# Patient Record
Sex: MALE | Race: WHITE | Employment: OTHER | URBAN - METROPOLITAN AREA
[De-identification: names, ages, dates, MRNs, and addresses within clinical notes are randomized per-mention and may not be internally consistent; named-entity substitution may affect disease eponyms.]

---

## 2019-12-15 ENCOUNTER — APPOINTMENT (OUTPATIENT)
Dept: CT IMAGING | Age: 69
DRG: 914 | End: 2019-12-15
Payer: MEDICARE

## 2019-12-15 ENCOUNTER — HOSPITAL ENCOUNTER (INPATIENT)
Age: 69
LOS: 1 days | Discharge: HOME | DRG: 914 | End: 2019-12-16
Attending: EMERGENCY MEDICINE | Admitting: SURGERY
Payer: MEDICARE

## 2019-12-15 PROBLEM — S36.892A TRAUMATIC RETROPERITONEAL HEMATOMA: Status: ACTIVE | Noted: 2019-12-15

## 2019-12-15 LAB
ABO/RH: NORMAL
ABSOLUTE EOS #: 0.21 K/UL (ref 0–0.44)
ABSOLUTE EOS #: 0.22 K/UL (ref 0–0.44)
ABSOLUTE IMMATURE GRANULOCYTE: 0.03 K/UL (ref 0–0.3)
ABSOLUTE IMMATURE GRANULOCYTE: 0.04 K/UL (ref 0–0.3)
ABSOLUTE LYMPH #: 1.52 K/UL (ref 1.1–3.7)
ABSOLUTE LYMPH #: 1.66 K/UL (ref 1.1–3.7)
ABSOLUTE MONO #: 0.72 K/UL (ref 0.1–1.2)
ABSOLUTE MONO #: 0.78 K/UL (ref 0.1–1.2)
ALBUMIN SERPL-MCNC: 4.1 G/DL (ref 3.5–5.2)
ALBUMIN/GLOBULIN RATIO: 1.5 (ref 1–2.5)
ALP BLD-CCNC: 85 U/L (ref 40–129)
ALT SERPL-CCNC: 27 U/L (ref 5–41)
ANION GAP SERPL CALCULATED.3IONS-SCNC: 16 MMOL/L (ref 9–17)
ANION GAP SERPL CALCULATED.3IONS-SCNC: 17 MMOL/L (ref 9–17)
ANTIBODY SCREEN: NEGATIVE
ARM BAND NUMBER: NORMAL
AST SERPL-CCNC: 23 U/L
BASOPHILS # BLD: 0 % (ref 0–2)
BASOPHILS # BLD: 0 % (ref 0–2)
BASOPHILS ABSOLUTE: 0.03 K/UL (ref 0–0.2)
BASOPHILS ABSOLUTE: <0.03 K/UL (ref 0–0.2)
BILIRUB SERPL-MCNC: 0.47 MG/DL (ref 0.3–1.2)
BILIRUBIN DIRECT: 0.12 MG/DL
BILIRUBIN, INDIRECT: 0.35 MG/DL (ref 0–1)
BUN BLDV-MCNC: 13 MG/DL (ref 8–23)
BUN BLDV-MCNC: 17 MG/DL (ref 8–23)
BUN/CREAT BLD: ABNORMAL (ref 9–20)
CALCIUM SERPL-MCNC: 8.6 MG/DL (ref 8.6–10.4)
CALCIUM SERPL-MCNC: 8.9 MG/DL (ref 8.6–10.4)
CHLORIDE BLD-SCNC: 102 MMOL/L (ref 98–107)
CHLORIDE BLD-SCNC: 108 MMOL/L (ref 98–107)
CO2: 19 MMOL/L (ref 20–31)
CO2: 21 MMOL/L (ref 20–31)
CREAT SERPL-MCNC: 0.67 MG/DL (ref 0.7–1.2)
CREAT SERPL-MCNC: 0.79 MG/DL (ref 0.7–1.2)
DIFFERENTIAL TYPE: ABNORMAL
DIFFERENTIAL TYPE: ABNORMAL
EOSINOPHILS RELATIVE PERCENT: 3 % (ref 1–4)
EOSINOPHILS RELATIVE PERCENT: 3 % (ref 1–4)
EXPIRATION DATE: NORMAL
GFR AFRICAN AMERICAN: >60 ML/MIN
GFR AFRICAN AMERICAN: >60 ML/MIN
GFR NON-AFRICAN AMERICAN: >60 ML/MIN
GFR NON-AFRICAN AMERICAN: >60 ML/MIN
GFR SERPL CREATININE-BSD FRML MDRD: ABNORMAL ML/MIN/{1.73_M2}
GLUCOSE BLD-MCNC: 151 MG/DL (ref 75–110)
GLUCOSE BLD-MCNC: 199 MG/DL (ref 70–99)
GLUCOSE BLD-MCNC: 207 MG/DL (ref 70–99)
GLUCOSE BLD-MCNC: 246 MG/DL (ref 75–110)
HCT VFR BLD CALC: 37.6 % (ref 40.7–50.3)
HCT VFR BLD CALC: 41.5 % (ref 40.7–50.3)
HCT VFR BLD CALC: 41.9 % (ref 40.7–50.3)
HEMOGLOBIN: 13.1 G/DL (ref 13–17)
HEMOGLOBIN: 13.7 G/DL (ref 13–17)
HEMOGLOBIN: 14.3 G/DL (ref 13–17)
IMMATURE GRANULOCYTES: 0 %
IMMATURE GRANULOCYTES: 1 %
INR BLD: 0.9
LACTIC ACID, WHOLE BLOOD: 4 MMOL/L (ref 0.7–2.1)
LYMPHOCYTES # BLD: 18 % (ref 24–43)
LYMPHOCYTES # BLD: 21 % (ref 24–43)
MAGNESIUM: 2 MG/DL (ref 1.6–2.6)
MCH RBC QN AUTO: 30 PG (ref 25.2–33.5)
MCH RBC QN AUTO: 30.2 PG (ref 25.2–33.5)
MCHC RBC AUTO-ENTMCNC: 33 G/DL (ref 28.4–34.8)
MCHC RBC AUTO-ENTMCNC: 34.1 G/DL (ref 28.4–34.8)
MCV RBC AUTO: 88.4 FL (ref 82.6–102.9)
MCV RBC AUTO: 90.8 FL (ref 82.6–102.9)
MONOCYTES # BLD: 9 % (ref 3–12)
MONOCYTES # BLD: 9 % (ref 3–12)
MRSA, DNA, NASAL: NORMAL
NRBC AUTOMATED: 0 PER 100 WBC
NRBC AUTOMATED: 0 PER 100 WBC
PARTIAL THROMBOPLASTIN TIME: 24.5 SEC (ref 20.5–30.5)
PARTIAL THROMBOPLASTIN TIME: 24.6 SEC (ref 20.5–30.5)
PDW BLD-RTO: 14.1 % (ref 11.8–14.4)
PDW BLD-RTO: 14.4 % (ref 11.8–14.4)
PHOSPHORUS: 3.1 MG/DL (ref 2.5–4.5)
PLATELET # BLD: 199 K/UL (ref 138–453)
PLATELET # BLD: 207 K/UL (ref 138–453)
PLATELET ESTIMATE: ABNORMAL
PLATELET ESTIMATE: ABNORMAL
PMV BLD AUTO: 10.5 FL (ref 8.1–13.5)
PMV BLD AUTO: 10.8 FL (ref 8.1–13.5)
POTASSIUM SERPL-SCNC: 3.9 MMOL/L (ref 3.7–5.3)
POTASSIUM SERPL-SCNC: 4.6 MMOL/L (ref 3.7–5.3)
PROTHROMBIN TIME: 9.6 SEC (ref 9–12)
RBC # BLD: 4.57 M/UL (ref 4.21–5.77)
RBC # BLD: 4.74 M/UL (ref 4.21–5.77)
RBC # BLD: ABNORMAL 10*6/UL
RBC # BLD: ABNORMAL 10*6/UL
SEG NEUTROPHILS: 66 % (ref 36–65)
SEG NEUTROPHILS: 69 % (ref 36–65)
SEGMENTED NEUTROPHILS ABSOLUTE COUNT: 5.18 K/UL (ref 1.5–8.1)
SEGMENTED NEUTROPHILS ABSOLUTE COUNT: 5.9 K/UL (ref 1.5–8.1)
SODIUM BLD-SCNC: 139 MMOL/L (ref 135–144)
SODIUM BLD-SCNC: 144 MMOL/L (ref 135–144)
SPECIMEN DESCRIPTION: NORMAL
TOTAL PROTEIN: 6.8 G/DL (ref 6.4–8.3)
TROPONIN INTERP: NORMAL
TROPONIN T: NORMAL NG/ML
TROPONIN, HIGH SENSITIVITY: 16 NG/L (ref 0–22)
WBC # BLD: 7.8 K/UL (ref 3.5–11.3)
WBC # BLD: 8.5 K/UL (ref 3.5–11.3)
WBC # BLD: ABNORMAL 10*3/UL
WBC # BLD: ABNORMAL 10*3/UL

## 2019-12-15 PROCEDURE — 86850 RBC ANTIBODY SCREEN: CPT

## 2019-12-15 PROCEDURE — 82805 BLOOD GASES W/O2 SATURATION: CPT

## 2019-12-15 PROCEDURE — 82248 BILIRUBIN DIRECT: CPT

## 2019-12-15 PROCEDURE — 83735 ASSAY OF MAGNESIUM: CPT

## 2019-12-15 PROCEDURE — 85610 PROTHROMBIN TIME: CPT

## 2019-12-15 PROCEDURE — 99285 EMERGENCY DEPT VISIT HI MDM: CPT

## 2019-12-15 PROCEDURE — 82947 ASSAY GLUCOSE BLOOD QUANT: CPT

## 2019-12-15 PROCEDURE — 85025 COMPLETE CBC W/AUTO DIFF WBC: CPT

## 2019-12-15 PROCEDURE — 70450 CT HEAD/BRAIN W/O DYE: CPT

## 2019-12-15 PROCEDURE — 86901 BLOOD TYPING SEROLOGIC RH(D): CPT

## 2019-12-15 PROCEDURE — 85027 COMPLETE CBC AUTOMATED: CPT

## 2019-12-15 PROCEDURE — 6370000000 HC RX 637 (ALT 250 FOR IP): Performed by: STUDENT IN AN ORGANIZED HEALTH CARE EDUCATION/TRAINING PROGRAM

## 2019-12-15 PROCEDURE — 80051 ELECTROLYTE PANEL: CPT

## 2019-12-15 PROCEDURE — 83605 ASSAY OF LACTIC ACID: CPT

## 2019-12-15 PROCEDURE — 86900 BLOOD TYPING SEROLOGIC ABO: CPT

## 2019-12-15 PROCEDURE — 80053 COMPREHEN METABOLIC PANEL: CPT

## 2019-12-15 PROCEDURE — 6360000004 HC RX CONTRAST MEDICATION: Performed by: EMERGENCY MEDICINE

## 2019-12-15 PROCEDURE — G0480 DRUG TEST DEF 1-7 CLASSES: HCPCS

## 2019-12-15 PROCEDURE — 72128 CT CHEST SPINE W/O DYE: CPT

## 2019-12-15 PROCEDURE — 2580000003 HC RX 258: Performed by: STUDENT IN AN ORGANIZED HEALTH CARE EDUCATION/TRAINING PROGRAM

## 2019-12-15 PROCEDURE — 72125 CT NECK SPINE W/O DYE: CPT

## 2019-12-15 PROCEDURE — 80307 DRUG TEST PRSMV CHEM ANLYZR: CPT

## 2019-12-15 PROCEDURE — 92523 SPEECH SOUND LANG COMPREHEN: CPT

## 2019-12-15 PROCEDURE — 84100 ASSAY OF PHOSPHORUS: CPT

## 2019-12-15 PROCEDURE — 80048 BASIC METABOLIC PNL TOTAL CA: CPT

## 2019-12-15 PROCEDURE — 72131 CT LUMBAR SPINE W/O DYE: CPT

## 2019-12-15 PROCEDURE — 84520 ASSAY OF UREA NITROGEN: CPT

## 2019-12-15 PROCEDURE — 85018 HEMOGLOBIN: CPT

## 2019-12-15 PROCEDURE — 84484 ASSAY OF TROPONIN QUANT: CPT

## 2019-12-15 PROCEDURE — 84703 CHORIONIC GONADOTROPIN ASSAY: CPT

## 2019-12-15 PROCEDURE — 85730 THROMBOPLASTIN TIME PARTIAL: CPT

## 2019-12-15 PROCEDURE — 36415 COLL VENOUS BLD VENIPUNCTURE: CPT

## 2019-12-15 PROCEDURE — 71260 CT THORAX DX C+: CPT

## 2019-12-15 PROCEDURE — 85014 HEMATOCRIT: CPT

## 2019-12-15 PROCEDURE — 87641 MR-STAPH DNA AMP PROBE: CPT

## 2019-12-15 PROCEDURE — 2060000000 HC ICU INTERMEDIATE R&B

## 2019-12-15 PROCEDURE — 82565 ASSAY OF CREATININE: CPT

## 2019-12-15 RX ORDER — SODIUM CHLORIDE 0.9 % (FLUSH) 0.9 %
10 SYRINGE (ML) INJECTION PRN
Status: DISCONTINUED | OUTPATIENT
Start: 2019-12-15 | End: 2019-12-16 | Stop reason: HOSPADM

## 2019-12-15 RX ORDER — SIMVASTATIN 20 MG
20 TABLET ORAL NIGHTLY
Status: DISCONTINUED | OUTPATIENT
Start: 2019-12-15 | End: 2019-12-16 | Stop reason: HOSPADM

## 2019-12-15 RX ORDER — METHOCARBAMOL 500 MG/1
750 TABLET, FILM COATED ORAL 3 TIMES DAILY PRN
Status: DISCONTINUED | OUTPATIENT
Start: 2019-12-15 | End: 2019-12-16 | Stop reason: HOSPADM

## 2019-12-15 RX ORDER — ACETAMINOPHEN 500 MG
1000 TABLET ORAL EVERY 8 HOURS SCHEDULED
Status: DISCONTINUED | OUTPATIENT
Start: 2019-12-15 | End: 2019-12-16 | Stop reason: HOSPADM

## 2019-12-15 RX ORDER — SODIUM CHLORIDE, SODIUM LACTATE, POTASSIUM CHLORIDE, CALCIUM CHLORIDE 600; 310; 30; 20 MG/100ML; MG/100ML; MG/100ML; MG/100ML
INJECTION, SOLUTION INTRAVENOUS CONTINUOUS
Status: DISCONTINUED | OUTPATIENT
Start: 2019-12-15 | End: 2019-12-16 | Stop reason: HOSPADM

## 2019-12-15 RX ORDER — GLYBURIDE-METFORMIN HYDROCHLORIDE 5; 500 MG/1; MG/1
1 TABLET ORAL 2 TIMES DAILY WITH MEALS
Status: CANCELLED | OUTPATIENT
Start: 2019-12-15

## 2019-12-15 RX ORDER — AMLODIPINE BESYLATE 5 MG/1
5 TABLET ORAL DAILY
Status: DISCONTINUED | OUTPATIENT
Start: 2019-12-15 | End: 2019-12-16 | Stop reason: HOSPADM

## 2019-12-15 RX ORDER — LEVOTHYROXINE SODIUM 0.05 MG/1
50 TABLET ORAL DAILY
Status: DISCONTINUED | OUTPATIENT
Start: 2019-12-15 | End: 2019-12-16 | Stop reason: HOSPADM

## 2019-12-15 RX ORDER — ONDANSETRON 2 MG/ML
4 INJECTION INTRAMUSCULAR; INTRAVENOUS EVERY 6 HOURS PRN
Status: DISCONTINUED | OUTPATIENT
Start: 2019-12-15 | End: 2019-12-16 | Stop reason: HOSPADM

## 2019-12-15 RX ORDER — SODIUM CHLORIDE 0.9 % (FLUSH) 0.9 %
10 SYRINGE (ML) INJECTION EVERY 12 HOURS SCHEDULED
Status: DISCONTINUED | OUTPATIENT
Start: 2019-12-15 | End: 2019-12-16 | Stop reason: HOSPADM

## 2019-12-15 RX ORDER — GABAPENTIN 100 MG/1
100 CAPSULE ORAL 3 TIMES DAILY
Status: DISCONTINUED | OUTPATIENT
Start: 2019-12-15 | End: 2019-12-16 | Stop reason: HOSPADM

## 2019-12-15 RX ORDER — MEMANTINE HYDROCHLORIDE 5 MG/1
10 TABLET ORAL 2 TIMES DAILY
Status: DISCONTINUED | OUTPATIENT
Start: 2019-12-15 | End: 2019-12-16 | Stop reason: HOSPADM

## 2019-12-15 RX ADMIN — MEMANTINE HYDROCHLORIDE 10 MG: 5 TABLET, FILM COATED ORAL at 09:00

## 2019-12-15 RX ADMIN — SODIUM CHLORIDE, PRESERVATIVE FREE 10 ML: 5 INJECTION INTRAVENOUS at 21:05

## 2019-12-15 RX ADMIN — ACETAMINOPHEN 1000 MG: 500 TABLET ORAL at 10:37

## 2019-12-15 RX ADMIN — GABAPENTIN 100 MG: 100 CAPSULE ORAL at 20:49

## 2019-12-15 RX ADMIN — SIMVASTATIN 20 MG: 20 TABLET, FILM COATED ORAL at 20:50

## 2019-12-15 RX ADMIN — ACETAMINOPHEN 1000 MG: 500 TABLET ORAL at 14:58

## 2019-12-15 RX ADMIN — INSULIN LISPRO 2 UNITS: 100 INJECTION, SOLUTION INTRAVENOUS; SUBCUTANEOUS at 20:50

## 2019-12-15 RX ADMIN — GABAPENTIN 100 MG: 100 CAPSULE ORAL at 09:00

## 2019-12-15 RX ADMIN — ACETAMINOPHEN 1000 MG: 500 TABLET ORAL at 20:50

## 2019-12-15 RX ADMIN — SODIUM CHLORIDE, PRESERVATIVE FREE 10 ML: 5 INJECTION INTRAVENOUS at 09:00

## 2019-12-15 RX ADMIN — INSULIN LISPRO 4 UNITS: 100 INJECTION, SOLUTION INTRAVENOUS; SUBCUTANEOUS at 18:25

## 2019-12-15 RX ADMIN — IOHEXOL 75 ML: 350 INJECTION, SOLUTION INTRAVENOUS at 05:08

## 2019-12-15 RX ADMIN — MEMANTINE HYDROCHLORIDE 10 MG: 5 TABLET, FILM COATED ORAL at 20:50

## 2019-12-15 RX ADMIN — LEVOTHYROXINE SODIUM 50 MCG: 50 TABLET ORAL at 10:37

## 2019-12-15 RX ADMIN — AMLODIPINE BESYLATE 5 MG: 5 TABLET ORAL at 10:39

## 2019-12-15 RX ADMIN — INSULIN LISPRO 2 UNITS: 100 INJECTION, SOLUTION INTRAVENOUS; SUBCUTANEOUS at 12:00

## 2019-12-15 RX ADMIN — GABAPENTIN 100 MG: 100 CAPSULE ORAL at 14:00

## 2019-12-15 RX ADMIN — INSULIN LISPRO 4 UNITS: 100 INJECTION, SOLUTION INTRAVENOUS; SUBCUTANEOUS at 09:00

## 2019-12-15 RX ADMIN — SODIUM CHLORIDE, POTASSIUM CHLORIDE, SODIUM LACTATE AND CALCIUM CHLORIDE: 600; 310; 30; 20 INJECTION, SOLUTION INTRAVENOUS at 10:38

## 2019-12-15 ASSESSMENT — ENCOUNTER SYMPTOMS
BACK PAIN: 1
SHORTNESS OF BREATH: 0
ABDOMINAL PAIN: 0

## 2019-12-15 ASSESSMENT — PAIN SCALES - GENERAL
PAINLEVEL_OUTOF10: 8
PAINLEVEL_OUTOF10: 4
PAINLEVEL_OUTOF10: 8
PAINLEVEL_OUTOF10: 0
PAINLEVEL_OUTOF10: 0

## 2019-12-15 ASSESSMENT — PAIN - FUNCTIONAL ASSESSMENT: PAIN_FUNCTIONAL_ASSESSMENT: 0-10

## 2019-12-15 NOTE — PLAN OF CARE
improve  12/15/2019 1712 by Kamla Miranda RN  Outcome: Ongoing  12/15/2019 1711 by Kamla Miranda RN  Outcome: Not Met This Shift     Problem: Respiratory:  Goal: Ability to maintain normal respiratory secretions will improve  Description  Ability to maintain normal respiratory secretions will improve  12/15/2019 1712 by Kamla Miranda RN  Outcome: Ongoing  12/15/2019 1711 by Kamla Miranda RN  Outcome: Not Met This Shift

## 2019-12-15 NOTE — ED NOTES
Lab notified, awaiting lab results for pt. Stated they will call back.      Koleen Phoenix, RN  12/15/19 9786
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
## 2019-12-15 NOTE — PROGRESS NOTES
Comprehension  Comprehension: Within Functional Limits         Expression  Primary Mode of Expression: Verbal    Verbal Expression  Verbal Expression: Within functional limits         Motor Speech  Motor Speech:  Within Functional Limits         Cognition:      Orientation  Overall Orientation Status: Within Normal Limits  Attention  Attention: Within Functional Limits  Memory  Memory: Within Funtional Limits  Problem Solving  Problem Solving: Within Functional Limits  Abstract Reasoning  Abstract Reasoning: Within Functional Limits  Safety/Judgement  Safety/Judgement: Within Functional Limits  Verbal Sequencing: WFL  Thought Organization: WFL  Word Generation: WFL    Prognosis:  Speech Therapy Prognosis  Prognosis: Good  Individuals consulted  Consulted and agree with results and recommendations: Patient    Education:  Patient Education: yes  Patient Education Response: Verbalizes understanding             Therapy Time:   Individual Concurrent Group Co-treatment   Time In 2262         Time Out 1047         Minutes Blanca Schaffer M.S. CCC-SLP    12/15/2019 11:00 AM

## 2019-12-16 ENCOUNTER — APPOINTMENT (OUTPATIENT)
Dept: GENERAL RADIOLOGY | Age: 69
DRG: 914 | End: 2019-12-16
Payer: MEDICARE

## 2019-12-16 VITALS
HEIGHT: 71 IN | RESPIRATION RATE: 21 BRPM | OXYGEN SATURATION: 90 % | HEART RATE: 88 BPM | DIASTOLIC BLOOD PRESSURE: 70 MMHG | BODY MASS INDEX: 27.02 KG/M2 | TEMPERATURE: 98.6 F | WEIGHT: 193 LBS | SYSTOLIC BLOOD PRESSURE: 159 MMHG

## 2019-12-16 LAB
ABSOLUTE EOS #: 0.42 K/UL (ref 0–0.44)
ABSOLUTE IMMATURE GRANULOCYTE: <0.03 K/UL (ref 0–0.3)
ABSOLUTE LYMPH #: 1.29 K/UL (ref 1.1–3.7)
ABSOLUTE MONO #: 0.7 K/UL (ref 0.1–1.2)
ACETAMINOPHEN LEVEL: <5 UG/ML (ref 10–30)
ANION GAP SERPL CALCULATED.3IONS-SCNC: 12 MMOL/L (ref 9–17)
BASOPHILS # BLD: 0 % (ref 0–2)
BASOPHILS ABSOLUTE: 0.03 K/UL (ref 0–0.2)
BUN BLDV-MCNC: 11 MG/DL (ref 8–23)
BUN/CREAT BLD: ABNORMAL (ref 9–20)
CALCIUM SERPL-MCNC: 8.5 MG/DL (ref 8.6–10.4)
CHLORIDE BLD-SCNC: 103 MMOL/L (ref 98–107)
CO2: 26 MMOL/L (ref 20–31)
CREAT SERPL-MCNC: 0.63 MG/DL (ref 0.7–1.2)
DIFFERENTIAL TYPE: ABNORMAL
EOSINOPHILS RELATIVE PERCENT: 6 % (ref 1–4)
ETHANOL PERCENT: 0.07 %
ETHANOL: 71 MG/DL
GFR AFRICAN AMERICAN: >60 ML/MIN
GFR NON-AFRICAN AMERICAN: >60 ML/MIN
GFR SERPL CREATININE-BSD FRML MDRD: ABNORMAL ML/MIN/{1.73_M2}
GFR SERPL CREATININE-BSD FRML MDRD: ABNORMAL ML/MIN/{1.73_M2}
GLUCOSE BLD-MCNC: 172 MG/DL (ref 75–110)
GLUCOSE BLD-MCNC: 175 MG/DL (ref 70–99)
GLUCOSE BLD-MCNC: 276 MG/DL (ref 75–110)
GLUCOSE BLD-MCNC: 93 MG/DL (ref 75–110)
HCT VFR BLD CALC: 39.4 % (ref 40.7–50.3)
HCT VFR BLD CALC: 39.8 % (ref 40.7–50.3)
HEMOGLOBIN: 13.5 G/DL (ref 13–17)
HEMOGLOBIN: 13.7 G/DL (ref 13–17)
IMMATURE GRANULOCYTES: 0 %
LYMPHOCYTES # BLD: 19 % (ref 24–43)
MCH RBC QN AUTO: 31 PG (ref 25.2–33.5)
MCHC RBC AUTO-ENTMCNC: 34.4 G/DL (ref 28.4–34.8)
MCV RBC AUTO: 90 FL (ref 82.6–102.9)
MONOCYTES # BLD: 10 % (ref 3–12)
NRBC AUTOMATED: 0 PER 100 WBC
PDW BLD-RTO: 14.1 % (ref 11.8–14.4)
PLATELET # BLD: 227 K/UL (ref 138–453)
PLATELET ESTIMATE: ABNORMAL
PMV BLD AUTO: 11.3 FL (ref 8.1–13.5)
POTASSIUM SERPL-SCNC: 4.8 MMOL/L (ref 3.7–5.3)
RBC # BLD: 4.42 M/UL (ref 4.21–5.77)
RBC # BLD: ABNORMAL 10*6/UL
SALICYLATE LEVEL: <1 MG/DL (ref 3–10)
SEG NEUTROPHILS: 65 % (ref 36–65)
SEGMENTED NEUTROPHILS ABSOLUTE COUNT: 4.48 K/UL (ref 1.5–8.1)
SODIUM BLD-SCNC: 141 MMOL/L (ref 135–144)
TOXIC TRICYCLIC SC,BLOOD: NEGATIVE
WBC # BLD: 6.9 K/UL (ref 3.5–11.3)
WBC # BLD: ABNORMAL 10*3/UL

## 2019-12-16 PROCEDURE — 2580000003 HC RX 258: Performed by: STUDENT IN AN ORGANIZED HEALTH CARE EDUCATION/TRAINING PROGRAM

## 2019-12-16 PROCEDURE — 6370000000 HC RX 637 (ALT 250 FOR IP): Performed by: STUDENT IN AN ORGANIZED HEALTH CARE EDUCATION/TRAINING PROGRAM

## 2019-12-16 PROCEDURE — 85025 COMPLETE CBC W/AUTO DIFF WBC: CPT

## 2019-12-16 PROCEDURE — 85014 HEMATOCRIT: CPT

## 2019-12-16 PROCEDURE — 97535 SELF CARE MNGMENT TRAINING: CPT

## 2019-12-16 PROCEDURE — APPSS30 APP SPLIT SHARED TIME 16-30 MINUTES: Performed by: NURSE PRACTITIONER

## 2019-12-16 PROCEDURE — 85018 HEMOGLOBIN: CPT

## 2019-12-16 PROCEDURE — 36415 COLL VENOUS BLD VENIPUNCTURE: CPT

## 2019-12-16 PROCEDURE — 73030 X-RAY EXAM OF SHOULDER: CPT

## 2019-12-16 PROCEDURE — 97165 OT EVAL LOW COMPLEX 30 MIN: CPT

## 2019-12-16 PROCEDURE — 97116 GAIT TRAINING THERAPY: CPT

## 2019-12-16 PROCEDURE — 97162 PT EVAL MOD COMPLEX 30 MIN: CPT

## 2019-12-16 PROCEDURE — 80048 BASIC METABOLIC PNL TOTAL CA: CPT

## 2019-12-16 PROCEDURE — 82947 ASSAY GLUCOSE BLOOD QUANT: CPT

## 2019-12-16 RX ADMIN — GABAPENTIN 100 MG: 100 CAPSULE ORAL at 08:25

## 2019-12-16 RX ADMIN — ACETAMINOPHEN 1000 MG: 500 TABLET ORAL at 14:00

## 2019-12-16 RX ADMIN — ACETAMINOPHEN 1000 MG: 500 TABLET ORAL at 06:09

## 2019-12-16 RX ADMIN — INSULIN LISPRO 2 UNITS: 100 INJECTION, SOLUTION INTRAVENOUS; SUBCUTANEOUS at 08:23

## 2019-12-16 RX ADMIN — GABAPENTIN 100 MG: 100 CAPSULE ORAL at 14:00

## 2019-12-16 RX ADMIN — LEVOTHYROXINE SODIUM 50 MCG: 50 TABLET ORAL at 06:08

## 2019-12-16 RX ADMIN — SODIUM CHLORIDE, POTASSIUM CHLORIDE, SODIUM LACTATE AND CALCIUM CHLORIDE: 600; 310; 30; 20 INJECTION, SOLUTION INTRAVENOUS at 10:14

## 2019-12-16 RX ADMIN — INSULIN LISPRO 6 UNITS: 100 INJECTION, SOLUTION INTRAVENOUS; SUBCUTANEOUS at 12:23

## 2019-12-16 RX ADMIN — MEMANTINE HYDROCHLORIDE 10 MG: 5 TABLET, FILM COATED ORAL at 08:25

## 2019-12-16 RX ADMIN — AMLODIPINE BESYLATE 5 MG: 5 TABLET ORAL at 08:25

## 2019-12-16 RX ADMIN — METHOCARBAMOL TABLETS 750 MG: 500 TABLET, COATED ORAL at 12:23

## 2019-12-16 ASSESSMENT — PAIN DESCRIPTION - PROGRESSION: CLINICAL_PROGRESSION: NOT CHANGED

## 2019-12-16 ASSESSMENT — PAIN DESCRIPTION - LOCATION
LOCATION: EYE
LOCATION: EYE

## 2019-12-16 ASSESSMENT — PAIN - FUNCTIONAL ASSESSMENT: PAIN_FUNCTIONAL_ASSESSMENT: ACTIVITIES ARE NOT PREVENTED

## 2019-12-16 ASSESSMENT — PAIN DESCRIPTION - ONSET: ONSET: ON-GOING

## 2019-12-16 ASSESSMENT — PAIN DESCRIPTION - ORIENTATION
ORIENTATION: LEFT
ORIENTATION: LEFT

## 2019-12-16 ASSESSMENT — PAIN DESCRIPTION - DESCRIPTORS: DESCRIPTORS: ACHING

## 2019-12-16 ASSESSMENT — PAIN SCALES - GENERAL
PAINLEVEL_OUTOF10: 1
PAINLEVEL_OUTOF10: 4
PAINLEVEL_OUTOF10: 4
PAINLEVEL_OUTOF10: 9
PAINLEVEL_OUTOF10: 1

## 2019-12-16 ASSESSMENT — PAIN DESCRIPTION - FREQUENCY: FREQUENCY: CONTINUOUS

## 2019-12-16 ASSESSMENT — PAIN DESCRIPTION - PAIN TYPE
TYPE: ACUTE PAIN
TYPE: ACUTE PAIN

## 2019-12-16 NOTE — CARE COORDINATION
SBIRT -completed  Met with patient this date states he drinks about 5 shots of Black Velvet on the weekend. Smokes marijuana x1 weekly  Denies any suicidal ideations  No prior rehab          Alcohol Screening and Brief Intervention        Recent Labs     12/15/19  0400   ALC 71*       Alcohol Pre-screening  (MEN ONLY) How many times in the past year have you had 5 or more drinks in a day?: None       Alcohol Screening Audit       Drug Pre-Screening   How many times in the past year have you used a recreational drug or used a prescription medication for nonmedical reasons?: 1 or more    Drug Screening DAST  TOTAL SCORE[de-identified] 1    Mood Pre-Screening (PHQ-2)  During the past two weeks, have you been bothered by little interest or pleasure in doing things?: No  During the past two weeks, have you been bothered by feeling down, depressed, or hopeless?: No    Mood Pre-Screening (PHQ-9)         I have interviewed Ayaan Valenzuela, [id number] regarding  His alcohol consumption/drug use and risk for excessive use. Screenings were negative. Patient  N/A intervention at this time.      Deferred []    Completed on: 12/16/2019   [address] [facility]

## 2019-12-16 NOTE — PROGRESS NOTES
Activity; Distraction;Repositioned  Response to Pain Intervention: Patient Satisfied    Oxygen Therapy  O2 Device: None (Room air)    Social/Functional History  Social/Functional History  Lives With: Spouse  Type of Home: House  Home Layout: One level  Home Access: Stairs to enter without rails  Entrance Stairs - Number of Steps: 1 big step  Bathroom Shower/Tub: Tub/Shower unit  Bathroom Toilet: Standard  Bathroom Equipment: (daughter is going to bring over shower chair )  Home Equipment: Cane(pt reported only using cane for long distances )  Receives Help From: Family  ADL Assistance: Independent  Homemaking Assistance: Independent  Homemaking Responsibilities: Yes(pt reported splitting with wife )  Meal Prep Responsibility: Secondary  Laundry Responsibility: Secondary  Cleaning Responsibility: Secondary  Ambulation Assistance: Independent  Transfer Assistance: Independent  Active : Yes  Occupation: Retired  Type of occupation: supervisor in factories  Additional Comments: pt reported wife able to assist PRN      Objective   Vision: Within Functional Limits  Hearing: Within functional limits    Orientation  Overall Orientation Status: Within Functional Limits     Balance  Sitting Balance: Independent(~8 minutes on eOB and in chair )  Standing Balance: Supervision  Standing Balance  Time: ~5 minutes   Activity: pt completed functional mobility in hallway with PT and within hospital room   Comment: pt with no LOB      ADL  Feeding: Independent  Grooming: Independent  UE Bathing: Independent  LE Bathing: Modified independent   UE Dressing: Independent  LE Dressing: Modified independent (pt donned/doffed sock while sitting on eOB with no difficulties)  Toileting: Modified independent   Tone RUE  RUE Tone: Normotonic  Tone LUE  LUE Tone: Normotonic  Coordination  Movements Are Fluid And Coordinated: Yes     Bed mobility  Supine to Sit: Modified independent  Sit to Supine: (pt retired to chair at end of session)  Scooting: Modified independent  Transfers  Sit to stand: Supervision  Stand to sit: Supervision     Cognition  Overall Cognitive Status: WFL     Sensation  Overall Sensation Status: WFL      LUE AROM : WFL  Left Hand AROM: WFL  RUE AROM : WFL  Right Hand AROM: WFL    LUE Strength  Gross LUE Strength: WFL  L Hand General: 5/5  RUE Strength  Gross RUE Strength: WFL  R Hand General: 5/5      Plan   Plan  Times per week: D/C OT     AM-PAC Score   AM-[facility] Inpatient Daily Activity Raw Score: 24 (12/16/19 1132)  AM-PAC Inpatient ADL T-Scale Score : 57.54 (12/16/19 1132)  ADL Inpatient CMS 0-100% Score: 0 (12/16/19 1132)  ADL Inpatient CMS G-Code Modifier : [facility] (12/16/19 1132)    Therapy Time   Individual Concurrent Group Co-treatment   Time In 1013         Time Out [facility], OTR/L

## 2019-12-16 NOTE — CARE COORDINATION
Case Management Initial Discharge Plan  F[place],             Met with:patient patient to discuss discharge plans. Information verified: address, contacts, phone number, , insurance Yes  PCP: Chase Foster DO  Date of last visit: 2 months ago    Insurance Provider: auto insurance and [employer] medicare    Discharge Planning    Living Arrangements:  Spouse/Significant Other   Support Systems:  Family Members    Home has 1 stories  2 stairs to climb to get into front door, 0stairs to climb to reach second floor  Location of bedroom/bathroom in home main    Patient able to perform ADL's:Independent    Current Services (outpatient & in home) none  DME equipment: cane  DME provider: 0      Potential Assistance Needed:  Outpatient PT/OT    Patient agreeable to home care: No  [place] of choice provided:  n/a    Prior SNF/Rehab Placement and Facility: none  Agreeable to SNF/Rehab: No  [place] of choice provided: n/a   Evaluation: no    Expected Discharge date:     Patient expects to be discharged to: Follow Up Appointment: Best Day/ Time:      Transportation provider: family  Transportation arrangements needed for discharge: No    Readmission Risk              Risk of Unplanned Readmission:        11             Does patient have a readmission risk score greater than 14?: No  If yes, follow-up appointment must be made within 7 days of discharge. Goals of Care: pain control      Discharge Plan: home independently.           Electronically signed by Khai Munoz RN on 19 at 12:10 PM

## 2019-12-17 NOTE — CARE COORDINATION
Discharge [facility] Case Management Department  Written by: Jn Prasad RN    Patient Name: Cindy Gay  Attending Provider: No att. providers found  Admit Date: 12/15/2019  1:22 AM  MRN: [id number]  Account: [de-identified]                     : 1950  Discharge Date: 2019      Disposition: home independently.     Jn Prasad RN
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
## 2021-07-03 ENCOUNTER — HOSPITAL ENCOUNTER (INPATIENT)
Age: 71
LOS: 2 days | Discharge: HOME | DRG: 596 | End: 2021-07-08
Attending: EMERGENCY MEDICINE | Admitting: FAMILY MEDICINE
Payer: MEDICARE

## 2021-07-03 ENCOUNTER — APPOINTMENT (OUTPATIENT)
Dept: CT IMAGING | Age: 71
DRG: 596 | End: 2021-07-03
Payer: MEDICARE

## 2021-07-03 DIAGNOSIS — B02.9 HERPES ZOSTER WITHOUT COMPLICATION: Primary | ICD-10-CM

## 2021-07-03 DIAGNOSIS — C85.90 NON-HODGKIN'S LYMPHOMA, UNSPECIFIED BODY REGION, UNSPECIFIED NON-HODGKIN LYMPHOMA TYPE (HCC): ICD-10-CM

## 2021-07-03 PROBLEM — N17.9 ACUTE KIDNEY INJURY (HCC): Status: ACTIVE | Noted: 2021-07-03

## 2021-07-03 LAB
-: ABNORMAL
ABSOLUTE EOS #: 0.1 K/UL (ref 0–0.4)
ABSOLUTE IMMATURE GRANULOCYTE: ABNORMAL K/UL (ref 0–0.3)
ABSOLUTE LYMPH #: 1.4 K/UL (ref 1–4.8)
ABSOLUTE MONO #: 0.7 K/UL (ref 0.1–1.3)
ALBUMIN SERPL-MCNC: 3.9 G/DL (ref 3.5–5.2)
ALBUMIN/GLOBULIN RATIO: ABNORMAL (ref 1–2.5)
ALP BLD-CCNC: 144 U/L (ref 40–129)
ALT SERPL-CCNC: 14 U/L (ref 5–41)
AMORPHOUS: ABNORMAL
ANION GAP SERPL CALCULATED.3IONS-SCNC: 10 MMOL/L (ref 9–17)
AST SERPL-CCNC: 16 U/L
BACTERIA: ABNORMAL
BASOPHILS # BLD: 0 % (ref 0–2)
BASOPHILS ABSOLUTE: 0 K/UL (ref 0–0.2)
BILIRUB SERPL-MCNC: 0.67 MG/DL (ref 0.3–1.2)
BILIRUBIN URINE: NEGATIVE
BUN BLDV-MCNC: 39 MG/DL (ref 8–23)
BUN/CREAT BLD: ABNORMAL (ref 9–20)
CALCIUM SERPL-MCNC: 8.8 MG/DL (ref 8.6–10.4)
CASTS UA: ABNORMAL /LPF
CHLORIDE BLD-SCNC: 105 MMOL/L (ref 98–107)
CO2: 26 MMOL/L (ref 20–31)
COLOR: YELLOW
COMMENT UA: ABNORMAL
CREAT SERPL-MCNC: 1.36 MG/DL (ref 0.7–1.2)
CRYSTALS, UA: ABNORMAL /HPF
DIFFERENTIAL TYPE: ABNORMAL
EOSINOPHILS RELATIVE PERCENT: 1 % (ref 0–4)
EPITHELIAL CELLS UA: ABNORMAL /HPF
GFR AFRICAN AMERICAN: >60 ML/MIN
GFR NON-AFRICAN AMERICAN: 52 ML/MIN
GFR SERPL CREATININE-BSD FRML MDRD: ABNORMAL ML/MIN/{1.73_M2}
GFR SERPL CREATININE-BSD FRML MDRD: ABNORMAL ML/MIN/{1.73_M2}
GLUCOSE BLD-MCNC: 139 MG/DL (ref 70–99)
GLUCOSE URINE: NEGATIVE
HCT VFR BLD CALC: 40 % (ref 41–53)
HEMOGLOBIN: 13.5 G/DL (ref 13.5–17.5)
IMMATURE GRANULOCYTES: ABNORMAL %
KETONES, URINE: NEGATIVE
LEUKOCYTE ESTERASE, URINE: NEGATIVE
LYMPHOCYTES # BLD: 15 % (ref 24–44)
MCH RBC QN AUTO: 28.9 PG (ref 26–34)
MCHC RBC AUTO-ENTMCNC: 33.7 G/DL (ref 31–37)
MCV RBC AUTO: 85.6 FL (ref 80–100)
MONOCYTES # BLD: 8 % (ref 1–7)
MUCUS: ABNORMAL
NITRITE, URINE: NEGATIVE
NRBC AUTOMATED: ABNORMAL PER 100 WBC
OTHER OBSERVATIONS UA: ABNORMAL
PDW BLD-RTO: 15.2 % (ref 11.5–14.9)
PH UA: 5.5 (ref 5–8)
PLATELET # BLD: 184 K/UL (ref 150–450)
PLATELET ESTIMATE: ABNORMAL
PMV BLD AUTO: 8.2 FL (ref 6–12)
POTASSIUM SERPL-SCNC: 4.3 MMOL/L (ref 3.7–5.3)
PROTEIN UA: ABNORMAL
RBC # BLD: 4.68 M/UL (ref 4.5–5.9)
RBC # BLD: ABNORMAL 10*6/UL
RBC UA: ABNORMAL /HPF
RENAL EPITHELIAL, UA: ABNORMAL /HPF
SEG NEUTROPHILS: 76 % (ref 36–66)
SEGMENTED NEUTROPHILS ABSOLUTE COUNT: 6.7 K/UL (ref 1.3–9.1)
SODIUM BLD-SCNC: 141 MMOL/L (ref 135–144)
SPECIFIC GRAVITY UA: 1.02 (ref 1–1.03)
TOTAL PROTEIN: 6.4 G/DL (ref 6.4–8.3)
TRICHOMONAS: ABNORMAL
TURBIDITY: CLEAR
URINE HGB: ABNORMAL
UROBILINOGEN, URINE: NORMAL
WBC # BLD: 8.9 K/UL (ref 3.5–11)
WBC # BLD: ABNORMAL 10*3/UL
WBC UA: ABNORMAL /HPF
YEAST: ABNORMAL

## 2021-07-03 PROCEDURE — 6370000000 HC RX 637 (ALT 250 FOR IP): Performed by: FAMILY MEDICINE

## 2021-07-03 PROCEDURE — 2580000003 HC RX 258: Performed by: FAMILY MEDICINE

## 2021-07-03 PROCEDURE — 80053 COMPREHEN METABOLIC PANEL: CPT

## 2021-07-03 PROCEDURE — 2580000003 HC RX 258: Performed by: EMERGENCY MEDICINE

## 2021-07-03 PROCEDURE — G0378 HOSPITAL OBSERVATION PER HR: HCPCS

## 2021-07-03 PROCEDURE — 36415 COLL VENOUS BLD VENIPUNCTURE: CPT

## 2021-07-03 PROCEDURE — 85025 COMPLETE CBC W/AUTO DIFF WBC: CPT

## 2021-07-03 PROCEDURE — 6360000004 HC RX CONTRAST MEDICATION: Performed by: EMERGENCY MEDICINE

## 2021-07-03 PROCEDURE — 6370000000 HC RX 637 (ALT 250 FOR IP): Performed by: EMERGENCY MEDICINE

## 2021-07-03 PROCEDURE — 99284 EMERGENCY DEPT VISIT MOD MDM: CPT

## 2021-07-03 PROCEDURE — 96374 THER/PROPH/DIAG INJ IV PUSH: CPT

## 2021-07-03 PROCEDURE — 6360000002 HC RX W HCPCS: Performed by: EMERGENCY MEDICINE

## 2021-07-03 PROCEDURE — 74177 CT ABD & PELVIS W/CONTRAST: CPT

## 2021-07-03 PROCEDURE — 6360000002 HC RX W HCPCS: Performed by: FAMILY MEDICINE

## 2021-07-03 PROCEDURE — 96375 TX/PRO/DX INJ NEW DRUG ADDON: CPT

## 2021-07-03 PROCEDURE — 81001 URINALYSIS AUTO W/SCOPE: CPT

## 2021-07-03 RX ORDER — LEVOTHYROXINE SODIUM 0.05 MG/1
50 TABLET ORAL DAILY
Status: DISCONTINUED | OUTPATIENT
Start: 2021-07-04 | End: 2021-07-08 | Stop reason: HOSPADM

## 2021-07-03 RX ORDER — ONDANSETRON 4 MG/1
4 TABLET, ORALLY DISINTEGRATING ORAL EVERY 8 HOURS PRN
Status: DISCONTINUED | OUTPATIENT
Start: 2021-07-03 | End: 2021-07-08 | Stop reason: HOSPADM

## 2021-07-03 RX ORDER — 0.9 % SODIUM CHLORIDE 0.9 %
1000 INTRAVENOUS SOLUTION INTRAVENOUS ONCE
Status: COMPLETED | OUTPATIENT
Start: 2021-07-03 | End: 2021-07-03

## 2021-07-03 RX ORDER — DEXTROSE MONOHYDRATE 25 G/50ML
12.5 INJECTION, SOLUTION INTRAVENOUS PRN
Status: DISCONTINUED | OUTPATIENT
Start: 2021-07-03 | End: 2021-07-08 | Stop reason: HOSPADM

## 2021-07-03 RX ORDER — ACYCLOVIR 800 MG/1
800 TABLET ORAL
Status: DISCONTINUED | OUTPATIENT
Start: 2021-07-03 | End: 2021-07-06

## 2021-07-03 RX ORDER — GLYBURIDE 5 MG/1
5 TABLET ORAL 2 TIMES DAILY WITH MEALS
Status: DISCONTINUED | OUTPATIENT
Start: 2021-07-04 | End: 2021-07-08 | Stop reason: HOSPADM

## 2021-07-03 RX ORDER — 0.9 % SODIUM CHLORIDE 0.9 %
80 INTRAVENOUS SOLUTION INTRAVENOUS ONCE
Status: COMPLETED | OUTPATIENT
Start: 2021-07-03 | End: 2021-07-03

## 2021-07-03 RX ORDER — MORPHINE SULFATE 4 MG/ML
4 INJECTION, SOLUTION INTRAMUSCULAR; INTRAVENOUS ONCE
Status: COMPLETED | OUTPATIENT
Start: 2021-07-03 | End: 2021-07-03

## 2021-07-03 RX ORDER — MEMANTINE HYDROCHLORIDE 10 MG/1
10 TABLET ORAL 2 TIMES DAILY
Status: DISCONTINUED | OUTPATIENT
Start: 2021-07-04 | End: 2021-07-08 | Stop reason: HOSPADM

## 2021-07-03 RX ORDER — OXYCODONE HYDROCHLORIDE AND ACETAMINOPHEN 5; 325 MG/1; MG/1
2 TABLET ORAL ONCE
Status: COMPLETED | OUTPATIENT
Start: 2021-07-03 | End: 2021-07-03

## 2021-07-03 RX ORDER — OMEGA-3-ACID ETHYL ESTERS 1 G/1
1 CAPSULE, LIQUID FILLED ORAL 2 TIMES DAILY
Status: DISCONTINUED | OUTPATIENT
Start: 2021-07-04 | End: 2021-07-08 | Stop reason: HOSPADM

## 2021-07-03 RX ORDER — ATORVASTATIN CALCIUM 20 MG/1
20 TABLET, FILM COATED ORAL DAILY
Status: DISCONTINUED | OUTPATIENT
Start: 2021-07-04 | End: 2021-07-08 | Stop reason: HOSPADM

## 2021-07-03 RX ORDER — SODIUM CHLORIDE 0.9 % (FLUSH) 0.9 %
5-40 SYRINGE (ML) INJECTION EVERY 12 HOURS SCHEDULED
Status: DISCONTINUED | OUTPATIENT
Start: 2021-07-03 | End: 2021-07-08 | Stop reason: HOSPADM

## 2021-07-03 RX ORDER — DEXTROSE MONOHYDRATE 50 MG/ML
100 INJECTION, SOLUTION INTRAVENOUS PRN
Status: DISCONTINUED | OUTPATIENT
Start: 2021-07-03 | End: 2021-07-08 | Stop reason: HOSPADM

## 2021-07-03 RX ORDER — NICOTINE POLACRILEX 4 MG
15 LOZENGE BUCCAL PRN
Status: DISCONTINUED | OUTPATIENT
Start: 2021-07-03 | End: 2021-07-08 | Stop reason: HOSPADM

## 2021-07-03 RX ORDER — AMLODIPINE BESYLATE 5 MG/1
5 TABLET ORAL DAILY
Status: DISCONTINUED | OUTPATIENT
Start: 2021-07-04 | End: 2021-07-06

## 2021-07-03 RX ORDER — SODIUM CHLORIDE 9 MG/ML
25 INJECTION, SOLUTION INTRAVENOUS PRN
Status: DISCONTINUED | OUTPATIENT
Start: 2021-07-03 | End: 2021-07-08 | Stop reason: HOSPADM

## 2021-07-03 RX ORDER — GLYBURIDE-METFORMIN HYDROCHLORIDE 5; 500 MG/1; MG/1
1 TABLET ORAL 2 TIMES DAILY WITH MEALS
Status: DISCONTINUED | OUTPATIENT
Start: 2021-07-04 | End: 2021-07-03 | Stop reason: SDUPTHER

## 2021-07-03 RX ORDER — ACETAMINOPHEN 325 MG/1
650 TABLET ORAL EVERY 4 HOURS PRN
Status: DISCONTINUED | OUTPATIENT
Start: 2021-07-03 | End: 2021-07-04

## 2021-07-03 RX ORDER — SODIUM CHLORIDE 0.9 % (FLUSH) 0.9 %
5-40 SYRINGE (ML) INJECTION PRN
Status: DISCONTINUED | OUTPATIENT
Start: 2021-07-03 | End: 2021-07-08 | Stop reason: HOSPADM

## 2021-07-03 RX ORDER — LIDOCAINE 4 G/G
1 PATCH TOPICAL DAILY
Status: DISCONTINUED | OUTPATIENT
Start: 2021-07-04 | End: 2021-07-08 | Stop reason: HOSPADM

## 2021-07-03 RX ORDER — ONDANSETRON 2 MG/ML
4 INJECTION INTRAMUSCULAR; INTRAVENOUS EVERY 6 HOURS PRN
Status: DISCONTINUED | OUTPATIENT
Start: 2021-07-03 | End: 2021-07-08 | Stop reason: HOSPADM

## 2021-07-03 RX ORDER — ONDANSETRON 2 MG/ML
8 INJECTION INTRAMUSCULAR; INTRAVENOUS ONCE
Status: COMPLETED | OUTPATIENT
Start: 2021-07-03 | End: 2021-07-03

## 2021-07-03 RX ORDER — SODIUM CHLORIDE 9 MG/ML
INJECTION, SOLUTION INTRAVENOUS CONTINUOUS
Status: DISCONTINUED | OUTPATIENT
Start: 2021-07-03 | End: 2021-07-08 | Stop reason: HOSPADM

## 2021-07-03 RX ORDER — SODIUM CHLORIDE 0.9 % (FLUSH) 0.9 %
10 SYRINGE (ML) INJECTION PRN
Status: DISCONTINUED | OUTPATIENT
Start: 2021-07-03 | End: 2021-07-06 | Stop reason: SDUPTHER

## 2021-07-03 RX ADMIN — SODIUM CHLORIDE 1000 ML: 9 INJECTION, SOLUTION INTRAVENOUS at 12:46

## 2021-07-03 RX ADMIN — IOPAMIDOL 75 ML: 755 INJECTION, SOLUTION INTRAVENOUS at 13:54

## 2021-07-03 RX ADMIN — SODIUM CHLORIDE 80 ML: 9 INJECTION, SOLUTION INTRAVENOUS at 13:48

## 2021-07-03 RX ADMIN — SODIUM CHLORIDE, PRESERVATIVE FREE 10 ML: 5 INJECTION INTRAVENOUS at 13:48

## 2021-07-03 RX ADMIN — HYDROMORPHONE HYDROCHLORIDE 0.5 MG: 1 INJECTION, SOLUTION INTRAMUSCULAR; INTRAVENOUS; SUBCUTANEOUS at 23:45

## 2021-07-03 RX ADMIN — MORPHINE SULFATE 4 MG: 4 INJECTION, SOLUTION INTRAMUSCULAR; INTRAVENOUS at 12:46

## 2021-07-03 RX ADMIN — SODIUM CHLORIDE, PRESERVATIVE FREE 10 ML: 5 INJECTION INTRAVENOUS at 21:24

## 2021-07-03 RX ADMIN — HYDROMORPHONE HYDROCHLORIDE 1 MG: 1 INJECTION, SOLUTION INTRAMUSCULAR; INTRAVENOUS; SUBCUTANEOUS at 15:37

## 2021-07-03 RX ADMIN — OXYCODONE HYDROCHLORIDE AND ACETAMINOPHEN 2 TABLET: 5; 325 TABLET ORAL at 17:48

## 2021-07-03 RX ADMIN — ACYCLOVIR 800 MG: 800 TABLET ORAL at 21:24

## 2021-07-03 RX ADMIN — SODIUM CHLORIDE, PRESERVATIVE FREE 10 ML: 5 INJECTION INTRAVENOUS at 21:23

## 2021-07-03 RX ADMIN — SODIUM CHLORIDE: 9 INJECTION, SOLUTION INTRAVENOUS at 21:24

## 2021-07-03 RX ADMIN — ACYCLOVIR 800 MG: 800 TABLET ORAL at 23:21

## 2021-07-03 RX ADMIN — ONDANSETRON 8 MG: 2 INJECTION INTRAMUSCULAR; INTRAVENOUS at 15:37

## 2021-07-03 ASSESSMENT — PAIN DESCRIPTION - LOCATION: LOCATION: ABDOMEN;BACK

## 2021-07-03 ASSESSMENT — PAIN DESCRIPTION - DESCRIPTORS: DESCRIPTORS: THROBBING

## 2021-07-03 ASSESSMENT — PAIN DESCRIPTION - ORIENTATION: ORIENTATION: RIGHT

## 2021-07-03 ASSESSMENT — PAIN SCALES - GENERAL
PAINLEVEL_OUTOF10: 7
PAINLEVEL_OUTOF10: 4
PAINLEVEL_OUTOF10: 6
PAINLEVEL_OUTOF10: 7
PAINLEVEL_OUTOF10: 4
PAINLEVEL_OUTOF10: 8

## 2021-07-03 ASSESSMENT — ENCOUNTER SYMPTOMS
ABDOMINAL PAIN: 1
EYE PAIN: 0
CONSTIPATION: 0
DIARRHEA: 0
VOMITING: 0
COUGH: 0
SHORTNESS OF BREATH: 0
SORE THROAT: 0
BACK PAIN: 0
CHEST TIGHTNESS: 0
NAUSEA: 0

## 2021-07-03 ASSESSMENT — PAIN DESCRIPTION - PAIN TYPE: TYPE: ACUTE PAIN

## 2021-07-03 NOTE — ED NOTES
Report given to Mi Infante RN from [employer]. Report method by phone   The following was reviewed with receiving RN:   Current vital signs:  BP (!) 150/59   Pulse 71   Temp 97.8 °F (36.6 °C) (Oral)   Resp 20   Ht 5' 10.5\" (1.791 m)   Wt 192 lb (87.1 kg)   SpO2 91%   BMI 27.16 kg/m²                MEWS Score: 1     Any medication or safety alerts were reviewed. Any pending diagnostics and notifications were also reviewed, as well as any safety concerns or issues, abnormal labs, abnormal imaging, and abnormal assessment findings. Questions were answered.             La Nena Peter RN  07/03/21 0390

## 2021-07-03 NOTE — ED NOTES
Patient wife at nurses station upset pt hasn't been seen by physician. RN and house supervisor spoke with wife notified her physician will be in shortly.       Bakari Brown, GIOVANY  07/03/21 7475

## 2021-07-03 NOTE — ED PROVIDER NOTES
[address] ED  eMERGENCY dEPARTMENT eNCOUnter    Pt Name: Inge Zarate  MRN: [id number]  [place] 1950  Date of evaluation: 7/3/21  CHIEF COMPLAINT       Chief Complaint   Patient presents with    Flank Pain     Rt flank pain    Abdominal Pain     RLQ pain     HISTORY OF PRESENT ILLNESS   HPI  R sided flank and abdominal pain started about 2 days ago. Pain is constant but varies in intensity, it is throbbing in nature, currently 2-3/10 in severity. No fever, nausea or vomiting. He cannot recall his last BM but states he has passed flatus today. No abdominal surgeries. No urinary symptoms. REVIEW OF SYSTEMS     Review of Systems   Constitutional: Negative for chills and fever. HENT: Negative for congestion, ear pain and sore throat. Eyes: Negative for pain and visual disturbance. Respiratory: Negative for cough, chest tightness and shortness of breath. Cardiovascular: Negative for chest pain and palpitations. Gastrointestinal: Positive for abdominal pain. Negative for constipation, diarrhea, nausea and vomiting. Genitourinary: Positive for flank pain. Negative for dysuria and testicular pain. Musculoskeletal: Negative for back pain. Skin: Positive for rash. Negative for wound. Neurological: Negative for seizures, syncope and headaches. PASTMEDICAL HISTORY     Past Medical History:   Diagnosis Date    [facility] ([facility] [address]     History of non-Hodgkin's lymphoma     Hyperlipidemia     Hypertension     Hypothyroidism      SURGICAL HISTORY       Past Surgical History:   Procedure Laterality Date    HERNIA REPAIR       CURRENT MEDICATIONS       Previous Medications    AMLODIPINE (NORVASC) 5 MG TABLET    Take 5 mg by mouth daily. GLYBURIDE-METFORMIN (GLUCOVANCE) 5-500 MG PER TABLET    Take 1 tablet by mouth 2 times daily (with meals). LEVOTHYROXINE (SYNTHROID) 50 MCG TABLET    Take 50 mcg by mouth Daily.       MEMANTINE (NAMENDA) 10 MG TABLET    Take 10 mg by mouth 2 times daily.      OMEGA-3 ACID ETHYL ESTERS (LOVAZA) 1 G CAPSULE    Take 1 g by mouth 2 times daily. SIMVASTATIN (ZOCOR) 20 MG TABLET    Take 20 mg by mouth nightly. ALLERGIES     has No Known Allergies. FAMILY HISTORY     has no family status information on file. SOCIALHISTORY      reports that he quit smoking about 15 years ago. His smoking use included cigarettes. He has a 90.00 pack-year smoking history. He does not have any smokeless tobacco history on file. He reports current alcohol use of about 1.7 standard drinks of alcohol per week. He reports current drug use. Drug: Marijuana. PHYSICAL EXAM     INITIAL VITALS: BP (!) 167/63   Pulse 65   Temp 97.8 °F (36.6 °C) (Oral)   Resp 19   Ht 5' 10.5\" (1.791 m)   Wt 192 lb (87.1 kg)   SpO2 92%   BMI 27.16 kg/m²    Physical Exam  Vitals and nursing note reviewed. Constitutional:       Appearance: He is well-developed. HENT:      Head: Normocephalic and atraumatic. Right Ear: External ear normal.      Left Ear: External ear normal.   Eyes:      Conjunctiva/sclera: Conjunctivae normal.      Pupils: Pupils are equal, round, and reactive to light. Neck:      Vascular: No JVD. Trachea: No tracheal deviation. Cardiovascular:      Rate and Rhythm: Normal rate and regular rhythm. Heart sounds: Normal heart sounds. Pulmonary:      Effort: Pulmonary effort is normal. No respiratory distress. Breath sounds: Normal breath sounds. No stridor. Abdominal:      General: Bowel sounds are normal. There is no distension. Palpations: Abdomen is soft. Tenderness: There is abdominal tenderness in the right upper quadrant and right lower quadrant. Musculoskeletal:         General: No tenderness or deformity. Normal range of motion. Cervical back: Normal range of motion and neck supple. Skin:     Comments: Excoriated rash noted over right flank, no vesicles noted.     Neurological:      Mental Status: He is alert and oriented to person, place, and time. Cranial Nerves: No cranial nerve deficit. Coordination: Coordination normal.         MEDICAL DECISION MAKING:   Assessment:  Holden Cordova is a 65 y.o. male who presents with right flank and abdominal pain. ED Course/MDM:   Patient arrived hemodynamically stable and in no acute distress. Patient's previous imaging and studies reviewed. On exam, patient does have a rash on his right flank which appears dermatomal and does not cross midline, not clear if zoster as it is not vesicular. Labs with LAURO. 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 We will admit for heme consult regarding infiltrative mass, IVF, pain management. Discussed with Dr Tolu Do who also requested that we initiate anti-virals for shingles. Admit. Procedures    DIAGNOSTIC RESULTS   EKG: All EKG's are interpreted by the Emergency Department Physician who either signs or Co-signs this chart inthe absence of a cardiologist.      RADIOLOGY:All plain film, CT, MRI, and formal ultrasound images (except ED bedside ultrasound) are read by the radiologist, see reports below, unless otherwise noted in MDM or here. CT ABDOMEN PELVIS W IV CONTRAST Additional Contrast? None   Final Result   Infiltrative soft tissue density is seen in the retroperitoneum on the left,   with increased size of retroperitoneal lymph nodes, and involvement of the   adrenal glands, and pancreas. .  Given the lack of resolution and progression   constellation of findings suggest underlying neoplastic etiology, possibly   lymphoma. Tiny nonobstructing right renal stone, and cholelithiasis atherosclerosis   with severe narrowing of the superficial femoral arteries and proximal iliacs           LABS: All lab results were reviewed by myself, and all abnormals are listed below.   Labs Reviewed   URINE RT REFLEX TO CULTURE - Abnormal; Notable for the following components:       Result Value    Urine Hgb TRACE (*)     Protein, UA 4+ (*)     All other components within normal limits   MICROSCOPIC URINALYSIS - Abnormal; Notable for the following components:    Bacteria, UA FEW (*)     All other components within normal limits   CBC WITH AUTO DIFFERENTIAL - Abnormal; Notable for the following components:    Hematocrit 40.0 (*)     RDW 15.2 (*)     Seg Neutrophils 76 (*)     Lymphocytes 15 (*)     Monocytes 8 (*)     All other components within normal limits   COMPREHENSIVE METABOLIC PANEL - Abnormal; Notable for the following components:    Glucose 139 (*)     BUN 39 (*)     CREATININE 1.36 (*)     Alkaline Phosphatase 144 (*)     GFR Non- 52 (*)     All other components within normal limits     EMERGENCY DEPARTMENT COURSE:   Vitals:    Vitals:    07/03/21 1025 07/03/21 1606 07/03/21 1630   BP: (!) 156/69 (!) 186/67 (!) 167/63   Pulse: 74 67 65   Resp: 16 16 19   Temp: 97.8 °F (36.6 °C)     TempSrc: Oral     SpO2: 96% 95% 92%   Weight: 192 lb (87.1 kg)     Height: 5' 10.5\" (1.791 m)         The patient was given the following medications while in the emergency department:  Orders Placed This Encounter   Medications    0.9 % sodium chloride bolus    morphine sulfate (PF) injection 4 mg    0.9 % sodium chloride bolus    sodium chloride flush 0.9 % injection 10 mL    iopamidol (ISOVUE-370) 76 % injection 75 mL    HYDROmorphone (DILAUDID) injection 1 mg    ondansetron (ZOFRAN) injection 8 mg    oxyCODONE-acetaminophen (PERCOCET) 5-325 MG per tablet 2 tablet     CONSULTS:  IP CONSULT TO FAMILY MEDICINE  IP CONSULT TO GYNECOLOGIC ONCOLOGY    FINAL IMPRESSION      1. Herpes zoster without complication          DISPOSITION/PLAN   DISPOSITION Admitted 07/03/2021 05:26:55 PM      PATIENT REFERRED TO:  No follow-up provider specified.   DISCHARGE MEDICATIONS:  New Prescriptions    No medications on file     Coralyn Im, MD  AttendingEmergency Physician                        Chani Andres MD  07/03/21 5683

## 2021-07-03 NOTE — ED NOTES
Patient's family at nurses station stating patient is in need of more pain medication.  RN notified MD of patient's request.      Leon Villalba RN  07/03/21 7692

## 2021-07-03 NOTE — ED NOTES
Report given to Azul Fung RN from [facility]. Report method in person   The following was reviewed with receiving RN:   Current vital signs:  BP (!) 168/87   Pulse 85   Temp 97.8 °F (36.6 °C) (Oral)   Resp 14   Ht 5' 10.5\" (1.791 m)   Wt 192 lb (87.1 kg)   SpO2 100%   BMI 27.16 kg/m²                MEWS Score: 1     Any medication or safety alerts were reviewed. Any pending diagnostics and notifications were also reviewed, as well as any safety concerns or issues, abnormal labs, abnormal imaging, and abnormal assessment findings. Questions were answered.           Rico Neil RN  07/03/21 0963

## 2021-07-04 LAB
ABSOLUTE EOS #: 0.2 K/UL (ref 0–0.4)
ABSOLUTE IMMATURE GRANULOCYTE: ABNORMAL K/UL (ref 0–0.3)
ABSOLUTE LYMPH #: 0.8 K/UL (ref 1–4.8)
ABSOLUTE MONO #: 0.6 K/UL (ref 0.1–1.3)
AMYLASE: 56 U/L (ref 28–100)
ANION GAP SERPL CALCULATED.3IONS-SCNC: 10 MMOL/L (ref 9–17)
BASOPHILS # BLD: 0 % (ref 0–2)
BASOPHILS ABSOLUTE: 0 K/UL (ref 0–0.2)
BUN BLDV-MCNC: 32 MG/DL (ref 8–23)
BUN/CREAT BLD: ABNORMAL (ref 9–20)
CALCIUM SERPL-MCNC: 8.1 MG/DL (ref 8.6–10.4)
CHLORIDE BLD-SCNC: 107 MMOL/L (ref 98–107)
CO2: 25 MMOL/L (ref 20–31)
CREAT SERPL-MCNC: 1.21 MG/DL (ref 0.7–1.2)
DIFFERENTIAL TYPE: ABNORMAL
EOSINOPHILS RELATIVE PERCENT: 3 % (ref 0–4)
GFR AFRICAN AMERICAN: >60 ML/MIN
GFR NON-AFRICAN AMERICAN: 59 ML/MIN
GFR SERPL CREATININE-BSD FRML MDRD: ABNORMAL ML/MIN/{1.73_M2}
GFR SERPL CREATININE-BSD FRML MDRD: ABNORMAL ML/MIN/{1.73_M2}
GLUCOSE BLD-MCNC: 106 MG/DL (ref 75–110)
GLUCOSE BLD-MCNC: 117 MG/DL (ref 70–99)
GLUCOSE BLD-MCNC: 126 MG/DL (ref 75–110)
GLUCOSE BLD-MCNC: 129 MG/DL (ref 75–110)
GLUCOSE BLD-MCNC: 153 MG/DL (ref 75–110)
GLUCOSE BLD-MCNC: 156 MG/DL (ref 75–110)
GLUCOSE BLD-MCNC: 44 MG/DL (ref 75–110)
GLUCOSE BLD-MCNC: 63 MG/DL (ref 75–110)
HCT VFR BLD CALC: 36.4 % (ref 41–53)
HEMOGLOBIN: 12.3 G/DL (ref 13.5–17.5)
IMMATURE GRANULOCYTES: ABNORMAL %
LACTATE DEHYDROGENASE: 156 U/L (ref 135–225)
LIPASE: 31 U/L (ref 13–60)
LYMPHOCYTES # BLD: 14 % (ref 24–44)
MCH RBC QN AUTO: 28.9 PG (ref 26–34)
MCHC RBC AUTO-ENTMCNC: 33.9 G/DL (ref 31–37)
MCV RBC AUTO: 85.2 FL (ref 80–100)
MONOCYTES # BLD: 11 % (ref 1–7)
NRBC AUTOMATED: ABNORMAL PER 100 WBC
PATHOLOGIST REVIEW: NORMAL
PDW BLD-RTO: 15 % (ref 11.5–14.9)
PLATELET # BLD: 156 K/UL (ref 150–450)
PLATELET ESTIMATE: ABNORMAL
PMV BLD AUTO: 8.5 FL (ref 6–12)
POTASSIUM SERPL-SCNC: 4.7 MMOL/L (ref 3.7–5.3)
RBC # BLD: 4.27 M/UL (ref 4.5–5.9)
RBC # BLD: ABNORMAL 10*6/UL
SEG NEUTROPHILS: 72 % (ref 36–66)
SEGMENTED NEUTROPHILS ABSOLUTE COUNT: 4.4 K/UL (ref 1.3–9.1)
SODIUM BLD-SCNC: 142 MMOL/L (ref 135–144)
WBC # BLD: 6 K/UL (ref 3.5–11)
WBC # BLD: ABNORMAL 10*3/UL

## 2021-07-04 PROCEDURE — 88185 FLOWCYTOMETRY/TC ADD-ON: CPT

## 2021-07-04 PROCEDURE — 6360000002 HC RX W HCPCS: Performed by: FAMILY MEDICINE

## 2021-07-04 PROCEDURE — 83615 LACTATE (LD) (LDH) ENZYME: CPT

## 2021-07-04 PROCEDURE — 85025 COMPLETE CBC W/AUTO DIFF WBC: CPT

## 2021-07-04 PROCEDURE — 88184 FLOWCYTOMETRY/ TC 1 MARKER: CPT

## 2021-07-04 PROCEDURE — 82947 ASSAY GLUCOSE BLOOD QUANT: CPT

## 2021-07-04 PROCEDURE — G0378 HOSPITAL OBSERVATION PER HR: HCPCS

## 2021-07-04 PROCEDURE — 6370000000 HC RX 637 (ALT 250 FOR IP): Performed by: FAMILY MEDICINE

## 2021-07-04 PROCEDURE — 82150 ASSAY OF AMYLASE: CPT

## 2021-07-04 PROCEDURE — 2580000003 HC RX 258: Performed by: FAMILY MEDICINE

## 2021-07-04 PROCEDURE — 80048 BASIC METABOLIC PNL TOTAL CA: CPT

## 2021-07-04 PROCEDURE — 36415 COLL VENOUS BLD VENIPUNCTURE: CPT

## 2021-07-04 PROCEDURE — 83690 ASSAY OF LIPASE: CPT

## 2021-07-04 PROCEDURE — 99223 1ST HOSP IP/OBS HIGH 75: CPT | Performed by: INTERNAL MEDICINE

## 2021-07-04 RX ORDER — TRAMADOL HYDROCHLORIDE 50 MG/1
50 TABLET ORAL EVERY 8 HOURS PRN
Status: DISCONTINUED | OUTPATIENT
Start: 2021-07-04 | End: 2021-07-05

## 2021-07-04 RX ORDER — ACETAMINOPHEN 325 MG/1
650 TABLET ORAL EVERY 6 HOURS PRN
Status: DISCONTINUED | OUTPATIENT
Start: 2021-07-04 | End: 2021-07-08 | Stop reason: HOSPADM

## 2021-07-04 RX ORDER — PREGABALIN 50 MG/1
50 CAPSULE ORAL NIGHTLY
Status: DISCONTINUED | OUTPATIENT
Start: 2021-07-04 | End: 2021-07-08 | Stop reason: HOSPADM

## 2021-07-04 RX ADMIN — SODIUM CHLORIDE: 9 INJECTION, SOLUTION INTRAVENOUS at 19:13

## 2021-07-04 RX ADMIN — ACYCLOVIR 800 MG: 800 TABLET ORAL at 19:12

## 2021-07-04 RX ADMIN — METFORMIN HYDROCHLORIDE 500 MG: 500 TABLET ORAL at 08:21

## 2021-07-04 RX ADMIN — ENOXAPARIN SODIUM 40 MG: 40 INJECTION SUBCUTANEOUS at 08:20

## 2021-07-04 RX ADMIN — PREGABALIN 50 MG: 50 CAPSULE ORAL at 20:15

## 2021-07-04 RX ADMIN — TRAMADOL HYDROCHLORIDE 50 MG: 50 TABLET, FILM COATED ORAL at 17:20

## 2021-07-04 RX ADMIN — ACETAMINOPHEN 650 MG: 325 TABLET ORAL at 14:04

## 2021-07-04 RX ADMIN — OMEGA-3-ACID ETHYL ESTERS 1 G: 1 CAPSULE, LIQUID FILLED ORAL at 08:20

## 2021-07-04 RX ADMIN — GLYBURIDE 5 MG: 5 TABLET ORAL at 08:33

## 2021-07-04 RX ADMIN — GLYBURIDE 5 MG: 5 TABLET ORAL at 17:16

## 2021-07-04 RX ADMIN — LEVOTHYROXINE SODIUM 50 MCG: 0.05 TABLET ORAL at 05:53

## 2021-07-04 RX ADMIN — HYDROMORPHONE HYDROCHLORIDE 0.5 MG: 1 INJECTION, SOLUTION INTRAMUSCULAR; INTRAVENOUS; SUBCUTANEOUS at 19:12

## 2021-07-04 RX ADMIN — ACYCLOVIR 800 MG: 800 TABLET ORAL at 15:53

## 2021-07-04 RX ADMIN — OMEGA-3-ACID ETHYL ESTERS 1 G: 1 CAPSULE, LIQUID FILLED ORAL at 20:15

## 2021-07-04 RX ADMIN — ACYCLOVIR 800 MG: 800 TABLET ORAL at 05:54

## 2021-07-04 RX ADMIN — ACYCLOVIR 800 MG: 800 TABLET ORAL at 11:53

## 2021-07-04 RX ADMIN — ACYCLOVIR 800 MG: 800 TABLET ORAL at 22:49

## 2021-07-04 RX ADMIN — MEMANTINE 10 MG: 10 TABLET ORAL at 08:33

## 2021-07-04 RX ADMIN — AMLODIPINE BESYLATE 5 MG: 5 TABLET ORAL at 08:21

## 2021-07-04 RX ADMIN — ONDANSETRON 4 MG: 4 TABLET, ORALLY DISINTEGRATING ORAL at 05:53

## 2021-07-04 RX ADMIN — ATORVASTATIN CALCIUM 20 MG: 20 TABLET, FILM COATED ORAL at 08:21

## 2021-07-04 RX ADMIN — SODIUM CHLORIDE: 9 INJECTION, SOLUTION INTRAVENOUS at 08:19

## 2021-07-04 RX ADMIN — HYDROMORPHONE HYDROCHLORIDE 0.5 MG: 1 INJECTION, SOLUTION INTRAMUSCULAR; INTRAVENOUS; SUBCUTANEOUS at 11:53

## 2021-07-04 RX ADMIN — MEMANTINE 10 MG: 10 TABLET ORAL at 20:14

## 2021-07-04 RX ADMIN — HYDROMORPHONE HYDROCHLORIDE 0.5 MG: 1 INJECTION, SOLUTION INTRAMUSCULAR; INTRAVENOUS; SUBCUTANEOUS at 05:53

## 2021-07-04 ASSESSMENT — PAIN SCALES - GENERAL
PAINLEVEL_OUTOF10: 3
PAINLEVEL_OUTOF10: 0
PAINLEVEL_OUTOF10: 7
PAINLEVEL_OUTOF10: 6
PAINLEVEL_OUTOF10: 7
PAINLEVEL_OUTOF10: 0
PAINLEVEL_OUTOF10: 7
PAINLEVEL_OUTOF10: 7
PAINLEVEL_OUTOF10: 4
PAINLEVEL_OUTOF10: 7

## 2021-07-04 ASSESSMENT — PAIN DESCRIPTION - ORIENTATION: ORIENTATION: RIGHT;MID

## 2021-07-04 ASSESSMENT — PAIN DESCRIPTION - LOCATION: LOCATION: BACK

## 2021-07-04 ASSESSMENT — PAIN DESCRIPTION - DESCRIPTORS: DESCRIPTORS: THROBBING

## 2021-07-04 NOTE — PLAN OF CARE
Problem: Pain:  Goal: Pain level will decrease  Description: Pain level will decrease  7/4/2021 1833 by Graciela Darby RN  Outcome: Ongoing     Problem: Pain:  Goal: Control of acute pain  Description: Control of acute pain  7/4/2021 1833 by Graciela Darby RN  Outcome: Ongoing     Problem: Pain:  Goal: Control of chronic pain  Description: Control of chronic pain  Outcome: Ongoing

## 2021-07-04 NOTE — PROGRESS NOTES
Harsh expiratory wheezes noted anteriorly with occasional congested nonproductive cough noted. Area of shingles noted under abdominal fold right side, no drainage noted.

## 2021-07-04 NOTE — CARE COORDINATION
CASE MANAGEMENT NOTE:    Admission Date:  7/3/2021 Hilaria Carreno is a 65 y.o.  male    Admitted for : Acute kidney injury ([facility] [address] [N17.9]    Met with:  Patient    PCP:  Saskia Hauser                                Insurance:  [employer]      Is patient alert and oriented at time of discussion:  Yes    Current Residence/ Living Arrangements:  independently at home w/Wife          Current Services PTA:  No    Does patient go to outpatient dialysis: No  If yes, location and chair time: NA    Is patient agreeable to VNS: No    Freedom of choice provided:  Yes    List of [address] provided: No    VNS chosen:  No, Denies the need at this time. DME:  straight cane, GB    Home Oxygen: No    Nebulizer: No    CPAP/BIPAP: No    Supplier: N/A    Potential Assistance Needed: Will follow    SNF needed: No    Freedom of choice and list provided: NA    Pharmacy:  [address] [place] [address] in [place]       Does Patient want to use MEDS to BEDS? No    Is patient currently receiving oral anticoagulation therapy? No    Is the Patient an [facility] with Readmission Risk Score greater than 14%? No  If yes, pt needs a follow up appointment made within 7 days. Family Members/Caregivers that pt would like involved in their care:    Yes    If yes, list name here:  WifeSarika    Transportation Provider:  Patient             Discharge Plan:  7/4/21 [employer] Pt. Lives in 1 story home w/ Wife. DME cane, GB. Denies VNS, Has HX of Non Hodgkin's Lymphoma, had Chemo/Rad about 8 years ago. Hemoc, Bun 32, CR 1.21, IV Fluids.  Denies needs at this time, will follow//KB                 Electronically signed by: Shaniqua Perez RN on 7/4/2021 at 10:29 AM

## 2021-07-04 NOTE — PROGRESS NOTES
Patient arrived to room 2077. Ambulated to bed. A&O x 4. In no acute distress. Vitals stable. Bed locked in lowest position, call light in reach. All questions answered.  Assessment complete

## 2021-07-04 NOTE — CARE COORDINATION
Please have patient or family member fill out the MRI Screening form and fax to dept @ [phone]. Any questions. .please call [facility] MRI @ [phone]. MRI exam will be scheduled after receiving the completed screening form.  Patient will need to be NPO for approximately 6 hours prior to exam. Thank you!!

## 2021-07-04 NOTE — PLAN OF CARE
Problem: Pain:  Goal: Pain level will decrease  Description: Pain level will decrease  Outcome: Ongoing  Note: R flank pain. Medicated as needed. Patient tolerating      Problem: Pain:  Goal: Control of acute pain  Description: Control of acute pain  Outcome: Ongoing  Note: R flank pain. Medicated as needed. Patient tolerating      Problem: Pain:  Goal: Control of chronic pain  Description: Control of chronic pain  Outcome: Ongoing  Note: R flank pain. Medicated as needed.  Patient tolerating

## 2021-07-04 NOTE — PROGRESS NOTES
Bedside reporting done, per Kelvin Carrillo and Marian Chen Rn's  IV intact, no redness noted  Pt. Rests peacefully, family cont.  At bedside  Bed alarm on

## 2021-07-04 NOTE — FLOWSHEET NOTE
07/04/21 1510   Encounter Summary   Services provided to: Patient   Referral/Consult From: Rounding   Complexity of Encounter Low   Length of Encounter 15 minutes   Spiritual/Worship   Type Spiritual support   Assessment Sleeping   Intervention Prayer

## 2021-07-04 NOTE — CONSULTS
Today's Date: 7/4/2021  Patient Name: Justo Hilario  Date of admission: 7/3/2021 10:30 AM  Patient's age: 65 y.o., 1950  Admission Dx: Acute kidney injury ([facility] [address] [N17.9]    Reason for Consult: infiltrative kidney mass wtih history of NHL  Requesting Physician: Mian Villasenor DO    CHIEF COMPLAINT:    Chief Complaint   Patient presents with    Flank Pain     Rt flank pain    Abdominal Pain     RLQ pain       History Obtained From:  patient and chart    HISTORY OF PRESENT ILLNESS:      Justo Hilario is a 65 y.o. male who is admitted to the hospital on 7/3/2021  For acute abdominal pain and was noted to have acute kidney injury. Patient reports his abdominal pain started few days ago and also noted to have skin rash around lower back suspicious for herpes zoster. Patient has history of non-Hodgkin's lymphoma and has seen oncology with last visit in 2013. Since then he has not had any follow-up with oncology. His brief oncologic history as follows. He had seen Dr. Emily Villaseñor in the past.  He has not showed for his follow-up appointment since then. 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       CURRENT THERAPY:    1.   Observation. 2.   Status post treatment with Bexxar in May 2008. 3.   Status post previous treatment with Rituxan.       On admission he had lab work showing acute kidney injury with elevated creatinine. And also CT abdomen showed a large 10.1 cm infiltrate soft tissue density beneath the left kidney and also encasing left adrenal gland. Additionally nodular density in pancreas measuring 2.6 cm noted. Also a dominant right external iliac lymph node noted measuring 3.5 cm suggesting increase in size from previous scans. Therefore oncology was consulted for further recommendations.   Past Medical History:   has a past medical history of Cancer ([address] History of non-Hodgkin's lymphoma, Hyperlipidemia, Hypertension, and Hypothyroidism. Past Surgical History:   has a past surgical history that includes hernia repair. Medications:    Prior to Admission medications    Medication Sig Start Date End Date Taking? Authorizing Provider   amLODIPine (NORVASC) 5 MG tablet Take 5 mg by mouth daily. Yes Historical Provider, MD   glyBURIDE-metformin (GLUCOVANCE) 5-500 MG per tablet Take 1 tablet by mouth 2 times daily (with meals). Yes Historical Provider, MD   levothyroxine (SYNTHROID) 50 MCG tablet Take 50 mcg by mouth Daily. Yes Historical Provider, MD   memantine (NAMENDA) 10 MG tablet Take 10 mg by mouth 2 times daily. Yes Historical Provider, MD   omega-3 acid ethyl esters (LOVAZA) 1 G capsule Take 1 g by mouth 2 times daily. Yes Historical Provider, MD   simvastatin (ZOCOR) 20 MG tablet Take 20 mg by mouth nightly.      Yes Historical Provider, MD     Current Facility-Administered Medications   Medication Dose Route Frequency Provider Last Rate Last Admin    sodium chloride flush 0.9 % injection 10 mL  10 mL Intravenous PRN Maribel Templeton MD   10 mL at 07/03/21 1348    sodium chloride flush 0.9 % injection 5-40 mL  5-40 mL Intravenous 2 times per day Toño Geronimo, DO   10 mL at 07/03/21 2124    sodium chloride flush 0.9 % injection 5-40 mL  5-40 mL Intravenous PRN Roderick T Dixon, DO        0.9 % sodium chloride infusion  25 mL Intravenous PRN Roderick T Dixon, DO        enoxaparin (LOVENOX) injection 40 mg  40 mg Subcutaneous Daily Roderick T Dixon, DO   40 mg at 07/04/21 0820    acetaminophen (TYLENOL) tablet 650 mg  650 mg Oral Q4H PRN Roderick T Dixon, DO        ondansetron (ZOFRAN-ODT) disintegrating tablet 4 mg  4 mg Oral Q8H PRN Roderick T Dixon, DO   4 mg at 07/04/21 0553    Or    ondansetron (ZOFRAN) injection 4 mg  4 mg Intravenous Q6H PRN Roderick T Dixon, DO        0.9 % sodium chloride infusion   Intravenous Continuous Roderick T Dixon,  mL/hr at 07/04/21 0819 New Bag at 07/04/21 0819    acyclovir (ZOVIRAX) tablet 800 mg  800 mg Oral 5x Daily Roderick T Dixon, DO   800 mg at 07/04/21 1153    amLODIPine (NORVASC) tablet 5 mg  5 mg Oral Daily Roderick T Dixon, DO   5 mg at 07/04/21 9674    levothyroxine (SYNTHROID) tablet 50 mcg  50 mcg Oral Daily Roderick T Dixon, DO   50 mcg at 07/04/21 0553    memantine (NAMENDA) tablet 10 mg  10 mg Oral BID Roderick T Dixon, DO   10 mg at 07/04/21 1060    omega-3 acid ethyl esters (LOVAZA) capsule 1 g  1 g Oral BID Roderick T Dixon, DO   1 g at 07/04/21 0820    atorvastatin (LIPITOR) tablet 20 mg  20 mg Oral Daily Roderick T Dixon, DO   20 mg at 07/04/21 6669    HYDROmorphone (DILAUDID) injection 0.5 mg  0.5 mg Intravenous Q6H PRN Rosaland Dung, DO   0.5 mg at 07/04/21 1153    lidocaine 4 % external patch 1 patch  1 patch Transdermal Daily Roderick T Dixon, DO   1 patch at 07/04/21 0819    glyBURIDE (DIABETA) tablet 5 mg  5 mg Oral BID  Roderick T Dixon, DO   5 mg at 07/04/21 9433    And    metFORMIN (GLUCOPHAGE) tablet 500 mg  500 mg Oral BID  Roderick T Dixon, DO   500 mg at 07/04/21 0821    glucose (GLUTOSE) 40 % oral gel 15 g  15 g Oral PRN Roderick T Dixon, DO        dextrose 50 % IV solution  12.5 g Intravenous PRN Roderick T Dixon, DO        glucagon (rDNA) injection 1 mg  1 mg Intramuscular PRN Roderick T Dixon, DO        dextrose 5 % solution  100 mL/hr Intravenous PRN Roderick T Dixon, DO           Allergies:  Patient has no known allergies. Social History:   reports that he quit smoking about 15 years ago. His smoking use included cigarettes. He has a 90.00 pack-year smoking history. He does not have any smokeless tobacco history on file. He reports current alcohol use of about 1.7 standard drinks of alcohol per week. He reports current drug use. Drug: Marijuana. Family History: family history is not on file. Family history was reviewed and no pertinent family history noted. REVIEW OF SYSTEMS:    Constitutional: No fever or chills.  No night sweats, no weight loss   Eyes: No eye discharge, double vision, or eye pain   HEENT: negative for sore mouth, sore throat, hoarseness and voice change   Respiratory: negative for cough , sputum, dyspnea, wheezing, hemoptysis, chest pain   Cardiovascular: negative for chest pain, dyspnea, palpitations, orthopnea, PND   Gastrointestinal: negative for nausea, vomiting, diarrhea, constipation, ++abdominal pain, Dysphagia, hematemesis and hematochezia   Genitourinary: negative for frequency, dysuria, nocturia, urinary incontinence, and hematuria   Integument: negative for rash, skin lesions, bruises.    Hematologic/Lymphatic: negative for easy bruising, bleeding, lymphadenopathy, or petechiae   Endocrine: negative for heat or cold intolerance,weight changes, change in bowel habits and hair loss   Musculoskeletal: negative for myalgias, arthralgias, pain, joint swelling,and bone pain   Neurological: negative for headaches, dizziness, seizures, weakness, numbness    PHYSICAL EXAM:      BP (!) 163/66   Pulse 69   Temp 98.6 °F (37 °C) (Oral)   Resp 16   Ht 5' 10.5\" (1.791 m)   Wt 192 lb (87.1 kg)   SpO2 92%   BMI 27.16 kg/m²    Temp (24hrs), Av.4 °F (36.9 °C), Min:98 °F (36.7 °C), Max:99 °F (37.2 °C)    General appearance - well appearing, no in pain or distress   Mental status - alert and cooperative   Eyes - pupils equal and reactive, extraocular eye movements intact   Ears - bilateral TM's and external ear canals normal   Mouth - mucous membranes moist, pharynx normal without lesions   Neck - supple, no significant adenopathy   Lymphatics - no palpable lymphadenopathy, no hepatosplenomegaly   Chest - clear to auscultation, no wheezes, rales or rhonchi, symmetric air entry   Heart - normal rate, regular rhythm, normal S1, S2, no murmurs  Abdomen - soft, nontender, nondistended, no masses or organomegaly   Neurological - alert, oriented, normal speech, no focal findings or movement disorder noted   Musculoskeletal - no joint tenderness, deformity or swelling   Extremities - peripheral pulses normal, no pedal edema, no clubbing or cyanosis   Skin - normal coloration and turgor, no rashes, no suspicious skin lesions noted ,    DATA:    Labs:   CBC:   Recent Labs     07/03/21  1226 07/04/21  0528   WBC 8.9 6.0   HGB 13.5 12.3*   HCT 40.0* 36.4*    156     BMP:   Recent Labs     07/03/21  1226 07/04/21  0528    142   K 4.3 4.7   CO2 26 25   BUN 39* 32*   CREATININE 1.36* 1.21*   LABGLOM 52* 59*   GLUCOSE 139* 117*     PT/INR: No results for input(s): PROTIME, INR in the last 72 hours. IMAGING DATA:  CT ABDOMEN PELVIS W IV CONTRAST Additional Contrast? None   Final Result   Infiltrative soft tissue density is seen in the retroperitoneum on the left,   with increased size of retroperitoneal lymph nodes, and involvement of the   adrenal glands, and pancreas. .  Given the lack of resolution and progression   constellation of findings suggest underlying neoplastic etiology, possibly   lymphoma. Tiny nonobstructing right renal stone, and cholelithiasis atherosclerosis   with severe narrowing of the superficial femoral arteries and proximal iliacs             Primary Problem  <principal problem not specified>    Active Hospital Problems    Diagnosis Date Noted    Acute kidney injury ([facility] [address] [N17.9] 07/03/2021         IMPRESSION:   1. History of small cell non-Hodgkin's lymphoma  2. Retroperitoneal soft tissue mass  3. Acute kidney injury  4. Back pain/skin rash possible herpes zoster    RECOMMENDATIONS:  1. I reviewed the laboratory data, imaging studies, diagnosis and other treatment recommendations with patient and family  2. Given his recent scan and prior history of lymphoma he will need tissue biopsy of the retroperitoneal mass to assess for recurrence  3. Plan for IR guided biopsy of the retroperitoneal mass  4. Patient will need restaging scans possibly PET scan as outpatient  5.  Further recommendation will be based on the tissue

## 2021-07-04 NOTE — H&P
Dr. Be Valdez        History and Physical Examination   Admission Note    CHIEF COMPLAINT:   Chief Complaint   Patient presents with    Flank Pain     Rt flank pain    Abdominal Pain     RLQ pain       Reason for Admission: Intractable back and abdominal pain    History Obtained From:  Patient     HISTORY OF PRESENT ILLNESS:      The patient is a pleasant 65 y.o. male with significant medical history of back and abdominal pain that started on Thursday. And he noticed some rash developing on that and the pain got worse. There was burning and discomfort for him he had decreased appetite and eating because of it but no nausea no vomiting no diarrhea denies any chest pain or shortness of breath. Patient states he just saw Dr. Avani Lamar on Thursday for a shoulder injection because of shoulder pain and he said he actually was asked about shingles vaccine. Which she has not had. He has never had shingles before he says. Now he continues to have some pain the pain medicine helps a little bit he says. He has no fevers no chills. Patient had a CT in the emergency room and showed some increase in the size of either lymph nodes or adrenal area especially on the left he does have a history of lymphoma he tells me the last time he saw his hematologist was maybe about 5 or 6 years ago and not sure if he was declared in remission. But he thinks he has been doing okay at home with no evidence of any night sweats or losing weight or fatigue. Except in the last few days he has been just having decreased appetite because of the right-sided pain he says. Patient also has cholelithiasis on his CAT scan but does not look he has been having any symptoms from it is LFTs amylase lipase are normal so far but the CAT scan shows some mass on the pancreas nonspecific.     Past Medical History:    Past Medical History:   Diagnosis Date    Cancer ([facility] [address]     History of non-Hodgkin's lymphoma     Hyperlipidemia     Hypertension     Hypothyroidism      Patient Active Problem List   Diagnosis Code    NHL (non-Hodgkin's lymphoma) ([facility] [address] C85.90    Traumatic retroperitoneal hematoma S36.892A    [facility] N17.9       Past Surgical History:       Past Surgical History:   Procedure Laterality Date    HERNIA REPAIR         Current Medications:    Current Facility-Administered Medications   Medication Dose Route Frequency Provider Last Rate Last Admin    sodium chloride flush 0.9 % injection 10 mL  10 mL Intravenous PRN Richard Elizabeth MD   10 mL at 07/03/21 1348    sodium chloride flush 0.9 % injection 5-40 mL  5-40 mL Intravenous 2 times per day Stephan Cuevas,    10 mL at 07/03/21 2124    sodium chloride flush 0.9 % injection 5-40 mL  5-40 mL Intravenous PRN Roderick T Dixon, DO        0.9 % sodium chloride infusion  25 mL Intravenous PRN Roderick T Dixon, DO        enoxaparin (LOVENOX) injection 40 mg  40 mg Subcutaneous Daily Roderick T Dixon, DO   40 mg at 07/04/21 0820    acetaminophen (TYLENOL) tablet 650 mg  650 mg Oral Q4H PRN Roderick T Dixon, DO        ondansetron (ZOFRAN-ODT) disintegrating tablet 4 mg  4 mg Oral Q8H PRN Roderick T Dixon, DO   4 mg at 07/04/21 0553    Or    ondansetron (ZOFRAN) injection 4 mg  4 mg Intravenous Q6H PRN Roderick T Dixon, DO        0.9 % sodium chloride infusion   Intravenous Continuous Roderick T Dixon,  mL/hr at 07/04/21 0819 New Bag at 07/04/21 0819    acyclovir (ZOVIRAX) tablet 800 mg  800 mg Oral 5x Daily Roderick T Dixon, DO   800 mg at 07/04/21 1153    amLODIPine (NORVASC) tablet 5 mg  5 mg Oral Daily Roderick T Dixon, DO   5 mg at 07/04/21 0821    levothyroxine (SYNTHROID) tablet 50 mcg  50 mcg Oral Daily Roderick T Dixon, DO   50 mcg at 07/04/21 0553    memantine (NAMENDA) tablet 10 mg  10 mg Oral BID Roderick T Dixon, DO   10 mg at 07/04/21 0833    omega-3 acid ethyl esters (LOVAZA) capsule 1 g  1 g Oral BID Roderick Metcalf DO   1 g at 07/04/21 0820    atorvastatin (LIPITOR) tablet 20 mg  20 mg Oral Daily Roderick T Dixon, DO   20 mg at 07/04/21 3586    HYDROmorphone (DILAUDID) injection 0.5 mg  0.5 mg Intravenous Q6H PRN Barbara Reeves, DO   0.5 mg at 07/04/21 1153    lidocaine 4 % external patch 1 patch  1 patch Transdermal Daily Roderick T Dixon, DO   1 patch at 07/04/21 0819    glyBURIDE (DIABETA) tablet 5 mg  5 mg Oral BID  Roderick T Dixon, DO   5 mg at 07/04/21 5759    And    metFORMIN (GLUCOPHAGE) tablet 500 mg  500 mg Oral BID  Roderick T Dixon, DO   500 mg at 07/04/21 0821    glucose (GLUTOSE) 40 % oral gel 15 g  15 g Oral PRN Roderick T Dixon, DO        dextrose 50 % IV solution  12.5 g Intravenous PRN Roderick T Dixon, DO        glucagon (rDNA) injection 1 mg  1 mg Intramuscular PRN Roderick T Dixon, DO        dextrose 5 % solution  100 mL/hr Intravenous PRN Roderick T Dixon, DO           Allergies:  Patient has no known allergies. Social History:    reports that he quit smoking about 15 years ago. His smoking use included cigarettes. He has a 90.00 pack-year smoking history. He does not have any smokeless tobacco history on file. He reports current alcohol use of about 1.7 standard drinks of alcohol per week. He reports current drug use. Drug: Marijuana. Family History:   History reviewed. No pertinent family history.     REVIEW OF SYSTEMS:  RESPIRATORY:  negative for  dry cough, dyspnea, wheezing and chest pain   CARDIOVASCULAR:  negative for  chest pain, dyspnea, palpitations, orthopnea, exertional chest pressure/discomfort, fatigue, edema, syncope  GASTROINTESTINAL:  negative for nausea, vomiting, change in bowel habits, diarrhea, constipation, abdominal pain and reflux   GENITOURINARY:  negative for frequency, dysuria, nocturia, urinary incontinence and hesitancy positive for   HEMATOLOGIC/LYMPHATIC:  negative for easy bruising, bleeding and swelling/edemapositive for   ENDOCRINE:  negative for weight changes, change in bowel habits and diabetic symptoms including neither polyuria nor polydipsia nor blurred vision nor foot ulcerations nor neuropathy  MUSCULOSKELETAL:  negative for  myalgias, arthralgias, pain, joint swelling, stiff joints and muscle weakness shoulder pain he just had injection in the shoulder he says. NEUROLOGICAL:  negative for headaches, dizziness, memory problems, speech problems, visual disturbance, gait problems, weakness and numbness     Vitals:  BP (!) 163/66   Pulse 69   Temp 98.6 °F (37 °C) (Oral)   Resp 16   Ht 5' 10.5\" (1.791 m)   Wt 192 lb (87.1 kg)   SpO2 92%   BMI 27.16 kg/m²     PHYSICAL EXAM:    CONSTITUTIONAL:  awake, alert, cooperative, no apparent distress, and appears stated age  EYES:  Lids and lashes normal, pupils equal, round and reactive to light, extra ocular muscles intact, sclera clear, conjunctiva normal    ENT:  Normocephalic, without obvious abnormality, atraumatic, sinuses nontender on palpation, external ears without lesions, oral pharynx with moist mucus membranes, tonsils without erythema or exudates,    NECK:  Supple, symmetrical, trachea midline, no adenopathy, thyroid symmetric, not enlarged and no tenderness, skin normal   HEMATOLOGIC/LYMPHATICS:  no cervical lymphadenopathy   BACK:  Symmetric, no curvature, spinous processes are non-tender on palpation, paraspinous muscles are non-tender on palpation, no costal vertebral tenderness   LUNGS:  No increased work of breathing, good air exchange, clear to auscultation bilaterally, no crackles or wheezing   CARDIOVASCULAR:  Normal apical impulse, regular rate and rhythm, normal S1 and S2, no S3 or S4, and no murmur noted    ABDOMEN:  No scars, normal bowel sounds, soft, non-distended, non-tender, no masses palpated, no hepatosplenomegally  , his right flank shows vesicular type lesion consistent with varicella that extended into the back. It is sensitive to touch as well. GENITAL/URINARY:na   MUSCULOSKELETAL:  There is no redness, warmth, or swelling of the joints.   Full range of motion noted.  Motor strength is 5 out of 5 all extremities bilaterally. Tone is normal.   NEUROLOGIC:  Awake, alert, oriented to name, place and time. Cranial nerves II-XII are grossly intact. Motor is 5 out of 5 bilaterally. Sensory is intact. gait is normal.     SKIN:  no bruising or bleeding, normal skin color, texture, turgor, no redness, warmth, or swelling and no rashes    RECENT DATA:  No results found for: CBC, BMP    DATA:     Component Value Units   POC Glucose Fingerstick [[id number]] (Abnormal)    Collected: 07/04/21 1056    Updated: 07/04/21 1113     POC Glucose 153High  mg/dL   POC Glucose Fingerstick [[id number]] (Abnormal)    Collected: 07/04/21 0616    Updated: 07/04/21 0628     POC Glucose 126High  mg/dL   Lipase [[id number]]    Collected: 07/04/21 0528    Updated: 07/04/21 0614    Specimen Source: Blood     Lipase 31 U/L   Amylase [[id number]]    Collected: 07/04/21 0528    Updated: 07/04/21 0614    Specimen Source: Blood     Amylase 56 U/L   Basic Metabolic Panel w/ Reflex to MG [[id number]] (Abnormal)    Collected: 07/04/21 0528    Updated: 07/04/21 0700    Specimen Source: Blood     Glucose 117High  mg/dL    BUN 32High  mg/dL    CREATININE 1. 21High  mg/dL    Bun/Cre Ratio NOT REPORTED    Calcium 8.1Low  mg/dL    Sodium 142 mmol/L    Potassium 4.7 mmol/L    Chloride 107 mmol/L    CO2 25 mmol/L    Anion Gap 10 mmol/L    GFR Non-African American 59Low  mL/min    GFR African American >60 mL/min    GFR Comment         Comment: Average GFR for 79or more years old:    65 mL/min/1.73sq m   Chronic Kidney Disease:    <60 mL/min/1.73sq m   Kidney failure:    <15 mL/min/1.73sq m               eGFR calculated using average adult body mass.  Additional eGFR calculator available at:         [employer]              GFR Staging NOT REPORTED   CBC with DIFF [[id number]] (Abnormal)    Collected: 07/04/21 0528    Updated: 07/04/21 0601    Specimen Source: Blood     WBC 6.0 k/uL    RBC 4.27Low  m/uL    Hemoglobin 12.3Low  g/dL    Hematocrit 36.4Low  %    MCV 85.2 fL    MCH 28.9 pg    MCHC 33.9 g/dL    RDW 15.0High  %    Platelets 915 k/uL    MPV 8.5 fL    NRBC Automated NOT REPORTED per 100 WBC    Differential Type NOT REPORTED    Seg Neutrophils 72High  %    Lymphocytes 14Low  %    Monocytes 11High  %    Eosinophils % 3 %    Basophils 0 %    Immature Granulocytes NOT REPORTED %    Segs Absolute 4.40 k/uL    Absolute Lymph # 0.80Low  k/uL    Absolute Mono # 0.60 k/uL    Absolute Eos # 0.20 k/uL    Basophils Absolute 0.00 k/uL    Absolute Immature Granulocyte NOT REPORTED k/uL    WBC Morphology NOT REPORTED    RBC Morphology NOT REPORTED    Platelet Estimate NOT REPORTED   CT ABDOMEN PELVIS W IV CONTRAST Additional Contrast? None [[id number]]    Collected: 07/03/21 1356    Updated: 07/03/21 1414    Narrative:     EXAMINATION:   CT OF THE ABDOMEN AND PELVIS WITH CONTRAST 7/3/2021 1:29 pm     TECHNIQUE:   CT of the abdomen and pelvis was performed with the administration of   intravenous contrast. Multiplanar reformatted images are provided for review. Dose modulation, iterative reconstruction, and/or weight based adjustment of   the mA/kV was utilized to reduce the radiation dose to as low as reasonably   achievable. COMPARISON:   December 2019     HISTORY:   ORDERING SYSTEM PROVIDED HISTORY: RLQ abd pain   TECHNOLOGIST PROVIDED HISTORY:     RLQ abd pain   Decision Support Exception - unselect if not a suspected or confirmed   emergency medical condition->Emergency Medical Condition (MA)   Reason for Exam: RLQ abd pain   Acuity: Unknown   Type of Exam: Unknown   Additional signs and symptoms: PT STATES RIGHT SIDE PAN SINCE THURSDAY, SAYS   THE PAIN IS ALWAYS THERE SOMETIMES JUST STRONGER THAN OTHERS   Relevant Medical/Surgical History: URGERIES-HERNIA REPAIR AS A CHILD     FINDINGS:   Lower Chest: Small hiatal hernia seen.  There is nonspecific thickening at   the GE junction.  Atherosclerotic change seen in the descending thoracic   aorta. No pleural effusions.  No focal lung consolidation noted. Organs: No perisplenic fluid     No intrahepatic ductal dilatation.  No perihepatic fluid.  Gallstones are   seen.  Gallbladder is contracted. There is nodularity to the lateral limb of the adrenal gland on the right   measuring 9 mm.  No hydronephrosis on the right.  2 mm stone seen in the   right kidney.  A few subcentimeter cysts are seen in the right kidney. There is in ill-defined nodule in the right kidney measuring 1.2 cm. .  A more   hypodense nodule with seen in this region previously, therefore, the finding   on today's study may be secondary to ruptured cyst.     There is infiltrative soft tissue density seen in the left upper quadrant, in   the perinephric space on the left encasing the left kidney.  This   infiltrative soft tissue density is also seen in the retroperitoneum on the   left encasing the aorta and IVC.  Beneath the left kidney this infiltrative   soft tissue density measures 10.1 cm by 7.9 cm.  There is also a encasement   of the left adrenal gland by this soft tissue density. While there is no pancreatic ductal dilatation there is a nodular density   seen within the pancreas measuring 2.6 cm x 2.3 cm. No significant small bowel distention noted.  Mild stool load seen in the   colon.  Appendix is identified and normal in caliber.  A few colonic   diverticula are seen     Nodular soft tissue density extends inferiorly along the root of the   mesentery.  Infiltrative soft tissue density is also seen extending along the   iliac vessels bilaterally, left greater than right. There is mild to moderate narrowing of the iliac vessels bilaterally, left   greater than right.  There is severe narrowing of the right superficial   femoral artery and left superficial femoral artery. Jomar Flight narrowing is   seen involving the proximal iliacs.      Prostate measures 4.9 cm x 3.4 cm. Dominant right external iliac node measures 3.5 x 1.6 cm. Lorean Snare  Previously this   measured 2.9 x 1.2 cm. Spurring is seen in the spine    Impression:     Infiltrative soft tissue density is seen in the retroperitoneum on the left,   with increased size of retroperitoneal lymph nodes, and involvement of the   adrenal glands, and pancreas. Alvester Redhead the lack of resolution and progression   constellation of findings suggest underlying neoplastic etiology, possibly   lymphoma. Tiny nonobstructing right renal stone, and cholelithiasis atherosclerosis   with severe narrowing of the superficial femoral arteries and proximal iliacs    Current IP Meds          ASSESSMENT:  Patient Active Problem List   Diagnosis Code    NHL (non-Hodgkin's lymphoma) (HCC) C85.90    Traumatic retroperitoneal hematoma S36.892A    Acute kidney injury ([facility] [address] N17.9     · History of lymphoma  ·   · Cholelithiasis  ·   · Right flank abdominal pain secondary his acute varicella-zoster  ·   · Varicella-zoster  ·   · Herpetic neuralgia  ·   · Abnormal CT with left retroperitoneal and adrenal mass  ·   · Pancreatic mass questionable etiology  ·   · History of hypertension  ·   · History of diabetes mellitus  · Acute kidney injury  · Renal insufficiency improving with hydration    PLAN:  · Continue with IV fluids we will adjust the rate  ·   · Encourage p.o. intake  ·   · Continue with the Valtrex for antiviral medication equipment for 7 days  ·   · We will add Lyrica 50 mg at at bedtime for 7 days and then to increase to twice daily for 7 days and then to 3 times daily for 7 days for his herpetic pain  ·   · Continue Lidoderm patch  ·   · Encourage ambulation  ·   · Await hematology input  ·   · We will get MRCP tomorrow to evaluate the abnormalities on the CT  ·   · He can continue with his home meds  ·   · We will add Ultram 50 mg p.o. 3 times daily as needed for pain with Tylenol 500 mg.   · Came back to the patient about MRCP and his daughter was in the room she started saying that they have noticed lately that he has been dizzy having problems with his bowels and urine as well asked RN to add MRI brain with no contrast for tomorrow as well get physical therapy.         Electronically signed by KRISTI Salgado on 7/4/2021 at 2:04 PM  [address] [place] [address]

## 2021-07-04 NOTE — PROGRESS NOTES
Patient diaphoretic, denies weakness or shakiness. Blood sugar 44. Patient given orange juice and peanut butter and crackers. Blood sugar rechecked . .. 63. Patient states he feels better. Will continue to monitor.

## 2021-07-05 ENCOUNTER — APPOINTMENT (OUTPATIENT)
Dept: MRI IMAGING | Age: 71
DRG: 596 | End: 2021-07-05
Payer: MEDICARE

## 2021-07-05 LAB
ABSOLUTE EOS #: 0 K/UL (ref 0–0.4)
ABSOLUTE IMMATURE GRANULOCYTE: ABNORMAL K/UL (ref 0–0.3)
ABSOLUTE LYMPH #: 1.56 K/UL (ref 1–4.8)
ABSOLUTE MONO #: 0.27 K/UL (ref 0.1–1.3)
ANION GAP SERPL CALCULATED.3IONS-SCNC: 7 MMOL/L (ref 9–17)
BASOPHILS # BLD: 0 % (ref 0–2)
BASOPHILS ABSOLUTE: 0 K/UL (ref 0–0.2)
BUN BLDV-MCNC: 25 MG/DL (ref 8–23)
BUN/CREAT BLD: ABNORMAL (ref 9–20)
CA 19-9: 17 U/ML (ref 0–35)
CALCIUM SERPL-MCNC: 8.6 MG/DL (ref 8.6–10.4)
CARCINOEMBRYONIC ANTIGEN: 1.3 NG/ML
CHLORIDE BLD-SCNC: 108 MMOL/L (ref 98–107)
CO2: 26 MMOL/L (ref 20–31)
CREAT SERPL-MCNC: 1.18 MG/DL (ref 0.7–1.2)
DIFFERENTIAL TYPE: ABNORMAL
EOSINOPHILS RELATIVE PERCENT: 0 % (ref 0–4)
GFR AFRICAN AMERICAN: >60 ML/MIN
GFR NON-AFRICAN AMERICAN: >60 ML/MIN
GFR SERPL CREATININE-BSD FRML MDRD: ABNORMAL ML/MIN/{1.73_M2}
GFR SERPL CREATININE-BSD FRML MDRD: ABNORMAL ML/MIN/{1.73_M2}
GLUCOSE BLD-MCNC: 49 MG/DL (ref 75–110)
GLUCOSE BLD-MCNC: 63 MG/DL (ref 70–99)
GLUCOSE BLD-MCNC: 99 MG/DL (ref 75–110)
HCT VFR BLD CALC: 37.7 % (ref 41–53)
HEMOGLOBIN: 12.8 G/DL (ref 13.5–17.5)
IMMATURE GRANULOCYTES: ABNORMAL %
LYMPHOCYTES # BLD: 23 % (ref 24–44)
MCH RBC QN AUTO: 29.1 PG (ref 26–34)
MCHC RBC AUTO-ENTMCNC: 33.8 G/DL (ref 31–37)
MCV RBC AUTO: 86 FL (ref 80–100)
MONOCYTES # BLD: 4 % (ref 1–7)
MORPHOLOGY: NORMAL
NRBC AUTOMATED: ABNORMAL PER 100 WBC
PDW BLD-RTO: 15 % (ref 11.5–14.9)
PLATELET # BLD: 161 K/UL (ref 150–450)
PLATELET ESTIMATE: ABNORMAL
PMV BLD AUTO: 8.2 FL (ref 6–12)
POTASSIUM SERPL-SCNC: 4.9 MMOL/L (ref 3.7–5.3)
RBC # BLD: 4.39 M/UL (ref 4.5–5.9)
RBC # BLD: ABNORMAL 10*6/UL
SEG NEUTROPHILS: 73 % (ref 36–66)
SEGMENTED NEUTROPHILS ABSOLUTE COUNT: 4.97 K/UL (ref 1.3–9.1)
SODIUM BLD-SCNC: 141 MMOL/L (ref 135–144)
WBC # BLD: 6.8 K/UL (ref 3.5–11)
WBC # BLD: ABNORMAL 10*3/UL

## 2021-07-05 PROCEDURE — 6360000002 HC RX W HCPCS: Performed by: FAMILY MEDICINE

## 2021-07-05 PROCEDURE — 70551 MRI BRAIN STEM W/O DYE: CPT

## 2021-07-05 PROCEDURE — 2500000003 HC RX 250 WO HCPCS: Performed by: FAMILY MEDICINE

## 2021-07-05 PROCEDURE — G0378 HOSPITAL OBSERVATION PER HR: HCPCS

## 2021-07-05 PROCEDURE — 36415 COLL VENOUS BLD VENIPUNCTURE: CPT

## 2021-07-05 PROCEDURE — 99232 SBSQ HOSP IP/OBS MODERATE 35: CPT | Performed by: INTERNAL MEDICINE

## 2021-07-05 PROCEDURE — 82947 ASSAY GLUCOSE BLOOD QUANT: CPT

## 2021-07-05 PROCEDURE — 80048 BASIC METABOLIC PNL TOTAL CA: CPT

## 2021-07-05 PROCEDURE — 97530 THERAPEUTIC ACTIVITIES: CPT

## 2021-07-05 PROCEDURE — 86301 IMMUNOASSAY TUMOR CA 19-9: CPT

## 2021-07-05 PROCEDURE — 97116 GAIT TRAINING THERAPY: CPT

## 2021-07-05 PROCEDURE — 97162 PT EVAL MOD COMPLEX 30 MIN: CPT

## 2021-07-05 PROCEDURE — 6370000000 HC RX 637 (ALT 250 FOR IP): Performed by: FAMILY MEDICINE

## 2021-07-05 PROCEDURE — 2580000003 HC RX 258: Performed by: FAMILY MEDICINE

## 2021-07-05 PROCEDURE — 85025 COMPLETE CBC W/AUTO DIFF WBC: CPT

## 2021-07-05 PROCEDURE — 97166 OT EVAL MOD COMPLEX 45 MIN: CPT

## 2021-07-05 PROCEDURE — 82378 CARCINOEMBRYONIC ANTIGEN: CPT

## 2021-07-05 RX ORDER — TRAMADOL HYDROCHLORIDE 50 MG/1
50 TABLET ORAL EVERY 6 HOURS PRN
Status: DISCONTINUED | OUTPATIENT
Start: 2021-07-05 | End: 2021-07-08

## 2021-07-05 RX ADMIN — AMLODIPINE BESYLATE 5 MG: 5 TABLET ORAL at 08:26

## 2021-07-05 RX ADMIN — OMEGA-3-ACID ETHYL ESTERS 1 G: 1 CAPSULE, LIQUID FILLED ORAL at 22:07

## 2021-07-05 RX ADMIN — DEXTROSE MONOHYDRATE 12.5 G: 25 INJECTION, SOLUTION INTRAVENOUS at 06:34

## 2021-07-05 RX ADMIN — HYDROMORPHONE HYDROCHLORIDE 0.5 MG: 1 INJECTION, SOLUTION INTRAMUSCULAR; INTRAVENOUS; SUBCUTANEOUS at 01:21

## 2021-07-05 RX ADMIN — ACYCLOVIR 800 MG: 800 TABLET ORAL at 18:53

## 2021-07-05 RX ADMIN — HYDROMORPHONE HYDROCHLORIDE 0.5 MG: 1 INJECTION, SOLUTION INTRAMUSCULAR; INTRAVENOUS; SUBCUTANEOUS at 22:03

## 2021-07-05 RX ADMIN — PREGABALIN 50 MG: 50 CAPSULE ORAL at 22:07

## 2021-07-05 RX ADMIN — ACYCLOVIR 800 MG: 800 TABLET ORAL at 15:10

## 2021-07-05 RX ADMIN — HYDROMORPHONE HYDROCHLORIDE 0.5 MG: 1 INJECTION, SOLUTION INTRAMUSCULAR; INTRAVENOUS; SUBCUTANEOUS at 15:10

## 2021-07-05 RX ADMIN — MEMANTINE 10 MG: 10 TABLET ORAL at 22:07

## 2021-07-05 RX ADMIN — LEVOTHYROXINE SODIUM 50 MCG: 0.05 TABLET ORAL at 05:44

## 2021-07-05 RX ADMIN — ACYCLOVIR 800 MG: 800 TABLET ORAL at 22:08

## 2021-07-05 RX ADMIN — ACYCLOVIR 800 MG: 800 TABLET ORAL at 11:54

## 2021-07-05 RX ADMIN — ACYCLOVIR 800 MG: 800 TABLET ORAL at 05:44

## 2021-07-05 RX ADMIN — TRAMADOL HYDROCHLORIDE 50 MG: 50 TABLET, FILM COATED ORAL at 09:30

## 2021-07-05 RX ADMIN — SODIUM CHLORIDE, PRESERVATIVE FREE 10 ML: 5 INJECTION INTRAVENOUS at 22:04

## 2021-07-05 ASSESSMENT — PAIN DESCRIPTION - LOCATION
LOCATION: BACK
LOCATION: BACK;ABDOMEN
LOCATION: ABDOMEN;BACK

## 2021-07-05 ASSESSMENT — PAIN SCALES - GENERAL
PAINLEVEL_OUTOF10: 8
PAINLEVEL_OUTOF10: 7
PAINLEVEL_OUTOF10: 3
PAINLEVEL_OUTOF10: 8
PAINLEVEL_OUTOF10: 5
PAINLEVEL_OUTOF10: 8
PAINLEVEL_OUTOF10: 7
PAINLEVEL_OUTOF10: 7

## 2021-07-05 ASSESSMENT — PAIN DESCRIPTION - ORIENTATION: ORIENTATION: MID

## 2021-07-05 ASSESSMENT — PAIN DESCRIPTION - PAIN TYPE
TYPE: ACUTE PAIN

## 2021-07-05 ASSESSMENT — PAIN DESCRIPTION - DESCRIPTORS
DESCRIPTORS: THROBBING;SHOOTING
DESCRIPTORS: BURNING;ITCHING
DESCRIPTORS: BURNING;ITCHING

## 2021-07-05 NOTE — PROGRESS NOTES
Physical Therapy    Facility/Department: [facility] MED SURG  Initial Assessment    NAME: Joe Stout  : 1950  MRN: [id number]    Date of Service: 2021    Discharge Recommendations:  Patient would benefit from continued therapy after discharge        Assessment   Body structures, Functions, Activity limitations: Decreased functional mobility ; Decreased strength;Decreased endurance;Decreased balance  Assessment: Pt presents with generalized weakness/endurnace and increased back/abdominal pain affecting his mobility. Will benefit from continued therapy while in acute care setting to improve fucntion for safe return home. Treatment Diagnosis: Impaired mobility  Prognosis: Good  Decision Making: Medium Complexity  Clinical Presentation: evolving  PT Education: Plan of Care;PT Role;Goals; General Safety;Gait Training;Family Education;Precautions;Transfer Training;Energy Conservation; Functional Mobility Training  REQUIRES PT FOLLOW UP: Yes  Activity Tolerance  Activity Tolerance: Patient limited by pain; Patient limited by fatigue;Patient limited by endurance       Patient Diagnosis(es): The encounter diagnosis was Herpes zoster without complication. has a past medical history of Cancer ([address] History of non-Hodgkin's lymphoma, Hyperlipidemia, Hypertension, and Hypothyroidism. has a past surgical history that includes hernia repair. Restrictions  Restrictions/Precautions  Restrictions/Precautions: Contact Precautions  Required Braces or Orthoses?: No  Vision/Hearing  Vision: Within Functional Limits  Hearing: Within functional limits     Subjective  General  Patient assessed for rehabilitation services?: Yes  Additional Pertinent Hx: Joe Stout is a 65 y.o. male who is admitted to the hospital on 7/3/2021  For acute abdominal pain and was noted to have acute kidney injury.   Patient reports his abdominal pain started few days ago and also noted to have skin rash around lower back suspicious for herpes zoster. .Patient has history of non-Hodgkin's lymphoma . CT Abdomine- Abnormal CT with left retroperitoneal and adrenal mass. Oncology consulted, reccommends tissue biopsy of   Referring Practitioner: Dr Marcela Wiley  Referral Date : 07/04/21  Diagnosis: Acute Kidney injury  Follows Commands: Within Functional Limits  Pain Screening  Patient Currently in Pain: Yes  Pain Assessment  Pain Assessment: 0-10  Pain Level: 8  Pain Type: Acute pain  Pain Location: Abdomen;Back  Pain Descriptors: Burning;Itching  Response to Pain Intervention: Asleep with RR greater than 10  Vital Signs  BP Location: Left upper arm  Level of Consciousness: Alert (0)  Patient Currently in Pain: Yes  Oxygen Therapy  O2 Device: None (Room air)       Orientation     Social/Functional History  Social/Functional History  Lives With: Spouse  Type of Home: House  Home Layout: One level  Home Access: Stairs to enter without rails  Entrance Stairs - Number of Steps: 1 step. Pt reports grabbing storm door handle to assist with step  Bathroom Shower/Tub: Tub/Shower unit, Doors  Bathroom Toilet: Handicap height  Bathroom Equipment: Grab bars in shower, Hand-held shower  Bathroom Accessibility: Walker accessible  Home Equipment: [employer], Reacher  ADL Assistance: Independent  Homemaking Assistance: Independent (share responsibility)  Homemaking Responsibilities: Yes  Ambulation Assistance: Independent (with single point cane)  Transfer Assistance: Independent  Active : Yes  Mode of Transportation: Car  Occupation: Retired  Type of occupation: Supervisor in [address]: Pt reports sleeping in flat bed  Additional Comments: Pt reports that wife is retired and able to assist if needed. Pt reports that he has family and friends around that are able to assist as needed. Pt denies any recent OT/PT.    Cognition        Objective          AROM RLE (degrees)  RLE AROM: WFL  AROM LLE (degrees)  LLE AROM : WFL  Strength RLE  Comment: Grossly 4/5  Strength LLE  Comment: Grossly 4/5     Sensation  Overall Sensation Status: Impaired (Bilateral feet)  Bed mobility  Rolling to Left: Stand by assistance  Rolling to Right: Stand by assistance  Supine to Sit: Stand by assistance (relies heavily on bed rails.)  Sit to Supine: Stand by assistance (RELIES HEAVILY ON BED RAILS.)  Comment: INCREASED ABD/BACK PAIN WITH BED MOBILITY SLIGHT DIZZY FIRST UPON SITTING, SYMPTOMS CLEARING WITH TIME. Transfers  Sit to Stand: Stand by assistance  Stand to sit: Stand by assistance  Comment: Pt deferrs sitting up in a chair due to back pain. educated pt in importnce of sitting up, trying to sit up at meals at bedside, pt verbalizes understanding. Ambulation  Ambulation?: Yes  Ambulation 1  Surface: level tile  Device: No Device (IV pole)  Assistance: Stand by assistance;Contact guard assistance  Quality of Gait: Slight unsteady, short steps, fatiques easily. Gait Deviations: Slow Marta;Decreased step length;Decreased step height;Decreased arm swing  Distance: 70 ft  Comments: Pt declines to try to use rolling walker.   Stairs/Curb  Stairs?: No     Balance  Posture: Fair  Sitting - Static: Good  Sitting - Dynamic: Fair  Standing - Static: Good;-  Standing - Dynamic: Fair;+ (UE support on IV pole)        Plan   Plan  Times per week: 5 to 6 x/week  Current Treatment Recommendations: Strengthening, Balance Training, Functional Mobility Training, Transfer Training, Endurance Training, Gait Training, Safety Education & Training, Patient/Caregiver Education & Training, Home Exercise Program  Safety Devices  Type of devices: Left in bed, Gait belt, Call light within reach    G-Code       OutComes Score                                                  AM-PAC Score             Goals  Short term goals  Time Frame for Short term goals: 5 to 7 visits  Short term goal 1: Pt able to perform bed mobility independently without use fo bed rail  Short term goal 2: Pt able to tranfer independently from varies surfaces  Short term goal 3:  Pt able to ambulate with st cane distance of 200 to 300 ft SBA, level surfaces. Short term goal 4: Pt able to tolerate strengthenign activities/excercises for 20 to 30 minutes toimprove overall endurance and strength for safe return home.    Patient Goals   Patient goals : Feel better, get stronger       Therapy Time   Individual Concurrent Group Co-treatment   Time In 5010         Time Out 0920         Minutes 22         Timed Code Treatment Minutes: [address] [facility] [address] Judith [place]o, JUNITO

## 2021-07-05 NOTE — CARE COORDINATION
ONGOING DISCHARGE PLAN:    Patient is alert and oriented x4. Spoke with patient regarding discharge plan and patient confirms that plan is still home without needs. LAURO- Creatinine improved at 1.18 today (was 1.21 yesterday). IR consult for bx retroperitoneal mass. Will continue to follow for additional discharge needs.     Electronically signed by Sylvester Kay RN on 7/5/2021 at 1:37 PM

## 2021-07-05 NOTE — PROGRESS NOTES
[id number] Consulted      PROGRESS NOTE        Patient:  Jj Garcia  YOB: 1950    MRN: [id number]     Acct: [de-identified]     Admit date: 7/3/2021    Pt seen and Chart reviewed. Consultant notes reviewed and care evaluated. Subjective: Patient doing okay considering but his wife is in the room she says he is thinking a lot he is somewhat quiet. He still having some pain but the pain medication is helping. But maybe is not lasting long. Patient cannot have MRCP today second to scheduling issues but may be hopefully by tomorrow also discussed with oncologist Dr. Glenys Chan and plan to have IR do a biopsy on his retroperitoneal changes. I will try to further consult by tomorrow, patient received Lyrica last night it seems like she is done okay with it with no side effect so far    Diet:  ADULT DIET;  Regular; 5 carb choices (75 gm/meal)      Medications:Current Inpatient    Scheduled Meds:   insulin lispro  0-6 Units Subcutaneous TID WC    insulin lispro  0-3 Units Subcutaneous Nightly    pregabalin  50 mg Oral Nightly    sodium chloride flush  5-40 mL Intravenous 2 times per day    enoxaparin  40 mg Subcutaneous Daily    acyclovir  800 mg Oral 5x Daily    amLODIPine  5 mg Oral Daily    levothyroxine  50 mcg Oral Daily    memantine  10 mg Oral BID    omega-3 acid ethyl esters  1 g Oral BID    atorvastatin  20 mg Oral Daily    lidocaine  1 patch Transdermal Daily    [Held by provider] glyBURIDE  5 mg Oral BID WC    And    [Held by provider] metFORMIN  500 mg Oral BID WC     Continuous Infusions:   sodium chloride      sodium chloride 75 mL/hr at 07/04/21 1913    dextrose       PRN Meds:traMADol, acetaminophen, sodium chloride flush, sodium chloride flush, sodium chloride, ondansetron **OR** ondansetron, HYDROmorphone, glucose, dextrose, glucagon (rDNA), dextrose        Physical Exam:  Vitals: BP (!) 195/74   Pulse 74   Temp 98.4 °F (36.9 °C) (Oral)   Resp 16   Ht 5' 10.5\" (1.791 m)   Wt 194 lb 7.1 oz (88.2 kg)   SpO2 94%   BMI 27.51 kg/m²   24 hour intake/output:    Intake/Output Summary (Last 24 hours) at 7/5/2021 1303  Last data filed at 7/4/2021 1830  Gross per 24 hour   Intake 1833 ml   Output 675 ml   Net 1158 ml     Last 3 weights: Wt Readings from Last 3 Encounters:   07/04/21 194 lb 7.1 oz (88.2 kg)   12/16/19 193 lb (87.5 kg)   09/06/13 212 lb (96.2 kg)       Physical Examination:   General appearance - alert, well appearing, and in no distress  Mental status - alert, oriented to person, place, and time  PERRLA wnl  Chest - clear to auscultation, no wheezes, rales or rhonchi, symmetric air entry  Heart - normal rate, regular rhythm, normal S1, S2, no murmurs, rubs, clicks or gallops  Abdomen - soft, nontender, nondistended, no masses or organomegaly just his right side and flank area tender and sensitive second to his very soft eruption. There is still looks vesicular and starting but no secondary infection. Neurological - alert, oriented, normal speech, no focal findings or movement disorder noted}  Extremities - peripheral pulses normal, no pedal edema, no clubbing or cyanosis  Skin - normal coloration and turgor, no rashes, no suspicious skin lesions noted     CEA [[id number]]    Collected: 07/05/21 0531    Updated: 07/05/21 1016     CEA 1.3 ng/mL    Comment: The Roche \"ECLIA\" assay is used.  Results obtained with different assay methods cannot be   used interchangeably. CANCER ANTIGEN 19-9 [[id number]]    Collected: 07/05/21 0531    Updated: 07/05/21 1016    Specimen Source: Blood     CA 19-9 17 U/mL    Comment: The Roche \"ECLIA\" assay is used.  Results obtained with different assay methods cannot be   used interchangeably.        POC Glucose Fingerstick [[id number]]    Collected: 07/05/21 0712    Updated: 07/05/21 0718     POC Glucose 99 mg/dL   CBC with DIFF [[id number]] (Abnormal)    Collected: 07/05/21 0531 Updated: 07/05/21 0705    Specimen Source: Blood     WBC 6.8 k/uL    RBC 4.39Low  m/uL    Hemoglobin 12.8Low  g/dL    Hematocrit 37.7Low  %    MCV 86.0 fL    MCH 29.1 pg    MCHC 33.8 g/dL    RDW 15.0High  %    Platelets 740 k/uL    MPV 8.2 fL    NRBC Automated NOT REPORTED per 100 WBC    Differential Type NOT REPORTED    Immature Granulocytes NOT REPORTED %    Absolute Immature Granulocyte NOT REPORTED k/uL    WBC Morphology NOT REPORTED    RBC Morphology NOT REPORTED    Platelet Estimate NOT REPORTED    Seg Neutrophils 73High  %    Lymphocytes 23Low  %    Monocytes 4 %    Eosinophils % 0 %    Basophils 0 %    Segs Absolute 4.97 k/uL    Absolute Lymph # 1.56 k/uL    Absolute Mono # 0.27 k/uL    Absolute Eos # 0.00 k/uL    Basophils Absolute 0.00 k/uL    Morphology Normal   POC Glucose Fingerstick [[id number]] (Abnormal)    Collected: 07/05/21 0625    Updated: 07/05/21 0631     POC Glucose 49Low  mg/dL    Comment: Critical Noted      Basic Metabolic Prof [[id number]] (Abnormal)    Collected: 07/05/21 0531    Updated: 07/05/21 9612    Specimen Source: Blood     Glucose 63Low  mg/dL    BUN 25High  mg/dL    CREATININE 1.18 mg/dL    Bun/Cre Ratio NOT REPORTED    Calcium 8.6 mg/dL    Sodium 141 mmol/L    Potassium 4.9 mmol/L    Chloride 108High  mmol/L    CO2 26 mmol/L    Anion Gap 7Low  mmol/L    GFR Non-African American >60 mL/min    GFR African American >60 mL/min    GFR Comment         Comment: Average GFR for 79or more years old:    65 mL/min/1.73sq m   Chronic Kidney Disease:    <60 mL/min/1.73sq m   Kidney failure:    <15 mL/min/1.73sq m               eGFR calculated using average adult body mass.  Additional eGFR calculator available at:         [email or web address]              GFR Staging NOT REPORTED   POC Glucose Fingerstick [[id number]]    Collected: 07/04/21 2034    Updated: 07/04/21 2041     POC Glucose 106 mg/dL   POC Glucose Fingerstick [[id number]] (Abnormal)    Collected: 07/04/21 1919    Updated: 07/04/21 1926     POC Glucose 156High  mg/dL   POC Glucose Fingerstick [[id number]] (Abnormal)    Collected: 07/04/21 1700    Updated: 07/04/21 1706     POC Glucose 129High  mg/dL   Lactate Dehydrogenase [[id number]]    Collected: 07/04/21 1641    Updated: 07/04/21 1703    Specimen Source: Blood      U/L   Path Review, Smear [[id number]]    Collected: 07/04/21 0528    Updated: 07/04/21 1657     Pathologist Review TO BE REVIEWED BY PATHOLOGIST   Flow cytometry leukemia/lymphoma blood [[id number]]    Collected: 07/04/21 1641    Updated: 07/04/21 1641    Specimen Source: Blood    POC Glucose Fingerstick [[id number]] (Abnormal)    Collected: 07/04/21 1543    Updated: 07/04/21 1632     POC Glucose 44Low  mg/dL   POC Glucose Fingerstick [[id number]] (Abnormal)    Collected: 07/04/21 1604    Updated: 07/04/21 1632     POC Glucose 63Low           Assessment:  Active Problems:    Acute kidney injury ([facility] [address]  Resolved Problems:    * No resolved hospital problems. *     NHL (non-Hodgkin's lymphoma) (HCC) C85.90    Traumatic retroperitoneal hematoma S36.892A    Acute kidney injury ([address] N17.9      · History of lymphoma  ·    · Cholelithiasis  ·    · Right flank abdominal pain secondary his acute varicella-zoster  ·    · Varicella-zoster  ·    · Herpetic neuralgia  ·    · Abnormal CT with left retroperitoneal and adrenal mass  ·    · Pancreatic mass questionable etiology  ·    · History of hypertension  ·    · History of diabetes mellitus  · Acute kidney injury  · Renal insufficiency improving with hydration  · Hyperglycemia second to decreased appetite and oral intake  · Reviewing his CT shows severe narrowing of the superficial femoral arteries and proximal iliac         Plan:  1. Discussed with patient and his wife he agrees to try at least clear Ensure he does not like the other type.   Encouraged him to at least drink that he does not need it  2.   3. We will DC his glipizide reported on hold for now and just put him on low insulin sliding scale  4. We will get vascular consult  5. Discussed with Dr. Luna Castro in regard to his biopsy we will ask IR to put him on the schedule  6. We will increase his Ultram to every 6 hours as needed continue with Dilaudid for breakthrough pain  7. We will await his MRCP tomorrow for further plan and possible surgical input  8.  Patient and wife verbalized understanding of the plan    [facility]             7/5/2021, 1:03 PM

## 2021-07-05 NOTE — PLAN OF CARE
Problem: Pain:  Goal: Pain level will decrease  Description: Pain level will decrease  7/5/2021 0414 by Nelida Bower RN  Outcome: Ongoing  7/4/2021 1833 by Eliana Treviño RN  Outcome: Ongoing  7/4/2021 1425 by Bruce Cabrera RN  Outcome: Ongoing  Goal: Control of acute pain  Description: Control of acute pain  7/5/2021 0414 by Nelida Bower RN  Outcome: Ongoing  Note: Assess the location, characteristics, onset, duration, frequency, quality, and severity of pain. Encourage immediate report of pain. Use appropriate pain scale to rate pain. Manage pain using nonpharmacologic/pharmacologic interventions. 7/4/2021 1833 by Eliana Treviño RN  Outcome: Ongoing  7/4/2021 1425 by Bruce Cabrera RN  Outcome: Ongoing  Goal: Control of chronic pain  Description: Control of chronic pain  7/5/2021 0414 by Nelida Bower RN  Outcome: Ongoing  7/4/2021 1833 by Eliana Treviño RN  Outcome: Ongoing     Problem: Falls - Risk of:  Goal: Will remain free from falls  Description: Will remain free from falls  Outcome: Ongoing  Note: Patient remains free of falls and injuries throughout shift. Bed remains in the lowest position, wheels locked, call light and bedside table are within reach.    Goal: Absence of physical injury  Description: Absence of physical injury  Outcome: Ongoing

## 2021-07-05 NOTE — FLOWSHEET NOTE
07/05/21 1132   Encounter Summary   Services provided to: Patient   Referral/Consult From: Rounding   Complexity of Encounter Low   Length of Encounter 15 minutes   Spiritual/Judaism   Type Spiritual support   Assessment Sleeping   Intervention Prayer

## 2021-07-05 NOTE — PLAN OF CARE
Please have patient or patient representative fill out the MRI screening form and fax to [phone] when done. MRI will be scheduled after screening form is received. Any questions call [phone]. Thank you!

## 2021-07-05 NOTE — PROGRESS NOTES
[address]   Occupational Therapy Evaluation  Date: 21  Patient Name: Justo Hilario       Room: 3890/8315-65  MRN: [id number]  Account: [de-identified]   : 1950  (65 y.o.) Gender: male     Discharge Recommendations: The patient may need non-skilled ADL assistance after discharge. Equipment Needed:  (TBD)    Referring Practitioner: Mian Villasenor DO  Diagnosis: LAURO  Additional Pertinent Hx: Justo Hilario is a 65 y.o. male who is admitted to the hospital on 7/3/2021  For acute abdominal pain and was noted to have acute kidney injury. Patient reports his abdominal pain started few days ago and also noted to have skin rash around lower back suspicious for herpes zoster. .Patient has history of non-Hodgkin's lymphoma . CT Abdomine- Abnormal CT with left retroperitoneal and adrenal mass    Treatment Diagnosis: Impaired self care status  Past Medical History:  has a past medical history of Cancer ([address] History of non-Hodgkin's lymphoma, Hyperlipidemia, Hypertension, and Hypothyroidism. Past Surgical History:   has a past surgical history that includes hernia repair. Restrictions  Restrictions/Precautions: Contact Precautions  Required Braces or Orthoses?: No     Vitals  Temp: 99.1 °F (37.3 °C)  Pulse: 82  Resp: 18  BP: (!) 171/79  Height: 5' 10.5\" (179.1 cm)  Weight: 194 lb 7.1 oz (88.2 kg)  BMI (Calculated): 27.6  Oxygen Therapy  SpO2: 91 %  O2 Device: None (Room air)  O2 Flow Rate (L/min): 0 L/min  Level of Consciousness: Alert (0)    Subjective     Overall Orientation Status: Within Functional Limits  Vision  Vision: Within Functional Limits  Hearing  Hearing: Within functional limits  Social/Functional History  Lives With: Spouse  Type of Home: House  Home Layout: One level  Home Access: Stairs to enter without rails  Entrance Stairs - Number of Steps: 1 step.  Pt reports grabbing storm door handle to assist with step  Bathroom Shower/Tub: Tub/Shower unit, [facility]  [place] Toilet: Handicap height  Bathroom Equipment: Grab bars in shower, Hand-held shower  Bathroom Accessibility: Walker accessible  Home Equipment: Bobbye Brace, Reacher  ADL Assistance: Independent  Homemaking Assistance: Independent (share responsibility)  Homemaking Responsibilities: Yes  Ambulation Assistance: Independent (with single point cane)  Transfer Assistance: Independent  Active : Yes  Mode of Transportation: Car  Occupation: Retired  Type of occupation: Supervisor in [address]: Pt reports sleeping in flat bed  Additional Comments: Pt reports that wife is retired and able to assist if needed. Pt reports that he has family and friends around that are able to assist as needed. Pt denies any recent OT/PT. Pain Assessment  Pain Assessment: 0-10  Pain Level: 8  Pain Type: Acute pain  Pain Location: Back, Abdomen  Pain Descriptors: Burning, Itching    Objective          Sensation  Overall Sensation Status: Impaired (Bilateral feet)   ADL  Feeding: Modified independent   Grooming: Setup  UE Bathing: Minimal assistance  LE Bathing: Minimal assistance  UE Dressing: Minimal assistance  LE Dressing: Minimal assistance  Toileting: Minimal assistance  Additional Comments: ADL scores based on clinical reasoning and skilled observation unless otherwise noted. Pt completed doffing/donning bilateral socks with min A to stabilize LLE. Pt currently limited due to pain, balance, and strength impacting safety and independence with self care tasks. UE Function           LUE Strength  L Hand General: 4/5  LUE Strength Comment: Shoulder defered due to pain. Otherwise 4/5     LUE Tone: Normotonic     LUE AROM (degrees)  LUE General AROM: ~100 degrees shoulder flexion due to increased back pain; Otherwise WFL     Left Hand AROM (degrees)  Left Hand AROM: WFL  RUE Strength  R Hand General: 4/5  RUE Strength Comment: Shoulder defered due to pain.  Otherwise 4/5      RUE Tone: Normotonic     RUE AROM (degrees)  RUE General AROM: ~100 degrees shoulder flexion due to increased back pain; Otherwise WFL     Right Hand AROM (degrees)  Right Hand AROM: WFL    Fine Motor Skills  Coordination  Movements Are Fluid And Coordinated: Yes                           Mobility  Supine to Sit: Stand by assistance  Sit to Supine: Stand by assistance       Balance  Sitting Balance: Stand by assistance  Standing Balance: Contact guard assistance  Standing Balance  Time: 3-4 minutes  Activity: functional mobility  Comment: Unsteadiness with standing  Functional Mobility  Functional - Mobility Device: Other (IV pole for stabilization)  Activity: Other  Assist Level: Contact guard assistance  Functional Mobility Comments: Pt demostrated short steps with mobility with unsteadiness noted. Bed mobility  Rolling to Left: Stand by assistance  Rolling to Right: Stand by assistance  Supine to Sit: Stand by assistance  Sit to Supine: Stand by assistance  Comment: Pt reported increased back and abdominal pain with bed mobility with dizziness noted upon sitting. Pt reports that symptoms resolved with increased time.       Transfers  Sit to stand: Stand by assistance  Stand to sit: Stand by assistance      Assessment  Performance deficits / Impairments: Decreased ADL status, Decreased functional mobility , Decreased ROM, Decreased strength, Decreased safe awareness, Decreased endurance, Decreased balance  Treatment Diagnosis: Impaired self care status  Prognosis: Good  Decision Making: Medium Complexity  REQUIRES OT FOLLOW UP: Yes  Discharge Recommendations: Home with assist PRN  Activity Tolerance: Patient limited by fatigue, Patient limited by pain         Functional Outcome Measures  AM-PAC Daily Activity Inpatient   How much help for putting on and taking off regular lower body clothing?: A Little  How much help for Bathing?: A Little  How much help for Toileting?: A Little  How much help for putting on and taking off regular upper body clothing?: A Little  How much help for taking care of personal grooming?: A Little  How much help for eating meals?: None  AM-PAC Inpatient Daily Activity Raw Score: 19  AM-PAC Inpatient ADL T-Scale Score : 40.22  ADL Inpatient CMS 0-100% Score: 42.8  ADL Inpatient CMS G-Code Modifier : CK       Goals  Patient Goals   Patient goals :  To get betterq  Short term goals  Time Frame for Short term goals: By discharge  Short term goal 1: Pt will complete BADLs with modified independence and good safety  Short term goal 2: Pt will complete functional transfers/mobility during self care tasks with modified independence with good safety  Short term goal 3: Pt will tolerate standing 8+ minutes during functional activity of choice to increase safety and independence with self care and mobility  Short term goal 4: Pt will verbalize/demonstrate good understanding of energy conservation techniques/home safety to increase safety and independence with self care and mobility  Short term goal 5: Pt will participate in 15+ minutes of therapeutic exercises/functional activities to increase safety and independence with self care and mobility    Plan  Safety Devices  Safety Devices in place: Yes  Type of devices: Bed alarm in place, Gait belt, Patient at risk for falls, Call light within reach, Left in bed, Nurse notified     Plan  Times per week: 4-6  Current Treatment Recommendations: Self-Care / ADL, Strengthening, ROM, Balance Training, Functional Mobility Training, Endurance Training, Pain Management, Safety Education & Training, Patient/Caregiver Education & Training, Equipment Evaluation, Education, & procurement       Equipment Recommendations  Equipment Needed:  (TBD)  OT Individual Minutes  Time In: 7794  Time Out: 0920  Minutes: 22    Electronically signed by Jl Barkley OT on 7/5/21 at 1:10 PM EDT

## 2021-07-06 ENCOUNTER — APPOINTMENT (OUTPATIENT)
Dept: INTERVENTIONAL RADIOLOGY/VASCULAR | Age: 71
DRG: 596 | End: 2021-07-06
Payer: MEDICARE

## 2021-07-06 ENCOUNTER — APPOINTMENT (OUTPATIENT)
Dept: CT IMAGING | Age: 71
DRG: 596 | End: 2021-07-06
Payer: MEDICARE

## 2021-07-06 ENCOUNTER — APPOINTMENT (OUTPATIENT)
Dept: MRI IMAGING | Age: 71
DRG: 596 | End: 2021-07-06
Payer: MEDICARE

## 2021-07-06 LAB
ABSOLUTE EOS #: 0.1 K/UL (ref 0–0.4)
ABSOLUTE IMMATURE GRANULOCYTE: ABNORMAL K/UL (ref 0–0.3)
ABSOLUTE LYMPH #: 1.3 K/UL (ref 1–4.8)
ABSOLUTE MONO #: 1.1 K/UL (ref 0.1–1.3)
ANION GAP SERPL CALCULATED.3IONS-SCNC: 9 MMOL/L (ref 9–17)
BASOPHILS # BLD: 0 % (ref 0–2)
BASOPHILS ABSOLUTE: 0 K/UL (ref 0–0.2)
BUN BLDV-MCNC: 19 MG/DL (ref 8–23)
BUN/CREAT BLD: ABNORMAL (ref 9–20)
CALCIUM SERPL-MCNC: 8.3 MG/DL (ref 8.6–10.4)
CHLORIDE BLD-SCNC: 108 MMOL/L (ref 98–107)
CO2: 24 MMOL/L (ref 20–31)
CREAT SERPL-MCNC: 1.07 MG/DL (ref 0.7–1.2)
DIFFERENTIAL TYPE: ABNORMAL
EOSINOPHILS RELATIVE PERCENT: 2 % (ref 0–4)
FLOW CYTOMETRY BL: NORMAL
GFR AFRICAN AMERICAN: >60 ML/MIN
GFR NON-AFRICAN AMERICAN: >60 ML/MIN
GFR SERPL CREATININE-BSD FRML MDRD: ABNORMAL ML/MIN/{1.73_M2}
GFR SERPL CREATININE-BSD FRML MDRD: ABNORMAL ML/MIN/{1.73_M2}
GLUCOSE BLD-MCNC: 102 MG/DL (ref 75–110)
GLUCOSE BLD-MCNC: 103 MG/DL (ref 70–99)
GLUCOSE BLD-MCNC: 103 MG/DL (ref 75–110)
GLUCOSE BLD-MCNC: 119 MG/DL (ref 75–110)
GLUCOSE BLD-MCNC: 159 MG/DL (ref 75–110)
GLUCOSE BLD-MCNC: 163 MG/DL (ref 75–110)
GLUCOSE BLD-MCNC: 182 MG/DL (ref 75–110)
GLUCOSE BLD-MCNC: 73 MG/DL (ref 75–110)
HCT VFR BLD CALC: 35.7 % (ref 41–53)
HEMOGLOBIN: 11.9 G/DL (ref 13.5–17.5)
IMMATURE GRANULOCYTES: ABNORMAL %
INR BLD: 1
LYMPHOCYTES # BLD: 19 % (ref 24–44)
MCH RBC QN AUTO: 28.5 PG (ref 26–34)
MCHC RBC AUTO-ENTMCNC: 33.4 G/DL (ref 31–37)
MCV RBC AUTO: 85.2 FL (ref 80–100)
MONOCYTES # BLD: 15 % (ref 1–7)
NRBC AUTOMATED: ABNORMAL PER 100 WBC
PARTIAL THROMBOPLASTIN TIME: 30.2 SEC (ref 24–36)
PDW BLD-RTO: 15.1 % (ref 11.5–14.9)
PLATELET # BLD: 156 K/UL (ref 150–450)
PLATELET ESTIMATE: ABNORMAL
PMV BLD AUTO: 8.1 FL (ref 6–12)
POTASSIUM SERPL-SCNC: 4.4 MMOL/L (ref 3.7–5.3)
PROTHROMBIN TIME: 13 SEC (ref 11.8–14.6)
RBC # BLD: 4.19 M/UL (ref 4.5–5.9)
RBC # BLD: ABNORMAL 10*6/UL
SEG NEUTROPHILS: 64 % (ref 36–66)
SEGMENTED NEUTROPHILS ABSOLUTE COUNT: 4.5 K/UL (ref 1.3–9.1)
SODIUM BLD-SCNC: 141 MMOL/L (ref 135–144)
SURGICAL PATHOLOGY REPORT: NORMAL
WBC # BLD: 7.1 K/UL (ref 3.5–11)
WBC # BLD: ABNORMAL 10*3/UL

## 2021-07-06 PROCEDURE — A9579 GAD-BASE MR CONTRAST NOS,1ML: HCPCS | Performed by: FAMILY MEDICINE

## 2021-07-06 PROCEDURE — 49180 BIOPSY ABDOMINAL MASS: CPT | Performed by: RADIOLOGY

## 2021-07-06 PROCEDURE — 1200000000 HC SEMI PRIVATE

## 2021-07-06 PROCEDURE — 85610 PROTHROMBIN TIME: CPT

## 2021-07-06 PROCEDURE — 6360000002 HC RX W HCPCS: Performed by: FAMILY MEDICINE

## 2021-07-06 PROCEDURE — 80048 BASIC METABOLIC PNL TOTAL CA: CPT

## 2021-07-06 PROCEDURE — 88108 CYTOPATH CONCENTRATE TECH: CPT

## 2021-07-06 PROCEDURE — 36415 COLL VENOUS BLD VENIPUNCTURE: CPT

## 2021-07-06 PROCEDURE — 6360000004 HC RX CONTRAST MEDICATION: Performed by: FAMILY MEDICINE

## 2021-07-06 PROCEDURE — 88360 TUMOR IMMUNOHISTOCHEM/MANUAL: CPT

## 2021-07-06 PROCEDURE — 76377 3D RENDER W/INTRP POSTPROCES: CPT

## 2021-07-06 PROCEDURE — 6370000000 HC RX 637 (ALT 250 FOR IP): Performed by: FAMILY MEDICINE

## 2021-07-06 PROCEDURE — 88341 IMHCHEM/IMCYTCHM EA ADD ANTB: CPT

## 2021-07-06 PROCEDURE — 88305 TISSUE EXAM BY PATHOLOGIST: CPT

## 2021-07-06 PROCEDURE — 0T913ZX DRAINAGE OF LEFT KIDNEY, PERCUTANEOUS APPROACH, DIAGNOSTIC: ICD-10-PCS | Performed by: RADIOLOGY

## 2021-07-06 PROCEDURE — 93923 UPR/LXTR ART STDY 3+ LVLS: CPT

## 2021-07-06 PROCEDURE — 2580000003 HC RX 258: Performed by: FAMILY MEDICINE

## 2021-07-06 PROCEDURE — 88342 IMHCHEM/IMCYTCHM 1ST ANTB: CPT

## 2021-07-06 PROCEDURE — 85025 COMPLETE CBC W/AUTO DIFF WBC: CPT

## 2021-07-06 PROCEDURE — 76942 ECHO GUIDE FOR BIOPSY: CPT | Performed by: RADIOLOGY

## 2021-07-06 PROCEDURE — 88333 PATH CONSLTJ SURG CYTO XM 1: CPT

## 2021-07-06 PROCEDURE — 0TB13ZX EXCISION OF LEFT KIDNEY, PERCUTANEOUS APPROACH, DIAGNOSTIC: ICD-10-PCS | Performed by: RADIOLOGY

## 2021-07-06 PROCEDURE — 85730 THROMBOPLASTIN TIME PARTIAL: CPT

## 2021-07-06 PROCEDURE — 2709999900 IR BIOPSY ABDOMINAL/RETROPERITONEAL MASS PERCUTANEOUS

## 2021-07-06 PROCEDURE — 99232 SBSQ HOSP IP/OBS MODERATE 35: CPT | Performed by: INTERNAL MEDICINE

## 2021-07-06 PROCEDURE — 74150 CT ABDOMEN W/O CONTRAST: CPT

## 2021-07-06 RX ORDER — AMLODIPINE BESYLATE 10 MG/1
10 TABLET ORAL DAILY
Status: DISCONTINUED | OUTPATIENT
Start: 2021-07-06 | End: 2021-07-08 | Stop reason: HOSPADM

## 2021-07-06 RX ORDER — 0.9 % SODIUM CHLORIDE 0.9 %
80 INTRAVENOUS SOLUTION INTRAVENOUS ONCE
Status: COMPLETED | OUTPATIENT
Start: 2021-07-06 | End: 2021-07-06

## 2021-07-06 RX ORDER — SODIUM CHLORIDE 0.9 % (FLUSH) 0.9 %
10 SYRINGE (ML) INJECTION PRN
Status: DISCONTINUED | OUTPATIENT
Start: 2021-07-06 | End: 2021-07-06 | Stop reason: SDUPTHER

## 2021-07-06 RX ORDER — ACYCLOVIR 800 MG/1
800 TABLET ORAL
Status: DISCONTINUED | OUTPATIENT
Start: 2021-07-06 | End: 2021-07-08 | Stop reason: HOSPADM

## 2021-07-06 RX ADMIN — PREGABALIN 50 MG: 50 CAPSULE ORAL at 20:17

## 2021-07-06 RX ADMIN — SODIUM CHLORIDE, PRESERVATIVE FREE 10 ML: 5 INJECTION INTRAVENOUS at 10:20

## 2021-07-06 RX ADMIN — SODIUM CHLORIDE: 9 INJECTION, SOLUTION INTRAVENOUS at 21:28

## 2021-07-06 RX ADMIN — SODIUM CHLORIDE: 9 INJECTION, SOLUTION INTRAVENOUS at 05:21

## 2021-07-06 RX ADMIN — INSULIN LISPRO 1 UNITS: 100 INJECTION, SOLUTION INTRAVENOUS; SUBCUTANEOUS at 20:19

## 2021-07-06 RX ADMIN — HYDROMORPHONE HYDROCHLORIDE 0.5 MG: 1 INJECTION, SOLUTION INTRAMUSCULAR; INTRAVENOUS; SUBCUTANEOUS at 21:29

## 2021-07-06 RX ADMIN — TRAMADOL HYDROCHLORIDE 50 MG: 50 TABLET, FILM COATED ORAL at 13:14

## 2021-07-06 RX ADMIN — HYDROMORPHONE HYDROCHLORIDE 0.5 MG: 1 INJECTION, SOLUTION INTRAMUSCULAR; INTRAVENOUS; SUBCUTANEOUS at 08:19

## 2021-07-06 RX ADMIN — ACYCLOVIR 800 MG: 800 TABLET ORAL at 13:49

## 2021-07-06 RX ADMIN — HYDROMORPHONE HYDROCHLORIDE 0.5 MG: 1 INJECTION, SOLUTION INTRAMUSCULAR; INTRAVENOUS; SUBCUTANEOUS at 15:12

## 2021-07-06 RX ADMIN — GADOTERIDOL 19 ML: 279.3 INJECTION, SOLUTION INTRAVENOUS at 10:19

## 2021-07-06 RX ADMIN — ATORVASTATIN CALCIUM 20 MG: 20 TABLET, FILM COATED ORAL at 08:19

## 2021-07-06 RX ADMIN — SODIUM CHLORIDE 80 ML: 9 INJECTION, SOLUTION INTRAVENOUS at 10:20

## 2021-07-06 RX ADMIN — OMEGA-3-ACID ETHYL ESTERS 1 G: 1 CAPSULE, LIQUID FILLED ORAL at 20:17

## 2021-07-06 RX ADMIN — ACYCLOVIR 800 MG: 800 TABLET ORAL at 23:03

## 2021-07-06 RX ADMIN — AMLODIPINE BESYLATE 10 MG: 10 TABLET ORAL at 08:19

## 2021-07-06 RX ADMIN — MEMANTINE 10 MG: 10 TABLET ORAL at 20:17

## 2021-07-06 RX ADMIN — MEMANTINE 10 MG: 10 TABLET ORAL at 08:20

## 2021-07-06 RX ADMIN — ACYCLOVIR 800 MG: 800 TABLET ORAL at 20:17

## 2021-07-06 RX ADMIN — SODIUM CHLORIDE, PRESERVATIVE FREE 10 ML: 5 INJECTION INTRAVENOUS at 20:18

## 2021-07-06 ASSESSMENT — PAIN SCALES - GENERAL
PAINLEVEL_OUTOF10: 5
PAINLEVEL_OUTOF10: 7
PAINLEVEL_OUTOF10: 3
PAINLEVEL_OUTOF10: 3
PAINLEVEL_OUTOF10: 10
PAINLEVEL_OUTOF10: 0
PAINLEVEL_OUTOF10: 8
PAINLEVEL_OUTOF10: 4
PAINLEVEL_OUTOF10: 6
PAINLEVEL_OUTOF10: 0

## 2021-07-06 ASSESSMENT — PAIN DESCRIPTION - PAIN TYPE: TYPE: ACUTE PAIN

## 2021-07-06 ASSESSMENT — PAIN DESCRIPTION - ONSET: ONSET: ON-GOING

## 2021-07-06 ASSESSMENT — PAIN DESCRIPTION - ORIENTATION: ORIENTATION: RIGHT;LOWER

## 2021-07-06 ASSESSMENT — PAIN DESCRIPTION - LOCATION: LOCATION: BACK

## 2021-07-06 ASSESSMENT — PAIN DESCRIPTION - PROGRESSION: CLINICAL_PROGRESSION: NOT CHANGED

## 2021-07-06 ASSESSMENT — PAIN DESCRIPTION - FREQUENCY: FREQUENCY: CONTINUOUS

## 2021-07-06 ASSESSMENT — PAIN - FUNCTIONAL ASSESSMENT: PAIN_FUNCTIONAL_ASSESSMENT: ACTIVITIES ARE NOT PREVENTED

## 2021-07-06 ASSESSMENT — PAIN DESCRIPTION - DESCRIPTORS: DESCRIPTORS: THROBBING

## 2021-07-06 NOTE — CONSULTS
[address] [facility]                 [address] [facility], [address]                                  CONSULTATION    PATIENT NAME: Daisy Redd                    :        1950  MED REC NO:   [id number]                              ROOM:       2077  ACCOUNT NO:   [de-identified]                           ADMIT DATE: 2021  PROVIDER:     NELSON Cortez    CONSULT DATE:  2021    REASON FOR CONSULTATION:  Arterial occlusive disease. HISTORY OF PRESENT ILLNESS:  The patient is a 22-year-old male who was  admitted with abdominal pain. This has been going on for several days  now. The patient is complaining of pain mostly in the upper abdomen. He was noted to have changes on the CT scan which are suggestive of  possible tumor in the retroperitoneum. We were asked to evaluate him  for arterial occlusive disease as seen by the CT scan study involving  the iliac arteries as well as both superficial femoral arteries. The  patient gives me history of claudication for about _____. He denies any  rest pain. The patient is a past smoker. He does have diabetes. The  patient has been treated for non-Hodgkin lymphoma in the past and  Oncology service is back on the case for retroperitoneal mass and  changes in the pancreas as well as lymph node involvement in the  retroperitoneum. PAST MEDICAL HISTORY:  History of non-Hodgkin lymphoma. History of  hyperlipidemia, hypertension, hypothyroidism. The patient had a hernia  repair in the past.    MEDICATIONS:  As listed. ALLERGIES:  None known. SOCIAL HISTORY:  The patient is a past smoker. He quit smoking 15 years  ago. He denies any ethanol abuse. FAMILY HISTORY:  Nothing contributory. REVIEW OF SYSTEMS:  10 points review of systems other than abdominal  pain and history of claudication has no other symptomatology. PHYSICAL EXAMINATION:  GENERAL:  The patient is awake, alert and oriented.   He is in no  distress. HEART:  Normal.  LUNGS:  Clear bilaterally. ABDOMEN:  Soft. I feel no pulsatile masses. He is nontender in the  abdomen. EXTREMITIES:  Reveal no ulcerations. Toes are all viable. Calves are  soft and nontender. PERIPHERAL VASCULAR SYSTEM:  Reveals palpable radial, brachial, femoral  pulses. DP and PT are not palpable on either side. Popliteal pulses  are not palpable on either side as well. LABORATORY DATA:  Lab study shows normal white count, hemoglobin is  stable, platelets are normal.  His BUN and creatinine are elevated. DIAGNOSTIC DATA:  CT scan study was reviewed. ASSESSMENT:  The patient is a 68-year-old white male who has a history  of claudication. We are planning on getting noninvasive studies. Clinically as well as by CAT scan as well as by history, the patient  does have peripheral arterial occlusive disease. As soon as the  arterial study reports are available, further recommendation will be  forwarded. Considering multiple comorbidities at the present time, very  unlikely that we will be recommending any intervention from vascular  standpoint at this point. We will prefer him to be on aspirin if at all  possible along with statins. The patient is already not smoking  anymore. We would encourage him to walk as much as possible. Thank you very much for this kind consultation.         Luanna Koyanagi    D: 07/06/2021 12:17:45       T: 07/06/2021 14:29:28     [email or web address]  Job#: [id number]     Doc#: [id number]    CC:

## 2021-07-06 NOTE — PROGRESS NOTES
Today's Date: 7/5/2021  Patient Name: Ayanna Albrecht  Date of admission: 7/3/2021 10:30 AM  Patient's age: 65 y.o., 1950  Admission Dx: Acute kidney injury ([facility] [address] [N17.9]    Reason for Consult: infiltrative kidney mass wtih history of NHL  Requesting Physician: Nyasia Barnes DO    CHIEF COMPLAINT:    Chief Complaint   Patient presents with    Flank Pain     Rt flank pain    Abdominal Pain     RLQ pain       SUBJECTIVE:    Pt seen and examined  Plan for IR guided biopsy  Pain controlled   NO NV      HISTORY OF PRESENT ILLNESS:    Ayanna Albrecht is a 65 y.o. male who is admitted to the hospital on 7/3/2021  For acute abdominal pain and was noted to have acute kidney injury. Patient reports his abdominal pain started few days ago and also noted to have skin rash around lower back suspicious for herpes zoster. Patient has history of non-Hodgkin's lymphoma and has seen oncology with last visit in 2013. Since then he has not had any follow-up with oncology. His brief oncologic history as follows. He had seen Dr. Lul Hunter in the past.  He has not showed for his follow-up appointment since then. 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       CURRENT THERAPY:    1.   Observation. 2.   Status post treatment with Bexxar in May 2008. 3.   Status post previous treatment with Rituxan.       On admission he had lab work showing acute kidney injury with elevated creatinine. And also CT abdomen showed a large 10.1 cm infiltrate soft tissue density beneath the left kidney and also encasing left adrenal gland. Additionally nodular density in pancreas measuring 2.6 cm noted. Also a dominant right external iliac lymph node noted measuring 3.5 cm suggesting increase in size from previous scans. Therefore oncology was consulted for further recommendations.   Past Medical History:   has a past medical history of Cancer ([address] History of non-Hodgkin's lymphoma, Hyperlipidemia, Hypertension, and Hypothyroidism. Past Surgical History:   has a past surgical history that includes hernia repair. Medications:    Prior to Admission medications    Medication Sig Start Date End Date Taking? Authorizing Provider   amLODIPine (NORVASC) 5 MG tablet Take 5 mg by mouth daily. Yes Historical Provider, MD   glyBURIDE-metformin (GLUCOVANCE) 5-500 MG per tablet Take 1 tablet by mouth 2 times daily (with meals). Yes Historical Provider, MD   levothyroxine (SYNTHROID) 50 MCG tablet Take 50 mcg by mouth Daily. Yes Historical Provider, MD   memantine (NAMENDA) 10 MG tablet Take 10 mg by mouth 2 times daily. Yes Historical Provider, MD   omega-3 acid ethyl esters (LOVAZA) 1 G capsule Take 1 g by mouth 2 times daily. Yes Historical Provider, MD   simvastatin (ZOCOR) 20 MG tablet Take 20 mg by mouth nightly.      Yes Historical Provider, MD     Current Facility-Administered Medications   Medication Dose Route Frequency Provider Last Rate Last Admin    amLODIPine (NORVASC) tablet 10 mg  10 mg Oral Daily Roderick T Dixon, DO   10 mg at 07/06/21 0819    acyclovir (ZOVIRAX) tablet 800 mg  800 mg Oral 5x Daily Roderick T Dixon, DO        sodium chloride flush 0.9 % injection 10 mL  10 mL Intravenous PRN Roderick T Dixon, DO   10 mL at 07/06/21 1020    insulin lispro (HUMALOG) injection vial 0-6 Units  0-6 Units Subcutaneous TID WC Roderick T Dixon, DO        insulin lispro (HUMALOG) injection vial 0-3 Units  0-3 Units Subcutaneous Nightly Roderick T Dixon, DO        traMADol (ULTRAM) tablet 50 mg  50 mg Oral Q6H PRN Roderick T Dixon, DO   50 mg at 07/06/21 1314    acetaminophen (TYLENOL) tablet 650 mg  650 mg Oral Q6H PRN Roderick T Dixon, DO        pregabalin (LYRICA) capsule 50 mg  50 mg Oral Nightly Roderick T Dixon, DO   50 mg at 07/05/21 2207    sodium chloride flush 0.9 % injection 10 mL  10 mL Intravenous PRN Taiwo Tavares MD   10 mL at 07/03/21 1348    sodium chloride flush 0.9 % injection 5-40 mL  5-40 mL Intravenous 2 times per day Alveda Has, DO   10 mL at 07/05/21 2204    sodium chloride flush 0.9 % injection 5-40 mL  5-40 mL Intravenous PRN Roderick T Dixon, DO        0.9 % sodium chloride infusion  25 mL Intravenous PRN Roderick T Dixon, DO        enoxaparin (LOVENOX) injection 40 mg  40 mg Subcutaneous Daily Roderick T Dixon, DO   40 mg at 07/04/21 0820    ondansetron (ZOFRAN-ODT) disintegrating tablet 4 mg  4 mg Oral Q8H PRN Alveda Has, DO   4 mg at 07/04/21 0553    Or    ondansetron (ZOFRAN) injection 4 mg  4 mg Intravenous Q6H PRN Roderick T Dixon, DO        0.9 % sodium chloride infusion   Intravenous Continuous Alveda Has, DO 75 mL/hr at 07/06/21 0521 New Bag at 07/06/21 0521    levothyroxine (SYNTHROID) tablet 50 mcg  50 mcg Oral Daily Roderick T Dixon, DO   50 mcg at 07/05/21 0544    memantine (NAMENDA) tablet 10 mg  10 mg Oral BID Roderick T Dixon, DO   10 mg at 07/06/21 0820    omega-3 acid ethyl esters (LOVAZA) capsule 1 g  1 g Oral BID Roderick T Odeh, DO   1 g at 07/05/21 2207    atorvastatin (LIPITOR) tablet 20 mg  20 mg Oral Daily Roderick T Dixno, DO   20 mg at 07/06/21 0819    HYDROmorphone (DILAUDID) injection 0.5 mg  0.5 mg Intravenous Q6H PRN Alveda Has, DO   0.5 mg at 07/06/21 0819    lidocaine 4 % external patch 1 patch  1 patch Transdermal Daily Roderick T Dixon, DO   1 patch at 07/05/21 1154    [Held by provider] glyBURIDE (DIABETA) tablet 5 mg  5 mg Oral BID  Roderick T Dixon, DO   5 mg at 07/04/21 1716    And    [Held by provider] metFORMIN (GLUCOPHAGE) tablet 500 mg  500 mg Oral BID  Roderick T Dixon, DO   500 mg at 07/04/21 0821    glucose (GLUTOSE) 40 % oral gel 15 g  15 g Oral PRN Roderick T Dixon, DO        dextrose 50 % IV solution  12.5 g Intravenous PRN Roderick T Dixon, DO   12.5 g at 07/05/21 0634    glucagon (rDNA) injection 1 mg  1 mg Intramuscular PRN Roderick T Dixon, DO        dextrose 5 % solution  100 mL/hr Intravenous PRN Alveda Has, DO Allergies:  Patient has no known allergies. Social History:   reports that he quit smoking about 15 years ago. His smoking use included cigarettes. He has a 90.00 pack-year smoking history. He does not have any smokeless tobacco history on file. He reports current alcohol use of about 1.7 standard drinks of alcohol per week. He reports current drug use. Drug: Marijuana. Family History: family history is not on file. Family history was reviewed and no pertinent family history noted. REVIEW OF SYSTEMS:    Constitutional: No fever or chills. No night sweats, no weight loss   Eyes: No eye discharge, double vision, or eye pain   HEENT: negative for sore mouth, sore throat, hoarseness and voice change   Respiratory: negative for cough , sputum, dyspnea, wheezing, hemoptysis, chest pain   Cardiovascular: negative for chest pain, dyspnea, palpitations, orthopnea, PND   Gastrointestinal: negative for nausea, vomiting, diarrhea, constipation, ++abdominal pain, Dysphagia, hematemesis and hematochezia   Genitourinary: negative for frequency, dysuria, nocturia, urinary incontinence, and hematuria   Integument: negative for rash, skin lesions, bruises.    Hematologic/Lymphatic: negative for easy bruising, bleeding, lymphadenopathy, or petechiae   Endocrine: negative for heat or cold intolerance,weight changes, change in bowel habits and hair loss   Musculoskeletal: negative for myalgias, arthralgias, pain, joint swelling,and bone pain   Neurological: negative for headaches, dizziness, seizures, weakness, numbness    PHYSICAL EXAM:      BP (!) 181/80   Pulse 83   Temp 98.8 °F (37.1 °C) (Oral)   Resp 17   Ht 5' 10.5\" (1.791 m)   Wt 194 lb 7.1 oz (88.2 kg)   SpO2 95%   BMI 27.51 kg/m²    Temp (24hrs), Av.8 °F (37.1 °C), Min:98.7 °F (37.1 °C), Max:98.9 °F (37.2 °C)    General appearance - well appearing, no in pain or distress   Mental status - alert and cooperative   Eyes - pupils equal and reactive, extraocular eye movements intact   Ears - bilateral TM's and external ear canals normal   Mouth - mucous membranes moist, pharynx normal without lesions   Neck - supple, no significant adenopathy   Lymphatics - no palpable lymphadenopathy, no hepatosplenomegaly   Chest - clear to auscultation, no wheezes, rales or rhonchi, symmetric air entry   Heart - normal rate, regular rhythm, normal S1, S2, no murmurs  Abdomen - soft, nontender, nondistended, no masses or organomegaly   Neurological - alert, oriented, normal speech, no focal findings or movement disorder noted   Musculoskeletal - no joint tenderness, deformity or swelling   Extremities - peripheral pulses normal, no pedal edema, no clubbing or cyanosis   Skin - normal coloration and turgor, no rashes, no suspicious skin lesions noted ,    DATA:    Labs:   CBC:   Recent Labs     07/05/21 0531 07/06/21  0528   WBC 6.8 7.1   HGB 12.8* 11.9*   HCT 37.7* 35.7*    156     BMP:   Recent Labs     07/05/21 0531 07/06/21  0528    141   K 4.9 4.4   CO2 26 24   BUN 25* 19   CREATININE 1.18 1.07   LABGLOM >60 >60   GLUCOSE 63* 103*     PT/INR:   Recent Labs     07/06/21  0750   PROTIME 13.0   INR 1.0       IMAGING DATA:  MRI ABDOMEN W WO CONTRAST MRCP   Final Result   1. Infiltrative soft tissue mass in the left perinephric space and   retroperitoneum with direct involvement of the left adrenal gland and with   perivascular extension into the pancreas and splenic hilum. These findings   are consistent with lymphoma. 2.  Moderately severe left hydronephrosis is present, likely due to extrinsic   compression at the level of the UPJ. 3.  Cholelithiasis. MRI BRAIN WO CONTRAST   Final Result   No acute stroke, intracranial hemorrhage or mass effect. No obvious   intracranial metastatic disease. Mild chronic small vessel ischemic disease. Small bilateral old lacunar   infarcts.       Post-contrast MRI brain is more sensitive for the detection of intracranial   metastatic disease and may be helpful for further evaluation as clinically   appropriate. CT ABDOMEN PELVIS W IV CONTRAST Additional Contrast? None   Final Result   Infiltrative soft tissue density is seen in the retroperitoneum on the left,   with increased size of retroperitoneal lymph nodes, and involvement of the   adrenal glands, and pancreas. .  Given the lack of resolution and progression   constellation of findings suggest underlying neoplastic etiology, possibly   lymphoma. Tiny nonobstructing right renal stone, and cholelithiasis atherosclerosis   with severe narrowing of the superficial femoral arteries and proximal iliacs         IR BIOPSY ABDOMINAL/RETROPERITONEAL MASS PERCUTANEOUS    (Results Pending)   VL Arterial PVR Lower    (Results Pending)   CT ABDOMEN WO CONTRAST Additional Contrast? None    (Results Pending)       Primary Problem  <principal problem not specified>    Active Hospital Problems    Diagnosis Date Noted    Acute kidney injury ([facility] [address] [N17.9] 07/03/2021         IMPRESSION:   1. History of small cell non-Hodgkin's lymphoma  2. Retroperitoneal soft tissue mass  3. Acute kidney injury  4. Back pain/skin rash possible herpes zoster    RECOMMENDATIONS:  1. I reviewed the laboratory data, imaging studies, diagnosis and other treatment recommendations with patient and family  2. Given his recent scan and prior history of lymphoma he will need tissue biopsy of the retroperitoneal mass to assess for recurrence  3. Plan for IR guided biopsy of the retroperitoneal mass  4. Patient will need restaging scans possibly PET scan as outpatient  5. Further recommendation will be based on the tissue diagnosis  6. Continue other supportive care as per primary team  7. Likley biopsy tomorrow  8. Disused with Dr Alexsander Hallman  9. We will follow    Discussed with pt and Nurse. Thank you for asking us to see this patient. Adria Montes De Oca MD  Hematologist/Medical

## 2021-07-06 NOTE — PROGRESS NOTES
Physical Therapy        Physical Therapy Cancel Note      DATE: 2021    NAME: Hilaria Carreno  MRN: [id number]   : 1950      Patient not seen this date for Physical Therapy due to: Other: Unable to treat in AM due to being at MRI. Checked on patient in PM and patient declined PT today.        Electronically signed by Jase Cochran PTA on 2021 at 2:10 PM

## 2021-07-06 NOTE — PROGRESS NOTES
[address] [facility]    OCCUPATIONAL THERAPY MISSED TREATMENT NOTE   INPATIENT   Date: 21  Patient Name: Niki Ruiz       Room: 77/6763-11  MRN: [id number]   Account #: [de-identified]    : 1950  (65 y.o.)  Gender: male   Referring Practitioner: Katty Gomez DO  Diagnosis: LAURO             REASON FOR MISSED TREATMENT:  Patient at testing and/or off the floor   -   9:50 Per RN Raya Yo. Pt off of the floor for MRI. Occupational therapy will attempt again if time allows. 13:02 Pt out of room for IR per RN. Occupational therapy will attempt again if time allows. 12:41-12:44 Pt just returned from IR. Pt politely declines occupational therapy at this time.         Saurabh Ricks, OT

## 2021-07-06 NOTE — PROGRESS NOTES
[id number] [facility]      PROGRESS NOTE        Patient:  Pineda Morris  YOB: 1950    MRN: [id number]     Acct: [de-identified]     Admit date: 7/3/2021    Pt seen and Chart reviewed. Consultant notes reviewed and care evaluated. Subjective: Patient stated last night was in bed but this morning he is having pain he does get received Dilaudid and it seems to help a little bit. He says his blood pressure has been high at home as well. He still not eating much he is scheduled to have MRCP today we will make sure also he is on the schedule for IR for biopsy he states the meds helps when he gets it except he is n.p.o. now he is just getting Dilaudid we will see if that will continue to work for him.     Diet:  Diet NPO      Medications:Current Inpatient    Scheduled Meds:   amLODIPine  10 mg Oral Daily    insulin lispro  0-6 Units Subcutaneous TID WC    insulin lispro  0-3 Units Subcutaneous Nightly    pregabalin  50 mg Oral Nightly    sodium chloride flush  5-40 mL Intravenous 2 times per day    enoxaparin  40 mg Subcutaneous Daily    acyclovir  800 mg Oral 5x Daily    levothyroxine  50 mcg Oral Daily    memantine  10 mg Oral BID    omega-3 acid ethyl esters  1 g Oral BID    atorvastatin  20 mg Oral Daily    lidocaine  1 patch Transdermal Daily    [Held by provider] glyBURIDE  5 mg Oral BID WC    And    [Held by provider] metFORMIN  500 mg Oral BID WC     Continuous Infusions:   sodium chloride      sodium chloride 75 mL/hr at 07/06/21 0521    dextrose       PRN Meds:traMADol, acetaminophen, sodium chloride flush, sodium chloride flush, sodium chloride, ondansetron **OR** ondansetron, HYDROmorphone, glucose, dextrose, glucagon (rDNA), dextrose        Physical Exam:  Vitals: BP (!) 190/79   Pulse 71   Temp 98.8 °F (37.1 °C) (Oral)   Resp 16   Ht 5' 10.5\" (1.791 m)   Wt 194 lb 7.1 oz (88.2 kg)   SpO2 95%   BMI 27.51 kg/m²   24 hour intake/output:    Intake/Output Summary (Last 24 hours) at 7/6/2021 0804  Last data filed at 7/5/2021 1856  Gross per 24 hour   Intake 2132.5 ml   Output --   Net 2132.5 ml     Last 3 weights: Wt Readings from Last 3 Encounters:   07/04/21 194 lb 7.1 oz (88.2 kg)   12/16/19 193 lb (87.5 kg)   09/06/13 212 lb (96.2 kg)       Physical Examination:   General appearance - alert, well appearing, and in no distress  Mental status - alert, oriented to person, place, and time  PERRLA wnl  Chest - clear to auscultation, no wheezes, rales or rhonchi, symmetric air entry  Heart - normal rate, regular rhythm, normal S1, S2, no murmurs, rubs, clicks or gallops  Abdomen - soft, nontender, nondistended, no masses or organomegaly  Neurological - alert, oriented, normal speech, no focal findings or movement disorder noted}  Extremities - peripheral pulses normal, no pedal edema, no clubbing or cyanosis  Skin - normal coloration and turgor, no rashes, no suspicious skin lesions noted   PT [[id number]]    Collected: 07/06/21 0750    Updated: 07/06/21 0802    Specimen Source: Blood     Protime 13.0 sec    INR 1.0    Comment:        Non-therapeutic Range:      INR = 0.9-1.2   Therapeutic Range:    Moderate Anticoagulant Intensity:      INR = 2.0-3.0    High Anticoagulant Intensity:      INR = 2.5-3. 5             APTT [[id number]]    Collected: 07/06/21 0750    Updated: 07/06/21 0750    Specimen Source: Blood    CBC with DIFF [[id number]] (Abnormal)    Collected: 07/06/21 0528    Updated: 07/06/21 6804    Specimen Source: Blood     WBC 7.1 k/uL    RBC 4. 19Low  m/uL    Hemoglobin 11.9Low  g/dL    Hematocrit 35.7Low  %    MCV 85.2 fL    MCH 28.5 pg    MCHC 33.4 g/dL    RDW 15.1High  %    Platelets 279 k/uL    MPV 8.1 fL    NRBC Automated NOT REPORTED per 100 WBC    Differential Type NOT REPORTED    Immature Granulocytes NOT REPORTED %    Absolute Immature Granulocyte NOT REPORTED k/uL    WBC Morphology NOT REPORTED    RBC Morphology NOT REPORTED    Platelet Estimate NOT REPORTED    Seg Neutrophils 64 %    Lymphocytes 19Low  %    Monocytes 15High  %    Eosinophils % 2 %    Basophils 0 %    Segs Absolute 4.50 k/uL    Absolute Lymph # 1.30 k/uL    Absolute Mono # 1.10 k/uL    Absolute Eos # 0.10 k/uL    Basophils Absolute 0.00 k/uL   Basic Metabolic Prof [[id number]] (Abnormal)    Collected: 07/06/21 0528    Updated: 07/06/21 0602    Specimen Source: Blood     Glucose 103High  mg/dL    BUN 19 mg/dL    CREATININE 1.07 mg/dL    Bun/Cre Ratio NOT REPORTED    Calcium 8.3Low  mg/dL    Sodium 141 mmol/L    Potassium 4.4 mmol/L    Chloride 108High  mmol/L    CO2 24 mmol/L    Anion Gap 9 mmol/L    GFR Non-African American >60 mL/min    GFR African American >60 mL/min    GFR Comment         Comment: Average GFR for 79or more years old:    65 mL/min/1.73sq m   Chronic Kidney Disease:    <60 mL/min/1.73sq m   Kidney failure:    <15 mL/min/1.73sq m               eGFR calculated using average adult body mass. Additional eGFR calculator available at:         [email or web address]              GFR Staging NOT REPORTED   MRI BRAIN WO CONTRAST [[id number]]    Collected: 07/05/21 2123    Updated: 07/05/21 2307    Narrative:     EXAMINATION:   MRI OF THE BRAIN WITHOUT CONTRAST  7/5/2021 9:07 pm     TECHNIQUE:   Multiplanar multisequence MRI of the brain was performed without the   administration of intravenous contrast.     COMPARISON:   Noncontrast CT head done December 15, 2018. [facility] MRI done November 14, 2008. HISTORY:   ORDERING SYSTEM PROVIDED HISTORY: mets? dizziness and confusion. TECHNOLOGIST PROVIDED HISTORY:   mets? dizziness and confusion. Reason for Exam: pt having unsteady gait x 1 year, but getting worse recently   and having some confusion also. FINDINGS:   INTRACRANIAL STRUCTURES/VENTRICLES: There is no acute infarct. No mass effect   or midline shift.  No evidence of an acute intracranial hemorrhage.  The    · History of lymphoma  ·    · Cholelithiasis  ·    · Right flank abdominal pain secondary his acute varicella-zoster  ·    · Varicella-zoster  ·    · Herpetic neuralgia  ·    · Abnormal CT with left retroperitoneal and adrenal mass  ·    · Pancreatic mass questionable etiology  ·    · History of hypertension  ·    · History of diabetes mellitus  · Acute kidney injury  · Renal insufficiency improving with hydration  · Hyperglycemia second to decreased appetite and oral intake  · Reviewing his CT shows severe narrowing of the superficial femoral arteries and proximal iliac  · Herpetic pain this morning  ·   · Elevated blood pressure            Plan:  25. Norvasc was increased to 10 mg daily we will monitor his blood pressure patient states he has always had a little bit higher blood pressure than normal  26.   27. Continue pain control  28.   29. Awaiting MRCP and further testing  30.   31. Make sure he is on the IR schedule for biopsy  32.   33. We will give him a clear Ensure as well.     KRISTI Moser             7/6/2021, 8:04 AM

## 2021-07-06 NOTE — PLAN OF CARE
Problem: Pain:  Goal: Pain level will decrease  Description: Pain level will decrease  Outcome: Ongoing  Goal: Control of acute pain  Description: Control of acute pain  Outcome: Ongoing  Note: Assess the location, characteristics, onset, duration, frequency, quality, and severity of pain. Encourage immediate report of pain. Use appropriate pain scale to rate pain. Manage pain using nonpharmacologic/pharmacologic interventions. Goal: Control of chronic pain  Description: Control of chronic pain  Outcome: Ongoing     Problem: Falls - Risk of:  Goal: Will remain free from falls  Description: Will remain free from falls  Outcome: Ongoing  Note: Patient remains free of falls and injuries throughout shift. Bed remains in the lowest position, wheels locked, call light and bedside table are within reach.    Goal: Absence of physical injury  Description: Absence of physical injury  Outcome: Ongoing

## 2021-07-06 NOTE — PROGRESS NOTES
Patient goes down for MRI of the brain. Patient not NPO until midnight, so MRI of abdomen to be done tomorrow.

## 2021-07-06 NOTE — PROGRESS NOTES
Writer speaks with Dr. Eriberto Pineda regarding patient's elevated blood pressure. New order to increase morning dose of Norvasc from 5mg to 10mg.

## 2021-07-06 NOTE — PLAN OF CARE
Problem: Falls - Risk of:  Goal: Will remain free from falls  Description: Will remain free from falls  7/6/2021 1404 by Vikas Del Toro RN  Outcome: Met This Shift  Note: No falls noted this shift. Patient ambulates independently without difficulty. Bed kept in low position. Safe environment maintained. Bedside table & call light in reach. Uses call light appropriately when needing assistance. 7/6/2021 0430 by Nelida Bower RN  Outcome: Ongoing  Note: Patient remains free of falls and injuries throughout shift. Bed remains in the lowest position, wheels locked, call light and bedside table are within reach. Problem: Pain:  Goal: Pain level will decrease  Description: Pain level will decrease  7/6/2021 1404 by Vikas Del Toro RN  Outcome: Ongoing  Note: Pt medicated with pain medication prn. Assessed all pain characteristics including level, type, location, frequency, and onset. Non-pharmacologic interventions offered to pt as well. Pt states pain is tolerable at this time. Will continue to monitor. 7/6/2021 0430 by Nelida Bower RN  Outcome: Ongoing  Goal: Control of acute pain  Description: Control of acute pain  7/6/2021 0430 by Nelida Bower RN  Outcome: Ongoing  Note: Assess the location, characteristics, onset, duration, frequency, quality, and severity of pain. Encourage immediate report of pain. Use appropriate pain scale to rate pain. Manage pain using nonpharmacologic/pharmacologic interventions.    Goal: Control of chronic pain  Description: Control of chronic pain  7/6/2021 0430 by Nelida Bower RN  Outcome: Ongoing     Problem: Falls - Risk of:  Goal: Absence of physical injury  Description: Absence of physical injury  7/6/2021 0430 by Nelida Bower RN  Outcome: Ongoing

## 2021-07-07 LAB
ABSOLUTE EOS #: 0.2 K/UL (ref 0–0.4)
ABSOLUTE IMMATURE GRANULOCYTE: ABNORMAL K/UL (ref 0–0.3)
ABSOLUTE LYMPH #: 1.4 K/UL (ref 1–4.8)
ABSOLUTE MONO #: 1 K/UL (ref 0.1–1.3)
ANION GAP SERPL CALCULATED.3IONS-SCNC: 6 MMOL/L (ref 9–17)
BASOPHILS # BLD: 0 % (ref 0–2)
BASOPHILS ABSOLUTE: 0 K/UL (ref 0–0.2)
BUN BLDV-MCNC: 22 MG/DL (ref 8–23)
BUN/CREAT BLD: ABNORMAL (ref 9–20)
CALCIUM SERPL-MCNC: 8.2 MG/DL (ref 8.6–10.4)
CHLORIDE BLD-SCNC: 105 MMOL/L (ref 98–107)
CO2: 26 MMOL/L (ref 20–31)
CREAT SERPL-MCNC: 1.09 MG/DL (ref 0.7–1.2)
DIFFERENTIAL TYPE: ABNORMAL
EOSINOPHILS RELATIVE PERCENT: 3 % (ref 0–4)
GFR AFRICAN AMERICAN: >60 ML/MIN
GFR NON-AFRICAN AMERICAN: >60 ML/MIN
GFR SERPL CREATININE-BSD FRML MDRD: ABNORMAL ML/MIN/{1.73_M2}
GFR SERPL CREATININE-BSD FRML MDRD: ABNORMAL ML/MIN/{1.73_M2}
GLUCOSE BLD-MCNC: 115 MG/DL (ref 75–110)
GLUCOSE BLD-MCNC: 123 MG/DL (ref 70–99)
GLUCOSE BLD-MCNC: 179 MG/DL (ref 75–110)
GLUCOSE BLD-MCNC: 236 MG/DL (ref 75–110)
GLUCOSE BLD-MCNC: 79 MG/DL (ref 75–110)
HCT VFR BLD CALC: 34.5 % (ref 41–53)
HEMOGLOBIN: 11.6 G/DL (ref 13.5–17.5)
IMMATURE GRANULOCYTES: ABNORMAL %
LYMPHOCYTES # BLD: 20 % (ref 24–44)
MCH RBC QN AUTO: 28.6 PG (ref 26–34)
MCHC RBC AUTO-ENTMCNC: 33.6 G/DL (ref 31–37)
MCV RBC AUTO: 85.2 FL (ref 80–100)
MONOCYTES # BLD: 14 % (ref 1–7)
NRBC AUTOMATED: ABNORMAL PER 100 WBC
PDW BLD-RTO: 15 % (ref 11.5–14.9)
PLATELET # BLD: 170 K/UL (ref 150–450)
PLATELET ESTIMATE: ABNORMAL
PMV BLD AUTO: 8.3 FL (ref 6–12)
POTASSIUM SERPL-SCNC: 4.6 MMOL/L (ref 3.7–5.3)
RBC # BLD: 4.05 M/UL (ref 4.5–5.9)
RBC # BLD: ABNORMAL 10*6/UL
SEG NEUTROPHILS: 63 % (ref 36–66)
SEGMENTED NEUTROPHILS ABSOLUTE COUNT: 4.5 K/UL (ref 1.3–9.1)
SODIUM BLD-SCNC: 137 MMOL/L (ref 135–144)
SURGICAL PATHOLOGY REPORT: NORMAL
WBC # BLD: 7.1 K/UL (ref 3.5–11)
WBC # BLD: ABNORMAL 10*3/UL

## 2021-07-07 PROCEDURE — 1200000000 HC SEMI PRIVATE

## 2021-07-07 PROCEDURE — 6360000002 HC RX W HCPCS: Performed by: FAMILY MEDICINE

## 2021-07-07 PROCEDURE — 99232 SBSQ HOSP IP/OBS MODERATE 35: CPT | Performed by: INTERNAL MEDICINE

## 2021-07-07 PROCEDURE — 80048 BASIC METABOLIC PNL TOTAL CA: CPT

## 2021-07-07 PROCEDURE — 36415 COLL VENOUS BLD VENIPUNCTURE: CPT

## 2021-07-07 PROCEDURE — 2580000003 HC RX 258: Performed by: FAMILY MEDICINE

## 2021-07-07 PROCEDURE — 97116 GAIT TRAINING THERAPY: CPT

## 2021-07-07 PROCEDURE — 97530 THERAPEUTIC ACTIVITIES: CPT

## 2021-07-07 PROCEDURE — 85025 COMPLETE CBC W/AUTO DIFF WBC: CPT

## 2021-07-07 PROCEDURE — 82947 ASSAY GLUCOSE BLOOD QUANT: CPT

## 2021-07-07 PROCEDURE — 6370000000 HC RX 637 (ALT 250 FOR IP): Performed by: FAMILY MEDICINE

## 2021-07-07 RX ADMIN — ENOXAPARIN SODIUM 40 MG: 40 INJECTION SUBCUTANEOUS at 07:25

## 2021-07-07 RX ADMIN — MEMANTINE 10 MG: 10 TABLET ORAL at 20:15

## 2021-07-07 RX ADMIN — AMLODIPINE BESYLATE 10 MG: 10 TABLET ORAL at 07:26

## 2021-07-07 RX ADMIN — OMEGA-3-ACID ETHYL ESTERS 1 G: 1 CAPSULE, LIQUID FILLED ORAL at 20:15

## 2021-07-07 RX ADMIN — MEMANTINE 10 MG: 10 TABLET ORAL at 07:31

## 2021-07-07 RX ADMIN — TRAMADOL HYDROCHLORIDE 50 MG: 50 TABLET, FILM COATED ORAL at 02:05

## 2021-07-07 RX ADMIN — HYDROMORPHONE HYDROCHLORIDE 0.5 MG: 1 INJECTION, SOLUTION INTRAMUSCULAR; INTRAVENOUS; SUBCUTANEOUS at 23:10

## 2021-07-07 RX ADMIN — ACYCLOVIR 800 MG: 800 TABLET ORAL at 18:09

## 2021-07-07 RX ADMIN — ACYCLOVIR 800 MG: 800 TABLET ORAL at 10:28

## 2021-07-07 RX ADMIN — INSULIN LISPRO 1 UNITS: 100 INJECTION, SOLUTION INTRAVENOUS; SUBCUTANEOUS at 20:20

## 2021-07-07 RX ADMIN — LEVOTHYROXINE SODIUM 50 MCG: 0.05 TABLET ORAL at 06:32

## 2021-07-07 RX ADMIN — SODIUM CHLORIDE: 9 INJECTION, SOLUTION INTRAVENOUS at 23:17

## 2021-07-07 RX ADMIN — HYDROMORPHONE HYDROCHLORIDE 0.5 MG: 1 INJECTION, SOLUTION INTRAMUSCULAR; INTRAVENOUS; SUBCUTANEOUS at 10:28

## 2021-07-07 RX ADMIN — TRAMADOL HYDROCHLORIDE 50 MG: 50 TABLET, FILM COATED ORAL at 15:29

## 2021-07-07 RX ADMIN — HYDROMORPHONE HYDROCHLORIDE 0.5 MG: 1 INJECTION, SOLUTION INTRAMUSCULAR; INTRAVENOUS; SUBCUTANEOUS at 18:07

## 2021-07-07 RX ADMIN — ACYCLOVIR 800 MG: 800 TABLET ORAL at 23:10

## 2021-07-07 RX ADMIN — INSULIN LISPRO 2 UNITS: 100 INJECTION, SOLUTION INTRAVENOUS; SUBCUTANEOUS at 11:21

## 2021-07-07 RX ADMIN — HYDROMORPHONE HYDROCHLORIDE 0.5 MG: 1 INJECTION, SOLUTION INTRAMUSCULAR; INTRAVENOUS; SUBCUTANEOUS at 03:42

## 2021-07-07 RX ADMIN — PREGABALIN 50 MG: 50 CAPSULE ORAL at 20:15

## 2021-07-07 RX ADMIN — SODIUM CHLORIDE: 9 INJECTION, SOLUTION INTRAVENOUS at 07:26

## 2021-07-07 RX ADMIN — ATORVASTATIN CALCIUM 20 MG: 20 TABLET, FILM COATED ORAL at 07:26

## 2021-07-07 RX ADMIN — ACETAMINOPHEN 650 MG: 325 TABLET ORAL at 15:29

## 2021-07-07 RX ADMIN — ACYCLOVIR 800 MG: 800 TABLET ORAL at 06:32

## 2021-07-07 RX ADMIN — OMEGA-3-ACID ETHYL ESTERS 1 G: 1 CAPSULE, LIQUID FILLED ORAL at 07:26

## 2021-07-07 RX ADMIN — ACYCLOVIR 800 MG: 800 TABLET ORAL at 15:09

## 2021-07-07 ASSESSMENT — PAIN DESCRIPTION - LOCATION
LOCATION: FLANK
LOCATION: BACK

## 2021-07-07 ASSESSMENT — PAIN DESCRIPTION - PAIN TYPE
TYPE: ACUTE PAIN

## 2021-07-07 ASSESSMENT — PAIN DESCRIPTION - DESCRIPTORS
DESCRIPTORS: DISCOMFORT
DESCRIPTORS: ACHING

## 2021-07-07 ASSESSMENT — PAIN SCALES - GENERAL
PAINLEVEL_OUTOF10: 3
PAINLEVEL_OUTOF10: 10
PAINLEVEL_OUTOF10: 9
PAINLEVEL_OUTOF10: 7
PAINLEVEL_OUTOF10: 10
PAINLEVEL_OUTOF10: 0
PAINLEVEL_OUTOF10: 10
PAINLEVEL_OUTOF10: 4
PAINLEVEL_OUTOF10: 4
PAINLEVEL_OUTOF10: 2
PAINLEVEL_OUTOF10: 5
PAINLEVEL_OUTOF10: 9
PAINLEVEL_OUTOF10: 0
PAINLEVEL_OUTOF10: 5

## 2021-07-07 ASSESSMENT — PAIN DESCRIPTION - ORIENTATION
ORIENTATION: LOWER
ORIENTATION: RIGHT
ORIENTATION: LEFT
ORIENTATION: RIGHT
ORIENTATION: RIGHT

## 2021-07-07 ASSESSMENT — PAIN DESCRIPTION - FREQUENCY: FREQUENCY: CONTINUOUS

## 2021-07-07 ASSESSMENT — PAIN DESCRIPTION - PROGRESSION: CLINICAL_PROGRESSION: NOT CHANGED

## 2021-07-07 NOTE — PLAN OF CARE
Problem: Pain:  Goal: Pain level will decrease  Description: Pain level will decrease  Outcome: Ongoing  Note: Pain decreased with prescribed medication. Goal: Control of acute pain  Description: Control of acute pain  Outcome: Ongoing  Goal: Control of chronic pain  Description: Control of chronic pain  Outcome: Ongoing     Problem: Falls - Risk of:  Goal: Will remain free from falls  Description: Will remain free from falls  Outcome: Ongoing  Note: No falls this shift. Call light within reach and siderails x2. Bed in lowest position. Patient safety maintained. Goal: Absence of physical injury  Description: Absence of physical injury  Outcome: Ongoing  Note: No falls this shift. Call light within reach and siderails x2. Bed in lowest position. Patient safety maintained.

## 2021-07-07 NOTE — PROGRESS NOTES
Dr. Fabrice Parra returns page for new urology consult. Reviewed results of CT scan and most recent creatinine level. NNO at this time.

## 2021-07-07 NOTE — PROGRESS NOTES
Dr. Kirby Vann calls in to speak with writer re: recent testing. Received new order to consult Dr. Olman Stein for possible need for stent. Updated patient and he voices understanding.

## 2021-07-07 NOTE — CONSULTS
Urology Consultation    Patient:  Harman Gibbs  MRN: [id number]    CHIEF COMPLAINT:  Left hydronephrosis    HISTORY OF PRESENT ILLNESS:   The patient is a 65 y.o. male who presents with left hydronephrosis. He does not complain of left flank pain but actually right back pain. He has an infiltrative left retroperitoneal mass that was biopsied yesterday. Patient states there was not enough tissue for diagnosis. No hematuria or obstructive symptoms. Creatinine is normal at 1.07, was 1.36 on admission. Patient's old records, notes and chart reviewed and summarized above. Past Medical History:    Past Medical History:   Diagnosis Date    Cancer ([facility] [address]     History of non-Hodgkin's lymphoma     Hyperlipidemia     Hypertension     Hypothyroidism        Past Surgical History:    Past Surgical History:   Procedure Laterality Date    HERNIA REPAIR      IR BIOPSY ABDOMINAL MASS  7/6/2021    IR BIOPSY ABDOMINAL MASS 7/6/2021 [facility]       Medications:    Scheduled Meds:   amLODIPine  10 mg Oral Daily    acyclovir  800 mg Oral 5x Daily    insulin lispro  0-6 Units Subcutaneous TID WC    insulin lispro  0-3 Units Subcutaneous Nightly    pregabalin  50 mg Oral Nightly    sodium chloride flush  5-40 mL Intravenous 2 times per day    enoxaparin  40 mg Subcutaneous Daily    levothyroxine  50 mcg Oral Daily    memantine  10 mg Oral BID    omega-3 acid ethyl esters  1 g Oral BID    atorvastatin  20 mg Oral Daily    lidocaine  1 patch Transdermal Daily    [Held by provider] glyBURIDE  5 mg Oral BID WC    And    [Held by provider] metFORMIN  500 mg Oral BID WC     Continuous Infusions:   sodium chloride      sodium chloride 75 mL/hr at 07/07/21 0726    dextrose       PRN Meds:.traMADol, acetaminophen, sodium chloride flush, sodium chloride, ondansetron **OR** ondansetron, HYDROmorphone, glucose, dextrose, glucagon (rDNA), dextrose    Allergies:  Patient has no known allergies.     Social History:    Social History     Socioeconomic History    Marital status:      Spouse name: Not on file    Number of children: Not on file    Years of education: Not on file    Highest education level: Not on file   Occupational History    Not on file   Tobacco Use    Smoking status: Former Smoker     Packs/day: 2.00     Years: 45.00     Pack years: 90.00     Types: Cigarettes     Quit date: 1/7/2006     Years since quitting: 15.5   Substance and Sexual Activity    Alcohol use: Yes     Alcohol/week: 1.7 standard drinks     Types: 2 Standard drinks or equivalent per week    Drug use: Yes     Types: Marijuana    Sexual activity: Not on file   Other Topics Concern    Not on file   Social History Narrative    Not on file     Social Determinants of Health     Financial Resource Strain:     Difficulty of Paying Living Expenses:    Food Insecurity:     Worried About Running Out of Food in the Last Year:     [address] in the Last Year:    Transportation Needs:     Lack of Transportation (Medical):  Lack of Transportation (Non-Medical):    Physical Activity:     Days of Exercise per Week:     Minutes of Exercise per Session:    Stress:     Feeling of Stress :    Social Connections:     Frequency of Communication with Friends and Family:     Frequency of Social Gatherings with Friends and Family:     Attends Jewish Services:     Active Member of Clubs or Organizations:     Attends Club or Organization Meetings:     Marital Status:    Intimate Partner Violence:     Fear of Current or Ex-Partner:     Emotionally Abused:     Physically Abused:     Sexually Abused:        Family History:  History reviewed. No pertinent family history. REVIEW OF SYSTEMS:  All systems reviewed and negative except for that already noted in the HPI.     Physical Exam:    This a 65 y.o. male   Patient Vitals for the past 24 hrs:   BP Temp Temp src Pulse Resp SpO2   07/07/21 1345 (!) 166/67 98.2 °F (36.8 °C) Oral 76 16 94 %   07/07/21 0715 (!) 172/81 98.8 °F (37.1 °C) Oral 74 16 98 %   07/07/21 0008 (!) 168/69 99.9 °F (37.7 °C) Axillary 70 16 91 %   07/06/21 1824 (!) 155/65 99.7 °F (37.6 °C) Oral 84 16 93 %     Constitutional: Patient in no acute distress; Neuro: alert and oriented to person place and time. Psych: Mood and affect normal.  Skin: Normal  Lungs: Respiratory effort normal  Cardiovascular:  Normal peripheral pulses  Abdomen: Soft, non-tender, non-distended with no CVA, flank pain, hepatosplenomegaly or hernia. Kidneys normal.  Bladder non-tender and not distended. Lymphatics: no palpable lymphadenopathy  Penis normal and circumcised  Urethral meatus normal  Scrotal exam normal  Testicles normal bilaterally  Epididymis normal bilaterally  No evidence of inguinal hernia  Anus and perineum normal  Normal rectal tone with no masses  Prostate soft, non-tender to palpation. No palpable nodules. Estimated 40 grams. LABS:  Recent Labs     07/05/21  0531 07/06/21  0528 07/07/21  0547   WBC 6.8 7.1 7.1   HGB 12.8* 11.9* 11.6*   HCT 37.7* 35.7* 34.5*   MCV 86.0 85.2 85.2    156 170     Recent Labs     07/05/21  0531 07/06/21  0528 07/07/21  0547    141 137   K 4.9 4.4 4.6   * 108* 105   CO2 26 24 26   BUN 25* 19 22   CREATININE 1.18 1.07 1.09     Lab Results   Component Value Date    PSA 1.65 02/29/2016    PSA 1.69 07/05/2013    PSA 1.64 07/27/2012       Additional Lab/culture results:    Urinalysis: No results for input(s): COLORU, PHUR, LABCAST, WBCUA, RBCUA, MUCUS, TRICHOMONAS, YEAST, BACTERIA, CLARITYU, SPECGRAV, LEUKOCYTESUR, UROBILINOGEN, Cleven Gemma in the last 72 hours.     Invalid input(s): NITRATE, GLUCOSEUKETONESUAMORPHOUS     -----------------------------------------------------------------  Imaging Results:    Assessment and Plan   Impression:    Patient Active Problem List   Diagnosis    [facility]    Traumatic retroperitoneal hematoma    Acute kidney injury ([facility] [address]       Plan: No need for ureteral stent unless symptomatic with pain. Patient is being discharged and has follow up with oncology. Has prior history of NHL. Follow up with me in 2 weeks.     Wil Garcia MD  5:36 PM 7/7/2021

## 2021-07-07 NOTE — PROGRESS NOTES
[address] [facility]    INPATIENT OCCUPATIONAL THERAPY  PROGRESS NOTE  Date: 2021  Patient Name: Pineda Morris      Room: 3259/1428-01  MRN: [id number]    : 1950  (65 y.o.) Gender: male     Discharge Recommendations:  Further Occupational Therapy is recommended upon facility discharge. Equipment Needed:  (TBD)    Referring Practitioner: Nathaniel Doll DO  Diagnosis: LAURO  General  Chart Reviewed: Yes  Patient assessed for rehabilitation services?: Yes  Additional Pertinent Hx: Pineda Morris is a 65 y.o. male who is admitted to the hospital on 7/3/2021  For acute abdominal pain and was noted to have acute kidney injury. Patient reports his abdominal pain started few days ago and also noted to have skin rash around lower back suspicious for herpes zoster. .Patient has history of non-Hodgkin's lymphoma . CT Abdomine- Abnormal CT with left retroperitoneal and adrenal mass  Family / Caregiver Present: No  Referring Practitioner: Nathaniel Doll DO  Diagnosis: LAURO    Restrictions  Restrictions/Precautions: Contact Precautions  Required Braces or Orthoses?: No      Subjective   Sleeping at first attempt   Feeling Better, Hoping to get home soon            Objective   Alert and conversant about instruction in Energy Conservation strategies with application to daily needs                                                    Assessment     Activity Tolerance: Patient limited by fatigue;Patient limited by pain                Patient Education:  Patient Education: Eulalio Maguire and provided a handout regarding Energy Conservation strategies with applicaiton to daily needs  Learner:patient  Method: demonstration, explanation and handout       Outcome: needs reinforcement and asked questions     Plan         Goals  Short term goals  Time Frame for Short term goals: By discharge  Short term goal 1: Pt will complete BADLs with modified independence and good safety  Short term goal 2: Pt will

## 2021-07-07 NOTE — CARE COORDINATION
ONGOING DISCHARGE PLAN:    Patient is alert and oriented x4. Spoke with patient regarding discharge plan and patient confirms that plan is still to return to home w/ Wife. Denies VNS. Pt. Had IR BX of Retroperitoneal mass & MRCP yesterday. Hemoc/Vascular following. Pt. Denies needs at this time. Will continue to follow for additional discharge needs.     Electronically signed by Tiff Lopez RN on 7/7/2021 at 11:45 AM

## 2021-07-07 NOTE — PROGRESS NOTES
172/81   Pulse 74   Temp 98.8 °F (37.1 °C) (Oral)   Resp 16   Ht 5' 10.5\" (1.791 m)   Wt 194 lb 7.1 oz (88.2 kg)   SpO2 98%   BMI 27.51 kg/m²   24 hour intake/output:    Intake/Output Summary (Last 24 hours) at 7/7/2021 0832  Last data filed at 7/7/2021 0818  Gross per 24 hour   Intake 1531 ml   Output 400 ml   Net 1131 ml     Last 3 weights:   Wt Readings from Last 3 Encounters:   07/04/21 194 lb 7.1 oz (88.2 kg)   12/16/19 193 lb (87.5 kg)   09/06/13 212 lb (96.2 kg)       Physical Examination:   General appearance - alert, well appearing, and in no distress  Mental status - alert, oriented to person, place, and time  PERRLA wnl  Chest - clear to auscultation, no wheezes, rales or rhonchi, symmetric air entry  Heart - normal rate, regular rhythm, normal S1, S2, no murmurs, rubs, clicks or gallops  Abdomen - soft, nontender, nondistended, no masses or organomegaly still have vesicular lesions but less sensitive and less tender  Neurological - alert, oriented, normal speech, no focal findings or movement disorder noted}  Extremities - peripheral pulses normal, no pedal edema, no clubbing or cyanosis  Skin - normal coloration and turgor, no rashes, no suspicious skin lesions noted     Last Refreshed: 07/07/21 0832 [Time [place] Orders]   Results     Component Value Units   POC Glucose Fingerstick [[id number]] (Abnormal)    Collected: 07/07/21 0626    Updated: 07/07/21 0633     POC Glucose 115High  mg/dL   Basic Metabolic Prof [[id number]] (Abnormal)    Collected: 07/07/21 0547    Updated: 07/07/21 0630    Specimen Source: Blood     Glucose 123High  mg/dL    BUN 22 mg/dL    CREATININE 1.09 mg/dL    Bun/Cre Ratio NOT REPORTED    Calcium 8.2Low  mg/dL    Sodium 137 mmol/L    Potassium 4.6 mmol/L    Chloride 105 mmol/L    CO2 26 mmol/L    Anion Gap 6Low  mmol/L    GFR Non-African American >60 mL/min    GFR African American >60 mL/min    GFR Comment         Comment: Average GFR for 79or more years old:    65 mL/min/1.73sq m   Chronic Kidney Disease:    <60 mL/min/1.73sq m   Kidney failure:    <15 mL/min/1.73sq m               eGFR calculated using average adult body mass.  Additional eGFR calculator available at:         [employer]              GFR Staging NOT REPORTED   CBC with DIFF [[id number]] (Abnormal)    Collected: 07/07/21 0547    Updated: 07/07/21 0615    Specimen Source: Blood     WBC 7.1 k/uL    RBC 4.05Low  m/uL    Hemoglobin 11.6Low  g/dL    Hematocrit 34.5Low  %    MCV 85.2 fL    MCH 28.6 pg    MCHC 33.6 g/dL    RDW 15.0High  %    Platelets 408 k/uL    MPV 8.3 fL    NRBC Automated NOT REPORTED per 100 WBC    Differential Type NOT REPORTED    Seg Neutrophils 63 %    Lymphocytes 20Low  %    Monocytes 14High  %    Eosinophils % 3 %    Basophils 0 %    Immature Granulocytes NOT REPORTED %    Segs Absolute 4.50 k/uL    Absolute Lymph # 1.40 k/uL    Absolute Mono # 1.00 k/uL    Absolute Eos # 0.20 k/uL    Basophils Absolute 0.00 k/uL    Absolute Immature Granulocyte NOT REPORTED k/uL    WBC Morphology NOT REPORTED    RBC Morphology NOT REPORTED    Platelet Estimate NOT REPORTED   POC Glucose Fingerstick [[id number]] (Abnormal)    Collected: 07/06/21 2010    Updated: 07/06/21 2016     POC Glucose 159High  mg/dL   VL Arterial PVR Lower [[id number]]    Collected: 07/06/21 1541    Updated: 07/06/21 1923    Narrative:         [facility]    Vascular Lower Arterial Plethysmography Procedure        Patient Name   Rebeka Barrera    Date of Study           07/06/2021                  Brenda Base        Date of Birth  1950  Gender                  Male        Age            70 year(s)  Race                            Room Number    9569        Corporate ID # [id number]        Patient Acct # [de-identified]        MR #           [id number]      Sonographer             Brenden Birch RVT        Accession #    [id number]  Interpreting Physician  Delos Reyes,Arthur        Referring                  Referring Physician     Roderick Augustine Minus    Practitioner       Procedure   Type of Study:        Extremities Arteries: Lower Arterial Plethysmography, PVR Lower.       Indications for Study:PVD. Patient Status: In Patient. Technical Quality:Adequate visualization.      Conclusions        Summary        Right ALIN of 0.53 and left ALIN of 0.67 are consistent with moderate    arterial occlusive disease.        Signature        ----------------------------------------------------------------    Electronically signed by Yomi Latham RVT(Sonographer) on    07/06/2021 04:26 PM    ----------------------------------------------------------------        ----------------------------------------------------------------    Electronically signed by Carlen Craze Reyes,Arthur(Interpreting    [id number]) on 07/06/2021 07:23 PM    ----------------------------------------------------------------       Velocities are measured in cm/s ; Diameters are measured in cm     Pressures   +--------------------------------------++--------+-----+----+--------+-----+   !                                      ! ! Right   !     !Left!        !     !   +--------------------------------------++--------+-----+----+--------+-----+   ! Location                              ! ! Pressure! Ratio!    !Pressure! Ratio! +--------------------------------------++--------+-----+----+--------+-----+   ! Thigh                                 !!129     !0.8  !    !241     !0.75 !   +--------------------------------------++--------+-----+----+--------+-----+   ! Calf                                  !!87      !0.54 !    !110     !0.68 !   +--------------------------------------++--------+-----+----+--------+-----+   ! Ankle PT                              !!86      !0.53 !    !108     !0.67 !   +--------------------------------------++--------+-----+----+--------+-----+   ! Ankle DP                              !!84      !0.52 !    !104     !0.64 ! +--------------------------------------++--------+-----+----+--------+-----+   ! Great Toe                             !!76      !0.42 !    !70      !0.43 !   +--------------------------------------++--------+-----+----+--------+-----+       - Brachial Pressure:Right: 154. Left:162.       - ALIN:Right: 0.53. Left: 0.67. Plethysmographic Digit Evaluation   +---------++--------+-----+---------------++--------+-----+----------------+   !         ! ! Right   !     ! Left           !!        !     !                !   +---------++--------+-----+---------------++--------+-----+----------------+   ! Location ! !Pressure! Ratio! PPG Wave Form  !!Pressure! Ratio! PPG Wave Form   !   +---------++--------+-----+---------------++--------+-----+----------------+   ! Great HRA!!70      !0.42 !               !!79      !0.43 !                !   +---------++--------+-----+---------------++--------+-----+----------------+   Flow cytometry leukemia/lymphoma blood [[id number]]    Collected: 07/04/21 1641    Updated: 07/06/21 1623    Specimen Source: Blood     Flow Cytometry Bl [id number]    Comment: SEE SEPARATE REPORT      IR BIOPSY ABDOMINAL/RETROPERITONEAL MASS PERCUTANEOUS [[id number]]    Collected: 07/06/21 1316    Updated: 07/06/21 1613    Narrative:     PROCEDURE:   ULTRASOUND-GUIDED RETROPERITONEAL MASS BIOPSY     7/6/2021     HISTORY:   ORDERING SYSTEM PROVIDED HISTORY: Biopsy of MI mass seen on CT scan, send for   cytology and flow Cytometry   TECHNOLOGIST PROVIDED HISTORY:   Biopsy of MI mass seen on CT scan, send for cytology and flow Cytometry     CONTRAST:   None. SEDATION:   None. FLUOROSCOPY DOSE AND TYPE OR TIME AND EXPOSURES:   None.      DESCRIPTION OF PROCEDURE AND FINDINGS:   This procedure was performed by Dr. Baron Carrero.  Informed consent was obtained   after a detailed explanation of the procedure including risks, benefits, and   alternatives.  Universal protocol was observed.  Targeted ultrasound of the   left flank was performed and shows a heterogeneous hypoechoic mass by the   inferior pole of the left kidney corresponding to the mass described on the   recent CT and MRI studies.  The skin over the area was prepped and draped in   standard sterile fashion.  1% lidocaine used for local anesthesia.  Using   real-time ultrasound guidance an 25 gauge M biopsy needle was advanced into   the lesion.  Ultrasound images show a safe tract and access into the mass on   all occasions.  Five core biopsy specimens were obtained and placed on   slides.  Samples were given directly to the pathologist for review. Preliminary evaluation confirms adequate specimens for assessment.  Following   this, request was made for an aspiration biopsy.  A 20 gauge spinal needle   was advanced into the lesion with real-time ultrasound guidance.  Fine needle   aspiration biopsy was obtained and sample submitted for flow cytometry. Postprocedure images show no local complication.  Sterile dressing was   applied.  EBL: None. Impression:     Successful ultrasound-guided core biopsy and FNA biopsy of a left   retroperitoneal mass. CT ABDOMEN WO CONTRAST Additional Contrast? None [[id number]]    Collected: 07/06/21 1309    Updated: 07/06/21 1546    Narrative:     EXAMINATION:   CT ABDOMEN WITHOUT CONTRAST 7/6/2021 12:04 pm     TECHNIQUE:   CT of the abdomen was performed without the administration of intravenous   contrast. Multiplanar reformatted images are provided for review. Dose   modulation, iterative reconstruction, and/or weight based adjustment of the   mA/kV was utilized to reduce the radiation dose to as low as reasonably   achievable.      COMPARISON:   MRI 07/06/2021, 07/03/2020     HISTORY:   ORDERING SYSTEM PROVIDED HISTORY: ABDOMINAL MASS   TECHNOLOGIST PROVIDED HISTORY:   ABDOMINAL MASS       Left retroperitoneal mass extending below the kidney, history of lymphoma,   please biopsy for histology     FINDINGS:   Limited CT of abdomen is performed without contrast for planned   retroperitoneal mass biopsy.  Images are obtained with an overlying grid. Infiltrative retroperitoneal mass described on prior studies is again noted. Mild left hydronephrosis.  Extensive calcific plaque of the aorta and iliac   arteries.  Skin over the largest component of the mass inferior to the left   kidney and anterior to the psoas muscle was marked.  Subsequent biopsy is   performed using ultrasound guidance. Impression:     Infiltrative predominant left-sided retroperitoneal mass is again noted. Subsequent biopsy is performed using ultrasound guidance. Path Review, Smear [[id number]]    Collected: 07/04/21 0528    Updated: 07/06/21 1455     Pathologist Review TO BE REVIEWED BY PATHOLOGIST    Comment: Reviewed by pathologist: Brian Quintana M.D.   (NOTE)   ESSENTIALLY NORMAL PERIPHERAL BLOOD SMEAR. Surgical Pathology [[id number]]    Collected: 07/04/21 1048    Updated: 07/06/21 1341     Surgical Pathology Report --    [id number]   [facility]   [facility]   ANATOMIC PATHOLOGY   [address] [place] [address]. [place], [address]   [phone]   Fax: [phone]   SURGICAL PATHOLOGY CONSULTATION     Patient Name: Eri Sneed   MR#: [id number]   Specimen #[id number]     Procedures/Addenda   FLOW CYTOMETRY REPORT     Date Ordered:     7/6/2021     Status:   Signed Out        Date Complete:     7/6/2021     By: Jeronimo Ohara M.D.        Date Reported:     7/6/2021       INTERPRETATION     Peripheral blood:          Mild normocytic anemia. A reticulocyte count and iron studies   would help further characterize this anemia. Lymphopenia. Flow cytometric immunophenotyping analysis of peripheral blood   lymphocyte population is negative for B-cell monoclonality and T-cell   aberrancy (reactive lymphocytes).        RESULTS-COMMENTS   PERIPHERAL BLOOD STUDY     CBC: Please see the electronic health record for EXAMINATION:   MRI OF THE ABDOMEN WITH AND WITHOUT CONTRAST AND MRCP 7/6/2021 9:02 am     TECHNIQUE:   Multiplanar multisequence MRI of the abdomen was performed with and without   the administration of intravenous contrast.  After initial T2 axial and   coronal images, thick slab, thin slab and 3D coronal MRCP sequences were   obtained without the administration of intravenous contrast.  MIP images are   provided for review. COMPARISON:   CT 07/03/2021 and 12/15/2019     HISTORY:   ORDERING SYSTEM PROVIDED HISTORY: Pancreatic mass left adrenal mass abnormal   CT   TECHNOLOGIST PROVIDED HISTORY:   Pancreatic mass left adrenal mass abnormal CT   Reason for Exam: Pancreatic mass left adrenal mass abnormal CT   Additional signs and symptoms: Patient complains of shingles, kidney stone   snad gallstones. Previous hernia repair denies any other absominal suergeries     FINDINGS:   ABDOMEN:     Trace pleural effusions. Infiltrative soft tissue mass in the left perinephric space extending into   the midline retroperitoneum surrounding the left renal vasculature, aorta and   into the aortocaval space.  Soft tissue does extend along the celiac and SMA. Directly involvement of the left adrenal gland is demonstrated.  A similar   signal intensity mass is present that is immediately adjacent to if not   involving the body of the pancreas near the SMV measuring up to 2.8 cm.  This   likely represents direct extension of the retroperitoneal mass along the   vasculature into the pancreas and less likely a primary pancreatic neoplasm. Soft tissue extension along the splenic vasculature towards the splenic hilum   is noted.  No pancreatic duct dilatation.  No discrete mass identified within   the left renal parenchyma. Moderate left hydronephrosis is noted, presumably due to extrinsic   obstruction at the level of the UPJ.  Subcentimeter intraparenchymal   bilateral renal cysts are noted.      The liver is normal in morphology.  No evidence for steatosis. No suspicious   liver lesion identified.  The hepatic veins and portal veins are   appropriately opacified. The spleen is normal in size and signal intensity. The visualized bowel is normal in caliber. The aorta is normal in caliber.  The visceral branches are patent.  No   lymphadenopathy.  The IVC is appropriately opacified. No signal abnormality identified in the visualized osseous structures. MRCP:     Gallbladder: Cholelithiasis in a contracted gallbladder. Bile Ducts: Unremarkable. Pancreatic Duct: Unremarkable. Impression:     1.  Infiltrative soft tissue mass in the left perinephric space and   retroperitoneum with direct involvement of the left adrenal gland and with   perivascular extension into the pancreas and splenic hilum.  These findings   are consistent with lymphoma. 2.  Moderately severe left hydronephrosis is present, likely due to extrinsic   compression at the level of the UPJ. 3.  Cholelithiasis.     Current IP Meds  Hide  (From admission, onward)    Frequency    acyclovir (ZOVIRAX) tablet 800 mg    Discontinue Note to Pharmacy: For a total of 7 days    5 TIMES DAILY    amLODIPine (NORVASC) tablet 10 mg    Discontinue    DAILY    insulin lispro (HUMALOG) injection vial 0-3 Units (Low Dose Correction Insulin)    Discontinue    NIGHTLY    traMADol (ULTRAM) tablet 50 mg    Discontinue    EVERY 6 HOURS PRN    insulin lispro (HUMALOG) injection vial 0-6 Units (Low Dose Correction Insulin)    Discontinue    3 TIMES DAILY WITH MEALS    pregabalin (LYRICA) capsule 50 mg    Discontinue    NIGHTLY    acetaminophen (TYLENOL) tablet 650 mg    Discontinue    EVERY 6 HOURS PRN    memantine (NAMENDA) tablet 10 mg    Discontinue    2 TIMES DAILY    omega-3 acid ethyl esters (LOVAZA) capsule 1 g    Discontinue    2 TIMES DAILY    atorvastatin (LIPITOR) tablet 20 mg    Discontinue    DAILY    lidocaine 4 % external patch 1 patch    Discontinue    DAILY    [Held by provider] glyBURIDE (DIABETA) tablet 5 mg    Discontinue   \"And\" Linked Group Details    2 TIMES DAILY WITH MEALS    [Held by provider] metFORMIN (GLUCOPHAGE) tablet 500 mg    Discontinue   \"And\" Linked Group Details    2 TIMES DAILY WITH MEALS    levothyroxine (SYNTHROID) tablet 50 mcg    Discontinue    DAILY    glucose (GLUTOSE) 40 % oral gel 15 g    Discontinue    PRN    dextrose 50 % IV solution    Discontinue    PRN    glucagon (rDNA) injection 1 mg    Discontinue    PRN    dextrose 5 % solution    Discontinue    PRN    HYDROmorphone (DILAUDID) injection 0.5 mg    Discontinue    EVERY 6 HOURS PRN    sodium chloride flush 0.9 % injection 5-40 mL (Saline Flushes)    Discontinue    2 times per day    enoxaparin (LOVENOX) injection 40 mg (enoxaparin (LOVENOX) or reason for no chemical VTE)    Discontinue    DAILY    0.9 % sodium chloride infusion (IV MAINTENANCE FLUID ORDER PANEL)    Discontinue    CONTINUOUS    ondansetron (ZOFRAN-ODT) disintegrating tablet 4 mg (ondansetron (ZOFRAN) ODT or ondansetron (ZOFRAN) IV)    Discontinue   \"Or\" Linked Group Details    EVERY 8 HOURS PRN    ondansetron (ZOFRAN) injection 4 mg (ondansetron (ZOFRAN) ODT or ondansetron (ZOFRAN) IV)    Discontinue   \"Or\" Linked Group Details    EVERY 6 HOURS PRN    sodium chloride flush 0.9 % injection 5-40 mL (Saline Flushes)    Discontinue    PRN    0.9 % sodium chloride infusion (Saline Flushes)    Discontinue    PRN   Intake/Output     07/06/21 0701 - 07/07/21 0700 07/07/21 0701 - 07/08/21 0700   Intake (ml) 1171 360   Output (ml) 400 --   Net (ml) 771 360         Assessment:  Active Problems:    Acute kidney injury ([address]  Resolved Problems:    * No resolved hospital problems.  *     NHL (non-Hodgkin's lymphoma) (HCC) C85.90    Traumatic retroperitoneal hematoma S36.892A    Acute kidney injury ([address] N17.9      · History of lymphoma  ·    · Cholelithiasis  ·    · Right flank abdominal pain secondary his acute varicella-zoster  ·    · Varicella-zoster  ·    · Herpetic neuralgia  ·    · Abnormal CT with left retroperitoneal and adrenal mass  ·    · Pancreatic mass questionable etiology  ·    · History of hypertension  ·    · History of diabetes mellitus  · Acute kidney injury  · Renal insufficiency improving with hydration  · Hyperglycemia second to decreased appetite and oral intake  · Reviewing his CT shows severe narrowing of the superficial femoral arteries and proximal iliac  · Herpetic pain this morning  ·    · Elevated blood pressure  · Cholelithiasis but no cholecystitis on MRCP  ·   · Evidence of moderate left hydro on MRCP urology consulted  ·   · Feeling better today his herpetic pain is improving  ·   · Eating better                  Plan:  32. Await urology input  33.   34. If no plan for any procedures we could start discharge planning if not today tomorrow and follow-up outpatient  35.   36.  Awaiting his biopsy results for hematology to plan further care for him and treatment  37.   38. Discussed with patient about his gallbladder and cholelithiasis since he has other things going on we will monitor that and in the future when things are worked up with his lymphoma will refer to surgery if needed this can be done outpatient    KRISTI Beverly             7/7/2021, 8:32 AM

## 2021-07-07 NOTE — PROGRESS NOTES
Medical History:   has a past medical history of Cancer ([facility] [address] History of non-Hodgkin's lymphoma, Hyperlipidemia, Hypertension, and Hypothyroidism. Past Surgical History:   has a past surgical history that includes hernia repair and IR BIOPSY ABDOMINAL/RETROPERITONEAL MASS PERCUTANEOUS (7/6/2021). Medications:    Prior to Admission medications    Medication Sig Start Date End Date Taking? Authorizing Provider   amLODIPine (NORVASC) 5 MG tablet Take 5 mg by mouth daily. Yes Historical Provider, MD   glyBURIDE-metformin (GLUCOVANCE) 5-500 MG per tablet Take 1 tablet by mouth 2 times daily (with meals). Yes Historical Provider, MD   levothyroxine (SYNTHROID) 50 MCG tablet Take 50 mcg by mouth Daily. Yes Historical Provider, MD   memantine (NAMENDA) 10 MG tablet Take 10 mg by mouth 2 times daily. Yes Historical Provider, MD   omega-3 acid ethyl esters (LOVAZA) 1 G capsule Take 1 g by mouth 2 times daily. Yes Historical Provider, MD   simvastatin (ZOCOR) 20 MG tablet Take 20 mg by mouth nightly.      Yes Historical Provider, MD     Current Facility-Administered Medications   Medication Dose Route Frequency Provider Last Rate Last Admin    amLODIPine (NORVASC) tablet 10 mg  10 mg Oral Daily Roderick T Dixon, DO   10 mg at 07/07/21 0726    acyclovir (ZOVIRAX) tablet 800 mg  800 mg Oral 5x Daily Roderick T Dixon, DO   800 mg at 07/07/21 1509    insulin lispro (HUMALOG) injection vial 0-6 Units  0-6 Units Subcutaneous TID WC Roderick T Dixon, DO   2 Units at 07/07/21 1121    insulin lispro (HUMALOG) injection vial 0-3 Units  0-3 Units Subcutaneous Nightly Roderick T Dixon, DO   1 Units at 07/06/21 2019    traMADol (ULTRAM) tablet 50 mg  50 mg Oral Q6H PRN Roderick T Dixon, DO   50 mg at 07/07/21 1529    acetaminophen (TYLENOL) tablet 650 mg  650 mg Oral Q6H PRN Roderick T Dixon, DO   650 mg at 07/07/21 1529    pregabalin (LYRICA) capsule 50 mg  50 mg Oral Nightly Roderick T Dixon, DO   50 mg at 07/06/21 2017    sodium chloride flush 0.9 % injection 5-40 mL  5-40 mL Intravenous 2 times per day Rich Cordon, DO   10 mL at 07/06/21 2018    sodium chloride flush 0.9 % injection 5-40 mL  5-40 mL Intravenous PRN Roderick T Dixon, DO        0.9 % sodium chloride infusion  25 mL Intravenous PRN Roderick T Dixon, DO        enoxaparin (LOVENOX) injection 40 mg  40 mg Subcutaneous Daily Roderick T Dixon, DO   40 mg at 07/07/21 0725    ondansetron (ZOFRAN-ODT) disintegrating tablet 4 mg  4 mg Oral Q8H PRN Rich Cordon, DO   4 mg at 07/04/21 0553    Or    ondansetron (ZOFRAN) injection 4 mg  4 mg Intravenous Q6H PRN Roderick T Dixon, DO        0.9 % sodium chloride infusion   Intravenous Continuous Rich Cordon, DO 75 mL/hr at 07/07/21 0726 New Bag at 07/07/21 0726    levothyroxine (SYNTHROID) tablet 50 mcg  50 mcg Oral Daily Roderick T Dixon, DO   50 mcg at 07/07/21 7270    memantine (NAMENDA) tablet 10 mg  10 mg Oral BID Roderick T Dixon, DO   10 mg at 07/07/21 0731    omega-3 acid ethyl esters (LOVAZA) capsule 1 g  1 g Oral BID Roderick T Dixon, DO   1 g at 07/07/21 0726    atorvastatin (LIPITOR) tablet 20 mg  20 mg Oral Daily Roderick T Dixon, DO   20 mg at 07/07/21 0726    HYDROmorphone (DILAUDID) injection 0.5 mg  0.5 mg Intravenous Q6H PRN Roderick T Dixon, DO   0.5 mg at 07/07/21 1028    lidocaine 4 % external patch 1 patch  1 patch Transdermal Daily Roderick T Dixon, DO   1 patch at 07/07/21 0725    [Held by provider] glyBURIDE (DIABETA) tablet 5 mg  5 mg Oral BID  Roderick T Dixon, DO   5 mg at 07/04/21 1716    And    [Held by provider] metFORMIN (GLUCOPHAGE) tablet 500 mg  500 mg Oral BID  Roderick T Dixon, DO   500 mg at 07/04/21 0821    glucose (GLUTOSE) 40 % oral gel 15 g  15 g Oral PRN Roderick T Dixon, DO        dextrose 50 % IV solution  12.5 g Intravenous PRN Roderick T Dixon, DO   12.5 g at 07/05/21 0634    glucagon (rDNA) injection 1 mg  1 mg Intramuscular PRN Roderick T Dixon, DO        dextrose 5 % solution  100 mL/hr Intravenous PRN Annamaria Washburn DO           Allergies:  Patient has no known allergies. Social History:   reports that he quit smoking about 15 years ago. His smoking use included cigarettes. He has a 90.00 pack-year smoking history. He does not have any smokeless tobacco history on file. He reports current alcohol use of about 1.7 standard drinks of alcohol per week. He reports current drug use. Drug: Marijuana. Family History: family history is not on file. Family history was reviewed and no pertinent family history noted. REVIEW OF SYSTEMS:    Constitutional: No fever or chills. No night sweats, no weight loss   Eyes: No eye discharge, double vision, or eye pain   HEENT: negative for sore mouth, sore throat, hoarseness and voice change   Respiratory: negative for cough , sputum, dyspnea, wheezing, hemoptysis, chest pain   Cardiovascular: negative for chest pain, dyspnea, palpitations, orthopnea, PND   Gastrointestinal: negative for nausea, vomiting, diarrhea, constipation, ++abdominal pain, Dysphagia, hematemesis and hematochezia   Genitourinary: negative for frequency, dysuria, nocturia, urinary incontinence, and hematuria   Integument: negative for rash, skin lesions, bruises.    Hematologic/Lymphatic: negative for easy bruising, bleeding, lymphadenopathy, or petechiae   Endocrine: negative for heat or cold intolerance,weight changes, change in bowel habits and hair loss   Musculoskeletal: negative for myalgias, arthralgias, pain, joint swelling,and bone pain   Neurological: negative for headaches, dizziness, seizures, weakness, numbness    PHYSICAL EXAM:      BP (!) 166/67   Pulse 76   Temp 98.2 °F (36.8 °C) (Oral)   Resp 16   Ht 5' 10.5\" (1.791 m)   Wt 194 lb 7.1 oz (88.2 kg)   SpO2 94%   BMI 27.51 kg/m²    Temp (24hrs), Av.2 °F (37.3 °C), Min:98.2 °F (36.8 °C), Max:99.9 °F (37.7 °C)    General appearance - well appearing, no in pain or distress   Mental status - alert and cooperative   Eyes - pupils equal and reactive, extraocular eye movements intact   Ears - bilateral TM's and external ear canals normal   Mouth - mucous membranes moist, pharynx normal without lesions   Neck - supple, no significant adenopathy   Lymphatics - no palpable lymphadenopathy, no hepatosplenomegaly   Chest - clear to auscultation, no wheezes, rales or rhonchi, symmetric air entry   Heart - normal rate, regular rhythm, normal S1, S2, no murmurs  Abdomen - soft, nontender, nondistended, no masses or organomegaly   Neurological - alert, oriented, normal speech, no focal findings or movement disorder noted   Musculoskeletal - no joint tenderness, deformity or swelling   Extremities - peripheral pulses normal, no pedal edema, no clubbing or cyanosis   Skin - normal coloration and turgor, no rashes, no suspicious skin lesions noted ,    DATA:    Labs:   CBC:   Recent Labs     07/06/21  0528 07/07/21  0547   WBC 7.1 7.1   HGB 11.9* 11.6*   HCT 35.7* 34.5*    170     BMP:   Recent Labs     07/06/21  0528 07/07/21  0547    137   K 4.4 4.6   CO2 24 26   BUN 19 22   CREATININE 1.07 1.09   LABGLOM >60 >60   GLUCOSE 103* 123*     PT/INR:   Recent Labs     07/06/21  0750   PROTIME 13.0   INR 1.0       IMAGING DATA:  VL Arterial PVR Lower   Final Result      CT ABDOMEN WO CONTRAST Additional Contrast? None   Final Result   Infiltrative predominant left-sided retroperitoneal mass is again noted. Subsequent biopsy is performed using ultrasound guidance. IR BIOPSY ABDOMINAL/RETROPERITONEAL MASS PERCUTANEOUS   Final Result   Successful ultrasound-guided core biopsy and FNA biopsy of a left   retroperitoneal mass. MRI ABDOMEN W WO CONTRAST MRCP   Final Result   1. Infiltrative soft tissue mass in the left perinephric space and   retroperitoneum with direct involvement of the left adrenal gland and with   perivascular extension into the pancreas and splenic hilum. These findings   are consistent with lymphoma.       2. recommendation will be based on the tissue diagnosis  6. Continue other supportive care as per primary team  7. MRI noted  8. Urology to see patient  5. Okay to discharge from hematology standpoint and will set up follow-up appointment with Dr. Tim Gitelman as outpatient and arrange PET scan as outpatient  10. We will follow    Discussed with pt and Nurse. Thank you for asking us to see this patient. Adria Padilla MD  Hematologist/Medical Oncologist    Cell: [phone]    This note is created with the assistance of a speech recognition program.  While intending to generate a document that actually reflects the content of the visit, the document can still have some errors including those of syntax and sound a like substitutions which may escape proof reading. It such instances, actual meaning can be extrapolated by contextual diversion.

## 2021-07-07 NOTE — PROGRESS NOTES
occulsive arerail  disease. Referring Practitioner: Dr Ayla Felder  Pain Screening  Patient Currently in Pain: Yes  Pain Assessment  Pain Assessment: 0-10  Pain Level: 2  Pain Type: Acute pain  Pain Location: Back  Pain Orientation: Lower  Pain Descriptors: Discomfort  Clinical Progression: Not changed  Response to Pain Intervention: Patient Satisfied  Vital Signs  BP Location: Left upper arm  Level of Consciousness: Alert (0)  Patient Currently in Pain: Yes  Oxygen Therapy  O2 Device: None (Room air)       Orientation     Cognition      Objective   Bed mobility  Rolling to Left: Modified independent  Rolling to Right: Modified independent  Supine to Sit: Modified independent  Sit to Supine: Modified independent  Scooting: Modified independent  Transfers  Sit to Stand: Stand by assistance  Stand to sit: Stand by assistance  Comment: Pt returns back to bed, reports he ahs been sitting at EOB on his own often. Ambulation  Ambulation?: Yes  Ambulation 1  Surface: level tile  Device:  (IV pole)  Assistance: Stand by assistance;Contact guard assistance  Quality of Gait: Slight unsteady without device, short steps, fatiques easily. Pt with increased steadyness with IV pole, per pt he has a cane at home that he will use if needed. Gait Deviations: Slow Marta;Decreased step length;Decreased step height;Decreased arm swing  Distance: 200 ft, with 2 standing breaks   Comments: Pt declines to try to use rolling walker. Stairs/Curb  Stairs?: No     Balance  Posture: Good  Sitting - Static: Good  Sitting - Dynamic: Fair;+  Standing - Static: Good;-  Standing - Dynamic: Fair;+ (UE support on IV pole)  Other exercises  Other exercises? :  (Pt deferrs 2* fatique after ambulation.)   AROM RLE (degrees)  RLE AROM: WFL  AROM LLE (degrees)  LLE AROM : WFL  Strength RLE  Comment: Grossly 4/5  Strength LLE  Comment: Grossly 4/5                 G-Code     OutComes Score                                                     AM-PAC Score Goals  Short term goals  Time Frame for Short term goals: 5 to 7 visits  Short term goal 1: Pt able to perform bed mobility independently without use fo bed rail  Short term goal 2: Pt able to tranfer independently from varies surfaces  Short term goal 3:  Pt able to ambulate with st cane distance of 200 to 300 ft SBA, level surfaces. Short term goal 4: Pt able to tolerate strengthenign activities/excercises for 20 to 30 minutes toimprove overall endurance and strength for safe return home.    Patient Goals   Patient goals : Feel better, get stronger    Plan    Plan  Times per week: 5 to 6 x/week  Current Treatment Recommendations: Strengthening, Balance Training, Functional Mobility Training, Transfer Training, Endurance Training, Gait Training, Safety Education & Training, Patient/Caregiver Education & Training, Home Exercise Program  Safety Devices  Type of devices: Left in bed, Gait belt, Call light within reach     Therapy Time   Individual Concurrent Group Co-treatment   Time In 1359         Time Out 1409         Minutes 10                 Shayne Rahman, PT

## 2021-07-07 NOTE — PROGRESS NOTES
Pt. Examined,willdictate note. PVR studies are consistent with Arterial occlusive disease consistent with claudication. No need for intervention for now,will as OP. Thanks.

## 2021-07-07 NOTE — PROGRESS NOTES
Today's Date: 7/6/2021  Patient Name: Becky Hess  Date of admission: 7/3/2021 10:30 AM  Patient's age: 65 y.o., 1950  Admission Dx: Acute kidney injury ([facility] [address] [N17.9]    Reason for Consult: infiltrative kidney mass wtih history of NHL  Requesting Physician: Dorothy Yu DO    CHIEF COMPLAINT:    Chief Complaint   Patient presents with    Flank Pain     Rt flank pain    Abdominal Pain     RLQ pain       SUBJECTIVE:    Pt seen and examined  Had IR guide LN biopsy today   tolerated procedure well  Pain controlled   NO NV      HISTORY OF PRESENT ILLNESS:    Becky Hess is a 65 y.o. male who is admitted to the hospital on 7/3/2021  For acute abdominal pain and was noted to have acute kidney injury. Patient reports his abdominal pain started few days ago and also noted to have skin rash around lower back suspicious for herpes zoster. Patient has history of non-Hodgkin's lymphoma and has seen oncology with last visit in 2013. Since then he has not had any follow-up with oncology. His brief oncologic history as follows. He had seen Dr. Wendy Morales in the past.  He has not showed for his follow-up appointment since then. 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       CURRENT THERAPY:    1.   Observation. 2.   Status post treatment with Bexxar in May 2008. 3.   Status post previous treatment with Rituxan.       On admission he had lab work showing acute kidney injury with elevated creatinine. And also CT abdomen showed a large 10.1 cm infiltrate soft tissue density beneath the left kidney and also encasing left adrenal gland. Additionally nodular density in pancreas measuring 2.6 cm noted. Also a dominant right external iliac lymph node noted measuring 3.5 cm suggesting increase in size from previous scans. Therefore oncology was consulted for further recommendations.   Past Medical History:   has a past medical history of Cancer [facility]), History of non-Hodgkin's lymphoma, Hyperlipidemia, Hypertension, and Hypothyroidism. Past Surgical History:   has a past surgical history that includes hernia repair. Medications:    Prior to Admission medications    Medication Sig Start Date End Date Taking? Authorizing Provider   amLODIPine (NORVASC) 5 MG tablet Take 5 mg by mouth daily. Yes Historical Provider, MD   glyBURIDE-metformin (GLUCOVANCE) 5-500 MG per tablet Take 1 tablet by mouth 2 times daily (with meals). Yes Historical Provider, MD   levothyroxine (SYNTHROID) 50 MCG tablet Take 50 mcg by mouth Daily. Yes Historical Provider, MD   memantine (NAMENDA) 10 MG tablet Take 10 mg by mouth 2 times daily. Yes Historical Provider, MD   omega-3 acid ethyl esters (LOVAZA) 1 G capsule Take 1 g by mouth 2 times daily. Yes Historical Provider, MD   simvastatin (ZOCOR) 20 MG tablet Take 20 mg by mouth nightly.      Yes Historical Provider, MD     Current Facility-Administered Medications   Medication Dose Route Frequency Provider Last Rate Last Admin    amLODIPine (NORVASC) tablet 10 mg  10 mg Oral Daily Roderick T Dixon, DO   10 mg at 07/06/21 0819    acyclovir (ZOVIRAX) tablet 800 mg  800 mg Oral 5x Daily Roderick T Dixon, DO   800 mg at 07/06/21 2303    insulin lispro (HUMALOG) injection vial 0-6 Units  0-6 Units Subcutaneous TID WC Roderick T Dixon, DO        insulin lispro (HUMALOG) injection vial 0-3 Units  0-3 Units Subcutaneous Nightly Roderick T Dixon, DO   1 Units at 07/06/21 2019    traMADol (ULTRAM) tablet 50 mg  50 mg Oral Q6H PRN Roderick T Dixon, DO   50 mg at 07/06/21 1314    acetaminophen (TYLENOL) tablet 650 mg  650 mg Oral Q6H PRN Destiny Pickup, DO        pregabalin (LYRICA) capsule 50 mg  50 mg Oral Nightly Roderick T Dixon, DO   50 mg at 07/06/21 2017    sodium chloride flush 0.9 % injection 5-40 mL  5-40 mL Intravenous 2 times per day Destiny Pickup, DO   10 mL at 07/06/21 2018    sodium chloride flush 0.9 % injection 5-40 mL  5-40 mL Intravenous PRN Roderick T Dixon, DO        0.9 % sodium chloride infusion  25 mL Intravenous PRN Roderick T Dixon, DO        enoxaparin (LOVENOX) injection 40 mg  40 mg Subcutaneous Daily Roderick T Dixon, DO   40 mg at 07/04/21 0820    ondansetron (ZOFRAN-ODT) disintegrating tablet 4 mg  4 mg Oral Q8H PRN Lennox Sauce, DO   4 mg at 07/04/21 0553    Or    ondansetron (ZOFRAN) injection 4 mg  4 mg Intravenous Q6H PRN Roderick T Dixon, DO        0.9 % sodium chloride infusion   Intravenous Continuous Lennox Sauce, DO 75 mL/hr at 07/06/21 2128 New Bag at 07/06/21 2128    levothyroxine (SYNTHROID) tablet 50 mcg  50 mcg Oral Daily Roderick T Dixon, DO   50 mcg at 07/05/21 0544    memantine (NAMENDA) tablet 10 mg  10 mg Oral BID Roderick T Dixon, DO   10 mg at 07/06/21 2017    omega-3 acid ethyl esters (LOVAZA) capsule 1 g  1 g Oral BID Roderick T Dixon, DO   1 g at 07/06/21 2017    atorvastatin (LIPITOR) tablet 20 mg  20 mg Oral Daily Roderick T Dixon, DO   20 mg at 07/06/21 0819    HYDROmorphone (DILAUDID) injection 0.5 mg  0.5 mg Intravenous Q6H PRN Lennox Sauce, DO   0.5 mg at 07/06/21 2129    lidocaine 4 % external patch 1 patch  1 patch Transdermal Daily Roderick T Dixon, DO   1 patch at 07/06/21 1350    [Held by provider] glyBURIDE (DIABETA) tablet 5 mg  5 mg Oral BID  Roderick T Dixon, DO   5 mg at 07/04/21 1716    And    [Held by provider] metFORMIN (GLUCOPHAGE) tablet 500 mg  500 mg Oral BID  Roderick T Dixon, DO   500 mg at 07/04/21 0821    glucose (GLUTOSE) 40 % oral gel 15 g  15 g Oral PRN Roderick T Dixon, DO        dextrose 50 % IV solution  12.5 g Intravenous PRN Roderick T Dixon, DO   12.5 g at 07/05/21 0634    glucagon (rDNA) injection 1 mg  1 mg Intramuscular PRN Roderick T Dixon, DO        dextrose 5 % solution  100 mL/hr Intravenous PRN Roderick T Dixon, DO           Allergies:  Patient has no known allergies. Social History:   reports that he quit smoking about 15 years ago. His smoking use included cigarettes.  He supple, no significant adenopathy   Lymphatics - no palpable lymphadenopathy, no hepatosplenomegaly   Chest - clear to auscultation, no wheezes, rales or rhonchi, symmetric air entry   Heart - normal rate, regular rhythm, normal S1, S2, no murmurs  Abdomen - soft, nontender, nondistended, no masses or organomegaly   Neurological - alert, oriented, normal speech, no focal findings or movement disorder noted   Musculoskeletal - no joint tenderness, deformity or swelling   Extremities - peripheral pulses normal, no pedal edema, no clubbing or cyanosis   Skin - normal coloration and turgor, no rashes, no suspicious skin lesions noted ,    DATA:    Labs:   CBC:   Recent Labs     07/05/21  0531 07/06/21  0528   WBC 6.8 7.1   HGB 12.8* 11.9*   HCT 37.7* 35.7*    156     BMP:   Recent Labs     07/05/21 0531 07/06/21  0528    141   K 4.9 4.4   CO2 26 24   BUN 25* 19   CREATININE 1.18 1.07   LABGLOM >60 >60   GLUCOSE 63* 103*     PT/INR:   Recent Labs     07/06/21  0750   PROTIME 13.0   INR 1.0       IMAGING DATA:  VL Arterial PVR Lower   Final Result      CT ABDOMEN WO CONTRAST Additional Contrast? None   Final Result   Infiltrative predominant left-sided retroperitoneal mass is again noted. Subsequent biopsy is performed using ultrasound guidance. IR BIOPSY ABDOMINAL/RETROPERITONEAL MASS PERCUTANEOUS   Preliminary Result   Successful ultrasound-guided core biopsy and FNA biopsy of a left   retroperitoneal mass. MRI ABDOMEN W WO CONTRAST MRCP   Final Result   1. Infiltrative soft tissue mass in the left perinephric space and   retroperitoneum with direct involvement of the left adrenal gland and with   perivascular extension into the pancreas and splenic hilum. These findings   are consistent with lymphoma. 2.  Moderately severe left hydronephrosis is present, likely due to extrinsic   compression at the level of the UPJ. 3.  Cholelithiasis.          MRI BRAIN WO CONTRAST   Final us to see this patient. Adria Hastings MD  Hematologist/Medical Oncologist    Cell: [phone]    This note is created with the assistance of a speech recognition program.  While intending to generate a document that actually reflects the content of the visit, the document can still have some errors including those of syntax and sound a like substitutions which may escape proof reading. It such instances, actual meaning can be extrapolated by contextual diversion.

## 2021-07-08 VITALS
WEIGHT: 194.45 LBS | TEMPERATURE: 98.3 F | BODY MASS INDEX: 27.22 KG/M2 | HEART RATE: 82 BPM | SYSTOLIC BLOOD PRESSURE: 196 MMHG | HEIGHT: 71 IN | OXYGEN SATURATION: 92 % | RESPIRATION RATE: 16 BRPM | DIASTOLIC BLOOD PRESSURE: 77 MMHG

## 2021-07-08 LAB
ABSOLUTE EOS #: 0.2 K/UL (ref 0–0.4)
ABSOLUTE IMMATURE GRANULOCYTE: ABNORMAL K/UL (ref 0–0.3)
ABSOLUTE LYMPH #: 1.3 K/UL (ref 1–4.8)
ABSOLUTE MONO #: 0.7 K/UL (ref 0.1–1.3)
ANION GAP SERPL CALCULATED.3IONS-SCNC: 9 MMOL/L (ref 9–17)
BASOPHILS # BLD: 0 % (ref 0–2)
BASOPHILS ABSOLUTE: 0 K/UL (ref 0–0.2)
BUN BLDV-MCNC: 21 MG/DL (ref 8–23)
BUN/CREAT BLD: ABNORMAL (ref 9–20)
CALCIUM SERPL-MCNC: 8.2 MG/DL (ref 8.6–10.4)
CHLORIDE BLD-SCNC: 104 MMOL/L (ref 98–107)
CO2: 25 MMOL/L (ref 20–31)
CREAT SERPL-MCNC: 1.04 MG/DL (ref 0.7–1.2)
DIFFERENTIAL TYPE: ABNORMAL
EOSINOPHILS RELATIVE PERCENT: 3 % (ref 0–4)
GFR AFRICAN AMERICAN: >60 ML/MIN
GFR NON-AFRICAN AMERICAN: >60 ML/MIN
GFR SERPL CREATININE-BSD FRML MDRD: ABNORMAL ML/MIN/{1.73_M2}
GFR SERPL CREATININE-BSD FRML MDRD: ABNORMAL ML/MIN/{1.73_M2}
GLUCOSE BLD-MCNC: 110 MG/DL (ref 75–110)
GLUCOSE BLD-MCNC: 116 MG/DL (ref 70–99)
HCT VFR BLD CALC: 33.8 % (ref 41–53)
HEMOGLOBIN: 11.3 G/DL (ref 13.5–17.5)
IMMATURE GRANULOCYTES: ABNORMAL %
LYMPHOCYTES # BLD: 20 % (ref 24–44)
MCH RBC QN AUTO: 28.6 PG (ref 26–34)
MCHC RBC AUTO-ENTMCNC: 33.5 G/DL (ref 31–37)
MCV RBC AUTO: 85.5 FL (ref 80–100)
MONOCYTES # BLD: 11 % (ref 1–7)
NRBC AUTOMATED: ABNORMAL PER 100 WBC
PDW BLD-RTO: 14.9 % (ref 11.5–14.9)
PLATELET # BLD: 184 K/UL (ref 150–450)
PLATELET ESTIMATE: ABNORMAL
PMV BLD AUTO: 7.9 FL (ref 6–12)
POTASSIUM SERPL-SCNC: 4.3 MMOL/L (ref 3.7–5.3)
RBC # BLD: 3.95 M/UL (ref 4.5–5.9)
RBC # BLD: ABNORMAL 10*6/UL
SEG NEUTROPHILS: 66 % (ref 36–66)
SEGMENTED NEUTROPHILS ABSOLUTE COUNT: 4.4 K/UL (ref 1.3–9.1)
SODIUM BLD-SCNC: 138 MMOL/L (ref 135–144)
SURGICAL PATHOLOGY REPORT: NORMAL
WBC # BLD: 6.6 K/UL (ref 3.5–11)
WBC # BLD: ABNORMAL 10*3/UL

## 2021-07-08 PROCEDURE — 80048 BASIC METABOLIC PNL TOTAL CA: CPT

## 2021-07-08 PROCEDURE — 6370000000 HC RX 637 (ALT 250 FOR IP): Performed by: FAMILY MEDICINE

## 2021-07-08 PROCEDURE — 6360000002 HC RX W HCPCS: Performed by: FAMILY MEDICINE

## 2021-07-08 PROCEDURE — 82947 ASSAY GLUCOSE BLOOD QUANT: CPT

## 2021-07-08 PROCEDURE — 85025 COMPLETE CBC W/AUTO DIFF WBC: CPT

## 2021-07-08 PROCEDURE — 36415 COLL VENOUS BLD VENIPUNCTURE: CPT

## 2021-07-08 RX ORDER — AMLODIPINE BESYLATE 10 MG/1
10 TABLET ORAL DAILY
Qty: 30 TABLET | Refills: 5 | Status: SHIPPED | OUTPATIENT
Start: 2021-07-09 | End: 2021-10-05 | Stop reason: DRUGHIGH

## 2021-07-08 RX ORDER — PREGABALIN 50 MG/1
CAPSULE ORAL
Qty: 90 CAPSULE | Refills: 1 | Status: SHIPPED | OUTPATIENT
Start: 2021-07-08 | End: 2021-10-05 | Stop reason: DRUGHIGH

## 2021-07-08 RX ORDER — HYDROCODONE BITARTRATE AND ACETAMINOPHEN 5; 325 MG/1; MG/1
1 TABLET ORAL EVERY 6 HOURS PRN
Qty: 28 TABLET | Refills: 0 | Status: SHIPPED | OUTPATIENT
Start: 2021-07-08 | End: 2021-07-15

## 2021-07-08 RX ORDER — HYDROCODONE BITARTRATE AND ACETAMINOPHEN 5; 325 MG/1; MG/1
1 TABLET ORAL EVERY 6 HOURS PRN
Status: DISCONTINUED | OUTPATIENT
Start: 2021-07-08 | End: 2021-07-08 | Stop reason: HOSPADM

## 2021-07-08 RX ORDER — ACYCLOVIR 800 MG/1
800 TABLET ORAL
Qty: 25 TABLET | Refills: 0 | Status: SHIPPED | OUTPATIENT
Start: 2021-07-08 | End: 2021-07-13

## 2021-07-08 RX ADMIN — HYDROMORPHONE HYDROCHLORIDE 0.5 MG: 1 INJECTION, SOLUTION INTRAMUSCULAR; INTRAVENOUS; SUBCUTANEOUS at 05:37

## 2021-07-08 RX ADMIN — ENOXAPARIN SODIUM 40 MG: 40 INJECTION SUBCUTANEOUS at 07:23

## 2021-07-08 RX ADMIN — ATORVASTATIN CALCIUM 20 MG: 20 TABLET, FILM COATED ORAL at 07:24

## 2021-07-08 RX ADMIN — AMLODIPINE BESYLATE 10 MG: 10 TABLET ORAL at 07:24

## 2021-07-08 RX ADMIN — LEVOTHYROXINE SODIUM 50 MCG: 0.05 TABLET ORAL at 05:37

## 2021-07-08 RX ADMIN — HYDROCODONE BITARTRATE AND ACETAMINOPHEN 1 TABLET: 5; 325 TABLET ORAL at 09:23

## 2021-07-08 RX ADMIN — OMEGA-3-ACID ETHYL ESTERS 1 G: 1 CAPSULE, LIQUID FILLED ORAL at 07:24

## 2021-07-08 RX ADMIN — ACYCLOVIR 800 MG: 800 TABLET ORAL at 05:37

## 2021-07-08 RX ADMIN — MEMANTINE 10 MG: 10 TABLET ORAL at 07:25

## 2021-07-08 ASSESSMENT — PAIN DESCRIPTION - PAIN TYPE: TYPE: ACUTE PAIN

## 2021-07-08 ASSESSMENT — PAIN DESCRIPTION - ORIENTATION: ORIENTATION: RIGHT

## 2021-07-08 ASSESSMENT — PAIN SCALES - GENERAL
PAINLEVEL_OUTOF10: 6
PAINLEVEL_OUTOF10: 6
PAINLEVEL_OUTOF10: 3

## 2021-07-08 ASSESSMENT — PAIN DESCRIPTION - PROGRESSION: CLINICAL_PROGRESSION: GRADUALLY WORSENING

## 2021-07-08 ASSESSMENT — PAIN DESCRIPTION - FREQUENCY: FREQUENCY: CONTINUOUS

## 2021-07-08 ASSESSMENT — PAIN DESCRIPTION - ONSET: ONSET: ON-GOING

## 2021-07-08 ASSESSMENT — PAIN DESCRIPTION - LOCATION: LOCATION: FLANK

## 2021-07-08 ASSESSMENT — PAIN DESCRIPTION - DESCRIPTORS: DESCRIPTORS: ACHING

## 2021-07-08 NOTE — DISCHARGE SUMMARY
Physician Discharge Summary     Patient ID:  Niki Ruiz  [id number]  70 y.o.  1950    Admit date: 7/3/2021    Discharge date and time: 7/8/2021    Admitting Physician: Katty Gomez DO     Discharge Physician: Katty Gomez DO      Admission Diagnoses:   Acute kidney injury [facility]) [N17.9]    Discharge Diagnoses: Active Problems:    Acute kidney injury ([address]  Resolved Problems:    * No resolved hospital problems. [facility] Course: Patient was admitted with abdominal pain was found to have her take 2 lesions on the right side. But had a CT in the emergency room showed masslike areas around the retroperitoneal area and kidneys. And adrenals and he had a history of lymphoma was suspicious that this is old lymphoma type recurrence he was having no symptoms before admission. Patient was seen by hematology he also showed hydro on his MRCP and cholelithiasis with no evidence of cholecystitis seen by urology but no need for stenting they said because of his hydro as well as vascular and felt his occlusion that noted on the CT was more consistent with PVD and claudication but no intervention needed patient had a biopsy of his mass that was still pending and maybe was inconclusive according to the patient before discharge but will plan to get him a PET scan by his hematologist outpatient. Patient pain got somewhat controlled with Ultram and Lyrica but was discharged on Norco and Lyrica.   POC Glucose Fingerstick [[id number]]      Collected: 07/08/21 0602      Updated: 07/08/21 0608        POC Glucose 110 mg/dL    Basic Metabolic Prof [[id number]] (Abnormal)      Collected: 07/08/21 0535      Updated: 07/08/21 0608      Specimen Source: Blood        Glucose 116High  mg/dL      BUN 21 mg/dL      CREATININE 1.04 mg/dL      Bun/Cre Ratio NOT REPORTED      Calcium 8.2Low  mg/dL      Sodium 138 mmol/L      Potassium 4.3 mmol/L      Chloride 104 mmol/L      CO2 25 mmol/L      Anion Gap 9 mmol/L      GFR Non-African American >60 mL/min      GFR African American >60 mL/min      GFR Comment           Comment: Average GFR for 79or more years old:    65 mL/min/1.73sq m   Chronic Kidney Disease:    <60 mL/min/1.73sq m   Kidney failure:    <15 mL/min/1.73sq m               eGFR calculated using average adult body mass.  Additional eGFR calculator available at:         [email or web address]               GFR Staging NOT REPORTED    CBC with DIFF [[id number]] (Abnormal)      Collected: 07/08/21 0535      Updated: 07/08/21 0545      Specimen Source: Blood        WBC 6.6 k/uL      RBC 3.95Low  m/uL      Hemoglobin 11.3Low  g/dL      Hematocrit 33.8Low  %      MCV 85.5 fL      MCH 28.6 pg      MCHC 33.5 g/dL      RDW 14.9 %      Platelets 859 k/uL      MPV 7.9 fL      NRBC Automated NOT REPORTED per 100 WBC      Differential Type NOT REPORTED      Seg Neutrophils 66 %      Lymphocytes 20Low  %      Monocytes 11High  %      Eosinophils % 3 %      Basophils 0 %      Immature Granulocytes NOT REPORTED %      Segs Absolute 4.40 k/uL      Absolute Lymph # 1.30 k/uL      Absolute Mono # 0.70 k/uL      Absolute Eos # 0.20 k/uL      Basophils Absolute 0.00 k/uL      Absolute Immature Granulocyte NOT REPORTED k/uL      WBC Morphology NOT REPORTED      RBC Morphology NOT REPORTED      Platelet Estimate NOT REPORTED    POC Glucose Fingerstick [[id number]] (Abnormal)      Collected: 07/07/21 2016      Updated: 07/07/21 2022        POC Glucose 179High  mg/dL    POC Glucose Fingerstick [[id number]]      Collected: 07/07/21 1554      Updated: 07/07/21 1601        POC Glucose 79 mg/dL    Surgical Pathology [[id number]]      Collected: 07/03/21 1435      Updated: 07/07/21 1510        Surgical Pathology Report --      [id number]   [facility]   ANATOMIC PATHOLOGY   [address] [place], [place] [address]g[place]   [phone]   Fax: [phone]   SURGICAL PATHOLOGY CONSULTATION Patient Name: Dianna Gage   MR#: [id number]   Specimen #[id number]     Procedures/Addenda   FLOW CYTOMETRY REPORT     Date Ordered:     7/7/2021     Status:   Signed Out        Date Complete:     7/7/2021     By: Zeinab Villanueva M.D.        Date Reported:     7/7/2021       INTERPRETATION     Retroperitoneal mass biopsy for flow cytometry:          Low cellularity/low viability sample precludes analysis. RESULTS-COMMENTS     Sample type:  RP Mass Biopsy     Flow cytometric immunophenotyping analysis is attempted on RP mass   biopsy following rbc lysis procedure.  The cells are labeled by direct   ten color immunostaining procedure and analyzed on a [facility] flow   cytometer. Viability:  65%     Note: Low cellularity sample, flow cytometry cannot be performed.       Zeinab Villanueva M.D.   Shan Flores                 Source:   1: RP MASS BIOPSY FOR FLOW CYTOMETRY     IR BIOPSY ABDOMINAL/RETROPERITONEAL MASS PERCUTANEOUS [[id number]]      Collected: 07/06/21 1316      Updated: 07/07/21 1131      Narrative:      PROCEDURE:   ULTRASOUND-GUIDED RETROPERITONEAL MASS BIOPSY     7/6/2021     HISTORY:   ORDERING SYSTEM PROVIDED HISTORY: Biopsy of NE mass seen on CT scan, send for   cytology and flow Cytometry   TECHNOLOGIST PROVIDED HISTORY:   Biopsy of NE mass seen on CT scan, send for cytology and flow Cytometry     CONTRAST:   None. SEDATION:   None. FLUOROSCOPY DOSE AND TYPE OR TIME AND EXPOSURES:   None.      DESCRIPTION OF PROCEDURE AND FINDINGS:   This procedure was performed by Dr. Aubree Yusuf.  Informed consent was obtained   after a detailed explanation of the procedure including risks, benefits, and   alternatives.  Universal protocol was observed.  Targeted ultrasound of the   left flank was performed and shows a heterogeneous hypoechoic mass by the   inferior pole of the left kidney corresponding to the mass described on the   recent CT and MRI studies.  The skin over the area was prepped and draped in   standard sterile fashion.  1% lidocaine used for local anesthesia.  Using   real-time ultrasound guidance an 25 gauge M biopsy needle was advanced into   the lesion.  Ultrasound images show a safe tract and access into the mass on   all occasions.  Five core biopsy specimens were obtained and placed on   slides.  Samples were given directly to the pathologist for review. Preliminary evaluation confirms adequate specimens for assessment.  Following   this, request was made for an aspiration biopsy.  A 20 gauge spinal needle   was advanced into the lesion with real-time ultrasound guidance.  Fine needle   aspiration biopsy was obtained and sample submitted for flow cytometry. Postprocedure images show no local complication.  Sterile dressing was   applied.  EBL: None. Impression:      Successful ultrasound-guided core biopsy and FNA biopsy of a left   retroperitoneal mass. POC Glucose Fingerstick [[id number]] (Abnormal)      Collected: 07/07/21 1057      Updated: 07/07/21 1103        POC Glucose 236High  mg/dL    Current IP Meds       Patient Instructions: Continue with home meds.   And to see his PCP and hematologist for follow-up    Discharge Medications: Norco for pain and Lyrica for his herpetic pain and continue with his home meds  [unfilled]  acyclovir (ZOVIRAX) tablet 800 mg    Discontinue Note to Pharmacy: For a total of 7 days    5 TIMES DAILY     amLODIPine (NORVASC) tablet 10 mg    Discontinue    DAILY    insulin lispro (HUMALOG) injection vial 0-3 Units (Low Dose Correction Insulin)    Discontinue    NIGHTLY    traMADol (ULTRAM) tablet 50 mg    Discontinue    EVERY 6 HOURS PRN    insulin lispro (HUMALOG) injection vial 0-6 Units (Low Dose Correction Insulin)    Discontinue    3 TIMES DAILY WITH MEALS    pregabalin (LYRICA) capsule 50 mg    Discontinue    NIGHTLY    acetaminophen (TYLENOL) tablet 650 mg    Discontinue    EVERY 6 HOURS PRN    memantine (NAMENDA) tablet 10 mg    Discontinue    2 TIMES DAILY    omega-3 acid ethyl esters (LOVAZA) capsule 1 g    Discontinue    2 TIMES DAILY    atorvastatin (LIPITOR) tablet 20 mg    Discontinue    DAILY    lidocaine 4 % external patch 1 patch    Discontinue    DAILY    [Held by provider] glyBURIDE (DIABETA) tablet 5 mg    Discontinue   \"And\" Linked Group Details    2 TIMES DAILY WITH MEALS    [Held by provider] metFORMIN (GLUCOPHAGE) tablet 500 mg    Discontinue   \"And\" Linked Group Details    2 TIMES DAILY WITH MEALS    levothyroxine (SYNTHROID) tablet 50 mcg    Discontinue    DAILY    glucose (GLUTOSE) 40 % oral gel 15 g    Discontinue    PRN    dextrose 50 % IV solution    Discontinue    PRN    glucagon (rDNA) injection 1 mg    Discontinue    PRN    dextrose 5 % solution    Discontinue    PRN    HYDROmorphone (DILAUDID) injection 0.5 mg    Discontinue    EVERY 6 HOURS PRN    sodium chloride flush 0.9 % injection 5-40 mL (Saline Flushes)    Discontinue    2 times per day    enoxaparin (LOVENOX) injection 40 mg (enoxaparin (LOVENOX) or reason for no chemical VTE)    Discontinue    DAILY    0.9 % sodium chloride infusion (IV MAINTENANCE FLUID ORDER PANEL)    Discontinue    CONTINUOUS    ondansetron (ZOFRAN-ODT) disintegrating tablet 4 mg (ondansetron (ZOFRAN) ODT or ondansetron (ZOFRAN) IV)    Discontinue   \"Or\" Linked Group Details    EVERY 8 HOURS PRN    ondansetron (ZOFRAN) injection 4 mg (ondansetron (ZOFRAN) ODT or ondansetron (ZOFRAN) IV)    Discontinue   \"Or\" Linked Group Details    EVERY 6 HOURS PRN    sodium chloride flush 0.9 % injection 5-40 mL (Saline Flushes)    Discontinue    PRN    0.9 % sodium chloride infusion (Saline Flushes)    Discontinue    PRN   Intake/Output     07/07/21 0701 - 07/08/21 0700 07/08/21 0701 - 07/09/21 0700   Intake (ml) 2968.8 --   Output (ml) 575 200   Net (ml) 2393.8 -200       Disposition to home  Activity: See list    Diet: Adult Oral Nutrition Supplement; Clear Liquid Oral Supplement  ADULT DIET; Regular; 5 carb choices (75 gm/meal)    Follow-up with Dr Maico Puga in 1 to 2 weeks, patient to call  for an appointment and if has any problems.       Electronically signed by KRISTI Lopez  on 7/8/2021 at 8:36 AM

## 2021-07-08 NOTE — PLAN OF CARE
Problem: Pain:  Goal: Pain level will decrease  Description: Pain level will decrease  7/8/2021 0450 by Fredy Alvarado RN  Outcome: Ongoing  Note: Pt c/o left flank pain  and was medicated times 1. Repositions self for comfort as needed. Restful environment provided   7/7/2021 1924 by Lili Varghese RN  Outcome: Ongoing  Note: Pain decreased with prescribed medication. Problem: Falls - Risk of:  Goal: Will remain free from falls  Description: Will remain free from falls  7/8/2021 0450 by Fredy Alvarado RN  Outcome: Ongoing  Note: Gait steady. Safe environment provided. Bed down and locked. Uses call light appropriately   7/7/2021 1924 by Lili Varghese RN  Outcome: Ongoing  Note: No falls this shift. Call light within reach and siderails x2. Bed in lowest position. Patient safety maintained.

## 2021-07-08 NOTE — CARE COORDINATION
ONGOING DISCHARGE PLAN:    Patient is alert and oriented x4. Spoke with patient regarding discharge plan and patient confirms that plan is still  to return to home w/ Wife.     Denies VNS.      Pt. Had IR BX of Retroperitoneal mass & MRCP Tuesday.     Hemoc/Vascular following.      Per Urology notes, no need for Ureteral stent at this time. Anticipate DC today & will F/U w/ Hematology.       Will continue to follow for additional discharge needs.     Electronically signed by Lauren Burger RN on 7/8/2021 at 9:17 AM

## 2021-07-08 NOTE — CONSULTS
[address]                 [address] [facility], [address]                                  CONSULTATION    PATIENT NAME: Tanja Swain                    :        1950  MED REC NO:   [id number]                              ROOM:       2077  ACCOUNT NO:   [de-identified]                           ADMIT DATE: 2021  PROVIDER:     Zabrina Prather    CONSULT DATE:  2021    CHIEF COMPLAINT:  Weakness in both lower extremities. REVIEW OF SYSTEMS:  10 point review of systems shows no other  symptomatology at the present time. PHYSICAL EXAMINATION:  GENERAL:  This patient is awake, alert and oriented x3. NEUROLOGIC:  Speech is clear. Cranial nerve functions are intact. No  motor or sensory symptoms are noted. Face is symmetrical.  HEART:  Reveals normal rhythm. ABDOMEN:  Soft. I feel no masses. PERIPHERAL VASCULAR SYSTEM:  Reveals palpable radial, brachial, femoral  pulses. DP and PT audible with Doppler. EXTREMITIES:  Reveals no ulcerations. Toes are all viable. DIAGNOSTIC DATA:  PVR studies were reviewed, which shows arterial  insufficiency and worse on the right side. His ALIN falls in the range  of claudication. ASSESSMENT:  This patient has multiple medical issues at the present  time. He does not have significant symptomatology from arterial  insufficiency. We will encourage him to walk as much as possible. The  patient may benefit from using aspirin along with statins. From a vascular standpoint, we will follow him along on an outpatient  basis. At this point, there is no need for any intervention.         Ping Maciel    D: 2021 13:56:48       T: 2021 14:58:27     [email or web address]  Job#: [id number]     Doc#: [id number]    CC:

## 2021-07-08 NOTE — ADT AUTH CERT
Utilization Reviews       Renal Failure, Acute - Care Day 5 (7/7/2021) by Myrtie Osler, RN       Review Status Review Entered   Completed 7/8/2021 09:46      Criteria Review      Care Day: 5 Care Date: 7/7/2021 Level of Care: Inpatient Floor    Guideline Day 1    Level Of Care    (X) ICU or floor    Clinical Status    ( ) * Clinical Indications met    7/8/2021 9:46 AM EDT by Skylar Martinez      BP-181/72, T-99    Activity    (X) Activity as tolerated    Routes    (X) Parenteral or oral hydration    (X) Parenteral or oral medications    7/8/2021 9:46 AM EDT by Skylar Martinez      acyclovir (ZOVIRAX) tablet 800 mg 5 times po daily  NS @ 75 ml/hr    (X) Oral diet as tolerated    Interventions    (X) Urinalysis and other urine tests, CBC, renal function tests, hepatitis B test, other laboratory tests    (X) CXR, ECG, renal ultrasound    Medications    (X) Discontinue potentially nephrotoxic medications    (X) Possible medical therapies    7/8/2021 9:46 AM EDT by Skylar Martinez      Increased to amLODIPine (NORVASC) tablet 10 mg  po daily  HYDROmorphone (DILAUDID) injection 0.5 mg x4    Plan: No need for ureteral stent unless symptomatic with pain.  Patient is being discharged and has follow up with oncology. * Milestone   Additional Notes   77/       Per Hematology Oncology   IMPRESSION:    1. History of small cell non-Hodgkin's lymphoma   2. Retroperitoneal soft tissue mass   3. Acute kidney injury   4. Back pain/skin rash possible herpes zoster       RECOMMENDATIONS:   1. I reviewed the laboratory data, imaging studies, diagnosis and other treatment recommendations with patient and family   2. Given his recent scan and prior history of lymphoma he will need tissue biopsy of the retroperitoneal mass to assess for recurrence   3. Flow cytometry from her retroperitoneal mass biopsy was nondiagnostic as it showed low cellularity, cytology still awaited   4.  Patient will need restaging scans possibly PET scan as outpatient   5. Further recommendation will be based on the tissue diagnosis   6. Continue other supportive care as per primary team   7. MRI noted   8. Urology to see patient   5. Okay to discharge from hematology standpoint and will set up follow-up appointment with Dr. Donnie Alfonso as outpatient and arrange PET scan as outpatient   10. We will follow          Per Urology PN    Assessment and Plan   Impression:     Patient Active Problem List   Diagnosis   · NHL (non-Hodgkin's lymphoma) ([address]   · Traumatic retroperitoneal hematoma   · Acute kidney injury ([address]           Plan: No need for ureteral stent unless symptomatic with pain.  Patient is being discharged and has follow up with oncology.  Has prior history of NHL.  Follow up with me in 2 weeks.             Renal Failure, Acute - Care Day 4 (7/6/2021) by Dino Grant RN       Review Status Review Entered   Completed 7/8/2021 09:42      Criteria Review      Care Day: 4 Care Date: 7/6/2021 Level of Care: Inpatient Floor    Guideline Day 1    Level Of Care    (X) ICU or floor    7/8/2021 9:42 AM EDT by Rosalio Newberry      IMPRESSION:   1. History of small cell non-Hodgkin's lymphoma  2. Retroperitoneal soft tissue mass  3. Acute kidney injury  4. Back pain/skin rash possible herpes zoster    Clinical Status    ( ) * Clinical Indications met    7/8/2021 9:42 AM EDT by Fadumo Alvarez RP mass biopsy today' await biopsy results    T-99.7, BP-180/80    Activity    (X) Activity as tolerated    Routes    (X) Parenteral or oral hydration    (X) Parenteral or oral medications    7/8/2021 9:42 AM EDT by Rosaloi Newberry      acyclovir (ZOVIRAX) tablet 800 mg 5 times po daily  NS @ 75 ml/hr    (X) Oral diet as tolerated    Interventions    (X) Urinalysis and other urine tests, CBC, renal function tests, hepatitis B test, other laboratory tests    (X) CXR, ECG, renal ultrasound    Medications    (X) Discontinue potentially nephrotoxic medications    (X) Possible medical therapies    7/8/2021 9:42 AM EDT by Barbara Fuentes RP mass biopsy today' await biopsy results    Increased to amLODIPine (NORVASC) tablet 10 mg  po daily  HYDROmorphone (DILAUDID) injection 0.5 mg x3    * Milestone   Additional Notes   7/6          Per Hematology Oncology      IMPRESSION:    1. History of small cell non-Hodgkin's lymphoma   2. Retroperitoneal soft tissue mass   3. Acute kidney injury   4. Back pain/skin rash possible herpes zoster       RECOMMENDATIONS:   1. I reviewed the laboratory data, imaging studies, diagnosis and other treatment recommendations with patient and family   2. Given his recent scan and prior history of lymphoma he will need tissue biopsy of the retroperitoneal mass to assess for recurrence   3. Plan for IR guided biopsy of the retroperitoneal mass   4. Patient will need restaging scans possibly PET scan as outpatient   5. Further recommendation will be based on the tissue diagnosis   6. Continue other supportive care as per primary team   7. Had RP mass biopsy today' await biopsy results   8. MRI noted   9. We will follow          Vascular Surgery   Consults       Date of Service:  7/6/2021  2:29 PM       CONSULTATION       ADMIT DATE: 07/03/2021   PROVIDER:     [facility]       CONSULT DATE:  07/06/2021       REASON FOR CONSULTATION:  Arterial occlusive disease.       HISTORY OF PRESENT ILLNESS:  The patient is a 52-year-old male who was   admitted with abdominal pain.  This has been going on for several days   now.  The patient is complaining of pain mostly in the upper abdomen.     He was noted to have changes on the CT scan which are suggestive of   possible tumor in the retroperitoneum.  We were asked to evaluate him   for arterial occlusive disease as seen by the CT scan study involving   the iliac arteries as well as both superficial femoral arteries.  The   patient gives me history of claudication for about _____. Theadorviky Kirkpatrick denies any   rest not smoking   anymore.  We would encourage him to walk as much as possible.

## 2021-07-23 ENCOUNTER — TELEPHONE (OUTPATIENT)
Dept: ONCOLOGY | Age: 71
End: 2021-07-23

## 2021-07-23 ENCOUNTER — OFFICE VISIT (OUTPATIENT)
Dept: ONCOLOGY | Age: 71
End: 2021-07-23
Payer: MEDICARE

## 2021-07-23 VITALS
SYSTOLIC BLOOD PRESSURE: 150 MMHG | HEART RATE: 62 BPM | DIASTOLIC BLOOD PRESSURE: 75 MMHG | TEMPERATURE: 97.2 F | WEIGHT: 179 LBS | BODY MASS INDEX: 25.32 KG/M2

## 2021-07-23 DIAGNOSIS — C82.13 FOLLICULAR LYMPHOMA GRADE II OF INTRA-ABDOMINAL LYMPH NODES (HCC): Primary | ICD-10-CM

## 2021-07-23 PROCEDURE — 99214 OFFICE O/P EST MOD 30 MIN: CPT | Performed by: INTERNAL MEDICINE

## 2021-07-23 PROCEDURE — G8417 CALC BMI ABV UP PARAM F/U: HCPCS | Performed by: INTERNAL MEDICINE

## 2021-07-23 PROCEDURE — 1111F DSCHRG MED/CURRENT MED MERGE: CPT | Performed by: INTERNAL MEDICINE

## 2021-07-23 PROCEDURE — 99211 OFF/OP EST MAY X REQ PHY/QHP: CPT | Performed by: INTERNAL MEDICINE

## 2021-07-23 PROCEDURE — 4040F PNEUMOC VAC/ADMIN/RCVD: CPT | Performed by: INTERNAL MEDICINE

## 2021-07-23 PROCEDURE — G8427 DOCREV CUR MEDS BY ELIG CLIN: HCPCS | Performed by: INTERNAL MEDICINE

## 2021-07-23 PROCEDURE — 4004F PT TOBACCO SCREEN RCVD TLK: CPT | Performed by: INTERNAL MEDICINE

## 2021-07-23 PROCEDURE — 3017F COLORECTAL CA SCREEN DOC REV: CPT | Performed by: INTERNAL MEDICINE

## 2021-07-23 PROCEDURE — 1123F ACP DISCUSS/DSCN MKR DOCD: CPT | Performed by: INTERNAL MEDICINE

## 2021-07-23 RX ORDER — FLUOXETINE HYDROCHLORIDE 20 MG/1
20 CAPSULE ORAL DAILY
COMMUNITY

## 2021-07-23 RX ORDER — CARVEDILOL 6.25 MG/1
6.25 TABLET ORAL 2 TIMES DAILY WITH MEALS
Status: ON HOLD | COMMUNITY
End: 2022-02-20 | Stop reason: HOSPADM

## 2021-07-23 RX ORDER — ATORVASTATIN CALCIUM 10 MG/1
10 TABLET, FILM COATED ORAL DAILY
Status: ON HOLD | COMMUNITY
End: 2022-06-29 | Stop reason: SDUPTHER

## 2021-07-23 RX ORDER — OMEPRAZOLE 20 MG/1
20 CAPSULE, DELAYED RELEASE ORAL 2 TIMES DAILY
COMMUNITY

## 2021-07-23 NOTE — TELEPHONE ENCOUNTER
AVS from 7/23/21     PET/CT scan soon  Further treatment will be based on results      *Pet Ct sched for [email or web address]  -Per  wants to review results before scheduling follow up appt.   *AVS given to triage to follow for results    PT was given AVS

## 2021-08-04 ENCOUNTER — HOSPITAL ENCOUNTER (OUTPATIENT)
Dept: NUCLEAR MEDICINE | Age: 71
Discharge: HOME | End: 2021-08-06
Payer: MEDICARE

## 2021-08-04 DIAGNOSIS — C82.13 FOLLICULAR LYMPHOMA GRADE II OF INTRA-ABDOMINAL LYMPH NODES (HCC): ICD-10-CM

## 2021-08-04 LAB — GLUCOSE BLD-MCNC: 108 MG/DL (ref 75–110)

## 2021-08-04 PROCEDURE — 82947 ASSAY GLUCOSE BLOOD QUANT: CPT

## 2021-08-04 PROCEDURE — 2580000003 HC RX 258: Performed by: INTERNAL MEDICINE

## 2021-08-04 PROCEDURE — 78815 PET IMAGE W/CT SKULL-THIGH: CPT

## 2021-08-04 PROCEDURE — A9552 F18 FDG: HCPCS | Performed by: INTERNAL MEDICINE

## 2021-08-04 PROCEDURE — 3430000000 HC RX DIAGNOSTIC RADIOPHARMACEUTICAL: Performed by: INTERNAL MEDICINE

## 2021-08-04 RX ORDER — SODIUM CHLORIDE 0.9 % (FLUSH) 0.9 %
10 SYRINGE (ML) INJECTION ONCE
Status: COMPLETED | OUTPATIENT
Start: 2021-08-04 | End: 2021-08-04

## 2021-08-04 RX ORDER — FLUDEOXYGLUCOSE F 18 200 MCI/ML
10 INJECTION, SOLUTION INTRAVENOUS
Status: COMPLETED | OUTPATIENT
Start: 2021-08-04 | End: 2021-08-04

## 2021-08-04 RX ADMIN — FLUDEOXYGLUCOSE F 18 12.2 MILLICURIE: 200 INJECTION, SOLUTION INTRAVENOUS at 12:34

## 2021-08-04 RX ADMIN — SODIUM CHLORIDE, PRESERVATIVE FREE 10 ML: 5 INJECTION INTRAVENOUS at 12:35

## 2021-08-07 NOTE — PROGRESS NOTES
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 Evidence of relapse with biopsy-proven follicular lymphoma. 1-2 from treated medically lymph node biopsy July 6, 2021    CURRENT THERAPY:    1. Observation. 2. Status post treatment with Bexxar in May 2008. 3. Status post previous treatment with Rituxan. INTERIM HISTORY:  The patient is seen for follow-up of his lymphoma. He was last seen by myself in 2013. Was lost for follow-up. He was treated medically because of abdominal pain. Further work-up showed enlarged lymph nodes in the retroperitoneum and iliac area. Biopsy proved recurrence of follicular lymphoma. Patient has no more abdominal pain. No nausea vomiting. No fever. No weight loss or decreased appetite. No night sweats. REVIEW OF SYSTEMS:   General: No weight loss. Eyes: No blurred vision, eye pain or double vision. Ears: No hearing problems or drainage. No tinnitus. Throat: No sore throat, problems with swallowing or dysphagia. Respiratory: No cough, sputum or hemoptysis. No shortness of breath. Cardiovascular: No chest pain, orthopnea or PND. No lower extremity edema. No palpitation. Gastrointestinal: No problems with swallowing. No abdominal pain or bloating. No nausea or vomiting. No diarrhea or constipation. No GI bleeding. Genitourinary: No dysuria, hematuria, frequency or urgency. Musculoskeletal: No muscle aches or pains. No limitation of movement. No back pain. Dermatologic: No skin rashes or pruritus. No skin lesions or discolorations. Psychiatric: No depression, anxiety, or stress or signs of schizophrenia. Hematologic: No history of bleeding tendency. No bruises or ecchymosis. No history of clotting problems. Infectious disease: No fever, chills or frequent infections. Endocrine: No problems with opacity. No polydipsia or polyuria. Neurologic: No headaches or dizziness. No weakness or numbness of the extremities.     SOCIAL HISTORY: No smoking, no alcohol drinking. PHYSICAL EXAM:  The patient is not in acute distress. Vital signs: Blood pressure (!) 150/75, pulse 62, temperature 97.2 °F (36.2 °C), temperature source Tympanic, weight 179 lb (81.2 kg). HEENT:  Eyes are normal. Ears, nose and throat are normal.  Neck: Supple. No lymph node enlargement. No thyroid enlargement. Trachea is centrally located. Chest:  Clear to auscultation. No wheezes or crepitations. Heart: Regular sinus rhythm. Abdomen: Soft, nontender. No hepatosplenomegaly. No masses. Extremities:  With no edema. Lymph Nodes:  No cervical, axillary or inguinal lymph node enlargement. Neurologic:  Conscious and oriented. No focal neurological deficits. Psychosocial: No depression, anxiety or stress. Skin: No rashes, bruises or ecchymoses. REVIEW OF DIAGNOSTIC DATA:     Lab Results   Component Value Date    WBC 6.6 07/08/2021    HGB 11.3 (L) 07/08/2021    HCT 33.8 (L) 07/08/2021    MCV 85.5 07/08/2021     07/08/2021       Chemistry        Component Value Date/Time     07/08/2021 0535    K 4.3 07/08/2021 0535     07/08/2021 0535    CO2 25 07/08/2021 0535    BUN 21 07/08/2021 0535    CREATININE 1.04 07/08/2021 0535        Component Value Date/Time    CALCIUM 8.2 (L) 07/08/2021 0535    ALKPHOS 144 (H) 07/03/2021 1226    AST 16 07/03/2021 1226    ALT 14 07/03/2021 1226    BILITOT 0.67 07/03/2021 1226        CT scan showed stable findings with no evidence of progression. ASSESSMENT:    Small cell, B-cell non-Hodgkins lymphoma in remission. Events of relapse with biopsy-proven follicular lymphoma grade 1-2 July 5, 2021    PLAN:     Lymph node biopsy showed relapse of low-grade lymphoma. Grade 1 through 2 follicular lymphoma. I again discussed with the patient the nature of his lymphoma, staging, prognosis and treatment. Explained that this is a low-grade lymphoma and treatment is usually well controlled but not curable.     Before deciding the treatment plan would like to do PET CT scan to evaluate the extent of the disease. We will call him with results and further recommendations. Patient's questions were answered to the best of his satisfaction and he verbalized full understanding and agreement. cc: Kristin Uribe D.O. Yosi Kiran M.D.         Mamie Mccrary M.D.

## 2021-08-09 DIAGNOSIS — C82.13 FOLLICULAR LYMPHOMA GRADE II OF INTRA-ABDOMINAL LYMPH NODES (HCC): ICD-10-CM

## 2021-08-10 RX ORDER — EPINEPHRINE 1 MG/ML
0.3 INJECTION, SOLUTION, CONCENTRATE INTRAVENOUS PRN
Status: CANCELLED | OUTPATIENT
Start: 2021-09-20

## 2021-08-10 RX ORDER — SODIUM CHLORIDE 0.9 % (FLUSH) 0.9 %
5-40 SYRINGE (ML) INJECTION PRN
Status: CANCELLED | OUTPATIENT
Start: 2021-09-20

## 2021-08-10 RX ORDER — EPINEPHRINE 1 MG/ML
0.3 INJECTION, SOLUTION, CONCENTRATE INTRAVENOUS PRN
Status: CANCELLED | OUTPATIENT
Start: 2021-08-23

## 2021-08-10 RX ORDER — EPINEPHRINE 1 MG/ML
0.3 INJECTION, SOLUTION, CONCENTRATE INTRAVENOUS PRN
Status: CANCELLED | OUTPATIENT
Start: 2021-08-30

## 2021-08-10 RX ORDER — EPINEPHRINE 1 MG/ML
0.3 INJECTION, SOLUTION, CONCENTRATE INTRAVENOUS PRN
Status: CANCELLED | OUTPATIENT
Start: 2021-09-13

## 2021-08-10 RX ORDER — MEPERIDINE HYDROCHLORIDE 50 MG/ML
12.5 INJECTION INTRAMUSCULAR; INTRAVENOUS; SUBCUTANEOUS ONCE
Status: CANCELLED | OUTPATIENT
Start: 2021-09-13 | End: 2021-08-23

## 2021-08-10 RX ORDER — DIPHENHYDRAMINE HYDROCHLORIDE 50 MG/ML
25 INJECTION INTRAMUSCULAR; INTRAVENOUS ONCE
Status: CANCELLED | OUTPATIENT
Start: 2021-08-23

## 2021-08-10 RX ORDER — SODIUM CHLORIDE 9 MG/ML
20 INJECTION, SOLUTION INTRAVENOUS ONCE
Status: CANCELLED | OUTPATIENT
Start: 2021-09-13 | End: 2021-08-23

## 2021-08-10 RX ORDER — SODIUM CHLORIDE 9 MG/ML
25 INJECTION, SOLUTION INTRAVENOUS PRN
Status: CANCELLED | OUTPATIENT
Start: 2021-09-13

## 2021-08-10 RX ORDER — ACETAMINOPHEN 325 MG/1
650 TABLET ORAL ONCE
Status: CANCELLED | OUTPATIENT
Start: 2021-09-20

## 2021-08-10 RX ORDER — MEPERIDINE HYDROCHLORIDE 50 MG/ML
12.5 INJECTION INTRAMUSCULAR; INTRAVENOUS; SUBCUTANEOUS ONCE
Status: CANCELLED | OUTPATIENT
Start: 2021-09-20 | End: 2021-08-30

## 2021-08-10 RX ORDER — MEPERIDINE HYDROCHLORIDE 50 MG/ML
12.5 INJECTION INTRAMUSCULAR; INTRAVENOUS; SUBCUTANEOUS ONCE
Status: CANCELLED | OUTPATIENT
Start: 2021-08-23 | End: 2021-08-10

## 2021-08-10 RX ORDER — SODIUM CHLORIDE 9 MG/ML
INJECTION, SOLUTION INTRAVENOUS CONTINUOUS
Status: CANCELLED | OUTPATIENT
Start: 2021-09-20

## 2021-08-10 RX ORDER — METHYLPREDNISOLONE SODIUM SUCCINATE 125 MG/2ML
125 INJECTION, POWDER, LYOPHILIZED, FOR SOLUTION INTRAMUSCULAR; INTRAVENOUS ONCE
Status: CANCELLED | OUTPATIENT
Start: 2021-08-30 | End: 2021-08-16

## 2021-08-10 RX ORDER — SODIUM CHLORIDE 9 MG/ML
20 INJECTION, SOLUTION INTRAVENOUS ONCE
Status: CANCELLED | OUTPATIENT
Start: 2021-08-30 | End: 2021-08-16

## 2021-08-10 RX ORDER — SODIUM CHLORIDE 9 MG/ML
INJECTION, SOLUTION INTRAVENOUS CONTINUOUS
Status: CANCELLED | OUTPATIENT
Start: 2021-09-13

## 2021-08-10 RX ORDER — HEPARIN SODIUM (PORCINE) LOCK FLUSH IV SOLN 100 UNIT/ML 100 UNIT/ML
500 SOLUTION INTRAVENOUS PRN
Status: CANCELLED | OUTPATIENT
Start: 2021-08-23

## 2021-08-10 RX ORDER — SODIUM CHLORIDE 9 MG/ML
INJECTION, SOLUTION INTRAVENOUS CONTINUOUS
Status: CANCELLED | OUTPATIENT
Start: 2021-08-23

## 2021-08-10 RX ORDER — METHYLPREDNISOLONE SODIUM SUCCINATE 125 MG/2ML
125 INJECTION, POWDER, LYOPHILIZED, FOR SOLUTION INTRAMUSCULAR; INTRAVENOUS ONCE
Status: CANCELLED | OUTPATIENT
Start: 2021-08-23 | End: 2021-08-10

## 2021-08-10 RX ORDER — SODIUM CHLORIDE 0.9 % (FLUSH) 0.9 %
5-40 SYRINGE (ML) INJECTION PRN
Status: CANCELLED | OUTPATIENT
Start: 2021-08-30

## 2021-08-10 RX ORDER — HEPARIN SODIUM (PORCINE) LOCK FLUSH IV SOLN 100 UNIT/ML 100 UNIT/ML
500 SOLUTION INTRAVENOUS PRN
Status: CANCELLED | OUTPATIENT
Start: 2021-08-30

## 2021-08-10 RX ORDER — ACETAMINOPHEN 325 MG/1
650 TABLET ORAL ONCE
Status: CANCELLED | OUTPATIENT
Start: 2021-09-13

## 2021-08-10 RX ORDER — HEPARIN SODIUM (PORCINE) LOCK FLUSH IV SOLN 100 UNIT/ML 100 UNIT/ML
500 SOLUTION INTRAVENOUS PRN
Status: CANCELLED | OUTPATIENT
Start: 2021-09-20

## 2021-08-10 RX ORDER — DIPHENHYDRAMINE HYDROCHLORIDE 50 MG/ML
50 INJECTION INTRAMUSCULAR; INTRAVENOUS ONCE
Status: CANCELLED | OUTPATIENT
Start: 2021-09-20 | End: 2021-08-30

## 2021-08-10 RX ORDER — SODIUM CHLORIDE 9 MG/ML
20 INJECTION, SOLUTION INTRAVENOUS ONCE
Status: CANCELLED | OUTPATIENT
Start: 2021-08-23 | End: 2021-08-10

## 2021-08-10 RX ORDER — SODIUM CHLORIDE 9 MG/ML
20 INJECTION, SOLUTION INTRAVENOUS ONCE
Status: CANCELLED | OUTPATIENT
Start: 2021-09-20 | End: 2021-08-30

## 2021-08-10 RX ORDER — DIPHENHYDRAMINE HYDROCHLORIDE 50 MG/ML
25 INJECTION INTRAMUSCULAR; INTRAVENOUS ONCE
Status: CANCELLED | OUTPATIENT
Start: 2021-08-30

## 2021-08-10 RX ORDER — SODIUM CHLORIDE 9 MG/ML
25 INJECTION, SOLUTION INTRAVENOUS PRN
Status: CANCELLED | OUTPATIENT
Start: 2021-08-30

## 2021-08-10 RX ORDER — MEPERIDINE HYDROCHLORIDE 50 MG/ML
12.5 INJECTION INTRAMUSCULAR; INTRAVENOUS; SUBCUTANEOUS ONCE
Status: CANCELLED | OUTPATIENT
Start: 2021-08-30 | End: 2021-08-16

## 2021-08-10 RX ORDER — METHYLPREDNISOLONE SODIUM SUCCINATE 125 MG/2ML
125 INJECTION, POWDER, LYOPHILIZED, FOR SOLUTION INTRAMUSCULAR; INTRAVENOUS ONCE
Status: CANCELLED | OUTPATIENT
Start: 2021-09-20 | End: 2021-08-30

## 2021-08-10 RX ORDER — HEPARIN SODIUM (PORCINE) LOCK FLUSH IV SOLN 100 UNIT/ML 100 UNIT/ML
500 SOLUTION INTRAVENOUS PRN
Status: CANCELLED | OUTPATIENT
Start: 2021-09-13

## 2021-08-10 RX ORDER — DIPHENHYDRAMINE HYDROCHLORIDE 50 MG/ML
25 INJECTION INTRAMUSCULAR; INTRAVENOUS ONCE
Status: CANCELLED | OUTPATIENT
Start: 2021-09-13

## 2021-08-10 RX ORDER — DIPHENHYDRAMINE HYDROCHLORIDE 50 MG/ML
25 INJECTION INTRAMUSCULAR; INTRAVENOUS ONCE
Status: CANCELLED | OUTPATIENT
Start: 2021-09-20

## 2021-08-10 RX ORDER — SODIUM CHLORIDE 9 MG/ML
25 INJECTION, SOLUTION INTRAVENOUS PRN
Status: CANCELLED | OUTPATIENT
Start: 2021-08-23

## 2021-08-10 RX ORDER — SODIUM CHLORIDE 0.9 % (FLUSH) 0.9 %
5-40 SYRINGE (ML) INJECTION PRN
Status: CANCELLED | OUTPATIENT
Start: 2021-09-13

## 2021-08-10 RX ORDER — ACETAMINOPHEN 325 MG/1
650 TABLET ORAL ONCE
Status: CANCELLED | OUTPATIENT
Start: 2021-08-30

## 2021-08-10 RX ORDER — DIPHENHYDRAMINE HYDROCHLORIDE 50 MG/ML
50 INJECTION INTRAMUSCULAR; INTRAVENOUS ONCE
Status: CANCELLED | OUTPATIENT
Start: 2021-08-30 | End: 2021-08-16

## 2021-08-10 RX ORDER — SODIUM CHLORIDE 0.9 % (FLUSH) 0.9 %
5-40 SYRINGE (ML) INJECTION PRN
Status: CANCELLED | OUTPATIENT
Start: 2021-08-23

## 2021-08-10 RX ORDER — DIPHENHYDRAMINE HYDROCHLORIDE 50 MG/ML
50 INJECTION INTRAMUSCULAR; INTRAVENOUS ONCE
Status: CANCELLED | OUTPATIENT
Start: 2021-08-23 | End: 2021-08-10

## 2021-08-10 RX ORDER — SODIUM CHLORIDE 9 MG/ML
INJECTION, SOLUTION INTRAVENOUS CONTINUOUS
Status: CANCELLED | OUTPATIENT
Start: 2021-08-30

## 2021-08-10 RX ORDER — METHYLPREDNISOLONE SODIUM SUCCINATE 125 MG/2ML
125 INJECTION, POWDER, LYOPHILIZED, FOR SOLUTION INTRAMUSCULAR; INTRAVENOUS ONCE
Status: CANCELLED | OUTPATIENT
Start: 2021-09-13 | End: 2021-08-23

## 2021-08-10 RX ORDER — DIPHENHYDRAMINE HYDROCHLORIDE 50 MG/ML
50 INJECTION INTRAMUSCULAR; INTRAVENOUS ONCE
Status: CANCELLED | OUTPATIENT
Start: 2021-09-13 | End: 2021-08-23

## 2021-08-10 RX ORDER — SODIUM CHLORIDE 9 MG/ML
25 INJECTION, SOLUTION INTRAVENOUS PRN
Status: CANCELLED | OUTPATIENT
Start: 2021-09-20

## 2021-08-10 RX ORDER — ACETAMINOPHEN 325 MG/1
650 TABLET ORAL ONCE
Status: CANCELLED | OUTPATIENT
Start: 2021-08-23

## 2021-08-17 ENCOUNTER — TELEPHONE (OUTPATIENT)
Dept: INFUSION THERAPY | Age: 71
End: 2021-08-17

## 2021-08-17 DIAGNOSIS — C85.90 NON-HODGKIN'S LYMPHOMA, UNSPECIFIED BODY REGION, UNSPECIFIED NON-HODGKIN LYMPHOMA TYPE (HCC): Primary | ICD-10-CM

## 2021-08-17 NOTE — TELEPHONE ENCOUNTER
Pt notified of need to have hep b antigen drawn prior to the start of this rituxan  Pt verbalized understanding and states he will be here this thur 8/19  Dalila Ojeda RN

## 2021-08-17 NOTE — TELEPHONE ENCOUNTER
Chemo orders received, ht 70\", wt 179lbs, and bsa 2.0 verified.    Dose of chemotherapy verified;  Ruxience 375mg/m2= 750mg

## 2021-08-17 NOTE — TELEPHONE ENCOUNTER
Chemotherapy orders received:    Ht=70 inches  Ix=098 lbs  BSA=2.00  Chemotherapy doses verified:  Ruxience 375 mg/m2 = 750 mg  Jonah Quiroz RN

## 2021-08-19 ENCOUNTER — HOSPITAL ENCOUNTER (OUTPATIENT)
Age: 71
Discharge: HOME | End: 2021-08-19
Payer: MEDICARE

## 2021-08-19 DIAGNOSIS — C85.90 NON-HODGKIN'S LYMPHOMA, UNSPECIFIED BODY REGION, UNSPECIFIED NON-HODGKIN LYMPHOMA TYPE (HCC): ICD-10-CM

## 2021-08-19 LAB
ABSOLUTE EOS #: 0.1 K/UL (ref 0–0.4)
ABSOLUTE IMMATURE GRANULOCYTE: ABNORMAL K/UL (ref 0–0.3)
ABSOLUTE LYMPH #: 1.4 K/UL (ref 1–4.8)
ABSOLUTE MONO #: 0.6 K/UL (ref 0.1–1.2)
ALBUMIN SERPL-MCNC: 3.6 G/DL (ref 3.5–5.2)
ALBUMIN/GLOBULIN RATIO: 1.7 (ref 1–2.5)
ALP BLD-CCNC: 86 U/L (ref 40–129)
ALT SERPL-CCNC: 27 U/L (ref 5–41)
ANION GAP SERPL CALCULATED.3IONS-SCNC: 9 MMOL/L (ref 9–17)
AST SERPL-CCNC: 18 U/L
BASOPHILS # BLD: 1 % (ref 0–2)
BASOPHILS ABSOLUTE: 0 K/UL (ref 0–0.2)
BILIRUB SERPL-MCNC: 0.3 MG/DL (ref 0.3–1.2)
BUN BLDV-MCNC: 40 MG/DL (ref 8–23)
BUN/CREAT BLD: ABNORMAL (ref 9–20)
CALCIUM SERPL-MCNC: 8.5 MG/DL (ref 8.6–10.4)
CHLORIDE BLD-SCNC: 108 MMOL/L (ref 98–107)
CO2: 23 MMOL/L (ref 20–31)
CREAT SERPL-MCNC: 1.14 MG/DL (ref 0.7–1.2)
DIFFERENTIAL TYPE: ABNORMAL
EOSINOPHILS RELATIVE PERCENT: 2 % (ref 1–4)
GFR AFRICAN AMERICAN: >60 ML/MIN
GFR NON-AFRICAN AMERICAN: >60 ML/MIN
GFR SERPL CREATININE-BSD FRML MDRD: ABNORMAL ML/MIN/{1.73_M2}
GFR SERPL CREATININE-BSD FRML MDRD: ABNORMAL ML/MIN/{1.73_M2}
GLUCOSE BLD-MCNC: 211 MG/DL (ref 70–99)
HCT VFR BLD CALC: 36.9 % (ref 41–53)
HEMOGLOBIN: 12.2 G/DL (ref 13.5–17.5)
HEPATITIS B SURFACE ANTIGEN: NONREACTIVE
IMMATURE GRANULOCYTES: ABNORMAL %
LYMPHOCYTES # BLD: 22 % (ref 24–44)
MCH RBC QN AUTO: 29.2 PG (ref 26–34)
MCHC RBC AUTO-ENTMCNC: 33 G/DL (ref 31–37)
MCV RBC AUTO: 88.4 FL (ref 80–100)
MONOCYTES # BLD: 9 % (ref 2–11)
NRBC AUTOMATED: ABNORMAL PER 100 WBC
PDW BLD-RTO: 16.7 % (ref 12.5–15.4)
PLATELET # BLD: 129 K/UL (ref 140–450)
PLATELET ESTIMATE: ABNORMAL
PMV BLD AUTO: 8.8 FL (ref 6–12)
POTASSIUM SERPL-SCNC: 5.5 MMOL/L (ref 3.7–5.3)
RBC # BLD: 4.18 M/UL (ref 4.5–5.9)
RBC # BLD: ABNORMAL 10*6/UL
SEG NEUTROPHILS: 66 % (ref 36–66)
SEGMENTED NEUTROPHILS ABSOLUTE COUNT: 4.4 K/UL (ref 1.8–7.7)
SODIUM BLD-SCNC: 140 MMOL/L (ref 135–144)
TOTAL PROTEIN: 5.7 G/DL (ref 6.4–8.3)
WBC # BLD: 6.5 K/UL (ref 3.5–11)
WBC # BLD: ABNORMAL 10*3/UL

## 2021-08-19 PROCEDURE — 85025 COMPLETE CBC W/AUTO DIFF WBC: CPT

## 2021-08-19 PROCEDURE — 87340 HEPATITIS B SURFACE AG IA: CPT

## 2021-08-19 PROCEDURE — 36415 COLL VENOUS BLD VENIPUNCTURE: CPT

## 2021-08-19 PROCEDURE — 80053 COMPREHEN METABOLIC PANEL: CPT

## 2021-08-23 ENCOUNTER — TELEPHONE (OUTPATIENT)
Dept: ONCOLOGY | Age: 71
End: 2021-08-23

## 2021-08-23 ENCOUNTER — HOSPITAL ENCOUNTER (OUTPATIENT)
Dept: INFUSION THERAPY | Age: 71
Discharge: HOME | End: 2021-08-23
Payer: MEDICARE

## 2021-08-23 ENCOUNTER — OFFICE VISIT (OUTPATIENT)
Dept: ONCOLOGY | Age: 71
End: 2021-08-23
Payer: MEDICARE

## 2021-08-23 VITALS
SYSTOLIC BLOOD PRESSURE: 125 MMHG | TEMPERATURE: 97.5 F | HEART RATE: 65 BPM | BODY MASS INDEX: 25.17 KG/M2 | DIASTOLIC BLOOD PRESSURE: 64 MMHG | WEIGHT: 177.9 LBS

## 2021-08-23 DIAGNOSIS — C82.13 FOLLICULAR LYMPHOMA GRADE II OF INTRA-ABDOMINAL LYMPH NODES (HCC): ICD-10-CM

## 2021-08-23 DIAGNOSIS — C85.90 NON-HODGKIN'S LYMPHOMA, UNSPECIFIED BODY REGION, UNSPECIFIED NON-HODGKIN LYMPHOMA TYPE (HCC): Primary | ICD-10-CM

## 2021-08-23 LAB
ABSOLUTE EOS #: 0.1 K/UL (ref 0–0.4)
ABSOLUTE IMMATURE GRANULOCYTE: ABNORMAL K/UL (ref 0–0.3)
ABSOLUTE LYMPH #: 1.3 K/UL (ref 1–4.8)
ABSOLUTE MONO #: 0.4 K/UL (ref 0.1–1.2)
ALBUMIN SERPL-MCNC: 3.6 G/DL (ref 3.5–5.2)
ALBUMIN/GLOBULIN RATIO: 1.6 (ref 1–2.5)
ALP BLD-CCNC: 93 U/L (ref 40–129)
ALT SERPL-CCNC: 43 U/L (ref 5–41)
ANION GAP SERPL CALCULATED.3IONS-SCNC: 9 MMOL/L (ref 9–17)
AST SERPL-CCNC: 25 U/L
BASOPHILS # BLD: 0 % (ref 0–2)
BASOPHILS ABSOLUTE: 0 K/UL (ref 0–0.2)
BILIRUB SERPL-MCNC: 0.32 MG/DL (ref 0.3–1.2)
BUN BLDV-MCNC: 41 MG/DL (ref 8–23)
BUN/CREAT BLD: ABNORMAL (ref 9–20)
CALCIUM SERPL-MCNC: 8.5 MG/DL (ref 8.6–10.4)
CHLORIDE BLD-SCNC: 109 MMOL/L (ref 98–107)
CO2: 22 MMOL/L (ref 20–31)
CREAT SERPL-MCNC: 1.49 MG/DL (ref 0.7–1.2)
DIFFERENTIAL TYPE: ABNORMAL
EOSINOPHILS RELATIVE PERCENT: 2 % (ref 1–4)
GFR AFRICAN AMERICAN: 56 ML/MIN
GFR NON-AFRICAN AMERICAN: 46 ML/MIN
GFR SERPL CREATININE-BSD FRML MDRD: ABNORMAL ML/MIN/{1.73_M2}
GFR SERPL CREATININE-BSD FRML MDRD: ABNORMAL ML/MIN/{1.73_M2}
GLUCOSE BLD-MCNC: 349 MG/DL (ref 70–99)
HCT VFR BLD CALC: 37.5 % (ref 41–53)
HEMOGLOBIN: 12.3 G/DL (ref 13.5–17.5)
IMMATURE GRANULOCYTES: ABNORMAL %
LYMPHOCYTES # BLD: 20 % (ref 24–44)
MCH RBC QN AUTO: 29.3 PG (ref 26–34)
MCHC RBC AUTO-ENTMCNC: 32.8 G/DL (ref 31–37)
MCV RBC AUTO: 89.4 FL (ref 80–100)
MONOCYTES # BLD: 7 % (ref 2–11)
NRBC AUTOMATED: ABNORMAL PER 100 WBC
PDW BLD-RTO: 16.8 % (ref 12.5–15.4)
PLATELET # BLD: 138 K/UL (ref 140–450)
PLATELET ESTIMATE: ABNORMAL
PMV BLD AUTO: 8.9 FL (ref 6–12)
POTASSIUM SERPL-SCNC: 5.6 MMOL/L (ref 3.7–5.3)
RBC # BLD: 4.19 M/UL (ref 4.5–5.9)
RBC # BLD: ABNORMAL 10*6/UL
SEG NEUTROPHILS: 71 % (ref 36–66)
SEGMENTED NEUTROPHILS ABSOLUTE COUNT: 4.5 K/UL (ref 1.8–7.7)
SODIUM BLD-SCNC: 140 MMOL/L (ref 135–144)
TOTAL PROTEIN: 5.9 G/DL (ref 6.4–8.3)
WBC # BLD: 6.4 K/UL (ref 3.5–11)
WBC # BLD: ABNORMAL 10*3/UL

## 2021-08-23 PROCEDURE — 85025 COMPLETE CBC W/AUTO DIFF WBC: CPT

## 2021-08-23 PROCEDURE — G8427 DOCREV CUR MEDS BY ELIG CLIN: HCPCS | Performed by: INTERNAL MEDICINE

## 2021-08-23 PROCEDURE — 4040F PNEUMOC VAC/ADMIN/RCVD: CPT | Performed by: INTERNAL MEDICINE

## 2021-08-23 PROCEDURE — 6360000002 HC RX W HCPCS: Performed by: INTERNAL MEDICINE

## 2021-08-23 PROCEDURE — 80053 COMPREHEN METABOLIC PANEL: CPT

## 2021-08-23 PROCEDURE — 99211 OFF/OP EST MAY X REQ PHY/QHP: CPT | Performed by: INTERNAL MEDICINE

## 2021-08-23 PROCEDURE — 1036F TOBACCO NON-USER: CPT | Performed by: INTERNAL MEDICINE

## 2021-08-23 PROCEDURE — 6370000000 HC RX 637 (ALT 250 FOR IP): Performed by: INTERNAL MEDICINE

## 2021-08-23 PROCEDURE — 36415 COLL VENOUS BLD VENIPUNCTURE: CPT

## 2021-08-23 PROCEDURE — 96375 TX/PRO/DX INJ NEW DRUG ADDON: CPT

## 2021-08-23 PROCEDURE — G8417 CALC BMI ABV UP PARAM F/U: HCPCS | Performed by: INTERNAL MEDICINE

## 2021-08-23 PROCEDURE — 99214 OFFICE O/P EST MOD 30 MIN: CPT | Performed by: INTERNAL MEDICINE

## 2021-08-23 PROCEDURE — 1123F ACP DISCUSS/DSCN MKR DOCD: CPT | Performed by: INTERNAL MEDICINE

## 2021-08-23 PROCEDURE — 96415 CHEMO IV INFUSION ADDL HR: CPT

## 2021-08-23 PROCEDURE — 3017F COLORECTAL CA SCREEN DOC REV: CPT | Performed by: INTERNAL MEDICINE

## 2021-08-23 PROCEDURE — 96413 CHEMO IV INFUSION 1 HR: CPT

## 2021-08-23 PROCEDURE — 2580000003 HC RX 258: Performed by: INTERNAL MEDICINE

## 2021-08-23 RX ORDER — MELOXICAM 15 MG/1
15 TABLET ORAL DAILY
Status: ON HOLD | COMMUNITY
Start: 2021-07-14 | End: 2022-02-01 | Stop reason: HOSPADM

## 2021-08-23 RX ORDER — DONEPEZIL HYDROCHLORIDE 10 MG/1
10 TABLET, FILM COATED ORAL DAILY
COMMUNITY
Start: 2021-07-11

## 2021-08-23 RX ORDER — SODIUM CHLORIDE 9 MG/ML
20 INJECTION, SOLUTION INTRAVENOUS ONCE
Status: DISCONTINUED | OUTPATIENT
Start: 2021-08-23 | End: 2021-08-24 | Stop reason: HOSPADM

## 2021-08-23 RX ORDER — ACETAMINOPHEN 325 MG/1
650 TABLET ORAL ONCE
Status: COMPLETED | OUTPATIENT
Start: 2021-08-23 | End: 2021-08-23

## 2021-08-23 RX ORDER — DIPHENHYDRAMINE HYDROCHLORIDE 50 MG/ML
25 INJECTION INTRAMUSCULAR; INTRAVENOUS ONCE
Status: COMPLETED | OUTPATIENT
Start: 2021-08-23 | End: 2021-08-23

## 2021-08-23 RX ADMIN — SODIUM CHLORIDE 20 ML/HR: 9 INJECTION, SOLUTION INTRAVENOUS at 10:32

## 2021-08-23 RX ADMIN — DIPHENHYDRAMINE HYDROCHLORIDE 25 MG: 50 INJECTION, SOLUTION INTRAMUSCULAR; INTRAVENOUS at 10:33

## 2021-08-23 RX ADMIN — ACETAMINOPHEN 650 MG: 325 TABLET ORAL at 10:33

## 2021-08-23 RX ADMIN — SODIUM CHLORIDE 750 MG: 9 INJECTION, SOLUTION INTRAVENOUS at 10:59

## 2021-08-23 ASSESSMENT — PAIN SCALES - GENERAL: PAINLEVEL_OUTOF10: 0

## 2021-08-23 NOTE — PROGRESS NOTES
Pt arrived for C1 ruxience   Labs collected and reviewed seen by Dr Hazel Steele prior ok to proceed with tx  Pt had rituxan in 2013 at [facility], pt denies  any issues with infusions   Titrated at 50mg tolerated w/o incident   Return 8/30  and tx   Shayy Brady RN

## 2021-08-23 NOTE — FLOWSHEET NOTE
Situation:  Writer visited with Mr. Falguni Kramer while rounding the infusion clinic. Assessment:  Mr. Falguni Kramer smiled and greeted writer. He shared that he was a patient at [address] [place] [address] eight years ago and that he had a recurrence. He acknowledged how he was feeling, noting he is doing okay with the cancer and mentioning another concern that he has shared with his doctor. He identified his wife, children, grandchildren and great grandchildren as his support system. He affirmed that he has a sense of humor and laughed during the conversation. Intervention:  Writer provided supportive presence and explored Pt's coping and needs; inquired about Pt's sources of support and strength; offered words of encouragement and support. Outcome:  Mr. Falguni Kramer thanked writer. 08/23/21 1511   Encounter Summary   Services provided to: Patient   Referral/Consult From: Rounding   Support System Spouse; Children;Family members   Continue Visiting   (8/23/21)   Complexity of Encounter Low   Length of Encounter 15 minutes   Spiritual Assessment Completed Yes   Routine   Type Initial   Spiritual/Anabaptism   Type Spiritual support   Assessment Calm; Approachable;Coping; Hopeful   Intervention Active listening;Explored feelings, thoughts, concerns;Explored coping resources;Sustaining presence/ Ministry of presence; Discussed illness/injury and it's impact   Outcome Receptive; Hopeful;Encouraged;Coping;Expressed gratitude;Expressed feelings/needs/concerns     Electronically signed by Ishaan Herman, Oncology [facility] [id number] [facility]  8/23/2021  3:13 PM

## 2021-08-23 NOTE — TELEPHONE ENCOUNTER
AVS from 8/23/21      [facility]  Start [facility]ituxan  RV one week after starting treatment    *IR port placed for [email or web address]  *TX sched for 8/23/21  *rv is sched for [email or web address] w/tx @11am    PT was given AVS and an appt schedule

## 2021-08-30 ENCOUNTER — TELEPHONE (OUTPATIENT)
Dept: ONCOLOGY | Age: 71
End: 2021-08-30

## 2021-08-30 ENCOUNTER — OFFICE VISIT (OUTPATIENT)
Dept: ONCOLOGY | Age: 71
End: 2021-08-30
Payer: MEDICARE

## 2021-08-30 ENCOUNTER — HOSPITAL ENCOUNTER (OUTPATIENT)
Dept: INFUSION THERAPY | Age: 71
Discharge: HOME | End: 2021-08-30
Payer: MEDICARE

## 2021-08-30 VITALS
WEIGHT: 177.7 LBS | TEMPERATURE: 98.2 F | SYSTOLIC BLOOD PRESSURE: 110 MMHG | BODY MASS INDEX: 25.14 KG/M2 | HEART RATE: 67 BPM | DIASTOLIC BLOOD PRESSURE: 64 MMHG

## 2021-08-30 DIAGNOSIS — C85.90 NON-HODGKIN'S LYMPHOMA, UNSPECIFIED BODY REGION, UNSPECIFIED NON-HODGKIN LYMPHOMA TYPE (HCC): Primary | ICD-10-CM

## 2021-08-30 DIAGNOSIS — C82.13 FOLLICULAR LYMPHOMA GRADE II OF INTRA-ABDOMINAL LYMPH NODES (HCC): ICD-10-CM

## 2021-08-30 LAB
ABSOLUTE EOS #: 0.1 K/UL (ref 0–0.4)
ABSOLUTE IMMATURE GRANULOCYTE: ABNORMAL K/UL (ref 0–0.3)
ABSOLUTE LYMPH #: 1.1 K/UL (ref 1–4.8)
ABSOLUTE MONO #: 0.6 K/UL (ref 0.1–1.2)
ALBUMIN SERPL-MCNC: 3.6 G/DL (ref 3.5–5.2)
ALBUMIN/GLOBULIN RATIO: 1.6 (ref 1–2.5)
ALP BLD-CCNC: 88 U/L (ref 40–129)
ALT SERPL-CCNC: 37 U/L (ref 5–41)
ANION GAP SERPL CALCULATED.3IONS-SCNC: 8 MMOL/L (ref 9–17)
AST SERPL-CCNC: 20 U/L
BASOPHILS # BLD: 0 % (ref 0–2)
BASOPHILS ABSOLUTE: 0 K/UL (ref 0–0.2)
BILIRUB SERPL-MCNC: 0.31 MG/DL (ref 0.3–1.2)
BUN BLDV-MCNC: 33 MG/DL (ref 8–23)
BUN/CREAT BLD: ABNORMAL (ref 9–20)
CALCIUM SERPL-MCNC: 8.6 MG/DL (ref 8.6–10.4)
CHLORIDE BLD-SCNC: 111 MMOL/L (ref 98–107)
CO2: 25 MMOL/L (ref 20–31)
CREAT SERPL-MCNC: 1.21 MG/DL (ref 0.7–1.2)
DIFFERENTIAL TYPE: ABNORMAL
EOSINOPHILS RELATIVE PERCENT: 2 % (ref 1–4)
GFR AFRICAN AMERICAN: >60 ML/MIN
GFR NON-AFRICAN AMERICAN: 59 ML/MIN
GFR SERPL CREATININE-BSD FRML MDRD: ABNORMAL ML/MIN/{1.73_M2}
GFR SERPL CREATININE-BSD FRML MDRD: ABNORMAL ML/MIN/{1.73_M2}
GLUCOSE BLD-MCNC: 118 MG/DL (ref 70–99)
HCT VFR BLD CALC: 35.6 % (ref 41–53)
HEMOGLOBIN: 12.1 G/DL (ref 13.5–17.5)
IMMATURE GRANULOCYTES: ABNORMAL %
LYMPHOCYTES # BLD: 17 % (ref 24–44)
MCH RBC QN AUTO: 29.6 PG (ref 26–34)
MCHC RBC AUTO-ENTMCNC: 34 G/DL (ref 31–37)
MCV RBC AUTO: 86.9 FL (ref 80–100)
MONOCYTES # BLD: 9 % (ref 2–11)
NRBC AUTOMATED: ABNORMAL PER 100 WBC
PDW BLD-RTO: 16.5 % (ref 12.5–15.4)
PLATELET # BLD: 130 K/UL (ref 140–450)
PLATELET ESTIMATE: ABNORMAL
PMV BLD AUTO: 8.6 FL (ref 6–12)
POTASSIUM SERPL-SCNC: 4.5 MMOL/L (ref 3.7–5.3)
RBC # BLD: 4.1 M/UL (ref 4.5–5.9)
RBC # BLD: ABNORMAL 10*6/UL
SEG NEUTROPHILS: 72 % (ref 36–66)
SEGMENTED NEUTROPHILS ABSOLUTE COUNT: 4.6 K/UL (ref 1.8–7.7)
SODIUM BLD-SCNC: 144 MMOL/L (ref 135–144)
TOTAL PROTEIN: 5.9 G/DL (ref 6.4–8.3)
WBC # BLD: 6.4 K/UL (ref 3.5–11)
WBC # BLD: ABNORMAL 10*3/UL

## 2021-08-30 PROCEDURE — 85025 COMPLETE CBC W/AUTO DIFF WBC: CPT

## 2021-08-30 PROCEDURE — 6370000000 HC RX 637 (ALT 250 FOR IP): Performed by: INTERNAL MEDICINE

## 2021-08-30 PROCEDURE — 99211 OFF/OP EST MAY X REQ PHY/QHP: CPT | Performed by: INTERNAL MEDICINE

## 2021-08-30 PROCEDURE — 1036F TOBACCO NON-USER: CPT | Performed by: INTERNAL MEDICINE

## 2021-08-30 PROCEDURE — 6360000002 HC RX W HCPCS: Performed by: INTERNAL MEDICINE

## 2021-08-30 PROCEDURE — 1123F ACP DISCUSS/DSCN MKR DOCD: CPT | Performed by: INTERNAL MEDICINE

## 2021-08-30 PROCEDURE — G8417 CALC BMI ABV UP PARAM F/U: HCPCS | Performed by: INTERNAL MEDICINE

## 2021-08-30 PROCEDURE — 2580000003 HC RX 258: Performed by: INTERNAL MEDICINE

## 2021-08-30 PROCEDURE — G8427 DOCREV CUR MEDS BY ELIG CLIN: HCPCS | Performed by: INTERNAL MEDICINE

## 2021-08-30 PROCEDURE — 80053 COMPREHEN METABOLIC PANEL: CPT

## 2021-08-30 PROCEDURE — 96413 CHEMO IV INFUSION 1 HR: CPT

## 2021-08-30 PROCEDURE — 36415 COLL VENOUS BLD VENIPUNCTURE: CPT

## 2021-08-30 PROCEDURE — 99214 OFFICE O/P EST MOD 30 MIN: CPT | Performed by: INTERNAL MEDICINE

## 2021-08-30 PROCEDURE — 96415 CHEMO IV INFUSION ADDL HR: CPT

## 2021-08-30 PROCEDURE — 4040F PNEUMOC VAC/ADMIN/RCVD: CPT | Performed by: INTERNAL MEDICINE

## 2021-08-30 PROCEDURE — 3017F COLORECTAL CA SCREEN DOC REV: CPT | Performed by: INTERNAL MEDICINE

## 2021-08-30 PROCEDURE — 96375 TX/PRO/DX INJ NEW DRUG ADDON: CPT

## 2021-08-30 RX ORDER — SODIUM CHLORIDE 9 MG/ML
20 INJECTION, SOLUTION INTRAVENOUS ONCE
Status: COMPLETED | OUTPATIENT
Start: 2021-08-30 | End: 2021-08-30

## 2021-08-30 RX ORDER — ACETAMINOPHEN 325 MG/1
650 TABLET ORAL ONCE
Status: COMPLETED | OUTPATIENT
Start: 2021-08-30 | End: 2021-08-30

## 2021-08-30 RX ORDER — DIPHENHYDRAMINE HYDROCHLORIDE 50 MG/ML
25 INJECTION INTRAMUSCULAR; INTRAVENOUS ONCE
Status: COMPLETED | OUTPATIENT
Start: 2021-08-30 | End: 2021-08-30

## 2021-08-30 RX ADMIN — ACETAMINOPHEN 650 MG: 325 TABLET ORAL at 10:56

## 2021-08-30 RX ADMIN — SODIUM CHLORIDE 750 MG: 9 INJECTION, SOLUTION INTRAVENOUS at 11:21

## 2021-08-30 RX ADMIN — SODIUM CHLORIDE 20 ML/HR: 9 INJECTION, SOLUTION INTRAVENOUS at 10:53

## 2021-08-30 RX ADMIN — DIPHENHYDRAMINE HYDROCHLORIDE 25 MG: 50 INJECTION, SOLUTION INTRAMUSCULAR; INTRAVENOUS at 10:56

## 2021-08-30 ASSESSMENT — PAIN SCALES - GENERAL: PAINLEVEL_OUTOF10: 0

## 2021-08-30 NOTE — PROGRESS NOTES
Patient here for ruxience C1D8. Labs reviewed. He saw Dr. Ildefonso Swenson today see his dictation. He tolerated treatment well and was discharged home in stable condition. He is due to return 9/13 for [facility].

## 2021-08-30 NOTE — TELEPHONE ENCOUNTER
AVS from 8/30/21    Refer to GI  Proceed with Rituxan today  RV 2 weeks    Referall confirmed with Dr. Malou Hardin office    Tx today as scheduled    RV scheduled 9/13/21 @ 8:30am    PT was given AVS and an appt schedule    Electronically signed by Zenon Appiah on 8/30/2021 at 1:47 PM

## 2021-08-31 ENCOUNTER — HOSPITAL ENCOUNTER (OUTPATIENT)
Dept: INTERVENTIONAL RADIOLOGY/VASCULAR | Age: 71
Discharge: HOME | End: 2021-09-02
Payer: MEDICARE

## 2021-08-31 VITALS
HEART RATE: 60 BPM | TEMPERATURE: 97 F | SYSTOLIC BLOOD PRESSURE: 156 MMHG | RESPIRATION RATE: 20 BRPM | OXYGEN SATURATION: 97 % | DIASTOLIC BLOOD PRESSURE: 52 MMHG

## 2021-08-31 DIAGNOSIS — C85.90 NON-HODGKIN'S LYMPHOMA, UNSPECIFIED BODY REGION, UNSPECIFIED NON-HODGKIN LYMPHOMA TYPE (HCC): ICD-10-CM

## 2021-08-31 LAB
INR BLD: 0.9
PARTIAL THROMBOPLASTIN TIME: 26.7 SEC (ref 24–36)
PLATELET # BLD: 121 K/UL (ref 150–450)
PROTHROMBIN TIME: 12.1 SEC (ref 11.8–14.6)

## 2021-08-31 PROCEDURE — 36561 INSERT TUNNELED CV CATH: CPT | Performed by: RADIOLOGY

## 2021-08-31 PROCEDURE — 7100000031 HC ASPR PHASE II RECOVERY - ADDTL 15 MIN

## 2021-08-31 PROCEDURE — 7100000011 HC PHASE II RECOVERY - ADDTL 15 MIN

## 2021-08-31 PROCEDURE — 85610 PROTHROMBIN TIME: CPT

## 2021-08-31 PROCEDURE — 7100000010 HC PHASE II RECOVERY - FIRST 15 MIN

## 2021-08-31 PROCEDURE — 2709999900 IR PORT PLACEMENT > 5 YEARS

## 2021-08-31 PROCEDURE — 76937 US GUIDE VASCULAR ACCESS: CPT | Performed by: RADIOLOGY

## 2021-08-31 PROCEDURE — 7100000030 HC ASPR PHASE II RECOVERY - FIRST 15 MIN

## 2021-08-31 PROCEDURE — 85049 AUTOMATED PLATELET COUNT: CPT

## 2021-08-31 PROCEDURE — 2580000003 HC RX 258: Performed by: RADIOLOGY

## 2021-08-31 PROCEDURE — 36415 COLL VENOUS BLD VENIPUNCTURE: CPT

## 2021-08-31 PROCEDURE — 85730 THROMBOPLASTIN TIME PARTIAL: CPT

## 2021-08-31 PROCEDURE — 6360000002 HC RX W HCPCS: Performed by: RADIOLOGY

## 2021-08-31 PROCEDURE — 77001 FLUOROGUIDE FOR VEIN DEVICE: CPT | Performed by: RADIOLOGY

## 2021-08-31 RX ORDER — ACETAMINOPHEN 325 MG/1
650 TABLET ORAL EVERY 4 HOURS PRN
Status: DISCONTINUED | OUTPATIENT
Start: 2021-08-31 | End: 2021-09-03 | Stop reason: HOSPADM

## 2021-08-31 RX ORDER — SODIUM CHLORIDE 9 MG/ML
INJECTION, SOLUTION INTRAVENOUS CONTINUOUS
Status: DISCONTINUED | OUTPATIENT
Start: 2021-08-31 | End: 2021-09-03 | Stop reason: HOSPADM

## 2021-08-31 RX ORDER — SODIUM CHLORIDE 0.9 % (FLUSH) 0.9 %
5-40 SYRINGE (ML) INJECTION PRN
Status: DISCONTINUED | OUTPATIENT
Start: 2021-08-31 | End: 2021-09-03 | Stop reason: HOSPADM

## 2021-08-31 RX ORDER — SODIUM CHLORIDE 9 MG/ML
25 INJECTION, SOLUTION INTRAVENOUS PRN
Status: DISCONTINUED | OUTPATIENT
Start: 2021-08-31 | End: 2021-09-03 | Stop reason: HOSPADM

## 2021-08-31 RX ADMIN — CEFAZOLIN 1000 MG: 1 INJECTION, POWDER, FOR SOLUTION INTRAMUSCULAR; INTRAVENOUS at 10:50

## 2021-08-31 RX ADMIN — CEFAZOLIN 1000 MG: 1 INJECTION, POWDER, FOR SOLUTION INTRAMUSCULAR; INTRAVENOUS at 10:49

## 2021-08-31 RX ADMIN — SODIUM CHLORIDE: 9 INJECTION, SOLUTION INTRAVENOUS at 10:56

## 2021-08-31 ASSESSMENT — PAIN SCALES - GENERAL: PAINLEVEL_OUTOF10: 0

## 2021-08-31 NOTE — H&P
HISTORY and [id number]       NAME:  Abdifatah Shultz  MRN: [id number]   YOB: 1950   Date: 8/31/2021   Age: 66 y.o. Gender: male       COMPLAINT AND PRESENT HISTORY:     Abdifatah Shultz is 66 y.o. , male, for IR PORT PLACEMENT     Patient has hx of Non-Hodgkin's Lymphoma. Patient was diagnosed in 2013 and underwent Chemotherapy. Patient presented with abdominal pain and distension. and was rechecked for Lymphoma. Patient had CAT, PET scan and Patient had a Bx done 7/6/21,  that gave evidence to relapse in follicular lymphoma. Patient has been under care of , Oncology. Pt has recently started x2  infusions of Chemotherapy meds. Reports that he will be starting. Rituxan, as he has in the past.     Patient reports with continues abdominal pain on the right side. Patient states when the pain strikes its at a 10/10. Pt denies any nausea or vomiting . No decrease in appetite, occassional  night sweats. Patient has significant medical history of : HTN, HLD, HYPOTHYROID, DM-managed with medications. Patient denies any CP, palpitations  or SOB. Denies any issues in bowel or bladder. No fever or chills. Patient denies any aspirin, but has been on motrin. PAST MEDICAL HISTORY     Past Medical History:   Diagnosis Date    Cancer ([facility] [address]     Diabetes mellitus ([facility] [address]     History of non-Hodgkin's lymphoma     Hyperlipidemia     Hypertension     Hypothyroidism        SURGICAL HISTORY       Past Surgical History:   Procedure Laterality Date    HERNIA REPAIR      IR BIOPSY ABDOMINAL MASS  7/6/2021    IR BIOPSY ABDOMINAL MASS 7/6/2021 [facility]       FAMILY HISTORY     History reviewed. No pertinent family history.     SOCIAL HISTORY       Social History     Socioeconomic History    Marital status:      Spouse name: None    Number of children: None    Years of education: None    Highest education level: None Occupational History    None   Tobacco Use    Smoking status: Former Smoker     Packs/day: 2.00     Years: 45.00     Pack years: 90.00     Types: Cigarettes     Quit date: 1/7/2006     Years since quitting: 15.6    Smokeless tobacco: Never Used   Vaping Use    Vaping Use: Never used   Substance and Sexual Activity    Alcohol use: Yes     Alcohol/week: 1.7 standard drinks     Types: 2 Standard drinks or equivalent per week    Drug use: Yes     Types: Marijuana    Sexual activity: None   Other Topics Concern    None   Social History Narrative    None     Social Determinants of Health     Financial Resource Strain:     Difficulty of Paying Living Expenses:    Food Insecurity:     Worried About Running Out of Food in the Last Year:     Ran Out of Food in the Last Year:    Transportation Needs:     Lack of Transportation (Medical):      Lack of Transportation (Non-Medical):    Physical Activity:     Days of Exercise per Week:     Minutes of Exercise per Session:    Stress:     Feeling of Stress :    Social Connections:     Frequency of Communication with Friends and Family:     Frequency of Social Gatherings with Friends and Family:     Attends Jainism Services:     Active Member of Clubs or Organizations:     Attends Club or Organization Meetings:     Marital Status:    Intimate Partner Violence:     Fear of Current or Ex-Partner:     Emotionally Abused:     Physically Abused:     Sexually Abused:           REVIEW OF SYSTEMS      No Known Allergies    Current Outpatient Medications on File Prior to Encounter   Medication Sig Dispense Refill    donepezil (ARICEPT) 10 MG tablet       atorvastatin (LIPITOR) 10 MG tablet Take 10 mg by mouth daily      FLUoxetine (PROZAC) 20 MG capsule Take 20 mg by mouth daily      lisinopril-hydroCHLOROthiazide (PRINZIDE;ZESTORETIC) 20-12.5 MG per tablet       carvedilol (COREG) 6.25 MG tablet Take 6.25 mg by mouth 2 times daily (with meals)      omeprazole (PRILOSEC) 20 MG delayed release capsule Take 20 mg by mouth 2 times daily      amLODIPine (NORVASC) 10 MG tablet Take 1 tablet by mouth daily (Patient taking differently: Take by mouth daily ) 30 tablet 5    glyBURIDE-metformin (GLUCOVANCE) 5-500 MG per tablet Take 1 tablet by mouth 2 times daily (with meals).  levothyroxine (SYNTHROID) 50 MCG tablet Take 112 mcg by mouth Daily       memantine (NAMENDA) 10 MG tablet Take 10 mg by mouth 2 times daily        meloxicam (MOBIC) 15 MG tablet       pregabalin (LYRICA) 50 MG capsule 1 tablet at at bedtime for the next 4 days and then 1 tablets twice daily for 7 days then 1 tablet 3 times daily 90 capsule 1     No current facility-administered medications on file prior to encounter. Negative except for what is mentioned in the HPI. GENERAL PHYSICAL EXAM     Vitals: BP (!) 133/52   Pulse 57   Temp 96.8 °F (36 °C) (Infrared)   Resp 18   SpO2 100%  There is no height or weight on file to calculate BMI. GENERAL APPEARANCE:   Joe Stout is 66 y.o.,  male, moderately obese, nourished, conscious, alert. Does not appear to be distress or pain at this time. SKIN:  Warm, dry, no cyanosis or jaundice. HEAD:  Normocephalic, atraumatic, no swelling or tenderness. EYES:  Pupils equal, reactive to light. EARS:  No discharge, no marked hearing loss. NOSE:  No rhinorrhea, epistaxis or septal deformity. THROAT:  Not congested. No ulceration bleeding or discharge. NECK:  No stiffness, trachea central.  No palpable masses or L.N.                 CHEST:  Symmetrical and equal on expansion. HEART:  RRR S1 > S2. No audible murmurs or gallops. LUNGS:  Equal on expansion, normal breath sounds. No adventitious sounds. ABDOMEN:  Obese. Soft on palpation.   No dysphagia, No localized tenderness. No guarding or rigidity. No palpable hepatosplenomegaly. LYMPHATICS:  No palpable cervical lymphadenopathy. LOCOMOTOR, BACK AND SPINE:  No tenderness or deformities. EXTREMITIES:  Symmetrical, no pretibial edema. Jamies sign negative. No discoloration or ulcerations. NEUROLOGIC:  The patient is conscious, alert, oriented,Cranial nerve II-XII intact, taste and smell were not examined. No apparent focal sensory or motor deficits.              PROVISIONAL DIAGNOSES / SURGERY:      IR PORT PLACEMENT         Recurrent Small Cell Non-Hodgkin's Lymphoma    Patient Active Problem List    Diagnosis Date Noted    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 ([facility] [address] 08/09/2021    Acute kidney injury ([address] 07/03/2021    Traumatic retroperitoneal hematoma 12/15/2019    NHL (non-Hodgkin's lymphoma) ([facility] [address] 01/07/2013           VALARIE Benitez, APRN - CNP on 8/31/2021 at 10:32 AM

## 2021-08-31 NOTE — BRIEF OP NOTE
Brief Postoperative Note    Barbara Giles  YOB: 1950  [id number]    Pre-operative Diagnosis: Non-Hodgkin's lymphoma, unspecified body region, unspecified non-Hodgkin lymphoma type    Post-operative Diagnosis: Same    Procedure: Port placement    Medications Given: none    Anesthesia: Local    Surgeons/Assistants: Kelvin Dick MD    Estimated Blood Loss: minimal    Complications: none    Specimens: were not obtained    Findings: Rt IJ chemoport inserted via rt IJ vein with tip in upper RA. Port flushed and ready for use at this time.       Electronically signed by Kelvin Dick MD on 8/31/2021 at 12:26 PM

## 2021-09-01 ENCOUNTER — TELEPHONE (OUTPATIENT)
Dept: GASTROENTEROLOGY | Age: 71
End: 2021-09-01

## 2021-09-01 NOTE — TELEPHONE ENCOUNTER
Received VM on 8/30/21 requesting a call back if we did not received this referral.  Called patient and is scheduled for 9-14-21. Did offer sooner but the patient was not available.   Writer thanked and call ended,

## 2021-09-01 NOTE — PROGRESS NOTES
Chief Complaint   Patient presents with    Follow-up     review status of disease    Results     go over PET scan            DIAGNOSIS:  Recurrent progressive diffuse small cell B cell non-Hodgkins lymphoma. Evidence of relapse with biopsy-proven follicular lymphoma. 1-2 from treated medically lymph node biopsy July 6, 2021    CURRENT THERAPY:    1. Observation. 2. Status post treatment with Bexxar in May 2008. 3. Status post previous treatment with Rituxan. 4.  Start treatment with Rituxan August 23, 2021    INTERIM HISTORY:  The patient is seen for follow-up of his lymphoma. He was treated medically because of abdominal pain. Further work-up showed enlarged lymph nodes in the retroperitoneum and iliac area. Biopsy proved recurrence of follicular lymphoma. Patient has no more abdominal pain. No nausea vomiting. No fever. No weight loss or decreased appetite. No night sweats. REVIEW OF SYSTEMS:   General: No weight loss. Eyes: No blurred vision, eye pain or double vision. Ears: No hearing problems or drainage. No tinnitus. Throat: No sore throat, problems with swallowing or dysphagia. Respiratory: No cough, sputum or hemoptysis. No shortness of breath. Cardiovascular: No chest pain, orthopnea or PND. No lower extremity edema. No palpitation. Gastrointestinal: No problems with swallowing. No abdominal pain or bloating. No nausea or vomiting. No diarrhea or constipation. No GI bleeding. Genitourinary: No dysuria, hematuria, frequency or urgency. Musculoskeletal: No muscle aches or pains. No limitation of movement. No back pain. Dermatologic: No skin rashes or pruritus. No skin lesions or discolorations. Psychiatric: No depression, anxiety, or stress or signs of schizophrenia. Hematologic: No history of bleeding tendency. No bruises or ecchymosis. No history of clotting problems. Infectious disease: No fever, chills or frequent infections. Endocrine: No problems with opacity.  No polydipsia or polyuria. Neurologic: No headaches or dizziness. No weakness or numbness of the extremities. SOCIAL HISTORY:  No smoking, no alcohol drinking. PHYSICAL EXAM:  The patient is not in acute distress. Vital signs: Blood pressure 125/64, pulse 65, temperature 97.5 °F (36.4 °C), temperature source Oral, weight 177 lb 14.4 oz (80.7 kg). HEENT:  Eyes are normal. Ears, nose and throat are normal.  Neck: Supple. No lymph node enlargement. No thyroid enlargement. Trachea is centrally located. Chest:  Clear to auscultation. No wheezes or crepitations. Heart: Regular sinus rhythm. Abdomen: Soft, nontender. No hepatosplenomegaly. No masses. Extremities:  With no edema. Lymph Nodes:  No cervical, axillary or inguinal lymph node enlargement. Neurologic:  Conscious and oriented. No focal neurological deficits. Psychosocial: No depression, anxiety or stress. Skin: No rashes, bruises or ecchymoses. REVIEW OF DIAGNOSTIC DATA:     Lab Results   Component Value Date    WBC 6.4 08/30/2021    HGB 12.1 (L) 08/30/2021    HCT 35.6 (L) 08/30/2021    MCV 86.9 08/30/2021     (L) 08/31/2021       Chemistry        Component Value Date/Time     08/30/2021 0951    K 4.5 08/30/2021 0951     (H) 08/30/2021 0951    CO2 25 08/30/2021 0951    BUN 33 (H) 08/30/2021 0951    CREATININE 1.21 (H) 08/30/2021 0951        Component Value Date/Time    CALCIUM 8.6 08/30/2021 0951    ALKPHOS 88 08/30/2021 0951    AST 20 08/30/2021 0951    ALT 37 08/30/2021 0951    BILITOT 0.31 08/30/2021 0951        CT scan showed stable findings with no evidence of progression. IR PORT PLACEMENT > 5 YEARS  Narrative: PROCEDURE:  ULTRASOUND GUIDED VASCULAR ACCESS. FLUOROSCOPY GUIDED PLACEMENT OF A SUBCUTANEOUS PORT-A-CATH.    8/31/2021.     HISTORY:  ORDERING SYSTEM PROVIDED HISTORY: Non-Hodgkin's lymphoma, unspecified body  region, unspecified non-Hodgkin lymphoma type ([facility] [address]    SEDATION:  Local anesthesia    FLUOROSCOPY DOSE AND

## 2021-09-06 NOTE — PROGRESS NOTES
Chief Complaint   Patient presents with    Follow-up     review status of disease    Other     Questions about the port being put in     Abdominal Pain     having issues and its getting worse, would like a GI doctor            DIAGNOSIS:  Recurrent progressive diffuse small cell B cell non-Hodgkins lymphoma. Evidence of relapse with biopsy-proven follicular lymphoma. 1-2 from treated medically lymph node biopsy July 6, 2021    CURRENT THERAPY:    1. Observation. 2. Status post treatment with Bexxar in May 2008. 3. Status post previous treatment with Rituxan. 4.  Start treatment with Rituxan August 23, 2021    INTERIM HISTORY:  The patient is seen for follow-up of his lymphoma. He was treated medically because of abdominal pain. Further work-up showed enlarged lymph nodes in the retroperitoneum and iliac area. Biopsy proved recurrence of follicular lymphoma. Patient has no more abdominal pain. No nausea vomiting. No fever. No weight loss or decreased appetite. No night sweats. Tolerated Rituxan well. No side effects. REVIEW OF SYSTEMS:   General: No weight loss. Eyes: No blurred vision, eye pain or double vision. Ears: No hearing problems or drainage. No tinnitus. Throat: No sore throat, problems with swallowing or dysphagia. Respiratory: No cough, sputum or hemoptysis. No shortness of breath. Cardiovascular: No chest pain, orthopnea or PND. No lower extremity edema. No palpitation. Gastrointestinal: No problems with swallowing. No abdominal pain or bloating. No nausea or vomiting. No diarrhea or constipation. No GI bleeding. Genitourinary: No dysuria, hematuria, frequency or urgency. Musculoskeletal: No muscle aches or pains. No limitation of movement. No back pain. Dermatologic: No skin rashes or pruritus. No skin lesions or discolorations. Psychiatric: No depression, anxiety, or stress or signs of schizophrenia. Hematologic: No history of bleeding tendency. No bruises or ecchymosis. Non-Hodgkin's lymphoma, unspecified body  region, unspecified non-Hodgkin lymphoma type ([facility] [address]    SEDATION:  Local anesthesia    FLUOROSCOPY DOSE AND TYPE OR TIME AND EXPOSURES:  Fluoroscopy time-26 seconds    D AP-71 cGy cm squared. TECHNIQUE:  Informed consent was obtained after a detailed explanation of the procedure  including risks, benefits, and alternatives. Universal protocol was observed. The right neck and chest were prepped and draped in sterile fashion using  maximum sterile barrier technique. Local anesthesia was achieved with  lidocaine. A micropuncture needle was used to access the right internal  jugular vein using ultrasound guidance. An ultrasound image demonstrating  patency of the vein with needle tip located within it. An image was obtained  and stored in PACs. A 0.018 inch guidewire and 2 in 1 transitional set was  left in place, with the guidewire extending into the IVC. A chest wall incision was then made and a subcutaneous pocket was created. The port reservoir was placed within the pocket and the port tubing was  attached and tunneled subcutaneously to the venotomy site. The port tubing  was cut to an appropriate length. A 0.035 guidewire was used to place a  peel-away sheath. The catheter was then advanced through the peel-away  sheath under fluoroscopic guidance to the cavo-atrial junction. The chest wall incision was closed using 2-0 and 4-0 Vicryl suture and tissue  adhesive was applied to the venotomy site and chest wall incision. The port  flushed easily and there was a good blood return. The port was locked with  heparinized saline. The patient tolerated the procedure well and there were  no immediate complications. FINDINGS:  Fluoroscopic image demonstrates the tip of the port in the mid-upper right  atrium. Impression: Successful ultrasound and fluoroscopy guided Port-A-Cath placement, as above. Port is flushed and ready for use at this time.         ASSESSMENT: Small cell, B-cell non-Hodgkins lymphoma in remission. Events of relapse with biopsy-proven follicular lymphoma grade 1-2 July 5, 2021    PLAN:     Lymph node biopsy showed relapse of low-grade lymphoma. Grade 1 through 2 follicular lymphoma. PET/CT scan was reviewed and discussed with the patient. I again discussed with the patient the nature of his lymphoma, staging, prognosis and treatment. Explained that this is a low-grade lymphoma and treatment is usually well controlled but not curable. Based on the findings my recommendation is to use rituximab again. Explained benefits and side effects. He agreed. We will plan to give weekly Rituxan for 4 weeks followed by maintenance. Started weekly rituximab. Tolerated well. Will be continued. With patient's GI symptoms, we will refer to GI. Patient's questions were answered to the best of his satisfaction and he verbalized full understanding and agreement. cc: LORETO Lopez M.D. Audra Galloway, M.D.

## 2021-09-13 ENCOUNTER — HOSPITAL ENCOUNTER (OUTPATIENT)
Dept: INFUSION THERAPY | Age: 71
Discharge: HOME | End: 2021-09-13
Payer: MEDICARE

## 2021-09-13 ENCOUNTER — TELEPHONE (OUTPATIENT)
Dept: ONCOLOGY | Age: 71
End: 2021-09-13

## 2021-09-13 ENCOUNTER — OFFICE VISIT (OUTPATIENT)
Dept: ONCOLOGY | Age: 71
End: 2021-09-13
Payer: MEDICARE

## 2021-09-13 VITALS
DIASTOLIC BLOOD PRESSURE: 76 MMHG | TEMPERATURE: 96 F | SYSTOLIC BLOOD PRESSURE: 151 MMHG | BODY MASS INDEX: 25.66 KG/M2 | WEIGHT: 181.4 LBS | HEART RATE: 80 BPM

## 2021-09-13 DIAGNOSIS — C82.13 FOLLICULAR LYMPHOMA GRADE II OF INTRA-ABDOMINAL LYMPH NODES (HCC): ICD-10-CM

## 2021-09-13 DIAGNOSIS — C85.90 NON-HODGKIN'S LYMPHOMA, UNSPECIFIED BODY REGION, UNSPECIFIED NON-HODGKIN LYMPHOMA TYPE (HCC): Primary | ICD-10-CM

## 2021-09-13 LAB
ABSOLUTE EOS #: 0.1 K/UL (ref 0–0.4)
ABSOLUTE IMMATURE GRANULOCYTE: ABNORMAL K/UL (ref 0–0.3)
ABSOLUTE LYMPH #: 1.2 K/UL (ref 1–4.8)
ABSOLUTE MONO #: 0.8 K/UL (ref 0.1–1.2)
ALBUMIN SERPL-MCNC: 3.9 G/DL (ref 3.5–5.2)
ALBUMIN/GLOBULIN RATIO: 1.8 (ref 1–2.5)
ALP BLD-CCNC: 88 U/L (ref 40–129)
ALT SERPL-CCNC: 20 U/L (ref 5–41)
ANION GAP SERPL CALCULATED.3IONS-SCNC: 11 MMOL/L (ref 9–17)
AST SERPL-CCNC: 17 U/L
BASOPHILS # BLD: 0 % (ref 0–2)
BASOPHILS ABSOLUTE: 0 K/UL (ref 0–0.2)
BILIRUB SERPL-MCNC: 0.42 MG/DL (ref 0.3–1.2)
BUN BLDV-MCNC: 30 MG/DL (ref 8–23)
BUN/CREAT BLD: ABNORMAL (ref 9–20)
CALCIUM SERPL-MCNC: 8.5 MG/DL (ref 8.6–10.4)
CHLORIDE BLD-SCNC: 111 MMOL/L (ref 98–107)
CO2: 23 MMOL/L (ref 20–31)
CREAT SERPL-MCNC: 1.13 MG/DL (ref 0.7–1.2)
DIFFERENTIAL TYPE: ABNORMAL
EOSINOPHILS RELATIVE PERCENT: 2 % (ref 1–4)
GFR AFRICAN AMERICAN: >60 ML/MIN
GFR NON-AFRICAN AMERICAN: >60 ML/MIN
GFR SERPL CREATININE-BSD FRML MDRD: ABNORMAL ML/MIN/{1.73_M2}
GFR SERPL CREATININE-BSD FRML MDRD: ABNORMAL ML/MIN/{1.73_M2}
GLUCOSE BLD-MCNC: 210 MG/DL (ref 70–99)
HCT VFR BLD CALC: 35.2 % (ref 41–53)
HEMOGLOBIN: 11.7 G/DL (ref 13.5–17.5)
IMMATURE GRANULOCYTES: ABNORMAL %
LYMPHOCYTES # BLD: 17 % (ref 24–44)
MCH RBC QN AUTO: 29.5 PG (ref 26–34)
MCHC RBC AUTO-ENTMCNC: 33.2 G/DL (ref 31–37)
MCV RBC AUTO: 88.9 FL (ref 80–100)
MONOCYTES # BLD: 11 % (ref 2–11)
NRBC AUTOMATED: ABNORMAL PER 100 WBC
PDW BLD-RTO: 16.7 % (ref 12.5–15.4)
PLATELET # BLD: 139 K/UL (ref 140–450)
PLATELET ESTIMATE: ABNORMAL
PMV BLD AUTO: 8.8 FL (ref 6–12)
POTASSIUM SERPL-SCNC: 4.8 MMOL/L (ref 3.7–5.3)
RBC # BLD: 3.96 M/UL (ref 4.5–5.9)
RBC # BLD: ABNORMAL 10*6/UL
SEG NEUTROPHILS: 70 % (ref 36–66)
SEGMENTED NEUTROPHILS ABSOLUTE COUNT: 5.2 K/UL (ref 1.8–7.7)
SODIUM BLD-SCNC: 145 MMOL/L (ref 135–144)
TOTAL PROTEIN: 6.1 G/DL (ref 6.4–8.3)
WBC # BLD: 7.3 K/UL (ref 3.5–11)
WBC # BLD: ABNORMAL 10*3/UL

## 2021-09-13 PROCEDURE — 6360000002 HC RX W HCPCS: Performed by: INTERNAL MEDICINE

## 2021-09-13 PROCEDURE — 4040F PNEUMOC VAC/ADMIN/RCVD: CPT | Performed by: INTERNAL MEDICINE

## 2021-09-13 PROCEDURE — 85025 COMPLETE CBC W/AUTO DIFF WBC: CPT

## 2021-09-13 PROCEDURE — 96415 CHEMO IV INFUSION ADDL HR: CPT

## 2021-09-13 PROCEDURE — 6370000000 HC RX 637 (ALT 250 FOR IP): Performed by: INTERNAL MEDICINE

## 2021-09-13 PROCEDURE — 36591 DRAW BLOOD OFF VENOUS DEVICE: CPT

## 2021-09-13 PROCEDURE — 3017F COLORECTAL CA SCREEN DOC REV: CPT | Performed by: INTERNAL MEDICINE

## 2021-09-13 PROCEDURE — G8417 CALC BMI ABV UP PARAM F/U: HCPCS | Performed by: INTERNAL MEDICINE

## 2021-09-13 PROCEDURE — 1036F TOBACCO NON-USER: CPT | Performed by: INTERNAL MEDICINE

## 2021-09-13 PROCEDURE — G8427 DOCREV CUR MEDS BY ELIG CLIN: HCPCS | Performed by: INTERNAL MEDICINE

## 2021-09-13 PROCEDURE — 2580000003 HC RX 258: Performed by: INTERNAL MEDICINE

## 2021-09-13 PROCEDURE — 99214 OFFICE O/P EST MOD 30 MIN: CPT | Performed by: INTERNAL MEDICINE

## 2021-09-13 PROCEDURE — 96413 CHEMO IV INFUSION 1 HR: CPT

## 2021-09-13 PROCEDURE — 99211 OFF/OP EST MAY X REQ PHY/QHP: CPT | Performed by: INTERNAL MEDICINE

## 2021-09-13 PROCEDURE — 96375 TX/PRO/DX INJ NEW DRUG ADDON: CPT

## 2021-09-13 PROCEDURE — 80053 COMPREHEN METABOLIC PANEL: CPT

## 2021-09-13 PROCEDURE — 1123F ACP DISCUSS/DSCN MKR DOCD: CPT | Performed by: INTERNAL MEDICINE

## 2021-09-13 RX ORDER — HEPARIN SODIUM (PORCINE) LOCK FLUSH IV SOLN 100 UNIT/ML 100 UNIT/ML
500 SOLUTION INTRAVENOUS PRN
Status: DISCONTINUED | OUTPATIENT
Start: 2021-09-13 | End: 2021-09-14 | Stop reason: HOSPADM

## 2021-09-13 RX ORDER — ACETAMINOPHEN 325 MG/1
650 TABLET ORAL ONCE
Status: COMPLETED | OUTPATIENT
Start: 2021-09-13 | End: 2021-09-13

## 2021-09-13 RX ORDER — DIPHENHYDRAMINE HYDROCHLORIDE 50 MG/ML
25 INJECTION INTRAMUSCULAR; INTRAVENOUS ONCE
Status: COMPLETED | OUTPATIENT
Start: 2021-09-13 | End: 2021-09-13

## 2021-09-13 RX ORDER — SODIUM CHLORIDE 9 MG/ML
20 INJECTION, SOLUTION INTRAVENOUS ONCE
Status: COMPLETED | OUTPATIENT
Start: 2021-09-13 | End: 2021-09-13

## 2021-09-13 RX ORDER — SODIUM CHLORIDE 0.9 % (FLUSH) 0.9 %
5-40 SYRINGE (ML) INJECTION PRN
Status: DISCONTINUED | OUTPATIENT
Start: 2021-09-13 | End: 2021-09-14 | Stop reason: HOSPADM

## 2021-09-13 RX ADMIN — HEPARIN 500 UNITS: 100 SYRINGE at 11:38

## 2021-09-13 RX ADMIN — SODIUM CHLORIDE, PRESERVATIVE FREE 10 ML: 5 INJECTION INTRAVENOUS at 11:38

## 2021-09-13 RX ADMIN — DIPHENHYDRAMINE HYDROCHLORIDE 25 MG: 50 INJECTION, SOLUTION INTRAMUSCULAR; INTRAVENOUS at 09:18

## 2021-09-13 RX ADMIN — SODIUM CHLORIDE 750 MG: 9 INJECTION, SOLUTION INTRAVENOUS at 09:53

## 2021-09-13 RX ADMIN — ACETAMINOPHEN 650 MG: 325 TABLET ORAL at 09:18

## 2021-09-13 RX ADMIN — SODIUM CHLORIDE 20 ML/HR: 9 INJECTION, SOLUTION INTRAVENOUS at 09:18

## 2021-09-13 RX ADMIN — SODIUM CHLORIDE, PRESERVATIVE FREE 10 ML: 5 INJECTION INTRAVENOUS at 09:17

## 2021-09-13 ASSESSMENT — PAIN SCALES - GENERAL: PAINLEVEL_OUTOF10: 0

## 2021-09-13 NOTE — TELEPHONE ENCOUNTER
AVS from 9/13/21     Proceed with treatment as scheduled. Start maintenance Rituxan in 9 weeks  RV 9 weeks.   CT abdomen before RV    *tx as sched  *ct sched for [email or web address]   *rv is sched for [email or web address] w/tx to follow    PT was given AVS and an appt schedule

## 2021-09-13 NOTE — PATIENT INSTRUCTIONS
Proceed with treatment as scheduled. Start maintenance Rituxan in 9 weeks  RV 9 weeks.   CT abdomen before RV

## 2021-09-13 NOTE — PROGRESS NOTES
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 Evidence of relapse with biopsy-proven follicular lymphoma. 1-2 from treated medically lymph node biopsy July 6, 2021    CURRENT THERAPY:    1. Observation. 2. Status post treatment with Bexxar in May 2008. 3. Status post previous treatment with Rituxan. 4.  Start treatment with Rituxan August 23, 2021. Will complete 4 weeks of rituximab on 9/20/2021  5. He will be started on consolidation Rituxan maintenance after 2 months. INTERIM HISTORY:  The patient is seen for follow-up of his lymphoma. He was treated medically because of abdominal pain. Further work-up showed enlarged lymph nodes in the retroperitoneum and iliac area. Biopsy proved recurrence of follicular lymphoma. Patient has no more abdominal pain. No nausea vomiting. No fever. No weight loss or decreased appetite. No night sweats. Tolerated Rituxan well. No side effects. REVIEW OF SYSTEMS:   General: No weight loss. Eyes: No blurred vision, eye pain or double vision. Ears: No hearing problems or drainage. No tinnitus. Throat: No sore throat, problems with swallowing or dysphagia. Respiratory: No cough, sputum or hemoptysis. No shortness of breath. Cardiovascular: No chest pain, orthopnea or PND. No lower extremity edema. No palpitation. Gastrointestinal: No problems with swallowing. No abdominal pain or bloating. No nausea or vomiting. No diarrhea or constipation. No GI bleeding. Genitourinary: No dysuria, hematuria, frequency or urgency. Musculoskeletal: No muscle aches or pains. No limitation of movement. No back pain. Dermatologic: No skin rashes or pruritus. No skin lesions or discolorations. Psychiatric: No depression, anxiety, or stress or signs of schizophrenia. Hematologic: No history of bleeding tendency. No bruises or ecchymosis.  No history of clotting problems. Infectious disease: No fever, chills or frequent infections. Endocrine: No problems with opacity. No polydipsia or polyuria. Neurologic: No headaches or dizziness. No weakness or numbness of the extremities. SOCIAL HISTORY:  No smoking, no alcohol drinking. PHYSICAL EXAM:  The patient is not in acute distress. Vital signs: Blood pressure (!) 151/76, pulse 80, temperature 96 °F (35.6 °C), temperature source Oral, weight 181 lb 6.4 oz (82.3 kg). HEENT:  Eyes are normal. Ears, nose and throat are normal.  Neck: Supple. No lymph node enlargement. No thyroid enlargement. Trachea is centrally located. Chest:  Clear to auscultation. No wheezes or crepitations. Heart: Regular sinus rhythm. Abdomen: Soft, nontender. No hepatosplenomegaly. No masses. Extremities:  With no edema. Lymph Nodes:  No cervical, axillary or inguinal lymph node enlargement. Neurologic:  Conscious and oriented. No focal neurological deficits. Psychosocial: No depression, anxiety or stress. Skin: No rashes, bruises or ecchymoses. REVIEW OF DIAGNOSTIC DATA:     Lab Results   Component Value Date    WBC 7.3 09/13/2021    HGB 11.7 (L) 09/13/2021    HCT 35.2 (L) 09/13/2021    MCV 88.9 09/13/2021     (L) 09/13/2021       Chemistry        Component Value Date/Time     (H) 09/13/2021 0834    K 4.8 09/13/2021 0834     (H) 09/13/2021 0834    CO2 23 09/13/2021 0834    BUN 30 (H) 09/13/2021 0834    CREATININE 1.13 09/13/2021 0834        Component Value Date/Time    CALCIUM 8.5 (L) 09/13/2021 0834    ALKPHOS 88 09/13/2021 0834    AST 17 09/13/2021 0834    ALT 20 09/13/2021 0834    BILITOT 0.42 09/13/2021 0834        CT scan showed stable findings with no evidence of progression. IR PORT PLACEMENT > 5 YEARS  Narrative: PROCEDURE:  ULTRASOUND GUIDED VASCULAR ACCESS. FLUOROSCOPY GUIDED PLACEMENT OF A SUBCUTANEOUS PORT-A-CATH.    8/31/2021.     HISTORY:  ORDERING SYSTEM PROVIDED HISTORY: Non-Hodgkin's lymphoma, unspecified body  region, unspecified non-Hodgkin lymphoma type ([facility] [address]    SEDATION:  Local anesthesia    FLUOROSCOPY DOSE AND TYPE OR TIME AND EXPOSURES:  Fluoroscopy time-26 seconds    D AP-71 cGy cm squared. TECHNIQUE:  Informed consent was obtained after a detailed explanation of the procedure  including risks, benefits, and alternatives. Universal protocol was observed. The right neck and chest were prepped and draped in sterile fashion using  maximum sterile barrier technique. Local anesthesia was achieved with  lidocaine. A micropuncture needle was used to access the right internal  jugular vein using ultrasound guidance. An ultrasound image demonstrating  patency of the vein with needle tip located within it. An image was obtained  and stored in PACs. A 0.018 inch guidewire and 2 in 1 transitional set was  left in place, with the guidewire extending into the IVC. A chest wall incision was then made and a subcutaneous pocket was created. The port reservoir was placed within the pocket and the port tubing was  attached and tunneled subcutaneously to the venotomy site. The port tubing  was cut to an appropriate length. A 0.035 guidewire was used to place a  peel-away sheath. The catheter was then advanced through the peel-away  sheath under fluoroscopic guidance to the cavo-atrial junction. The chest wall incision was closed using 2-0 and 4-0 Vicryl suture and tissue  adhesive was applied to the venotomy site and chest wall incision. The port  flushed easily and there was a good blood return. The port was locked with  heparinized saline. The patient tolerated the procedure well and there were  no immediate complications. FINDINGS:  Fluoroscopic image demonstrates the tip of the port in the mid-upper right  atrium. Impression: Successful ultrasound and fluoroscopy guided Port-A-Cath placement, as above. Port is flushed and ready for use at this time.         ASSESSMENT:

## 2021-09-13 NOTE — PROGRESS NOTES
Pt here for C.1D.15 Ruxience. Arrives ambulatory by self. Denies any new complaints. Labs drawn from port at MD visit, results reviewed. Pt was seen by Dr. Jose Montes, order rec'd to proceed with tx. Tx complete without incident. Pt d/c'd in stable condition. Returns 9/20/21 for C.1D.22 tx.

## 2021-09-14 ENCOUNTER — TELEPHONE (OUTPATIENT)
Dept: GASTROENTEROLOGY | Age: 71
End: 2021-09-14

## 2021-09-14 ENCOUNTER — VIRTUAL VISIT (OUTPATIENT)
Dept: GASTROENTEROLOGY | Age: 71
End: 2021-09-14
Payer: MEDICARE

## 2021-09-14 DIAGNOSIS — C82.13 FOLLICULAR LYMPHOMA GRADE II OF INTRA-ABDOMINAL LYMPH NODES (HCC): ICD-10-CM

## 2021-09-14 DIAGNOSIS — R10.31 ABDOMINAL PAIN, RIGHT LOWER QUADRANT: Primary | ICD-10-CM

## 2021-09-14 DIAGNOSIS — R19.8 CHANGE IN BOWEL FUNCTION: ICD-10-CM

## 2021-09-14 DIAGNOSIS — D64.9 ANEMIA, UNSPECIFIED TYPE: ICD-10-CM

## 2021-09-14 PROCEDURE — 99204 OFFICE O/P NEW MOD 45 MIN: CPT | Performed by: INTERNAL MEDICINE

## 2021-09-14 PROCEDURE — 1036F TOBACCO NON-USER: CPT | Performed by: INTERNAL MEDICINE

## 2021-09-14 PROCEDURE — G8427 DOCREV CUR MEDS BY ELIG CLIN: HCPCS | Performed by: INTERNAL MEDICINE

## 2021-09-14 PROCEDURE — 3017F COLORECTAL CA SCREEN DOC REV: CPT | Performed by: INTERNAL MEDICINE

## 2021-09-14 PROCEDURE — 4040F PNEUMOC VAC/ADMIN/RCVD: CPT | Performed by: INTERNAL MEDICINE

## 2021-09-14 PROCEDURE — G8417 CALC BMI ABV UP PARAM F/U: HCPCS | Performed by: INTERNAL MEDICINE

## 2021-09-14 PROCEDURE — 1123F ACP DISCUSS/DSCN MKR DOCD: CPT | Performed by: INTERNAL MEDICINE

## 2021-09-14 RX ORDER — POLYETHYLENE GLYCOL 3350 17 G/17G
POWDER, FOR SOLUTION ORAL
Qty: 238 G | Refills: 0 | Status: ON HOLD | OUTPATIENT
Start: 2021-09-14 | End: 2021-09-24 | Stop reason: ALTCHOICE

## 2021-09-14 ASSESSMENT — ENCOUNTER SYMPTOMS
ANAL BLEEDING: 0
COUGH: 0
ABDOMINAL DISTENTION: 0
TROUBLE SWALLOWING: 0
BLOOD IN STOOL: 0
VOMITING: 0
CONSTIPATION: 1
ABDOMINAL PAIN: 1
NAUSEA: 0
CHOKING: 0
DIARRHEA: 0
RECTAL PAIN: 0
WHEEZING: 0

## 2021-09-14 NOTE — TELEPHONE ENCOUNTER
Writer spoke to pt's wife, Sarika, over the phone in regards to scheduling colon proc ordered at [facility] on 9/14/21. Wife states pt has cold symptoms cough, sneezing, but no fever. Pt is sched w/ [place] at [facility] 9/23/21 @ 11:15am proc time, 9:15am arrival time. Mag Citrate, Miralax and Dulcolax orders will be sent to [employer] in [place], [place]. Bowel prep instructions given to pt over the phone and hard copy e-mailed to [email or web address]. Sarika instructed pt will need a  and to bring proof of covid-19 vaccinations on day of proc. Sarika voices her understanding.

## 2021-09-14 NOTE — TELEPHONE ENCOUNTER
Patient came to office answering several questions positive for COVID. Per Dr Vincent Restrepo, patient can have a video consultation later today. He was sent home.

## 2021-09-15 NOTE — TELEPHONE ENCOUNTER
Writer returned pt's phone call in regards to rescheduling 9/23/21 colon proc. Pt states he has strep throat & cold symptoms. Pt is resched w/ Elyssa at [address] [place] [address] 9/30/21 @ 12:30pm proc time, 10:30am arrival time. Pt reminded of needing a  and to bring proof of Covid-19 vaccinations. New bowel prep instructions will be sent to wife's e-mail address. Pt voices his understanding.

## 2021-09-15 NOTE — TELEPHONE ENCOUNTER
Writer spoke to pt's wife, Sarika, over the phone in regards to pt's symptoms. Sarika states pt is feeling better, but has an appt w/ pcp today in regards to his cold symptoms. Writer informed Sarika if pt is still having symptoms on Monday, pt's procedure will need to be rescheduled due to anesthesia. Sarika voices her understanding and will call the office back Monday if pt is still having symptoms.

## 2021-09-16 ENCOUNTER — HOSPITAL ENCOUNTER (OUTPATIENT)
Age: 71
Discharge: HOME | End: 2021-09-16
Payer: MEDICARE

## 2021-09-16 LAB — PROSTATE SPECIFIC ANTIGEN: 2.5 UG/L

## 2021-09-16 PROCEDURE — 84154 ASSAY OF PSA FREE: CPT

## 2021-09-16 PROCEDURE — 84153 ASSAY OF PSA TOTAL: CPT

## 2021-09-16 PROCEDURE — 36415 COLL VENOUS BLD VENIPUNCTURE: CPT

## 2021-09-17 LAB
PROSTATE SPECIFIC ANTIGEN FREE: 0.8 UG/L
PROSTATE SPECIFIC ANTIGEN PERCENT FREE: 38.1 %
PROSTATE SPECIFIC ANTIGEN: 2.1 UG/L (ref 0–4)

## 2021-09-20 ENCOUNTER — HOSPITAL ENCOUNTER (OUTPATIENT)
Dept: INFUSION THERAPY | Age: 71
Discharge: HOME | End: 2021-09-20
Payer: MEDICARE

## 2021-09-20 VITALS
TEMPERATURE: 97.3 F | BODY MASS INDEX: 24.5 KG/M2 | HEART RATE: 63 BPM | WEIGHT: 173.2 LBS | SYSTOLIC BLOOD PRESSURE: 129 MMHG | RESPIRATION RATE: 18 BRPM | DIASTOLIC BLOOD PRESSURE: 70 MMHG

## 2021-09-20 DIAGNOSIS — C82.13 FOLLICULAR LYMPHOMA GRADE II OF INTRA-ABDOMINAL LYMPH NODES (HCC): ICD-10-CM

## 2021-09-20 DIAGNOSIS — C85.90 NON-HODGKIN'S LYMPHOMA, UNSPECIFIED BODY REGION, UNSPECIFIED NON-HODGKIN LYMPHOMA TYPE (HCC): Primary | ICD-10-CM

## 2021-09-20 LAB
ABSOLUTE EOS #: 0.1 K/UL (ref 0–0.4)
ABSOLUTE IMMATURE GRANULOCYTE: ABNORMAL K/UL (ref 0–0.3)
ABSOLUTE LYMPH #: 1.4 K/UL (ref 1–4.8)
ABSOLUTE MONO #: 1 K/UL (ref 0.1–1.2)
ALBUMIN SERPL-MCNC: 3.7 G/DL (ref 3.5–5.2)
ALBUMIN/GLOBULIN RATIO: 1.4 (ref 1–2.5)
ALP BLD-CCNC: 109 U/L (ref 40–129)
ALT SERPL-CCNC: 18 U/L (ref 5–41)
ANION GAP SERPL CALCULATED.3IONS-SCNC: 12 MMOL/L (ref 9–17)
AST SERPL-CCNC: 17 U/L
BASOPHILS # BLD: 0 % (ref 0–2)
BASOPHILS ABSOLUTE: 0 K/UL (ref 0–0.2)
BILIRUB SERPL-MCNC: 0.43 MG/DL (ref 0.3–1.2)
BUN BLDV-MCNC: 50 MG/DL (ref 8–23)
BUN/CREAT BLD: ABNORMAL (ref 9–20)
CALCIUM SERPL-MCNC: 8.7 MG/DL (ref 8.6–10.4)
CHLORIDE BLD-SCNC: 108 MMOL/L (ref 98–107)
CO2: 21 MMOL/L (ref 20–31)
CREAT SERPL-MCNC: 1.49 MG/DL (ref 0.7–1.2)
DIFFERENTIAL TYPE: ABNORMAL
EOSINOPHILS RELATIVE PERCENT: 2 % (ref 1–4)
GFR AFRICAN AMERICAN: 56 ML/MIN
GFR NON-AFRICAN AMERICAN: 46 ML/MIN
GFR SERPL CREATININE-BSD FRML MDRD: ABNORMAL ML/MIN/{1.73_M2}
GFR SERPL CREATININE-BSD FRML MDRD: ABNORMAL ML/MIN/{1.73_M2}
GLUCOSE BLD-MCNC: 99 MG/DL (ref 70–99)
HCT VFR BLD CALC: 35.4 % (ref 41–53)
HEMOGLOBIN: 11.8 G/DL (ref 13.5–17.5)
IMMATURE GRANULOCYTES: ABNORMAL %
LYMPHOCYTES # BLD: 17 % (ref 24–44)
MCH RBC QN AUTO: 29.6 PG (ref 26–34)
MCHC RBC AUTO-ENTMCNC: 33.3 G/DL (ref 31–37)
MCV RBC AUTO: 88.8 FL (ref 80–100)
MONOCYTES # BLD: 12 % (ref 2–11)
NRBC AUTOMATED: ABNORMAL PER 100 WBC
PDW BLD-RTO: 16.3 % (ref 12.5–15.4)
PLATELET # BLD: 144 K/UL (ref 140–450)
PLATELET ESTIMATE: ABNORMAL
PMV BLD AUTO: 9.3 FL (ref 6–12)
POTASSIUM SERPL-SCNC: 5.2 MMOL/L (ref 3.7–5.3)
RBC # BLD: 3.99 M/UL (ref 4.5–5.9)
RBC # BLD: ABNORMAL 10*6/UL
SEG NEUTROPHILS: 69 % (ref 36–66)
SEGMENTED NEUTROPHILS ABSOLUTE COUNT: 5.8 K/UL (ref 1.8–7.7)
SODIUM BLD-SCNC: 141 MMOL/L (ref 135–144)
TOTAL PROTEIN: 6.3 G/DL (ref 6.4–8.3)
WBC # BLD: 8.4 K/UL (ref 3.5–11)
WBC # BLD: ABNORMAL 10*3/UL

## 2021-09-20 PROCEDURE — 96375 TX/PRO/DX INJ NEW DRUG ADDON: CPT

## 2021-09-20 PROCEDURE — 2580000003 HC RX 258: Performed by: INTERNAL MEDICINE

## 2021-09-20 PROCEDURE — 96413 CHEMO IV INFUSION 1 HR: CPT

## 2021-09-20 PROCEDURE — 85025 COMPLETE CBC W/AUTO DIFF WBC: CPT

## 2021-09-20 PROCEDURE — 96415 CHEMO IV INFUSION ADDL HR: CPT

## 2021-09-20 PROCEDURE — 6370000000 HC RX 637 (ALT 250 FOR IP): Performed by: INTERNAL MEDICINE

## 2021-09-20 PROCEDURE — 6360000002 HC RX W HCPCS: Performed by: INTERNAL MEDICINE

## 2021-09-20 PROCEDURE — 80053 COMPREHEN METABOLIC PANEL: CPT

## 2021-09-20 PROCEDURE — 36591 DRAW BLOOD OFF VENOUS DEVICE: CPT

## 2021-09-20 RX ORDER — SODIUM CHLORIDE 9 MG/ML
20 INJECTION, SOLUTION INTRAVENOUS ONCE
Status: COMPLETED | OUTPATIENT
Start: 2021-09-20 | End: 2021-09-20

## 2021-09-20 RX ORDER — HEPARIN SODIUM (PORCINE) LOCK FLUSH IV SOLN 100 UNIT/ML 100 UNIT/ML
500 SOLUTION INTRAVENOUS PRN
Status: DISCONTINUED | OUTPATIENT
Start: 2021-09-20 | End: 2021-09-21 | Stop reason: HOSPADM

## 2021-09-20 RX ORDER — ACETAMINOPHEN 325 MG/1
650 TABLET ORAL ONCE
Status: COMPLETED | OUTPATIENT
Start: 2021-09-20 | End: 2021-09-20

## 2021-09-20 RX ORDER — SODIUM CHLORIDE 0.9 % (FLUSH) 0.9 %
5-40 SYRINGE (ML) INJECTION PRN
Status: DISCONTINUED | OUTPATIENT
Start: 2021-09-20 | End: 2021-09-21 | Stop reason: HOSPADM

## 2021-09-20 RX ORDER — DIPHENHYDRAMINE HYDROCHLORIDE 50 MG/ML
25 INJECTION INTRAMUSCULAR; INTRAVENOUS ONCE
Status: COMPLETED | OUTPATIENT
Start: 2021-09-20 | End: 2021-09-20

## 2021-09-20 RX ADMIN — SODIUM CHLORIDE 20 ML/HR: 9 INJECTION, SOLUTION INTRAVENOUS at 10:16

## 2021-09-20 RX ADMIN — ACETAMINOPHEN 650 MG: 325 TABLET ORAL at 10:27

## 2021-09-20 RX ADMIN — HEPARIN 500 UNITS: 100 SYRINGE at 12:23

## 2021-09-20 RX ADMIN — SODIUM CHLORIDE, PRESERVATIVE FREE 10 ML: 5 INJECTION INTRAVENOUS at 12:23

## 2021-09-20 RX ADMIN — SODIUM CHLORIDE 750 MG: 9 INJECTION, SOLUTION INTRAVENOUS at 10:43

## 2021-09-20 RX ADMIN — SODIUM CHLORIDE, PRESERVATIVE FREE 10 ML: 5 INJECTION INTRAVENOUS at 09:31

## 2021-09-20 RX ADMIN — DIPHENHYDRAMINE HYDROCHLORIDE 25 MG: 50 INJECTION, SOLUTION INTRAMUSCULAR; INTRAVENOUS at 10:28

## 2021-09-20 RX ADMIN — SODIUM CHLORIDE, PRESERVATIVE FREE 10 ML: 5 INJECTION INTRAVENOUS at 09:30

## 2021-09-20 ASSESSMENT — PAIN DESCRIPTION - PAIN TYPE: TYPE: CHRONIC PAIN

## 2021-09-20 ASSESSMENT — PAIN DESCRIPTION - PROGRESSION: CLINICAL_PROGRESSION: GRADUALLY IMPROVING

## 2021-09-20 ASSESSMENT — PAIN DESCRIPTION - ORIENTATION: ORIENTATION: RIGHT;LOWER

## 2021-09-20 ASSESSMENT — PAIN - FUNCTIONAL ASSESSMENT: PAIN_FUNCTIONAL_ASSESSMENT: PREVENTS OR INTERFERES SOME ACTIVE ACTIVITIES AND ADLS

## 2021-09-20 ASSESSMENT — PAIN SCALES - GENERAL
PAINLEVEL_OUTOF10: 3
PAINLEVEL_OUTOF10: 1

## 2021-09-20 ASSESSMENT — PAIN DESCRIPTION - FREQUENCY: FREQUENCY: CONTINUOUS

## 2021-09-20 ASSESSMENT — PAIN DESCRIPTION - ONSET: ONSET: ON-GOING

## 2021-09-20 ASSESSMENT — PAIN DESCRIPTION - DESCRIPTORS: DESCRIPTORS: DISCOMFORT

## 2021-09-20 ASSESSMENT — PAIN DESCRIPTION - LOCATION: LOCATION: ABDOMEN

## 2021-09-20 NOTE — TELEPHONE ENCOUNTER
9/16/21 pt returned phone call and is resched to [facility] Fri 9/24/21 @ 1:15pm proc time, 11:45am arrival time. Pt reminded of needing a  and to bring proof of covid-19 vaccinations. Pt voices his understanding.

## 2021-09-20 NOTE — PROGRESS NOTES
Pt here for C1D22. Denies any new complaints. Labs drawn from port and results reviewed. Dr Jase Moreno reviewed labs and instructed pt to increase his fluid intake. Pt and spouse verbalize understanding. Will proceed with treatment. Pt was treated without incident and d/c'd in stable condition. Pt will return 11/15 for Dr visit and treatment.

## 2021-09-23 NOTE — TELEPHONE ENCOUNTER
Pt left msg on ofc vm 9/23/21 @ 12:52pm wanting to know arrival time for his proc on 9/24/21    Writer returned pt's phone call and spoke to pt informing him he is to arrive at 11:45am tomorrow 9/24/21 at [facility]. Pt voices his understanding.

## 2021-09-24 ENCOUNTER — ANESTHESIA EVENT (OUTPATIENT)
Dept: OPERATING ROOM | Age: 71
End: 2021-09-24
Payer: MEDICARE

## 2021-09-24 ENCOUNTER — HOSPITAL ENCOUNTER (OUTPATIENT)
Age: 71
Setting detail: OUTPATIENT SURGERY
Discharge: HOME | End: 2021-09-24
Attending: INTERNAL MEDICINE | Admitting: INTERNAL MEDICINE
Payer: MEDICARE

## 2021-09-24 ENCOUNTER — ANESTHESIA (OUTPATIENT)
Dept: OPERATING ROOM | Age: 71
End: 2021-09-24
Payer: MEDICARE

## 2021-09-24 VITALS
BODY MASS INDEX: 24.91 KG/M2 | TEMPERATURE: 97.2 F | OXYGEN SATURATION: 98 % | HEIGHT: 70 IN | SYSTOLIC BLOOD PRESSURE: 171 MMHG | RESPIRATION RATE: 13 BRPM | WEIGHT: 174 LBS | HEART RATE: 50 BPM | DIASTOLIC BLOOD PRESSURE: 65 MMHG

## 2021-09-24 VITALS — OXYGEN SATURATION: 93 % | DIASTOLIC BLOOD PRESSURE: 51 MMHG | SYSTOLIC BLOOD PRESSURE: 99 MMHG

## 2021-09-24 LAB — GLUCOSE BLD-MCNC: 116 MG/DL (ref 75–110)

## 2021-09-24 PROCEDURE — 3700000001 HC ADD 15 MINUTES (ANESTHESIA): Performed by: INTERNAL MEDICINE

## 2021-09-24 PROCEDURE — 88305 TISSUE EXAM BY PATHOLOGIST: CPT

## 2021-09-24 PROCEDURE — 6360000002 HC RX W HCPCS: Performed by: NURSE ANESTHETIST, CERTIFIED REGISTERED

## 2021-09-24 PROCEDURE — 45385 COLONOSCOPY W/LESION REMOVAL: CPT | Performed by: INTERNAL MEDICINE

## 2021-09-24 PROCEDURE — 3609010600 HC COLONOSCOPY POLYPECTOMY SNARE/COLD BIOPSY: Performed by: INTERNAL MEDICINE

## 2021-09-24 PROCEDURE — 7100000011 HC PHASE II RECOVERY - ADDTL 15 MIN: Performed by: INTERNAL MEDICINE

## 2021-09-24 PROCEDURE — 3700000000 HC ANESTHESIA ATTENDED CARE: Performed by: INTERNAL MEDICINE

## 2021-09-24 PROCEDURE — 7100000010 HC PHASE II RECOVERY - FIRST 15 MIN: Performed by: INTERNAL MEDICINE

## 2021-09-24 PROCEDURE — 2709999900 HC NON-CHARGEABLE SUPPLY: Performed by: INTERNAL MEDICINE

## 2021-09-24 PROCEDURE — 2580000003 HC RX 258: Performed by: ANESTHESIOLOGY

## 2021-09-24 PROCEDURE — 82947 ASSAY GLUCOSE BLOOD QUANT: CPT

## 2021-09-24 RX ORDER — PROPOFOL 10 MG/ML
INJECTION, EMULSION INTRAVENOUS PRN
Status: DISCONTINUED | OUTPATIENT
Start: 2021-09-24 | End: 2021-09-24 | Stop reason: SDUPTHER

## 2021-09-24 RX ORDER — LIDOCAINE HYDROCHLORIDE 10 MG/ML
1 INJECTION, SOLUTION EPIDURAL; INFILTRATION; INTRACAUDAL; PERINEURAL
Status: DISCONTINUED | OUTPATIENT
Start: 2021-09-24 | End: 2021-09-24 | Stop reason: HOSPADM

## 2021-09-24 RX ORDER — SODIUM CHLORIDE, SODIUM LACTATE, POTASSIUM CHLORIDE, CALCIUM CHLORIDE 600; 310; 30; 20 MG/100ML; MG/100ML; MG/100ML; MG/100ML
INJECTION, SOLUTION INTRAVENOUS CONTINUOUS
Status: DISCONTINUED | OUTPATIENT
Start: 2021-09-25 | End: 2021-09-24 | Stop reason: HOSPADM

## 2021-09-24 RX ORDER — ONDANSETRON 2 MG/ML
4 INJECTION INTRAMUSCULAR; INTRAVENOUS
Status: DISCONTINUED | OUTPATIENT
Start: 2021-09-24 | End: 2021-09-24 | Stop reason: HOSPADM

## 2021-09-24 RX ADMIN — PROPOFOL 50 MG: 10 INJECTION, EMULSION INTRAVENOUS at 12:56

## 2021-09-24 RX ADMIN — PROPOFOL 50 MG: 10 INJECTION, EMULSION INTRAVENOUS at 12:49

## 2021-09-24 RX ADMIN — PROPOFOL 20 MG: 10 INJECTION, EMULSION INTRAVENOUS at 13:34

## 2021-09-24 RX ADMIN — PROPOFOL 50 MG: 10 INJECTION, EMULSION INTRAVENOUS at 13:00

## 2021-09-24 RX ADMIN — PROPOFOL 50 MG: 10 INJECTION, EMULSION INTRAVENOUS at 13:12

## 2021-09-24 RX ADMIN — PROPOFOL 50 MG: 10 INJECTION, EMULSION INTRAVENOUS at 12:53

## 2021-09-24 RX ADMIN — SODIUM CHLORIDE, POTASSIUM CHLORIDE, SODIUM LACTATE AND CALCIUM CHLORIDE: 600; 310; 30; 20 INJECTION, SOLUTION INTRAVENOUS at 11:39

## 2021-09-24 RX ADMIN — PROPOFOL 100 MG: 10 INJECTION, EMULSION INTRAVENOUS at 13:19

## 2021-09-24 RX ADMIN — PROPOFOL 50 MG: 10 INJECTION, EMULSION INTRAVENOUS at 13:16

## 2021-09-24 RX ADMIN — PROPOFOL 100 MG: 10 INJECTION, EMULSION INTRAVENOUS at 13:02

## 2021-09-24 ASSESSMENT — PULMONARY FUNCTION TESTS
PIF_VALUE: 1
PIF_VALUE: 0
PIF_VALUE: 1
PIF_VALUE: 0
PIF_VALUE: 1
PIF_VALUE: 0
PIF_VALUE: 1
PIF_VALUE: 0
PIF_VALUE: 0
PIF_VALUE: 1
PIF_VALUE: 0
PIF_VALUE: 1
PIF_VALUE: 0
PIF_VALUE: 1
PIF_VALUE: 0
PIF_VALUE: 1
PIF_VALUE: 0
PIF_VALUE: 1
PIF_VALUE: 0
PIF_VALUE: 1
PIF_VALUE: 0
PIF_VALUE: 1
PIF_VALUE: 0
PIF_VALUE: 1
PIF_VALUE: 0
PIF_VALUE: 1
PIF_VALUE: 0
PIF_VALUE: 1
PIF_VALUE: 1

## 2021-09-24 ASSESSMENT — ENCOUNTER SYMPTOMS: SHORTNESS OF BREATH: 0

## 2021-09-24 ASSESSMENT — PAIN SCALES - GENERAL
PAINLEVEL_OUTOF10: 0
PAINLEVEL_OUTOF10: 0

## 2021-09-24 ASSESSMENT — LIFESTYLE VARIABLES: SMOKING_STATUS: 0

## 2021-09-24 ASSESSMENT — PAIN DESCRIPTION - DESCRIPTORS: DESCRIPTORS: DISCOMFORT

## 2021-09-24 ASSESSMENT — PAIN - FUNCTIONAL ASSESSMENT: PAIN_FUNCTIONAL_ASSESSMENT: 0-10

## 2021-09-24 NOTE — H&P
Interval H&P Note    Pt Name: Lul Chao  MRN: [id number]  YOB: 1950  Date of evaluation: 9/24/2021      [x] I have reviewed in Epic the H&P by Lexi OSORIO dated 8/31//2021 attached below which meets the criteria for an Interval History and Physical note. [x] I have examined  Lul Chao  There are no changes to the patient who is scheduled for a diagnostic colonoscopy by Dr. Gwen Oro for abdominal pain right lower quadrant, follicular lymphoma grade 2 intra abdominal lymph node, anemia, change in bowel function. Patient had port placement for lymphoma on 8/31/2021. Patient was having complaints of recurrent abdominal pain and was given a referral to GI from Dr. Stone Smith. Patient was evaluated by Dr. Gwen Oro on 9/14/2021 who recommended colonoscopy. He denies bloody tarry stools, diarrhea alternating with constipation, nausea, vomiting, or unintentional weight loss. Patient followed bowel prep until watery clear. No previous colonoscopy. The patient denies new health changes, fever, chills, wheezing, cough, increased SOB, chest pain, open sores or wounds. Hx diabetes. POC . Denies taking any blood thinning medications in the last 10 days. Vital signs: BP (!) 155/58   Pulse 80   Temp 96.8 °F (36 °C)   Resp 16   SpO2 97%     Allergies:  Patient has no known allergies. Medications:    Prior to Admission medications    Medication Sig Start Date End Date Taking? Authorizing Provider   magnesium citrate solution Take 296 mLs by mouth once for 1 dose 9/14/21 9/14/21  Dominik Bergeron MD   bisacodyl (DULCOLAX) 5 MG EC tablet TAKE 4 TABS AT 10 AM THE DAY PRIOR TO COLONOSCOPY 9/14/21   Dominik Bergeron MD   polyethylene glycol (GLYCOLAX) 17 GM/SCOOP powder Use as directed by following your patient instructions given by office.  9/14/21   Dominik Bergeron MD   donepezil (ARICEPT) 10 MG tablet  7/11/21   Historical Provider, MD   meloxicam (MOBIC) 15 MG tablet  7/14/21   Historical Provider, MD --    INR  --   --  0.9   PROTIME  --   --  12.1   APTT  --   --  26.7   AST 17   < >  --    ALT 18   < >  --    LABALBU 3.7   < >  --     < > = values in this interval not displayed. No results for input(s): COVID19 in the last 720 hours. EDWIGE Keene CNP  Electronically signed 9/24/2021 at 11:25 AM    EDWIGE Woods CNP   Nurse Practitioner   Internal Medicine   H&P       Signed   Date of Service:  8/31/2021 11:00 AM         Related encounter: [facility] IR PORT PLACEMENT > 5 YEARS from 8/31/2021 in [facility] Special Procedures         Signed        Expand AllCollapse All     Show:Clear all  [x]Manual[x]Template[x]Copied    Added by:  [x]EDWIGE Jeffrey CNP    []Holly for details        HISTORY and [facility] [address]         NAME:  Cesar Clarke  MRN: [id number]   YOB: 1950   Date: 8/31/2021   Age: 66 y.o. Gender: male         COMPLAINT AND PRESENT HISTORY:      Cesar Clarke is 66 y.o. , male, for IR PORT PLACEMENT      Patient has hx of Non-Hodgkin's Lymphoma. Patient was diagnosed in 2013 and underwent Chemotherapy. Patient presented with abdominal pain and distension. and was rechecked for Lymphoma. Patient had CAT, PET scan and Patient had a Bx done 7/6/21,  that gave evidence to relapse in follicular lymphoma. Patient has been under care of , Oncology. Pt has recently started x2  infusions of Chemotherapy meds. Reports that he will be starting. Rituxan, as he has in the past.      Patient reports with continues abdominal pain on the right side. Patient states when the pain strikes its at a 10/10. Pt denies any nausea or vomiting . No decrease in appetite, occassional  night sweats. Patient has significant medical history of : HTN, HLD, HYPOTHYROID, DM-managed with medications. Patient denies any CP, palpitations  or SOB. Denies any issues in bowel or bladder. No fever or chills.    Patient denies any aspirin, but has been on motrin. PAST MEDICAL HISTORY      Past Medical History        Past Medical History:   Diagnosis Date    Cancer ([facility] [address]      Diabetes mellitus ([facility] [address]      History of non-Hodgkin's lymphoma      Hyperlipidemia      Hypertension      Hypothyroidism              SURGICAL HISTORY        Past Surgical History         Past Surgical History:   Procedure Laterality Date    HERNIA REPAIR        IR BIOPSY ABDOMINAL MASS   7/6/2021     IR BIOPSY ABDOMINAL MASS 7/6/2021 [facility] SPECIAL PROCEDURES            FAMILY HISTORY     Family History   History reviewed. No pertinent family history. SOCIAL HISTORY        Social History   Social History            Socioeconomic History    Marital status:        Spouse name: None    Number of children: None    Years of education: None    Highest education level: None   Occupational History    None   Tobacco Use    Smoking status: Former Smoker       Packs/day: 2.00       Years: 45.00       Pack years: 90.00       Types: Cigarettes       Quit date: 1/7/2006       Years since quitting: 15.6    Smokeless tobacco: Never Used   Vaping Use    Vaping Use: Never used   Substance and Sexual Activity    Alcohol use: Yes       Alcohol/week: 1.7 standard drinks       Types: 2 Standard drinks or equivalent per week    Drug use: Yes       Types: Marijuana    Sexual activity: None   Other Topics Concern    None   Social History Narrative    None      Social Determinants of Health          Financial Resource Strain:     Difficulty of Paying Living Expenses:    Food Insecurity:     Worried About Running Out of Food in the Last Year:     Ran Out of Food in the Last Year:    Transportation Needs:     Lack of Transportation (Medical):      Lack of Transportation (Non-Medical):    Physical Activity:     Days of Exercise per Week:     Minutes of Exercise per Session:    Stress:     Feeling of Stress :    Social Connections:     Abdifatah Shultz is 66 y.o.,  male, moderately obese, nourished, conscious, alert. Does not appear to be distress or pain at this time. SKIN:  Warm, dry, no cyanosis or jaundice. HEAD:  Normocephalic, atraumatic, no swelling or tenderness. EYES:  Pupils equal, reactive to light. EARS:  No discharge, no marked hearing loss. NOSE:  No rhinorrhea, epistaxis or septal deformity. THROAT:  Not congested. No ulceration bleeding or discharge. NECK:  No stiffness, trachea central.  No palpable masses or L.N.                 CHEST:  Symmetrical and equal on expansion. HEART:  RRR S1 > S2. No audible murmurs or gallops. LUNGS:  Equal on expansion, normal breath sounds. No adventitious sounds. ABDOMEN:  Obese. Soft on palpation. No dysphagia, No localized tenderness. No guarding or rigidity. No palpable hepatosplenomegaly. LYMPHATICS:  No palpable cervical lymphadenopathy. LOCOMOTOR, BACK AND SPINE:  No tenderness or deformities. EXTREMITIES:  Symmetrical, no pretibial edema. Jamies sign negative. No discoloration or ulcerations. NEUROLOGIC:  The patient is conscious, alert, oriented,Cranial nerve II-XII intact, taste and smell were not examined. No apparent focal sensory or motor deficits. PROVISIONAL DIAGNOSES / SURGERY:       IR PORT PLACEMENT          Recurrent Small Cell Non-Hodgkin's Lymphoma          Patient Active Problem List     Diagnosis Date Noted    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 ([address] 08/09/2021    Acute kidney injury ([address] 07/03/2021    Traumatic retroperitoneal hematoma 12/15/2019    NHL (non-Hodgkin's lymphoma) ([address] 01/07/2013            VALARIE Grubbs, EDWIGE - CNP on 8/31/2021 at 10:32 AM               Cosigned by:  Luís Kenny MD at 8/31/2021 11:04 AM Revision History                    Routing History

## 2021-09-24 NOTE — ANESTHESIA POSTPROCEDURE EVALUATION
Department of Anesthesiology  Postprocedure Note    Patient: Justo Hilario  MRN: [id number]  YOB: 1950  Date of evaluation: 9/24/2021  Time:  2:08 PM     Procedure Summary     Date: 09/24/21 Room / Location: [place] [address]    Anesthesia Start: 5054 Anesthesia Stop: 9683    Procedure: COLONOSCOPY POLYPECTOMY SNARE/COLD BIOPSY (Left ) Diagnosis: (DX ABD PAIN  RIGHT LOWER QUADRANT,FOLLICALER LYMPHOMA GRADE 2 INTRA ABDOMINAL LYMPH NODE    ANEMIA  CHANGE IN BOWEL FUNCTION)    Surgeons: Idalia Mcarthur MD Responsible Provider: Prince Mahmood MD    Anesthesia Type: MAC ASA Status: 3          Anesthesia Type: MAC    Yvette Phase I:      Yvette Phase II: Yvette Score: 10    Last vitals: Reviewed and per EMR flowsheets.        Anesthesia Post Evaluation    Complications: no

## 2021-09-24 NOTE — OP NOTE
Operative Note  PROCEDURE NOTE    DATE OF PROCEDURE: 9/24/2021    SURGEON: Effie Warren MD  Facility : [facility] [address] [facility]  ASSISTANT: None  Anesthesia: MAC  PREOPERATIVE DIAGNOSIS:     1. Abdominal pain, right lower quadrant  COLONOSCOPY W/ OR W/O BIOPSY   2. 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)  COLONOSCOPY W/ OR W/O BIOPSY   3. Change in bowel function  COLONOSCOPY W/ OR W/O BIOPSY   4. Anemia, unspecified type  COLONOSCOPY W/ OR W/O BIOPSY        POSTOPERATIVE DIAGNOSIS: as described below    OPERATION: Total colonoscopy     ANESTHESIA: Moderate Sedation    ESTIMATED BLOOD LOSS: less than 50     COMPLICATIONS: None. SPECIMENS:  Was Obtained:     Cecal polyp removed with snare and 2 clips were placed  Measuring around 12 mm    Right colon polyp 1 cm snared    Multiple sigmoid polyps, they are all around 1 cm removed with a snare one by one, we removed at least 5  There is multiple other smaller ones which we could not remove at this time because of the poor preparation and the patient would not give the air to keep deflating his colon      Diverticulosis      Very poor preparation    Hemorrhoids    Very limited exam is to be repeated with 2-day prep          HISTORY: The patient is a 66y.o. year old male with history of above preop diagnosis. I recommended colonoscopy with possible biopsy or polypectomy and I explained the risk, benefits, expected outcome, and alternatives to the procedure. Risks included but are not limited to bleeding, infection, respiratory distress, hypotension, and perforation of the colon and possibility of missing a lesion. The patient understands and is in agreement. The patient was counseled at length about the risks of lashaun Covid-19 during their perioperative period and any recovery window from their procedure.   The patient was made aware that lashaun Covid-19  may worsen their prognosis for recovering from their procedure  and lend to a higher morbidity and/or mortality risk. All material risks, benefits, and reasonable alternatives including postponing the procedure were discussed. The patient does wish to proceed with the procedure at this time. PROCEDURE: The patient was given IV conscious sedation. The patient's SPO2 remained above 90% throughout the procedure. The colonoscope was inserted per rectum and advanced under direct vision to the cecum without difficulty. Post sedation note : The patient's SPO2 remained above 90% throughout the procedure. the vital signs remained stable , and no immediate complication form the procedure noted, patient will be ready for d/c when criteria is met . The prep was poor. Findings:  Terminal ileum: normal      Cecal polyp removed with snare and 2 clips were placed  Measuring around 12 mm    Right colon polyp 1 cm snared    Multiple sigmoid polyps, they are all around 1 cm removed with a snare one by one, we removed at least 5  There is multiple other smaller ones which we could not remove at this time because of the poor preparation and the patient would not give the air to keep deflating his colon      Diverticulosis      Very poor preparation    Hemorrhoids    Very limited exam is to be repeated with 2-day prep    Withdrawal Time was (minutes): 29    The colon was decompressed and the scope was removed. The patient tolerated the procedure well. Recommendations/Plan:   1. Lifestyle and dietary modifications as discussed  2. F/U Biopsies  3. F/U In OfficeYes  4. Discussed with the family  5.  Repeat colonoscopy in2-3 months with 2 day prep       Electronically signed by Samina Mariee MD  on 9/24/2021 at 1:43 PM

## 2021-09-28 LAB — SURGICAL PATHOLOGY REPORT: NORMAL

## 2021-10-05 ENCOUNTER — APPOINTMENT (OUTPATIENT)
Dept: CT IMAGING | Age: 71
DRG: 660 | End: 2021-10-05
Payer: MEDICARE

## 2021-10-05 ENCOUNTER — HOSPITAL ENCOUNTER (INPATIENT)
Age: 71
LOS: 7 days | Discharge: HOME | DRG: 660 | End: 2021-10-12
Attending: EMERGENCY MEDICINE | Admitting: FAMILY MEDICINE
Payer: MEDICARE

## 2021-10-05 ENCOUNTER — TELEPHONE (OUTPATIENT)
Dept: GASTROENTEROLOGY | Age: 71
End: 2021-10-05

## 2021-10-05 DIAGNOSIS — R52 PAIN: ICD-10-CM

## 2021-10-05 DIAGNOSIS — K81.1 CHRONIC CHOLECYSTITIS: ICD-10-CM

## 2021-10-05 DIAGNOSIS — N13.1 HYDRONEPHROSIS WITH URETERAL STRICTURE, NOT ELSEWHERE CLASSIFIED: Primary | ICD-10-CM

## 2021-10-05 LAB
-: ABNORMAL
ABSOLUTE EOS #: 0 K/UL (ref 0–0.4)
ABSOLUTE IMMATURE GRANULOCYTE: ABNORMAL K/UL (ref 0–0.3)
ABSOLUTE LYMPH #: 1 K/UL (ref 1–4.8)
ABSOLUTE MONO #: 0.6 K/UL (ref 0.1–1.3)
ALBUMIN SERPL-MCNC: 3.9 G/DL (ref 3.5–5.2)
ALBUMIN/GLOBULIN RATIO: ABNORMAL (ref 1–2.5)
ALP BLD-CCNC: 147 U/L (ref 40–129)
ALT SERPL-CCNC: 17 U/L (ref 5–41)
AMORPHOUS: ABNORMAL
ANION GAP SERPL CALCULATED.3IONS-SCNC: 15 MMOL/L (ref 9–17)
AST SERPL-CCNC: 10 U/L
BACTERIA: ABNORMAL
BASOPHILS # BLD: 0 % (ref 0–2)
BASOPHILS ABSOLUTE: 0 K/UL (ref 0–0.2)
BILIRUB SERPL-MCNC: 0.72 MG/DL (ref 0.3–1.2)
BILIRUBIN DIRECT: 0.17 MG/DL
BILIRUBIN URINE: ABNORMAL
BILIRUBIN, INDIRECT: 0.55 MG/DL (ref 0–1)
BUN BLDV-MCNC: 20 MG/DL (ref 8–23)
BUN/CREAT BLD: ABNORMAL (ref 9–20)
CALCIUM SERPL-MCNC: 9.1 MG/DL (ref 8.6–10.4)
CASTS UA: ABNORMAL /LPF
CASTS UA: ABNORMAL /LPF
CHLORIDE BLD-SCNC: 105 MMOL/L (ref 98–107)
CO2: 24 MMOL/L (ref 20–31)
COLOR: YELLOW
COMMENT UA: ABNORMAL
CREAT SERPL-MCNC: 1.25 MG/DL (ref 0.7–1.2)
CRYSTALS, UA: ABNORMAL /HPF
DIFFERENTIAL TYPE: ABNORMAL
EOSINOPHILS RELATIVE PERCENT: 1 % (ref 0–4)
EPITHELIAL CELLS UA: ABNORMAL /HPF
GFR AFRICAN AMERICAN: >60 ML/MIN
GFR NON-AFRICAN AMERICAN: 57 ML/MIN
GFR SERPL CREATININE-BSD FRML MDRD: ABNORMAL ML/MIN/{1.73_M2}
GFR SERPL CREATININE-BSD FRML MDRD: ABNORMAL ML/MIN/{1.73_M2}
GLOBULIN: ABNORMAL G/DL (ref 1.5–3.8)
GLUCOSE BLD-MCNC: 139 MG/DL (ref 70–99)
GLUCOSE URINE: NEGATIVE
HCT VFR BLD CALC: 39.3 % (ref 41–53)
HEMOGLOBIN: 13.3 G/DL (ref 13.5–17.5)
IMMATURE GRANULOCYTES: ABNORMAL %
KETONES, URINE: ABNORMAL
LACTATE DEHYDROGENASE: 188 U/L (ref 135–225)
LACTIC ACID: 1.3 MMOL/L (ref 0.5–2.2)
LEUKOCYTE ESTERASE, URINE: NEGATIVE
LIPASE: 22 U/L (ref 13–60)
LYMPHOCYTES # BLD: 13 % (ref 24–44)
MCH RBC QN AUTO: 29.7 PG (ref 26–34)
MCHC RBC AUTO-ENTMCNC: 33.9 G/DL (ref 31–37)
MCV RBC AUTO: 87.5 FL (ref 80–100)
MONOCYTES # BLD: 8 % (ref 1–7)
MUCUS: ABNORMAL
NITRITE, URINE: NEGATIVE
NRBC AUTOMATED: ABNORMAL PER 100 WBC
OTHER OBSERVATIONS UA: ABNORMAL
PDW BLD-RTO: 15.3 % (ref 11.5–14.9)
PH UA: 5.5 (ref 5–8)
PHOSPHORUS: 3.4 MG/DL (ref 2.5–4.5)
PLATELET # BLD: 171 K/UL (ref 150–450)
PLATELET ESTIMATE: ABNORMAL
PMV BLD AUTO: 9 FL (ref 6–12)
POTASSIUM SERPL-SCNC: 4.6 MMOL/L (ref 3.7–5.3)
PROTEIN UA: ABNORMAL
RBC # BLD: 4.49 M/UL (ref 4.5–5.9)
RBC # BLD: ABNORMAL 10*6/UL
RBC UA: ABNORMAL /HPF
RENAL EPITHELIAL, UA: ABNORMAL /HPF
SEG NEUTROPHILS: 78 % (ref 36–66)
SEGMENTED NEUTROPHILS ABSOLUTE COUNT: 5.9 K/UL (ref 1.3–9.1)
SODIUM BLD-SCNC: 144 MMOL/L (ref 135–144)
SPECIFIC GRAVITY UA: 1.04 (ref 1–1.03)
TOTAL PROTEIN: 6.6 G/DL (ref 6.4–8.3)
TRICHOMONAS: ABNORMAL
TURBIDITY: CLEAR
URIC ACID: 9.1 MG/DL (ref 3.4–7)
URINE HGB: NEGATIVE
UROBILINOGEN, URINE: NORMAL
WBC # BLD: 7.6 K/UL (ref 3.5–11)
WBC # BLD: ABNORMAL 10*3/UL
WBC UA: ABNORMAL /HPF
YEAST: ABNORMAL

## 2021-10-05 PROCEDURE — 96374 THER/PROPH/DIAG INJ IV PUSH: CPT

## 2021-10-05 PROCEDURE — 96375 TX/PRO/DX INJ NEW DRUG ADDON: CPT

## 2021-10-05 PROCEDURE — 80048 BASIC METABOLIC PNL TOTAL CA: CPT

## 2021-10-05 PROCEDURE — 6360000004 HC RX CONTRAST MEDICATION: Performed by: STUDENT IN AN ORGANIZED HEALTH CARE EDUCATION/TRAINING PROGRAM

## 2021-10-05 PROCEDURE — 96376 TX/PRO/DX INJ SAME DRUG ADON: CPT

## 2021-10-05 PROCEDURE — 84100 ASSAY OF PHOSPHORUS: CPT

## 2021-10-05 PROCEDURE — 2580000003 HC RX 258: Performed by: STUDENT IN AN ORGANIZED HEALTH CARE EDUCATION/TRAINING PROGRAM

## 2021-10-05 PROCEDURE — 80076 HEPATIC FUNCTION PANEL: CPT

## 2021-10-05 PROCEDURE — 85025 COMPLETE CBC W/AUTO DIFF WBC: CPT

## 2021-10-05 PROCEDURE — 83605 ASSAY OF LACTIC ACID: CPT

## 2021-10-05 PROCEDURE — 6370000000 HC RX 637 (ALT 250 FOR IP): Performed by: STUDENT IN AN ORGANIZED HEALTH CARE EDUCATION/TRAINING PROGRAM

## 2021-10-05 PROCEDURE — 2580000003 HC RX 258: Performed by: FAMILY MEDICINE

## 2021-10-05 PROCEDURE — 6360000002 HC RX W HCPCS: Performed by: STUDENT IN AN ORGANIZED HEALTH CARE EDUCATION/TRAINING PROGRAM

## 2021-10-05 PROCEDURE — 99285 EMERGENCY DEPT VISIT HI MDM: CPT

## 2021-10-05 PROCEDURE — 6360000002 HC RX W HCPCS: Performed by: EMERGENCY MEDICINE

## 2021-10-05 PROCEDURE — 81001 URINALYSIS AUTO W/SCOPE: CPT

## 2021-10-05 PROCEDURE — 74177 CT ABD & PELVIS W/CONTRAST: CPT

## 2021-10-05 PROCEDURE — 36415 COLL VENOUS BLD VENIPUNCTURE: CPT

## 2021-10-05 PROCEDURE — 84550 ASSAY OF BLOOD/URIC ACID: CPT

## 2021-10-05 PROCEDURE — 83615 LACTATE (LD) (LDH) ENZYME: CPT

## 2021-10-05 PROCEDURE — 1200000000 HC SEMI PRIVATE

## 2021-10-05 PROCEDURE — 83690 ASSAY OF LIPASE: CPT

## 2021-10-05 RX ORDER — ONDANSETRON 2 MG/ML
4 INJECTION INTRAMUSCULAR; INTRAVENOUS EVERY 8 HOURS PRN
Status: DISCONTINUED | OUTPATIENT
Start: 2021-10-05 | End: 2021-10-12 | Stop reason: HOSPADM

## 2021-10-05 RX ORDER — SODIUM CHLORIDE 0.9 % (FLUSH) 0.9 %
10 SYRINGE (ML) INJECTION PRN
Status: DISCONTINUED | OUTPATIENT
Start: 2021-10-05 | End: 2021-10-12 | Stop reason: HOSPADM

## 2021-10-05 RX ORDER — AMLODIPINE BESYLATE 5 MG/1
10 TABLET ORAL DAILY
Status: ON HOLD | COMMUNITY
End: 2021-10-12 | Stop reason: HOSPADM

## 2021-10-05 RX ORDER — PANTOPRAZOLE SODIUM 40 MG/1
40 TABLET, DELAYED RELEASE ORAL
Status: DISCONTINUED | OUTPATIENT
Start: 2021-10-06 | End: 2021-10-12 | Stop reason: HOSPADM

## 2021-10-05 RX ORDER — ONDANSETRON 2 MG/ML
4 INJECTION INTRAMUSCULAR; INTRAVENOUS ONCE
Status: COMPLETED | OUTPATIENT
Start: 2021-10-05 | End: 2021-10-05

## 2021-10-05 RX ORDER — 0.9 % SODIUM CHLORIDE 0.9 %
80 INTRAVENOUS SOLUTION INTRAVENOUS ONCE
Status: COMPLETED | OUTPATIENT
Start: 2021-10-05 | End: 2021-10-05

## 2021-10-05 RX ORDER — CARVEDILOL 6.25 MG/1
6.25 TABLET ORAL 2 TIMES DAILY WITH MEALS
Status: DISCONTINUED | OUTPATIENT
Start: 2021-10-05 | End: 2021-10-10

## 2021-10-05 RX ORDER — 0.9 % SODIUM CHLORIDE 0.9 %
1000 INTRAVENOUS SOLUTION INTRAVENOUS ONCE
Status: COMPLETED | OUTPATIENT
Start: 2021-10-05 | End: 2021-10-05

## 2021-10-05 RX ORDER — AMLODIPINE BESYLATE 5 MG/1
10 TABLET ORAL DAILY
Status: DISCONTINUED | OUTPATIENT
Start: 2021-10-05 | End: 2021-10-12 | Stop reason: HOSPADM

## 2021-10-05 RX ORDER — GLIPIZIDE AND METFORMIN HCL 5; 500 MG/1; MG/1
1-2 TABLET, FILM COATED ORAL
COMMUNITY
End: 2022-07-14

## 2021-10-05 RX ORDER — SODIUM CHLORIDE 9 MG/ML
INJECTION, SOLUTION INTRAVENOUS CONTINUOUS
Status: DISCONTINUED | OUTPATIENT
Start: 2021-10-05 | End: 2021-10-12 | Stop reason: HOSPADM

## 2021-10-05 RX ORDER — DONEPEZIL HYDROCHLORIDE 10 MG/1
10 TABLET, FILM COATED ORAL NIGHTLY
Status: DISCONTINUED | OUTPATIENT
Start: 2021-10-05 | End: 2021-10-12 | Stop reason: HOSPADM

## 2021-10-05 RX ORDER — MEMANTINE HYDROCHLORIDE 10 MG/1
10 TABLET ORAL 2 TIMES DAILY
Status: DISCONTINUED | OUTPATIENT
Start: 2021-10-05 | End: 2021-10-12 | Stop reason: HOSPADM

## 2021-10-05 RX ORDER — GLIPIZIDE AND METFORMIN HCL 5; 500 MG/1; MG/1
1 TABLET, FILM COATED ORAL
COMMUNITY
End: 2021-11-15 | Stop reason: SDUPTHER

## 2021-10-05 RX ORDER — FLUOXETINE HYDROCHLORIDE 20 MG/1
20 CAPSULE ORAL DAILY
Status: DISCONTINUED | OUTPATIENT
Start: 2021-10-05 | End: 2021-10-12 | Stop reason: HOSPADM

## 2021-10-05 RX ORDER — ATORVASTATIN CALCIUM 10 MG/1
10 TABLET, FILM COATED ORAL DAILY
Status: DISCONTINUED | OUTPATIENT
Start: 2021-10-05 | End: 2021-10-12 | Stop reason: HOSPADM

## 2021-10-05 RX ORDER — PREGABALIN 150 MG/1
150 CAPSULE ORAL NIGHTLY
Status: ON HOLD | COMMUNITY
End: 2022-02-01 | Stop reason: HOSPADM

## 2021-10-05 RX ORDER — SCOLOPAMINE TRANSDERMAL SYSTEM 1 MG/1
1 PATCH, EXTENDED RELEASE TRANSDERMAL
Status: DISCONTINUED | OUTPATIENT
Start: 2021-10-05 | End: 2021-10-12 | Stop reason: HOSPADM

## 2021-10-05 RX ORDER — ONDANSETRON 4 MG/1
4 TABLET, ORALLY DISINTEGRATING ORAL ONCE
Status: DISCONTINUED | OUTPATIENT
Start: 2021-10-05 | End: 2021-10-05

## 2021-10-05 RX ORDER — LEVOTHYROXINE SODIUM 112 UG/1
112 TABLET ORAL DAILY
Status: DISCONTINUED | OUTPATIENT
Start: 2021-10-06 | End: 2021-10-12 | Stop reason: HOSPADM

## 2021-10-05 RX ADMIN — SODIUM CHLORIDE, PRESERVATIVE FREE 10 ML: 5 INJECTION INTRAVENOUS at 17:47

## 2021-10-05 RX ADMIN — AMLODIPINE BESYLATE 10 MG: 5 TABLET ORAL at 22:38

## 2021-10-05 RX ADMIN — ONDANSETRON 4 MG: 2 INJECTION INTRAMUSCULAR; INTRAVENOUS at 18:56

## 2021-10-05 RX ADMIN — ONDANSETRON 4 MG: 2 INJECTION INTRAMUSCULAR; INTRAVENOUS at 16:24

## 2021-10-05 RX ADMIN — IOPAMIDOL 75 ML: 755 INJECTION, SOLUTION INTRAVENOUS at 17:47

## 2021-10-05 RX ADMIN — SODIUM CHLORIDE: 9 INJECTION, SOLUTION INTRAVENOUS at 22:40

## 2021-10-05 RX ADMIN — HYDROMORPHONE HYDROCHLORIDE 1 MG: 1 INJECTION, SOLUTION INTRAMUSCULAR; INTRAVENOUS; SUBCUTANEOUS at 18:56

## 2021-10-05 RX ADMIN — SODIUM CHLORIDE 80 ML: 9 INJECTION, SOLUTION INTRAVENOUS at 17:44

## 2021-10-05 RX ADMIN — SODIUM CHLORIDE 1000 ML: 9 INJECTION, SOLUTION INTRAVENOUS at 16:23

## 2021-10-05 ASSESSMENT — ENCOUNTER SYMPTOMS
VOMITING: 1
EYE ITCHING: 0
COUGH: 0
EYE PAIN: 0
SHORTNESS OF BREATH: 0
DIARRHEA: 0
SINUS PRESSURE: 0
ABDOMINAL DISTENTION: 0
SORE THROAT: 0
SINUS PAIN: 0
CONSTIPATION: 0
NAUSEA: 1
ABDOMINAL PAIN: 1

## 2021-10-05 ASSESSMENT — PAIN SCALES - GENERAL
PAINLEVEL_OUTOF10: 8
PAINLEVEL_OUTOF10: 8

## 2021-10-05 NOTE — TELEPHONE ENCOUNTER
Patient's wife LM x2 yesterday afternoon and once this morning stating pt was having abd pain. VM were pulled around noon today. Writer called and spoke with Sarika. She states she thinks pt has the stomach flu because he started vomiting. Writer advised patient to go to ER. She states she would like Dr Sally Pacheco to look at results of colonoscopy first.  Writer spoke with Dr Sally Pacheco. He also advises pt to go to ER to be evaluated. Writer returned call to [facility] and advised her. She stated she would see if she could get the patient to go. Writer expressed the urgency to go to hospital.  TE was not made immediately, as we were in clinic. Writer checked chart to make TE and sees pt is in ER.

## 2021-10-05 NOTE — ED PROVIDER NOTES
[facility]  Emergency Department Encounter  EmergencyMedicine Resident     Pt Name:Dale Terris Hamman  MRN: [id number]  [place] 1950  Date of evaluation: 10/5/21  PCP:  Mary Rodrigez DO    This patient was evaluated in the Emergency Department for symptoms described in the history of present illness. The patient was evaluated in the context of the global COVID-19 pandemic, which necessitated consideration that the patient might be at risk for infection with the SARS-CoV-2 virus that causes COVID-19. Institutional protocols and algorithms that pertain to the evaluation of patients at risk for COVID-19 are in a state of rapid change based on information released by regulatory bodies including the CDC and federal and state organizations. These policies and algorithms were followed during the patient's care in the ED. CHIEF COMPLAINT       Chief Complaint   Patient presents with    Abdominal Pain       HISTORY OF PRESENT ILLNESS  (Location/Symptom, Timing/Onset, Context/Setting, Quality, Duration, Modifying Factors, Severity.)      Brielle Jon is a 66 y.o. male who presents with right-sided and generalized abdominal pain with nausea and NBNB emesis. States he has been unable to tolerate any significant p.o. intake for the last 3 days. He reported this to his doctor who instructed him to report to the emergency department due to \"possible infection\". Complains of subjective fever and chills but denies any shortness of breath, chest pain, coughing, dysuria. Underwent colonoscopy 1 week ago which was limited due to poor prep but states that 5 polyps were removed and were \"precancerous\". Medical history includes lymphoma for which he is following with his oncologist and is currently on Rituxan. Last CT scan of the abdomen was done in July and demonstrated a left-sided retroperitoneal mass.     PAST MEDICAL / SURGICAL / SOCIAL / FAMILY HISTORY      has a past medical history of Cancer ([facility] [address] Chemotherapy management, encounter for, Diabetes mellitus ([facility] [address] History of non-Hodgkin's lymphoma, Hyperlipidemia, Hypertension, and Hypothyroidism. has a past surgical history that includes hernia repair; IR BIOPSY ABDOMINAL/RETROPERITONEAL MASS PERCUTANEOUS (7/6/2021); IR PORT PLACEMENT > 5 YEARS (8/31/2021); and Colonoscopy (Left, 9/24/2021). Social History     Socioeconomic History    Marital status:      Spouse name: Not on file    Number of children: Not on file    Years of education: Not on file    Highest education level: Not on file   Occupational History    Not on file   Tobacco Use    Smoking status: Former Smoker     Packs/day: 2.00     Years: 45.00     Pack years: 90.00     Types: Cigarettes     Quit date: 1/7/2006     Years since quitting: 15.7    Smokeless tobacco: Never Used   Vaping Use    Vaping Use: Never used   Substance and Sexual Activity    Alcohol use: Yes     Alcohol/week: 1.7 standard drinks     Types: 2 Standard drinks or equivalent per week     Comment: occassional    Drug use: Yes     Types: Marijuana    Sexual activity: Not on file   Other Topics Concern    Not on file   Social History Narrative    Not on file     Social Determinants of Health     Financial Resource Strain:     Difficulty of Paying Living Expenses:    Food Insecurity:     Worried About Running Out of Food in the Last Year:     [address] in the Last Year:    Transportation Needs:     Lack of Transportation (Medical):      Lack of Transportation (Non-Medical):    Physical Activity:     Days of Exercise per Week:     Minutes of Exercise per Session:    Stress:     Feeling of Stress :    Social Connections:     Frequency of Communication with Friends and Family:     Frequency of Social Gatherings with Friends and Family:     Attends Christianity Services:     Active Member of Clubs or Organizations:     Attends Club or Organization Meetings:     Marital Status:    Intimate Partner Violence:     Fear of Current or Ex-Partner:     Emotionally Abused:     Physically Abused:     Sexually Abused:        Family History   Problem Relation Age of Onset    Diabetes Mother     Alzheimer's Disease Father     Heart Disease Brother     Heart Disease Brother        Allergies:  Patient has no known allergies. Home Medications:  Prior to Admission medications    Medication Sig Start Date End Date Taking? Authorizing Provider   amLODIPine (NORVASC) 5 MG tablet Take 10 mg by mouth daily    Yes Historical Provider, MD   pregabalin (LYRICA) 150 MG capsule Take 150 mg by mouth 3 times daily. Yes Historical Provider, MD   glipiZIDE-metFORMIN (METAGLIP) 5-500 MG per tablet Take 2 tablets by mouth daily (with breakfast)   Yes Historical Provider, MD   glipiZIDE-metFORMIN (METAGLIP) 5-500 MG per tablet Take 1 tablet by mouth Daily with supper   Yes Historical Provider, MD   donepezil (ARICEPT) 10 MG tablet Take 10 mg by mouth nightly  7/11/21  Yes Historical Provider, MD   meloxicam (MOBIC) 15 MG tablet Take 15 mg by mouth daily  7/14/21  Yes Historical Provider, MD   atorvastatin (LIPITOR) 10 MG tablet Take 10 mg by mouth daily   Yes Historical Provider, MD   FLUoxetine (PROZAC) 20 MG capsule Take 20 mg by mouth daily   Yes Historical Provider, MD   lisinopril-hydroCHLOROthiazide (PRINZIDE;ZESTORETIC) 20-12.5 MG per tablet Take 1 tablet by mouth daily  7/14/21  Yes Historical Provider, MD   carvedilol (COREG) 6.25 MG tablet Take 6.25 mg by mouth 2 times daily (with meals)   Yes Historical Provider, MD   omeprazole (PRILOSEC) 20 MG delayed release capsule Take 20 mg by mouth 2 times daily   Yes Historical Provider, MD   levothyroxine (SYNTHROID) 50 MCG tablet Take 112 mcg by mouth Daily    Yes Historical Provider, MD       REVIEW OF SYSTEMS    (2-9 systems for level 4, 10 or more for level 5)      Review of Systems   Constitutional: Negative for activity change, chills and fever.    HENT: Negative for congestion, sinus pressure, sinus pain and sore throat. Eyes: Negative for pain and itching. Respiratory: Negative for cough and shortness of breath. Cardiovascular: Negative for chest pain. Gastrointestinal: Positive for abdominal pain, nausea and vomiting. Negative for abdominal distention, constipation and diarrhea. Endocrine: Negative for polyuria. Genitourinary: Negative for dysuria and frequency. Musculoskeletal: Negative for arthralgias. Skin: Negative for rash. Neurological: Negative for light-headedness and headaches. PHYSICAL EXAM   (up to 7 for level 4, 8 or more for level 5)      INITIAL VITALS:   BP (!) 188/63   Pulse 61   Temp 99 °F (37.2 °C) (Oral)   Resp 18   Ht 5' 10\" (1.778 m)   Wt 164 lb (74.4 kg)   SpO2 96%   BMI 23.53 kg/m²     Physical Exam  Vitals reviewed. Constitutional:       General: He is not in acute distress. HENT:      Head: Normocephalic and atraumatic. Ears:      Comments: Hearing grossly normal     Nose: Nose normal.      Mouth/Throat:      Mouth: Mucous membranes are moist.      Pharynx: Oropharynx is clear. Eyes:      General: No scleral icterus. Conjunctiva/sclera: Conjunctivae normal.      Pupils: Pupils are equal, round, and reactive to light. Cardiovascular:      Rate and Rhythm: Normal rate and regular rhythm. Pulses: Normal pulses. Pulmonary:      Effort: Pulmonary effort is normal. No respiratory distress. Breath sounds: Normal breath sounds. Abdominal:      General: There is no distension. Tenderness: There is generalized abdominal tenderness. There is no right CVA tenderness, left CVA tenderness or guarding. Negative signs include Navas's sign and McBurney's sign. Musculoskeletal:      Cervical back: No muscular tenderness. Right lower leg: No edema. Left lower leg: No edema. Skin:     General: Skin is warm and dry. Capillary Refill: Capillary refill takes less than 2 seconds. Neurological:      General: No focal deficit present. Mental Status: He is alert and oriented to person, place, and time. Mental status is at baseline. DIFFERENTIAL  DIAGNOSIS     PLAN (LABS / IMAGING / EKG):  Orders Placed This Encounter   Procedures    CT ABDOMEN PELVIS W IV CONTRAST Additional Contrast? None    CBC Auto Differential    Lipase    Hepatic Function Panel    Basic Metabolic Panel    Lactic Acid    Lactate Dehydrogenase    Phosphorus    Uric Acid    Urinalysis Reflex to Culture    Microscopic Urinalysis    CBC with DIFF    BASIC METABOLIC PANEL    ADULT DIET;  Regular    Up with assistance    Inpatient consult to Primary Care Provider    Inpatient consult to Oncology    Inpatient consult to Urology    POCT Glucose    POCT Glucose    PATIENT STATUS (FROM ED OR OR/PROCEDURAL) Inpatient       MEDICATIONS ORDERED:  Orders Placed This Encounter   Medications    DISCONTD: ondansetron (ZOFRAN-ODT) disintegrating tablet 4 mg    0.9 % sodium chloride bolus    ondansetron (ZOFRAN) injection 4 mg    iopamidol (ISOVUE-370) 76 % injection 75 mL    sodium chloride flush 0.9 % injection 10 mL    0.9 % sodium chloride bolus    HYDROmorphone (DILAUDID) injection 1 mg    ondansetron (ZOFRAN) injection 4 mg    amLODIPine (NORVASC) tablet 10 mg    atorvastatin (LIPITOR) tablet 10 mg    carvedilol (COREG) tablet 6.25 mg    donepezil (ARICEPT) tablet 10 mg    FLUoxetine (PROZAC) capsule 20 mg    levothyroxine (SYNTHROID) tablet 112 mcg    memantine (NAMENDA) tablet 10 mg    pantoprazole (PROTONIX) tablet 40 mg    ondansetron (ZOFRAN) injection 4 mg    insulin lispro (HUMALOG) injection vial 0-6 Units    insulin lispro (HUMALOG) injection vial 0-3 Units    influenza quadrivalent split vaccine (FLUZONE;FLUARIX;FLULAVAL;AFLURIA) injection 0.5 mL    HYDROmorphone (DILAUDID) injection 1 mg    0.9 % sodium chloride infusion    scopolamine (TRANSDERM-SCOP) transdermal patch 1 patch       DIAGNOSTIC RESULTS / EMERGENCY DEPARTMENT COURSE / MDM   LAB RESULTS:  Results for orders placed or performed during the hospital encounter of 10/05/21   CBC Auto Differential   Result Value Ref Range    WBC 7.6 3.5 - 11.0 k/uL    RBC 4.49 (L) 4.5 - 5.9 m/uL    Hemoglobin 13.3 (L) 13.5 - 17.5 g/dL    Hematocrit 39.3 (L) 41 - 53 %    MCV 87.5 80 - 100 fL    MCH 29.7 26 - 34 pg    MCHC 33.9 31 - 37 g/dL    RDW 15.3 (H) 11.5 - 14.9 %    Platelets 974 250 - 169 k/uL    MPV 9.0 6.0 - 12.0 fL    NRBC Automated NOT REPORTED per 100 WBC    Differential Type NOT REPORTED     Seg Neutrophils 78 (H) 36 - 66 %    Lymphocytes 13 (L) 24 - 44 %    Monocytes 8 (H) 1 - 7 %    Eosinophils % 1 0 - 4 %    Basophils 0 0 - 2 %    Immature Granulocytes NOT REPORTED 0 %    Segs Absolute 5.90 1.3 - 9.1 k/uL    Absolute Lymph # 1.00 1.0 - 4.8 k/uL    Absolute Mono # 0.60 0.1 - 1.3 k/uL    Absolute Eos # 0.00 0.0 - 0.4 k/uL    Basophils Absolute 0.00 0.0 - 0.2 k/uL    Absolute Immature Granulocyte NOT REPORTED 0.00 - 0.30 k/uL    WBC Morphology NOT REPORTED     RBC Morphology NOT REPORTED     Platelet Estimate NOT REPORTED    Lipase   Result Value Ref Range    Lipase 22 13 - 60 U/L   Hepatic Function Panel   Result Value Ref Range    Albumin 3.9 3.5 - 5.2 g/dL    Alkaline Phosphatase 147 (H) 40 - 129 U/L    ALT 17 5 - 41 U/L    AST 10 <40 U/L    Total Bilirubin 0.72 0.3 - 1.2 mg/dL    Bilirubin, Direct 0.17 <0.31 mg/dL    Bilirubin, Indirect 0.55 0.00 - 1.00 mg/dL    Total Protein 6.6 6.4 - 8.3 g/dL    Globulin NOT REPORTED 1.5 - 3.8 g/dL    Albumin/Globulin Ratio NOT REPORTED 1.0 - 2.5   Basic Metabolic Panel   Result Value Ref Range    Glucose 139 (H) 70 - 99 mg/dL    BUN 20 8 - 23 mg/dL    CREATININE 1.25 (H) 0.70 - 1.20 mg/dL    Bun/Cre Ratio NOT REPORTED 9 - 20    Calcium 9.1 8.6 - 10.4 mg/dL    Sodium 144 135 - 144 mmol/L    Potassium 4.6 3.7 - 5.3 mmol/L    Chloride 105 98 - 107 mmol/L    CO2 24 20 - 31 mmol/L Anion Gap 15 9 - 17 mmol/L    GFR Non-African American 57 (L) >60 mL/min    GFR African American >60 >60 mL/min    GFR Comment          GFR Staging NOT REPORTED    Lactic Acid   Result Value Ref Range    Lactic Acid 1.3 0.5 - 2.2 mmol/L   Lactate Dehydrogenase   Result Value Ref Range     135 - 225 U/L   Phosphorus   Result Value Ref Range    Phosphorus 3.4 2.5 - 4.5 mg/dL   Uric Acid   Result Value Ref Range    Uric Acid 9.1 (H) 3.4 - 7.0 mg/dL   Urinalysis Reflex to Culture    Specimen: Urine, clean catch   Result Value Ref Range    Color, UA Yellow Yellow    Turbidity UA Clear Clear    Glucose, Ur NEGATIVE NEGATIVE    Bilirubin Urine SMALL (A) NEGATIVE    Ketones, Urine SMALL (A) NEGATIVE    Specific Gravity, UA 1.045 (H) 1.000 - 1.030    Urine Hgb NEGATIVE NEGATIVE    pH, UA 5.5 5.0 - 8.0    Protein, UA 4+ (A) NEGATIVE    Urobilinogen, Urine Normal Normal    Nitrite, Urine NEGATIVE NEGATIVE    Leukocyte Esterase, Urine NEGATIVE NEGATIVE    Urinalysis Comments NOT REPORTED    Microscopic Urinalysis   Result Value Ref Range    -          WBC, UA 2 TO 5 /HPF    RBC, UA 2 TO 5 /HPF    Casts UA HYALINE /LPF    Casts UA 0 TO 2 /LPF    Crystals, UA NOT REPORTED None /HPF    Epithelial Cells UA 2 TO 5 /HPF    Renal Epithelial, UA NOT REPORTED 0 /HPF    Bacteria, UA FEW (A) None    Mucus, UA NOT REPORTED None    Trichomonas, UA NOT REPORTED None    Amorphous, UA NOT REPORTED None    Other Observations UA NOT REPORTED NOT REQ. Yeast, UA NOT REPORTED None       IMPRESSION: Ashish Andersen is a 66 y.o. man presenting for n/v and right sided abdominal pain. Hx of metastatic lymphoma. Vital signs stable on exam and abdomen is nonsurgical.  Biochemical work-up was generally unremarkable. CT scan demonstrates new right sided retroperitoneal mass with associated right hydronephrosis which is new from most recent CT scan from July. [facility].  Patient did not report these as known findings. Will admit for symptomatic management and urology evaluation. RADIOLOGY:  CT ABDOMEN PELVIS W IV CONTRAST Additional Contrast? None    Result Date: 10/5/2021  1. Interval decrease in size of the infiltrating soft tissue mass within the left retroperitoneum, encasing the left kidney and abdominal aorta. The para-aortic adenopathy, and external iliac chain adenopathy is decreased in size, indicating response to therapy. Changes are likely due to lymphoma. 2. Cholelithiasis, without evidence of cholecystitis 3. Interval development of a mild degree of right hydronephrosis, and hydroureter. Right ureter is dilated to the level of the retroperitoneal \"mass\". .  Obstruction may be related to stricture formation or encasement by the retroperitoneal soft tissue mass. 4. Scattered colonic diverticula, without evidence of diverticulitis 5. Enlarged prostate gland 6. Nonobstructive bowel gas pattern      EKG  none    All EKG's are interpreted by the Emergency Department Physician who either signs or Co-signs this chart in the absence of a cardiologist.    EMERGENCY DEPARTMENT COURSE:  Patient seen and evaluated, VSS and nontoxic in appearance. ED work-up as stated above. Zofran offered for symptomatic management with moderate improvement. Discussed findings with patient and recommended admission for symptomatic management and urology evaluation. PROCEDURES:  none    CONSULTS:  IP CONSULT TO PRIMARY CARE PROVIDER  IP CONSULT TO ONCOLOGY  IP CONSULT TO UROLOGY    CRITICAL CARE:  See attending note    FINAL IMPRESSION      1. Hydronephrosis with ureteral stricture, not elsewhere classified          DISPOSITION / [address] Admitted 10/05/2021 07:22:05 PM      PATIENT REFERRED TO:  No follow-up provider specified.     DISCHARGE MEDICATIONS:  Current Discharge Medication List          Emilio Sheth DO  Emergency Medicine Resident    (Please note that portions of thisnote were completed with a voice recognition program.  Efforts were made to edit the dictations but occasionally words are mis-transcribed.)       Lyudmila Dick DO  Resident  10/06/21 Jayesh Villarreal DO  Resident  11/23/21 [id number]

## 2021-10-05 NOTE — ED PROVIDER NOTES
[address] ED  eMERGENCY dEPARTMENT eNCOUnter   Attending Attestation     Pt Name: Favian Adan  MRN: [id number]  [place] 1950  Date of evaluation: 10/5/21    History, EXAM, MDM:    Favian Adan is a 66 y.o. male who presents with Abdominal Pain    N/v x 3 days. Chronic abdominal pain. Small cell, B-cell non-Hodgkins lymphoma in remission. 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 Currently on rituximab. On exam VSS. Laboratory studies obtained and showed normal WBC count. Cr. 1.25. Lactic acid normal. .   CT scan shows hydronephrosis resulting from obstruction from his mass. Will place in hospital  Urology evaluation. Pt will likely need IR nephrostomy tube. D/w Pt and he's in agreement with the plan.               Vitals:   Vitals:    10/05/21 1525   BP: (!) 154/84   Pulse: 87   Resp: 18   Temp: 98.5 °F (36.9 °C)   TempSrc: Oral   SpO2: 99%   Weight: 164 lb (74.4 kg)   Height: 5' 10\" (1.778 m)     I performed a history and physical examination of the patient and discussed management with the resident. I reviewed the residents note and agree with the documented findings and plan of care. Any areas of disagreement are noted on the chart. I was personally present for the key portions of any procedures. I have documented in the chart those procedures where I was not present during the key portions. I have personally reviewed all images and agree with the resident's interpretation. I have reviewed the emergency nurses triage note. I agree with the chief complaint, past medical history, past surgical history, allergies, medications, social and family history as documented unless otherwise noted below. Documentation of the HPI, Physical Exam and Medical Decision Making performed by medical students or scribes is based on my personal performance of the HPI, PE and MDM.  For Phys Assistant/ Nurse Practitioner cases/documentation I have had a face to face evaluation of this patient and have completed at least one if not all key elements of the E/M (history, physical exam, and MDM). Additional findings are as noted.     Sandro June MD  Attending Emergency  Physician                Sandro June MD  10/06/21 2522

## 2021-10-05 NOTE — ED NOTES
Pt complains of N/V x2 days. Recently had colonoscopy x7 days ago.  Denies any blood in vomit or stoool     Sixto Rolon RN  10/05/21 [id number]

## 2021-10-05 NOTE — PROGRESS NOTES
Medication History completed:    New medications: glipizide-metformin    Medications discontinued: memantine, magnesium citrate, glyburide-metformin    Changes to dosing:   Pregabalin changed to 150 mg three times daily  Amlodipine changed to 5 mg daily    Stated allergies: NKDA    Other pertinent information: Medications confirmed with [facility]      Thank you,  Masha Olivares, PharmD, BCPS  [phone]

## 2021-10-06 ENCOUNTER — APPOINTMENT (OUTPATIENT)
Dept: GENERAL RADIOLOGY | Age: 71
DRG: 660 | End: 2021-10-06
Payer: MEDICARE

## 2021-10-06 ENCOUNTER — APPOINTMENT (OUTPATIENT)
Dept: ULTRASOUND IMAGING | Age: 71
DRG: 660 | End: 2021-10-06
Payer: MEDICARE

## 2021-10-06 ENCOUNTER — ANESTHESIA EVENT (OUTPATIENT)
Dept: OPERATING ROOM | Age: 71
DRG: 660 | End: 2021-10-06
Payer: MEDICARE

## 2021-10-06 ENCOUNTER — ANESTHESIA (OUTPATIENT)
Dept: OPERATING ROOM | Age: 71
DRG: 660 | End: 2021-10-06
Payer: MEDICARE

## 2021-10-06 ENCOUNTER — APPOINTMENT (OUTPATIENT)
Dept: NUCLEAR MEDICINE | Age: 71
DRG: 660 | End: 2021-10-06
Payer: MEDICARE

## 2021-10-06 VITALS
RESPIRATION RATE: 1 BRPM | SYSTOLIC BLOOD PRESSURE: 118 MMHG | DIASTOLIC BLOOD PRESSURE: 58 MMHG | TEMPERATURE: 96.2 F | OXYGEN SATURATION: 99 %

## 2021-10-06 LAB
-: ABNORMAL
ABSOLUTE EOS #: 0.1 K/UL (ref 0–0.4)
ABSOLUTE IMMATURE GRANULOCYTE: ABNORMAL K/UL (ref 0–0.3)
ABSOLUTE LYMPH #: 0.6 K/UL (ref 1–4.8)
ABSOLUTE MONO #: 0.7 K/UL (ref 0.1–1.3)
ALBUMIN SERPL-MCNC: 3.5 G/DL (ref 3.5–5.2)
ALBUMIN/GLOBULIN RATIO: ABNORMAL (ref 1–2.5)
ALP BLD-CCNC: 129 U/L (ref 40–129)
ALT SERPL-CCNC: 13 U/L (ref 5–41)
AMORPHOUS: ABNORMAL
AMYLASE: 45 U/L (ref 28–100)
ANION GAP SERPL CALCULATED.3IONS-SCNC: 11 MMOL/L (ref 9–17)
AST SERPL-CCNC: 11 U/L
BACTERIA: ABNORMAL
BASOPHILS # BLD: 0 % (ref 0–2)
BASOPHILS ABSOLUTE: 0 K/UL (ref 0–0.2)
BILIRUB SERPL-MCNC: 0.61 MG/DL (ref 0.3–1.2)
BILIRUBIN DIRECT: 0.17 MG/DL
BILIRUBIN URINE: ABNORMAL
BILIRUBIN, INDIRECT: 0.44 MG/DL (ref 0–1)
BUN BLDV-MCNC: 19 MG/DL (ref 8–23)
BUN/CREAT BLD: ABNORMAL (ref 9–20)
CALCIUM SERPL-MCNC: 8.4 MG/DL (ref 8.6–10.4)
CASTS UA: ABNORMAL /LPF
CHLORIDE BLD-SCNC: 110 MMOL/L (ref 98–107)
CO2: 26 MMOL/L (ref 20–31)
COLOR: YELLOW
COMMENT UA: ABNORMAL
CREAT SERPL-MCNC: 1.1 MG/DL (ref 0.7–1.2)
CRYSTALS, UA: ABNORMAL /HPF
DIFFERENTIAL TYPE: ABNORMAL
EOSINOPHILS RELATIVE PERCENT: 2 % (ref 0–4)
EPITHELIAL CELLS UA: ABNORMAL /HPF
FERRITIN: 137 UG/L (ref 30–400)
FOLATE: 18.2 NG/ML
GFR AFRICAN AMERICAN: >60 ML/MIN
GFR NON-AFRICAN AMERICAN: >60 ML/MIN
GFR SERPL CREATININE-BSD FRML MDRD: ABNORMAL ML/MIN/{1.73_M2}
GFR SERPL CREATININE-BSD FRML MDRD: ABNORMAL ML/MIN/{1.73_M2}
GLOBULIN: ABNORMAL G/DL (ref 1.5–3.8)
GLUCOSE BLD-MCNC: 107 MG/DL (ref 75–110)
GLUCOSE BLD-MCNC: 75 MG/DL (ref 75–110)
GLUCOSE BLD-MCNC: 83 MG/DL (ref 75–110)
GLUCOSE BLD-MCNC: 87 MG/DL (ref 75–110)
GLUCOSE BLD-MCNC: 89 MG/DL (ref 75–110)
GLUCOSE BLD-MCNC: 94 MG/DL (ref 70–99)
GLUCOSE BLD-MCNC: 95 MG/DL (ref 75–110)
GLUCOSE URINE: NEGATIVE
HCT VFR BLD CALC: 34.4 % (ref 41–53)
HEMOGLOBIN: 11.7 G/DL (ref 13.5–17.5)
IMMATURE GRANULOCYTES: ABNORMAL %
IRON SATURATION: 19 % (ref 20–55)
IRON: 46 UG/DL (ref 59–158)
KETONES, URINE: ABNORMAL
LEUKOCYTE ESTERASE, URINE: NEGATIVE
LIPASE: 19 U/L (ref 13–60)
LYMPHOCYTES # BLD: 10 % (ref 24–44)
MCH RBC QN AUTO: 29.9 PG (ref 26–34)
MCHC RBC AUTO-ENTMCNC: 33.9 G/DL (ref 31–37)
MCV RBC AUTO: 88.3 FL (ref 80–100)
MONOCYTES # BLD: 11 % (ref 1–7)
MUCUS: ABNORMAL
NITRITE, URINE: NEGATIVE
NRBC AUTOMATED: ABNORMAL PER 100 WBC
OTHER OBSERVATIONS UA: ABNORMAL
PDW BLD-RTO: 15.5 % (ref 11.5–14.9)
PH UA: 5 (ref 5–8)
PLATELET # BLD: 144 K/UL (ref 150–450)
PLATELET ESTIMATE: ABNORMAL
PMV BLD AUTO: 8.5 FL (ref 6–12)
POTASSIUM SERPL-SCNC: 4.5 MMOL/L (ref 3.7–5.3)
PROTEIN UA: ABNORMAL
RBC # BLD: 3.89 M/UL (ref 4.5–5.9)
RBC # BLD: ABNORMAL 10*6/UL
RBC UA: ABNORMAL /HPF
RENAL EPITHELIAL, UA: ABNORMAL /HPF
SEG NEUTROPHILS: 77 % (ref 36–66)
SEGMENTED NEUTROPHILS ABSOLUTE COUNT: 4.7 K/UL (ref 1.3–9.1)
SODIUM BLD-SCNC: 147 MMOL/L (ref 135–144)
SPECIFIC GRAVITY UA: 1.02 (ref 1–1.03)
TOTAL IRON BINDING CAPACITY: 237 UG/DL (ref 250–450)
TOTAL PROTEIN: 5.4 G/DL (ref 6.4–8.3)
TRICHOMONAS: ABNORMAL
TURBIDITY: CLEAR
UNSATURATED IRON BINDING CAPACITY: 191 UG/DL (ref 112–347)
URINE HGB: ABNORMAL
UROBILINOGEN, URINE: NORMAL
VITAMIN B-12: 791 PG/ML (ref 232–1245)
WBC # BLD: 6.2 K/UL (ref 3.5–11)
WBC # BLD: ABNORMAL 10*3/UL
WBC UA: ABNORMAL /HPF
YEAST: ABNORMAL

## 2021-10-06 PROCEDURE — 6360000002 HC RX W HCPCS: Performed by: ANESTHESIOLOGY

## 2021-10-06 PROCEDURE — 2580000003 HC RX 258: Performed by: ANESTHESIOLOGY

## 2021-10-06 PROCEDURE — 82150 ASSAY OF AMYLASE: CPT

## 2021-10-06 PROCEDURE — 3700000000 HC ANESTHESIA ATTENDED CARE: Performed by: UROLOGY

## 2021-10-06 PROCEDURE — 6370000000 HC RX 637 (ALT 250 FOR IP): Performed by: STUDENT IN AN ORGANIZED HEALTH CARE EDUCATION/TRAINING PROGRAM

## 2021-10-06 PROCEDURE — C1758 CATHETER, URETERAL: HCPCS | Performed by: UROLOGY

## 2021-10-06 PROCEDURE — 85025 COMPLETE CBC W/AUTO DIFF WBC: CPT

## 2021-10-06 PROCEDURE — 6360000002 HC RX W HCPCS: Performed by: UROLOGY

## 2021-10-06 PROCEDURE — 6370000000 HC RX 637 (ALT 250 FOR IP): Performed by: UROLOGY

## 2021-10-06 PROCEDURE — 6360000002 HC RX W HCPCS: Performed by: STUDENT IN AN ORGANIZED HEALTH CARE EDUCATION/TRAINING PROGRAM

## 2021-10-06 PROCEDURE — 2709999900 HC NON-CHARGEABLE SUPPLY: Performed by: UROLOGY

## 2021-10-06 PROCEDURE — 6360000004 HC RX CONTRAST MEDICATION: Performed by: UROLOGY

## 2021-10-06 PROCEDURE — 76705 ECHO EXAM OF ABDOMEN: CPT

## 2021-10-06 PROCEDURE — 3600000002 HC SURGERY LEVEL 2 BASE: Performed by: UROLOGY

## 2021-10-06 PROCEDURE — 83550 IRON BINDING TEST: CPT

## 2021-10-06 PROCEDURE — 2580000003 HC RX 258: Performed by: FAMILY MEDICINE

## 2021-10-06 PROCEDURE — 82746 ASSAY OF FOLIC ACID SERUM: CPT

## 2021-10-06 PROCEDURE — 51798 US URINE CAPACITY MEASURE: CPT

## 2021-10-06 PROCEDURE — 80048 BASIC METABOLIC PNL TOTAL CA: CPT

## 2021-10-06 PROCEDURE — A9537 TC99M MEBROFENIN: HCPCS | Performed by: FAMILY MEDICINE

## 2021-10-06 PROCEDURE — 3700000001 HC ADD 15 MINUTES (ANESTHESIA): Performed by: UROLOGY

## 2021-10-06 PROCEDURE — 82728 ASSAY OF FERRITIN: CPT

## 2021-10-06 PROCEDURE — 3209999900 FLUORO FOR SURGICAL PROCEDURES

## 2021-10-06 PROCEDURE — 2500000003 HC RX 250 WO HCPCS: Performed by: ANESTHESIOLOGY

## 2021-10-06 PROCEDURE — 80076 HEPATIC FUNCTION PANEL: CPT

## 2021-10-06 PROCEDURE — 6370000000 HC RX 637 (ALT 250 FOR IP): Performed by: FAMILY MEDICINE

## 2021-10-06 PROCEDURE — C2617 STENT, NON-COR, TEM W/O DEL: HCPCS | Performed by: UROLOGY

## 2021-10-06 PROCEDURE — 3600000012 HC SURGERY LEVEL 2 ADDTL 15MIN: Performed by: UROLOGY

## 2021-10-06 PROCEDURE — 3430000000 HC RX DIAGNOSTIC RADIOPHARMACEUTICAL: Performed by: FAMILY MEDICINE

## 2021-10-06 PROCEDURE — 81001 URINALYSIS AUTO W/SCOPE: CPT

## 2021-10-06 PROCEDURE — 83540 ASSAY OF IRON: CPT

## 2021-10-06 PROCEDURE — 6360000002 HC RX W HCPCS: Performed by: FAMILY MEDICINE

## 2021-10-06 PROCEDURE — 7100000001 HC PACU RECOVERY - ADDTL 15 MIN: Performed by: UROLOGY

## 2021-10-06 PROCEDURE — 0T768DZ DILATION OF RIGHT URETER WITH INTRALUMINAL DEVICE, VIA NATURAL OR ARTIFICIAL OPENING ENDOSCOPIC: ICD-10-PCS | Performed by: UROLOGY

## 2021-10-06 PROCEDURE — 82607 VITAMIN B-12: CPT

## 2021-10-06 PROCEDURE — 1200000000 HC SEMI PRIVATE

## 2021-10-06 PROCEDURE — 78227 HEPATOBIL SYST IMAGE W/DRUG: CPT

## 2021-10-06 PROCEDURE — 99223 1ST HOSP IP/OBS HIGH 75: CPT | Performed by: INTERNAL MEDICINE

## 2021-10-06 PROCEDURE — 6360000002 HC RX W HCPCS: Performed by: RADIOLOGY

## 2021-10-06 PROCEDURE — 83690 ASSAY OF LIPASE: CPT

## 2021-10-06 PROCEDURE — 7100000000 HC PACU RECOVERY - FIRST 15 MIN: Performed by: UROLOGY

## 2021-10-06 PROCEDURE — 82947 ASSAY GLUCOSE BLOOD QUANT: CPT

## 2021-10-06 PROCEDURE — C1769 GUIDE WIRE: HCPCS | Performed by: UROLOGY

## 2021-10-06 PROCEDURE — 36415 COLL VENOUS BLD VENIPUNCTURE: CPT

## 2021-10-06 DEVICE — URETERAL STENT
Type: IMPLANTABLE DEVICE | Site: URETER | Status: FUNCTIONAL
Brand: POLARIS™ ULTRA

## 2021-10-06 RX ORDER — ONDANSETRON 2 MG/ML
4 INJECTION INTRAMUSCULAR; INTRAVENOUS
Status: DISCONTINUED | OUTPATIENT
Start: 2021-10-06 | End: 2021-10-06 | Stop reason: HOSPADM

## 2021-10-06 RX ORDER — MEPERIDINE HYDROCHLORIDE 25 MG/ML
12.5 INJECTION INTRAMUSCULAR; INTRAVENOUS; SUBCUTANEOUS EVERY 5 MIN PRN
Status: DISCONTINUED | OUTPATIENT
Start: 2021-10-06 | End: 2021-10-06 | Stop reason: HOSPADM

## 2021-10-06 RX ORDER — SODIUM CHLORIDE 0.9 % (FLUSH) 0.9 %
10 SYRINGE (ML) INJECTION PRN
Status: DISCONTINUED | OUTPATIENT
Start: 2021-10-06 | End: 2021-10-12 | Stop reason: HOSPADM

## 2021-10-06 RX ORDER — TOBRAMYCIN SULFATE 40 MG/ML
INJECTION, SOLUTION INTRAMUSCULAR; INTRAVENOUS PRN
Status: DISCONTINUED | OUTPATIENT
Start: 2021-10-06 | End: 2021-10-06 | Stop reason: ALTCHOICE

## 2021-10-06 RX ORDER — EPHEDRINE SULFATE/0.9% NACL/PF 50 MG/5 ML
SYRINGE (ML) INTRAVENOUS PRN
Status: DISCONTINUED | OUTPATIENT
Start: 2021-10-06 | End: 2021-10-06 | Stop reason: SDUPTHER

## 2021-10-06 RX ORDER — LIDOCAINE HYDROCHLORIDE 10 MG/ML
INJECTION, SOLUTION EPIDURAL; INFILTRATION; INTRACAUDAL; PERINEURAL PRN
Status: DISCONTINUED | OUTPATIENT
Start: 2021-10-06 | End: 2021-10-06 | Stop reason: SDUPTHER

## 2021-10-06 RX ORDER — MORPHINE SULFATE 2 MG/ML
2 INJECTION, SOLUTION INTRAMUSCULAR; INTRAVENOUS EVERY 5 MIN PRN
Status: DISCONTINUED | OUTPATIENT
Start: 2021-10-06 | End: 2021-10-06 | Stop reason: HOSPADM

## 2021-10-06 RX ORDER — DIPHENHYDRAMINE HYDROCHLORIDE 50 MG/ML
12.5 INJECTION INTRAMUSCULAR; INTRAVENOUS
Status: DISCONTINUED | OUTPATIENT
Start: 2021-10-06 | End: 2021-10-06 | Stop reason: HOSPADM

## 2021-10-06 RX ORDER — LIDOCAINE HYDROCHLORIDE 20 MG/ML
JELLY TOPICAL PRN
Status: DISCONTINUED | OUTPATIENT
Start: 2021-10-06 | End: 2021-10-06 | Stop reason: ALTCHOICE

## 2021-10-06 RX ORDER — GLYCOPYRROLATE 1 MG/5 ML
SYRINGE (ML) INTRAVENOUS PRN
Status: DISCONTINUED | OUTPATIENT
Start: 2021-10-06 | End: 2021-10-06 | Stop reason: SDUPTHER

## 2021-10-06 RX ORDER — MIDAZOLAM HYDROCHLORIDE 1 MG/ML
INJECTION INTRAMUSCULAR; INTRAVENOUS PRN
Status: DISCONTINUED | OUTPATIENT
Start: 2021-10-06 | End: 2021-10-06 | Stop reason: SDUPTHER

## 2021-10-06 RX ORDER — SODIUM CHLORIDE 9 MG/ML
INJECTION, SOLUTION INTRAVENOUS CONTINUOUS PRN
Status: DISCONTINUED | OUTPATIENT
Start: 2021-10-06 | End: 2021-10-06 | Stop reason: SDUPTHER

## 2021-10-06 RX ORDER — PROPOFOL 10 MG/ML
INJECTION, EMULSION INTRAVENOUS PRN
Status: DISCONTINUED | OUTPATIENT
Start: 2021-10-06 | End: 2021-10-06 | Stop reason: SDUPTHER

## 2021-10-06 RX ORDER — LABETALOL HYDROCHLORIDE 5 MG/ML
5 INJECTION, SOLUTION INTRAVENOUS EVERY 10 MIN PRN
Status: DISCONTINUED | OUTPATIENT
Start: 2021-10-06 | End: 2021-10-06 | Stop reason: HOSPADM

## 2021-10-06 RX ORDER — ONDANSETRON 2 MG/ML
INJECTION INTRAMUSCULAR; INTRAVENOUS PRN
Status: DISCONTINUED | OUTPATIENT
Start: 2021-10-06 | End: 2021-10-06 | Stop reason: SDUPTHER

## 2021-10-06 RX ORDER — MORPHINE SULFATE 2 MG/ML
2.9 INJECTION, SOLUTION INTRAMUSCULAR; INTRAVENOUS ONCE
Status: DISCONTINUED | OUTPATIENT
Start: 2021-10-06 | End: 2021-10-06

## 2021-10-06 RX ORDER — FENTANYL CITRATE 50 UG/ML
INJECTION, SOLUTION INTRAMUSCULAR; INTRAVENOUS PRN
Status: DISCONTINUED | OUTPATIENT
Start: 2021-10-06 | End: 2021-10-06 | Stop reason: SDUPTHER

## 2021-10-06 RX ORDER — LIDOCAINE 4 G/G
1 PATCH TOPICAL DAILY
Status: DISCONTINUED | OUTPATIENT
Start: 2021-10-06 | End: 2021-10-12 | Stop reason: HOSPADM

## 2021-10-06 RX ORDER — MORPHINE SULFATE 4 MG/ML
2.9 INJECTION, SOLUTION INTRAMUSCULAR; INTRAVENOUS ONCE
Status: COMPLETED | OUTPATIENT
Start: 2021-10-06 | End: 2021-10-06

## 2021-10-06 RX ADMIN — Medication 25 MG: at 18:30

## 2021-10-06 RX ADMIN — MORPHINE SULFATE 2 MG: 2 INJECTION, SOLUTION INTRAMUSCULAR; INTRAVENOUS at 19:37

## 2021-10-06 RX ADMIN — ATORVASTATIN CALCIUM 10 MG: 10 TABLET, FILM COATED ORAL at 07:51

## 2021-10-06 RX ADMIN — HYDROMORPHONE HYDROCHLORIDE 1 MG: 1 INJECTION, SOLUTION INTRAMUSCULAR; INTRAVENOUS; SUBCUTANEOUS at 20:53

## 2021-10-06 RX ADMIN — PIPERACILLIN AND TAZOBACTAM 4500 MG: 4; .5 INJECTION, POWDER, LYOPHILIZED, FOR SOLUTION INTRAVENOUS; PARENTERAL at 10:27

## 2021-10-06 RX ADMIN — ONDANSETRON 4 MG: 2 INJECTION, SOLUTION INTRAMUSCULAR; INTRAVENOUS at 18:23

## 2021-10-06 RX ADMIN — SODIUM CHLORIDE, PRESERVATIVE FREE 10 ML: 5 INJECTION INTRAVENOUS at 12:12

## 2021-10-06 RX ADMIN — SODIUM CHLORIDE: 9 INJECTION, SOLUTION INTRAVENOUS at 18:17

## 2021-10-06 RX ADMIN — FENTANYL CITRATE 100 MCG: 50 INJECTION, SOLUTION INTRAMUSCULAR; INTRAVENOUS at 18:22

## 2021-10-06 RX ADMIN — PANTOPRAZOLE SODIUM 40 MG: 40 TABLET, DELAYED RELEASE ORAL at 06:33

## 2021-10-06 RX ADMIN — PROPOFOL 150 MG: 10 INJECTION, EMULSION INTRAVENOUS at 18:24

## 2021-10-06 RX ADMIN — ONDANSETRON 4 MG: 2 INJECTION INTRAMUSCULAR; INTRAVENOUS at 06:31

## 2021-10-06 RX ADMIN — AMLODIPINE BESYLATE 10 MG: 5 TABLET ORAL at 07:51

## 2021-10-06 RX ADMIN — FLUOXETINE HYDROCHLORIDE 20 MG: 20 CAPSULE ORAL at 07:50

## 2021-10-06 RX ADMIN — HYDROMORPHONE HYDROCHLORIDE 1 MG: 1 INJECTION, SOLUTION INTRAMUSCULAR; INTRAVENOUS; SUBCUTANEOUS at 14:35

## 2021-10-06 RX ADMIN — Medication 0.2 MG: at 18:41

## 2021-10-06 RX ADMIN — Medication 5.2 MILLICURIE: at 12:12

## 2021-10-06 RX ADMIN — HYDROMORPHONE HYDROCHLORIDE 1 MG: 1 INJECTION, SOLUTION INTRAMUSCULAR; INTRAVENOUS; SUBCUTANEOUS at 00:17

## 2021-10-06 RX ADMIN — ONDANSETRON 4 MG: 2 INJECTION INTRAMUSCULAR; INTRAVENOUS at 14:35

## 2021-10-06 RX ADMIN — LIDOCAINE HYDROCHLORIDE 50 MG: 10 INJECTION, SOLUTION EPIDURAL; INFILTRATION; INTRACAUDAL; PERINEURAL at 18:25

## 2021-10-06 RX ADMIN — DONEPEZIL HYDROCHLORIDE 10 MG: 10 TABLET, FILM COATED ORAL at 20:53

## 2021-10-06 RX ADMIN — HYDROMORPHONE HYDROCHLORIDE 1 MG: 1 INJECTION, SOLUTION INTRAMUSCULAR; INTRAVENOUS; SUBCUTANEOUS at 04:26

## 2021-10-06 RX ADMIN — MEMANTINE 10 MG: 10 TABLET ORAL at 20:53

## 2021-10-06 RX ADMIN — CARVEDILOL 6.25 MG: 6.25 TABLET, FILM COATED ORAL at 07:51

## 2021-10-06 RX ADMIN — Medication 25 MG: at 18:46

## 2021-10-06 RX ADMIN — MORPHINE SULFATE 2.9 MG: 4 INJECTION, SOLUTION INTRAMUSCULAR; INTRAVENOUS at 13:38

## 2021-10-06 RX ADMIN — MIDAZOLAM 2 MG: 1 INJECTION INTRAMUSCULAR; INTRAVENOUS at 18:24

## 2021-10-06 RX ADMIN — MEMANTINE 10 MG: 10 TABLET ORAL at 07:52

## 2021-10-06 ASSESSMENT — PULMONARY FUNCTION TESTS
PIF_VALUE: 0
PIF_VALUE: 1
PIF_VALUE: 13
PIF_VALUE: 13
PIF_VALUE: 3
PIF_VALUE: 13
PIF_VALUE: 1
PIF_VALUE: 12
PIF_VALUE: 1
PIF_VALUE: 5
PIF_VALUE: 12
PIF_VALUE: 0
PIF_VALUE: 13
PIF_VALUE: 13
PIF_VALUE: 0
PIF_VALUE: 2
PIF_VALUE: 13
PIF_VALUE: 13
PIF_VALUE: 1
PIF_VALUE: 0
PIF_VALUE: 13
PIF_VALUE: 1
PIF_VALUE: 13
PIF_VALUE: 13
PIF_VALUE: 22
PIF_VALUE: 0
PIF_VALUE: 13
PIF_VALUE: 13
PIF_VALUE: 1
PIF_VALUE: 13
PIF_VALUE: 0
PIF_VALUE: 13
PIF_VALUE: 1
PIF_VALUE: 13
PIF_VALUE: 0
PIF_VALUE: 3
PIF_VALUE: 0
PIF_VALUE: 13
PIF_VALUE: 2
PIF_VALUE: 13

## 2021-10-06 ASSESSMENT — PAIN DESCRIPTION - DESCRIPTORS
DESCRIPTORS: SHARP
DESCRIPTORS: ACHING
DESCRIPTORS: SHARP;SHOOTING
DESCRIPTORS: SHARP;SHOOTING

## 2021-10-06 ASSESSMENT — PAIN SCALES - GENERAL
PAINLEVEL_OUTOF10: 7
PAINLEVEL_OUTOF10: 8
PAINLEVEL_OUTOF10: 0
PAINLEVEL_OUTOF10: 7
PAINLEVEL_OUTOF10: 7
PAINLEVEL_OUTOF10: 0
PAINLEVEL_OUTOF10: 0
PAINLEVEL_OUTOF10: 7
PAINLEVEL_OUTOF10: 3
PAINLEVEL_OUTOF10: 5
PAINLEVEL_OUTOF10: 0
PAINLEVEL_OUTOF10: 6
PAINLEVEL_OUTOF10: 5
PAINLEVEL_OUTOF10: 8
PAINLEVEL_OUTOF10: 6

## 2021-10-06 ASSESSMENT — PAIN DESCRIPTION - LOCATION
LOCATION: ABDOMEN

## 2021-10-06 ASSESSMENT — PAIN DESCRIPTION - ORIENTATION
ORIENTATION: RIGHT;LOWER
ORIENTATION: RIGHT
ORIENTATION: RIGHT;LOWER

## 2021-10-06 ASSESSMENT — PAIN DESCRIPTION - FREQUENCY
FREQUENCY: CONTINUOUS

## 2021-10-06 ASSESSMENT — LIFESTYLE VARIABLES: SMOKING_STATUS: 0

## 2021-10-06 ASSESSMENT — ENCOUNTER SYMPTOMS
STRIDOR: 0
SHORTNESS OF BREATH: 0

## 2021-10-06 ASSESSMENT — PAIN DESCRIPTION - PAIN TYPE: TYPE: CHRONIC PAIN

## 2021-10-06 NOTE — PLAN OF CARE
Problem: Pain:  Goal: Pain level will decrease  Description: Pain level will decrease  Outcome: Ongoing  Goal: Control of acute pain  Description: Control of acute pain  Outcome: Ongoing  Goal: Control of chronic pain  Description: Control of chronic pain  Outcome: Ongoing     Problem: Coping:  Goal: Expressions of feelings of enhanced comfort will increase  Description: Expressions of feelings of enhanced comfort will increase  Outcome: Ongoing     Problem: Urinary Elimination:  Goal: Ability to achieve a balanced intake and output will improve  Description: Ability to achieve a balanced intake and output will improve  Outcome: Ongoing  Goal: Ability to recognize the need to void and respond appropriately will improve  Description: Ability to recognize the need to void and respond appropriately will improve  Outcome: Ongoing  Goal: Will remain free from infection  Description: Will remain free from infection  Outcome: Ongoing     Problem: Falls - Risk of:  Goal: Will remain free from falls  Description: Will remain free from falls  Outcome: Ongoing  Goal: Absence of physical injury  Description: Absence of physical injury  Outcome: Ongoing

## 2021-10-06 NOTE — PROGRESS NOTES
Spoke with RN about HIDA scan. We will bring him down around 1215, please keep him NPO and NO PAIN MEDS until after the test.  Any questions please call nuclear medicine at [phone].  Electronically signed by Basim Jarrett on 10/6/2021 at 8:59 AM

## 2021-10-06 NOTE — PLAN OF CARE
Problem: Pain:  Goal: Control of acute pain  Description: Control of acute pain  10/6/2021 1442 by Minnie Gutierrez RN  Outcome: Ongoing   Pt. Pain is adequately controlled with PRN pain medication, see MAR. Problem: Urinary Elimination:  Goal: Will remain free from infection  Description: Will remain free from infection  10/6/2021 1442 by Minnie Gutierrez RN  Outcome: Ongoing   Pt. Remains free of any signs on infection, will continue to monitor. Problem: Falls - Risk of:  Goal: Will remain free from falls  Description: Will remain free from falls  10/6/2021 1442 by Minnie Gutierrez RN  Outcome: Ongoing   Pt. Remains free of falls, appropriate fall precautions in place.

## 2021-10-06 NOTE — CONSULTS
General Surgery Consult      Pt Name: Albino Matta  MRN: [id number]  YOB: 1950  Date of evaluation: 10/6/2021  Primary Care Physician: Fritz Carter DO   Patient evaluated at the request of  Dr. Ashok Buerger  Reason for evaluation: Abdominal pain    SUBJECTIVE:   History of Chief Complaint:    Albino Matta is a 66 y.o. male with PMHx type II DM, hypothyroidism, and non-Hodgkin's lymphoma undergoing chemotherapy who presents with right sided abdominal pain. Patient states he has been having intermittent pain since July but worsened over the past several days. Pain is dull, now constant, radiates to right flank. Associated with N/V, subjective fevers/chills, and poor appetite. He denies SOB, hematemesis, diarrhea, melena, hematochezia, dysuria, hematuria. S/p open bilateral inguinal hernia repairs, IR retroperitoneal mass biopsy 07/2021, and IR port placement 08/31/2021. Biopsy confirmed follicular lymphoma, stage 1-2. He did just have a colonoscopy per GI 09/24/2021 with removal of several polyps. Pathology came back as tubular adenomas and sessile serrated adenoma. Labs in ED unremarkable. CT abdomen/pelvis revealed interval decrease in left retroperitoneal soft tissue mass, indicating response to therapy. Cholelithiasis and right hydronephrosis also identified. Symptom onset has been acute for a time period of several day(s). Severity is described as moderate. Course of his symptoms over time is acute. Past Medical History   has a past medical history of Cancer [facility]), Chemotherapy management, encounter for, Diabetes mellitus ([facility] [address] History of non-Hodgkin's lymphoma, Hyperlipidemia, Hypertension, and Hypothyroidism. Past Surgical History   has a past surgical history that includes hernia repair; IR BIOPSY ABDOMINAL/RETROPERITONEAL MASS PERCUTANEOUS (7/6/2021); IR PORT PLACEMENT > 5 YEARS (8/31/2021); and Colonoscopy (Left, 9/24/2021).     Medications  Prior to Admission medications    Medication Sig diplopia, tinnitus or vertigo  Resp Denies any shortness of breath, cough or wheezing  Cardiac Denies any chest pain, palpitations, claudication or edema  GI Denies any melena, hematochezia, hematemesis or pyrosis   Denies any frequency, urgency, hesitancy or incontinence  Heme Denies bruising or bleeding easily; PMHx non-Hodgkin's lymphoma undergoing chemotherapy   Endocrine PMHx type II DM and hypothyroidism   Neuro Denies any focal motor or sensory deficits    OBJECTIVE:   VITALS:  height is 5' 10\" (1.778 m) and weight is 164 lb (74.4 kg). His oral temperature is 97.8 °F (36.6 °C). His blood pressure is 161/61 (abnormal) and his pulse is 66. His respiration is 18 and oxygen saturation is 91%. CONSTITUTIONAL: Alert and oriented times 3, no acute distress and cooperative to examination with proper mood and affect. SKIN: Skin color, texture, turgor normal. No rashes or lesions. LYMPH: no cervical nodes, no inguinal nodes  HEENT: Head is normocephalic, atraumatic. EOMI, PERRLA  NECK: Supple, symmetrical, trachea midline, no adenopathy, thyroid symmetric, not enlarged and no tenderness, skin normal  CHEST/LUNGS: chest symmetric with normal A/P diameter, normal respiratory rate and rhythm, lungs clear to auscultation without wheezes, rales or rhonchi. No accessory muscle use. CARDIOVASCULAR: Heart regular rate and rhythm Normal S1 and S2. . Carotid and femoral pulses 2+/4 and equal bilaterally  ABDOMEN: Normal shape. . right groin and left groin scar(s) present. Normal bowel sounds. No bruits. Soft, nondistended, no masses or organomegaly. no evidence of hernia. Percussion: Normal without hepatosplenomegally. Tenderness: RLQ  RECTAL: deferred, not clinically indicated  NEUROLOGIC: There are no focalizing motor or sensory deficits. CN II-XII are grossly intact.   EXTREMITIES: no cyanosis, no clubbing and no edema    LABS:   CBC with Differential:    Lab Results   Component Value Date    WBC 6.2 10/06/2021    RBC 3.89 10/06/2021    RBC 4.38 06/06/2012    HGB 11.7 10/06/2021    HCT 34.4 10/06/2021     10/06/2021     06/06/2012    MCV 88.3 10/06/2021    MCH 29.9 10/06/2021    MCHC 33.9 10/06/2021    RDW 15.5 10/06/2021    LYMPHOPCT 10 10/06/2021    MONOPCT 11 10/06/2021    BASOPCT 0 10/06/2021    MONOSABS 0.70 10/06/2021    LYMPHSABS 0.60 10/06/2021    EOSABS 0.10 10/06/2021    BASOSABS 0.00 10/06/2021    DIFFTYPE NOT REPORTED 10/06/2021     BMP:    Lab Results   Component Value Date     10/06/2021    K 4.5 10/06/2021     10/06/2021    CO2 26 10/06/2021    BUN 19 10/06/2021    LABALBU 3.5 10/06/2021    CREATININE 1.10 10/06/2021    CALCIUM 8.4 10/06/2021    GFRAA >60 10/06/2021    LABGLOM >60 10/06/2021    GLUCOSE 94 10/06/2021    GLUCOSE 97 06/06/2012     Hepatic Function Panel:    Lab Results   Component Value Date    ALKPHOS 129 10/06/2021    ALT 13 10/06/2021    AST 11 10/06/2021    PROT 5.4 10/06/2021    BILITOT 0.61 10/06/2021    BILIDIR 0.17 10/06/2021    IBILI 0.44 10/06/2021    LABALBU 3.5 10/06/2021     Calcium:    Lab Results   Component Value Date    CALCIUM 8.4 10/06/2021     Magnesium:    Lab Results   Component Value Date    MG 2.0 12/15/2019     Phosphorus:    Lab Results   Component Value Date    PHOS 3.4 10/05/2021     PT/INR:    Lab Results   Component Value Date    PROTIME 12.1 08/31/2021    INR 0.9 08/31/2021     ABG:  No results found for: PHART, PH, EKF2IJX, PCO2, PO2ART, PO2, GJF7GYQ, HCO3, BEART, BE, THGBART, THB, CJF8BVU, Z2EDFKIC, O2SAT  Urine Culture:  No components found for: CURINE  Blood Culture:  No components found for: CBLOOD, CFUNGUSBL  Stool Culture:  No components found for: CSTOOL    RADIOLOGY:   I have personally reviewed the following films:  CT ABDOMEN PELVIS W IV CONTRAST Additional Contrast? None    Result Date: 10/5/2021  EXAMINATION: CT OF THE ABDOMEN AND PELVIS WITH CONTRAST 10/5/2021 11:37 am TECHNIQUE: CT of the abdomen and pelvis was performed with the administration of intravenous contrast. Multiplanar reformatted images are provided for review. Dose modulation, iterative reconstruction, and/or weight based adjustment of the mA/kV was utilized to reduce the radiation dose to as low as reasonably achievable. COMPARISON: CT scan of the abdomen dated July 6, 2021, and July 3, 2021 HISTORY: ORDERING SYSTEM PROVIDED HISTORY: abd pain w/n/v w/known retroperitoneal mass TECHNOLOGIST PROVIDED HISTORY: abd pain w/n/v w/known retroperitoneal mass Reason for Exam: Right side ab pain with nausea/vomiting, hx of retroperitoneal mass Acuity: Unknown Type of Exam: Unknown Relevant Medical/Surgical History: Hernia repair FINDINGS: Lower Chest: Dependent changes are present within the lung bases. Organs: The liver, spleen, and pancreas demonstrate no acute abnormality. Cholelithiasis is present without evidence of cholecystitis. Nodular thickening of the right adrenal gland is again noted, unchanged. Cortical cyst formation is again noted on the right kidney. A 3 mm nonobstructing calculus is again visualized within the lower pole collecting system of the right kidney. An approximate 1.7 x 1.5 cm solid-appearing nodule is again visualized within the right kidney, previously measured 1.2 mm. A mild degree of right hydronephrosis and hydroureter is now present. Again demonstrated is the presence of an infiltrating soft tissue mass involving the left retroperitoneum, with enlargement of the left adrenal gland, and encasing the left kidney. Caudal to the left kidney, with the soft tissue mass measures approximately 6.4 by 7.1 cm, decreased in size, indicating response to treatment. The soft tissue fullness within the para-region is decreased in size as well. Interval decrease in the degree of left hydronephrosis is present, now mild in degree. GI/Bowel: A small hiatal hernia is present. No evidence of small-bowel obstruction is present.   The appendix is normal.  Biopsy clips are present within the cecum. Scattered colonic diverticula are noted, without evidence of diverticulitis. Pelvis: Prostate gland is enlarged. Urinary bladder appears grossly normal. Peritoneum/Retroperitoneum: No free fluid or free air is present within the abdomen or pelvis. Extensive atherosclerotic changes are again demonstrated involving the abdominal aorta. I suspect there is high-grade stenosis at the origin of the left common iliac artery, similar compared to the prior study. Dominant right external iliac chain lymph node is significantly decreased in size, now measuring approximately 2.6 cm in length, and 8 mm in thickness. This previously measured 3.5 x 1.6 cm. Bulky para-adenopathy is decreased in size. Bones/Soft Tissues: No acute osseous abnormality is present. 1. Interval decrease in size of the infiltrating soft tissue mass within the left retroperitoneum, encasing the left kidney and abdominal aorta. The para-aortic adenopathy, and external iliac chain adenopathy is decreased in size, indicating response to therapy. Changes are likely due to lymphoma. 2. Cholelithiasis, without evidence of cholecystitis 3. Interval development of a mild degree of right hydronephrosis, and hydroureter. Right ureter is dilated to the level of the retroperitoneal \"mass\". .  Obstruction may be related to stricture formation or encasement by the retroperitoneal soft tissue mass. 4. Scattered colonic diverticula, without evidence of diverticulitis 5. Enlarged prostate gland 6. Nonobstructive bowel gas pattern         IMPRESSION:   1. Abdominal pain, tender in RLQ  2. Cholelithiasis   3. Hydroureteronephrosis, right sided    4. PMHx type II DM, hypothyroidism, and non-Hodgkin's lymphoma undergoing chemotherapy  5. S/p open b/l inguinal hernia repair, IR retroperitoneal mass biopsy, IR port placement, colonoscopy with polypectomy per GI 09/24/2021    does not have any pertinent problems on file.     PLAN: 1. NPO  2. Daily CBC, BMP, trend LFT's  3. RUQ US and HIDA scan today  4. GI prophylaxis  5. Pain and nausea management   6. No acute general surgery intervention; await testing results  7. Urology consultation per primary   8. Continue medical management   9. Right upper quadrant abdominal pain. Likely chronic calculus cholecystitis. HIDA scan negative for acute cholecystitis. Urology consult pending  10. Will discuss with primary care physician and urology regarding timing for cholecystectomy. Thank you for this interesting consult and for allowing us to participate in the care of this patient. If you have any questions please don't hesitate to call.       Electronically signed by MIREYA Hinson  on 10/6/2021 at 9:34 AM

## 2021-10-06 NOTE — CARE COORDINATION
DISCHARGE PLANNING NOTE:      Attempted to speak with patient regarding discharge planning, however he was off the floor. Will try again later.     Electronically signed by Ramila Ferguson RN on 10/6/2021 at 1:05 PM

## 2021-10-06 NOTE — FLOWSHEET NOTE
Patient stated he had just \"gotten back from a test\"; declined prayer     10/06/21 5079   Encounter Summary   Services provided to: Patient   Referral/Consult From: Rounding   Continue Visiting   (10/6/21)   Complexity of Encounter Moderate   Length of Encounter 15 minutes   Spiritual Assessment Completed Yes   Routine   Type Initial   Assessment Approachable   Intervention Active listening;Sustaining presence/ Ministry of presence; Discussed illness/injury and it's impact   Outcome Expressed gratitude;Coping;Receptive

## 2021-10-06 NOTE — CONSULTS
Lissa [facility]d  Urology Consultation    Patient:  Richard Mandujano  MRN: [id number]  YOB: 1950    CHIEF COMPLAINT: Chronic hydronephrosis, right flank pain    HISTORY OF PRESENT ILLNESS:   The patient is a 66 y.o. male who presents with abdominal pain, and nausea as well as right-sided pain patient denies any dysuria or gross hematuria he had a recent colonoscopy 1 week ago 5 polyps were removed. Patient with known history of lymphoma, hydronephrosis associated with left-sided retroperitoneal lymph nodes has been present since July the patient had biopsy of the abdominal and retroperitoneal nodes on July 6 this was followed by placement of port in August.    The CT imaging previously demonstrated the hydronephrosis, we had discussed with the patient the stent placement previously but since the renal function was stable and the patient was being treated for the lymphoma stent placement was delayed at that time    Patient's old records, notes and chart reviewed and summarized above.     Past Medical History:    Past Medical History:   Diagnosis Date    Cancer [facility])     Chemotherapy management, encounter for     Diabetes mellitus ([facility] [address]     History of non-Hodgkin's lymphoma     Hyperlipidemia     Hypertension     Hypothyroidism        Past Surgical History:    Past Surgical History:   Procedure Laterality Date    COLONOSCOPY Left 9/24/2021    COLONOSCOPY POLYPECTOMY SNARE/COLD BIOPSY performed by [facility] V[facility]quez MD [facility] IR BIOPSY ABDOMINAL MASS  7/6/2021    IR BIOPSY ABDOMINAL MASS 7/6/2021 [facility]    IR PORT PLACEMENT EQUAL OR GREATER THAN 5 YEARS  8/31/2021    IR PORT PLACEMENT EQUAL OR GREATER THAN 5 YEARS 8/31/2021 [facility]     Previous  surgery: none     Medications:    Scheduled Meds:   amLODIPine  10 mg Oral Daily    atorvastatin  10 mg Oral Daily    carvedilol  6.25 mg Oral BID WC    donepezil  10 mg Oral Nightly    FLUoxetine  20 mg Oral Daily    levothyroxine  112 mcg Oral Daily    memantine  10 mg Oral BID    pantoprazole  40 mg Oral QAM AC    insulin lispro  0-6 Units SubCUTAneous TID WC    insulin lispro  0-3 Units SubCUTAneous Nightly    influenza virus vaccine  0.5 mL IntraMUSCular Prior to discharge    scopolamine  1 patch TransDERmal Q72H     Continuous Infusions:   sodium chloride 80 mL/hr at 10/05/21 2240     PRN Meds:.sodium chloride flush, ondansetron, HYDROmorphone    Allergies:  Patient has no known allergies. Social History:    Social History     Socioeconomic History    Marital status:      Spouse name: Not on file    Number of children: Not on file    Years of education: Not on file    Highest education level: Not on file   Occupational History    Not on file   Tobacco Use    Smoking status: Former Smoker     Packs/day: 2.00     Years: 45.00     Pack years: 90.00     Types: Cigarettes     Quit date: 1/7/2006     Years since quitting: 15.7    Smokeless tobacco: Never Used   Vaping Use    Vaping Use: Never used   Substance and Sexual Activity    Alcohol use: Yes     Alcohol/week: 1.7 standard drinks     Types: 2 Standard drinks or equivalent per week     Comment: occassional    Drug use: Yes     Types: Marijuana    Sexual activity: Not on file   Other Topics Concern    Not on file   Social History Narrative    Not on file     Social Determinants of Health     Financial Resource Strain:     Difficulty of Paying Living Expenses:    Food Insecurity:     Worried About Running Out of Food in the Last Year:     [address] in the Last Year:    Transportation Needs:     Lack of Transportation (Medical):      Lack of Transportation (Non-Medical):    Physical Activity:     Days of Exercise per Week:     Minutes of Exercise per Session:    Stress:     Feeling of Stress :    Social Connections:     Frequency of Communication with Friends and Family:     Frequency of Social Gatherings with Friends and Family:     Attends [employer] witness Services:     Active Member of Clubs or Organizations:     Attends Club or Organization Meetings:     Marital Status:    Intimate Partner Violence:     Fear of Current or Ex-Partner:     Emotionally Abused:     Physically Abused:     Sexually Abused:        Family History:    Family History   Problem Relation Age of Onset    Diabetes Mother     Alzheimer's Disease Father     Heart Disease Brother     Heart Disease Brother      Previous Urologic Family history: none    REVIEW OF SYSTEMS:  Constitutional: negative  Eyes: negative  Respiratory: negative  Cardiovascular: negative  Gastrointestinal: See history of present illness  Genitourinary: see HPI  Musculoskeletal: negative  Skin: negative   Neurological: negative  Hematological/Lymphatic: See history of present illness  Psychological: negative    Physical Exam:    This a 66 y.o. male   Patient Vitals for the past 24 hrs:   BP Temp Temp src Pulse Resp SpO2 Height Weight   10/06/21 0630 (!) 161/61 97.8 °F (36.6 °C) Oral 57 18 -- -- --   10/05/21 2120 (!) 188/63 99 °F (37.2 °C) Oral 61 18 96 % -- --   10/05/21 2100 (!) 161/57 -- -- 59 17 94 % -- --   10/05/21 1936 (!) 170/67 -- -- -- -- 94 % -- --   10/05/21 1933 (!) 183/67 -- -- 65 17 -- -- --   10/05/21 1525 (!) 154/84 98.5 °F (36.9 °C) Oral 87 18 99 % 5' 10\" (1.778 m) 164 lb (74.4 kg)     Constitutional: Patient in no acute distress; Neuro: alert and oriented to person place and time. Psych: Mood and affect normal.  Skin: Normal  Lungs: Respiratory effort normal  Cardiovascular:  Normal peripheral pulses  Abdomen: Soft, right flank is-tender, -distended with right CVA, and right para umbilical tenderness    No evidence  or hernia. Kidneys normal.  Bladder non-tender and not distended.   Lymphatics: no palpable lymphadenopathy  Penis normal and circumcised  Urethral meatus normal  Scrotal exam normal  .   LABS:  Recent Labs 10/05/21  1619 10/06/21  0539   WBC 7.6 6.2   HGB 13.3* 11.7*   HCT 39.3* 34.4*   MCV 87.5 88.3    144*     Recent Labs     10/05/21  1619 10/06/21  0539    147*   K 4.6 4.5    110*   CO2 24 26   PHOS 3.4  --    BUN 20 19   CREATININE 1.25* 1.10     Lab Results   Component Value Date    PSA 2.50 09/16/2021    PSA 2.1 09/16/2021    PSA 1.65 02/29/2016       Additional Lab/culture results:    Urinalysis:   Recent Labs     10/05/21  2011   COLORU Yellow   PHUR 5.5   WBCUA 2 TO 5   RBCUA 2 TO 5   MUCUS NOT REPORTED   TRICHOMONAS NOT REPORTED   YEAST NOT REPORTED   BACTERIA FEW*   SPECGRAV 1.045*   LEUKOCYTESUR NEGATIVE   UROBILINOGEN Normal   BILIRUBINUR SMALL*        -----------------------------------------------------------------  Imaging Results:  . Interval decrease in size of the infiltrating soft tissue mass within the   left retroperitoneum, encasing the left kidney and abdominal aorta.  The   para-aortic adenopathy, and external iliac chain adenopathy is decreased in   size, indicating response to therapy.  Changes are likely due to lymphoma. 2. Cholelithiasis, without evidence of cholecystitis   3. Interval development of a mild degree of right hydronephrosis, and   hydroureter.  Right ureter is dilated to the level of the retroperitoneal   \"mass\". .  Obstruction may be related to stricture formation or encasement by   the retroperitoneal soft tissue mass. 4. Scattered colonic diverticula, without evidence of diverticulitis   5. Enlarged prostate gland   6.  Nonobstructive bowel gas pattern             Assessment and Plan   Impression: Right flank pain, right hydronephrosis  Patient Active Problem List   Diagnosis    NHL (non-Hodgkin's lymphoma) ([address]    Traumatic retroperitoneal hematoma    Acute kidney injury ([address]    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)    Hydronephrosis due to obstruction of ureter       Plan: Discussed with the patient cystoscopy pyelogram right stent placement.     Patient is agreeable    Discussed also with the patient's primary care physician Dr. Dequan Hood    We will proceed as outlined    Chana Carias MD  7:08 AM 10/6/2021

## 2021-10-06 NOTE — ED NOTES
Report given to Hamilton Doyle RN from ED. Report method by phone   The following was reviewed with receiving RN:   Current vital signs:  BP (!) 161/57   Pulse 59   Temp 98.5 °F (36.9 °C) (Oral)   Resp 17   Ht 5' 10\" (1.778 m)   Wt 164 lb (74.4 kg)   SpO2 94%   BMI 23.53 kg/m²                MEWS Score: 1     Any medication or safety alerts were reviewed. Any pending diagnostics and notifications were also reviewed, as well as any safety concerns or issues, abnormal labs, abnormal imaging, and abnormal assessment findings. Questions were answered.             Dyllan Rodriguez RN  10/05/21 3370

## 2021-10-06 NOTE — ANESTHESIA PRE PROCEDURE
Department of Anesthesiology  Preprocedure Note       Name:  Ashish Andersen   Age:  66 y.o.  :  1950                                          MRN:  [id number]         Date:  10/6/2021      Surgeon: Ingrid Davis):  Kike Orellana MD    Procedure: Procedure(s):  CYSTOSCOPY PYELOGRAM URETERAL STENT INSERTION    Medications prior to admission:   Prior to Admission medications    Medication Sig Start Date End Date Taking? Authorizing Provider   amLODIPine (NORVASC) 5 MG tablet Take 10 mg by mouth daily    Yes Historical Provider, MD   pregabalin (LYRICA) 150 MG capsule Take 150 mg by mouth 3 times daily.    Yes Historical Provider, MD   glipiZIDE-metFORMIN (METAGLIP) 5-500 MG per tablet Take 2 tablets by mouth daily (with breakfast)   Yes Historical Provider, MD   glipiZIDE-metFORMIN (METAGLIP) 5-500 MG per tablet Take 1 tablet by mouth Daily with supper   Yes Historical Provider, MD   donepezil (ARICEPT) 10 MG tablet Take 10 mg by mouth nightly  21  Yes Historical Provider, MD   meloxicam (MOBIC) 15 MG tablet Take 15 mg by mouth daily  21  Yes Historical Provider, MD   atorvastatin (LIPITOR) 10 MG tablet Take 10 mg by mouth daily   Yes Historical Provider, MD   FLUoxetine (PROZAC) 20 MG capsule Take 20 mg by mouth daily   Yes Historical Provider, MD   lisinopril-hydroCHLOROthiazide (PRINZIDE;ZESTORETIC) 20-12.5 MG per tablet Take 1 tablet by mouth daily  21  Yes Historical Provider, MD   carvedilol (COREG) 6.25 MG tablet Take 6.25 mg by mouth 2 times daily (with meals)   Yes Historical Provider, MD   omeprazole (PRILOSEC) 20 MG delayed release capsule Take 20 mg by mouth 2 times daily   Yes Historical Provider, MD   levothyroxine (SYNTHROID) 50 MCG tablet Take 112 mcg by mouth Daily    Yes Historical Provider, MD       Current medications:    Current Facility-Administered Medications   Medication Dose Route Frequency Provider Last Rate Last Admin    lidocaine 4 % external patch 1 patch  1 patch TransDERmal Daily Ida Console, DO   1 patch at 10/06/21 1027    piperacillin-tazobactam (ZOSYN) 3,375 mg in dextrose 5 % 50 mL IVPB extended infusion (mini-bag)  3,375 mg IntraVENous Q8H Roderick T Dixon, DO        sodium chloride flush 0.9 % injection 10 mL  10 mL IntraVENous PRN Roderick T Dixon, DO   10 mL at 10/06/21 1212    sodium chloride flush 0.9 % injection 10 mL  10 mL IntraVENous PRN Roxane A Lupe, DO   10 mL at 10/05/21 1747    amLODIPine (NORVASC) tablet 10 mg  10 mg Oral Daily Roxane A Lupe, DO   10 mg at 10/06/21 0751    atorvastatin (LIPITOR) tablet 10 mg  10 mg Oral Daily Roxane A Lupe, DO   10 mg at 10/06/21 0751    carvedilol (COREG) tablet 6.25 mg  6.25 mg Oral BID  Roxane A Lupe, DO   6.25 mg at 10/06/21 0751    donepezil (ARICEPT) tablet 10 mg  10 mg Oral Nightly Roxane A Lupe, DO        FLUoxetine (PROZAC) capsule 20 mg  20 mg Oral Daily Roxane A Lupe, DO   20 mg at 10/06/21 0750    levothyroxine (SYNTHROID) tablet 112 mcg  112 mcg Oral Daily Roxane A Lupe, DO        memantine (NAMENDA) tablet 10 mg  10 mg Oral BID Roxane A Lupe, DO   10 mg at 10/06/21 0752    pantoprazole (PROTONIX) tablet 40 mg  40 mg Oral QAM  Roxane A Lupe, DO   40 mg at 10/06/21 0633    ondansetron (ZOFRAN) injection 4 mg  4 mg IntraVENous Q8H PRN Roxane A Lupe, DO   4 mg at 10/06/21 1435    insulin lispro (HUMALOG) injection vial 0-6 Units  0-6 Units SubCUTAneous TID  Roxane A Lupe, DO        insulin lispro (HUMALOG) injection vial 0-3 Units  0-3 Units SubCUTAneous Nightly Roxane A Lupe, DO        influenza quadrivalent split vaccine (FLUZONE;FLUARIX;FLULAVAL;AFLURIA) injection 0.5 mL  0.5 mL IntraMUSCular Prior to discharge Roderick T Dixon, DO        HYDROmorphone (DILAUDID) injection 1 mg  1 mg IntraVENous Q4H PRN Roderick T Dixon, DO   1 mg at 10/06/21 1435    0.9 % sodium chloride infusion   IntraVENous Continuous Roderick T Dixon, DO 80 mL/hr at 10/05/21 2240 New Bag at 10/05/21 2240    scopolamine (TRANSDERM-SCOP) transdermal patch 1 patch  1 patch TransDERmal Q72H Roderick HODGE Dixon, DO   1 patch at 10/06/21 0021       Allergies:  No Known Allergies    Problem List:    Patient Active Problem List   Diagnosis Code    NHL (non-Hodgkin's lymphoma) ([facility] [address] C85.90    Traumatic retroperitoneal hematoma S36.892A    Acute kidney injury ([facility] [address] E76.1    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    Hydronephrosis due to obstruction of ureter N13.1       Past Medical History:        Diagnosis Date    Cancer ([facility] [address]     Chemotherapy management, encounter for     Diabetes mellitus [facility]     History of non-Hodgkin's lymphoma     Hyperlipidemia     Hypertension     Hypothyroidism        Past Surgical History:        Procedure Laterality Date    COLONOSCOPY Left 9/24/2021    COLONOSCOPY POLYPECTOMY SNARE/COLD BIOPSY performed by Juan Wang MD at [facility] IR BIOPSY ABDOMINAL MASS  7/6/2021    IR BIOPSY ABDOMINAL MASS 7/6/2021 [facility]    IR PORT PLACEMENT EQUAL OR GREATER THAN 5 YEARS  8/31/2021    IR PORT PLACEMENT EQUAL OR GREATER THAN 5 YEARS 8/31/2021 [facility]       Social History:    Social History     Tobacco Use    Smoking status: Former Smoker     Packs/day: 2.00     Years: 45.00     Pack years: 90.00     Types: Cigarettes     Quit date: 1/7/2006     Years since quitting: 15.7    Smokeless tobacco: Never Used   Substance Use Topics    Alcohol use:  Yes     Alcohol/week: 1.7 standard drinks     Types: 2 Standard drinks or equivalent per week     Comment: occassional                                Counseling given: Not Answered      Vital Signs (Current):   Vitals:    10/06/21 0630 10/06/21 0751 10/06/21 1100 10/06/21 1435   BP: (!) 161/61  (!) 126/49    Pulse: 57 66 55    Resp: 18  16    Temp: 97.8 °F (36.6 °C)  98 °F (36.7 °C)    TempSrc: Oral  Oral    SpO2: 91%  (!) 86% 90%   Weight:       Height: BP Readings from Last 3 Encounters:   10/06/21 (!) 126/49   09/24/21 (!) 171/65   09/24/21 (!) 99/51       NPO Status:                                                                                 BMI:   Wt Readings from Last 3 Encounters:   10/05/21 164 lb (74.4 kg)   09/24/21 174 lb (78.9 kg)   09/20/21 173 lb 3.2 oz (78.6 kg)     Body mass index is 23.53 kg/m².     CBC:   Lab Results   Component Value Date    WBC 6.2 10/06/2021    RBC 3.89 10/06/2021    RBC 4.38 06/06/2012    HGB 11.7 10/06/2021    HCT 34.4 10/06/2021    MCV 88.3 10/06/2021    RDW 15.5 10/06/2021     10/06/2021     06/06/2012     LR    CMP:   Lab Results   Component Value Date     10/06/2021    K 4.5 10/06/2021     10/06/2021    CO2 26 10/06/2021    BUN 19 10/06/2021    CREATININE 1.10 10/06/2021    GFRAA >60 10/06/2021    LABGLOM >60 10/06/2021    GLUCOSE 94 10/06/2021    GLUCOSE 97 06/06/2012    PROT 5.4 10/06/2021    CALCIUM 8.4 10/06/2021    BILITOT 0.61 10/06/2021    ALKPHOS 129 10/06/2021    AST 11 10/06/2021    ALT 13 10/06/2021       POC Tests:   Recent Labs     10/06/21  1120   POCGLU 107       Coags:   Lab Results   Component Value Date    PROTIME 12.1 08/31/2021    INR 0.9 08/31/2021    APTT 26.7 08/31/2021       HCG (If Applicable): No results found for: PREGTESTUR, PREGSERUM, HCG, HCGQUANT     ABGs: No results found for: PHART, PO2ART, KNM0SRL, CKP8MKU, BEART, O9MWDTLJ     Type & Screen (If Applicable):  No results found for: LABABO, LABRH    Drug/Infectious Status (If Applicable):  No results found for: HIV, HEPCAB    COVID-19 Screening (If Applicable): No results found for: COVID19        Anesthesia Evaluation  Patient summary reviewed and Nursing notes reviewed no history of anesthetic complications:   Airway: Mallampati: III  TM distance: >3 FB   Neck ROM: full  Mouth opening: > = 3 FB Dental:          Pulmonary:Negative Pulmonary ROS and normal exam  breath sounds clear to auscultation      (-) pneumonia, COPD, asthma, shortness of breath, recent URI, sleep apnea, rhonchi, wheezes, rales, stridor, not a current smoker and no decreased breath sounds          Patient did not smoke on day of surgery. Cardiovascular:  Exercise tolerance: good (>4 METS),   (+) hypertension: no interval change, hyperlipidemia    (-) pacemaker, valvular problems/murmurs, past MI, CAD, CABG/stent, dysrhythmias,  angina,  CHF, orthopnea, PND,  DIALLO, murmur, weak pulses,  friction rub, systolic click, carotid bruit,  JVD, peripheral edema and no pulmonary hypertension    ECG reviewed  Rhythm: regular  Rate: normal           Beta Blocker:  Dose within 24 Hrs         Neuro/Psych:   Negative Neuro/Psych ROS     (-) seizures, neuromuscular disease, TIA, CVA, headaches, psychiatric history and depression/anxiety            GI/Hepatic/Renal:        (-) hiatal hernia, GERD, PUD, hepatitis, liver disease, no renal disease, bowel prep and no morbid obesity       Endo/Other:    (+) DiabetesType II DM, , hypothyroidism::., no malignancy/cancer. (-) hyperthyroidism, blood dyscrasia, arthritis, no electrolyte abnormalities, no malignancy/cancer               Abdominal:             Vascular: negative vascular ROS. - PVD, DVT and PE. Other Findings: Bad teeth          Anesthesia Plan      general     ASA 3       Induction: intravenous. MIPS: Postoperative opioids intended and Prophylactic antiemetics administered. Anesthetic plan and risks discussed with patient. Plan discussed with CRNA.                   Shira Wilder MD   10/6/2021

## 2021-10-06 NOTE — PROGRESS NOTES
RN updated Dr. Geni Olson on patients admission and nausea, see new orders for scopolamine patch, and that RN called consult to Dr. Aisha Bhatt and he said the patient has had the same CT results for 3 months and  He is waiting for the Biopsy pathology that has is still pending.

## 2021-10-06 NOTE — OP NOTE
carried out,    The contrast material reached the lower third of the ureter but would not progress proximally because of obstruction. Cori Hai was negotiated by the narrowing and the area of hydronephrosis and positioned in the renal pelvis with the distal end in the bladder. At the completion the bladder was emptied the scope removed the patient returned to recovery room in stable condition. Recommendation discussed status with medicine, we will await resolution of the hydronephrosis and monitoring the renal function further plan of care will be decided accordingly.     We will also monitor the patient's right flank pain and right paraumbilical pain    Electronically signed by Yodit Kirkland MD on 10/6/2021 at 7:01 PM

## 2021-10-06 NOTE — H&P
Dr. Mario June        History and Physical Examination   Admission Note    CHIEF COMPLAINT:   Chief Complaint   Patient presents with    Abdominal Pain       Reason for Admission: Nausea vomiting right-sided abdominal pain    History Obtained From:  Patient     HISTORY OF PRESENT ILLNESS:      The patient is a pleasant 66 y.o. male with significant medical history of lymphoma undergoing treatments presented with nausea vomiting and right-sided abdominal pain especially with movement he says. Started he says after he had colonoscopy last week for his symptoms by  where polyps were found but he was not clear enough to complete the procedure he says. But after that he started having nausea vomiting could not keep things down had pain on the right side that slowly getting worse especially with movement he says from side to side. He denies having any issues with urination. He thought he was emptying. He had no fevers or chills till yesterday he says. He had no chest pain no shortness of breath he has no coronary artery disease except for hypertension he had no problems with his chemo treatment and he has no problems with ambulation or going flight of stairs he says. He had a CT that showed mild hydro on the right with may be a retroperitoneal mass as well as his left-sided mass has decreased in size is response to his treatment. Patient was seen by urology this morning Dr. Maris Severino which possibly he is going to have for cystoscopy may be today according to the patient. Patient also shows has cholelithiasis he is tender in the right upper quadrant and middle quadrant his CT does not show any cholecystitis but will run some other testing make sure this not also a concern or part of his symptoms.     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    Hydronephrosis due to obstruction of ureter N13.1       Past Surgical History:       Past Surgical History:   Procedure Laterality Date    COLONOSCOPY Left 9/24/2021    COLONOSCOPY POLYPECTOMY SNARE/COLD BIOPSY performed by Bobo Mata MD at [facility] IR BIOPSY ABDOMINAL MASS  7/6/2021    IR BIOPSY ABDOMINAL MASS 7/6/2021 [facility]    IR PORT PLACEMENT EQUAL OR GREATER THAN 5 YEARS  8/31/2021    IR PORT PLACEMENT EQUAL OR GREATER THAN 5 YEARS 8/31/2021 [facility]       Current Medications:    Current Facility-Administered Medications   Medication Dose Route Frequency Provider Last Rate Last Admin    sodium chloride flush 0.9 % injection 10 mL  10 mL IntraVENous PRN Roxane A Lupe, DO   10 mL at 10/05/21 1747    amLODIPine (NORVASC) tablet 10 mg  10 mg Oral Daily Roxane A Lupe, DO   10 mg at 10/06/21 0751    atorvastatin (LIPITOR) tablet 10 mg  10 mg Oral Daily Roxane A Lupe, DO   10 mg at 10/06/21 0751    carvedilol (COREG) tablet 6.25 mg  6.25 mg Oral BID WC Roxane A Lupe, DO   6.25 mg at 10/06/21 0751    donepezil (ARICEPT) tablet 10 mg  10 mg Oral Nightly Roxane A Lupe, DO        FLUoxetine (PROZAC) capsule 20 mg  20 mg Oral Daily Roxane A Lupe, DO   20 mg at 10/06/21 0750    levothyroxine (SYNTHROID) tablet 112 mcg  112 mcg Oral Daily Roxane A Lupe, DO        memantine (NAMENDA) tablet 10 mg  10 mg Oral BID Roxane A Lupe, DO   10 mg at 10/06/21 0752    pantoprazole (PROTONIX) tablet 40 mg  40 mg Oral QAM AC Roxane A Lupe, DO   40 mg at 10/06/21 0633    ondansetron (ZOFRAN) injection 4 mg  4 mg IntraVENous Q8H PRN Roxane A Lupe, DO   4 mg at 10/06/21 4952  insulin lispro (HUMALOG) injection vial 0-6 Units  0-6 Units SubCUTAneous TID WC Roxane A Lupe, DO        insulin lispro (HUMALOG) injection vial 0-3 Units  0-3 Units SubCUTAneous Nightly Roxane Andrade, DO        influenza quadrivalent split vaccine (FLUZONE;FLUARIX;FLULAVAL;AFLURIA) injection 0.5 mL  0.5 mL IntraMUSCular Prior to discharge Roderick T Dixon, DO        HYDROmorphone (DILAUDID) injection 1 mg  1 mg IntraVENous Q4H PRN Roderick T Dixon, DO   1 mg at 10/06/21 0426    0.9 % sodium chloride infusion   IntraVENous Continuous Roderick T Dixon, DO 80 mL/hr at 10/05/21 2240 New Bag at 10/05/21 2240    scopolamine (TRANSDERM-SCOP) transdermal patch 1 patch  1 patch TransDERmal Q72H Roderick T Dixon, DO   1 patch at 10/06/21 0021       Allergies:  Patient has no known allergies. Social History:    reports that he quit smoking about 15 years ago. His smoking use included cigarettes. He has a 90.00 pack-year smoking history. He has never used smokeless tobacco. He reports current alcohol use of about 1.7 standard drinks of alcohol per week. He reports current drug use. Drug: Marijuana.     Family History:   Family History   Problem Relation Age of Onset    Diabetes Mother     Alzheimer's Disease Father     Heart Disease Brother     Heart Disease Brother        REVIEW OF SYSTEMS:  RESPIRATORY:  negative for  dry cough, dyspnea, wheezing and chest pain    CARDIOVASCULAR:  negative for  chest pain, dyspnea, palpitations, orthopnea, exertional chest pressure/discomfort, fatigue, edema, syncope positive for htn  GASTROINTESTINAL:  negative for nausea, vomiting, change in bowel habits, diarrhea, constipation, abdominal pain and reflux  GENITOURINARY:  negative for frequency, dysuria, nocturia, urinary incontinence and hesitancy positive for   HEMATOLOGIC/LYMPHATIC:  negative for easy bruising, bleeding and swelling/edemapositive for lymphoma  ENDOCRINE:  negative for weight changes, change in bowel habits and diabetic symptoms including neither polyuria nor polydipsia nor blurred vision nor foot ulcerations nor neuropathy  MUSCULOSKELETAL:  negative for  myalgias, arthralgias, pain, joint swelling, stiff joints and muscle weakness positive for rt sided pain  NEUROLOGICAL:  negative for headaches, dizziness, memory problems, speech problems, visual disturbance, gait problems, weakness and numbness     Vitals:  BP (!) 161/61   Pulse 66 Comment: apical  Temp 97.8 °F (36.6 °C) (Oral)   Resp 18   Ht 5' 10\" (1.778 m)   Wt 164 lb (74.4 kg)   SpO2 91%   BMI 23.53 kg/m²     PHYSICAL EXAM:    CONSTITUTIONAL:  awake, alert, cooperative, no apparent distress, and appears stated age  EYES:  Lids and lashes normal, pupils equal, round and reactive to light, extra ocular muscles intact, sclera clear, conjunctiva normal    ENT:  Normocephalic, without obvious abnormality, atraumatic, sinuses nontender on palpation, external ears without lesions, oral pharynx with moist mucus membranes, tonsils without erythema or exudates,    NECK:  Supple, symmetrical, trachea midline, no adenopathy, thyroid symmetric, not enlarged and no tenderness, skin normal  HEMATOLOGIC/LYMPHATICS:  no cervical lymphadenopathy no cervical lymphadenopathy  BACK:  Symmetric, no curvature, spinous processes are non-tender on palpation, paraspinous muscles are non-tender on palpation, no costal vertebral tenderness  LUNGS:  No increased work of breathing, good air exchange, clear to auscultation bilaterally, no crackles or wheezing  CARDIOVASCULAR:  Normal apical impulse, regular rate and rhythm, normal S1 and S2, no S3 or S4, and no murmur noted    ABDOMEN:  No scars, normal bowel sounds, soft, non-distended, non-tender, no masses palpated, no hepatosplenomegally   GENITAL/URINARY:  na  MUSCULOSKELETAL:  There is no redness, warmth, or swelling of the joints. Full range of motion noted. Motor strength is 5 out of 5 all extremities bilaterally.   Tone is normal. NEUROLOGIC:  Awake, alert, oriented to name, place and time. Cranial nerves II-XII are grossly intact. Motor is 5 out of 5 bilaterally. Sensory is intact. gait is normal.    SKIN:  no bruising or bleeding, normal skin color, texture, turgor, no redness, warmth, or swelling and no rashes     RECENT DATA:  No results found for: CBC, BMP    DATA:     Component Value Units   Hepatic Function Panel [[id number]]    Collected: 10/06/21 0832    Updated: 10/06/21 0848    Amylase [[id number]]    Collected: 10/06/21 0832    Updated: 10/06/21 0848    Lipase [[id number]]    Collected: 10/06/21 0832    Updated: 10/06/21 0832    Specimen Source: Blood    POC Glucose Fingerstick [[id number]]    Collected: 10/06/21 0723    Updated: 10/06/21 0730     POC Glucose 87 mg/dL   BASIC METABOLIC PANEL [[id number]] (Abnormal)    Collected: 10/06/21 0539    Updated: 10/06/21 4863    Specimen Source: Blood     Glucose 94 mg/dL    BUN 19 mg/dL    CREATININE 1.10 mg/dL    Bun/Cre Ratio NOT REPORTED    Calcium 8.4Low  mg/dL    Sodium 147High  mmol/L    Potassium 4.5 mmol/L    Chloride 110High  mmol/L    CO2 26 mmol/L    Anion Gap 11 mmol/L    GFR Non-African American >60 mL/min    GFR African American >60 mL/min    GFR Comment         Comment: Average GFR for 79or more years old:    65 mL/min/1.73sq m   Chronic Kidney Disease:    <60 mL/min/1.73sq m   Kidney failure:    <15 mL/min/1.73sq m               eGFR calculated using average adult body mass.  Additional eGFR calculator available at:         [email or web address]              GFR Staging NOT REPORTED   CBC with DIFF [[id number]] (Abnormal)    Collected: 10/06/21 0539    Updated: 10/06/21 7734    Specimen Source: Blood     WBC 6.2 k/uL    RBC 3.89Low  m/uL    Hemoglobin 11.7Low  g/dL    Hematocrit 34.4Low  %    MCV 88.3 fL    MCH 29.9 pg    MCHC 33.9 g/dL    RDW 15.5High  %    Platelets 734LDB  k/uL    MPV 8.5 fL    NRBC Automated NOT REPORTED per 100 WBC evidence   of diverticulitis. Pelvis: Prostate gland is enlarged.  Urinary bladder appears grossly normal.     Peritoneum/Retroperitoneum: No free fluid or free air is present within the   abdomen or pelvis.  Extensive atherosclerotic changes are again demonstrated   involving the abdominal aorta.  I suspect there is high-grade stenosis at the   origin of the left common iliac artery, similar compared to the prior study. Dominant right external iliac chain lymph node is significantly decreased in   size, now measuring approximately 2.6 cm in length, and 8 mm in thickness. This previously measured 3.5 x 1.6 cm.  Bulky para-adenopathy is decreased in   size. Bones/Soft Tissues: No acute osseous abnormality is present. Impression:     1. Interval decrease in size of the infiltrating soft tissue mass within the   left retroperitoneum, encasing the left kidney and abdominal aorta.  The   para-aortic adenopathy, and external iliac chain adenopathy is decreased in   size, indicating response to therapy.  Changes are likely due to lymphoma. 2. Cholelithiasis, without evidence of cholecystitis   3. Interval development of a mild degree of right hydronephrosis, and   hydroureter.  Right ureter is dilated to the level of the retroperitoneal   \"mass\". .  Obstruction may be related to stricture formation or encasement by   the retroperitoneal soft tissue mass. 4. Scattered colonic diverticula, without evidence of diverticulitis   5. Enlarged prostate gland   6.  Nonobstructive bowel gas pattern    Uric Acid [[id number]] (Abnormal)    Collected: 10/05/21 1619    Updated: 10/05/21 1655     Uric Acid 9.1High  mg/dL   Lipase [[id number]]    Collected: 10/05/21 1619    Updated: 10/05/21 1655    Specimen Source: Blood     Lipase 22 U/L   Hepatic Function Panel [[id number]] (Abnormal)    Collected: 10/05/21 1619    Updated: 10/05/21 1655    Specimen Source: Blood     Albumin 3.9 g/dL    Alkaline Phosphatase 147High U/L    ALT 17 U/L    AST 10 U/L    Total Bilirubin 0.72 mg/dL    Bilirubin, Direct 0.17 mg/dL    Bilirubin, Indirect 0.55 mg/dL    Total Protein 6.6 g/dL    Globulin NOT REPORTED g/dL    Albumin/Globulin Ratio NOT REPORTED   Lactate Dehydrogenase [[id number]]    Collected: 10/05/21 1619    Updated: 10/05/21 1655      U/L   Phosphorus [[id number]]    Collected: 10/05/21 1619    Updated: 10/05/21 1655     Phosphorus 3.4 mg/dL   Basic Metabolic Panel [[id number]] (Abnormal)    Collected: 10/05/21 1619    Updated: 10/05/21 1655    Specimen Type: Blood     Glucose 139High  mg/dL    BUN 20 mg/dL    CREATININE 1. 25High  mg/dL    Bun/Cre Ratio NOT REPORTED    Calcium 9.1 mg/dL    Sodium 144 mmol/L    Potassium 4.6 mmol/L    Chloride 105 mmol/L    CO2 24 mmol/L    Anion Gap 15 mmol/L    GFR Non-African American 57Low  mL/min    GFR African American >60 mL/min    GFR Comment         Comment: Average GFR for 79or more years old:    65 mL/min/1.73sq m   Chronic Kidney Disease:    <60 mL/min/1.73sq m   Kidney failure:    <15 mL/min/1.73sq m               eGFR calculated using average adult body mass.  Additional eGFR calculator available at:         [email or web address]              GFR Staging NOT REPORTED   Lactic Acid [[id number]]    Collected: 10/05/21 1619    Updated: 10/05/21 1642    Specimen Source: Blood     Lactic Acid 1.3 mmol/L   CBC Auto Differential [[id number]] (Abnormal)    Collected: 10/05/21 1619    Updated: 10/05/21 1636    Specimen Source: Blood     WBC 7.6 k/uL    RBC 4.49Low  m/uL    Hemoglobin 13.3Low  g/dL    Hematocrit 39.3Low  %    MCV 87.5 fL    MCH 29.7 pg    MCHC 33.9 g/dL    RDW 15.3High  %    Platelets 940 k/uL    MPV 9.0 fL    NRBC Automated NOT REPORTED per 100 WBC    Differential Type NOT REPORTED    Seg Neutrophils 78High  %    Lymphocytes 13Low  %    Monocytes 8High  %    Eosinophils % 1 %    Basophils 0 %    Immature Granulocytes NOT REPORTED %    Segs Absolute 5.90 k/uL    Absolute Lymph # 1.00 k/uL    Absolute Mono # 0.60 k/uL    Absolute Eos # 0.00 k/uL    Basophils Absolute 0.00 k/uL    Absolute Immature Granulocyte NOT REPORTED k/uL    WBC Morphology NOT REPORTED    RBC Morphology NOT REPORTED    Platelet Estimate NOT REPORTED   Current IP Meds          ASSESSMENT:  Patient Active Problem List   Diagnosis Code    NHL (non-Hodgkin's lymphoma) (HCC) C85.90    Traumatic retroperitoneal hematoma S36.892A    Acute kidney injury ([address] P71.9    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 Check lipase amylase LFTs    We will consult general surgery as well    We will discuss with  he left a message to talk to him about possible cystoscopy and stent this afternoon    We will keep patient n.p.o. for the above testing and procedure    He is doing okay with scopolamine patch for now.     We will apply Lidoderm patch on his right-sided pain for comfort as well  Discussed with RN  We will start patient on Zosyn prophylactically for his symptoms ~other testing are done      Electronically signed by KRISTI Thakkar on 10/6/2021 at [address] [place] [address]

## 2021-10-06 NOTE — CONSULTS
Today's Date: 10/6/2021  Patient Name: Hillary Buck  Date of admission: 10/5/2021  3:20 PM  Patient's age: 66 y.o., 1950  Admission Dx: Hydronephrosis with ureteral stricture, not elsewhere classified [N13.1]  Hydronephrosis due to obstruction of ureter [N13.1]    Reason for Consult: management recommendations  Requesting Physician: Philomena Capps DO    CHIEF COMPLAINT: Abdominal pain. B-cell lymphoma. History Obtained From:  patient, electronic medical record    HISTORY OF PRESENT ILLNESS:      The patient is a 66 y.o.  male who is admitted to the hospital with chief complaints of right-sided abdominal pain. Patient has been having pain on and off. Pain is associated with nausea and vomiting. Denies shortness of breath. Patient recently finished induction course of Rituxan 4 weekly treatments. Patient follows up with Dr. Edil Gaona from medical oncology standpoint. Recent colonoscopy showed polyps which were removed did not show any malignancy. CT abdomen pelvis showed interval decrease in retroperitoneal soft tissue mass indicating response to therapy. CT scan does show interval development of mild degree of right hydronephrosis. Patient was seen by surgery team who are at this point not planning any intervention. Surgically team has ordered work-up for gallbladder evaluation. Patient lab work-up shows mild normocytic anemia and thrombocytopenia. Patient is LFTs are within range. Urology team has been consulted. Past Medical History:   has a past medical history of Cancer [facility]), Chemotherapy management, encounter for, Diabetes mellitus ([facility] [address] History of non-Hodgkin's lymphoma, Hyperlipidemia, Hypertension, and Hypothyroidism. Past Surgical History:   has a past surgical history that includes hernia repair; IR BIOPSY ABDOMINAL/RETROPERITONEAL MASS PERCUTANEOUS (7/6/2021); IR PORT PLACEMENT > 5 YEARS (8/31/2021); and Colonoscopy (Left, 9/24/2021).      Medications: standard drinks of alcohol per week. He reports current drug use. Drug: Marijuana. Family History: family history includes Alzheimer's Disease in his father; Diabetes in his mother; Heart Disease in his brother and brother. REVIEW OF SYSTEMS:      Constitutional: No fever or chills. No night sweats, no weight loss   Eyes: No eye discharge, double vision, or eye pain   HEENT: negative for sore mouth, sore throat, hoarseness and voice change   Respiratory: negative for cough , sputum, dyspnea, wheezing, hemoptysis, chest pain   Cardiovascular: negative for chest pain, dyspnea, palpitations, orthopnea, PND   Gastrointestinal: Positive abdominal pain  Genitourinary: negative for frequency, dysuria, nocturia, urinary incontinence, and hematuria   Integument: negative for rash, skin lesions, bruises.    Hematologic/Lymphatic: negative for easy bruising, bleeding, lymphadenopathy, or petechiae   Endocrine: negative for heat or cold intolerance,weight changes, change in bowel habits and hair loss   Musculoskeletal: negative for myalgias, arthralgias, pain, joint swelling,and bone pain   Neurological: negative for headaches, dizziness, seizures, weakness, numbness    PHYSICAL EXAM:        BP (!) 126/49   Pulse 55   Temp 98 °F (36.7 °C) (Oral)   Resp 16   Ht 5' 10\" (1.778 m)   Wt 164 lb (74.4 kg)   SpO2 (!) 86%   BMI 23.53 kg/m²    Temp (24hrs), Av.3 °F (36.8 °C), Min:97.8 °F (36.6 °C), Max:99 °F (37.2 °C)      General appearance - well appearing, no in pain or distress   Mental status - alert and cooperative   Eyes - pupils equal and reactive, extraocular eye movements intact   Ears - bilateral TM's and external ear canals normal   Mouth - mucous membranes moist, pharynx normal without lesions   Neck - supple, no significant adenopathy   Lymphatics - no palpable lymphadenopathy, no hepatosplenomegaly   Chest - clear to auscultation, no wheezes, rales or rhonchi, symmetric air entry   Heart - normal rate, regular rhythm, normal S1, S2, no murmurs  Abdomen - soft, nondistended, no masses or organomegaly.   Generalized mild tenderness  Neurological - alert, oriented, normal speech, no focal findings or movement disorder noted   Musculoskeletal - no joint tenderness, deformity or swelling   Extremities - peripheral pulses normal, no pedal edema, no clubbing or cyanosis   Skin - normal coloration and turgor, no rashes, no suspicious skin lesions noted ,      DATA:      Labs:     Results for orders placed or performed during the hospital encounter of 10/05/21   CBC Auto Differential   Result Value Ref Range    WBC 7.6 3.5 - 11.0 k/uL    RBC 4.49 (L) 4.5 - 5.9 m/uL    Hemoglobin 13.3 (L) 13.5 - 17.5 g/dL    Hematocrit 39.3 (L) 41 - 53 %    MCV 87.5 80 - 100 fL    MCH 29.7 26 - 34 pg    MCHC 33.9 31 - 37 g/dL    RDW 15.3 (H) 11.5 - 14.9 %    Platelets 770 263 - 467 k/uL    MPV 9.0 6.0 - 12.0 fL    NRBC Automated NOT REPORTED per 100 WBC    Differential Type NOT REPORTED     Seg Neutrophils 78 (H) 36 - 66 %    Lymphocytes 13 (L) 24 - 44 %    Monocytes 8 (H) 1 - 7 %    Eosinophils % 1 0 - 4 %    Basophils 0 0 - 2 %    Immature Granulocytes NOT REPORTED 0 %    Segs Absolute 5.90 1.3 - 9.1 k/uL    Absolute Lymph # 1.00 1.0 - 4.8 k/uL    Absolute Mono # 0.60 0.1 - 1.3 k/uL    Absolute Eos # 0.00 0.0 - 0.4 k/uL    Basophils Absolute 0.00 0.0 - 0.2 k/uL    Absolute Immature Granulocyte NOT REPORTED 0.00 - 0.30 k/uL    WBC Morphology NOT REPORTED     RBC Morphology NOT REPORTED     Platelet Estimate NOT REPORTED    Lipase   Result Value Ref Range    Lipase 22 13 - 60 U/L   Hepatic Function Panel   Result Value Ref Range    Albumin 3.9 3.5 - 5.2 g/dL    Alkaline Phosphatase 147 (H) 40 - 129 U/L    ALT 17 5 - 41 U/L    AST 10 <40 U/L    Total Bilirubin 0.72 0.3 - 1.2 mg/dL    Bilirubin, Direct 0.17 <0.31 mg/dL    Bilirubin, Indirect 0.55 0.00 - 1.00 mg/dL    Total Protein 6.6 6.4 - 8.3 g/dL    Globulin NOT REPORTED 1.5 - 3.8 g/dL Albumin/Globulin Ratio NOT REPORTED 1.0 - 2.5   Basic Metabolic Panel   Result Value Ref Range    Glucose 139 (H) 70 - 99 mg/dL    BUN 20 8 - 23 mg/dL    CREATININE 1.25 (H) 0.70 - 1.20 mg/dL    Bun/Cre Ratio NOT REPORTED 9 - 20    Calcium 9.1 8.6 - 10.4 mg/dL    Sodium 144 135 - 144 mmol/L    Potassium 4.6 3.7 - 5.3 mmol/L    Chloride 105 98 - 107 mmol/L    CO2 24 20 - 31 mmol/L    Anion Gap 15 9 - 17 mmol/L    GFR Non-African American 57 (L) >60 mL/min    GFR African American >60 >60 mL/min    GFR Comment          GFR Staging NOT REPORTED    Lactic Acid   Result Value Ref Range    Lactic Acid 1.3 0.5 - 2.2 mmol/L   Lactate Dehydrogenase   Result Value Ref Range     135 - 225 U/L   Phosphorus   Result Value Ref Range    Phosphorus 3.4 2.5 - 4.5 mg/dL   Uric Acid   Result Value Ref Range    Uric Acid 9.1 (H) 3.4 - 7.0 mg/dL   Urinalysis Reflex to Culture    Specimen: Urine, clean catch   Result Value Ref Range    Color, UA Yellow Yellow    Turbidity UA Clear Clear    Glucose, Ur NEGATIVE NEGATIVE    Bilirubin Urine SMALL (A) NEGATIVE    Ketones, Urine SMALL (A) NEGATIVE    Specific Gravity, UA 1.045 (H) 1.000 - 1.030    Urine Hgb NEGATIVE NEGATIVE    pH, UA 5.5 5.0 - 8.0    Protein, UA 4+ (A) NEGATIVE    Urobilinogen, Urine Normal Normal    Nitrite, Urine NEGATIVE NEGATIVE    Leukocyte Esterase, Urine NEGATIVE NEGATIVE    Urinalysis Comments NOT REPORTED    Microscopic Urinalysis   Result Value Ref Range    -          WBC, UA 2 TO 5 /HPF    RBC, UA 2 TO 5 /HPF    Casts UA HYALINE /LPF    Casts UA 0 TO 2 /LPF    Crystals, UA NOT REPORTED None /HPF    Epithelial Cells UA 2 TO 5 /HPF    Renal Epithelial, UA NOT REPORTED 0 /HPF    Bacteria, UA FEW (A) None    Mucus, UA NOT REPORTED None    Trichomonas, UA NOT REPORTED None    Amorphous, UA NOT REPORTED None    Other Observations UA NOT REPORTED NOT REQ.     Yeast, UA NOT REPORTED None   CBC with DIFF   Result Value Ref Range    WBC 6.2 3.5 - 11.0 k/uL    RBC 3.89 (L) 4.5 - 5.9 m/uL    Hemoglobin 11.7 (L) 13.5 - 17.5 g/dL    Hematocrit 34.4 (L) 41 - 53 %    MCV 88.3 80 - 100 fL    MCH 29.9 26 - 34 pg    MCHC 33.9 31 - 37 g/dL    RDW 15.5 (H) 11.5 - 14.9 %    Platelets 629 (L) 801 - 450 k/uL    MPV 8.5 6.0 - 12.0 fL    NRBC Automated NOT REPORTED per 100 WBC    Differential Type NOT REPORTED     Seg Neutrophils 77 (H) 36 - 66 %    Lymphocytes 10 (L) 24 - 44 %    Monocytes 11 (H) 1 - 7 %    Eosinophils % 2 0 - 4 %    Basophils 0 0 - 2 %    Immature Granulocytes NOT REPORTED 0 %    Segs Absolute 4.70 1.3 - 9.1 k/uL    Absolute Lymph # 0.60 (L) 1.0 - 4.8 k/uL    Absolute Mono # 0.70 0.1 - 1.3 k/uL    Absolute Eos # 0.10 0.0 - 0.4 k/uL    Basophils Absolute 0.00 0.0 - 0.2 k/uL    Absolute Immature Granulocyte NOT REPORTED 0.00 - 0.30 k/uL    WBC Morphology NOT REPORTED     RBC Morphology NOT REPORTED     Platelet Estimate NOT REPORTED    BASIC METABOLIC PANEL   Result Value Ref Range    Glucose 94 70 - 99 mg/dL    BUN 19 8 - 23 mg/dL    CREATININE 1.10 0.70 - 1.20 mg/dL    Bun/Cre Ratio NOT REPORTED 9 - 20    Calcium 8.4 (L) 8.6 - 10.4 mg/dL    Sodium 147 (H) 135 - 144 mmol/L    Potassium 4.5 3.7 - 5.3 mmol/L    Chloride 110 (H) 98 - 107 mmol/L    CO2 26 20 - 31 mmol/L    Anion Gap 11 9 - 17 mmol/L    GFR Non-African American >60 >60 mL/min    GFR African American >60 >60 mL/min    GFR Comment          GFR Staging NOT REPORTED    Lipase   Result Value Ref Range    Lipase 19 13 - 60 U/L   Amylase   Result Value Ref Range    Amylase 45 28 - 100 U/L   Hepatic Function Panel   Result Value Ref Range    Albumin 3.5 3.5 - 5.2 g/dL    Alkaline Phosphatase 129 40 - 129 U/L    ALT 13 5 - 41 U/L    AST 11 <40 U/L    Total Bilirubin 0.61 0.3 - 1.2 mg/dL    Bilirubin, Direct 0.17 <0.31 mg/dL    Bilirubin, Indirect 0.44 0.00 - 1.00 mg/dL    Total Protein 5.4 (L) 6.4 - 8.3 g/dL    Globulin NOT REPORTED 1.5 - 3.8 g/dL    Albumin/Globulin Ratio NOT REPORTED 1.0 - 2.5   POC Glucose Fingerstick Result Value Ref Range    POC Glucose 87 75 - 110 mg/dL   POC Glucose Fingerstick   Result Value Ref Range    POC Glucose 107 75 - 110 mg/dL         IMAGING DATA:    CT ABDOMEN PELVIS W IV CONTRAST Additional Contrast? None    Result Date: 10/5/2021  EXAMINATION: CT OF THE ABDOMEN AND PELVIS WITH CONTRAST 10/5/2021 11:37 am TECHNIQUE: CT of the abdomen and pelvis was performed with the administration of intravenous contrast. Multiplanar reformatted images are provided for review. Dose modulation, iterative reconstruction, and/or weight based adjustment of the mA/kV was utilized to reduce the radiation dose to as low as reasonably achievable. COMPARISON: CT scan of the abdomen dated July 6, 2021, and July 3, 2021 HISTORY: ORDERING SYSTEM PROVIDED HISTORY: abd pain w/n/v w/known retroperitoneal mass TECHNOLOGIST PROVIDED HISTORY: abd pain w/n/v w/known retroperitoneal mass Reason for Exam: Right side ab pain with nausea/vomiting, hx of retroperitoneal mass Acuity: Unknown Type of Exam: Unknown Relevant Medical/Surgical History: Hernia repair FINDINGS: Lower Chest: Dependent changes are present within the lung bases. Organs: The liver, spleen, and pancreas demonstrate no acute abnormality. Cholelithiasis is present without evidence of cholecystitis. Nodular thickening of the right adrenal gland is again noted, unchanged. Cortical cyst formation is again noted on the right kidney. A 3 mm nonobstructing calculus is again visualized within the lower pole collecting system of the right kidney. An approximate 1.7 x 1.5 cm solid-appearing nodule is again visualized within the right kidney, previously measured 1.2 mm. A mild degree of right hydronephrosis and hydroureter is now present. Again demonstrated is the presence of an infiltrating soft tissue mass involving the left retroperitoneum, with enlargement of the left adrenal gland, and encasing the left kidney.   Caudal to the left kidney, with the soft tissue mass measures approximately 6.4 by 7.1 cm, decreased in size, indicating response to treatment. The soft tissue fullness within the para-region is decreased in size as well. Interval decrease in the degree of left hydronephrosis is present, now mild in degree. GI/Bowel: A small hiatal hernia is present. No evidence of small-bowel obstruction is present. The appendix is normal.  Biopsy clips are present within the cecum. Scattered colonic diverticula are noted, without evidence of diverticulitis. Pelvis: Prostate gland is enlarged. Urinary bladder appears grossly normal. Peritoneum/Retroperitoneum: No free fluid or free air is present within the abdomen or pelvis. Extensive atherosclerotic changes are again demonstrated involving the abdominal aorta. I suspect there is high-grade stenosis at the origin of the left common iliac artery, similar compared to the prior study. Dominant right external iliac chain lymph node is significantly decreased in size, now measuring approximately 2.6 cm in length, and 8 mm in thickness. This previously measured 3.5 x 1.6 cm. Bulky para-adenopathy is decreased in size. Bones/Soft Tissues: No acute osseous abnormality is present. 1. Interval decrease in size of the infiltrating soft tissue mass within the left retroperitoneum, encasing the left kidney and abdominal aorta. The para-aortic adenopathy, and external iliac chain adenopathy is decreased in size, indicating response to therapy. Changes are likely due to lymphoma. 2. Cholelithiasis, without evidence of cholecystitis 3. Interval development of a mild degree of right hydronephrosis, and hydroureter. Right ureter is dilated to the level of the retroperitoneal \"mass\". .  Obstruction may be related to stricture formation or encasement by the retroperitoneal soft tissue mass. 4. Scattered colonic diverticula, without evidence of diverticulitis 5. Enlarged prostate gland 6.  Nonobstructive bowel gas pattern     -- Diagnosis --   1.  CECUM, POLYP, BIOPSY:               -  CONSISTENT WITH SESSILE SERRATED ADENOMA (SESSILE   SERRATED LESION). 2.  RIGHT COLON, POLYP, POLYPECTOMY:        -  TUBULOVILLOUS ADENOMA. -  ADENOMA EXTENDS TO THE CAUTERIZED MARGIN. 3.  SIGMOID COLON, POLYP, BIOPSIES:        -  ADENOMATOUS POLYP (TUBULAR ADENOMA). IMPRESSION:   Primary Problem  <principal problem not specified>    Active Hospital Problems    Diagnosis Date Noted    Hydronephrosis due to obstruction of ureter [N13.1] 10/05/2021       B-cell lymphoma  Abdominal pain  Anemia    RECOMMENDATIONS:  1. I personally reviewed results of lab work-up imaging studies and other relevant clinical data. 2. Reviewed hospitalization course  3. Reviewed previous treatment history from medical oncology standpoint  4. Patient recently finished induction therapy with 4 weekly doses of Rituxan. CT scans show good response. However patient clinically states that pain has gotten worse over time despite of decrease in size of the lymphadenopathy on the retroperitoneal mass. Suggesting patient abdominal pain may not be related to the lymphadenopathy. Recommend evaluation for other etiologies of abdominal pain  5. In the meanwhile continue symptomatic supportive care  6. Patient is scheduled for maintenance Rituxan starting November. We anticipate to proceed as planned  7. We will order work-up for anemia  8. Follow-up on ultrasound of gallbladder  9. Follow-up on urology evaluation. Discussed with patient and Nurse. Thank you for asking us to see this patient. Scot Lane MD          This note is created with the assistance of a speech recognition program.  While intending to generate a document that actually reflects the content of the visit, the document can still have some errors including those of syntax and sound a like substitutions which may escape proof reading.   It such instances, actual meaning can be extrapolated by contextual diversion.

## 2021-10-06 NOTE — CARE COORDINATION
CASE MANAGEMENT NOTE:    Admission Date:  10/5/2021 Bozena Aleman is a 66 y.o.  male    Admitted for : Hydronephrosis with ureteral stricture, not elsewhere classified [N13.1]  Hydronephrosis due to obstruction of ureter [N13.1]    Pt is alert and orietned x4. PCP:  Dr. Rachael Goff:  [facility]      Current Residence/ Living Arrangements:  independently at home with spouse             Current Services PTA:  Follows with Dr. Cheyenne Orozco at [facility] for [address]    Does patient go to outpatient dialysis: No  If yes, location and chair time: n/a    Is patient agreeable to VNS: No    Freedom of choice provided:  Yes    List of [address] provided: No    VNS chosen:  No    DME:  straight cane and grab bars    Home Oxygen: No    Nebulizer: No    CPAP/BIPAP: No    Supplier: N/A    Potential Assistance Needed: No    SNF needed: No    Freedom of choice and list provided: NA    Pharmacy:  [address] [place] [address] in [place]       Does Patient want to use MEDS to BEDS? No    Is patient currently receiving oral anticoagulation therapy? No    Is the Patient an [facility] with Readmission Risk Score greater than 14%? No  If yes, pt needs a follow up appointment made within 7 days. Family Members/Caregivers that pt would like involved in their care:    Yes    If yes, list name here:  Sarika    Transportation Provider:  Patient and Family             Discharge Plan:  Home without needs.                 Electronically signed by: Maya Brizuela RN on 10/6/2021 at 2:44 PM

## 2021-10-07 PROBLEM — E44.0 MODERATE MALNUTRITION (HCC): Chronic | Status: ACTIVE | Noted: 2021-10-07

## 2021-10-07 LAB
ABSOLUTE EOS #: 0.1 K/UL (ref 0–0.4)
ABSOLUTE IMMATURE GRANULOCYTE: ABNORMAL K/UL (ref 0–0.3)
ABSOLUTE LYMPH #: 1 K/UL (ref 1–4.8)
ABSOLUTE MONO #: 0.7 K/UL (ref 0.1–1.3)
ANION GAP SERPL CALCULATED.3IONS-SCNC: 11 MMOL/L (ref 9–17)
BASOPHILS # BLD: 0 % (ref 0–2)
BASOPHILS ABSOLUTE: 0 K/UL (ref 0–0.2)
BUN BLDV-MCNC: 18 MG/DL (ref 8–23)
BUN/CREAT BLD: ABNORMAL (ref 9–20)
CALCIUM SERPL-MCNC: 8.1 MG/DL (ref 8.6–10.4)
CHLORIDE BLD-SCNC: 108 MMOL/L (ref 98–107)
CO2: 23 MMOL/L (ref 20–31)
CREAT SERPL-MCNC: 1.23 MG/DL (ref 0.7–1.2)
DIFFERENTIAL TYPE: ABNORMAL
EOSINOPHILS RELATIVE PERCENT: 2 % (ref 0–4)
GFR AFRICAN AMERICAN: >60 ML/MIN
GFR NON-AFRICAN AMERICAN: 58 ML/MIN
GFR SERPL CREATININE-BSD FRML MDRD: ABNORMAL ML/MIN/{1.73_M2}
GFR SERPL CREATININE-BSD FRML MDRD: ABNORMAL ML/MIN/{1.73_M2}
GLUCOSE BLD-MCNC: 102 MG/DL (ref 70–99)
GLUCOSE BLD-MCNC: 112 MG/DL (ref 75–110)
GLUCOSE BLD-MCNC: 81 MG/DL (ref 75–110)
GLUCOSE BLD-MCNC: 97 MG/DL (ref 75–110)
GLUCOSE BLD-MCNC: 99 MG/DL (ref 75–110)
GLUCOSE BLD-MCNC: 99 MG/DL (ref 75–110)
HCT VFR BLD CALC: 33.6 % (ref 41–53)
HEMOGLOBIN: 11.3 G/DL (ref 13.5–17.5)
IMMATURE GRANULOCYTES: ABNORMAL %
LYMPHOCYTES # BLD: 14 % (ref 24–44)
MCH RBC QN AUTO: 30.1 PG (ref 26–34)
MCHC RBC AUTO-ENTMCNC: 33.7 G/DL (ref 31–37)
MCV RBC AUTO: 89.4 FL (ref 80–100)
MONOCYTES # BLD: 11 % (ref 1–7)
NRBC AUTOMATED: ABNORMAL PER 100 WBC
PDW BLD-RTO: 15.1 % (ref 11.5–14.9)
PLATELET # BLD: 136 K/UL (ref 150–450)
PLATELET ESTIMATE: ABNORMAL
PMV BLD AUTO: 8.4 FL (ref 6–12)
POTASSIUM SERPL-SCNC: 4.4 MMOL/L (ref 3.7–5.3)
RBC # BLD: 3.75 M/UL (ref 4.5–5.9)
RBC # BLD: ABNORMAL 10*6/UL
SEG NEUTROPHILS: 73 % (ref 36–66)
SEGMENTED NEUTROPHILS ABSOLUTE COUNT: 5 K/UL (ref 1.3–9.1)
SODIUM BLD-SCNC: 142 MMOL/L (ref 135–144)
WBC # BLD: 6.9 K/UL (ref 3.5–11)
WBC # BLD: ABNORMAL 10*3/UL

## 2021-10-07 PROCEDURE — 99232 SBSQ HOSP IP/OBS MODERATE 35: CPT | Performed by: INTERNAL MEDICINE

## 2021-10-07 PROCEDURE — 6370000000 HC RX 637 (ALT 250 FOR IP): Performed by: UROLOGY

## 2021-10-07 PROCEDURE — 6360000002 HC RX W HCPCS: Performed by: UROLOGY

## 2021-10-07 PROCEDURE — 85025 COMPLETE CBC W/AUTO DIFF WBC: CPT

## 2021-10-07 PROCEDURE — 36415 COLL VENOUS BLD VENIPUNCTURE: CPT

## 2021-10-07 PROCEDURE — 80048 BASIC METABOLIC PNL TOTAL CA: CPT

## 2021-10-07 PROCEDURE — 0T9B70Z DRAINAGE OF BLADDER WITH DRAINAGE DEVICE, VIA NATURAL OR ARTIFICIAL OPENING: ICD-10-PCS | Performed by: UROLOGY

## 2021-10-07 PROCEDURE — 2580000003 HC RX 258: Performed by: UROLOGY

## 2021-10-07 PROCEDURE — 1200000000 HC SEMI PRIVATE

## 2021-10-07 PROCEDURE — 82947 ASSAY GLUCOSE BLOOD QUANT: CPT

## 2021-10-07 PROCEDURE — 51702 INSERT TEMP BLADDER CATH: CPT

## 2021-10-07 PROCEDURE — 51798 US URINE CAPACITY MEASURE: CPT

## 2021-10-07 RX ORDER — LIDOCAINE HYDROCHLORIDE 20 MG/ML
JELLY TOPICAL PRN
Status: DISCONTINUED | OUTPATIENT
Start: 2021-10-07 | End: 2021-10-07

## 2021-10-07 RX ORDER — LIDOCAINE HYDROCHLORIDE 20 MG/ML
JELLY TOPICAL ONCE
Status: COMPLETED | OUTPATIENT
Start: 2021-10-07 | End: 2021-10-07

## 2021-10-07 RX ORDER — TAMSULOSIN HYDROCHLORIDE 0.4 MG/1
0.8 CAPSULE ORAL ONCE
Status: COMPLETED | OUTPATIENT
Start: 2021-10-07 | End: 2021-10-07

## 2021-10-07 RX ORDER — FERROUS SULFATE 325(65) MG
325 TABLET ORAL
Status: DISCONTINUED | OUTPATIENT
Start: 2021-10-08 | End: 2021-10-12 | Stop reason: HOSPADM

## 2021-10-07 RX ADMIN — HYDROMORPHONE HYDROCHLORIDE 1 MG: 1 INJECTION, SOLUTION INTRAMUSCULAR; INTRAVENOUS; SUBCUTANEOUS at 10:08

## 2021-10-07 RX ADMIN — CARVEDILOL 6.25 MG: 6.25 TABLET, FILM COATED ORAL at 08:02

## 2021-10-07 RX ADMIN — AMLODIPINE BESYLATE 10 MG: 5 TABLET ORAL at 07:59

## 2021-10-07 RX ADMIN — MEMANTINE 10 MG: 10 TABLET ORAL at 21:13

## 2021-10-07 RX ADMIN — PIPERACILLIN SODIUM AND TAZOBACTAM SODIUM 3375 MG: 3; .375 INJECTION, POWDER, LYOPHILIZED, FOR SOLUTION INTRAVENOUS at 01:16

## 2021-10-07 RX ADMIN — HYDROMORPHONE HYDROCHLORIDE 1 MG: 1 INJECTION, SOLUTION INTRAMUSCULAR; INTRAVENOUS; SUBCUTANEOUS at 22:33

## 2021-10-07 RX ADMIN — CARVEDILOL 6.25 MG: 6.25 TABLET, FILM COATED ORAL at 16:22

## 2021-10-07 RX ADMIN — ATORVASTATIN CALCIUM 10 MG: 10 TABLET, FILM COATED ORAL at 07:59

## 2021-10-07 RX ADMIN — HYDROMORPHONE HYDROCHLORIDE 1 MG: 1 INJECTION, SOLUTION INTRAMUSCULAR; INTRAVENOUS; SUBCUTANEOUS at 18:03

## 2021-10-07 RX ADMIN — TAMSULOSIN HYDROCHLORIDE 0.8 MG: 0.4 CAPSULE ORAL at 01:15

## 2021-10-07 RX ADMIN — ONDANSETRON 4 MG: 2 INJECTION INTRAMUSCULAR; INTRAVENOUS at 03:15

## 2021-10-07 RX ADMIN — HYDROMORPHONE HYDROCHLORIDE 1 MG: 1 INJECTION, SOLUTION INTRAMUSCULAR; INTRAVENOUS; SUBCUTANEOUS at 13:59

## 2021-10-07 RX ADMIN — FLUOXETINE HYDROCHLORIDE 20 MG: 20 CAPSULE ORAL at 07:59

## 2021-10-07 RX ADMIN — HYDROMORPHONE HYDROCHLORIDE 1 MG: 1 INJECTION, SOLUTION INTRAMUSCULAR; INTRAVENOUS; SUBCUTANEOUS at 06:18

## 2021-10-07 RX ADMIN — PANTOPRAZOLE SODIUM 40 MG: 40 TABLET, DELAYED RELEASE ORAL at 06:18

## 2021-10-07 RX ADMIN — HYDROMORPHONE HYDROCHLORIDE 1 MG: 1 INJECTION, SOLUTION INTRAMUSCULAR; INTRAVENOUS; SUBCUTANEOUS at 00:30

## 2021-10-07 RX ADMIN — PIPERACILLIN SODIUM AND TAZOBACTAM SODIUM 3375 MG: 3; .375 INJECTION, POWDER, LYOPHILIZED, FOR SOLUTION INTRAVENOUS at 16:21

## 2021-10-07 RX ADMIN — ONDANSETRON 4 MG: 2 INJECTION INTRAMUSCULAR; INTRAVENOUS at 11:09

## 2021-10-07 RX ADMIN — LEVOTHYROXINE SODIUM 112 MCG: 0.11 TABLET ORAL at 06:19

## 2021-10-07 RX ADMIN — DONEPEZIL HYDROCHLORIDE 10 MG: 10 TABLET, FILM COATED ORAL at 21:13

## 2021-10-07 RX ADMIN — PIPERACILLIN SODIUM AND TAZOBACTAM SODIUM 3375 MG: 3; .375 INJECTION, POWDER, LYOPHILIZED, FOR SOLUTION INTRAVENOUS at 07:59

## 2021-10-07 RX ADMIN — LIDOCAINE HYDROCHLORIDE: 20 JELLY TOPICAL at 06:38

## 2021-10-07 RX ADMIN — MEMANTINE 10 MG: 10 TABLET ORAL at 07:59

## 2021-10-07 ASSESSMENT — PAIN DESCRIPTION - LOCATION
LOCATION: ABDOMEN;FLANK
LOCATION: ABDOMEN
LOCATION: FLANK;ABDOMEN
LOCATION: ABDOMEN
LOCATION: ABDOMEN

## 2021-10-07 ASSESSMENT — PAIN SCALES - GENERAL
PAINLEVEL_OUTOF10: 8
PAINLEVEL_OUTOF10: 7
PAINLEVEL_OUTOF10: 7
PAINLEVEL_OUTOF10: 8
PAINLEVEL_OUTOF10: 10
PAINLEVEL_OUTOF10: 9
PAINLEVEL_OUTOF10: 5
PAINLEVEL_OUTOF10: 8
PAINLEVEL_OUTOF10: 6
PAINLEVEL_OUTOF10: 5

## 2021-10-07 ASSESSMENT — PAIN DESCRIPTION - PAIN TYPE
TYPE: SURGICAL PAIN
TYPE: CHRONIC PAIN
TYPE: CHRONIC PAIN
TYPE: SURGICAL PAIN
TYPE: CHRONIC PAIN

## 2021-10-07 ASSESSMENT — PAIN DESCRIPTION - ORIENTATION: ORIENTATION: RIGHT

## 2021-10-07 NOTE — PROGRESS NOTES
[address] [facility]      PROGRESS NOTE        Patient:  Santos Berg  YOB: 1950    MRN: [id number]     Acct: [de-identified]     Admit date: 10/5/2021    Pt seen and Chart reviewed. Consultant notes reviewed and care evaluated. Subjective: Patient said he had a bad night after his procedure since he could not urinate and he could not looks like cancer the Teran Dr. Cheyenne Hall has put it in this morning he feels a bit better but he has burning sensation now. He has blood in Teran probably secondary his procedure as well as retention he has no fevers no chills he still having abdominal pain but he says his nausea is better this morning. His HIDA scan was abnormal yesterday with delay in contrast and reported as chronic cholecystitis which feels still contributed to his nausea vomiting and abdominal pain beside his Hydro    Diet:  ADULT DIET;  Regular      Medications:Current Inpatient    Scheduled Meds:   lidocaine  1 patch TransDERmal Daily    piperacillin-tazobactam  3,375 mg IntraVENous Q8H    amLODIPine  10 mg Oral Daily    atorvastatin  10 mg Oral Daily    carvedilol  6.25 mg Oral BID WC    donepezil  10 mg Oral Nightly    FLUoxetine  20 mg Oral Daily    levothyroxine  112 mcg Oral Daily    memantine  10 mg Oral BID    pantoprazole  40 mg Oral QAM AC    insulin lispro  0-6 Units SubCUTAneous TID WC    insulin lispro  0-3 Units SubCUTAneous Nightly    influenza virus vaccine  0.5 mL IntraMUSCular Prior to discharge    scopolamine  1 patch TransDERmal Q72H     Continuous Infusions:   sodium chloride 80 mL/hr at 10/07/21 0637     PRN Meds:sodium chloride flush, sodium chloride flush, ondansetron, HYDROmorphone        Physical Exam:  Vitals: BP (!) 138/58   Pulse 65   Temp 97.6 °F (36.4 °C) (Oral)   Resp 14   Ht 5' 10\" (1.778 m)   Wt 164 lb (74.4 kg)   SpO2 (!) 88%   BMI 23.53 kg/m²   24 hour intake/output:    Intake/Output Summary (Last 24 hours) at 10/7/2021 0830  Last data filed at 10/7/2021 0636  Gross per 24 hour   Intake 1724.65 ml   Output 500 ml   Net 1224.65 ml     Last 3 weights: Wt Readings from Last 3 Encounters:   10/05/21 164 lb (74.4 kg)   09/24/21 174 lb (78.9 kg)   09/20/21 173 lb 3.2 oz (78.6 kg)       Physical Examination:   General appearance - alert, well appearing, and in no distress  Mental status - alert, oriented to person, place, and time  PERRLA wnl  Chest - clear to auscultation, no wheezes, rales or rhonchi, symmetric air entry  Heart - normal rate, regular rhythm, normal S1, S2, no murmurs, rubs, clicks or gallops  Abdomen - soft,  in the right upper quadrant with positive Navas's on my exam., nondistended, no masses or organomegaly  Neurological - alert, oriented, normal speech, no focal findings or movement disorder noted}  Extremities - peripheral pulses normal, no pedal edema, no clubbing or cyanosis  Skin - normal coloration and turgor, no rashes, no suspicious skin lesions noted   Groin patient has a Teran now looks bloody urine.   He just had the Teran inserted this morning   Component Value Units   Basic Metabolic Panel w/ Reflex to MG [[id number]]    Collected: 10/07/21 0805    Updated: 10/07/21 0806    Specimen Source: Blood    POC Glucose Fingerstick [[id number]]    Collected: 10/07/21 0731    Updated: 10/07/21 0738     POC Glucose 99 mg/dL   POC Glucose Fingerstick [[id number]]    Collected: 10/07/21 0607    Updated: 10/07/21 0613     POC Glucose 99 mg/dL   CBC with DIFF [[id number]] (Abnormal)    Collected: 10/07/21 0530    Updated: 10/07/21 0552    Specimen Source: Blood     WBC 6.9 k/uL    RBC 3.75Low  m/uL    Hemoglobin 11.3Low  g/dL    Hematocrit 33.6Low  %    MCV 89.4 fL    MCH 30.1 pg    MCHC 33.7 g/dL    RDW 15.1High  %    Platelets 101QDO  k/uL    MPV 8.4 fL    NRBC Automated NOT REPORTED per 100 WBC    Differential Type NOT REPORTED    Seg Neutrophils 73High  % Lymphocytes 14Low  %    Monocytes 11High  %    Eosinophils % 2 %    Basophils 0 %    Immature Granulocytes NOT REPORTED %    Segs Absolute 5.00 k/uL    Absolute Lymph # 1.00 k/uL    Absolute Mono # 0.70 k/uL    Absolute Eos # 0.10 k/uL    Basophils Absolute 0.00 k/uL    Absolute Immature Granulocyte NOT REPORTED k/uL    WBC Morphology NOT REPORTED    RBC Morphology NOT REPORTED    Platelet Estimate NOT REPORTED   POC Glucose Fingerstick [[id number]]    Collected: 10/06/21 2159    Updated: 10/06/21 2206     POC Glucose 95 mg/dL   POC Glucose Fingerstick [[id number]]    Collected: 10/06/21 2021    Updated: 10/06/21 2027     POC Glucose 75 mg/dL   Vitamin B12 & Folate [[id number]]    Collected: 10/06/21 1433    Updated: 10/06/21 2008    Specimen Source: Blood     Vitamin B-12 791 pg/mL    Folate 18.2 ng/mL   Urinalysis Reflex to Culture [[id number]] (Abnormal)    Collected: 10/06/21 1839    Updated: 10/06/21 1939    Specimen Source: Urine, Cystoscopic     Color, UA Yellow    Turbidity UA Clear    Glucose, Ur NEGATIVE    Bilirubin Urine NEGATIVE  Verified by [email or web address]     Ketones, Urine TRACEAbnormal     Specific Gravity, UA 1.022    Urine Hgb LARGEAbnormal     pH, UA 5.0    Protein, UA 2+Abnormal     Urobilinogen, Urine Normal    Nitrite, Urine NEGATIVE    Leukocyte Esterase, Urine NEGATIVE    Urinalysis Comments NOT REPORTED   Microscopic Urinalysis [[id number]] (Abnormal)    Collected: 10/06/21 1839    Updated: 10/06/21 1939     -         WBC, UA 5 TO 10 /HPF    RBC, UA 50  /HPF    Casts UA NOT REPORTED /LPF    Crystals, UA NOT REPORTED /HPF    Epithelial Cells UA 0 TO 2 /HPF    Renal Epithelial, UA NOT REPORTED /HPF    Bacteria, UA MANYAbnormal     Mucus, UA NOT REPORTED    Trichomonas, UA NOT REPORTED    Amorphous, UA NOT REPORTED    Other Observations UA NOT REPORTED    Yeast, UA NOT REPORTED   FERRITIN [[id number]]    Collected: 10/06/21 1433    Updated: 10/06/21 1931    Specimen Source: Blood Ferritin 137 ug/L   IRON AND TIBC [[id number]] (Abnormal)    Collected: 10/06/21 1433    Updated: 10/06/21 1931    Specimen Source: Blood     Iron 46Low  ug/dL    TIBC 237Low  ug/dL    Iron Saturation 19Low  %    UIBC 191 ug/dL   POC Glucose Fingerstick [[id number]]    Collected: 10/06/21 1908    Updated: 10/06/21 1915     POC Glucose 83 mg/dL   FLUORO FOR SURGICAL PROCEDURES [[id number]]    Resulted: 10/06/21 1856    Updated: 10/06/21 1856    Narrative:     Radiology exam is complete. No Radiologist dictation. Please follow up   with ordering provider. POC Glucose Fingerstick [[id number]]    Collected: 10/06/21 1635    Updated: 10/06/21 1642     POC Glucose 89 mg/dL   US GALLBLADDER RUQ [[id number]]    Collected: 10/06/21 1341    Updated: 10/06/21 1455    Narrative:     EXAMINATION:   RIGHT UPPER QUADRANT ULTRASOUND     10/6/2021 1:33 pm     COMPARISON:   10/5/2021, 10/6/2021     HISTORY:   ORDERING SYSTEM PROVIDED HISTORY: cholelithiasis/n/v   TECHNOLOGIST PROVIDED HISTORY:   Choleithiasis/n/v     FINDINGS:   LIVER:  The liver demonstrates normal echogenicity without evidence of   intrahepatic biliary ductal dilatation. BILIARY SYSTEM:  Cholelithiasis is present with mild gallbladder wall   thickening.  Possible gallbladder sludge.  No definite pericholecystic fluid. The sonographic Navas's sign is negative. Common bile duct is within normal limits measuring 5 mm. RIGHT KIDNEY: Mild hydronephrosis. PANCREAS:  Visualized portions of the pancreas are unremarkable. OTHER: No evidence of right upper quadrant ascites. Impression:     1. Cholelithiasis with mild gallbladder wall thickening.  Correlating with   recent CT and HIDA scan, this is favored to be related to chronic   cholecystitis.  The sonographic Navas's sign is negative. 2. Mild right-sided hydronephrosis.     NM HEPATOBILIARY SCAN Roena Rook FRACTION [[id number]]    Collected: 10/06/21 1432    Updated: 10/06/21 1444 Narrative:     EXAMINATION:   NUCLEAR MEDICINE HEPATOBILIARY SCINTIGRAPHY (HIDA SCAN). 10/6/2021 12:12 pm     TECHNIQUE:   Approximately 5.2 hakmjhexbexUu39t Mebrofenin (Choletec) was administered IV. Then, dynamic images of the abdomen were obtained in the anterior projection   for 60 mins.  2.9 mg of morphine was administered per protocol an additional   images were acquired for 30 minutes     COMPARISON:   No prior for comparison. HISTORY:   ORDERING SYSTEM PROVIDED HISTORY: abdominal pain   TECHNOLOGIST PROVIDED HISTORY:   abdominal pain   Reason for Exam: abdominal pain   Acuity: Unknown   Type of Exam: Unknown     FINDINGS:   Upon injection of radiopharmaceutical, there is prompt visualization of the   liver.  Activity is seen within common duct and small bowel during the 1st   hour of imaging though gallbladder is not visualized.  Morphine was   administered per protocol, after which gallbladder is visualized. Impression:     No evidence of acute cholecystitis.  Given delayed visualization of the   gallbladder, chronic cholecystitis could be considered in the appropriate   clinical setting.     POC Glucose Fingerstick [[id number]]    Collected: 10/06/21 1120    Updated: 10/06/21 1139     POC Glucose 107 mg/dL   Lipase [[id number]]    Collected: 10/06/21 0832    Updated: 10/06/21 0905    Specimen Source: Blood     Lipase 19 U/L   Amylase [[id number]]    Collected: 10/06/21 7442    Updated: 10/06/21 0905     Amylase 45 U/L   Hepatic Function Panel [[id number]] (Abnormal)    Collected: 10/06/21 8284    Updated: 10/06/21 0905     Albumin 3.5 g/dL    Alkaline Phosphatase 129 U/L    ALT 13 U/L    AST 11 U/L    Total Bilirubin 0.61 mg/dL    Bilirubin, Direct 0.17 mg/dL    Bilirubin, Indirect 0.44 mg/dL    Total Protein 5.4Low  g/dL    Globulin NOT REPORTED g/dL    Albumin/Globulin Ratio NOT REPORTED   Current IP Meds        Assessment:  Active Problems:    Hydronephrosis due to obstruction of ureter  Resolved Problems:    * No resolved hospital problems. 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    Hydronephrosis due to obstruction of ureter N13.1      · Nausea vomiting  ·    · Abdominal pain right-sided  ·    · Status post colonoscopy with polyp back to me  ·    · History of lymphoma seems to be responding to treatment according to the CAT scan with decreasing mass  ·    · New right-sided hydro-  ·    · History of hypertension  · Status post stenting  ·   · Urinary retention he has a Teran now with some blood noted in the Teran  ·   · Still abdominal and right upper quadrant pain on palpation  ·   · HIDA with evidence of chronic cholecystitis and probably associated acute event reported as negative will give him antibiotics      Plan:  19. Continue monitoring  20.   21. Pain control  22.   23. Continue with antibiotics  24.   25.  Await surgical opinion in regard to HIDA scan    KRISTI Conley             10/7/2021, 8:30 AM

## 2021-10-07 NOTE — ANESTHESIA POSTPROCEDURE EVALUATION
Department of Anesthesiology  Postprocedure Note    Patient: Vicky Wilkerson  MRN: [id number]  YOB: 1950  Date of evaluation: 10/7/2021  Time:  10:42 AM     Procedure Summary     Date: 10/06/21 Room / Location: [facility] [address] / [facility]: DESTINEE POTTER    Anesthesia Start: 1817 Anesthesia Stop: 1905    Procedure: CYSTOSCOPY [address] (Right Ureter) Diagnosis: (hydronephosis)    Surgeons: Foster Agrawal MD Responsible Provider: An Daugherty MD    Anesthesia Type: general ASA Status: 3          Anesthesia Type: general    Yvette Phase I: Yvette Score: 8    Yvette Phase II:      Last vitals: Reviewed and per EMR flowsheets. Anesthesia Post Evaluation    Comments: POD #1. Patient seen at bedside. No anesthesia complications reported.

## 2021-10-07 NOTE — PROGRESS NOTES
Dr. Ronald Abarca placed #16 Azeri coude catheter with uro-jet. Dark keith urine returned. Prior to placement bladder scan completed resulting in 585 ml. Per Dr. Ronald Abarca that result may also be indicative of ascites.  Patient tolerated well

## 2021-10-07 NOTE — PROGRESS NOTES
[facility]                 PATIENT NAME: Pranay oFss     TODAY'S DATE: 10/7/2021, 9:39 AM    SUBJECTIVE:    Pt seen and examined. Afebrile, VSS. No leukocytosis, hemoglobin stable. S/p cystoscopy with stent placement per urology. Urology also had to place costello catheter this morning due to urinary retention and difficult placement. Patient had almost 600mL urine output following catheter insertion. Urine in bag and tubing is bloody from procedure. Patient states abdominal pain is improved but still present. Mostly c/o burning around catheter site this morning. Mild nausea, no emesis. No appetite; did not eat breakfast. Passing flatus, no BM. HIDA and RUQ US results reviewed; HIDA negative for acute cholecystitis. OBJECTIVE:   VITALS:  BP (!) 138/58   Pulse 65   Temp 97.6 °F (36.4 °C) (Oral)   Resp 14   Ht 5' 10\" (1.778 m)   Wt 164 lb (74.4 kg)   SpO2 92%   BMI 23.53 kg/m²      INTAKE/OUTPUT:      Intake/Output Summary (Last 24 hours) at 10/7/2021 0939  Last data filed at 10/7/2021 0636  Gross per 24 hour   Intake 1724.65 ml   Output 500 ml   Net 1224.65 ml                 CONSTITUTIONAL:  awake and alert.   No acute distress  HEART:   RRR  LUNGS:   Decreased air entry at bases, no wheezing   ABDOMEN:   Abdomen soft, right sided abdomen and flank tender, non-distended  EXTREMITIES:   No pedal edema    Data:  CBC:   Lab Results   Component Value Date    WBC 6.9 10/07/2021    RBC 3.75 10/07/2021    RBC 4.38 06/06/2012    HGB 11.3 10/07/2021    HCT 33.6 10/07/2021    MCV 89.4 10/07/2021    MCH 30.1 10/07/2021    MCHC 33.7 10/07/2021    RDW 15.1 10/07/2021     10/07/2021     06/06/2012    MPV 8.4 10/07/2021     BMP:    Lab Results   Component Value Date     10/07/2021    K 4.4 10/07/2021     10/07/2021    CO2 23 10/07/2021    BUN 18 10/07/2021    LABALBU 3.5 10/06/2021    CREATININE 1.23 10/07/2021    CALCIUM 8.1 10/07/2021    GFRAA >60 10/07/2021    LABGLOM 58 10/07/2021    GLUCOSE 102 10/07/2021    GLUCOSE 97 06/06/2012       Radiology Review:      [address] [[id number]] Collected: 10/06/21 1341      Order Status: Completed Updated: 10/06/21 1455     Narrative:       EXAMINATION:   RIGHT UPPER QUADRANT ULTRASOUND     10/6/2021 1:33 pm     COMPARISON:   10/5/2021, 10/6/2021     HISTORY:   ORDERING SYSTEM PROVIDED HISTORY: cholelithiasis/n/v   TECHNOLOGIST PROVIDED HISTORY:   Choleithiasis/n/v     FINDINGS:   LIVER:  The liver demonstrates normal echogenicity without evidence of   intrahepatic biliary ductal dilatation. BILIARY SYSTEM:  Cholelithiasis is present with mild gallbladder wall   thickening.  Possible gallbladder sludge.  No definite pericholecystic fluid. The sonographic Navas's sign is negative. Common bile duct is within normal limits measuring 5 mm. RIGHT KIDNEY: Mild hydronephrosis. PANCREAS:  Visualized portions of the pancreas are unremarkable. OTHER: No evidence of right upper quadrant ascites.      Impression:       1. Cholelithiasis with mild gallbladder wall thickening.  Correlating with   recent CT and HIDA scan, this is favored to be related to chronic   cholecystitis.  The sonographic Navas's sign is negative. 2. Mild right-sided hydronephrosis.      NM HEPATOBILIARY SCAN Lima Covert FRACTION [[id number]] Collected: 10/06/21 1432     Order Status: Completed Updated: 10/06/21 1444     Narrative:       EXAMINATION:   NUCLEAR MEDICINE HEPATOBILIARY SCINTIGRAPHY (HIDA SCAN). 10/6/2021 12:12 pm     TECHNIQUE:   Approximately 5.2 [id number] Mebrofenin (Choletec) was administered IV. Then, dynamic images of the abdomen were obtained in the anterior projection   for 60 mins.  2.9 mg of morphine was administered per protocol an additional   images were acquired for 30 minutes     COMPARISON:   No prior for comparison.      HISTORY:   ORDERING SYSTEM PROVIDED HISTORY: abdominal pain TECHNOLOGIST PROVIDED HISTORY:   abdominal pain   Reason for Exam: abdominal pain   Acuity: Unknown   Type of Exam: Unknown     FINDINGS:   Upon injection of radiopharmaceutical, there is prompt visualization of the   liver.  Activity is seen within common duct and small bowel during the 1st   hour of imaging though gallbladder is not visualized.  Morphine was   administered per protocol, after which gallbladder is visualized.      Impression:       No evidence of acute cholecystitis.  Given delayed visualization of the   gallbladder, chronic cholecystitis could be considered in the appropriate   clinical setting. ASSESSMENT     Active Problems:    Hydronephrosis due to obstruction of ureter  Resolved Problems:    * No resolved hospital problems. *      Plan  1. Diet as tolerated  2. Pain and nausea management   3. Patient will need cholecystectomy in the future for chronic calculus cholecystitis, likely as outpatient  4. Surgically stable  5.  Continue medical management       Electronically signed by Maddy Coronado PA-C  [address] [address]

## 2021-10-07 NOTE — PROGRESS NOTES
Patient has been unable to urinate, having pressure and discomfort. Bladder scan reveals 370 ml. Dr. Teena Lopez called.  Order received for #18 coude costello catheter

## 2021-10-07 NOTE — PROCEDURES
With the patient laying in supine position, genitalia prepped with Betadine solution. Lidocaine 2% gel instilled per urethra, 20 mL of lidocaine gel. We then added 10 mL around the catheter. 16.  Coudé catheter was then inserted and negotiated over the prostate and into the bladder. Sagrario-colored urine obtained. Patient felt immediate relief. Recommendation:  We will maintain the indwelling Teran for the time being,    The patient still has significant tenderness in the right upper quadrant and flank pain stent is in good position, further assessment by general surgery

## 2021-10-07 NOTE — PLAN OF CARE
Problem: Pain:  Goal: Pain level will decrease  Description: Pain level will decrease  Outcome: Ongoing  Goal: Control of acute pain  Description: Control of acute pain  10/7/2021 0308 by Clint Hernandez RN  Outcome: Ongoing  10/6/2021 1442 by Sarah Balderas RN  Outcome: Ongoing  Goal: Control of chronic pain  Description: Control of chronic pain  Outcome: Ongoing     Problem: Coping:  Goal: Expressions of feelings of enhanced comfort will increase  Description: Expressions of feelings of enhanced comfort will increase  Outcome: Ongoing     Problem: Urinary Elimination:  Goal: Ability to achieve a balanced intake and output will improve  Description: Ability to achieve a balanced intake and output will improve  Outcome: Ongoing  Goal: Ability to recognize the need to void and respond appropriately will improve  Description: Ability to recognize the need to void and respond appropriately will improve  Outcome: Ongoing  Goal: Will remain free from infection  Description: Will remain free from infection  10/7/2021 0308 by Clint Hernandez RN  Outcome: Ongoing  10/6/2021 1442 by Sarah Balderas RN  Outcome: Ongoing     Problem: Falls - Risk of:  Goal: Will remain free from falls  Description: Will remain free from falls  10/7/2021 0308 by Clint Hernandez RN  Outcome: Ongoing  10/6/2021 1442 by Sarah Balderas RN  Outcome: Ongoing  Goal: Absence of physical injury  Description: Absence of physical injury  Outcome: Ongoing

## 2021-10-07 NOTE — PROGRESS NOTES
Notified Dr. Tita Gtz of the inability to place costello catheter. He will be in early in the a.m. to attempt to place one. Will stop iv fluids for now and give a dose of flomax.  Urology cart placed at bedside

## 2021-10-07 NOTE — PROGRESS NOTES
[facility]   Urology Progress Note            Subjective: Follow-up hydronephrosis, urinary retention, difficult catheterization    Patient Vitals for the past 24 hrs:   BP Temp Temp src Pulse Resp SpO2   10/07/21 0700 (!) 138/58 97.6 °F (36.4 °C) Oral 65 14 (!) 88 %   10/06/21 2350 (!) 163/62 98 °F (36.7 °C) Oral 63 18 93 %   10/06/21 2018 (!) 149/76 97.5 °F (36.4 °C) -- 94 19 99 %   10/06/21 1950 (!) 157/64 98 °F (36.7 °C) -- 85 17 97 %   10/06/21 1949 -- -- -- 86 18 97 %   10/06/21 1940 (!) 165/68 -- -- 85 20 95 %   10/06/21 1930 (!) 158/70 -- -- 90 19 92 %   10/06/21 1920 (!) 160/74 -- -- 93 17 96 %   10/06/21 1915 -- -- -- 94 15 96 %   10/06/21 1910 138/65 -- -- 88 18 98 %   10/06/21 1902 (!) 116/54 98 °F (36.7 °C) Infrared 78 11 97 %   10/06/21 1435 -- -- -- -- -- 90 %   10/06/21 1100 (!) 126/49 98 °F (36.7 °C) Oral 55 16 (!) 86 %       Intake/Output Summary (Last 24 hours) at 10/7/2021 0825  Last data filed at 10/7/2021 0636  Gross per 24 hour   Intake 1724.65 ml   Output 500 ml   Net 1224.65 ml       Recent Labs     10/05/21  1619 10/06/21  0539 10/07/21  0530   WBC 7.6 6.2 6.9   HGB 13.3* 11.7* 11.3*   HCT 39.3* 34.4* 33.6*   MCV 87.5 88.3 89.4    144* 136*     Recent Labs     10/05/21  1619 10/06/21  0539    147*   K 4.6 4.5    110*   CO2 24 26   PHOS 3.4  --    BUN 20 19   CREATININE 1.25* 1.10       Recent Labs     10/06/21  1839   COLORU Yellow   PHUR 5.0   WBCUA 5 TO 10   RBCUA 50    MUCUS NOT REPORTED   TRICHOMONAS NOT REPORTED   YEAST NOT REPORTED   BACTERIA MANY*   SPECGRAV 1.022   LEUKOCYTESUR NEGATIVE   UROBILINOGEN Normal   BILIRUBINUR NEGATIVE  Verified by ictotest.*       Additional Lab/culture results:    Physical Exam: Patient complaining of discomfort unable to empty the bladder well patient has hematuria associated with prior attempt at catheterization and the presence of ureteral stent.     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

## 2021-10-07 NOTE — CARE COORDINATION
ONGOING DISCHARGE PLAN:    Spoke with patient yesterday regarding discharge plan, and he confirmed that plan is home without needs. Declined VNS. POD#1 cysto with stent. Teran placed by Dr. Ronald Abarca this AM d/t retention. Had HIDA yesterday which showed no acute cholecystitis. Also had GB US which showed cholelithiasis with mild gallbladder wall thickening. Active order for IV Zosyn. Will continue to follow for additional discharge needs.     Electronically signed by Jacob Estrada RN on 10/7/2021 at 8:24 AM

## 2021-10-07 NOTE — PROGRESS NOTES
Pt urinated a small amount of bloody urine.  Pt urinated in toilet, stating he did not want to use the urinal. WCTM

## 2021-10-07 NOTE — PLAN OF CARE
Problem: Pain:  Goal: Pain level will decrease  Description: Pain level will decrease  10/7/2021 1331 by Alok Turner RN  Outcome: Ongoing, as needed medications given as soon as possible patient can have      Problem: Coping:  Goal: Expressions of feelings of enhanced comfort will increase  Description: Expressions of feelings of enhanced comfort will increase  10/7/2021 1331 by Alok Turner RN  Outcome: Ongoing     Problem: Urinary Elimination:  Goal: Ability to achieve a balanced intake and output will improve  Description: Ability to achieve a balanced intake and output will improve  10/7/2021 1331 by Alok Turner RN  Outcome: Ongoing, costello inplace      Problem: Falls - Risk of:  Goal: Will remain free from falls  Description: Will remain free from falls  10/7/2021 1331 by Alok Turnre RN  Outcome: Ongoing, bed alarm on, call light within reach

## 2021-10-07 NOTE — PROGRESS NOTES
Today's Date: 10/7/2021  Patient Name: Hillary Buck  Date of admission: 10/5/2021  3:20 PM  Patient's age: 66 y.o., 1950  Admission Dx: Hydronephrosis with ureteral stricture, not elsewhere classified [N13.1]  Hydronephrosis due to obstruction of ureter [N13.1]    Reason for Consult: management recommendations  Requesting Physician: Philomena Capps DO    CHIEF COMPLAINT: Abdominal pain. B-cell lymphoma. History Obtained From:  patient, electronic medical record    Interval history:    Patient seen and examined  Labs and vitals reviewed  Still complains abdominal pain but it is better  Patient s/p cystoscopy with stent placement. HIDA scan negative for acute cholecystitis  Work-up reveals mild iron deficiency with iron saturation of 19%. HISTORY OF PRESENT ILLNESS:      The patient is a 66 y.o.  male who is admitted to the hospital with chief complaints of right-sided abdominal pain. Patient has been having pain on and off. Pain is associated with nausea and vomiting. Denies shortness of breath. Patient recently finished induction course of Rituxan 4 weekly treatments. Patient follows up with Dr. Edil Gaona from medical oncology standpoint. Recent colonoscopy showed polyps which were removed did not show any malignancy. CT abdomen pelvis showed interval decrease in retroperitoneal soft tissue mass indicating response to therapy. CT scan does show interval development of mild degree of right hydronephrosis. Patient was seen by surgery team who are at this point not planning any intervention. Surgically team has ordered work-up for gallbladder evaluation. Patient lab work-up shows mild normocytic anemia and thrombocytopenia. Patient is LFTs are within range. Urology team has been consulted.       Past Medical History:   [facility] Chemotherapy management, encounter for, Diabetes mellitus ([facility] [address] History of non-Hodgkin's lymphoma, Hyperlipidemia, Hypertension, and Hypothyroidism. Past Surgical History:   has a past surgical history that includes hernia repair; IR BIOPSY ABDOMINAL/RETROPERITONEAL MASS PERCUTANEOUS (7/6/2021); IR PORT PLACEMENT > 5 YEARS (8/31/2021); Colonoscopy (Left, 9/24/2021); and Cystoscopy (Right, 10/6/2021). Medications:    Prior to Admission medications    Medication Sig Start Date End Date Taking? Authorizing Provider   amLODIPine (NORVASC) 5 MG tablet Take 10 mg by mouth daily    Yes Historical Provider, MD   pregabalin (LYRICA) 150 MG capsule Take 150 mg by mouth 3 times daily.    Yes Historical Provider, MD   glipiZIDE-metFORMIN (METAGLIP) 5-500 MG per tablet Take 2 tablets by mouth daily (with breakfast)   Yes Historical Provider, MD   glipiZIDE-metFORMIN (METAGLIP) 5-500 MG per tablet Take 1 tablet by mouth Daily with supper   Yes Historical Provider, MD   donepezil (ARICEPT) 10 MG tablet Take 10 mg by mouth nightly  7/11/21  Yes Historical Provider, MD   meloxicam (MOBIC) 15 MG tablet Take 15 mg by mouth daily  7/14/21  Yes Historical Provider, MD   atorvastatin (LIPITOR) 10 MG tablet Take 10 mg by mouth daily   Yes Historical Provider, MD   FLUoxetine (PROZAC) 20 MG capsule Take 20 mg by mouth daily   Yes Historical Provider, MD   lisinopril-hydroCHLOROthiazide (PRINZIDE;ZESTORETIC) 20-12.5 MG per tablet Take 1 tablet by mouth daily  7/14/21  Yes Historical Provider, MD   carvedilol (COREG) 6.25 MG tablet Take 6.25 mg by mouth 2 times daily (with meals)   Yes Historical Provider, MD   omeprazole (PRILOSEC) 20 MG delayed release capsule Take 20 mg by mouth 2 times daily   Yes Historical Provider, MD   levothyroxine (SYNTHROID) 50 MCG tablet Take 112 mcg by mouth Daily    Yes Historical Provider, MD     Current Facility-Administered Medications   Medication Dose Route Frequency Provider Last Rate Last Admin    lidocaine 4 % external patch 1 patch  1 patch TransDERmal Daily Parul Marcelino MD   1 patch at at 10/07/21 0637 Restarted at 10/07/21 1529    scopolamine (TRANSDERM-SCOP) transdermal patch 1 patch  1 patch TransDERmal Q72H Rosas Dhillon MD   1 patch at 10/06/21 0021       Allergies:  Patient has no known allergies. Social History:   reports that he quit smoking about 15 years ago. His smoking use included cigarettes. He has a 90.00 pack-year smoking history. He has never used smokeless tobacco. He reports current alcohol use of about 1.7 standard drinks of alcohol per week. He reports current drug use. Drug: Marijuana. Family History: family history includes Alzheimer's Disease in his father; Diabetes in his mother; Heart Disease in his brother and brother. REVIEW OF SYSTEMS:      Constitutional: No fever or chills. No night sweats, no weight loss   Eyes: No eye discharge, double vision, or eye pain   HEENT: negative for sore mouth, sore throat, hoarseness and voice change   Respiratory: negative for cough , sputum, dyspnea, wheezing, hemoptysis, chest pain   Cardiovascular: negative for chest pain, dyspnea, palpitations, orthopnea, PND   Gastrointestinal: Positive abdominal pain  Genitourinary: negative for frequency, dysuria, nocturia, urinary incontinence, and hematuria   Integument: negative for rash, skin lesions, bruises.    Hematologic/Lymphatic: negative for easy bruising, bleeding, lymphadenopathy, or petechiae   Endocrine: negative for heat or cold intolerance,weight changes, change in bowel habits and hair loss   Musculoskeletal: negative for myalgias, arthralgias, pain, joint swelling,and bone pain   Neurological: negative for headaches, dizziness, seizures, weakness, numbness    PHYSICAL EXAM:        BP (!) 138/58   Pulse 65   Temp 97.6 °F (36.4 °C) (Oral)   Resp 14   Ht 5' 10\" (1.778 m)   Wt 164 lb (74.4 kg)   SpO2 92%   BMI 23.53 kg/m²    Temp (24hrs), Av.6 °F (35.3 °C), Min:82.3 °F (27.9 °C), Max:98 °F (36.7 °C)      General appearance - well appearing, no in pain or distress   Mental status - alert and cooperative   Eyes - pupils equal and reactive, extraocular eye movements intact   Ears - bilateral TM's and external ear canals normal   Mouth - mucous membranes moist, pharynx normal without lesions   Neck - supple, no significant adenopathy   Lymphatics - no palpable lymphadenopathy, no hepatosplenomegaly   Chest - clear to auscultation, no wheezes, rales or rhonchi, symmetric air entry   Heart - normal rate, regular rhythm, normal S1, S2, no murmurs  Abdomen - soft, nondistended, no masses or organomegaly.   Generalized mild tenderness  Neurological - alert, oriented, normal speech, no focal findings or movement disorder noted   Musculoskeletal - no joint tenderness, deformity or swelling   Extremities - peripheral pulses normal, no pedal edema, no clubbing or cyanosis   Skin - normal coloration and turgor, no rashes, no suspicious skin lesions noted ,      DATA:      Labs:     Results for orders placed or performed during the hospital encounter of 10/05/21   CBC Auto Differential   Result Value Ref Range    WBC 7.6 3.5 - 11.0 k/uL    RBC 4.49 (L) 4.5 - 5.9 m/uL    Hemoglobin 13.3 (L) 13.5 - 17.5 g/dL    Hematocrit 39.3 (L) 41 - 53 %    MCV 87.5 80 - 100 fL    MCH 29.7 26 - 34 pg    MCHC 33.9 31 - 37 g/dL    RDW 15.3 (H) 11.5 - 14.9 %    Platelets 932 163 - 700 k/uL    MPV 9.0 6.0 - 12.0 fL    NRBC Automated NOT REPORTED per 100 WBC    Differential Type NOT REPORTED     Seg Neutrophils 78 (H) 36 - 66 %    Lymphocytes 13 (L) 24 - 44 %    Monocytes 8 (H) 1 - 7 %    Eosinophils % 1 0 - 4 %    Basophils 0 0 - 2 %    Immature Granulocytes NOT REPORTED 0 %    Segs Absolute 5.90 1.3 - 9.1 k/uL    Absolute Lymph # 1.00 1.0 - 4.8 k/uL    Absolute Mono # 0.60 0.1 - 1.3 k/uL    Absolute Eos # 0.00 0.0 - 0.4 k/uL    Basophils Absolute 0.00 0.0 - 0.2 k/uL    Absolute Immature Granulocyte NOT REPORTED 0.00 - 0.30 k/uL    WBC Morphology NOT REPORTED     RBC Morphology NOT REPORTED Casts UA HYALINE /LPF    Casts UA 0 TO 2 /LPF    Crystals, UA NOT REPORTED None /HPF    Epithelial Cells UA 2 TO 5 /HPF    Renal Epithelial, UA NOT REPORTED 0 /HPF    Bacteria, UA FEW (A) None    Mucus, UA NOT REPORTED None    Trichomonas, UA NOT REPORTED None    Amorphous, UA NOT REPORTED None    Other Observations UA NOT REPORTED NOT REQ.     Yeast, UA NOT REPORTED None   CBC with DIFF   Result Value Ref Range    WBC 6.2 3.5 - 11.0 k/uL    RBC 3.89 (L) 4.5 - 5.9 m/uL    Hemoglobin 11.7 (L) 13.5 - 17.5 g/dL    Hematocrit 34.4 (L) 41 - 53 %    MCV 88.3 80 - 100 fL    MCH 29.9 26 - 34 pg    MCHC 33.9 31 - 37 g/dL    RDW 15.5 (H) 11.5 - 14.9 %    Platelets 870 (L) 343 - 450 k/uL    MPV 8.5 6.0 - 12.0 fL    NRBC Automated NOT REPORTED per 100 WBC    Differential Type NOT REPORTED     Seg Neutrophils 77 (H) 36 - 66 %    Lymphocytes 10 (L) 24 - 44 %    Monocytes 11 (H) 1 - 7 %    Eosinophils % 2 0 - 4 %    Basophils 0 0 - 2 %    Immature Granulocytes NOT REPORTED 0 %    Segs Absolute 4.70 1.3 - 9.1 k/uL    Absolute Lymph # 0.60 (L) 1.0 - 4.8 k/uL    Absolute Mono # 0.70 0.1 - 1.3 k/uL    Absolute Eos # 0.10 0.0 - 0.4 k/uL    Basophils Absolute 0.00 0.0 - 0.2 k/uL    Absolute Immature Granulocyte NOT REPORTED 0.00 - 0.30 k/uL    WBC Morphology NOT REPORTED     RBC Morphology NOT REPORTED     Platelet Estimate NOT REPORTED    BASIC METABOLIC PANEL   Result Value Ref Range    Glucose 94 70 - 99 mg/dL    BUN 19 8 - 23 mg/dL    CREATININE 1.10 0.70 - 1.20 mg/dL    Bun/Cre Ratio NOT REPORTED 9 - 20    Calcium 8.4 (L) 8.6 - 10.4 mg/dL    Sodium 147 (H) 135 - 144 mmol/L    Potassium 4.5 3.7 - 5.3 mmol/L    Chloride 110 (H) 98 - 107 mmol/L    CO2 26 20 - 31 mmol/L    Anion Gap 11 9 - 17 mmol/L    GFR Non-African American >60 >60 mL/min    GFR African American >60 >60 mL/min    GFR Comment          GFR Staging NOT REPORTED    Lipase   Result Value Ref Range    Lipase 19 13 - 60 U/L   Amylase   Result Value Ref Range    Amylase 45 28 - 100 U/L   Hepatic Function Panel   Result Value Ref Range    Albumin 3.5 3.5 - 5.2 g/dL    Alkaline Phosphatase 129 40 - 129 U/L    ALT 13 5 - 41 U/L    AST 11 <40 U/L    Total Bilirubin 0.61 0.3 - 1.2 mg/dL    Bilirubin, Direct 0.17 <0.31 mg/dL    Bilirubin, Indirect 0.44 0.00 - 1.00 mg/dL    Total Protein 5.4 (L) 6.4 - 8.3 g/dL    Globulin NOT REPORTED 1.5 - 3.8 g/dL    Albumin/Globulin Ratio NOT REPORTED 1.0 - 2.5   FERRITIN   Result Value Ref Range    Ferritin 137 30 - 400 ug/L   IRON AND TIBC   Result Value Ref Range    Iron 46 (L) 59 - 158 ug/dL    TIBC 237 (L) 250 - 450 ug/dL    Iron Saturation 19 (L) 20 - 55 %    UIBC 191 112 - 347 ug/dL   Vitamin B12 & Folate   Result Value Ref Range    Vitamin B-12 791 232 - 1245 pg/mL    Folate 18.2 >4.8 ng/mL   CBC with DIFF   Result Value Ref Range    WBC 6.9 3.5 - 11.0 k/uL    RBC 3.75 (L) 4.5 - 5.9 m/uL    Hemoglobin 11.3 (L) 13.5 - 17.5 g/dL    Hematocrit 33.6 (L) 41 - 53 %    MCV 89.4 80 - 100 fL    MCH 30.1 26 - 34 pg    MCHC 33.7 31 - 37 g/dL    RDW 15.1 (H) 11.5 - 14.9 %    Platelets 671 (L) 446 - 450 k/uL    MPV 8.4 6.0 - 12.0 fL    NRBC Automated NOT REPORTED per 100 WBC    Differential Type NOT REPORTED     Seg Neutrophils 73 (H) 36 - 66 %    Lymphocytes 14 (L) 24 - 44 %    Monocytes 11 (H) 1 - 7 %    Eosinophils % 2 0 - 4 %    Basophils 0 0 - 2 %    Immature Granulocytes NOT REPORTED 0 %    Segs Absolute 5.00 1.3 - 9.1 k/uL    Absolute Lymph # 1.00 1.0 - 4.8 k/uL    Absolute Mono # 0.70 0.1 - 1.3 k/uL    Absolute Eos # 0.10 0.0 - 0.4 k/uL    Basophils Absolute 0.00 0.0 - 0.2 k/uL    Absolute Immature Granulocyte NOT REPORTED 0.00 - 0.30 k/uL    WBC Morphology NOT REPORTED     RBC Morphology NOT REPORTED     Platelet Estimate NOT REPORTED    Urinalysis Reflex to Culture    Specimen: Urine, Cystoscopic   Result Value Ref Range    Color, UA Yellow Yellow    Turbidity UA Clear Clear    Glucose, Ur NEGATIVE NEGATIVE    Bilirubin Urine NEGATIVE  Verified by ictotest. (A) NEGATIVE    Ketones, Urine TRACE (A) NEGATIVE    Specific Gravity, UA 1.022 1.000 - 1.030    Urine Hgb LARGE (A) NEGATIVE    pH, UA 5.0 5.0 - 8.0    Protein, UA 2+ (A) NEGATIVE    Urobilinogen, Urine Normal Normal    Nitrite, Urine NEGATIVE NEGATIVE    Leukocyte Esterase, Urine NEGATIVE NEGATIVE    Urinalysis Comments NOT REPORTED    Microscopic Urinalysis   Result Value Ref Range    -          WBC, UA 5 TO 10 /HPF    RBC, UA 50  /HPF    Casts UA NOT REPORTED /LPF    Crystals, UA NOT REPORTED None /HPF    Epithelial Cells UA 0 TO 2 /HPF    Renal Epithelial, UA NOT REPORTED 0 /HPF    Bacteria, UA MANY (A) None    Mucus, UA NOT REPORTED None    Trichomonas, UA NOT REPORTED None    Amorphous, UA NOT REPORTED None    Other Observations UA NOT REPORTED NOT REQ.     Yeast, UA NOT REPORTED None   Basic Metabolic Panel w/ Reflex to MG   Result Value Ref Range    Glucose 102 (H) 70 - 99 mg/dL    BUN 18 8 - 23 mg/dL    CREATININE 1.23 (H) 0.70 - 1.20 mg/dL    Bun/Cre Ratio NOT REPORTED 9 - 20    Calcium 8.1 (L) 8.6 - 10.4 mg/dL    Sodium 142 135 - 144 mmol/L    Potassium 4.4 3.7 - 5.3 mmol/L    Chloride 108 (H) 98 - 107 mmol/L    CO2 23 20 - 31 mmol/L    Anion Gap 11 9 - 17 mmol/L    GFR Non-African American 58 (L) >60 mL/min    GFR African American >60 >60 mL/min    GFR Comment          GFR Staging NOT REPORTED    POC Glucose Fingerstick   Result Value Ref Range    POC Glucose 87 75 - 110 mg/dL   POC Glucose Fingerstick   Result Value Ref Range    POC Glucose 107 75 - 110 mg/dL   POC Glucose Fingerstick   Result Value Ref Range    POC Glucose 89 75 - 110 mg/dL   POC Glucose Fingerstick   Result Value Ref Range    POC Glucose 83 75 - 110 mg/dL   POC Glucose Fingerstick   Result Value Ref Range    POC Glucose 75 75 - 110 mg/dL   POC Glucose Fingerstick   Result Value Ref Range    POC Glucose 95 75 - 110 mg/dL   POC Glucose Fingerstick   Result Value Ref Range    POC Glucose 99 75 - 110 mg/dL   POC Glucose Fingerstick   Result Value Ref Range    POC Glucose 99 75 - 110 mg/dL   POC Glucose Fingerstick   Result Value Ref Range    POC Glucose 97 75 - 110 mg/dL         IMAGING DATA:    CT ABDOMEN PELVIS W IV CONTRAST Additional Contrast? None    Result Date: 10/5/2021  EXAMINATION: CT OF THE ABDOMEN AND PELVIS WITH CONTRAST 10/5/2021 11:37 am TECHNIQUE: CT of the abdomen and pelvis was performed with the administration of intravenous contrast. Multiplanar reformatted images are provided for review. Dose modulation, iterative reconstruction, and/or weight based adjustment of the mA/kV was utilized to reduce the radiation dose to as low as reasonably achievable. COMPARISON: CT scan of the abdomen dated July 6, 2021, and July 3, 2021 HISTORY: ORDERING SYSTEM PROVIDED HISTORY: abd pain w/n/v w/known retroperitoneal mass TECHNOLOGIST PROVIDED HISTORY: abd pain w/n/v w/known retroperitoneal mass Reason for Exam: Right side ab pain with nausea/vomiting, hx of retroperitoneal mass Acuity: Unknown Type of Exam: Unknown Relevant Medical/Surgical History: Hernia repair FINDINGS: Lower Chest: Dependent changes are present within the lung bases. Organs: The liver, spleen, and pancreas demonstrate no acute abnormality. Cholelithiasis is present without evidence of cholecystitis. Nodular thickening of the right adrenal gland is again noted, unchanged. Cortical cyst formation is again noted on the right kidney. A 3 mm nonobstructing calculus is again visualized within the lower pole collecting system of the right kidney. An approximate 1.7 x 1.5 cm solid-appearing nodule is again visualized within the right kidney, previously measured 1.2 mm. A mild degree of right hydronephrosis and hydroureter is now present. Again demonstrated is the presence of an infiltrating soft tissue mass involving the left retroperitoneum, with enlargement of the left adrenal gland, and encasing the left kidney.   Caudal to the left kidney, with the soft tissue mass measures approximately 6.4 by 7.1 cm, decreased in size, indicating response to treatment. The soft tissue fullness within the para-region is decreased in size as well. Interval decrease in the degree of left hydronephrosis is present, now mild in degree. GI/Bowel: A small hiatal hernia is present. No evidence of small-bowel obstruction is present. The appendix is normal.  Biopsy clips are present within the cecum. Scattered colonic diverticula are noted, without evidence of diverticulitis. Pelvis: Prostate gland is enlarged. Urinary bladder appears grossly normal. Peritoneum/Retroperitoneum: No free fluid or free air is present within the abdomen or pelvis. Extensive atherosclerotic changes are again demonstrated involving the abdominal aorta. I suspect there is high-grade stenosis at the origin of the left common iliac artery, similar compared to the prior study. Dominant right external iliac chain lymph node is significantly decreased in size, now measuring approximately 2.6 cm in length, and 8 mm in thickness. This previously measured 3.5 x 1.6 cm. Bulky para-adenopathy is decreased in size. Bones/Soft Tissues: No acute osseous abnormality is present. 1. Interval decrease in size of the infiltrating soft tissue mass within the left retroperitoneum, encasing the left kidney and abdominal aorta. The para-aortic adenopathy, and external iliac chain adenopathy is decreased in size, indicating response to therapy. Changes are likely due to lymphoma. 2. Cholelithiasis, without evidence of cholecystitis 3. Interval development of a mild degree of right hydronephrosis, and hydroureter. Right ureter is dilated to the level of the retroperitoneal \"mass\". .  Obstruction may be related to stricture formation or encasement by the retroperitoneal soft tissue mass. 4. Scattered colonic diverticula, without evidence of diverticulitis 5. Enlarged prostate gland 6.  Nonobstructive bowel gas pattern -- Diagnosis --   1. CECUM, POLYP, BIOPSY:               -  CONSISTENT WITH SESSILE SERRATED ADENOMA (SESSILE   SERRATED LESION). 2.  RIGHT COLON, POLYP, POLYPECTOMY:        -  TUBULOVILLOUS ADENOMA. -  ADENOMA EXTENDS TO THE CAUTERIZED MARGIN. 3.  SIGMOID COLON, POLYP, BIOPSIES:        -  ADENOMATOUS POLYP (TUBULAR ADENOMA). IMPRESSION:   Primary Problem  <principal problem not specified>    Active Hospital Problems    Diagnosis Date Noted    Hydronephrosis due to obstruction of ureter [N13.1] 10/05/2021       B-cell lymphoma  Abdominal pain  Anemia    RECOMMENDATIONS:  1. I personally reviewed results of lab work-up imaging studies and other relevant clinical data. 2. Reviewed hospitalization course  3. Discussed with patient's family member at bedside  4. Scans show response to treatment with Rituxan. Maintenance therapy scheduled to start in November  5. Continue pain management  6. We will order oral iron given iron saturation is 19% on lower side  7. Patient pain has worsened over time despite of decrease in size of the retroperitoneal mass suggesting abdominal pain may not be related to the abdominal mass  8. Continue symptomatic supportive care      Discussed with patient and Nurse. Thank you for asking us to see this patient. Veronica Lane MD          This note is created with the assistance of a speech recognition program.  While intending to generate a document that actually reflects the content of the visit, the document can still have some errors including those of syntax and sound a like substitutions which may escape proof reading. It such instances, actual meaning can be extrapolated by contextual diversion.

## 2021-10-08 ENCOUNTER — ANESTHESIA EVENT (OUTPATIENT)
Dept: OPERATING ROOM | Age: 71
DRG: 660 | End: 2021-10-08
Payer: MEDICARE

## 2021-10-08 ENCOUNTER — APPOINTMENT (OUTPATIENT)
Dept: GENERAL RADIOLOGY | Age: 71
DRG: 660 | End: 2021-10-08
Payer: MEDICARE

## 2021-10-08 LAB
ABSOLUTE EOS #: 0.2 K/UL (ref 0–0.4)
ABSOLUTE IMMATURE GRANULOCYTE: ABNORMAL K/UL (ref 0–0.3)
ABSOLUTE LYMPH #: 1 K/UL (ref 1–4.8)
ABSOLUTE MONO #: 0.7 K/UL (ref 0.1–1.3)
ANION GAP SERPL CALCULATED.3IONS-SCNC: 10 MMOL/L (ref 9–17)
BASOPHILS # BLD: 0 % (ref 0–2)
BASOPHILS ABSOLUTE: 0 K/UL (ref 0–0.2)
BUN BLDV-MCNC: 13 MG/DL (ref 8–23)
BUN/CREAT BLD: ABNORMAL (ref 9–20)
CALCIUM SERPL-MCNC: 8.2 MG/DL (ref 8.6–10.4)
CHLORIDE BLD-SCNC: 107 MMOL/L (ref 98–107)
CO2: 23 MMOL/L (ref 20–31)
CREAT SERPL-MCNC: 1.04 MG/DL (ref 0.7–1.2)
DIFFERENTIAL TYPE: ABNORMAL
EOSINOPHILS RELATIVE PERCENT: 2 % (ref 0–4)
GFR AFRICAN AMERICAN: >60 ML/MIN
GFR NON-AFRICAN AMERICAN: >60 ML/MIN
GFR SERPL CREATININE-BSD FRML MDRD: ABNORMAL ML/MIN/{1.73_M2}
GFR SERPL CREATININE-BSD FRML MDRD: ABNORMAL ML/MIN/{1.73_M2}
GLUCOSE BLD-MCNC: 101 MG/DL (ref 75–110)
GLUCOSE BLD-MCNC: 119 MG/DL (ref 75–110)
GLUCOSE BLD-MCNC: 135 MG/DL (ref 75–110)
GLUCOSE BLD-MCNC: 152 MG/DL (ref 75–110)
GLUCOSE BLD-MCNC: 74 MG/DL (ref 75–110)
GLUCOSE BLD-MCNC: 80 MG/DL (ref 70–99)
GLUCOSE BLD-MCNC: 88 MG/DL (ref 75–110)
HCT VFR BLD CALC: 33.9 % (ref 41–53)
HEMOGLOBIN: 11.4 G/DL (ref 13.5–17.5)
IMMATURE GRANULOCYTES: ABNORMAL %
LYMPHOCYTES # BLD: 15 % (ref 24–44)
MCH RBC QN AUTO: 29.7 PG (ref 26–34)
MCHC RBC AUTO-ENTMCNC: 33.7 G/DL (ref 31–37)
MCV RBC AUTO: 88 FL (ref 80–100)
MONOCYTES # BLD: 10 % (ref 1–7)
NRBC AUTOMATED: ABNORMAL PER 100 WBC
PDW BLD-RTO: 15 % (ref 11.5–14.9)
PLATELET # BLD: 144 K/UL (ref 150–450)
PLATELET ESTIMATE: ABNORMAL
PMV BLD AUTO: 8.5 FL (ref 6–12)
POTASSIUM SERPL-SCNC: 4.1 MMOL/L (ref 3.7–5.3)
RBC # BLD: 3.85 M/UL (ref 4.5–5.9)
RBC # BLD: ABNORMAL 10*6/UL
SEG NEUTROPHILS: 73 % (ref 36–66)
SEGMENTED NEUTROPHILS ABSOLUTE COUNT: 5 K/UL (ref 1.3–9.1)
SODIUM BLD-SCNC: 140 MMOL/L (ref 135–144)
WBC # BLD: 6.8 K/UL (ref 3.5–11)
WBC # BLD: ABNORMAL 10*3/UL

## 2021-10-08 PROCEDURE — 6370000000 HC RX 637 (ALT 250 FOR IP): Performed by: UROLOGY

## 2021-10-08 PROCEDURE — 82947 ASSAY GLUCOSE BLOOD QUANT: CPT

## 2021-10-08 PROCEDURE — 36415 COLL VENOUS BLD VENIPUNCTURE: CPT

## 2021-10-08 PROCEDURE — 6370000000 HC RX 637 (ALT 250 FOR IP): Performed by: INTERNAL MEDICINE

## 2021-10-08 PROCEDURE — 80048 BASIC METABOLIC PNL TOTAL CA: CPT

## 2021-10-08 PROCEDURE — 99232 SBSQ HOSP IP/OBS MODERATE 35: CPT | Performed by: INTERNAL MEDICINE

## 2021-10-08 PROCEDURE — 6360000002 HC RX W HCPCS: Performed by: UROLOGY

## 2021-10-08 PROCEDURE — 2580000003 HC RX 258: Performed by: UROLOGY

## 2021-10-08 PROCEDURE — 85025 COMPLETE CBC W/AUTO DIFF WBC: CPT

## 2021-10-08 PROCEDURE — 1200000000 HC SEMI PRIVATE

## 2021-10-08 PROCEDURE — 71045 X-RAY EXAM CHEST 1 VIEW: CPT

## 2021-10-08 PROCEDURE — 6370000000 HC RX 637 (ALT 250 FOR IP): Performed by: FAMILY MEDICINE

## 2021-10-08 PROCEDURE — 93005 ELECTROCARDIOGRAM TRACING: CPT | Performed by: FAMILY MEDICINE

## 2021-10-08 RX ORDER — FLUTICASONE PROPIONATE 50 MCG
1 SPRAY, SUSPENSION (ML) NASAL DAILY
Status: DISCONTINUED | OUTPATIENT
Start: 2021-10-08 | End: 2021-10-12 | Stop reason: HOSPADM

## 2021-10-08 RX ADMIN — SODIUM CHLORIDE: 9 INJECTION, SOLUTION INTRAVENOUS at 06:23

## 2021-10-08 RX ADMIN — HYDROMORPHONE HYDROCHLORIDE 1 MG: 1 INJECTION, SOLUTION INTRAMUSCULAR; INTRAVENOUS; SUBCUTANEOUS at 02:27

## 2021-10-08 RX ADMIN — LEVOTHYROXINE SODIUM 112 MCG: 0.11 TABLET ORAL at 06:18

## 2021-10-08 RX ADMIN — FLUOXETINE HYDROCHLORIDE 20 MG: 20 CAPSULE ORAL at 08:24

## 2021-10-08 RX ADMIN — DONEPEZIL HYDROCHLORIDE 10 MG: 10 TABLET, FILM COATED ORAL at 22:07

## 2021-10-08 RX ADMIN — ONDANSETRON 4 MG: 2 INJECTION INTRAMUSCULAR; INTRAVENOUS at 10:31

## 2021-10-08 RX ADMIN — PIPERACILLIN SODIUM AND TAZOBACTAM SODIUM 3375 MG: 3; .375 INJECTION, POWDER, LYOPHILIZED, FOR SOLUTION INTRAVENOUS at 17:44

## 2021-10-08 RX ADMIN — HYDROMORPHONE HYDROCHLORIDE 1 MG: 1 INJECTION, SOLUTION INTRAMUSCULAR; INTRAVENOUS; SUBCUTANEOUS at 19:38

## 2021-10-08 RX ADMIN — PIPERACILLIN SODIUM AND TAZOBACTAM SODIUM 3375 MG: 3; .375 INJECTION, POWDER, LYOPHILIZED, FOR SOLUTION INTRAVENOUS at 02:22

## 2021-10-08 RX ADMIN — FLUTICASONE PROPIONATE 1 SPRAY: 50 SPRAY, METERED NASAL at 08:38

## 2021-10-08 RX ADMIN — HYDROMORPHONE HYDROCHLORIDE 1 MG: 1 INJECTION, SOLUTION INTRAMUSCULAR; INTRAVENOUS; SUBCUTANEOUS at 06:18

## 2021-10-08 RX ADMIN — AMLODIPINE BESYLATE 10 MG: 5 TABLET ORAL at 08:25

## 2021-10-08 RX ADMIN — ATORVASTATIN CALCIUM 10 MG: 10 TABLET, FILM COATED ORAL at 08:24

## 2021-10-08 RX ADMIN — HYDROMORPHONE HYDROCHLORIDE 1 MG: 1 INJECTION, SOLUTION INTRAMUSCULAR; INTRAVENOUS; SUBCUTANEOUS at 23:25

## 2021-10-08 RX ADMIN — PANTOPRAZOLE SODIUM 40 MG: 40 TABLET, DELAYED RELEASE ORAL at 06:18

## 2021-10-08 RX ADMIN — PIPERACILLIN SODIUM AND TAZOBACTAM SODIUM 3375 MG: 3; .375 INJECTION, POWDER, LYOPHILIZED, FOR SOLUTION INTRAVENOUS at 10:14

## 2021-10-08 RX ADMIN — MEMANTINE 10 MG: 10 TABLET ORAL at 08:38

## 2021-10-08 RX ADMIN — MEMANTINE 10 MG: 10 TABLET ORAL at 22:07

## 2021-10-08 RX ADMIN — CARVEDILOL 6.25 MG: 6.25 TABLET, FILM COATED ORAL at 08:24

## 2021-10-08 RX ADMIN — FERROUS SULFATE TAB 325 MG (65 MG ELEMENTAL FE) 325 MG: 325 (65 FE) TAB at 08:25

## 2021-10-08 RX ADMIN — HYDROMORPHONE HYDROCHLORIDE 1 MG: 1 INJECTION, SOLUTION INTRAMUSCULAR; INTRAVENOUS; SUBCUTANEOUS at 13:06

## 2021-10-08 RX ADMIN — CARVEDILOL 6.25 MG: 6.25 TABLET, FILM COATED ORAL at 17:43

## 2021-10-08 RX ADMIN — SODIUM CHLORIDE: 9 INJECTION, SOLUTION INTRAVENOUS at 19:42

## 2021-10-08 ASSESSMENT — PAIN SCALES - GENERAL
PAINLEVEL_OUTOF10: 9
PAINLEVEL_OUTOF10: 9
PAINLEVEL_OUTOF10: 6
PAINLEVEL_OUTOF10: 3
PAINLEVEL_OUTOF10: 9
PAINLEVEL_OUTOF10: 7
PAINLEVEL_OUTOF10: 7
PAINLEVEL_OUTOF10: 6
PAINLEVEL_OUTOF10: 6
PAINLEVEL_OUTOF10: 0
PAINLEVEL_OUTOF10: 3
PAINLEVEL_OUTOF10: 6

## 2021-10-08 ASSESSMENT — PAIN DESCRIPTION - PAIN TYPE
TYPE: SURGICAL PAIN

## 2021-10-08 ASSESSMENT — PAIN DESCRIPTION - LOCATION
LOCATION: ABDOMEN;BACK

## 2021-10-08 NOTE — CARE COORDINATION
ONGOING DISCHARGE PLAN:    Patient is alert and oriented x4. Spoke with patient regarding discharge plan and patient confirms that plan is still to return to home. POD #2 cysto with stent and costello placement - Will most likely need to discharge home with costello catheter. Gen surgery on board - May need to have cholecystectomy this admission. Will continue to follow for additional discharge needs.     Electronically signed by Isabela Roa RN on 10/8/2021 at 2:16 PM

## 2021-10-08 NOTE — PROGRESS NOTES
[facility]                 PATIENT NAME: Ava Israel     TODAY'S DATE: 10/8/2021, 10:30 AM    SUBJECTIVE:    Pt seen and examined. Afebrile, VSS. No leukocytosis, hemoglobin stable. Patient states he feels about the same. C/o right-sided abdominal pain, mostly in RLQ and right flank. C/o intermittent nausea, currently has no N/V. He is passing flatus, no BM. Tolerating diet however states he has poor appetite. Urine is less bloody today. OBJECTIVE:   VITALS:  BP (!) 169/74   Pulse 82   Temp 97.5 °F (36.4 °C) (Oral)   Resp 20   Ht 5' 10\" (1.778 m)   Wt 164 lb (74.4 kg)   SpO2 90%   BMI 23.53 kg/m²      INTAKE/OUTPUT:      Intake/Output Summary (Last 24 hours) at 10/8/2021 1030  Last data filed at 10/8/2021 0622  Gross per 24 hour   Intake 1437 ml   Output 1150 ml   Net 287 ml                 CONSTITUTIONAL:  awake and alert.   No acute distress  HEART:   RRR  LUNGS:   Decreased air entry at bases, no wheezing   ABDOMEN:   Abdomen soft, RLQ and right flank tender, non-distended  EXTREMITIES:   No pedal edema    Data:  CBC:   Lab Results   Component Value Date    WBC 6.8 10/08/2021    RBC 3.85 10/08/2021    RBC 4.38 06/06/2012    HGB 11.4 10/08/2021    HCT 33.9 10/08/2021    MCV 88.0 10/08/2021    MCH 29.7 10/08/2021    MCHC 33.7 10/08/2021    RDW 15.0 10/08/2021     10/08/2021     06/06/2012    MPV 8.5 10/08/2021     BMP:    Lab Results   Component Value Date     10/08/2021    K 4.1 10/08/2021     10/08/2021    CO2 23 10/08/2021    BUN 13 10/08/2021    LABALBU 3.5 10/06/2021    CREATININE 1.04 10/08/2021    CALCIUM 8.2 10/08/2021    GFRAA >60 10/08/2021    LABGLOM >60 10/08/2021    GLUCOSE 80 10/08/2021    GLUCOSE 97 06/06/2012       Radiology Review:  No new images to review       ASSESSMENT     Active Problems:    Hydronephrosis due to obstruction of ureter    Moderate malnutrition ([address]  Resolved Problems:    * No resolved hospital problems. *      Plan  1. Diet as tolerated  2. Pain and nausea management   3. Surgically stable  4. Patient will need cholecystectomy at some point  5. Continue medical management   6. Patient was seen and examined. Case discussed with admitting physician. Symptomatic cholelithiasis/chronic calculus cholecystitis. For robotic cholecystectomy tomorrow. Discussed with patient family at length. N.p.o. after midnight. Hold blood thinners.       Electronically signed by Jessica Kumair PA-C  [id number] [address]

## 2021-10-08 NOTE — PLAN OF CARE
Problem: Pain:  Goal: Pain level will decrease  Description: Pain level will decrease  Outcome: Ongoing  Goal: Control of acute pain  Description: Control of acute pain  Outcome: Ongoing  Goal: Control of chronic pain  Description: Control of chronic pain  Outcome: Ongoing     Problem: Coping:  Goal: Expressions of feelings of enhanced comfort will increase  Description: Expressions of feelings of enhanced comfort will increase  Outcome: Ongoing     Problem: Urinary Elimination:  Goal: Ability to achieve a balanced intake and output will improve  Description: Ability to achieve a balanced intake and output will improve  Outcome: Ongoing  Goal: Ability to recognize the need to void and respond appropriately will improve  Description: Ability to recognize the need to void and respond appropriately will improve  Outcome: Ongoing  Goal: Will remain free from infection  Description: Will remain free from infection  Outcome: Ongoing     Problem: Falls - Risk of:  Goal: Will remain free from falls  Description: Will remain free from falls  Outcome: Ongoing  Goal: Absence of physical injury  Description: Absence of physical injury  Outcome: Ongoing     Problem: Nutrition  Goal: Optimal nutrition therapy  Outcome: Ongoing

## 2021-10-08 NOTE — PROGRESS NOTES
[facility]   Urology Progress Note            Subjective: follow-up hydronephrosis status post right stent placement    Patient Vitals for the past 24 hrs:   BP Temp Temp src Pulse Resp SpO2 Height   10/08/21 0700 (!) 179/64 97.5 °F (36.4 °C) Oral 65 16 -- --   10/07/21 1843 (!) 149/57 97.7 °F (36.5 °C) -- 57 20 90 % --   10/07/21 1622 (!) 155/58 -- -- 60 -- -- --   10/07/21 1552 -- -- -- -- -- -- 5' 10\" (1.778 m)   10/07/21 1315 (!) 144/58 97.9 °F (36.6 °C) Oral 58 16 92 % --       Intake/Output Summary (Last 24 hours) at 10/8/2021 0709  Last data filed at 10/8/2021 7013  Gross per 24 hour   Intake 1437 ml   Output 1850 ml   Net -413 ml       Recent Labs     10/05/21  1619 10/06/21  0539 10/07/21  0530   WBC 7.6 6.2 6.9   HGB 13.3* 11.7* 11.3*   HCT 39.3* 34.4* 33.6*   MCV 87.5 88.3 89.4    144* 136*     Recent Labs     10/05/21  1619 10/06/21  0539 10/07/21  0805    147* 142   K 4.6 4.5 4.4    110* 108*   CO2 24 26 23   PHOS 3.4  --   --    BUN 20 19 18   CREATININE 1.25* 1.10 1.23*       Recent Labs     10/06/21  1839   COLORU Yellow   PHUR 5.0   WBCUA 5 TO 10   RBCUA 50    MUCUS NOT REPORTED   TRICHOMONAS NOT REPORTED   YEAST NOT REPORTED   BACTERIA MANY*   SPECGRAV 1.022   LEUKOCYTESUR NEGATIVE   UROBILINOGEN Normal   BILIRUBINUR NEGATIVE  Verified by ictotest.*       Additional Lab/culture results:    Physical Exam: patient alert oriented not in acute distress, discussed with the patient the flank pain he continues to have right upper quadrant pain. Discussed status with general surgery and with primary care service.     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

## 2021-10-08 NOTE — FLOWSHEET NOTE
10/08/21 0800   Oxygen Therapy   SpO2 (!) 88 %     Patient assessed by primary RN. Shallow breathing at rest.  Patients SP02 between 90-94% on room air when talking. Writer will continue to monitor. Congested cough and rhonchi throughout. Patient able to clear.

## 2021-10-08 NOTE — PROGRESS NOTES
BIOPSY ABDOMINAL/RETROPERITONEAL MASS PERCUTANEOUS (7/6/2021); IR PORT PLACEMENT > 5 YEARS (8/31/2021); Colonoscopy (Left, 9/24/2021); and Cystoscopy (Right, 10/6/2021). Medications:    Prior to Admission medications    Medication Sig Start Date End Date Taking? Authorizing Provider   amLODIPine (NORVASC) 5 MG tablet Take 10 mg by mouth daily    Yes Historical Provider, MD   pregabalin (LYRICA) 150 MG capsule Take 150 mg by mouth 3 times daily.    Yes Historical Provider, MD   glipiZIDE-metFORMIN (METAGLIP) 5-500 MG per tablet Take 2 tablets by mouth daily (with breakfast)   Yes Historical Provider, MD   glipiZIDE-metFORMIN (METAGLIP) 5-500 MG per tablet Take 1 tablet by mouth Daily with supper   Yes Historical Provider, MD   donepezil (ARICEPT) 10 MG tablet Take 10 mg by mouth nightly  7/11/21  Yes Historical Provider, MD   meloxicam (MOBIC) 15 MG tablet Take 15 mg by mouth daily  7/14/21  Yes Historical Provider, MD   atorvastatin (LIPITOR) 10 MG tablet Take 10 mg by mouth daily   Yes Historical Provider, MD   FLUoxetine (PROZAC) 20 MG capsule Take 20 mg by mouth daily   Yes Historical Provider, MD   lisinopril-hydroCHLOROthiazide (PRINZIDE;ZESTORETIC) 20-12.5 MG per tablet Take 1 tablet by mouth daily  7/14/21  Yes Historical Provider, MD   carvedilol (COREG) 6.25 MG tablet Take 6.25 mg by mouth 2 times daily (with meals)   Yes Historical Provider, MD   omeprazole (PRILOSEC) 20 MG delayed release capsule Take 20 mg by mouth 2 times daily   Yes Historical Provider, MD   levothyroxine (SYNTHROID) 50 MCG tablet Take 112 mcg by mouth Daily    Yes Historical Provider, MD     Current Facility-Administered Medications   Medication Dose Route Frequency Provider Last Rate Last Admin    phenol 1.4 % mouth spray 1 spray  1 spray Mouth/Throat Q2H PRN Roderick T Dixon, DO        fluticasone (FLONASE) 50 MCG/ACT nasal spray 1 spray  1 spray Each Nostril Daily Roderick T Dixon, DO   1 spray at 10/08/21 0838    ferrous sulfate (IRON 325) tablet 325 mg  325 mg Oral Daily with breakfast Jennifer Au MD   325 mg at 10/08/21 0825    lidocaine 4 % external patch 1 patch  1 patch TransDERmal Daily Kike Orellana MD   1 patch at 10/08/21 0825    piperacillin-tazobactam (ZOSYN) 3,375 mg in dextrose 5 % 50 mL IVPB extended infusion (mini-bag)  3,375 mg IntraVENous Q8H Kike Orellana MD   Stopped at 10/08/21 1441    sodium chloride flush 0.9 % injection 10 mL  10 mL IntraVENous PRN Kike Orellana MD   10 mL at 10/06/21 1212    sodium chloride flush 0.9 % injection 10 mL  10 mL IntraVENous PRN Kike Orellana MD   10 mL at 10/05/21 1747    amLODIPine (NORVASC) tablet 10 mg  10 mg Oral Daily Kike Orellana MD   10 mg at 10/08/21 0825    atorvastatin (LIPITOR) tablet 10 mg  10 mg Oral Daily Kike Orellana MD   10 mg at 10/08/21 0824    carvedilol (COREG) tablet 6.25 mg  6.25 mg Oral BID  Kike Orellana MD   6.25 mg at 10/08/21 2164    donepezil (ARICEPT) tablet 10 mg  10 mg Oral Nightly Kike Orellana MD   10 mg at 10/07/21 2113    FLUoxetine (PROZAC) capsule 20 mg  20 mg Oral Daily Kike Orellana MD   20 mg at 10/08/21 2126    levothyroxine (SYNTHROID) tablet 112 mcg  112 mcg Oral Daily Kike Orellana MD   112 mcg at 10/08/21 0618    memantine (NAMENDA) tablet 10 mg  10 mg Oral BID Kike Orellana MD   10 mg at 10/08/21 4842    pantoprazole (PROTONIX) tablet 40 mg  40 mg Oral QAM AC Kike Orellana MD   40 mg at 10/08/21 0618    ondansetron (ZOFRAN) injection 4 mg  4 mg IntraVENous Q8H PRN Kike Orellana MD   4 mg at 10/08/21 1031    insulin lispro (HUMALOG) injection vial 0-6 Units  0-6 Units SubCUTAneous TID  Kike Orellana MD        insulin lispro (HUMALOG) injection vial 0-3 Units  0-3 Units SubCUTAneous Nightly Kike Orellana MD        influenza quadrivalent split vaccine (FLUZONE;FLUARIX;FLULAVAL;AFLURIA) injection 0.5 mL  0.5 mL IntraMUSCular Prior to discharge Kike Orellana MD        HYDROmorphone (DILAUDID) injection 1 mg  1 mg IntraVENous Q4H PRN Adrian Gómez MD   1 mg at 10/08/21 1306    0.9 % sodium chloride infusion   IntraVENous Continuous Adrian Gómez MD 80 mL/hr at 10/08/21 0623 New Bag at 10/08/21 0623    scopolamine (TRANSDERM-SCOP) transdermal patch 1 patch  1 patch TransDERmal Q72H Adrian Gómez MD   1 patch at 10/06/21 0021       Allergies:  Patient has no known allergies. Social History:   reports that he quit smoking about 15 years ago. His smoking use included cigarettes. He has a 90.00 pack-year smoking history. He has never used smokeless tobacco. He reports current alcohol use of about 1.7 standard drinks of alcohol per week. He reports current drug use. Drug: Marijuana. Family History: family history includes Alzheimer's Disease in his father; Diabetes in his mother; Heart Disease in his brother and brother. REVIEW OF SYSTEMS:      Constitutional: No fever or chills. No night sweats, no weight loss   Eyes: No eye discharge, double vision, or eye pain   HEENT: negative for sore mouth, sore throat, hoarseness and voice change   Respiratory: negative for cough , sputum, dyspnea, wheezing, hemoptysis, chest pain   Cardiovascular: negative for chest pain, dyspnea, palpitations, orthopnea, PND   Gastrointestinal: Positive abdominal pain  Genitourinary: negative for frequency, dysuria, nocturia, urinary incontinence, and hematuria   Integument: negative for rash, skin lesions, bruises.    Hematologic/Lymphatic: negative for easy bruising, bleeding, lymphadenopathy, or petechiae   Endocrine: negative for heat or cold intolerance,weight changes, change in bowel habits and hair loss   Musculoskeletal: negative for myalgias, arthralgias, pain, joint swelling,and bone pain   Neurological: negative for headaches, dizziness, seizures, weakness, numbness    PHYSICAL EXAM:        BP (!) 162/66   Pulse 65   Temp 98.1 °F (36.7 °C) (Oral)   Resp 18   Ht 5' 10\" (1.778 m)   Wt 164 lb (74.4 kg)   SpO2 92%   BMI 23.53 kg/m²    Temp (24hrs), Av.8 °F (36.6 °C), Min:97.5 °F (36.4 °C), Max:98.1 °F (36.7 °C)      General appearance - well appearing, no in pain or distress   Mental status - alert and cooperative   Eyes - pupils equal and reactive, extraocular eye movements intact   Ears - bilateral TM's and external ear canals normal   Mouth - mucous membranes moist, pharynx normal without lesions   Neck - supple, no significant adenopathy   Lymphatics - no palpable lymphadenopathy, no hepatosplenomegaly   Chest - clear to auscultation, no wheezes, rales or rhonchi, symmetric air entry   Heart - normal rate, regular rhythm, normal S1, S2, no murmurs  Abdomen - soft, nondistended, no masses or organomegaly.   Generalized mild tenderness  Neurological - alert, oriented, normal speech, no focal findings or movement disorder noted   Musculoskeletal - no joint tenderness, deformity or swelling   Extremities - peripheral pulses normal, no pedal edema, no clubbing or cyanosis   Skin - normal coloration and turgor, no rashes, no suspicious skin lesions noted ,      DATA:      Labs:     Results for orders placed or performed during the hospital encounter of 10/05/21   CBC Auto Differential   Result Value Ref Range    WBC 7.6 3.5 - 11.0 k/uL    RBC 4.49 (L) 4.5 - 5.9 m/uL    Hemoglobin 13.3 (L) 13.5 - 17.5 g/dL    Hematocrit 39.3 (L) 41 - 53 %    MCV 87.5 80 - 100 fL    MCH 29.7 26 - 34 pg    MCHC 33.9 31 - 37 g/dL    RDW 15.3 (H) 11.5 - 14.9 %    Platelets 300 936 - 217 k/uL    MPV 9.0 6.0 - 12.0 fL    NRBC Automated NOT REPORTED per 100 WBC    Differential Type NOT REPORTED     Seg Neutrophils 78 (H) 36 - 66 %    Lymphocytes 13 (L) 24 - 44 %    Monocytes 8 (H) 1 - 7 %    Eosinophils % 1 0 - 4 %    Basophils 0 0 - 2 %    Immature Granulocytes NOT REPORTED 0 %    Segs Absolute 5.90 1.3 - 9.1 k/uL    Absolute Lymph # 1.00 1.0 - 4.8 k/uL    Absolute Mono # 0.60 0.1 - 1.3 k/uL    Absolute Eos # 0.00 0.0 - 0.4 k/uL    Basophils Absolute 0.00 0.0 - 0.2 k/uL    Absolute Immature Granulocyte NOT REPORTED 0.00 - 0.30 k/uL    WBC Morphology NOT REPORTED     RBC Morphology NOT REPORTED     Platelet Estimate NOT REPORTED    Lipase   Result Value Ref Range    Lipase 22 13 - 60 U/L   Hepatic Function Panel   Result Value Ref Range    Albumin 3.9 3.5 - 5.2 g/dL    Alkaline Phosphatase 147 (H) 40 - 129 U/L    ALT 17 5 - 41 U/L    AST 10 <40 U/L    Total Bilirubin 0.72 0.3 - 1.2 mg/dL    Bilirubin, Direct 0.17 <0.31 mg/dL    Bilirubin, Indirect 0.55 0.00 - 1.00 mg/dL    Total Protein 6.6 6.4 - 8.3 g/dL    Globulin NOT REPORTED 1.5 - 3.8 g/dL    Albumin/Globulin Ratio NOT REPORTED 1.0 - 2.5   Basic Metabolic Panel   Result Value Ref Range    Glucose 139 (H) 70 - 99 mg/dL    BUN 20 8 - 23 mg/dL    CREATININE 1.25 (H) 0.70 - 1.20 mg/dL    Bun/Cre Ratio NOT REPORTED 9 - 20    Calcium 9.1 8.6 - 10.4 mg/dL    Sodium 144 135 - 144 mmol/L    Potassium 4.6 3.7 - 5.3 mmol/L    Chloride 105 98 - 107 mmol/L    CO2 24 20 - 31 mmol/L    Anion Gap 15 9 - 17 mmol/L    GFR Non-African American 57 (L) >60 mL/min    GFR African American >60 >60 mL/min    GFR Comment          GFR Staging NOT REPORTED    Lactic Acid   Result Value Ref Range    Lactic Acid 1.3 0.5 - 2.2 mmol/L   Lactate Dehydrogenase   Result Value Ref Range     135 - 225 U/L   Phosphorus   Result Value Ref Range    Phosphorus 3.4 2.5 - 4.5 mg/dL   Uric Acid   Result Value Ref Range    Uric Acid 9.1 (H) 3.4 - 7.0 mg/dL   Urinalysis Reflex to Culture    Specimen: Urine, clean catch   Result Value Ref Range    Color, UA Yellow Yellow    Turbidity UA Clear Clear    Glucose, Ur NEGATIVE NEGATIVE    Bilirubin Urine SMALL (A) NEGATIVE    Ketones, Urine SMALL (A) NEGATIVE    Specific Gravity, UA 1.045 (H) 1.000 - 1.030    Urine Hgb NEGATIVE NEGATIVE    pH, UA 5.5 5.0 - 8.0    Protein, UA 4+ (A) NEGATIVE    Urobilinogen, Urine Normal Normal    Nitrite, Urine NEGATIVE NEGATIVE    Leukocyte American >60 >60 mL/min    GFR Comment          GFR Staging NOT REPORTED    Lipase   Result Value Ref Range    Lipase 19 13 - 60 U/L   Amylase   Result Value Ref Range    Amylase 45 28 - 100 U/L   Hepatic Function Panel   Result Value Ref Range    Albumin 3.5 3.5 - 5.2 g/dL    Alkaline Phosphatase 129 40 - 129 U/L    ALT 13 5 - 41 U/L    AST 11 <40 U/L    Total Bilirubin 0.61 0.3 - 1.2 mg/dL    Bilirubin, Direct 0.17 <0.31 mg/dL    Bilirubin, Indirect 0.44 0.00 - 1.00 mg/dL    Total Protein 5.4 (L) 6.4 - 8.3 g/dL    Globulin NOT REPORTED 1.5 - 3.8 g/dL    Albumin/Globulin Ratio NOT REPORTED 1.0 - 2.5   FERRITIN   Result Value Ref Range    Ferritin 137 30 - 400 ug/L   IRON AND TIBC   Result Value Ref Range    Iron 46 (L) 59 - 158 ug/dL    TIBC 237 (L) 250 - 450 ug/dL    Iron Saturation 19 (L) 20 - 55 %    UIBC 191 112 - 347 ug/dL   Vitamin B12 & Folate   Result Value Ref Range    Vitamin B-12 791 232 - 1245 pg/mL    Folate 18.2 >4.8 ng/mL   CBC with DIFF   Result Value Ref Range    WBC 6.9 3.5 - 11.0 k/uL    RBC 3.75 (L) 4.5 - 5.9 m/uL    Hemoglobin 11.3 (L) 13.5 - 17.5 g/dL    Hematocrit 33.6 (L) 41 - 53 %    MCV 89.4 80 - 100 fL    MCH 30.1 26 - 34 pg    MCHC 33.7 31 - 37 g/dL    RDW 15.1 (H) 11.5 - 14.9 %    Platelets 786 (L) 578 - 450 k/uL    MPV 8.4 6.0 - 12.0 fL    NRBC Automated NOT REPORTED per 100 WBC    Differential Type NOT REPORTED     Seg Neutrophils 73 (H) 36 - 66 %    Lymphocytes 14 (L) 24 - 44 %    Monocytes 11 (H) 1 - 7 %    Eosinophils % 2 0 - 4 %    Basophils 0 0 - 2 %    Immature Granulocytes NOT REPORTED 0 %    Segs Absolute 5.00 1.3 - 9.1 k/uL    Absolute Lymph # 1.00 1.0 - 4.8 k/uL    Absolute Mono # 0.70 0.1 - 1.3 k/uL    Absolute Eos # 0.10 0.0 - 0.4 k/uL    Basophils Absolute 0.00 0.0 - 0.2 k/uL    Absolute Immature Granulocyte NOT REPORTED 0.00 - 0.30 k/uL    WBC Morphology NOT REPORTED     RBC Morphology NOT REPORTED     Platelet Estimate NOT REPORTED    Urinalysis Reflex to Culture Specimen: Urine, Cystoscopic   Result Value Ref Range    Color, UA Yellow Yellow    Turbidity UA Clear Clear    Glucose, Ur NEGATIVE NEGATIVE    Bilirubin Urine NEGATIVE  Verified by ictotest. (A) NEGATIVE    Ketones, Urine TRACE (A) NEGATIVE    Specific Gravity, UA 1.022 1.000 - 1.030    Urine Hgb LARGE (A) NEGATIVE    pH, UA 5.0 5.0 - 8.0    Protein, UA 2+ (A) NEGATIVE    Urobilinogen, Urine Normal Normal    Nitrite, Urine NEGATIVE NEGATIVE    Leukocyte Esterase, Urine NEGATIVE NEGATIVE    Urinalysis Comments NOT REPORTED    Microscopic Urinalysis   Result Value Ref Range    -          WBC, UA 5 TO 10 /HPF    RBC, UA 50  /HPF    Casts UA NOT REPORTED /LPF    Crystals, UA NOT REPORTED None /HPF    Epithelial Cells UA 0 TO 2 /HPF    Renal Epithelial, UA NOT REPORTED 0 /HPF    Bacteria, UA MANY (A) None    Mucus, UA NOT REPORTED None    Trichomonas, UA NOT REPORTED None    Amorphous, UA NOT REPORTED None    Other Observations UA NOT REPORTED NOT REQ.     Yeast, UA NOT REPORTED None   Basic Metabolic Panel w/ Reflex to MG   Result Value Ref Range    Glucose 102 (H) 70 - 99 mg/dL    BUN 18 8 - 23 mg/dL    CREATININE 1.23 (H) 0.70 - 1.20 mg/dL    Bun/Cre Ratio NOT REPORTED 9 - 20    Calcium 8.1 (L) 8.6 - 10.4 mg/dL    Sodium 142 135 - 144 mmol/L    Potassium 4.4 3.7 - 5.3 mmol/L    Chloride 108 (H) 98 - 107 mmol/L    CO2 23 20 - 31 mmol/L    Anion Gap 11 9 - 17 mmol/L    GFR Non-African American 58 (L) >60 mL/min    GFR African American >60 >60 mL/min    GFR Comment          GFR Staging NOT REPORTED    CBC with DIFF   Result Value Ref Range    WBC 6.8 3.5 - 11.0 k/uL    RBC 3.85 (L) 4.5 - 5.9 m/uL    Hemoglobin 11.4 (L) 13.5 - 17.5 g/dL    Hematocrit 33.9 (L) 41 - 53 %    MCV 88.0 80 - 100 fL    MCH 29.7 26 - 34 pg    MCHC 33.7 31 - 37 g/dL    RDW 15.0 (H) 11.5 - 14.9 %    Platelets 428 (L) 475 - 450 k/uL    MPV 8.5 6.0 - 12.0 fL    NRBC Automated NOT REPORTED per 100 WBC    Differential Type NOT REPORTED     Seg Neutrophils 73 (H) 36 - 66 %    Lymphocytes 15 (L) 24 - 44 %    Monocytes 10 (H) 1 - 7 %    Eosinophils % 2 0 - 4 %    Basophils 0 0 - 2 %    Immature Granulocytes NOT REPORTED 0 %    Segs Absolute 5.00 1.3 - 9.1 k/uL    Absolute Lymph # 1.00 1.0 - 4.8 k/uL    Absolute Mono # 0.70 0.1 - 1.3 k/uL    Absolute Eos # 0.20 0.0 - 0.4 k/uL    Basophils Absolute 0.00 0.0 - 0.2 k/uL    Absolute Immature Granulocyte NOT REPORTED 0.00 - 0.30 k/uL    WBC Morphology NOT REPORTED     RBC Morphology NOT REPORTED     Platelet Estimate NOT REPORTED    Basic Metabolic Panel w/ Reflex to MG   Result Value Ref Range    Glucose 80 70 - 99 mg/dL    BUN 13 8 - 23 mg/dL    CREATININE 1.04 0.70 - 1.20 mg/dL    Bun/Cre Ratio NOT REPORTED 9 - 20    Calcium 8.2 (L) 8.6 - 10.4 mg/dL    Sodium 140 135 - 144 mmol/L    Potassium 4.1 3.7 - 5.3 mmol/L    Chloride 107 98 - 107 mmol/L    CO2 23 20 - 31 mmol/L    Anion Gap 10 9 - 17 mmol/L    GFR Non-African American >60 >60 mL/min    GFR African American >60 >60 mL/min    GFR Comment          GFR Staging NOT REPORTED    POC Glucose Fingerstick   Result Value Ref Range    POC Glucose 87 75 - 110 mg/dL   POC Glucose Fingerstick   Result Value Ref Range    POC Glucose 107 75 - 110 mg/dL   POC Glucose Fingerstick   Result Value Ref Range    POC Glucose 89 75 - 110 mg/dL   POC Glucose Fingerstick   Result Value Ref Range    POC Glucose 83 75 - 110 mg/dL   POC Glucose Fingerstick   Result Value Ref Range    POC Glucose 75 75 - 110 mg/dL   POC Glucose Fingerstick   Result Value Ref Range    POC Glucose 95 75 - 110 mg/dL   POC Glucose Fingerstick   Result Value Ref Range    POC Glucose 99 75 - 110 mg/dL   POC Glucose Fingerstick   Result Value Ref Range    POC Glucose 99 75 - 110 mg/dL   POC Glucose Fingerstick   Result Value Ref Range    POC Glucose 97 75 - 110 mg/dL   POC Glucose Fingerstick   Result Value Ref Range    POC Glucose 81 75 - 110 mg/dL   POC Glucose Fingerstick   Result Value Ref Range    POC Glucose 112 (H) 75 - 110 mg/dL   POC Glucose Fingerstick   Result Value Ref Range    POC Glucose 74 (L) 75 - 110 mg/dL   POC Glucose Fingerstick   Result Value Ref Range    POC Glucose 135 (H) 75 - 110 mg/dL   POC Glucose Fingerstick   Result Value Ref Range    POC Glucose 101 75 - 110 mg/dL   POC Glucose Fingerstick   Result Value Ref Range    POC Glucose 88 75 - 110 mg/dL   EKG 12 Lead   Result Value Ref Range    Ventricular Rate 64 BPM    Atrial Rate 64 BPM    P-R Interval 130 ms    QRS Duration 100 ms    Q-T Interval 442 ms    QTc Calculation (Bazett) 455 ms    P Axis 70 degrees    R Axis 27 degrees    T Axis 31 degrees         IMAGING DATA:    CT ABDOMEN PELVIS W IV CONTRAST Additional Contrast? None    Result Date: 10/5/2021  EXAMINATION: CT OF THE ABDOMEN AND PELVIS WITH CONTRAST 10/5/2021 11:37 am TECHNIQUE: CT of the abdomen and pelvis was performed with the administration of intravenous contrast. Multiplanar reformatted images are provided for review. Dose modulation, iterative reconstruction, and/or weight based adjustment of the mA/kV was utilized to reduce the radiation dose to as low as reasonably achievable. COMPARISON: CT scan of the abdomen dated July 6, 2021, and July 3, 2021 HISTORY: ORDERING SYSTEM PROVIDED HISTORY: abd pain w/n/v w/known retroperitoneal mass TECHNOLOGIST PROVIDED HISTORY: abd pain w/n/v w/known retroperitoneal mass Reason for Exam: Right side ab pain with nausea/vomiting, hx of retroperitoneal mass Acuity: Unknown Type of Exam: Unknown Relevant Medical/Surgical History: Hernia repair FINDINGS: Lower Chest: Dependent changes are present within the lung bases. Organs: The liver, spleen, and pancreas demonstrate no acute abnormality. Cholelithiasis is present without evidence of cholecystitis. Nodular thickening of the right adrenal gland is again noted, unchanged. Cortical cyst formation is again noted on the right kidney.   A 3 mm nonobstructing calculus is again visualized

## 2021-10-08 NOTE — PLAN OF CARE
Problem: Pain:  Goal: Pain level will decrease  10/8/2021 1436 by Josiah Demarco RN  Outcome: Ongoing  10/8/2021 0500 by Amalia Wang RN  Outcome: Ongoing  Goal: Control of acute pain  10/8/2021 1436 by Josiah Demarco RN  Outcome: Ongoing  10/8/2021 0500 by Amalia Wang RN  Outcome: Ongoing  Goal: Control of chronic pain  10/8/2021 0500 by Amalia Wang RN  Outcome: Ongoing     Problem: Coping:  Goal: Expressions of feelings of enhanced comfort will increase  10/8/2021 1436 by Josiah Demarco RN  Outcome: Ongoing  10/8/2021 0500 by Amalia Wang RN  Outcome: Ongoing     Problem: Urinary Elimination:  Goal: Ability to achieve a balanced intake and output will improve  10/8/2021 1436 by Josiah Demarco RN  Outcome: Ongoing  10/8/2021 0500 by Amalia Wang RN  Outcome: Ongoing  Goal: Ability to recognize the need to void and respond appropriately will improve  10/8/2021 1436 by Josiah Demarco RN  Outcome: Met This Shift  10/8/2021 0500 by Amalia Wang RN  Outcome: Ongoing  Goal: Will remain free from infection  10/8/2021 0500 by Amalia Wang RN  Outcome: Ongoing

## 2021-10-08 NOTE — PROGRESS NOTES
Client's O2 sat 87% on RA, lungs inspiratory expiratory wheezes with inspiratory crackles. Loose non productive cough. Bp 161/133, p86 R 20  Placed client on 1LNC. O2 sats back to 90 %. Client still c/o SOB  Increase O2 to [facility]. RN notified. New orders received.

## 2021-10-08 NOTE — PROGRESS NOTES
[id number] [place] [facility]      PROGRESS NOTE        Patient:  Richard Mandujano  YOB: 1950    MRN: [id number]     Acct: [de-identified]     Admit date: 10/5/2021    Pt seen and Chart reviewed. Consultant notes reviewed and care evaluated. Subjective: Patient reported having congestion and throat hurting him he says. He still feels nauseous but no vomiting. Still having pain on the right side he says. Did discuss with 2 areas and  morning  offered to have his gallbladder out when he is done during this admission did ask the patient who has no objection to take care of it he says when he is in the hospital his wife was not in the room to discuss with her. He eats a little bit he says. But he still feels uncomfortable from his abdominal symptoms. Diet:  ADULT DIET;  Regular      Medications:Current Inpatient    Scheduled Meds:   fluticasone  1 spray Each Nostril Daily    ferrous sulfate  325 mg Oral Daily with breakfast    lidocaine  1 patch TransDERmal Daily    piperacillin-tazobactam  3,375 mg IntraVENous Q8H    amLODIPine  10 mg Oral Daily    atorvastatin  10 mg Oral Daily    carvedilol  6.25 mg Oral BID WC    donepezil  10 mg Oral Nightly    FLUoxetine  20 mg Oral Daily    levothyroxine  112 mcg Oral Daily    memantine  10 mg Oral BID    pantoprazole  40 mg Oral QAM AC    insulin lispro  0-6 Units SubCUTAneous TID WC    insulin lispro  0-3 Units SubCUTAneous Nightly    influenza virus vaccine  0.5 mL IntraMUSCular Prior to discharge    scopolamine  1 patch TransDERmal Q72H     Continuous Infusions:   sodium chloride 80 mL/hr at 10/08/21 0623     PRN Meds:phenol, sodium chloride flush, sodium chloride flush, ondansetron, HYDROmorphone        Physical Exam:  Vitals: BP (!) 179/64   Pulse 65   Temp 97.5 °F (36.4 °C) (Oral)   Resp 16   Ht 5' 10\" (1.778 m)   Wt 164 lb (74.4 kg)   SpO2 (!) 88%   BMI 23.53 kg/m² 24 hour intake/output:    Intake/Output Summary (Last 24 hours) at 10/8/2021 0837  Last data filed at 10/8/2021 0622  Gross per 24 hour   Intake 1437 ml   Output 1850 ml   Net -413 ml     Last 3 weights: Wt Readings from Last 3 Encounters:   10/05/21 164 lb (74.4 kg)   09/24/21 174 lb (78.9 kg)   09/20/21 173 lb 3.2 oz (78.6 kg)       Physical Examination:   General appearance - alert, well appearing, and in no distress  Mental status - alert, oriented to person, place, and time  PERRLAwnl posterior pharynx seems to be intact he is complaining some congestion and soreness of the throat may be second to his procedure. Without seeing any rashes or erythema or swelling  Chest - clear to auscultation, no wheezes, rales or rhonchi, symmetric air entry  Heart - normal rate, regular rhythm, normal S1, S2, no murmurs, rubs, clicks or gallops  Abdomen - soft, still very tender when I palpate the right upper quadrant as well in the middle., nondistended, no masses or organomegaly  Neurological - alert, oriented, normal speech, no focal findings or movement disorder noted}  Extremities - peripheral pulses normal, no pedal edema, no clubbing or cyanosis  Skin - normal coloration and turgor, no rashes, no suspicious skin lesions noted     Teran urine just darker yellow urine but no blood in it now    Assessment:  Active Problems:    Hydronephrosis due to obstruction of ureter    Moderate malnutrition (HCC)  Resolved Problems:    * No resolved hospital problems.  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    Hydronephrosis due to obstruction of ureter N13.1      · Nausea vomiting  ·    · Abdominal pain right-sided  ·    · Status post colonoscopy with polyp back to me  ·    · History of lymphoma seems to be responding to treatment according to the CAT scan with decreasing mass  ·    · New right-sided hydro-  ·    · History of hypertension  · Status post stenting  ·    · Urinary retention he has a Teran now with some blood noted in the Teran  ·    · Still abdominal and right upper quadrant pain on palpation  ·    · HIDA with evidence of chronic cholecystitis and probably associated acute event reported as negative will give him antibiotics  · Status post cystoscopy  · Status post stent  ·   ·   ·   · Hematuria resolving  ·   · Still with right upper quadrant middle quadrant pain no nausea  ·   ·             Plan:  29. Discussed with Dr. Joan Chung and Gloria Araujo this morning Dr. Gloria Araujo that he would schedule patient for surgery for his gallbladder if okay with others. Dr. Joan Chung had no objection did discuss that with the patient about taking gallbladder out he prefers to have things done while he is in the hospital we will discussed with his wife if she is agreeable with that  27.   31. Meanwhile we will do chest x-ray and EKG just as a preop clearance  32.   33. Continue with antibiotic for now  34.   35.  We will give him Chloraseptic spray for his throat see if it helps and Flonase for his nasal congestion    Adali Castro DO [facility]            10/8/2021, 8:37 AM

## 2021-10-08 NOTE — PROGRESS NOTES
Writer in to asses patient after oxygen was applied this morning. SN and instructor report SP02 dropping to high 80s.  02 removed and patients SP02 between 94-88%.

## 2021-10-09 ENCOUNTER — ANESTHESIA (OUTPATIENT)
Dept: OPERATING ROOM | Age: 71
DRG: 660 | End: 2021-10-09
Payer: MEDICARE

## 2021-10-09 VITALS — DIASTOLIC BLOOD PRESSURE: 74 MMHG | SYSTOLIC BLOOD PRESSURE: 153 MMHG | OXYGEN SATURATION: 97 %

## 2021-10-09 LAB
EKG ATRIAL RATE: 64 BPM
EKG P AXIS: 70 DEGREES
EKG P-R INTERVAL: 130 MS
EKG Q-T INTERVAL: 442 MS
EKG QRS DURATION: 100 MS
EKG QTC CALCULATION (BAZETT): 455 MS
EKG R AXIS: 27 DEGREES
EKG T AXIS: 31 DEGREES
EKG VENTRICULAR RATE: 64 BPM
GLUCOSE BLD-MCNC: 119 MG/DL (ref 75–110)
GLUCOSE BLD-MCNC: 198 MG/DL (ref 75–110)
GLUCOSE BLD-MCNC: 96 MG/DL (ref 75–110)

## 2021-10-09 PROCEDURE — 82947 ASSAY GLUCOSE BLOOD QUANT: CPT

## 2021-10-09 PROCEDURE — 6360000002 HC RX W HCPCS: Performed by: UROLOGY

## 2021-10-09 PROCEDURE — 1200000000 HC SEMI PRIVATE

## 2021-10-09 PROCEDURE — 2580000003 HC RX 258: Performed by: SURGERY

## 2021-10-09 PROCEDURE — 6360000002 HC RX W HCPCS: Performed by: SURGERY

## 2021-10-09 PROCEDURE — 3600000009 HC SURGERY ROBOT BASE: Performed by: SURGERY

## 2021-10-09 PROCEDURE — 6360000002 HC RX W HCPCS: Performed by: ANESTHESIOLOGY

## 2021-10-09 PROCEDURE — 3600000019 HC SURGERY ROBOT ADDTL 15MIN: Performed by: SURGERY

## 2021-10-09 PROCEDURE — 6370000000 HC RX 637 (ALT 250 FOR IP): Performed by: SURGERY

## 2021-10-09 PROCEDURE — 2500000003 HC RX 250 WO HCPCS: Performed by: ANESTHESIOLOGY

## 2021-10-09 PROCEDURE — 6370000000 HC RX 637 (ALT 250 FOR IP): Performed by: UROLOGY

## 2021-10-09 PROCEDURE — 3700000001 HC ADD 15 MINUTES (ANESTHESIA): Performed by: SURGERY

## 2021-10-09 PROCEDURE — 99232 SBSQ HOSP IP/OBS MODERATE 35: CPT | Performed by: INTERNAL MEDICINE

## 2021-10-09 PROCEDURE — 2580000003 HC RX 258: Performed by: ANESTHESIOLOGY

## 2021-10-09 PROCEDURE — 7100000001 HC PACU RECOVERY - ADDTL 15 MIN: Performed by: SURGERY

## 2021-10-09 PROCEDURE — 88304 TISSUE EXAM BY PATHOLOGIST: CPT

## 2021-10-09 PROCEDURE — 8E0W4CZ ROBOTIC ASSISTED PROCEDURE OF TRUNK REGION, PERCUTANEOUS ENDOSCOPIC APPROACH: ICD-10-PCS | Performed by: SURGERY

## 2021-10-09 PROCEDURE — 3700000000 HC ANESTHESIA ATTENDED CARE: Performed by: SURGERY

## 2021-10-09 PROCEDURE — 93010 ELECTROCARDIOGRAM REPORT: CPT | Performed by: INTERNAL MEDICINE

## 2021-10-09 PROCEDURE — 0FT44ZZ RESECTION OF GALLBLADDER, PERCUTANEOUS ENDOSCOPIC APPROACH: ICD-10-PCS | Performed by: SURGERY

## 2021-10-09 PROCEDURE — 2500000003 HC RX 250 WO HCPCS: Performed by: SURGERY

## 2021-10-09 PROCEDURE — 7100000000 HC PACU RECOVERY - FIRST 15 MIN: Performed by: SURGERY

## 2021-10-09 PROCEDURE — 2580000003 HC RX 258: Performed by: UROLOGY

## 2021-10-09 PROCEDURE — S2900 ROBOTIC SURGICAL SYSTEM: HCPCS | Performed by: SURGERY

## 2021-10-09 PROCEDURE — 2709999900 HC NON-CHARGEABLE SUPPLY: Performed by: SURGERY

## 2021-10-09 RX ORDER — EPHEDRINE SULFATE/0.9% NACL/PF 50 MG/5 ML
SYRINGE (ML) INTRAVENOUS PRN
Status: DISCONTINUED | OUTPATIENT
Start: 2021-10-09 | End: 2021-10-09 | Stop reason: SDUPTHER

## 2021-10-09 RX ORDER — PROPOFOL 10 MG/ML
INJECTION, EMULSION INTRAVENOUS PRN
Status: DISCONTINUED | OUTPATIENT
Start: 2021-10-09 | End: 2021-10-09 | Stop reason: SDUPTHER

## 2021-10-09 RX ORDER — PROMETHAZINE HYDROCHLORIDE 25 MG/ML
6.25 INJECTION, SOLUTION INTRAMUSCULAR; INTRAVENOUS EVERY 6 HOURS PRN
Status: DISCONTINUED | OUTPATIENT
Start: 2021-10-09 | End: 2021-10-09

## 2021-10-09 RX ORDER — ACETAMINOPHEN 325 MG/1
650 TABLET ORAL EVERY 4 HOURS PRN
Status: DISCONTINUED | OUTPATIENT
Start: 2021-10-09 | End: 2021-10-12 | Stop reason: HOSPADM

## 2021-10-09 RX ORDER — OXYCODONE HYDROCHLORIDE AND ACETAMINOPHEN 5; 325 MG/1; MG/1
1 TABLET ORAL EVERY 4 HOURS PRN
Status: DISCONTINUED | OUTPATIENT
Start: 2021-10-09 | End: 2021-10-12 | Stop reason: HOSPADM

## 2021-10-09 RX ORDER — CEPHALEXIN 500 MG/1
CAPSULE ORAL
Qty: 21 CAPSULE | Refills: 0 | Status: ON HOLD | OUTPATIENT
Start: 2021-10-09 | End: 2021-11-24 | Stop reason: ALTCHOICE

## 2021-10-09 RX ORDER — PIPERACILLIN SODIUM, TAZOBACTAM SODIUM 3; .375 G/15ML; G/15ML
INJECTION, POWDER, LYOPHILIZED, FOR SOLUTION INTRAVENOUS PRN
Status: DISCONTINUED | OUTPATIENT
Start: 2021-10-09 | End: 2021-10-09 | Stop reason: SDUPTHER

## 2021-10-09 RX ORDER — ONDANSETRON 2 MG/ML
4 INJECTION INTRAMUSCULAR; INTRAVENOUS
Status: COMPLETED | OUTPATIENT
Start: 2021-10-09 | End: 2021-10-09

## 2021-10-09 RX ORDER — MEPERIDINE HYDROCHLORIDE 25 MG/ML
12.5 INJECTION INTRAMUSCULAR; INTRAVENOUS; SUBCUTANEOUS EVERY 5 MIN PRN
Status: DISCONTINUED | OUTPATIENT
Start: 2021-10-09 | End: 2021-10-09 | Stop reason: HOSPADM

## 2021-10-09 RX ORDER — PHENYLEPHRINE HYDROCHLORIDE 10 MG/ML
INJECTION INTRAVENOUS PRN
Status: DISCONTINUED | OUTPATIENT
Start: 2021-10-09 | End: 2021-10-09 | Stop reason: SDUPTHER

## 2021-10-09 RX ORDER — FENTANYL CITRATE 50 UG/ML
100 INJECTION, SOLUTION INTRAMUSCULAR; INTRAVENOUS
Status: DISCONTINUED | OUTPATIENT
Start: 2021-10-09 | End: 2021-10-12 | Stop reason: HOSPADM

## 2021-10-09 RX ORDER — MORPHINE SULFATE 2 MG/ML
2 INJECTION, SOLUTION INTRAMUSCULAR; INTRAVENOUS EVERY 5 MIN PRN
Status: DISCONTINUED | OUTPATIENT
Start: 2021-10-09 | End: 2021-10-09 | Stop reason: HOSPADM

## 2021-10-09 RX ORDER — ROCURONIUM BROMIDE 10 MG/ML
INJECTION, SOLUTION INTRAVENOUS PRN
Status: DISCONTINUED | OUTPATIENT
Start: 2021-10-09 | End: 2021-10-09 | Stop reason: SDUPTHER

## 2021-10-09 RX ORDER — BUPIVACAINE HYDROCHLORIDE 5 MG/ML
INJECTION, SOLUTION EPIDURAL; INTRACAUDAL PRN
Status: DISCONTINUED | OUTPATIENT
Start: 2021-10-09 | End: 2021-10-09 | Stop reason: ALTCHOICE

## 2021-10-09 RX ORDER — LABETALOL HYDROCHLORIDE 5 MG/ML
5 INJECTION, SOLUTION INTRAVENOUS EVERY 10 MIN PRN
Status: DISCONTINUED | OUTPATIENT
Start: 2021-10-09 | End: 2021-10-09 | Stop reason: HOSPADM

## 2021-10-09 RX ORDER — SUCCINYLCHOLINE/SOD CL,ISO/PF 200MG/10ML
SYRINGE (ML) INTRAVENOUS PRN
Status: DISCONTINUED | OUTPATIENT
Start: 2021-10-09 | End: 2021-10-09 | Stop reason: SDUPTHER

## 2021-10-09 RX ORDER — MIDAZOLAM HYDROCHLORIDE 1 MG/ML
INJECTION INTRAMUSCULAR; INTRAVENOUS PRN
Status: DISCONTINUED | OUTPATIENT
Start: 2021-10-09 | End: 2021-10-09 | Stop reason: SDUPTHER

## 2021-10-09 RX ORDER — FENTANYL CITRATE 50 UG/ML
INJECTION, SOLUTION INTRAMUSCULAR; INTRAVENOUS PRN
Status: DISCONTINUED | OUTPATIENT
Start: 2021-10-09 | End: 2021-10-09 | Stop reason: SDUPTHER

## 2021-10-09 RX ORDER — GLYCOPYRROLATE 1 MG/5 ML
SYRINGE (ML) INTRAVENOUS PRN
Status: DISCONTINUED | OUTPATIENT
Start: 2021-10-09 | End: 2021-10-09 | Stop reason: SDUPTHER

## 2021-10-09 RX ORDER — DIPHENHYDRAMINE HYDROCHLORIDE 50 MG/ML
12.5 INJECTION INTRAMUSCULAR; INTRAVENOUS
Status: DISCONTINUED | OUTPATIENT
Start: 2021-10-09 | End: 2021-10-09 | Stop reason: HOSPADM

## 2021-10-09 RX ORDER — SODIUM CHLORIDE, SODIUM LACTATE, POTASSIUM CHLORIDE, CALCIUM CHLORIDE 600; 310; 30; 20 MG/100ML; MG/100ML; MG/100ML; MG/100ML
INJECTION, SOLUTION INTRAVENOUS CONTINUOUS PRN
Status: DISCONTINUED | OUTPATIENT
Start: 2021-10-09 | End: 2021-10-09 | Stop reason: SDUPTHER

## 2021-10-09 RX ORDER — OXYCODONE HYDROCHLORIDE AND ACETAMINOPHEN 5; 325 MG/1; MG/1
1 TABLET ORAL EVERY 6 HOURS PRN
Qty: 28 TABLET | Refills: 0 | Status: SHIPPED | OUTPATIENT
Start: 2021-10-09 | End: 2021-10-16

## 2021-10-09 RX ORDER — LIDOCAINE HYDROCHLORIDE 10 MG/ML
INJECTION, SOLUTION EPIDURAL; INFILTRATION; INTRACAUDAL; PERINEURAL PRN
Status: DISCONTINUED | OUTPATIENT
Start: 2021-10-09 | End: 2021-10-09 | Stop reason: SDUPTHER

## 2021-10-09 RX ORDER — ONDANSETRON 4 MG/1
TABLET, FILM COATED ORAL
Qty: 20 TABLET | Refills: 0 | Status: ON HOLD | OUTPATIENT
Start: 2021-10-09 | End: 2022-02-01 | Stop reason: HOSPADM

## 2021-10-09 RX ORDER — FENTANYL CITRATE 50 UG/ML
50 INJECTION, SOLUTION INTRAMUSCULAR; INTRAVENOUS
Status: DISCONTINUED | OUTPATIENT
Start: 2021-10-09 | End: 2021-10-12 | Stop reason: HOSPADM

## 2021-10-09 RX ORDER — ONDANSETRON 2 MG/ML
4 INJECTION INTRAMUSCULAR; INTRAVENOUS EVERY 6 HOURS PRN
Status: DISCONTINUED | OUTPATIENT
Start: 2021-10-09 | End: 2021-10-12 | Stop reason: HOSPADM

## 2021-10-09 RX ADMIN — Medication 0.4 MG: at 08:32

## 2021-10-09 RX ADMIN — HYDROMORPHONE HYDROCHLORIDE 0.5 MG: 1 INJECTION, SOLUTION INTRAMUSCULAR; INTRAVENOUS; SUBCUTANEOUS at 10:30

## 2021-10-09 RX ADMIN — INSULIN LISPRO 1 UNITS: 100 INJECTION, SOLUTION INTRAVENOUS; SUBCUTANEOUS at 21:01

## 2021-10-09 RX ADMIN — HYDROMORPHONE HYDROCHLORIDE 1 MG: 1 INJECTION, SOLUTION INTRAMUSCULAR; INTRAVENOUS; SUBCUTANEOUS at 15:29

## 2021-10-09 RX ADMIN — ONDANSETRON 4 MG: 2 INJECTION INTRAMUSCULAR; INTRAVENOUS at 11:21

## 2021-10-09 RX ADMIN — Medication 140 MG: at 08:27

## 2021-10-09 RX ADMIN — HYDROMORPHONE HYDROCHLORIDE 1 MG: 1 INJECTION, SOLUTION INTRAMUSCULAR; INTRAVENOUS; SUBCUTANEOUS at 05:22

## 2021-10-09 RX ADMIN — SODIUM CHLORIDE, POTASSIUM CHLORIDE, SODIUM LACTATE AND CALCIUM CHLORIDE: 600; 310; 30; 20 INJECTION, SOLUTION INTRAVENOUS at 08:16

## 2021-10-09 RX ADMIN — ONDANSETRON 4 MG: 2 INJECTION INTRAMUSCULAR; INTRAVENOUS at 23:26

## 2021-10-09 RX ADMIN — CARVEDILOL 6.25 MG: 6.25 TABLET, FILM COATED ORAL at 16:44

## 2021-10-09 RX ADMIN — OXYCODONE HYDROCHLORIDE AND ACETAMINOPHEN 1 TABLET: 5; 325 TABLET ORAL at 13:22

## 2021-10-09 RX ADMIN — PHENYLEPHRINE HYDROCHLORIDE 100 MCG: 10 INJECTION INTRAVENOUS at 09:15

## 2021-10-09 RX ADMIN — ONDANSETRON 4 MG: 2 INJECTION INTRAMUSCULAR; INTRAVENOUS at 01:26

## 2021-10-09 RX ADMIN — HYDROMORPHONE HYDROCHLORIDE 1 MG: 1 INJECTION, SOLUTION INTRAMUSCULAR; INTRAVENOUS; SUBCUTANEOUS at 23:22

## 2021-10-09 RX ADMIN — ONDANSETRON 4 MG: 2 INJECTION INTRAMUSCULAR; INTRAVENOUS at 10:10

## 2021-10-09 RX ADMIN — MIDAZOLAM 25 MG: 1 INJECTION INTRAMUSCULAR; INTRAVENOUS at 08:37

## 2021-10-09 RX ADMIN — PANTOPRAZOLE SODIUM 40 MG: 40 TABLET, DELAYED RELEASE ORAL at 13:22

## 2021-10-09 RX ADMIN — MEMANTINE 10 MG: 10 TABLET ORAL at 21:00

## 2021-10-09 RX ADMIN — OXYCODONE HYDROCHLORIDE AND ACETAMINOPHEN 1 TABLET: 5; 325 TABLET ORAL at 21:00

## 2021-10-09 RX ADMIN — HYDROMORPHONE HYDROCHLORIDE 1 MG: 1 INJECTION, SOLUTION INTRAMUSCULAR; INTRAVENOUS; SUBCUTANEOUS at 11:20

## 2021-10-09 RX ADMIN — PIPERACILLIN SODIUM AND TAZOBACTAM SODIUM 3375 MG: 3; .375 INJECTION, POWDER, LYOPHILIZED, FOR SOLUTION INTRAVENOUS at 01:27

## 2021-10-09 RX ADMIN — PROPOFOL 150 MG: 10 INJECTION, EMULSION INTRAVENOUS at 08:26

## 2021-10-09 RX ADMIN — MIDAZOLAM 2 MG: 1 INJECTION INTRAMUSCULAR; INTRAVENOUS at 08:26

## 2021-10-09 RX ADMIN — OXYCODONE HYDROCHLORIDE AND ACETAMINOPHEN 1 TABLET: 5; 325 TABLET ORAL at 16:56

## 2021-10-09 RX ADMIN — PIPERACILLIN SODIUM AND TAZOBACTAM SODIUM 3375 MG: 3; .375 INJECTION, POWDER, LYOPHILIZED, FOR SOLUTION INTRAVENOUS at 16:44

## 2021-10-09 RX ADMIN — LIDOCAINE HYDROCHLORIDE 5 ML: 10 INJECTION, SOLUTION EPIDURAL; INFILTRATION; INTRACAUDAL; PERINEURAL at 08:26

## 2021-10-09 RX ADMIN — HYDROMORPHONE HYDROCHLORIDE 0.5 MG: 1 INJECTION, SOLUTION INTRAMUSCULAR; INTRAVENOUS; SUBCUTANEOUS at 10:10

## 2021-10-09 RX ADMIN — CARVEDILOL 6.25 MG: 6.25 TABLET, FILM COATED ORAL at 05:27

## 2021-10-09 RX ADMIN — DONEPEZIL HYDROCHLORIDE 10 MG: 10 TABLET, FILM COATED ORAL at 21:00

## 2021-10-09 RX ADMIN — PIPERACILLIN SODIUM AND TAZOBACTAM SODIUM 3.38 G: 3; .375 INJECTION, POWDER, LYOPHILIZED, FOR SOLUTION INTRAVENOUS at 09:07

## 2021-10-09 RX ADMIN — ROCURONIUM BROMIDE 5 MG: 10 INJECTION, SOLUTION INTRAVENOUS at 08:27

## 2021-10-09 RX ADMIN — Medication 25 MG: at 08:33

## 2021-10-09 RX ADMIN — PROMETHAZINE HYDROCHLORIDE 6.25 MG: 25 INJECTION INTRAMUSCULAR; INTRAVENOUS at 10:32

## 2021-10-09 RX ADMIN — FENTANYL CITRATE 100 MCG: 50 INJECTION, SOLUTION INTRAMUSCULAR; INTRAVENOUS at 08:26

## 2021-10-09 RX ADMIN — SUGAMMADEX 200 MG: 100 INJECTION, SOLUTION INTRAVENOUS at 09:40

## 2021-10-09 ASSESSMENT — PULMONARY FUNCTION TESTS
PIF_VALUE: 15
PIF_VALUE: 18
PIF_VALUE: 22
PIF_VALUE: 10
PIF_VALUE: 21
PIF_VALUE: 22
PIF_VALUE: 24
PIF_VALUE: 22
PIF_VALUE: 21
PIF_VALUE: 22
PIF_VALUE: 22
PIF_VALUE: 21
PIF_VALUE: 18
PIF_VALUE: 21
PIF_VALUE: 18
PIF_VALUE: 18
PIF_VALUE: 23
PIF_VALUE: 5
PIF_VALUE: 18
PIF_VALUE: 20
PIF_VALUE: 18
PIF_VALUE: 8
PIF_VALUE: 14
PIF_VALUE: 21
PIF_VALUE: 18
PIF_VALUE: 21
PIF_VALUE: 21
PIF_VALUE: 18
PIF_VALUE: 21
PIF_VALUE: 1
PIF_VALUE: 21
PIF_VALUE: 19
PIF_VALUE: 16
PIF_VALUE: 22
PIF_VALUE: 2
PIF_VALUE: 3
PIF_VALUE: 14
PIF_VALUE: 22
PIF_VALUE: 14
PIF_VALUE: 26
PIF_VALUE: 18
PIF_VALUE: 3
PIF_VALUE: 2
PIF_VALUE: 1
PIF_VALUE: 21
PIF_VALUE: 3
PIF_VALUE: 1
PIF_VALUE: 21
PIF_VALUE: 15
PIF_VALUE: 23
PIF_VALUE: 18
PIF_VALUE: 21
PIF_VALUE: 18
PIF_VALUE: 18
PIF_VALUE: 21
PIF_VALUE: 21
PIF_VALUE: 22
PIF_VALUE: 21
PIF_VALUE: 22
PIF_VALUE: 21
PIF_VALUE: 18
PIF_VALUE: 21
PIF_VALUE: 21
PIF_VALUE: 3
PIF_VALUE: 18
PIF_VALUE: 23
PIF_VALUE: 18
PIF_VALUE: 14
PIF_VALUE: 22
PIF_VALUE: 21
PIF_VALUE: 20
PIF_VALUE: 21
PIF_VALUE: 15
PIF_VALUE: 21
PIF_VALUE: 22
PIF_VALUE: 18
PIF_VALUE: 18
PIF_VALUE: 29
PIF_VALUE: 22
PIF_VALUE: 18
PIF_VALUE: 3
PIF_VALUE: 2
PIF_VALUE: 21
PIF_VALUE: 17

## 2021-10-09 ASSESSMENT — PAIN DESCRIPTION - LOCATION
LOCATION: ABDOMEN

## 2021-10-09 ASSESSMENT — ENCOUNTER SYMPTOMS
SHORTNESS OF BREATH: 0
STRIDOR: 0

## 2021-10-09 ASSESSMENT — PAIN DESCRIPTION - PAIN TYPE
TYPE: SURGICAL PAIN

## 2021-10-09 ASSESSMENT — LIFESTYLE VARIABLES: SMOKING_STATUS: 0

## 2021-10-09 ASSESSMENT — PAIN DESCRIPTION - FREQUENCY
FREQUENCY: CONTINUOUS

## 2021-10-09 ASSESSMENT — PAIN SCALES - GENERAL
PAINLEVEL_OUTOF10: 6
PAINLEVEL_OUTOF10: 9
PAINLEVEL_OUTOF10: 9
PAINLEVEL_OUTOF10: 4
PAINLEVEL_OUTOF10: 10
PAINLEVEL_OUTOF10: 8
PAINLEVEL_OUTOF10: 7
PAINLEVEL_OUTOF10: 9
PAINLEVEL_OUTOF10: 0
PAINLEVEL_OUTOF10: 4
PAINLEVEL_OUTOF10: 9
PAINLEVEL_OUTOF10: 8
PAINLEVEL_OUTOF10: 6
PAINLEVEL_OUTOF10: 9
PAINLEVEL_OUTOF10: 9
PAINLEVEL_OUTOF10: 8

## 2021-10-09 ASSESSMENT — PAIN DESCRIPTION - PROGRESSION
CLINICAL_PROGRESSION: NOT CHANGED

## 2021-10-09 ASSESSMENT — PAIN DESCRIPTION - ONSET
ONSET: ON-GOING

## 2021-10-09 ASSESSMENT — PAIN DESCRIPTION - DESCRIPTORS
DESCRIPTORS: BURNING;ITCHING
DESCRIPTORS: SHARP

## 2021-10-09 ASSESSMENT — PAIN DESCRIPTION - ORIENTATION
ORIENTATION: MID

## 2021-10-09 ASSESSMENT — PAIN - FUNCTIONAL ASSESSMENT
PAIN_FUNCTIONAL_ASSESSMENT: PREVENTS OR INTERFERES SOME ACTIVE ACTIVITIES AND ADLS
PAIN_FUNCTIONAL_ASSESSMENT: PREVENTS OR INTERFERES SOME ACTIVE ACTIVITIES AND ADLS

## 2021-10-09 NOTE — PLAN OF CARE
Problem: Pain:  Goal: Pain level will decrease  Description: Pain level will decrease  10/9/2021 1602 by Nuria Chavarria RN  Outcome: Ongoing, patient complains of pain, as needed medications given. Problem: Coping:  Goal: Expressions of feelings of enhanced comfort will increase  Description: Expressions of feelings of enhanced comfort will increase  10/9/2021 1602 by Nuria Chavarria RN  Outcome: Ongoing     Problem: Urinary Elimination:  Goal: Will remain free from infection  Description: Will remain free from infection  10/9/2021 1602 by Nuria Chavarria RN  Outcome: Ongoing, costello intact      Problem: Falls - Risk of:  Goal: Will remain free from falls  Description: Will remain free from falls  10/9/2021 1602 by Nuria Chavarria RN  Outcome: Ongoing, Patient is alert and oriented, able to make needs known.  Patient bed low and locked, alarm on, family at bed side

## 2021-10-09 NOTE — PROGRESS NOTES
Today's Date: 10/9/2021  Patient Name: Len Ayon  Date of admission: 10/5/2021  3:20 PM  Patient's age: 66 y.o., 1950  Admission Dx: Hydronephrosis with ureteral stricture, not elsewhere classified [N13.1]  Hydronephrosis due to obstruction of ureter [N13.1]    Reason for Consult: management recommendations  Requesting Physician: Kuldeep Victor DO    CHIEF COMPLAINT: Abdominal pain. B-cell lymphoma. History Obtained From:  patient, electronic medical record    Interval history:    Patient seen and examined  Labs and vitals reviewed  Underwent surgery    complaining of post op pain. HISTORY OF PRESENT ILLNESS:      The patient is a 66 y.o.  male who is admitted to the hospital with chief complaints of right-sided abdominal pain. Patient has been having pain on and off. Pain is associated with nausea and vomiting. Denies shortness of breath. Patient recently finished induction course of Rituxan 4 weekly treatments. Patient follows up with Dr. Veronica Meza from medical oncology standpoint. Recent colonoscopy showed polyps which were removed did not show any malignancy. CT abdomen pelvis showed interval decrease in retroperitoneal soft tissue mass indicating response to therapy. CT scan does show interval development of mild degree of right hydronephrosis. Patient was seen by surgery team who are at this point not planning any intervention. Surgically team has ordered work-up for gallbladder evaluation. Patient lab work-up shows mild normocytic anemia and thrombocytopenia. Patient is LFTs are within range. Urology team has been consulted. Past Medical History:   has a past medical history of Cancer [facility]), Chemotherapy management, encounter for, Diabetes mellitus ([facility] [address] History of non-Hodgkin's lymphoma, Hyperlipidemia, Hypertension, and Hypothyroidism.     Past Surgical History:   has a past surgical history that includes hernia repair; IR BIOPSY ABDOMINAL/RETROPERITONEAL MASS PERCUTANEOUS (7/6/2021); IR PORT PLACEMENT > 5 YEARS (8/31/2021); Colonoscopy (Left, 9/24/2021); and Cystoscopy (Right, 10/6/2021). Medications:    Prior to Admission medications    Medication Sig Start Date End Date Taking? Authorizing Provider   cephALEXin (KEFLEX) 500 MG capsule 500 mgTake three times daily 10/9/21  Yes Arpit Hernandez MD   ondansetron (ZOFRAN) 4 MG tablet Take every six hours as needed 10/9/21  Yes Arpit Hernandez MD   oxyCODONE-acetaminophen (PERCOCET) 5-325 MG per tablet Take 1 tablet by mouth every 6 hours as needed for Pain for up to 7 days. . Take lowest dose possible to manage pain 10/9/21 10/16/21 Yes Arpit Hernandez MD   amLODIPine (NORVASC) 5 MG tablet Take 10 mg by mouth daily    Yes Historical Provider, MD   pregabalin (LYRICA) 150 MG capsule Take 150 mg by mouth 3 times daily.    Yes Historical Provider, MD   glipiZIDE-metFORMIN (METAGLIP) 5-500 MG per tablet Take 2 tablets by mouth daily (with breakfast)   Yes Historical Provider, MD   glipiZIDE-metFORMIN (METAGLIP) 5-500 MG per tablet Take 1 tablet by mouth Daily with supper   Yes Historical Provider, MD   donepezil (ARICEPT) 10 MG tablet Take 10 mg by mouth nightly  7/11/21  Yes Historical Provider, MD   meloxicam (MOBIC) 15 MG tablet Take 15 mg by mouth daily  7/14/21  Yes Historical Provider, MD   atorvastatin (LIPITOR) 10 MG tablet Take 10 mg by mouth daily   Yes Historical Provider, MD   FLUoxetine (PROZAC) 20 MG capsule Take 20 mg by mouth daily   Yes Historical Provider, MD   lisinopril-hydroCHLOROthiazide (PRINZIDE;ZESTORETIC) 20-12.5 MG per tablet Take 1 tablet by mouth daily  7/14/21  Yes Historical Provider, MD   carvedilol (COREG) 6.25 MG tablet Take 6.25 mg by mouth 2 times daily (with meals)   Yes Historical Provider, MD   omeprazole (PRILOSEC) 20 MG delayed release capsule Take 20 mg by mouth 2 times daily   Yes Historical Provider, MD   levothyroxine (SYNTHROID) 50 MCG tablet Take 112 mcg by mouth Daily    Yes Historical Provider, MD     Current Facility-Administered Medications   Medication Dose Route Frequency Provider Last Rate Last Admin    oxyCODONE-acetaminophen (PERCOCET) 5-325 MG per tablet 1 tablet  1 tablet Oral Q4H PRN Tanya Zavala MD   1 tablet at 10/09/21 1322    ondansetron (ZOFRAN) injection 4 mg  4 mg IntraVENous Q6H PRN Tanya Zavala MD   4 mg at 10/09/21 1121    fentaNYL (SUBLIMAZE) injection 50 mcg  50 mcg IntraVENous Q2H PRN Joslynstbello Zavala MD        fentaNYL (SUBLIMAZE) injection 100 mcg  100 mcg IntraVENous Q2H PRN Fili Price MD        acetaminophen (TYLENOL) tablet 650 mg  650 mg Oral Q4H PRN Tanya Zavala MD        promethazine (PHENERGAN) 6.25 mg in sodium chloride 0.9 % 50 mL IVPB  6.25 mg IntraVENous Q6H PRN Shannen Saleh MD   Stopped at 10/09/21 1050    phenol 1.4 % mouth spray 1 spray  1 spray Mouth/Throat Q2H PRN Joslynstbello Zavala MD        fluticasone (FLONASE) 50 MCG/ACT nasal spray 1 spray  1 spray Each Nostril Daily Tanya Zavala MD   1 spray at 10/08/21 0838    ferrous sulfate (IRON 325) tablet 325 mg  325 mg Oral Daily with breakfast Tanya Zavala MD   325 mg at 10/08/21 0825    lidocaine 4 % external patch 1 patch  1 patch TransDERmal Daily Tanya Zavala MD   1 patch at 10/08/21 0825    piperacillin-tazobactam (ZOSYN) 3,375 mg in dextrose 5 % 50 mL IVPB extended infusion (mini-bag)  3,375 mg IntraVENous Darrel Meyers MD   Stopped at 10/09/21 0527    sodium chloride flush 0.9 % injection 10 mL  10 mL IntraVENous PRN Tanya Zavala MD   10 mL at 10/06/21 1212    sodium chloride flush 0.9 % injection 10 mL  10 mL IntraVENous PRN Joslynstbello Zavala MD   10 mL at 10/05/21 1747    amLODIPine (NORVASC) tablet 10 mg  10 mg Oral Daily Daviddystine MD Sally   10 mg at 10/08/21 0825    atorvastatin (LIPITOR) tablet 10 mg  10 mg Oral Daily Glabernadinestbello Zavala MD   10 mg at 10/08/21 0824    carvedilol (COREG) tablet 6.25 mg  6.25 mg Oral BID  Wilian Rosado MD   6.25 mg at 10/09/21 0527    donepezil (ARICEPT) tablet 10 mg  10 mg Oral Nightly Wilian Rosado MD   10 mg at 10/08/21 2207    FLUoxetine (PROZAC) capsule 20 mg  20 mg Oral Daily Wilian Rosado MD   20 mg at 10/08/21 0824    levothyroxine (SYNTHROID) tablet 112 mcg  112 mcg Oral Daily Wilian Rosado MD   112 mcg at 10/08/21 0618    memantine (NAMENDA) tablet 10 mg  10 mg Oral BID Wilian Rosado MD   10 mg at 10/08/21 2207    pantoprazole (PROTONIX) tablet 40 mg  40 mg Oral QAM  Fili Perry MD   40 mg at 10/09/21 1322    ondansetron (ZOFRAN) injection 4 mg  4 mg IntraVENous Q8H PRN Wilian Rosado MD   4 mg at 10/09/21 0126    insulin lispro (HUMALOG) injection vial 0-6 Units  0-6 Units SubCUTAneous TID  Fili Perry MD        insulin lispro (HUMALOG) injection vial 0-3 Units  0-3 Units SubCUTAneous Nightly Wilian Rosado MD        influenza quadrivalent split vaccine (FLUZONE;FLUARIX;FLULAVAL;AFLURIA) injection 0.5 mL  0.5 mL IntraMUSCular Prior to discharge Wilian Rosado MD        HYDROmorphone (DILAUDID) injection 1 mg  1 mg IntraVENous Q4H PRN Wilian Rosado MD   1 mg at 10/09/21 1120    0.9 % sodium chloride infusion   IntraVENous Continuous Wilian Rosado MD 80 mL/hr at 10/09/21 1113 Restarted at 10/09/21 1113    scopolamine (TRANSDERM-SCOP) transdermal patch 1 patch  1 patch TransDERmal Q72H Wilian Rosado MD   1 patch at 10/09/21 0007       Allergies:  Patient has no known allergies. Social History:   reports that he quit smoking about 15 years ago. His smoking use included cigarettes. He has a 90.00 pack-year smoking history. He has never used smokeless tobacco. He reports current alcohol use of about 1.7 standard drinks of alcohol per week. He reports current drug use. Drug: Marijuana.      Family History: family history includes Alzheimer's Disease in his father; Diabetes in his mother; Heart Disease in his brother and brother. REVIEW OF SYSTEMS:      Constitutional: No fever or chills. No night sweats, no weight loss   Eyes: No eye discharge, double vision, or eye pain   HEENT: negative for sore mouth, sore throat, hoarseness and voice change   Respiratory: negative for cough , sputum, dyspnea, wheezing, hemoptysis, chest pain   Cardiovascular: negative for chest pain, dyspnea, palpitations, orthopnea, PND   Gastrointestinal: Positive abdominal pain  Genitourinary: negative for frequency, dysuria, nocturia, urinary incontinence, and hematuria   Integument: negative for rash, skin lesions, bruises. Hematologic/Lymphatic: negative for easy bruising, bleeding, lymphadenopathy, or petechiae   Endocrine: negative for heat or cold intolerance,weight changes, change in bowel habits and hair loss   Musculoskeletal: negative for myalgias, arthralgias, pain, joint swelling,and bone pain   Neurological: negative for headaches, dizziness, seizures, weakness, numbness    PHYSICAL EXAM:        BP (!) 169/70   Pulse 75   Temp 97.7 °F (36.5 °C)   Resp 16   Ht 5' 10\" (1.778 m)   Wt 164 lb (74.4 kg)   SpO2 95%   BMI 23.53 kg/m²    Temp (24hrs), Av.3 °F (36.8 °C), Min:97.7 °F (36.5 °C), Max:98.6 °F (37 °C)      General appearance - well appearing, no in pain or distress   Mental status - alert and cooperative   Eyes - pupils equal and reactive, extraocular eye movements intact   Ears - bilateral TM's and external ear canals normal   Mouth - mucous membranes moist, pharynx normal without lesions   Neck - supple, no significant adenopathy   Lymphatics - no palpable lymphadenopathy, no hepatosplenomegaly   Chest - clear to auscultation, no wheezes, rales or rhonchi, symmetric air entry   Heart - normal rate, regular rhythm, normal S1, S2, no murmurs  Abdomen - soft, nondistended, no masses or organomegaly.   Generalized mild tenderness  Neurological - alert, oriented, normal speech, no focal findings or movement disorder noted   Musculoskeletal - no joint tenderness, deformity or swelling   Extremities - peripheral pulses normal, no pedal edema, no clubbing or cyanosis   Skin - normal coloration and turgor, no rashes, no suspicious skin lesions noted ,      DATA:      Labs:     Results for orders placed or performed during the hospital encounter of 10/05/21   CBC Auto Differential   Result Value Ref Range    WBC 7.6 3.5 - 11.0 k/uL    RBC 4.49 (L) 4.5 - 5.9 m/uL    Hemoglobin 13.3 (L) 13.5 - 17.5 g/dL    Hematocrit 39.3 (L) 41 - 53 %    MCV 87.5 80 - 100 fL    MCH 29.7 26 - 34 pg    MCHC 33.9 31 - 37 g/dL    RDW 15.3 (H) 11.5 - 14.9 %    Platelets 681 827 - 569 k/uL    MPV 9.0 6.0 - 12.0 fL    NRBC Automated NOT REPORTED per 100 WBC    Differential Type NOT REPORTED     Seg Neutrophils 78 (H) 36 - 66 %    Lymphocytes 13 (L) 24 - 44 %    Monocytes 8 (H) 1 - 7 %    Eosinophils % 1 0 - 4 %    Basophils 0 0 - 2 %    Immature Granulocytes NOT REPORTED 0 %    Segs Absolute 5.90 1.3 - 9.1 k/uL    Absolute Lymph # 1.00 1.0 - 4.8 k/uL    Absolute Mono # 0.60 0.1 - 1.3 k/uL    Absolute Eos # 0.00 0.0 - 0.4 k/uL    Basophils Absolute 0.00 0.0 - 0.2 k/uL    Absolute Immature Granulocyte NOT REPORTED 0.00 - 0.30 k/uL    WBC Morphology NOT REPORTED     RBC Morphology NOT REPORTED     Platelet Estimate NOT REPORTED    Lipase   Result Value Ref Range    Lipase 22 13 - 60 U/L   Hepatic Function Panel   Result Value Ref Range    Albumin 3.9 3.5 - 5.2 g/dL    Alkaline Phosphatase 147 (H) 40 - 129 U/L    ALT 17 5 - 41 U/L    AST 10 <40 U/L    Total Bilirubin 0.72 0.3 - 1.2 mg/dL    Bilirubin, Direct 0.17 <0.31 mg/dL    Bilirubin, Indirect 0.55 0.00 - 1.00 mg/dL    Total Protein 6.6 6.4 - 8.3 g/dL    Globulin NOT REPORTED 1.5 - 3.8 g/dL    Albumin/Globulin Ratio NOT REPORTED 1.0 - 2.5   Basic Metabolic Panel   Result Value Ref Range    Glucose 139 (H) 70 - 99 mg/dL    BUN 20 8 - 23 mg/dL    CREATININE 1.25 (H) 0.70 - 1.20 mg/dL    Bun/Cre Ratio NOT REPORTED 9 - 20    Calcium 9.1 8.6 - 10.4 mg/dL    Sodium 144 135 - 144 mmol/L    Potassium 4.6 3.7 - 5.3 mmol/L    Chloride 105 98 - 107 mmol/L    CO2 24 20 - 31 mmol/L    Anion Gap 15 9 - 17 mmol/L    GFR Non-African American 57 (L) >60 mL/min    GFR African American >60 >60 mL/min    GFR Comment          GFR Staging NOT REPORTED    Lactic Acid   Result Value Ref Range    Lactic Acid 1.3 0.5 - 2.2 mmol/L   Lactate Dehydrogenase   Result Value Ref Range     135 - 225 U/L   Phosphorus   Result Value Ref Range    Phosphorus 3.4 2.5 - 4.5 mg/dL   Uric Acid   Result Value Ref Range    Uric Acid 9.1 (H) 3.4 - 7.0 mg/dL   Urinalysis Reflex to Culture    Specimen: Urine, clean catch   Result Value Ref Range    Color, UA Yellow Yellow    Turbidity UA Clear Clear    Glucose, Ur NEGATIVE NEGATIVE    Bilirubin Urine SMALL (A) NEGATIVE    Ketones, Urine SMALL (A) NEGATIVE    Specific Gravity, UA 1.045 (H) 1.000 - 1.030    Urine Hgb NEGATIVE NEGATIVE    pH, UA 5.5 5.0 - 8.0    Protein, UA 4+ (A) NEGATIVE    Urobilinogen, Urine Normal Normal    Nitrite, Urine NEGATIVE NEGATIVE    Leukocyte Esterase, Urine NEGATIVE NEGATIVE    Urinalysis Comments NOT REPORTED    Microscopic Urinalysis   Result Value Ref Range    -          WBC, UA 2 TO 5 /HPF    RBC, UA 2 TO 5 /HPF    Casts UA HYALINE /LPF    Casts UA 0 TO 2 /LPF    Crystals, UA NOT REPORTED None /HPF    Epithelial Cells UA 2 TO 5 /HPF    Renal Epithelial, UA NOT REPORTED 0 /HPF    Bacteria, UA FEW (A) None    Mucus, UA NOT REPORTED None    Trichomonas, UA NOT REPORTED None    Amorphous, UA NOT REPORTED None    Other Observations UA NOT REPORTED NOT REQ.     Yeast, UA NOT REPORTED None   CBC with DIFF   Result Value Ref Range    WBC 6.2 3.5 - 11.0 k/uL    RBC 3.89 (L) 4.5 - 5.9 m/uL    Hemoglobin 11.7 (L) 13.5 - 17.5 g/dL    Hematocrit 34.4 (L) 41 - 53 %    MCV 88.3 80 - 100 fL    MCH 29.9 26 - 34 pg    MCHC 33.9 31 - 37 g/dL    RDW 15.5 (H) 11.5 - 14.9 %    Platelets 719 (L) 150 - 450 k/uL    MPV 8.5 6.0 - 12.0 fL    NRBC Automated NOT REPORTED per 100 WBC    Differential Type NOT REPORTED     Seg Neutrophils 77 (H) 36 - 66 %    Lymphocytes 10 (L) 24 - 44 %    Monocytes 11 (H) 1 - 7 %    Eosinophils % 2 0 - 4 %    Basophils 0 0 - 2 %    Immature Granulocytes NOT REPORTED 0 %    Segs Absolute 4.70 1.3 - 9.1 k/uL    Absolute Lymph # 0.60 (L) 1.0 - 4.8 k/uL    Absolute Mono # 0.70 0.1 - 1.3 k/uL    Absolute Eos # 0.10 0.0 - 0.4 k/uL    Basophils Absolute 0.00 0.0 - 0.2 k/uL    Absolute Immature Granulocyte NOT REPORTED 0.00 - 0.30 k/uL    WBC Morphology NOT REPORTED     RBC Morphology NOT REPORTED     Platelet Estimate NOT REPORTED    BASIC METABOLIC PANEL   Result Value Ref Range    Glucose 94 70 - 99 mg/dL    BUN 19 8 - 23 mg/dL    CREATININE 1.10 0.70 - 1.20 mg/dL    Bun/Cre Ratio NOT REPORTED 9 - 20    Calcium 8.4 (L) 8.6 - 10.4 mg/dL    Sodium 147 (H) 135 - 144 mmol/L    Potassium 4.5 3.7 - 5.3 mmol/L    Chloride 110 (H) 98 - 107 mmol/L    CO2 26 20 - 31 mmol/L    Anion Gap 11 9 - 17 mmol/L    GFR Non-African American >60 >60 mL/min    GFR African American >60 >60 mL/min    GFR Comment          GFR Staging NOT REPORTED    Lipase   Result Value Ref Range    Lipase 19 13 - 60 U/L   Amylase   Result Value Ref Range    Amylase 45 28 - 100 U/L   Hepatic Function Panel   Result Value Ref Range    Albumin 3.5 3.5 - 5.2 g/dL    Alkaline Phosphatase 129 40 - 129 U/L    ALT 13 5 - 41 U/L    AST 11 <40 U/L    Total Bilirubin 0.61 0.3 - 1.2 mg/dL    Bilirubin, Direct 0.17 <0.31 mg/dL    Bilirubin, Indirect 0.44 0.00 - 1.00 mg/dL    Total Protein 5.4 (L) 6.4 - 8.3 g/dL    Globulin NOT REPORTED 1.5 - 3.8 g/dL    Albumin/Globulin Ratio NOT REPORTED 1.0 - 2.5   FERRITIN   Result Value Ref Range    Ferritin 137 30 - 400 ug/L   IRON AND TIBC   Result Value Ref Range    Iron 46 (L) 59 - 158 ug/dL    TIBC 237 (L) 250 - 450 ug/dL    Iron Saturation 19 (L) 20 - 55 %    UIBC 191 112 - 347 ug/dL Vitamin B12 & Folate   Result Value Ref Range    Vitamin B-12 791 232 - 1245 pg/mL    Folate 18.2 >4.8 ng/mL   CBC with DIFF   Result Value Ref Range    WBC 6.9 3.5 - 11.0 k/uL    RBC 3.75 (L) 4.5 - 5.9 m/uL    Hemoglobin 11.3 (L) 13.5 - 17.5 g/dL    Hematocrit 33.6 (L) 41 - 53 %    MCV 89.4 80 - 100 fL    MCH 30.1 26 - 34 pg    MCHC 33.7 31 - 37 g/dL    RDW 15.1 (H) 11.5 - 14.9 %    Platelets 287 (L) 257 - 450 k/uL    MPV 8.4 6.0 - 12.0 fL    NRBC Automated NOT REPORTED per 100 WBC    Differential Type NOT REPORTED     Seg Neutrophils 73 (H) 36 - 66 %    Lymphocytes 14 (L) 24 - 44 %    Monocytes 11 (H) 1 - 7 %    Eosinophils % 2 0 - 4 %    Basophils 0 0 - 2 %    Immature Granulocytes NOT REPORTED 0 %    Segs Absolute 5.00 1.3 - 9.1 k/uL    Absolute Lymph # 1.00 1.0 - 4.8 k/uL    Absolute Mono # 0.70 0.1 - 1.3 k/uL    Absolute Eos # 0.10 0.0 - 0.4 k/uL    Basophils Absolute 0.00 0.0 - 0.2 k/uL    Absolute Immature Granulocyte NOT REPORTED 0.00 - 0.30 k/uL    WBC Morphology NOT REPORTED     RBC Morphology NOT REPORTED     Platelet Estimate NOT REPORTED    Urinalysis Reflex to Culture    Specimen: Urine, Cystoscopic   Result Value Ref Range    Color, UA Yellow Yellow    Turbidity UA Clear Clear    Glucose, Ur NEGATIVE NEGATIVE    Bilirubin Urine NEGATIVE  Verified by ictotest. (A) NEGATIVE    Ketones, Urine TRACE (A) NEGATIVE    Specific Gravity, UA 1.022 1.000 - 1.030    Urine Hgb LARGE (A) NEGATIVE    pH, UA 5.0 5.0 - 8.0    Protein, UA 2+ (A) NEGATIVE    Urobilinogen, Urine Normal Normal    Nitrite, Urine NEGATIVE NEGATIVE    Leukocyte Esterase, Urine NEGATIVE NEGATIVE    Urinalysis Comments NOT REPORTED    Microscopic Urinalysis   Result Value Ref Range    -          WBC, UA 5 TO 10 /HPF    RBC, UA 50  /HPF    Casts UA NOT REPORTED /LPF    Crystals, UA NOT REPORTED None /HPF    Epithelial Cells UA 0 TO 2 /HPF    Renal Epithelial, UA NOT REPORTED 0 /HPF    Bacteria, UA MANY (A) None    Mucus, UA NOT REPORTED None    Trichomonas, UA NOT REPORTED None    Amorphous, UA NOT REPORTED None    Other Observations UA NOT REPORTED NOT REQ.     Yeast, UA NOT REPORTED None   Basic Metabolic Panel w/ Reflex to MG   Result Value Ref Range    Glucose 102 (H) 70 - 99 mg/dL    BUN 18 8 - 23 mg/dL    CREATININE 1.23 (H) 0.70 - 1.20 mg/dL    Bun/Cre Ratio NOT REPORTED 9 - 20    Calcium 8.1 (L) 8.6 - 10.4 mg/dL    Sodium 142 135 - 144 mmol/L    Potassium 4.4 3.7 - 5.3 mmol/L    Chloride 108 (H) 98 - 107 mmol/L    CO2 23 20 - 31 mmol/L    Anion Gap 11 9 - 17 mmol/L    GFR Non-African American 58 (L) >60 mL/min    GFR African American >60 >60 mL/min    GFR Comment          GFR Staging NOT REPORTED    CBC with DIFF   Result Value Ref Range    WBC 6.8 3.5 - 11.0 k/uL    RBC 3.85 (L) 4.5 - 5.9 m/uL    Hemoglobin 11.4 (L) 13.5 - 17.5 g/dL    Hematocrit 33.9 (L) 41 - 53 %    MCV 88.0 80 - 100 fL    MCH 29.7 26 - 34 pg    MCHC 33.7 31 - 37 g/dL    RDW 15.0 (H) 11.5 - 14.9 %    Platelets 911 (L) 035 - 450 k/uL    MPV 8.5 6.0 - 12.0 fL    NRBC Automated NOT REPORTED per 100 WBC    Differential Type NOT REPORTED     Seg Neutrophils 73 (H) 36 - 66 %    Lymphocytes 15 (L) 24 - 44 %    Monocytes 10 (H) 1 - 7 %    Eosinophils % 2 0 - 4 %    Basophils 0 0 - 2 %    Immature Granulocytes NOT REPORTED 0 %    Segs Absolute 5.00 1.3 - 9.1 k/uL    Absolute Lymph # 1.00 1.0 - 4.8 k/uL    Absolute Mono # 0.70 0.1 - 1.3 k/uL    Absolute Eos # 0.20 0.0 - 0.4 k/uL    Basophils Absolute 0.00 0.0 - 0.2 k/uL    Absolute Immature Granulocyte NOT REPORTED 0.00 - 0.30 k/uL    WBC Morphology NOT REPORTED     RBC Morphology NOT REPORTED     Platelet Estimate NOT REPORTED    Basic Metabolic Panel w/ Reflex to MG   Result Value Ref Range    Glucose 80 70 - 99 mg/dL    BUN 13 8 - 23 mg/dL    CREATININE 1.04 0.70 - 1.20 mg/dL    Bun/Cre Ratio NOT REPORTED 9 - 20    Calcium 8.2 (L) 8.6 - 10.4 mg/dL    Sodium 140 135 - 144 mmol/L    Potassium 4.1 3.7 - 5.3 mmol/L Chloride 107 98 - 107 mmol/L    CO2 23 20 - 31 mmol/L    Anion Gap 10 9 - 17 mmol/L    GFR Non-African American >60 >60 mL/min    GFR African American >60 >60 mL/min    GFR Comment          GFR Staging NOT REPORTED    POC Glucose Fingerstick   Result Value Ref Range    POC Glucose 87 75 - 110 mg/dL   POC Glucose Fingerstick   Result Value Ref Range    POC Glucose 107 75 - 110 mg/dL   POC Glucose Fingerstick   Result Value Ref Range    POC Glucose 89 75 - 110 mg/dL   POC Glucose Fingerstick   Result Value Ref Range    POC Glucose 83 75 - 110 mg/dL   POC Glucose Fingerstick   Result Value Ref Range    POC Glucose 75 75 - 110 mg/dL   POC Glucose Fingerstick   Result Value Ref Range    POC Glucose 95 75 - 110 mg/dL   POC Glucose Fingerstick   Result Value Ref Range    POC Glucose 99 75 - 110 mg/dL   POC Glucose Fingerstick   Result Value Ref Range    POC Glucose 99 75 - 110 mg/dL   POC Glucose Fingerstick   Result Value Ref Range    POC Glucose 97 75 - 110 mg/dL   POC Glucose Fingerstick   Result Value Ref Range    POC Glucose 81 75 - 110 mg/dL   POC Glucose Fingerstick   Result Value Ref Range    POC Glucose 112 (H) 75 - 110 mg/dL   POC Glucose Fingerstick   Result Value Ref Range    POC Glucose 74 (L) 75 - 110 mg/dL   POC Glucose Fingerstick   Result Value Ref Range    POC Glucose 135 (H) 75 - 110 mg/dL   POC Glucose Fingerstick   Result Value Ref Range    POC Glucose 101 75 - 110 mg/dL   POC Glucose Fingerstick   Result Value Ref Range    POC Glucose 88 75 - 110 mg/dL   POC Glucose Fingerstick   Result Value Ref Range    POC Glucose 119 (H) 75 - 110 mg/dL   POC Glucose Fingerstick   Result Value Ref Range    POC Glucose 152 (H) 75 - 110 mg/dL   POC Glucose Fingerstick   Result Value Ref Range    POC Glucose 96 75 - 110 mg/dL   EKG 12 Lead   Result Value Ref Range    Ventricular Rate 64 BPM    Atrial Rate 64 BPM    P-R Interval 130 ms    QRS Duration 100 ms    Q-T Interval 442 ms    QTc Calculation (Bazett) 455 ms P Axis 70 degrees    R Axis 27 degrees    T Axis 31 degrees         IMAGING DATA:    CT ABDOMEN PELVIS W IV CONTRAST Additional Contrast? None    Result Date: 10/5/2021  EXAMINATION: CT OF THE ABDOMEN AND PELVIS WITH CONTRAST 10/5/2021 11:37 am TECHNIQUE: CT of the abdomen and pelvis was performed with the administration of intravenous contrast. Multiplanar reformatted images are provided for review. Dose modulation, iterative reconstruction, and/or weight based adjustment of the mA/kV was utilized to reduce the radiation dose to as low as reasonably achievable. COMPARISON: CT scan of the abdomen dated July 6, 2021, and July 3, 2021 HISTORY: ORDERING SYSTEM PROVIDED HISTORY: abd pain w/n/v w/known retroperitoneal mass TECHNOLOGIST PROVIDED HISTORY: abd pain w/n/v w/known retroperitoneal mass Reason for Exam: Right side ab pain with nausea/vomiting, hx of retroperitoneal mass Acuity: Unknown Type of Exam: Unknown Relevant Medical/Surgical History: Hernia repair FINDINGS: Lower Chest: Dependent changes are present within the lung bases. Organs: The liver, spleen, and pancreas demonstrate no acute abnormality. Cholelithiasis is present without evidence of cholecystitis. Nodular thickening of the right adrenal gland is again noted, unchanged. Cortical cyst formation is again noted on the right kidney. A 3 mm nonobstructing calculus is again visualized within the lower pole collecting system of the right kidney. An approximate 1.7 x 1.5 cm solid-appearing nodule is again visualized within the right kidney, previously measured 1.2 mm. A mild degree of right hydronephrosis and hydroureter is now present. Again demonstrated is the presence of an infiltrating soft tissue mass involving the left retroperitoneum, with enlargement of the left adrenal gland, and encasing the left kidney.   Caudal to the left kidney, with the soft tissue mass measures approximately 6.4 by 7.1 cm, decreased in size, indicating response to treatment. The soft tissue fullness within the para-region is decreased in size as well. Interval decrease in the degree of left hydronephrosis is present, now mild in degree. GI/Bowel: A small hiatal hernia is present. No evidence of small-bowel obstruction is present. The appendix is normal.  Biopsy clips are present within the cecum. Scattered colonic diverticula are noted, without evidence of diverticulitis. Pelvis: Prostate gland is enlarged. Urinary bladder appears grossly normal. Peritoneum/Retroperitoneum: No free fluid or free air is present within the abdomen or pelvis. Extensive atherosclerotic changes are again demonstrated involving the abdominal aorta. I suspect there is high-grade stenosis at the origin of the left common iliac artery, similar compared to the prior study. Dominant right external iliac chain lymph node is significantly decreased in size, now measuring approximately 2.6 cm in length, and 8 mm in thickness. This previously measured 3.5 x 1.6 cm. Bulky para-adenopathy is decreased in size. Bones/Soft Tissues: No acute osseous abnormality is present. 1. Interval decrease in size of the infiltrating soft tissue mass within the left retroperitoneum, encasing the left kidney and abdominal aorta. The para-aortic adenopathy, and external iliac chain adenopathy is decreased in size, indicating response to therapy. Changes are likely due to lymphoma. 2. Cholelithiasis, without evidence of cholecystitis 3. Interval development of a mild degree of right hydronephrosis, and hydroureter. Right ureter is dilated to the level of the retroperitoneal \"mass\". .  Obstruction may be related to stricture formation or encasement by the retroperitoneal soft tissue mass. 4. Scattered colonic diverticula, without evidence of diverticulitis 5. Enlarged prostate gland 6. Nonobstructive bowel gas pattern     -- Diagnosis --   1.   CECUM, POLYP, BIOPSY:               -  CONSISTENT WITH SESSILE SERRATED

## 2021-10-09 NOTE — OP NOTE
Operative Note      Patient: Heriberto Marsh  YOB: 1950  MRN: [id number]    Date of Procedure: 10/9/2021                Preoperative diagnosis: Chronic cholecystitis    Postoperative diagnosis: Same    Procedure: Robotic cholecystectomy    Surgeon: Dr. Janae Meek    Asst.: MIREYA Dietz    Anesthesia: General    Preparation: Chloraprep    EBL: Less than 25 mL    Specimen: Gallbladder    Procedure: Informed consent was obtained. Preoperative antibiotics were given. Patient was taken to the operating room. General anesthesia was given. Abdomen was prepped and draped in usual sterile fashion. Timeout was done. Supraumbilical incision was made. Kathy Abed port was introduced using the open technique without any difficulty. CO2 insufflation was carried out. Scope was introduced. Patient was placed in the reverse Trendelenburg position. 3 additional ports were placed in usual fashion. Robot was brought in. All the ports were docked in usual fashion. Camera and the ancillary instruments were advanced over the target anatomy. Assistant grabbed the fundus of the gallbladder and that was retracted anterosuperiorly. Careful dissection was continued in the triangle of calot. Cystic duct was isolated was skeletonized. Cystic artery was isolated was skeletonized. Critical view was obtained. Anatomy was confirmed. After confirming the anatomy cystic duct was clipped and divided. Cystic artery was clipped and divided. Gallbladder was peeled off the liver bed using the hook cautery. Complete hemostasis was confirmed. All the clips were found to be intact. At this point robot was undocked. Gallbladder was retrieved and sent to pathology for further evaluation. All the port sites are visualized. No bleeding noted. All the ports were removed. Fascia and the skin was approximated in the usual fashion. Local anesthetic was infiltrated. Dermabond was applied. Steri-Strips applied.   Sterile dressing was applied. Patient tolerated procedure well and was transferred to the recovery room in a stable condition.     -  Recommendations: Resume diet pain control. Medical management per admitting physician.

## 2021-10-09 NOTE — ANESTHESIA POSTPROCEDURE EVALUATION
POST- ANESTHESIA EVALUATION       Pt Name: Heriberto Marsh  MRN: [id number]  YOB: 1950  Date of evaluation: 10/9/2021  Time:  11:36 AM      BP (!) 186/86   Pulse 87   Temp 98.5 °F (36.9 °C) (Oral)   Resp 16   Ht 5' 10\" (1.778 m)   Wt 164 lb (74.4 kg)   SpO2 95%   BMI 23.53 kg/m²      Consciousness Level  Awake  Cardiopulmonary Status  Stable  Pain Adequately Treated YES  Nausea / Vomiting  NO  Adequate Hydration  YES  Anesthesia Related Complications NONE      Electronically signed by Bjorn Posey MD on 10/9/2021 at 11:36 AM       Department of Anesthesiology  Postprocedure Note    Patient: Heriberto Marsh  MRN: [id number]  YOB: 1950  Date of evaluation: 10/9/2021  Time:  11:35 AM     Procedure Summary     Date: 10/09/21 Room / Location: [address] [place] [address]    Anesthesia Start: 9469 Anesthesia Stop: 4421    Procedure: CHOLECYSTECTOMY LAPAROSCOPIC ROBOTIC (N/A Abdomen) Diagnosis: (CHOLECYSTITIS)    Surgeons: Rikki Ireland MD Responsible Provider: Bjorn Posey MD    Anesthesia Type: general ASA Status: 3          Anesthesia Type: general    Yvette Phase I: Yvette Score: 8    Yvette Phase II:      Last vitals: Reviewed and per EMR flowsheets.        Anesthesia Post Evaluation

## 2021-10-09 NOTE — ANESTHESIA PRE PROCEDURE
Department of Anesthesiology  Preprocedure Note       Name:  Perez Blackman   Age:  66 y.o.  :  1950                                          MRN:  [id number]         Date:  10/9/2021      Surgeon: Kathryn Hernandez):  Wilhemina Olszewski, MD    Procedure: Procedure(s):  CHOLECYSTECTOMY LAPAROSCOPIC ROBOTIC    Medications prior to admission:   Prior to Admission medications    Medication Sig Start Date End Date Taking? Authorizing Provider   amLODIPine (NORVASC) 5 MG tablet Take 10 mg by mouth daily    Yes Historical Provider, MD   pregabalin (LYRICA) 150 MG capsule Take 150 mg by mouth 3 times daily.    Yes Historical Provider, MD   glipiZIDE-metFORMIN (METAGLIP) 5-500 MG per tablet Take 2 tablets by mouth daily (with breakfast)   Yes Historical Provider, MD   glipiZIDE-metFORMIN (METAGLIP) 5-500 MG per tablet Take 1 tablet by mouth Daily with supper   Yes Historical Provider, MD   donepezil (ARICEPT) 10 MG tablet Take 10 mg by mouth nightly  21  Yes Historical Provider, MD   meloxicam (MOBIC) 15 MG tablet Take 15 mg by mouth daily  21  Yes Historical Provider, MD   atorvastatin (LIPITOR) 10 MG tablet Take 10 mg by mouth daily   Yes Historical Provider, MD   FLUoxetine (PROZAC) 20 MG capsule Take 20 mg by mouth daily   Yes Historical Provider, MD   lisinopril-hydroCHLOROthiazide (PRINZIDE;ZESTORETIC) 20-12.5 MG per tablet Take 1 tablet by mouth daily  21  Yes Historical Provider, MD   carvedilol (COREG) 6.25 MG tablet Take 6.25 mg by mouth 2 times daily (with meals)   Yes Historical Provider, MD   omeprazole (PRILOSEC) 20 MG delayed release capsule Take 20 mg by mouth 2 times daily   Yes Historical Provider, MD   levothyroxine (SYNTHROID) 50 MCG tablet Take 112 mcg by mouth Daily    Yes Historical Provider, MD       Current medications:    Current Facility-Administered Medications   Medication Dose Route Frequency Provider Last Rate Last Admin    [MAR Hold] phenol 1.4 % mouth spray 1 spray  1 spray Mouth/Throat Q2H PRN Roderick Metcalf DO        [MAR Hold] fluticasone (FLONASE) 50 MCG/ACT nasal spray 1 spray  1 spray Each Nostril Daily Roderick Metcalf DO   1 spray at 10/08/21 0838    [MAR Hold] ferrous sulfate (IRON 325) tablet 325 mg  325 mg Oral Daily with breakfast Bernadette Salvador MD   325 mg at 10/08/21 0825    [MAR Hold] lidocaine 4 % external patch 1 patch  1 patch TransDERmal Daily Evangelina Alejandro MD   1 patch at 10/08/21 0825    [MAR Hold] piperacillin-tazobactam (ZOSYN) 3,375 mg in dextrose 5 % 50 mL IVPB extended infusion (mini-bag)  3,375 mg IntraVENous Q8H Evangelina Alejandro MD   Stopped at 10/09/21 0527    [MAR Hold] sodium chloride flush 0.9 % injection 10 mL  10 mL IntraVENous PRN Evangelina Alejandro MD   10 mL at 10/06/21 1212    [MAR Hold] sodium chloride flush 0.9 % injection 10 mL  10 mL IntraVENous PRN Evangelina Alejandro MD   10 mL at 10/05/21 1747    [MAR Hold] amLODIPine (NORVASC) tablet 10 mg  10 mg Oral Daily Evangelina Alejandro MD   10 mg at 10/08/21 0825    [MAR Hold] atorvastatin (LIPITOR) tablet 10 mg  10 mg Oral Daily Evangelina Alejandro MD   10 mg at 10/08/21 0824    [MAR Hold] carvedilol (COREG) tablet 6.25 mg  6.25 mg Oral BID WC Evangelina Alejandro MD   6.25 mg at 10/09/21 0527    [MAR Hold] donepezil (ARICEPT) tablet 10 mg  10 mg Oral Nightly Evangelina Alejandro MD   10 mg at 10/08/21 2207    [MAR Hold] FLUoxetine (PROZAC) capsule 20 mg  20 mg Oral Daily Evangelina Alejandro MD   20 mg at 10/08/21 0824    [MAR Hold] levothyroxine (SYNTHROID) tablet 112 mcg  112 mcg Oral Daily Evangelina Alejandro MD   112 mcg at 10/08/21 0618    [MAR Hold] memantine (NAMENDA) tablet 10 mg  10 mg Oral BID Evangelina Alejandro MD   10 mg at 10/08/21 2207    [MAR Hold] pantoprazole (PROTONIX) tablet 40 mg  40 mg Oral QAM AC Evangelina Alejandro MD   40 mg at 10/08/21 0618    [MAR Hold] ondansetron (ZOFRAN) injection 4 mg  4 mg IntraVENous Q8H PRN Evangelina Alejandro MD   4 mg at 10/09/21 0126    [MAR Hold] insulin lispro (HUMALOG) 8/31/2021 [facility] PROCEDURES       Social History:    Social History     Tobacco Use    Smoking status: Former Smoker     Packs/day: 2.00     Years: 45.00     Pack years: 90.00     Types: Cigarettes     Quit date: 1/7/2006     Years since quitting: 15.7    Smokeless tobacco: Never Used   Substance Use Topics    Alcohol use: Yes     Alcohol/week: 1.7 standard drinks     Types: 2 Standard drinks or equivalent per week     Comment: occassional                                Counseling given: Not Answered      Vital Signs (Current):   Vitals:    10/08/21 1430 10/08/21 1730 10/08/21 1945 10/09/21 0524   BP:  (!) 145/55 (!) 144/73 (!) 173/66   Pulse:  71 66 64   Resp:  18 15 16   Temp:   98.5 °F (36.9 °C) 98.1 °F (36.7 °C)   TempSrc:   Oral    SpO2: 92% 91% 92% 95%   Weight:       Height:                                                  BP Readings from Last 3 Encounters:   10/09/21 (!) 173/66   10/06/21 (!) 118/58   09/24/21 (!) 171/65       NPO Status:                                                                                 BMI:   Wt Readings from Last 3 Encounters:   10/05/21 164 lb (74.4 kg)   09/24/21 174 lb (78.9 kg)   09/20/21 173 lb 3.2 oz (78.6 kg)     Body mass index is 23.53 kg/m².     CBC:   Lab Results   Component Value Date    WBC 6.8 10/08/2021    RBC 3.85 10/08/2021    RBC 4.38 06/06/2012    HGB 11.4 10/08/2021    HCT 33.9 10/08/2021    MCV 88.0 10/08/2021    RDW 15.0 10/08/2021     10/08/2021     06/06/2012       CMP:   Lab Results   Component Value Date     10/08/2021    K 4.1 10/08/2021     10/08/2021    CO2 23 10/08/2021    BUN 13 10/08/2021    CREATININE 1.04 10/08/2021    GFRAA >60 10/08/2021    LABGLOM >60 10/08/2021    GLUCOSE 80 10/08/2021    GLUCOSE 97 06/06/2012    PROT 5.4 10/06/2021    CALCIUM 8.2 10/08/2021    BILITOT 0.61 10/06/2021    ALKPHOS 129 10/06/2021    AST 11 10/06/2021    ALT 13 10/06/2021       POC Tests:   Recent Labs     10/09/21  6832 POCGLU 96       Coags:   Lab Results   Component Value Date    PROTIME 12.1 08/31/2021    INR 0.9 08/31/2021    APTT 26.7 08/31/2021       HCG (If Applicable): No results found for: PREGTESTUR, PREGSERUM, HCG, HCGQUANT     ABGs: No results found for: PHART, PO2ART, XUP9SGP, XZK1UPM, BEART, P7ESDIND     Type & Screen (If Applicable):  No results found for: LABABO, LABRH    Drug/Infectious Status (If Applicable):  No results found for: HIV, HEPCAB    COVID-19 Screening (If Applicable): No results found for: COVID19        Anesthesia Evaluation  Patient summary reviewed and Nursing notes reviewed no history of anesthetic complications:   Airway: Mallampati: III  TM distance: >3 FB   Neck ROM: full  Mouth opening: > = 3 FB Dental: normal exam         Pulmonary:Negative Pulmonary ROS and normal exam  breath sounds clear to auscultation      (-) pneumonia, COPD, asthma, shortness of breath, recent URI, sleep apnea, rhonchi, wheezes, rales, stridor, not a current smoker and no decreased breath sounds          Patient did not smoke on day of surgery. Cardiovascular:  Exercise tolerance: good (>4 METS),   (+) hypertension:,     (-) pacemaker, valvular problems/murmurs, past MI, CAD, CABG/stent, dysrhythmias,  angina,  CHF, orthopnea, PND,  DIALLO, murmur, weak pulses,  friction rub, systolic click, carotid bruit,  JVD, no pulmonary hypertension and no hyperlipidemia    ECG reviewed  Rhythm: regular  Rate: normal           Beta Blocker:  Dose within 24 Hrs         Neuro/Psych:   Negative Neuro/Psych ROS     (-) seizures, neuromuscular disease, TIA, CVA, headaches, psychiatric history and depression/anxiety            GI/Hepatic/Renal: Neg GI/Hepatic/Renal ROS       (-) hiatal hernia, GERD, PUD, hepatitis, liver disease, no renal disease, bowel prep and no morbid obesity       Endo/Other:    (+) DiabetesType II DM, , hypothyroidism::., no malignancy/cancer.     (-) hyperthyroidism, blood dyscrasia, arthritis, no electrolyte abnormalities, no malignancy/cancer               Abdominal:             Vascular: negative vascular ROS. - PVD, DVT and PE. Other Findings:             Anesthesia Plan      general     ASA 3       Induction: intravenous. MIPS: Postoperative opioids intended and Prophylactic antiemetics administered. Anesthetic plan and risks discussed with patient.                       Fam Jimenez MD   10/9/2021

## 2021-10-09 NOTE — PROGRESS NOTES
[id number] [facility]      PROGRESS NOTE        Patient:  Hillary Buck  YOB: 1950    MRN: [id number]     Acct: [de-identified]     Admit date: 10/5/2021    Pt seen and Chart reviewed. Consultant notes reviewed and care evaluated. Subjective: Patient underwent robotic cholecystectomy this morning seems doing okay just having pain he says he just received pain medication is helping denies any chest pressure pain no nausea or vomiting at this time. Did discuss with his wife Nancy earlier answered her questions and explained his condition as well. Diet:  ADULT DIET;  Regular      Medications:Current Inpatient    Scheduled Meds:   fluticasone  1 spray Each Nostril Daily    ferrous sulfate  325 mg Oral Daily with breakfast    lidocaine  1 patch TransDERmal Daily    piperacillin-tazobactam  3,375 mg IntraVENous Q8H    amLODIPine  10 mg Oral Daily    atorvastatin  10 mg Oral Daily    carvedilol  6.25 mg Oral BID WC    donepezil  10 mg Oral Nightly    FLUoxetine  20 mg Oral Daily    levothyroxine  112 mcg Oral Daily    memantine  10 mg Oral BID    pantoprazole  40 mg Oral QAM AC    insulin lispro  0-6 Units SubCUTAneous TID WC    insulin lispro  0-3 Units SubCUTAneous Nightly    influenza virus vaccine  0.5 mL IntraMUSCular Prior to discharge    scopolamine  1 patch TransDERmal Q72H     Continuous Infusions:   sodium chloride 80 mL/hr at 10/09/21 1113     PRN Meds:oxyCODONE-acetaminophen, ondansetron, fentanNYL, fentanNYL, acetaminophen, promethazine (PHENERGAN) in sodium chloride 0.9% IVPB, phenol, sodium chloride flush, sodium chloride flush, ondansetron, HYDROmorphone        Physical Exam:  Vitals: BP (!) 186/86   Pulse 87   Temp 98.5 °F (36.9 °C) (Oral)   Resp 16   Ht 5' 10\" (1.778 m)   Wt 164 lb (74.4 kg)   SpO2 95%   BMI 23.53 kg/m²   24 hour intake/output:    Intake/Output Summary (Last 24 hours) at 10/9/2021 1134  Last data filed at 10/9/2021 1050  Gross per 24 hour   Intake 3300 ml   Output 1560 ml   Net 1740 ml     Last 3 weights: Wt Readings from Last 3 Encounters:   10/05/21 164 lb (74.4 kg)   09/24/21 174 lb (78.9 kg)   09/20/21 173 lb 3.2 oz (78.6 kg)       Physical Examination:   General appearance - alert, well appearing, and in no distress  Mental status - alert, oriented to person, place, and time  PERRLA wnl  Chest - clear to auscultation, no wheezes, rales or rhonchi, symmetric air entry  Heart - normal rate, regular rhythm, normal S1, S2, no murmurs, rubs, clicks or gallops  Abdomen - soft, nontender, nondistended, no masses or organomegaly patient has about 4 incisions look intact so far no drains noted  but he just came from surgery.   Neurological - alert, oriented, normal speech, no focal findings or movement disorder noted}  Extremities - peripheral pulses normal, no pedal edema, no clubbing or cyanosis  Skin - normal coloration and turgor, no rashes, no suspicious skin lesions noted     EKG 12 Lead [[id number]]    Collected: 10/08/21 1302    Updated: 10/09/21 1017     Ventricular Rate 64 BPM    Atrial Rate 64 BPM    P-R Interval 130 ms    QRS Duration 100 ms    Q-T Interval 442 ms    QTc Calculation (Bazett) 455 ms    P Axis 70 degrees    R Axis 27 degrees    T Axis 31 degrees   Narrative:     Normal sinus rhythm   Inferior infarct (cited on or before 29-JAN-2005)   Abnormal ECG   When compared with ECG of 12-NOV-2011 19:04,   No significant change was found   POC Glucose Fingerstick [[id number]]    Collected: 10/09/21 0528    Updated: 10/09/21 0538     POC Glucose 96 mg/dL   POC Glucose Fingerstick [[id number]] (Abnormal)    Collected: 10/1950    Updated: 10/08/21 1955     POC Glucose 152High  mg/dL   POC Glucose Fingerstick [[id number]] (Abnormal)    Collected: 10/08/21 1709    Updated: 10/08/21 1720     POC Glucose 119High  mg/dL   XR CHEST PORTABLE [[id number]]    Collected: 10/08/21 1922 Updated: 10/08/21 1459    Narrative:     EXAMINATION:   ONE X-RAY VIEW OF THE CHEST     10/8/2021 9:03 am     COMPARISON:   None. HISTORY:   ORDERING SYSTEM PROVIDED HISTORY: sob   TECHNOLOGIST PROVIDED HISTORY:   sob   Reason for Exam: dyspnea   Acuity: Acute   Type of Exam: Initial     FINDINGS:   Right chest port is in place with distal tip at the cavoatrial junction. Heart size and pulmonary vasculature are within normal limits.  No focal   airspace consolidation, pneumothorax, or pleural effusion.  No free air   beneath the diaphragm.  No evidence of acute osseous abnormality.  Chronic   appearing healed left clavicular fracture. Impression:     No acute intrathoracic process. POC Glucose Fingerstick [[id number]]    Collected: 10/08/21 1137    Updated: 10/08/21 1147     POC Glucose 88 mg/dL   Current IP Meds        Assessment:  Active Problems:    Hydronephrosis due to obstruction of ureter    Moderate malnutrition (HCC)  Resolved Problems:    * No resolved hospital problems.  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    Hydronephrosis due to obstruction of ureter N13.1      · Nausea vomiting  ·    · Abdominal pain right-sided  ·    · Status post colonoscopy with polyp back to me  ·    · History of lymphoma seems to be responding to treatment according to the CAT scan with decreasing mass  ·    · New right-sided hydro-  ·    · History of hypertension  · Status post stenting  ·    · Urinary retention he has a Teran now with some blood noted in the Teran  ·    · Still abdominal and right upper quadrant pain on palpation  ·    · HIDA with evidence of chronic cholecystitis and probably associated acute event reported as negative will give him antibiotics  · Status post cystoscopy  · Status post stent  ·    ·    ·    · Hematuria resolving  ·    · Still with right upper quadrant middle quadrant pain no nausea  ·    · Patient is status post robotic cholecystectomy                  Plan:  29. Postop marielos n control  30. Continue with other treatments  31. We will continue to monitor  32. Discussed with his wife Vincent Mo answered her questions  35.      KRISTI Pop             10/9/2021, 11:34 AM

## 2021-10-10 LAB
ABSOLUTE EOS #: 0.1 K/UL (ref 0–0.4)
ABSOLUTE IMMATURE GRANULOCYTE: ABNORMAL K/UL (ref 0–0.3)
ABSOLUTE LYMPH #: 1.4 K/UL (ref 1–4.8)
ABSOLUTE MONO #: 1 K/UL (ref 0.1–1.3)
ANION GAP SERPL CALCULATED.3IONS-SCNC: 9 MMOL/L (ref 9–17)
BASOPHILS # BLD: 0 % (ref 0–2)
BASOPHILS ABSOLUTE: 0 K/UL (ref 0–0.2)
BUN BLDV-MCNC: 14 MG/DL (ref 8–23)
BUN/CREAT BLD: ABNORMAL (ref 9–20)
CALCIUM SERPL-MCNC: 8.1 MG/DL (ref 8.6–10.4)
CHLORIDE BLD-SCNC: 109 MMOL/L (ref 98–107)
CO2: 23 MMOL/L (ref 20–31)
CREAT SERPL-MCNC: 1.16 MG/DL (ref 0.7–1.2)
DIFFERENTIAL TYPE: ABNORMAL
EOSINOPHILS RELATIVE PERCENT: 1 % (ref 0–4)
GFR AFRICAN AMERICAN: >60 ML/MIN
GFR NON-AFRICAN AMERICAN: >60 ML/MIN
GFR SERPL CREATININE-BSD FRML MDRD: ABNORMAL ML/MIN/{1.73_M2}
GFR SERPL CREATININE-BSD FRML MDRD: ABNORMAL ML/MIN/{1.73_M2}
GLUCOSE BLD-MCNC: 102 MG/DL (ref 75–110)
GLUCOSE BLD-MCNC: 102 MG/DL (ref 75–110)
GLUCOSE BLD-MCNC: 103 MG/DL (ref 70–99)
GLUCOSE BLD-MCNC: 152 MG/DL (ref 75–110)
GLUCOSE BLD-MCNC: 167 MG/DL (ref 75–110)
GLUCOSE BLD-MCNC: 96 MG/DL (ref 75–110)
HCT VFR BLD CALC: 32.9 % (ref 41–53)
HEMOGLOBIN: 11 G/DL (ref 13.5–17.5)
IMMATURE GRANULOCYTES: ABNORMAL %
LYMPHOCYTES # BLD: 15 % (ref 24–44)
MCH RBC QN AUTO: 29.3 PG (ref 26–34)
MCHC RBC AUTO-ENTMCNC: 33.5 G/DL (ref 31–37)
MCV RBC AUTO: 87.6 FL (ref 80–100)
MONOCYTES # BLD: 11 % (ref 1–7)
NRBC AUTOMATED: ABNORMAL PER 100 WBC
PDW BLD-RTO: 15.2 % (ref 11.5–14.9)
PLATELET # BLD: 174 K/UL (ref 150–450)
PLATELET ESTIMATE: ABNORMAL
PMV BLD AUTO: 7.9 FL (ref 6–12)
POTASSIUM SERPL-SCNC: 4.4 MMOL/L (ref 3.7–5.3)
RBC # BLD: 3.75 M/UL (ref 4.5–5.9)
RBC # BLD: ABNORMAL 10*6/UL
SEG NEUTROPHILS: 73 % (ref 36–66)
SEGMENTED NEUTROPHILS ABSOLUTE COUNT: 6.7 K/UL (ref 1.3–9.1)
SODIUM BLD-SCNC: 141 MMOL/L (ref 135–144)
WBC # BLD: 9.2 K/UL (ref 3.5–11)
WBC # BLD: ABNORMAL 10*3/UL

## 2021-10-10 PROCEDURE — 97162 PT EVAL MOD COMPLEX 30 MIN: CPT

## 2021-10-10 PROCEDURE — 82947 ASSAY GLUCOSE BLOOD QUANT: CPT

## 2021-10-10 PROCEDURE — 6360000002 HC RX W HCPCS: Performed by: SURGERY

## 2021-10-10 PROCEDURE — 6370000000 HC RX 637 (ALT 250 FOR IP): Performed by: SURGERY

## 2021-10-10 PROCEDURE — 80048 BASIC METABOLIC PNL TOTAL CA: CPT

## 2021-10-10 PROCEDURE — 2580000003 HC RX 258: Performed by: SURGERY

## 2021-10-10 PROCEDURE — 6370000000 HC RX 637 (ALT 250 FOR IP): Performed by: FAMILY MEDICINE

## 2021-10-10 PROCEDURE — 36415 COLL VENOUS BLD VENIPUNCTURE: CPT

## 2021-10-10 PROCEDURE — 97116 GAIT TRAINING THERAPY: CPT

## 2021-10-10 PROCEDURE — 85025 COMPLETE CBC W/AUTO DIFF WBC: CPT

## 2021-10-10 PROCEDURE — 99232 SBSQ HOSP IP/OBS MODERATE 35: CPT | Performed by: INTERNAL MEDICINE

## 2021-10-10 PROCEDURE — 1200000000 HC SEMI PRIVATE

## 2021-10-10 RX ORDER — CARVEDILOL 12.5 MG/1
12.5 TABLET ORAL 2 TIMES DAILY WITH MEALS
Status: DISCONTINUED | OUTPATIENT
Start: 2021-10-10 | End: 2021-10-12 | Stop reason: HOSPADM

## 2021-10-10 RX ADMIN — PIPERACILLIN SODIUM AND TAZOBACTAM SODIUM 3375 MG: 3; .375 INJECTION, POWDER, LYOPHILIZED, FOR SOLUTION INTRAVENOUS at 16:40

## 2021-10-10 RX ADMIN — OXYCODONE HYDROCHLORIDE AND ACETAMINOPHEN 1 TABLET: 5; 325 TABLET ORAL at 09:08

## 2021-10-10 RX ADMIN — INSULIN LISPRO 1 UNITS: 100 INJECTION, SOLUTION INTRAVENOUS; SUBCUTANEOUS at 21:04

## 2021-10-10 RX ADMIN — CARVEDILOL 12.5 MG: 12.5 TABLET, FILM COATED ORAL at 16:42

## 2021-10-10 RX ADMIN — DONEPEZIL HYDROCHLORIDE 10 MG: 10 TABLET, FILM COATED ORAL at 21:03

## 2021-10-10 RX ADMIN — PIPERACILLIN SODIUM AND TAZOBACTAM SODIUM 3375 MG: 3; .375 INJECTION, POWDER, LYOPHILIZED, FOR SOLUTION INTRAVENOUS at 02:00

## 2021-10-10 RX ADMIN — OXYCODONE HYDROCHLORIDE AND ACETAMINOPHEN 1 TABLET: 5; 325 TABLET ORAL at 16:45

## 2021-10-10 RX ADMIN — INSULIN LISPRO 1 UNITS: 100 INJECTION, SOLUTION INTRAVENOUS; SUBCUTANEOUS at 12:42

## 2021-10-10 RX ADMIN — AMLODIPINE BESYLATE 10 MG: 5 TABLET ORAL at 07:21

## 2021-10-10 RX ADMIN — ONDANSETRON 4 MG: 2 INJECTION INTRAMUSCULAR; INTRAVENOUS at 05:02

## 2021-10-10 RX ADMIN — PANTOPRAZOLE SODIUM 40 MG: 40 TABLET, DELAYED RELEASE ORAL at 06:45

## 2021-10-10 RX ADMIN — OXYCODONE HYDROCHLORIDE AND ACETAMINOPHEN 1 TABLET: 5; 325 TABLET ORAL at 05:02

## 2021-10-10 RX ADMIN — MEMANTINE 10 MG: 10 TABLET ORAL at 07:21

## 2021-10-10 RX ADMIN — FLUOXETINE HYDROCHLORIDE 20 MG: 20 CAPSULE ORAL at 07:21

## 2021-10-10 RX ADMIN — LEVOTHYROXINE SODIUM 112 MCG: 0.11 TABLET ORAL at 06:45

## 2021-10-10 RX ADMIN — PIPERACILLIN SODIUM AND TAZOBACTAM SODIUM 3375 MG: 3; .375 INJECTION, POWDER, LYOPHILIZED, FOR SOLUTION INTRAVENOUS at 07:22

## 2021-10-10 RX ADMIN — OXYCODONE HYDROCHLORIDE AND ACETAMINOPHEN 1 TABLET: 5; 325 TABLET ORAL at 13:08

## 2021-10-10 RX ADMIN — FERROUS SULFATE TAB 325 MG (65 MG ELEMENTAL FE) 325 MG: 325 (65 FE) TAB at 07:21

## 2021-10-10 RX ADMIN — ATORVASTATIN CALCIUM 10 MG: 10 TABLET, FILM COATED ORAL at 07:21

## 2021-10-10 RX ADMIN — OXYCODONE HYDROCHLORIDE AND ACETAMINOPHEN 1 TABLET: 5; 325 TABLET ORAL at 21:04

## 2021-10-10 RX ADMIN — MEMANTINE 10 MG: 10 TABLET ORAL at 21:04

## 2021-10-10 RX ADMIN — CARVEDILOL 6.25 MG: 6.25 TABLET, FILM COATED ORAL at 07:21

## 2021-10-10 ASSESSMENT — PAIN DESCRIPTION - LOCATION
LOCATION: ABDOMEN

## 2021-10-10 ASSESSMENT — PAIN DESCRIPTION - PAIN TYPE
TYPE: SURGICAL PAIN
TYPE: SURGICAL PAIN
TYPE: ACUTE PAIN
TYPE: SURGICAL PAIN
TYPE: ACUTE PAIN
TYPE: SURGICAL PAIN
TYPE: ACUTE PAIN

## 2021-10-10 ASSESSMENT — PAIN DESCRIPTION - ONSET
ONSET: ON-GOING
ONSET: ON-GOING

## 2021-10-10 ASSESSMENT — PAIN DESCRIPTION - FREQUENCY
FREQUENCY: CONTINUOUS

## 2021-10-10 ASSESSMENT — PAIN SCALES - GENERAL
PAINLEVEL_OUTOF10: 10
PAINLEVEL_OUTOF10: 5
PAINLEVEL_OUTOF10: 8
PAINLEVEL_OUTOF10: 9
PAINLEVEL_OUTOF10: 4
PAINLEVEL_OUTOF10: 6
PAINLEVEL_OUTOF10: 8
PAINLEVEL_OUTOF10: 9
PAINLEVEL_OUTOF10: 0
PAINLEVEL_OUTOF10: 8
PAINLEVEL_OUTOF10: 6

## 2021-10-10 ASSESSMENT — PAIN - FUNCTIONAL ASSESSMENT
PAIN_FUNCTIONAL_ASSESSMENT: PREVENTS OR INTERFERES SOME ACTIVE ACTIVITIES AND ADLS

## 2021-10-10 ASSESSMENT — PAIN DESCRIPTION - DESCRIPTORS
DESCRIPTORS: SHARP;BURNING
DESCRIPTORS: BURNING;ACHING

## 2021-10-10 ASSESSMENT — PAIN DESCRIPTION - ORIENTATION: ORIENTATION: MID;RIGHT

## 2021-10-10 ASSESSMENT — PAIN DESCRIPTION - PROGRESSION
CLINICAL_PROGRESSION: NOT CHANGED

## 2021-10-10 NOTE — PROGRESS NOTES
[id number] [place] [employer]      PROGRESS NOTE        Patient:  Stef Schneider  YOB: 1950    MRN: [id number]     Acct: [de-identified]     Admit date: 10/5/2021    Pt seen and Chart reviewed. Consultant notes reviewed and care evaluated. Subjective: Patient reports he still having pain but the pain is different than what he had before surgery. Just sounds like the postop pain pain medication does help. His nausea resolved he says. He did eat breakfast this morning kept it down he just did not like it but he still ate his oatmeal and kept it down. He has lunch now but he is just waiting a bit before he started eating he says. He denies any chest pain or shortness of breath. He is getting up going to bathroom but he feels somewhat weak and wobbly he says. Blood pressure has been running high that could be just pain but will adjust his medications for now. He has no family in the room. Did discuss with his wife Cem Quinones yesterday. Diet:  ADULT DIET;  Regular      Medications:Current Inpatient    Scheduled Meds:   fluticasone  1 spray Each Nostril Daily    ferrous sulfate  325 mg Oral Daily with breakfast    lidocaine  1 patch TransDERmal Daily    piperacillin-tazobactam  3,375 mg IntraVENous Q8H    amLODIPine  10 mg Oral Daily    atorvastatin  10 mg Oral Daily    carvedilol  6.25 mg Oral BID WC    donepezil  10 mg Oral Nightly    FLUoxetine  20 mg Oral Daily    levothyroxine  112 mcg Oral Daily    memantine  10 mg Oral BID    pantoprazole  40 mg Oral QAM AC    insulin lispro  0-6 Units SubCUTAneous TID WC    insulin lispro  0-3 Units SubCUTAneous Nightly    influenza virus vaccine  0.5 mL IntraMUSCular Prior to discharge    scopolamine  1 patch TransDERmal Q72H     Continuous Infusions:   sodium chloride 80 mL/hr at 10/09/21 1113     PRN Meds:oxyCODONE-acetaminophen, ondansetron, fentanNYL, fentanNYL, acetaminophen, promethazine (PHENERGAN) in sodium chloride 0.9% IVPB, phenol, sodium chloride flush, sodium chloride flush, ondansetron, HYDROmorphone        Physical Exam:  Vitals: BP (!) 178/91   Pulse 66   Temp 98.5 °F (36.9 °C) (Oral)   Resp 16   Ht 5' 10\" (1.778 m)   Wt 164 lb (74.4 kg)   SpO2 94%   BMI 23.53 kg/m²   24 hour intake/output:    Intake/Output Summary (Last 24 hours) at 10/10/2021 1248  Last data filed at 10/9/2021 2100  Gross per 24 hour   Intake 520 ml   Output 1600 ml   Net -1080 ml     Last 3 weights:   Wt Readings from Last 3 Encounters:   10/05/21 164 lb (74.4 kg)   09/24/21 174 lb (78.9 kg)   09/20/21 173 lb 3.2 oz (78.6 kg)       Physical Examination:   General appearance - alert, well appearing, and in no distress  Mental status - alert, oriented to person, place, and time  PERRLA wnl  Chest - clear to auscultation, no wheezes, rales or rhonchi, symmetric air entry  Heart - normal rate, regular rhythm, normal S1, S2, no murmurs, rubs, clicks or gallops  Abdomen - soft, has bowel sounds, nondistended, no masses or organomegaly  postop tenderness no distention his incision is intact  Neurological - alert, oriented, normal speech, no focal findings or movement disorder noted}  Extremities - peripheral pulses normal, no pedal edema, no clubbing or cyanosis  Skin - normal coloration and turgor, no rashes, no suspicious skin lesions noted   His Teran cath the urine looks darker yellow but no clots no blood in looks like he is urinating very good half of the bag is full today   Component Value Units   POC Glucose Fingerstick [[id number]] (Abnormal)    Collected: 10/10/21 1111    Updated: 10/10/21 1117     POC Glucose 152High  mg/dL   Basic Metabolic Panel w/ Reflex to MG [[id number]] (Abnormal)    Collected: 10/10/21 0656    Updated: 10/10/21 0719    Specimen Source: Blood     Glucose 103High  mg/dL    BUN 14 mg/dL    CREATININE 1.16 mg/dL    Bun/Cre Ratio NOT REPORTED    Calcium 8.1Low  mg/dL    Sodium Assessment:  Active Problems:    Hydronephrosis due to obstruction of ureter    Moderate malnutrition (HCC)  Resolved Problems:    * No resolved hospital problems. 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    Hydronephrosis due to obstruction of ureter N13.1      · Nausea vomiting  ·    · Abdominal pain right-sided  ·    · Status post colonoscopy with polyp back to me  ·    · History of lymphoma seems to be responding to treatment according to the CAT scan with decreasing mass  ·    · New right-sided hydro-  ·    · History of hypertension  · Status post stenting  ·    · Urinary retention he has a Teran now with some blood noted in the Teran  ·    · Still abdominal and right upper quadrant pain on palpation  ·    · HIDA with evidence of chronic cholecystitis and probably associated acute event reported as negative will give him antibiotics  · Status post cystoscopy  · Status post stent  ·    ·    ·    · Hematuria resolving  ·    · Still with right upper quadrant middle quadrant pain no nausea  ·    · Patient is status post robotic cholecystectomy  · Still postop pain  ·   · Nausea resolved  ·   · Weakness  · Elevated blood pressure that could be also secondary his pain                  Plan:  1. We will increase his Coreg to 12.5 mg p.o. twice daily with parameters monitor his blood pressure  2. We will get physical therapy to evaluate patient  3.   4. We will see how he does overnight and if anything may be home tomorrow with VNS and physical therapy  5.      KRISTI Aguiar             10/10/2021, 12:48 PM

## 2021-10-10 NOTE — PROGRESS NOTES
Patient was seen and examined. Awake alert in no acute distress. Pain is manageable according to him. Afebrile vital signs are stable. Tolerating diet. Abdomen is soft. Dressing is dry. Postoperative tenderness. Nothing acute. Extremity nontender. Blood work pending. Surgically stable. Discharged home when okay with admitting physician. Discharge instructions in the chart. Prescriptions called into patient's pharmacy.

## 2021-10-10 NOTE — CARE COORDINATION
ONGOING DISCHARGE PLAN:    Patient is alert and oriented x4. Spoke with patient regarding discharge plan and patient confirms that plan is still to return to home w/ Wife. Denies VNS. Pt. Is POD #4, cysto w/ Stent & costello placed. Pt. States, his Wife, will be able to take care of this at home. Pt. Is POD #1, Robotic April. Reg Diet. Per Nursing, still having a lot of pain. ?DC today. Will continue to follow for additional discharge needs.     Electronically signed by Geovany Hinojosa RN on 10/10/2021 at 11:17 AM

## 2021-10-10 NOTE — PROGRESS NOTES
Physical Therapy    Facility/Department: [facility] MED SURG  Initial Assessment    NAME: Melita Avina  : 1950  MRN: [id number]    Date of Service: 10/10/2021    Discharge Recommendations:  Patient would benefit from continued therapy after discharge        Assessment   Body structures, Functions, Activity limitations: Decreased functional mobility ; Decreased endurance;Decreased balance; Increased pain  Assessment: Pt's mobility limitied due to pain and decreased endurance, required 1 lt of o2 for mobility. Will conintue POC to maximize potential. Will jose roberto able to return home form here with family's assistance at home  Specific instructions for Next Treatment: Monitor sao2 with activity  Prognosis: Good  Decision Making: Medium Complexity  History: Melita Avina is a 66 y.o. male with PMHx type II DM, hypothyroidism, and non-Hodgkin's lymphoma undergoing chemotherapy who presents with right sided abdominal pain. Patient states he has been having intermittent pain since July but worsened over the past several days. Pain is dull, now constant, radiates to right flank. Associated with N/V, subjective fevers/chills, and poor appetite. He denies SOB, hematemesis, diarrhea, melena, hematochezia, dysuria, hematuria. S/p open bilateral inguinal hernia repairs, IR retroperitoneal mass biopsy 2021, and IR port placement 2021. Biopsy confirmed follicular lymphoma, stage 1-2. He did just have a colonoscopy per GI 2021 with removal of several polyps. Pathology came back as tubular adenomas and sessile serrated adenoma. Labs in ED unremarkable. CT abdomen/pelvis revealed interval decrease in left retroperitoneal soft tissue mass, indicating response to therapy. Cholelithiasis and right hydronephrosis also identified. Underwent cystoscopy wit huretheral stents placement 10/6/21, colecystectomy 10/9/2.  REQUIRES PT FOLLOW UP: Yes  Activity Tolerance  Activity Tolerance: Patient limited by pain; Patient limited by fatigue;Patient limited by endurance       Patient Diagnosis(es): The primary encounter diagnosis was Hydronephrosis with ureteral stricture, not elsewhere classified. Diagnoses of Pain and Chronic cholecystitis were also pertinent to this visit. has a past medical history of Cancer [facility]), Chemotherapy management, encounter for, Diabetes mellitus ([facility] [address] History of non-Hodgkin's lymphoma, Hyperlipidemia, Hypertension, and Hypothyroidism. has a past surgical history that includes hernia repair; IR BIOPSY ABDOMINAL/RETROPERITONEAL MASS PERCUTANEOUS (7/6/2021); IR PORT PLACEMENT > 5 YEARS (8/31/2021); Colonoscopy (Left, 9/24/2021); Cystoscopy (Right, 10/6/2021); and Cholecystectomy, laparoscopic (N/A, 10/9/2021). Restrictions  Restrictions/Precautions  Restrictions/Precautions: General Precautions  Position Activity Restriction  Other position/activity restrictions:  Teran, IV, o2  Vision/Hearing  Vision: Within Functional Limits  Hearing: Exceptions to [facility]  Hearing Exceptions: Hard of hearing/hearing concerns     Subjective  General  Patient assessed for rehabilitation services?: Yes  Referral Date : 10/10/21  Diagnosis: Hydronephrosis  Follows Commands: Within Functional Limits  Pain Screening  Patient Currently in Pain: Yes  Pain Assessment  Pain Assessment: 0-10  Pain Level: 8  Pain Type: Acute pain  Pain Location: Abdomen  Pain Orientation: Mid;Right  Pain Descriptors:  (Electrical shock)  Pain Frequency: Continuous  Clinical Progression: Not changed  Functional Pain Assessment: Prevents or interferes some active activities and ADLs  Vital Signs  Patient Currently in Pain: Yes  Oxygen Therapy  SpO2: 92 %  O2 Device: Nasal cannula  O2 Flow Rate (L/min): 1 L/min       Orientation     Social/Functional History  Social/Functional History  Lives With: Spouse  Type of Home: House  Home Layout: One level  Home Access: Stairs to enter with rails, Stairs to enter without rails  Entrance Stairs - Number of Steps: 1  Bathroom Shower/Tub: Tub/Shower unit, Doors, Shower chair without back  [employer]: Handicap height  Bathroom Equipment: Hand-held shower, Grab bars in shower  Bathroom Accessibility: Accessible  Home Equipment: [employer]  ADL Assistance: Independent  Ambulation Assistance: Independent  Transfer Assistance: Independent  Active : Yes  Mode of Transportation: Car  Occupation: Retired  IADL Comments: Sleeps in a flat bed, cooper has a recliner at home. Additional Comments: Spouse in good health and will be petr to assist as needed at home. Cognition        Objective          AROM RLE (degrees)  RLE AROM: WFL  AROM LLE (degrees)  LLE AROM : WFL  AROM RUE (degrees)  RUE AROM : WFL  AROM LUE (degrees)  LUE AROM : WFL  Strength RLE  Strength RLE: WFL  Strength LLE  Strength LLE: WFL     Sensation  Overall Sensation Status: Impaired  Additional Comments: B feet numbness/neuropathy. Bed mobility  Supine to Sit: Minimal assistance  Sit to Supine: Contact guard assistance  Scooting: Stand by assistance  Transfers  Sit to Stand: Stand by assistance  Stand to sit: Stand by assistance  Ambulation  Ambulation?: Yes  Ambulation 1  Surface: level tile  Device: No Device (IV Pole)  Assistance: Stand by assistance  Quality of Gait: steady gait, pain  and fatique limiting activity, sao2 87 % with mobility with 1 lt o2, sao2 increased to 92% at rest  Distance: 40 ft  Comments: Attempted dangling , standing at bed side without supplemntal o2, sao2 only upto 88% max, provided 1 lt o2 for mobility. Pt reports feel ches t tightness, unabel to take deep breaths, RN Alberto notified.       Balance  Posture: Good  Sitting - Static: Good  Sitting - Dynamic: Good;-  Standing - Static: Good  Standing - Dynamic: Fair;+        Plan   Plan  Times per week: daily  Specific instructions for Next Treatment: Monitor sao2 with activity  Current Treatment Recommendations: Strengthening, Balance Training, Functional Mobility Training, Transfer Training, Endurance Training, Gait Training, Stair training, Home Exercise Program, Patient/Caregiver Education & Training, Safety Education & Training  Safety Devices  Type of devices: Left in bed, Call light within reach, Nurse notified    G-Code       OutComes Score                                                  AM-PAC Score  AM-PAC Inpatient Mobility Raw Score : 17 (10/10/21 1629)  AM-PAC Inpatient T-Scale Score : 42.13 (10/10/21 1629)  Mobility Inpatient CMS 0-100% Score: 50.57 (10/10/21 1629)  Mobility Inpatient CMS G-Code Modifier : CK (10/10/21 1629)          Goals  Short term goals  Time Frame for Short term goals: 3 visits  Short term goal 1: Pt able to perform supine<>sit SBA  Short term goal 2: Ambulate without device distance fo > 100 ft, sao2 > 89%, SBA  Short term goal 3:  Pt petr to perform step upsx 4 , sba with 1 UE support.    Patient Goals   Patient goals : Feel better       Therapy Time   Individual Concurrent Group Co-treatment   Time In 8465         Time Out 1419         Minutes 25         Timed Code Treatment Minutes: 10 Minutes       Patricia Hansen, PT

## 2021-10-10 NOTE — PROGRESS NOTES
Patient called out stating he wasn't sure if he had to pass gas or have BM. Patient assisted to bathroom. Patient states he passed gas but no BM. Teran care performed. Patient returned to bed. Patient resting comfortably. Will continue to monitor patient.

## 2021-10-10 NOTE — PROGRESS NOTES
BIOPSY ABDOMINAL/RETROPERITONEAL MASS PERCUTANEOUS (7/6/2021); IR PORT PLACEMENT > 5 YEARS (8/31/2021); Colonoscopy (Left, 9/24/2021); Cystoscopy (Right, 10/6/2021); and Cholecystectomy, laparoscopic (N/A, 10/9/2021). Medications:    Prior to Admission medications    Medication Sig Start Date End Date Taking? Authorizing Provider   cephALEXin (KEFLEX) 500 MG capsule 500 mgTake three times daily 10/9/21  Yes Loy Garg MD   ondansetron (ZOFRAN) 4 MG tablet Take every six hours as needed 10/9/21  Yes Loy Garg MD   oxyCODONE-acetaminophen (PERCOCET) 5-325 MG per tablet Take 1 tablet by mouth every 6 hours as needed for Pain for up to 7 days. . Take lowest dose possible to manage pain 10/9/21 10/16/21 Yes Loy Garg MD   amLODIPine (NORVASC) 5 MG tablet Take 10 mg by mouth daily    Yes Historical Provider, MD   pregabalin (LYRICA) 150 MG capsule Take 150 mg by mouth 3 times daily.    Yes Historical Provider, MD   glipiZIDE-metFORMIN (METAGLIP) 5-500 MG per tablet Take 2 tablets by mouth daily (with breakfast)   Yes Historical Provider, MD   glipiZIDE-metFORMIN (METAGLIP) 5-500 MG per tablet Take 1 tablet by mouth Daily with supper   Yes Historical Provider, MD   donepezil (ARICEPT) 10 MG tablet Take 10 mg by mouth nightly  7/11/21  Yes Historical Provider, MD   meloxicam (MOBIC) 15 MG tablet Take 15 mg by mouth daily  7/14/21  Yes Historical Provider, MD   atorvastatin (LIPITOR) 10 MG tablet Take 10 mg by mouth daily   Yes Historical Provider, MD   FLUoxetine (PROZAC) 20 MG capsule Take 20 mg by mouth daily   Yes Historical Provider, MD   lisinopril-hydroCHLOROthiazide (PRINZIDE;ZESTORETIC) 20-12.5 MG per tablet Take 1 tablet by mouth daily  7/14/21  Yes Historical Provider, MD   carvedilol (COREG) 6.25 MG tablet Take 6.25 mg by mouth 2 times daily (with meals)   Yes Historical Provider, MD   omeprazole (PRILOSEC) 20 MG delayed release capsule Take 20 mg by mouth 2 times daily   Yes Historical [id number]    atorvastatin (LIPITOR) tablet 10 mg  10 mg Oral Daily Arpit Hernandez MD   10 mg at 10/10/21 0721    donepezil (ARICEPT) tablet 10 mg  10 mg Oral Nightly Arpit Hernandez MD   10 mg at 10/09/21 2100    FLUoxetine (PROZAC) capsule 20 mg  20 mg Oral Daily Arpit Hernandez MD   20 mg at 10/10/21 0721    levothyroxine (SYNTHROID) tablet 112 mcg  112 mcg Oral Daily Arpit Hernandez MD   112 mcg at 10/10/21 0645    memantine (NAMENDA) tablet 10 mg  10 mg Oral BID Arpit Hernandez MD   10 mg at 10/10/21 0721    pantoprazole (PROTONIX) tablet 40 mg  40 mg Oral QAM AC Filibrant Van MD   40 mg at 10/10/21 0645    ondansetron (ZOFRAN) injection 4 mg  4 mg IntraVENous Q8H PRN Arpit Hernandez MD   4 mg at 10/09/21 0126    insulin lispro (HUMALOG) injection vial 0-6 Units  0-6 Units SubCUTAneous TID WC Arpit Hernandez MD   1 Units at 10/10/21 1242    insulin lispro (HUMALOG) injection vial 0-3 Units  0-3 Units SubCUTAneous Nightly Arpit Hernandez MD   1 Units at 10/09/21 2101    influenza quadrivalent split vaccine (FLUZONE;FLUARIX;FLULAVAL;AFLURIA) injection 0.5 mL  0.5 mL IntraMUSCular Prior to discharge Arpit Hernandez MD        HYDROmorphone (DILAUDID) injection 1 mg  1 mg IntraVENous Q4H PRN Arpit Hernandez MD   1 mg at 10/09/21 2322    0.9 % sodium chloride infusion   IntraVENous Continuous Arpit Hernandez MD 80 mL/hr at 10/09/21 1113 Restarted at 10/09/21 1113    scopolamine (TRANSDERM-SCOP) transdermal patch 1 patch  1 patch TransDERmal Q72H Arpit Hernandez MD   1 patch at 10/09/21 0007       Allergies:  Patient has no known allergies. Social History:   reports that he quit smoking about 15 years ago. His smoking use included cigarettes. He has a 90.00 pack-year smoking history. He has never used smokeless tobacco. He reports current alcohol use of about 1.7 standard drinks of alcohol per week. He reports current drug use. Drug: Marijuana.      Family History: family history includes Alzheimer's Disease in his father; Diabetes in his mother; Heart Disease in his brother and brother. REVIEW OF SYSTEMS:      Constitutional: No fever or chills. No night sweats, no weight loss   Eyes: No eye discharge, double vision, or eye pain   HEENT: negative for sore mouth, sore throat, hoarseness and voice change   Respiratory: negative for cough , sputum, dyspnea, wheezing, hemoptysis, chest pain   Cardiovascular: negative for chest pain, dyspnea, palpitations, orthopnea, PND   Gastrointestinal: Positive abdominal pain  Genitourinary: negative for frequency, dysuria, nocturia, urinary incontinence, and hematuria   Integument: negative for rash, skin lesions, bruises. Hematologic/Lymphatic: negative for easy bruising, bleeding, lymphadenopathy, or petechiae   Endocrine: negative for heat or cold intolerance,weight changes, change in bowel habits and hair loss   Musculoskeletal: negative for myalgias, arthralgias, pain, joint swelling,and bone pain   Neurological: negative for headaches, dizziness, seizures, weakness, numbness    PHYSICAL EXAM:        BP (!) 130/54   Pulse 63   Temp 98.1 °F (36.7 °C)   Resp 16   Ht 5' 10\" (1.778 m)   Wt 164 lb (74.4 kg)   SpO2 92%   BMI 23.53 kg/m²    Temp (24hrs), Av.3 °F (36.8 °C), Min:97.7 °F (36.5 °C), Max:98.5 °F (36.9 °C)      General appearance - well appearing, no in pain or distress   Mental status - alert and cooperative   Eyes - pupils equal and reactive, extraocular eye movements intact   Ears - bilateral TM's and external ear canals normal   Mouth - mucous membranes moist, pharynx normal without lesions   Neck - supple, no significant adenopathy   Lymphatics - no palpable lymphadenopathy, no hepatosplenomegaly   Chest - clear to auscultation, no wheezes, rales or rhonchi, symmetric air entry   Heart - normal rate, regular rhythm, normal S1, S2, no murmurs  Abdomen - soft, nondistended, no masses or organomegaly.   Generalized mild 99 mg/dL    BUN 20 8 - 23 mg/dL    CREATININE 1.25 (H) 0.70 - 1.20 mg/dL    Bun/Cre Ratio NOT REPORTED 9 - 20    Calcium 9.1 8.6 - 10.4 mg/dL    Sodium 144 135 - 144 mmol/L    Potassium 4.6 3.7 - 5.3 mmol/L    Chloride 105 98 - 107 mmol/L    CO2 24 20 - 31 mmol/L    Anion Gap 15 9 - 17 mmol/L    GFR Non-African American 57 (L) >60 mL/min    GFR African American >60 >60 mL/min    GFR Comment          GFR Staging NOT REPORTED    Lactic Acid   Result Value Ref Range    Lactic Acid 1.3 0.5 - 2.2 mmol/L   Lactate Dehydrogenase   Result Value Ref Range     135 - 225 U/L   Phosphorus   Result Value Ref Range    Phosphorus 3.4 2.5 - 4.5 mg/dL   Uric Acid   Result Value Ref Range    Uric Acid 9.1 (H) 3.4 - 7.0 mg/dL   Urinalysis Reflex to Culture    Specimen: Urine, clean catch   Result Value Ref Range    Color, UA Yellow Yellow    Turbidity UA Clear Clear    Glucose, Ur NEGATIVE NEGATIVE    Bilirubin Urine SMALL (A) NEGATIVE    Ketones, Urine SMALL (A) NEGATIVE    Specific Gravity, UA 1.045 (H) 1.000 - 1.030    Urine Hgb NEGATIVE NEGATIVE    pH, UA 5.5 5.0 - 8.0    Protein, UA 4+ (A) NEGATIVE    Urobilinogen, Urine Normal Normal    Nitrite, Urine NEGATIVE NEGATIVE    Leukocyte Esterase, Urine NEGATIVE NEGATIVE    Urinalysis Comments NOT REPORTED    Microscopic Urinalysis   Result Value Ref Range    -          WBC, UA 2 TO 5 /HPF    RBC, UA 2 TO 5 /HPF    Casts UA HYALINE /LPF    Casts UA 0 TO 2 /LPF    Crystals, UA NOT REPORTED None /HPF    Epithelial Cells UA 2 TO 5 /HPF    Renal Epithelial, UA NOT REPORTED 0 /HPF    Bacteria, UA FEW (A) None    Mucus, UA NOT REPORTED None    Trichomonas, UA NOT REPORTED None    Amorphous, UA NOT REPORTED None    Other Observations UA NOT REPORTED NOT REQ.     Yeast, UA NOT REPORTED None   CBC with DIFF   Result Value Ref Range    WBC 6.2 3.5 - 11.0 k/uL    RBC 3.89 (L) 4.5 - 5.9 m/uL    Hemoglobin 11.7 (L) 13.5 - 17.5 g/dL    Hematocrit 34.4 (L) 41 - 53 %    MCV 88.3 80 - 100 fL MCH 29.9 26 - 34 pg    MCHC 33.9 31 - 37 g/dL    RDW 15.5 (H) 11.5 - 14.9 %    Platelets 918 (L) 709 - 450 k/uL    MPV 8.5 6.0 - 12.0 fL    NRBC Automated NOT REPORTED per 100 WBC    Differential Type NOT REPORTED     Seg Neutrophils 77 (H) 36 - 66 %    Lymphocytes 10 (L) 24 - 44 %    Monocytes 11 (H) 1 - 7 %    Eosinophils % 2 0 - 4 %    Basophils 0 0 - 2 %    Immature Granulocytes NOT REPORTED 0 %    Segs Absolute 4.70 1.3 - 9.1 k/uL    Absolute Lymph # 0.60 (L) 1.0 - 4.8 k/uL    Absolute Mono # 0.70 0.1 - 1.3 k/uL    Absolute Eos # 0.10 0.0 - 0.4 k/uL    Basophils Absolute 0.00 0.0 - 0.2 k/uL    Absolute Immature Granulocyte NOT REPORTED 0.00 - 0.30 k/uL    WBC Morphology NOT REPORTED     RBC Morphology NOT REPORTED     Platelet Estimate NOT REPORTED    BASIC METABOLIC PANEL   Result Value Ref Range    Glucose 94 70 - 99 mg/dL    BUN 19 8 - 23 mg/dL    CREATININE 1.10 0.70 - 1.20 mg/dL    Bun/Cre Ratio NOT REPORTED 9 - 20    Calcium 8.4 (L) 8.6 - 10.4 mg/dL    Sodium 147 (H) 135 - 144 mmol/L    Potassium 4.5 3.7 - 5.3 mmol/L    Chloride 110 (H) 98 - 107 mmol/L    CO2 26 20 - 31 mmol/L    Anion Gap 11 9 - 17 mmol/L    GFR Non-African American >60 >60 mL/min    GFR African American >60 >60 mL/min    GFR Comment          GFR Staging NOT REPORTED    Lipase   Result Value Ref Range    Lipase 19 13 - 60 U/L   Amylase   Result Value Ref Range    Amylase 45 28 - 100 U/L   Hepatic Function Panel   Result Value Ref Range    Albumin 3.5 3.5 - 5.2 g/dL    Alkaline Phosphatase 129 40 - 129 U/L    ALT 13 5 - 41 U/L    AST 11 <40 U/L    Total Bilirubin 0.61 0.3 - 1.2 mg/dL    Bilirubin, Direct 0.17 <0.31 mg/dL    Bilirubin, Indirect 0.44 0.00 - 1.00 mg/dL    Total Protein 5.4 (L) 6.4 - 8.3 g/dL    Globulin NOT REPORTED 1.5 - 3.8 g/dL    Albumin/Globulin Ratio NOT REPORTED 1.0 - 2.5   FERRITIN   Result Value Ref Range    Ferritin 137 30 - 400 ug/L   IRON AND TIBC   Result Value Ref Range    Iron 46 (L) 59 - 158 ug/dL    TIBC 237 (L) 250 - 450 ug/dL    Iron Saturation 19 (L) 20 - 55 %    UIBC 191 112 - 347 ug/dL   Vitamin B12 & Folate   Result Value Ref Range    Vitamin B-12 791 232 - 1245 pg/mL    Folate 18.2 >4.8 ng/mL   CBC with DIFF   Result Value Ref Range    WBC 6.9 3.5 - 11.0 k/uL    RBC 3.75 (L) 4.5 - 5.9 m/uL    Hemoglobin 11.3 (L) 13.5 - 17.5 g/dL    Hematocrit 33.6 (L) 41 - 53 %    MCV 89.4 80 - 100 fL    MCH 30.1 26 - 34 pg    MCHC 33.7 31 - 37 g/dL    RDW 15.1 (H) 11.5 - 14.9 %    Platelets 079 (L) 954 - 450 k/uL    MPV 8.4 6.0 - 12.0 fL    NRBC Automated NOT REPORTED per 100 WBC    Differential Type NOT REPORTED     Seg Neutrophils 73 (H) 36 - 66 %    Lymphocytes 14 (L) 24 - 44 %    Monocytes 11 (H) 1 - 7 %    Eosinophils % 2 0 - 4 %    Basophils 0 0 - 2 %    Immature Granulocytes NOT REPORTED 0 %    Segs Absolute 5.00 1.3 - 9.1 k/uL    Absolute Lymph # 1.00 1.0 - 4.8 k/uL    Absolute Mono # 0.70 0.1 - 1.3 k/uL    Absolute Eos # 0.10 0.0 - 0.4 k/uL    Basophils Absolute 0.00 0.0 - 0.2 k/uL    Absolute Immature Granulocyte NOT REPORTED 0.00 - 0.30 k/uL    WBC Morphology NOT REPORTED     RBC Morphology NOT REPORTED     Platelet Estimate NOT REPORTED    Urinalysis Reflex to Culture    Specimen: Urine, Cystoscopic   Result Value Ref Range    Color, UA Yellow Yellow    Turbidity UA Clear Clear    Glucose, Ur NEGATIVE NEGATIVE    Bilirubin Urine NEGATIVE  Verified by ictotest. (A) NEGATIVE    Ketones, Urine TRACE (A) NEGATIVE    Specific Gravity, UA 1.022 1.000 - 1.030    Urine Hgb LARGE (A) NEGATIVE    pH, UA 5.0 5.0 - 8.0    Protein, UA 2+ (A) NEGATIVE    Urobilinogen, Urine Normal Normal    Nitrite, Urine NEGATIVE NEGATIVE    Leukocyte Esterase, Urine NEGATIVE NEGATIVE    Urinalysis Comments NOT REPORTED    Microscopic Urinalysis   Result Value Ref Range    -          WBC, UA 5 TO 10 /HPF    RBC, UA 50  /HPF    Casts UA NOT REPORTED /LPF    Crystals, UA NOT REPORTED None /HPF    Epithelial Cells UA 0 TO 2 /HPF    Renal Epithelial, UA NOT REPORTED 0 /HPF    Bacteria, UA MANY (A) None    Mucus, UA NOT REPORTED None    Trichomonas, UA NOT REPORTED None    Amorphous, UA NOT REPORTED None    Other Observations UA NOT REPORTED NOT REQ.     Yeast, UA NOT REPORTED None   Basic Metabolic Panel w/ Reflex to MG   Result Value Ref Range    Glucose 102 (H) 70 - 99 mg/dL    BUN 18 8 - 23 mg/dL    CREATININE 1.23 (H) 0.70 - 1.20 mg/dL    Bun/Cre Ratio NOT REPORTED 9 - 20    Calcium 8.1 (L) 8.6 - 10.4 mg/dL    Sodium 142 135 - 144 mmol/L    Potassium 4.4 3.7 - 5.3 mmol/L    Chloride 108 (H) 98 - 107 mmol/L    CO2 23 20 - 31 mmol/L    Anion Gap 11 9 - 17 mmol/L    GFR Non-African American 58 (L) >60 mL/min    GFR African American >60 >60 mL/min    GFR Comment          GFR Staging NOT REPORTED    CBC with DIFF   Result Value Ref Range    WBC 6.8 3.5 - 11.0 k/uL    RBC 3.85 (L) 4.5 - 5.9 m/uL    Hemoglobin 11.4 (L) 13.5 - 17.5 g/dL    Hematocrit 33.9 (L) 41 - 53 %    MCV 88.0 80 - 100 fL    MCH 29.7 26 - 34 pg    MCHC 33.7 31 - 37 g/dL    RDW 15.0 (H) 11.5 - 14.9 %    Platelets 175 (L) 145 - 450 k/uL    MPV 8.5 6.0 - 12.0 fL    NRBC Automated NOT REPORTED per 100 WBC    Differential Type NOT REPORTED     Seg Neutrophils 73 (H) 36 - 66 %    Lymphocytes 15 (L) 24 - 44 %    Monocytes 10 (H) 1 - 7 %    Eosinophils % 2 0 - 4 %    Basophils 0 0 - 2 %    Immature Granulocytes NOT REPORTED 0 %    Segs Absolute 5.00 1.3 - 9.1 k/uL    Absolute Lymph # 1.00 1.0 - 4.8 k/uL    Absolute Mono # 0.70 0.1 - 1.3 k/uL    Absolute Eos # 0.20 0.0 - 0.4 k/uL    Basophils Absolute 0.00 0.0 - 0.2 k/uL    Absolute Immature Granulocyte NOT REPORTED 0.00 - 0.30 k/uL    WBC Morphology NOT REPORTED     RBC Morphology NOT REPORTED     Platelet Estimate NOT REPORTED    Basic Metabolic Panel w/ Reflex to MG   Result Value Ref Range    Glucose 80 70 - 99 mg/dL    BUN 13 8 - 23 mg/dL    CREATININE 1.04 0.70 - 1.20 mg/dL    Bun/Cre Ratio NOT REPORTED 9 - 20    Calcium 8.2 (L) 8.6 - 10.4 mg/dL    Sodium 140 135 - 144 mmol/L    Potassium 4.1 3.7 - 5.3 mmol/L    Chloride 107 98 - 107 mmol/L    CO2 23 20 - 31 mmol/L    Anion Gap 10 9 - 17 mmol/L    GFR Non-African American >60 >60 mL/min    GFR African American >60 >60 mL/min    GFR Comment          GFR Staging NOT REPORTED    CBC with DIFF   Result Value Ref Range    WBC 9.2 3.5 - 11.0 k/uL    RBC 3.75 (L) 4.5 - 5.9 m/uL    Hemoglobin 11.0 (L) 13.5 - 17.5 g/dL    Hematocrit 32.9 (L) 41 - 53 %    MCV 87.6 80 - 100 fL    MCH 29.3 26 - 34 pg    MCHC 33.5 31 - 37 g/dL    RDW 15.2 (H) 11.5 - 14.9 %    Platelets 886 970 - 240 k/uL    MPV 7.9 6.0 - 12.0 fL    NRBC Automated NOT REPORTED per 100 WBC    Differential Type NOT REPORTED     Seg Neutrophils 73 (H) 36 - 66 %    Lymphocytes 15 (L) 24 - 44 %    Monocytes 11 (H) 1 - 7 %    Eosinophils % 1 0 - 4 %    Basophils 0 0 - 2 %    Immature Granulocytes NOT REPORTED 0 %    Segs Absolute 6.70 1.3 - 9.1 k/uL    Absolute Lymph # 1.40 1.0 - 4.8 k/uL    Absolute Mono # 1.00 0.1 - 1.3 k/uL    Absolute Eos # 0.10 0.0 - 0.4 k/uL    Basophils Absolute 0.00 0.0 - 0.2 k/uL    Absolute Immature Granulocyte NOT REPORTED 0.00 - 0.30 k/uL    WBC Morphology NOT REPORTED     RBC Morphology NOT REPORTED     Platelet Estimate NOT REPORTED    Basic Metabolic Panel w/ Reflex to MG   Result Value Ref Range    Glucose 103 (H) 70 - 99 mg/dL    BUN 14 8 - 23 mg/dL    CREATININE 1.16 0.70 - 1.20 mg/dL    Bun/Cre Ratio NOT REPORTED 9 - 20    Calcium 8.1 (L) 8.6 - 10.4 mg/dL    Sodium 141 135 - 144 mmol/L    Potassium 4.4 3.7 - 5.3 mmol/L    Chloride 109 (H) 98 - 107 mmol/L    CO2 23 20 - 31 mmol/L    Anion Gap 9 9 - 17 mmol/L    GFR Non-African American >60 >60 mL/min    GFR African American >60 >60 mL/min    GFR Comment          GFR Staging NOT REPORTED    POC Glucose Fingerstick   Result Value Ref Range    POC Glucose 87 75 - 110 mg/dL   POC Glucose Fingerstick   Result Value Ref Range    POC Glucose 107 75 - 110 mg/dL   POC Glucose Fingerstick   Result Value Ref Range    POC Glucose 89 75 - 110 mg/dL   POC Glucose Fingerstick   Result Value Ref Range    POC Glucose 83 75 - 110 mg/dL   POC Glucose Fingerstick   Result Value Ref Range    POC Glucose 75 75 - 110 mg/dL   POC Glucose Fingerstick   Result Value Ref Range    POC Glucose 95 75 - 110 mg/dL   POC Glucose Fingerstick   Result Value Ref Range    POC Glucose 99 75 - 110 mg/dL   POC Glucose Fingerstick   Result Value Ref Range    POC Glucose 99 75 - 110 mg/dL   POC Glucose Fingerstick   Result Value Ref Range    POC Glucose 97 75 - 110 mg/dL   POC Glucose Fingerstick   Result Value Ref Range    POC Glucose 81 75 - 110 mg/dL   POC Glucose Fingerstick   Result Value Ref Range    POC Glucose 112 (H) 75 - 110 mg/dL   POC Glucose Fingerstick   Result Value Ref Range    POC Glucose 74 (L) 75 - 110 mg/dL   POC Glucose Fingerstick   Result Value Ref Range    POC Glucose 135 (H) 75 - 110 mg/dL   POC Glucose Fingerstick   Result Value Ref Range    POC Glucose 101 75 - 110 mg/dL   POC Glucose Fingerstick   Result Value Ref Range    POC Glucose 88 75 - 110 mg/dL   POC Glucose Fingerstick   Result Value Ref Range    POC Glucose 119 (H) 75 - 110 mg/dL   POC Glucose Fingerstick   Result Value Ref Range    POC Glucose 152 (H) 75 - 110 mg/dL   POC Glucose Fingerstick   Result Value Ref Range    POC Glucose 96 75 - 110 mg/dL   POC Glucose Fingerstick   Result Value Ref Range    POC Glucose 119 (H) 75 - 110 mg/dL   POC Glucose Fingerstick   Result Value Ref Range    POC Glucose 198 (H) 75 - 110 mg/dL   POC Glucose Fingerstick   Result Value Ref Range    POC Glucose 102 75 - 110 mg/dL   POC Glucose Fingerstick   Result Value Ref Range    POC Glucose 96 75 - 110 mg/dL   POC Glucose Fingerstick   Result Value Ref Range    POC Glucose 152 (H) 75 - 110 mg/dL   EKG 12 Lead   Result Value Ref Range    Ventricular Rate 64 BPM    Atrial Rate 64 BPM    P-R Interval 130 ms    QRS Duration 100 ms    Q-T Interval 442 ms    QTc Calculation (Bazett) 455 ms    P Axis 70 degrees    R Axis 27 degrees    T Axis 31 degrees         IMAGING DATA:    CT ABDOMEN PELVIS W IV CONTRAST Additional Contrast? None    Result Date: 10/5/2021  EXAMINATION: CT OF THE ABDOMEN AND PELVIS WITH CONTRAST 10/5/2021 11:37 am TECHNIQUE: CT of the abdomen and pelvis was performed with the administration of intravenous contrast. Multiplanar reformatted images are provided for review. Dose modulation, iterative reconstruction, and/or weight based adjustment of the mA/kV was utilized to reduce the radiation dose to as low as reasonably achievable. COMPARISON: CT scan of the abdomen dated July 6, 2021, and July 3, 2021 HISTORY: ORDERING SYSTEM PROVIDED HISTORY: abd pain w/n/v w/known retroperitoneal mass TECHNOLOGIST PROVIDED HISTORY: abd pain w/n/v w/known retroperitoneal mass Reason for Exam: Right side ab pain with nausea/vomiting, hx of retroperitoneal mass Acuity: Unknown Type of Exam: Unknown Relevant Medical/Surgical History: Hernia repair FINDINGS: Lower Chest: Dependent changes are present within the lung bases. Organs: The liver, spleen, and pancreas demonstrate no acute abnormality. Cholelithiasis is present without evidence of cholecystitis. Nodular thickening of the right adrenal gland is again noted, unchanged. Cortical cyst formation is again noted on the right kidney. A 3 mm nonobstructing calculus is again visualized within the lower pole collecting system of the right kidney. An approximate 1.7 x 1.5 cm solid-appearing nodule is again visualized within the right kidney, previously measured 1.2 mm. A mild degree of right hydronephrosis and hydroureter is now present. Again demonstrated is the presence of an infiltrating soft tissue mass involving the left retroperitoneum, with enlargement of the left adrenal gland, and encasing the left kidney.   Caudal to the left kidney, with the soft tissue mass measures approximately 6.4 by 7.1 cm, decreased in size, indicating response to treatment. The soft tissue fullness within the para-region is decreased in size as well. Interval decrease in the degree of left hydronephrosis is present, now mild in degree. GI/Bowel: A small hiatal hernia is present. No evidence of small-bowel obstruction is present. The appendix is normal.  Biopsy clips are present within the cecum. Scattered colonic diverticula are noted, without evidence of diverticulitis. Pelvis: Prostate gland is enlarged. Urinary bladder appears grossly normal. Peritoneum/Retroperitoneum: No free fluid or free air is present within the abdomen or pelvis. Extensive atherosclerotic changes are again demonstrated involving the abdominal aorta. I suspect there is high-grade stenosis at the origin of the left common iliac artery, similar compared to the prior study. Dominant right external iliac chain lymph node is significantly decreased in size, now measuring approximately 2.6 cm in length, and 8 mm in thickness. This previously measured 3.5 x 1.6 cm. Bulky para-adenopathy is decreased in size. Bones/Soft Tissues: No acute osseous abnormality is present. 1. Interval decrease in size of the infiltrating soft tissue mass within the left retroperitoneum, encasing the left kidney and abdominal aorta. The para-aortic adenopathy, and external iliac chain adenopathy is decreased in size, indicating response to therapy. Changes are likely due to lymphoma. 2. Cholelithiasis, without evidence of cholecystitis 3. Interval development of a mild degree of right hydronephrosis, and hydroureter. Right ureter is dilated to the level of the retroperitoneal \"mass\". .  Obstruction may be related to stricture formation or encasement by the retroperitoneal soft tissue mass. 4. Scattered colonic diverticula, without evidence of diverticulitis 5. Enlarged prostate gland 6. Nonobstructive bowel gas pattern     -- Diagnosis --   1.   CECUM, POLYP, BIOPSY: -  CONSISTENT WITH SESSILE SERRATED ADENOMA (SESSILE   SERRATED LESION). 2.  RIGHT COLON, POLYP, POLYPECTOMY:        -  TUBULOVILLOUS ADENOMA. -  ADENOMA EXTENDS TO THE CAUTERIZED MARGIN. 3.  SIGMOID COLON, POLYP, BIOPSIES:        -  ADENOMATOUS POLYP (TUBULAR ADENOMA). 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 Hydronephrosis  Anemia    RECOMMENDATIONS:  1. I personally reviewed results of lab work-up imaging studies and other relevant clinical data. 2. Underwent cholecystectomy. 3. Reviewing clinical progress, he seems to be getting better  4. Labs are stable, no drop in hemoglobin or platelet  5. Okay to discharge from my perspective, will follow up as an outpatient to resume rituximab maintenance        Discussed with patient and Nurse. Thank you for asking us to see this patient. Raheel Colon MD  Hematologist/Medical [id number] [facility] hematology oncology physicians               This note is created with the assistance of a speech recognition program.  While intending to generate a document that actually reflects the content of the visit, the document can still have some errors including those of syntax and sound a like substitutions which may escape proof reading. It such instances, actual meaning can be extrapolated by contextual diversion.

## 2021-10-10 NOTE — PLAN OF CARE
Problem: Skin Integrity:  Goal: Absence of new skin breakdown  Description: Absence of new skin breakdown  10/10/2021 0313 by Patricia Salamanca RN  Outcome: Ongoing

## 2021-10-11 LAB
ABSOLUTE EOS #: 0.3 K/UL (ref 0–0.4)
ABSOLUTE IMMATURE GRANULOCYTE: ABNORMAL K/UL (ref 0–0.3)
ABSOLUTE LYMPH #: 0.9 K/UL (ref 1–4.8)
ABSOLUTE MONO #: 0.9 K/UL (ref 0.1–1.3)
ANION GAP SERPL CALCULATED.3IONS-SCNC: 8 MMOL/L (ref 9–17)
BASOPHILS # BLD: 0 % (ref 0–2)
BASOPHILS ABSOLUTE: 0 K/UL (ref 0–0.2)
BUN BLDV-MCNC: 12 MG/DL (ref 8–23)
BUN/CREAT BLD: ABNORMAL (ref 9–20)
CALCIUM SERPL-MCNC: 8.1 MG/DL (ref 8.6–10.4)
CHLORIDE BLD-SCNC: 109 MMOL/L (ref 98–107)
CO2: 25 MMOL/L (ref 20–31)
CREAT SERPL-MCNC: 0.98 MG/DL (ref 0.7–1.2)
DIFFERENTIAL TYPE: ABNORMAL
EOSINOPHILS RELATIVE PERCENT: 4 % (ref 0–4)
GFR AFRICAN AMERICAN: >60 ML/MIN
GFR NON-AFRICAN AMERICAN: >60 ML/MIN
GFR SERPL CREATININE-BSD FRML MDRD: ABNORMAL ML/MIN/{1.73_M2}
GFR SERPL CREATININE-BSD FRML MDRD: ABNORMAL ML/MIN/{1.73_M2}
GLUCOSE BLD-MCNC: 102 MG/DL (ref 75–110)
GLUCOSE BLD-MCNC: 102 MG/DL (ref 75–110)
GLUCOSE BLD-MCNC: 108 MG/DL (ref 75–110)
GLUCOSE BLD-MCNC: 111 MG/DL (ref 75–110)
GLUCOSE BLD-MCNC: 113 MG/DL (ref 75–110)
GLUCOSE BLD-MCNC: 94 MG/DL (ref 70–99)
HCT VFR BLD CALC: 33.2 % (ref 41–53)
HEMOGLOBIN: 11.1 G/DL (ref 13.5–17.5)
IMMATURE GRANULOCYTES: ABNORMAL %
LYMPHOCYTES # BLD: 12 % (ref 24–44)
MCH RBC QN AUTO: 29.2 PG (ref 26–34)
MCHC RBC AUTO-ENTMCNC: 33.3 G/DL (ref 31–37)
MCV RBC AUTO: 87.6 FL (ref 80–100)
MONOCYTES # BLD: 12 % (ref 1–7)
NRBC AUTOMATED: ABNORMAL PER 100 WBC
PDW BLD-RTO: 15 % (ref 11.5–14.9)
PLATELET # BLD: 183 K/UL (ref 150–450)
PLATELET ESTIMATE: ABNORMAL
PMV BLD AUTO: 8 FL (ref 6–12)
POTASSIUM SERPL-SCNC: 3.8 MMOL/L (ref 3.7–5.3)
RBC # BLD: 3.79 M/UL (ref 4.5–5.9)
RBC # BLD: ABNORMAL 10*6/UL
SEG NEUTROPHILS: 72 % (ref 36–66)
SEGMENTED NEUTROPHILS ABSOLUTE COUNT: 5.4 K/UL (ref 1.3–9.1)
SODIUM BLD-SCNC: 142 MMOL/L (ref 135–144)
WBC # BLD: 7.5 K/UL (ref 3.5–11)
WBC # BLD: ABNORMAL 10*3/UL

## 2021-10-11 PROCEDURE — 82947 ASSAY GLUCOSE BLOOD QUANT: CPT

## 2021-10-11 PROCEDURE — 6370000000 HC RX 637 (ALT 250 FOR IP): Performed by: SURGERY

## 2021-10-11 PROCEDURE — 85025 COMPLETE CBC W/AUTO DIFF WBC: CPT

## 2021-10-11 PROCEDURE — 36415 COLL VENOUS BLD VENIPUNCTURE: CPT

## 2021-10-11 PROCEDURE — 6360000002 HC RX W HCPCS: Performed by: SURGERY

## 2021-10-11 PROCEDURE — 1200000000 HC SEMI PRIVATE

## 2021-10-11 PROCEDURE — 80048 BASIC METABOLIC PNL TOTAL CA: CPT

## 2021-10-11 PROCEDURE — 6370000000 HC RX 637 (ALT 250 FOR IP): Performed by: FAMILY MEDICINE

## 2021-10-11 PROCEDURE — 99999 PR OFFICE/OUTPT VISIT,PROCEDURE ONLY: CPT | Performed by: PHYSICIAN ASSISTANT

## 2021-10-11 PROCEDURE — 2580000003 HC RX 258: Performed by: SURGERY

## 2021-10-11 PROCEDURE — 2580000003 HC RX 258: Performed by: FAMILY MEDICINE

## 2021-10-11 RX ORDER — DOCUSATE SODIUM 100 MG/1
100 CAPSULE, LIQUID FILLED ORAL DAILY
Status: DISCONTINUED | OUTPATIENT
Start: 2021-10-11 | End: 2021-10-12 | Stop reason: HOSPADM

## 2021-10-11 RX ADMIN — SODIUM CHLORIDE: 9 INJECTION, SOLUTION INTRAVENOUS at 00:58

## 2021-10-11 RX ADMIN — SODIUM CHLORIDE: 9 INJECTION, SOLUTION INTRAVENOUS at 11:06

## 2021-10-11 RX ADMIN — HYDROMORPHONE HYDROCHLORIDE 1 MG: 1 INJECTION, SOLUTION INTRAMUSCULAR; INTRAVENOUS; SUBCUTANEOUS at 00:58

## 2021-10-11 RX ADMIN — PIPERACILLIN SODIUM AND TAZOBACTAM SODIUM 3375 MG: 3; .375 INJECTION, POWDER, LYOPHILIZED, FOR SOLUTION INTRAVENOUS at 17:32

## 2021-10-11 RX ADMIN — CARVEDILOL 12.5 MG: 12.5 TABLET, FILM COATED ORAL at 09:18

## 2021-10-11 RX ADMIN — AMLODIPINE BESYLATE 10 MG: 5 TABLET ORAL at 09:18

## 2021-10-11 RX ADMIN — ONDANSETRON 4 MG: 2 INJECTION INTRAMUSCULAR; INTRAVENOUS at 12:40

## 2021-10-11 RX ADMIN — FERROUS SULFATE TAB 325 MG (65 MG ELEMENTAL FE) 325 MG: 325 (65 FE) TAB at 10:14

## 2021-10-11 RX ADMIN — PIPERACILLIN SODIUM AND TAZOBACTAM SODIUM 3375 MG: 3; .375 INJECTION, POWDER, LYOPHILIZED, FOR SOLUTION INTRAVENOUS at 09:18

## 2021-10-11 RX ADMIN — HYDROMORPHONE HYDROCHLORIDE 1 MG: 1 INJECTION, SOLUTION INTRAMUSCULAR; INTRAVENOUS; SUBCUTANEOUS at 11:06

## 2021-10-11 RX ADMIN — ATORVASTATIN CALCIUM 10 MG: 10 TABLET, FILM COATED ORAL at 09:18

## 2021-10-11 RX ADMIN — FLUOXETINE HYDROCHLORIDE 20 MG: 20 CAPSULE ORAL at 09:18

## 2021-10-11 RX ADMIN — DONEPEZIL HYDROCHLORIDE 10 MG: 10 TABLET, FILM COATED ORAL at 21:10

## 2021-10-11 RX ADMIN — HYDROMORPHONE HYDROCHLORIDE 1 MG: 1 INJECTION, SOLUTION INTRAMUSCULAR; INTRAVENOUS; SUBCUTANEOUS at 16:26

## 2021-10-11 RX ADMIN — OXYCODONE HYDROCHLORIDE AND ACETAMINOPHEN 1 TABLET: 5; 325 TABLET ORAL at 21:17

## 2021-10-11 RX ADMIN — PANTOPRAZOLE SODIUM 40 MG: 40 TABLET, DELAYED RELEASE ORAL at 05:00

## 2021-10-11 RX ADMIN — OXYCODONE HYDROCHLORIDE AND ACETAMINOPHEN 1 TABLET: 5; 325 TABLET ORAL at 04:58

## 2021-10-11 RX ADMIN — DOCUSATE SODIUM 100 MG: 100 CAPSULE, LIQUID FILLED ORAL at 09:17

## 2021-10-11 RX ADMIN — MEMANTINE 10 MG: 10 TABLET ORAL at 10:14

## 2021-10-11 RX ADMIN — PIPERACILLIN SODIUM AND TAZOBACTAM SODIUM 3375 MG: 3; .375 INJECTION, POWDER, LYOPHILIZED, FOR SOLUTION INTRAVENOUS at 00:58

## 2021-10-11 RX ADMIN — MEMANTINE 10 MG: 10 TABLET ORAL at 21:10

## 2021-10-11 RX ADMIN — LEVOTHYROXINE SODIUM 112 MCG: 0.11 TABLET ORAL at 05:00

## 2021-10-11 RX ADMIN — CARVEDILOL 12.5 MG: 12.5 TABLET, FILM COATED ORAL at 17:32

## 2021-10-11 ASSESSMENT — PAIN SCALES - GENERAL
PAINLEVEL_OUTOF10: 8
PAINLEVEL_OUTOF10: 0
PAINLEVEL_OUTOF10: 7
PAINLEVEL_OUTOF10: 0
PAINLEVEL_OUTOF10: 10
PAINLEVEL_OUTOF10: 10
PAINLEVEL_OUTOF10: 7

## 2021-10-11 ASSESSMENT — PAIN DESCRIPTION - ONSET: ONSET: ON-GOING

## 2021-10-11 ASSESSMENT — PAIN - FUNCTIONAL ASSESSMENT: PAIN_FUNCTIONAL_ASSESSMENT: PREVENTS OR INTERFERES SOME ACTIVE ACTIVITIES AND ADLS

## 2021-10-11 ASSESSMENT — PAIN DESCRIPTION - ORIENTATION: ORIENTATION: MID

## 2021-10-11 ASSESSMENT — PAIN DESCRIPTION - PROGRESSION: CLINICAL_PROGRESSION: NOT CHANGED

## 2021-10-11 ASSESSMENT — PAIN DESCRIPTION - DESCRIPTORS: DESCRIPTORS: ACHING;BURNING;ITCHING

## 2021-10-11 ASSESSMENT — PAIN DESCRIPTION - LOCATION: LOCATION: ABDOMEN

## 2021-10-11 ASSESSMENT — PAIN DESCRIPTION - PAIN TYPE: TYPE: ACUTE PAIN

## 2021-10-11 ASSESSMENT — PAIN DESCRIPTION - FREQUENCY: FREQUENCY: CONTINUOUS

## 2021-10-11 NOTE — DISCHARGE INSTR - COC
Isolation          Patient Infection Status       None to display            Nurse Assessment:  Last Vital Signs: BP (!) 166/68   Pulse 63   Temp 98.4 °F (36.9 °C) (Oral)   Resp 16   Ht 5' 10\" (1.778 m)   Wt 170 lb 13.7 oz (77.5 kg)   SpO2 91%   BMI 24.52 kg/m²     Last documented pain score (0-10 scale): Pain Level: 10  Last Weight:   Wt Readings from Last 1 Encounters:   10/11/21 170 lb 13.7 oz (77.5 kg)     Mental Status:  {IP PT MENTAL STATUS:20030}    IV Access:  { TANIA IV ACCESS:579739080}    Nursing Mobility/ADLs:  Walking   {CHP DME YIHM:765043358}  Transfer  {CHP DME NWQZ:433094766}  Bathing  {CHP DME UAYZ:145795755}  Dressing  {CHP DME IYFR:203142257}  Toileting  {CHP DME AYYT:412725009}  Feeding  {P DME KJNV:063880245}  Med Admin  {P DME ALOI:553313915}  Med Delivery   { TANIA MED Delivery:330955739}    Wound Care Documentation and Therapy:        Elimination:  Continence: Bowel: {YES / RS:11214}  Bladder: {YES / EH:66580}  Urinary Catheter: {Urinary Catheter:660035725}   Colostomy/Ileostomy/Ileal Conduit: {YES / ML:02790}       Date of Last BM: ***    Intake/Output Summary (Last 24 hours) at 10/11/2021 1604  Last data filed at 10/11/2021 1221  Gross per 24 hour   Intake 3120 ml   Output 3600 ml   Net -480 ml     I/O last 3 completed shifts:   In: 3120 [I.V.:3120]  Out: 3600 [Urine:3600]    Safety Concerns:     [address] Safety Concerns:404732017}    Impairments/Disabilities:      [address] Impairments/Disabilities:592481789}    Nutrition Therapy:  Current Nutrition Therapy:   [address] Diet List:996036435}    Routes of Feeding: {CHP DME Other Feedings:495643242}  Liquids: {Slp liquid thickness:40876}  Daily Fluid Restriction: {CHP DME Yes amt example:067835758}  Last Modified Barium Swallow with Video (Video Swallowing Test): {Done Not Done UJCS:020390461}    Treatments at the Time of Hospital Discharge:   Respiratory Treatments: ***  Oxygen Therapy:  {Therapy; copd oxygen:79185}  [facility]    Rehab Therapies: Physical Therapy and Occupational Therapy  Weight Bearing Status/Restrictions: No weight bearing restirctions  Other Medical Equipment (for information only, NOT a DME order):  cane  Other Treatments: Skilled Nursing Assessment Per Protocol. Medication Education & Monitoring. S/P cysto w/ Stent, S/P Robotic April. Teran cath care/monitoring/education. Patient's personal belongings (please select all that are sent with patient):  {CHP DME Belongings:862030334}    RN SIGNATURE:  {Esignature:051400239}    CASE MANAGEMENT/SOCIAL WORK SECTION    Inpatient Status Date: ***    Readmission Risk Assessment Score:  Readmission Risk              Risk of Unplanned Readmission:  15           Discharging to [facility]: [phone]  F: [phone]    Dialysis Facility (if applicable)   Name:  Address:  Dialysis Schedule:  Phone:  Fax:    / signature: Electronically signed by Yadiel Davis RN on 10/11/21 at 4:04 PM EDT    PHYSICIAN SECTION    Prognosis: Good    Condition at Discharge: Stable    Rehab Potential (if transferring to Rehab): Good    Recommended Labs or Other Treatments After Discharge: cont with home meds monitor vitals   Cbcd bmp lfts in 1 week    Physician Certification: I certify the above information and transfer of Rubén Moreira  is necessary for the continuing treatment of the diagnosis listed and that he requires Home Care for greater 30 days.      Update Admission H&P: No change in H&P    PHYSICIAN SIGNATURE:  Electronically signed by Mike Coats DO on 10/12/21 at 8:34 AM EDT

## 2021-10-11 NOTE — PROGRESS NOTES
[facility] [address]                 PATIENT NAME: Shyam Hester     TODAY'S DATE: 10/11/2021, 10:07 AM    SUBJECTIVE:  POD#2  Pt seen and examined. Afebrile, HTN. No leukocytosis, hemoglobin stable. S/p robotic laparoscopic cholecystectomy. Patient states abdominal pain is controlled. He is passing flatus, no BM yet. Tolerating diet however states he has no appetite. No N/V. Dressings clean, dry, intact. Patient has not gotten OOB; states he feels \"weak and tired\" and generally not up for it. OBJECTIVE:   VITALS:  BP (!) 189/70   Pulse 65   Temp 98.5 °F (36.9 °C) (Oral)   Resp 18   Ht 5' 10\" (1.778 m)   Wt 170 lb 13.7 oz (77.5 kg)   SpO2 90%   BMI 24.52 kg/m²      INTAKE/OUTPUT:      Intake/Output Summary (Last 24 hours) at 10/11/2021 1007  Last data filed at 10/11/2021 0503  Gross per 24 hour   Intake 3120 ml   Output 2300 ml   Net 820 ml                 CONSTITUTIONAL:  awake and alert.   No acute distress  HEART:   RRR  LUNGS:   Decreased air entry at bases, no wheezing   ABDOMEN:   Abdomen soft, non-tender, non-distended  EXTREMITIES:   No pedal edema    Data:  CBC:   Lab Results   Component Value Date    WBC 7.5 10/11/2021    RBC 3.79 10/11/2021    RBC 4.38 06/06/2012    HGB 11.1 10/11/2021    HCT 33.2 10/11/2021    MCV 87.6 10/11/2021    MCH 29.2 10/11/2021    MCHC 33.3 10/11/2021    RDW 15.0 10/11/2021     10/11/2021     06/06/2012    MPV 8.0 10/11/2021     BMP:    Lab Results   Component Value Date     10/11/2021    K 3.8 10/11/2021     10/11/2021    CO2 25 10/11/2021    BUN 12 10/11/2021    LABALBU 3.5 10/06/2021    CREATININE 0.98 10/11/2021    CALCIUM 8.1 10/11/2021    GFRAA >60 10/11/2021    LABGLOM >60 10/11/2021    GLUCOSE 94 10/11/2021    GLUCOSE 97 06/06/2012       Radiology Review:  No new images to review      ASSESSMENT     Active Problems:    Hydronephrosis due to obstruction of ureter    Moderate malnutrition (HCC)    Chronic cholecystitis  Resolved Problems:    * No resolved hospital problems. *      Plan  1. Diet as tolerated  2. Increase activity, pulmonary toilet  3. Surgically stable for discharge when okay with rest of treatment team  4. Prescriptions called in to pharmacy   5. Continue medical management   6. Patient was seen and examined. Poor appetite. Abdomen is soft. Dressings are dry. Blood work reviewed. WBC count is normal.  Hemoglobin is adequate. Sodium potassium creatinine all normal.  7. Surgically stable for discharge. Long conversation with the patient and his wife. They both prefer to go home with home care. Discharge planner already involved. Nothing further to add.       Electronically signed by Madison Quintana PA-C  [id number] [address]

## 2021-10-11 NOTE — PROGRESS NOTES
[id number] BABADU      PROGRESS NOTE        Patient:  Stef Schneider  YOB: 1950    MRN: [id number]     Acct: [de-identified]     Admit date: 10/5/2021    Pt seen and Chart reviewed. Consultant notes reviewed and care evaluated. Subjective: Patient states he still having pain but the pain meds helps. He is passing gas but has not had a bowel movement. But he has no nausea anymore he is eating and keeping his food down. He has no chest pain or shortness of breath no fevers or chills. His blood pressure still high but that could be still related to his discomfort and pain his meds just got adjusted yesterday we will continue monitor    Diet:  ADULT DIET;  Regular      Medications:Current Inpatient    Scheduled Meds:   carvedilol  12.5 mg Oral BID WC    fluticasone  1 spray Each Nostril Daily    ferrous sulfate  325 mg Oral Daily with breakfast    lidocaine  1 patch TransDERmal Daily    piperacillin-tazobactam  3,375 mg IntraVENous Q8H    amLODIPine  10 mg Oral Daily    atorvastatin  10 mg Oral Daily    donepezil  10 mg Oral Nightly    FLUoxetine  20 mg Oral Daily    levothyroxine  112 mcg Oral Daily    memantine  10 mg Oral BID    pantoprazole  40 mg Oral QAM AC    insulin lispro  0-6 Units SubCUTAneous TID WC    insulin lispro  0-3 Units SubCUTAneous Nightly    influenza virus vaccine  0.5 mL IntraMUSCular Prior to discharge    scopolamine  1 patch TransDERmal Q72H     Continuous Infusions:   sodium chloride 80 mL/hr at 10/11/21 0058     PRN Meds:oxyCODONE-acetaminophen, ondansetron, fentanNYL, fentanNYL, acetaminophen, promethazine (PHENERGAN) in sodium chloride 0.9% IVPB, phenol, sodium chloride flush, sodium chloride flush, ondansetron, HYDROmorphone        Physical Exam:  Vitals: BP (!) 189/70   Pulse 65   Temp 98.5 °F (36.9 °C) (Oral)   Resp 18   Ht 5' 10\" (1.778 m)   Wt 170 lb 13.7 oz (77.5 kg)   SpO2 90%   BMI 24.52 kg/m²   24 hour intake/output:    Intake/Output Summary (Last 24 hours) at 10/11/2021 0829  Last data filed at 10/11/2021 0503  Gross per 24 hour   Intake 3120 ml   Output 2300 ml   Net 820 ml     Last 3 weights:   Wt Readings from Last 3 Encounters:   10/11/21 170 lb 13.7 oz (77.5 kg)   09/24/21 174 lb (78.9 kg)   09/20/21 173 lb 3.2 oz (78.6 kg)       Physical Examination:   General appearance - alert, well appearing, and in no distress  Mental status - alert, oriented to person, place, and time  PERRLA wnl  Chest - clear to auscultation, no wheezes, rales or rhonchi, symmetric air entry  Heart - normal rate, regular rhythm, normal S1, S2, no murmurs, rubs, clicks or gallops  Abdomen - soft, nontender, nondistended, no masses or organomegaly just postop tenderness but I am able to palpate his abdomen without a lot of tenderness as before surgery incision intact he still has good bowel sounds and soft abdomen  Neurological - alert, oriented, normal speech, no focal findings or movement disorder noted}  Extremities - peripheral pulses normal, no pedal edema, no clubbing or cyanosis  Skin - normal coloration and turgor, no rashes, no suspicious skin lesions noted    Component Value Units   POC Glucose Fingerstick [[id number]]    Collected: 10/11/21 0647    Updated: 10/11/21 0654     POC Glucose 102 mg/dL   POC Glucose Fingerstick [[id number]]    Collected: 10/09/21 0958    Updated: 10/11/21 0642     POC Glucose 102 mg/dL   Basic Metabolic Panel w/ Reflex to MG [[id number]] (Abnormal)    Collected: 10/11/21 0542    Updated: 10/11/21 3831    Specimen Source: Blood     Glucose 94 mg/dL    BUN 12 mg/dL    CREATININE 0.98 mg/dL    Bun/Cre Ratio NOT REPORTED    Calcium 8.1Low  mg/dL    Sodium 142 mmol/L    Potassium 3.8 mmol/L    Chloride 109High  mmol/L    CO2 25 mmol/L    Anion Gap 8Low  mmol/L    GFR Non-African American >60 mL/min    GFR African American >60 mL/min    GFR Comment         Comment: Average GFR for 79or more years old:    65 mL/min/1.73sq m   Chronic Kidney Disease:    <60 mL/min/1.73sq m   Kidney failure:    <15 mL/min/1.73sq m               eGFR calculated using average adult body mass. Additional eGFR calculator available at:         [email or web address]              GFR Staging NOT REPORTED   CBC with DIFF [[id number]] (Abnormal)    Collected: 10/11/21 0542    Updated: 10/11/21 0614    Specimen Source: Blood     WBC 7.5 k/uL    RBC 3.79Low  m/uL    Hemoglobin 11.1Low  g/dL    Hematocrit 33.2Low  %    MCV 87.6 fL    MCH 29.2 pg    MCHC 33.3 g/dL    RDW 15.0High  %    Platelets 090 k/uL    MPV 8.0 fL    NRBC Automated NOT REPORTED per 100 WBC    Differential Type NOT REPORTED    Seg Neutrophils 72High  %    Lymphocytes 12Low  %    Monocytes 12High  %    Eosinophils % 4 %    Basophils 0 %    Immature Granulocytes NOT REPORTED %    Segs Absolute 5.40 k/uL    Absolute Lymph # 0.90Low  k/uL    Absolute Mono # 0.90 k/uL    Absolute Eos # 0.30 k/uL    Basophils Absolute 0.00 k/uL    Absolute Immature Granulocyte NOT REPORTED k/uL    WBC Morphology NOT REPORTED    RBC Morphology NOT REPORTED    Platelet Estimate NOT REPORTED   POC Glucose Fingerstick [[id number]] (Abnormal)    Collected: 10/10/21 2023    Updated: 10/10/21 2031     POC Glucose 167High  mg/dL   POC Glucose Fingerstick [[id number]]    Collected: 10/10/21 1555    Updated: 10/10/21 1609     POC Glucose 102 mg/dL   POC Glucose Fingerstick [[id number]] (Abnormal)    Collected: 10/10/21 1111    Updated: 10/10/21 1117     POC Glucose 152High  mg/dL   Current IP Meds          Assessment:  Active Problems:    Hydronephrosis due to obstruction of ureter    Moderate malnutrition (HCC)  Resolved Problems:    * No resolved hospital problems.  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.13    Hydronephrosis due to obstruction of ureter N13.1      · Nausea vomiting  ·    · Abdominal pain right-sided  ·    · Status post colonoscopy with polyp back to me  ·    · History of lymphoma seems to be responding to treatment according to the CAT scan with decreasing mass  ·    · New right-sided hydro-  ·    · History of hypertension  · Status post stenting  ·    · Urinary retention he has a Teran now with some blood noted in the Teran  ·    · Still abdominal and right upper quadrant pain on palpation  ·    · HIDA with evidence of chronic cholecystitis and probably associated acute event reported as negative will give him antibiotics  · Status post cystoscopy  · Status post stent  ·    ·    ·    · Hematuria resolving  ·    · Still with right upper quadrant middle quadrant pain no nausea  ·    · Patient is status post robotic cholecystectomy  · Still postop pain  ·    · Nausea resolved  ·    · Weakness  · Elevated blood pressure that could be also secondary his pain continue to monitor his med was just adjusted yesterday                  Plan:  1. We will give him Colace 100 mg p.o. twice daily  2.   3. We will monitor his progress today and especially physical therapy evaluation before plan on discharge he wants to go home instead of SNF if not today probably tomorrow  4. Crease IV fluid to 50 mL an hour  5.  Continue to monitor blood pressure and pulse    Anita Braga DO FAAFP            10/11/2021, 8:29 AM

## 2021-10-11 NOTE — CARE COORDINATION
ONGOING DISCHARGE PLAN:    Patient is alert and oriented x4. Spoke with patient's Wife, regarding discharge plan and she confirms that plan is still to return to home w/ Her. Denies VNS. Pt is POD #5, cysto w/ Stent & costello placed. Pt.'s  Wife, will be able to take care of this at home, she will just need to be shown how to do this. Pt. Is POD #2, Robotic April.     Reg Diet. Per Wife/Pt. He is still having a lot of pain & is not moving around well. Surgery following. Will continue to follow for additional discharge needs. Electronically signed by Yeny Caba RN on 10/11/2021 at 2:41 PM      ADD: Arlette Major was informed to speak to Pt's Wife again, in regards to VNS. She now wants services. Agreeable to Francesca Wheatley to speak to pt's wife in regards to this.

## 2021-10-11 NOTE — PROGRESS NOTES
Comprehensive Nutrition Assessment    Type and Reason for Visit:  Reassess    Nutrition Recommendations/Plan:   Will continue to provide Regular diet. Will add Ensure High Protein supplements to all trays    Nutrition Assessment:  Pt is not meeting nutrition needs. He has been advanced to Regular diet , but has refused breakfast and states he is not ready for lunch. Malnutrition Assessment:  Malnutrition Status: Moderate malnutrition    Context:  Acute Illness     Findings of the 6 clinical characteristics of malnutrition:  Energy Intake:  7 - 50% or less of estimated energy requirements for 5 or more days  Weight Loss:  7 - Greater than 2% over 1 week     Body Fat Loss:  Unable to assess     Muscle Mass Loss:  Unable to assess    Fluid Accumulation:  No significant fluid accumulation     Strength:  Not Performed    Estimated Daily Nutrient Needs:  Energy (kcal):  4547-3005 kcal based on 28-30 kcal/kg admission; Weight Used for Energy Requirements:  Admission     Protein (g):   gm based on 1.2-1.4 gm/kg admission; Weight Used for Protein Requirements:  Admission          Nutrition Related Findings:  s/p lap millicent, +flatus, no BM, Labs/Meds: Reviewed      Wounds:          Current Nutrition Therapies:    ADULT DIET;  Regular    Anthropometric Measures:  · Height: 5' 10\" (177.8 cm)  · Current Body Weight: 170 lb (77.1 kg)   · Admission Body Weight: 164 lb (74.4 kg)    · Usual Body Weight: 200 lb (90.7 kg)     Ideal Body Weight: 166 lbs;     Nutrition Diagnosis:   · Inadequate oral intake related to catabolic illness as evidenced by intake 0-25%, vomiting, nausea    Nutrition Interventions:   Food and/or Nutrient Delivery:  Continue Current Diet, Start Oral Nutrition Supplement  Nutrition Education/Counseling:  Education not indicated   Coordination of Nutrition Care:  Continue to monitor while inpatient    Goals:  Meet over 75% of estimated nutrition needs       Nutrition Monitoring and Evaluation: Food/Nutrient Intake Outcomes:  Food and Nutrient Intake, Supplement Intake  Physical Signs/Symptoms Outcomes:  Biochemical Data, GI Status, Fluid Status or Edema, Weight, Skin     Discharge Planning:    Continue current diet     Electronically signed by Marly Grant RD, HELENE on 10/11/21 at 3:16 PM EDT    Contact: [phone]

## 2021-10-11 NOTE — PLAN OF CARE
Nutrition Problem #1: Inadequate oral intake  Intervention: Food and/or Nutrient Delivery: Continue Current Diet, Start Oral Nutrition Supplement  Nutritional Goals: Meet over 75% of estimated nutrition needs

## 2021-10-12 VITALS
TEMPERATURE: 98.1 F | OXYGEN SATURATION: 95 % | SYSTOLIC BLOOD PRESSURE: 140 MMHG | HEART RATE: 66 BPM | DIASTOLIC BLOOD PRESSURE: 65 MMHG | WEIGHT: 170.86 LBS | RESPIRATION RATE: 16 BRPM | BODY MASS INDEX: 24.46 KG/M2 | HEIGHT: 70 IN

## 2021-10-12 LAB
ABSOLUTE EOS #: 0.3 K/UL (ref 0–0.4)
ABSOLUTE IMMATURE GRANULOCYTE: ABNORMAL K/UL (ref 0–0.3)
ABSOLUTE LYMPH #: 1 K/UL (ref 1–4.8)
ABSOLUTE MONO #: 0.7 K/UL (ref 0.1–1.3)
ANION GAP SERPL CALCULATED.3IONS-SCNC: 9 MMOL/L (ref 9–17)
BASOPHILS # BLD: 0 % (ref 0–2)
BASOPHILS ABSOLUTE: 0 K/UL (ref 0–0.2)
BUN BLDV-MCNC: 11 MG/DL (ref 8–23)
BUN/CREAT BLD: ABNORMAL (ref 9–20)
CALCIUM SERPL-MCNC: 8.2 MG/DL (ref 8.6–10.4)
CHLORIDE BLD-SCNC: 107 MMOL/L (ref 98–107)
CO2: 25 MMOL/L (ref 20–31)
CREAT SERPL-MCNC: 1.01 MG/DL (ref 0.7–1.2)
DIFFERENTIAL TYPE: ABNORMAL
EOSINOPHILS RELATIVE PERCENT: 4 % (ref 0–4)
GFR AFRICAN AMERICAN: >60 ML/MIN
GFR NON-AFRICAN AMERICAN: >60 ML/MIN
GFR SERPL CREATININE-BSD FRML MDRD: ABNORMAL ML/MIN/{1.73_M2}
GFR SERPL CREATININE-BSD FRML MDRD: ABNORMAL ML/MIN/{1.73_M2}
GLUCOSE BLD-MCNC: 109 MG/DL (ref 75–110)
GLUCOSE BLD-MCNC: 117 MG/DL (ref 75–110)
GLUCOSE BLD-MCNC: 94 MG/DL (ref 70–99)
HCT VFR BLD CALC: 31.4 % (ref 41–53)
HEMOGLOBIN: 10.5 G/DL (ref 13.5–17.5)
IMMATURE GRANULOCYTES: ABNORMAL %
LYMPHOCYTES # BLD: 15 % (ref 24–44)
MCH RBC QN AUTO: 29.2 PG (ref 26–34)
MCHC RBC AUTO-ENTMCNC: 33.4 G/DL (ref 31–37)
MCV RBC AUTO: 87.5 FL (ref 80–100)
MONOCYTES # BLD: 12 % (ref 1–7)
NRBC AUTOMATED: ABNORMAL PER 100 WBC
PDW BLD-RTO: 15 % (ref 11.5–14.9)
PLATELET # BLD: 190 K/UL (ref 150–450)
PLATELET ESTIMATE: ABNORMAL
PMV BLD AUTO: 7.8 FL (ref 6–12)
POTASSIUM SERPL-SCNC: 3.9 MMOL/L (ref 3.7–5.3)
RBC # BLD: 3.59 M/UL (ref 4.5–5.9)
RBC # BLD: ABNORMAL 10*6/UL
SEG NEUTROPHILS: 69 % (ref 36–66)
SEGMENTED NEUTROPHILS ABSOLUTE COUNT: 4.3 K/UL (ref 1.3–9.1)
SODIUM BLD-SCNC: 141 MMOL/L (ref 135–144)
WBC # BLD: 6.3 K/UL (ref 3.5–11)
WBC # BLD: ABNORMAL 10*3/UL

## 2021-10-12 PROCEDURE — 6370000000 HC RX 637 (ALT 250 FOR IP): Performed by: SURGERY

## 2021-10-12 PROCEDURE — 2580000003 HC RX 258: Performed by: FAMILY MEDICINE

## 2021-10-12 PROCEDURE — 90686 IIV4 VACC NO PRSV 0.5 ML IM: CPT | Performed by: SURGERY

## 2021-10-12 PROCEDURE — 85025 COMPLETE CBC W/AUTO DIFF WBC: CPT

## 2021-10-12 PROCEDURE — 82947 ASSAY GLUCOSE BLOOD QUANT: CPT

## 2021-10-12 PROCEDURE — 36415 COLL VENOUS BLD VENIPUNCTURE: CPT

## 2021-10-12 PROCEDURE — 6360000002 HC RX W HCPCS: Performed by: SURGERY

## 2021-10-12 PROCEDURE — 6370000000 HC RX 637 (ALT 250 FOR IP): Performed by: FAMILY MEDICINE

## 2021-10-12 PROCEDURE — 2580000003 HC RX 258: Performed by: SURGERY

## 2021-10-12 PROCEDURE — G0008 ADMIN INFLUENZA VIRUS VAC: HCPCS | Performed by: SURGERY

## 2021-10-12 PROCEDURE — 97110 THERAPEUTIC EXERCISES: CPT

## 2021-10-12 PROCEDURE — 80048 BASIC METABOLIC PNL TOTAL CA: CPT

## 2021-10-12 RX ORDER — FERROUS SULFATE 325(65) MG
325 TABLET ORAL
Qty: 30 TABLET | Refills: 3 | Status: SHIPPED | OUTPATIENT
Start: 2021-10-13 | End: 2021-11-10 | Stop reason: ALTCHOICE

## 2021-10-12 RX ORDER — AMLODIPINE BESYLATE 10 MG/1
10 TABLET ORAL DAILY
Qty: 30 TABLET | Refills: 5 | Status: SHIPPED | OUTPATIENT
Start: 2021-10-12 | End: 2022-09-26

## 2021-10-12 RX ORDER — DOCUSATE SODIUM 100 MG/1
100 CAPSULE, LIQUID FILLED ORAL DAILY
Qty: 30 CAPSULE | Refills: 0
Start: 2021-10-13 | End: 2022-06-13

## 2021-10-12 RX ORDER — LOSARTAN POTASSIUM 50 MG/1
50 TABLET ORAL DAILY
Qty: 30 TABLET | Refills: 5 | Status: SHIPPED | OUTPATIENT
Start: 2021-10-12

## 2021-10-12 RX ORDER — CARVEDILOL 12.5 MG/1
12.5 TABLET ORAL 2 TIMES DAILY WITH MEALS
Qty: 60 TABLET | Refills: 3 | Status: ON HOLD | OUTPATIENT
Start: 2021-10-12 | End: 2021-11-24

## 2021-10-12 RX ORDER — LEVOTHYROXINE SODIUM 112 UG/1
112 TABLET ORAL DAILY
Qty: 30 TABLET | Refills: 3 | Status: ON HOLD | OUTPATIENT
Start: 2021-10-13 | End: 2022-02-01 | Stop reason: HOSPADM

## 2021-10-12 RX ORDER — FLUTICASONE PROPIONATE 50 MCG
1 SPRAY, SUSPENSION (ML) NASAL DAILY
Qty: 16 G | Refills: 3 | Status: SHIPPED | OUTPATIENT
Start: 2021-10-13 | End: 2021-11-10 | Stop reason: ALTCHOICE

## 2021-10-12 RX ORDER — LOSARTAN POTASSIUM 50 MG/1
50 TABLET ORAL DAILY
Status: DISCONTINUED | OUTPATIENT
Start: 2021-10-12 | End: 2021-10-12 | Stop reason: HOSPADM

## 2021-10-12 RX ADMIN — PANTOPRAZOLE SODIUM 40 MG: 40 TABLET, DELAYED RELEASE ORAL at 06:37

## 2021-10-12 RX ADMIN — LOSARTAN POTASSIUM 50 MG: 50 TABLET, FILM COATED ORAL at 09:31

## 2021-10-12 RX ADMIN — OXYCODONE HYDROCHLORIDE AND ACETAMINOPHEN 1 TABLET: 5; 325 TABLET ORAL at 01:42

## 2021-10-12 RX ADMIN — MEMANTINE 10 MG: 10 TABLET ORAL at 07:54

## 2021-10-12 RX ADMIN — INFLUENZA A VIRUS A/VICTORIA/2570/2019 IVR-215 (H1N1) ANTIGEN (PROPIOLACTONE INACTIVATED), INFLUENZA A VIRUS A/CAMBODIA/E0826360/2020 IVR-224 (H3N2) ANTIGEN (PROPIOLACTONE INACTIVATED), INFLUENZA B VIRUS B/VICTORIA/705/2018 BVR-11 ANTIGEN (PROPIOLACTONE INACTIVATED), INFLUENZA B VIRUS B/PHUKET/3073/2013 BVR-1B ANTIGEN (PROPIOLACTONE INACTIVATED) 0.5 ML: 15; 15; 15; 15 INJECTION, SUSPENSION INTRAMUSCULAR at 17:31

## 2021-10-12 RX ADMIN — FERROUS SULFATE TAB 325 MG (65 MG ELEMENTAL FE) 325 MG: 325 (65 FE) TAB at 07:55

## 2021-10-12 RX ADMIN — LEVOTHYROXINE SODIUM 112 MCG: 0.11 TABLET ORAL at 06:37

## 2021-10-12 RX ADMIN — ONDANSETRON 4 MG: 2 INJECTION INTRAMUSCULAR; INTRAVENOUS at 13:26

## 2021-10-12 RX ADMIN — DOCUSATE SODIUM 100 MG: 100 CAPSULE, LIQUID FILLED ORAL at 07:54

## 2021-10-12 RX ADMIN — PIPERACILLIN SODIUM AND TAZOBACTAM SODIUM 3375 MG: 3; .375 INJECTION, POWDER, LYOPHILIZED, FOR SOLUTION INTRAVENOUS at 01:41

## 2021-10-12 RX ADMIN — AMLODIPINE BESYLATE 10 MG: 5 TABLET ORAL at 07:55

## 2021-10-12 RX ADMIN — PIPERACILLIN SODIUM AND TAZOBACTAM SODIUM 3375 MG: 3; .375 INJECTION, POWDER, LYOPHILIZED, FOR SOLUTION INTRAVENOUS at 09:31

## 2021-10-12 RX ADMIN — OXYCODONE HYDROCHLORIDE AND ACETAMINOPHEN 1 TABLET: 5; 325 TABLET ORAL at 06:37

## 2021-10-12 RX ADMIN — FLUOXETINE HYDROCHLORIDE 20 MG: 20 CAPSULE ORAL at 07:55

## 2021-10-12 RX ADMIN — ATORVASTATIN CALCIUM 10 MG: 10 TABLET, FILM COATED ORAL at 07:54

## 2021-10-12 RX ADMIN — CARVEDILOL 12.5 MG: 12.5 TABLET, FILM COATED ORAL at 07:54

## 2021-10-12 RX ADMIN — OXYCODONE HYDROCHLORIDE AND ACETAMINOPHEN 1 TABLET: 5; 325 TABLET ORAL at 14:07

## 2021-10-12 RX ADMIN — SODIUM CHLORIDE: 9 INJECTION, SOLUTION INTRAVENOUS at 09:31

## 2021-10-12 ASSESSMENT — PAIN DESCRIPTION - LOCATION
LOCATION: ABDOMEN

## 2021-10-12 ASSESSMENT — PAIN SCALES - GENERAL
PAINLEVEL_OUTOF10: 7
PAINLEVEL_OUTOF10: 7
PAINLEVEL_OUTOF10: 4
PAINLEVEL_OUTOF10: 5
PAINLEVEL_OUTOF10: 7
PAINLEVEL_OUTOF10: 7
PAINLEVEL_OUTOF10: 6
PAINLEVEL_OUTOF10: 5
PAINLEVEL_OUTOF10: 7

## 2021-10-12 ASSESSMENT — PAIN DESCRIPTION - DESCRIPTORS
DESCRIPTORS: ACHING
DESCRIPTORS: ACHING
DESCRIPTORS: ACHING;JABBING

## 2021-10-12 ASSESSMENT — PAIN DESCRIPTION - PROGRESSION: CLINICAL_PROGRESSION: NOT CHANGED

## 2021-10-12 ASSESSMENT — PAIN DESCRIPTION - DIRECTION
RADIATING_TOWARDS: BACK

## 2021-10-12 NOTE — CARE COORDINATION
ONGOING DISCHARGE PLAN:    Patient is alert and oriented x4. Spoke with patient regarding discharge plan and patient confirms that plan is still to return to home, no needs. His wife will be here later today to provide transportation home. Will continue to follow for additional discharge needs.     Electronically signed by Jaquan Ewing RN on 10/12/2021 at 10:50 AM

## 2021-10-12 NOTE — PROGRESS NOTES
[id number] [address] [employer]      PROGRESS NOTE        Patient:  Melita Avina  YOB: 1950    MRN: [id number]     Acct: [de-identified]     Admit date: 10/5/2021    Pt seen and Chart reviewed. Consultant notes reviewed and care evaluated. Subjective: Patient is feeling better he is actually ate some of his breakfast with no nausea no vomiting. The pain is much improved he said he still have some he said it comes and goes but it is different than what he had before. He feels okay to go home. His Teran looks clear urine    Diet:  ADULT DIET; Regular  Adult Oral Nutrition Supplement;  Low Calorie/High Protein Oral Supplement      Medications:Current Inpatient    Scheduled Meds:   docusate sodium  100 mg Oral Daily    carvedilol  12.5 mg Oral BID WC    fluticasone  1 spray Each Nostril Daily    ferrous sulfate  325 mg Oral Daily with breakfast    lidocaine  1 patch TransDERmal Daily    piperacillin-tazobactam  3,375 mg IntraVENous Q8H    amLODIPine  10 mg Oral Daily    atorvastatin  10 mg Oral Daily    donepezil  10 mg Oral Nightly    FLUoxetine  20 mg Oral Daily    levothyroxine  112 mcg Oral Daily    memantine  10 mg Oral BID    pantoprazole  40 mg Oral QAM AC    insulin lispro  0-6 Units SubCUTAneous TID WC    insulin lispro  0-3 Units SubCUTAneous Nightly    influenza virus vaccine  0.5 mL IntraMUSCular Prior to discharge    scopolamine  1 patch TransDERmal Q72H     Continuous Infusions:   sodium chloride 50 mL/hr at 10/11/21 1106     PRN Meds:oxyCODONE-acetaminophen, ondansetron, fentanNYL, fentanNYL, acetaminophen, promethazine (PHENERGAN) in sodium chloride 0.9% IVPB, phenol, sodium chloride flush, sodium chloride flush, ondansetron, HYDROmorphone        Physical Exam:  Vitals: BP (!) 178/69   Pulse 58   Temp 98.4 °F (36.9 °C) (Oral)   Resp 17   Ht 5' 10\" (1.778 m)   Wt 170 lb 13.7 oz (77.5 kg)   SpO2 95%   BMI 24.52 kg/m²   24 hour intake/output:    Intake/Output Summary (Last 24 hours) at 10/12/2021 0810  Last data filed at 10/12/2021 0639  Gross per 24 hour   Intake 1535 ml   Output 2350 ml   Net -815 ml     Last 3 weights: Wt Readings from Last 3 Encounters:   10/11/21 170 lb 13.7 oz (77.5 kg)   09/24/21 174 lb (78.9 kg)   09/20/21 173 lb 3.2 oz (78.6 kg)       Physical Examination:   General appearance - alert, well appearing, and in no distress  Mental status - alert, oriented to person, place, and time  PERRLA wnl  Chest - clear to auscultation, no wheezes, rales or rhonchi, symmetric air entry  Heart - normal rate, regular rhythm, normal S1, S2, no murmurs, rubs, clicks or gallops  Abdomen - soft, nontender, nondistended, no masses or organomegaly some tenderness. But his incision is intact  Neurological - alert, oriented, normal speech, no focal findings or movement disorder noted}  Extremities - peripheral pulses normal, no pedal edema, no clubbing or cyanosis  Skin - normal coloration and turgor, no rashes, no suspicious skin lesions noted   Teran shows clear urine no blood or clots or sediment   Component Value Units   Basic Metabolic Panel w/ Reflex to MG [[id number]] (Abnormal)    Collected: 10/12/21 0527    Updated: 10/12/21 0604    Specimen Source: Blood     Glucose 94 mg/dL    BUN 11 mg/dL    CREATININE 1.01 mg/dL    Bun/Cre Ratio NOT REPORTED    Calcium 8.2Low  mg/dL    Sodium 141 mmol/L    Potassium 3.9 mmol/L    Chloride 107 mmol/L    CO2 25 mmol/L    Anion Gap 9 mmol/L    GFR Non-African American >60 mL/min    GFR African American >60 mL/min    GFR Comment         Comment: Average GFR for 79or more years old:    65 mL/min/1.73sq m   Chronic Kidney Disease:    <60 mL/min/1.73sq m   Kidney failure:    <15 mL/min/1.73sq m               eGFR calculated using average adult body mass.  Additional eGFR calculator available at:         [email or web address]              GFR Staging NOT REPORTED CBC with DIFF [[id number]] (Abnormal)    Collected: 10/12/21 0527    Updated: 10/12/21 0540    Specimen Source: Blood     WBC 6.3 k/uL    RBC 3.59Low  m/uL    Hemoglobin 10.5Low  g/dL    Hematocrit 31.4Low  %    MCV 87.5 fL    MCH 29.2 pg    MCHC 33.4 g/dL    RDW 15.0High  %    Platelets 961 k/uL    MPV 7.8 fL    NRBC Automated NOT REPORTED per 100 WBC    Differential Type NOT REPORTED    Seg Neutrophils 69High  %    Lymphocytes 15Low  %    Monocytes 12High  %    Eosinophils % 4 %    Basophils 0 %    Immature Granulocytes NOT REPORTED %    Segs Absolute 4.30 k/uL    Absolute Lymph # 1.00 k/uL    Absolute Mono # 0.70 k/uL    Absolute Eos # 0.30 k/uL    Basophils Absolute 0.00 k/uL    Absolute Immature Granulocyte NOT REPORTED k/uL    WBC Morphology NOT REPORTED    RBC Morphology NOT REPORTED    Platelet Estimate NOT REPORTED   POC Glucose Fingerstick [[id number]] (Abnormal)    Collected: 10/11/21 2010    Updated: 10/11/21 2026     POC Glucose 111High  mg/dL   POC Glucose Fingerstick [[id number]] (Abnormal)    Collected: 10/11/21 1658    Updated: 10/11/21 1705     POC Glucose 113High  mg/dL   POC Glucose Fingerstick [[id number]]    Collected: 10/11/21 1133    Updated: 10/11/21 1139     POC Glucose 108 mg/dL   Current IP Meds        Assessment:  Active Problems:    Hydronephrosis due to obstruction of ureter    Moderate malnutrition (HCC)    Chronic cholecystitis  Resolved Problems:    * No resolved hospital problems.  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    Hydronephrosis due to obstruction of ureter N13.1      · Nausea vomiting  ·    · Abdominal pain right-sided  ·    · Status post colonoscopy with polyp back to me  ·    · History of lymphoma seems to be responding to treatment according to the CAT scan with decreasing mass  ·    · New right-sided hydro-  ·    · History of hypertension  · Status post stenting  ·    · Urinary retention he has a Teran now with some blood noted in the Teran  ·    · Still abdominal and right upper quadrant pain on palpation  ·    · HIDA with evidence of chronic cholecystitis and probably associated acute event reported as negative will give him antibiotics  · Status post cystoscopy  · Status post stent  ·    ·    ·    · Hematuria resolving  ·    · Still with right upper quadrant middle quadrant pain no nausea  ·    · Patient is status post robotic cholecystectomy  · Still postop pain  ·    · Nausea resolved  ·    · Weakness  · Elevated blood pressure that could be also secondary his pain continue to monitor his med was just adjusted yesterday  · Eating better               Plan:  36. We will plan on discharging patient home today. 37.   38. To check with Dr. Stanislav Durant if the Teran needs stay until he sees him in the office discussed with the his nurse  44.   40. To continue with antibiotic outpatient  41. Cozaar 50 mg daily to his blood pressure regimen  42.  To follow-up with  in 1 week and to call if any problems    Radhames Alvarez DO [facility]            10/12/2021, 8:10 AM

## 2021-10-12 NOTE — PLAN OF CARE
Problem: Pain:  Goal: Pain level will decrease  Description: Pain level will decrease  Outcome: Completed  Goal: Control of acute pain  Description: Control of acute pain  Outcome: Completed  Goal: Control of chronic pain  Description: Control of chronic pain  Outcome: Completed     Problem: Coping:  Goal: Expressions of feelings of enhanced comfort will increase  Description: Expressions of feelings of enhanced comfort will increase  Outcome: Completed     Problem: Urinary Elimination:  Goal: Ability to achieve a balanced intake and output will improve  Description: Ability to achieve a balanced intake and output will improve  Outcome: Completed  Goal: Ability to recognize the need to void and respond appropriately will improve  Description: Ability to recognize the need to void and respond appropriately will improve  Outcome: Completed  Goal: Will remain free from infection  Description: Will remain free from infection  Outcome: Completed     Problem: Falls - Risk of:  Goal: Will remain free from falls  Description: Will remain free from falls  Outcome: Completed  Goal: Absence of physical injury  Description: Absence of physical injury  Outcome: Completed     Problem: Nutrition  Goal: Optimal nutrition therapy  Outcome: Completed     Problem: Skin Integrity:  Goal: Will show no infection signs and symptoms  Description: Will show no infection signs and symptoms  Outcome: Completed  Goal: Absence of new skin breakdown  Description: Absence of new skin breakdown  Outcome: Completed     Problem: Musculor/Skeletal Functional Status  Goal: Highest potential functional level  Outcome: Completed  Goal: Absence of falls  Outcome: Completed

## 2021-10-12 NOTE — PLAN OF CARE
Problem: Pain:  Goal: Pain level will decrease  Description: Pain level will decrease  10/12/2021 0134 by Jose M Mares RN  Outcome: Ongoing  10/11/2021 1835 by Felicita Jaramillo RN  Outcome: Ongoing  Goal: Control of acute pain  Description: Control of acute pain  10/12/2021 0134 by Jose M Mares RN  Outcome: Ongoing  10/11/2021 1835 by Felicita Jaramillo RN  Outcome: Ongoing  Goal: Control of chronic pain  Description: Control of chronic pain  10/12/2021 0134 by Jose M Mares RN  Outcome: Ongoing  10/11/2021 1835 by Felicita Jaramillo RN  Outcome: Ongoing     Problem: Coping:  Goal: Expressions of feelings of enhanced comfort will increase  Description: Expressions of feelings of enhanced comfort will increase  10/12/2021 0134 by Jose M Mares RN  Outcome: Ongoing  10/11/2021 1835 by Felicita Jaramillo RN  Outcome: Ongoing     Problem: Urinary Elimination:  Goal: Ability to achieve a balanced intake and output will improve  Description: Ability to achieve a balanced intake and output will improve  10/12/2021 0134 by Jose M Mares RN  Outcome: Ongoing  10/11/2021 1835 by Felicita Jaramillo RN  Outcome: Ongoing  Goal: Ability to recognize the need to void and respond appropriately will improve  Description: Ability to recognize the need to void and respond appropriately will improve  Outcome: Ongoing  Goal: Will remain free from infection  Description: Will remain free from infection  Outcome: Ongoing     Problem: Falls - Risk of:  Goal: Will remain free from falls  Description: Will remain free from falls  10/12/2021 0134 by Jose M Mares RN  Outcome: Ongoing  10/11/2021 1835 by Felicita Jaramillo RN  Outcome: Ongoing  Note: Pt remained free of falls during shift.    Goal: Absence of physical injury  Description: Absence of physical injury  10/12/2021 0134 by Jose M Mares RN  Outcome: Ongoing  10/11/2021 1835 by Felicita Jaramillo RN  Outcome: Ongoing     Problem: Nutrition  Goal: Optimal nutrition therapy  10/11/2021 1835 by Yaneth Lester RN  Outcome: Ongoing  10/11/2021 1517 by Ara Coronado, NIKKI, LD  Outcome: Ongoing     Problem: Skin Integrity:  Goal: Will show no infection signs and symptoms  Description: Will show no infection signs and symptoms  10/12/2021 0134 by Bel Wagner RN  Outcome: Ongoing  10/11/2021 1835 by Yaneth Lester RN  Outcome: Ongoing  Goal: Absence of new skin breakdown  Description: Absence of new skin breakdown  10/12/2021 0134 by Bel Wagner RN  Outcome: Ongoing  10/11/2021 1835 by Yaneth Lester RN  Outcome: Ongoing     Problem: Musculor/Skeletal Functional Status  Goal: Highest potential functional level  10/11/2021 1835 by Yaneth Lester RN  Outcome: Ongoing  Goal: Absence of falls  10/11/2021 1835 by Yaneth Lester RN  Outcome: Ongoing

## 2021-10-12 NOTE — PROGRESS NOTES
Patient discharged at this time with discharge instructions and personal belongings via wheelchair with wife. IV removed. All questions answered.

## 2021-10-12 NOTE — FLOWSHEET NOTE
Patient's wife Sarika was with him; family known to writer. Sarika gave writer update. Patient has recent cancer dx which he's in treatment for, and \"one thing after another\" keeps happening. They talked about their family and how they process life. Patient expects to be d/c today if urologist in agreement. Writer provided listening presence and emotional support. 10/12/21 3843   Encounter Summary   Services provided to: Patient and family together   Referral/Consult From: [address] [place] [address]; Children   Continue Visiting   (10-12-21)   Complexity of Encounter Moderate   Length of Encounter 30 minutes   Grief and Life Adjustment   Type Adjustment to illness   Assessment Calm; Approachable   Intervention Active listening;Explored feelings, thoughts, concerns;Explored coping resources;Sustaining presence/ Ministry of presence; Discussed illness/injury and it's impact; Discussed meaning/purpose   Outcome Expressed gratitude;Engaged in conversation;Expressed feelings/needs/concerns;Coping;Receptive

## 2021-10-12 NOTE — DISCHARGE SUMMARY
Physician Discharge Summary     Patient ID:  Ashish Andersen  [id number]  30 y.o.  1950    Admit date: 10/5/2021    Discharge date and time: 10/12/2021    Admitting Physician: Juan Daniel Steele DO     Discharge Physician: Juan Daniel Steele DO      Admission Diagnoses:   Hydronephrosis with ureteral stricture, not elsewhere classified [N13.1]  Hydronephrosis due to obstruction of ureter [N13.1]    Discharge Diagnoses: Active Problems:    Hydronephrosis due to obstruction of ureter    Moderate malnutrition (HCC)    Chronic cholecystitis  Resolved Problems:    * No resolved hospital problems.  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    Hydronephrosis due to obstruction of ureter N13.1      · Nausea vomiting  ·    · Abdominal pain right-sided  ·    · Status post colonoscopy with polyp back to me  ·    · History of lymphoma seems to be responding to treatment according to the CAT scan with decreasing mass  ·    · New right-sided hydro-  ·    · History of hypertension  · Status post stenting  ·    · Urinary retention he has a Teran now with some blood noted in the Teran  ·    · Still abdominal and right upper quadrant pain on palpation  ·    · HIDA with evidence of chronic cholecystitis and probably associated acute event reported as negative will give him antibiotics  · Status post cystoscopy  · Status post stent  ·    ·    ·    · Hematuria resolving  ·    · Still with right upper quadrant middle quadrant pain no nausea  ·    · Patient is status post robotic cholecystectomy  · Still postop pain  ·    · Nausea resolved  ·    · Weakness  · Elevated blood pressure that could be also secondary his pain continue to monitor his med was just adjusted yesterday  · Eating better                  Hospital Course: Patient was admitted with abdominal pain and was found on CT to have a new Hydro on the right side secondary his lymphoma with pelvic shifting was advanced. Otherwise the left side seems to be improving after he had his treatment for his lymphoma in the last couple months. Patient had nausea and pain he underwent stenting of the right ureter by  patient continued to have nausea surgery consulted in regard to his gallbladder which shows stones and abnormal HIDA scan he underwent robotic cholecystectomy did well after that except for postop pain that was different than his original pain. Patient was been eating and having BMs and feeling better with his nausea that has resolved. And then was discharged to be followed outpatient. Patient also was seen by his hematologist as well.     Patient Instructions: Continue with diet and monitor for any nausea or pain or fevers    Discharge Medications: Continue with home meds plus antibiotics on discharge  [unfilled]    Frequency    docusate sodium (COLACE) capsule 100 mg    Discontinue    DAILY    carvedilol (COREG) tablet 12.5 mg    Discontinue    2 TIMES DAILY WITH MEALS    oxyCODONE-acetaminophen (PERCOCET) 5-325 MG per tablet 1 tablet    Discontinue    EVERY 4 HOURS PRN    ondansetron (ZOFRAN) injection 4 mg    Discontinue    EVERY 6 HOURS PRN    fentaNYL (SUBLIMAZE) injection 50 mcg    Discontinue    EVERY 2 HOURS PRN    fentaNYL (SUBLIMAZE) injection 100 mcg    Discontinue    EVERY 2 HOURS PRN    acetaminophen (TYLENOL) tablet 650 mg    Discontinue    EVERY 4 HOURS PRN    promethazine (PHENERGAN) 6.25 mg in sodium chloride 0.9 % 50 mL IVPB    Discontinue    EVERY 6 HOURS PRN    fluticasone (FLONASE) 50 MCG/ACT nasal spray 1 spray    Discontinue    DAILY    phenol 1.4 % mouth spray 1 spray    Discontinue    EVERY 2 HOURS PRN    ferrous sulfate (IRON 325) tablet 325 mg    Discontinue    DAILY WITH BREAKFAST    piperacillin-tazobactam (ZOSYN) 3,375 mg in dextrose 5 % 50 mL IVPB extended infusion (mini-bag)    Discontinue   \"Followed by\" Linked Group Details    EVERY 8 HOURS    sodium chloride flush 0.9 % injection 10 mL    Discontinue    PRN    lidocaine 4 % external patch 1 patch    Discontinue    DAILY    insulin lispro (HUMALOG) injection vial 0-6 Units (Low Dose Correction Insulin)    Discontinue    3 TIMES DAILY WITH MEALS    levothyroxine (SYNTHROID) tablet 112 mcg    Discontinue    DAILY    pantoprazole (PROTONIX) tablet 40 mg    Discontinue    DAILY BEFORE BREAKFAST    scopolamine (TRANSDERM-SCOP) transdermal patch 1 patch    Discontinue    EVERY 72 HOURS    0.9 % sodium chloride infusion    Discontinue    CONTINUOUS    HYDROmorphone (DILAUDID) injection 1 mg    Discontinue    EVERY 4 HOURS PRN    influenza quadrivalent split vaccine (FLUZONE;FLUARIX;FLULAVAL;AFLURIA) injection 0.5 mL    Discontinue    PRIOR TO DISCHARGE    donepezil (ARICEPT) tablet 10 mg    Discontinue    NIGHTLY    memantine (NAMENDA) tablet 10 mg    Discontinue    2 TIMES DAILY    insulin lispro (HUMALOG) injection vial 0-3 Units (Low Dose Correction Insulin)    Discontinue    NIGHTLY    amLODIPine (NORVASC) tablet 10 mg    Discontinue    DAILY    atorvastatin (LIPITOR) tablet 10 mg    Discontinue    DAILY    FLUoxetine (PROZAC) capsule 20 mg    Discontinue    DAILY    ondansetron (ZOFRAN) injection 4 mg    Discontinue    EVERY 8 HOURS PRN    sodium chloride flush 0.9 % injection 10 mL    Discontinue    PRN   Intake/Output        Disposition to home  We will add Cozaar 50 mg p.o. daily secondary to increased blood pressure    Activity: Ad coleen. Diet: ADULT DIET; Regular  Adult Oral Nutrition Supplement; Low Calorie/High Protein Oral Supplement    Follow-up with with Dr Edvin Lopez in 1 to 2 weeks, patient to call  for an appointment and if has any problems.       Electronically signed by KRISTI Bullock  on 10/12/2021 at 8:30 AM

## 2021-10-12 NOTE — PROGRESS NOTES
Patient seen and examined. Afebrile, HTN otherwise VSS. POD#3 lap millicent. Patient states abdominal pain is about the same; rated 7/10. Passing flatus, no BM. Tolerating diet, no N/V. Abdomen soft, non-distended. Mild postoperative tenderness surrounding incisions. No pedal edema. Incisions clean, dry, intact. Patient ambulated in halls yesterday evening. Surgically stable for discharge. Prescriptions called in to pharmacy. Continue medical management. No acute general surgical issues. Patient has voided since the catheter was removed. Discharge to home today.     Maddy Coronado PA-C  [facility] [place]

## 2021-10-13 NOTE — PROGRESS NOTES
Physical Therapy  Facility/Department: [facility] MED SURG  Daily Treatment Note  NAME: Mona Goff  : 1950  MRN: [id number]    Date of Service: 10/12/2021    Discharge Recommendations:  Patient would benefit from continued therapy after discharge        Assessment   Body structures, Functions, Activity limitations: Decreased functional mobility ; Decreased endurance;Decreased balance; Increased pain  Specific instructions for Next Treatment: Monitor sao2 with activity  Prognosis: Good  REQUIRES PT FOLLOW UP: Yes  Activity Tolerance  Activity Tolerance: Patient limited by pain; Patient limited by fatigue;Patient limited by endurance     Patient Diagnosis(es): The primary encounter diagnosis was Hydronephrosis with ureteral stricture, not elsewhere classified. Diagnoses of Pain and Chronic cholecystitis were also pertinent to this visit. has a past medical history of Cancer [facility]), Chemotherapy management, encounter for, Diabetes mellitus ([facility] [address] History of non-Hodgkin's lymphoma, Hyperlipidemia, Hypertension, and Hypothyroidism. has a past surgical history that includes hernia repair; IR BIOPSY ABDOMINAL/RETROPERITONEAL MASS PERCUTANEOUS (2021); IR PORT PLACEMENT > 5 YEARS (2021); Colonoscopy (Left, 2021); Cystoscopy (Right, 10/6/2021); and Cholecystectomy, laparoscopic (N/A, 10/9/2021). Restrictions  Restrictions/Precautions  Restrictions/Precautions: General Precautions  Position Activity Restriction  Other position/activity restrictions: Teran, IV, o2  Subjective   Subjective  Subjective: Pt in bed upon arrival, pt stated that he was fatigued following bath with student nurses. Pt agreeable to ther ex and agreed to therapist returning this p.m to amb. Pt refused amb this afternoon stated he did not feel well. General Comment  Comments: Pt required much encouragement to do more pt refused gait , when therapist returned in afternoon.   Pain Screening  Patient Currently in Pain: Yes  Pain Assessment  Pain Assessment: 0-10  Pain Level: 7  Pain Location: Abdomen  Pain Radiating Towards: Back   Clinical Progression: Not changed  Vital Signs  Patient Currently in Pain: Yes  Oxygen Therapy  SpO2: 95 %  Pulse Oximeter Device Mode: Intermittent  Pulse Oximeter Device Location: Finger  O2 Device: Nasal cannula  O2 Flow Rate (L/min): 1 L/min       Orientation     Cognition      Objective      Transfers  Comment: Pt refused standing activity d/t increased pain. Ambulation  Ambulation?: No (c/p increased pain.)        Other exercises  Other exercises?: Yes  Other exercises 1: Reviewed glut/Quadsets and ankle pumps x 10                        G-Code     OutComes Score                                                     AM-PAC Score             Goals  Short term goals  Time Frame for Short term goals: 3 visits  Short term goal 1: Pt able to perform supine<>sit SBA  Short term goal 2: Ambulate without device distance fo > 100 ft, sao2 > 89%, SBA  Short term goal 3:  Pt petr to perform step upsx 4 , sba with 1 UE support.    Patient Goals   Patient goals : Feel better    Plan    Plan  Times per week: daily  Specific instructions for Next Treatment: Monitor sao2 with activity  Current Treatment Recommendations: Strengthening, Balance Training, Functional Mobility Training, Transfer Training, Endurance Training, Gait Training, Stair training, Home Exercise Program, Patient/Caregiver Education & Training, Safety Education & Training  Safety Devices  Type of devices: Left in bed, Call light within reach, Nurse notified, Bed alarm in place (Patricia Hernandez RN)     Therapy Time   Individual Concurrent Group Co-treatment   Time In 0915         Time Out 0925         Minutes [address] [facility], [place]   Electronically signed by Bunny Tejeda PTA on 10/12/2021 at 8:11 PM

## 2021-10-14 LAB — SURGICAL PATHOLOGY REPORT: NORMAL

## 2021-10-26 ENCOUNTER — HOSPITAL ENCOUNTER (OUTPATIENT)
Dept: VASCULAR LAB | Age: 71
Discharge: HOME | End: 2021-10-26
Payer: MEDICARE

## 2021-10-26 DIAGNOSIS — R09.89 BRUIT: ICD-10-CM

## 2021-10-26 PROCEDURE — 93880 EXTRACRANIAL BILAT STUDY: CPT

## 2021-11-08 ENCOUNTER — HOSPITAL ENCOUNTER (OUTPATIENT)
Dept: CT IMAGING | Age: 71
Discharge: HOME | End: 2021-11-10
Payer: MEDICARE

## 2021-11-08 DIAGNOSIS — C85.90 NON-HODGKIN'S LYMPHOMA, UNSPECIFIED BODY REGION, UNSPECIFIED NON-HODGKIN LYMPHOMA TYPE (HCC): ICD-10-CM

## 2021-11-08 PROCEDURE — 74176 CT ABD & PELVIS W/O CONTRAST: CPT

## 2021-11-10 ENCOUNTER — HOSPITAL ENCOUNTER (OUTPATIENT)
Dept: PREADMISSION TESTING | Age: 71
Discharge: HOME | End: 2021-11-14
Payer: MEDICARE

## 2021-11-10 VITALS — BODY MASS INDEX: 24.08 KG/M2 | WEIGHT: 172 LBS | HEIGHT: 71 IN

## 2021-11-10 NOTE — PROGRESS NOTES
Pre-op Instructions For Out-Patient Surgery    Medication Instructions:  · Please stop herbs and any supplements now (includes vitamins and minerals). · Please contact your surgeon and prescribing physician for pre-op instructions for any blood thinners. · If you have inhalers/aerosol treatments at home, please use them the morning of your surgery and bring the inhalers with you to the hospital.    · Please take the following medications the morning of your surgery with a sip of water: Keflex, Losartan, Coreg, Amlodipine, Synthroid. Surgery Instructions:  1. After midnight before surgery:  Do not eat or drink anything, including water, mints, gum, and hard candy. You may brush your teeth without swallowing. No smoking, chewing tobacco, or street drugs. 2. Please shower or bathe before surgery. If you were given Surgical Scrub Chlorhexidine Gluconate Liquid (CHG), please shower the night before and the morning of your surgery following the detailed instructions you received during your pre-admission visit. 3. Please do not wear any cologne, lotion, powder, deodorant, jewelry, piercings, perfume, makeup, nail polish, hair accessories, or hair spray on the day of surgery. Wear loose comfortable clothing. 4. Leave your valuables at home. Bring a storage case for any glasses/contacts. 5. An adult who is responsible for you MUST drive you home and should be with you for the first 24 hours after surgery. 6. If having out-patient knee and foot surgeries, please arrange for planned crutches, walker, or wheelchair before arriving to the hospital.    The Day of Surgery:  · Arrive at [address] [place] [address] Surgery Entrance at the time directed by your surgeon and check in at the desk. · If you have a living will or healthcare power of , please bring a copy. · You will be taken to the pre-op holding area where you will be prepared for surgery.   A physical assessment will be performed by a nurse practitioner or house officer. Your IV will be started and you will meet your anesthesiologist.    · When you go to surgery, your family will be directed to the surgical waiting room, where the doctor should speak with them after your surgery. · After surgery, you will be taken to the recovery room then when you are awake and stable you will go to the short stay unit for preparation to be discharged. Only your one designated person is allowed to come to short stay for your discharge. · If you use a Bi-PAP or C-PAP machine, please bring it with you and leave it in the car in case it is needed in recovery room. Instructions read to Enoch Renteria and understanding verbalized.

## 2021-11-10 NOTE — PROGRESS NOTES
Dr. Carina Zheng, anesthesia, was contacted and informed of patient's history and planned surgery. No orders received and no clearance required.

## 2021-11-15 ENCOUNTER — HOSPITAL ENCOUNTER (OUTPATIENT)
Dept: INFUSION THERAPY | Age: 71
Discharge: HOME | End: 2021-11-15
Payer: MEDICARE

## 2021-11-15 ENCOUNTER — TELEPHONE (OUTPATIENT)
Dept: ONCOLOGY | Age: 71
End: 2021-11-15

## 2021-11-15 ENCOUNTER — OFFICE VISIT (OUTPATIENT)
Dept: ONCOLOGY | Age: 71
End: 2021-11-15
Payer: MEDICARE

## 2021-11-15 VITALS
WEIGHT: 169.5 LBS | TEMPERATURE: 96.9 F | DIASTOLIC BLOOD PRESSURE: 78 MMHG | BODY MASS INDEX: 23.98 KG/M2 | HEART RATE: 77 BPM | SYSTOLIC BLOOD PRESSURE: 160 MMHG

## 2021-11-15 DIAGNOSIS — C85.90 NON-HODGKIN'S LYMPHOMA, UNSPECIFIED BODY REGION, UNSPECIFIED NON-HODGKIN LYMPHOMA TYPE (HCC): Primary | ICD-10-CM

## 2021-11-15 DIAGNOSIS — C82.13 FOLLICULAR LYMPHOMA GRADE II OF INTRA-ABDOMINAL LYMPH NODES (HCC): ICD-10-CM

## 2021-11-15 LAB
ABSOLUTE EOS #: 0.3 K/UL (ref 0–0.4)
ABSOLUTE IMMATURE GRANULOCYTE: ABNORMAL K/UL (ref 0–0.3)
ABSOLUTE LYMPH #: 1.2 K/UL (ref 1–4.8)
ABSOLUTE MONO #: 0.6 K/UL (ref 0.1–1.2)
ALBUMIN SERPL-MCNC: 4.2 G/DL (ref 3.5–5.2)
ALBUMIN/GLOBULIN RATIO: 2.1 (ref 1–2.5)
ALP BLD-CCNC: 128 U/L (ref 40–129)
ALT SERPL-CCNC: <5 U/L (ref 5–41)
ANION GAP SERPL CALCULATED.3IONS-SCNC: 12 MMOL/L (ref 9–17)
AST SERPL-CCNC: 12 U/L
BASOPHILS # BLD: 1 % (ref 0–2)
BASOPHILS ABSOLUTE: 0 K/UL (ref 0–0.2)
BILIRUB SERPL-MCNC: 0.55 MG/DL (ref 0.3–1.2)
BUN BLDV-MCNC: 36 MG/DL (ref 8–23)
BUN/CREAT BLD: ABNORMAL (ref 9–20)
CALCIUM SERPL-MCNC: 8.6 MG/DL (ref 8.6–10.4)
CHLORIDE BLD-SCNC: 107 MMOL/L (ref 98–107)
CO2: 24 MMOL/L (ref 20–31)
CREAT SERPL-MCNC: 1.24 MG/DL (ref 0.7–1.2)
DIFFERENTIAL TYPE: ABNORMAL
EOSINOPHILS RELATIVE PERCENT: 4 % (ref 1–4)
GFR AFRICAN AMERICAN: >60 ML/MIN
GFR NON-AFRICAN AMERICAN: 57 ML/MIN
GFR SERPL CREATININE-BSD FRML MDRD: ABNORMAL ML/MIN/{1.73_M2}
GFR SERPL CREATININE-BSD FRML MDRD: ABNORMAL ML/MIN/{1.73_M2}
GLUCOSE BLD-MCNC: 162 MG/DL (ref 70–99)
HCT VFR BLD CALC: 38.2 % (ref 41–53)
HEMOGLOBIN: 12.9 G/DL (ref 13.5–17.5)
IMMATURE GRANULOCYTES: ABNORMAL %
LYMPHOCYTES # BLD: 17 % (ref 24–44)
MCH RBC QN AUTO: 29.2 PG (ref 26–34)
MCHC RBC AUTO-ENTMCNC: 33.7 G/DL (ref 31–37)
MCV RBC AUTO: 86.6 FL (ref 80–100)
MONOCYTES # BLD: 9 % (ref 2–11)
NRBC AUTOMATED: ABNORMAL PER 100 WBC
PDW BLD-RTO: 15 % (ref 12.5–15.4)
PLATELET # BLD: 173 K/UL (ref 140–450)
PLATELET ESTIMATE: ABNORMAL
PMV BLD AUTO: 8.6 FL (ref 6–12)
POTASSIUM SERPL-SCNC: 4.2 MMOL/L (ref 3.7–5.3)
RBC # BLD: 4.41 M/UL (ref 4.5–5.9)
RBC # BLD: ABNORMAL 10*6/UL
SEG NEUTROPHILS: 69 % (ref 36–66)
SEGMENTED NEUTROPHILS ABSOLUTE COUNT: 4.9 K/UL (ref 1.8–7.7)
SODIUM BLD-SCNC: 143 MMOL/L (ref 135–144)
TOTAL PROTEIN: 6.2 G/DL (ref 6.4–8.3)
WBC # BLD: 7.1 K/UL (ref 3.5–11)
WBC # BLD: ABNORMAL 10*3/UL

## 2021-11-15 PROCEDURE — G8420 CALC BMI NORM PARAMETERS: HCPCS | Performed by: INTERNAL MEDICINE

## 2021-11-15 PROCEDURE — 1036F TOBACCO NON-USER: CPT | Performed by: INTERNAL MEDICINE

## 2021-11-15 PROCEDURE — 6360000002 HC RX W HCPCS: Performed by: INTERNAL MEDICINE

## 2021-11-15 PROCEDURE — 96415 CHEMO IV INFUSION ADDL HR: CPT

## 2021-11-15 PROCEDURE — 96375 TX/PRO/DX INJ NEW DRUG ADDON: CPT

## 2021-11-15 PROCEDURE — 99214 OFFICE O/P EST MOD 30 MIN: CPT | Performed by: INTERNAL MEDICINE

## 2021-11-15 PROCEDURE — 85025 COMPLETE CBC W/AUTO DIFF WBC: CPT

## 2021-11-15 PROCEDURE — 80053 COMPREHEN METABOLIC PANEL: CPT

## 2021-11-15 PROCEDURE — 96413 CHEMO IV INFUSION 1 HR: CPT

## 2021-11-15 PROCEDURE — 99211 OFF/OP EST MAY X REQ PHY/QHP: CPT | Performed by: INTERNAL MEDICINE

## 2021-11-15 PROCEDURE — G8427 DOCREV CUR MEDS BY ELIG CLIN: HCPCS | Performed by: INTERNAL MEDICINE

## 2021-11-15 PROCEDURE — 3017F COLORECTAL CA SCREEN DOC REV: CPT | Performed by: INTERNAL MEDICINE

## 2021-11-15 PROCEDURE — 6370000000 HC RX 637 (ALT 250 FOR IP): Performed by: INTERNAL MEDICINE

## 2021-11-15 PROCEDURE — 1123F ACP DISCUSS/DSCN MKR DOCD: CPT | Performed by: INTERNAL MEDICINE

## 2021-11-15 PROCEDURE — 36591 DRAW BLOOD OFF VENOUS DEVICE: CPT

## 2021-11-15 PROCEDURE — 2580000003 HC RX 258: Performed by: INTERNAL MEDICINE

## 2021-11-15 PROCEDURE — 4040F PNEUMOC VAC/ADMIN/RCVD: CPT | Performed by: INTERNAL MEDICINE

## 2021-11-15 PROCEDURE — G8482 FLU IMMUNIZE ORDER/ADMIN: HCPCS | Performed by: INTERNAL MEDICINE

## 2021-11-15 RX ORDER — DIPHENHYDRAMINE HYDROCHLORIDE 50 MG/ML
25 INJECTION INTRAMUSCULAR; INTRAVENOUS ONCE
Status: COMPLETED | OUTPATIENT
Start: 2021-11-15 | End: 2021-11-15

## 2021-11-15 RX ORDER — MEPERIDINE HYDROCHLORIDE 50 MG/ML
12.5 INJECTION INTRAMUSCULAR; INTRAVENOUS; SUBCUTANEOUS ONCE
Status: CANCELLED | OUTPATIENT
Start: 2021-11-15 | End: 2021-11-15

## 2021-11-15 RX ORDER — SODIUM CHLORIDE 9 MG/ML
20 INJECTION, SOLUTION INTRAVENOUS ONCE
Status: COMPLETED | OUTPATIENT
Start: 2021-11-15 | End: 2021-11-15

## 2021-11-15 RX ORDER — SODIUM CHLORIDE 9 MG/ML
INJECTION, SOLUTION INTRAVENOUS CONTINUOUS
Status: CANCELLED | OUTPATIENT
Start: 2021-11-15

## 2021-11-15 RX ORDER — SODIUM CHLORIDE 9 MG/ML
25 INJECTION, SOLUTION INTRAVENOUS PRN
Status: CANCELLED | OUTPATIENT
Start: 2021-11-15

## 2021-11-15 RX ORDER — METHYLPREDNISOLONE SODIUM SUCCINATE 125 MG/2ML
125 INJECTION, POWDER, LYOPHILIZED, FOR SOLUTION INTRAMUSCULAR; INTRAVENOUS ONCE
Status: CANCELLED | OUTPATIENT
Start: 2021-11-15 | End: 2021-11-15

## 2021-11-15 RX ORDER — SODIUM CHLORIDE 0.9 % (FLUSH) 0.9 %
5-40 SYRINGE (ML) INJECTION PRN
Status: DISCONTINUED | OUTPATIENT
Start: 2021-11-15 | End: 2021-11-16 | Stop reason: HOSPADM

## 2021-11-15 RX ORDER — MEPERIDINE HYDROCHLORIDE 50 MG/ML
12.5 INJECTION INTRAMUSCULAR; INTRAVENOUS; SUBCUTANEOUS ONCE
Status: CANCELLED | OUTPATIENT
Start: 2022-01-10 | End: 2022-01-10

## 2021-11-15 RX ORDER — ACETAMINOPHEN 325 MG/1
650 TABLET ORAL ONCE
Status: COMPLETED | OUTPATIENT
Start: 2021-11-15 | End: 2021-11-15

## 2021-11-15 RX ORDER — SODIUM CHLORIDE 9 MG/ML
INJECTION, SOLUTION INTRAVENOUS CONTINUOUS
Status: CANCELLED | OUTPATIENT
Start: 2022-01-10

## 2021-11-15 RX ORDER — SODIUM CHLORIDE 0.9 % (FLUSH) 0.9 %
5-40 SYRINGE (ML) INJECTION PRN
Status: CANCELLED | OUTPATIENT
Start: 2022-01-10

## 2021-11-15 RX ORDER — DIPHENHYDRAMINE HYDROCHLORIDE 50 MG/ML
25 INJECTION INTRAMUSCULAR; INTRAVENOUS ONCE
Status: CANCELLED | OUTPATIENT
Start: 2022-01-10

## 2021-11-15 RX ORDER — ACETAMINOPHEN 325 MG/1
650 TABLET ORAL ONCE
Status: CANCELLED | OUTPATIENT
Start: 2022-01-10

## 2021-11-15 RX ORDER — DIPHENHYDRAMINE HYDROCHLORIDE 50 MG/ML
50 INJECTION INTRAMUSCULAR; INTRAVENOUS ONCE
Status: CANCELLED | OUTPATIENT
Start: 2021-11-15 | End: 2021-11-15

## 2021-11-15 RX ORDER — HEPARIN SODIUM (PORCINE) LOCK FLUSH IV SOLN 100 UNIT/ML 100 UNIT/ML
500 SOLUTION INTRAVENOUS PRN
Status: DISCONTINUED | OUTPATIENT
Start: 2021-11-15 | End: 2021-11-16 | Stop reason: HOSPADM

## 2021-11-15 RX ORDER — DIPHENHYDRAMINE HYDROCHLORIDE 50 MG/ML
50 INJECTION INTRAMUSCULAR; INTRAVENOUS ONCE
Status: CANCELLED | OUTPATIENT
Start: 2022-01-10 | End: 2022-01-10

## 2021-11-15 RX ORDER — EPINEPHRINE 1 MG/ML
0.3 INJECTION, SOLUTION, CONCENTRATE INTRAVENOUS PRN
Status: CANCELLED | OUTPATIENT
Start: 2022-01-10

## 2021-11-15 RX ORDER — HEPARIN SODIUM (PORCINE) LOCK FLUSH IV SOLN 100 UNIT/ML 100 UNIT/ML
500 SOLUTION INTRAVENOUS PRN
Status: CANCELLED | OUTPATIENT
Start: 2022-01-10

## 2021-11-15 RX ORDER — METHYLPREDNISOLONE SODIUM SUCCINATE 125 MG/2ML
125 INJECTION, POWDER, LYOPHILIZED, FOR SOLUTION INTRAMUSCULAR; INTRAVENOUS ONCE
Status: CANCELLED | OUTPATIENT
Start: 2022-01-10 | End: 2022-01-10

## 2021-11-15 RX ORDER — MEGESTROL ACETATE 40 MG/ML
400 SUSPENSION ORAL DAILY
Qty: 240 ML | Refills: 3 | Status: ON HOLD | OUTPATIENT
Start: 2021-11-15 | End: 2022-02-20 | Stop reason: HOSPADM

## 2021-11-15 RX ORDER — EPINEPHRINE 1 MG/ML
0.3 INJECTION, SOLUTION, CONCENTRATE INTRAVENOUS PRN
Status: CANCELLED | OUTPATIENT
Start: 2021-11-15

## 2021-11-15 RX ORDER — HYDROCODONE BITARTRATE AND ACETAMINOPHEN 5; 325 MG/1; MG/1
1 TABLET ORAL EVERY 6 HOURS PRN
Qty: 100 TABLET | Refills: 0 | Status: SHIPPED | OUTPATIENT
Start: 2021-11-15 | End: 2021-12-15

## 2021-11-15 RX ORDER — SODIUM CHLORIDE 9 MG/ML
25 INJECTION, SOLUTION INTRAVENOUS PRN
Status: CANCELLED | OUTPATIENT
Start: 2022-01-10

## 2021-11-15 RX ORDER — SODIUM CHLORIDE 9 MG/ML
20 INJECTION, SOLUTION INTRAVENOUS ONCE
Status: CANCELLED | OUTPATIENT
Start: 2022-01-10 | End: 2022-01-10

## 2021-11-15 RX ADMIN — SODIUM CHLORIDE, PRESERVATIVE FREE 10 ML: 5 INJECTION INTRAVENOUS at 12:11

## 2021-11-15 RX ADMIN — HEPARIN 500 UNITS: 100 SYRINGE at 12:11

## 2021-11-15 RX ADMIN — ACETAMINOPHEN 650 MG: 325 TABLET ORAL at 10:11

## 2021-11-15 RX ADMIN — DIPHENHYDRAMINE HYDROCHLORIDE 25 MG: 50 INJECTION, SOLUTION INTRAMUSCULAR; INTRAVENOUS at 10:11

## 2021-11-15 RX ADMIN — SODIUM CHLORIDE 20 ML/HR: 9 INJECTION, SOLUTION INTRAVENOUS at 10:11

## 2021-11-15 RX ADMIN — SODIUM CHLORIDE 700 MG: 9 INJECTION, SOLUTION INTRAVENOUS at 10:42

## 2021-11-15 ASSESSMENT — PAIN SCALES - GENERAL: PAINLEVEL_OUTOF10: 0

## 2021-11-15 NOTE — PROGRESS NOTES
Pt here for C1D1 Ruxience. Pt seen by Dr Allison Sosa prior to treatment, refer to his note. Labs drawn from port and results reviewed. Pt was treated without incident and d/c'd in stable condition. Pt will return on 1-10-22 for C2D1.

## 2021-11-15 NOTE — TELEPHONE ENCOUNTER
AVS from 11/15/21    Norco, megace  Proceed with rutuxan as scheduled  RV about 4 months (at time of treatment)    Pt to  rx at pharmacy on file, Oral meds to be continued as prescribed    Tx today as scheduled    RV scheduled 3/7/21 @ 12pm    PT was given AVS and an appt schedule    Electronically signed by Coleen Garcia on 11/15/2021 at 3:02 PM

## 2021-11-22 NOTE — PROGRESS NOTES
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 Evidence of relapse with biopsy-proven follicular lymphoma. 1-2 from treated medically lymph node biopsy July 6, 2021    CURRENT THERAPY:    1. Observation. 2. Status post treatment with Bexxar in May 2008. 3. Status post previous treatment with Rituxan. 4.  Start treatment with Rituxan August 23, 2021. completed4 weeks of rituximab on 9/20/2021  5. He will be started on consolidation Rituxan maintenance     INTERIM HISTORY:  The patient is seen for follow-up of his lymphoma. He was treated medically because of abdominal pain. Further work-up showed enlarged lymph nodes in the retroperitoneum and iliac area. Biopsy proved recurrence of follicular lymphoma. Patient has no more abdominal pain. No nausea vomiting. No fever. No weight loss or decreased appetite. No night sweats. Tolerated Rituxan well. No side effects. He c/o decreased appetite and abdominal pain. REVIEW OF SYSTEMS:   General: No weight loss. Eyes: No blurred vision, eye pain or double vision. Ears: No hearing problems or drainage. No tinnitus. Throat: No sore throat, problems with swallowing or dysphagia. Respiratory: No cough, sputum or hemoptysis. No shortness of breath. Cardiovascular: No chest pain, orthopnea or PND. No lower extremity edema. No palpitation. Gastrointestinal: No problems with swallowing. No abdominal pain or bloating. No nausea or vomiting. No diarrhea or constipation. No GI bleeding. Genitourinary: No dysuria, hematuria, frequency or urgency. Musculoskeletal: No muscle aches or pains. No limitation of movement. No back pain. Dermatologic: No skin rashes or pruritus. No skin lesions or discolorations. Psychiatric: No depression, anxiety, or stress or signs of schizophrenia. Hematologic: No history of bleeding tendency. No bruises or ecchymosis. No history of clotting problems. Infectious disease: No fever, chills or frequent infections. Endocrine: No problems with opacity. No polydipsia or polyuria. Neurologic: No headaches or dizziness. No weakness or numbness of the extremities. SOCIAL HISTORY:  No smoking, no alcohol drinking. PHYSICAL EXAM:  The patient is not in acute distress. Vital signs: Blood pressure (!) 160/78, pulse 77, temperature 96.9 °F (36.1 °C), temperature source Temporal, weight 169 lb 8 oz (76.9 kg). HEENT:  Eyes are normal. Ears, nose and throat are normal.  Neck: Supple. No lymph node enlargement. No thyroid enlargement. Trachea is centrally located. Chest:  Clear to auscultation. No wheezes or crepitations. Heart: Regular sinus rhythm. Abdomen: Soft, nontender. No hepatosplenomegaly. No masses. Extremities:  With no edema. Lymph Nodes:  No cervical, axillary or inguinal lymph node enlargement. Neurologic:  Conscious and oriented. No focal neurological deficits. Psychosocial: No depression, anxiety or stress. Skin: No rashes, bruises or ecchymoses.     REVIEW OF DIAGNOSTIC DATA:     Lab Results   Component Value Date    WBC 7.1 11/15/2021    HGB 12.9 (L) 11/15/2021    HCT 38.2 (L) 11/15/2021    MCV 86.6 11/15/2021     11/15/2021       Chemistry        Component Value Date/Time     11/15/2021 0907    K 4.2 11/15/2021 0907     11/15/2021 0907    CO2 24 11/15/2021 0907    BUN 36 (H) 11/15/2021 0907    CREATININE 1.24 (H) 11/15/2021 0907        Component Value Date/Time    CALCIUM 8.6 11/15/2021 0907    ALKPHOS 128 11/15/2021 0907    AST 12 11/15/2021 0907    ALT <5 (L) 11/15/2021 0907    BILITOT 0.55 11/15/2021 0907        CT scan showed stable findings with no evidence of progression. CT ABDOMEN PELVIS WO CONTRAST Additional Contrast? Oral  Narrative: EXAMINATION:  CT OF THE ABDOMEN AND PELVIS WITHOUT CONTRAST 11/8/2021 10:56 am    TECHNIQUE:  CT of the abdomen and pelvis was performed without the administration of  intravenous contrast. Multiplanar reformatted images are provided for review. Dose modulation, iterative reconstruction, and/or weight based adjustment of  the mA/kV was utilized to reduce the radiation dose to as low as reasonably  achievable. COMPARISON:  CT abdomen and pelvis October 5, 2021. HISTORY:  ORDERING SYSTEM PROVIDED HISTORY: Non-Hodgkin's lymphoma, unspecified body  region, unspecified non-Hodgkin lymphoma type [facility])  TECHNOLOGIST PROVIDED HISTORY:    Reason for Exam: non-hodgkin's lymphoma, oral only ordered. FINDINGS:  Lower Chest: Minimal bibasilar scarring. Organs: Suboptimal evaluation due to lack of IV contrast.  Gallbladder  appears to have been removed. Splenic granulomas. Liver pancreas and right  adrenal gland all appear grossly unremarkable. Left adrenal gland not  visualized. Previously identified infiltrative mass is identified involving  the left retroperitoneum/perinephric region extending into the periaortic  retroperitoneum and its branches which appears grossly similar to the prior  study. There appears to be persistent left-sided hydronephrosis without  hydroureter. Right-sided double J ureteral stent is noted. No stone is seen  along the course of the stent. A 3 mm nonobstructing right renal calculus is  seen. Abdominal aorta demonstrates moderate calcification without aneurysm. GI/Bowel: Stomach is grossly unremarkable. Small bowel appears nondilated. Oral contrast has reached the colon. Appendix is normal.  Colonic  diverticulosis. Pelvis: Prostatomegaly. Peritoneum/Retroperitoneum: No free air, free fluid or pathologically  enlarged lymph nodes.     Bones/Soft Tissues: Abdominal wall demonstrates no acute findings. Osseous  structures demonstrate no acute findings. Impression: Overall, no significant change in size of the previously identified  infiltrating mass involving the left retroperitoneum when compared to the  prior study from October 5, 2021. Finding likely relates to the patient's  lymphoma diagnosis. There is persistent left-sided hydronephrosis. ASSESSMENT:    Small cell, B-cell non-Hodgkins lymphoma in remission. Events of relapse with biopsy-proven follicular lymphoma grade 1-2 July 5, 2021    PLAN:     Lymph node biopsy showed relapse of low-grade lymphoma. Grade 1 through 2 follicular lymphoma. PET/CT scan was reviewed and discussed with the patient. I again discussed with the patient the nature of his lymphoma, staging, prognosis and treatment. Explained that this is a low-grade lymphoma and treatment is usually well controlled but not curable. Based on the findings my recommendation is to use rituximab again. Explained benefits and side effects. He agreed. We will plan to give weekly Rituxan for 4 weeks followed by maintenance. rituxan Tolerated well. .   Ct scan showed stable disease. Induction rituximab will be followed by consolidation of maintenance rituximab every 2 months for 2 years. With patient's GI symptoms, he was referred to GI. Will prescribe norco and megace. Patient's questions were answered to the best of his satisfaction and he verbalized full understanding and agreement. cc: Santi Hadley D.O. Valeda Mcardle, M.D.         Vitor Kaur M.D.

## 2021-11-23 ENCOUNTER — ANESTHESIA EVENT (OUTPATIENT)
Dept: OPERATING ROOM | Age: 71
End: 2021-11-23
Payer: MEDICARE

## 2021-11-24 ENCOUNTER — ANESTHESIA (OUTPATIENT)
Dept: OPERATING ROOM | Age: 71
End: 2021-11-24
Payer: MEDICARE

## 2021-11-24 ENCOUNTER — APPOINTMENT (OUTPATIENT)
Dept: GENERAL RADIOLOGY | Age: 71
End: 2021-11-24
Attending: UROLOGY
Payer: MEDICARE

## 2021-11-24 ENCOUNTER — HOSPITAL ENCOUNTER (OUTPATIENT)
Age: 71
Setting detail: OUTPATIENT SURGERY
Discharge: HOME | End: 2021-11-24
Attending: UROLOGY | Admitting: UROLOGY
Payer: MEDICARE

## 2021-11-24 VITALS
WEIGHT: 169 LBS | TEMPERATURE: 97.3 F | BODY MASS INDEX: 23.66 KG/M2 | HEIGHT: 71 IN | RESPIRATION RATE: 20 BRPM | DIASTOLIC BLOOD PRESSURE: 72 MMHG | OXYGEN SATURATION: 94 % | SYSTOLIC BLOOD PRESSURE: 168 MMHG | HEART RATE: 71 BPM

## 2021-11-24 VITALS
RESPIRATION RATE: 2 BRPM | DIASTOLIC BLOOD PRESSURE: 57 MMHG | SYSTOLIC BLOOD PRESSURE: 111 MMHG | OXYGEN SATURATION: 97 % | TEMPERATURE: 96.9 F

## 2021-11-24 DIAGNOSIS — N13.1 HYDRONEPHROSIS DUE TO OBSTRUCTION OF URETER: Primary | ICD-10-CM

## 2021-11-24 LAB
-: ABNORMAL
AMORPHOUS: ABNORMAL
BACTERIA: ABNORMAL
BILIRUBIN URINE: ABNORMAL
CASTS UA: ABNORMAL /LPF
COLOR: ABNORMAL
COMMENT UA: ABNORMAL
CRYSTALS, UA: ABNORMAL /HPF
EPITHELIAL CELLS UA: ABNORMAL /HPF
GLUCOSE BLD-MCNC: 142 MG/DL (ref 75–110)
GLUCOSE URINE: NEGATIVE
KETONES, URINE: ABNORMAL
LEUKOCYTE ESTERASE, URINE: ABNORMAL
MUCUS: ABNORMAL
NITRITE, URINE: NEGATIVE
OTHER OBSERVATIONS UA: ABNORMAL
PH UA: 5.5 (ref 5–8)
PROTEIN UA: ABNORMAL
RBC UA: ABNORMAL /HPF
RENAL EPITHELIAL, UA: ABNORMAL /HPF
SPECIFIC GRAVITY UA: 1.01 (ref 1–1.03)
TRICHOMONAS: ABNORMAL
TURBIDITY: ABNORMAL
URINE HGB: ABNORMAL
UROBILINOGEN, URINE: NORMAL
WBC UA: ABNORMAL /HPF
YEAST: ABNORMAL

## 2021-11-24 PROCEDURE — C1758 CATHETER, URETERAL: HCPCS | Performed by: UROLOGY

## 2021-11-24 PROCEDURE — 7100000010 HC PHASE II RECOVERY - FIRST 15 MIN: Performed by: UROLOGY

## 2021-11-24 PROCEDURE — 7100000011 HC PHASE II RECOVERY - ADDTL 15 MIN: Performed by: UROLOGY

## 2021-11-24 PROCEDURE — C2617 STENT, NON-COR, TEM W/O DEL: HCPCS | Performed by: UROLOGY

## 2021-11-24 PROCEDURE — 2580000003 HC RX 258: Performed by: ANESTHESIOLOGY

## 2021-11-24 PROCEDURE — 6360000004 HC RX CONTRAST MEDICATION: Performed by: UROLOGY

## 2021-11-24 PROCEDURE — C1769 GUIDE WIRE: HCPCS | Performed by: UROLOGY

## 2021-11-24 PROCEDURE — 3700000001 HC ADD 15 MINUTES (ANESTHESIA): Performed by: UROLOGY

## 2021-11-24 PROCEDURE — 7100000031 HC ASPR PHASE II RECOVERY - ADDTL 15 MIN: Performed by: UROLOGY

## 2021-11-24 PROCEDURE — 6360000002 HC RX W HCPCS: Performed by: UROLOGY

## 2021-11-24 PROCEDURE — 3209999900 FLUORO FOR SURGICAL PROCEDURES

## 2021-11-24 PROCEDURE — 3600000002 HC SURGERY LEVEL 2 BASE: Performed by: UROLOGY

## 2021-11-24 PROCEDURE — 2500000003 HC RX 250 WO HCPCS: Performed by: NURSE ANESTHETIST, CERTIFIED REGISTERED

## 2021-11-24 PROCEDURE — 6370000000 HC RX 637 (ALT 250 FOR IP): Performed by: UROLOGY

## 2021-11-24 PROCEDURE — 7100000001 HC PACU RECOVERY - ADDTL 15 MIN: Performed by: UROLOGY

## 2021-11-24 PROCEDURE — 82947 ASSAY GLUCOSE BLOOD QUANT: CPT

## 2021-11-24 PROCEDURE — 7100000030 HC ASPR PHASE II RECOVERY - FIRST 15 MIN: Performed by: UROLOGY

## 2021-11-24 PROCEDURE — 7100000000 HC PACU RECOVERY - FIRST 15 MIN: Performed by: UROLOGY

## 2021-11-24 PROCEDURE — 2709999900 HC NON-CHARGEABLE SUPPLY: Performed by: UROLOGY

## 2021-11-24 PROCEDURE — 81001 URINALYSIS AUTO W/SCOPE: CPT

## 2021-11-24 PROCEDURE — 87086 URINE CULTURE/COLONY COUNT: CPT

## 2021-11-24 PROCEDURE — 3700000000 HC ANESTHESIA ATTENDED CARE: Performed by: UROLOGY

## 2021-11-24 PROCEDURE — 3600000012 HC SURGERY LEVEL 2 ADDTL 15MIN: Performed by: UROLOGY

## 2021-11-24 PROCEDURE — 6360000002 HC RX W HCPCS: Performed by: NURSE ANESTHETIST, CERTIFIED REGISTERED

## 2021-11-24 DEVICE — URETERAL STENT
Type: IMPLANTABLE DEVICE | Site: URETER | Status: FUNCTIONAL
Brand: POLARIS™ ULTRA

## 2021-11-24 RX ORDER — ONDANSETRON 2 MG/ML
INJECTION INTRAMUSCULAR; INTRAVENOUS PRN
Status: DISCONTINUED | OUTPATIENT
Start: 2021-11-24 | End: 2021-11-24 | Stop reason: SDUPTHER

## 2021-11-24 RX ORDER — LABETALOL HYDROCHLORIDE 5 MG/ML
5 INJECTION, SOLUTION INTRAVENOUS EVERY 10 MIN PRN
Status: DISCONTINUED | OUTPATIENT
Start: 2021-11-24 | End: 2021-11-24 | Stop reason: HOSPADM

## 2021-11-24 RX ORDER — MIDAZOLAM HYDROCHLORIDE 1 MG/ML
INJECTION INTRAMUSCULAR; INTRAVENOUS PRN
Status: DISCONTINUED | OUTPATIENT
Start: 2021-11-24 | End: 2021-11-24 | Stop reason: SDUPTHER

## 2021-11-24 RX ORDER — LIDOCAINE HYDROCHLORIDE 10 MG/ML
1 INJECTION, SOLUTION EPIDURAL; INFILTRATION; INTRACAUDAL; PERINEURAL
Status: DISCONTINUED | OUTPATIENT
Start: 2021-11-24 | End: 2021-11-24 | Stop reason: HOSPADM

## 2021-11-24 RX ORDER — CEPHALEXIN 500 MG/1
500 CAPSULE ORAL 3 TIMES DAILY
Qty: 15 CAPSULE | Refills: 0 | Status: SHIPPED | OUTPATIENT
Start: 2021-11-24 | End: 2021-11-29

## 2021-11-24 RX ORDER — DEXAMETHASONE SODIUM PHOSPHATE 4 MG/ML
INJECTION, SOLUTION INTRA-ARTICULAR; INTRALESIONAL; INTRAMUSCULAR; INTRAVENOUS; SOFT TISSUE PRN
Status: DISCONTINUED | OUTPATIENT
Start: 2021-11-24 | End: 2021-11-24 | Stop reason: SDUPTHER

## 2021-11-24 RX ORDER — PROMETHAZINE HYDROCHLORIDE 25 MG/ML
6.25 INJECTION, SOLUTION INTRAMUSCULAR; INTRAVENOUS
Status: DISCONTINUED | OUTPATIENT
Start: 2021-11-24 | End: 2021-11-24

## 2021-11-24 RX ORDER — TOBRAMYCIN SULFATE 40 MG/ML
INJECTION, SOLUTION INTRAMUSCULAR; INTRAVENOUS PRN
Status: DISCONTINUED | OUTPATIENT
Start: 2021-11-24 | End: 2021-11-24 | Stop reason: ALTCHOICE

## 2021-11-24 RX ORDER — SODIUM CHLORIDE 0.9 % (FLUSH) 0.9 %
10 SYRINGE (ML) INJECTION PRN
Status: DISCONTINUED | OUTPATIENT
Start: 2021-11-24 | End: 2021-11-24 | Stop reason: HOSPADM

## 2021-11-24 RX ORDER — LIDOCAINE HYDROCHLORIDE 10 MG/ML
INJECTION, SOLUTION EPIDURAL; INFILTRATION; INTRACAUDAL; PERINEURAL PRN
Status: DISCONTINUED | OUTPATIENT
Start: 2021-11-24 | End: 2021-11-24 | Stop reason: SDUPTHER

## 2021-11-24 RX ORDER — METOCLOPRAMIDE HYDROCHLORIDE 5 MG/ML
10 INJECTION INTRAMUSCULAR; INTRAVENOUS
Status: DISCONTINUED | OUTPATIENT
Start: 2021-11-24 | End: 2021-11-24 | Stop reason: HOSPADM

## 2021-11-24 RX ORDER — FENTANYL CITRATE 50 UG/ML
25 INJECTION, SOLUTION INTRAMUSCULAR; INTRAVENOUS EVERY 5 MIN PRN
Status: DISCONTINUED | OUTPATIENT
Start: 2021-11-24 | End: 2021-11-24 | Stop reason: HOSPADM

## 2021-11-24 RX ORDER — DIPHENHYDRAMINE HYDROCHLORIDE 50 MG/ML
12.5 INJECTION INTRAMUSCULAR; INTRAVENOUS
Status: DISCONTINUED | OUTPATIENT
Start: 2021-11-24 | End: 2021-11-24 | Stop reason: HOSPADM

## 2021-11-24 RX ORDER — SODIUM CHLORIDE, SODIUM LACTATE, POTASSIUM CHLORIDE, CALCIUM CHLORIDE 600; 310; 30; 20 MG/100ML; MG/100ML; MG/100ML; MG/100ML
INJECTION, SOLUTION INTRAVENOUS CONTINUOUS
Status: DISCONTINUED | OUTPATIENT
Start: 2021-11-24 | End: 2021-11-24 | Stop reason: HOSPADM

## 2021-11-24 RX ORDER — PROPOFOL 10 MG/ML
INJECTION, EMULSION INTRAVENOUS PRN
Status: DISCONTINUED | OUTPATIENT
Start: 2021-11-24 | End: 2021-11-24 | Stop reason: SDUPTHER

## 2021-11-24 RX ORDER — SODIUM CHLORIDE 9 MG/ML
25 INJECTION, SOLUTION INTRAVENOUS PRN
Status: DISCONTINUED | OUTPATIENT
Start: 2021-11-24 | End: 2021-11-24 | Stop reason: HOSPADM

## 2021-11-24 RX ORDER — LIDOCAINE HYDROCHLORIDE 20 MG/ML
JELLY TOPICAL PRN
Status: DISCONTINUED | OUTPATIENT
Start: 2021-11-24 | End: 2021-11-24 | Stop reason: ALTCHOICE

## 2021-11-24 RX ORDER — PHENAZOPYRIDINE HYDROCHLORIDE 100 MG/1
100 TABLET, FILM COATED ORAL 3 TIMES DAILY PRN
Qty: 12 TABLET | Refills: 1 | Status: SHIPPED | OUTPATIENT
Start: 2021-11-24 | End: 2021-11-28

## 2021-11-24 RX ORDER — OXYCODONE HYDROCHLORIDE AND ACETAMINOPHEN 5; 325 MG/1; MG/1
1 TABLET ORAL
Status: DISCONTINUED | OUTPATIENT
Start: 2021-11-24 | End: 2021-11-24 | Stop reason: HOSPADM

## 2021-11-24 RX ORDER — 0.9 % SODIUM CHLORIDE 0.9 %
500 INTRAVENOUS SOLUTION INTRAVENOUS
Status: DISCONTINUED | OUTPATIENT
Start: 2021-11-24 | End: 2021-11-24 | Stop reason: HOSPADM

## 2021-11-24 RX ORDER — HYDRALAZINE HYDROCHLORIDE 20 MG/ML
5 INJECTION INTRAMUSCULAR; INTRAVENOUS EVERY 10 MIN PRN
Status: DISCONTINUED | OUTPATIENT
Start: 2021-11-24 | End: 2021-11-24 | Stop reason: HOSPADM

## 2021-11-24 RX ADMIN — ONDANSETRON 4 MG: 2 INJECTION, SOLUTION INTRAMUSCULAR; INTRAVENOUS at 09:32

## 2021-11-24 RX ADMIN — SODIUM CHLORIDE, POTASSIUM CHLORIDE, SODIUM LACTATE AND CALCIUM CHLORIDE: 600; 310; 30; 20 INJECTION, SOLUTION INTRAVENOUS at 09:12

## 2021-11-24 RX ADMIN — CEFAZOLIN 2000 MG: 10 INJECTION, POWDER, FOR SOLUTION INTRAVENOUS at 09:25

## 2021-11-24 RX ADMIN — DEXAMETHASONE SODIUM PHOSPHATE 4 MG: 4 INJECTION, SOLUTION INTRAMUSCULAR; INTRAVENOUS at 09:32

## 2021-11-24 RX ADMIN — PROPOFOL 110 MG: 10 INJECTION, EMULSION INTRAVENOUS at 09:28

## 2021-11-24 RX ADMIN — MIDAZOLAM 2 MG: 1 INJECTION INTRAMUSCULAR; INTRAVENOUS at 09:24

## 2021-11-24 RX ADMIN — LIDOCAINE HYDROCHLORIDE 50 MG: 10 INJECTION, SOLUTION EPIDURAL; INFILTRATION; INTRACAUDAL; PERINEURAL at 09:28

## 2021-11-24 ASSESSMENT — PULMONARY FUNCTION TESTS
PIF_VALUE: 2
PIF_VALUE: 3
PIF_VALUE: 23
PIF_VALUE: 11
PIF_VALUE: 2
PIF_VALUE: 12
PIF_VALUE: 16
PIF_VALUE: 11
PIF_VALUE: 12
PIF_VALUE: 10
PIF_VALUE: 3
PIF_VALUE: 9
PIF_VALUE: 26
PIF_VALUE: 24
PIF_VALUE: 2
PIF_VALUE: 2
PIF_VALUE: 15
PIF_VALUE: 5
PIF_VALUE: 11
PIF_VALUE: 11
PIF_VALUE: 18
PIF_VALUE: 18
PIF_VALUE: 13
PIF_VALUE: 12
PIF_VALUE: 2
PIF_VALUE: 0
PIF_VALUE: 11
PIF_VALUE: 1
PIF_VALUE: 2
PIF_VALUE: 11
PIF_VALUE: 11
PIF_VALUE: 0
PIF_VALUE: 1
PIF_VALUE: 10
PIF_VALUE: 0
PIF_VALUE: 11
PIF_VALUE: 12
PIF_VALUE: 2
PIF_VALUE: 1
PIF_VALUE: 11
PIF_VALUE: 3
PIF_VALUE: 18
PIF_VALUE: 3
PIF_VALUE: 2

## 2021-11-24 ASSESSMENT — PAIN SCALES - GENERAL
PAINLEVEL_OUTOF10: 0

## 2021-11-24 ASSESSMENT — PAIN DESCRIPTION - DESCRIPTORS: DESCRIPTORS: BURNING;ITCHING;SHARP;SHOOTING

## 2021-11-24 ASSESSMENT — LIFESTYLE VARIABLES: SMOKING_STATUS: 1

## 2021-11-24 ASSESSMENT — PAIN - FUNCTIONAL ASSESSMENT: PAIN_FUNCTIONAL_ASSESSMENT: 0-10

## 2021-11-24 NOTE — ANESTHESIA POSTPROCEDURE EVALUATION
Department of Anesthesiology  Postprocedure Note    Patient: Rubén Moreira  MRN: [id number]  YOB: 1950  Date of evaluation: 11/24/2021  Time:  12:39 PM     Procedure Summary     Date: 11/24/21 Room / Location: [address] / [address]    Anesthesia Start: 2461 Anesthesia Stop: 1013    Procedure: CYSTOSCOPY URETERAL STENT INSERTION RIGHT EXCHANGE & RETROGRADE PYELOGRAM LEFT INSERTION (Bilateral Ureter) Diagnosis: (HYDRONEPHROSIS      (PT HAS HAD COVID VACCINE DONE))    Surgeons: Rome Esquivel MD Responsible Provider: Mandi Gutierrez MD    Anesthesia Type: general ASA Status: 3          Anesthesia Type: general    Yvette Phase I: Yvette Score: 10    Yvette Phase II: Yvette Score: 10    Last vitals: Reviewed and per EMR flowsheets.        Anesthesia Post Evaluation    Comments: POST- ANESTHESIA EVALUATION       Pt Name: Rubén Moreira  MRN: [id number]  YOB: 1950  Date of evaluation: 11/24/2021  Time:  12:39 PM      BP (!) 168/72   Pulse 71   Temp 97.3 °F (36.3 °C) (Infrared)   Resp 20   Ht 5' 10.5\" (1.791 m)   Wt 169 lb (76.7 kg)   SpO2 94%   BMI 23.91 kg/m²      Consciousness Level  Awake  Cardiopulmonary Status  Stable  Pain Adequately Treated YES  Nausea / Vomiting  NO  Adequate Hydration  YES  Anesthesia Related Complications NONE      Electronically signed by Mandi Gutierrez MD on 11/24/2021 at 12:39 PM

## 2021-11-24 NOTE — ANESTHESIA PRE PROCEDURE
Department of Anesthesiology  Preprocedure Note       Name:  Artcheko Stands   Age:  66 y.o.  :  1950                                          MRN:  [id number]         Date:  2021      Surgeon: Chhaya Venegas):  Harrison Altamirano MD    Procedure: Procedure(s):  CYSTOSCOPY URETERAL STENT INSERTION    Medications prior to admission:   Prior to Admission medications    Medication Sig Start Date End Date Taking? Authorizing Provider   HYDROcodone-acetaminophen (NORCO) 5-325 MG per tablet Take 1 tablet by mouth every 6 hours as needed for Pain for up to 30 days. 11/15/21 12/15/21  Omid Huerta MD   megestrol (MEGACE) 40 MG/ML suspension Take 10 mLs by mouth daily 11/15/21   Omid Huerta MD   amLODIPine (NORVASC) 10 MG tablet Take 1 tablet by mouth daily 10/12/21   Darion Duncan DO   levothyroxine (SYNTHROID) 112 MCG tablet Take 1 tablet by mouth Daily 10/13/21   Darion Duncan DO   docusate sodium (COLACE) 100 MG capsule Take 1 capsule by mouth daily 10/13/21   Roderick Metcalf DO   carvedilol (COREG) 12.5 MG tablet Take 1 tablet by mouth 2 times daily (with meals)  Patient not taking: Reported on 11/15/2021 10/12/21   Sánchez Metcalf DO   losartan (COZAAR) 50 MG tablet Take 1 tablet by mouth daily 10/12/21   Darion Duncan DO   cephALEXin (KEFLEX) 500 MG capsule 500 mgTake three times daily  Patient not taking: Reported on 11/15/2021 10/9/21   Wilian Rosado MD   ondansetron (ZOFRAN) 4 MG tablet Take every six hours as needed 10/9/21   Wilian Rosado MD   pregabalin (LYRICA) 150 MG capsule Take 150 mg by mouth 3 times daily.     Historical Provider, MD   glipiZIDE-metFORMIN (METAGLIP) 5-500 MG per tablet Take 1 tablet by mouth daily (with breakfast)     Historical Provider, MD   donepezil (ARICEPT) 10 MG tablet Take 10 mg by mouth nightly  21   Historical Provider, MD   meloxicam (MOBIC) 15 MG tablet Take 15 mg by mouth daily  21   Historical Provider, MD   atorvastatin (LIPITOR) 10 MG tablet Take 10 mg by mouth daily    Historical Provider, MD   FLUoxetine (PROZAC) 20 MG capsule Take 20 mg by mouth daily    Historical Provider, MD   lisinopril-hydroCHLOROthiazide (PRINZIDE;ZESTORETIC) 20-12.5 MG per tablet Take 1 tablet by mouth daily  7/14/21   Historical Provider, MD   carvedilol (COREG) 6.25 MG tablet Take 6.25 mg by mouth 2 times daily (with meals)    Historical Provider, MD   omeprazole (PRILOSEC) 20 MG delayed release capsule Take 20 mg by mouth 2 times daily    Historical Provider, MD       Current medications:    Current Facility-Administered Medications   Medication Dose Route Frequency Provider Last Rate Last Admin    lactated ringers infusion   IntraVENous Continuous [place] MD Ronald        sodium chloride flush 0.9 % injection 10 mL  10 mL IntraVENous PRN [place] MD Ronald        0.9 % sodium chloride infusion  25 mL IntraVENous PRN [place] MD Ronald        lidocaine PF 1 % injection 1 mL  1 mL IntraDERmal Once PRN [place] MD Ronald           Allergies:  No Known Allergies    Problem List:    Patient Active Problem List   Diagnosis Code    NHL (non-Hodgkin's lymphoma) ([facility] C85.90    Traumatic retroperitoneal hematoma S36.892A    Acute kidney injury ([facility] L68.7    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    Hydronephrosis due to obstruction of ureter N13.1    Moderate malnutrition ([address] E44.0    Chronic cholecystitis K81.1       Past Medical History:        Diagnosis Date    Cancer ([facility]     Chemotherapy management, encounter for     Diabetes mellitus ([facility]     History of non-Hodgkin's lymphoma     Hyperlipidemia     Hypertension     Hypothyroidism        Past Surgical History:        Procedure Laterality Date    CHOLECYSTECTOMY, LAPAROSCOPIC N/A 10/9/2021    CHOLECYSTECTOMY LAPAROSCOPIC ROBOTIC XI performed by Loy Garg MD at [address] Left 9/24/2021    COLONOSCOPY POLYPECTOMY SNARE/COLD BIOPSY [facility] 10/6/2021    CYSTOSCOPY PYELOGRAM URETERAL STENT INSERTION performed by Dave Briggs MD at [address] IR BIOPSY ABDOMINAL MASS  7/6/2021    IR BIOPSY ABDOMINAL MASS 7/6/2021 [facility]    IR PORT PLACEMENT EQUAL OR GREATER THAN 5 YEARS  8/31/2021    IR PORT PLACEMENT EQUAL OR GREATER THAN 5 YEARS 8/31/2021 [facility]    TONSILLECTOMY      as child       Social History:    Social History     Tobacco Use    Smoking status: Former Smoker     Packs/day: 2.00     Years: 45.00     Pack years: 90.00     Types: Cigarettes     Quit date: 1/7/2006     Years since quitting: 15.8    Smokeless tobacco: Never Used   Substance Use Topics    Alcohol use: Yes     Alcohol/week: 1.7 standard drinks     Types: 2 Standard drinks or equivalent per week     Comment: occassional                                Counseling given: Not Answered      Vital Signs (Current): There were no vitals filed for this visit.                                            BP Readings from Last 3 Encounters:   11/15/21 (!) 160/78   10/12/21 (!) 140/65   10/09/21 (!) 153/74       NPO Status:                                                                                 BMI:   Wt Readings from Last 3 Encounters:   11/15/21 169 lb 8 oz (76.9 kg)   11/10/21 172 lb (78 kg)   10/11/21 170 lb 13.7 oz (77.5 kg)     There is no height or weight on file to calculate BMI.    CBC:   Lab Results   Component Value Date    WBC 7.1 11/15/2021    RBC 4.41 11/15/2021    RBC 4.38 06/06/2012    HGB 12.9 11/15/2021    HCT 38.2 11/15/2021    MCV 86.6 11/15/2021    RDW 15.0 11/15/2021     11/15/2021     06/06/2012       CMP:   Lab Results   Component Value Date     11/15/2021    K 4.2 11/15/2021     11/15/2021    CO2 24 11/15/2021    BUN 36 11/15/2021    CREATININE 1.24 11/15/2021    GFRAA >60 11/15/2021    LABGLOM 57 11/15/2021    GLUCOSE 162 11/15/2021 GLUCOSE 97 06/06/2012    PROT 6.2 11/15/2021    CALCIUM 8.6 11/15/2021    BILITOT 0.55 11/15/2021    ALKPHOS 128 11/15/2021    AST 12 11/15/2021    ALT <5 11/15/2021       POC Tests: No results for input(s): POCGLU, POCNA, POCK, POCCL, POCBUN, POCHEMO, POCHCT in the last 72 hours. Coags:   Lab Results   Component Value Date    PROTIME 12.1 08/31/2021    INR 0.9 08/31/2021    APTT 26.7 08/31/2021       HCG (If Applicable): No results found for: PREGTESTUR, PREGSERUM, HCG, HCGQUANT     ABGs: No results found for: PHART, PO2ART, SBO6CJS, FLC5JUZ, BEART, W8UMHPSB     Type & Screen (If Applicable):  No results found for: LABABO, LABRH    Drug/Infectious Status (If Applicable):  No results found for: HIV, HEPCAB    COVID-19 Screening (If Applicable): No results found for: COVID19        Anesthesia Evaluation  Patient summary reviewed and Nursing notes reviewed no history of anesthetic complications:   Airway: Mallampati: II  TM distance: >3 FB   Neck ROM: full  Mouth opening: > = 3 FB Dental:          Pulmonary:   (+) rhonchi,  current smoker                           Cardiovascular:    (+) hypertension:, hyperlipidemia      ECG reviewed  Rhythm: regular  Rate: normal                    Neuro/Psych:   Negative Neuro/Psych ROS              GI/Hepatic/Renal:   (+) GERD: well controlled, renal disease: CRI,           Endo/Other:    (+) DiabetesType II DM, , hypothyroidism, blood dyscrasia: anemia:., malignancy/cancer (lymphoma). Abdominal:             Vascular: negative vascular ROS. Other Findings:           Anesthesia Plan      general     ASA 3       Induction: intravenous. MIPS: Postoperative opioids intended and Prophylactic antiemetics administered. Anesthetic plan and risks discussed with patient. Plan discussed with CRNA.                   Jemal Bermudez MD   11/24/2021

## 2021-11-24 NOTE — OP NOTE
Operative Note      Patient: Roberto Garsia  YOB: 1950  MRN: [id number]    Date of Procedure: 11/24/2021    Pre-Op Diagnosis: Bilateral HYDRONEPHROSIS                                  Right ureteral stent in place                                     (PT HAS HAD COVID VACCINE DONE)   Post-Op Diagnosis: Same       Procedure(s):  CYSTOSCOPY URETERAL STENT INSERTION    Surgeon(s):  Melissa Jiménez MD    Assistant:   * No surgical staff found *    Anesthesia: General    Estimated Blood Loss (mL): Minimal    Complications: None    Specimens:   * No specimens in log *    Implants:  * No implants in log *      Drains:   Ureteral Catheter Right ureter 7 fr (Active)       Findings: Indications: 77-year-old male with history of lymphoma right hydronephrosis with right ureteral stent in place, patient also has left hydronephrosis previously documented without left flank pain, he is scheduled today for stent exchange on the right    We will assess the left side by pyelography with the possibility of inserting a left ureteral stent the patient is aware and agreeable    Detailed Description of Procedure:   Patient was brought to the operating room, positioned in dorsolithotomy, proper patient identification procedure identification prepping and draping in the usual sterile manner. We entered the bladder with the cystoscope, prostate is enlarged, the into the bladder revealed some chronic cystitis changes a urine sample is collected for culture and sensitivity. The remainder of the bladder examination was unremarkable. The right ureteral stent was grasped and removed and the Glidewire inserted following by insertion of a #7 double-J stent positioned in the renal pelvis with the distal end in the bladder. We then addressed the left ureter where left hydronephrosis was previously documented and we proceeded with retrograde pyelography.     We then proceeded with insertion of a Glidewire followed by insertion of a stent, a 28 cm stent was also inserted in the left ureter with good E flux of urine documented. Bladder was irrigated the scope removed the patient returned to recovery room in stable condition.     Recommendation recheck renal function studies in 3 weeks continue to monitor accordingly    Electronically signed by Elena Mauro MD on 11/24/2021 at 9:12 AM

## 2021-11-24 NOTE — H&P
HISTORY and [facility] [id number]       NAME:  Melita Avina  MRN: [id number]   YOB: 1950   Date: 11/24/2021   Age: 66 y.o. Gender: male       COMPLAINT AND PRESENT HISTORY:   Melita Avina is 66 y.o.,  male, undergoing  Kidney stones. Patient has significant hx of kidney stones. Pt has There appears to be persistent left-sided hydronephrosis without  Hydroureter and a  3 mm nonobstructing right renal calculus  Patient will be having : CYSTOSCOPY URETERAL STENT INSERTION - Right   Pt complains of pain in the right flank, radiates to the RLQ. Patient complains of pain at 8/10 with specific movements, otherwise at 4/10. Symptoms started July 2021, he states that he was hospitalized and was found to gallstones, kidney stones , recurrence of his cancer and shingles. Patient states that a stent was placed on his right side. Patient denies any current nausea or vomiting. Pt admits to gross hematuria. And occassionally dysuria. no fever or chills. Significant medical history:  Non-Hodgkin's lymphoma and is under care of Oncologist, Dr. Viola Nath, pt is currently on chemo admits to weight loss. HTN, HLD, DM, HYPOTHYROIDISM. Patient states that he smokes marijuana frequently for pain. Patient has been NPO since midnight. No blood thinners in the past 5-7 days. Pt states that he did not take any of his medication. Pt denies any KEARNS or dizziness. Anticoagulants or blood thinners: Mobic-stopped 2 wks ago.   Patient denies any issues with Anesthesia    Impression  CT ABDOMEN PELVIS  11/8/2021  Overall, no significant change in size of the previously identified  infiltrating mass involving the left retroperitoneum when compared to the  prior study from October 5, 2021.  Finding likely relates to the patient's  lymphoma diagnosis. Barbra Luis Alfredo is persistent left-sided hydronephrosis.       PAST MEDICAL HISTORY     Past Medical History:   Diagnosis Date    Cancer [facility])  Chemotherapy management, encounter for     Diabetes mellitus ([facility] [address]     History of non-Hodgkin's lymphoma     Hyperlipidemia     Hypertension     Hypothyroidism        SURGICAL HISTORY       Past Surgical History:   Procedure Laterality Date    CHOLECYSTECTOMY, LAPAROSCOPIC N/A 10/9/2021    CHOLECYSTECTOMY LAPAROSCOPIC ROBOTIC XI performed by Marly Hernández MD at [address] Left 9/24/2021    COLONOSCOPY POLYPECTOMY SNARE/COLD BIOPSY performed by Benji Thompson MD at [address] Right 10/6/2021    CYSTOSCOPY PYELOGRAM URETERAL STENT INSERTION performed by Elena Mauro MD at [id number] IR BIOPSY ABDOMINAL MASS  7/6/2021    IR BIOPSY ABDOMINAL MASS 7/6/2021 [facility]    IR PORT PLACEMENT EQUAL OR GREATER THAN 5 YEARS  8/31/2021    IR PORT PLACEMENT EQUAL OR GREATER THAN 5 YEARS 8/31/2021 [facility]    TONSILLECTOMY      as child       FAMILY HISTORY       Family History   Problem Relation Age of Onset    Diabetes Mother     Alzheimer's Disease Father     Heart Disease Brother     Heart Disease Brother        SOCIAL HISTORY       Social History     Socioeconomic History    Marital status:      Spouse name: Not on file    Number of children: Not on file    Years of education: Not on file    Highest education level: Not on file   Occupational History    Not on file   Tobacco Use    Smoking status: Former Smoker     Packs/day: 2.00     Years: 45.00     Pack years: 90.00     Types: Cigarettes     Quit date: 1/7/2006     Years since quitting: 15.8    Smokeless tobacco: Never Used   Vaping Use    Vaping Use: Never used   Substance and Sexual Activity    Alcohol use:  Yes     Alcohol/week: 1.7 standard drinks     Types: 2 Standard drinks or equivalent per week     Comment: occassional    Drug use: Yes     Types: Marijuana Valdene Bruce)     Comment: every other day    Sexual activity: Not on file   Other Topics Concern    Not on file   Social History Narrative    Not on file     Social Determinants of Health     Financial Resource Strain:     Difficulty of Paying Living Expenses: Not on file   Food Insecurity:     Worried About Running Out of Food in the Last Year: Not on file    Jerald of Food in the Last Year: Not on file   Transportation Needs:     Lack of Transportation (Medical): Not on file    Lack of Transportation (Non-Medical): Not on file   Physical Activity:     Days of Exercise per Week: Not on file    Minutes of Exercise per Session: Not on file   Stress:     Feeling of Stress : Not on file   Social Connections:     Frequency of Communication with Friends and Family: Not on file    Frequency of Social Gatherings with Friends and Family: Not on file    Attends Muslim Services: Not on file    Active Member of [address] [place] [address] [employer] or Organizations: Not on file    Attends Club or Organization Meetings: Not on file    Marital Status: Not on file   Intimate Partner Violence:     Fear of Current or Ex-Partner: Not on file    Emotionally Abused: Not on file    Physically Abused: Not on file    Sexually Abused: Not on file   Housing Stability:     Unable to Pay for Housing in the Last Year: Not on file    Number of [place] in the Last Year: Not on file    Unstable Housing in the Last Year: Not on file           REVIEW OF SYSTEMS      No Known Allergies    No current facility-administered medications on file prior to encounter.      Current Outpatient Medications on File Prior to Encounter   Medication Sig Dispense Refill    amLODIPine (NORVASC) 10 MG tablet Take 1 tablet by mouth daily 30 tablet 5    levothyroxine (SYNTHROID) 112 MCG tablet Take 1 tablet by mouth Daily 30 tablet 3    docusate sodium (COLACE) 100 MG capsule Take 1 capsule by mouth daily 30 capsule 0    carvedilol (COREG) 12.5 MG tablet Take 1 tablet by mouth 2 times daily (with meals) (Patient not taking: Reported on 11/15/2021) 60 tablet 3    losartan (COZAAR) 50 MG tablet Take 1 tablet by mouth daily 30 tablet 5    cephALEXin (KEFLEX) 500 MG capsule 500 mgTake three times daily (Patient not taking: Reported on 11/15/2021) 21 capsule 0    ondansetron (ZOFRAN) 4 MG tablet Take every six hours as needed 20 tablet 0    pregabalin (LYRICA) 150 MG capsule Take 150 mg by mouth 3 times daily.  glipiZIDE-metFORMIN (METAGLIP) 5-500 MG per tablet Take 1 tablet by mouth daily (with breakfast)       donepezil (ARICEPT) 10 MG tablet Take 10 mg by mouth nightly       meloxicam (MOBIC) 15 MG tablet Take 15 mg by mouth daily       atorvastatin (LIPITOR) 10 MG tablet Take 10 mg by mouth daily      FLUoxetine (PROZAC) 20 MG capsule Take 20 mg by mouth daily      lisinopril-hydroCHLOROthiazide (PRINZIDE;ZESTORETIC) 20-12.5 MG per tablet Take 1 tablet by mouth daily       carvedilol (COREG) 6.25 MG tablet Take 6.25 mg by mouth 2 times daily (with meals)      omeprazole (PRILOSEC) 20 MG delayed release capsule Take 20 mg by mouth 2 times daily         Negative except for what is mentioned in the HPI. GENERAL PHYSICAL EXAM     Vitals :   See vital signs in RN flow sheet. GENERAL APPEARANCE:   Severino Lopez is 66 y.o., male, moderately obese, nourished, conscious, alert. Does not appear to be distress or pain at this time. SKIN:  Warm, dry, no cyanosis or jaundice. HEAD:  Normocephalic, atraumatic, no swelling or tenderness. EYES:  Pupils equal, reactive to light. EARS:  No discharge, no marked hearing loss. NOSE:  No rhinorrhea, epistaxis or septal deformity. THROAT:  Not congested. No ulceration bleeding or discharge. NECK:  No stiffness, trachea central.  No palpable masses or L.N.                 CHEST:  Symmetrical and equal on expansion. HEART:  RRR . No audible murmurs or gallops.                  LUNGS:  Equal on expansion, normal breath sounds. Pt has mild coarseness to posterior lung fields. Has loose, moist cough. ABDOMEN:  Obese. Soft on palpation. No dysphagia, No localized tenderness. No guarding or rigidity. LYMPHATICS:  No palpable cervical lymphadenopathy. LOCOMOTOR, BACK AND SPINE:  No tenderness or deformities. Pt using cane to aid in ambulation. EXTREMITIES:  Symmetrical, no pretibial edema. No discoloration or ulcerations. NEUROLOGIC:  The patient is conscious, alert, oriented,Cranial nerve II-XII intact, taste and smell were not examined. No apparent focal sensory or motor deficits.              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

## 2021-11-25 LAB
CULTURE: NO GROWTH
Lab: NORMAL
SPECIMEN DESCRIPTION: NORMAL

## 2021-12-02 ENCOUNTER — TELEPHONE (OUTPATIENT)
Dept: GASTROENTEROLOGY | Age: 71
End: 2021-12-02

## 2021-12-02 NOTE — TELEPHONE ENCOUNTER
LVM that due to provider conflict 42/78/00 was cancelled and moved to 2/14/22 1 pm at the [facility] office. Letter mailed.

## 2021-12-10 ENCOUNTER — HOSPITAL ENCOUNTER (OUTPATIENT)
Age: 71
Setting detail: SPECIMEN
Discharge: HOME | End: 2021-12-10

## 2021-12-11 LAB
CULTURE: NO GROWTH
Lab: NORMAL
SPECIMEN DESCRIPTION: NORMAL

## 2021-12-28 ENCOUNTER — HOSPITAL ENCOUNTER (OUTPATIENT)
Age: 71
Setting detail: SPECIMEN
Discharge: HOME | End: 2021-12-28
Payer: MEDICARE

## 2021-12-28 LAB
-: ABNORMAL
AMORPHOUS: ABNORMAL
BACTERIA: ABNORMAL
BILIRUBIN URINE: NEGATIVE
CASTS UA: ABNORMAL /LPF
COLOR: ABNORMAL
COMMENT UA: ABNORMAL
CRYSTALS, UA: ABNORMAL /HPF
EPITHELIAL CELLS UA: ABNORMAL /HPF
GLUCOSE URINE: NEGATIVE
KETONES, URINE: NEGATIVE
LEUKOCYTE ESTERASE, URINE: ABNORMAL
MUCUS: ABNORMAL
NITRITE, URINE: NEGATIVE
OTHER OBSERVATIONS UA: ABNORMAL
PH UA: 5.5 (ref 5–8)
PROTEIN UA: ABNORMAL
RBC UA: ABNORMAL /HPF
RENAL EPITHELIAL, UA: ABNORMAL /HPF
SPECIFIC GRAVITY UA: 1.02 (ref 1–1.03)
TRICHOMONAS: ABNORMAL
TURBIDITY: ABNORMAL
URINE HGB: ABNORMAL
UROBILINOGEN, URINE: NORMAL
WBC UA: ABNORMAL /HPF
YEAST: ABNORMAL

## 2021-12-28 PROCEDURE — 87086 URINE CULTURE/COLONY COUNT: CPT

## 2021-12-28 PROCEDURE — 81001 URINALYSIS AUTO W/SCOPE: CPT

## 2021-12-29 LAB
CULTURE: NO GROWTH
Lab: NORMAL
SPECIMEN DESCRIPTION: NORMAL

## 2022-01-10 ENCOUNTER — HOSPITAL ENCOUNTER (OUTPATIENT)
Dept: INFUSION THERAPY | Age: 72
Discharge: HOME | End: 2022-01-10
Payer: MEDICARE

## 2022-01-10 VITALS
HEART RATE: 70 BPM | SYSTOLIC BLOOD PRESSURE: 168 MMHG | TEMPERATURE: 97.8 F | DIASTOLIC BLOOD PRESSURE: 69 MMHG | RESPIRATION RATE: 16 BRPM

## 2022-01-10 DIAGNOSIS — C85.90 NON-HODGKIN'S LYMPHOMA, UNSPECIFIED BODY REGION, UNSPECIFIED NON-HODGKIN LYMPHOMA TYPE (HCC): Primary | ICD-10-CM

## 2022-01-10 DIAGNOSIS — C82.13 FOLLICULAR LYMPHOMA GRADE II OF INTRA-ABDOMINAL LYMPH NODES (HCC): ICD-10-CM

## 2022-01-10 LAB
ABSOLUTE EOS #: 0.2 K/UL (ref 0–0.4)
ABSOLUTE IMMATURE GRANULOCYTE: ABNORMAL K/UL (ref 0–0.3)
ABSOLUTE LYMPH #: 1.3 K/UL (ref 1–4.8)
ABSOLUTE MONO #: 0.6 K/UL (ref 0.1–1.2)
ALBUMIN SERPL-MCNC: 3.7 G/DL (ref 3.5–5.2)
ALBUMIN/GLOBULIN RATIO: 1.5 (ref 1–2.5)
ALP BLD-CCNC: 114 U/L (ref 40–129)
ALT SERPL-CCNC: 9 U/L (ref 5–41)
ANION GAP SERPL CALCULATED.3IONS-SCNC: 5 MMOL/L (ref 9–17)
AST SERPL-CCNC: 15 U/L
BASOPHILS # BLD: 1 % (ref 0–2)
BASOPHILS ABSOLUTE: 0.1 K/UL (ref 0–0.2)
BILIRUB SERPL-MCNC: 0.24 MG/DL (ref 0.3–1.2)
BUN BLDV-MCNC: 32 MG/DL (ref 8–23)
BUN/CREAT BLD: ABNORMAL (ref 9–20)
CALCIUM SERPL-MCNC: 8.5 MG/DL (ref 8.6–10.4)
CHLORIDE BLD-SCNC: 110 MMOL/L (ref 98–107)
CO2: 23 MMOL/L (ref 20–31)
CREAT SERPL-MCNC: 1.58 MG/DL (ref 0.7–1.2)
DIFFERENTIAL TYPE: ABNORMAL
EOSINOPHILS RELATIVE PERCENT: 4 % (ref 1–4)
GFR AFRICAN AMERICAN: 52 ML/MIN
GFR NON-AFRICAN AMERICAN: 43 ML/MIN
GFR SERPL CREATININE-BSD FRML MDRD: ABNORMAL ML/MIN/{1.73_M2}
GFR SERPL CREATININE-BSD FRML MDRD: ABNORMAL ML/MIN/{1.73_M2}
GLUCOSE BLD-MCNC: 107 MG/DL (ref 70–99)
HCT VFR BLD CALC: 33.3 % (ref 41–53)
HEMOGLOBIN: 11.1 G/DL (ref 13.5–17.5)
IMMATURE GRANULOCYTES: ABNORMAL %
LYMPHOCYTES # BLD: 23 % (ref 24–44)
MCH RBC QN AUTO: 29.5 PG (ref 26–34)
MCHC RBC AUTO-ENTMCNC: 33.3 G/DL (ref 31–37)
MCV RBC AUTO: 88.7 FL (ref 80–100)
MONOCYTES # BLD: 11 % (ref 2–11)
NRBC AUTOMATED: ABNORMAL PER 100 WBC
PDW BLD-RTO: 17.3 % (ref 12.5–15.4)
PLATELET # BLD: 136 K/UL (ref 140–450)
PLATELET ESTIMATE: ABNORMAL
PMV BLD AUTO: 9.1 FL (ref 6–12)
POTASSIUM SERPL-SCNC: 5.8 MMOL/L (ref 3.7–5.3)
RBC # BLD: 3.75 M/UL (ref 4.5–5.9)
RBC # BLD: ABNORMAL 10*6/UL
SEG NEUTROPHILS: 61 % (ref 36–66)
SEGMENTED NEUTROPHILS ABSOLUTE COUNT: 3.3 K/UL (ref 1.8–7.7)
SODIUM BLD-SCNC: 138 MMOL/L (ref 135–144)
TOTAL PROTEIN: 6.2 G/DL (ref 6.4–8.3)
WBC # BLD: 5.5 K/UL (ref 3.5–11)
WBC # BLD: ABNORMAL 10*3/UL

## 2022-01-10 PROCEDURE — 85025 COMPLETE CBC W/AUTO DIFF WBC: CPT

## 2022-01-10 PROCEDURE — 96413 CHEMO IV INFUSION 1 HR: CPT

## 2022-01-10 PROCEDURE — 6360000002 HC RX W HCPCS: Performed by: INTERNAL MEDICINE

## 2022-01-10 PROCEDURE — 36591 DRAW BLOOD OFF VENOUS DEVICE: CPT

## 2022-01-10 PROCEDURE — 2580000003 HC RX 258: Performed by: INTERNAL MEDICINE

## 2022-01-10 PROCEDURE — 96415 CHEMO IV INFUSION ADDL HR: CPT

## 2022-01-10 PROCEDURE — 6370000000 HC RX 637 (ALT 250 FOR IP): Performed by: INTERNAL MEDICINE

## 2022-01-10 PROCEDURE — 96375 TX/PRO/DX INJ NEW DRUG ADDON: CPT

## 2022-01-10 PROCEDURE — 80053 COMPREHEN METABOLIC PANEL: CPT

## 2022-01-10 PROCEDURE — 36415 COLL VENOUS BLD VENIPUNCTURE: CPT

## 2022-01-10 RX ORDER — HEPARIN SODIUM (PORCINE) LOCK FLUSH IV SOLN 100 UNIT/ML 100 UNIT/ML
500 SOLUTION INTRAVENOUS PRN
Status: DISCONTINUED | OUTPATIENT
Start: 2022-01-10 | End: 2022-01-11 | Stop reason: HOSPADM

## 2022-01-10 RX ORDER — SODIUM CHLORIDE 0.9 % (FLUSH) 0.9 %
5-40 SYRINGE (ML) INJECTION PRN
Status: DISCONTINUED | OUTPATIENT
Start: 2022-01-10 | End: 2022-01-11 | Stop reason: HOSPADM

## 2022-01-10 RX ORDER — DIPHENHYDRAMINE HYDROCHLORIDE 50 MG/ML
25 INJECTION INTRAMUSCULAR; INTRAVENOUS ONCE
Status: COMPLETED | OUTPATIENT
Start: 2022-01-10 | End: 2022-01-10

## 2022-01-10 RX ORDER — ACETAMINOPHEN 325 MG/1
650 TABLET ORAL ONCE
Status: COMPLETED | OUTPATIENT
Start: 2022-01-10 | End: 2022-01-10

## 2022-01-10 RX ORDER — SODIUM CHLORIDE 9 MG/ML
20 INJECTION, SOLUTION INTRAVENOUS ONCE
Status: COMPLETED | OUTPATIENT
Start: 2022-01-10 | End: 2022-01-10

## 2022-01-10 RX ADMIN — ACETAMINOPHEN 650 MG: 325 TABLET ORAL at 10:04

## 2022-01-10 RX ADMIN — DIPHENHYDRAMINE HYDROCHLORIDE 25 MG: 50 INJECTION, SOLUTION INTRAMUSCULAR; INTRAVENOUS at 10:04

## 2022-01-10 RX ADMIN — SODIUM CHLORIDE 20 ML/HR: 9 INJECTION, SOLUTION INTRAVENOUS at 10:04

## 2022-01-10 RX ADMIN — SODIUM CHLORIDE, PRESERVATIVE FREE 10 ML: 5 INJECTION INTRAVENOUS at 11:51

## 2022-01-10 RX ADMIN — SODIUM CHLORIDE, PRESERVATIVE FREE 10 ML: 5 INJECTION INTRAVENOUS at 09:05

## 2022-01-10 RX ADMIN — SODIUM CHLORIDE 700 MG: 9 INJECTION, SOLUTION INTRAVENOUS at 10:25

## 2022-01-10 RX ADMIN — HEPARIN 500 UNITS: 100 SYRINGE at 11:51

## 2022-01-10 ASSESSMENT — PAIN SCALES - GENERAL: PAINLEVEL_OUTOF10: 0

## 2022-01-10 NOTE — PROGRESS NOTES
Pt here for C.2D.1 maintenance Rituxan. Arrives ambulatory. Denies any new complaints. Labs drawn from port, results reviewed. Creat=1.58 K+=5.8  Reviewed labs and recent hospitalization for ureter stent xchange with Dr Jacquie Carey. Order rec'd to proceed with tx today and pt to f/u with PCP re: elevated labs. Call placed to Dr Frank Palacios office x 3 with no answer, detailed VM left with lab results and informed that Dr Bill Lozoya would like PCP to address, requested call back. No call back from PCP. Instructed pt and wife to f/u with PCP asap re: elevated lab results. Reviewed potassium rich foods pt should avoid. Pt's wife also states pt has a cough that he can't \"shake\" ongoing for a couple wks. Recommended OTC Mucinex and to notify PCP office. Tx complete without incident. Pt d/c'd in stable condition. Returns 3/7/22 for f/u with Dr Jacquie Carey and C.3 Rituxan.

## 2022-01-28 ENCOUNTER — TELEPHONE (OUTPATIENT)
Dept: GASTROENTEROLOGY | Age: 72
End: 2022-01-28

## 2022-01-28 ENCOUNTER — HOSPITAL ENCOUNTER (INPATIENT)
Age: 72
LOS: 4 days | Discharge: HOME HEALTH | DRG: 194 | End: 2022-02-01
Attending: EMERGENCY MEDICINE | Admitting: INTERNAL MEDICINE
Payer: MEDICARE

## 2022-01-28 ENCOUNTER — APPOINTMENT (OUTPATIENT)
Dept: CT IMAGING | Age: 72
DRG: 194 | End: 2022-01-28
Payer: MEDICARE

## 2022-01-28 ENCOUNTER — APPOINTMENT (OUTPATIENT)
Dept: GENERAL RADIOLOGY | Age: 72
DRG: 194 | End: 2022-01-28
Payer: MEDICARE

## 2022-01-28 DIAGNOSIS — J90 PLEURAL EFFUSION: ICD-10-CM

## 2022-01-28 DIAGNOSIS — R06.02 SHORTNESS OF BREATH: Primary | ICD-10-CM

## 2022-01-28 DIAGNOSIS — E03.9 HYPOTHYROIDISM, UNSPECIFIED TYPE: ICD-10-CM

## 2022-01-28 DIAGNOSIS — J96.01 ACUTE RESPIRATORY FAILURE WITH HYPOXIA (HCC): ICD-10-CM

## 2022-01-28 DIAGNOSIS — R09.02 HYPOXIA: ICD-10-CM

## 2022-01-28 LAB
ABSOLUTE EOS #: 0.21 K/UL (ref 0–0.44)
ABSOLUTE IMMATURE GRANULOCYTE: 0.04 K/UL (ref 0–0.3)
ABSOLUTE LYMPH #: 1.37 K/UL (ref 1.1–3.7)
ABSOLUTE MONO #: 0.76 K/UL (ref 0.1–1.2)
ALBUMIN SERPL-MCNC: 3.5 G/DL (ref 3.5–5.2)
ALBUMIN/GLOBULIN RATIO: 1.6 (ref 1–2.5)
ALP BLD-CCNC: 108 U/L (ref 40–129)
ALT SERPL-CCNC: 15 U/L (ref 5–41)
ANION GAP SERPL CALCULATED.3IONS-SCNC: 9 MMOL/L (ref 9–17)
AST SERPL-CCNC: 18 U/L
BASOPHILS # BLD: 0 % (ref 0–2)
BASOPHILS ABSOLUTE: 0.03 K/UL (ref 0–0.2)
BILIRUB SERPL-MCNC: 0.3 MG/DL (ref 0.3–1.2)
BUN BLDV-MCNC: 29 MG/DL (ref 8–23)
BUN/CREAT BLD: ABNORMAL (ref 9–20)
CALCIUM SERPL-MCNC: 8.1 MG/DL (ref 8.6–10.4)
CHLORIDE BLD-SCNC: 112 MMOL/L (ref 98–107)
CO2: 22 MMOL/L (ref 20–31)
CREAT SERPL-MCNC: 1.41 MG/DL (ref 0.7–1.2)
DIFFERENTIAL TYPE: ABNORMAL
EOSINOPHILS RELATIVE PERCENT: 3 % (ref 1–4)
GFR AFRICAN AMERICAN: 60 ML/MIN
GFR NON-AFRICAN AMERICAN: 49 ML/MIN
GFR SERPL CREATININE-BSD FRML MDRD: ABNORMAL ML/MIN/{1.73_M2}
GFR SERPL CREATININE-BSD FRML MDRD: ABNORMAL ML/MIN/{1.73_M2}
GLUCOSE BLD-MCNC: 63 MG/DL (ref 70–99)
HCT VFR BLD CALC: 34.5 % (ref 40.7–50.3)
HEMOGLOBIN: 10.9 G/DL (ref 13–17)
IMMATURE GRANULOCYTES: 1 %
LACTIC ACID, SEPSIS WHOLE BLOOD: 1.1 MMOL/L (ref 0.5–1.9)
LACTIC ACID, SEPSIS WHOLE BLOOD: 1.4 MMOL/L (ref 0.5–1.9)
LACTIC ACID, SEPSIS: NORMAL MMOL/L (ref 0.5–1.9)
LACTIC ACID, SEPSIS: NORMAL MMOL/L (ref 0.5–1.9)
LYMPHOCYTES # BLD: 18 % (ref 24–43)
MCH RBC QN AUTO: 29.5 PG (ref 25.2–33.5)
MCHC RBC AUTO-ENTMCNC: 31.6 G/DL (ref 28.4–34.8)
MCV RBC AUTO: 93.5 FL (ref 82.6–102.9)
MONOCYTES # BLD: 10 % (ref 3–12)
NRBC AUTOMATED: 0 PER 100 WBC
PDW BLD-RTO: 16.6 % (ref 11.8–14.4)
PLATELET # BLD: 142 K/UL (ref 138–453)
PLATELET ESTIMATE: ABNORMAL
PMV BLD AUTO: 11.7 FL (ref 8.1–13.5)
POTASSIUM SERPL-SCNC: 4.7 MMOL/L (ref 3.7–5.3)
PROCALCITONIN: 0.05 NG/ML
RBC # BLD: 3.69 M/UL (ref 4.21–5.77)
RBC # BLD: ABNORMAL 10*6/UL
SARS-COV-2, RAPID: NOT DETECTED
SEG NEUTROPHILS: 68 % (ref 36–65)
SEGMENTED NEUTROPHILS ABSOLUTE COUNT: 5.28 K/UL (ref 1.5–8.1)
SODIUM BLD-SCNC: 143 MMOL/L (ref 135–144)
SPECIMEN DESCRIPTION: NORMAL
THYROXINE, FREE: 0.88 NG/DL (ref 0.93–1.7)
TOTAL PROTEIN: 5.7 G/DL (ref 6.4–8.3)
TROPONIN INTERP: ABNORMAL
TROPONIN INTERP: ABNORMAL
TROPONIN T: ABNORMAL NG/ML
TROPONIN T: ABNORMAL NG/ML
TROPONIN, HIGH SENSITIVITY: 30 NG/L (ref 0–22)
TROPONIN, HIGH SENSITIVITY: 32 NG/L (ref 0–22)
TSH SERPL DL<=0.05 MIU/L-ACNC: 11.01 MIU/L (ref 0.3–5)
WBC # BLD: 7.7 K/UL (ref 3.5–11.3)
WBC # BLD: ABNORMAL 10*3/UL

## 2022-01-28 PROCEDURE — 6360000002 HC RX W HCPCS: Performed by: STUDENT IN AN ORGANIZED HEALTH CARE EDUCATION/TRAINING PROGRAM

## 2022-01-28 PROCEDURE — 94640 AIRWAY INHALATION TREATMENT: CPT

## 2022-01-28 PROCEDURE — 94761 N-INVAS EAR/PLS OXIMETRY MLT: CPT

## 2022-01-28 PROCEDURE — 6360000004 HC RX CONTRAST MEDICATION: Performed by: STUDENT IN AN ORGANIZED HEALTH CARE EDUCATION/TRAINING PROGRAM

## 2022-01-28 PROCEDURE — 93005 ELECTROCARDIOGRAM TRACING: CPT | Performed by: STUDENT IN AN ORGANIZED HEALTH CARE EDUCATION/TRAINING PROGRAM

## 2022-01-28 PROCEDURE — 6370000000 HC RX 637 (ALT 250 FOR IP): Performed by: STUDENT IN AN ORGANIZED HEALTH CARE EDUCATION/TRAINING PROGRAM

## 2022-01-28 PROCEDURE — 99223 1ST HOSP IP/OBS HIGH 75: CPT | Performed by: NURSE PRACTITIONER

## 2022-01-28 PROCEDURE — 99285 EMERGENCY DEPT VISIT HI MDM: CPT

## 2022-01-28 PROCEDURE — 84443 ASSAY THYROID STIM HORMONE: CPT

## 2022-01-28 PROCEDURE — 71045 X-RAY EXAM CHEST 1 VIEW: CPT

## 2022-01-28 PROCEDURE — 87635 SARS-COV-2 COVID-19 AMP PRB: CPT

## 2022-01-28 PROCEDURE — 84439 ASSAY OF FREE THYROXINE: CPT

## 2022-01-28 PROCEDURE — 71260 CT THORAX DX C+: CPT

## 2022-01-28 PROCEDURE — 84484 ASSAY OF TROPONIN QUANT: CPT

## 2022-01-28 PROCEDURE — 80053 COMPREHEN METABOLIC PANEL: CPT

## 2022-01-28 PROCEDURE — 2700000000 HC OXYGEN THERAPY PER DAY

## 2022-01-28 PROCEDURE — 84145 PROCALCITONIN (PCT): CPT

## 2022-01-28 PROCEDURE — 6360000002 HC RX W HCPCS: Performed by: NURSE PRACTITIONER

## 2022-01-28 PROCEDURE — 85025 COMPLETE CBC W/AUTO DIFF WBC: CPT

## 2022-01-28 PROCEDURE — 2060000000 HC ICU INTERMEDIATE R&B

## 2022-01-28 PROCEDURE — 83605 ASSAY OF LACTIC ACID: CPT

## 2022-01-28 RX ORDER — ONDANSETRON 4 MG/1
4 TABLET, ORALLY DISINTEGRATING ORAL EVERY 8 HOURS PRN
Status: DISCONTINUED | OUTPATIENT
Start: 2022-01-28 | End: 2022-02-01 | Stop reason: HOSPADM

## 2022-01-28 RX ORDER — CARVEDILOL 6.25 MG/1
6.25 TABLET ORAL 2 TIMES DAILY WITH MEALS
Status: DISCONTINUED | OUTPATIENT
Start: 2022-01-29 | End: 2022-02-01 | Stop reason: HOSPADM

## 2022-01-28 RX ORDER — POTASSIUM CHLORIDE 20 MEQ/1
40 TABLET, EXTENDED RELEASE ORAL PRN
Status: DISCONTINUED | OUTPATIENT
Start: 2022-01-28 | End: 2022-02-01 | Stop reason: HOSPADM

## 2022-01-28 RX ORDER — FUROSEMIDE 10 MG/ML
40 INJECTION INTRAMUSCULAR; INTRAVENOUS ONCE
Status: DISCONTINUED | OUTPATIENT
Start: 2022-01-29 | End: 2022-01-29

## 2022-01-28 RX ORDER — MAGNESIUM SULFATE 1 G/100ML
1000 INJECTION INTRAVENOUS PRN
Status: DISCONTINUED | OUTPATIENT
Start: 2022-01-28 | End: 2022-02-01 | Stop reason: HOSPADM

## 2022-01-28 RX ORDER — POTASSIUM CHLORIDE 7.45 MG/ML
10 INJECTION INTRAVENOUS PRN
Status: DISCONTINUED | OUTPATIENT
Start: 2022-01-28 | End: 2022-02-01 | Stop reason: HOSPADM

## 2022-01-28 RX ORDER — SODIUM CHLORIDE 0.9 % (FLUSH) 0.9 %
10 SYRINGE (ML) INJECTION PRN
Status: DISCONTINUED | OUTPATIENT
Start: 2022-01-28 | End: 2022-02-01 | Stop reason: HOSPADM

## 2022-01-28 RX ORDER — ASPIRIN 81 MG/1
324 TABLET, CHEWABLE ORAL ONCE
Status: COMPLETED | OUTPATIENT
Start: 2022-01-28 | End: 2022-01-28

## 2022-01-28 RX ORDER — FUROSEMIDE 10 MG/ML
20 INJECTION INTRAMUSCULAR; INTRAVENOUS ONCE
Status: COMPLETED | OUTPATIENT
Start: 2022-01-28 | End: 2022-01-28

## 2022-01-28 RX ORDER — ACETAMINOPHEN 650 MG/1
650 SUPPOSITORY RECTAL EVERY 6 HOURS PRN
Status: DISCONTINUED | OUTPATIENT
Start: 2022-01-28 | End: 2022-02-01 | Stop reason: HOSPADM

## 2022-01-28 RX ORDER — LOSARTAN POTASSIUM 50 MG/1
50 TABLET ORAL DAILY
Status: DISCONTINUED | OUTPATIENT
Start: 2022-01-29 | End: 2022-01-29

## 2022-01-28 RX ORDER — DEXTROSE MONOHYDRATE 25 G/50ML
12.5 INJECTION, SOLUTION INTRAVENOUS PRN
Status: DISCONTINUED | OUTPATIENT
Start: 2022-01-28 | End: 2022-01-29 | Stop reason: SDUPTHER

## 2022-01-28 RX ORDER — LEVOTHYROXINE SODIUM 0.12 MG/1
125 TABLET ORAL DAILY
Status: DISCONTINUED | OUTPATIENT
Start: 2022-01-29 | End: 2022-01-29

## 2022-01-28 RX ORDER — DONEPEZIL HYDROCHLORIDE 10 MG/1
10 TABLET, FILM COATED ORAL NIGHTLY
Status: DISCONTINUED | OUTPATIENT
Start: 2022-01-29 | End: 2022-02-01 | Stop reason: HOSPADM

## 2022-01-28 RX ORDER — GLIPIZIDE 5 MG/1
5 TABLET ORAL
Status: DISCONTINUED | OUTPATIENT
Start: 2022-01-29 | End: 2022-02-01 | Stop reason: HOSPADM

## 2022-01-28 RX ORDER — NICOTINE POLACRILEX 4 MG
15 LOZENGE BUCCAL PRN
Status: DISCONTINUED | OUTPATIENT
Start: 2022-01-28 | End: 2022-01-29 | Stop reason: SDUPTHER

## 2022-01-28 RX ORDER — SODIUM CHLORIDE 0.9 % (FLUSH) 0.9 %
5-40 SYRINGE (ML) INJECTION EVERY 12 HOURS SCHEDULED
Status: DISCONTINUED | OUTPATIENT
Start: 2022-01-29 | End: 2022-02-01 | Stop reason: HOSPADM

## 2022-01-28 RX ORDER — ACETAMINOPHEN 325 MG/1
650 TABLET ORAL EVERY 6 HOURS PRN
Status: DISCONTINUED | OUTPATIENT
Start: 2022-01-28 | End: 2022-02-01 | Stop reason: HOSPADM

## 2022-01-28 RX ORDER — AMLODIPINE BESYLATE 10 MG/1
10 TABLET ORAL DAILY
Status: DISCONTINUED | OUTPATIENT
Start: 2022-01-29 | End: 2022-02-01 | Stop reason: HOSPADM

## 2022-01-28 RX ORDER — LEVOTHYROXINE SODIUM 0.05 MG/1
50 TABLET ORAL ONCE
Status: COMPLETED | OUTPATIENT
Start: 2022-01-28 | End: 2022-01-28

## 2022-01-28 RX ORDER — GLIPIZIDE AND METFORMIN HCL 5; 500 MG/1; MG/1
1 TABLET, FILM COATED ORAL
Status: DISCONTINUED | OUTPATIENT
Start: 2022-01-29 | End: 2022-01-28

## 2022-01-28 RX ORDER — SODIUM CHLORIDE 9 MG/ML
25 INJECTION, SOLUTION INTRAVENOUS PRN
Status: DISCONTINUED | OUTPATIENT
Start: 2022-01-28 | End: 2022-02-01 | Stop reason: HOSPADM

## 2022-01-28 RX ORDER — CARVEDILOL 12.5 MG/1
6.25 TABLET ORAL 2 TIMES DAILY WITH MEALS
Status: DISCONTINUED | OUTPATIENT
Start: 2022-01-28 | End: 2022-01-28

## 2022-01-28 RX ORDER — DEXTROSE MONOHYDRATE 50 MG/ML
100 INJECTION, SOLUTION INTRAVENOUS PRN
Status: DISCONTINUED | OUTPATIENT
Start: 2022-01-28 | End: 2022-01-29 | Stop reason: SDUPTHER

## 2022-01-28 RX ORDER — ONDANSETRON 2 MG/ML
4 INJECTION INTRAMUSCULAR; INTRAVENOUS EVERY 6 HOURS PRN
Status: DISCONTINUED | OUTPATIENT
Start: 2022-01-28 | End: 2022-02-01 | Stop reason: HOSPADM

## 2022-01-28 RX ORDER — ATORVASTATIN CALCIUM 10 MG/1
10 TABLET, FILM COATED ORAL DAILY
Status: DISCONTINUED | OUTPATIENT
Start: 2022-01-29 | End: 2022-02-01 | Stop reason: HOSPADM

## 2022-01-28 RX ADMIN — FUROSEMIDE 20 MG: 10 INJECTION, SOLUTION INTRAMUSCULAR; INTRAVENOUS at 23:00

## 2022-01-28 RX ADMIN — IOPAMIDOL 75 ML: 755 INJECTION, SOLUTION INTRAVENOUS at 20:24

## 2022-01-28 RX ADMIN — LEVOTHYROXINE SODIUM 50 MCG: 50 TABLET ORAL at 22:01

## 2022-01-28 RX ADMIN — ASPIRIN 324 MG: 81 TABLET, CHEWABLE ORAL at 18:29

## 2022-01-28 RX ADMIN — ALBUTEROL SULFATE 10 MG: 5 SOLUTION RESPIRATORY (INHALATION) at 20:41

## 2022-01-28 RX ADMIN — IPRATROPIUM BROMIDE 0.5 MG: 0.5 SOLUTION RESPIRATORY (INHALATION) at 20:41

## 2022-01-28 ASSESSMENT — ENCOUNTER SYMPTOMS
BACK PAIN: 0
NAUSEA: 0
COUGH: 1
VOMITING: 0
DIARRHEA: 1
SHORTNESS OF BREATH: 1
PHOTOPHOBIA: 0
ABDOMINAL PAIN: 0
RHINORRHEA: 1

## 2022-01-28 ASSESSMENT — PAIN SCALES - GENERAL: PAINLEVEL_OUTOF10: 3

## 2022-01-28 ASSESSMENT — PAIN DESCRIPTION - LOCATION: LOCATION: BACK

## 2022-01-28 ASSESSMENT — PAIN DESCRIPTION - PAIN TYPE: TYPE: CHRONIC PAIN

## 2022-01-28 NOTE — TELEPHONE ENCOUNTER
LVM that 2/14/22 was cancelled due to provider conflict and to call the office to r/s. Mailed letter.

## 2022-01-28 NOTE — ED PROVIDER NOTES
[facility]  ED  Emergency Department Encounter  EmergencyMedicine Resident     Pt Name:Kristofer Johns  MRN: [id number]  [place] 1950  Date of evaluation: 1/28/22  PCP:  Ger Simth DO    This patient was evaluated in the Emergency Department for symptoms described in the history of present illness. The patient was evaluated in the context of the global COVID-19 pandemic, which necessitated consideration that the patient might be at risk for infection with the SARS-CoV-2 virus that causes COVID-19. Institutional protocols and algorithms that pertain to the evaluation of patients at risk for COVID-19 are in a state of rapid change based on information released by regulatory bodies including the CDC and federal and state organizations. These policies and algorithms were followed during the patient's care in the ED. CHIEF COMPLAINT       Chief Complaint   Patient presents with    Cough    Shortness of Breath       HISTORY OF PRESENT ILLNESS  (Location/Symptom, Timing/Onset, Context/Setting, Quality, Duration, Modifying Factors, Severity.)      Jeni Pink is a 66 y.o. male who presents with complaint of hypoxia, of breath and cough the symptoms been ongoing for last several days to 1 week. Patient has a history of non-Hodgkin's lymphoma and has been receiving chemotherapy last chemotherapy was an effusion approxi-1 month ago. Patient is fully vaccinated against Covid as well as against influenza received his booster shot for Covid. Patient denies any chest pain has been having a cough is productive of mucoid sputum has been having some diarrhea but that predates these current symptoms. Denies abdominal pain or nausea. No vomiting.     PAST MEDICAL / SURGICAL / SOCIAL / FAMILY HISTORY      has a [facility] Chemotherapy management, encounter for, Diabetes mellitus ([facility] [address] History of non-Hodgkin's lymphoma, Hyperlipidemia, Hypertension, and Hypothyroidism. has a past surgical history that includes hernia repair; IR BIOPSY ABDOMINAL/RETROPERITONEAL MASS PERCUTANEOUS (2021); IR PORT PLACEMENT > 5 YEARS (2021); Colonoscopy (Left, 2021); Cystoscopy (Right, 10/6/2021); Cholecystectomy, laparoscopic (N/A, 10/9/2021); Tonsillectomy; and Cystoscopy (Bilateral, 2021). Social History     Socioeconomic History    Marital status:      Spouse name: Not on file    Number of children: Not on file    Years of education: Not on file    Highest education level: Not on file   Occupational History    Not on file   Tobacco Use    Smoking status: Former Smoker     Packs/day: 2.00     Years: 45.00     Pack years: 90.00     Types: Cigarettes     Quit date: 2006     Years since quittin.0    Smokeless tobacco: Never Used   Vaping Use    Vaping Use: Never used   Substance and Sexual Activity    Alcohol use: Yes     Alcohol/week: 1.7 standard drinks     Types: 2 Standard drinks or equivalent per week     Comment: occassional    Drug use: Yes     Types: Marijuana Magalie Wheatley)     Comment: every other day    Sexual activity: Not on file   Other Topics Concern    Not on file   Social History Narrative    Not on file     Social Determinants of Health     Financial Resource Strain:     Difficulty of Paying Living Expenses: Not on file   Food Insecurity:     Worried About Running Out of Food in the Last Year: Not on file    Jerald of Food in the Last Year: Not on file   Transportation Needs:     Lack of Transportation (Medical): Not on file    Lack of Transportation (Non-Medical):  Not on file   Physical Activity:     Days of Exercise per Week: Not on file    Minutes of Exercise per Session: Not on file   Stress:     Feeling of Stress : Not on file   Social Connections:     Frequency of Communication with Friends and Family: Not on file    Frequency of Social Gatherings with Friends and Family: Not on file    Attends Historical Provider, MD   FLUoxetine (PROZAC) 20 MG capsule Take 20 mg by mouth daily    Historical Provider, MD   lisinopril-hydroCHLOROthiazide (PRINZIDE;ZESTORETIC) 20-12.5 MG per tablet Take 1 tablet by mouth daily  7/14/21   Historical Provider, MD   carvedilol (COREG) 6.25 MG tablet Take 6.25 mg by mouth 2 times daily (with meals)    Historical Provider, MD   omeprazole (PRILOSEC) 20 MG delayed release capsule Take 20 mg by mouth 2 times daily    Historical Provider, MD       REVIEW OF SYSTEMS    (2-9 systems for level 4, 10 or more for level 5)      Review of Systems   Constitutional: Positive for appetite change. Negative for chills, diaphoresis and fever. HENT: Positive for congestion and rhinorrhea. Eyes: Negative for photophobia. Respiratory: Positive for cough and shortness of breath. Cardiovascular: Negative for chest pain, palpitations and leg swelling. Gastrointestinal: Positive for diarrhea. Negative for abdominal pain, nausea and vomiting. Genitourinary: Negative for flank pain. Musculoskeletal: Negative for back pain and neck pain. Skin: Negative for pallor, rash and wound. Allergic/Immunologic: Negative for environmental allergies and food allergies. Neurological: Negative for dizziness and headaches. Psychiatric/Behavioral: Negative for agitation and confusion. PHYSICAL EXAM   (up to 7 for level 4, 8 or more for level 5)      INITIAL VITALS:   BP (!) 170/62   Pulse 69   Temp 97.7 °F (36.5 °C)   Resp 18   Ht 6' (1.829 m)   Wt 260 lb (117.9 kg)   SpO2 95%   BMI 35.26 kg/m²     Physical Exam  Vitals and nursing note reviewed. Constitutional:       Appearance: He is well-developed. He is not toxic-appearing. HENT:      Head: Normocephalic. Right Ear: External ear normal.      Left Ear: External ear normal.      Nose: Congestion and rhinorrhea present.       Mouth/Throat:      Mouth: Mucous membranes are moist.   Eyes:      Extraocular Movements: Extraocular movements intact. Pupils: Pupils are equal, round, and reactive to light. Cardiovascular:      Rate and Rhythm: Normal rate. Pulses: Normal pulses. Pulmonary:      Effort: Respiratory distress present. Comments: Port right chest wall  Abdominal:      Palpations: Abdomen is soft. Tenderness: There is no abdominal tenderness. There is no guarding. Musculoskeletal:         General: No deformity. Normal range of motion. Cervical back: Normal range of motion. Right lower leg: No edema. Left lower leg: No edema. Skin:     General: Skin is warm. Capillary Refill: Capillary refill takes less than 2 seconds. Neurological:      Mental Status: He is alert and oriented to person, place, and time.    Psychiatric:         Mood and Affect: Mood normal.         DIFFERENTIAL  DIAGNOSIS     PLAN (LABS / IMAGING / EKG):  Orders Placed This Encounter   Procedures    COVID-19, Rapid    XR CHEST PORTABLE    CT CHEST PULMONARY EMBOLISM W CONTRAST    CBC WITH AUTO DIFFERENTIAL    COMPREHENSIVE METABOLIC PANEL    TSH with Reflex    Troponin    Lactate, Sepsis    Procalcitonin    T4, Free    Troponin    Inpatient consult to JORJE Gonzalez ED RT Aerosol protocol    EKG 12 Lead    PATIENT STATUS (FROM ED OR OR/PROCEDURAL) Inpatient       MEDICATIONS ORDERED:  Orders Placed This Encounter   Medications    aspirin chewable tablet 324 mg    levothyroxine (SYNTHROID) tablet 50 mcg    iopamidol (ISOVUE-370) 76 % injection 75 mL    DISCONTD: carvedilol (COREG) tablet 6.25 mg    albuterol (PROVENTIL) nebulizer solution 5 mg     Order Specific Question:   Initiate RT Bronchodilator Protocol     Answer:   No    ipratropium (ATROVENT) 0.02 % nebulizer solution 0.5 mg     Order Specific Question:   Initiate RT Bronchodilator Protocol     Answer:   No    furosemide (LASIX) injection 20 mg       DDX: covid, acs, pe, pneumonia    DIAGNOSTIC RESULTS / EMERGENCY DEPARTMENT COURSE / MDM   LAB RESULTS:  Results for orders placed or performed during the hospital encounter of 01/28/22   COVID-19, Rapid    Specimen: Nasopharyngeal Swab   Result Value Ref Range    Specimen Description . NASOPHARYNGEAL SWAB     SARS-CoV-2, Rapid Not Detected Not Detected   CBC WITH AUTO DIFFERENTIAL   Result Value Ref Range    WBC 7.7 3.5 - 11.3 k/uL    RBC 3.69 (L) 4.21 - 5.77 m/uL    Hemoglobin 10.9 (L) 13.0 - 17.0 g/dL    Hematocrit 34.5 (L) 40.7 - 50.3 %    MCV 93.5 82.6 - 102.9 fL    MCH 29.5 25.2 - 33.5 pg    MCHC 31.6 28.4 - 34.8 g/dL    RDW 16.6 (H) 11.8 - 14.4 %    Platelets 524 283 - 673 k/uL    MPV 11.7 8.1 - 13.5 fL    NRBC Automated 0.0 0.0 per 100 WBC    Differential Type NOT REPORTED     Seg Neutrophils 68 (H) 36 - 65 %    Lymphocytes 18 (L) 24 - 43 %    Monocytes 10 3 - 12 %    Eosinophils % 3 1 - 4 %    Basophils 0 0 - 2 %    Immature Granulocytes 1 (H) 0 %    Segs Absolute 5.28 1.50 - 8.10 k/uL    Absolute Lymph # 1.37 1.10 - 3.70 k/uL    Absolute Mono # 0.76 0.10 - 1.20 k/uL    Absolute Eos # 0.21 0.00 - 0.44 k/uL    Basophils Absolute 0.03 0.00 - 0.20 k/uL    Absolute Immature Granulocyte 0.04 0.00 - 0.30 k/uL    WBC Morphology NOT REPORTED     RBC Morphology ANISOCYTOSIS PRESENT     Platelet Estimate NOT REPORTED    COMPREHENSIVE METABOLIC PANEL   Result Value Ref Range    Glucose 63 (L) 70 - 99 mg/dL    BUN 29 (H) 8 - 23 mg/dL    CREATININE 1.41 (H) 0.70 - 1.20 mg/dL    Bun/Cre Ratio NOT REPORTED 9 - 20    Calcium 8.1 (L) 8.6 - 10.4 mg/dL    Sodium 143 135 - 144 mmol/L    Potassium 4.7 3.7 - 5.3 mmol/L    Chloride 112 (H) 98 - 107 mmol/L    CO2 22 20 - 31 mmol/L    Anion Gap 9 9 - 17 mmol/L    Alkaline Phosphatase 108 40 - 129 U/L    ALT 15 5 - 41 U/L    AST 18 <40 U/L    Total Bilirubin 0.30 0.3 - 1.2 mg/dL    Total Protein 5.7 (L) 6.4 - 8.3 g/dL    Albumin 3.5 3.5 - 5.2 g/dL    Albumin/Globulin Ratio 1.6 1.0 - 2.5    GFR Non- 49 (L) >60 mL/min    GFR  American 60 (L) >60 mL/min    GFR Comment          GFR Staging NOT REPORTED    TSH with Reflex   Result Value Ref Range    TSH 11.01 (H) 0.30 - 5.00 mIU/L   Troponin   Result Value Ref Range    Troponin, High Sensitivity 32 (H) 0 - 22 ng/L    Troponin T NOT REPORTED <0.03 ng/mL    Troponin Interp NOT REPORTED    Lactate, Sepsis   Result Value Ref Range    Lactic Acid, Sepsis NOT REPORTED 0.5 - 1.9 mmol/L    Lactic Acid, Sepsis, Whole Blood 1.1 0.5 - 1.9 mmol/L   Lactate, Sepsis   Result Value Ref Range    Lactic Acid, Sepsis NOT REPORTED 0.5 - 1.9 mmol/L    Lactic Acid, Sepsis, Whole Blood 1.4 0.5 - 1.9 mmol/L   Procalcitonin   Result Value Ref Range    Procalcitonin 0.05 <0.09 ng/mL   T4, Free   Result Value Ref Range    Thyroxine, Free 0.88 (L) 0.93 - 1.70 ng/dL   Troponin   Result Value Ref Range    Troponin, High Sensitivity 30 (H) 0 - 22 ng/L    Troponin T NOT REPORTED <0.03 ng/mL    Troponin Interp NOT REPORTED        IMPRESSION: Patient is alert oriented 70-year-old male sending with shortness of breath cough productive mucoid sputum patient has cancer history we will scan for PE. Will include work-up for Covid, ACS and above differential diagnosis patient's hypoxia and need for submental oxygen will need admission    RADIOLOGY:  XR CHEST PORTABLE    Result Date: 1/28/2022  EXAMINATION: ONE XRAY VIEW OF THE CHEST 1/28/2022 6:10 pm COMPARISON: 10/08/2021 HISTORY: ORDERING SYSTEM PROVIDED HISTORY: sob hypoxia TECHNOLOGIST PROVIDED HISTORY: sob hypoxia Reason for Exam: sob hypoxia FINDINGS: Right-sided MediPort device identified. The cardiomediastinal silhouette is mildly enlarged with mild edema/perihilar congestion. .  The lungs are clear. No pleural effusion or pneumothorax is present. Cardiomegaly with mild perihilar congestion/edema.      CT CHEST PULMONARY EMBOLISM W CONTRAST    Result Date: 1/28/2022  EXAMINATION: CTA OF THE CHEST 1/28/2022 5:17 pm TECHNIQUE: CTA of the chest was performed after the administration of intravenous contrast.  Multiplanar reformatted images are provided for review. MIP images are provided for review. Dose modulation, iterative reconstruction, and/or weight based adjustment of the mA/kV was utilized to reduce the radiation dose to as low as reasonably achievable. COMPARISON: PET-CT 11/08/2021, CT abdomen and pelvis 10/05/2021 HISTORY: ORDERING SYSTEM PROVIDED HISTORY: sob cancer patient fully vaccinated new onset oxygen requirement TECHNOLOGIST PROVIDED HISTORY: sob cancer patient fully vaccinated new onset oxygen requirement Decision Support Exception - unselect if not a suspected or confirmed emergency medical condition->Emergency Medical Condition (MA) Reason for Exam: SOB - Cancer patient fully vaccinated new onset oxygen requirement FINDINGS: Pulmonary Arteries: Pulmonary arteries are adequately opacified for evaluation. No evidence of intraluminal filling defect to suggest pulmonary embolism. Main pulmonary artery is normal in caliber. Mediastinum: Right chest port terminates in the right atrium. No mediastinal lymphadenopathy. The heart and pericardium demonstrate no acute abnormality. Mild cardiomegaly. 3 vessel coronary artery calcifications. There is no acute abnormality of the atherosclerotic thoracic aorta. Lungs/pleura: Mild septal thickening and atelectasis which is new from prior. Background centrilobular emphysema. Calcified granuloma in the right lower lobe. New diffuse bronchial wall thickening. New small layering bilateral pleural effusions. No pneumothorax. Upper Abdomen: Confluent retroperitoneal lymphadenopathy is increased relative to prior, but only partially visualized. Proximal portion of a left ureteral stent is visualized in the proximal left collecting system. Soft Tissues/Bones: No acute bone or soft tissue abnormality. Unchanged sclerosis of the vertebral body and right-sided posterior elements at T5. No pathologic fracture.      No evidence of pulmonary embolism. New mild interstitial pulmonary edema, atelectasis, and diffuse bronchial wall thickening. New small bilateral layering pleural effusions. The patient's infiltrative soft tissue/confluent adenopathy in the partially visualized upper retroperitoneum appears increased relative to the most recent prior comparison. EKG  Sinus rhythm at a rate of 65 normal axis QTC is 445 MT interval 124 QRS duration 96 ms poor R wave progression appropriate precordial T wave balance nonspecific ECG. All EKG's are interpreted by the Emergency Department Physician who either signs or Co-signs this chart in the absence of a cardiologist.    EMERGENCY DEPARTMENT COURSE:  ED Course as of 01/28/22 2326 Fri Jan 28, 2022   1744 Not in room [BG]   1941 Trops stable/downtrending [BG]   1941 Troponin(!):    Troponin, High Sensitivity 30(!)   Troponin T NOT REPORTED   Troponin Interp NOT REPORTED [BG]   1941 Procalcitonin:    Procalcitonin 0.05 [BG]   1941 COMPREHENSIVE METABOLIC PANEL(!):    Glucose 63(!)   BUN 29(!)   Creatinine 1.41(!)   Bun/Cre Ratio NOT REPORTED   CALCIUM, SERUM, 066253 8.1(!)   Sodium 143   Potassium 4.7   Chloride 112(!)   CO2 22   Anion Gap 9   Alk Phos 108   ALT 15   AST 18   Bilirubin 0.30   Total Protein 5.7(!)   Albumin 3.5   Albumin/Globulin Ratio 1.6   GFR Non- 49(!)   GFR  60(!)   GFR Comment        GFR Staging NOT REPORTED [BG]   1941 TSH with Reflex(!):    TSH 11.01(!) [BG]   1941 CBC WITH AUTO DIFFERENTIAL(!):    WBC 7.7   RBC 3.69(!)   Hemoglobin Quant 10. 9(!)   Hematocrit 34.5(!)   MCV 93.5   MCH 29.5   MCHC 31.6   RDW 16.6(!)   Platelet Count 736   MPV 11.7   NRBC Automated 0.0   Differential Type NOT REPORTED   Seg Neutrophils 68(!)   Lymphocytes 18(!)   Monocytes 10   Eosinophils % 3   Basophils 0   Immature Granulocytes 1(!)   Segs Absolute 5.28   Absolute Lymph # 1.37   Absolute Mono # 0.76   Absolute Eos # 0.21   Basophils Absolute 0.03   Absolute Immature Granulocyte 0.04   WBC Morphology NOT REPORTED   RBC Morphology ANISOCYTOSIS PRESENT   Platelet Estimate NOT REPORTED [BG]   1945 Dispo pending ct pe [BG]   2006 Hypothyroid we will give an additional 50 mcg of Synthroid and continue with his home dose of 112 mcg. [BG]   2027 CT CHEST PULMONARY EMBOLISM W CONTRAST [BG]   2030 Pleural effusion b/l L>R [BG]      ED Course User Index  [BG] Kim Pang DO         PROCEDURES:      CONSULTS:  IP CONSULT TO HOSPITALIST    CRITICAL CARE:      FINAL IMPRESSION      1. Shortness of breath    2. Hypoxia    3. Hypothyroidism, unspecified type    4. Pleural effusion          DISPOSITION / PLAN     DISPOSITION    admit    PATIENT REFERRED TO:  No follow-up provider specified.     DISCHARGE MEDICATIONS:  New Prescriptions    No medications on file       Kim Pang DO  Emergency Medicine Resident    (Please note that portions of thisnote were completed with a voice recognition program.  Efforts were made to edit the dictations but occasionally words are mis-transcribed.)       Kim Pang DO  Resident  01/28/22 1991

## 2022-01-29 PROBLEM — I51.7 CARDIOMEGALY: Status: ACTIVE | Noted: 2022-01-29

## 2022-01-29 PROBLEM — R09.02 HYPOXIA: Status: ACTIVE | Noted: 2022-01-29

## 2022-01-29 LAB
ANION GAP SERPL CALCULATED.3IONS-SCNC: 12 MMOL/L (ref 9–17)
BNP INTERPRETATION: ABNORMAL
BUN BLDV-MCNC: 26 MG/DL (ref 8–23)
BUN/CREAT BLD: ABNORMAL (ref 9–20)
CALCIUM SERPL-MCNC: 8.5 MG/DL (ref 8.6–10.4)
CHLORIDE BLD-SCNC: 110 MMOL/L (ref 98–107)
CO2: 23 MMOL/L (ref 20–31)
CREAT SERPL-MCNC: 1.15 MG/DL (ref 0.7–1.2)
EKG ATRIAL RATE: 65 BPM
EKG P AXIS: 52 DEGREES
EKG P-R INTERVAL: 124 MS
EKG Q-T INTERVAL: 428 MS
EKG QRS DURATION: 96 MS
EKG QTC CALCULATION (BAZETT): 445 MS
EKG R AXIS: 17 DEGREES
EKG T AXIS: 42 DEGREES
EKG VENTRICULAR RATE: 65 BPM
GFR AFRICAN AMERICAN: >60 ML/MIN
GFR NON-AFRICAN AMERICAN: >60 ML/MIN
GFR SERPL CREATININE-BSD FRML MDRD: ABNORMAL ML/MIN/{1.73_M2}
GFR SERPL CREATININE-BSD FRML MDRD: ABNORMAL ML/MIN/{1.73_M2}
GLUCOSE BLD-MCNC: 100 MG/DL (ref 75–110)
GLUCOSE BLD-MCNC: 148 MG/DL (ref 75–110)
GLUCOSE BLD-MCNC: 183 MG/DL (ref 75–110)
GLUCOSE BLD-MCNC: 198 MG/DL (ref 75–110)
GLUCOSE BLD-MCNC: 219 MG/DL (ref 75–110)
GLUCOSE BLD-MCNC: 91 MG/DL (ref 70–99)
HCT VFR BLD CALC: 34.8 % (ref 40.7–50.3)
HEMOGLOBIN: 10.7 G/DL (ref 13–17)
INR BLD: 1
MCH RBC QN AUTO: 29.3 PG (ref 25.2–33.5)
MCHC RBC AUTO-ENTMCNC: 30.7 G/DL (ref 28.4–34.8)
MCV RBC AUTO: 95.3 FL (ref 82.6–102.9)
MYOGLOBIN: 42 NG/ML (ref 28–72)
MYOGLOBIN: 44 NG/ML (ref 28–72)
NRBC AUTOMATED: 0 PER 100 WBC
PDW BLD-RTO: 16.4 % (ref 11.8–14.4)
PLATELET # BLD: 116 K/UL (ref 138–453)
PMV BLD AUTO: 11.2 FL (ref 8.1–13.5)
POTASSIUM SERPL-SCNC: 4.4 MMOL/L (ref 3.7–5.3)
PRO-BNP: 1643 PG/ML
PROTHROMBIN TIME: 10.7 SEC (ref 9.1–12.3)
RBC # BLD: 3.65 M/UL (ref 4.21–5.77)
SODIUM BLD-SCNC: 145 MMOL/L (ref 135–144)
THYROXINE, FREE: 0.87 NG/DL (ref 0.93–1.7)
TROPONIN INTERP: ABNORMAL
TROPONIN INTERP: ABNORMAL
TROPONIN T: ABNORMAL NG/ML
TROPONIN T: ABNORMAL NG/ML
TROPONIN, HIGH SENSITIVITY: 30 NG/L (ref 0–22)
TROPONIN, HIGH SENSITIVITY: 30 NG/L (ref 0–22)
TSH SERPL DL<=0.05 MIU/L-ACNC: 11.96 MIU/L (ref 0.3–5)
WBC # BLD: 7.1 K/UL (ref 3.5–11.3)

## 2022-01-29 PROCEDURE — 94761 N-INVAS EAR/PLS OXIMETRY MLT: CPT

## 2022-01-29 PROCEDURE — 2580000003 HC RX 258: Performed by: NURSE PRACTITIONER

## 2022-01-29 PROCEDURE — 93010 ELECTROCARDIOGRAM REPORT: CPT | Performed by: INTERNAL MEDICINE

## 2022-01-29 PROCEDURE — 6370000000 HC RX 637 (ALT 250 FOR IP): Performed by: STUDENT IN AN ORGANIZED HEALTH CARE EDUCATION/TRAINING PROGRAM

## 2022-01-29 PROCEDURE — 6370000000 HC RX 637 (ALT 250 FOR IP): Performed by: NURSE PRACTITIONER

## 2022-01-29 PROCEDURE — 82947 ASSAY GLUCOSE BLOOD QUANT: CPT

## 2022-01-29 PROCEDURE — 94640 AIRWAY INHALATION TREATMENT: CPT

## 2022-01-29 PROCEDURE — 83874 ASSAY OF MYOGLOBIN: CPT

## 2022-01-29 PROCEDURE — 85027 COMPLETE CBC AUTOMATED: CPT

## 2022-01-29 PROCEDURE — 84439 ASSAY OF FREE THYROXINE: CPT

## 2022-01-29 PROCEDURE — 97166 OT EVAL MOD COMPLEX 45 MIN: CPT

## 2022-01-29 PROCEDURE — 97530 THERAPEUTIC ACTIVITIES: CPT

## 2022-01-29 PROCEDURE — 84443 ASSAY THYROID STIM HORMONE: CPT

## 2022-01-29 PROCEDURE — 99232 SBSQ HOSP IP/OBS MODERATE 35: CPT | Performed by: STUDENT IN AN ORGANIZED HEALTH CARE EDUCATION/TRAINING PROGRAM

## 2022-01-29 PROCEDURE — 85610 PROTHROMBIN TIME: CPT

## 2022-01-29 PROCEDURE — 80048 BASIC METABOLIC PNL TOTAL CA: CPT

## 2022-01-29 PROCEDURE — 6360000002 HC RX W HCPCS: Performed by: NURSE PRACTITIONER

## 2022-01-29 PROCEDURE — 2700000000 HC OXYGEN THERAPY PER DAY

## 2022-01-29 PROCEDURE — 2060000000 HC ICU INTERMEDIATE R&B

## 2022-01-29 PROCEDURE — 36415 COLL VENOUS BLD VENIPUNCTURE: CPT

## 2022-01-29 PROCEDURE — 6360000002 HC RX W HCPCS: Performed by: STUDENT IN AN ORGANIZED HEALTH CARE EDUCATION/TRAINING PROGRAM

## 2022-01-29 PROCEDURE — 97162 PT EVAL MOD COMPLEX 30 MIN: CPT

## 2022-01-29 PROCEDURE — 97535 SELF CARE MNGMENT TRAINING: CPT

## 2022-01-29 PROCEDURE — 83880 ASSAY OF NATRIURETIC PEPTIDE: CPT

## 2022-01-29 PROCEDURE — 84484 ASSAY OF TROPONIN QUANT: CPT

## 2022-01-29 RX ORDER — LEVOTHYROXINE SODIUM 0.07 MG/1
150 TABLET ORAL DAILY
Status: DISCONTINUED | OUTPATIENT
Start: 2022-01-30 | End: 2022-02-01 | Stop reason: HOSPADM

## 2022-01-29 RX ORDER — IPRATROPIUM BROMIDE AND ALBUTEROL SULFATE 2.5; .5 MG/3ML; MG/3ML
1 SOLUTION RESPIRATORY (INHALATION)
Status: DISCONTINUED | OUTPATIENT
Start: 2022-01-29 | End: 2022-02-01 | Stop reason: HOSPADM

## 2022-01-29 RX ORDER — PREGABALIN 75 MG/1
150 CAPSULE ORAL NIGHTLY
Status: DISCONTINUED | OUTPATIENT
Start: 2022-01-29 | End: 2022-01-29

## 2022-01-29 RX ORDER — AZITHROMYCIN 250 MG/1
500 TABLET, FILM COATED ORAL DAILY
Status: ON HOLD | COMMUNITY
End: 2022-02-01 | Stop reason: HOSPADM

## 2022-01-29 RX ORDER — PREGABALIN 75 MG/1
75 CAPSULE ORAL 3 TIMES DAILY
Status: DISCONTINUED | OUTPATIENT
Start: 2022-01-29 | End: 2022-01-29

## 2022-01-29 RX ORDER — PANTOPRAZOLE SODIUM 40 MG/1
40 TABLET, DELAYED RELEASE ORAL
Status: DISCONTINUED | OUTPATIENT
Start: 2022-01-29 | End: 2022-02-01 | Stop reason: HOSPADM

## 2022-01-29 RX ORDER — FLUOXETINE HYDROCHLORIDE 20 MG/1
20 CAPSULE ORAL DAILY
Status: DISCONTINUED | OUTPATIENT
Start: 2022-01-29 | End: 2022-02-01 | Stop reason: HOSPADM

## 2022-01-29 RX ORDER — METHYLPREDNISOLONE SODIUM SUCCINATE 40 MG/ML
40 INJECTION, POWDER, LYOPHILIZED, FOR SOLUTION INTRAMUSCULAR; INTRAVENOUS DAILY
Status: DISCONTINUED | OUTPATIENT
Start: 2022-01-29 | End: 2022-02-01 | Stop reason: HOSPADM

## 2022-01-29 RX ORDER — HYDRALAZINE HYDROCHLORIDE 25 MG/1
25 TABLET, FILM COATED ORAL EVERY 12 HOURS SCHEDULED
Status: DISCONTINUED | OUTPATIENT
Start: 2022-01-29 | End: 2022-01-30

## 2022-01-29 RX ORDER — NICOTINE POLACRILEX 4 MG
15 LOZENGE BUCCAL PRN
Status: DISCONTINUED | OUTPATIENT
Start: 2022-01-29 | End: 2022-02-01 | Stop reason: HOSPADM

## 2022-01-29 RX ORDER — ALBUTEROL SULFATE 90 UG/1
2 AEROSOL, METERED RESPIRATORY (INHALATION) EVERY 4 HOURS PRN
COMMUNITY

## 2022-01-29 RX ORDER — DEXTROSE MONOHYDRATE 50 MG/ML
100 INJECTION, SOLUTION INTRAVENOUS PRN
Status: DISCONTINUED | OUTPATIENT
Start: 2022-01-29 | End: 2022-02-01 | Stop reason: HOSPADM

## 2022-01-29 RX ORDER — LOSARTAN POTASSIUM 50 MG/1
100 TABLET ORAL DAILY
Status: DISCONTINUED | OUTPATIENT
Start: 2022-01-30 | End: 2022-02-01 | Stop reason: HOSPADM

## 2022-01-29 RX ORDER — DEXTROSE MONOHYDRATE 25 G/50ML
12.5 INJECTION, SOLUTION INTRAVENOUS PRN
Status: DISCONTINUED | OUTPATIENT
Start: 2022-01-29 | End: 2022-02-01 | Stop reason: HOSPADM

## 2022-01-29 RX ORDER — PREGABALIN 75 MG/1
75 CAPSULE ORAL 2 TIMES DAILY
Status: ON HOLD | COMMUNITY
End: 2022-08-13 | Stop reason: HOSPADM

## 2022-01-29 RX ORDER — PREGABALIN 75 MG/1
150 CAPSULE ORAL EVERY 8 HOURS
Status: DISCONTINUED | OUTPATIENT
Start: 2022-01-29 | End: 2022-01-31

## 2022-01-29 RX ORDER — HYDRALAZINE HYDROCHLORIDE 20 MG/ML
10 INJECTION INTRAMUSCULAR; INTRAVENOUS EVERY 6 HOURS PRN
Status: DISCONTINUED | OUTPATIENT
Start: 2022-01-29 | End: 2022-02-01 | Stop reason: HOSPADM

## 2022-01-29 RX ADMIN — INSULIN LISPRO 1 UNITS: 100 INJECTION, SOLUTION INTRAVENOUS; SUBCUTANEOUS at 13:42

## 2022-01-29 RX ADMIN — FLUOXETINE HYDROCHLORIDE 20 MG: 20 CAPSULE ORAL at 08:21

## 2022-01-29 RX ADMIN — METHYLPREDNISOLONE SODIUM SUCCINATE 40 MG: 40 INJECTION, POWDER, FOR SOLUTION INTRAMUSCULAR; INTRAVENOUS at 10:18

## 2022-01-29 RX ADMIN — DONEPEZIL HYDROCHLORIDE 10 MG: 10 TABLET, FILM COATED ORAL at 01:27

## 2022-01-29 RX ADMIN — ACETAMINOPHEN 650 MG: 325 TABLET ORAL at 20:04

## 2022-01-29 RX ADMIN — CARVEDILOL 6.25 MG: 6.25 TABLET, FILM COATED ORAL at 17:48

## 2022-01-29 RX ADMIN — SODIUM CHLORIDE, PRESERVATIVE FREE 10 ML: 5 INJECTION INTRAVENOUS at 20:52

## 2022-01-29 RX ADMIN — IPRATROPIUM BROMIDE AND ALBUTEROL SULFATE 1 AMPULE: .5; 3 SOLUTION RESPIRATORY (INHALATION) at 11:45

## 2022-01-29 RX ADMIN — ACETAMINOPHEN 650 MG: 325 TABLET ORAL at 11:47

## 2022-01-29 RX ADMIN — ATORVASTATIN CALCIUM 10 MG: 10 TABLET, FILM COATED ORAL at 08:21

## 2022-01-29 RX ADMIN — HYDRALAZINE HYDROCHLORIDE 10 MG: 20 INJECTION INTRAMUSCULAR; INTRAVENOUS at 04:52

## 2022-01-29 RX ADMIN — IPRATROPIUM BROMIDE AND ALBUTEROL SULFATE 1 AMPULE: .5; 3 SOLUTION RESPIRATORY (INHALATION) at 20:14

## 2022-01-29 RX ADMIN — PANTOPRAZOLE SODIUM 40 MG: 40 TABLET, DELAYED RELEASE ORAL at 06:14

## 2022-01-29 RX ADMIN — HYDRALAZINE HYDROCHLORIDE 25 MG: 25 TABLET, FILM COATED ORAL at 08:42

## 2022-01-29 RX ADMIN — LOSARTAN POTASSIUM 50 MG: 50 TABLET, FILM COATED ORAL at 08:21

## 2022-01-29 RX ADMIN — SODIUM CHLORIDE, PRESERVATIVE FREE 10 ML: 5 INJECTION INTRAVENOUS at 08:22

## 2022-01-29 RX ADMIN — ENOXAPARIN SODIUM 40 MG: 100 INJECTION SUBCUTANEOUS at 08:21

## 2022-01-29 RX ADMIN — DONEPEZIL HYDROCHLORIDE 10 MG: 10 TABLET, FILM COATED ORAL at 20:49

## 2022-01-29 RX ADMIN — HYDRALAZINE HYDROCHLORIDE 25 MG: 25 TABLET, FILM COATED ORAL at 20:49

## 2022-01-29 RX ADMIN — PREGABALIN 150 MG: 75 CAPSULE ORAL at 01:26

## 2022-01-29 RX ADMIN — INSULIN LISPRO 2 UNITS: 100 INJECTION, SOLUTION INTRAVENOUS; SUBCUTANEOUS at 17:46

## 2022-01-29 RX ADMIN — AMLODIPINE BESYLATE 10 MG: 10 TABLET ORAL at 08:21

## 2022-01-29 RX ADMIN — PREGABALIN 75 MG: 75 CAPSULE ORAL at 13:42

## 2022-01-29 RX ADMIN — IPRATROPIUM BROMIDE AND ALBUTEROL SULFATE 1 AMPULE: .5; 3 SOLUTION RESPIRATORY (INHALATION) at 15:34

## 2022-01-29 RX ADMIN — PREGABALIN 150 MG: 75 CAPSULE ORAL at 21:18

## 2022-01-29 RX ADMIN — LEVOTHYROXINE SODIUM 125 MCG: 125 TABLET ORAL at 06:14

## 2022-01-29 RX ADMIN — CARVEDILOL 6.25 MG: 6.25 TABLET, FILM COATED ORAL at 08:21

## 2022-01-29 RX ADMIN — INSULIN LISPRO 1 UNITS: 100 INJECTION, SOLUTION INTRAVENOUS; SUBCUTANEOUS at 21:19

## 2022-01-29 ASSESSMENT — ENCOUNTER SYMPTOMS
COUGH: 1
VOMITING: 0
SHORTNESS OF BREATH: 1
DIARRHEA: 1
WHEEZING: 0
NAUSEA: 0
CONSTIPATION: 0
STRIDOR: 0
ABDOMINAL PAIN: 0
BLOOD IN STOOL: 0

## 2022-01-29 ASSESSMENT — PAIN SCALES - GENERAL
PAINLEVEL_OUTOF10: 9
PAINLEVEL_OUTOF10: 8
PAINLEVEL_OUTOF10: 9
PAINLEVEL_OUTOF10: 9
PAINLEVEL_OUTOF10: 8
PAINLEVEL_OUTOF10: 0

## 2022-01-29 ASSESSMENT — PAIN DESCRIPTION - PROGRESSION: CLINICAL_PROGRESSION: NOT CHANGED

## 2022-01-29 ASSESSMENT — PAIN DESCRIPTION - LOCATION
LOCATION: BACK

## 2022-01-29 ASSESSMENT — PAIN DESCRIPTION - FREQUENCY
FREQUENCY: CONTINUOUS
FREQUENCY: CONTINUOUS

## 2022-01-29 ASSESSMENT — PAIN DESCRIPTION - PAIN TYPE
TYPE: CHRONIC PAIN
TYPE: ACUTE PAIN;CHRONIC PAIN

## 2022-01-29 ASSESSMENT — PAIN DESCRIPTION - ORIENTATION
ORIENTATION: MID;POSTERIOR
ORIENTATION: MID;UPPER

## 2022-01-29 ASSESSMENT — PAIN DESCRIPTION - ONSET: ONSET: ON-GOING

## 2022-01-29 ASSESSMENT — PAIN - FUNCTIONAL ASSESSMENT: PAIN_FUNCTIONAL_ASSESSMENT: ACTIVITIES ARE NOT PREVENTED

## 2022-01-29 ASSESSMENT — PAIN DESCRIPTION - DESCRIPTORS
DESCRIPTORS: ACHING
DESCRIPTORS: ACHING;DISCOMFORT

## 2022-01-29 NOTE — H&P
[facility]  Office: [address], DO, Erinadebayo Manpreet, DO, Jocelynn Danielson, DO, Caro Soto, DO, Cyril Gage MD, Oscar Iglesias MD, Eddy Sanford MD, Severino Macedo MD, Zane Kramer MD, Carolynn Snellen, MD, Mitch Kessler MD, Junior Agrawal, DO, Ramya Campbell, DO, Melisa Sofia MD,  Juan Cassidy, DO, Jody Vaughn MD, Russ Hernandez MD, Chalino Jerry MD, Janice Tavares MD, Hero Salgado MD, Le[facility], [facility] Center[facility]RICIA Barcenas, CNP, Sofy Wayne, CNP, Truman Moya, CNS, Kimberly Sage, CNP, Naun Huggins, CNP, Mauricio Schneider, CNP, Governor Callaway, CNP, Raymon Lopes, CNP, Emily Faust PA-C, Bryan John, CORY, Gary Erickson DNP, Georgina Neely, CNP, Myron Pringle, CNP, Licha Elizalde, CNP, Gabriella Macdonald, CNP, Gisell Turk, CNP, Ryan Brizuela, CNP         [address]    HISTORY AND PHYSICAL EXAMINATION            Date:   1/28/2022  Patient name:  Juan Palomares  Date of admission:  1/28/2022  5:42 PM  MRN:   [id number]  Account:  [de-identified]  YOB: 1950  PCP:    Juvenal Don DO  Room:   18/18  Code Status:    Prior    Chief Complaint:     Chief Complaint   Patient presents with    Cough    Shortness of Breath       History Obtained From:     patient, electronic medical record    History of Present Illness:     Juan Palomares is a 66 y.o. Non- / non  male who presents with Cough and Shortness of Breath      Patient presents to the emergency room for the concern of shortness of breath. Patient reports ongoing shortness of breath and cough for multiple days. Patient with  productive cough, with gray white sputum. Per daughter, Ashley Desir was decreased on pulse ox and when pcp called instructed to seek treatment in the emergency room. Upon arrival to the emergency room patient was noted to be hypoxic with spo2 in the mid 80's on room air with adventitious lung sounds t/o.         Work up in the emergency room          Laboratories:   Metabolic panel. -Within normal limits with the exception of chloride 112, BUN 29, creatinine 1.41  TSH 11.01, free thyroxine 0.88  GI/Liver Panel-within normal limits  Hematology.-No acute leukocytosis, hemoglobin 10.9 hematocrit 34.5  Coagulation-not done  Cardiac Markers-high sensitive troponin 30  EKG- Sinus rhythm at a rate of 65 normal axis QTC is 445 OR interval 124 QRS duration 96 ms poor R wave progression appropriate precordial T wave balance nonspecific ECG  Urine-not done      Imaging and Diagnostics:   XR CHEST PORTABLE  Cardiomegaly with mild perihilar congestion/edema. CT CHEST PULMONARY EMBOLISM W CONTRAST  No evidence of pulmonary embolism. New mild interstitial pulmonary edema, atelectasis, and diffuse bronchial wall thickening. New small bilateral layering pleural effusions. The patient's infiltrative soft tissue/confluent adenopathy in the partially visualized upper retroperitoneum appears increased relative to the most recent prior comparison.              Past Medical History:     Past Medical History:   Diagnosis Date    Cancer [facility])     Chemotherapy management, encounter for     Diabetes mellitus ([facility] [address]     History of non-Hodgkin's lymphoma     Hyperlipidemia     Hypertension     Hypothyroidism         Past Surgical History:     Past Surgical History:   Procedure Laterality Date    CHOLECYSTECTOMY, LAPAROSCOPIC N/A 10/9/2021    CHOLECYSTECTOMY LAPAROSCOPIC ROBOTIC XI performed by Roland Villaseñor MD at [address] [place] [address] Rd Left 9/24/2021    COLONOSCOPY POLYPECTOMY SNARE/COLD BIOPSY performed by Dionne Ghotra MD at [facility] Right 10/6/2021    CYSTOSCOPY PYELOGRAM URETERAL STENT INSERTION performed by Janeth Feliz MD at [facility] Bilateral 11/24/2021    CYSTOSCOPY URETERAL STENT INSERTION RIGHT EXCHANGE & RETROGRADE PYELOGRAM LEFT INSERTION performed by Janeth Feliz MD at [id number] [facility] IR BIOPSY ABDOMINAL MASS  7/6/2021    IR BIOPSY ABDOMINAL MASS 7/6/2021 [facility] SPECIAL PROCEDURES    IR PORT PLACEMENT EQUAL OR GREATER THAN 5 YEARS  8/31/2021    IR PORT PLACEMENT EQUAL OR GREATER THAN 5 YEARS 8/31/2021 [facility] SPECIAL PROCEDURES    TONSILLECTOMY      as child        Medications Prior to Admission:     Prior to Admission medications    Medication Sig Start Date End Date Taking? Authorizing Provider   megestrol (MEGACE) 40 MG/ML suspension Take 10 mLs by mouth daily 11/15/21   Dashawn Acuña MD   amLODIPine (NORVASC) 10 MG tablet Take 1 tablet by mouth daily 10/12/21   Apyrl Alar, DO   levothyroxine (SYNTHROID) 112 MCG tablet Take 1 tablet by mouth Daily 10/13/21   Apryl Alar, DO   docusate sodium (COLACE) 100 MG capsule Take 1 capsule by mouth daily 10/13/21   Roderick Metcalf, DO   losartan (COZAAR) 50 MG tablet Take 1 tablet by mouth daily 10/12/21   Apryl Alar, DO   ondansetron (ZOFRAN) 4 MG tablet Take every six hours as needed 10/9/21   Valarie Santillan MD   pregabalin (LYRICA) 150 MG capsule Take 150 mg by mouth 3 times daily.     Historical Provider, MD   glipiZIDE-metFORMIN (METAGLIP) 5-500 MG per tablet Take 1 tablet by mouth daily (with breakfast)     Historical Provider, MD   donepezil (ARICEPT) 10 MG tablet Take 10 mg by mouth nightly  7/11/21   Historical Provider, MD   meloxicam (MOBIC) 15 MG tablet Take 15 mg by mouth daily  7/14/21   Historical Provider, MD   atorvastatin (LIPITOR) 10 MG tablet Take 10 mg by mouth daily    Historical Provider, MD   FLUoxetine (PROZAC) 20 MG capsule Take 20 mg by mouth daily    Historical Provider, MD   lisinopril-hydroCHLOROthiazide (PRINZIDE;ZESTORETIC) 20-12.5 MG per tablet Take 1 tablet by mouth daily  7/14/21   Historical Provider, MD   carvedilol (COREG) 6.25 MG tablet Take 6.25 mg by mouth 2 times daily (with meals)    Historical Provider, MD   omeprazole (PRILOSEC) 20 MG delayed release capsule Take 20 mg by mouth 2 times daily Historical Provider, MD        Allergies:     Patient has no known allergies. Social History:     Tobacco:    reports that he quit smoking about 16 years ago. His smoking use included cigarettes. He has a 90.00 pack-year smoking history. He has never used smokeless tobacco.  Alcohol:      reports current alcohol use of about 1.7 standard drinks of alcohol per week. Drug Use:  reports current drug use. Drug: Marijuana Jay Barb). Family History:     Family History   Problem Relation Age of Onset    Diabetes Mother     Alzheimer's Disease Father     Heart Disease Brother     Heart Disease Brother        Review of Systems:     Positive and Negative as described in HPI. Review of Systems   Constitutional: Positive for fatigue. Negative for chills, diaphoresis and fever. HENT: Negative for congestion and hearing loss. Respiratory: Positive for cough and shortness of breath. Negative for wheezing and stridor. Cardiovascular: Positive for leg swelling. Negative for chest pain and palpitations. Gastrointestinal: Positive for diarrhea. Negative for abdominal pain, blood in stool, constipation, nausea and vomiting. Genitourinary: Negative for dysuria and frequency. Musculoskeletal: Negative for myalgias. Skin: Negative for rash. Neurological: Positive for weakness. Negative for dizziness, seizures and headaches. Psychiatric/Behavioral: The patient is not nervous/anxious. Physical Exam:   BP (!) 205/83   Pulse 64   Temp 97.7 °F (36.5 °C)   Resp 13   Ht 6' (1.829 m)   Wt 260 lb (117.9 kg)   SpO2 97%   BMI 35.26 kg/m²   Temp (24hrs), Av.7 °F (36.5 °C), Min:97.7 °F (36.5 °C), Max:97.7 °F (36.5 °C)    No results for input(s): POCGLU in the last 72 hours. Intake/Output Summary (Last 24 hours) at 2022 7478  Last data filed at 2022 1900  Gross per 24 hour   Intake 145 ml   Output --   Net 145 ml       Physical Exam  Vitals and nursing note reviewed.    Constitutional: General: He is not in acute distress. Appearance: He is well-developed. He is not ill-appearing or diaphoretic. Comments: Fatigued appearing     HENT:      Head: Normocephalic and atraumatic. Right Ear: Hearing normal.      Left Ear: Hearing normal.      Nose: Nose normal. No rhinorrhea. Eyes:      General: Lids are normal.      Extraocular Movements:      Right eye: Normal extraocular motion. Left eye: Normal extraocular motion. Conjunctiva/sclera: Conjunctivae normal.      Right eye: Right conjunctiva is not injected. Left eye: Left conjunctiva is not injected. Pupils: Pupils are equal, round, and reactive to light. Pupils are equal.      Right eye: Pupil is reactive. Left eye: Pupil is reactive. Neck:      Thyroid: No thyromegaly. Vascular: JVD present. No carotid bruit. Trachea: Trachea and phonation normal. No tracheal deviation. Cardiovascular:      Rate and Rhythm: Normal rate and regular rhythm. Pulses: Normal pulses. Heart sounds: Murmur heard. Pulmonary:      Effort: Pulmonary effort is normal. No accessory muscle usage or respiratory distress. Breath sounds: No stridor. Wheezing, rhonchi and rales present. No decreased breath sounds. Comments: Moist congested cough. Abdominal:      General: Bowel sounds are normal. There is no distension. Palpations: Abdomen is soft. There is no mass. Tenderness: There is no abdominal tenderness. There is no guarding. Musculoskeletal:         General: No tenderness. Cervical back: Neck supple. Right lower le+ Edema present. Left lower le+ Edema present. Skin:     General: Skin is warm and dry. Findings: No erythema, lesion or rash. Neurological:      Mental Status: He is alert and oriented to person, place, and time. He is not disoriented. GCS: GCS eye subscore is 4. GCS verbal subscore is 5. GCS motor subscore is 6. Cranial Nerves:  No cranial nerve deficit or dysarthria. Sensory: No sensory deficit. Psychiatric:         Speech: Speech normal.         Behavior: Behavior normal. Behavior is cooperative.          Investigations:      Laboratory Testing:  Recent Results (from the past 24 hour(s))   CBC WITH AUTO DIFFERENTIAL    Collection Time: 01/28/22  6:03 PM   Result Value Ref Range    WBC 7.7 3.5 - 11.3 k/uL    RBC 3.69 (L) 4.21 - 5.77 m/uL    Hemoglobin 10.9 (L) 13.0 - 17.0 g/dL    Hematocrit 34.5 (L) 40.7 - 50.3 %    MCV 93.5 82.6 - 102.9 fL    MCH 29.5 25.2 - 33.5 pg    MCHC 31.6 28.4 - 34.8 g/dL    RDW 16.6 (H) 11.8 - 14.4 %    Platelets 670 741 - 175 k/uL    MPV 11.7 8.1 - 13.5 fL    NRBC Automated 0.0 0.0 per 100 WBC    Differential Type NOT REPORTED     Seg Neutrophils 68 (H) 36 - 65 %    Lymphocytes 18 (L) 24 - 43 %    Monocytes 10 3 - 12 %    Eosinophils % 3 1 - 4 %    Basophils 0 0 - 2 %    Immature Granulocytes 1 (H) 0 %    Segs Absolute 5.28 1.50 - 8.10 k/uL    Absolute Lymph # 1.37 1.10 - 3.70 k/uL    Absolute Mono # 0.76 0.10 - 1.20 k/uL    Absolute Eos # 0.21 0.00 - 0.44 k/uL    Basophils Absolute 0.03 0.00 - 0.20 k/uL    Absolute Immature Granulocyte 0.04 0.00 - 0.30 k/uL    WBC Morphology NOT REPORTED     RBC Morphology ANISOCYTOSIS PRESENT     Platelet Estimate NOT REPORTED    COMPREHENSIVE METABOLIC PANEL    Collection Time: 01/28/22  6:03 PM   Result Value Ref Range    Glucose 63 (L) 70 - 99 mg/dL    BUN 29 (H) 8 - 23 mg/dL    CREATININE 1.41 (H) 0.70 - 1.20 mg/dL    Bun/Cre Ratio NOT REPORTED 9 - 20    Calcium 8.1 (L) 8.6 - 10.4 mg/dL    Sodium 143 135 - 144 mmol/L    Potassium 4.7 3.7 - 5.3 mmol/L    Chloride 112 (H) 98 - 107 mmol/L    CO2 22 20 - 31 mmol/L    Anion Gap 9 9 - 17 mmol/L    Alkaline Phosphatase 108 40 - 129 U/L    ALT 15 5 - 41 U/L    AST 18 <40 U/L    Total Bilirubin 0.30 0.3 - 1.2 mg/dL    Total Protein 5.7 (L) 6.4 - 8.3 g/dL    Albumin 3.5 3.5 - 5.2 g/dL    Albumin/Globulin Ratio 1.6 1.0 - 2.5    GFR Non- 49 (L) >60 mL/min    GFR African American 60 (L) >60 mL/min    GFR Comment          GFR Staging NOT REPORTED    TSH with Reflex    Collection Time: 01/28/22  6:03 PM   Result Value Ref Range    TSH 11.01 (H) 0.30 - 5.00 mIU/L   Troponin    Collection Time: 01/28/22  6:03 PM   Result Value Ref Range    Troponin, High Sensitivity 32 (H) 0 - 22 ng/L    Troponin T NOT REPORTED <0.03 ng/mL    Troponin Interp NOT REPORTED    Lactate, Sepsis    Collection Time: 01/28/22  6:03 PM   Result Value Ref Range    Lactic Acid, Sepsis NOT REPORTED 0.5 - 1.9 mmol/L    Lactic Acid, Sepsis, Whole Blood 1.1 0.5 - 1.9 mmol/L   Procalcitonin    Collection Time: 01/28/22  6:03 PM   Result Value Ref Range    Procalcitonin 0.05 <0.09 ng/mL   T4, Free    Collection Time: 01/28/22  6:03 PM   Result Value Ref Range    Thyroxine, Free 0.88 (L) 0.93 - 1.70 ng/dL   COVID-19, Rapid    Collection Time: 01/28/22  6:06 PM    Specimen: Nasopharyngeal Swab   Result Value Ref Range    Specimen Description . NASOPHARYNGEAL SWAB     SARS-CoV-2, Rapid Not Detected Not Detected   Troponin    Collection Time: 01/28/22  6:56 PM   Result Value Ref Range    Troponin, High Sensitivity 30 (H) 0 - 22 ng/L    Troponin T NOT REPORTED <0.03 ng/mL    Troponin Interp NOT REPORTED    Lactate, Sepsis    Collection Time: 01/28/22  8:13 PM   Result Value Ref Range    Lactic Acid, Sepsis NOT REPORTED 0.5 - 1.9 mmol/L    Lactic Acid, Sepsis, Whole Blood 1.4 0.5 - 1.9 mmol/L       Imaging/Diagnostics:  XR CHEST PORTABLE    Result Date: 1/28/2022  Cardiomegaly with mild perihilar congestion/edema. CT CHEST PULMONARY EMBOLISM W CONTRAST    Result Date: 1/28/2022  No evidence of pulmonary embolism. New mild interstitial pulmonary edema, atelectasis, and diffuse bronchial wall thickening. New small bilateral layering pleural effusions.  The patient's infiltrative soft tissue/confluent adenopathy in the partially visualized upper retroperitoneum appears increased relative to the most recent prior comparison. Assessment :      Hospital Problems           Last Modified POA    * (Principal) Acute respiratory failure with hypoxia ([facility] [address] 1/29/2022 Yes    Pleural effusion 1/28/2022 Yes    Cardiomegaly 1/29/2022 Yes    Hypothyroidism 1/29/2022 Yes    Diabetes mellitus ([facility] [address] 1/29/2022 Yes    Hypertensive urgency 1/29/2022 Yes          Plan:     Patient status inpatient in the Progressive Unit/Step down    Supportive treatment with supplemental oxygen and evaluate as diuresis is initiated  Initiate IV Lasix x1 dose and evaluate output response  BNP add-on, check echocardiogram as cardiomegaly is new. Consult cardiology if there seems to be a new wall motion abnormality or reduced ejection fraction  Monitor blood pressures and utilize IV hydralazine for systolic blood pressure greater than 160  Patient states that he takes his thyroid medicine on a regular basis we will recheck thyroid levels in the morning  At this time will hold off on Metformin and Glucotrol given the concern from the IV contrast and risk of hypoglycemia in hospital.  We will utilize insulin sliding scale and hypoglycemia protocol. Resume Metformin if creatinine is below 1.4 and follow very closely given the age and creatinine clearance of this patient  Continue to monitor blood troponins and correlate clinically. GI prophylaxis with diet  DVT prophylaxis with Lovenox    Consultations:   IP CONSULT TO HOSPITALIST     Patient is admitted as inpatient status because of co-morbidities listed above, severity of signs and symptoms as outlined, requirement for current medical therapies and most importantly because of direct risk to patient if care not provided in a hospital setting. Expected length of stay > 48 hours.     EDWIGE [place] - [facility]  1/28/2022  9:39 PM    Copy sent to Dr. Ulises Staley DO

## 2022-01-29 NOTE — PROGRESS NOTES
[facility]  Office: [address], DO, Etta Granda, DO, Ricardo Mohs, DO, [facility], DO, Jesus Albert MD, Arianna Wilcox MD, Kvng Gupta MD, Sulma Stewart MD, Dawna Hahn MD, Cara Ortega MD, Nola Cerda MD, Alea Cortez, DO, Enmanuel Dhaliwal, DO, Xavier Maravilla MD,  Mark Wolfe DO, Rod Gaffney MD, Mady Allen MD, Allyson Santiago MD, Amanda Calzada MD, Marshall Ramirez MD, Melissa Garsia, Brenda Ochoa, CNP, Sabas Voss, CNP, Manoj Khoury, CNS, Kellie Leal, CNP, Yareli Carney, CNP, Gregoria Snow, CNP, Dena Peter, CNP, Den Henry, CNP, Abel Do PA-C, Peri Stapleton, CORY, Eladio Goldberg, CORY, Jayda Velez, CNP, Elena Patricia, CNP, Romario Quinones, CNP, Matilde Brady, CNP, Varsha Grant, CNP, Shandra Martell, [address] [place] [address]    Progress Note    1/29/2022    10:53 AM    Name:   Rhianna Yung  MRN:     [id number]     Acct:      [de-identified]   Room:   [facility] Day:  1  Admit Date:  1/28/2022  5:42 PM    PCP:   Robel Downey DO  Code Status:  Full Code    Subjective:     C/C:   Chief Complaint   Patient presents with    Cough    Shortness of Breath     Interval History Status: not changed. Patient was seen and examined at bedside this AM. He reports feeling \"okay\" but continues to complain of shortness of breath. Denies fever/chills, nausea/vomiting or chest pain. Brief History:     (per HPI) Patient is a 47-year-old male with a past medical history of DM2, follicular lymphoma (currently on maintenance rituximab), hypothyroidism, HTN and HLD who presented to the emergency department on 1/28/2022 complaining of shortness of breath. The patient states that his symptoms began several weeks ago and have progressively worsened. In the ED, the patient was hypoxic and ill-appearing.  CTPA was done and was remarkable for new mild interstitial pulmonary edema, atelectasis and diffuse bronchial wall thickening (negative for PE). The patient was admitted to internal medicine for further management. Review of Systems:     Constitutional:  negative for chills, fevers, sweats  Respiratory:  Positive for cough and shortness of breath. Cardiovascular:  negative for chest pain, chest pressure/discomfort, lower extremity edema, palpitations  Gastrointestinal:  negative for abdominal pain, constipation, diarrhea, nausea, vomiting  Neurological:  negative for dizziness, headache    Medications:      Allergies:  No Known Allergies    Current Meds:   Scheduled Meds:    pregabalin  150 mg Oral Nightly    pantoprazole  40 mg Oral QAM AC    FLUoxetine  20 mg Oral Daily    insulin lispro  0-6 Units SubCUTAneous TID WC    insulin lispro  0-3 Units SubCUTAneous Nightly    hydrALAZINE  25 mg Oral 2 times per day    [START ON 1/30/2022] levothyroxine  150 mcg Oral Daily    methylPREDNISolone  40 mg IntraVENous Daily    ipratropium-albuterol  1 ampule Inhalation Q4H WA    [START ON 1/30/2022] losartan  100 mg Oral Daily    amLODIPine  10 mg Oral Daily    atorvastatin  10 mg Oral Daily    carvedilol  6.25 mg Oral BID WC    donepezil  10 mg Oral Nightly    sodium chloride flush  5-40 mL IntraVENous 2 times per day    enoxaparin  40 mg SubCUTAneous Daily    [Held by provider] glipiZIDE  5 mg Oral QAM AC    And    [Held by provider] metFORMIN  500 mg Oral Daily with breakfast     Continuous Infusions:    dextrose      sodium chloride       PRN Meds: glucose, dextrose, glucagon (rDNA), dextrose, hydrALAZINE, ipratropium, sodium chloride flush, sodium chloride, potassium chloride **OR** potassium alternative oral replacement **OR** potassium chloride, magnesium sulfate, ondansetron **OR** ondansetron, acetaminophen **OR** acetaminophen    Data:     Past Medical History:   has a past medical history of Cancer [facility]), Chemotherapy management, encounter for, Diabetes mellitus ([facility] [address] History of non-Hodgkin's lymphoma, Hyperlipidemia, Hypertension, and Hypothyroidism. Social History:   reports that he quit smoking about 16 years ago. His smoking use included cigarettes. He has a 90.00 pack-year smoking history. He has never used smokeless tobacco. He reports current alcohol use of about 1.7 standard drinks of alcohol per week. He reports current drug use. Drug: Marijuana Deboraha Sanes). Family History:   Family History   Problem Relation Age of Onset    Diabetes Mother     Alzheimer's Disease Father     Heart Disease Brother     Heart Disease Brother        Vitals:  BP (!) 174/68   Pulse 70   Temp 97.7 °F (36.5 °C) (Oral)   Resp 20   Ht 5' 10\" (1.778 m)   Wt 180 lb 11.2 oz (82 kg)   SpO2 96%   BMI 25.93 kg/m²   Temp (24hrs), Av.8 °F (36.6 °C), Min:97.7 °F (36.5 °C), Max:97.9 °F (36.6 °C)    Recent Labs     22  0044 22  0819   POCGLU 148* 100       I/O (24Hr): Intake/Output Summary (Last 24 hours) at 2022 1053  Last data filed at 2022 4407  Gross per 24 hour   Intake 155 ml   Output --   Net 155 ml       Labs:  Hematology:  Recent Labs     22  1803 01/29/22  0625   WBC 7.7 7.1   RBC 3.69* 3.65*   HGB 10.9* 10.7*   HCT 34.5* 34.8*   MCV 93.5 95.3   MCH 29.5 29.3   MCHC 31.6 30.7   RDW 16.6* 16.4*    116*   MPV 11.7 11.2   INR  --  1.0     Chemistry:  Recent Labs     22  1803 01/28/22  1803 01/28/22  1856 22  0625 22  0712     --   --  145*  --    K 4.7  --   --  4.4  --    *  --   --  110*  --    CO2 22  --   --  23  --    GLUCOSE 63*  --   --  91  --    BUN 29*  --   --  26*  --    CREATININE 1.41*  --   --  1.15  --    ANIONGAP 9  --   --  12  --    LABGLOM 49*  --   --  >60  --    GFRAA 60*  --   --  >60  --    CALCIUM 8.1*  --   --  8.5*  --    PROBNP  --   --   --  1,643*  --    TROPHS 32*   < > 30* 30* 30*   MYOGLOBIN  --   --   --  44 42    < > = values in this interval not displayed.      Recent Labs     22  1588 01/28/22  1803 01/29/22  0044 01/29/22  0625 01/29/22  0819   PROT 5.7*  --   --   --   --    LABALBU 3.5  --   --   --   --    TSH 11.01*   < >  --  11.96*  --    AST 18  --   --   --   --    ALT 15  --   --   --   --    ALKPHOS 108  --   --   --   --    BILITOT 0.30  --   --   --   --    POCGLU  --   --  148*  --  100    < > = values in this interval not displayed. ABG:No results found for: POCPH, PHART, PH, POCPCO2, RTW1IKO, PCO2, POCPO2, PO2ART, PO2, POCHCO3, JOT1UHS, HCO3, NBEA, PBEA, BEART, BE, THGBART, THB, RGX0GNX, FILF6KIH, D0QGJLID, O2SAT, FIO2  Lab Results   Component Value Date/Time    SPECIAL NOT REPORTED 12/28/2021 11:56 AM     Lab Results   Component Value Date/Time    CULTURE NO GROWTH 12/28/2021 11:56 AM       Radiology:  XR CHEST PORTABLE    Result Date: 1/28/2022  Cardiomegaly with mild perihilar congestion/edema. CT CHEST PULMONARY EMBOLISM W CONTRAST    Result Date: 1/28/2022  No evidence of pulmonary embolism. New mild interstitial pulmonary edema, atelectasis, and diffuse bronchial wall thickening. New small bilateral layering pleural effusions. The patient's infiltrative soft tissue/confluent adenopathy in the partially visualized upper retroperitoneum appears increased relative to the most recent prior comparison. Physical Examination:        General appearance:  alert, cooperative and no distress  Mental Status:  oriented to person, place and time and normal affect  Lungs:  Scattered wheezes and rhonchi appreciated.    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 ([address] 1/29/2022 Yes    Pleural effusion 1/28/2022 Yes    Cardiomegaly 1/29/2022 Yes    Hypothyroidism 1/29/2022 Yes    Diabetes mellitus ([facility] [address] 1/29/2022 Yes    Hypertensive urgency 1/29/2022 Yes    Hypoxia 1/29/2022 Yes          Plan:        Hypoxia   -Likely 2/2 heart failure and/or undiagnosed COPD (patient states that he has quit smoking cigarettes but continues to smoke marijuana frequently)   -Echocardiogram pending   -Pro-BNP elevated at 1643   -Continue supplemental O2 as needed (currently on 2 L NC)   -Start methylprednisolone 40 mg daily   -Q4H Duonebs   -Daily BMP   -Daily CBC   -Will revisit need for diuresis pending echocardiogram results     Hypertensive urgency, improving   -Continue amlodipine 10 mg daily   -Continue carvedilol 6.25 mg bid   -Start hydralazine 25 mg q12h   -Increase losartan to 100 mg daily     DM2   -Holding home glipizide and metformin   -LDSSI   -Hypoglycemia protocol     Hypothyroidism   -TSH markedly elevated with low T4   -Will increase home levothyroxine to 150 mcg daily     HLD   -Continue home atorvastatin 10 mg daily     Follicular lymphoma   -Not currently in remission   -Patient follows with Dr. Barbara Warren outpatient and receives maintenance rituximab every 2-months   -Follow-up with oncology outpatient as scheduled       Kerline Goddard MD  1/29/2022  10:53 AM

## 2022-01-29 NOTE — PLAN OF CARE
Problem: Pain:  Goal: Pain level will decrease  Description: Pain level will decrease  1/29/2022 0931 by Mina Hodge RN  Outcome: Ongoing  1/29/2022 0612 by Satish Enriquez RN  Outcome: Ongoing  Goal: Control of acute pain  Description: Control of acute pain  1/29/2022 0931 by Mina Hodge RN  Outcome: Ongoing  1/29/2022 0612 by Satish Enriquez RN  Outcome: Ongoing  Goal: Control of chronic pain  Description: Control of chronic pain  1/29/2022 0931 by Mina Hodge RN  Outcome: Ongoing  1/29/2022 0612 by Satish Enriquez RN  Outcome: Ongoing     Problem: Falls - Risk of:  Goal: Will remain free from falls  Description: Will remain free from falls  1/29/2022 0931 by Mina Hodge RN  Outcome: Ongoing  1/29/2022 0612 by Satish Enriquez RN  Outcome: Ongoing  Goal: Absence of physical injury  Description: Absence of physical injury  1/29/2022 0931 by Mina Hodge RN  Outcome: Ongoing  1/29/2022 0612 by Satish Enriquez RN  Outcome: Ongoing     Problem: Gas Exchange - Impaired:  Goal: Levels of oxygenation will improve  Description: Levels of oxygenation will improve  1/29/2022 0931 by Mina Hodge RN  Outcome: Ongoing  1/29/2022 0612 by Satish Enriquez RN  Outcome: Ongoing

## 2022-01-29 NOTE — ED PROVIDER NOTES
[address] [facility]     Emergency Department     Faculty Attestation    I performed a history and physical examination of the patient and discussed management with the resident. I reviewed the residents note and agree with the documented findings including all diagnostic interpretations and plan of care. Any areas of disagreement are noted on the chart. I was personally present for the key portions of any procedures. I have documented in the chart those procedures where I was not present during the key portions. I have reviewed the emergency nurses triage note. I agree with the chief complaint, past medical history, past surgical history, allergies, medications, social and family history as documented unless otherwise noted below. Documentation of the HPI, Physical Exam and Medical Decision Making performed by scribes is based on my personal performance of the HPI, PE and MDM. For Physician Assistant/ Nurse Practitioner cases/documentation I have personally evaluated this patient and have completed at least one if not all key elements of the E/M (history, physical exam, and MDM). Additional findings are as noted. This patient was evaluated in the Emergency Department for symptoms described in the history of present illness. He/she was evaluated in the context of the global COVID-19 pandemic, which necessitated consideration that the patient might be at risk for infection with the SARS-CoV-2 virus that causes COVID-19. Institutional protocols and algorithms that pertain to the evaluation of patients at risk for COVID-19 are in a state of rapid change based on information released by regulatory bodies including the CDC and federal and state organizations. These policies and algorithms were followed during the patient's care in the ED. Primary Care Physician: Sahara Hoff DO    History:  This is a 66 y.o. male who presents to the Emergency Department with complaint of shortness of breath and cough. Known history of non-Hodgkin's lymphoma receiving infusion therapy last therapy 1 month ago. No fevers. Is immunized against Covid. No leg swelling. No history of COPD or asthma but was recently prescribed albuterol    Physical:     height is 6' (1.829 m) and weight is 260 lb (117.9 kg). His temperature is 97.7 °F (36.5 °C). His blood pressure is 205/83 (abnormal) and his pulse is 64. His respiration is 13 and oxygen saturation is 97%.    66 y.o. male no acute distress, saturations mid 80s on room air, cardiac exam regular rate and rhythm no murmurs rubs gallops, pulmonary shows Rales in the lower lobes bilaterally, some end expiratory wheezing. Abdomen soft nontender nondistended.     Impression: Hypoxia of unclear etiology    Plan: CT PE study, labs, Covid swab, admit    EKG Interpretation    Interpreted by me    Rhythm: normal sinus   Rate: normal  Axis: normal  Ectopy: none  Conduction: normal  ST Segments: no acute change  T Waves: no acute change  Q Waves: none    Clinical Impression: no acute changes and normal EKG    Markel Shine MD, [place] [facility]  Attending Emergency Physician         Shaaron Lesches, MD  01/28/22 2999

## 2022-01-29 NOTE — ED NOTES
Rohan Grande fielding daughter [phone] call with updates or in case of emergency per patient     Stann Lanes, RN  01/28/22 5079

## 2022-01-29 NOTE — PROGRESS NOTES
Physician Progress Note      Taylor Gallegos  [facility] #:                  [id number]  :                       1950  ADMIT DATE:       2022 5:42 PM  [address] [place] DATE:  RESPONDING  PROVIDER #:        Jaylene Palmer MD          QUERY TEXT:    Patient admitted with Hypoxia new onset cardiomegaly and pleural effusion   Noted documentation of acute respiratory failure in H&P and progress note on   . noted likely 2/2 heart failure or undiagnosed COPD. Noted per record   review pulse ox 90 per cent on room air and placed on 2 liters with some   expiratory wheezing and dyspnea on exertion. no noted tachypnea or accessory   muscle usage. . In order to support the diagnosis of acute respiratory   failure, please include additional clinical indicators in your documentation. Or please document if the diagnosis of acute respirator    The medical record reflects the following:  Risk Factors: History of lymphoma on chemo,  Clinical Indicators:  admitted with Hypoxia new onset cardiomegaly and pleural   effusion Noted documentation of acute respiratory failure in H&P and progress   note on . noted likely 2/2 heart failure or undiagnosed COPD. Noted per   record review pulse ox 90 per cent on room air and placed on 2 liters with   some expiratory wheezing and dyspnea on exertion.  no noted tachypnea or   accessory muscle usage  Treatment: oxygen, aerosols, CT chest,    Acute Respiratory Failure Clinical Indicators per [employer] Training Guide and   Quick Reference Guide:  pO2 < 60 mmHg or SpO2 (pulse oximetry) < 91% breathing room air  pCO2 > 50 and pH < 7.35  P/F ratio (pO2 / FIO2) < 300  pO2 decrease or pCO2 increase by 10 mmHg from baseline (if known)  Supplemental oxygen of 40% or more  Presence of respiratory distress, tachypnea, dyspnea, shortness of breath,   wheezing  Unable to speak in complete sentences  Use of accessory muscles to breathe  Extreme anxiety and feeling of impending doom  Tripod position  Confusion/altered mental status/obtunded      Thank you Call if questions Samantha Reevesni BSN [employer]                                                                                                                                                                                                                                                                                                                                 [phone]  Options provided:  -- Acute Respiratory Failure as evidenced by, Please document evidence. -- Acute Respiratory Failure ruled out after study  -- Acute Respiratory failure ruled out after study, patient has Hypoxia only  -- Other - I will add my own diagnosis  -- Disagree - Not applicable / Not valid  -- Disagree - Clinically unable to determine / Unknown  -- Refer to Clinical Documentation Reviewer    PROVIDER RESPONSE TEXT:    Acute respiratory failure is ruled out after study and noted Hypoxia only    Query created by:  Anette Selby on 1/29/2022 10:48 AM      Electronically signed by:  Mis Dawkins MD 1/29/2022 10:53 AM

## 2022-01-29 NOTE — PROGRESS NOTES
Physical Therapy    Facility/Department: [facility] NEURO  Initial Assessment    NAME: Zettie Cushing  : 1950  MRN: [id number]  Chief Complaint   Patient presents with    Cough    Shortness of Breath     Date of Service: 2022    Discharge Recommendations:    Further therapy recommended at discharge. PT Equipment Recommendations  Equipment Needed: No    Assessment   Assessment: Pt will benefit from continued acute physical therapy to address functional deficits. Pt SBA bed mobility. Pt CGA transfers/ambulation 250' w/ RW. Pt uses rollator and has a ramp entrance at baseline. Prognosis: Fair  Decision Making: Medium Complexity  PT Education: Transfer Training;PT Role;Functional Mobility Training;Plan of Care;General Safety;Gait Training  REQUIRES PT FOLLOW UP: Yes  Activity Tolerance  Activity Tolerance: Patient Tolerated treatment well       Patient Diagnosis(es): The primary encounter diagnosis was Shortness of breath. Diagnoses of Hypoxia, Hypothyroidism, unspecified type, and Pleural effusion were also pertinent to this visit. has a past medical history of Cancer [facility]), Chemotherapy management, encounter for, Diabetes mellitus ([facility] [address] History of non-Hodgkin's lymphoma, Hyperlipidemia, Hypertension, and Hypothyroidism. has a past surgical history that includes hernia repair; IR BIOPSY ABDOMINAL/RETROPERITONEAL MASS PERCUTANEOUS (2021); IR PORT PLACEMENT > 5 YEARS (2021); Colonoscopy (Left, 2021); Cystoscopy (Right, 10/6/2021); Cholecystectomy, laparoscopic (N/A, 10/9/2021); Tonsillectomy; and Cystoscopy (Bilateral, 2021). Restrictions  Restrictions/Precautions  Restrictions/Precautions: General Precautions,Fall Risk  Position Activity Restriction  Other position/activity restrictions: Up with assistance.  pt admit for SOB/hypoxia per emergency medicine note.   Vision/Hearing  Vision: Within Functional Limits  Hearing: Within functional limits     Subjective  General  Chart Reviewed: Yes  Patient assessed for rehabilitation services?: Yes  Response To Previous Treatment: Not applicable  Family / Caregiver Present: No  Follows Commands: Within Functional Limits  General Comment  Comments: Pt and RN agreeable to therapy. Pt supine in bed upon arrival and in recliner at exit. Co eval with OT. Subjective  Subjective: Staff asked how the patient got brusises on his knees when asking questions regarding pts fall history. Pt stated that \"drug overdose. About a month ago I was taking my oxy medication as prescribed and was still having back pain. I was then given a fentanyl patch which made me loopy. I fell to the ground and crawled around for a bit trying to get up until my cousin found me on the floor unconscious\". Pain Screening  Patient Currently in Pain: Yes  Pain Assessment  Pain Assessment: 0-10  Pain Level: 9 (RN made aware of pain level)  Pain Type: Chronic pain  Pain Location: Back  Non-Pharmaceutical Pain Intervention(s): Ambulation/Increased Activity; Distraction;Repositioned  Response to Pain Intervention: Patient Satisfied  Vital Signs  Patient Currently in Pain: Yes  Pre Treatment Pain Screening  Intervention List: Patient able to continue with treatment    Orientation  Orientation  Overall Orientation Status: Within Normal Limits  Orientation Level: Oriented X4  Social/Functional History  Social/Functional History  Lives With: Family,Spouse  Type of Home: House  Home Layout: One level  Home Access: Stairs to enter without rails,Ramped entrance  Entrance Stairs - Number of Steps: 1  Bathroom Shower/Tub: Tub/Shower unit,Shower chair without back  Bathroom Equipment: Grab bars in shower (suction cup grab bar)  Home Equipment: 4 wheeled walker,Cane,Wheelchair-manual (straight cane.  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 Cognition   Cognition  Overall Cognitive Status: WFL    Objective     Observation/Palpation  Posture: Fair    AROM RLE (degrees)  RLE AROM: WFL  AROM LLE (degrees)  LLE AROM : WFL  AROM RUE (degrees)  RUE AROM : WFL  RUE General AROM: Observed OT perform a gross strength/ROM assessment. No apparent deficits. AROM LUE (degrees)  LUE AROM : WFL  LUE General AROM: Observed OT perform a gross strength/ROM assessment. No apparent deficits. Strength RLE  Strength RLE: WFL  Comment: PF/DF=5, Hip flexion=4-, Knee flexion=4+, knee extension=5  Strength LLE  Strength LLE: WFL  Comment: PF/DF=5, Hip flexion=4-, Knee flexion=4+, knee extension=5  Strength RUE  Strength RUE: WFL  Comment: Observed OT perform a gross strength/ROM assessment. No apparent deficits. Strength LUE  Strength LUE: WFL  Comment: Observed OT perform a gross strength/ROM assessment. No apparent deficits. Sensation  Overall Sensation Status: WFL (patient notes numbness in the feet at baseline)  Bed mobility  Supine to Sit: Stand by assistance  Scooting: Stand by assistance  Transfers  Sit to Stand: Contact guard assistance  Stand to sit: Contact guard assistance  Comment: CGA for safety  Ambulation  Ambulation?: Yes  Ambulation 1  Surface: level tile  Device: Rolling Walker  Assistance: Contact guard assistance  Quality of Gait: Pt needed two reminders to keep his feet within the walker to prevent fall risk from walkers to far out that could slide out from under patients. Pt took required increased time to turn around and took 5 steps to complete turn. Pt had a slowed lyn, decreased step length, and decreased step height.    Distance: 250'  Stairs/Curb  Stairs?: No     Balance  Posture: Fair  Sitting - Static: Fair;+  Sitting - Dynamic: Fair;+  Standing - Static: Fair;+  Standing - Dynamic:

## 2022-01-29 NOTE — PROGRESS NOTES
Occupational Therapy   Occupational Therapy Initial Assessment  Date: 2022   Patient Name: Lang Olivera  MRN: [id number]     : 1950    Date of Service: 2022  Chief Complaint   Patient presents with    Cough    Shortness of Breath     Discharge Recommendations:  Patient would benefit from continued therapy after discharge     Assessment   Performance deficits / Impairments: Decreased functional mobility ; Decreased ADL status; Decreased endurance;Decreased high-level IADLs;Decreased safe awareness;Decreased balance  Assessment: Patient supine upon arrival, completed bed mobility at CGA to sit EOB and engage in intermittent static/dynamic sitting balance. Pt engaged in household distance functional mobility using RW and then to bathroom, requiring CGA for dynamic balance and seated rest break d/t fatigue. Pt educated on use of pursed lip breathing tech with fair return. Pt retired to bedside recliner with all needs met. Patient would benefit from continued acute OT services to address functional deficits through skilled intervention of ADL and IADL compensatory training, balance, safety and transfer training, education of EC/WS techs and implementation, use of AE/DME to increase independence, and strengthening activities to improve function for ADLs to promote functional outcomes. Prognosis: Good  Decision Making: Medium Complexity  Patient Education: OT role, OT POC, purpose of evaluation, activity promotion, hand placement for transfers, use of RW during ADLs, benefit of simulating home environment - good return  REQUIRES OT FOLLOW UP: Yes  Activity Tolerance  Activity Tolerance: Patient Tolerated treatment well;Patient limited by fatigue  Safety Devices  Safety Devices in place: Yes  Type of devices: Gait belt;Call light within reach; Left in chair;Nurse notified; Patient at risk for falls; Chair alarm in place  Restraints  Initially in place: No         Patient Diagnosis(es): The primary encounter diagnosis was Shortness of breath. Diagnoses of Hypoxia, Hypothyroidism, unspecified type, and Pleural effusion were also pertinent to this visit. has a past medical history of Cancer [facility]), Chemotherapy management, encounter for, Diabetes mellitus ([facility] [address] History of non-Hodgkin's lymphoma, Hyperlipidemia, Hypertension, and Hypothyroidism. has a past surgical history that includes hernia repair; IR BIOPSY ABDOMINAL/RETROPERITONEAL MASS PERCUTANEOUS (7/6/2021); IR PORT PLACEMENT > 5 YEARS (8/31/2021); Colonoscopy (Left, 9/24/2021); Cystoscopy (Right, 10/6/2021); Cholecystectomy, laparoscopic (N/A, 10/9/2021); Tonsillectomy; and Cystoscopy (Bilateral, 11/24/2021). Restrictions  Restrictions/Precautions  Restrictions/Precautions: General Precautions,Fall Risk  Position Activity Restriction  Other position/activity restrictions: up with assistance    Subjective   General  Patient assessed for rehabilitation services?: Yes  Family / Caregiver Present: No  General Comment  Comments: RN ok'd patient for OT/PT evaluation. Pt pleasant and cooperative throughout. Patient Currently in Pain: Yes  Pain Assessment  Pain Assessment: Faces  Pain Level: 9  Pain Type: Chronic pain  Pain Location: Back  Non-Pharmaceutical Pain Intervention(s): Ambulation/Increased Activity; Distraction; Therapeutic presence  Response to Pain Intervention: Patient Satisfied  Vital Signs  Patient Currently in Pain: Yes     Social/Functional History  Social/Functional History  Lives With: Pola Rubinstein (cousin)  Type of Home: House  Home Layout: One level  Home Access: Stairs to enter without rails,Ramped entrance  Entrance Stairs - Number of Steps: 1  Bathroom Shower/Tub: Tub/Shower unit,Shower chair without back  Bathroom Equipment: Grab bars in shower (suction cup grab bar)  Home Equipment: 4 wheeled walker,Cane,Wheelchair-manual (straight cane.  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 Objective   Vision: Within Functional Limits  Hearing: Within functional limits    Orientation  Overall Orientation Status: Within Functional Limits  Balance  Sitting Balance: Stand by assistance (Independently static, SBA for dynamic)  Standing Balance: Contact guard assistance  Standing Balance  Time: 2-3 min  Activity: standing EOB, at bedside recliner  Comment: using RW for support  Functional Mobility  Functional - Mobility Device: Rolling Walker  Activity: To/from bathroom; Other  Assist Level: Contact guard assistance  Functional Mobility Comments: household distances; reports no s/s of SOB during functional mobility; Implementation of rest break and education on pursed lip breathing implemented post activity; O2 96%  Toilet Transfers  Toilet - Technique: Ambulating  Equipment Used: Grab bars  Toilet Transfer: Minimal assistance  Toilet Transfers Comments: CGA with use of front GB; Pt required Min A for use of GB to simulate home environment for transfer off toilet  ADL  Feeding: Independent  Grooming: Independent  UE Bathing: Stand by assistance  LE Bathing: Contact guard assistance  UE Dressing: Stand by assistance  LE Dressing: Contact guard assistance  Toileting: Contact guard assistance  Additional Comments: Pt sat EOB and donned gown over backside simulating coat. Pt completed clothng mgmt to engage in toileting tasks at [facility] for balance to doff/don over hips.   Tone RUE  RUE Tone: Normotonic  Tone LUE  LUE Tone: Normotonic  Coordination  Movements Are Fluid And Coordinated: Yes     Bed mobility  Supine to Sit: Stand by assistance  Scooting: Stand by assistance  Comment: Retired to bedside recliner at end of session  Transfers  Sit to stand: Contact guard assistance  Stand to sit: Stand by assistance     Cognition  Overall Cognitive Status: Exceptions  Safety Judgement: Decreased awareness of need for safety        Sensation  Overall Sensation Status: WFL (reports numbness and tingling in B feet at baseline)      LUE AROM : WFL  Left Hand AROM: WFL  RUE AROM : WFL  Right Hand AROM: WFL  LUE Strength  Gross LUE Strength: WFL  L Hand General: 4+/5  RUE Strength  Gross RUE Strength: WFL  R Hand General: 4+/5              Plan   Plan  Times per week: 2-4x/wk  Current Treatment Recommendations: Strengthening,Endurance Training,Patient/Caregiver Education & Training,Equipment Evaluation, Education, & procurement,Self-Care / ADL,Home Management Training,Safety Education & Training,Functional Mobility Training,Balance Training    AM-PAC Score        AM-PAC Inpatient Daily Activity Raw Score: 20 (01/29/22 [facility])  AM-PAC Inpatient ADL T-Scale Score : 42.03 (01/29/22 1552)  ADL Inpatient CMS 0-100% Score: 38.32 (01/29/22 [facility])  ADL Inpatient CMS G-Code Modifier : Velvet Aparna (01/29/22 1552)    Goals  Short term goals  Time Frame for Short term goals: Patient will, by discharge  Short term goal 1: demo UB ADLs independently  Short term goal 2: demo LB ADLs at Supervision  Short term goal 3: demo functional transfers/mobility using LRD at Supervision  Short term goal 4: demo proper positioning of RW during ADLs to reduce fall risk during functional tasks  Short term goal 5: identify 2+ fall prevention strategies to reduce risk of falls  Short term goal 6: demo 8+ min of dynamic standing, reaching outside KIKA with uni/bilateral hand release to engage in functional tasks     Therapy Time   Individual Concurrent Group Co-treatment   Time In 1154         Time Out 1221         Minutes 27 Timed Code Treatment Minutes: [address] Demetrice, OTR/L

## 2022-01-30 ENCOUNTER — APPOINTMENT (OUTPATIENT)
Dept: GENERAL RADIOLOGY | Age: 72
DRG: 194 | End: 2022-01-30
Payer: MEDICARE

## 2022-01-30 LAB
-: NORMAL
AMORPHOUS: NORMAL
ANION GAP SERPL CALCULATED.3IONS-SCNC: 8 MMOL/L (ref 9–17)
BACTERIA: NORMAL
BILIRUBIN URINE: ABNORMAL
BUN BLDV-MCNC: 31 MG/DL (ref 8–23)
BUN/CREAT BLD: ABNORMAL (ref 9–20)
CALCIUM SERPL-MCNC: 8.1 MG/DL (ref 8.6–10.4)
CASTS UA: NORMAL /LPF (ref 0–8)
CHLORIDE BLD-SCNC: 109 MMOL/L (ref 98–107)
CO2: 24 MMOL/L (ref 20–31)
COLOR: ABNORMAL
COMMENT UA: ABNORMAL
CREAT SERPL-MCNC: 1.23 MG/DL (ref 0.7–1.2)
CRYSTALS, UA: NORMAL /HPF
EPITHELIAL CELLS UA: NORMAL /HPF (ref 0–5)
GFR AFRICAN AMERICAN: >60 ML/MIN
GFR NON-AFRICAN AMERICAN: 58 ML/MIN
GFR SERPL CREATININE-BSD FRML MDRD: ABNORMAL ML/MIN/{1.73_M2}
GFR SERPL CREATININE-BSD FRML MDRD: ABNORMAL ML/MIN/{1.73_M2}
GLUCOSE BLD-MCNC: 100 MG/DL (ref 75–110)
GLUCOSE BLD-MCNC: 106 MG/DL (ref 70–99)
GLUCOSE BLD-MCNC: 116 MG/DL (ref 75–110)
GLUCOSE BLD-MCNC: 202 MG/DL (ref 75–110)
GLUCOSE BLD-MCNC: 271 MG/DL (ref 75–110)
GLUCOSE URINE: NEGATIVE
HCT VFR BLD CALC: 33 % (ref 40.7–50.3)
HEMOGLOBIN: 10.3 G/DL (ref 13–17)
KETONES, URINE: NEGATIVE
LEUKOCYTE ESTERASE, URINE: ABNORMAL
MCH RBC QN AUTO: 29.3 PG (ref 25.2–33.5)
MCHC RBC AUTO-ENTMCNC: 31.2 G/DL (ref 28.4–34.8)
MCV RBC AUTO: 94 FL (ref 82.6–102.9)
MUCUS: NORMAL
NITRITE, URINE: NEGATIVE
NRBC AUTOMATED: 0 PER 100 WBC
OTHER OBSERVATIONS UA: NORMAL
PDW BLD-RTO: 16.6 % (ref 11.8–14.4)
PH UA: 6 (ref 5–8)
PLATELET # BLD: ABNORMAL K/UL (ref 138–453)
PLATELET, FLUORESCENCE: 126 K/UL (ref 138–453)
PLATELET, IMMATURE FRACTION: 6.1 % (ref 1.1–10.3)
PMV BLD AUTO: ABNORMAL FL (ref 8.1–13.5)
POTASSIUM SERPL-SCNC: 4.7 MMOL/L (ref 3.7–5.3)
PROTEIN UA: ABNORMAL
RBC # BLD: 3.51 M/UL (ref 4.21–5.77)
RBC UA: NORMAL /HPF (ref 0–4)
RENAL EPITHELIAL, UA: NORMAL /HPF
SODIUM BLD-SCNC: 141 MMOL/L (ref 135–144)
SPECIFIC GRAVITY UA: 1.02 (ref 1–1.03)
TRICHOMONAS: NORMAL
TURBIDITY: ABNORMAL
URINE HGB: ABNORMAL
UROBILINOGEN, URINE: NORMAL
WBC # BLD: 11.1 K/UL (ref 3.5–11.3)
WBC UA: NORMAL /HPF (ref 0–5)
YEAST: NORMAL

## 2022-01-30 PROCEDURE — 81001 URINALYSIS AUTO W/SCOPE: CPT

## 2022-01-30 PROCEDURE — 99232 SBSQ HOSP IP/OBS MODERATE 35: CPT | Performed by: STUDENT IN AN ORGANIZED HEALTH CARE EDUCATION/TRAINING PROGRAM

## 2022-01-30 PROCEDURE — 94761 N-INVAS EAR/PLS OXIMETRY MLT: CPT

## 2022-01-30 PROCEDURE — 6370000000 HC RX 637 (ALT 250 FOR IP): Performed by: STUDENT IN AN ORGANIZED HEALTH CARE EDUCATION/TRAINING PROGRAM

## 2022-01-30 PROCEDURE — 6360000002 HC RX W HCPCS: Performed by: STUDENT IN AN ORGANIZED HEALTH CARE EDUCATION/TRAINING PROGRAM

## 2022-01-30 PROCEDURE — 85055 RETICULATED PLATELET ASSAY: CPT

## 2022-01-30 PROCEDURE — 36415 COLL VENOUS BLD VENIPUNCTURE: CPT

## 2022-01-30 PROCEDURE — 6370000000 HC RX 637 (ALT 250 FOR IP): Performed by: NURSE PRACTITIONER

## 2022-01-30 PROCEDURE — 2580000003 HC RX 258: Performed by: NURSE PRACTITIONER

## 2022-01-30 PROCEDURE — 87086 URINE CULTURE/COLONY COUNT: CPT

## 2022-01-30 PROCEDURE — 85027 COMPLETE CBC AUTOMATED: CPT

## 2022-01-30 PROCEDURE — 2700000000 HC OXYGEN THERAPY PER DAY

## 2022-01-30 PROCEDURE — 2060000000 HC ICU INTERMEDIATE R&B

## 2022-01-30 PROCEDURE — 82947 ASSAY GLUCOSE BLOOD QUANT: CPT

## 2022-01-30 PROCEDURE — 80048 BASIC METABOLIC PNL TOTAL CA: CPT

## 2022-01-30 PROCEDURE — 94640 AIRWAY INHALATION TREATMENT: CPT

## 2022-01-30 PROCEDURE — 6360000002 HC RX W HCPCS: Performed by: NURSE PRACTITIONER

## 2022-01-30 PROCEDURE — 2580000003 HC RX 258: Performed by: STUDENT IN AN ORGANIZED HEALTH CARE EDUCATION/TRAINING PROGRAM

## 2022-01-30 PROCEDURE — 71045 X-RAY EXAM CHEST 1 VIEW: CPT

## 2022-01-30 RX ORDER — FUROSEMIDE 10 MG/ML
20 INJECTION INTRAMUSCULAR; INTRAVENOUS DAILY
Status: DISCONTINUED | OUTPATIENT
Start: 2022-01-30 | End: 2022-01-30

## 2022-01-30 RX ORDER — HYDRALAZINE HYDROCHLORIDE 25 MG/1
25 TABLET, FILM COATED ORAL EVERY 8 HOURS SCHEDULED
Status: DISCONTINUED | OUTPATIENT
Start: 2022-01-30 | End: 2022-02-01 | Stop reason: HOSPADM

## 2022-01-30 RX ADMIN — PREGABALIN 150 MG: 75 CAPSULE ORAL at 21:44

## 2022-01-30 RX ADMIN — IPRATROPIUM BROMIDE AND ALBUTEROL SULFATE 1 AMPULE: .5; 3 SOLUTION RESPIRATORY (INHALATION) at 16:12

## 2022-01-30 RX ADMIN — ENOXAPARIN SODIUM 40 MG: 100 INJECTION SUBCUTANEOUS at 08:30

## 2022-01-30 RX ADMIN — PANTOPRAZOLE SODIUM 40 MG: 40 TABLET, DELAYED RELEASE ORAL at 06:15

## 2022-01-30 RX ADMIN — FLUOXETINE HYDROCHLORIDE 20 MG: 20 CAPSULE ORAL at 08:30

## 2022-01-30 RX ADMIN — SODIUM CHLORIDE, PRESERVATIVE FREE 10 ML: 5 INJECTION INTRAVENOUS at 09:56

## 2022-01-30 RX ADMIN — DONEPEZIL HYDROCHLORIDE 10 MG: 10 TABLET, FILM COATED ORAL at 21:44

## 2022-01-30 RX ADMIN — Medication 500 MG: at 09:56

## 2022-01-30 RX ADMIN — IPRATROPIUM BROMIDE AND ALBUTEROL SULFATE 1 AMPULE: .5; 3 SOLUTION RESPIRATORY (INHALATION) at 12:00

## 2022-01-30 RX ADMIN — CEFTRIAXONE SODIUM 1000 MG: 1 INJECTION, POWDER, FOR SOLUTION INTRAMUSCULAR; INTRAVENOUS at 08:31

## 2022-01-30 RX ADMIN — HYDRALAZINE HYDROCHLORIDE 25 MG: 25 TABLET, FILM COATED ORAL at 08:30

## 2022-01-30 RX ADMIN — PREGABALIN 150 MG: 75 CAPSULE ORAL at 06:14

## 2022-01-30 RX ADMIN — IPRATROPIUM BROMIDE AND ALBUTEROL SULFATE 1 AMPULE: .5; 3 SOLUTION RESPIRATORY (INHALATION) at 20:59

## 2022-01-30 RX ADMIN — HYDRALAZINE HYDROCHLORIDE 25 MG: 25 TABLET, FILM COATED ORAL at 14:16

## 2022-01-30 RX ADMIN — AMLODIPINE BESYLATE 10 MG: 10 TABLET ORAL at 08:30

## 2022-01-30 RX ADMIN — PREGABALIN 150 MG: 75 CAPSULE ORAL at 13:50

## 2022-01-30 RX ADMIN — INSULIN LISPRO 2 UNITS: 100 INJECTION, SOLUTION INTRAVENOUS; SUBCUTANEOUS at 21:45

## 2022-01-30 RX ADMIN — SODIUM CHLORIDE, PRESERVATIVE FREE 10 ML: 5 INJECTION INTRAVENOUS at 21:44

## 2022-01-30 RX ADMIN — CARVEDILOL 6.25 MG: 6.25 TABLET, FILM COATED ORAL at 17:45

## 2022-01-30 RX ADMIN — LOSARTAN POTASSIUM 100 MG: 50 TABLET, FILM COATED ORAL at 08:30

## 2022-01-30 RX ADMIN — METHYLPREDNISOLONE SODIUM SUCCINATE 40 MG: 40 INJECTION, POWDER, FOR SOLUTION INTRAMUSCULAR; INTRAVENOUS at 13:10

## 2022-01-30 RX ADMIN — HYDRALAZINE HYDROCHLORIDE 25 MG: 25 TABLET, FILM COATED ORAL at 21:44

## 2022-01-30 RX ADMIN — ACETAMINOPHEN 650 MG: 325 TABLET ORAL at 03:52

## 2022-01-30 RX ADMIN — CARVEDILOL 6.25 MG: 6.25 TABLET, FILM COATED ORAL at 08:27

## 2022-01-30 RX ADMIN — ACETAMINOPHEN 650 MG: 325 TABLET ORAL at 12:07

## 2022-01-30 RX ADMIN — INSULIN LISPRO 2 UNITS: 100 INJECTION, SOLUTION INTRAVENOUS; SUBCUTANEOUS at 12:08

## 2022-01-30 RX ADMIN — IPRATROPIUM BROMIDE AND ALBUTEROL SULFATE 1 AMPULE: .5; 3 SOLUTION RESPIRATORY (INHALATION) at 08:54

## 2022-01-30 RX ADMIN — LEVOTHYROXINE SODIUM 150 MCG: 75 TABLET ORAL at 06:15

## 2022-01-30 RX ADMIN — ATORVASTATIN CALCIUM 10 MG: 10 TABLET, FILM COATED ORAL at 08:31

## 2022-01-30 ASSESSMENT — PAIN DESCRIPTION - LOCATION
LOCATION: BACK

## 2022-01-30 ASSESSMENT — PAIN DESCRIPTION - FREQUENCY
FREQUENCY: CONTINUOUS

## 2022-01-30 ASSESSMENT — PAIN SCALES - WONG BAKER
WONGBAKER_NUMERICALRESPONSE: 0

## 2022-01-30 ASSESSMENT — PAIN DESCRIPTION - PAIN TYPE
TYPE: ACUTE PAIN;CHRONIC PAIN
TYPE: CHRONIC PAIN
TYPE: ACUTE PAIN;CHRONIC PAIN
TYPE: CHRONIC PAIN

## 2022-01-30 ASSESSMENT — PAIN DESCRIPTION - PROGRESSION
CLINICAL_PROGRESSION: GRADUALLY WORSENING

## 2022-01-30 ASSESSMENT — PAIN DESCRIPTION - ONSET
ONSET: ON-GOING
ONSET: ON-GOING

## 2022-01-30 ASSESSMENT — PAIN SCALES - GENERAL
PAINLEVEL_OUTOF10: 6
PAINLEVEL_OUTOF10: 8
PAINLEVEL_OUTOF10: 0
PAINLEVEL_OUTOF10: 2
PAINLEVEL_OUTOF10: 1
PAINLEVEL_OUTOF10: 2
PAINLEVEL_OUTOF10: 0

## 2022-01-30 ASSESSMENT — PAIN - FUNCTIONAL ASSESSMENT: PAIN_FUNCTIONAL_ASSESSMENT: PREVENTS OR INTERFERES SOME ACTIVE ACTIVITIES AND ADLS

## 2022-01-30 ASSESSMENT — PAIN DESCRIPTION - ORIENTATION: ORIENTATION: MID;UPPER;LOWER

## 2022-01-30 NOTE — PROGRESS NOTES
[facility]  Office: [address], DO, Sarah Guzman, DO, Sherron Rouse, DO, Rohan Soto, DO, Brain Dancer, MD, Unique Groves MD, Rocio Reid MD, Quincy Collins MD, Shruti Leger MD, Burgess Josiah MD, Rose Blackburn MD, Angelita Carmichael, DO, Ann Fontanez, DO, Madeline Gonzalez MD,  Pedro Coto, DO, Hali Johns MD, Francia Mendes MD, Celeste Edwards MD, Fidel Jordan MD, Robyn De La Torre MD, Sai Connor, Giovanna Wallace, CNP, Marshall Roberts, CNP, Romeo Alejandro, CNS, Zulma Mak, CNP, Neisha Conroy, CNP, Alberto Rizzo, CNP, Kellie Galvan, CNP, Author Nicki, DARLENE, Naz Vásquez PA-C, Sally Stewart, CORY, Momo Maloney, CORY, Emma Carreno, CNP, Rina Cast, CNP, Ivan Carpio, CNP, Daphnie Abebe, CNP, Jose Lopez, CNP, Kezia Wright, [address] [place] [address]    Progress Note    1/30/2022    9:11 AM    Name:   Jaquan Pereira  MRN:     [id number]     Acct:      [de-identified]   Room:   [address] [place], [place] [address] Day:  2  Admit Date:  1/28/2022  5:42 PM    PCP:   Estuardo Pulido DO  Code Status:  Full Code    Subjective:     C/C:   Chief Complaint   Patient presents with    Cough    Shortness of Breath     Interval History Status: improved. Patient was seen and examined at bedside this AM. He reports feeling much better and states his shortness of breath is significantly improved since yesterday. CXR done this morning is showing evidence of possible left-sided pneumonia. Will start empiric antibiotics and continue to monitor for improvement. Anticipate discharge within the next 1-2 days if he is able to be weaned off of oxygen. Brief History:     (per HPI) Patient is a 40-year-old male with a past medical history of DM2, follicular lymphoma (currently on maintenance rituximab), hypothyroidism, HTN and HLD who presented to the emergency department on 1/28/2022 complaining of shortness of breath.  The patient states that his symptoms began several weeks ago and have progressively worsened. In the ED, the patient was hypoxic and ill-appearing. CTPA was done and was remarkable for new mild interstitial pulmonary edema, atelectasis and diffuse bronchial wall thickening (negative for PE). The patient was admitted to internal medicine for further management. Review of Systems:     Constitutional:  negative for chills, fevers, sweats  Respiratory:  Positive for cough and shortness of breath. Cardiovascular:  negative for chest pain, chest pressure/discomfort, lower extremity edema, palpitations  Gastrointestinal:  negative for abdominal pain, constipation, diarrhea, nausea, vomiting  Neurological:  negative for dizziness, headache    Medications:      Allergies:  No Known Allergies    Current Meds:   Scheduled Meds:    cefTRIAXone (ROCEPHIN) IV  1,000 mg IntraVENous Q24H    azithromycin  500 mg IntraVENous Q24H    hydrALAZINE  25 mg Oral 3 times per day    pantoprazole  40 mg Oral QAM AC    FLUoxetine  20 mg Oral Daily    insulin lispro  0-6 Units SubCUTAneous TID WC    insulin lispro  0-3 Units SubCUTAneous Nightly    levothyroxine  150 mcg Oral Daily    methylPREDNISolone  40 mg IntraVENous Daily    ipratropium-albuterol  1 ampule Inhalation Q4H WA    losartan  100 mg Oral Daily    pregabalin  150 mg Oral Q8H    amLODIPine  10 mg Oral Daily    atorvastatin  10 mg Oral Daily    carvedilol  6.25 mg Oral BID WC    donepezil  10 mg Oral Nightly    sodium chloride flush  5-40 mL IntraVENous 2 times per day    enoxaparin  40 mg SubCUTAneous Daily    [Held by provider] glipiZIDE  5 mg Oral QAM AC    And    [Held by provider] metFORMIN  500 mg Oral Daily with breakfast     Continuous Infusions:    dextrose      sodium chloride       PRN Meds: glucose, dextrose, glucagon (rDNA), dextrose, hydrALAZINE, ipratropium, sodium chloride flush, sodium chloride, potassium chloride **OR** potassium alternative oral replacement **OR** potassium chloride, magnesium sulfate, ondansetron **OR** ondansetron, acetaminophen **OR** acetaminophen    Data:     Past Medical History:   has a past medical history of Cancer ([facility] [address] Chemotherapy management, encounter for, Diabetes mellitus ([facility] [address] History of non-Hodgkin's lymphoma, Hyperlipidemia, Hypertension, and Hypothyroidism. Social History:   reports that he quit smoking about 16 years ago. His smoking use included cigarettes. He has a 90.00 pack-year smoking history. He has never used smokeless tobacco. He reports current alcohol use of about 1.7 standard drinks of alcohol per week. He reports current drug use. Drug: Marijuana Alpheus Radha). Family History:   Family History   Problem Relation Age of Onset    Diabetes Mother     Alzheimer's Disease Father     Heart Disease Brother     Heart Disease Brother        Vitals:  BP (!) 173/74   Pulse 67   Temp 98.2 °F (36.8 °C) (Oral)   Resp 15   Ht 5' 10\" (1.778 m)   Wt 180 lb 11.2 oz (82 kg)   SpO2 95%   BMI 25.93 kg/m²   Temp (24hrs), Av.2 °F (36.8 °C), Min:97.5 °F (36.4 °C), Max:98.7 °F (37.1 °C)    Recent Labs     22  0819 22  1225 22  1738 22  2108   POCGLU 100 183* 219* 198*       I/O (24Hr):     Intake/Output Summary (Last 24 hours) at 2022 0911  Last data filed at 2022 3513  Gross per 24 hour   Intake --   Output 550 ml   Net -550 ml       Labs:  Hematology:  Recent Labs     22  1803 22  0625 22  0802   WBC 7.7 7.1 11.1   RBC 3.69* 3.65* 3.51*   HGB 10.9* 10.7* 10.3*   HCT 34.5* 34.8* 33.0*   MCV 93.5 95.3 94.0   MCH 29.5 29.3 29.3   MCHC 31.6 30.7 31.2   RDW 16.6* 16.4* 16.6*    116* See Reflexed IPF Result   MPV 11.7 11.2 NOT REPORTED   INR  --  1.0  --      Chemistry:  Recent Labs     22  1803 22  1803 22  1856 22  0625 22  0712 22  0802     --   --  145*  --  141   K 4.7  --   --  4.4  --  4.7   *  --   --  110*  --  109* CO2 22  --   --  23  --  24   GLUCOSE 63*  --   --  91  --  106*   BUN 29*  --   --  26*  --  31*   CREATININE 1.41*  --   --  1.15  --  1.23*   ANIONGAP 9  --   --  12  --  8*   LABGLOM 49*  --   --  >60  --  58*   GFRAA 60*  --   --  >60  --  >60   CALCIUM 8.1*  --   --  8.5*  --  8.1*   PROBNP  --   --   --  1,643*  --   --    TROPHS 32*   < > 30* 30* 30*  --    MYOGLOBIN  --   --   --  44 42  --     < > = values in this interval not displayed. Recent Labs     01/28/22  1803 01/28/22  1803 01/29/22  0044 01/29/22  0625 01/29/22  0819 01/29/22  1225 01/29/22  1738 01/29/22  2108   PROT 5.7*  --   --   --   --   --   --   --    LABALBU 3.5  --   --   --   --   --   --   --    TSH 11.01*   < >  --  11.96*  --   --   --   --    AST 18  --   --   --   --   --   --   --    ALT 15  --   --   --   --   --   --   --    ALKPHOS 108  --   --   --   --   --   --   --    BILITOT 0.30  --   --   --   --   --   --   --    POCGLU  --   --  148*  --  100 183* 219* 198*    < > = values in this interval not displayed. ABG:No results found for: POCPH, PHART, PH, POCPCO2, WVR4TOF, PCO2, POCPO2, PO2ART, PO2, POCHCO3, PYY7XMV, HCO3, NBEA, PBEA, BEART, BE, THGBART, THB, UGM2WVH, KXHO5XED, P5NGUDXE, O2SAT, FIO2  Lab Results   Component Value Date/Time    SPECIAL NOT REPORTED 12/28/2021 11:56 AM     Lab Results   Component Value Date/Time    CULTURE NO GROWTH 12/28/2021 11:56 AM       Radiology:  XR CHEST PORTABLE    Result Date: 1/28/2022  Cardiomegaly with mild perihilar congestion/edema. CT CHEST PULMONARY EMBOLISM W CONTRAST    Result Date: 1/28/2022  No evidence of pulmonary embolism. New mild interstitial pulmonary edema, atelectasis, and diffuse bronchial wall thickening. New small bilateral layering pleural effusions. The patient's infiltrative soft tissue/confluent adenopathy in the partially visualized upper retroperitoneum appears increased relative to the most recent prior comparison.        Physical Examination: General appearance:  alert, cooperative and no distress  Mental Status:  oriented to person, place and time and normal affect  Lungs:  Scattered wheezes appreciated bilaterally.    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 ([address] 1/29/2022 Yes    Pleural effusion 1/28/2022 Yes    Cardiomegaly 1/29/2022 Yes    Hypothyroidism 1/29/2022 Yes    Diabetes mellitus ([address] 1/29/2022 Yes    Hypertensive urgency 1/29/2022 Yes    Hypoxia 1/29/2022 Yes          Plan:        Hypoxia   -Likely due to undiagnosed COPD; patient states his breathing has significantly improved with steroids and breathing treatments    -CXR (1/30) showing evidence of pneumonia; will start empiric ceftriaxone and azithromycin   -Echocardiogram pending   -Pro-BNP elevated at 1643   -Continue supplemental O2 as needed (currently on 2 L NC)   -Continue methylprednisolone 40 mg daily   -Q4H Duonebs   -Daily BMP   -Daily CBC   -Will revisit need for diuresis pending echocardiogram results     CAP   -CXR showing interval development of a left mid-lung infiltrate worrisome for pneumonia   -Start ceftriaxone and azithromycin     Hypertensive urgency, improving   -Continue amlodipine 10 mg daily   -Continue carvedilol 6.25 mg bid   -Increase hydralazine to 25 mg tid   -Continue losartan 100 mg daily     Hematuria   -Patient states that this is a chronic problem (follows with Dr. Taryn Cardoso)   -Recommend outpatient follow-up with urology for cystoscopy     DM2   -Holding home glipizide and metformin   -LDSSI   -Hypoglycemia protocol     Hypothyroidism   -TSH markedly elevated with low T4   -Will increase home levothyroxine to 150 mcg daily     HLD   -Continue home atorvastatin 10 mg daily     Follicular lymphoma   -Not currently in remission   -Patient follows with Dr. Bradford Chirinos outpatient and receives maintenance rituximab every 2-months   -Follow-up with oncology outpatient as scheduled       Unique Moss MD  1/30/2022  9:11 AM

## 2022-01-30 NOTE — PLAN OF CARE
Problem: Pain:  Goal: Pain level will decrease  Description: Pain level will decrease  1/30/2022 1825 by Jerry Arreaga RN  Outcome: Ongoing  1/30/2022 0447 by Andriy Medrano RN  Outcome: Ongoing  Goal: Control of acute pain  Description: Control of acute pain  1/30/2022 1825 by Jerry Arreaga RN  Outcome: Ongoing  1/30/2022 0447 by Andriy Medrano RN  Outcome: Ongoing  Goal: Control of chronic pain  Description: Control of chronic pain  1/30/2022 0447 by Andriy Medrano RN  Outcome: Ongoing     Problem: Falls - Risk of:  Goal: Will remain free from falls  Description: Will remain free from falls  1/30/2022 1825 by Jerry Arreaga RN  Outcome: Ongoing  1/30/2022 0447 by Andriy Medrano RN  Outcome: Ongoing  Goal: Absence of physical injury  Description: Absence of physical injury  1/30/2022 1825 by Jerry Arreaga RN  Outcome: Ongoing  1/30/2022 0447 by Andriy Medrano RN  Outcome: Ongoing     Problem: Gas Exchange - Impaired:  Goal: Levels of oxygenation will improve  Description: Levels of oxygenation will improve  1/30/2022 1825 by Jerry Arreaga RN  Outcome: Ongoing  1/30/2022 1010 by Traci Calderon RCP  Outcome: Ongoing  1/30/2022 0447 by Andriy Medrano RN  Outcome: Ongoing

## 2022-01-30 NOTE — PLAN OF CARE
Problem: Gas Exchange - Impaired:  Goal: Levels of oxygenation will improve  Description: Levels of oxygenation will improve  1/30/2022 1010 by Melissa Buenrostro RCP  Outcome: Ongoing     BRONCHOSPASM/BRONCHOCONSTRICTION     [x]         IMPROVE AERATION/BREATH SOUNDS  [x]   ADMINISTER BRONCHODILATOR THERAPY AS APPROPRIATE  [x]   ASSESS BREATH SOUNDS  []   IMPLEMENT AEROSOL/MDI PROTOCOL  [x]   PATIENT EDUCATION AS NEEDED

## 2022-01-30 NOTE — CARE COORDINATION
Case Management Initial Discharge Plan  Lauryn Justin,             Met with:patient to discuss discharge plans. Information verified: address, contacts, phone number, , insurance Yes  Insurance Provider: [facility]    Emergency Contact/Next of Florentino Okeefe name & number: Sarika/wife [phone], daughter Randy Villafana [phone]  Who are involved in patient's support system? family    PCP: Joyce Herndon DO  Date of last visit: 1 week ago      Discharge Planning    Living Arrangements: With wife and cousin    Home has 1 stories  Ramp - 0 stairs to climb to get into front door, 0 stairs to climb to reach second floor  Location of bedroom/bathroom in home Main floor    Patient able to perform ADL's:Independent    Current Services (outpatient & in home) Had home care but does not remember the name. Just closed case 3 weeks ago  DME equipment: walker  DME provider:     Is patient receiving oral anticoagulation therapy? No    If indicated:   Physician managing anticoagulation treatment: N/A  Where does patient obtain lab work for ATC treatment? Potential Assistance Needed:       Patient agreeable to home care: Yes - if needed  Freedom of choice provided:  yes    Prior SNF/Rehab Placement and Facility: N/A  Agreeable to SNF/Rehab: No  Antimony of choice provided: n/a     Evaluation: no    Expected Discharge date:       Patient expects to be discharged to: If home: is the family and/or caregiver wiling & able to provide support at home? Yes  Who will be providing this support? Wife and daughter    Follow Up Appointment: Best Day/ Time:      Transportation provider: family  Transportation arrangements needed for discharge: No family will transport home    Readmission Risk              Risk of Unplanned Readmission:  22             Does patient have a readmission risk score greater than 14?: Yes  If yes, follow-up appointment must be made within 7 days of discharge.        Educated pt on transitional options, provided freedom of choice and are agreeable with plan      Discharge Plan: Pt wants to go home at discharge and is agreeable to home care if needed. Home health just closed but he doesn't remember the name of the agency. Called pt's wife and she states that she doesn't remember the agency but states that she really wants pt to have home care again. Chart reviewed and pt was set up with   Tess Montenegro from [facility]. Referral sent to Tess Montenegro.             Electronically signed by PRISCILLA Bateman on 1/30/22 at 10:42 AM EST

## 2022-01-30 NOTE — PROGRESS NOTES
Nutrition Assessment     Type and Reason for Visit: Positive Nutrition Screen (weight loss)    Nutrition Recommendations/Plan:   -Continue with current diet, monitor and encourage adequate intake     Nutrition Assessment:  Admitted d/t SOB. Pt eating breakfast at time of visit, states has had a good appetite. Discussed weight history, pt states UBW of 190-200lb from October. [facility] wt charted on 10/5: 170lbs. States wt has been stable over past month. Doesn't like Ensure nutrition supplements. No nutrition concerns reported at this time. Current Nutrition Therapies:    ADULT DIET;  Regular    Anthropometric Measures:  · Height: 5' 10\" (177.8 cm)  · Current Body Wt: 180 lb 12.4 oz (82 kg)   · BMI: 25.9    Nutrition Diagnosis:   No nutrition diagnosis at this time     Nutrition Interventions:   Food and/or Nutrient Delivery:  Continue Current Diet  Nutrition Education/Counseling:  Education not indicated   Coordination of Nutrition Care:  Continue to monitor while inpatient       Nutrition Monitoring and Evaluation:   Food/Nutrient Intake Outcomes:  Food and Nutrient Intake  Physical Signs/Symptoms Outcomes:  Weight,Meal Time Behavior     Discharge Planning:    No discharge needs at this time     Electronically signed by Opal Carpenter RD, LD on 1/30/22 at 12:25 PM EST    Contact: [phone]

## 2022-01-31 LAB
CULTURE: NO GROWTH
GLUCOSE BLD-MCNC: 111 MG/DL (ref 75–110)
GLUCOSE BLD-MCNC: 228 MG/DL (ref 75–110)
GLUCOSE BLD-MCNC: 263 MG/DL (ref 75–110)
GLUCOSE BLD-MCNC: 268 MG/DL (ref 75–110)
LV EF: 55 %
LVEF MODALITY: NORMAL
Lab: NORMAL
SPECIMEN DESCRIPTION: NORMAL

## 2022-01-31 PROCEDURE — 6360000002 HC RX W HCPCS: Performed by: STUDENT IN AN ORGANIZED HEALTH CARE EDUCATION/TRAINING PROGRAM

## 2022-01-31 PROCEDURE — 99232 SBSQ HOSP IP/OBS MODERATE 35: CPT | Performed by: STUDENT IN AN ORGANIZED HEALTH CARE EDUCATION/TRAINING PROGRAM

## 2022-01-31 PROCEDURE — 6360000002 HC RX W HCPCS: Performed by: NURSE PRACTITIONER

## 2022-01-31 PROCEDURE — 2060000000 HC ICU INTERMEDIATE R&B

## 2022-01-31 PROCEDURE — 6370000000 HC RX 637 (ALT 250 FOR IP): Performed by: STUDENT IN AN ORGANIZED HEALTH CARE EDUCATION/TRAINING PROGRAM

## 2022-01-31 PROCEDURE — 94761 N-INVAS EAR/PLS OXIMETRY MLT: CPT

## 2022-01-31 PROCEDURE — 2700000000 HC OXYGEN THERAPY PER DAY

## 2022-01-31 PROCEDURE — 2580000003 HC RX 258: Performed by: NURSE PRACTITIONER

## 2022-01-31 PROCEDURE — 6370000000 HC RX 637 (ALT 250 FOR IP): Performed by: NURSE PRACTITIONER

## 2022-01-31 PROCEDURE — 94640 AIRWAY INHALATION TREATMENT: CPT

## 2022-01-31 PROCEDURE — 82947 ASSAY GLUCOSE BLOOD QUANT: CPT

## 2022-01-31 PROCEDURE — 97530 THERAPEUTIC ACTIVITIES: CPT

## 2022-01-31 PROCEDURE — 93306 TTE W/DOPPLER COMPLETE: CPT

## 2022-01-31 RX ORDER — PREGABALIN 75 MG/1
75 CAPSULE ORAL EVERY 4 HOURS
Status: DISCONTINUED | OUTPATIENT
Start: 2022-01-31 | End: 2022-02-01 | Stop reason: HOSPADM

## 2022-01-31 RX ORDER — FUROSEMIDE 10 MG/ML
20 INJECTION INTRAMUSCULAR; INTRAVENOUS 2 TIMES DAILY
Status: DISCONTINUED | OUTPATIENT
Start: 2022-01-31 | End: 2022-01-31

## 2022-01-31 RX ADMIN — Medication 500 MG: at 09:46

## 2022-01-31 RX ADMIN — ATORVASTATIN CALCIUM 10 MG: 10 TABLET, FILM COATED ORAL at 09:46

## 2022-01-31 RX ADMIN — PREGABALIN 150 MG: 75 CAPSULE ORAL at 05:50

## 2022-01-31 RX ADMIN — METHYLPREDNISOLONE SODIUM SUCCINATE 40 MG: 40 INJECTION, POWDER, FOR SOLUTION INTRAMUSCULAR; INTRAVENOUS at 09:44

## 2022-01-31 RX ADMIN — SODIUM CHLORIDE, PRESERVATIVE FREE 10 ML: 5 INJECTION INTRAVENOUS at 20:45

## 2022-01-31 RX ADMIN — CARVEDILOL 6.25 MG: 6.25 TABLET, FILM COATED ORAL at 09:45

## 2022-01-31 RX ADMIN — IPRATROPIUM BROMIDE AND ALBUTEROL SULFATE 1 AMPULE: .5; 3 SOLUTION RESPIRATORY (INHALATION) at 20:21

## 2022-01-31 RX ADMIN — SODIUM CHLORIDE, PRESERVATIVE FREE 10 ML: 5 INJECTION INTRAVENOUS at 09:51

## 2022-01-31 RX ADMIN — LEVOTHYROXINE SODIUM 150 MCG: 75 TABLET ORAL at 09:45

## 2022-01-31 RX ADMIN — INSULIN LISPRO 3 UNITS: 100 INJECTION, SOLUTION INTRAVENOUS; SUBCUTANEOUS at 11:53

## 2022-01-31 RX ADMIN — PREGABALIN 75 MG: 75 CAPSULE ORAL at 14:46

## 2022-01-31 RX ADMIN — DONEPEZIL HYDROCHLORIDE 10 MG: 10 TABLET, FILM COATED ORAL at 20:44

## 2022-01-31 RX ADMIN — ACETAMINOPHEN 650 MG: 325 TABLET ORAL at 03:12

## 2022-01-31 RX ADMIN — HYDRALAZINE HYDROCHLORIDE 25 MG: 25 TABLET, FILM COATED ORAL at 05:50

## 2022-01-31 RX ADMIN — IPRATROPIUM BROMIDE AND ALBUTEROL SULFATE 1 AMPULE: .5; 3 SOLUTION RESPIRATORY (INHALATION) at 09:09

## 2022-01-31 RX ADMIN — HYDRALAZINE HYDROCHLORIDE 25 MG: 25 TABLET, FILM COATED ORAL at 14:49

## 2022-01-31 RX ADMIN — PANTOPRAZOLE SODIUM 40 MG: 40 TABLET, DELAYED RELEASE ORAL at 09:45

## 2022-01-31 RX ADMIN — CEFTRIAXONE SODIUM 1000 MG: 1 INJECTION, POWDER, FOR SOLUTION INTRAMUSCULAR; INTRAVENOUS at 05:50

## 2022-01-31 RX ADMIN — HYDRALAZINE HYDROCHLORIDE 25 MG: 25 TABLET, FILM COATED ORAL at 22:05

## 2022-01-31 RX ADMIN — FLUOXETINE HYDROCHLORIDE 20 MG: 20 CAPSULE ORAL at 09:45

## 2022-01-31 RX ADMIN — LOSARTAN POTASSIUM 100 MG: 50 TABLET, FILM COATED ORAL at 09:45

## 2022-01-31 RX ADMIN — FUROSEMIDE 20 MG: 10 INJECTION, SOLUTION INTRAVENOUS at 11:32

## 2022-01-31 RX ADMIN — IPRATROPIUM BROMIDE AND ALBUTEROL SULFATE 1 AMPULE: .5; 3 SOLUTION RESPIRATORY (INHALATION) at 11:44

## 2022-01-31 RX ADMIN — PREGABALIN 75 MG: 75 CAPSULE ORAL at 17:52

## 2022-01-31 RX ADMIN — CARVEDILOL 6.25 MG: 6.25 TABLET, FILM COATED ORAL at 17:52

## 2022-01-31 RX ADMIN — INSULIN LISPRO 2 UNITS: 100 INJECTION, SOLUTION INTRAVENOUS; SUBCUTANEOUS at 17:49

## 2022-01-31 RX ADMIN — PREGABALIN 75 MG: 75 CAPSULE ORAL at 22:05

## 2022-01-31 RX ADMIN — AMLODIPINE BESYLATE 10 MG: 10 TABLET ORAL at 09:46

## 2022-01-31 RX ADMIN — INSULIN LISPRO 2 UNITS: 100 INJECTION, SOLUTION INTRAVENOUS; SUBCUTANEOUS at 21:14

## 2022-01-31 RX ADMIN — ACETAMINOPHEN 650 MG: 325 TABLET ORAL at 20:44

## 2022-01-31 RX ADMIN — IPRATROPIUM BROMIDE AND ALBUTEROL SULFATE 1 AMPULE: .5; 3 SOLUTION RESPIRATORY (INHALATION) at 17:19

## 2022-01-31 RX ADMIN — PREGABALIN 75 MG: 75 CAPSULE ORAL at 11:29

## 2022-01-31 RX ADMIN — ENOXAPARIN SODIUM 40 MG: 100 INJECTION SUBCUTANEOUS at 09:45

## 2022-01-31 ASSESSMENT — PAIN SCALES - GENERAL
PAINLEVEL_OUTOF10: 3
PAINLEVEL_OUTOF10: 3

## 2022-01-31 NOTE — PLAN OF CARE
Problem: Pain:  Goal: Pain level will decrease  Description: Pain level will decrease  1/31/2022 1806 by Sheila Barnhart RN  Outcome: Ongoing     Problem: Pain:  Goal: Control of acute pain  Description: Control of acute pain  1/31/2022 1806 by Sheila Barnhart RN  Outcome: Ongoing     Problem: Pain:  Goal: Control of chronic pain  Description: Control of chronic pain  1/31/2022 1806 by Sheila Barnhart RN  Outcome: Ongoing     Problem: Falls - Risk of:  Goal: Will remain free from falls  Description: Will remain free from falls  1/31/2022 1806 by Sheila Barnhart RN  Outcome: Ongoing     Problem: Falls - Risk of:  Goal: Absence of physical injury  Description: Absence of physical injury  1/31/2022 1806 by Sheila Barnhart RN  Outcome: Ongoing     Problem: Gas Exchange - Impaired:  Goal: Levels of oxygenation will improve  Description: Levels of oxygenation will improve  1/31/2022 1806 by Sheila Barnhart RN  Outcome: Ongoing

## 2022-01-31 NOTE — PROGRESS NOTES
Physical Therapy  Facility/Department: [facility] NEURO  Daily Treatment Note  NAME: Nuvia Valles  : 1950  MRN: [id number]    Date of Service: 2022    Discharge Recommendations:  Patient would benefit from continued therapy after discharge   PT Equipment Recommendations  Equipment Needed: No    Assessment    Pt cooperative, motivated, willing to get up to chair after completing PT session. Mildly unsteady on his feet, weakness noted LLE. He states he feels his breathing is improving. Body structures, Functions, Activity limitations: Decreased functional mobility ; Decreased endurance;Decreased balance  Prognosis: Good  Decision Making: Medium Complexity  PT Education: Goals;PT Role;Plan of Care;Transfer Training;Energy Conservation;General Safety;Gait Training;Disease Specific Education; Functional Mobility Training; Injury Prevention  REQUIRES PT FOLLOW UP: Yes  Activity Tolerance  Activity Tolerance: Patient Tolerated treatment well     Patient Diagnosis(es): The primary encounter diagnosis was Shortness of breath. Diagnoses of Hypoxia, Hypothyroidism, unspecified type, and Pleural effusion were also pertinent to this visit. has a past medical history of Cancer [facility]), Chemotherapy management, encounter for, Diabetes mellitus ([facility] [address] History of non-Hodgkin's lymphoma, Hyperlipidemia, Hypertension, and Hypothyroidism. has a past surgical history that includes hernia repair; IR BIOPSY ABDOMINAL/RETROPERITONEAL MASS PERCUTANEOUS (2021); IR PORT PLACEMENT > 5 YEARS (2021); Colonoscopy (Left, 2021); Cystoscopy (Right, 10/6/2021); Cholecystectomy, laparoscopic (N/A, 10/9/2021); Tonsillectomy; and Cystoscopy (Bilateral, 2021).     Restrictions  Restrictions/Precautions  Restrictions/Precautions: General Precautions,Fall Risk  Position Activity Restriction  Other position/activity restrictions: up with assistance  Subjective   General  Response To Previous Treatment: Patient with no complaints from previous session.   Family / Caregiver Present: No  Pain Screening  Patient Currently in Pain: Denies  Vital Signs  Patient Currently in Pain: Denies       Orientation  Orientation  Overall Orientation Status: Within Normal Limits     Objective   Bed mobility  Rolling to Right: Independent  Supine to Sit: Independent  Scooting: Independent  Transfers  Sit to Stand: Stand by assistance  Stand to sit: Stand by assistance  Bed to Chair: Stand by assistance  Stand Pivot Transfers: Stand by assistance  Ambulation  Ambulation?: Yes  Ambulation 1  Surface: level tile  Device: Rolling Walker  Assistance: Stand by assistance  Quality of Gait: occasional verbal cues needed to stay closer to the walker  Distance: 250'x1  Stairs/Curb  Stairs?: No (pt denies having any stairs he has to do at home)     Balance  Posture: Fair  Sitting - Static: Good  Sitting - Dynamic: Good  Standing - Static: Fair  Standing - Dynamic: Fair  Other exercises  Other exercises 1: standing ex: up on toes, partial squats, KTC, 10 reps each, no rest break            Goals  Short term goals  Time Frame for Short term goals: 14 visits  Short term goal 1: Pt will perform transfers MOD I  Short term goal 2: Pt will perform bed mobility MOD I  Short term goal 3: Pt will ambulate 350' with RW/least restrictive AD MOD I  Short term goal 4: Pt will ascend/descend 2 steps with unilateral/bilateral railing use MOD I    Plan    Plan  Times per week: 3-5x/wk  Times per day: Daily  Current Treatment Recommendations: Strengthening,Transfer Training,Endurance Training,Patient/Caregiver Education & Training,Pain Management,Balance Training,Gait Training,Home Exercise Program,Functional Mobility Training,Safety Education & Training,Stair training  Safety Devices  Type of devices: Call light within reach,Gait belt,Patient at risk for falls,Left in chair,Nurse notified  Restraints  Initially in place: No     Therapy Time   Individual Concurrent Group Co-treatment   Time In [address]         Time Out 1436         Minutes 28                 Luisana Florez [place]

## 2022-01-31 NOTE — PROGRESS NOTES
done and was remarkable for new mild interstitial pulmonary edema, atelectasis and diffuse bronchial wall thickening (negative for PE). The patient was admitted to internal medicine for further management. Review of Systems:     Constitutional:  negative for chills, fevers, sweats  Respiratory:  Positive for cough and shortness of breath. Cardiovascular:  Positive for bilateral lower extremity edema. Gastrointestinal:  negative for abdominal pain, constipation, diarrhea, nausea, vomiting  Neurological:  negative for dizziness, headache    Medications:      Allergies:  No Known Allergies    Current Meds:   Scheduled Meds:    pregabalin  75 mg Oral Q4H    cefTRIAXone (ROCEPHIN) IV  1,000 mg IntraVENous Q24H    azithromycin  500 mg IntraVENous Q24H    hydrALAZINE  25 mg Oral 3 times per day    pantoprazole  40 mg Oral QAM AC    FLUoxetine  20 mg Oral Daily    insulin lispro  0-6 Units SubCUTAneous TID WC    insulin lispro  0-3 Units SubCUTAneous Nightly    levothyroxine  150 mcg Oral Daily    methylPREDNISolone  40 mg IntraVENous Daily    ipratropium-albuterol  1 ampule Inhalation Q4H WA    losartan  100 mg Oral Daily    amLODIPine  10 mg Oral Daily    atorvastatin  10 mg Oral Daily    carvedilol  6.25 mg Oral BID WC    donepezil  10 mg Oral Nightly    sodium chloride flush  5-40 mL IntraVENous 2 times per day    enoxaparin  40 mg SubCUTAneous Daily    [Held by provider] glipiZIDE  5 mg Oral QAM AC    And    [Held by provider] metFORMIN  500 mg Oral Daily with breakfast     Continuous Infusions:    dextrose      sodium chloride       PRN Meds: glucose, dextrose, glucagon (rDNA), dextrose, hydrALAZINE, ipratropium, sodium chloride flush, sodium chloride, potassium chloride **OR** potassium alternative oral replacement **OR** potassium chloride, magnesium sulfate, ondansetron **OR** ondansetron, acetaminophen **OR** acetaminophen    Data:     Past Medical History:   has a past medical history --  8*   LABGLOM 49*  --   --  >60  --  58*   GFRAA 60*  --   --  >60  --  >60   CALCIUM 8.1*  --   --  8.5*  --  8.1*   PROBNP  --   --   --  1,643*  --   --    TROPHS 32*   < > 30* 30* 30*  --    MYOGLOBIN  --   --   --  44 42  --     < > = values in this interval not displayed. Recent Labs     01/28/22  1803 01/29/22  0044 01/29/22  0625 01/29/22  0819 01/29/22  2108 01/30/22  0914 01/30/22  1135 01/30/22  1647 01/30/22 2106 01/31/22  0943   PROT 5.7*  --   --   --   --   --   --   --   --   --    LABALBU 3.5  --   --   --   --   --   --   --   --   --    TSH 11.01*  --  11.96*  --   --   --   --   --   --   --    AST 18  --   --   --   --   --   --   --   --   --    ALT 15  --   --   --   --   --   --   --   --   --    ALKPHOS 108  --   --   --   --   --   --   --   --   --    BILITOT 0.30  --   --   --   --   --   --   --   --   --    POCGLU  --    < >  --    < > 198* 100 202* 116* 271* 111*    < > = values in this interval not displayed. ABG:No results found for: POCPH, PHART, PH, POCPCO2, VAK7ELZ, PCO2, POCPO2, PO2ART, PO2, POCHCO3, ZOG6DGO, HCO3, NBEA, PBEA, BEART, BE, THGBART, THB, OPF8FWQ, IYWU6HLU, C2GLTUDV, O2SAT, FIO2  Lab Results   Component Value Date/Time    SPECIAL NOT REPORTED 01/30/2022 09:00 AM     Lab Results   Component Value Date/Time    CULTURE NO GROWTH 01/30/2022 09:00 AM       Radiology:  XR CHEST PORTABLE    Result Date: 1/28/2022  Cardiomegaly with mild perihilar congestion/edema. CT CHEST PULMONARY EMBOLISM W CONTRAST    Result Date: 1/28/2022  No evidence of pulmonary embolism. New mild interstitial pulmonary edema, atelectasis, and diffuse bronchial wall thickening. New small bilateral layering pleural effusions. The patient's infiltrative soft tissue/confluent adenopathy in the partially visualized upper retroperitoneum appears increased relative to the most recent prior comparison.        Physical Examination:        General appearance:  alert, cooperative and no distress  Mental Status:  oriented to person, place and time and normal affect  Lungs:  Scattered wheezes and rhonchi appreciated bilaterally.    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 ([address] 1/29/2022 Yes    Pleural effusion 1/28/2022 Yes    Cardiomegaly 1/29/2022 Yes    Hypothyroidism 1/29/2022 Yes    Diabetes mellitus ([address] 1/29/2022 Yes    Hypertensive urgency 1/29/2022 Yes    Hypoxia 1/29/2022 Yes          Plan:        Hypoxia   -Likely due to undiagnosed COPD; patient states his breathing has significantly improved with steroids and breathing treatments    -CXR (1/30) showing evidence of pneumonia; continue empiric ceftriaxone and azithromycin   -Echocardiogram pending; will start IV Lasix 20 mg bid while results pendi ng  -Pro-BNP elevated at 1643   -Continue supplemental O2 as needed (currently on 2 L NC)   -Continue methylprednisolone 40 mg daily   -Q4H Duonebs   -Daily BMP   -Daily CBC   -Will start empiric diuresis pending results of echocardiogram     CAP   -CXR showing interval development of a left mid-lung infiltrate worrisome for pneumonia   -Start ceftriaxone and azithromycin     Hypertensive urgency, improving   -Continue amlodipine 10 mg daily   -Continue carvedilol 6.25 mg bid   -Increase hydralazine to 25 mg tid   -Continue losartan 100 mg daily     Hematuria   -Patient states that this is a chronic problem (follows with Dr. Lisbet Silvestre)   -Recommend outpatient follow-up with urology for cystoscopy     DM2   -Holding home glipizide and metformin   -LDSSI   -Hypoglycemia protocol     Hypothyroidism   -TSH markedly elevated with low T4   -Will increase home levothyroxine to 150 mcg daily     HLD   -Continue home atorvastatin 10 mg daily     Follicular lymphoma   -Not currently in remission   -Patient follows with Dr. Bradford Chirinos outpatient and receives maintenance rituximab every 2-months   -Follow-up with oncology outpatient as scheduled       Unique Moss MD  1/31/2022  10:33 AM

## 2022-01-31 NOTE — DISCHARGE INSTR - COC
Continuity of Care Form    Patient Name: Nuvia Valles   :  [birth year]  MRN:  [id number]    Admit date:  2022  Discharge date:  22      Code Status Order: Full Code   Advance Directives:      Admitting Physician:  Atul Valladares MD  PCP: Aurelia Navarro DO    Discharging Nurse: [facility] Unit/Room#: 1049/7611-49  Discharging Unit Phone Number: ***    Emergency Contact:   Extended Emergency Contact Information  Primary Emergency Contact: Mary Obregon, [address] [facility]  Home Phone: [phone]  Relation: Spouse  Secondary Emergency Contact: Ashley Yao  Mobile Phone: [phone]  Relation: Child   needed?  No    Past Surgical History:  Past Surgical History:   Procedure Laterality Date    CHOLECYSTECTOMY, LAPAROSCOPIC N/A 10/9/2021    CHOLECYSTECTOMY LAPAROSCOPIC ROBOTIC XI performed by Sue Guillen MD at [address] Left 2021    COLONOSCOPY POLYPECTOMY SNARE/COLD BIOPSY performed by Sofia Araujo MD at . [address] Right 10/6/2021    CYSTOSCOPY PYELOGRAM URETERAL STENT INSERTION performed by Zac Camargo MD at . [address] Bilateral 2021    CYSTOSCOPY URETERAL [address] performed by Zac Camargo MD at . [address] MASS  2021    IR BIOPSY ABDOMINAL MASS 2021 [facility] PROCEDURES    IR PORT PLACEMENT EQUAL OR GREATER THAN 5 YEARS  2021    IR PORT PLACEMENT EQUAL OR GREATER THAN 5 YEARS 2021 [facility]    TONSILLECTOMY      as child       Immunization History:   Immunization History   Administered Date(s) Administered    Influenza, Quadv, IM, PF (6 mo and older Fluzone, Flulaval, Fluarix, and 3 yrs and older Afluria) 10/12/2021    Influenza, Triv, inactivated, subunit, adjuvanted, IM (Fluad 65 yrs and older) 2019       Active Problems:  Patient Active Problem List   Diagnosis Code NHL (non-Hodgkin's lymphoma) (HCC) C85.90    Traumatic retroperitoneal hematoma S36.892A    Acute kidney injury (HCC) X04.9    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    Hydronephrosis due to obstruction of ureter N13.1    Moderate malnutrition (HCC) E44.0    Chronic cholecystitis K81.1    Pleural effusion J90    Cardiomegaly I51.7    Hypothyroidism E03.9    Diabetes mellitus (HCC) E11.9    Hypertensive urgency I16.0    Acute respiratory failure with hypoxia (HCC) J96.01    Hypoxia R09.02       Isolation/Infection:   Isolation            No Isolation          Patient Infection Status       Infection Onset Added Last Indicated Last Indicated By Review Planned Expiration Resolved Resolved By    None active    Resolved    COVID-19 (Rule Out) 01/28/22 01/28/22 01/28/22 COVID-19, Rapid (Ordered)   01/28/22 Rule-Out Test Resulted            Nurse Assessment:  Last Vital Signs: BP (!) 145/57   Pulse 65   Temp 97.6 °F (36.4 °C) (Oral)   Resp 22   Ht 5' 10\" (1.778 m)   Wt 180 lb 11.2 oz (82 kg)   SpO2 96%   BMI 25.93 kg/m²     Last documented pain score (0-10 scale): Pain Level: 3  Last Weight:   Wt Readings from Last 1 Encounters:   01/28/22 180 lb 11.2 oz (82 kg)     Mental Status:  oriented and alert    IV Access:  Port: not accessed    Nursing Mobility/ADLs:  Walking   Assisted  Transfer  Assisted  Bathing  Assisted  Dressing  Assisted  Toileting  Independent  Feeding  Independent  Med Admin  Assisted  Med Delivery   whole    Wound Care Documentation and Therapy:        Elimination:  Continence: Bowel: Yes  Bladder: Yes  Urinary Catheter: None   Colostomy/Ileostomy/Ileal Conduit: No       Date of Last BM: ***    Intake/Output Summary (Last 24 hours) at 1/31/2022 1809  Last data filed at 1/31/2022 1500  Gross per 24 hour   Intake --   Output 1400 ml   Net -1400 ml     I/O last 3 completed shifts:   In: 780 [P.O.:480; IV Piggyback:300]  Out: 2025 [Urine:2025]    Safety Concerns: At Risk for Falls    Impairments/Disabilities:      Vision and Hearing    Nutrition Therapy:  Current Nutrition Therapy:   - Oral Diet:  General    Routes of Feeding: Oral  Liquids: Thin Liquids  Daily Fluid Restriction: no  Last Modified Barium Swallow with Video (Video Swallowing Test): not done    Treatments at the Time of Hospital Discharge:   Respiratory Treatments: ***  Oxygen Therapy:  is not on home oxygen therapy. Ventilator:    - No ventilator support    Rehab Therapies: Physical Therapy and Occupational Therapy  Weight Bearing Status/Restrictions: No weight bearing restirctions  Other Medical Equipment (for information only, NOT a DME order):  walker  Other Treatments: ***    Patient's personal belongings (please select all that are sent with patient):  None    RN SIGNATURE:  Electronically signed by Heather Guzman RN on 1/31/22 at 6:11 PM EST    CASE MANAGEMENT/SOCIAL WORK SECTION    Inpatient Status Date: ***    Readmission Risk Assessment Score:  Readmission Risk              Risk of Unplanned Readmission:  24           Discharging to Facility/ Agency   Name: Pa Steve   Address: [address]  Phone: [phone]  Fax:    [facility] (Overnight Oximetry)   Name: Diaz Goldsmith   Address: [address] [place] [address]  Phone: [phone]  Fax:    / signature: Electronically signed by Barbi Jaffe RN on 2/1/22 at 4:23 PM EST    PHYSICIAN SECTION    Prognosis: Fair    Condition at Discharge: Stable    Rehab Potential (if transferring to Rehab): Fair    Recommended Labs or Other Treatments After Discharge:     -Follow-up with your primary care physician within 1 week of discharge for posthospitalization follow-up  -Follow-up with pulmonology outpatient for evaluation of possible COPD. Will need formal pulmonary function test done on an outpatient basis.   -We will need outpatient sleep study for evaluation of possible obstructive sleep apnea  -Follow-up with your urologist for reevaluation of hydronephrosis/ stent management  -Follow up with your oncologist for continued treatment of follicular lymphoma.   -repeat renal function panel in one week. -PT/OT    Physician Certification: I certify the above information and transfer of Carmina Bennett  is necessary for the continuing treatment of the diagnosis listed and that he requires Home Care for greater 30 days.      Update Admission H&P: No change in H&P    PHYSICIAN SIGNATURE:  Electronically signed by Evans Dumont DO on 2/1/22 at 4:21 PM EST

## 2022-02-01 VITALS
HEIGHT: 70 IN | TEMPERATURE: 97.4 F | SYSTOLIC BLOOD PRESSURE: 156 MMHG | DIASTOLIC BLOOD PRESSURE: 59 MMHG | OXYGEN SATURATION: 96 % | HEART RATE: 68 BPM | BODY MASS INDEX: 25.87 KG/M2 | WEIGHT: 180.7 LBS | RESPIRATION RATE: 16 BRPM

## 2022-02-01 LAB
GLUCOSE BLD-MCNC: 113 MG/DL (ref 75–110)
GLUCOSE BLD-MCNC: 233 MG/DL (ref 75–110)
GLUCOSE BLD-MCNC: 308 MG/DL (ref 75–110)

## 2022-02-01 PROCEDURE — 82947 ASSAY GLUCOSE BLOOD QUANT: CPT

## 2022-02-01 PROCEDURE — 6370000000 HC RX 637 (ALT 250 FOR IP): Performed by: NURSE PRACTITIONER

## 2022-02-01 PROCEDURE — 94640 AIRWAY INHALATION TREATMENT: CPT

## 2022-02-01 PROCEDURE — 99239 HOSP IP/OBS DSCHRG MGMT >30: CPT | Performed by: INTERNAL MEDICINE

## 2022-02-01 PROCEDURE — 6370000000 HC RX 637 (ALT 250 FOR IP): Performed by: INTERNAL MEDICINE

## 2022-02-01 PROCEDURE — 6360000002 HC RX W HCPCS: Performed by: STUDENT IN AN ORGANIZED HEALTH CARE EDUCATION/TRAINING PROGRAM

## 2022-02-01 PROCEDURE — 6370000000 HC RX 637 (ALT 250 FOR IP): Performed by: STUDENT IN AN ORGANIZED HEALTH CARE EDUCATION/TRAINING PROGRAM

## 2022-02-01 PROCEDURE — 6360000002 HC RX W HCPCS: Performed by: NURSE PRACTITIONER

## 2022-02-01 PROCEDURE — 51798 US URINE CAPACITY MEASURE: CPT

## 2022-02-01 PROCEDURE — 2580000003 HC RX 258: Performed by: NURSE PRACTITIONER

## 2022-02-01 RX ORDER — PREDNISONE 20 MG/1
40 TABLET ORAL DAILY
Qty: 10 TABLET | Refills: 0 | Status: SHIPPED | OUTPATIENT
Start: 2022-02-01 | End: 2022-02-06

## 2022-02-01 RX ORDER — AMOXICILLIN AND CLAVULANATE POTASSIUM 875; 125 MG/1; MG/1
1 TABLET, FILM COATED ORAL 2 TIMES DAILY
Qty: 8 TABLET | Refills: 0 | Status: SHIPPED | OUTPATIENT
Start: 2022-02-01 | End: 2022-02-05

## 2022-02-01 RX ORDER — FUROSEMIDE 20 MG/1
20 TABLET ORAL DAILY
Qty: 30 TABLET | Refills: 0 | Status: ON HOLD | OUTPATIENT
Start: 2022-02-01 | End: 2022-09-06 | Stop reason: HOSPADM

## 2022-02-01 RX ORDER — AZITHROMYCIN 250 MG/1
500 TABLET, FILM COATED ORAL DAILY
Status: DISCONTINUED | OUTPATIENT
Start: 2022-02-01 | End: 2022-02-01 | Stop reason: HOSPADM

## 2022-02-01 RX ORDER — LEVOTHYROXINE SODIUM 0.15 MG/1
150 TABLET ORAL DAILY
Qty: 30 TABLET | Refills: 3 | Status: SHIPPED | OUTPATIENT
Start: 2022-02-02

## 2022-02-01 RX ORDER — AZITHROMYCIN 500 MG/1
500 TABLET, FILM COATED ORAL DAILY
Qty: 3 TABLET | Refills: 0 | Status: SHIPPED | OUTPATIENT
Start: 2022-02-01 | End: 2022-02-01 | Stop reason: HOSPADM

## 2022-02-01 RX ADMIN — CEFTRIAXONE SODIUM 1000 MG: 1 INJECTION, POWDER, FOR SOLUTION INTRAMUSCULAR; INTRAVENOUS at 06:27

## 2022-02-01 RX ADMIN — INSULIN LISPRO 4 UNITS: 100 INJECTION, SOLUTION INTRAVENOUS; SUBCUTANEOUS at 17:36

## 2022-02-01 RX ADMIN — ENOXAPARIN SODIUM 40 MG: 100 INJECTION SUBCUTANEOUS at 09:52

## 2022-02-01 RX ADMIN — PREGABALIN 75 MG: 75 CAPSULE ORAL at 06:27

## 2022-02-01 RX ADMIN — PREGABALIN 75 MG: 75 CAPSULE ORAL at 09:52

## 2022-02-01 RX ADMIN — IPRATROPIUM BROMIDE AND ALBUTEROL SULFATE 1 AMPULE: .5; 3 SOLUTION RESPIRATORY (INHALATION) at 08:21

## 2022-02-01 RX ADMIN — LOSARTAN POTASSIUM 100 MG: 50 TABLET, FILM COATED ORAL at 09:49

## 2022-02-01 RX ADMIN — SODIUM CHLORIDE, PRESERVATIVE FREE 10 ML: 5 INJECTION INTRAVENOUS at 09:52

## 2022-02-01 RX ADMIN — ACETAMINOPHEN 650 MG: 325 TABLET ORAL at 14:51

## 2022-02-01 RX ADMIN — AMLODIPINE BESYLATE 10 MG: 10 TABLET ORAL at 09:49

## 2022-02-01 RX ADMIN — HYDRALAZINE HYDROCHLORIDE 25 MG: 25 TABLET, FILM COATED ORAL at 06:27

## 2022-02-01 RX ADMIN — CARVEDILOL 6.25 MG: 6.25 TABLET, FILM COATED ORAL at 09:49

## 2022-02-01 RX ADMIN — ACETAMINOPHEN 650 MG: 325 TABLET ORAL at 07:55

## 2022-02-01 RX ADMIN — LEVOTHYROXINE SODIUM 150 MCG: 75 TABLET ORAL at 09:49

## 2022-02-01 RX ADMIN — AZITHROMYCIN 500 MG: 250 TABLET, FILM COATED ORAL at 09:58

## 2022-02-01 RX ADMIN — CARVEDILOL 6.25 MG: 6.25 TABLET, FILM COATED ORAL at 17:32

## 2022-02-01 RX ADMIN — IPRATROPIUM BROMIDE AND ALBUTEROL SULFATE 1 AMPULE: .5; 3 SOLUTION RESPIRATORY (INHALATION) at 11:49

## 2022-02-01 RX ADMIN — PREGABALIN 75 MG: 75 CAPSULE ORAL at 17:32

## 2022-02-01 RX ADMIN — INSULIN LISPRO 2 UNITS: 100 INJECTION, SOLUTION INTRAVENOUS; SUBCUTANEOUS at 12:41

## 2022-02-01 RX ADMIN — ATORVASTATIN CALCIUM 10 MG: 10 TABLET, FILM COATED ORAL at 09:49

## 2022-02-01 RX ADMIN — PREGABALIN 75 MG: 75 CAPSULE ORAL at 14:48

## 2022-02-01 RX ADMIN — IPRATROPIUM BROMIDE AND ALBUTEROL SULFATE 1 AMPULE: .5; 3 SOLUTION RESPIRATORY (INHALATION) at 15:18

## 2022-02-01 RX ADMIN — HYDRALAZINE HYDROCHLORIDE 25 MG: 25 TABLET, FILM COATED ORAL at 14:48

## 2022-02-01 RX ADMIN — FLUOXETINE HYDROCHLORIDE 20 MG: 20 CAPSULE ORAL at 09:49

## 2022-02-01 RX ADMIN — METHYLPREDNISOLONE SODIUM SUCCINATE 40 MG: 40 INJECTION, POWDER, FOR SOLUTION INTRAMUSCULAR; INTRAVENOUS at 09:49

## 2022-02-01 RX ADMIN — PREGABALIN 75 MG: 75 CAPSULE ORAL at 02:45

## 2022-02-01 RX ADMIN — PANTOPRAZOLE SODIUM 40 MG: 40 TABLET, DELAYED RELEASE ORAL at 09:49

## 2022-02-01 ASSESSMENT — PAIN SCALES - GENERAL
PAINLEVEL_OUTOF10: 8
PAINLEVEL_OUTOF10: 4
PAINLEVEL_OUTOF10: 4

## 2022-02-01 NOTE — TELEPHONE ENCOUNTER
Wife Raymundo Badillo called to advise that the patient is in the hospital and she will let him know. Writer thanked and call ended.

## 2022-02-01 NOTE — DISCHARGE SUMMARY
[facility]  Office: [address], DO, Rafael Welch, DO, Jayjaytolu Yeager, DO, Azar Soto, DO, Carmelita Reyes MD, Ammy Metzger MD, Macy Tracey MD, Carolin Boyle MD, Daren Klinefelter, MD, Alexander Bains MD, Kulwant Enriquez MD, Cher Collet, DO, Ricke Bosworth, DO, Sidra Shankar MD,  Serena Cheatham, DO, Elvie Avila MD, Partha Guzman MD, Kathi Velazquez MD, Yaquelin Estrada MD, Chel Slaughter MD, Robert Sepulveda, [facility], [facility], CNP, Tavia Elizalde, CNP, Natalia Cruz, CNS, Rachell Blount, CNP, Amarilys Osuna, CNP, Lea Dominguez, CNP, Gage Yo, CNP, Jessika Yun, CNP, Christina Guzman PA-C, Leland Davidson, [facility], Angélica Clemens, [facility], Anahi Dorman, CNP, Anila Jernigan, CNP, Fely Perez, CNP, Aramis Greco, CNP, Gage Raines, CNP, Goyo Elizabethk, [facility]    Discharge Summary     Patient ID: Ricardo Sargent  :     MRN: [id number]     ACCOUNT:  [de-identified]   Patient's PCP: Camelia Brock DO  Admit Date: 2022   Discharge Date: 2022     Length of Stay: 4  Code Status:  Full Code  Admitting Physician: Henna Kaufman DO  Discharge Physician: Henna Kaufman DO     Active Discharge Diagnoses:     Hospital Problem Lists:  Active Problems:    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 ([facility] [address]    Pleural effusion    Cardiomegaly    Hypothyroidism    Diabetes mellitus [facility])    Hypertensive urgency    Hypoxia  Resolved Problems:    * No resolved hospital problems. *      Admission Condition:  stable     Discharged Condition: stable    Hospital Stay:     Hospital Course:  Ricardo Sargent is a 78-year-old male who initially presented to our hospital with complaints of shortness of breath gradually getting worse over the last several weeks. CT was done which showed new interstitial pulmonary edema, atelectasis and bronchial wall thickening but was negative for pulmonary embolism. Patient did have some cough productive of green/yellow sputum. He does has a history of tobacco abuse smoked 2 packs/day for over 40 years. Has no formal history of COPD or PFT's on file. Patient was admitted and started on antibiotics, nebulized breathing treatments, steroids with improvement in symptoms. 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 There was no LV or RV dysfunction. He did have elevated pro-BNP and lower ext edema so he was started on lasix. Pt will need follow up with pulmonology outpatient for formal PFT's. Otherwise, pt was found to have hematuria. Case was discussed with his Urologist, recommend outpatient follow up. He has an appointment next week. Finally, pts synthroid dose was adjusted due to hypothyroidism. Significant therapeutic interventions:   -see above    Significant Diagnostic Studies:   Labs / Micro:  CBC:   Lab Results   Component Value Date    WBC 11.1 01/30/2022    RBC 3.51 01/30/2022    RBC 4.38 06/06/2012    HGB 10.3 01/30/2022    HCT 33.0 01/30/2022    MCV 94.0 01/30/2022    MCH 29.3 01/30/2022    MCHC 31.2 01/30/2022    RDW 16.6 01/30/2022    PLT See Reflexed IPF Result 01/30/2022     06/06/2012     BMP:    Lab Results   Component Value Date    GLUCOSE 106 01/30/2022    GLUCOSE 97 06/06/2012     01/30/2022    K 4.7 01/30/2022     01/30/2022    CO2 24 01/30/2022    ANIONGAP 8 01/30/2022    BUN 31 01/30/2022    CREATININE 1.23 01/30/2022    BUNCRER NOT REPORTED 01/30/2022    CALCIUM 8.1 01/30/2022    LABGLOM 58 01/30/2022    GFRAA >60 01/30/2022    GFR      01/30/2022    GFR NOT REPORTED 01/30/2022     HFP:    Lab Results   Component Value Date    PROT 5.7 01/28/2022        Radiology:  XR CHEST PORTABLE    Result Date: 1/30/2022  Interval development of left mid lung infiltrate worrisome for pneumonia.      XR CHEST PORTABLE    Result Date: 1/28/2022  Cardiomegaly with mild perihilar congestion/edema. CT CHEST PULMONARY EMBOLISM W CONTRAST    Result Date: 1/28/2022  No evidence of pulmonary embolism. New mild interstitial pulmonary edema, atelectasis, and diffuse bronchial wall thickening. New small bilateral layering pleural effusions. The patient's infiltrative soft tissue/confluent adenopathy in the partially visualized upper retroperitoneum appears increased relative to the most recent prior comparison. Consultations:    Consults:     Final Specialist Recommendations/Findings:   IP CONSULT TO HOSPITALIST  IP CONSULT TO HOME CARE NEEDS      The patient was seen and examined on day of discharge and this discharge summary is in conjunction with any daily progress note from day of discharge. Discharge plan:     Disposition: Home    Physician Follow Up:     Janeth Feliz MD  [phone] [address], [address] [facility]    Schedule an appointment as soon as possible for a visit  Follow up as soon as possible    Makenna Kenney [address] [facility] [phone]    Schedule an appointment as soon as possible for a visit in 1 week  Hopsital follow up. Needs PFT's, workup for KATIE    [facility]  [address] [facility]. [address] [place]  Schedule an appointment as soon as possible for a visit  CHF    Kavitha Bennett MD  [facility] [address]  [address] [facility] [address] [phone]    Schedule an appointment as soon as possible for a visit  COPD? Requiring Further Evaluation/Follow Up POST HOSPITALIZATION/Incidental Findings:   -Follow-up with your primary care physician within 1 week of discharge for posthospitalization follow-up  -Follow-up with pulmonology outpatient for evaluation of possible COPD. Will need formal pulmonary function test done on an outpatient basis.   -We will need outpatient sleep study for evaluation of possible obstructive sleep apnea  -Follow-up with your urologist for reevaluation of hydronephrosis/ stent management  -Follow up with your oncologist for continued treatment of follicular lymphoma.   -repeat renal function panel in one week. Diet: cardiac diet    Activity: As tolerated    Instructions to Patient: see above    Discharge Medications:      Medication List      START taking these medications    amoxicillin-clavulanate 875-125 MG per tablet  Commonly known as: AUGMENTIN  Take 1 tablet by mouth 2 times daily for 4 days     fluticasone-salmeterol 100-50 MCG/DOSE diskus inhaler  Commonly known as: ADVAIR  Inhale 1 puff into the lungs every 12 hours     furosemide 20 MG tablet  Commonly known as: Lasix  Take 1 tablet by mouth daily     predniSONE 20 MG tablet  Commonly known as: DELTASONE  Take 2 tablets by mouth daily for 5 days        CHANGE how you take these medications    amLODIPine 10 MG tablet  Commonly known as: NORVASC  Take 1 tablet by mouth daily  What changed: when to take this     levothyroxine 150 MCG tablet  Commonly known as: SYNTHROID  Take 1 tablet by mouth Daily  Start taking on: February 2, 2022  What changed:   medication strength  how much to take     pregabalin 75 MG capsule  Commonly known as: LYRICA  What changed: Another medication with the same name was removed. Continue taking this medication, and follow the directions you see here.         CONTINUE taking these medications    atorvastatin 10 MG tablet  Commonly known as: LIPITOR     carvedilol 6.25 MG tablet  Commonly known as: COREG     docusate sodium 100 MG capsule  Commonly known as: COLACE  Take 1 capsule by mouth daily     donepezil 10 MG tablet  Commonly known as: ARICEPT     FLUoxetine 20 MG capsule  Commonly known as: PROZAC     glipiZIDE-metFORMIN 5-500 MG per tablet  Commonly known as: METAGLIP     losartan 50 MG tablet  Commonly known as: Cozaar  Take 1 tablet by mouth daily     megestrol 40 MG/ML suspension  Commonly known as: MEGACE  Take 10 mLs by mouth daily     omeprazole 20 MG delayed release capsule  Commonly known as: PRILOSEC     Ventolin  (90 Base) MCG/ACT inhaler  Generic drug: albuterol sulfate HFA        STOP taking these medications    azithromycin 250 MG tablet  Commonly known as: ZITHROMAX     LISINOPRIL-HYDROCHLOROTHIAZIDE PO     meloxicam 15 MG tablet  Commonly known as: MOBIC     ondansetron 4 MG tablet  Commonly known as: Zofran           Where to Get Your Medications      These medications were sent to [id number], [address], [address] [address]    Phone: [phone]   amoxicillin-clavulanate 875-125 MG per tablet  fluticasone-salmeterol 100-50 MCG/DOSE diskus inhaler  furosemide 20 MG tablet  levothyroxine 150 MCG tablet  predniSONE 20 MG tablet         Discharge Procedure Orders   Renal Function Panel   Standing Status: Future Standing Exp. Date: 03/01/22   Order Comments: Forward to Alexandrea Clement, [address]     Jia Harris MD, Pulmonology, [place]   Referral Priority: Routine Referral Type: Eval and Treat   Referral Reason: Specialty Services Required   Referred to Provider: Sandie Muller Requested Specialty: Pulmonology   Number of Visits Requested: 1       Time Spent on discharge is  40 mins in patient examination, evaluation, counseling as well as medication reconciliation, prescriptions for required medications, discharge plan and follow up. Electronically signed by   Rush Puente DO  2/1/2022  4:22 PM      Thank you Dr. Alexandrea Clement DO for the opportunity to be involved in this patient's care.

## 2022-02-01 NOTE — PROGRESS NOTES
[facility]  Office: [address], DO, Kasie Mynoelle, DO, Tay Tran, DO, Gold Soto, DO, Mellisa Clemens MD, Brook Lara MD, Arian Guadarrama MD, Heaven Saravia MD, Nash Candelaria MD, Cindy Ware MD, Sury Fulton MD, Benita Wallace, DO, Anayeli Sharma, DO, Aurora Peterson MD,  Geena Sweeney, DO, Ayesha Ding MD, Oza Hatchet, MD, Marcus Henriquez MD, Aleta Fernandez MD, Feliciano Carlin MD, Zelda Schirmer, CNP, Arnoldo Orellana, CNP, Michela Hwang, CNP, Holden Mendoza, CNS, Kasia Hart, CNP, Jerome Bryson, CNP, Taya Flor, CNP, Colten Petersen, CNP, Doug Craft, CNP, FERN PereaC, Inna Beckham, DNP, Bridgette Hadley, [facility], Betzaida Avelar, CNP, Leela Mahan, CNP, Tapan Pacheco, CNP, Jose Hernandez, CNP, Chelsey Hunt, CNP, Carson Goltz, [address] [place] [address]    Progress Note    2/1/2022    10:39 AM    Name:   Asiya Wallace  MRN:     [id number]     Acct:      [de-identified]   Room:   [facility] Day:  4  Admit Date:  1/28/2022  5:42 PM    PCP:   Diana Hunter DO  Code Status:  Full Code    Subjective:     C/C:   Chief Complaint   Patient presents with    Cough    Shortness of Breath     Interval History Status: improved. Patient seen examined bedside today. Patient feels like his breathing is significantly improved. Denies any shortness of breath currently. Patient did get hypoxic overnight with SPO2 of 80%. Denies any history of sleep apnea. Denies any chest pain, nausea, vomiting, diarrhea    Brief History:     68-year-old male who initially presented to our hospital with complaints of shortness of breath gradually getting worse over the last several weeks. CT was done which showed new interstitial pulmonary edema, atelectasis and bronchial wall thickening but was negative for pulmonary embolism. Patient did have some cough productive of green/yellow sputum.   He does has a history of tobacco abuse smoked 2 packs/day for over 40 years. Has no formal history of COPD but does not follow-up with physicians. Patient was admitted and started on antibiotics, nebulized breathing treatments, steroids with improvement in symptoms. 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 There was no LV or RV dysfunction. Review of Systems:     Constitutional:  negative for chills, fevers, sweats  Respiratory:  negative for cough, dyspnea on exertion, shortness of breath, wheezing  Cardiovascular:  negative for chest pain, chest pressure/discomfort, lower extremity edema, palpitations  Gastrointestinal:  negative for abdominal pain, constipation, diarrhea, nausea, vomiting  Neurological:  negative for dizziness, headache    Medications:      Allergies:  No Known Allergies    Current Meds:   Scheduled Meds:    azithromycin  500 mg Oral Daily    pregabalin  75 mg Oral Q4H    cefTRIAXone (ROCEPHIN) IV  1,000 mg IntraVENous Q24H    hydrALAZINE  25 mg Oral 3 times per day    pantoprazole  40 mg Oral QAM AC    FLUoxetine  20 mg Oral Daily    insulin lispro  0-6 Units SubCUTAneous TID WC    insulin lispro  0-3 Units SubCUTAneous Nightly    levothyroxine  150 mcg Oral Daily    methylPREDNISolone  40 mg IntraVENous Daily    ipratropium-albuterol  1 ampule Inhalation Q4H WA    losartan  100 mg Oral Daily    amLODIPine  10 mg Oral Daily    atorvastatin  10 mg Oral Daily    carvedilol  6.25 mg Oral BID WC    donepezil  10 mg Oral Nightly    sodium chloride flush  5-40 mL IntraVENous 2 times per day    enoxaparin  40 mg SubCUTAneous Daily    [Held by provider] glipiZIDE  5 mg Oral QAM AC    And    [Held by provider] metFORMIN  500 mg Oral Daily with breakfast     Continuous Infusions:    dextrose      sodium chloride       PRN Meds: glucose, dextrose, glucagon (rDNA), dextrose, hydrALAZINE, ipratropium, sodium chloride flush, sodium chloride, potassium chloride **OR** potassium alternative oral replacement **OR** potassium chloride, magnesium sulfate, ondansetron **OR** ondansetron, acetaminophen **OR** acetaminophen    Data:     Past Medical History:   has a past medical history of Cancer ([facility] [address] Chemotherapy management, encounter for, [facility] History of non-Hodgkin's lymphoma, Hyperlipidemia, Hypertension, and Hypothyroidism. Social History:   reports that he quit smoking about 16 years ago. His smoking use included cigarettes. He has a 90.00 pack-year smoking history. He has never used smokeless tobacco. He reports current alcohol use of about 1.7 standard drinks of alcohol per week. He reports current drug use. Drug: Marijuana Thurl Cipro). Family History:   Family History   Problem Relation Age of Onset    Diabetes Mother     Alzheimer's Disease Father     Heart Disease Brother     Heart Disease Brother        Vitals:  BP (!) 165/70   Pulse 64   Temp 97.6 °F (36.4 °C) (Oral)   Resp 12   Ht 5' 10\" (1.778 m)   Wt 180 lb 11.2 oz (82 kg)   SpO2 98%   BMI 25.93 kg/m²   Temp (24hrs), Av.5 °F (36.4 °C), Min:97 °F (36.1 °C), Max:97.6 °F (36.4 °C)    Recent Labs     22  1139 22  1607 22  2100 22  0743   POCGLU 263* 228* 268* 113*       I/O (24Hr):     Intake/Output Summary (Last 24 hours) at 2022 1039  Last data filed at 2022 1500  Gross per 24 hour   Intake --   Output 300 ml   Net -300 ml       Labs:  Hematology:  Recent Labs     22  0802   WBC 11.1   RBC 3.51*   HGB 10.3*   HCT 33.0*   MCV 94.0   MCH 29.3   MCHC 31.2   RDW 16.6*   PLT See Reflexed IPF Result   MPV NOT REPORTED     Chemistry:  Recent Labs     22  0802      K 4.7   *   CO2 24   GLUCOSE 106*   BUN 31*   CREATININE 1.23*   ANIONGAP 8*   LABGLOM 58*   GFRAA >60   CALCIUM 8.1*     Recent Labs     22  2106 22  0943 22  1139 22  1607 22  2100 02/01/22  0743   POCGLU 271* 111* 263* 228* 268* 113*     ABG:No results found for: POCPH, PHART, PH, POCPCO2, BST0WVK, PCO2, POCPO2, PO2ART, PO2, POCHCO3, QEC6OCG, HCO3, NBEA, PBEA, BEART, BE, THGBART, THB, WDH3KBG, KKMG1ABZ, A0YZCTWT, O2SAT, FIO2  Lab Results   Component Value Date/Time    SPECIAL NOT REPORTED 01/30/2022 09:00 AM     Lab Results   Component Value Date/Time    CULTURE NO GROWTH 01/30/2022 09:00 AM       Radiology:  XR CHEST PORTABLE    Result Date: 1/30/2022  Interval development of left mid lung infiltrate worrisome for pneumonia. XR CHEST PORTABLE    Result Date: 1/28/2022  Cardiomegaly with mild perihilar congestion/edema. CT CHEST PULMONARY EMBOLISM W CONTRAST    Result Date: 1/28/2022  No evidence of pulmonary embolism. New mild interstitial pulmonary edema, atelectasis, and diffuse bronchial wall thickening. New small bilateral layering pleural effusions. The patient's infiltrative soft tissue/confluent adenopathy in the partially visualized upper retroperitoneum appears increased relative to the most recent prior comparison.        Physical Examination:        General appearance:  alert, cooperative and no distress  Mental Status:  oriented to person, place and time and normal affect  Lungs:  clear to auscultation bilaterally, normal effort  Heart:  regular rate and rhythm, no murmur  Abdomen:  soft, nontender, nondistended, normal bowel sounds, no masses, hepatomegaly, splenomegaly  Extremities:  no edema, redness, tenderness in the calves  Skin:  no gross lesions, rashes, induration    Assessment:        Hospital Problems           Last Modified POA    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 ([facility] [address] 1/29/2022 Yes    Pleural effusion 1/28/2022 Yes    Cardiomegaly 1/29/2022 Yes    Hypothyroidism 1/29/2022 Yes    Diabetes mellitus ([address] 1/29/2022 Yes    Hypertensive urgency 1/29/2022 Yes    Hypoxia 1/29/2022 Yes          Plan:        Acute Hypoxic respiratory failure: Findings concerning for COPD. Does not have formal diagnosis but he does have >60 pack year smoking history and smokes marijuana currently. Pt also did have some lower extremity edema concerning for CHG. Echocardiogram did not show any wall motion abnormalities, EF 50%. Imaging did show pulmonary vascular congestion and he did have elevated pro-BNP. Started on lasix 20 mg daily with improvement  Nocturnal hypoxia: will need outpt sleep study. Working on getting that set up outpatient   Community acquired pneumonia: Continue rocephin azithromycin. Uncontrolled hypertension: Restart home antihypertensives. Hypothyroidism: TSH 11, T4 low. Synthroid increased to 150 mcg daily  Dyslipidemia: restart statin  Follicular lymphoma: outpt oncology follow up  Hematuria: Spoke with Urology, will see him outpatient. Has appointment scheduled for next week.    Will need to see pulmonology for PFT's outpt      Edd Talamantes DO  2/1/2022  10:39 AM

## 2022-02-01 NOTE — PLAN OF CARE
Problem: Pain:  Goal: Pain level will decrease  Description: Pain level will decrease  2/1/2022 0306 by Marcus Olson RN  Outcome: Ongoing     Problem: Pain:  Goal: Control of acute pain  Description: Control of acute pain  2/1/2022 0306 by Marcus Olson RN  Outcome: Ongoing     Problem: Pain:  Goal: Control of chronic pain  Description: Control of chronic pain  2/1/2022 0306 by Marcus Olson RN  Outcome: Ongoing     Problem: Falls - Risk of:  Goal: Will remain free from falls  Description: Will remain free from falls  2/1/2022 0306 by Marcus Olson RN  Outcome: Met This Shift     Problem: Falls - Risk of:  Goal: Absence of physical injury  Description: Absence of physical injury  2/1/2022 0306 by Marcus Olson RN  Outcome: Met This Shift     Problem: Gas Exchange - Impaired:  Goal: Levels of oxygenation will improve  Description: Levels of oxygenation will improve  2/1/2022 0306 by Marcus Olson RN  Outcome: Ongoing

## 2022-02-01 NOTE — PLAN OF CARE
Problem: Pain:  Goal: Pain level will decrease  Description: Pain level will decrease  2/1/2022 1751 by Krish Shabazz RN  Outcome: Completed  2/1/2022 1750 by Krish Shabazz RN  Outcome: Ongoing  Goal: Control of acute pain  Description: Control of acute pain  2/1/2022 1751 by Krish Shabazz RN  Outcome: Completed  2/1/2022 1750 by Krish Shabazz RN  Outcome: Ongoing  Goal: Control of chronic pain  Description: Control of chronic pain  2/1/2022 1751 by Krish Shabazz RN  Outcome: Completed  2/1/2022 1750 by Krish Shabazz RN  Outcome: Ongoing     Problem: Falls - Risk of:  Goal: Will remain free from falls  Description: Will remain free from falls  2/1/2022 1751 by Krish Shabazz RN  Outcome: Completed  2/1/2022 1750 by Krish Shabazz RN  Outcome: Ongoing  Goal: Absence of physical injury  Description: Absence of physical injury  2/1/2022 1751 by Krish Shabazz RN  Outcome: Completed  2/1/2022 1750 by Krish Shabazz RN  Outcome: Ongoing     Problem: Gas Exchange - Impaired:  Goal: Levels of oxygenation will improve  Description: Levels of oxygenation will improve  2/1/2022 1751 by Krish Shabazz RN  Outcome: Completed  2/1/2022 1750 by Krish Shabazz RN  Outcome: Ongoing

## 2022-02-01 NOTE — PROGRESS NOTES
Home Oxygen Evaluation    Home Oxygen Evaluation completed. Patient is on Room air   Resting SpO2 = 97%  Resting SpO2 on room air = 97%    SpO2 on room air with exercise = 91%      Nocturnal Oximetry with patient on room air is recommended is SpO2 is between 89% and 95% (requires additional order).     [address], [place]  11:52 AM

## 2022-02-01 NOTE — PROGRESS NOTES
Daughter Massiel Bustos, patient, this RN, and Dr. Sterling President reviewed plan for discharge. Questions answered and daughter and patient comfortable and agreeable with discharge. This RN reviewed discharge packet with patient and daughter and all questions answered.

## 2022-02-01 NOTE — PROGRESS NOTES
Notified Dr. Audrey Chou that patient's urine is dark red in appearance. Per patient he follows with urology outpatient and that about 2 months ago he had bilateral stents placed and that plan is for these to be removed/replaced near the end of the month. Per Dr. Audrey Chou, to come evaluate patient and will discuss plan at that time, no new orders.

## 2022-02-01 NOTE — CARE COORDINATION
Discharge [address] [facility] Case Management Department  Written by: Amy Mulligan RN    Patient Name: Lauryn Justin  Attending Provider: Teja Baker DO  Admit Date: 2022  5:42 PM  MRN: [id number]  Account: [de-identified]                     : 1950  Discharge Date:  22      Disposition: home with [facility]; has transportation. Patient did not qualify for home O2 but did have an episode of desaturation. Respiratory order form for overnight oximetry signed by attending and faxed to Marisa Minor. Patient will have overnight ox done at home for possible nocturnal O2 needs. TANIA completed. Notified Luis A Taylor with [facility] of discharge.  Patient aware of information above      Amy Mulligan RN

## 2022-02-14 ENCOUNTER — HOSPITAL ENCOUNTER (OUTPATIENT)
Dept: PREADMISSION TESTING | Age: 72
Setting detail: OUTPATIENT SURGERY
Discharge: HOME | DRG: 987 | End: 2022-02-18
Payer: MEDICARE

## 2022-02-14 VITALS — HEIGHT: 71 IN | WEIGHT: 185 LBS | BODY MASS INDEX: 25.9 KG/M2

## 2022-02-14 NOTE — PROGRESS NOTES
Pre-op Instructions For Out-Patient Surgery    Medication Instructions:  · Please stop herbs and any supplements now (includes vitamins and minerals). · Please contact your surgeon and prescribing physician for pre-op instructions for any blood thinners. · If you have inhalers/aerosol treatments at home, please use them the morning of your surgery and bring the inhalers with you to the hospital.    · Please take the following medications the morning of your surgery with a sip of water:   LYRICA, LOSARTAN, CARVEDILOL, AMLODIPINE, LEVOTHYROXINE. Surgery Instructions:  1. After midnight before surgery:  Do not eat or drink anything, including water, mints, gum, and hard candy. You may brush your teeth without swallowing. No smoking, chewing tobacco, or street drugs. 2. Please shower or bathe before surgery. If you were given Surgical Scrub Chlorhexidine Gluconate Liquid (CHG), please shower the night before and the morning of your surgery following the detailed instructions you received during your pre-admission visit. 3. Please do not wear any cologne, lotion, powder, deodorant, jewelry, piercings, perfume, makeup, nail polish, hair accessories, or hair spray on the day of surgery. Wear loose comfortable clothing. 4. Leave your valuables at home. Bring a storage case for any glasses/contacts. 5. An adult who is responsible for you MUST drive you home and should be with you for the first 24 hours after surgery. 6. If having out-patient knee and foot surgeries, please arrange for planned crutches, walker, or wheelchair before arriving to the hospital.    The Day of Surgery:  · Arrive at [address] Surgery Entrance at the time directed by your surgeon and check in at the desk. · If you have a living will or healthcare power of , please bring a copy. · You will be taken to the pre-op holding area where you will be prepared for surgery. A physical assessment will be performed by a nurse practitioner or house officer. Your IV will be started and you will meet your anesthesiologist.    · When you go to surgery, your family will be directed to the surgical waiting room, where the doctor should speak with them after your surgery. · After surgery, you will be taken to the recovery room then when you are awake and stable you will go to the short stay unit for preparation to be discharged. Only your one designated person is allowed to come to short stay for your discharge. · If you use a Bi-PAP or C-PAP machine, please bring it with you and leave it in the car in case it is needed in recovery room. INSTRUCTIONS READ TO MITUL AND UNDERSTANDING VERBALIZED.

## 2022-02-15 ENCOUNTER — HOSPITAL ENCOUNTER (OUTPATIENT)
Age: 72
Discharge: HOME | DRG: 987 | End: 2022-02-17
Payer: MEDICARE

## 2022-02-15 ENCOUNTER — ANESTHESIA EVENT (OUTPATIENT)
Dept: OPERATING ROOM | Age: 72
DRG: 987 | End: 2022-02-15
Payer: MEDICARE

## 2022-02-15 ENCOUNTER — HOSPITAL ENCOUNTER (OUTPATIENT)
Dept: GENERAL RADIOLOGY | Age: 72
Discharge: HOME | DRG: 987 | End: 2022-02-17
Payer: MEDICARE

## 2022-02-15 ENCOUNTER — HOSPITAL ENCOUNTER (OUTPATIENT)
Age: 72
Discharge: HOME | DRG: 987 | End: 2022-02-15
Payer: MEDICARE

## 2022-02-15 DIAGNOSIS — R52 PAIN: ICD-10-CM

## 2022-02-15 LAB
ALBUMIN SERPL-MCNC: 3.7 G/DL (ref 3.5–5.2)
ALBUMIN/GLOBULIN RATIO: ABNORMAL (ref 1–2.5)
ALP BLD-CCNC: 99 U/L (ref 40–129)
ALT SERPL-CCNC: 11 U/L (ref 5–41)
ANION GAP SERPL CALCULATED.3IONS-SCNC: 8 MMOL/L (ref 9–17)
AST SERPL-CCNC: 12 U/L
BILIRUB SERPL-MCNC: 0.29 MG/DL (ref 0.3–1.2)
BUN BLDV-MCNC: 39 MG/DL (ref 8–23)
BUN/CREAT BLD: ABNORMAL (ref 9–20)
CALCIUM SERPL-MCNC: 8.5 MG/DL (ref 8.6–10.4)
CHLORIDE BLD-SCNC: 112 MMOL/L (ref 98–107)
CO2: 29 MMOL/L (ref 20–31)
CREAT SERPL-MCNC: 1.29 MG/DL (ref 0.7–1.2)
GFR AFRICAN AMERICAN: >60 ML/MIN
GFR NON-AFRICAN AMERICAN: 55 ML/MIN
GFR SERPL CREATININE-BSD FRML MDRD: ABNORMAL ML/MIN/{1.73_M2}
GFR SERPL CREATININE-BSD FRML MDRD: ABNORMAL ML/MIN/{1.73_M2}
GLUCOSE BLD-MCNC: 54 MG/DL (ref 70–99)
POTASSIUM SERPL-SCNC: 4.4 MMOL/L (ref 3.7–5.3)
SODIUM BLD-SCNC: 149 MMOL/L (ref 135–144)
TOTAL PROTEIN: 6.1 G/DL (ref 6.4–8.3)

## 2022-02-15 PROCEDURE — 72100 X-RAY EXAM L-S SPINE 2/3 VWS: CPT

## 2022-02-15 PROCEDURE — 36415 COLL VENOUS BLD VENIPUNCTURE: CPT

## 2022-02-15 PROCEDURE — 72072 X-RAY EXAM THORAC SPINE 3VWS: CPT

## 2022-02-15 PROCEDURE — 80053 COMPREHEN METABOLIC PANEL: CPT

## 2022-02-16 ENCOUNTER — HOSPITAL ENCOUNTER (INPATIENT)
Age: 72
LOS: 3 days | Discharge: HOME | DRG: 987 | End: 2022-02-20
Attending: FAMILY MEDICINE | Admitting: FAMILY MEDICINE
Payer: MEDICARE

## 2022-02-16 ENCOUNTER — APPOINTMENT (OUTPATIENT)
Dept: GENERAL RADIOLOGY | Age: 72
DRG: 987 | End: 2022-02-16
Attending: UROLOGY
Payer: MEDICARE

## 2022-02-16 ENCOUNTER — APPOINTMENT (OUTPATIENT)
Dept: CT IMAGING | Age: 72
DRG: 987 | End: 2022-02-16
Payer: MEDICARE

## 2022-02-16 ENCOUNTER — APPOINTMENT (OUTPATIENT)
Dept: GENERAL RADIOLOGY | Age: 72
DRG: 987 | End: 2022-02-16
Payer: MEDICARE

## 2022-02-16 ENCOUNTER — ANESTHESIA (OUTPATIENT)
Dept: OPERATING ROOM | Age: 72
DRG: 987 | End: 2022-02-16
Payer: MEDICARE

## 2022-02-16 ENCOUNTER — APPOINTMENT (OUTPATIENT)
Dept: CT IMAGING | Age: 72
DRG: 987 | End: 2022-02-16
Attending: UROLOGY
Payer: MEDICARE

## 2022-02-16 ENCOUNTER — HOSPITAL ENCOUNTER (OUTPATIENT)
Age: 72
Setting detail: OUTPATIENT SURGERY
Discharge: HOME | DRG: 987 | End: 2022-02-16
Attending: UROLOGY | Admitting: UROLOGY
Payer: MEDICARE

## 2022-02-16 VITALS
HEIGHT: 71 IN | WEIGHT: 185 LBS | HEART RATE: 65 BPM | SYSTOLIC BLOOD PRESSURE: 184 MMHG | TEMPERATURE: 97.5 F | BODY MASS INDEX: 25.9 KG/M2 | OXYGEN SATURATION: 93 % | RESPIRATION RATE: 16 BRPM | DIASTOLIC BLOOD PRESSURE: 84 MMHG

## 2022-02-16 VITALS
RESPIRATION RATE: 12 BRPM | DIASTOLIC BLOOD PRESSURE: 56 MMHG | SYSTOLIC BLOOD PRESSURE: 118 MMHG | OXYGEN SATURATION: 97 %

## 2022-02-16 DIAGNOSIS — J81.0 ACUTE PULMONARY EDEMA (HCC): Primary | ICD-10-CM

## 2022-02-16 LAB
ABSOLUTE EOS #: 0.2 K/UL (ref 0–0.4)
ABSOLUTE IMMATURE GRANULOCYTE: ABNORMAL K/UL (ref 0–0.3)
ABSOLUTE LYMPH #: 1 K/UL (ref 1–4.8)
ABSOLUTE MONO #: 0.6 K/UL (ref 0.1–1.3)
ANION GAP SERPL CALCULATED.3IONS-SCNC: 10 MMOL/L (ref 9–17)
BASOPHILS # BLD: 0 % (ref 0–2)
BASOPHILS ABSOLUTE: 0 K/UL (ref 0–0.2)
BNP INTERPRETATION: ABNORMAL
BUN BLDV-MCNC: 36 MG/DL (ref 8–23)
BUN/CREAT BLD: ABNORMAL (ref 9–20)
CALCIUM SERPL-MCNC: 8.4 MG/DL (ref 8.6–10.4)
CHLORIDE BLD-SCNC: 111 MMOL/L (ref 98–107)
CO2: 26 MMOL/L (ref 20–31)
CREAT SERPL-MCNC: 1.52 MG/DL (ref 0.7–1.2)
DIFFERENTIAL TYPE: ABNORMAL
EOSINOPHILS RELATIVE PERCENT: 2 % (ref 0–4)
GFR AFRICAN AMERICAN: 55 ML/MIN
GFR NON-AFRICAN AMERICAN: 45 ML/MIN
GFR SERPL CREATININE-BSD FRML MDRD: ABNORMAL ML/MIN/{1.73_M2}
GFR SERPL CREATININE-BSD FRML MDRD: ABNORMAL ML/MIN/{1.73_M2}
GLUCOSE BLD-MCNC: 87 MG/DL (ref 75–110)
GLUCOSE BLD-MCNC: 96 MG/DL (ref 75–110)
GLUCOSE BLD-MCNC: 99 MG/DL (ref 70–99)
HCT VFR BLD CALC: 34.2 % (ref 41–53)
HEMOGLOBIN: 11 G/DL (ref 13.5–17.5)
IMMATURE GRANULOCYTES: ABNORMAL %
LYMPHOCYTES # BLD: 14 % (ref 24–44)
MCH RBC QN AUTO: 28.3 PG (ref 26–34)
MCHC RBC AUTO-ENTMCNC: 32.2 G/DL (ref 31–37)
MCV RBC AUTO: 88 FL (ref 80–100)
MONOCYTES # BLD: 9 % (ref 1–7)
NRBC AUTOMATED: ABNORMAL PER 100 WBC
PDW BLD-RTO: 16.4 % (ref 11.5–14.9)
PLATELET # BLD: 145 K/UL (ref 150–450)
PLATELET ESTIMATE: ABNORMAL
PMV BLD AUTO: 9.3 FL (ref 6–12)
POTASSIUM SERPL-SCNC: 4.4 MMOL/L (ref 3.7–5.3)
PRO-BNP: 2291 PG/ML
RBC # BLD: 3.89 M/UL (ref 4.5–5.9)
RBC # BLD: ABNORMAL 10*6/UL
SEG NEUTROPHILS: 75 % (ref 36–66)
SEGMENTED NEUTROPHILS ABSOLUTE COUNT: 5.3 K/UL (ref 1.3–9.1)
SODIUM BLD-SCNC: 147 MMOL/L (ref 135–144)
TROPONIN INTERP: ABNORMAL
TROPONIN T: ABNORMAL NG/ML
TROPONIN, HIGH SENSITIVITY: 29 NG/L (ref 0–22)
WBC # BLD: 7.1 K/UL (ref 3.5–11)
WBC # BLD: ABNORMAL 10*3/UL

## 2022-02-16 PROCEDURE — 2580000003 HC RX 258: Performed by: ANESTHESIOLOGY

## 2022-02-16 PROCEDURE — 2580000003 HC RX 258: Performed by: STUDENT IN AN ORGANIZED HEALTH CARE EDUCATION/TRAINING PROGRAM

## 2022-02-16 PROCEDURE — 6360000002 HC RX W HCPCS: Performed by: UROLOGY

## 2022-02-16 PROCEDURE — C1769 GUIDE WIRE: HCPCS | Performed by: UROLOGY

## 2022-02-16 PROCEDURE — 0T788DZ DILATION OF BILATERAL URETERS WITH INTRALUMINAL DEVICE, VIA NATURAL OR ARTIFICIAL OPENING ENDOSCOPIC: ICD-10-PCS | Performed by: UROLOGY

## 2022-02-16 PROCEDURE — 2500000003 HC RX 250 WO HCPCS

## 2022-02-16 PROCEDURE — 36415 COLL VENOUS BLD VENIPUNCTURE: CPT

## 2022-02-16 PROCEDURE — 2709999900 HC NON-CHARGEABLE SUPPLY: Performed by: UROLOGY

## 2022-02-16 PROCEDURE — 3700000001 HC ADD 15 MINUTES (ANESTHESIA): Performed by: UROLOGY

## 2022-02-16 PROCEDURE — 87086 URINE CULTURE/COLONY COUNT: CPT

## 2022-02-16 PROCEDURE — 87636 SARSCOV2 & INF A&B AMP PRB: CPT

## 2022-02-16 PROCEDURE — 99285 EMERGENCY DEPT VISIT HI MDM: CPT

## 2022-02-16 PROCEDURE — 80048 BASIC METABOLIC PNL TOTAL CA: CPT

## 2022-02-16 PROCEDURE — 0TP98DZ REMOVAL OF INTRALUMINAL DEVICE FROM URETER, VIA NATURAL OR ARTIFICIAL OPENING ENDOSCOPIC: ICD-10-PCS | Performed by: UROLOGY

## 2022-02-16 PROCEDURE — 6360000002 HC RX W HCPCS

## 2022-02-16 PROCEDURE — 7100000001 HC PACU RECOVERY - ADDTL 15 MIN: Performed by: UROLOGY

## 2022-02-16 PROCEDURE — 94640 AIRWAY INHALATION TREATMENT: CPT

## 2022-02-16 PROCEDURE — 71045 X-RAY EXAM CHEST 1 VIEW: CPT

## 2022-02-16 PROCEDURE — 6370000000 HC RX 637 (ALT 250 FOR IP): Performed by: NURSE PRACTITIONER

## 2022-02-16 PROCEDURE — 71260 CT THORAX DX C+: CPT

## 2022-02-16 PROCEDURE — 3209999900 FLUORO FOR SURGICAL PROCEDURES

## 2022-02-16 PROCEDURE — C2617 STENT, NON-COR, TEM W/O DEL: HCPCS | Performed by: UROLOGY

## 2022-02-16 PROCEDURE — 94760 N-INVAS EAR/PLS OXIMETRY 1: CPT

## 2022-02-16 PROCEDURE — 6370000000 HC RX 637 (ALT 250 FOR IP)

## 2022-02-16 PROCEDURE — 6360000004 HC RX CONTRAST MEDICATION: Performed by: STUDENT IN AN ORGANIZED HEALTH CARE EDUCATION/TRAINING PROGRAM

## 2022-02-16 PROCEDURE — 3600000002 HC SURGERY LEVEL 2 BASE: Performed by: UROLOGY

## 2022-02-16 PROCEDURE — 7100000031 HC ASPR PHASE II RECOVERY - ADDTL 15 MIN: Performed by: UROLOGY

## 2022-02-16 PROCEDURE — 94761 N-INVAS EAR/PLS OXIMETRY MLT: CPT

## 2022-02-16 PROCEDURE — 6370000000 HC RX 637 (ALT 250 FOR IP): Performed by: UROLOGY

## 2022-02-16 PROCEDURE — 7100000030 HC ASPR PHASE II RECOVERY - FIRST 15 MIN: Performed by: UROLOGY

## 2022-02-16 PROCEDURE — 93005 ELECTROCARDIOGRAM TRACING: CPT | Performed by: STUDENT IN AN ORGANIZED HEALTH CARE EDUCATION/TRAINING PROGRAM

## 2022-02-16 PROCEDURE — 96360 HYDRATION IV INFUSION INIT: CPT

## 2022-02-16 PROCEDURE — 83880 ASSAY OF NATRIURETIC PEPTIDE: CPT

## 2022-02-16 PROCEDURE — 3600000012 HC SURGERY LEVEL 2 ADDTL 15MIN: Performed by: UROLOGY

## 2022-02-16 PROCEDURE — 3700000000 HC ANESTHESIA ATTENDED CARE: Performed by: UROLOGY

## 2022-02-16 PROCEDURE — 85025 COMPLETE CBC W/AUTO DIFF WBC: CPT

## 2022-02-16 PROCEDURE — 82947 ASSAY GLUCOSE BLOOD QUANT: CPT

## 2022-02-16 PROCEDURE — 74176 CT ABD & PELVIS W/O CONTRAST: CPT

## 2022-02-16 PROCEDURE — 7100000000 HC PACU RECOVERY - FIRST 15 MIN: Performed by: UROLOGY

## 2022-02-16 PROCEDURE — 84484 ASSAY OF TROPONIN QUANT: CPT

## 2022-02-16 PROCEDURE — 2700000000 HC OXYGEN THERAPY PER DAY

## 2022-02-16 DEVICE — URETERAL STENT
Type: IMPLANTABLE DEVICE | Site: URETER | Status: FUNCTIONAL
Brand: POLARIS™ ULTRA

## 2022-02-16 RX ORDER — SODIUM CHLORIDE 0.9 % (FLUSH) 0.9 %
10 SYRINGE (ML) INJECTION PRN
Status: DISCONTINUED | OUTPATIENT
Start: 2022-02-16 | End: 2022-02-20 | Stop reason: HOSPADM

## 2022-02-16 RX ORDER — METOCLOPRAMIDE HYDROCHLORIDE 5 MG/ML
10 INJECTION INTRAMUSCULAR; INTRAVENOUS
Status: DISCONTINUED | OUTPATIENT
Start: 2022-02-16 | End: 2022-02-16 | Stop reason: HOSPADM

## 2022-02-16 RX ORDER — PROPOFOL 10 MG/ML
INJECTION, EMULSION INTRAVENOUS PRN
Status: DISCONTINUED | OUTPATIENT
Start: 2022-02-16 | End: 2022-02-16 | Stop reason: SDUPTHER

## 2022-02-16 RX ORDER — FENTANYL CITRATE 50 UG/ML
50 INJECTION, SOLUTION INTRAMUSCULAR; INTRAVENOUS EVERY 5 MIN PRN
Status: DISCONTINUED | OUTPATIENT
Start: 2022-02-16 | End: 2022-02-16 | Stop reason: HOSPADM

## 2022-02-16 RX ORDER — IPRATROPIUM BROMIDE AND ALBUTEROL SULFATE 2.5; .5 MG/3ML; MG/3ML
1 SOLUTION RESPIRATORY (INHALATION) ONCE
Status: COMPLETED | OUTPATIENT
Start: 2022-02-16 | End: 2022-02-16

## 2022-02-16 RX ORDER — LIDOCAINE HYDROCHLORIDE 10 MG/ML
1 INJECTION, SOLUTION EPIDURAL; INFILTRATION; INTRACAUDAL; PERINEURAL
Status: DISCONTINUED | OUTPATIENT
Start: 2022-02-16 | End: 2022-02-16 | Stop reason: HOSPADM

## 2022-02-16 RX ORDER — ONDANSETRON 2 MG/ML
4 INJECTION INTRAMUSCULAR; INTRAVENOUS
Status: DISCONTINUED | OUTPATIENT
Start: 2022-02-16 | End: 2022-02-16 | Stop reason: HOSPADM

## 2022-02-16 RX ORDER — FENTANYL CITRATE 50 UG/ML
25 INJECTION, SOLUTION INTRAMUSCULAR; INTRAVENOUS EVERY 5 MIN PRN
Status: DISCONTINUED | OUTPATIENT
Start: 2022-02-16 | End: 2022-02-16 | Stop reason: HOSPADM

## 2022-02-16 RX ORDER — CEPHALEXIN 500 MG/1
500 CAPSULE ORAL 3 TIMES DAILY
Qty: 15 CAPSULE | Refills: 0 | Status: ON HOLD | OUTPATIENT
Start: 2022-02-16 | End: 2022-02-20 | Stop reason: HOSPADM

## 2022-02-16 RX ORDER — PHENAZOPYRIDINE HYDROCHLORIDE 100 MG/1
100 TABLET, FILM COATED ORAL 3 TIMES DAILY PRN
Qty: 15 TABLET | Refills: 0 | Status: ON HOLD | OUTPATIENT
Start: 2022-02-16 | End: 2022-02-20 | Stop reason: HOSPADM

## 2022-02-16 RX ORDER — HYDROCODONE BITARTRATE AND ACETAMINOPHEN 5; 325 MG/1; MG/1
2 TABLET ORAL PRN
Status: DISCONTINUED | OUTPATIENT
Start: 2022-02-16 | End: 2022-02-16 | Stop reason: HOSPADM

## 2022-02-16 RX ORDER — MORPHINE SULFATE 2 MG/ML
2 INJECTION, SOLUTION INTRAMUSCULAR; INTRAVENOUS EVERY 5 MIN PRN
Status: DISCONTINUED | OUTPATIENT
Start: 2022-02-16 | End: 2022-02-16 | Stop reason: HOSPADM

## 2022-02-16 RX ORDER — SODIUM CHLORIDE 0.9 % (FLUSH) 0.9 %
10 SYRINGE (ML) INJECTION EVERY 12 HOURS SCHEDULED
Status: DISCONTINUED | OUTPATIENT
Start: 2022-02-16 | End: 2022-02-16 | Stop reason: HOSPADM

## 2022-02-16 RX ORDER — IPRATROPIUM BROMIDE AND ALBUTEROL SULFATE 2.5; .5 MG/3ML; MG/3ML
SOLUTION RESPIRATORY (INHALATION)
Status: COMPLETED
Start: 2022-02-16 | End: 2022-02-16

## 2022-02-16 RX ORDER — SODIUM CHLORIDE 9 MG/ML
INJECTION, SOLUTION INTRAVENOUS CONTINUOUS
Status: DISCONTINUED | OUTPATIENT
Start: 2022-02-16 | End: 2022-02-16 | Stop reason: HOSPADM

## 2022-02-16 RX ORDER — HYDROCODONE BITARTRATE AND ACETAMINOPHEN 5; 325 MG/1; MG/1
1 TABLET ORAL PRN
Status: DISCONTINUED | OUTPATIENT
Start: 2022-02-16 | End: 2022-02-16 | Stop reason: HOSPADM

## 2022-02-16 RX ORDER — 0.9 % SODIUM CHLORIDE 0.9 %
80 INTRAVENOUS SOLUTION INTRAVENOUS ONCE
Status: COMPLETED | OUTPATIENT
Start: 2022-02-16 | End: 2022-02-17

## 2022-02-16 RX ORDER — HYDRALAZINE HYDROCHLORIDE 20 MG/ML
5 INJECTION INTRAMUSCULAR; INTRAVENOUS EVERY 10 MIN PRN
Status: DISCONTINUED | OUTPATIENT
Start: 2022-02-16 | End: 2022-02-16 | Stop reason: HOSPADM

## 2022-02-16 RX ORDER — IPRATROPIUM BROMIDE AND ALBUTEROL SULFATE 2.5; .5 MG/3ML; MG/3ML
2 SOLUTION RESPIRATORY (INHALATION) ONCE
Status: COMPLETED | OUTPATIENT
Start: 2022-02-16 | End: 2022-02-16

## 2022-02-16 RX ORDER — LABETALOL HYDROCHLORIDE 5 MG/ML
5 INJECTION, SOLUTION INTRAVENOUS EVERY 10 MIN PRN
Status: DISCONTINUED | OUTPATIENT
Start: 2022-02-16 | End: 2022-02-16 | Stop reason: HOSPADM

## 2022-02-16 RX ORDER — LIDOCAINE HYDROCHLORIDE 10 MG/ML
INJECTION, SOLUTION EPIDURAL; INFILTRATION; INTRACAUDAL; PERINEURAL PRN
Status: DISCONTINUED | OUTPATIENT
Start: 2022-02-16 | End: 2022-02-16 | Stop reason: SDUPTHER

## 2022-02-16 RX ORDER — LIDOCAINE HYDROCHLORIDE 20 MG/ML
JELLY TOPICAL PRN
Status: DISCONTINUED | OUTPATIENT
Start: 2022-02-16 | End: 2022-02-16 | Stop reason: ALTCHOICE

## 2022-02-16 RX ORDER — SODIUM CHLORIDE 9 MG/ML
25 INJECTION, SOLUTION INTRAVENOUS PRN
Status: DISCONTINUED | OUTPATIENT
Start: 2022-02-16 | End: 2022-02-16 | Stop reason: HOSPADM

## 2022-02-16 RX ORDER — FENTANYL CITRATE 50 UG/ML
INJECTION, SOLUTION INTRAMUSCULAR; INTRAVENOUS PRN
Status: DISCONTINUED | OUTPATIENT
Start: 2022-02-16 | End: 2022-02-16 | Stop reason: SDUPTHER

## 2022-02-16 RX ORDER — MEPERIDINE HYDROCHLORIDE 25 MG/ML
12.5 INJECTION INTRAMUSCULAR; INTRAVENOUS; SUBCUTANEOUS EVERY 5 MIN PRN
Status: DISCONTINUED | OUTPATIENT
Start: 2022-02-16 | End: 2022-02-16 | Stop reason: HOSPADM

## 2022-02-16 RX ORDER — FUROSEMIDE 10 MG/ML
20 INJECTION INTRAMUSCULAR; INTRAVENOUS ONCE
Status: COMPLETED | OUTPATIENT
Start: 2022-02-17 | End: 2022-02-17

## 2022-02-16 RX ORDER — SODIUM CHLORIDE 0.9 % (FLUSH) 0.9 %
10 SYRINGE (ML) INJECTION PRN
Status: DISCONTINUED | OUTPATIENT
Start: 2022-02-16 | End: 2022-02-16 | Stop reason: HOSPADM

## 2022-02-16 RX ORDER — DIPHENHYDRAMINE HYDROCHLORIDE 50 MG/ML
12.5 INJECTION INTRAMUSCULAR; INTRAVENOUS
Status: DISCONTINUED | OUTPATIENT
Start: 2022-02-16 | End: 2022-02-16 | Stop reason: HOSPADM

## 2022-02-16 RX ADMIN — SODIUM CHLORIDE 80 ML: 9 INJECTION, SOLUTION INTRAVENOUS at 23:04

## 2022-02-16 RX ADMIN — LIDOCAINE HYDROCHLORIDE 50 MG: 10 INJECTION, SOLUTION EPIDURAL; INFILTRATION; INTRACAUDAL; PERINEURAL at 07:40

## 2022-02-16 RX ADMIN — IPRATROPIUM BROMIDE AND ALBUTEROL SULFATE 2 AMPULE: 2.5; .5 SOLUTION RESPIRATORY (INHALATION) at 21:56

## 2022-02-16 RX ADMIN — IPRATROPIUM BROMIDE AND ALBUTEROL SULFATE 1 AMPULE: .5; 3 SOLUTION RESPIRATORY (INHALATION) at 09:23

## 2022-02-16 RX ADMIN — FENTANYL CITRATE 25 MCG: 50 INJECTION, SOLUTION INTRAMUSCULAR; INTRAVENOUS at 07:40

## 2022-02-16 RX ADMIN — IOPAMIDOL 75 ML: 755 INJECTION, SOLUTION INTRAVENOUS at 23:04

## 2022-02-16 RX ADMIN — IPRATROPIUM BROMIDE AND ALBUTEROL SULFATE 1 AMPULE: 2.5; .5 SOLUTION RESPIRATORY (INHALATION) at 09:23

## 2022-02-16 RX ADMIN — CEFAZOLIN 2000 MG: 10 INJECTION, POWDER, FOR SOLUTION INTRAVENOUS at 07:41

## 2022-02-16 RX ADMIN — SODIUM CHLORIDE: 9 INJECTION, SOLUTION INTRAVENOUS at 07:10

## 2022-02-16 RX ADMIN — SODIUM CHLORIDE, PRESERVATIVE FREE 10 ML: 5 INJECTION INTRAVENOUS at 23:04

## 2022-02-16 RX ADMIN — PROPOFOL 240 MG: 10 INJECTION, EMULSION INTRAVENOUS at 07:40

## 2022-02-16 RX ADMIN — IPRATROPIUM BROMIDE AND ALBUTEROL SULFATE 1 AMPULE: .5; 3 SOLUTION RESPIRATORY (INHALATION) at 06:59

## 2022-02-16 ASSESSMENT — PAIN SCALES - GENERAL
PAINLEVEL_OUTOF10: 0
PAINLEVEL_OUTOF10: 5
PAINLEVEL_OUTOF10: 0

## 2022-02-16 ASSESSMENT — ENCOUNTER SYMPTOMS
SORE THROAT: 0
NAUSEA: 0
WHEEZING: 1
VOMITING: 0
NAUSEA: 0
BACK PAIN: 1
VOMITING: 0
ABDOMINAL PAIN: 0
SHORTNESS OF BREATH: 1
SHORTNESS OF BREATH: 1
DIARRHEA: 1
ABDOMINAL PAIN: 0
CONSTIPATION: 0
EYE PAIN: 0
COUGH: 1
DIARRHEA: 0
SORE THROAT: 0
SINUS PAIN: 0
BACK PAIN: 0
COUGH: 0
SHORTNESS OF BREATH: 1

## 2022-02-16 ASSESSMENT — PULMONARY FUNCTION TESTS
PIF_VALUE: 2
PIF_VALUE: 1
PIF_VALUE: 1
PIF_VALUE: 0
PIF_VALUE: 1
PIF_VALUE: 0
PIF_VALUE: 1
PIF_VALUE: 0
PIF_VALUE: 1
PIF_VALUE: 1

## 2022-02-16 ASSESSMENT — PAIN DESCRIPTION - FREQUENCY: FREQUENCY: CONTINUOUS

## 2022-02-16 ASSESSMENT — PAIN DESCRIPTION - PAIN TYPE: TYPE: ACUTE PAIN

## 2022-02-16 ASSESSMENT — PAIN DESCRIPTION - DESCRIPTORS: DESCRIPTORS: DISCOMFORT

## 2022-02-16 ASSESSMENT — PAIN - FUNCTIONAL ASSESSMENT
PAIN_FUNCTIONAL_ASSESSMENT: 0-10
PAIN_FUNCTIONAL_ASSESSMENT: 0-10

## 2022-02-16 ASSESSMENT — PAIN DESCRIPTION - LOCATION: LOCATION: BACK

## 2022-02-16 NOTE — ANESTHESIA PRE PROCEDURE
Department of Anesthesiology  Preprocedure Note       Name:  Brandi Sullivan   Age:  66 y.o.  :  1950                                          MRN:  [id number]         Date:  2022      Surgeon: Arie Rodas):  Conner Moreira MD    Procedure: Procedure(s):  CYSTOSCOPY W/STENT EXCHANGE    Medications prior to admission:   Prior to Admission medications    Medication Sig Start Date End Date Taking? Authorizing Provider   levothyroxine (SYNTHROID) 150 MCG tablet Take 1 tablet by mouth Daily 22   Garcia Tripp, DO   fluticasone-salmeterol (ADVAIR) 100-50 MCG/DOSE diskus inhaler Inhale 1 puff into the lungs every 12 hours 22   Garcia Tripp, DO   furosemide (LASIX) 20 MG tablet Take 1 tablet by mouth daily 22   Garcia Tripp, DO   pregabalin (LYRICA) 75 MG capsule Take 75 mg by mouth 2 times daily.     Historical Provider, MD   albuterol sulfate HFA (VENTOLIN HFA) 108 (90 Base) MCG/ACT inhaler Inhale 2 puffs into the lungs every 6 hours as needed for Wheezing    Historical Provider, MD   megestrol (MEGACE) 40 MG/ML suspension Take 10 mLs by mouth daily 11/15/21   Wolf Barros MD   amLODIPine (NORVASC) 10 MG tablet Take 1 tablet by mouth daily  Patient taking differently: Take 10 mg by mouth 2 times daily  10/12/21   [facility]   docusate sodium (COLACE) 100 MG capsule Take 1 capsule by mouth daily 10/13/21   [facility] Dixon, DO   losartan (COZAAR) 50 MG tablet Take 1 tablet by mouth daily 10/12/21   [facility] Dixon, DO   glipiZIDE-metFORMIN (METAGLIP) 5-500 MG per tablet Take 1 tablet by mouth daily (with breakfast)     Historical Provider, MD   donepezil (ARICEPT) 10 MG tablet Take 10 mg by mouth daily  21   Historical Provider, MD   atorvastatin (LIPITOR) 10 MG tablet Take 10 mg by mouth daily    Historical Provider, MD   FLUoxetine (PROZAC) 20 MG capsule Take 20 mg by mouth daily    Historical Provider, MD   carvedilol (COREG) 6.25 MG tablet Take 6.25 mg by mouth 2 times daily (with meals)    Historical Provider, MD   omeprazole (PRILOSEC) 20 MG delayed release capsule Take 20 mg by mouth 2 times daily    Historical Provider, MD       Current medications:    Current Facility-Administered Medications   Medication Dose Route Frequency Provider Last Rate Last Admin    0.9 % sodium chloride infusion   IntraVENous Continuous Shahana Germain MD        sodium chloride flush 0.9 % injection 10 mL  10 mL IntraVENous 2 times per day Shahana Germain MD        sodium chloride flush 0.9 % injection 10 mL  10 mL IntraVENous PRN Shahana Germain MD        0.9 % sodium chloride infusion  25 mL IntraVENous PRN Shahana Cover, MD        lidocaine PF 1 % injection 1 mL  1 mL IntraDERmal Once PRN Shahana Germain, MD        ipratropium-albuterol (DUONEB) nebulizer solution 1 ampule  1 ampule Inhalation Once EDWIGE Campbell - CNP           Allergies:  No Known Allergies    Problem List:    Patient Active Problem List   Diagnosis Code    [facility]odg[facility] C85.90    Traumatic retroperitoneal hematoma S36.892A    Acute kidney injury ([facility] X52.6    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    Hydronephrosis due to obstruction of ureter N13.1    Moderate malnutrition ([address] E44.0    Chronic cholecystitis K81.1    Pleural effusion J90    Cardiomegaly I51.7    Hypothyroidism E03.9    Diabetes mellitus ([facility] E11.9    Hypertensive urgency I16.0    Acute respiratory failure with hypoxia ([facility] J96.01    Hypoxia R09.02       Past Medical History:        Diagnosis Date    Cancer ([facility]     Chemotherapy management, encounter for     Diabetes mellitus ([facility]     History of non-Hodgkin's lymphoma     Hyperlipidemia     Hypertension     Hypothyroidism        Past Surgical History:        Procedure Laterality Date    CHOLECYSTECTOMY, LAPAROSCOPIC N/A 10/9/2021    CHOLECYSTECTOMY LAPAROSCOPIC ROBOTIC XI performed by Lucretia Mata MD at [address] [place] [address] OR    COLONOSCOPY Left 2021    COLONOSCOPY POLYPECTOMY SNARE/COLD BIOPSY performed by Virginia Azar MD at [facility] 9 Right 10/6/2021    CYSTOSCOPY PYELOGRAM URETERAL STENT INSERTION performed by Lorenzo Ortiz MD at [facility] 9 Bilateral 2021    CYSTOSCOPY URETERAL STENT INSERTION RIGHT EXCHANGE & RETROGRADE PYELOGRAM LEFT INSERTION performed by Lorenzo Ortiz MD at [address] [facility] [place] IR BIOPSY ABDOMINAL MASS  2021    IR BIOPSY ABDOMINAL MASS 2021 [facility]    IR PORT PLACEMENT EQUAL OR GREATER THAN 5 YEARS  2021    IR PORT PLACEMENT EQUAL OR GREATER THAN 5 YEARS 2021 [facility] SPECIAL PROCEDURES    TONSILLECTOMY      as child       Social History:    Social History     Tobacco Use    Smoking status: Former Smoker     Packs/day: 2.00     Years: 45.00     Pack years: 90.00     Types: Cigarettes     Quit date: 2006     Years since quittin.1    Smokeless tobacco: Never Used   Substance Use Topics    Alcohol use: Yes     Alcohol/week: 1.7 standard drinks     Types: 2 Standard drinks or equivalent per week     Comment: occassional                                Counseling given: Not Answered      Vital Signs (Current):   Vitals:    22 0645   BP: (!) 165/67   Pulse: 76   Resp: 22   Temp: 98.2 °F (36.8 °C)   TempSrc: Infrared   SpO2: 92%   Weight: 185 lb (83.9 kg)   Height: 5' 10.5\" (1.791 m)                                              BP Readings from Last 3 Encounters:   22 (!) 165/67   22 (!) 156/59   01/10/22 (!) 168/69       NPO Status:                                                                                 BMI:   Wt Readings from Last 3 Encounters:   22 185 lb (83.9 kg)   22 185 lb (83.9 kg)   22 180 lb 11.2 oz (82 kg)     Body mass index is 26.17 kg/m².     CBC:   Lab Results   Component Value Date    WBC 11.1 2022    RBC 3.51 2022    RBC 4.38 2012    HGB 10.3 01/30/2022    HCT 33.0 01/30/2022    MCV 94.0 01/30/2022    RDW 16.6 01/30/2022    PLT See Reflexed IPF Result 01/30/2022     06/06/2012       CMP:   Lab Results   Component Value Date     02/15/2022    K 4.4 02/15/2022     02/15/2022    CO2 29 02/15/2022    BUN 39 02/15/2022    CREATININE 1.29 02/15/2022    GFRAA >60 02/15/2022    LABGLOM 55 02/15/2022    GLUCOSE 54 02/15/2022    GLUCOSE 97 06/06/2012    PROT 6.1 02/15/2022    CALCIUM 8.5 02/15/2022    BILITOT 0.29 02/15/2022    ALKPHOS 99 02/15/2022    AST 12 02/15/2022    ALT 11 02/15/2022       POC Tests: No results for input(s): POCGLU, POCNA, POCK, POCCL, POCBUN, POCHEMO, POCHCT in the last 72 hours. Coags:   Lab Results   Component Value Date    PROTIME 10.7 01/29/2022    INR 1.0 01/29/2022    APTT 26.7 08/31/2021       HCG (If Applicable): No results found for: PREGTESTUR, PREGSERUM, HCG, HCGQUANT     ABGs: No results found for: PHART, PO2ART, BTF1MEO, VWI4IAH, BEART, X7BBTYOA     Type & Screen (If Applicable):  No results found for: LABABO, LABRH    Drug/Infectious Status (If Applicable):  No results found for: HIV, HEPCAB    COVID-19 Screening (If Applicable):   Lab Results   Component Value Date    COVID19 Not Detected 01/28/2022           Anesthesia Evaluation  Patient summary reviewed and Nursing notes reviewed no history of anesthetic complications:   Airway: Mallampati: III  TM distance: >3 FB   Neck ROM: full  Mouth opening: > = 3 FB Dental: normal exam         Pulmonary:   (+) COPD:  shortness of breath:  wheezes,                             Cardiovascular:    (+) hypertension:,       ECG reviewed  Rhythm: regular  Rate: normal  Echocardiogram reviewed               ROS comment: No obvious wall motion abnormality seen. Normal LV systolic function with LVEF >55%.      Neuro/Psych:   Negative Neuro/Psych ROS              GI/Hepatic/Renal:             Endo/Other:    (+) DiabetesType II DM, , hypothyroidism::., . Abdominal:             Vascular: negative vascular ROS. Other Findings:             Anesthesia Plan      general and MAC     ASA 3       Induction: intravenous. MIPS: Postoperative opioids intended and Prophylactic antiemetics administered. Anesthetic plan and risks discussed with patient. Plan discussed with CRNA.                   Dereck Gamez MD   2/16/2022

## 2022-02-16 NOTE — OP NOTE
Operative Note      Patient: Rea Gay  YOB: 1950  MRN: [id number]    Date of Procedure: 2/16/2022    Pre-Op Diagnosis: CHRONIC HYDRONEPHROSIS & RIGHT FLANK PAIN, bilateral ureteral stents in place  Post-Op Diagnosis: Same       Procedure(s):  CYSTOSCOPY WITH BILATERAL STENT EXCHANGE    Surgeon(s):  Beulah Burch MD    Assistant:   * No surgical staff found *    Anesthesia: General    Estimated Blood Loss (mL): Minimal    Complications: None    Specimens:   ID Type Source Tests Collected by Time Destination   1 : Urine from cysto for culture Urine Urine, Cystoscopic CULTURE, URINE Beulah Burch MD 2/16/2022 0755        Implants:  Implant Name Type Inv. Item Serial No.  Lot No. LRB No. Used Action   STENT URET 7FR L28CM PERCFLX HYDR+ DBL PGTL THRD 2 - [id number]  STENT URET 7FR L28CM PERCFLX HYDR+ DBL PGTL THRD 2  [facility] [id number] Left 1 Implanted   STENT URET 7FR L28CM PERCFLX HYDR+ DBL PGTL THRD 2 - [id number]  STENT URET 7FR L28CM PERCFLX HYDR+ DBL PGTL THRD 2  [id number]- [id number] Right 1 Implanted         Drains:   Ureteral Catheter Right ureter 7 fr (Active)       Findings: Indications: This patient is 66years old, he presents for evaluation and management regarding gross hematuria, bilateral hydronephrosis associated with extrinsic pressure on the ureters from follicular lymphoma and retroperitoneal lymph nodes. The patient is presently receiving chemotherapy with [facility] oncology group Dr. Mike Pringle  The patient has bilateral stents because of the hydronephrosis. At the present time patient is scheduled for stent exchange. We will also reach out to Dr. Mike Pringle to check about the possibility of stent removal if there is evidence of resolution of the retroperitoneal lymphadenopathy and obstruction of the ureters.         Detailed Description of Procedure:   Patient was brought to the operating room positioned in dorsolithotomy, proper patient

## 2022-02-16 NOTE — PROGRESS NOTES
Spoke with Dr Navin Del Rosario who is well aware of patient resp status . Acceptable to send patient home on room air and pulse ox of low 90s, as well as BP elevated. Nurse to instruct family and patient to take meds as soon as he arrives home.

## 2022-02-16 NOTE — H&P
HISTORY and [id number]       NAME:  Marley Wray  MRN: [id number]   YOB: 1950   Date: 2/16/2022   Age: 66 y.o. Gender: male       COMPLAINT AND PRESENT HISTORY:   Marley Wray is 66 y.o., male, undergoing cystoscopy with right stent exchange due to chronic hydronephrosis and right flank pain. History of non-Hodgkin's Lymphoma. He is currently receiving chemotherapy treatments every two months per his right upper chest port. Sees Dr. Belinda Reynsoo for oncology. Pt recently seen in ED for SOB. CT chest completed revealing no evidence of PE but did reveal mild interstitial pulmonary edema, atelectasis and diffuse bronchial wall thickening, new small bilateral layering pleural effusions. Pt was admitted and treated with supportive supplemental oxygen, IV Lasix (elevated BNP and lower extremity edema), IV antibiotics, nebulizer treatments, and steroids. Cardiology was consulted. Pt was referred to pulmonology outpatient upon discharge. Pt was also found to have hematuria while admitted. Pt was recommended at that time to follow up with urology outpatient. Pt s/p cystoscopy stent exchange twice before today. Pt has intermittent burning with urination. Reports intermittent gross hematuria. Pt c/p urinary frequency. Denies urgency, sensation of incomplete emptying. No nausea, vomiting, diaphoresis. Pt does have bilateral flank pain with right being worse than left. No fever or chills. Echo completed on 01/28/2022:  Summary  Normal LV size and wall thickness. No obvious wall motion abnormality seen. Normal LV systolic function with LVEF >55%. Normal RV size and function. RV systolic pressure 44 mmHg  LA appears moderately dilated  No obvious significant structural valvular abnormality noted. Mild MR and TR  Normal aortic root dimension. No significant pericardial effusion noted.   IVC appears dilated, impaired inspiratory variation indicating elevated RA  filling pressure. He did not check his BS this am prior to arrival. NPO p MN. No medications taken this am. Denies taking any blood thinning medications. Pt continues to have SOB. He does have productive cough with yellow sputum. No longer needs supplemental oxygen. Pt does have intermittent diarrhea. Denies chest pain/pressure, palpitations, N/V/C, fever and chills.      PAST MEDICAL HISTORY     Past Medical History:   Diagnosis Date    Cancer [facility])     Chemotherapy management, encounter for     Diabetes mellitus ([facility]     History of non-Hodgkin's lymphoma     Hyperlipidemia     Hypertension     Hypothyroidism        SURGICAL HISTORY       Past Surgical History:   Procedure Laterality Date    CHOLECYSTECTOMY, LAPAROSCOPIC N/A 10/9/2021    CHOLECYSTECTOMY LAPAROSCOPIC ROBOTIC XI performed by Valarie Santillan MD at [facility] Left 9/24/2021    COLONOSCOPY POLYPECTOMY SNARE/COLD BIOPSY performed by Sánchez Thompson MD at [place] [address]. Right 10/6/2021    CYSTOSCOPY PYELOGRAM URETERAL STENT INSERTION performed by Dayanara Randle MD at [place] [address]. Bilateral 11/24/2021    CYSTOSCOPY URETERAL [address] LEFT INSERTION performed by Dayanara Randle MD at [address] [place] IR BIOPSY ABDOMINAL MASS  7/6/2021    IR BIOPSY ABDOMINAL MASS 7/6/2021 [facility]    IR PORT PLACEMENT EQUAL OR GREATER THAN 5 YEARS  8/31/2021    IR PORT PLACEMENT EQUAL OR GREATER THAN 5 YEARS 8/31/2021 [facility]    TONSILLECTOMY      as child       FAMILY HISTORY       Family History   Problem Relation Age of Onset    Diabetes Mother     Alzheimer's Disease Father     Heart Disease Brother     Heart Disease Brother        SOCIAL HISTORY       Social History     Socioeconomic History    Marital status:      Spouse name: Not on file    Number of children: Not on file    Years of education: Not on file    Highest education level: Not on file   Occupational History    Not on file   Tobacco Use    Smoking status: Former Smoker     Packs/day: 2.00     Years: 45.00     Pack years: 90.00     Types: Cigarettes     Quit date: 2006     Years since quittin.1    Smokeless tobacco: Never Used   Vaping Use    Vaping Use: Never used   Substance and Sexual Activity    Alcohol use: Yes     Alcohol/week: 1.7 standard drinks     Types: 2 Standard drinks or equivalent per week     Comment: occassional    Drug use: Yes     Types: Marijuana Thurl Cipro)     Comment: every other day    Sexual activity: Not on file   Other Topics Concern    Not on file   Social History Narrative    Not on file     Social Determinants of Health     Financial Resource Strain:     Difficulty of Paying Living Expenses: Not on file   Food Insecurity:     Worried About Running Out of Food in the Last Year: Not on file    Jerald of Food in the Last Year: Not on file   Transportation Needs:     Lack of Transportation (Medical): Not on file    Lack of Transportation (Non-Medical):  Not on file   Physical Activity:     Days of Exercise per Week: Not on file    Minutes of Exercise per Session: Not on file   Stress:     Feeling of Stress : Not on file   Social Connections:     Frequency of Communication with Friends and Family: Not on file    Frequency of Social Gatherings with Friends and Family: Not on file    Attends [employer]ism Services: Not on file    Active Member of [address] [place] [address] or Organizations: Not on file    Attends Club or Organization Meetings: Not on file    Marital Status: Not on file   Intimate Partner Violence:     Fear of Current or Ex-Partner: Not on file    Emotionally Abused: Not on file    Physically Abused: Not on file    Sexually Abused: Not on file   Housing Stability:     Unable to Pay for Housing in the Last Year: Not on file    Number of [place] in the Last Year: Not on file    Unstable Housing in the Last Year: Not on file REVIEW OF SYSTEMS      No Known Allergies    No current facility-administered medications on file prior to encounter. Current Outpatient Medications on File Prior to Encounter   Medication Sig Dispense Refill    megestrol (MEGACE) 40 MG/ML suspension Take 10 mLs by mouth daily 240 mL 3    amLODIPine (NORVASC) 10 MG tablet Take 1 tablet by mouth daily (Patient taking differently: Take 10 mg by mouth 2 times daily ) 30 tablet 5    docusate sodium (COLACE) 100 MG capsule Take 1 capsule by mouth daily 30 capsule 0    losartan (COZAAR) 50 MG tablet Take 1 tablet by mouth daily 30 tablet 5    glipiZIDE-metFORMIN (METAGLIP) 5-500 MG per tablet Take 1 tablet by mouth daily (with breakfast)       donepezil (ARICEPT) 10 MG tablet Take 10 mg by mouth daily       atorvastatin (LIPITOR) 10 MG tablet Take 10 mg by mouth daily      FLUoxetine (PROZAC) 20 MG capsule Take 20 mg by mouth daily      carvedilol (COREG) 6.25 MG tablet Take 6.25 mg by mouth 2 times daily (with meals)      omeprazole (PRILOSEC) 20 MG delayed release capsule Take 20 mg by mouth 2 times daily     Notation: Above medications are not currently reconciled at time of signing this H&P note, to be reconciled in pre-op per RN. Review of Systems   Constitutional: Positive for appetite change and unexpected weight change (pt reports 25 lb weight loss over the last couple months. he was prescribed an appetite booster per his oncologist). Negative for chills and fever. HENT: Positive for congestion. Negative for sore throat. Eyes: Negative for visual disturbance. Respiratory: Positive for cough, shortness of breath and wheezing. Cardiovascular: Positive for leg swelling. Negative for chest pain and palpitations. Gastrointestinal: Positive for diarrhea. Negative for abdominal pain, constipation, nausea and vomiting. Genitourinary:        See HPI   Musculoskeletal: Positive for back pain. Skin: Negative for rash and wound. Neurological: Negative for dizziness, speech difficulty, light-headedness and headaches. Hematological: Does not bruise/bleed easily. Psychiatric/Behavioral: Negative. GENERAL PHYSICAL EXAM     Vitals: Review vitals per RN flowsheet. Physical Exam  Constitutional:       General: He is not in acute distress. Appearance: He is well-developed. He is not ill-appearing, toxic-appearing or diaphoretic. HENT:      Head: Normocephalic and atraumatic. Nose: Nose normal. No congestion. Mouth/Throat:      Mouth: Mucous membranes are moist.      Pharynx: Oropharynx is clear. No oropharyngeal exudate or posterior oropharyngeal erythema. Eyes:      General: No scleral icterus. Right eye: No discharge. Left eye: No discharge. Conjunctiva/sclera: Conjunctivae normal.      Pupils: Pupils are equal, round, and reactive to light. Neck:      Trachea: No tracheal deviation. Cardiovascular:      Rate and Rhythm: Normal rate and regular rhythm. Heart sounds: Normal heart sounds. No murmur heard. No friction rub. No gallop. Pulmonary:      Effort: Pulmonary effort is normal. No respiratory distress. Breath sounds: Wheezing present. No rales. Abdominal:      General: Bowel sounds are normal. There is no distension. Palpations: Abdomen is soft. Tenderness: There is no abdominal tenderness. There is no guarding. Musculoskeletal:         General: No swelling or tenderness. Cervical back: Neck supple. No tenderness. Right lower leg: No edema. Left lower leg: No edema. Skin:     General: Skin is warm and dry. Coloration: Skin is not jaundiced. Findings: No bruising, erythema or rash. Neurological:      General: No focal deficit present. Mental Status: He is alert and oriented to person, place, and time. Cranial Nerves: No cranial nerve deficit. Gait: Gait abnormal (ambulates with walker). Psychiatric:         Mood and Affect: Mood normal.        PROVISIONAL DIAGNOSES / SURGERY:      CHRONIC HYDRONEPHROSIS & RIGHT FLANK PAIN     CYSTOSCOPY W/STENT EXCHANGE Right    Patient Active Problem List    Diagnosis Date Noted    Cardiomegaly 01/29/2022    Hypoxia 01/29/2022    Hypothyroidism     Diabetes mellitus ([address]     Hypertensive urgency     Acute respiratory failure with hypoxia ([address]     Pleural effusion 01/28/2022    Chronic cholecystitis     Moderate malnutrition ([address] 10/07/2021    Hydronephrosis due to obstruction of ureter 54/01/7242    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 ([address] 08/09/2021    Acute kidney injury ([address] 07/03/2021    Traumatic retroperitoneal hematoma 12/15/2019    NHL (non-Hodgkin's lymphoma) ([address] 01/07/2013           EDWIGE Bhatti - CNP on 2/16/2022 at 5:56 AM

## 2022-02-16 NOTE — ANESTHESIA POSTPROCEDURE EVALUATION
POST- ANESTHESIA EVALUATION       Pt Name: Gregoria Atwood  MRN: [id number]  YOB: 1950  Date of evaluation: 2/16/2022  Time:  9:59 AM      BP (!) 173/57   Pulse 57   Temp 97.7 °F (36.5 °C)   Resp 9   Ht 5' 10.5\" (1.791 m)   Wt 185 lb (83.9 kg)   SpO2 92%   BMI 26.17 kg/m²      Consciousness Level  Awake  Cardiopulmonary Status  Stable  Pain Adequately Treated YES  Nausea / Vomiting  NO  Adequate Hydration  YES  Anesthesia Related Complications NONE      Electronically signed by Merari Art MD on 2/16/2022 at 9:59 AM       Department of Anesthesiology  Postprocedure Note    Patient: Gregoria Atwood  MRN: [id number]  YOB: 1950  Date of evaluation: 2/16/2022  Time:  9:59 AM     Procedure Summary     Date: 02/16/22 Room / Location: [address] [place] [address]    Anesthesia Start: 0730 Anesthesia Stop: 7882    Procedure: CYSTOSCOPY WITH BILATERAL STENT EXCHANGE (Bilateral Ureter) Diagnosis: (CHRONIC HYDRONEPHROSIS & RIGHT FLANK PAIN  (PT HAS HAD COVID VACCINE))    Surgeons: Елена Payan MD Responsible Provider: Merari Art MD    Anesthesia Type: general, MAC ASA Status: 3          Anesthesia Type: general, MAC    Yvette Phase I: Yvette Score: 10    Yvette Phase II:      Last vitals: Reviewed and per EMR flowsheets.        Anesthesia Post Evaluation

## 2022-02-17 ENCOUNTER — APPOINTMENT (OUTPATIENT)
Dept: VASCULAR LAB | Age: 72
DRG: 987 | End: 2022-02-17
Payer: MEDICARE

## 2022-02-17 LAB
-: NORMAL
ABSOLUTE EOS #: 0.1 K/UL (ref 0–0.4)
ABSOLUTE IMMATURE GRANULOCYTE: ABNORMAL K/UL (ref 0–0.3)
ABSOLUTE LYMPH #: 0.9 K/UL (ref 1–4.8)
ABSOLUTE MONO #: 0.5 K/UL (ref 0.1–1.3)
ALLEN TEST: ABNORMAL
ANION GAP SERPL CALCULATED.3IONS-SCNC: 9 MMOL/L (ref 9–17)
BASOPHILS # BLD: 1 % (ref 0–2)
BASOPHILS ABSOLUTE: 0 K/UL (ref 0–0.2)
BUN BLDV-MCNC: 34 MG/DL (ref 8–23)
BUN/CREAT BLD: ABNORMAL (ref 9–20)
CALCIUM SERPL-MCNC: 8.1 MG/DL (ref 8.6–10.4)
CARBOXYHEMOGLOBIN: 1.9 % (ref 0–5)
CHLORIDE BLD-SCNC: 111 MMOL/L (ref 98–107)
CO2: 25 MMOL/L (ref 20–31)
CREAT SERPL-MCNC: 1.28 MG/DL (ref 0.7–1.2)
CULTURE: NO GROWTH
DIFFERENTIAL TYPE: ABNORMAL
EKG ATRIAL RATE: 74 BPM
EKG P AXIS: 57 DEGREES
EKG P-R INTERVAL: 128 MS
EKG Q-T INTERVAL: 416 MS
EKG QRS DURATION: 108 MS
EKG QTC CALCULATION (BAZETT): 461 MS
EKG R AXIS: 27 DEGREES
EKG T AXIS: 33 DEGREES
EKG VENTRICULAR RATE: 74 BPM
EOSINOPHILS RELATIVE PERCENT: 2 % (ref 0–4)
FIO2: ABNORMAL
GFR AFRICAN AMERICAN: >60 ML/MIN
GFR NON-AFRICAN AMERICAN: 55 ML/MIN
GFR SERPL CREATININE-BSD FRML MDRD: ABNORMAL ML/MIN/{1.73_M2}
GFR SERPL CREATININE-BSD FRML MDRD: ABNORMAL ML/MIN/{1.73_M2}
GLUCOSE BLD-MCNC: 154 MG/DL (ref 70–99)
GLUCOSE BLD-MCNC: 84 MG/DL (ref 75–110)
HCO3 VENOUS: 28.7 MMOL/L (ref 24–30)
HCT VFR BLD CALC: 31.6 % (ref 41–53)
HEMOGLOBIN: 10.2 G/DL (ref 13.5–17.5)
IMMATURE GRANULOCYTES: ABNORMAL %
INFLUENZA A: NOT DETECTED
INFLUENZA B: NOT DETECTED
LYMPHOCYTES # BLD: 14 % (ref 24–44)
Lab: NORMAL
MCH RBC QN AUTO: 28.4 PG (ref 26–34)
MCHC RBC AUTO-ENTMCNC: 32.2 G/DL (ref 31–37)
MCV RBC AUTO: 88.1 FL (ref 80–100)
METHEMOGLOBIN: ABNORMAL % (ref 0–1.9)
MODE: ABNORMAL
MONOCYTES # BLD: 9 % (ref 1–7)
NEGATIVE BASE EXCESS, VEN: ABNORMAL MMOL/L (ref 0–2)
NOTIFICATION TIME: ABNORMAL
NOTIFICATION: ABNORMAL
NRBC AUTOMATED: ABNORMAL PER 100 WBC
O2 DEVICE/FLOW/%: ABNORMAL
O2 SAT, VEN: 62.9 % (ref 60–85)
OXYHEMOGLOBIN: ABNORMAL % (ref 95–98)
PATIENT TEMP: 37.1
PCO2, VEN, TEMP ADJ: ABNORMAL MMHG (ref 39–55)
PCO2, VEN: 56.7 (ref 39–55)
PDW BLD-RTO: 16 % (ref 11.5–14.9)
PEEP/CPAP: ABNORMAL
PH VENOUS: 7.31 (ref 7.32–7.42)
PH, VEN, TEMP ADJ: ABNORMAL (ref 7.32–7.42)
PLATELET # BLD: 128 K/UL (ref 150–450)
PLATELET ESTIMATE: ABNORMAL
PMV BLD AUTO: 8.8 FL (ref 6–12)
PO2, VEN, TEMP ADJ: ABNORMAL MMHG (ref 30–50)
PO2, VEN: 35.8 (ref 30–50)
POSITIVE BASE EXCESS, VEN: 2.5 MMOL/L (ref 0–2)
POTASSIUM SERPL-SCNC: 4 MMOL/L (ref 3.7–5.3)
PROCALCITONIN: 0.07 NG/ML
PSV: ABNORMAL
PT. POSITION: ABNORMAL
RBC # BLD: 3.59 M/UL (ref 4.5–5.9)
RBC # BLD: ABNORMAL 10*6/UL
REASON FOR REJECTION: NORMAL
RESPIRATORY RATE: ABNORMAL
SAMPLE SITE: ABNORMAL
SARS-COV-2 RNA, RT PCR: NOT DETECTED
SEG NEUTROPHILS: 74 % (ref 36–66)
SEGMENTED NEUTROPHILS ABSOLUTE COUNT: 4.5 K/UL (ref 1.3–9.1)
SET RATE: ABNORMAL
SODIUM BLD-SCNC: 145 MMOL/L (ref 135–144)
SOURCE: NORMAL
SPECIMEN DESCRIPTION: NORMAL
SPECIMEN DESCRIPTION: NORMAL
TEXT FOR RESPIRATORY: ABNORMAL
TOTAL HB: ABNORMAL G/DL (ref 12–16)
TOTAL RATE: ABNORMAL
TROPONIN INTERP: ABNORMAL
TROPONIN INTERP: ABNORMAL
TROPONIN T: ABNORMAL NG/ML
TROPONIN T: ABNORMAL NG/ML
TROPONIN, HIGH SENSITIVITY: 27 NG/L (ref 0–22)
TROPONIN, HIGH SENSITIVITY: 28 NG/L (ref 0–22)
VT: ABNORMAL
WBC # BLD: 6.1 K/UL (ref 3.5–11)
WBC # BLD: ABNORMAL 10*3/UL
ZZ NTE CLEAN UP: ORDERED TEST: NORMAL
ZZ NTE WITH NAME CLEAN UP: SPECIMEN SOURCE: NORMAL

## 2022-02-17 PROCEDURE — 6360000002 HC RX W HCPCS: Performed by: FAMILY MEDICINE

## 2022-02-17 PROCEDURE — 6370000000 HC RX 637 (ALT 250 FOR IP): Performed by: FAMILY MEDICINE

## 2022-02-17 PROCEDURE — 87040 BLOOD CULTURE FOR BACTERIA: CPT

## 2022-02-17 PROCEDURE — 84145 PROCALCITONIN (PCT): CPT

## 2022-02-17 PROCEDURE — 2060000000 HC ICU INTERMEDIATE R&B

## 2022-02-17 PROCEDURE — 80048 BASIC METABOLIC PNL TOTAL CA: CPT

## 2022-02-17 PROCEDURE — 82805 BLOOD GASES W/O2 SATURATION: CPT

## 2022-02-17 PROCEDURE — 84484 ASSAY OF TROPONIN QUANT: CPT

## 2022-02-17 PROCEDURE — 93970 EXTREMITY STUDY: CPT

## 2022-02-17 PROCEDURE — 36415 COLL VENOUS BLD VENIPUNCTURE: CPT

## 2022-02-17 PROCEDURE — 2700000000 HC OXYGEN THERAPY PER DAY

## 2022-02-17 PROCEDURE — 2580000003 HC RX 258: Performed by: FAMILY MEDICINE

## 2022-02-17 PROCEDURE — 85025 COMPLETE CBC W/AUTO DIFF WBC: CPT

## 2022-02-17 PROCEDURE — 94640 AIRWAY INHALATION TREATMENT: CPT

## 2022-02-17 PROCEDURE — 6370000000 HC RX 637 (ALT 250 FOR IP): Performed by: NURSE PRACTITIONER

## 2022-02-17 PROCEDURE — 94761 N-INVAS EAR/PLS OXIMETRY MLT: CPT

## 2022-02-17 PROCEDURE — 93010 ELECTROCARDIOGRAM REPORT: CPT | Performed by: INTERNAL MEDICINE

## 2022-02-17 PROCEDURE — 6360000002 HC RX W HCPCS: Performed by: STUDENT IN AN ORGANIZED HEALTH CARE EDUCATION/TRAINING PROGRAM

## 2022-02-17 PROCEDURE — 6360000002 HC RX W HCPCS: Performed by: NURSE PRACTITIONER

## 2022-02-17 PROCEDURE — 82947 ASSAY GLUCOSE BLOOD QUANT: CPT

## 2022-02-17 RX ORDER — IPRATROPIUM BROMIDE AND ALBUTEROL SULFATE 2.5; .5 MG/3ML; MG/3ML
1 SOLUTION RESPIRATORY (INHALATION)
Status: DISCONTINUED | OUTPATIENT
Start: 2022-02-17 | End: 2022-02-20 | Stop reason: HOSPADM

## 2022-02-17 RX ORDER — DONEPEZIL HYDROCHLORIDE 10 MG/1
10 TABLET, FILM COATED ORAL DAILY
Status: DISCONTINUED | OUTPATIENT
Start: 2022-02-17 | End: 2022-02-20 | Stop reason: HOSPADM

## 2022-02-17 RX ORDER — ACETAMINOPHEN 325 MG/1
650 TABLET ORAL EVERY 6 HOURS PRN
Status: DISCONTINUED | OUTPATIENT
Start: 2022-02-17 | End: 2022-02-20 | Stop reason: HOSPADM

## 2022-02-17 RX ORDER — ACETAMINOPHEN 650 MG/1
650 SUPPOSITORY RECTAL EVERY 6 HOURS PRN
Status: DISCONTINUED | OUTPATIENT
Start: 2022-02-17 | End: 2022-02-20 | Stop reason: HOSPADM

## 2022-02-17 RX ORDER — FUROSEMIDE 20 MG/1
20 TABLET ORAL DAILY
Status: DISCONTINUED | OUTPATIENT
Start: 2022-02-17 | End: 2022-02-17

## 2022-02-17 RX ORDER — PANTOPRAZOLE SODIUM 40 MG/1
40 TABLET, DELAYED RELEASE ORAL
Status: DISCONTINUED | OUTPATIENT
Start: 2022-02-18 | End: 2022-02-20 | Stop reason: HOSPADM

## 2022-02-17 RX ORDER — ATORVASTATIN CALCIUM 10 MG/1
10 TABLET, FILM COATED ORAL DAILY
Status: DISCONTINUED | OUTPATIENT
Start: 2022-02-17 | End: 2022-02-20 | Stop reason: HOSPADM

## 2022-02-17 RX ORDER — SODIUM CHLORIDE 9 MG/ML
25 INJECTION, SOLUTION INTRAVENOUS PRN
Status: DISCONTINUED | OUTPATIENT
Start: 2022-02-17 | End: 2022-02-20 | Stop reason: HOSPADM

## 2022-02-17 RX ORDER — FUROSEMIDE 10 MG/ML
20 INJECTION INTRAMUSCULAR; INTRAVENOUS ONCE
Status: DISCONTINUED | OUTPATIENT
Start: 2022-02-17 | End: 2022-02-17

## 2022-02-17 RX ORDER — LOSARTAN POTASSIUM 50 MG/1
50 TABLET ORAL DAILY
Status: DISCONTINUED | OUTPATIENT
Start: 2022-02-17 | End: 2022-02-20 | Stop reason: HOSPADM

## 2022-02-17 RX ORDER — PHENAZOPYRIDINE HYDROCHLORIDE 100 MG/1
100 TABLET, FILM COATED ORAL 3 TIMES DAILY PRN
Status: DISCONTINUED | OUTPATIENT
Start: 2022-02-17 | End: 2022-02-20 | Stop reason: HOSPADM

## 2022-02-17 RX ORDER — POTASSIUM CHLORIDE 20 MEQ/1
10 TABLET, EXTENDED RELEASE ORAL
Status: DISCONTINUED | OUTPATIENT
Start: 2022-02-17 | End: 2022-02-20 | Stop reason: HOSPADM

## 2022-02-17 RX ORDER — CARVEDILOL 6.25 MG/1
6.25 TABLET ORAL 2 TIMES DAILY WITH MEALS
Status: DISCONTINUED | OUTPATIENT
Start: 2022-02-17 | End: 2022-02-20

## 2022-02-17 RX ORDER — POLYETHYLENE GLYCOL 3350 17 G/17G
17 POWDER, FOR SOLUTION ORAL DAILY PRN
Status: DISCONTINUED | OUTPATIENT
Start: 2022-02-17 | End: 2022-02-20 | Stop reason: HOSPADM

## 2022-02-17 RX ORDER — FUROSEMIDE 10 MG/ML
20 INJECTION INTRAMUSCULAR; INTRAVENOUS DAILY
Status: DISCONTINUED | OUTPATIENT
Start: 2022-02-17 | End: 2022-02-18

## 2022-02-17 RX ORDER — FLUOXETINE HYDROCHLORIDE 20 MG/1
20 CAPSULE ORAL DAILY
Status: DISCONTINUED | OUTPATIENT
Start: 2022-02-17 | End: 2022-02-20 | Stop reason: HOSPADM

## 2022-02-17 RX ORDER — ONDANSETRON 2 MG/ML
4 INJECTION INTRAMUSCULAR; INTRAVENOUS EVERY 6 HOURS PRN
Status: DISCONTINUED | OUTPATIENT
Start: 2022-02-17 | End: 2022-02-20 | Stop reason: HOSPADM

## 2022-02-17 RX ORDER — AMLODIPINE BESYLATE 10 MG/1
10 TABLET ORAL DAILY
Status: DISCONTINUED | OUTPATIENT
Start: 2022-02-17 | End: 2022-02-20 | Stop reason: HOSPADM

## 2022-02-17 RX ORDER — IPRATROPIUM BROMIDE AND ALBUTEROL SULFATE 2.5; .5 MG/3ML; MG/3ML
1 SOLUTION RESPIRATORY (INHALATION)
Status: COMPLETED | OUTPATIENT
Start: 2022-02-17 | End: 2022-02-17

## 2022-02-17 RX ORDER — PREGABALIN 75 MG/1
75 CAPSULE ORAL 2 TIMES DAILY
Status: DISCONTINUED | OUTPATIENT
Start: 2022-02-17 | End: 2022-02-20 | Stop reason: HOSPADM

## 2022-02-17 RX ORDER — BUDESONIDE AND FORMOTEROL FUMARATE DIHYDRATE 80; 4.5 UG/1; UG/1
2 AEROSOL RESPIRATORY (INHALATION) 2 TIMES DAILY
Refills: 0 | Status: DISCONTINUED | OUTPATIENT
Start: 2022-02-17 | End: 2022-02-20 | Stop reason: HOSPADM

## 2022-02-17 RX ORDER — SODIUM CHLORIDE 0.9 % (FLUSH) 0.9 %
5-40 SYRINGE (ML) INJECTION EVERY 12 HOURS SCHEDULED
Status: DISCONTINUED | OUTPATIENT
Start: 2022-02-17 | End: 2022-02-20 | Stop reason: HOSPADM

## 2022-02-17 RX ORDER — DOCUSATE SODIUM 100 MG/1
100 CAPSULE, LIQUID FILLED ORAL DAILY
Status: DISCONTINUED | OUTPATIENT
Start: 2022-02-17 | End: 2022-02-20 | Stop reason: HOSPADM

## 2022-02-17 RX ORDER — MEGESTROL ACETATE 40 MG/ML
400 SUSPENSION ORAL DAILY
Status: DISCONTINUED | OUTPATIENT
Start: 2022-02-17 | End: 2022-02-17

## 2022-02-17 RX ORDER — SODIUM CHLORIDE 0.9 % (FLUSH) 0.9 %
5-40 SYRINGE (ML) INJECTION PRN
Status: DISCONTINUED | OUTPATIENT
Start: 2022-02-17 | End: 2022-02-20 | Stop reason: HOSPADM

## 2022-02-17 RX ORDER — METHYLPREDNISOLONE SODIUM SUCCINATE 40 MG/ML
40 INJECTION, POWDER, LYOPHILIZED, FOR SOLUTION INTRAMUSCULAR; INTRAVENOUS EVERY 12 HOURS
Status: DISCONTINUED | OUTPATIENT
Start: 2022-02-17 | End: 2022-02-20

## 2022-02-17 RX ORDER — ONDANSETRON 4 MG/1
4 TABLET, ORALLY DISINTEGRATING ORAL EVERY 8 HOURS PRN
Status: DISCONTINUED | OUTPATIENT
Start: 2022-02-17 | End: 2022-02-20 | Stop reason: HOSPADM

## 2022-02-17 RX ADMIN — IPRATROPIUM BROMIDE AND ALBUTEROL SULFATE 1 AMPULE: 2.5; .5 SOLUTION RESPIRATORY (INHALATION) at 19:21

## 2022-02-17 RX ADMIN — CEFTRIAXONE SODIUM 1000 MG: 1 INJECTION, POWDER, FOR SOLUTION INTRAMUSCULAR; INTRAVENOUS at 12:21

## 2022-02-17 RX ADMIN — IPRATROPIUM BROMIDE AND ALBUTEROL SULFATE 1 AMPULE: 2.5; .5 SOLUTION RESPIRATORY (INHALATION) at 16:29

## 2022-02-17 RX ADMIN — POTASSIUM CHLORIDE 10 MEQ: 1500 TABLET, EXTENDED RELEASE ORAL at 12:04

## 2022-02-17 RX ADMIN — ATORVASTATIN CALCIUM 10 MG: 10 TABLET, FILM COATED ORAL at 12:04

## 2022-02-17 RX ADMIN — FUROSEMIDE 20 MG: 10 INJECTION, SOLUTION INTRAMUSCULAR; INTRAVENOUS at 12:05

## 2022-02-17 RX ADMIN — FLUOXETINE 20 MG: 20 CAPSULE ORAL at 12:04

## 2022-02-17 RX ADMIN — DONEPEZIL HYDROCHLORIDE 10 MG: 10 TABLET, FILM COATED ORAL at 12:04

## 2022-02-17 RX ADMIN — PREGABALIN 75 MG: 75 CAPSULE ORAL at 19:34

## 2022-02-17 RX ADMIN — IPRATROPIUM BROMIDE AND ALBUTEROL SULFATE 1 AMPULE: 2.5; .5 SOLUTION RESPIRATORY (INHALATION) at 12:39

## 2022-02-17 RX ADMIN — CARVEDILOL 6.25 MG: 6.25 TABLET, FILM COATED ORAL at 12:04

## 2022-02-17 RX ADMIN — SODIUM CHLORIDE, PRESERVATIVE FREE 10 ML: 5 INJECTION INTRAVENOUS at 19:34

## 2022-02-17 RX ADMIN — ENOXAPARIN SODIUM 40 MG: 100 INJECTION SUBCUTANEOUS at 12:06

## 2022-02-17 RX ADMIN — FUROSEMIDE 20 MG: 10 INJECTION, SOLUTION INTRAMUSCULAR; INTRAVENOUS at 00:35

## 2022-02-17 RX ADMIN — PREGABALIN 75 MG: 75 CAPSULE ORAL at 12:04

## 2022-02-17 RX ADMIN — BUDESONIDE AND FORMOTEROL FUMARATE DIHYDRATE 2 PUFF: 80; 4.5 AEROSOL RESPIRATORY (INHALATION) at 09:09

## 2022-02-17 RX ADMIN — LOSARTAN POTASSIUM 50 MG: 50 TABLET, FILM COATED ORAL at 12:04

## 2022-02-17 RX ADMIN — BUDESONIDE AND FORMOTEROL FUMARATE DIHYDRATE 2 PUFF: 80; 4.5 AEROSOL RESPIRATORY (INHALATION) at 19:21

## 2022-02-17 RX ADMIN — AMLODIPINE BESYLATE 10 MG: 10 TABLET ORAL at 12:03

## 2022-02-17 RX ADMIN — ENOXAPARIN SODIUM 40 MG: 100 INJECTION SUBCUTANEOUS at 19:34

## 2022-02-17 RX ADMIN — ACETAMINOPHEN 650 MG: 325 TABLET, FILM COATED ORAL at 19:33

## 2022-02-17 RX ADMIN — CARVEDILOL 6.25 MG: 6.25 TABLET, FILM COATED ORAL at 17:32

## 2022-02-17 RX ADMIN — METHYLPREDNISOLONE SODIUM SUCCINATE 40 MG: 40 INJECTION, POWDER, FOR SOLUTION INTRAMUSCULAR; INTRAVENOUS at 12:04

## 2022-02-17 ASSESSMENT — PAIN DESCRIPTION - LOCATION: LOCATION: BACK

## 2022-02-17 ASSESSMENT — PAIN DESCRIPTION - PAIN TYPE: TYPE: ACUTE PAIN

## 2022-02-17 ASSESSMENT — PAIN SCALES - GENERAL: PAINLEVEL_OUTOF10: 9

## 2022-02-17 NOTE — PLAN OF CARE
Problem: Falls - Risk of:  Goal: Will remain free from falls  Description: Will remain free from falls  Outcome: Ongoing     No falls noted this shift. Patient ambulates with x1 staff assistance and walker. Bed kept in low position. Safe environment maintained. Bedside table & call light in reach. Uses call light appropriately when needing assistance. Problem: Infection:  Goal: Will remain free from infection  Description: Will remain free from infection  Outcome: Ongoing     Patient afebrile.      Problem: Pain:  Goal: Patient's pain/discomfort is manageable  Description: Patient's pain/discomfort is manageable  Outcome: Ongoing     Problem: Discharge Planning:  Goal: Patients continuum of care needs are met  Description: Patients continuum of care needs are met  Outcome: Ongoing

## 2022-02-17 NOTE — ED PROVIDER NOTES
[address] [facility] ED     Emergency Department     Faculty Attestation        I performed a history and physical examination of the patient and discussed management with the resident. I reviewed the residents note and agree with the documented findings and plan of care. Any areas of disagreement are noted on the chart. I was personally present for the key portions of any procedures. I have documented in the chart those procedures where I was not present during the key portions. I have reviewed the emergency nurses triage note. I agree with the chief complaint, past medical history, past surgical history, allergies, medications, social and family history as documented unless otherwise noted below. Documentation of the HPI, Physical Exam and Medical Decision Making performed by medical students or scribes is based on my personal performance of the HPI, PE and MDM. For Physician Assistant/ Nurse Practitioner cases/documentation I have have had a face to face evaluation with this patient and have completed at least one if not all key elements of the E/M (history, physical exam, and MDM). Additional findings are as noted. Pertinent Comments     Primary Care Physician: Isabella Guerrero DO    History: This is a 66 y.o. male who presents to the Emergency Department with complaint of persistent hypoxia and shortness of breath. Was seen at [facility]. [facility] and admitted several weeks ago however discharged without oxygen. He apparently was supposed to get a procedure done today and he was hypoxic in the 70s. Physical:     axillary temperature is 97.9 °F (36.6 °C). His blood pressure is 110/63 and his pulse is 74. His respiration is 22 and oxygen saturation is 98%. 66 y.o. male     Afebrile, nontoxic, mildly hypoxic in the upper 80s on supplemental oxygen when I saw him.   Not any respiratory distress however    Impression: Hypoxia    Plan: Labs, CT scan, probable admission      The care is provided during an unprecedented national emergency due to the novel coronavirus, COVID-19.     (Please note that portions of this note were completed with a voice recognition program.  Efforts were made to edit the dictations but occasionally words are mis-transcribed.)    Minh Contreras DO  Attending Emergency Physician         Minh Contreras DO  02/16/22 7776

## 2022-02-17 NOTE — ED NOTES
Pt presents to emergency room via walk in for complaints of shortness of breath. Pt placed on nasal cannula at 5 lpm on initial arrival while preparing NRB. O2 saturations on room air 83%, climbing to 87% before placing NRB. Pt's saturations rapidly increased to 98% on the NRB. Pt respiratory rate is regular and unlabored, with prolonged expiratory phase. Currently receiving a breathing treatment via aerosol mask with sats at 96%.       Clarke Garcia RN  02/16/22 2930

## 2022-02-17 NOTE — PROGRESS NOTES
Patient received from ED via stretcher. Patient ambulated to bed. Vitals taken. No distress noted. POC and education reviewed with patient. Call light within reach, and pt educated on its use. Bed in lowest position, and locked. Side rails up x 2. Denied further questions or needs at this time. Will continue to monitor.

## 2022-02-17 NOTE — CARE COORDINATION
CASE MANAGEMENT NOTE:    Admission Date:  2/16/2022 Brandi Sullivan is a 66 y.o.  male    Admitted for : Acute pulmonary edema ([facility] [address] [J81.0]    Met with:  Patient    PCP:  Dr. Dotty Shanks [employer]      Is patient alert and oriented at time of discussion:  Yes    Current Residence/ Living Arrangements:  independently at home             Current Services PTA:  No    Does patient go to outpatient dialysis: No  If yes, location and chair time:     Is patient agreeable to VNS: No    Freedom of choice provided:  No    List of [address] provided: No    VNS chosen:  No    DME:  none    Home Oxygen: No    Nebulizer: No    CPAP/BIPAP: No    Supplier: N/A    Potential Assistance Needed: No    SNF needed: No    Freedom of choice and list provided: NA    Pharmacy:  [facility]       Does Patient want to use MEDS to BEDS? No    Is patient currently receiving oral anticoagulation therapy? No    Is the Patient an [facility] with Readmission Risk Score greater than 14%? No  If yes, pt needs a follow up appointment made within 7 days. Family Members/Caregivers that pt would like involved in their care:    Yes    If yes, list name here:  Sarika    Transportation Provider:  Family             Discharge Plan:  2/17/22 [employer] Pt is from home in a one story home DME none VNS denies plan is to discharge to home with no needs will continue to follow for needs . //tv                 Electronically signed by:  Fabby Robert RN on 2/17/2022 at 4:50 PM

## 2022-02-17 NOTE — ED NOTES
Patient transported to room 2109 via stretcher.  Hand off given to PCU [facility]  02/17/22 5610

## 2022-02-17 NOTE — H&P
Dr. Maddy Flores        History and Physical Examination   Admission Note    CHIEF COMPLAINT:   Chief Complaint   Patient presents with    Shortness of Breath       Reason for Admission: Hypoxia    History Obtained From:  Patient     HISTORY OF PRESENT ILLNESS:      The patient is a pleasant 66 y.o. male with significant medical history of history of lymphoma and recent admission to [facility] my understanding for what sounds like something similar to what happened yesterday patient underwent stent replacement yesterday for his chronic hydronephrosis secondary his lymphoma and the mass pushing on his ureter. He said after he was done with the procedure he started getting shortness of breath and last night his oxygen dropped to 70% was brought into the emergency room his work-up showed CT showed mild to moderate effusion. pro BNP was slightly elevated 2000, patient was given dose of Lasix he feels little bit better this morning. He is just finished eating breakfast he has no nausea no vomiting denies any chest pain he has been having some cough he says. Looks like he might of had infiltrate or pneumonia when he was at [facility]'s his CT shows still traces of that. Noted also thickening of his bronchial tubes but no PE. Patient does have a history of some dementia but he remembers me from last visit he remembers he was at [facility] as well as they did an echo on him he says. So his memory could be more transient.     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 ([facility] C82.13    Hydronephrosis due to obstruction of ureter N13.1    Moderate malnutrition (HCC) E44.0    Chronic cholecystitis K81.1    Pleural effusion J90    Cardiomegaly I51.7    Hypothyroidism E03.9    Diabetes mellitus ([address] E11.9    Hypertensive urgency I16.0    Acute respiratory failure with hypoxia (HCC) J96.01    Hypoxia R09.02       Past Surgical History:       Past Surgical History:   Procedure Laterality Date    CHOLECYSTECTOMY, LAPAROSCOPIC N/A 10/9/2021    CHOLECYSTECTOMY LAPAROSCOPIC ROBOTIC XI performed by Michelle Lunsford MD at [address] Left 9/24/2021    COLONOSCOPY POLYPECTOMY SNARE/COLD BIOPSY performed by Vj Sanchez MD at [address] Right 10/6/2021    CYSTOSCOPY PYELOGRAM URETERAL STENT INSERTION performed by Jennifer Snider MD at [address] Bilateral 11/24/2021    CYSTOSCOPY URETERAL STENT INSERTION RIGHT EXCHANGE & RETROGRADE PYELOGRAM LEFT INSERTION [facility] Bilateral 2/16/2022    CYSTOSCOPY WITH BILATERAL STENT EXCHANGE performed by Jennifer Snider MD at [address] [place] IR BIOPSY ABDOMINAL MASS  7/6/2021    IR BIOPSY ABDOMINAL MASS 7/6/2021 [facility]    IR PORT PLACEMENT EQUAL OR GREATER THAN 5 YEARS  8/31/2021    IR PORT PLACEMENT EQUAL OR GREATER THAN 5 YEARS 8/31/2021 [facility]    TONSILLECTOMY      as child       Current Medications:    Current Facility-Administered Medications   Medication Dose Route Frequency Provider Last Rate Last Admin    sodium chloride flush 0.9 % injection 5-40 mL  5-40 mL IntraVENous 2 times per day Roderick T Dixon, DO        sodium chloride flush 0.9 % injection 5-40 mL  5-40 mL IntraVENous PRN Roderick T Dixon, DO        0.9 % sodium chloride infusion  25 mL IntraVENous PRN Roderick T Dixon, DO        enoxaparin (LOVENOX) injection 40 mg  40 mg SubCUTAneous Daily Roderick T Dixon, DO        ondansetron (ZOFRAN-ODT) disintegrating tablet 4 mg  4 mg Oral Q8H PRN Roderick T Dixon, DO        Or    ondansetron (ZOFRAN) injection 4 mg  4 mg IntraVENous Q6H PRN Roderick T Dixon, DO        polyethylene glycol (GLYCOLAX) packet 17 g  17 g Oral Daily PRN Roderick T Dixon, DO        acetaminophen (TYLENOL) tablet 650 mg  650 mg Oral Q6H PRN Roderick T Dixon, DO        Or    acetaminophen (TYLENOL) suppository 650 mg  650 mg Rectal Q6H PRN Roderick T Dixon, DO        sodium chloride flush 0.9 % injection 10 mL  10 mL IntraVENous PRN Ranjana Staley MD   10 mL at 02/16/22 9466       Allergies:  Patient has no known allergies. Social History:    reports that he quit smoking about 16 years ago. His smoking use included cigarettes. He has a 90.00 pack-year smoking history. He has never used smokeless tobacco. He reports current alcohol use of about 1.7 standard drinks of alcohol per week. He reports current drug use. Drug: Marijuana Westsaurav Padilla).     Family History:   Family History   Problem Relation Age of Onset    Diabetes Mother     Alzheimer's Disease Father     Heart Disease Brother     Heart Disease Brother        REVIEW OF SYSTEMS:  RESPIRATORY:  negative for  dry cough, dyspnea, wheezing and chest pain  CARDIOVASCULAR:  negative for  chest pain, dyspnea, palpitations, orthopnea, exertional chest pressure/discomfort, fatigue, edema, syncope   GASTROINTESTINAL:  negative for nausea, vomiting, change in bowel habits, diarrhea, constipation, abdominal pain and reflux   GENITOURINARY:  negative for frequency, dysuria, nocturia, urinary incontinence and hesitancy positive for hydronephrosis status post stent yesterday  HEMATOLOGIC/LYMPHATIC:  negative for easy bruising, bleeding and swelling/edemapositive for   ENDOCRINE:  negative for weight changes, change in bowel habits and diabetic symptoms including neither polyuria nor polydipsia nor blurred vision nor foot ulcerations nor neuropathy  MUSCULOSKELETAL:  negative for  myalgias, arthralgias, pain, joint swelling, stiff joints and muscle weakness   NEUROLOGICAL:  negative for headaches, dizziness, memory problems, speech problems, visual disturbance, gait problems, weakness and numbness for history of dementia but he is alert oriented x3 this morning    Vitals:  BP (!) 148/61   Pulse 68   Temp 98.7 °F (37.1 °C) (Oral)   Resp 18   Wt 183 lb 3.2 oz (83.1 kg)   SpO2 93%   BMI 25.91 kg/m²     PHYSICAL EXAM:    CONSTITUTIONAL:  awake, alert, cooperative, no apparent distress, and appears stated age  EYES:  Lids and lashes normal, pupils equal, round and reactive to light, extra ocular muscles intact, sclera clear, conjunctiva normal   ENT:  Normocephalic, without obvious abnormality, atraumatic, sinuses nontender on palpation, external ears without lesions, oral pharynx with moist mucus membranes, tonsils without erythema or exudates,    NECK:  Supple, symmetrical, trachea midline, no adenopathy, thyroid symmetric, not enlarged and no tenderness, skin normal    HEMATOLOGIC/LYMPHATICS:  no cervical lymphadenopathy  BACK:  Symmetric, no curvature, spinous processes are non-tender on palpation, paraspinous muscles are non-tender on palpation, no costal vertebral tenderness   LUNGS:  No increased work of breathing, good air exchange, clear to auscultation bilaterally, no crackles or wheezing   CARDIOVASCULAR:  Normal apical impulse, regular rate and rhythm, normal S1 and S2, no S3 or S4, and no murmur noted   ABDOMEN:  No scars, normal bowel sounds, soft, non-distended, non-tender, no masses palpated, no hepatosplenomegally   GENITAL/URINARY: na  MUSCULOSKELETAL:  There is no redness, warmth, or swelling of the joints. Full range of motion noted. Motor strength is 5 out of 5 all extremities bilaterally. Tone is normal.  His right leg has +1 pitting but no erythema the left seems to be okay but no calf tenderness  NEUROLOGIC:  Awake, alert, oriented to name, place and time.   Cranial nerves II-XII are grossly intact. Motor is 5 out of 5 bilaterally. Sensory is intact. gait is normal.  He is alert and oriented this morning. He remembers what happened to him in the last month including her another admission and he knows of place and time and person this morning  SKIN:  no bruising or bleeding, normal skin color, texture, turgor, no redness, warmth, or swelling and no rashes     RECENT DATA:  No results found for: CBC, BMP    DATA:     Component Value Units   EKG 12 Lead [[id number]]    Collected: 02/16/22 2210    Updated: 02/17/22 0701     Ventricular Rate 74 BPM    Atrial Rate 74 BPM    P-R Interval 128 ms    QRS Duration 108 ms    Q-T Interval 416 ms    QTc Calculation (Bazett) 461 ms    P Axis 57 degrees    R Axis 27 degrees    T Axis 33 degrees   Narrative:     Normal sinus rhythm   Cannot rule out Anterior infarct (cited on or before 12-NOV-2011)   Abnormal ECG   When compared with ECG of 08-OCT-2021 13:02,   Criteria for Inferior infarct are no longer Present   Blood Gas, Venous [[id number]] (Abnormal)    Collected: 02/17/22 0128    Updated: 02/17/22 0224    Specimen Source: Blood gases     pH, Odin 7.312 Low     pCO2, Odin 56.7 High     pO2, Odin 35.8    HCO3, Venous 28.7 mmol/L    Positive Base Excess, Odin 2.5 High  mmol/L    Negative Base Excess, Odin NOT REPORTED mmol/L    O2 Sat, Odin 62.9 %    Total Hb NOT REPORTED g/dl    Oxyhemoglobin NOT REPORTED %    Carboxyhemoglobin 1.9 %    Comment:        Reference Range:   Non-Smokers     0-2%   Average Smoker  2-4%   Heavy Smoker    <10%              Methemoglobin NOT REPORTED %    Pt Temp 37.1    pH, Odin, Temp Adj NOT REPORTED    pCO2, Odin, Temp Adj NOT REPORTED mmHg    pO2, Odin, Temp Adj NOT REPORTED mmHg    O2 Device/Flow/% NOT REPORTED    Respiratory Rate NOT REPORTED    Jarred Test PENDING    Sample Site NOT REPORTED    Pt.  Position NOT REPORTED    Mode NOT REPORTED    Set Rate NOT REPORTED    Total Rate NOT REPORTED    VT NOT REPORTED    FIO2 NOT REPORTED Peep/Cpap NOT REPORTED    PSV NOT REPORTED    Text for Respiratory RESULT TO RN    NOTIFICATION NOT REPORTED    NOTIFICATION TIME NOT REPORTED   POC Glucose Fingerstick [[id number]]    Collected: 02/17/22 0125    Updated: 02/17/22 0131     POC Glucose 84 mg/dL   COVID-19 & Influenza Combo [[id number]]    Collected: 02/16/22 2348    Updated: 02/17/22 0030    Specimen Source: Nasopharyngeal Swab     Specimen Description . NASOPHARYNGEAL SWAB    Source . NASOPHARYNGEAL SWAB    SARS-CoV-2 RNA, RT PCR Not Detected    Comment:        Testing was performed using KRISTIAN Negin SARS-CoV-2 and Influenza A/B nucleic acid assay. This test is a multiplex Real-Time Reverse Transcriptase Polymerase Chain Reaction   (RT-PCR)-based in vitro diagnostic test intended for the qualitative detection of nucleic   acids from SARS-CoV-2,   influenza A, and influenza B in nasopharyngeal and nasal swab specimens for use under the   FDA's Emergency Use Authorization (EUA) only.         Not Detected results do not preclude SARS-CoV-2 infection and should not be used as the sole    basis for patient management decisions. Negative results must be combined with clinical observations, patient history, and   epidemiological information.         Fact sheet for Patients: FindDrives.pl   Fact sheet for Healthcare Providers: FindDrives.pl        INFLUENZA A Not Detected    INFLUENZA B Not Detected   Troponin [[id number]] (Abnormal)    Collected: 02/16/22 2352    Updated: 02/17/22 0018    Specimen Source: Blood     Troponin, High Sensitivity 27 High  ng/L    Comment:        High Sensitivity Troponin values cannot be compared with other Troponin methodologies.         Patients with high levels of Biotin oral intake (i.e >5mg/day) may have falsely decreased   Troponin levels. Samples collected within 8 hours of biotin intake may require additional   information for diagnosis.         Troponin T NOT infiltration.  For detailed description please refer to the same-day   abdominal CT report. Soft Tissues/Bones: No acute bone or soft tissue abnormality. Impression:     No evidence of pulmonary embolism. Mild to moderate  bilateral pleural effusion, right greater than left side. Findings suggestive of pulmonary edema.  Findings have progressed since prior   examination. Consolidative changes within the dependent right lower lobe and left lower   lobe, likely suggestive of atelectasis. Calcified granuloma within the superior segment of the right lower lobe. Coronary arterial calcification. Extensive abnormalities are noted at the expected location of the left kidney   and left side of retroperitoneum with ill-defined soft tissue infiltration. For detailed description please refer to the same-day abdominal CT report. XR CHEST PORTABLE [[id number]]    Collected: 02/16/22 2222    Updated: 02/16/22 2229    Narrative:     EXAMINATION:   ONE XRAY VIEW OF THE CHEST     2/16/2022 10:19 pm     COMPARISON:   January 30, 2022     HISTORY:   ORDERING SYSTEM PROVIDED HISTORY: hypoxia   TECHNOLOGIST PROVIDED HISTORY:   hypoxia   Reason for Exam: PT CO hypoxia with SOB X several days. FINDINGS:   Right IJ MediPort unchanged.  Cardiomegaly.  Mediastinum normal.  Left   clavicle fracture appears remote.  Interstitial infiltrates improved although   there may be residual infiltrate at the right lung base. Impression:     Possible mild interstitial infiltrate right lung base. Troponin [[id number]] (Abnormal)    Collected: 02/16/22 2145    Updated: 02/16/22 2224    Specimen Source: Blood     Troponin, High Sensitivity 29 High  ng/L    Comment:        High Sensitivity Troponin values cannot be compared with other Troponin methodologies.         Patients with high levels of Biotin oral intake (i.e >5mg/day) may have falsely decreased   Troponin levels.  Samples collected within 8 hours of biotin intake may require additional   information for diagnosis. Troponin T NOT REPORTED ng/mL    Troponin Interp NOT REPORTED   Basic Metabolic Panel w/ Reflex to MG [[id number]] (Abnormal)    Collected: 02/16/22 2145    Updated: 02/16/22 2224    Specimen Source: Blood     Glucose 99 mg/dL    BUN 36 High  mg/dL    CREATININE 1.52 High  mg/dL    Bun/Cre Ratio NOT REPORTED    Calcium 8.4 Low  mg/dL    Sodium 147 High  mmol/L    Potassium 4.4 mmol/L    Chloride 111 High  mmol/L    CO2 26 mmol/L    Anion Gap 10 mmol/L    GFR Non-African American 45 Low  mL/min    GFR African American 55 Low  mL/min    GFR Comment         Comment: Average GFR for 79or more years old:    65 mL/min/1.73sq m   Chronic Kidney Disease:    <60 mL/min/1.73sq m   Kidney failure:    <15 mL/min/1.73sq m               eGFR calculated using average adult body mass. Additional eGFR calculator available at:         Synesis.br              GFR Staging NOT REPORTED   Brain Natriuretic Peptide [[id number]] (Abnormal)    Collected: 02/16/22 2145    Updated: 02/16/22 2224    Specimen Source: Blood     Pro-BNP 2,291 High  pg/mL    Comment:        An age-independent cutoff point of 300 pg/ml has a 98% negative predictive value excluding   acute heart failure.         BNP Interpretation NOT REPORTED   CBC with Auto Differential [[id number]] (Abnormal)    Collected: 02/16/22 2145    Updated: 02/16/22 2201    Specimen Source: Blood     WBC 7.1 k/uL    RBC 3.89 Low  m/uL    Hemoglobin 11.0 Low  g/dL    Hematocrit 34.2 Low  %    MCV 88.0 fL    MCH 28.3 pg    MCHC 32.2 g/dL    RDW 16.4 High  %    Platelets 334 SCR  k/uL    MPV 9.3 fL    NRBC Automated NOT REPORTED per 100 WBC    Differential Type NOT REPORTED    Seg Neutrophils 75 High  %    Lymphocytes 14 Low  %    Monocytes 9 High  %    Eosinophils % 2 %    Basophils 0 %    Immature Granulocytes NOT REPORTED %    Segs Absolute 5.30 k/uL    Absolute Lymph # 1.00 k/uL Absolute Mono # 0.60 k/uL    Absolute Eos # 0.20 k/uL    Basophils Absolute 0.00 k/uL    Absolute Immature Granulocyte NOT REPORTED k/uL    WBC Morphology NOT REPORTED    RBC Morphology NOT REPORTED    Platelet Estimate NOT REPORTED       Current IP Meds          ASSESSMENT:  Patient Active Problem List   Diagnosis Code    NHL (non-Hodgkin's lymphoma) ([facility]) C85.90    Traumatic retroperitoneal hematoma S36.892A    Acute kidney injury ([facility] K52.5    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   Chronic CHF or exacerbated by his procedure yesterday with IV fluid  ·   · Pneumonia reported on CT resolving  ·   · Status post stent placement yesterday  ·   · History of hydronephrosis  ·   · History of lymphoma he is on treatment  · Recently 2 times per month  · History of neuropathy  · Chronic renal insufficiency probably secondary to hydro-    PLAN:  · We will place patient on cefepime second to his infiltrative changes and being in the hospital 1 procedure yesterday  ·   · Lasix 20 mg IV daily for now  ·   · Monitor his kidney function  · Continue with home meds  ·   · Consult pulmonary second to the thickening in the bronchial tube and hypoxia  ·   · We will review his records from these including echo  ·   · Repeat his labs this morning including troponin  ·   · Check urinalysis since he was told it was done last night in the ER was negative but have not seen the results  · And blood cultures this morning if it was not done        Electronically signed by KRISTI Winn on 2/17/2022 at 7:18 AM  Family [address]

## 2022-02-17 NOTE — ED PROVIDER NOTES
[address]  Emergency Department Encounter  EmergencyMedicineResident     This patient was seen during the COVID-19 crisis. There were limited resources and those resources we did have had to be conserved for the sickest of patients. Pt Name: Luis Anthony  MRN: [id number]  [place] 1950  Date of evaluation: 2/16/22  PCP: Leopoldo Bristle, DO    CHIEF COMPLAINT       Chief Complaint   Patient presents with    Shortness of Breath       HISTORY OF PRESENT ILLNESS  (Location/Symptom, Timing/Onset, Context/Setting, Quality, Duration, Modifying Factors, Severity.)      Luis Anthony is a 66 y.o. male who presents for evaluation of difficulty breathing. Patient was at the hospital earlier today having a procedure performed with urology. He had ureteral stents placed for pelvic cancer. Patient reportedly [facility] shortness of breath difficulty breathing requiring oxygen earlier in the month. He was discharged home. He was supposed to be sent home with oxygen per wife however he was unable to obtain it due to insurance reasons. He presents back here to the emergency room today short of breath. The wife states that his oxygen saturation at home was in the 70s. When EMS arrived his O2 sats are in the 80s. He has significant past medical history of chemotherapy managed non-Hodgkin's lymphoma, diabetes, hypertension, hyperlipidemia and hypothyroidism. Reportedly the patient has had hematuria for the last 6 months. PAST MEDICAL / SURGICAL /SOCIAL / FAMILY HISTORY      has a past medical history of Cancer [facility]), Chemotherapy management, encounter for, Diabetes mellitus ([facility] [address] History of non-Hodgkin's lymphoma, Hyperlipidemia, Hypertension, and Hypothyroidism. has a past surgical history that includes hernia repair; IR BIOPSY ABDOMINAL/RETROPERITONEAL MASS PERCUTANEOUS (7/6/2021); IR PORT PLACEMENT > 5 YEARS (8/31/2021); Colonoscopy (Left, 9/24/2021);  Cystoscopy (Right,  Fear of Current or Ex-Partner: Not on file    Emotionally Abused: Not on file    Physically Abused: Not on file    Sexually Abused: Not on file   Housing Stability:     Unable to Pay for Housing in the Last Year: Not on file    Number of Places Lived in the Last Year: Not on file    Unstable Housing in the Last Year: Not on file       Family History   Problem Relation Age of Onset    Diabetes Mother     Alzheimer's Disease Father     Heart Disease Brother     Heart Disease Brother        Allergies:  Patient has no known allergies. Home Medications:  Prior to Admission medications    Medication Sig Start Date End Date Taking? Authorizing Provider   cephALEXin (KEFLEX) 500 MG capsule Take 1 capsule by mouth 3 times daily for 5 days 2/16/22 2/21/22  Zac Camargo MD   phenazopyridine (PYRIDIUM) 100 MG tablet Take 1 tablet by mouth 3 times daily as needed for Pain 2/16/22 2/21/22  Zac Camargo MD   levothyroxine (SYNTHROID) 150 MCG tablet Take 1 tablet by mouth Daily 2/2/22   Shady Yung DO   fluticasone-salmeterol (ADVAIR) 100-50 MCG/DOSE diskus inhaler Inhale 1 puff into the lungs every 12 hours 2/1/22   Shady Yung DO   furosemide (LASIX) 20 MG tablet Take 1 tablet by mouth daily 2/1/22   Shady Yung DO   pregabalin (LYRICA) 75 MG capsule Take 75 mg by mouth 2 times daily.     Historical Provider, MD   albuterol sulfate HFA (VENTOLIN HFA) 108 (90 Base) MCG/ACT inhaler Inhale 2 puffs into the lungs every 6 hours as needed for Wheezing    Historical Provider, MD   megestrol (MEGACE) 40 MG/ML suspension Take 10 mLs by mouth daily 11/15/21   Britton Vivas MD   amLODIPine (NORVASC) 10 MG tablet Take 1 tablet by mouth daily  Patient taking differently: Take 10 mg by mouth 2 times daily  10/12/21   Viakacie Metcalf,    docusate sodium (COLACE) 100 MG capsule Take 1 capsule by mouth daily 10/13/21   Roderick Metcalf, DO   losartan (COZAAR) 50 MG tablet Take 1 tablet by mouth daily 10/12/21   Roderick HODGE OdehDO   glipiZIDE-metFORMIN (METAGLIP) 5-500 MG per tablet Take 1 tablet by mouth daily (with breakfast)     Historical Provider, MD   donepezil (ARICEPT) 10 MG tablet Take 10 mg by mouth daily  7/11/21   Historical Provider, MD   atorvastatin (LIPITOR) 10 MG tablet Take 10 mg by mouth daily    Historical Provider, MD   FLUoxetine (PROZAC) 20 MG capsule Take 20 mg by mouth daily    Historical Provider, MD   carvedilol (COREG) 6.25 MG tablet Take 6.25 mg by mouth 2 times daily (with meals)    Historical Provider, MD   omeprazole (PRILOSEC) 20 MG delayed release capsule Take 20 mg by mouth 2 times daily    Historical Provider, MD       REVIEW OF SYSTEMS    (2-9 systems for level 4, 10 or more forlevel 5)      Review of Systems   Constitutional: Positive for activity change and appetite change. Negative for chills and fever. HENT: Negative for congestion, sinus pain and sore throat. Eyes: Negative for pain and visual disturbance. Respiratory: Positive for shortness of breath. Negative for cough. Cardiovascular: Negative for chest pain. Gastrointestinal: Negative for abdominal pain, diarrhea, nausea and vomiting. Genitourinary: Positive for hematuria. Negative for difficulty urinating and dysuria. Musculoskeletal: Negative for back pain and myalgias. Skin: Negative for rash and wound. Neurological: Negative for dizziness, light-headedness and headaches. Psychiatric/Behavioral: Negative for agitation and confusion. PHYSICAL EXAM   (up to 7 for level 4, 8 or more forlevel 5)      ED TRIAGE VITALS BP: 110/63, Temp: 97.9 °F (36.6 °C), Pulse: 74, Resp: 22, SpO2: (!) 82 %    Vitals:    02/16/22 2142 02/16/22 2143 02/16/22 2154 02/16/22 2157   BP: 110/63      Pulse: 74  74    Resp:  22  18   Temp: 97.9 °F (36.6 °C)      TempSrc: Axillary      SpO2: (!) 82% 98%  95%         Physical Exam  Vitals and nursing note reviewed.    Constitutional:       General: He is in acute distress. Appearance: Normal appearance. He is ill-appearing. HENT:      Head: Normocephalic and atraumatic. Nose: Nose normal.      Mouth/Throat:      Mouth: Mucous membranes are moist.   Eyes:      Extraocular Movements: Extraocular movements intact. Pupils: Pupils are equal, round, and reactive to light. Cardiovascular:      Rate and Rhythm: Normal rate and regular rhythm. Pulses: Normal pulses. Heart sounds: Normal heart sounds. Pulmonary:      Effort: Tachypnea and respiratory distress present. Breath sounds: Wheezing and rhonchi present. Abdominal:      General: Abdomen is flat. Palpations: Abdomen is soft. Musculoskeletal:         General: Normal range of motion. Cervical back: Normal range of motion. Right lower leg: No edema. Left lower leg: No edema. Skin:     General: Skin is warm and dry. Capillary Refill: Capillary refill takes less than 2 seconds. Neurological:      General: No focal deficit present. Mental Status: He is alert and oriented to person, place, and time.    Psychiatric:         Mood and Affect: Mood normal.         Behavior: Behavior normal.           DIFFERENTIAL  DIAGNOSIS     PLAN (LABS / IMAGING / EKG):  Orders Placed This Encounter   Procedures    COVID-19 & Influenza Combo    XR CHEST PORTABLE    CT CHEST PULMONARY EMBOLISM W CONTRAST    CBC with Auto Differential    Basic Metabolic Panel w/ Reflex to MG    Troponin    Brain Natriuretic Peptide    Troponin    Blood Gas, Venous    Inpatient consult to OSIRIS Silva    EKG 12 Lead       MEDICATIONS ORDERED:  ED Medication Orders (From admission, onward)    Start Ordered     Status Ordering Provider    02/17/22 0000 02/16/22 2357  furosemide (LASIX) injection 20 mg  ONCE         Ordered TIFFANY ROWAN    02/16/22 2245 02/16/22 2243  0.9 % sodium chloride bolus  ONCE         Last MAR action: New Bag - by Cindy Saldaña on 02/16/22 at [facility], TIFFANY L    02/16/22 2243 02/16/22 2243  iopamidol (ISOVUE-370) 76 % injection 75 mL  IMG ONCE PRN         Last MAR action: Given - by Shira Arshad on 02/16/22 at [address]    02/16/22 2243 02/16/22 2243  sodium chloride flush 0.9 % injection 10 mL  PRN         Last MAR action: Given - by Shira Arshad on 02/16/22 at [facility], [address] [facility] L    02/16/22 2200 02/16/22 2156  ipratropium-albuterol (DUONEB) nebulizer solution 2 ampule  ONCE        Question:  Initiate RT Bronchodilator Protocol  Answer:  No    Last MAR action: Given - by Keiko Boland on 02/16/22 at [address] [place] [id number] / EMERGENCY DEPARTMENT COURSE / MDM         LABS:  Results for orders placed or performed during the hospital encounter of 02/16/22   CBC with Auto Differential   Result Value Ref Range    WBC 7.1 3.5 - 11.0 k/uL    RBC 3.89 (L) 4.5 - 5.9 m/uL    Hemoglobin 11.0 (L) 13.5 - 17.5 g/dL    Hematocrit 34.2 (L) 41 - 53 %    MCV 88.0 80 - 100 fL    MCH 28.3 26 - 34 pg    MCHC 32.2 31 - 37 g/dL    RDW 16.4 (H) 11.5 - 14.9 %    Platelets 284 (L) 819 - 450 k/uL    MPV 9.3 6.0 - 12.0 fL    NRBC Automated NOT REPORTED per 100 WBC    Differential Type NOT REPORTED     Seg Neutrophils 75 (H) 36 - 66 %    Lymphocytes 14 (L) 24 - 44 %    Monocytes 9 (H) 1 - 7 %    Eosinophils % 2 0 - 4 %    Basophils 0 0 - 2 %    Immature Granulocytes NOT REPORTED 0 %    Segs Absolute 5.30 1.3 - 9.1 k/uL    Absolute Lymph # 1.00 1.0 - 4.8 k/uL    Absolute Mono # 0.60 0.1 - 1.3 k/uL    Absolute Eos # 0.20 0.0 - 0.4 k/uL    Basophils Absolute 0.00 0.0 - 0.2 k/uL    Absolute Immature Granulocyte NOT REPORTED 0.00 - 0.30 k/uL    WBC Morphology NOT REPORTED     RBC Morphology NOT REPORTED     Platelet Estimate NOT REPORTED    Basic Metabolic Panel w/ Reflex to MG   Result Value Ref Range    Glucose 99 70 - 99 mg/dL    BUN 36 (H) 8 - 23 mg/dL    CREATININE 1.52 (H) 0.70 - 1.20 mg/dL    Bun/Cre Ratio NOT REPORTED 9 - 20    Calcium 8.4 (L) 8.6 - 10.4 mg/dL    Sodium 147 (H) 135 - 144 mmol/L    Potassium 4.4 3.7 - 5.3 mmol/L    Chloride 111 (H) 98 - 107 mmol/L    CO2 26 20 - 31 mmol/L    Anion Gap 10 9 - 17 mmol/L    GFR Non-African American 45 (L) >60 mL/min    GFR  55 (L) >60 mL/min    GFR Comment          GFR Staging NOT REPORTED    Troponin   Result Value Ref Range    Troponin, High Sensitivity 29 (H) 0 - 22 ng/L    Troponin T NOT REPORTED <0.03 ng/mL    Troponin Interp NOT REPORTED    Brain Natriuretic Peptide   Result Value Ref Range    Pro-BNP 2,291 (H) <300 pg/mL    BNP Interpretation NOT REPORTED    EKG 12 Lead   Result Value Ref Range    Ventricular Rate 74 BPM    Atrial Rate 74 BPM    P-R Interval 128 ms    QRS Duration 108 ms    Q-T Interval 416 ms    QTc Calculation (Bazett) 461 ms    P Axis 57 degrees    R Axis 27 degrees    T Axis 33 degrees       RADIOLOGY:  CT CHEST PULMONARY EMBOLISM W CONTRAST   Final Result   No evidence of pulmonary embolism. Mild to moderate  bilateral pleural effusion, right greater than left side. Findings suggestive of pulmonary edema. Findings have progressed since prior   examination. Consolidative changes within the dependent right lower lobe and left lower   lobe, likely suggestive of atelectasis. Calcified granuloma within the superior segment of the right lower lobe. Coronary arterial calcification. Extensive abnormalities are noted at the expected location of the left kidney   and left side of retroperitoneum with ill-defined soft tissue infiltration. For detailed description please refer to the same-day abdominal CT report. XR CHEST PORTABLE   Final Result   Possible mild interstitial infiltrate right lung base. ECG:  Normal sinus rhythm without any ST elevation or depression, intervals within normal limits, no repolarization abnormalities, overall poor EKG.     All EKG's are interpreted by the Emergency Department Physician who either signsor Co-signs this chart in the absence of a cardiologist.    EMERGENCY DEPARTMENT COURSE:    ED Course as of 02/17/22 0009   Wed Feb 16, 2022 2301 Pro-BNP(!): 2,291 [TJ]   2301 WBC: 7.1 [TJ]   2301 Pulse: 74 [TJ]   2301 Temp: 97.9 °F (36.6 °C) [TJ]   2301 BP: 110/63 [TJ]   Thu Feb 17, 2022   0000 Patient resting comfortably in bed on 6 L of oxygen saturating 89% while sleeping. While aroused his oxygen saturation does improve to greater than 90%. [TJ]   0000 CT chest indicative of pulmonary edema. With associated proBNP greater than 2000 likely diagnosis. Will not cover for pneumonia given these results. [TJ]   0001 CT PE negative. [TJ]      ED Course User Index  [TJ] Yanelis Dixon MD      Plan admit the patient to his PCP for evaluation and management of hypoxic respiratory failure secondary to pulmonary edema. While the emergency department the patient received breathing treatments, oxygen therapy and diuresis. CONSULTS:  IP CONSULT TO FAMILY MEDICINE    CRITICAL CARE:  See attending physician note    FINAL IMPRESSION      1. Acute pulmonary edema ([facility] [address]          DISPOSITION / PLAN     DISPOSITION Decision To Admit 02/16/2022 11:58:18 PM      PATIENT REFERRED TO:  No follow-up provider specified.     DISCHARGE MEDICATIONS:  New Prescriptions    No medications on file     Modified Medications    No medications on file        Yanelis Dixon MD  Emergency Medicine Resident    (Please note that portions of this note were completed with a voice recognition program.  Efforts were made to edit the dictations but occasionally words are mis-transcribed.)       Yanelis Dixon MD  Resident  02/17/22 0010

## 2022-02-17 NOTE — PLAN OF CARE
Problem: Falls - Risk of:  Goal: Will remain free from falls  Description: Will remain free from falls  Outcome: Ongoing  Goal: Absence of physical injury  Description: Absence of physical injury  Outcome: Ongoing     Problem: Infection:  Goal: Will remain free from infection  Description: Will remain free from infection  Outcome: Ongoing     Problem: Safety:  Goal: Free from accidental physical injury  Description: Free from accidental physical injury  Outcome: Ongoing  Goal: Free from intentional harm  Description: Free from intentional harm  Outcome: Ongoing     Problem: Pain:  Goal: Patient's pain/discomfort is manageable  Description: Patient's pain/discomfort is manageable  Outcome: Ongoing     Problem: Discharge Planning:  Goal: Patients continuum of care needs are met  Description: Patients continuum of care needs are met  Outcome: Ongoing     Problem: Daily Care:  Goal: Daily care needs are met  Description: Daily care needs are met  Outcome: Ongoing     Problem: Skin Integrity:  Goal: Skin integrity will stabilize  Description: Skin integrity will stabilize  Outcome: Ongoing

## 2022-02-17 NOTE — CONSULTS
[facility] PULMONARY, CRITICAL CARE & SLEEP   Flora Scherer MD/ Kamilla Jimenez MD/ Deyanira Ely MD/ Dr Izabella Chung APRN AGACNP-BC NP-C  Jacqulynn Fothergill APRN NP-C  [facility] APRN NP-C   CONSULT NOTE      Date of Admission: 2/16/2022  9:38 PM    Chief complaint: Dyspnea    Referring Physician: * No referring provider recorded for this case *  PCP: Arturo Do DO     History of Present Illness: Zettie Cushing is a 66 y.o. White (non-)   male who presents with reports of increasing dyspnea. He states he has had mild shortness of breath for a couple months. He states following his cholecystectomy the end of last year he had poor appetite and weight loss. He states over the last month he has gained back 25 pounds. He was admitted on 1/29 through 2/1 at [facility] for dyspnea and CHF. Reviewing records show he was treated with IV Rocephin and Zithromax and given IV Lasix. He had an echocardiogram done on 1/31 which showed a normal LV systolic function, EF greater than 55%, RVSP of 44, dilated left atrium, grade 1 diastolic dysfunction. He tells me he was referred to follow-up with pulmonology but does not have an appointment until April 1. He does not remember which physician this appointment is with. He states following discharge he continued with shortness of breath. He had an outpatient procedure with Dr. Jayne Fay yesterday for bilateral stent exchange. He was found to have hypoxia and worsening shortness of breath following the procedure and was sent to the emergency room for evaluation. Work-up in ER with CTA chest showed no PE but did show bilateral pleural effusions slightly greater on the left. Chest x-ray showed a mild infiltrate in the right base. He was started on IV Rocephin and IV Lasix was ordered. He was placed on 6 L nasal cannula. Venous Dopplers were ordered and pending.   Lab work showed a creatinine slightly improved today at 1.28 from 1.52, BNP 2291, white blood cell count 7.1, Covid test was negative. Venous blood gas showed a pH of 7.31 and a CO2 of 56. He denies any recent fever or chills. He has a past history of follicular lymphoma and follows with Dr. Sekou Siu and receives rituximab every 2 months. He also has a history of hypertension, hyperlipidemia, diabetes. He denies any diagnosed COPD. He was a previous smoker quitting 10 years ago. He states he smoked for about 50 years anywhere from 1 to 2 packs/day. He denies a history of KATIE but states he has been told he snores. He denies a history of DVT or PE. He denies a history of asbestos exposure or tuberculosis. He does not use inhalers or nebulizers at home. He is not on home oxygen. He has never been evaluated by pulmonologist in the past.  He denies any known history of coronary artery disease. He denies any known history of congestive heart failure.     PMH:   Past Medical History:   Diagnosis Date    Cancer [facility])     Chemotherapy management, encounter for     Diabetes mellitus ([facility] [address]     History of non-Hodgkin's lymphoma     Hyperlipidemia     Hypertension     Hypothyroidism        PSH:   Past Surgical History:   Procedure Laterality Date    CHOLECYSTECTOMY, LAPAROSCOPIC N/A 10/9/2021    CHOLECYSTECTOMY LAPAROSCOPIC ROBOTIC XI performed by Ilana Fischer MD at [place] [id number] Left 9/24/2021    COLONOSCOPY POLYPECTOMY SNARE/COLD BIOPSY performed by Yee Bai MD at [place] [address] Right 10/6/2021    CYSTOSCOPY PYELOGRAM URETERAL STENT INSERTION performed by Alton Moise MD at [place] [address] Bilateral 11/24/2021    CYSTOSCOPY URETERAL [address] LEFT INSERTION performed by Alton Moise MD at [place] [address] Bilateral 2/16/2022    CYSTOSCOPY WITH BILATERAL STENT EXCHANGE performed by Alton Moise MD at . [address] [place]  7/6/2021    IR BIOPSY ABDOMINAL MASS 7/6/2021 [facility] SPECIAL PROCEDURES    IR PORT PLACEMENT EQUAL OR GREATER THAN 5 YEARS  8/31/2021    IR PORT PLACEMENT EQUAL OR GREATER THAN 5 YEARS 8/31/2021 [facility] SPECIAL PROCEDURES    TONSILLECTOMY      as child       Allergies: No Known Allergies    Home Meds:  Medications Prior to Admission: levothyroxine (SYNTHROID) 150 MCG tablet, Take 1 tablet by mouth Daily  fluticasone-salmeterol (ADVAIR) 100-50 MCG/DOSE diskus inhaler, Inhale 1 puff into the lungs every 12 hours  pregabalin (LYRICA) 75 MG capsule, Take 75 mg by mouth 2 times daily.   albuterol sulfate HFA (VENTOLIN HFA) 108 (90 Base) MCG/ACT inhaler, Inhale 2 puffs into the lungs every 6 hours as needed for Wheezing  docusate sodium (COLACE) 100 MG capsule, Take 1 capsule by mouth daily  losartan (COZAAR) 50 MG tablet, Take 1 tablet by mouth daily  glipiZIDE-metFORMIN (METAGLIP) 5-500 MG per tablet, Take 1 tablet by mouth daily (with breakfast)   atorvastatin (LIPITOR) 10 MG tablet, Take 10 mg by mouth daily  FLUoxetine (PROZAC) 20 MG capsule, Take 20 mg by mouth daily  carvedilol (COREG) 6.25 MG tablet, Take 6.25 mg by mouth 2 times daily (with meals)  omeprazole (PRILOSEC) 20 MG delayed release capsule, Take 20 mg by mouth 2 times daily  cephALEXin (KEFLEX) 500 MG capsule, Take 1 capsule by mouth 3 times daily for 5 days  phenazopyridine (PYRIDIUM) 100 MG tablet, Take 1 tablet by mouth 3 times daily as needed for Pain  furosemide (LASIX) 20 MG tablet, Take 1 tablet by mouth daily  megestrol (MEGACE) 40 MG/ML suspension, Take 10 mLs by mouth daily  amLODIPine (NORVASC) 10 MG tablet, Take 1 tablet by mouth daily (Patient taking differently: Take 10 mg by mouth 2 times daily )  donepezil (ARICEPT) 10 MG tablet, Take 10 mg by mouth daily     Social History:   Social History     Socioeconomic History    Marital status:      Spouse name: Not on file    Number of children: Not on file    Years of education: Not on file    Highest education level: Not on file   Occupational History    Not on file   Tobacco Use    Smoking status: Former Smoker     Packs/day: 2.00     Years: 45.00     Pack years: 90.00     Types: Cigarettes     Quit date: 2006     Years since quittin.1    Smokeless tobacco: Never Used   Vaping Use    Vaping Use: Never used   Substance and Sexual Activity    Alcohol use: Yes     Alcohol/week: 1.7 standard drinks     Types: 2 Standard drinks or equivalent per week     Comment: occassional    Drug use: Yes     Types: Marijuana Don Safer)     Comment: every other day    Sexual activity: Not on file   Other Topics Concern    Not on file   Social History Narrative    Not on file     Social Determinants of Health     Financial Resource Strain:     Difficulty of Paying Living Expenses: Not on file   Food Insecurity:     Worried About [address] in the Last Year: Not on file    Jerald of Food in the Last Year: Not on file   Transportation Needs:     Lack of Transportation (Medical): Not on file    Lack of Transportation (Non-Medical):  Not on file   Physical Activity:     Days of Exercise per Week: Not on file    Minutes of Exercise per Session: Not on file   Stress:     Feeling of Stress : Not on file   Social Connections:     Frequency of Communication with Friends and Family: Not on file    Frequency of Social Gatherings with Friends and Family: Not on file    Attends [employer] Services: Not on file    Active Member of Clubs or Organizations: Not on file    Attends Club or Organization Meetings: Not on file    Marital Status: Not on file   Intimate Partner Violence:     Fear of Current or Ex-Partner: Not on file    Emotionally Abused: Not on file    Physically Abused: Not on file    Sexually Abused: Not on file   Housing Stability:     Unable to Pay for Housing in the Last Year: Not on file    Number of [place] in the Last Year: Not on file    Unstable Housing in the Last Year: Not on file       Family History:   Family History   Problem Relation Age of Onset    Diabetes Mother     Alzheimer's Disease Father     Heart Disease Brother     Heart Disease Brother        Review of Systems    Constitutional: Denies fever, chills, reports weight gain 25 pounds over a month  HEENT: Denies congestion, sore throat  Pulmonary: Reports shortness of breath, cough  Cardiac: Denies chest pain, syncope, reports dyspnea and weight gain  GI: Denies nausea, vomiting, diarrhea  : Denies urinary complaints, had recent bilateral stent exchange yesterday  Musculoskeletal: Denies myalgias  Neuro: Denies headache, focal weakness  Skin: Denies rash      Physical Exam  Vital Signs: Blood pressure (!) 157/63, pulse 64, temperature 98.7 °F (37.1 °C), temperature source Oral, resp. rate 18, weight 183 lb 3.2 oz (83.1 kg), SpO2 94 %. Admission Weight: Weight: 183 lb 3.2 oz (83.1 kg)    General: Elderly man sitting up in bed in no acute distress on 6 L nasal cannula  HEENT: Unremarkable, on 6 L nasal cannula  Neck: Supple, full range of motion  Lungs: Inspiratory and expiratory wheezing throughout with some fine rales in bilateral bases, respirations nonlabored, on 6 L nasal cannula with pulse ox 94 to 95%.   Cardiac: Regular, no murmur  Abdomen: Soft, slightly obese, nontender, bowel sounds active  Extremities: Full range of motion, no edema  Neurologic: Alert and oriented, appropriate  Skin: Pink warm and dry         ECHO Complete 2D W Doppler W Color    Result Date: 1/31/2022  Transthoracic Echocardiography Report (TTE)  Patient Name Diana Hallman    Date of Study             01/31/2022               Melvi ROSE   Date of      1950  Gender                    Male  Birth   Age          66 year(s)  Race                         Room Number  0545        Height:                   70 inch, 177.8 cm   Corporate ID [id number]    Weight:                   180 pounds, 81.6 kg  #   Patient Acct [de-identified]   BSA:         2 m^2        BMI:       25.83 kg/m^2  #   MR #         [id number] Sonographer               Sheron Murray   Accession #  [id number]  Interpreting Physician    Davidson Howell   Fellow                   Referring Nurse                           Practitioner   Interpreting             Referring Physician       Melany Narayanan,  Fellow                                             APRN-CNP  Type of Study   TTE procedure:2D Echocardiogram, M-Mode, Doppler, Color Doppler. Procedure Date Date: 01/31/2022 Start: 08:09 AM Study Location: [facility] Technical Quality: Adequate visualization Indications:Cardiomegaly. History / Tech. Comments: Procedure explained to patient. Echo completed in the Echo Lab. PMHx: Former smoker Hypertension, Diabetes Patient Status: Inpatient Height: 70 inches Weight: 180 pounds BSA: 2 m^2 BMI: 25.83 kg/m^2 HR: 71 bpm CONCLUSIONS Summary Normal LV size and wall thickness. No obvious wall motion abnormality seen. Normal LV systolic function with LVEF >55%. Normal RV size and function. RV systolic pressure 44 mmHg LA appears moderately dilated No obvious significant structural valvular abnormality noted. Mild MR and TR Normal aortic root dimension. No significant pericardial effusion noted. IVC appears dilated, impaired inspiratory variation indicating elevated RA filling pressure. Signature ----------------------------------------------------------------------------  Electronically signed by Davidson Howell(Interpreting physician) on  01/31/2022 02:16 PM ---------------------------------------------------------------------------- ----------------------------------------------------------------------------  Electronically signed by Courtney Yoo(Sonographer) on 01/31/2022 12:02 PM ---------------------------------------------------------------------------- FINDINGS Left Atrium Left atrium is moderately dilated. Inter-atrial septum is intact with no evidence for an atrial septal defect, by color doppler.  Left Ventricle Left ventricle is normal in size, global left ventricular systolic function is low normal, calculated ejection fraction is 50%. Grade I (mild) left ventricular diastolic dysfunction. Right Atrium Right atrium is normal in size. Right Ventricle Normal right ventricular size and function. Mitral Valve Normal mitral valve structure. No mitral stenosis. Mild mitral regurgitation. Aortic Valve Aortic valve is trileaflet. No evidence of aortic insufficiency or stenosis. Tricuspid Valve Normal tricuspid valve leaflets. Mild tricuspid regurgitation. Estimated right ventricular systolic pressure is 44 mmHg, suggesting pulmonary HTN. Pulmonic Valve Pulmonic valve not well visualized but Doppler velocities are normal. Trivial pulmonic insufficiency. Pericardial Effusion No significant pericardial effusion is seen. Miscellaneous Normal aortic root dimension. E/E' average = 16.05. IVC Increased diameter and impaired or no inspiratory variation indicating elevated RA filling pressure (i.e. CVP) .  M-mode / 2D Measurements & Calculations:   LVIDd:5.3 cm(3.7 - 5.6 cm)        Diastolic FZRHVV:071 ml  IVSd:1 cm(0.6 - 1.1 cm)           Systolic QYARMA:42.3 ml  MMVAS:5.8 cm(0.6 - 1.1 cm)        Aortic Root:2.9 cm(2.0 - 3.7 cm)                                    LA Dimension: 4.7 cm(1.9 - 4.0 cm)  Calculated LVEF (%): 49.84 %      LA volume/Index: 86.82 ml /43m^2                                    LVOT:2.2 cm                                    RVDd:3.7 cm   Mitral:                                 Aortic   Valve Area (P1/2-Time): 4.49 cm^2       Peak Velocity: 1.55 m/s  Peak E-Wave: 1.04 m/s                   Mean Velocity: 1.11 m/s  Peak A-Wave: 1.27 m/s                   Peak Gradient: 9.61 mmHg  E/A Ratio: 0.82                         Mean Gradient: 6 mmHg  Peak Gradient: 4.33 mmHg  Mean Gradient: 3 mmHg  Deceleration Time: 156 msec             Area (continuity): 2.93 cm^2  P1/2t: 49 msec                          AV VTI: 35.9 cm   Area (continuity): 2.74 cm^2  Mean Velocity: 0.86 m/s   Tricuspid:                              Pulmonic:   Estimated RVSP: 44 mmHg                 Peak Velocity: 0.87 m/s  Peak TR Velocity: 3.16 m/s              Peak Gradient: 3.02 mmHg  Peak TR Gradient: 39.9424 mmHg  Diastology / Tissue Doppler Septal Wall E' velocity:0.05 m/s Septal Wall E/E':19.5 Lateral Wall E' velocity:0.08 m/s Lateral Wall E/E':12.6    CT ABDOMEN PELVIS WO CONTRAST Additional Contrast? Radiologist Recommendation    Result Date: 2/16/2022  EXAMINATION: CT OF THE ABDOMEN AND PELVIS WITHOUT CONTRAST 2/16/2022 10:10 am TECHNIQUE: CT of the abdomen and pelvis was performed without the administration of intravenous contrast. Multiplanar reformatted images are provided for review. Dose modulation, iterative reconstruction, and/or weight based adjustment of the mA/kV was utilized to reduce the radiation dose to as low as reasonably achievable. COMPARISON: 11/08/2021 CT abdomen and pelvis and CT chest dated 01/28/2022 HISTORY: ORDERING SYSTEM PROVIDED HISTORY: Bilateral hydronephrosis TECHNOLOGIST PROVIDED HISTORY: Bilateral hydronephrosis Follicular lymphoma with ureteral obstruction Reason for Exam: Bilateral hydronephrosis, Follicular lymphoma with ureteral obstruction Additional signs and symptoms: bladder stent put in today FINDINGS: Exam is limited without intravenous contrast.  Right-sided ureteral stent with proximal pigtail in the mid to upper pole calyx and distal pigtail in the urinary bladder. Left ureteral stent is also positioned with proximal pigtail in the mid kidney and distal pigtail in the urinary bladder. No significant hydronephrosis. Nonobstructing 4 mm right renal stone. A hyperdense lesion involving the posterior mid right kidney measures 1.6 cm currently and previously. This is incompletely characterized without contrast and could represent a hyperdense or hemorrhagic cyst.  Solid mass thought less likely.   Persistent somewhat lobulated margin of the lower pole of the left kidney is unchanged. Left greater than right perinephric soft tissue thickening and fat stranding appear similar to previous. The previously described ill-defined infiltrating masslike soft tissue thickening of the retroperitoneum is again identified. Given the somewhat ill-defined infiltrating appearance, exact measurement is difficult. A focal area of this adjacent to the upper pole of the left kidney measures 5.9 x 3.2 cm, previously 6 x 3 cm. This is visually similar and is difficult to separate from the left adrenal gland. Extending more distally throughout the retroperitoneum, including along the aorta and aortocaval regions, this is also visually similar to previous. Gallbladder is absent. Liver is homogeneous. Spleen is normal in size. Some of the described soft tissue thickening extends along the posterior margin of the pancreas, similar to previous. No obvious evidence of acute pancreatitis. Assessment of bowel is limited without oral contrast.  No bowel obstruction. Appendix is normal.  There are biopsy clip markers identified within the ascending colon unchanged from previous. Severe diverticulosis without evidence of acute diverticulitis. There is minimal gas within the urinary bladder. A portion of this extends into a tiny right-sided diverticulum. Prostate gland is mildly enlarged but unchanged. Partial visualization of bilateral pleural effusions greater towards the right with adjacent areas of mild atelectasis. These findings are similar on the left compared to the recent CT and slightly worsened on the right compared to the recent chest CT. Bilateral ureteral stents are in place without significant hydronephrosis. Gas in the urinary bladder likely related to previous instrumentation. Fistula or infection thought less likely. No significant change in the infiltrating masslike soft tissue thickening of the retroperitoneum as detailed above.   Continued follow-up recommended. Bilateral pleural effusions and areas of basilar atelectasis or less likely infiltrates, slightly worsened on the right compared with recent chest CT. Severe diverticulosis. Prostatomegaly. RECOMMENDATIONS: Unavailable     XR THORACIC SPINE (3 VIEWS)    Result Date: 2/15/2022  EXAMINATION: THREE XRAY VIEWS OF THE THORACIC SPINE 2/15/2022 11:21 am COMPARISON: None HISTORY: ORDERING SYSTEM PROVIDED HISTORY: Pain TECHNOLOGIST PROVIDED HISTORY: Reason for Exam: Chronic back pain h/o multiple falls in the past FINDINGS: The thoracic vertebra are of normal height, with no fractures or loss of height. Thoracic discs are well maintained with no disc space narrowing or significant osteophyte formation. There is a Port-A-Cath with distal tip overlying the right atrium there is a calcified granuloma noted in the the right lung. No abnormalities noted in the thoracic spine. XR LUMBAR SPINE (2-3 VIEWS)    Result Date: 2/15/2022  EXAMINATION: THREE XRAY VIEWS OF THE LUMBAR SPINE 2/15/2022 11:21 am COMPARISON: None. HISTORY: ORDERING SYSTEM PROVIDED HISTORY: Pain TECHNOLOGIST PROVIDED HISTORY: Reason for Exam: Chronic back pain h/o multiple falls in the past FINDINGS: Lumbar vertebra appear unremarkable with no loss of height. Disc spaces are well maintained with no disc space narrowing or osteophytes. Facet joints and spinous processes appear normal. Visualized pelvis and lower thoracic spine appear unremarkable. There are bilateral double pigtail ureteral catheters in place. No abnormalities noted in the lumbar spine. XR CHEST PORTABLE    Result Date: 2/16/2022  EXAMINATION: ONE XRAY VIEW OF THE CHEST 2/16/2022 10:19 pm COMPARISON: January 30, 2022 HISTORY: ORDERING SYSTEM PROVIDED HISTORY: hypoxia TECHNOLOGIST PROVIDED HISTORY: hypoxia Reason for Exam: PT CO hypoxia with SOB X several days. FINDINGS: Right IJ MediPort unchanged. Cardiomegaly.   Mediastinum normal.  Left clavicle fracture appears remote. Interstitial infiltrates improved although there may be residual infiltrate at the right lung base. Possible mild interstitial infiltrate right lung base. XR CHEST PORTABLE    Result Date: 1/30/2022  EXAMINATION: ONE XRAY VIEW OF THE CHEST 1/30/2022 8:09 am COMPARISON: 01/28/2022 HISTORY: ORDERING SYSTEM PROVIDED HISTORY: SOB; assess for interval improvement TECHNOLOGIST PROVIDED HISTORY: SOB; assess for interval improvement Reason for Exam: port upright FINDINGS: Right IJ central venous catheter remains in place. The heart and mediastinal structures are stable. There has been development of a peripheral infiltrate in the left mid lung worrisome for pneumonia. Lungs are otherwise stable. Interval development of left mid lung infiltrate worrisome for pneumonia. XR CHEST PORTABLE    Result Date: 1/28/2022  EXAMINATION: ONE XRAY VIEW OF THE CHEST 1/28/2022 6:10 pm COMPARISON: 10/08/2021 HISTORY: ORDERING SYSTEM PROVIDED HISTORY: sob hypoxia TECHNOLOGIST PROVIDED HISTORY: sob hypoxia Reason for Exam: sob hypoxia FINDINGS: Right-sided MediPort device identified. The cardiomediastinal silhouette is mildly enlarged with mild edema/perihilar congestion. .  The lungs are clear. No pleural effusion or pneumothorax is present. Cardiomegaly with mild perihilar congestion/edema. CT CHEST PULMONARY EMBOLISM W CONTRAST    Result Date: 2/16/2022  EXAMINATION: CTA OF THE CHEST 2/16/2022 10:42 pm TECHNIQUE: CTA of the chest was performed after the administration of intravenous contrast.  Multiplanar reformatted images are provided for review. MIP images are provided for review. Dose modulation, iterative reconstruction, and/or weight based adjustment of the mA/kV was utilized to reduce the radiation dose to as low as reasonably achievable.  COMPARISON: 01/28/2022 HISTORY: ORDERING SYSTEM PROVIDED HISTORY: High risk paient with hypoxia and cancer TECHNOLOGIST PROVIDED HISTORY: High risk paient with hypoxia and cancer Decision Support Exception - unselect if not a suspected or confirmed emergency medical condition->Emergency Medical Condition (MA) Reason for Exam: Shortness of breath FINDINGS: Pulmonary Arteries: Pulmonary arteries are adequately opacified for evaluation. No evidence of intraluminal filling defect to suggest pulmonary embolism. Main pulmonary artery is normal in caliber. Mediastinum: No evidence of mediastinal lymphadenopathy. The heart and pericardium demonstrate no acute abnormality. Coronary arterial calcification. There is no acute abnormality of the thoracic aorta. Lungs/pleura: Calcified granuloma within the superior segment of the right lower lobe. Mild to moderate bilateral pleural effusion, right greater than left side. Consolidative changes within the dependent right lower lobe and left lower lobe, likely suggestive of atelectasis. Findings suggestive of pulmonary edema with thickening of interlobular septa. .  No pneumothorax. Upper Abdomen: Extensive abnormalities are noted at the expected location of the left kidney and left side of retroperitoneum with ill-defined soft tissue infiltration. For detailed description please refer to the same-day abdominal CT report. Soft Tissues/Bones: No acute bone or soft tissue abnormality. No evidence of pulmonary embolism. Mild to moderate  bilateral pleural effusion, right greater than left side. Findings suggestive of pulmonary edema. Findings have progressed since prior examination. Consolidative changes within the dependent right lower lobe and left lower lobe, likely suggestive of atelectasis. Calcified granuloma within the superior segment of the right lower lobe. Coronary arterial calcification. Extensive abnormalities are noted at the expected location of the left kidney and left side of retroperitoneum with ill-defined soft tissue infiltration. For detailed description please refer to the same-day abdominal CT report.      CT CHEST PULMONARY EMBOLISM W CONTRAST    Result Date: 1/28/2022  EXAMINATION: CTA OF THE CHEST 1/28/2022 5:17 pm TECHNIQUE: CTA of the chest was performed after the administration of intravenous contrast.  Multiplanar reformatted images are provided for review. MIP images are provided for review. Dose modulation, iterative reconstruction, and/or weight based adjustment of the mA/kV was utilized to reduce the radiation dose to as low as reasonably achievable. COMPARISON: PET-CT 11/08/2021, CT abdomen and pelvis 10/05/2021 HISTORY: ORDERING SYSTEM PROVIDED HISTORY: sob cancer patient fully vaccinated new onset oxygen requirement TECHNOLOGIST PROVIDED HISTORY: sob cancer patient fully vaccinated new onset oxygen requirement Decision Support Exception - unselect if not a suspected or confirmed emergency medical condition->Emergency Medical Condition (MA) Reason for Exam: SOB - Cancer patient fully vaccinated new onset oxygen requirement FINDINGS: Pulmonary Arteries: Pulmonary arteries are adequately opacified for evaluation. No evidence of intraluminal filling defect to suggest pulmonary embolism. Main pulmonary artery is normal in caliber. Mediastinum: Right chest port terminates in the right atrium. No mediastinal lymphadenopathy. The heart and pericardium demonstrate no acute abnormality. Mild cardiomegaly. 3 vessel coronary artery calcifications. There is no acute abnormality of the atherosclerotic thoracic aorta. Lungs/pleura: Mild septal thickening and atelectasis which is new from prior. Background centrilobular emphysema. Calcified granuloma in the right lower lobe. New diffuse bronchial wall thickening. New small layering bilateral pleural effusions. No pneumothorax. Upper Abdomen: Confluent retroperitoneal lymphadenopathy is increased relative to prior, but only partially visualized. Proximal portion of a left ureteral stent is visualized in the proximal left collecting system.  Soft Tissues/Bones: No acute bone or soft tissue abnormality. Unchanged sclerosis of the vertebral body and right-sided posterior elements at T5. No pathologic fracture. No evidence of pulmonary embolism. New mild interstitial pulmonary edema, atelectasis, and diffuse bronchial wall thickening. New small bilateral layering pleural effusions. The patient's infiltrative soft tissue/confluent adenopathy in the partially visualized upper retroperitoneum appears increased relative to the most recent prior comparison. FLUORO FOR SURGICAL PROCEDURES    Result Date: 2/16/2022  Radiology exam is complete. No Radiologist dictation. Please follow up with ordering provider. Assessment:    Acute exacerbation diastolic CHF, echo from 0/50 with grade 1 diastolic dysfunction  Acute hypoxic respiratory failure, currently requiring 6 L nasal cannula  Dyspnea  Bilateral pleural effusions  COPD exacerbation, not formally diagnosed, greater than 60-pack-year history of smoking, emphysematous changes on CT chest  Granuloma right lower lobe  Acute kidney injury, creatinine 1.28, improved from 7.59  History of follicular lymphoma, follows with Dr. Tonya Rebolledo, on rituximab every 2 months  History of hypertension  History of hyperlipidemia  History of diabetes  Probable KATIE  History of tobacco use, greater than 60-pack-year, quitting 10 years ago  Status post bilateral ureteral stent exchange with Dr. Madonna Hughes 2/16  Full code        Plan:    1. Agree with IV Lasix, scheduled for 20 mg today  2. BMP in a.m.  3.   We will start Solu-Medrol 40 mg every 12 hours  4. DuoNebs every 4 hours while awake  5. Wean oxygen as tolerated to keep pulse ox above 90%  6. Antibiotics per primary service, will add on procalcitonin  7. Would benefit from outpatient pulmonary follow-up for PFT testing and sleep study  8. Will need home O2 eval prior to discharge  9.    We will discuss with Dr. Leandro Lester, NP-C, MSN  [facility] Pulmonary, Critical Care & Sleep    Electronically signed by EDWIGE Pimentel CNP on 02/17/22     Clinical status discussed with me. Agree with findings and plan.     Bridgette Holstein, M.D.

## 2022-02-18 LAB
ABSOLUTE EOS #: 0 K/UL (ref 0–0.4)
ABSOLUTE IMMATURE GRANULOCYTE: ABNORMAL K/UL (ref 0–0.3)
ABSOLUTE LYMPH #: 0.6 K/UL (ref 1–4.8)
ABSOLUTE MONO #: 0.1 K/UL (ref 0.1–1.3)
ANION GAP SERPL CALCULATED.3IONS-SCNC: 9 MMOL/L (ref 9–17)
BASOPHILS # BLD: 0 % (ref 0–2)
BASOPHILS ABSOLUTE: 0 K/UL (ref 0–0.2)
BUN BLDV-MCNC: 35 MG/DL (ref 8–23)
BUN/CREAT BLD: ABNORMAL (ref 9–20)
CALCIUM SERPL-MCNC: 8.4 MG/DL (ref 8.6–10.4)
CHLORIDE BLD-SCNC: 108 MMOL/L (ref 98–107)
CO2: 27 MMOL/L (ref 20–31)
CREAT SERPL-MCNC: 1.32 MG/DL (ref 0.7–1.2)
DIFFERENTIAL TYPE: ABNORMAL
EOSINOPHILS RELATIVE PERCENT: 0 % (ref 0–4)
GFR AFRICAN AMERICAN: >60 ML/MIN
GFR NON-AFRICAN AMERICAN: 53 ML/MIN
GFR SERPL CREATININE-BSD FRML MDRD: ABNORMAL ML/MIN/{1.73_M2}
GFR SERPL CREATININE-BSD FRML MDRD: ABNORMAL ML/MIN/{1.73_M2}
GLUCOSE BLD-MCNC: 181 MG/DL (ref 70–99)
HCT VFR BLD CALC: 31.2 % (ref 41–53)
HEMOGLOBIN: 10.3 G/DL (ref 13.5–17.5)
IMMATURE GRANULOCYTES: ABNORMAL %
LYMPHOCYTES # BLD: 10 % (ref 24–44)
MCH RBC QN AUTO: 28.8 PG (ref 26–34)
MCHC RBC AUTO-ENTMCNC: 32.9 G/DL (ref 31–37)
MCV RBC AUTO: 87.5 FL (ref 80–100)
MONOCYTES # BLD: 1 % (ref 1–7)
NRBC AUTOMATED: ABNORMAL PER 100 WBC
PDW BLD-RTO: 15.7 % (ref 11.5–14.9)
PLATELET # BLD: 136 K/UL (ref 150–450)
PLATELET ESTIMATE: ABNORMAL
PMV BLD AUTO: 9 FL (ref 6–12)
POTASSIUM SERPL-SCNC: 4.9 MMOL/L (ref 3.7–5.3)
RBC # BLD: 3.56 M/UL (ref 4.5–5.9)
RBC # BLD: ABNORMAL 10*6/UL
SEG NEUTROPHILS: 89 % (ref 36–66)
SEGMENTED NEUTROPHILS ABSOLUTE COUNT: 5.2 K/UL (ref 1.3–9.1)
SODIUM BLD-SCNC: 144 MMOL/L (ref 135–144)
WBC # BLD: 5.9 K/UL (ref 3.5–11)
WBC # BLD: ABNORMAL 10*3/UL

## 2022-02-18 PROCEDURE — 6370000000 HC RX 637 (ALT 250 FOR IP): Performed by: FAMILY MEDICINE

## 2022-02-18 PROCEDURE — 94640 AIRWAY INHALATION TREATMENT: CPT

## 2022-02-18 PROCEDURE — 85025 COMPLETE CBC W/AUTO DIFF WBC: CPT

## 2022-02-18 PROCEDURE — 6360000002 HC RX W HCPCS: Performed by: FAMILY MEDICINE

## 2022-02-18 PROCEDURE — 2700000000 HC OXYGEN THERAPY PER DAY

## 2022-02-18 PROCEDURE — 2060000000 HC ICU INTERMEDIATE R&B

## 2022-02-18 PROCEDURE — 6360000002 HC RX W HCPCS: Performed by: NURSE PRACTITIONER

## 2022-02-18 PROCEDURE — 36415 COLL VENOUS BLD VENIPUNCTURE: CPT

## 2022-02-18 PROCEDURE — 6370000000 HC RX 637 (ALT 250 FOR IP): Performed by: NURSE PRACTITIONER

## 2022-02-18 PROCEDURE — 94761 N-INVAS EAR/PLS OXIMETRY MLT: CPT

## 2022-02-18 PROCEDURE — 2580000003 HC RX 258: Performed by: FAMILY MEDICINE

## 2022-02-18 PROCEDURE — 80048 BASIC METABOLIC PNL TOTAL CA: CPT

## 2022-02-18 RX ORDER — FUROSEMIDE 10 MG/ML
40 INJECTION INTRAMUSCULAR; INTRAVENOUS DAILY
Status: DISCONTINUED | OUTPATIENT
Start: 2022-02-19 | End: 2022-02-19

## 2022-02-18 RX ADMIN — LOSARTAN POTASSIUM 50 MG: 50 TABLET, FILM COATED ORAL at 09:12

## 2022-02-18 RX ADMIN — IPRATROPIUM BROMIDE AND ALBUTEROL SULFATE 1 AMPULE: 2.5; .5 SOLUTION RESPIRATORY (INHALATION) at 16:59

## 2022-02-18 RX ADMIN — CARVEDILOL 6.25 MG: 6.25 TABLET, FILM COATED ORAL at 09:16

## 2022-02-18 RX ADMIN — METHYLPREDNISOLONE SODIUM SUCCINATE 40 MG: 40 INJECTION, POWDER, FOR SOLUTION INTRAMUSCULAR; INTRAVENOUS at 10:59

## 2022-02-18 RX ADMIN — IPRATROPIUM BROMIDE AND ALBUTEROL SULFATE 1 AMPULE: 2.5; .5 SOLUTION RESPIRATORY (INHALATION) at 09:05

## 2022-02-18 RX ADMIN — SODIUM CHLORIDE 25 ML: 9 INJECTION, SOLUTION INTRAVENOUS at 09:22

## 2022-02-18 RX ADMIN — CEFTRIAXONE SODIUM 1000 MG: 1 INJECTION, POWDER, FOR SOLUTION INTRAMUSCULAR; INTRAVENOUS at 09:25

## 2022-02-18 RX ADMIN — DEXTROSE MONOHYDRATE 500 MG: 50 INJECTION, SOLUTION INTRAVENOUS at 19:19

## 2022-02-18 RX ADMIN — BUDESONIDE AND FORMOTEROL FUMARATE DIHYDRATE 2 PUFF: 80; 4.5 AEROSOL RESPIRATORY (INHALATION) at 21:03

## 2022-02-18 RX ADMIN — PANTOPRAZOLE SODIUM 40 MG: 40 TABLET, DELAYED RELEASE ORAL at 09:13

## 2022-02-18 RX ADMIN — ACETAMINOPHEN 650 MG: 325 TABLET, FILM COATED ORAL at 07:12

## 2022-02-18 RX ADMIN — ENOXAPARIN SODIUM 80 MG: 100 INJECTION SUBCUTANEOUS at 20:32

## 2022-02-18 RX ADMIN — IPRATROPIUM BROMIDE AND ALBUTEROL SULFATE 1 AMPULE: 2.5; .5 SOLUTION RESPIRATORY (INHALATION) at 12:28

## 2022-02-18 RX ADMIN — BUDESONIDE AND FORMOTEROL FUMARATE DIHYDRATE 2 PUFF: 80; 4.5 AEROSOL RESPIRATORY (INHALATION) at 09:05

## 2022-02-18 RX ADMIN — ATORVASTATIN CALCIUM 10 MG: 10 TABLET, FILM COATED ORAL at 09:12

## 2022-02-18 RX ADMIN — METHYLPREDNISOLONE SODIUM SUCCINATE 40 MG: 40 INJECTION, POWDER, FOR SOLUTION INTRAMUSCULAR; INTRAVENOUS at 00:11

## 2022-02-18 RX ADMIN — POTASSIUM CHLORIDE 10 MEQ: 1500 TABLET, EXTENDED RELEASE ORAL at 09:12

## 2022-02-18 RX ADMIN — ENOXAPARIN SODIUM 80 MG: 100 INJECTION SUBCUTANEOUS at 10:59

## 2022-02-18 RX ADMIN — ACETAMINOPHEN 650 MG: 325 TABLET, FILM COATED ORAL at 19:15

## 2022-02-18 RX ADMIN — CARVEDILOL 6.25 MG: 6.25 TABLET, FILM COATED ORAL at 18:38

## 2022-02-18 RX ADMIN — SODIUM CHLORIDE, PRESERVATIVE FREE 10 ML: 5 INJECTION INTRAVENOUS at 00:11

## 2022-02-18 RX ADMIN — DONEPEZIL HYDROCHLORIDE 10 MG: 10 TABLET, FILM COATED ORAL at 09:13

## 2022-02-18 RX ADMIN — DOCUSATE SODIUM 100 MG: 100 CAPSULE, LIQUID FILLED ORAL at 09:13

## 2022-02-18 RX ADMIN — METHYLPREDNISOLONE SODIUM SUCCINATE 40 MG: 40 INJECTION, POWDER, FOR SOLUTION INTRAMUSCULAR; INTRAVENOUS at 23:45

## 2022-02-18 RX ADMIN — PREGABALIN 75 MG: 75 CAPSULE ORAL at 20:32

## 2022-02-18 RX ADMIN — FLUOXETINE 20 MG: 20 CAPSULE ORAL at 09:13

## 2022-02-18 RX ADMIN — FUROSEMIDE 20 MG: 10 INJECTION, SOLUTION INTRAMUSCULAR; INTRAVENOUS at 09:14

## 2022-02-18 RX ADMIN — AMLODIPINE BESYLATE 10 MG: 10 TABLET ORAL at 09:12

## 2022-02-18 RX ADMIN — IPRATROPIUM BROMIDE AND ALBUTEROL SULFATE 1 AMPULE: 2.5; .5 SOLUTION RESPIRATORY (INHALATION) at 21:03

## 2022-02-18 RX ADMIN — PREGABALIN 75 MG: 75 CAPSULE ORAL at 09:12

## 2022-02-18 ASSESSMENT — PAIN SCALES - GENERAL
PAINLEVEL_OUTOF10: 6
PAINLEVEL_OUTOF10: 3
PAINLEVEL_OUTOF10: 6

## 2022-02-18 NOTE — PROGRESS NOTES
Writer tried obtaining vitals for am vitals. Patient refused saying \"do not touch me and get out\". Writer will try again and get vitals in an hour.

## 2022-02-18 NOTE — FLOWSHEET NOTE
RN notified Dr Sunil Fay of Venous scan, orders received for:    One time dose 40mg Lovenox. 1mg/kg BID Lovenox.

## 2022-02-18 NOTE — PROGRESS NOTES
Alexis Garcia MD/Wilder Wakefield MD/ Rosco Baumgarten MD/Dr Chau Columbus APRN AGACNP-BC, NP-C      Mara Mahajanviky GIBBONS NP-C     Florentin GIBBONS NP-C                                           Pulmonary Progress Note    Patient - Brandi Bound   Age - 66 y.o.   - 1950  MRN - [id number]  Acct # - [de-identified]  Date of Admission - 2022  9:38 PM    Consulting Service/Physician:       Primary Care Physician: Marianne Shrestha DO    SUBJECTIVE:     Chief Complaint:   Chief Complaint   Patient presents with    Shortness of Breath     Subjective:    Felipe Sanchez states his breathing is feeling slightly improved today. He continues with some mild chest tightness. He is currently on 3 L nasal cannula pulse ox 94%. He feels the nebulizer treatments are helping. He does not have nebulizer treatments at home. He is also wondering if he needs home oxygen. Venous Doppler did show an acute DVT in the right gastrocnemius. He was placed on Lovenox 1 mg/kg twice daily. Creatinine today is 1.32. Blood cultures are negative so far. He continues on IV Rocephin, IV Lasix and IV Solu-Medrol. VITALS  BP (!) 158/63   Pulse 64   Temp 97.5 °F (36.4 °C) (Oral)   Resp 18   Wt 177 lb (80.3 kg)   SpO2 94%   BMI 25.04 kg/m²   Wt Readings from Last 3 Encounters:   22 177 lb (80.3 kg)   22 185 lb (83.9 kg)   22 185 lb (83.9 kg)     I/O (24 Hours)    Intake/Output Summary (Last 24 hours) at 2022 7173  Last data filed at 2022 0817  Gross per 24 hour   Intake 1500 ml   Output 1020 ml   Net 480 ml     Ventilator:      Exam:   Physical Exam   Constitutional: Laying in bed on 3 L nasal cannula in no acute distress  HENT: Unremarkable  Head: Normocephalic and atraumatic. Eyes: EOM are normal. Pupils are equal, round, and reactive to light. Neck: Neck supple. Cardiovascular:  Regular rate and rhythm. Normal heart tones. No JVD. Pulmonary/Chest: Inspiratory and expiratory wheeze slightly improved from yesterday, respirations even and unlabored, on 3 L nasal cannula pulse ox 94%. Abdominal: Soft. Bowel sounds are normal.    Musculoskeletal: Normal range of motion. Neurological: Patient is alert and oriented to person, place, and time. Skin: Skin is warm and dry. No rash noted.    Extremities: No edema or discoloration  Infusions:      sodium chloride 25 mL (02/18/22 0922)     Meds:     Current Facility-Administered Medications:     sodium chloride flush 0.9 % injection 5-40 mL, 5-40 mL, IntraVENous, 2 times per day, Roderick T Dixon, DO, 10 mL at 02/18/22 0011    sodium chloride flush 0.9 % injection 5-40 mL, 5-40 mL, IntraVENous, PRN, Roderick T Dixon, DO    0.9 % sodium chloride infusion, 25 mL, IntraVENous, PRN, Roderick T Dixon, DO, Last Rate: 100 mL/hr at 02/18/22 0922, 25 mL at 02/18/22 0922    enoxaparin (LOVENOX) injection 40 mg, 40 mg, SubCUTAneous, Daily, Roderick T Dixon, DO, 40 mg at 02/17/22 1206    ondansetron (ZOFRAN-ODT) disintegrating tablet 4 mg, 4 mg, Oral, Q8H PRN **OR** ondansetron (ZOFRAN) injection 4 mg, 4 mg, IntraVENous, Q6H PRN, Roderick T Dixon, DO    polyethylene glycol (GLYCOLAX) packet 17 g, 17 g, Oral, Daily PRN, Roderick T Dixon, DO    acetaminophen (TYLENOL) tablet 650 mg, 650 mg, Oral, Q6H PRN, 650 mg at 02/18/22 0712 **OR** acetaminophen (TYLENOL) suppository 650 mg, 650 mg, Rectal, Q6H PRN, Roderick T Dixon, DO    cefTRIAXone (ROCEPHIN) 1000 mg IVPB in 50 mL D5W minibag, 1,000 mg, IntraVENous, Q24H, Roderick T Dixon, DO, Last Rate: 100 mL/hr at 02/18/22 0925, 1,000 mg at 02/18/22 0925    furosemide (LASIX) injection 20 mg, 20 mg, IntraVENous, Daily, Roderick T Dixon, DO, 20 mg at 02/18/22 0914    potassium chloride (KLOR-CON M) extended release tablet 10 mEq, 10 mEq, Oral, Daily with breakfast, Roderick T Dixon, DO, 10 mEq at 02/18/22 0912    pantoprazole (PROTONIX) tablet 40 mg, 40 mg, Oral, QAM AC, Roderick T Dixon, DO, 40 mg at 02/18/22 0913    atorvastatin (LIPITOR) tablet 10 mg, 10 mg, Oral, Daily, Roderick T Dixon, DO, 10 mg at 02/18/22 0912    FLUoxetine (PROZAC) capsule 20 mg, 20 mg, Oral, Daily, Roderick T Dixon, DO, 20 mg at 02/18/22 0913    carvedilol (COREG) tablet 6.25 mg, 6.25 mg, Oral, BID WC, Roderick T Dixon, DO, 6.25 mg at 02/18/22 0916    donepezil (ARICEPT) tablet 10 mg, 10 mg, Oral, Daily, Roderick T Dixon, DO, 10 mg at 02/18/22 0913    amLODIPine (NORVASC) tablet 10 mg, 10 mg, Oral, Daily, Roderick T Dixon, DO, 10 mg at 02/18/22 0912    docusate sodium (COLACE) capsule 100 mg, 100 mg, Oral, Daily, Roderick T Dixon, DO, 100 mg at 02/18/22 0913    losartan (COZAAR) tablet 50 mg, 50 mg, Oral, Daily, Roderick T Dixon, DO, 50 mg at 02/18/22 0912    pregabalin (LYRICA) capsule 75 mg, 75 mg, Oral, BID, Roderick T Dixon, DO, 75 mg at 02/18/22 0912    budesonide-formoterol (SYMBICORT) 80-4.5 MCG/ACT inhaler 2 puff, 2 puff, Inhalation, BID, Roderick T Dixon, DO, 2 puff at 02/18/22 0905    phenazopyridine (PYRIDIUM) tablet 100 mg, 100 mg, Oral, TID PRN, Roderick T Dixon, DO    methylPREDNISolone sodium (SOLU-MEDROL) injection 40 mg, 40 mg, IntraVENous, Q12H, EDWIGE Dasilva CNP, 40 mg at 02/18/22 0011    ipratropium-albuterol (DUONEB) nebulizer solution 1 ampule, 1 ampule, Inhalation, Q4H WA, EDWIGE Dasilva CNP, 1 ampule at 02/18/22 0905    enoxaparin (LOVENOX) injection 80 mg, 1 mg/kg, SubCUTAneous, BID, Roderick Metcalf,     sodium chloride flush 0.9 % injection 10 mL, 10 mL, IntraVENous, PRN, Brandyn Cole MD, 10 mL at 02/16/22 2304    Lab Results:     Lab Results   Component Value Date    WBC 5.9 02/18/2022    HGB 10.3 (L) 02/18/2022    HCT 31.2 (L) 02/18/2022    MCV 87.5 02/18/2022     (L) 02/18/2022     Lab Results   Component Value Date    CALCIUM 8.4 (L) 02/18/2022     02/18/2022    K 4.9 02/18/2022    CO2 27 02/18/2022     (H) 02/18/2022    BUN 35 (H) 02/18/2022    CREATININE 1.32 (H) 02/18/2022       Lab Results   Component Value Date    INR 1.0 01/29/2022    PROTIME 10.7 01/29/2022       Radiology:     ASSESSMENT:     Acute exacerbation diastolic CHF, echo from 2/19 with grade 1 diastolic dysfunction  Acute hypoxic respiratory failure, currently requiring 3 L nasal cannula  Dyspnea  Bilateral pleural effusions  COPD exacerbation, not formally diagnosed, greater than 60-pack-year history of smoking, emphysematous changes on CT chest  Acute DVT right gastrocnemius  Granuloma right lower lobe  Acute kidney injury, creatinine 1.32, improved from 2.27  History of follicular lymphoma, follows with Dr. Lawanda Austin, on rituximab every 2 months  History of hypertension  History of hyperlipidemia  History of diabetes  Probable KATIE  History of tobacco use, greater than 60-pack-year, quitting 10 years ago  Status post bilateral ureteral stent exchange with Dr. Venecia Gongora 2/16  Full code       PLAN:   1. Continue with diuresis  2. Continue with Solu-Medrol  3. Continue with nebulizer treatments  4. Will need oral anticoagulation due to DVT, currently on Lovenox, defer to primary  5. Will need home nebulizer machine at discharge  6. Will need home O2 eval prior to discharge  7. BMP in a.m.  8. Will need outpatient pulmonary follow-up for PFT testing and sleep study  9. Supportive care  10. Chest x-ray in a.m. Electronically signed by EDWIGE Guardado CNP on 02/18/22     This progress note was completed using a voice transcription system. Every effort was made to ensure accuracy. However, inadvertent computerized transcription errors may be present.     Felicity Mosley, NP-C, MSN  [facility] Pulmonary, Critical Care & Sleep

## 2022-02-18 NOTE — PLAN OF CARE
Problem: Falls - Risk of:  Goal: Will remain free from falls  Description: Will remain free from falls  2/18/2022 0345 by Jaylene Freitas RN  Outcome: Ongoing     No falls noted this shift. Patient ambulates with x1 staff assistance without difficulty. Bed kept in low position. Safe environment maintained. Bedside table & call light in reach. Uses call light appropriately when needing assistance. Problem: Pain:  Goal: Patient's pain/discomfort is manageable  Description: Patient's pain/discomfort is manageable  2/18/2022 0345 by Jaylene Freitas RN  Outcome: Ongoing     Patient given PRN tylenol for back pain.      Problem: Daily Care:  Goal: Daily care needs are met  Description: Daily care needs are met  2/18/2022 0345 by Jaylene Freitas RN  Outcome: Ongoing

## 2022-02-18 NOTE — CARE COORDINATION
ONGOING DISCHARGE PLAN:    Patient is alert and oriented x4. Spoke with patient regarding discharge plan and patient confirms that plan is  to discharge to home with no needs  Will follow for a home ox eval   Try to wean oxygen   Iv lasix   Venous scan ordered  Solu-medrol 40 bid       Will continue to follow for additional discharge needs.     Electronically signed by Jacquie Chowdhury RN on 2/18/2022 at 9:46 AM

## 2022-02-18 NOTE — CONSULTS
[address], [address]  [phone]    HISTORY & PHYSICAL / CONSULT NOTE     Juan Palomarse    PCP:  Juvenal Don DO    Date of Admission:  2/16/2022  Date of Consultation:  2/18/2022 9:40 AM    Consult for  CHF    SUBJECTIVE     History of Present Illness:  Juan Palomares is a 66 y.o. male with history of  Chronic diastolic heart failure, hypertension, hyperlipidemia, diabetes mellitus, Hodgkin's lymphoma status post chemotherapy, hypothyroidism. 01/2022 TTE: LVEF 55%. Mild MR /TR. RVSP 44 mm Hg. Patient presented to [facility] ED on  02/16/2022 complaining of shortness of breath. Patient has chronic hydronephrosis ureter stent placement yesterday. Patient stated that he has been gradually feeling short of breath with exertion for a few week with on and off lower extremity edema. Denies any chest pain, palpitations, dizziness, orthopnea, PND, syncope or presyncope. Patient reported smoking 2 packs per day for about 50 years and quit 10 years ago. Patient smokes marijuana occasionally. No recreational drug use. On  Admission blood pressure 165/65, rest of vitals within normal limits. EKG showed patient in normal sinus rhythm, possible old anterior infarct, no acute ischemic changes. COVID test was negative. Of presentation creatinine 1.5, down to 1.3 this morning. ProBNP 2200. Sodium 147, back to normal.  Potassium within normal limits. Venous ultrasound of the lower extremities showed evidence of acute right leg DVT. patient started on  IV Lasix 20 mg daily. at the time I saw her patient stated that her breathing is back to normal.  Stated her edema has resolved. Denies any chest pain shortness of breath, orthopnea. Telemetry was reviewed. No significant events noted.     Previous Medical History:    Past Medical History:   Diagnosis Date    Cancer [facility])     Chemotherapy management, encounter for     Diabetes mellitus ([address]     History of non-Hodgkin's lymphoma     Hyperlipidemia     Hypertension     Hypothyroidism        Previous Surgical History:    Past Surgical History:   Procedure Laterality Date    CHOLECYSTECTOMY, LAPAROSCOPIC N/A 10/9/2021    CHOLECYSTECTOMY LAPAROSCOPIC ROBOTIC XI performed by Susannah Duverney, MD at [facility] Left 9/24/2021    COLONOSCOPY POLYPECTOMY SNARE/COLD BIOPSY performed by Maykel Demarco MD at [address] [place] [address] Drive Right 10/6/2021    CYSTOSCOPY PYELOGRAM URETERAL STENT INSERTION performed by Valarie Snyder MD at [address] [place] [address] Drive Bilateral 11/24/2021    CYSTOSCOPY URETERAL [address] LEFT INSERTION performed by Valarie Snyder MD at [address] [place] [address] Bilateral 2/16/2022    CYSTOSCOPY WITH BILATERAL STENT EXCHANGE performed by Valarie Snyder MD at [id number] [facility] IR BIOPSY ABDOMINAL MASS  7/6/2021    IR BIOPSY ABDOMINAL MASS 7/6/2021 [facility]    IR PORT PLACEMENT EQUAL OR GREATER THAN 5 YEARS  8/31/2021    IR PORT PLACEMENT EQUAL OR GREATER THAN 5 YEARS 8/31/2021 [facility]    TONSILLECTOMY      as child       Allergies:  No Known Allergies     Hospital Meds:    Current Facility-Administered Medications   Medication Dose Route Frequency Provider Last Rate Last Admin    sodium chloride flush 0.9 % injection 5-40 mL  5-40 mL IntraVENous 2 times per day Roderick T Dixon, DO   10 mL at 02/18/22 0011    sodium chloride flush 0.9 % injection 5-40 mL  5-40 mL IntraVENous PRN Roderick T Dixon, DO        0.9 % sodium chloride infusion  25 mL IntraVENous PRN Roderick T Dixon,  mL/hr at 02/18/22 0922 25 mL at 02/18/22 0922    enoxaparin (LOVENOX) injection 40 mg  40 mg SubCUTAneous Daily Roderick T Dixon, DO   40 mg at 02/17/22 1206    ondansetron (ZOFRAN-ODT) disintegrating tablet 4 mg  4 mg Oral Q8H PRN Kosta Woods DO        Or    ondansetron (ZOFRAN) injection 4 mg  4 mg IntraVENous Q6H PRN Roderick T Dixon, DO        polyethylene glycol (GLYCOLAX) packet 17 g  17 g Oral Daily PRN Shan Miser, DO        acetaminophen (TYLENOL) tablet 650 mg  650 mg Oral Q6H PRN Shan Miser, DO   650 mg at 02/18/22 1660    Or    acetaminophen (TYLENOL) suppository 650 mg  650 mg Rectal Q6H PRN Shan Miser, DO        cefTRIAXone (ROCEPHIN) 1000 mg IVPB in 50 mL D5W minibag  1,000 mg IntraVENous Q24H Roderick T Dixon,  mL/hr at 02/18/22 0925 1,000 mg at 02/18/22 0925    furosemide (LASIX) injection 20 mg  20 mg IntraVENous Daily Roderick T Dixon, DO   20 mg at 02/18/22 0914    potassium chloride (KLOR-CON M) extended release tablet 10 mEq  10 mEq Oral Daily with breakfast Brand Turner T Dixon, DO   10 mEq at 02/18/22 0912    pantoprazole (PROTONIX) tablet 40 mg  40 mg Oral QAM AC Roderick T Dixon, DO   40 mg at 02/18/22 0913    atorvastatin (LIPITOR) tablet 10 mg  10 mg Oral Daily Roderick T Dixon, DO   10 mg at 02/18/22 0912    FLUoxetine (PROZAC) capsule 20 mg  20 mg Oral Daily Roderick T Dixon, DO   20 mg at 02/18/22 0913    carvedilol (COREG) tablet 6.25 mg  6.25 mg Oral BID WC Roderick T Dixon, DO   6.25 mg at 02/18/22 0916    donepezil (ARICEPT) tablet 10 mg  10 mg Oral Daily Roderick T Dixon, DO   10 mg at 02/18/22 0913    amLODIPine (NORVASC) tablet 10 mg  10 mg Oral Daily Roderick T Dixon, DO   10 mg at 02/18/22 0912    docusate sodium (COLACE) capsule 100 mg  100 mg Oral Daily Roderick T Dixon, DO   100 mg at 02/18/22 0913    losartan (COZAAR) tablet 50 mg  50 mg Oral Daily Roderick T Dixon, DO   50 mg at 02/18/22 0912    pregabalin (LYRICA) capsule 75 mg  75 mg Oral BID Roderick T Dixon, DO   75 mg at 02/18/22 0912    budesonide-formoterol (SYMBICORT) 80-4.5 MCG/ACT inhaler 2 puff  2 puff Inhalation BID Kyle Chappell DO   2 puff at 02/18/22 0905    phenazopyridine (PYRIDIUM) tablet 100 mg  100 mg Oral TID PRN Kyle Chappell, DO        methylPREDNISolone sodium (SOLU-MEDROL) injection phenazopyridine (PYRIDIUM) 100 MG tablet Take 1 tablet by mouth 3 times daily as needed for Pain 2/16/22 2/21/22  Gemini Stewart MD   furosemide (LASIX) 20 MG tablet Take 1 tablet by mouth daily 2/1/22   Shruthi Baca,    megestrol (MEGACE) 40 MG/ML suspension Take 10 mLs by mouth daily 11/15/21   Harry Mcwilliams MD   amLODIPine (NORVASC) 10 MG tablet Take 1 tablet by mouth daily  Patient taking differently: Take 10 mg by mouth 2 times daily  10/12/21   Roderick Metcalf DO   donepezil (ARICEPT) 10 MG tablet Take 10 mg by mouth daily  7/11/21   Historical Provider, MD        Social History:  TOBACCO:   reports that he quit smoking about 16 years ago. His smoking use included cigarettes. He has a 90.00 pack-year smoking history. He has never used smokeless tobacco.  ETOH:   reports current alcohol use of about 1.7 standard drinks of alcohol per week. DRUGS:  reports current drug use. Drug: Marijuana [employer]). OCCUPATION:      Family History:   Family History   Problem Relation Age of Onset    Diabetes Mother     Alzheimer's Disease Father     Heart Disease Brother     Heart Disease Brother         Review of Systems:   · Constitutional: there has been no unanticipated weight loss, no change in energy level, sleep pattern, or activity level. · Eyes: No visual changes or diplopia, no scleral icterus. · ENT: No Headaches, hearing loss or vertigo. No sore throat  · Cardiovascular:  See HPI   · Respiratory: No cough or wheezing, no sputum production, no hematemesis. · Gastrointestinal: No abdominal pain, no constipation, no diarrhea, no hematochezia, no melena. · Genitourinary: No dysuria, trouble voiding, or hematuria  · Musculoskeletal:  No gait disturbance, weakness or joint complaints  · Integumentary: No rash or pruritis  · Neurological: No headache, focal muscle weakness, focal numbness or tingling.    · Hematologic/Lymphatic: No abnormal bruising or bleeding, blood clots.  · Allergic/Immunologic: No nasal congestion or hives    OBJECTIVE     LAST LABS:   CBC: @LABRCNTIP(WBC:3,HGB:3,HCT:3,MCV:3,PLT:3)@      [email or web address]  PT/INR: @LABRCNTIP(PROTIME:3,INR:3)@  APTT: @LABRCNTIP(aPTT:3)@  MAG: @LABRCNTIP(MG:3)@  D Dimer: @LABRCNTIP(DDIMER:3)@  Troponin I @LABRCNTIP(TROPONINI:3)@  ProBNP @LABRCNTIP(BNP:3)@  Lipid Panel:    Lab Results   Component Value Date    CHOL 132 02/29/2016    TRIG 410 02/29/2016    HDL 26 02/29/2016     Liver Panel: No results found for: ALB  HgA1C:  No components found for: HGBA1C    ABG: No components found for: ABGSAMPLETYP, ABGBODYTEMP, ABGPHCORRFOR, EZJDHG8IDVEQP, ABGPHCORRFOOR, ABGPH, ABGPCO2, ABGPO2, ABGBASEEXCES, ABGBASEDEFIC, ABGHCO3, NLAI4VVP, ABGENDTIDALC, ABGALLENSTES, ABGSPO2, ABGSAMEPLESIT, SQOOEKS12TNP, ABGOXYGENSOUE    RADIOLOGY:  [unfilled]    PHYSICAL EXAM  Admission Weight: Weight: 183 lb 3.2 oz (83.1 kg)  I/O last 3 completed shifts: In: 1270 [P.O.:1270]  Out: 2470 [Urine:2470]  Weight change: -6 lb 3.2 oz (-2.812 kg)  Wt Readings from Last 3 Encounters:   02/18/22 177 lb (80.3 kg)   02/16/22 185 lb (83.9 kg)   02/14/22 185 lb (83.9 kg)       Vitals:  Vitals:    02/18/22 0014 02/18/22 0330 02/18/22 0731 02/18/22 0908   BP: (!) 150/60  (!) 158/63    Pulse: 58  64    Resp: 18 16 18   Temp: 97.9 °F (36.6 °C)  97.5 °F (36.4 °C)    TempSrc: Oral  Oral    SpO2: 91%  95% 94%   Weight:  177 lb (80.3 kg)         Admit Weight  Weight: 183 lb 3.2 oz (83.1 kg)  Last 3 Weights  [unfilled]  Body mass index is 25.04 kg/m². INTAKE/OUTPUT  I/O last 3 completed shifts:   In: 1270 [P.O.:1270]  Out: 2470 [Urine:2470]    Intake/Output Summary (Last 24 hours) at 2/18/2022 0940  Last data filed at 2/18/2022 0817  Gross per 24 hour   Intake 1500 ml   Output 1020 ml   Net 480 ml       General appearance: Alert oriented and cooperative, in no acute distress  Skin: Warm and dry to touch  Head: Normocephalic, without obvious abnormality, atraumatic  Eyes: Conjunctivae unremarkable, EOMs intact, sclera non icteric  Neck: No JVD, no carotid bruit, neck supple, trachea midline  Lungs: bilateral wheeze and crackles, no use of accessory muscles. Heart[de-identified] RRR with normal S1 and S2 , no murmurs and no gallops. Abdomen: Soft, non-tender, bowel sounds normal  Extremities: No edema  Neurologic: Oriented to time, person and place, affect appropriate, no focal/major motor or sensory defects noted  Psychiatric:  Appropriate mood, memory and judgment    ASSESSMENT     1. Acute on chronic diastolic heart failure exacerbatgion  2. Acute right LE DVT  3. HTN  4. HLD  5. DM  6. Acute kidney injury/CKD  7. Acute on chronic anemia    PLAN     - Pt clinically improved. Symptoms likely multifactorial due to acute HFpEF and suspect underlying COPD. -  Will increase IV lasix to 40 mg daily, reassess in the morning.  -  On full dose Lovenox for acute DVT, plan to discharge on NOAC. -   Will plan for stress test as outpatient. -  Continue Norvasc 10, Lipitor 10, Coreg 6.25 b.i.d., losartan 50 mg daily. -   Counseled patient on salt and fluid restriction.  -   Counseled patient on marijuana cessation.  -   Recommend pulmonology evaluation.  -   Will follow up. Cat Hadley MD    This note was completed using a voice transcription system. Every effort was made to ensure accuracy. However, inadvertent computerized transcription errors may be present.

## 2022-02-18 NOTE — PROGRESS NOTES
[id number] [place]      PROGRESS NOTE        Patient:  Osmin Posada  YOB: 1950    MRN: [id number]     Acct: [de-identified]     Admit date: 2/16/2022    Pt seen and Chart reviewed. Consultant notes reviewed and care evaluated. Subjective: Patient states he was feeling better today. He is actually eating dinner and has good appetite. His wife and daughter in the room did discuss with him his diagnoses and treatment plan as well as new finding of DVT in the leg and the plan for treating that as well they had a concern about him getting not getting a result of her oxygen when he went home from [facility] and they want to make sure this is something we could look into and walk on before discharge and that told him that we will work on that to see if he qualifies to have home oxygen once he is stable enough from us to discharge him home with above. Otherwise he denies any chest pain or increased shortness of breath today he still have some coughing but he thinks is better than what it was yesterday    Diet:  ADULT DIET;  Regular      Medications:Current Inpatient    Scheduled Meds:   [START ON 2/19/2022] furosemide  40 mg IntraVENous Daily    sodium chloride flush  5-40 mL IntraVENous 2 times per day    cefTRIAXone (ROCEPHIN) IV  1,000 mg IntraVENous Q24H    potassium chloride  10 mEq Oral Daily with breakfast    pantoprazole  40 mg Oral QAM AC    atorvastatin  10 mg Oral Daily    FLUoxetine  20 mg Oral Daily    carvedilol  6.25 mg Oral BID WC    donepezil  10 mg Oral Daily    amLODIPine  10 mg Oral Daily    docusate sodium  100 mg Oral Daily    losartan  50 mg Oral Daily    pregabalin  75 mg Oral BID    budesonide-formoterol  2 puff Inhalation BID    methylPREDNISolone  40 mg IntraVENous Q12H    ipratropium-albuterol  1 ampule Inhalation Q4H WA    enoxaparin  1 mg/kg SubCUTAneous BID     Continuous Infusions:   sodium chloride 25 mL (02/18/22 0922)     PRN Meds:sodium chloride flush, sodium chloride, ondansetron **OR** ondansetron, polyethylene glycol, acetaminophen **OR** acetaminophen, phenazopyridine, sodium chloride flush        Physical Exam:  Vitals: /70   Pulse 65   Temp 97.7 °F (36.5 °C) (Oral)   Resp 18   Wt 177 lb (80.3 kg)   SpO2 94%   BMI 25.04 kg/m²   24 hour intake/output:    Intake/Output Summary (Last 24 hours) at 2/18/2022 1814  Last data filed at 2/18/2022 1804  Gross per 24 hour   Intake 2400 ml   Output 1750 ml   Net 650 ml     Last 3 weights: Wt Readings from Last 3 Encounters:   02/18/22 177 lb (80.3 kg)   02/16/22 185 lb (83.9 kg)   02/14/22 185 lb (83.9 kg)       Physical Examination:   General appearance - alert, well appearing, and in no distress  Mental status - alert, oriented to person, place, and time  PERRLA  Chest -better aeration today very slight wheeze compared to yesterday. Heart - normal rate, regular rhythm, normal S1, S2, no murmurs, rubs, clicks or gallops  Abdomen - soft, nontender, nondistended, no masses or organomegaly  Neurological - alert, oriented, normal speech, no focal findings or movement disorder noted}  Extremities - peripheral pulses normal, no pedal edema, no clubbing or cyanosis  Skin - normal coloration and turgor, no rashes, no suspicious skin lesions noted      Component Value Units   Culture, Blood 1 [[id number]]    Collected: 02/17/22 0956    Updated: 02/18/22 1133    Specimen Source: Blood     Specimen Description . BLOOD RT [facility]    Special Requests NOT REPORTED    Culture NO GROWTH 1 DAY   Culture, Blood 1 [[id number]]    Collected: 02/17/22 0954    Updated: 02/18/22 1133    Specimen Source: Blood     Specimen Description . BLOOD LT [facility]    Special Requests NOT REPORTED    Culture NO GROWTH 1 DAY   Basic Metabolic Panel [[id number]] (Abnormal)    Collected: 02/18/22 0507    Updated: 02/18/22 0561    Specimen Source: Blood     Glucose 181 High  mg/dL    BUN 35 High  mg/dL CREATININE 1.32 High  mg/dL    Bun/Cre Ratio NOT REPORTED    Calcium 8.4 Low  mg/dL    Sodium 144 mmol/L    Potassium 4.9 mmol/L    Chloride 108 High  mmol/L    CO2 27 mmol/L    Anion Gap 9 mmol/L    GFR Non-African American 53 Low  mL/min    GFR African American >60 mL/min    GFR Comment         Comment: Average GFR for 79or more years old:    65 mL/min/1.73sq m   Chronic Kidney Disease:    <60 mL/min/1.73sq m   Kidney failure:    <15 mL/min/1.73sq m               eGFR calculated using average adult body mass.  Additional eGFR calculator available at:         [email or web address]              GFR Staging NOT REPORTED   CBC with Auto Differential [[id number]] (Abnormal)    Collected: 02/18/22 0507    Updated: 02/18/22 0533    Specimen Source: Blood     WBC 5.9 k/uL    RBC 3.56 Low  m/uL    Hemoglobin 10.3 Low  g/dL    Hematocrit 31.2 Low  %    MCV 87.5 fL    MCH 28.8 pg    MCHC 32.9 g/dL    RDW 15.7 High  %    Platelets 248 POR  k/uL    MPV 9.0 fL    NRBC Automated NOT REPORTED per 100 WBC    Differential Type NOT REPORTED    Immature Granulocytes NOT REPORTED %    Absolute Immature Granulocyte NOT REPORTED k/uL    WBC Morphology NOT REPORTED    RBC Morphology NOT REPORTED    Platelet Estimate NOT REPORTED    Seg Neutrophils 89 High  %    Lymphocytes 10 Low  %    Monocytes 1 %    Eosinophils % 0 %    Basophils 0 %    Segs Absolute 5.20 k/uL    Absolute Lymph # 0.60 Low  k/uL    Absolute Mono # 0.10 k/uL    Absolute Eos # 0.00 k/uL    Basophils Absolute 0.00 k/uL   EKG 12 Lead [[id number]]    Collected: 02/16/22 2210    Updated: 02/17/22 2050     Ventricular Rate 74 BPM    Atrial Rate 74 BPM    P-R Interval 128 ms    QRS Duration 108 ms    Q-T Interval 416 ms    QTc Calculation (Bazett) 461 ms    P Axis 57 degrees    R Axis 27 degrees    T Axis 33 degrees   Narrative:     Normal sinus rhythm   Cannot rule out Anterior infarct (cited on or before 12-NOV-2011)   Abnormal ECG   When compared with ECG of 08-OCT-2021 13:02,   Criteria for Inferior infarct are no longer Present   VL Lower Extremity Bilateral Venous Duplex [[id number]]    Collected: 02/17/22 1057    Updated: 02/17/22 1826    Narrative:         [facility]    Vascular Lower Extremities DVT Study Procedure        Patient Name   Michael Chakraborty    Date of Study           02/17/2022                  Ron Carrizales        Date of Birth  1950  Gender                  Male        Age            71 year(s)  Race                            Room Number    5744        Corporate ID # [id number]        Patient Acct # [de-identified]        MR #           [id number]      Sonographer             Ifrah Domínguez RVT        Accession #    [id number]  Interpreting Physician Giulia Jasso        Referring                  Referring Physician     Adrianne Andrews    Practitioner       Procedure   Type of Study:        Veins: Lower Extremities DVT Study, Venous Scan Lower Bilateral.       Indications for Study:Edema and Hypoxia. Patient Status: In Patient.    Technical Quality:Adequate visualization.       - Critical Result:GIOVANY Cortez.      Conclusions        Summary        Acute deep vein thrombosis of the right leg involving the gastrocnemius    vein.        Signature        ----------------------------------------------------------------    Electronically signed by Ifrah Domínguez RVT(Sonographer) on    02/17/2022 11:37 AM    ----------------------------------------------------------------        ----------------------------------------------------------------    Electronically signed by Ulice Belts Reyes,Arthur(Interpreting   [address] [place] [address]) on 02/17/2022 06:26 PM    ----------------------------------------------------------------       Findings:        Right Impression:                    Left Impression:    The common femoral, femoral,         The common femoral, femoral,    popliteal and tibial veins           popliteal and tibial veins    demonstrate normal compressibility   demonstrate normal compressibility    and augmentation.                    and augmentation.        Three gastroc veins are dilated and  Normal compressibility of the great    non-compressible with hypoechoic     saphenous vein.    echoes.                                         Normal compressibility of the small    Normal compressibility of the great  saphenous vein.    saphenous vein.                                         Calcific plaque noted in the common    Normal compressibility of the small  femoral, femoral and popliteal    saphenous vein.                      arteries.        Calcific plaque noted in the common    femoral, femoral and popliteal    arteries.       Velocities are measured in cm/s ; Diameters are measured in cm     Right Lower Extremities DVT Study Measurements   Right 2D Measurements   +------------------------------------+----------+---------------+----------+   ! Location                            ! Visualized! Compressibility! Thrombosis! +------------------------------------+----------+---------------+----------+   ! Common Femoral                      ! Yes       ! Yes            !None      !   +------------------------------------+----------+---------------+----------+   ! Prox Femoral                        ! Yes       ! Yes            !None      !   +------------------------------------+----------+---------------+----------+   ! Mid Femoral                         ! Yes       ! Yes            !None      !   +------------------------------------+----------+---------------+----------+   ! Dist Femoral                        ! Yes       ! Yes            !None      !   +------------------------------------+----------+---------------+----------+   ! Deep Femoral                        ! Yes       ! Yes            !None      !   +------------------------------------+----------+---------------+----------+   ! Popliteal                           ! Yes       ! Yes            !None      !   +------------------------------------+----------+---------------+----------+   ! Sapheno Femoral Junction            ! Yes       ! Yes            !None      !   +------------------------------------+----------+---------------+----------+   ! PTV                                 ! Yes       ! Yes            !None      !   +------------------------------------+----------+---------------+----------+   ! Peroneal                            ! Yes       ! Yes            !None      !   +------------------------------------+----------+---------------+----------+   ! Gastroc                             ! Yes       !No             !          !   +------------------------------------+----------+---------------+----------+   ! GSV Thigh                           ! Yes       ! Yes            !None      !   +------------------------------------+----------+---------------+----------+   ! GSV Knee                            ! Yes       ! Yes            !None      !   +------------------------------------+----------+---------------+----------+   ! GSV Ankle                           ! Yes       ! Yes            !None      !   +------------------------------------+----------+---------------+----------+   ! SSV                                 ! Yes       ! Yes            !None      !   +------------------------------------+----------+---------------+----------+     Left Lower Extremities DVT Study Measurements   Left 2D Measurements   +------------------------------------+----------+---------------+----------+   ! Location                            ! Visualized! Compressibility! Thrombosis! +------------------------------------+----------+---------------+----------+   ! Common Femoral                      ! Yes       ! Yes            !None      !   +------------------------------------+----------+---------------+----------+   ! Prox Femoral                        ! Yes       ! Yes            !None      ! +------------------------------------+----------+---------------+----------+   ! Mid Femoral                         ! Yes       ! Yes            !None      !   +------------------------------------+----------+---------------+----------+   ! Dist Femoral                        ! Yes       ! Yes            !None      !   +------------------------------------+----------+---------------+----------+   ! Deep Femoral                        ! Yes       ! Yes            !None      !   +------------------------------------+----------+---------------+----------+   ! Popliteal                           ! Yes       ! Yes            !None      !   +------------------------------------+----------+---------------+----------+   ! Sapheno Femoral Junction            ! Yes       ! Yes            !None      !   +------------------------------------+----------+---------------+----------+   ! PTV                                 ! Yes       ! Yes            !None      !   +------------------------------------+----------+---------------+----------+   ! Peroneal                            ! Yes       ! Yes            !None      !   +------------------------------------+----------+---------------+----------+   ! Gastroc                             ! Yes       ! Yes            !None      !   +------------------------------------+----------+---------------+----------+   ! GSV Thigh                           ! Yes       ! Yes            !None      !   +------------------------------------+----------+---------------+----------+   ! GSV Knee                            ! Yes       ! Yes            !None      !   +------------------------------------+----------+---------------+----------+   ! GSV Ankle                           ! Yes       ! Yes            !None      !   +------------------------------------+----------+---------------+----------+   ! SSV                                 ! Yes       ! Yes            !None      ! +------------------------------------+----------+---------------+----------+       Current IP Meds        Assessment:  Active Problems:    * No active hospital problems. *  Resolved Problems:    * No resolved hospital problems. 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 Chronic CHF or exacerbated by his procedure yesterday with IV fluid  ·    · Pneumonia reported on CT resolving  ·    · Status post stent placement yesterday  ·    · History of hydronephrosis  ·    · History of lymphoma he is on treatment  · Recently 2 times per month  · History of neuropathy  · Chronic renal insufficiency probably secondary to hydro-  · Acute DVT right calf  · History of carotid stenosis according to his family and he is in the process of seeing a vascular surgeon outpatient was referred by          Plan:  17. Continue the current treatment  18. 19. Continue with his IV antibiotics  20.   21.  We will continue with Lovenox 1 mg/kg twice a day and will see if social service could look into Eliquis if it is covered for him that we can switch to will hopefully can do that tomorrow if he does not qualify it or its Eliquis expensive maybe we will start him on Coumadin as well  22.   23. Discussed with his wife and his daughter were in the room they were concerned about him having oxygen at home and aerosol machine which she did not get when he was at these that explained to him as we plan discharge we will try to see if we can qualify him to get at least an oxygen at home  24.   25. Meanwhile continue with IV Lasix dose was given tomorrow by cardiology and monitor his kidney function  26. Looks like patient stayed on Rocephin instead of cefepime so we will add Zithromax to his regimen to cover him for his infiltrate  27.      Tonya Calixto,   FAAFP           2/18/2022, 6:14 PM

## 2022-02-19 ENCOUNTER — APPOINTMENT (OUTPATIENT)
Dept: GENERAL RADIOLOGY | Age: 72
DRG: 987 | End: 2022-02-19
Payer: MEDICARE

## 2022-02-19 LAB
ABSOLUTE EOS #: 0 K/UL (ref 0–0.4)
ABSOLUTE IMMATURE GRANULOCYTE: ABNORMAL K/UL (ref 0–0.3)
ABSOLUTE LYMPH #: 0.7 K/UL (ref 1–4.8)
ABSOLUTE MONO #: 0.2 K/UL (ref 0.1–1.3)
ANION GAP SERPL CALCULATED.3IONS-SCNC: 7 MMOL/L (ref 9–17)
BASOPHILS # BLD: 0 % (ref 0–2)
BASOPHILS ABSOLUTE: 0 K/UL (ref 0–0.2)
BNP INTERPRETATION: ABNORMAL
BUN BLDV-MCNC: 43 MG/DL (ref 8–23)
BUN/CREAT BLD: ABNORMAL (ref 9–20)
CALCIUM SERPL-MCNC: 8.1 MG/DL (ref 8.6–10.4)
CHLORIDE BLD-SCNC: 105 MMOL/L (ref 98–107)
CO2: 27 MMOL/L (ref 20–31)
CREAT SERPL-MCNC: 1.73 MG/DL (ref 0.7–1.2)
DIFFERENTIAL TYPE: ABNORMAL
EOSINOPHILS RELATIVE PERCENT: 0 % (ref 0–4)
GFR AFRICAN AMERICAN: 47 ML/MIN
GFR NON-AFRICAN AMERICAN: 39 ML/MIN
GFR SERPL CREATININE-BSD FRML MDRD: ABNORMAL ML/MIN/{1.73_M2}
GFR SERPL CREATININE-BSD FRML MDRD: ABNORMAL ML/MIN/{1.73_M2}
GLUCOSE BLD-MCNC: 220 MG/DL (ref 70–99)
HCT VFR BLD CALC: 30.7 % (ref 41–53)
HEMOGLOBIN: 10 G/DL (ref 13.5–17.5)
IMMATURE GRANULOCYTES: ABNORMAL %
LYMPHOCYTES # BLD: 8 % (ref 24–44)
MCH RBC QN AUTO: 28.4 PG (ref 26–34)
MCHC RBC AUTO-ENTMCNC: 32.5 G/DL (ref 31–37)
MCV RBC AUTO: 87.5 FL (ref 80–100)
MONOCYTES # BLD: 2 % (ref 1–7)
NRBC AUTOMATED: ABNORMAL PER 100 WBC
PDW BLD-RTO: 16 % (ref 11.5–14.9)
PLATELET # BLD: 148 K/UL (ref 150–450)
PLATELET ESTIMATE: ABNORMAL
PMV BLD AUTO: 9.3 FL (ref 6–12)
POTASSIUM SERPL-SCNC: 5 MMOL/L (ref 3.7–5.3)
PRO-BNP: 2532 PG/ML
RBC # BLD: 3.51 M/UL (ref 4.5–5.9)
RBC # BLD: ABNORMAL 10*6/UL
SEG NEUTROPHILS: 90 % (ref 36–66)
SEGMENTED NEUTROPHILS ABSOLUTE COUNT: 7.5 K/UL (ref 1.3–9.1)
SODIUM BLD-SCNC: 139 MMOL/L (ref 135–144)
WBC # BLD: 8.3 K/UL (ref 3.5–11)
WBC # BLD: ABNORMAL 10*3/UL

## 2022-02-19 PROCEDURE — 2060000000 HC ICU INTERMEDIATE R&B

## 2022-02-19 PROCEDURE — 2580000003 HC RX 258: Performed by: FAMILY MEDICINE

## 2022-02-19 PROCEDURE — 6360000002 HC RX W HCPCS: Performed by: FAMILY MEDICINE

## 2022-02-19 PROCEDURE — 80048 BASIC METABOLIC PNL TOTAL CA: CPT

## 2022-02-19 PROCEDURE — 94761 N-INVAS EAR/PLS OXIMETRY MLT: CPT

## 2022-02-19 PROCEDURE — 2700000000 HC OXYGEN THERAPY PER DAY

## 2022-02-19 PROCEDURE — 6370000000 HC RX 637 (ALT 250 FOR IP): Performed by: NURSE PRACTITIONER

## 2022-02-19 PROCEDURE — 6360000002 HC RX W HCPCS: Performed by: NURSE PRACTITIONER

## 2022-02-19 PROCEDURE — 85025 COMPLETE CBC W/AUTO DIFF WBC: CPT

## 2022-02-19 PROCEDURE — 6370000000 HC RX 637 (ALT 250 FOR IP): Performed by: FAMILY MEDICINE

## 2022-02-19 PROCEDURE — 94640 AIRWAY INHALATION TREATMENT: CPT

## 2022-02-19 PROCEDURE — 83880 ASSAY OF NATRIURETIC PEPTIDE: CPT

## 2022-02-19 PROCEDURE — 36415 COLL VENOUS BLD VENIPUNCTURE: CPT

## 2022-02-19 PROCEDURE — 71046 X-RAY EXAM CHEST 2 VIEWS: CPT

## 2022-02-19 PROCEDURE — 6360000002 HC RX W HCPCS: Performed by: INTERNAL MEDICINE

## 2022-02-19 RX ORDER — CEFUROXIME AXETIL 250 MG/1
500 TABLET ORAL EVERY 12 HOURS SCHEDULED
Status: DISCONTINUED | OUTPATIENT
Start: 2022-02-19 | End: 2022-02-20 | Stop reason: HOSPADM

## 2022-02-19 RX ORDER — FUROSEMIDE 10 MG/ML
20 INJECTION INTRAMUSCULAR; INTRAVENOUS DAILY
Status: DISCONTINUED | OUTPATIENT
Start: 2022-02-20 | End: 2022-02-20

## 2022-02-19 RX ADMIN — ENOXAPARIN SODIUM 80 MG: 100 INJECTION SUBCUTANEOUS at 08:14

## 2022-02-19 RX ADMIN — ATORVASTATIN CALCIUM 10 MG: 10 TABLET, FILM COATED ORAL at 08:02

## 2022-02-19 RX ADMIN — CARVEDILOL 6.25 MG: 6.25 TABLET, FILM COATED ORAL at 18:20

## 2022-02-19 RX ADMIN — DOCUSATE SODIUM 100 MG: 100 CAPSULE, LIQUID FILLED ORAL at 08:03

## 2022-02-19 RX ADMIN — PREGABALIN 75 MG: 75 CAPSULE ORAL at 08:02

## 2022-02-19 RX ADMIN — IPRATROPIUM BROMIDE AND ALBUTEROL SULFATE 1 AMPULE: 2.5; .5 SOLUTION RESPIRATORY (INHALATION) at 15:21

## 2022-02-19 RX ADMIN — ACETAMINOPHEN 650 MG: 325 TABLET, FILM COATED ORAL at 01:25

## 2022-02-19 RX ADMIN — ACETAMINOPHEN 650 MG: 325 TABLET, FILM COATED ORAL at 19:55

## 2022-02-19 RX ADMIN — IPRATROPIUM BROMIDE AND ALBUTEROL SULFATE 1 AMPULE: 2.5; .5 SOLUTION RESPIRATORY (INHALATION) at 07:39

## 2022-02-19 RX ADMIN — METHYLPREDNISOLONE SODIUM SUCCINATE 40 MG: 40 INJECTION, POWDER, FOR SOLUTION INTRAMUSCULAR; INTRAVENOUS at 22:22

## 2022-02-19 RX ADMIN — AMLODIPINE BESYLATE 10 MG: 10 TABLET ORAL at 08:02

## 2022-02-19 RX ADMIN — FLUOXETINE 20 MG: 20 CAPSULE ORAL at 08:02

## 2022-02-19 RX ADMIN — IPRATROPIUM BROMIDE AND ALBUTEROL SULFATE 1 AMPULE: 2.5; .5 SOLUTION RESPIRATORY (INHALATION) at 20:45

## 2022-02-19 RX ADMIN — BUDESONIDE AND FORMOTEROL FUMARATE DIHYDRATE 2 PUFF: 80; 4.5 AEROSOL RESPIRATORY (INHALATION) at 20:45

## 2022-02-19 RX ADMIN — PREGABALIN 75 MG: 75 CAPSULE ORAL at 19:55

## 2022-02-19 RX ADMIN — IPRATROPIUM BROMIDE AND ALBUTEROL SULFATE 1 AMPULE: 2.5; .5 SOLUTION RESPIRATORY (INHALATION) at 11:20

## 2022-02-19 RX ADMIN — DONEPEZIL HYDROCHLORIDE 10 MG: 10 TABLET, FILM COATED ORAL at 08:02

## 2022-02-19 RX ADMIN — SODIUM CHLORIDE, PRESERVATIVE FREE 10 ML: 5 INJECTION INTRAVENOUS at 19:56

## 2022-02-19 RX ADMIN — PANTOPRAZOLE SODIUM 40 MG: 40 TABLET, DELAYED RELEASE ORAL at 06:26

## 2022-02-19 RX ADMIN — BUDESONIDE AND FORMOTEROL FUMARATE DIHYDRATE 2 PUFF: 80; 4.5 AEROSOL RESPIRATORY (INHALATION) at 07:47

## 2022-02-19 RX ADMIN — CEFTRIAXONE SODIUM 1000 MG: 1 INJECTION, POWDER, FOR SOLUTION INTRAMUSCULAR; INTRAVENOUS at 08:05

## 2022-02-19 RX ADMIN — SODIUM CHLORIDE, PRESERVATIVE FREE 10 ML: 5 INJECTION INTRAVENOUS at 08:15

## 2022-02-19 RX ADMIN — METHYLPREDNISOLONE SODIUM SUCCINATE 40 MG: 40 INJECTION, POWDER, FOR SOLUTION INTRAMUSCULAR; INTRAVENOUS at 11:06

## 2022-02-19 RX ADMIN — POTASSIUM CHLORIDE 10 MEQ: 1500 TABLET, EXTENDED RELEASE ORAL at 08:02

## 2022-02-19 RX ADMIN — APIXABAN 5 MG: 5 TABLET, FILM COATED ORAL at 19:55

## 2022-02-19 RX ADMIN — ACETAMINOPHEN 650 MG: 325 TABLET, FILM COATED ORAL at 13:17

## 2022-02-19 RX ADMIN — CEFUROXIME AXETIL 500 MG: 250 TABLET ORAL at 19:55

## 2022-02-19 RX ADMIN — LOSARTAN POTASSIUM 50 MG: 50 TABLET, FILM COATED ORAL at 08:03

## 2022-02-19 RX ADMIN — FUROSEMIDE 40 MG: 10 INJECTION, SOLUTION INTRAMUSCULAR; INTRAVENOUS at 08:03

## 2022-02-19 RX ADMIN — CARVEDILOL 6.25 MG: 6.25 TABLET, FILM COATED ORAL at 08:03

## 2022-02-19 ASSESSMENT — PAIN DESCRIPTION - LOCATION: LOCATION: BACK

## 2022-02-19 ASSESSMENT — PAIN SCALES - GENERAL
PAINLEVEL_OUTOF10: 4
PAINLEVEL_OUTOF10: 5
PAINLEVEL_OUTOF10: 0
PAINLEVEL_OUTOF10: 6

## 2022-02-19 ASSESSMENT — PAIN DESCRIPTION - PAIN TYPE: TYPE: CHRONIC PAIN

## 2022-02-19 ASSESSMENT — PAIN DESCRIPTION - DESCRIPTORS: DESCRIPTORS: ACHING;DISCOMFORT

## 2022-02-19 ASSESSMENT — PAIN DESCRIPTION - ORIENTATION: ORIENTATION: LOWER

## 2022-02-19 NOTE — DISCHARGE INSTR - COC
Continuity of Care Form    Patient Name: Marley Wray   :    MRN:  [id number]    Admit date:  2022  Discharge date:  20    Code Status Order: Full Code   Advance Directives:      Admitting Physician:  Lilly Hernandez DO  PCP: Kavya Rosado DO    Discharging Nurse:   [address] Unit/Room#: 2109/2109-01  Discharging Unit Phone Number:     Emergency Contact:   Extended Emergency Contact Information  Primary Emergency Contact: Jean Marie Monroe, [address] [facility]  Home Phone: [phone]  Relation: Spouse  Secondary Emergency Contact: Juju Ag  Mobile Phone: [phone]  Relation: Child   needed?  No    Past Surgical History:  Past Surgical History:   Procedure Laterality Date    CHOLECYSTECTOMY, LAPAROSCOPIC N/A 10/9/2021    CHOLECYSTECTOMY LAPAROSCOPIC ROBOTIC XI performed by Shwetha Torres MD at [address] Left 2021    COLONOSCOPY POLYPECTOMY SNARE/COLD BIOPSY performed by Mike Ratliff MD at [address] [place] [address] Right 10/6/2021    CYSTOSCOPY PYELOGRAM URETERAL STENT INSERTION performed by Karmen Ortega MD at [address] [place] [address] Bilateral 2021    CYSTOSCOPY URETERAL [address] LEFT INSERTION performed by Karmen Ortega MD at [address] [place] [address] Bilateral 2022    CYSTOSCOPY WITH BILATERAL STENT EXCHANGE performed by Karmen Ortega MD at [address] MASS  2021    IR BIOPSY ABDOMINAL MASS 2021 [facility] SPECIAL PROCEDURES    IR PORT PLACEMENT EQUAL OR GREATER THAN 5 YEARS  2021    IR PORT PLACEMENT EQUAL OR GREATER THAN 5 YEARS 2021 [facility] PROCEDURES    TONSILLECTOMY      as child       Immunization History:   Immunization History   Administered Date(s) Administered    Influenza, Quadv, IM, PF (6 mo and older Fluzone, Flulaval, Fluarix, and 3 yrs and older Afluria) 10/12/2021    Influenza, Triv, inactivated, subunit, adjuvanted, IM (Fluad 65 yrs and older) 09/28/2019       Active Problems:  Patient Active Problem List   Diagnosis Code    NHL (non-Hodgkin's lymphoma) ([facility] C85.90    Traumatic retroperitoneal hematoma S36.892A    Acute kidney injury ([facility] X73.8    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    Hydronephrosis due to obstruction of ureter N13.1    Moderate malnutrition ([facility]) E44.0    Chronic cholecystitis K81.1    Pleural effusion J90    Cardiomegaly I51.7    Hypothyroidism E03.9    Diabetes mellitus ([facility] E11.9    Hypertensive urgency I16.0    Acute respiratory failure with hypoxia ([facility]) J96.01    Hypoxia R09.02       Isolation/Infection:   Isolation            No Isolation          Patient Infection Status       Infection Onset Added Last Indicated Last Indicated By Review Planned Expiration Resolved Resolved By    None active    Resolved    COVID-19 (Rule Out) 02/16/22 02/16/22 02/16/22 COVID-19 & Influenza Combo (Ordered)   02/17/22 Rule-Out Test Resulted    COVID-19 (Rule Out) 01/28/22 01/28/22 01/28/22 COVID-19, Rapid (Ordered)   01/28/22 Rule-Out Test Resulted            Nurse Assessment:  Last Vital Signs: BP (!) 163/70   Pulse 78   Temp 98.6 °F (37 °C) (Axillary)   Resp 20   Wt 180 lb 9.6 oz (81.9 kg)   SpO2 95%   BMI 25.55 kg/m²     Last documented pain score (0-10 scale): Pain Level: 6  Last Weight:   Wt Readings from Last 1 Encounters:   02/18/22 180 lb 9.6 oz (81.9 kg)     Mental Status:  oriented    IV Access:  - None    Nursing Mobility/ADLs:  Walking   Independent  Transfer  Independent  Bathing  Independent  Dressing  Independent  Toileting  Independent  Feeding  [address]  Independent  Med Delivery   none    Wound Care Documentation and Therapy:        Elimination:  Continence:    Bowel: Yes  Bladder: Yes  Urinary Catheter: None   Colostomy/Ileostomy/Ileal Conduit: No       Date of Last BM: 2/20/21    Intake/Output Summary (Last 24 hours) at 2/19/2022 0055  Last data filed at 2/19/2022 1320  Gross per 24 hour   Intake 830 ml   Output 2125 ml   Net -1295 ml     I/O last 3 completed shifts: In: 2400 [P.O.:2400]  Out: 2675 [Urine:2675]    Safety Concerns:     None    Impairments/Disabilities:      None    Nutrition Therapy:  Current Nutrition Therapy:   - Oral Diet:  Cardiac    Routes of Feeding: Oral  Liquids: No Restrictions  Daily Fluid Restriction: yes - amount 2000 mL  Last Modified Barium Swallow with Video (Video Swallowing Test): not done    Treatments at the Time of Hospital Discharge:   Respiratory Treatments: Nebulizer  Oxygen Therapy:  is not on home oxygen therapy. Ventilator:    - No ventilator support    Rehab Therapies: Physical Therapy and Occupational Therapy  Weight Bearing Status/Restrictions: No weight bearing restirctions  Other Medical Equipment (for information only, NOT a DME order): Other Treatments: Skilled Nursing Assessment Per Protocol. Medication Education & Monitoring. Pt. Has New Nebulizer. Please follow. Patient's personal belongings (please select all that are sent with patient):  None    RN SIGNATURE:  Electronically signed by Price Farah RN on 2/20/22 at 2:16 PM EST    CASE MANAGEMENT/SOCIAL WORK SECTION    Inpatient Status Date:     Readmission Risk Assessment Score:  Readmission Risk              Risk of Unplanned Readmission:  32           Discharging to Facility/ . [address]  [address] [address]  Phone [phone]  Fax  [phone]     Dialysis Facility (if applicable)   Name:  Address:  Dialysis Schedule:  Phone:  Fax:    / signature: Electronically signed by Sera Hart RN on 2/19/22 at 2:58 PM EST    PHYSICIAN SECTION    Prognosis: Fair    Condition at Discharge: Stable    Rehab Potential (if transferring to Rehab):  Fair    Recommended Labs or Other Treatments After Discharge: aerosol tx qid prn,cbcd bmp bnp in 5 days by friday    Physician Certification: I certify the above information and transfer of Morgan Webster  is necessary for the continuing treatment of the diagnosis listed and that he requires [address] for greater 30 days.      Update Admission H&P: No change in H&P    PHYSICIAN SIGNATURE:  Electronically signed by Derrek Cochran DO on 2/20/22 at 1:28 PM EST

## 2022-02-19 NOTE — PROGRESS NOTES
[id number]      PROGRESS NOTE        Patient:  Ricky Silver  YOB: 1950    MRN: [id number]     Acct: [de-identified]     Admit date: 2/16/2022    Pt seen and Chart reviewed. Consultant notes reviewed and care evaluated. Subjective: Patient states he is feeling okay. His cough and shortness of breath is better. He tells me last night he thinks the antibiotic was running he got little bit what sounds like chills a little bit and nausea. He says when he stopped he felt okay but no rashes. He is eating okay he has no problems with urination or bowel movements he just had one as well he says. Diet:  ADULT DIET;  Regular      Medications:Current Inpatient    Scheduled Meds:   cefUROXime  500 mg Oral 2 times per day    [Held by provider] furosemide  40 mg IntraVENous Daily    sodium chloride flush  5-40 mL IntraVENous 2 times per day    [Held by provider] potassium chloride  10 mEq Oral Daily with breakfast    pantoprazole  40 mg Oral QAM AC    atorvastatin  10 mg Oral Daily    FLUoxetine  20 mg Oral Daily    carvedilol  6.25 mg Oral BID WC    donepezil  10 mg Oral Daily    amLODIPine  10 mg Oral Daily    docusate sodium  100 mg Oral Daily    losartan  50 mg Oral Daily    pregabalin  75 mg Oral BID    budesonide-formoterol  2 puff Inhalation BID    methylPREDNISolone  40 mg IntraVENous Q12H    ipratropium-albuterol  1 ampule Inhalation Q4H WA    enoxaparin  1 mg/kg SubCUTAneous BID     Continuous Infusions:   sodium chloride 25 mL (02/18/22 0922)     PRN Meds:sodium chloride flush, sodium chloride, ondansetron **OR** ondansetron, polyethylene glycol, acetaminophen **OR** acetaminophen, phenazopyridine, sodium chloride flush        Physical Exam:  Vitals: BP (!) 163/70   Pulse 78   Temp 98.6 °F (37 °C) (Axillary)   Resp 20   Wt 180 lb 9.6 oz (81.9 kg)   SpO2 95%   BMI 25.55 kg/m²   24 hour intake/output:    Intake/Output Summary (Last 24 hours) at 2/19/2022 1337  Last data filed at 2/19/2022 1320  Gross per 24 hour   Intake 830 ml   Output 2125 ml   Net -1295 ml     Last 3 weights: Wt Readings from Last 3 Encounters:   02/18/22 180 lb 9.6 oz (81.9 kg)   02/14/22 185 lb (83.9 kg)   02/16/22 185 lb (83.9 kg)       Physical Examination:   General appearance - alert, well appearing, and in no distress  Mental status - alert, oriented to person, place, and time  PERRLA WNL  Chest - clear to auscultation, no wheezes that clear today, rales or rhonchi, symmetric air entry  Heart - normal rate, regular rhythm, normal S1, S2, no murmurs, rubs, clicks or gallops  Abdomen - soft, nontender, nondistended, no masses or organomegaly  tenderness noted  Neurological - alert, oriented, normal speech, no focal findings or movement disorder noted}  Extremities - peripheral pulses normal, no pedal edema, no clubbing or cyanosis edema has improved  Skin - normal coloration and turgor, no rashes, no suspicious skin lesions noted  : normal complete skin exam, no suspicious lesions    Component Value Units   XR CHEST (2 VW) [[id number]]    Collected: 02/19/22 0910    Updated: 02/19/22 1136    Specimen Type: Chest    Narrative:     EXAMINATION:   TWO X-RAY VIEWS OF THE CHEST     2/19/2022 8:03 am     COMPARISON:   02/16/2022     HISTORY:   ORDERING SYSTEM PROVIDED HISTORY:  Follow-up effusions   TECHNOLOGIST PROVIDED HISTORY:   Follow-up effusions   Reason for Exam:  Hx. of pleural effusion, follow up. FINDINGS:   Cardiomegaly and perihilar congestion.  Suggestion of small effusions. Bilateral lower lobe infiltrates more prominent compared to the previous   evaluation.  Access port appears stable. Impression:     Some worsening compared to the previous evaluation.  Persisting likely small   effusions bilaterally.  More prominent bilateral lower lobe infiltrates.     Culture, Blood 1 [[id number]]    Collected: 02/17/22 0954    Updated: 02/19/22 1133    Specimen Source: Blood     Specimen Description . BLOOD LT [facility]    Special Requests NOT REPORTED    Culture NO GROWTH 2 DAYS   Culture, Blood 1 [[id number]]    Collected: 02/17/22 0956    Updated: 02/19/22 1133    Specimen Source: Blood     Specimen Description . BLOOD RT [facility]    Special Requests NOT REPORTED    Culture NO GROWTH 2 DAYS   Basic Metabolic Panel [[id number]] (Abnormal)    Collected: 02/19/22 0512    Updated: 02/19/22 0603    Specimen Source: Blood     Glucose 220 High  mg/dL    BUN 43 High  mg/dL    CREATININE 1.73 High  mg/dL    Bun/Cre Ratio NOT REPORTED    Calcium 8.1 Low  mg/dL    Sodium 139 mmol/L    Potassium 5.0 mmol/L    Chloride 105 mmol/L    CO2 27 mmol/L    Anion Gap 7 Low  mmol/L    GFR Non-African American 39 Low  mL/min    GFR  47 Low  mL/min    GFR Comment         Comment: Average GFR for 79or more years old:    65 mL/min/1.73sq m   Chronic Kidney Disease:    <60 mL/min/1.73sq m   Kidney failure:    <15 mL/min/1.73sq m               eGFR calculated using average adult body mass. Additional eGFR calculator available at:         [employer]              GFR Staging NOT REPORTED   Brain Natriuretic Peptide [[id number]] (Abnormal)    Collected: 02/19/22 0512    Updated: 02/19/22 0603    Specimen Source: Blood     Pro-BNP 2,532 High  pg/mL    Comment:        An age-independent cutoff point of 300 pg/ml has a 98% negative predictive value excluding   acute heart failure.         BNP Interpretation NOT REPORTED   CBC with Auto Differential [[id number]] (Abnormal)    Collected: 02/19/22 0512    Updated: 02/19/22 0537    Specimen Source: Blood     WBC 8.3 k/uL    RBC 3.51 Low  m/uL    Hemoglobin 10.0 Low  g/dL    Hematocrit 30.7 Low  %    MCV 87.5 fL    MCH 28.4 pg    MCHC 32.5 g/dL    RDW 16.0 High  %    Platelets 313 LGS  k/uL    MPV 9.3 fL    NRBC Automated NOT REPORTED per 100 WBC    Differential Type NOT REPORTED    Seg Neutrophils 90 High  %    Lymphocytes 8 Low  %    Monocytes 2 %    Eosinophils % 0 %    Basophils 0 %    Immature Granulocytes NOT REPORTED %    Segs Absolute 7.50 k/uL    Absolute Lymph # 0.70 Low  k/uL    Absolute Mono # 0.20 k/uL    Absolute Eos # 0.00 k/uL    Basophils Absolute 0.00 k/uL    Absolute Immature Granulocyte NOT REPORTED k/uL    WBC Morphology NOT REPORTED    RBC Morphology NOT REPORTED    Platelet Estimate         Chest x-ray  X-ray okay slight effusion but does not look worse is previous to me    Assessment:  Active Problems:    * No active hospital problems. *  Resolved Problems:    * No resolved hospital problems. 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  Chronic CHF or exacerbated by his procedure yesterday with IV fluid  ·    · Pneumonia reported on CT resolving  ·    · Status post stent placement yesterday  ·    · History of hydronephrosis  ·    · History of lymphoma he is on treatment  · Recently 2 times per month  · History of neuropathy  · Chronic renal insufficiency probably secondary to hydro-  · Acute DVT right calf  · History of carotid stenosis according to his family and he is in the process of seeing a vascular surgeon outpatient was referred by               Plan:  1.  Continue with IV Lasix for now but will decrease to 20 mg IV secondary his kidney function  2.   3. We will do home evaluation for his oxygen  4.  Continue with antibiotics monitor for any issues  5.   6. Continue with physical therapy  7.   8. Monitor his kidney function    Eduarda Suarez DO FAAFP            2/19/2022, 1:37 PM

## 2022-02-19 NOTE — PLAN OF CARE
Problem: Falls - Risk of:  Goal: Will remain free from falls  Description: Will remain free from falls  Outcome: Ongoing     Problem: Falls - Risk of:  Goal: Absence of physical injury  Description: Absence of physical injury  Outcome: Ongoing     Problem: Infection:  Goal: Will remain free from infection  Description: Will remain free from infection  Outcome: Ongoing     Problem: Safety:  Goal: Free from accidental physical injury  Description: Free from accidental physical injury  Outcome: Ongoing     Problem: Pain:  Goal: Patient's pain/discomfort is manageable  Description: Patient's pain/discomfort is manageable  Outcome: Ongoing     Problem: Daily Care:  Goal: Daily care needs are met  Description: Daily care needs are met  Outcome: Ongoing     Problem: Skin Integrity:  Goal: Skin integrity will stabilize  Description: Skin integrity will stabilize  Outcome: Ongoing

## 2022-02-19 NOTE — PLAN OF CARE
Problem: Safety:  Goal: Free from accidental physical injury  Description: Free from accidental physical injury  2/19/2022 1739 by Dulce Degroot RN  Outcome: Ongoing  2/19/2022 0347 by Sal Ch RN  Outcome: Ongoing  Goal: Free from intentional harm  Description: Free from intentional harm  2/19/2022 1739 by Dulce Degroot RN  Outcome: Ongoing  2/19/2022 0347 by Sal Ch RN  Outcome: Ongoing

## 2022-02-19 NOTE — CARE COORDINATION
ONGOING DISCHARGE PLAN:    Patient is alert and oriented x4. Spoke with patient regarding discharge plan and patient confirms that plan is still to return to home w/Family. Agreeable to VNS, Writer sent referral to [facility], per Pt. Request. Will follow. Remains on IV Zithromax/Rocephin & IV Steroids, 40 Q12. Follow for Home Oxygen, Currently sating, 95% on 3lNC. Will continue to follow for additional discharge needs. Electronically signed by Cristo Pratt RN on 2/19/2022 at 2:16 PM    ADD: notified, by [facility], from [place], that they can accept for services.

## 2022-02-19 NOTE — FLOWSHEET NOTE
02/19/22 1108   Encounter Summary   Services provided to: Patient   Referral/Consult From: Edith   Complexity of Encounter Low   Length of Encounter 15 minutes   Routine   Type Initial   Assessment Sleeping   Intervention Prayer

## 2022-02-19 NOTE — PROGRESS NOTES
Dr. Emeka Yancey MD / Dr. Laurey Gitelman MD Dr. Marti Howell MD / Dr. Kyle Kapoor CNP                  Pulmonary Consult Note   [facility]      Patient - Marley Wray,  Age - 66 y.o.    - 1950      Room Number - 2109/2109-01   MRN -  [id number]   Acct # - [de-identified]  Date of Admission -  2022  9:38 PM        Consulting  Pieter Humphries DO  Primary Care Physician - Kavya Rosado DO     HPI   Patient's vital signs are stable. Decreased from 3 L to 2 L nasal cannula. Patient's proBNP remains elevated greater than 2500 creatinine is little bit worse today up to 1.73 patient tells me yesterday he had sweats as well as abdominal discomfort after taking Zithromax. However he tells me he has taken this in the past.    OBJECTIVE   VITALS    weight is 180 lb 9.6 oz (81.9 kg). His oral temperature is 98 °F (36.7 °C). His blood pressure is 159/64 (abnormal) and his pulse is 73. His respiration is 20 and oxygen saturation is 95%. Body mass index is 25.55 kg/m².   Temperature Range: Temp: 98 °F (36.7 °C) Temp  Av.8 °F (36.6 °C)  Min: 97.4 °F (36.3 °C)  Max: 98.3 °F (36.8 °C)  BP Range:  Systolic (53UGZ), CM , Min:131 , HJW:741     Diastolic (94ANH), SOR:38, Min:62, Max:70    Pulse Range: Pulse  Av  Min: 65  Max: 88  Respiration Range: Resp  Av.9  Min: 16  Max: 20  Current Pulse Ox[de-identified]  SpO2: 95 %  24HR Pulse Ox Range:  SpO2  Av.9 %  Min: 91 %  Max: 95 %  Oxygen Amount and Delivery: O2 Flow Rate (L/min): (S) 2 L/min (95% on 3L, weaned down to 2L)    Wt Readings from Last 3 Encounters:   22 180 lb 9.6 oz (81.9 kg)   02/14/22 185 lb (83.9 kg)   22 185 lb (83.9 kg)       I/O (24 Hours)    Intake/Output Summary (Last 24 hours) at 2022 1139  Last data filed at 2022 1045  Gross per 24 hour   Intake 1730 ml Output 2025 ml   Net -295 ml       EXAM     General Appearance  Awake, alert, oriented, in no acute distress  HEENT - normocephalic, atraumatic   Neck - Supple,  trachea midline   Lungs -nonlabored respirations.   Lung sounds are diminished posteriorly with few wheezing  Cardiovascular - Heart sounds are normal.  Regular rate and rhythm   Abdomen - Soft, nontender, nondistended, no masses or organomegaly  Neurologic - There are no focal motor or sensory deficits  Skin - No bruising or bleeding  Extremities - No  cyanosis, edema    MEDS      furosemide  40 mg IntraVENous Daily    azithromycin  500 mg IntraVENous Q24H    sodium chloride flush  5-40 mL IntraVENous 2 times per day    cefTRIAXone (ROCEPHIN) IV  1,000 mg IntraVENous Q24H    potassium chloride  10 mEq Oral Daily with breakfast    pantoprazole  40 mg Oral QAM AC    atorvastatin  10 mg Oral Daily    FLUoxetine  20 mg Oral Daily    carvedilol  6.25 mg Oral BID WC    donepezil  10 mg Oral Daily    amLODIPine  10 mg Oral Daily    docusate sodium  100 mg Oral Daily    losartan  50 mg Oral Daily    pregabalin  75 mg Oral BID    budesonide-formoterol  2 puff Inhalation BID    methylPREDNISolone  40 mg IntraVENous Q12H    ipratropium-albuterol  1 ampule Inhalation Q4H WA    enoxaparin  1 mg/kg SubCUTAneous BID      sodium chloride 25 mL (02/18/22 0922)     sodium chloride flush, sodium chloride, ondansetron **OR** ondansetron, polyethylene glycol, acetaminophen **OR** acetaminophen, phenazopyridine, sodium chloride flush    LABS   CBC   Recent Labs     02/19/22  0512   WBC 8.3   HGB 10.0*   HCT 30.7*   MCV 87.5   *     BMP:   Lab Results   Component Value Date     02/19/2022    K 5.0 02/19/2022     02/19/2022    CO2 27 02/19/2022    BUN 43 02/19/2022    LABALBU 3.7 02/15/2022    CREATININE 1.73 02/19/2022    CALCIUM 8.1 02/19/2022    GFRAA 47 02/19/2022    LABGLOM 39 02/19/2022     ABGs:No results found for: PHART, PO2ART, LKH5INC   Lab Results   Component Value Date    MODE NOT REPORTED 02/17/2022     Ionized Calcium:  No results found for: IONCA  Magnesium:    Lab Results   Component Value Date    MG 2.0 12/15/2019     Phosphorus:    Lab Results   Component Value Date    PHOS 3.4 10/05/2021        LIVER PROFILE No results for input(s): AST, ALT, LIPASE, BILIDIR, BILITOT, ALKPHOS in the last 72 hours. Invalid input(s): AMYLASE,  ALB  INR No results for input(s): INR in the last 72 hours. 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 Jacobs, on rituximab every 2 months  History of hypertension  History of hyperlipidemia  History of diabetes  Probable KATIE  Status post bilateral ureteral stent exchange with Dr. Thakkar 2/16  Full code          Plan:     We will continue current steroid  IV diuretics  Switch antibiotics to Ceftin for COPD exacerbation and discontinue Zithromax    Electronically signed by EDWIGE Greene CNP on 2/19/2022 at 11:39 AM

## 2022-02-20 VITALS
WEIGHT: 176.7 LBS | HEART RATE: 57 BPM | BODY MASS INDEX: 25 KG/M2 | SYSTOLIC BLOOD PRESSURE: 162 MMHG | DIASTOLIC BLOOD PRESSURE: 51 MMHG | TEMPERATURE: 97.2 F | OXYGEN SATURATION: 92 % | RESPIRATION RATE: 18 BRPM

## 2022-02-20 LAB
ABSOLUTE EOS #: 0 K/UL (ref 0–0.4)
ABSOLUTE IMMATURE GRANULOCYTE: ABNORMAL K/UL (ref 0–0.3)
ABSOLUTE LYMPH #: 0.7 K/UL (ref 1–4.8)
ABSOLUTE MONO #: 0.2 K/UL (ref 0.1–1.3)
ANION GAP SERPL CALCULATED.3IONS-SCNC: 7 MMOL/L (ref 9–17)
BASOPHILS # BLD: 0 % (ref 0–2)
BASOPHILS ABSOLUTE: 0 K/UL (ref 0–0.2)
BUN BLDV-MCNC: 40 MG/DL (ref 8–23)
BUN/CREAT BLD: ABNORMAL (ref 9–20)
CALCIUM SERPL-MCNC: 8.3 MG/DL (ref 8.6–10.4)
CHLORIDE BLD-SCNC: 102 MMOL/L (ref 98–107)
CO2: 29 MMOL/L (ref 20–31)
CREAT SERPL-MCNC: 1.39 MG/DL (ref 0.7–1.2)
DIFFERENTIAL TYPE: ABNORMAL
EOSINOPHILS RELATIVE PERCENT: 0 % (ref 0–4)
GFR AFRICAN AMERICAN: >60 ML/MIN
GFR NON-AFRICAN AMERICAN: 50 ML/MIN
GFR SERPL CREATININE-BSD FRML MDRD: ABNORMAL ML/MIN/{1.73_M2}
GFR SERPL CREATININE-BSD FRML MDRD: ABNORMAL ML/MIN/{1.73_M2}
GLUCOSE BLD-MCNC: 247 MG/DL (ref 70–99)
HCT VFR BLD CALC: 31.9 % (ref 41–53)
HEMOGLOBIN: 10.8 G/DL (ref 13.5–17.5)
IMMATURE GRANULOCYTES: ABNORMAL %
LYMPHOCYTES # BLD: 10 % (ref 24–44)
MCH RBC QN AUTO: 29.3 PG (ref 26–34)
MCHC RBC AUTO-ENTMCNC: 33.9 G/DL (ref 31–37)
MCV RBC AUTO: 86.3 FL (ref 80–100)
MONOCYTES # BLD: 3 % (ref 1–7)
NRBC AUTOMATED: ABNORMAL PER 100 WBC
PDW BLD-RTO: 15.7 % (ref 11.5–14.9)
PLATELET # BLD: 143 K/UL (ref 150–450)
PLATELET ESTIMATE: ABNORMAL
PMV BLD AUTO: 8.8 FL (ref 6–12)
POTASSIUM SERPL-SCNC: 5.3 MMOL/L (ref 3.7–5.3)
RBC # BLD: 3.7 M/UL (ref 4.5–5.9)
RBC # BLD: ABNORMAL 10*6/UL
SEG NEUTROPHILS: 87 % (ref 36–66)
SEGMENTED NEUTROPHILS ABSOLUTE COUNT: 6.3 K/UL (ref 1.3–9.1)
SODIUM BLD-SCNC: 138 MMOL/L (ref 135–144)
WBC # BLD: 7.1 K/UL (ref 3.5–11)
WBC # BLD: ABNORMAL 10*3/UL

## 2022-02-20 PROCEDURE — 94640 AIRWAY INHALATION TREATMENT: CPT

## 2022-02-20 PROCEDURE — 6370000000 HC RX 637 (ALT 250 FOR IP): Performed by: FAMILY MEDICINE

## 2022-02-20 PROCEDURE — 80048 BASIC METABOLIC PNL TOTAL CA: CPT

## 2022-02-20 PROCEDURE — 6360000002 HC RX W HCPCS: Performed by: FAMILY MEDICINE

## 2022-02-20 PROCEDURE — 2580000003 HC RX 258: Performed by: FAMILY MEDICINE

## 2022-02-20 PROCEDURE — 85025 COMPLETE CBC W/AUTO DIFF WBC: CPT

## 2022-02-20 PROCEDURE — 6370000000 HC RX 637 (ALT 250 FOR IP): Performed by: NURSE PRACTITIONER

## 2022-02-20 PROCEDURE — 36415 COLL VENOUS BLD VENIPUNCTURE: CPT

## 2022-02-20 PROCEDURE — 94761 N-INVAS EAR/PLS OXIMETRY MLT: CPT

## 2022-02-20 RX ORDER — IPRATROPIUM BROMIDE AND ALBUTEROL SULFATE 2.5; .5 MG/3ML; MG/3ML
3 SOLUTION RESPIRATORY (INHALATION)
Qty: 360 ML | Refills: 1 | Status: SHIPPED | OUTPATIENT
Start: 2022-02-20 | End: 2022-06-13

## 2022-02-20 RX ORDER — CEFUROXIME AXETIL 500 MG/1
500 TABLET ORAL 2 TIMES DAILY
Qty: 7 TABLET | Refills: 0 | Status: SHIPPED | OUTPATIENT
Start: 2022-02-20 | End: 2022-02-24

## 2022-02-20 RX ORDER — CEFUROXIME AXETIL 500 MG/1
500 TABLET ORAL 2 TIMES DAILY
Qty: 6 TABLET | Refills: 0 | Status: SHIPPED | OUTPATIENT
Start: 2022-02-20 | End: 2022-02-20 | Stop reason: HOSPADM

## 2022-02-20 RX ORDER — IPRATROPIUM BROMIDE AND ALBUTEROL SULFATE 2.5; .5 MG/3ML; MG/3ML
1 SOLUTION RESPIRATORY (INHALATION) EVERY 4 HOURS PRN
Qty: 360 ML | Refills: 3 | Status: SHIPPED | OUTPATIENT
Start: 2022-02-20

## 2022-02-20 RX ORDER — FUROSEMIDE 20 MG/1
20 TABLET ORAL DAILY
Qty: 30 TABLET | Refills: 5 | Status: SHIPPED | OUTPATIENT
Start: 2022-02-20 | End: 2022-06-13

## 2022-02-20 RX ORDER — PREDNISONE 10 MG/1
TABLET ORAL
Qty: 20 TABLET | Refills: 0 | Status: SHIPPED | OUTPATIENT
Start: 2022-02-20 | End: 2022-02-20 | Stop reason: HOSPADM

## 2022-02-20 RX ORDER — CARVEDILOL 12.5 MG/1
12.5 TABLET ORAL 2 TIMES DAILY
Qty: 60 TABLET | Refills: 5 | Status: ON HOLD | OUTPATIENT
Start: 2022-02-20 | End: 2022-06-29 | Stop reason: HOSPADM

## 2022-02-20 RX ORDER — CARVEDILOL 12.5 MG/1
12.5 TABLET ORAL 2 TIMES DAILY WITH MEALS
Status: DISCONTINUED | OUTPATIENT
Start: 2022-02-20 | End: 2022-02-20 | Stop reason: HOSPADM

## 2022-02-20 RX ORDER — BUDESONIDE AND FORMOTEROL FUMARATE DIHYDRATE 80; 4.5 UG/1; UG/1
2 AEROSOL RESPIRATORY (INHALATION) 2 TIMES DAILY
Qty: 10.2 G | Refills: 0 | Status: SHIPPED | OUTPATIENT
Start: 2022-02-20 | End: 2022-07-14

## 2022-02-20 RX ORDER — PREDNISONE 20 MG/1
40 TABLET ORAL DAILY
Status: DISCONTINUED | OUTPATIENT
Start: 2022-02-20 | End: 2022-02-20 | Stop reason: HOSPADM

## 2022-02-20 RX ORDER — ACETAMINOPHEN 325 MG/1
650 TABLET ORAL EVERY 6 HOURS PRN
Qty: 120 TABLET | Refills: 3 | Status: ON HOLD
Start: 2022-02-20 | End: 2022-06-28

## 2022-02-20 RX ORDER — POLYETHYLENE GLYCOL 3350 17 G/17G
17 POWDER, FOR SOLUTION ORAL DAILY PRN
Qty: 527 G | Refills: 1 | Status: SHIPPED | OUTPATIENT
Start: 2022-02-20 | End: 2022-03-22

## 2022-02-20 RX ORDER — POTASSIUM CHLORIDE 750 MG/1
10 TABLET, EXTENDED RELEASE ORAL
Qty: 60 TABLET | Refills: 3 | Status: SHIPPED | OUTPATIENT
Start: 2022-02-21 | End: 2022-02-20 | Stop reason: HOSPADM

## 2022-02-20 RX ORDER — PREDNISONE 10 MG/1
TABLET ORAL
Qty: 10 TABLET | Refills: 0 | Status: SHIPPED | OUTPATIENT
Start: 2022-02-20 | End: 2022-06-13

## 2022-02-20 RX ADMIN — IPRATROPIUM BROMIDE AND ALBUTEROL SULFATE 1 AMPULE: 2.5; .5 SOLUTION RESPIRATORY (INHALATION) at 07:17

## 2022-02-20 RX ADMIN — PREGABALIN 75 MG: 75 CAPSULE ORAL at 08:00

## 2022-02-20 RX ADMIN — CEFUROXIME AXETIL 500 MG: 250 TABLET ORAL at 07:59

## 2022-02-20 RX ADMIN — IPRATROPIUM BROMIDE AND ALBUTEROL SULFATE 1 AMPULE: 2.5; .5 SOLUTION RESPIRATORY (INHALATION) at 15:00

## 2022-02-20 RX ADMIN — APIXABAN 5 MG: 5 TABLET, FILM COATED ORAL at 08:00

## 2022-02-20 RX ADMIN — DOCUSATE SODIUM 100 MG: 100 CAPSULE, LIQUID FILLED ORAL at 07:59

## 2022-02-20 RX ADMIN — PANTOPRAZOLE SODIUM 40 MG: 40 TABLET, DELAYED RELEASE ORAL at 06:24

## 2022-02-20 RX ADMIN — BUDESONIDE AND FORMOTEROL FUMARATE DIHYDRATE 2 PUFF: 80; 4.5 AEROSOL RESPIRATORY (INHALATION) at 07:19

## 2022-02-20 RX ADMIN — CARVEDILOL 6.25 MG: 6.25 TABLET, FILM COATED ORAL at 08:00

## 2022-02-20 RX ADMIN — ATORVASTATIN CALCIUM 10 MG: 10 TABLET, FILM COATED ORAL at 08:01

## 2022-02-20 RX ADMIN — FLUOXETINE 20 MG: 20 CAPSULE ORAL at 08:01

## 2022-02-20 RX ADMIN — DONEPEZIL HYDROCHLORIDE 10 MG: 10 TABLET, FILM COATED ORAL at 08:01

## 2022-02-20 RX ADMIN — LOSARTAN POTASSIUM 50 MG: 50 TABLET, FILM COATED ORAL at 08:00

## 2022-02-20 RX ADMIN — AMLODIPINE BESYLATE 10 MG: 10 TABLET ORAL at 08:06

## 2022-02-20 RX ADMIN — FUROSEMIDE 20 MG: 10 INJECTION, SOLUTION INTRAMUSCULAR; INTRAVENOUS at 07:59

## 2022-02-20 RX ADMIN — IPRATROPIUM BROMIDE AND ALBUTEROL SULFATE 1 AMPULE: 2.5; .5 SOLUTION RESPIRATORY (INHALATION) at 10:55

## 2022-02-20 RX ADMIN — SODIUM CHLORIDE, PRESERVATIVE FREE 10 ML: 5 INJECTION INTRAVENOUS at 08:01

## 2022-02-20 RX ADMIN — PREDNISONE 40 MG: 20 TABLET ORAL at 14:11

## 2022-02-20 RX ADMIN — POTASSIUM CHLORIDE 10 MEQ: 1500 TABLET, EXTENDED RELEASE ORAL at 08:00

## 2022-02-20 RX ADMIN — ACETAMINOPHEN 650 MG: 325 TABLET, FILM COATED ORAL at 03:11

## 2022-02-20 ASSESSMENT — PAIN SCALES - GENERAL: PAINLEVEL_OUTOF10: 6

## 2022-02-20 NOTE — CARE COORDINATION
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    Hydronephrosis due to obstruction of ureter N13.1    Moderate malnutrition ([facility]) E44.0    Chronic cholecystitis K81.1    Pleural effusion J90    Cardiomegaly I51.7    Hypothyroidism E03.9    Diabetes mellitus ([facility] E11.9    Hypertensive urgency I16.0    Acute respiratory failure with hypoxia ([facility]) J96.01    Hypoxia R09.02       Isolation/Infection:   Isolation            No Isolation          Patient Infection Status       Infection Onset Added Last Indicated Last Indicated By Review Planned Expiration Resolved Resolved By    None active    Resolved    COVID-19 (Rule Out) 02/16/22 02/16/22 02/16/22 COVID-19 & Influenza Combo (Ordered)   02/17/22 Rule-Out Test Resulted    COVID-19 (Rule Out) 01/28/22 01/28/22 01/28/22 COVID-19, Rapid (Ordered)   01/28/22 Rule-Out Test Resulted            Nurse Assessment:  Last Vital Signs: BP (!) 163/70   Pulse 78   Temp 98.6 °F (37 °C) (Axillary)   Resp 20   Wt 180 lb 9.6 oz (81.9 kg)   SpO2 95%   BMI 25.55 kg/m²     Last documented pain score (0-10 scale): Pain Level: 6  Last Weight:   Wt Readings from Last 1 Encounters:   02/18/22 180 lb 9.6 oz (81.9 kg)     Mental Status:  oriented    IV Access:  - None    Nursing Mobility/ADLs:  Walking   Independent  Transfer  Independent  Bathing  Independent  Dressing  Independent  Toileting  Independent  Feeding  Independent  Med Admin  Independent  Med Delivery   none    Wound Care Documentation and Therapy:        Elimination:  Continence:   · Bowel:  Yes  · Bladder: Yes  Urinary Catheter: None   Colostomy/Ileostomy/Ileal Conduit: No       Date of Last BM: 2/20/21    Intake/Output Summary (Last 24 prn,cbcd bmp bnp in 5 days by friday    Physician Certification: I certify the above information and transfer of Jeni Pink  is necessary for the continuing treatment of the diagnosis listed and that he requires Home Care for greater 30 days. Update Admission H&P: No change in H&P    PHYSICIAN SIGNATURE:  Electronically signed by Raymundo Bonilla DO on 2/20/22 at 1:28 PM EST  Medication List      START taking these medications    START taking these medications   acetaminophen 325 MG tablet  Commonly known as: TYLENOL  Take 2 tablets by mouth every 6 hours as needed for Pain or Fever   apixaban 5 MG Tabs tablet  Commonly known as: ELIQUIS  Take 1 tablet by mouth 2 times daily   cefUROXime 500 MG tablet  Commonly known as: CEFTIN  Take 1 tablet by mouth 2 times daily for 7 doses   ipratropium-albuterol 0.5-2.5 (3) MG/3ML Soln nebulizer solution  Commonly known as: DUONEB  Inhale 3 mLs into the lungs every 4 hours as needed for Shortness of Breath   polyethylene glycol 17 g packet  Commonly known as: GLYCOLAX  Take 17 g by mouth daily as needed for Constipation   predniSONE 10 MG tablet  Commonly known as: DELTASONE  5 tablets the first day and then 4 tablets a second day then 3 tablets the third and 2 tablets the fourth 1 tablet left and then half a tablet this experienced     Dispense 16 tablets     CHANGE how you take these medications    CHANGE how you take these medications   amLODIPine 10 MG tablet  Commonly known as: NORVASC  Take 1 tablet by mouth daily  What changed: when to take this   carvedilol 12.5 MG tablet  Commonly known as: COREG  Take 1 tablet by mouth 2 times daily  What changed:   0 medication strength  0 how much to take  0 when to take this   * furosemide 20 MG tablet  Commonly known as: Lasix  Take 1 tablet by mouth daily  What changed: Another medication with the same name was added. Make sure you understand how and when to take each.    * furosemide 20 MG tablet  Commonly known as: Lasix  Take 1 tablet by mouth daily  What changed: You were already taking a medication with the same name, and this prescription was added. Make sure you understand how and when to take each.    (very important)  * This list has 2 medication(s) that are the same as other medications prescribed for you. Read the directions carefully, and ask your doctor or other care provider to review them with you.        CONTINUE taking these medications    CONTINUE taking these medications   atorvastatin 10 MG tablet  Commonly known as: LIPITOR   docusate sodium 100 MG capsule  Commonly known as: COLACE  Take 1 capsule by mouth daily   donepezil 10 MG tablet  Commonly known as: ARICEPT   FLUoxetine 20 MG capsule  Commonly known as: PROZAC   fluticasone-salmeterol 100-50 MCG/DOSE diskus inhaler  Commonly known as: ADVAIR  Inhale 1 puff into the lungs every 12 hours   glipiZIDE-metFORMIN 5-500 MG per tablet  Commonly known as: METAGLIP   levothyroxine 150 MCG tablet  Commonly known as: SYNTHROID  Take 1 tablet by mouth Daily   losartan 50 MG tablet  Commonly known as: Cozaar  Take 1 tablet by mouth daily   omeprazole 20 MG delayed release capsule  Commonly known as: PRILOSEC   pregabalin 75 MG capsule  Commonly known as: LYRICA   Ventolin  (90 Base) MCG/ACT inhaler  Generic drug: albuterol sulfate HFA     STOP taking these medications    STOP taking these medications   cephALEXin 500 MG capsule  Commonly known as: KEFLEX   megestrol 40 MG/ML suspension  Commonly known as: MEGACE   phenazopyridine 100 MG tablet  Commonly known as: Pyridium   Where to Get Your Medications      These medications were sent to [id number] [address]  Phone: [phone]        ipratropium-albuterol 0.5-2.5 (3) MG/3ML Soln nebulizer solution      You can get these medications from any pharmacy    Bring a paper prescription for each of these medications       apixaban 5 MG Tabs tablet         carvedilol 12.5 MG tablet         cefUROXime 500 MG tablet         furosemide 20 MG tablet         polyethylene glycol 17 g packet         predniSONE 10 MG tablet      Information about where to get these medications is not yet available    Ask your nurse or doctor about these medications       acetaminophen 325 MG tablet             Printing Report    Report Name Print   Discharge Meds Print

## 2022-02-20 NOTE — PROGRESS NOTES
Dr. Mary Ann Steve MD / Dr. Bary Lennox MD Dr. Delorse Marshall MD / Dr. Juancarlos GIBBONS CNP  Jo Pat CNP                  Pulmonary Consult Note   [facility]      Patient - Perri Castro,  Age - 66 y.o.    - 1950      Room Number - 2109/2109-01   N -  [id number]   Acct # - [de-identified]  Date of Admission -  2022  9:38 PM        Consulting  Pieter Humphries DO  Primary Care Physician - Leonel Galan DO     HPI   Pt vital signs remain stable. he is now on room air. Overall he feels like he is doing well. He is open to go home today. OBJECTIVE   VITALS    weight is 176 lb 11.2 oz (80.2 kg). His oral temperature is 97.6 °F (36.4 °C). His blood pressure is 161/59 (abnormal) and his pulse is 62. His respiration is 16 and oxygen saturation is 92%. Body mass index is 25 kg/m². Temperature Range: Temp: 97.6 °F (36.4 °C) Temp  Av °F (36.7 °C)  Min: 97.6 °F (36.4 °C)  Max: 98.6 °F (37 °C)  BP Range:  Systolic (72ORH), GS , Min:140 , ORM:780     Diastolic (54FSV), YZP:16, Min:56, Max:71    Pulse Range: Pulse  Av.3  Min: 62  Max: 78  Respiration Range: Resp  Av.8  Min: 16  Max: 20  Current Pulse Ox[de-identified]  SpO2: 92 %  24HR Pulse Ox Range:  SpO2  Av.8 %  Min: 92 %  Max: 95 %  Oxygen Amount and Delivery: O2 Flow Rate (L/min): 2 L/min    Wt Readings from Last 3 Encounters:   22 176 lb 11.2 oz (80.2 kg)   22 185 lb (83.9 kg)   22 185 lb (83.9 kg)       I/O (24 Hours)    Intake/Output Summary (Last 24 hours) at 2022 0931  Last data filed at 2022 2910  Gross per 24 hour   Intake --   Output 2125 ml   Net -2125 ml       EXAM     General Appearance  Awake, alert, oriented, in no acute distress  HEENT - normocephalic, atraumatic   Neck - Supple,  trachea midline   Lungs -nonlabored respirations.   Lung sounds are clear throughout  Cardiovascular - Heart sounds are normal.  Regular rate and rhythm   Abdomen - Soft, nontender, nondistended, no masses or organomegaly  Neurologic - There are no focal motor or sensory deficits  Skin - No bruising or bleeding  Extremities - No  cyanosis, edema    MEDS      cefUROXime  500 mg Oral 2 times per day    furosemide  20 mg IntraVENous Daily    apixaban  5 mg Oral BID    sodium chloride flush  5-40 mL IntraVENous 2 times per day    potassium chloride  10 mEq Oral Daily with breakfast    pantoprazole  40 mg Oral QAM AC    atorvastatin  10 mg Oral Daily    FLUoxetine  20 mg Oral Daily    carvedilol  6.25 mg Oral BID WC    donepezil  10 mg Oral Daily    amLODIPine  10 mg Oral Daily    docusate sodium  100 mg Oral Daily    losartan  50 mg Oral Daily    pregabalin  75 mg Oral BID    budesonide-formoterol  2 puff Inhalation BID    methylPREDNISolone  40 mg IntraVENous Q12H    ipratropium-albuterol  1 ampule Inhalation Q4H WA      sodium chloride 25 mL (02/18/22 0922)     sodium chloride flush, sodium chloride, ondansetron **OR** ondansetron, polyethylene glycol, acetaminophen **OR** acetaminophen, phenazopyridine, sodium chloride flush    LABS   CBC   Recent Labs     02/20/22  0653   WBC 7.1   HGB 10.8*   HCT 31.9*   MCV 86.3   *     BMP:   Lab Results   Component Value Date     02/20/2022    K 5.3 02/20/2022     02/20/2022    CO2 29 02/20/2022    BUN 40 02/20/2022    LABALBU 3.7 02/15/2022    CREATININE 1.39 02/20/2022    CALCIUM 8.3 02/20/2022    GFRAA >60 02/20/2022    LABGLOM 50 02/20/2022     ABGs:No results found for: PHART, PO2ART, PRG3AIC   Lab Results   Component Value Date    MODE NOT REPORTED 02/17/2022     Ionized Calcium:  No results found for: IONCA  Magnesium:    Lab Results   Component Value Date    MG 2.0 12/15/2019     Phosphorus:    Lab Results   Component Value Date    PHOS 3.4 10/05/2021        LIVER PROFILE No results for input(s): AST, ALT, LIPASE, BILIDIR, BILITOT, ALKPHOS in the last 72 hours. Invalid input(s): AMYLASE,  ALB  INR No results for input(s): INR in the last 72 hours. 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 Jacobs, on rituximab every 2 months  History of hypertension  History of hyperlipidemia  History of diabetes  Probable KATIE  Status post bilateral ureteral stent exchange with Dr. Thakkar 2/16  Full code          Plan: We will transition over to oral steroid-steroid burst sent to pharmacy  Antibiotics sent to pharmacy  On Eliquis for DVT  Patient will need outpatient follow-up with PFT in our office [phone] 46 weeks  No objection to discharge from pulmonary standpoint  Face-to-face evaluation was performed.   Patient will use and benefit from nebulizer therapy bronchospasm-order placed      Electronically signed by EDWIGE Stephens CNP on 2/20/2022 at 9:31 AM

## 2022-02-20 NOTE — DISCHARGE SUMMARY
Physician Discharge Summary     Patient ID:  Jaquan Pereira  [id number]  27 y.o.  1950    Admit date: 2/16/2022    Discharge date and time: 2/20/2022    Admitting Physician: Lionel Primrose, DO     Discharge Physician: Lionel Primrose, DO      Admission Diagnoses:   Acute pulmonary edema [facility]) [J81.0]    Discharge Diagnoses: Active Problems:    * No active hospital problems. *  Resolved Problems:    * No resolved hospital problems.  *      NHL (non-Hodgkin's lymphoma) ([facility]) C85.90    Traumatic retroperitoneal hematoma S36.892A    Acute kidney injury ([facility]) L11.8    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 ([facility]) C82.13    Hydronephrosis due to obstruction of ureter N13.1    Moderate malnutrition ([facility]) E44.0    Chronic cholecystitis K81.1    Pleural effusion J90    Cardiomegaly I51.7    Hypothyroidism E03.9    Diabetes mellitus ([facility] [address] E11.9    Hypertensive urgency I16.0    Acute respiratory failure with hypoxia ([facility]) J96.01    Hypoxia R09.02      · Acute respiratory failure  ·    · Acute?  Chronic CHF or exacerbated by his procedure yesterday with IV fluid  ·    · Pneumonia reported on CT resolving  ·    · Status post stent placement yesterday  ·    · History of hydronephrosis  ·    · History of lymphoma he is on treatment  · Recently 2 times per month  · History of neuropathy  · Chronic renal insufficiency probably secondary to hydro-  · Acute DVT right calf  · History of carotid stenosis according to his family and he is in the process of seeing a vascular surgeon outpatient was referred by   · Feeling better                 Hospital Course: Patient was admitted with hypoxia with a drop in his oxygenation on the 70% the day he had his stent replaced for his chronic hydrosecondary his lymphoma and mass pushing on his ureter patient states he still having problems after he went home patient was having pulmonary congestion and possible infiltrate on his CT he was admitted few weeks back to [place] with the same issues according to his wife but went home on no oxygen or aerosol treatment which they thought he might benefit from. Patient responded to treatment and patient with some Solu-Medrol antibiotics and diuresing with IV was seen by cardiology. Continue with those meds he did well his congestion and wheezing and cough has improved and patient was discharged to be followed outpatient on antibiotics and aerosol treatment he did not qualify to have home oxygen secondary has oxygen stayed in the 90-93 to 94% with walking. Patient to follow-up with his PCP in the next week he requested VNS sooner his abdomen is well.       Component Value Units   Culture, Blood 1 [[id number]]     Collected: 02/17/22 0954     Updated: 02/20/22 1133     Specimen Source: Blood       Specimen Description . BLOOD LT [facility]     Special Requests NOT REPORTED     Culture NO GROWTH 3 DAYS   Culture, Blood 1 [[id number]]     Collected: 02/17/22 0956     Updated: 02/20/22 1133     Specimen Source: Blood       Specimen Description . BLOOD RT [facility]     Special Requests NOT REPORTED     Culture NO GROWTH 3 DAYS   Basic Metabolic Panel [[id number]] (Abnormal)     Collected: 02/20/22 0653     Updated: 02/20/22 0716     Specimen Source: Blood       Glucose 247 High  mg/dL     BUN 40 High  mg/dL     CREATININE 1.39 High  mg/dL     Bun/Cre Ratio NOT REPORTED     Calcium 8.3 Low  mg/dL     Sodium 138 mmol/L     Potassium 5.3 mmol/L     Chloride 102 mmol/L     CO2 29 mmol/L     Anion Gap 7 Low  mmol/L     GFR Non- 50 Low  mL/min     GFR African American >60 mL/min     GFR Comment          Comment: Average GFR for 79or more years old:    65 mL/min/1.73sq m   Chronic Kidney Disease:    <60 mL/min/1.73sq m   Kidney failure:    <15 mL/min/1.73sq m               eGFR calculated using average adult body mass.  Additional eGFR calculator available at:         [email or web address]               GFR Staging NOT REPORTED   CBC with Auto Differential [[id number]] (Abnormal)     Collected: 02/20/22 0653     Updated: 02/20/22 0702     Specimen Source: Blood       WBC 7.1 k/uL     RBC 3.70 Low  m/uL     Hemoglobin 10.8 Low  g/dL     Hematocrit 31.9 Low  %     MCV 86.3 fL     MCH 29.3 pg     MCHC 33.9 g/dL     RDW 15.7 High  %     Platelets 846 KHB  k/uL     MPV 8.8 fL     NRBC Automated NOT REPORTED per 100 WBC     Differential Type NOT REPORTED     Seg Neutrophils 87 High  %     Lymphocytes 10 Low  %     Monocytes 3 %     Eosinophils % 0 %     Basophils 0 %     Immature Granulocytes NOT REPORTED %     Segs Absolute 6.30 k/uL     Absolute Lymph # 0.70 Low  k/uL     Absolute Mono # 0.20 k/uL     Absolute Eos # 0.00 k/uL     Basophils Absolute 0.00 k/uL     Absolute Immature Granulocyte NOT REPORTED k/uL     WBC Morphology NOT REPORTED     RBC Morphology NOT REPORTED     Platelet Estimate NOT REPORTED       Patient Instructions: To use the aerosol machine at least 3-4 times a day new,HOLD POTASSIUM FOR NOW TILL CHECK LABS    And to call if any increased shortness of breath to do blood work in the next 3 to 5 days to follow-up on his lites and blood work  Disposition to home  Discharge Medications: Ceftin 500 p.o. twice daily for 7 days aerosol treatment with DuoNeb 4 times a day prednisone 40 mg daily for 5 days taper dose and restart his home meds also Eliquis 5 mg p.o. twice daily for now for his acute DVT  [unfilled]    Activity: Ad coleen. Diet: ADULT DIET; Regular    Follow-up with Dr Belkis Crowder in 1 to 2 weeks, patient to call  for an appointment and if has any problems.       Electronically signed by KRISTI Boyle  on 2/20/2022 at 1:30 PM

## 2022-02-20 NOTE — PROGRESS NOTES
Patient ambulated for ten minutes on room air. Average 02 94 per cent. Lowest 92 per cent highest 96. Pt wife is going home to assure that the machine for breathing treatments was delivered.

## 2022-02-20 NOTE — CARE COORDINATION
ONGOING DISCHARGE PLAN:    Patient is alert and oriented x4. Spoke with patient regarding discharge plan and patient confirms that plan is still to return to home w/Family. Pt. Will have VNS, [employer] at home. Pt. Remains on PO Ceftin/Prednisone & IV Lasix Daily. Pt. Was walked by Nursing, lowest O2 Sat was 92%. Does Not qualify, Home O2 Eval. Was ordered. Pt. Is currently sating 92% on RA. [address], [phone], Spoke to Antonio Gallegos. Writer Called in per Dr. Arsenio Briseno:    Eliquis, PO, 5MG, BID, #60 w/ 3 Refills. Cost of Med is $47.00 This is Affordable for the Pt. Writer gave Pt. 30 Day Free card to use. Writer notified, Rangel, from [facility] [place], that pt. Needs Nebulizer. Writer faxed Face Sheet, DME for Nebulizer, F2F notes, DC med list to office. Informed of DC today. Denies other needs. Will continue to follow for additional discharge needs.     Electronically signed by Vijay Coelho RN on 2/20/2022 at 1:13 PM

## 2022-02-20 NOTE — PROGRESS NOTES
[address] [employer]      PROGRESS NOTE        Patient:  Asiya Wallace  YOB: 1950    MRN: [id number]     Acct: [de-identified]     Admit date: 2/16/2022    Pt seen and Chart reviewed. Consultant notes reviewed and care evaluated. Subjective: Patient seen in the chair eating his lunch seems to looking very bright and alert and comfortable states feels much better. He feels okay to go home his wife was in the room as well. Discussed with her and answered her questions she tells me that he did receive some medication for aerosol treatment but she was unsure of the aerosol machine was there. Which something they want to make sure they have. Also he has not started the home oxygen barbaraal asked his RN [facility] to try to see if he qualifies for home oxygen he has on room air right now doing 92-94%. Gladsi's RN actually walked him in the hallway 3-4 times he still keeps his oxygen around at 93-94% with room air and it does not look he could qualify for home oxygen. Looks like pulmonary is okay for him to be discharged today    Diet:  ADULT DIET;  Regular      Medications:Current Inpatient    Scheduled Meds:   predniSONE  40 mg Oral Daily    cefUROXime  500 mg Oral 2 times per day    furosemide  20 mg IntraVENous Daily    apixaban  5 mg Oral BID    sodium chloride flush  5-40 mL IntraVENous 2 times per day    potassium chloride  10 mEq Oral Daily with breakfast    pantoprazole  40 mg Oral QAM AC    atorvastatin  10 mg Oral Daily    FLUoxetine  20 mg Oral Daily    carvedilol  6.25 mg Oral BID WC    donepezil  10 mg Oral Daily    amLODIPine  10 mg Oral Daily    docusate sodium  100 mg Oral Daily    losartan  50 mg Oral Daily    pregabalin  75 mg Oral BID    budesonide-formoterol  2 puff Inhalation BID    ipratropium-albuterol  1 ampule Inhalation Q4H WA     Continuous Infusions:   sodium chloride 25 mL (02/18/22 0922)     PRN Meds:sodium chloride flush, sodium chloride, ondansetron **OR** ondansetron, polyethylene glycol, acetaminophen **OR** acetaminophen, phenazopyridine, sodium chloride flush        Physical Exam:  Vitals: BP (!) 161/59   Pulse 62   Temp 97.6 °F (36.4 °C) (Oral)   Resp 16   Wt 176 lb 11.2 oz (80.2 kg)   SpO2 92%   BMI 25.00 kg/m²   24 hour intake/output:    Intake/Output Summary (Last 24 hours) at 2/20/2022 1150  Last data filed at 2/20/2022 5001  Gross per 24 hour   Intake --   Output 1825 ml   Net -1825 ml     Last 3 weights: Wt Readings from Last 3 Encounters:   02/20/22 176 lb 11.2 oz (80.2 kg)   02/14/22 185 lb (83.9 kg)   02/16/22 185 lb (83.9 kg)       Physical Examination:   General appearance - alert, well appearing, and in no distress  Mental status - alert, oriented to person, place, and time  PERRLA wnl  Chest - clear to auscultation, no wheezes, rales or rhonchi, symmetric air entry his lungs actually much clearer today compared to even couple days ago since admission  Heart - normal rate, regular rhythm, normal S1, S2, no murmurs, rubs, clicks or gallops  Abdomen - soft, nontender, nondistended, no masses or organomegaly  Neurological - alert, oriented, normal speech, no focal findings or movement disorder noted}  Extremities - peripheral pulses normal, no pedal edema, no clubbing or cyanosis no tenderness or edema to lower legs now  Skin - normal coloration and turgor, no rashes, no suspicious skin lesions noted   Results     Component Value Units   Culture, Blood 1 [[id number]]    Collected: 02/17/22 0954    Updated: 02/20/22 1133    Specimen Source: Blood     Specimen Description . BLOOD LT [facility]    Special Requests NOT REPORTED    Culture NO GROWTH 3 DAYS   Culture, Blood 1 [[id number]]    Collected: 02/17/22 0956    Updated: 02/20/22 1133    Specimen Source: Blood     Specimen Description . BLOOD RT [facility]    Special Requests NOT REPORTED    Culture NO GROWTH 3 DAYS   Basic Metabolic Panel [[id number]] (Abnormal) Collected: 02/20/22 0653    Updated: 02/20/22 0716    Specimen Source: Blood     Glucose 247 High  mg/dL    BUN 40 High  mg/dL    CREATININE 1.39 High  mg/dL    Bun/Cre Ratio NOT REPORTED    Calcium 8.3 Low  mg/dL    Sodium 138 mmol/L    Potassium 5.3 mmol/L    Chloride 102 mmol/L    CO2 29 mmol/L    Anion Gap 7 Low  mmol/L    GFR Non-African American 50 Low  mL/min    GFR African American >60 mL/min    GFR Comment         Comment: Average GFR for 79or more years old:    65 mL/min/1.73sq m   Chronic Kidney Disease:    <60 mL/min/1.73sq m   Kidney failure:    <15 mL/min/1.73sq m               eGFR calculated using average adult body mass. Additional eGFR calculator available at:         [email or web address]              GFR Staging NOT REPORTED   CBC with Auto Differential [[id number]] (Abnormal)    Collected: 02/20/22 0653    Updated: 02/20/22 0702    Specimen Source: Blood     WBC 7.1 k/uL    RBC 3.70 Low  m/uL    Hemoglobin 10.8 Low  g/dL    Hematocrit 31.9 Low  %    MCV 86.3 fL    MCH 29.3 pg    MCHC 33.9 g/dL    RDW 15.7 High  %    Platelets 990 ILC  k/uL    MPV 8.8 fL    NRBC Automated NOT REPORTED per 100 WBC    Differential Type NOT REPORTED    Seg Neutrophils 87 High  %    Lymphocytes 10 Low  %    Monocytes 3 %    Eosinophils % 0 %    Basophils 0 %    Immature Granulocytes NOT REPORTED %    Segs Absolute 6.30 k/uL    Absolute Lymph # 0.70 Low  k/uL    Absolute Mono # 0.20 k/uL    Absolute Eos # 0.00 k/uL    Basophils Absolute 0.00 k/uL    Absolute Immature Granulocyte NOT REPORTED k/uL    WBC Morphology NOT REPORTED    RBC Morphology NOT REPORTED    Platelet Estimate NOT REPORTED           Assessment:  Active Problems:    * No active hospital problems. *  Resolved Problems:    * No resolved hospital problems.  *      NHL (non-Hodgkin's lymphoma) (HCC) C85.90    Traumatic retroperitoneal hematoma S36.892A    Acute kidney injury (HCC) W20.2    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    Hydronephrosis due to obstruction of ureter N13.1    Moderate malnutrition ([facility]) E44.0    Chronic cholecystitis K81.1    Pleural effusion J90    Cardiomegaly I51.7    Hypothyroidism E03.9    Diabetes mellitus ([facility] [address] E11.9    Hypertensive urgency I16.0    Acute respiratory failure with hypoxia ([facility]) J96.01    Hypoxia R09.02      · Acute respiratory failure  ·    · Acute?  Chronic CHF or exacerbated by his procedure yesterday with IV fluid  ·    · Pneumonia reported on CT resolving  ·    · Status post stent placement yesterday  ·    · History of hydronephrosis  ·    · History of lymphoma he is on treatment  · Recently 2 times per month  · History of neuropathy  · Chronic renal insufficiency probably secondary to hydro-  · Acute DVT right calf  · History of carotid stenosis according to his family and he is in the process of seeing a vascular surgeon outpatient was referred by   · Feeling better                 Plan:  1. We will plan on discharging home this afternoon  2.   3. We will make sure he has aerosol machine at home before discharge  4.   5. He is to monitor his oxygenation at home  6.   7. We will keep him on Lasix 20 mg daily for now and HOLD potassium  UNTIL  repeat blood work in the next couple days to keep an eye on his potassium  8.   9. We will keep him on Eliquis 5 mg p.o. twice daily for his acute DVT and will see if this medication is covered for him but he is given a coupon for the first month to get initially and an outpatient order about if he can continue with the discussed all that with his wife as well.     Justine Somers DO FAA            2/20/2022, 11:50 AM

## 2022-02-20 NOTE — PLAN OF CARE
Problem: Falls - Risk of:  Goal: Will remain free from falls  Description: Will remain free from falls  2/19/2022 2323 by Marnie Klinefelter, RN  Outcome: Ongoing     Problem: Falls - Risk of:  Goal: Absence of physical injury  Description: Absence of physical injury  2/19/2022 2323 by Marnie Klinefelter, RN  Outcome: Ongoing     Problem: Infection:  Goal: Will remain free from infection  Description: Will remain free from infection  2/19/2022 2323 by Marnie Klinefelter, RN  Outcome: Ongoing     Problem: Safety:  Goal: Free from accidental physical injury  Description: Free from accidental physical injury  2/19/2022 2323 by Marnie Klinefelter, RN  Outcome: Ongoing     Problem: Safety:  Goal: Free from intentional harm  Description: Free from intentional harm  2/19/2022 2323 by Marnie Klinefelter, RN  Outcome: Ongoing     Problem: Skin Integrity:  Goal: Skin integrity will stabilize  Description: Skin integrity will stabilize  2/19/2022 2323 by Marnie Klinefelter, RN  Outcome: Ongoing     Problem: Daily Care:  Goal: Daily care needs are met  Description: Daily care needs are met  2/19/2022 2323 by Marnie Klinefelter, RN  Outcome: Ongoing

## 2022-02-20 NOTE — FLOWSHEET NOTE
02/20/22 1107   Encounter Summary   Services provided to: Patient   [facility]   Support System Spouse; Children;Family members   Continue Visiting   (2-20-22)   Complexity of Encounter Low   Length of Encounter 15 minutes   Routine   Type Initial   Assessment Calm; Approachable   Intervention Active listening;Explored feelings, thoughts, concerns;[place];Sustaining presence/ Ministry of presence; Discussed illness/injury and it's impact   Outcome Expressed gratitude;Engaged in conversation;Expressed feelings/needs/concerns;Coping

## 2022-02-22 LAB
CULTURE: NORMAL
CULTURE: NORMAL
SPECIMEN DESCRIPTION: NORMAL
SPECIMEN DESCRIPTION: NORMAL

## 2022-03-07 ENCOUNTER — HOSPITAL ENCOUNTER (OUTPATIENT)
Dept: INFUSION THERAPY | Age: 72
Discharge: HOME | End: 2022-03-07
Payer: MEDICARE

## 2022-03-07 ENCOUNTER — OFFICE VISIT (OUTPATIENT)
Dept: ONCOLOGY | Age: 72
End: 2022-03-07
Payer: MEDICARE

## 2022-03-07 ENCOUNTER — TELEPHONE (OUTPATIENT)
Dept: ONCOLOGY | Age: 72
End: 2022-03-07

## 2022-03-07 VITALS
SYSTOLIC BLOOD PRESSURE: 156 MMHG | DIASTOLIC BLOOD PRESSURE: 74 MMHG | TEMPERATURE: 97.1 F | BODY MASS INDEX: 25.07 KG/M2 | WEIGHT: 177.2 LBS | HEART RATE: 76 BPM

## 2022-03-07 DIAGNOSIS — C85.90 NON-HODGKIN'S LYMPHOMA, UNSPECIFIED BODY REGION, UNSPECIFIED NON-HODGKIN LYMPHOMA TYPE (HCC): Primary | ICD-10-CM

## 2022-03-07 DIAGNOSIS — C82.13 FOLLICULAR LYMPHOMA GRADE II OF INTRA-ABDOMINAL LYMPH NODES (HCC): ICD-10-CM

## 2022-03-07 LAB
ABSOLUTE EOS #: 0.1 K/UL (ref 0–0.4)
ABSOLUTE LYMPH #: 0.9 K/UL (ref 1–4.8)
ABSOLUTE MONO #: 0.5 K/UL (ref 0.1–1.2)
ALBUMIN SERPL-MCNC: 3.2 G/DL (ref 3.5–5.2)
ALBUMIN/GLOBULIN RATIO: 1.1 (ref 1–2.5)
ALP BLD-CCNC: 95 U/L (ref 40–129)
ALT SERPL-CCNC: 14 U/L (ref 5–41)
ANION GAP SERPL CALCULATED.3IONS-SCNC: 11 MMOL/L (ref 9–17)
AST SERPL-CCNC: 14 U/L
BASOPHILS # BLD: 1 % (ref 0–2)
BASOPHILS ABSOLUTE: 0 K/UL (ref 0–0.2)
BILIRUB SERPL-MCNC: 0.32 MG/DL (ref 0.3–1.2)
BUN BLDV-MCNC: 26 MG/DL (ref 8–23)
CALCIUM SERPL-MCNC: 8.5 MG/DL (ref 8.6–10.4)
CHLORIDE BLD-SCNC: 105 MMOL/L (ref 98–107)
CO2: 27 MMOL/L (ref 20–31)
CREAT SERPL-MCNC: 1.25 MG/DL (ref 0.7–1.2)
EOSINOPHILS RELATIVE PERCENT: 1 % (ref 1–4)
GFR AFRICAN AMERICAN: >60 ML/MIN
GFR NON-AFRICAN AMERICAN: 57 ML/MIN
GFR SERPL CREATININE-BSD FRML MDRD: ABNORMAL ML/MIN/{1.73_M2}
GLUCOSE BLD-MCNC: 118 MG/DL (ref 70–99)
HCT VFR BLD CALC: 32.3 % (ref 41–53)
HEMOGLOBIN: 10.6 G/DL (ref 13.5–17.5)
LYMPHOCYTES # BLD: 15 % (ref 24–44)
MCH RBC QN AUTO: 28.4 PG (ref 26–34)
MCHC RBC AUTO-ENTMCNC: 32.8 G/DL (ref 31–37)
MCV RBC AUTO: 86.6 FL (ref 80–100)
MONOCYTES # BLD: 9 % (ref 2–11)
PDW BLD-RTO: 15.7 % (ref 12.5–15.4)
PLATELET # BLD: 172 K/UL (ref 140–450)
PMV BLD AUTO: 8.6 FL (ref 6–12)
POTASSIUM SERPL-SCNC: 4 MMOL/L (ref 3.7–5.3)
RBC # BLD: 3.73 M/UL (ref 4.5–5.9)
SEG NEUTROPHILS: 74 % (ref 36–66)
SEGMENTED NEUTROPHILS ABSOLUTE COUNT: 4.6 K/UL (ref 1.8–7.7)
SODIUM BLD-SCNC: 143 MMOL/L (ref 135–144)
TOTAL PROTEIN: 6 G/DL (ref 6.4–8.3)
WBC # BLD: 6.1 K/UL (ref 3.5–11)

## 2022-03-07 PROCEDURE — G8427 DOCREV CUR MEDS BY ELIG CLIN: HCPCS | Performed by: INTERNAL MEDICINE

## 2022-03-07 PROCEDURE — 96415 CHEMO IV INFUSION ADDL HR: CPT

## 2022-03-07 PROCEDURE — 96413 CHEMO IV INFUSION 1 HR: CPT

## 2022-03-07 PROCEDURE — 1111F DSCHRG MED/CURRENT MED MERGE: CPT | Performed by: INTERNAL MEDICINE

## 2022-03-07 PROCEDURE — 1036F TOBACCO NON-USER: CPT | Performed by: INTERNAL MEDICINE

## 2022-03-07 PROCEDURE — 36591 DRAW BLOOD OFF VENOUS DEVICE: CPT

## 2022-03-07 PROCEDURE — 1123F ACP DISCUSS/DSCN MKR DOCD: CPT | Performed by: INTERNAL MEDICINE

## 2022-03-07 PROCEDURE — 80053 COMPREHEN METABOLIC PANEL: CPT

## 2022-03-07 PROCEDURE — 3017F COLORECTAL CA SCREEN DOC REV: CPT | Performed by: INTERNAL MEDICINE

## 2022-03-07 PROCEDURE — G8482 FLU IMMUNIZE ORDER/ADMIN: HCPCS | Performed by: INTERNAL MEDICINE

## 2022-03-07 PROCEDURE — 85025 COMPLETE CBC W/AUTO DIFF WBC: CPT

## 2022-03-07 PROCEDURE — 99214 OFFICE O/P EST MOD 30 MIN: CPT | Performed by: INTERNAL MEDICINE

## 2022-03-07 PROCEDURE — 6370000000 HC RX 637 (ALT 250 FOR IP): Performed by: INTERNAL MEDICINE

## 2022-03-07 PROCEDURE — G8417 CALC BMI ABV UP PARAM F/U: HCPCS | Performed by: INTERNAL MEDICINE

## 2022-03-07 PROCEDURE — 6360000002 HC RX W HCPCS: Performed by: INTERNAL MEDICINE

## 2022-03-07 PROCEDURE — 2580000003 HC RX 258: Performed by: INTERNAL MEDICINE

## 2022-03-07 PROCEDURE — 96375 TX/PRO/DX INJ NEW DRUG ADDON: CPT

## 2022-03-07 PROCEDURE — 4040F PNEUMOC VAC/ADMIN/RCVD: CPT | Performed by: INTERNAL MEDICINE

## 2022-03-07 RX ORDER — DIPHENHYDRAMINE HYDROCHLORIDE 50 MG/ML
25 INJECTION INTRAMUSCULAR; INTRAVENOUS ONCE
Status: CANCELLED | OUTPATIENT
Start: 2022-05-02

## 2022-03-07 RX ORDER — HEPARIN SODIUM (PORCINE) LOCK FLUSH IV SOLN 100 UNIT/ML 100 UNIT/ML
500 SOLUTION INTRAVENOUS PRN
OUTPATIENT
Start: 2022-06-27

## 2022-03-07 RX ORDER — HEPARIN SODIUM (PORCINE) LOCK FLUSH IV SOLN 100 UNIT/ML 100 UNIT/ML
500 SOLUTION INTRAVENOUS PRN
Status: CANCELLED | OUTPATIENT
Start: 2022-03-07

## 2022-03-07 RX ORDER — ACETAMINOPHEN 325 MG/1
650 TABLET ORAL ONCE
Status: CANCELLED | OUTPATIENT
Start: 2022-05-02

## 2022-03-07 RX ORDER — SODIUM CHLORIDE 9 MG/ML
20 INJECTION, SOLUTION INTRAVENOUS ONCE
Status: CANCELLED | OUTPATIENT
Start: 2022-03-07 | End: 2022-03-07

## 2022-03-07 RX ORDER — MEPERIDINE HYDROCHLORIDE 50 MG/ML
12.5 INJECTION INTRAMUSCULAR; INTRAVENOUS; SUBCUTANEOUS ONCE
OUTPATIENT
Start: 2022-06-27 | End: 2022-06-27

## 2022-03-07 RX ORDER — MEPERIDINE HYDROCHLORIDE 50 MG/ML
12.5 INJECTION INTRAMUSCULAR; INTRAVENOUS; SUBCUTANEOUS ONCE
Status: CANCELLED | OUTPATIENT
Start: 2022-03-07 | End: 2022-03-07

## 2022-03-07 RX ORDER — DIPHENHYDRAMINE HYDROCHLORIDE 50 MG/ML
50 INJECTION INTRAMUSCULAR; INTRAVENOUS ONCE
Status: CANCELLED | OUTPATIENT
Start: 2022-03-07 | End: 2022-03-07

## 2022-03-07 RX ORDER — SODIUM CHLORIDE 0.9 % (FLUSH) 0.9 %
5-40 SYRINGE (ML) INJECTION PRN
Status: DISCONTINUED | OUTPATIENT
Start: 2022-03-07 | End: 2022-03-08 | Stop reason: HOSPADM

## 2022-03-07 RX ORDER — EPINEPHRINE 1 MG/ML
0.3 INJECTION, SOLUTION, CONCENTRATE INTRAVENOUS PRN
OUTPATIENT
Start: 2022-06-27

## 2022-03-07 RX ORDER — SODIUM CHLORIDE 0.9 % (FLUSH) 0.9 %
5-40 SYRINGE (ML) INJECTION PRN
Status: CANCELLED | OUTPATIENT
Start: 2022-05-02

## 2022-03-07 RX ORDER — SODIUM CHLORIDE 9 MG/ML
25 INJECTION, SOLUTION INTRAVENOUS PRN
OUTPATIENT
Start: 2022-06-27

## 2022-03-07 RX ORDER — DIPHENHYDRAMINE HYDROCHLORIDE 50 MG/ML
25 INJECTION INTRAMUSCULAR; INTRAVENOUS ONCE
Status: COMPLETED | OUTPATIENT
Start: 2022-03-07 | End: 2022-03-07

## 2022-03-07 RX ORDER — SODIUM CHLORIDE 9 MG/ML
INJECTION, SOLUTION INTRAVENOUS CONTINUOUS
OUTPATIENT
Start: 2022-06-27

## 2022-03-07 RX ORDER — SODIUM CHLORIDE 9 MG/ML
25 INJECTION, SOLUTION INTRAVENOUS PRN
Status: CANCELLED | OUTPATIENT
Start: 2022-03-07

## 2022-03-07 RX ORDER — DIPHENHYDRAMINE HYDROCHLORIDE 50 MG/ML
25 INJECTION INTRAMUSCULAR; INTRAVENOUS ONCE
Status: CANCELLED | OUTPATIENT
Start: 2022-03-07

## 2022-03-07 RX ORDER — ACETAMINOPHEN 325 MG/1
650 TABLET ORAL ONCE
Status: CANCELLED | OUTPATIENT
Start: 2022-03-07

## 2022-03-07 RX ORDER — SODIUM CHLORIDE 9 MG/ML
25 INJECTION, SOLUTION INTRAVENOUS PRN
Status: DISCONTINUED | OUTPATIENT
Start: 2022-03-07 | End: 2022-03-08 | Stop reason: HOSPADM

## 2022-03-07 RX ORDER — DIPHENHYDRAMINE HYDROCHLORIDE 50 MG/ML
25 INJECTION INTRAMUSCULAR; INTRAVENOUS ONCE
OUTPATIENT
Start: 2022-06-27

## 2022-03-07 RX ORDER — HYDROCODONE BITARTRATE AND ACETAMINOPHEN 5; 325 MG/1; MG/1
1 TABLET ORAL EVERY 6 HOURS PRN
Qty: 100 TABLET | Refills: 0 | Status: SHIPPED | OUTPATIENT
Start: 2022-03-07 | End: 2022-04-06

## 2022-03-07 RX ORDER — SODIUM CHLORIDE 9 MG/ML
INJECTION, SOLUTION INTRAVENOUS CONTINUOUS
Status: CANCELLED | OUTPATIENT
Start: 2022-05-02

## 2022-03-07 RX ORDER — ACETAMINOPHEN 325 MG/1
650 TABLET ORAL ONCE
Status: COMPLETED | OUTPATIENT
Start: 2022-03-07 | End: 2022-03-07

## 2022-03-07 RX ORDER — SODIUM CHLORIDE 0.9 % (FLUSH) 0.9 %
5-40 SYRINGE (ML) INJECTION PRN
OUTPATIENT
Start: 2022-06-27

## 2022-03-07 RX ORDER — MEPERIDINE HYDROCHLORIDE 50 MG/ML
12.5 INJECTION INTRAMUSCULAR; INTRAVENOUS; SUBCUTANEOUS ONCE
Status: CANCELLED | OUTPATIENT
Start: 2022-05-02 | End: 2022-05-02

## 2022-03-07 RX ORDER — METHYLPREDNISOLONE SODIUM SUCCINATE 125 MG/2ML
125 INJECTION, POWDER, LYOPHILIZED, FOR SOLUTION INTRAMUSCULAR; INTRAVENOUS ONCE
OUTPATIENT
Start: 2022-06-27 | End: 2022-06-27

## 2022-03-07 RX ORDER — EPINEPHRINE 1 MG/ML
0.3 INJECTION, SOLUTION, CONCENTRATE INTRAVENOUS PRN
Status: CANCELLED | OUTPATIENT
Start: 2022-05-02

## 2022-03-07 RX ORDER — EPINEPHRINE 1 MG/ML
0.3 INJECTION, SOLUTION, CONCENTRATE INTRAVENOUS PRN
Status: CANCELLED | OUTPATIENT
Start: 2022-03-07

## 2022-03-07 RX ORDER — HEPARIN SODIUM (PORCINE) LOCK FLUSH IV SOLN 100 UNIT/ML 100 UNIT/ML
500 SOLUTION INTRAVENOUS PRN
Status: DISCONTINUED | OUTPATIENT
Start: 2022-03-07 | End: 2022-03-08 | Stop reason: HOSPADM

## 2022-03-07 RX ORDER — HEPARIN SODIUM (PORCINE) LOCK FLUSH IV SOLN 100 UNIT/ML 100 UNIT/ML
500 SOLUTION INTRAVENOUS PRN
Status: CANCELLED | OUTPATIENT
Start: 2022-05-02

## 2022-03-07 RX ORDER — DIPHENHYDRAMINE HYDROCHLORIDE 50 MG/ML
50 INJECTION INTRAMUSCULAR; INTRAVENOUS ONCE
OUTPATIENT
Start: 2022-06-27 | End: 2022-06-27

## 2022-03-07 RX ORDER — SODIUM CHLORIDE 0.9 % (FLUSH) 0.9 %
5-40 SYRINGE (ML) INJECTION PRN
Status: CANCELLED | OUTPATIENT
Start: 2022-03-07

## 2022-03-07 RX ORDER — METHYLPREDNISOLONE SODIUM SUCCINATE 125 MG/2ML
125 INJECTION, POWDER, LYOPHILIZED, FOR SOLUTION INTRAMUSCULAR; INTRAVENOUS ONCE
Status: CANCELLED | OUTPATIENT
Start: 2022-03-07 | End: 2022-03-07

## 2022-03-07 RX ORDER — SODIUM CHLORIDE 9 MG/ML
INJECTION, SOLUTION INTRAVENOUS CONTINUOUS
Status: CANCELLED | OUTPATIENT
Start: 2022-03-07

## 2022-03-07 RX ORDER — METHYLPREDNISOLONE SODIUM SUCCINATE 125 MG/2ML
125 INJECTION, POWDER, LYOPHILIZED, FOR SOLUTION INTRAMUSCULAR; INTRAVENOUS ONCE
Status: CANCELLED | OUTPATIENT
Start: 2022-05-02 | End: 2022-05-02

## 2022-03-07 RX ORDER — ACETAMINOPHEN 325 MG/1
650 TABLET ORAL ONCE
OUTPATIENT
Start: 2022-06-27

## 2022-03-07 RX ORDER — SODIUM CHLORIDE 9 MG/ML
20 INJECTION, SOLUTION INTRAVENOUS ONCE
Status: CANCELLED | OUTPATIENT
Start: 2022-05-02 | End: 2022-05-02

## 2022-03-07 RX ORDER — SODIUM CHLORIDE 9 MG/ML
20 INJECTION, SOLUTION INTRAVENOUS ONCE
OUTPATIENT
Start: 2022-06-27 | End: 2022-06-27

## 2022-03-07 RX ORDER — SODIUM CHLORIDE 9 MG/ML
25 INJECTION, SOLUTION INTRAVENOUS PRN
Status: CANCELLED | OUTPATIENT
Start: 2022-05-02

## 2022-03-07 RX ORDER — DIPHENHYDRAMINE HYDROCHLORIDE 50 MG/ML
50 INJECTION INTRAMUSCULAR; INTRAVENOUS ONCE
Status: CANCELLED | OUTPATIENT
Start: 2022-05-02 | End: 2022-05-02

## 2022-03-07 RX ADMIN — SODIUM CHLORIDE, PRESERVATIVE FREE 10 ML: 5 INJECTION INTRAVENOUS at 15:13

## 2022-03-07 RX ADMIN — SODIUM CHLORIDE 700 MG: 9 INJECTION, SOLUTION INTRAVENOUS at 13:44

## 2022-03-07 RX ADMIN — Medication 500 UNITS: at 15:13

## 2022-03-07 RX ADMIN — SODIUM CHLORIDE, PRESERVATIVE FREE 20 ML: 5 INJECTION INTRAVENOUS at 09:50

## 2022-03-07 RX ADMIN — ACETAMINOPHEN 650 MG: 325 TABLET ORAL at 10:40

## 2022-03-07 RX ADMIN — SODIUM CHLORIDE 250 ML: 9 INJECTION, SOLUTION INTRAVENOUS at 10:34

## 2022-03-07 RX ADMIN — DIPHENHYDRAMINE HYDROCHLORIDE 25 MG: 50 INJECTION, SOLUTION INTRAMUSCULAR; INTRAVENOUS at 10:40

## 2022-03-07 ASSESSMENT — PAIN SCALES - GENERAL: PAINLEVEL_OUTOF10: 7

## 2022-03-07 NOTE — PROGRESS NOTES
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 Evidence of relapse with biopsy-proven follicular lymphoma. 1-2 from treated medically lymph node biopsy July 6, 2021    CURRENT THERAPY:    1. Observation. 2. Status post treatment with Bexxar in May 2008. 3. Status post previous treatment with Rituxan. 4.  Start treatment with Rituxan August 23, 2021. completed4 weeks of rituximab on 9/20/2021  5. Induction Rituxan is followed by consolidation Rituxan maintenance     INTERIM HISTORY:  The patient is seen for follow-up of his lymphoma. He was treated medically because of abdominal pain. Further work-up showed enlarged lymph nodes in the retroperitoneum and iliac area. Biopsy proved recurrence of follicular lymphoma. Patient did well on rituximab. Clinically he is stable. No significant abdominal pain at the present time. No GI symptoms. No urinary symptoms. He is much better with use of Norco). Plan is to continue on rituximab every 2 months for 2 years. Repeated CT scan February 2022 showed stable disease. REVIEW OF SYSTEMS:   General: No weight loss. Eyes: No blurred vision, eye pain or double vision. Ears: No hearing problems or drainage. No tinnitus. Throat: No sore throat, problems with swallowing or dysphagia. Respiratory: No cough, sputum or hemoptysis. No shortness of breath. Cardiovascular: No chest pain, orthopnea or PND. No lower extremity edema. No palpitation. Gastrointestinal: No problems with swallowing. No abdominal pain or bloating. No nausea or vomiting. No diarrhea or constipation. No GI bleeding. Genitourinary: No dysuria, hematuria, frequency or urgency. Musculoskeletal: No muscle aches or pains. No limitation of movement. No back pain. Dermatologic: No skin rashes or pruritus. No skin lesions or discolorations.  Psychiatric: No depression, anxiety, or stress or signs of schizophrenia. Hematologic: No history of bleeding tendency. No bruises or ecchymosis. No history of clotting problems. Infectious disease: No fever, chills or frequent infections. Endocrine: No problems with opacity. No polydipsia or polyuria. Neurologic: No headaches or dizziness. No weakness or numbness of the extremities. SOCIAL HISTORY:  No smoking, no alcohol drinking. PHYSICAL EXAM:  The patient is not in acute distress. Vital signs: Blood pressure (!) 156/74, pulse 76, temperature 97.1 °F (36.2 °C), temperature source Temporal, weight 177 lb 3.2 oz (80.4 kg). HEENT:  Eyes are normal. Ears, nose and throat are normal.  Neck: Supple. No lymph node enlargement. No thyroid enlargement. Trachea is centrally located. Chest:  Clear to auscultation. No wheezes or crepitations. Heart: Regular sinus rhythm. Abdomen: Soft, nontender. No hepatosplenomegaly. No masses. Extremities:  With no edema. Lymph Nodes:  No cervical, axillary or inguinal lymph node enlargement. Neurologic:  Conscious and oriented. No focal neurological deficits. Psychosocial: No depression, anxiety or stress. Skin: No rashes, bruises or ecchymoses. REVIEW OF DIAGNOSTIC DATA:     Lab Results   Component Value Date    WBC 6.1 03/07/2022    HGB 10.6 (L) 03/07/2022    HCT 32.3 (L) 03/07/2022    MCV 86.6 03/07/2022     03/07/2022       Chemistry        Component Value Date/Time     03/07/2022 0950    K 4.0 03/07/2022 0950     03/07/2022 0950    CO2 27 03/07/2022 0950    BUN 26 (H) 03/07/2022 0950    CREATININE 1.25 (H) 03/07/2022 0950        Component Value Date/Time    CALCIUM 8.5 (L) 03/07/2022 0950    ALKPHOS 95 03/07/2022 0950    AST 14 03/07/2022 0950    ALT 14 03/07/2022 0950    BILITOT 0.32 03/07/2022 0950        CT scan showed stable findings with no evidence of progression.    XR CHEST (2 VW)  Narrative: EXAMINATION:  TWO X-RAY VIEWS OF THE CHEST    2/19/2022 8:03 am    COMPARISON:  02/16/2022    HISTORY:  ORDERING SYSTEM PROVIDED HISTORY:  Follow-up effusions  TECHNOLOGIST PROVIDED HISTORY:  Follow-up effusions  Reason for Exam:  Hx. of pleural effusion, follow up. FINDINGS:  Cardiomegaly and perihilar congestion. Suggestion of small effusions. Bilateral lower lobe infiltrates more prominent compared to the previous  evaluation. Access port appears stable. Impression: Some worsening compared to the previous evaluation. Persisting likely small  effusions bilaterally. More prominent bilateral lower lobe infiltrates. ASSESSMENT:    Small cell, B-cell non-Hodgkins lymphoma in remission. Events of relapse with biopsy-proven follicular lymphoma grade 1-2 July 5, 2021    PLAN:     Lymph node biopsy showed relapse of low-grade lymphoma. Grade 1 through 2 follicular lymphoma. PET/CT scan was reviewed and discussed with the patient. I again discussed with the patient the nature of his lymphoma, staging, prognosis and treatment. Explained that this is a low-grade lymphoma and treatment is usually well controlled but not curable. Based on the findings my recommendation is to use rituximab again. Explained benefits and side effects. He agreed. Patient received 4 weeks of Rituxan. Tolerated well. Started on maintenance every 2 months. .    Ct scan from February 16, 2022 showed stable disease. We will continue on Rituxan every 2 months for 2 years as maintenance treatment. With patient's GI symptoms, he was referred to GI. Will continue norco and megace. Patient's questions were answered to the best of his satisfaction and he verbalized full understanding and agreement. cc: Kacy Turcios D.O. Nancy Jackson M.D.         Renee Xavier M.D.

## 2022-03-07 NOTE — ONCOLOGY
Patient arrived for ruxience. Pt had visit with Dr. Marino Vergara, labs reviewed and within treatable limit, will proceed with treatment today. Pt does not have precert for Ruxience. Will give pt ruxience today, approved by Marck Bone in management. Treatment completed without issue. Pt stable at discharge. Scheduled to return 5/2/22 for c4d1 ruxience.

## 2022-03-07 NOTE — TELEPHONE ENCOUNTER
Proceed with Rituxan as scheduled  RV about 4 months (at time of treatment)    Tx today as scheduled    RV scheduled 6/27/22 @ 11:45am w/ tx to follow    PT was given AVS and an appt schedule    Electronically signed by Kwame Campos on 3/7/2022 at 3:52 PM

## 2022-05-02 ENCOUNTER — HOSPITAL ENCOUNTER (OUTPATIENT)
Dept: INFUSION THERAPY | Age: 72
Discharge: HOME | End: 2022-05-02
Payer: MEDICARE

## 2022-05-02 VITALS
DIASTOLIC BLOOD PRESSURE: 58 MMHG | WEIGHT: 178.4 LBS | SYSTOLIC BLOOD PRESSURE: 106 MMHG | BODY MASS INDEX: 25.54 KG/M2 | HEIGHT: 70 IN | HEART RATE: 66 BPM | RESPIRATION RATE: 16 BRPM | TEMPERATURE: 98.1 F

## 2022-05-02 DIAGNOSIS — C85.90 NON-HODGKIN'S LYMPHOMA, UNSPECIFIED BODY REGION, UNSPECIFIED NON-HODGKIN LYMPHOMA TYPE (HCC): Primary | ICD-10-CM

## 2022-05-02 DIAGNOSIS — C82.13 FOLLICULAR LYMPHOMA GRADE II OF INTRA-ABDOMINAL LYMPH NODES (HCC): ICD-10-CM

## 2022-05-02 LAB
ABSOLUTE EOS #: 0.2 K/UL (ref 0–0.4)
ABSOLUTE LYMPH #: 1.4 K/UL (ref 1–4.8)
ABSOLUTE MONO #: 0.8 K/UL (ref 0.1–1.2)
ALBUMIN SERPL-MCNC: 3.5 G/DL (ref 3.5–5.2)
ALBUMIN/GLOBULIN RATIO: 1.5 (ref 1–2.5)
ALP BLD-CCNC: 119 U/L (ref 40–129)
ALT SERPL-CCNC: 20 U/L (ref 5–41)
ANION GAP SERPL CALCULATED.3IONS-SCNC: 8 MMOL/L (ref 9–17)
AST SERPL-CCNC: 18 U/L
BASOPHILS # BLD: 0 % (ref 0–2)
BASOPHILS ABSOLUTE: 0 K/UL (ref 0–0.2)
BILIRUB SERPL-MCNC: 0.42 MG/DL (ref 0.3–1.2)
BUN BLDV-MCNC: 40 MG/DL (ref 8–23)
CALCIUM SERPL-MCNC: 8.4 MG/DL (ref 8.6–10.4)
CHLORIDE BLD-SCNC: 103 MMOL/L (ref 98–107)
CO2: 30 MMOL/L (ref 20–31)
CREAT SERPL-MCNC: 1.23 MG/DL (ref 0.7–1.2)
EOSINOPHILS RELATIVE PERCENT: 3 % (ref 1–4)
GFR AFRICAN AMERICAN: >60 ML/MIN
GFR NON-AFRICAN AMERICAN: 58 ML/MIN
GFR SERPL CREATININE-BSD FRML MDRD: ABNORMAL ML/MIN/{1.73_M2}
GLUCOSE BLD-MCNC: 90 MG/DL (ref 70–99)
HCT VFR BLD CALC: 35.1 % (ref 41–53)
HEMOGLOBIN: 11.3 G/DL (ref 13.5–17.5)
LYMPHOCYTES # BLD: 19 % (ref 24–44)
MCH RBC QN AUTO: 26.7 PG (ref 26–34)
MCHC RBC AUTO-ENTMCNC: 32.3 G/DL (ref 31–37)
MCV RBC AUTO: 82.7 FL (ref 80–100)
MONOCYTES # BLD: 11 % (ref 2–11)
PDW BLD-RTO: 16.1 % (ref 12.5–15.4)
PLATELET # BLD: 143 K/UL (ref 140–450)
PMV BLD AUTO: 8.6 FL (ref 6–12)
POTASSIUM SERPL-SCNC: 4.9 MMOL/L (ref 3.7–5.3)
RBC # BLD: 4.24 M/UL (ref 4.5–5.9)
SEG NEUTROPHILS: 67 % (ref 36–66)
SEGMENTED NEUTROPHILS ABSOLUTE COUNT: 4.9 K/UL (ref 1.8–7.7)
SODIUM BLD-SCNC: 141 MMOL/L (ref 135–144)
TOTAL PROTEIN: 5.8 G/DL (ref 6.4–8.3)
WBC # BLD: 7.3 K/UL (ref 3.5–11)

## 2022-05-02 PROCEDURE — 6360000002 HC RX W HCPCS: Performed by: INTERNAL MEDICINE

## 2022-05-02 PROCEDURE — 36591 DRAW BLOOD OFF VENOUS DEVICE: CPT

## 2022-05-02 PROCEDURE — 85025 COMPLETE CBC W/AUTO DIFF WBC: CPT

## 2022-05-02 PROCEDURE — 96375 TX/PRO/DX INJ NEW DRUG ADDON: CPT

## 2022-05-02 PROCEDURE — 96413 CHEMO IV INFUSION 1 HR: CPT

## 2022-05-02 PROCEDURE — 2580000003 HC RX 258: Performed by: INTERNAL MEDICINE

## 2022-05-02 PROCEDURE — 6370000000 HC RX 637 (ALT 250 FOR IP): Performed by: INTERNAL MEDICINE

## 2022-05-02 PROCEDURE — 80053 COMPREHEN METABOLIC PANEL: CPT

## 2022-05-02 PROCEDURE — 96415 CHEMO IV INFUSION ADDL HR: CPT

## 2022-05-02 RX ORDER — ACETAMINOPHEN 325 MG/1
650 TABLET ORAL ONCE
Status: COMPLETED | OUTPATIENT
Start: 2022-05-02 | End: 2022-05-02

## 2022-05-02 RX ORDER — SODIUM CHLORIDE 0.9 % (FLUSH) 0.9 %
5-40 SYRINGE (ML) INJECTION PRN
Status: DISCONTINUED | OUTPATIENT
Start: 2022-05-02 | End: 2022-05-03 | Stop reason: HOSPADM

## 2022-05-02 RX ORDER — SODIUM CHLORIDE 9 MG/ML
25 INJECTION, SOLUTION INTRAVENOUS PRN
Status: DISCONTINUED | OUTPATIENT
Start: 2022-05-02 | End: 2022-05-03 | Stop reason: HOSPADM

## 2022-05-02 RX ORDER — DIPHENHYDRAMINE HYDROCHLORIDE 50 MG/ML
25 INJECTION INTRAMUSCULAR; INTRAVENOUS ONCE
Status: COMPLETED | OUTPATIENT
Start: 2022-05-02 | End: 2022-05-02

## 2022-05-02 RX ORDER — HEPARIN SODIUM (PORCINE) LOCK FLUSH IV SOLN 100 UNIT/ML 100 UNIT/ML
500 SOLUTION INTRAVENOUS PRN
Status: DISCONTINUED | OUTPATIENT
Start: 2022-05-02 | End: 2022-05-03 | Stop reason: HOSPADM

## 2022-05-02 RX ADMIN — ACETAMINOPHEN 650 MG: 325 TABLET ORAL at 11:47

## 2022-05-02 RX ADMIN — SODIUM CHLORIDE, PRESERVATIVE FREE 10 ML: 5 INJECTION INTRAVENOUS at 13:47

## 2022-05-02 RX ADMIN — HEPARIN 500 UNITS: 100 SYRINGE at 13:47

## 2022-05-02 RX ADMIN — DIPHENHYDRAMINE HYDROCHLORIDE 25 MG: 50 INJECTION, SOLUTION INTRAMUSCULAR; INTRAVENOUS at 11:47

## 2022-05-02 RX ADMIN — SODIUM CHLORIDE 700 MG: 9 INJECTION, SOLUTION INTRAVENOUS at 12:05

## 2022-05-02 RX ADMIN — SODIUM CHLORIDE, PRESERVATIVE FREE 10 ML: 5 INJECTION INTRAVENOUS at 11:01

## 2022-05-02 RX ADMIN — SODIUM CHLORIDE 250 ML: 9 INJECTION, SOLUTION INTRAVENOUS at 11:47

## 2022-05-02 RX ADMIN — SODIUM CHLORIDE, PRESERVATIVE FREE 10 ML: 5 INJECTION INTRAVENOUS at 11:00

## 2022-05-02 NOTE — ONCOLOGY
Patient arrived for C4D1 ruxience. Labs reviewed and within treatable limits, will proceed with treatment today. Treatment completed without issue. Pt stable at discharge. Scheduled to return 6/27/22 for MD visit and C5D1.

## 2022-05-31 ENCOUNTER — TELEPHONE (OUTPATIENT)
Dept: FAMILY MEDICINE CLINIC | Age: 72
End: 2022-05-31

## 2022-05-31 ENCOUNTER — TELEPHONE (OUTPATIENT)
Dept: INFUSION THERAPY | Age: 72
End: 2022-05-31

## 2022-05-31 NOTE — TELEPHONE ENCOUNTER
Not our patient was trying to get a hold of Dr. Jean Alvarez provided contact info :         Amy Truong

## 2022-05-31 NOTE — TELEPHONE ENCOUNTER
Pt's wife called stating pt is scheduled for left carotid endarterectomy on 6/28. Pt is scheduled for chemotherapy on 6/27. She is asking if this is ok or if things need to be rescheduled. Will discuss above with Dr. Todd Ramirez and will call pt's wife back with recommendations.

## 2022-06-02 ENCOUNTER — HOSPITAL ENCOUNTER (OUTPATIENT)
Dept: NUCLEAR MEDICINE | Age: 72
Discharge: HOME | End: 2022-06-04
Payer: MEDICARE

## 2022-06-02 ENCOUNTER — HOSPITAL ENCOUNTER (OUTPATIENT)
Dept: NON INVASIVE DIAGNOSTICS | Age: 72
Discharge: HOME | End: 2022-06-02
Payer: MEDICARE

## 2022-06-02 DIAGNOSIS — Z01.810 PRE-OPERATIVE CARDIOVASCULAR EXAMINATION: ICD-10-CM

## 2022-06-02 DIAGNOSIS — I50.32 CHRONIC DIASTOLIC HEART FAILURE (HCC): ICD-10-CM

## 2022-06-02 DIAGNOSIS — Z92.21 HISTORY OF CHEMOTHERAPY: ICD-10-CM

## 2022-06-02 LAB
LV EF: 51 %
LVEF MODALITY: NORMAL

## 2022-06-02 PROCEDURE — A9500 TC99M SESTAMIBI: HCPCS | Performed by: INTERNAL MEDICINE

## 2022-06-02 PROCEDURE — 3430000000 HC RX DIAGNOSTIC RADIOPHARMACEUTICAL: Performed by: INTERNAL MEDICINE

## 2022-06-02 PROCEDURE — 6360000002 HC RX W HCPCS: Performed by: INTERNAL MEDICINE

## 2022-06-02 PROCEDURE — 2580000003 HC RX 258: Performed by: INTERNAL MEDICINE

## 2022-06-02 PROCEDURE — C8923 2D TTE W OR W/O FOL W/CON,CO: HCPCS

## 2022-06-02 PROCEDURE — 6360000004 HC RX CONTRAST MEDICATION: Performed by: INTERNAL MEDICINE

## 2022-06-02 PROCEDURE — 78452 HT MUSCLE IMAGE SPECT MULT: CPT

## 2022-06-02 PROCEDURE — 93017 CV STRESS TEST TRACING ONLY: CPT

## 2022-06-02 RX ORDER — SODIUM CHLORIDE 0.9 % (FLUSH) 0.9 %
10 SYRINGE (ML) INJECTION PRN
Status: DISCONTINUED | OUTPATIENT
Start: 2022-06-02 | End: 2022-06-05 | Stop reason: HOSPADM

## 2022-06-02 RX ORDER — SODIUM CHLORIDE 9 MG/ML
500 INJECTION, SOLUTION INTRAVENOUS CONTINUOUS PRN
Status: DISCONTINUED | OUTPATIENT
Start: 2022-06-02 | End: 2022-06-02 | Stop reason: HOSPADM

## 2022-06-02 RX ORDER — NITROGLYCERIN 0.4 MG/1
0.4 TABLET SUBLINGUAL EVERY 5 MIN PRN
Status: DISCONTINUED | OUTPATIENT
Start: 2022-06-02 | End: 2022-06-02 | Stop reason: HOSPADM

## 2022-06-02 RX ORDER — AMINOPHYLLINE DIHYDRATE 25 MG/ML
50 INJECTION, SOLUTION INTRAVENOUS PRN
Status: DISCONTINUED | OUTPATIENT
Start: 2022-06-02 | End: 2022-06-02 | Stop reason: HOSPADM

## 2022-06-02 RX ORDER — ALBUTEROL SULFATE 90 UG/1
2 AEROSOL, METERED RESPIRATORY (INHALATION) PRN
Status: DISCONTINUED | OUTPATIENT
Start: 2022-06-02 | End: 2022-06-02 | Stop reason: HOSPADM

## 2022-06-02 RX ORDER — ATROPINE SULFATE 0.1 MG/ML
0.5 INJECTION INTRAVENOUS EVERY 5 MIN PRN
Status: DISCONTINUED | OUTPATIENT
Start: 2022-06-02 | End: 2022-06-02 | Stop reason: HOSPADM

## 2022-06-02 RX ORDER — SODIUM CHLORIDE 0.9 % (FLUSH) 0.9 %
5-40 SYRINGE (ML) INJECTION PRN
Status: DISCONTINUED | OUTPATIENT
Start: 2022-06-02 | End: 2022-06-02 | Stop reason: HOSPADM

## 2022-06-02 RX ORDER — METOPROLOL TARTRATE 5 MG/5ML
5 INJECTION INTRAVENOUS EVERY 5 MIN PRN
Status: DISCONTINUED | OUTPATIENT
Start: 2022-06-02 | End: 2022-06-02 | Stop reason: HOSPADM

## 2022-06-02 RX ADMIN — TETRAKIS(2-METHOXYISOBUTYLISOCYANIDE)COPPER(I) TETRAFLUOROBORATE 9.8 MILLICURIE: 1 INJECTION, POWDER, LYOPHILIZED, FOR SOLUTION INTRAVENOUS at 08:43

## 2022-06-02 RX ADMIN — TETRAKIS(2-METHOXYISOBUTYLISOCYANIDE)COPPER(I) TETRAFLUOROBORATE 32.7 MILLICURIE: 1 INJECTION, POWDER, LYOPHILIZED, FOR SOLUTION INTRAVENOUS at 09:42

## 2022-06-02 RX ADMIN — PERFLUTREN 2 ML: 6.52 INJECTION, SUSPENSION INTRAVENOUS at 10:01

## 2022-06-02 RX ADMIN — REGADENOSON 0.4 MG: 0.08 INJECTION, SOLUTION INTRAVENOUS at 09:42

## 2022-06-02 RX ADMIN — Medication 10 ML: at 08:43

## 2022-06-02 NOTE — PROCEDURES
[address] [place], [place] [address]                              CARDIAC STRESS TEST    PATIENT NAME: Ada Albert                    :        1950  MED REC NO:   [id number]                              ROOM:  ACCOUNT NO:   [de-identified]                           ADMIT DATE: 2022  PROVIDER:     Varney Gitelman    DATE OF STUDY:  2022    ORDERING PROVIDER:  Rosa Del Rosario MD    PRIMARY CARE PROVIDER:  Troy Talbert DO    INTERPRETING PHYSICIAN:  Gracia Barrera MD    _____ STRESS TESTING    TEST TYPE: LEXISCAN CARDIOLYTE STRESS TEST  INDICATION: PRE-OP  REFERRING PHYSICIAN: Rosa Del Rosario MD    RESTING HEART RATE: 55 BEATS PER MINUTE  RESTING BLOOD PRESSURE: 94/72    MEDICATION(S) GIVEN: 0.4MG IV LEXISCAN  REASON FOR TERMINATION: MEDICATION INFUSION COMPLETE    RESTING EKG: NORMAL  STRESS HEART RESPONSE: NORMAL RESPONSE  BLOOD PRESSURE RESPONSE: APPROPRIATE  STRESS EKGs: NORMAL  CHEST DISCOMFORT: NO PAIN DURING STRESS  ISCHEMIC EKG CHANGES: NONE    EKG IMPRESSION: ELECTROCARDIOGRAPHICALLY NEGATIVE LEXISCAN STRESS TEST. RADIOISOTOPE RESULTS TO FOLLOW FROM THE DEPARTMENT OF NUCLEAR MEDICINE.     COMMENTS: SINGLE APC SEEN      JOSE JUAN Doyle 89    D: 2022 10:23:06       T: 2022 10:30:13     NEYMAR/SOBIA  Job#: [id number]     Doc#: Unknown    CC:    (Retain this field even if not dictated or not decipherable)

## 2022-06-02 NOTE — PROGRESS NOTES
CST Lexiscan. Stress Tech performs patient preparation of physical comfort, review test procedures, pre-stress EKG. Lung Sounds clear t/o. Consent verified by pt. .  Educated patient on test procedure and possible side effects of Lexiscan as well as s/s to report. Cardiologist reviewed pre-test EKG and is present for test.  Patient tolerated test well. EKG portion of testing is completed and , nuc. med. portion is still pending.

## 2022-06-13 ENCOUNTER — APPOINTMENT (OUTPATIENT)
Dept: GENERAL RADIOLOGY | Age: 72
End: 2022-06-13
Payer: MEDICARE

## 2022-06-13 ENCOUNTER — HOSPITAL ENCOUNTER (OUTPATIENT)
Dept: PREADMISSION TESTING | Age: 72
Discharge: HOME | End: 2022-06-17
Payer: MEDICARE

## 2022-06-13 ENCOUNTER — HOSPITAL ENCOUNTER (EMERGENCY)
Age: 72
Discharge: HOME | End: 2022-06-13
Attending: EMERGENCY MEDICINE
Payer: MEDICARE

## 2022-06-13 VITALS
BODY MASS INDEX: 25.05 KG/M2 | WEIGHT: 175 LBS | HEART RATE: 76 BPM | OXYGEN SATURATION: 94 % | RESPIRATION RATE: 22 BRPM | SYSTOLIC BLOOD PRESSURE: 95 MMHG | DIASTOLIC BLOOD PRESSURE: 44 MMHG | HEIGHT: 70 IN | TEMPERATURE: 96.9 F

## 2022-06-13 VITALS
OXYGEN SATURATION: 97 % | WEIGHT: 176 LBS | SYSTOLIC BLOOD PRESSURE: 111 MMHG | DIASTOLIC BLOOD PRESSURE: 44 MMHG | BODY MASS INDEX: 25.2 KG/M2 | HEART RATE: 56 BPM | HEIGHT: 70 IN | TEMPERATURE: 97.7 F | RESPIRATION RATE: 15 BRPM

## 2022-06-13 DIAGNOSIS — J44.9 CHRONIC OBSTRUCTIVE PULMONARY DISEASE, UNSPECIFIED COPD TYPE (HCC): ICD-10-CM

## 2022-06-13 DIAGNOSIS — E16.2 HYPOGLYCEMIA: Primary | ICD-10-CM

## 2022-06-13 LAB
-: ABNORMAL
ABO/RH: NORMAL
ABSOLUTE EOS #: 0.06 K/UL (ref 0–0.44)
ABSOLUTE IMMATURE GRANULOCYTE: 0.11 K/UL (ref 0–0.3)
ABSOLUTE LYMPH #: 1.91 K/UL (ref 1.1–3.7)
ABSOLUTE MONO #: 1.17 K/UL (ref 0.1–1.2)
ANION GAP SERPL CALCULATED.3IONS-SCNC: 9 MMOL/L (ref 9–17)
ANTIBODY SCREEN: NEGATIVE
ARM BAND NUMBER: NORMAL
BASOPHILS # BLD: 0 % (ref 0–2)
BASOPHILS ABSOLUTE: <0.03 K/UL (ref 0–0.2)
BILIRUBIN URINE: NEGATIVE
BUN BLDV-MCNC: 45 MG/DL (ref 8–23)
BUN/CREAT BLD: 30 (ref 9–20)
CALCIUM SERPL-MCNC: 8.9 MG/DL (ref 8.6–10.4)
CHLORIDE BLD-SCNC: 104 MMOL/L (ref 98–107)
CO2: 32 MMOL/L (ref 20–31)
COLOR: YELLOW
CREAT SERPL-MCNC: 1.51 MG/DL (ref 0.7–1.2)
EOSINOPHILS RELATIVE PERCENT: 1 % (ref 1–4)
EPITHELIAL CELLS UA: ABNORMAL /HPF (ref 0–5)
ESTIMATED AVERAGE GLUCOSE: 117 MG/DL
EXPIRATION DATE: NORMAL
GFR AFRICAN AMERICAN: 55 ML/MIN
GFR NON-AFRICAN AMERICAN: 46 ML/MIN
GFR SERPL CREATININE-BSD FRML MDRD: ABNORMAL ML/MIN/{1.73_M2}
GLUCOSE BLD-MCNC: 139 MG/DL (ref 75–110)
GLUCOSE BLD-MCNC: 47 MG/DL (ref 75–110)
GLUCOSE BLD-MCNC: 78 MG/DL (ref 70–99)
GLUCOSE URINE: NEGATIVE
HBA1C MFR BLD: 5.7 % (ref 4–6)
HCT VFR BLD CALC: 45.8 % (ref 40.7–50.3)
HEMOGLOBIN: 13.3 G/DL (ref 13–17)
IMMATURE GRANULOCYTES: 1 %
INR BLD: 1.1
KETONES, URINE: NEGATIVE
LEUKOCYTE ESTERASE, URINE: ABNORMAL
LYMPHOCYTES # BLD: 17 % (ref 24–43)
MCH RBC QN AUTO: 25.8 PG (ref 25.2–33.5)
MCHC RBC AUTO-ENTMCNC: 29 G/DL (ref 28–38)
MCV RBC AUTO: 88.9 FL (ref 82.6–102.9)
MONOCYTES # BLD: 10 % (ref 3–12)
NITRITE, URINE: NEGATIVE
PARTIAL THROMBOPLASTIN TIME: 30.3 SEC (ref 23.9–33.8)
PDW BLD-RTO: 15.4 % (ref 11.8–14.4)
PH UA: 6 (ref 5–8)
PLATELET # BLD: 178 K/UL (ref 138–453)
PMV BLD AUTO: 11.1 FL (ref 8.1–13.5)
POTASSIUM SERPL-SCNC: 4.7 MMOL/L (ref 3.7–5.3)
PROTEIN UA: ABNORMAL
PROTHROMBIN TIME: 14.5 SEC (ref 11.5–14.2)
RBC # BLD: 5.15 M/UL (ref 4.21–5.77)
RBC # BLD: ABNORMAL 10*6/UL
RBC UA: ABNORMAL /HPF (ref 0–2)
SEG NEUTROPHILS: 71 % (ref 36–65)
SEGMENTED NEUTROPHILS ABSOLUTE COUNT: 8 K/UL (ref 1.5–8.1)
SODIUM BLD-SCNC: 145 MMOL/L (ref 135–144)
SPECIFIC GRAVITY UA: 1.02 (ref 1–1.03)
TURBIDITY: ABNORMAL
URINE HGB: ABNORMAL
UROBILINOGEN, URINE: NORMAL
WBC # BLD: 11.3 K/UL (ref 3.5–11.3)
WBC UA: ABNORMAL /HPF (ref 0–5)
YEAST: ABNORMAL

## 2022-06-13 PROCEDURE — 86901 BLOOD TYPING SEROLOGIC RH(D): CPT

## 2022-06-13 PROCEDURE — 6370000000 HC RX 637 (ALT 250 FOR IP): Performed by: EMERGENCY MEDICINE

## 2022-06-13 PROCEDURE — 82947 ASSAY GLUCOSE BLOOD QUANT: CPT

## 2022-06-13 PROCEDURE — 85610 PROTHROMBIN TIME: CPT

## 2022-06-13 PROCEDURE — 83036 HEMOGLOBIN GLYCOSYLATED A1C: CPT

## 2022-06-13 PROCEDURE — 93005 ELECTROCARDIOGRAM TRACING: CPT | Performed by: EMERGENCY MEDICINE

## 2022-06-13 PROCEDURE — 81001 URINALYSIS AUTO W/SCOPE: CPT

## 2022-06-13 PROCEDURE — 80048 BASIC METABOLIC PNL TOTAL CA: CPT

## 2022-06-13 PROCEDURE — 85025 COMPLETE CBC W/AUTO DIFF WBC: CPT

## 2022-06-13 PROCEDURE — 86900 BLOOD TYPING SEROLOGIC ABO: CPT

## 2022-06-13 PROCEDURE — 71045 X-RAY EXAM CHEST 1 VIEW: CPT

## 2022-06-13 PROCEDURE — 6360000002 HC RX W HCPCS: Performed by: EMERGENCY MEDICINE

## 2022-06-13 PROCEDURE — 99284 EMERGENCY DEPT VISIT MOD MDM: CPT

## 2022-06-13 PROCEDURE — 36415 COLL VENOUS BLD VENIPUNCTURE: CPT

## 2022-06-13 PROCEDURE — 85730 THROMBOPLASTIN TIME PARTIAL: CPT

## 2022-06-13 PROCEDURE — 2580000003 HC RX 258: Performed by: EMERGENCY MEDICINE

## 2022-06-13 PROCEDURE — 86850 RBC ANTIBODY SCREEN: CPT

## 2022-06-13 RX ORDER — LOPERAMIDE HYDROCHLORIDE 2 MG/1
2 CAPSULE ORAL ONCE
Status: COMPLETED | OUTPATIENT
Start: 2022-06-13 | End: 2022-06-13

## 2022-06-13 RX ORDER — 0.9 % SODIUM CHLORIDE 0.9 %
500 INTRAVENOUS SOLUTION INTRAVENOUS ONCE
Status: COMPLETED | OUTPATIENT
Start: 2022-06-13 | End: 2022-06-13

## 2022-06-13 RX ORDER — HEPARIN SODIUM (PORCINE) LOCK FLUSH IV SOLN 100 UNIT/ML 100 UNIT/ML
500 SOLUTION INTRAVENOUS ONCE
Status: COMPLETED | OUTPATIENT
Start: 2022-06-13 | End: 2022-06-13

## 2022-06-13 RX ADMIN — SODIUM CHLORIDE 500 ML: 9 INJECTION, SOLUTION INTRAVENOUS at 13:01

## 2022-06-13 RX ADMIN — HEPARIN 500 UNITS: 100 SYRINGE at 15:51

## 2022-06-13 RX ADMIN — LOPERAMIDE HYDROCHLORIDE 2 MG: 2 CAPSULE ORAL at 15:30

## 2022-06-13 ASSESSMENT — PAIN - FUNCTIONAL ASSESSMENT: PAIN_FUNCTIONAL_ASSESSMENT: NONE - DENIES PAIN

## 2022-06-13 ASSESSMENT — ENCOUNTER SYMPTOMS
APNEA: 0
COLOR CHANGE: 0
ABDOMINAL DISTENTION: 0
VOMITING: 0
SHORTNESS OF BREATH: 1
EYES NEGATIVE: 1
CHEST TIGHTNESS: 0
DIARRHEA: 0
SINUS PAIN: 0
CONSTIPATION: 0

## 2022-06-13 ASSESSMENT — PAIN SCALES - GENERAL: PAINLEVEL_OUTOF10: 0

## 2022-06-13 NOTE — ED NOTES
Pt to room from pre-op testing. RN states pt was hypotensive and low O2 and pt has been using more O2 at home the last few days. Pt c/o diarrhea x2 weeks and has been taking immodium.  Pt had episode of diarrhea while upstairs     [facility]  06/13/22 [id number]

## 2022-06-13 NOTE — H&P
History and Physical Service   [facility] 12    HISTORY AND PHYSICAL EXAMINATION            Date of Evaluation: 6/13/2022  Patient name:  Bob Carrillo  MRN:   [id number]  YOB: 1950  PCP:    Elise Martell DO    History Obtained From:     Patient, Pt's cousin, Medical records    History of Present Illness: This is Bob Carrillo a 66 y.o. male who presents for a pre-admission testing appointment for an upcoming LEFT CAROTID ENDARTERECTOMY by Dr. Todd Buenrostro MD scheduled on 6/28/2022 at 0930 due to CAROTID STENOSIS LEFT. A bilateral carotid scan was done on 10/26/2021 (Please see result below). Pt denies sudden vision changes, facial droop, slurred speech, unilateral weakness, dizziness, and confusion. The pt has generalized weakness. Pt denies history of a stroke. He is taking Eliquis for a right leg DVT which was discovered 2 months ago. The pt's cousin is present to help answer questions. Bilateral Carotid Scan: 10/26/2021  Conclusions        Summary        Simultaneous real time imaging utilizing B-Mode, color flow doppler and    spectral waveform analysis was performed on the bilateral extracerebral    vascular system.    The study demonstrates:        Right:    Internal carotid artery has a moderate, 50-69% stenosis based on    velocities.    The vertebral artery is patent with antegrade flow.        Left:    Internal carotid artery has a severe, 70-99% stenosis based on velocities.    The vertebral artery is patent with antegrade flow. History of CHF, hyperlipidemia, hypertension, right leg DVT (detected 2 months ago), hypothyroidism, diabetes, COPD (pt wears 5 liters of oxygen at night per nasal cannula), and non-Hodgkin's lymphoma (Last chemotherapy treatment was in 4/2022. Port is in the right upper chest.). Stress test and ECHO dated 6/2/22 (Please see results below).  Pt follows-up with [address] [place] [address] Cardiology, Dr. Dee Dee Lewis from pulmonology, and Dr. Adair Stokes from hematology and oncology. Blood pressures today were 92/52 and then 95/44. Resting blood pressure was 94/72 on 6/2/22 when the pt had a stress test. The pt is taking Lasix 40 mg twice per day, Norvasc, Coreg, and Cozaar. His oxygen saturation was initially 90% and then 94% on room air. The pt has had diarrhea for the past several months. Pt states his PCP, Dr. Anne Reeves, recommended Imodium and probiotics. The Imodium has improved the diarrhea. Pt states he did not take Imodium today and had diarrhea while in [facility]. The pt is pale, lethargic, lightheaded, dyspneic, and \"feels terrible\" today. He denies dizziness, chest pain, and palpitations. The pt wishes to go to the ED at this time. The pt was transported to the ED via cart by Marily Delatorre RN and Kelsea Walker RN. The pt's cousin accompanied the pt to the ED. The pt is taking an antibiotic for a broken left lower tooth. He is scheduled to have the tooth removed in July 2022. The pt's dentist is Dr. Lorrie Srivastava in [place], [place]. The pt's UA today was positive for protein, leukocyte esterase, yeast, and Hgb. Kelsea Walker RN stated she would fax the pt's UA and blood work results from today to Dr. Evy Mendoza. Functional Capacity per pt:   1) Pt is not able to walk 2 city blocks on level ground due to dyspnea. The pt has occasional dyspnea while talking, getting dressed, or doing things around the house. 2) The pt is usually not dyspneic while climbing 2 stair steps into his house.         Past Medical History:     Past Medical History:   Diagnosis Date    Arthritis     Cancer [facility])     chemotherapy lymphoma last was april 2022    Chemotherapy management, encounter for     [facility]     COPD [facility]chronic obstructive pulmonary disease) [facility]     Diabetes mellitus ([facility]     History of non-Hodgkin's lymphoma     Hx of blood clots     DVT in right leg     Hyperlipidemia     Hypertension     Hypothyroidism  On home O2     at night         Past Surgical History:     Past Surgical History:   Procedure Laterality Date    CHOLECYSTECTOMY, LAPAROSCOPIC N/A 10/9/2021    CHOLECYSTECTOMY LAPAROSCOPIC ROBOTIC XI performed by Tenzin Minor MD at [facility] Left 9/24/2021    COLONOSCOPY POLYPECTOMY SNARE/COLD BIOPSY performed by Carmina Torres MD at [address] [place] Right 10/6/2021    CYSTOSCOPY PYELOGRAM URETERAL STENT INSERTION performed by Oscar Dinero MD at [address] [place] [address] [place] Bilateral 11/24/2021    [address] LEFT INSERTION performed by Oscar Dinero MD at [address] [place] Bilateral 2/16/2022    CYSTOSCOPY WITH BILATERAL STENT EXCHANGE performed by Oscar Dinero MD at [address] [facility]      cataracts   [address] [facility]      IR BIOPSY ABDOMINAL MASS  7/6/2021    IR BIOPSY ABDOMINAL MASS 7/6/2021 [facility]    IR PORT PLACEMENT EQUAL OR GREATER THAN 5 YEARS  8/31/2021    IR PORT PLACEMENT EQUAL OR GREATER THAN 5 YEARS 8/31/2021 [facility]    TONSILLECTOMY      as child        Medications Prior to Admission:     Prior to Admission medications    Medication Sig Start Date End Date Taking?  Authorizing Provider   OXYGEN Inhale 5 L into the lungs at bedtime Nightly   Yes Historical Provider, MD   ipratropium-albuterol (DUONEB) 0.5-2.5 (3) MG/3ML SOLN nebulizer solution Inhale 3 mLs into the lungs every 4 hours as needed for Shortness of Breath 2/20/22   [place] Risk, APRN - CNP   carvedilol (COREG) 12.5 MG tablet Take 1 tablet by mouth 2 times daily 2/20/22 3/22/22  Veronda Curet, DO   apixaban (ELIQUIS) 5 MG TABS tablet Take 1 tablet by mouth 2 times daily 2/20/22   Veronda Curet, DO   acetaminophen (TYLENOL) 325 MG tablet Take 2 tablets by mouth every 6 hours as needed for Pain or Fever 2/20/22   Roderick Metcalf, DO   budesonide-formoterol (SYMBICORT) 80-4.5 MCG/ACT AERO Inhale 2 puffs into the lungs 2 times daily 2/20/22   Destiny Kaci, DO   levothyroxine (SYNTHROID) 150 MCG tablet Take 1 tablet by mouth Daily 2/2/22   Yulianal Footman, DO   fluticasone-salmeterol (ADVAIR) 100-50 MCG/DOSE diskus inhaler Inhale 1 puff into the lungs every 12 hours 2/1/22   Yulianal Footman, DO   furosemide (LASIX) 20 MG tablet Take 1 tablet by mouth daily  Patient taking differently: Take 40 mg by mouth 2 times daily  2/1/22   [facility], DO   pregabalin (LYRICA) 75 MG capsule Take 75 mg by mouth 2 times daily. Historical Provider, MD   albuterol sulfate HFA (VENTOLIN HFA) 108 (90 Base) MCG/ACT inhaler Inhale 2 puffs into the lungs every 6 hours as needed for Wheezing    Historical Provider, MD   amLODIPine (NORVASC) 10 MG tablet Take 1 tablet by mouth daily  Patient taking differently: Take 10 mg by mouth at bedtime  10/12/21   Roderickevgeny Metcalf, DO   losartan (COZAAR) 50 MG tablet Take 1 tablet by mouth daily 10/12/21   Roderick T Dixon, DO   glipiZIDE-metFORMIN (METAGLIP) 5-500 MG per tablet Take 1 tablet by mouth 2 times daily (before meals)     Historical Provider, MD   donepezil (ARICEPT) 10 MG tablet Take 10 mg by mouth daily  7/11/21   Historical Provider, MD   atorvastatin (LIPITOR) 10 MG tablet Take 10 mg by mouth daily    Historical Provider, MD   FLUoxetine (PROZAC) 20 MG capsule Take 20 mg by mouth daily    Historical Provider, MD   omeprazole (PRILOSEC) 20 MG delayed release capsule Take 20 mg by mouth 2 times daily    Historical Provider, MD        Allergies:     Patient has no known allergies. Social History:     Tobacco:    reports that he quit smoking about 16 years ago. His smoking use included cigarettes. He has a 90.00 pack-year smoking history. He has never used smokeless tobacco.  Alcohol:      reports current alcohol use of about 1.7 standard drinks of alcohol per week. Drug Use:  reports current drug use. Drug: Marijuana Rashaad Bilberry).     Family History:     Family History   Problem Relation Age of Onset    Diabetes Mother     Alzheimer's Disease Father     Heart Disease Brother     Heart Disease Brother        Review of Systems:     Positive and Negative as described in HPI. CONSTITUTIONAL: Fatigue. Negative for fevers, chills, sweats, and weight loss. HEENT: Clear rhinorrhea. Nasal congestion. Negative for glasses, hearing changes, and throat pain. RESPIRATORY: Occasional dyspnea. Chest congestion. Wheezing. Dry cough. The pt uses a DuoNeb once every couple of days. He also takes Symbicort, Advair, and Ventolin. Pt wears 5 liters of oxygen at night per nasal cannula. CARDIOVASCULAR: Pt is taking Eliquis for a right leg DVT. Negative for chest pain, irregular heartbeat, and palpitations. GASTROINTESTINAL: Diarrhea. Nausea. Negative for reflux, vomiting, constipation, change in bowel habits, and abdominal pain. GENITOURINARY: Negative for difficulty of urination, burning with urination, and frequency. INTEGUMENT: Easy bruising. Negative for rash and skin lesions. HEMATOLOGIC/LYMPHATIC: Negative for swelling/edema. ALLERGIC/IMMUNOLOGIC: Negative for urticaria and itching. ENDOCRINE: Diabetes. Pt does not remember his last HbA1c. Negative for increase in thirst, increase in urination, and heat or cold intolerance. MUSCULOSKELETAL: Generalized joint pain. NEUROLOGICAL: Lightheadedness at this time. Negative for headaches, dizziness, numbness, and tingling extremities. BEHAVIOR/PSYCH: Negative for depression and anxiety. Physical Exam:   BP (!) 95/44   Pulse 76   Temp 96.9 °F (36.1 °C) (Temporal)   Resp 22   Ht 5' 10\" (1.778 m)   Wt 175 lb (79.4 kg)   SpO2 94%   BMI 25.11 kg/m²     No results for input(s): POCGLU in the last 72 hours. General Appearance:  Alert. Pale, lethargic, and ill appearing. The pt's cousin is at bedside. Mental status: Oriented to person, place, and time. Head: Normocephalic and atraumatic.   Eye: No icterus, redness, pupils equal and reactive, extraocular eye movements intact, and conjunctiva clear. Ear: Hearing grossly intact. Nose: No drainage noted. Neck: Supple. Lungs: Wheezes throughout bilateral lungs. Mildly labored breathing. Bilateral equal air entry. Cardiovascular: Distant heart tones. (Blood pressure was initially 92/52 and then 95/44). Normal rate, regular rhythm, no murmur, gallop, or rub. Abdomen: Soft, nontender, nondistended, and active bowel sounds. Neurologic: Normal speech. Skin: Multiple bruises scattered on bilateral upper extremities. No gross lesions, rashes, or bleeding on exposed skin area. Psych: Pleasant.       Investigations:      Laboratory Testing:  Recent Results (from the past 24 hour(s))   Urinalysis with Reflex to Culture    Collection Time: 06/13/22 10:46 AM    Specimen: Urine, clean catch   Result Value Ref Range    Color, UA Yellow Yellow    Turbidity UA SLIGHTLY CLOUDY (A) Clear    Glucose, Ur NEGATIVE NEGATIVE    Bilirubin Urine NEGATIVE NEGATIVE    Ketones, Urine NEGATIVE NEGATIVE    Specific Gravity, UA 1.020 1.005 - 1.030    Urine Hgb 3+ (A) NEGATIVE    pH, UA 6.0 5.0 - 8.0    Protein, UA 2+ (A) NEGATIVE    Urobilinogen, Urine Normal Normal    Nitrite, Urine NEGATIVE NEGATIVE    Leukocyte Esterase, Urine SMALL (A) NEGATIVE   Microscopic Urinalysis    Collection Time: 06/13/22 10:46 AM   Result Value Ref Range    -          WBC, UA 2 TO 5 0 - 5 /HPF    RBC, UA 20 TO 50 0 - 2 /HPF    Epithelial Cells UA 0 TO 2 0 - 5 /HPF    Yeast, UA FEW (A) None   CBC with Auto Differential    Collection Time: 06/13/22 10:56 AM   Result Value Ref Range    WBC 11.3 3.5 - 11.3 k/uL    RBC 5.15 4.21 - 5.77 m/uL    Hemoglobin 13.3 13.0 - 17.0 g/dL    Hematocrit 45.8 40.7 - 50.3 %    MCV 88.9 82.6 - 102.9 fL    MCH 25.8 25.2 - 33.5 pg    MCHC 29.0 28.0 - 38.0 g/dL    RDW 15.4 (H) 11.8 - 14.4 %    Platelets 927 044 - 548 k/uL    MPV 11.1 8.1 - 13.5 fL    RBC Morphology ANISOCYTOSIS PRESENT     Seg Neutrophils 71 (H) 36 - 65 %    Lymphocytes 17 (L) 24 - 43 %    Monocytes 10 3 - 12 %    Eosinophils % 1 1 - 4 %    Basophils 0 0 - 2 %    Immature Granulocytes 1 (H) 0 %    Segs Absolute 8.00 1.50 - 8.10 k/uL    Absolute Lymph # 1.91 1.10 - 3.70 k/uL    Absolute Mono # 1.17 0.10 - 1.20 k/uL    Absolute Eos # 0.06 0.00 - 0.44 k/uL    Basophils Absolute <0.03 0.00 - 0.20 k/uL    Absolute Immature Granulocyte 0.11 0.00 - 0.30 k/uL   APTT    Collection Time: 06/13/22 10:56 AM   Result Value Ref Range    PTT 30.3 23.9 - 33.8 sec   Protime-INR    Collection Time: 06/13/22 10:56 AM   Result Value Ref Range    Protime 14.5 (H) 11.5 - 14.2 sec    INR 1.1    Basic Metabolic Panel    Collection Time: 06/13/22 10:56 AM   Result Value Ref Range    Glucose 78 70 - 99 mg/dL    BUN 45 (H) 8 - 23 mg/dL    CREATININE 1.51 (H) 0.70 - 1.20 mg/dL    Bun/Cre Ratio 30 (H) 9 - 20    Calcium 8.9 8.6 - 10.4 mg/dL    Sodium 145 (H) 135 - 144 mmol/L    Potassium 4.7 3.7 - 5.3 mmol/L    Chloride 104 98 - 107 mmol/L    CO2 32 (H) 20 - 31 mmol/L    Anion Gap 9 9 - 17 mmol/L    GFR Non-African American 46 (L) >60 mL/min    GFR  55 (L) >60 mL/min    GFR Comment             Recent Labs     06/13/22  1056   HGB 13.3   HCT 45.8   WBC 11.3   MCV 88.9   *   K 4.7      CO2 32*   BUN 45*   CREATININE 1.51*   GLUCOSE 78   INR 1.1   PROTIME 14.5*   APTT 30.3       No results for input(s): COVID19 in the last 720 hours. *Please note that labs listed above are the most recent lab values available in EPIC at the time of the visit and additional labs may have been drawn or resulted since that time.     Imaging/Diagnostics:    Echocardiogram Limited 2D    Result Date: 6/2/2022  [facility] Transthoracic Echocardiography Report (TTE)  Patient Name Geovanny Mohan    Date of Study               06/02/2022               Albert ROSE   Date of      1950  Gender                      Male  Birth   Age          66 year(s)  Race    Room Number              Height:                     70 inch, 177.8 cm   Corporate ID [id number]    Weight:                     180 pounds, 81.6 kg  #   Patient Acct [de-identified]   BSA:          2 m^2         BMI:      25.83  #                                                              kg/m^2   MR #         [id number]      Sonographer                 Lakia Reed   Accession #  [id number]  Interpreting Physician      [address]   Fellow                   Referring Nurse                           Practitioner   Interpreting             Referring Physician         Mirela Mohan  Fellow  Type of Study   TTE procedure:2D Echocardiogram, Contrast study. Procedure Date Date: 06/02/2022 Start: 09:53 AM Study Location: [address] [place] [address] Technical Quality: Fair visualization Indications:Congestive heart failure and Post-op Chemotherapy. Patient Status: Outpatient Contrast Medium: Definity. Amount - 2 ml Height: 70 inches Weight: 180 pounds BSA: 2 m^2 BMI: 25.83 kg/m^2 Rhythm: Within normal limits HR: 81 bpm CONCLUSIONS Summary Limited echo with contrast performed per order. Left ventricle is normal in size. Calculated EF via Herman's method 64% with global L. strain of -19.6%. Moderate left ventricular hypertrophy. No significant pericardial effusion is seen.  Signature ----------------------------------------------------------------------------  Electronically signed by Lakia Reed(Sonographer) on 06/02/2022 11:03  AM ---------------------------------------------------------------------------- ----------------------------------------------------------------------------  Electronically signed by Lourdes RezaInterpreting physician) on 06/02/2022  11:19 AM ---------------------------------------------------------------------------- FINDINGS Left Ventricle M-mode / 2D Measurements & Calculations:   LVIDd:4.35 cm(3.7 - 5.6 cm)            Diastolic SLEVCU:984.1 ml  LVIDs:2.8 cm(2.2 - 4.0 cm) Systolic ALXZFU:37.7 ml  AMSX:3.02 cm(0.6 - 1.1 cm)  LVPWd:1.3 cm(0.6 - 1.1 cm)  Fractional Shortenin.63 %  Calculated LVEF (%): 67.98 %      NM MYOCARDIAL SPECT REST EXERCISE OR RX    Result Date: 2022  EXAMINATION: MYOCARDIAL PERFUSION IMAGING 2022 8:44 am TECHNIQUE: For the rest study, 9.8 mCi of Tc 99 labeled sestamibi were injected. SPECT images were acquired. Under cardiology supervision, 0.4mg Link Comber was infused. After pharmacologic stress, 32.7 mCi of Tc 99 labeled sestamibi were injected. SPECT images with ECG gating were acquired. COMPARISON: None Available. HISTORY: ORDERING SYSTEM PROVIDED HISTORY: Pre-operative cardiovascular examination TECHNOLOGIST PROVIDED HISTORY: Reason for Exam: Other Procedure Type->Rx Reason for Exam: Other, Pre-operative cardiovascular examination FINDINGS: Images interpreted utilizing [employer] system and [employer]. Perfusion defect at stress involve the inferior apical mid inferior and proximal inferior septal segments without significant reversibility, 5% of left ventricular myocardium involved at stress Perfusion scores are visually adjusted to account for artifact. Summed stress score:  4 Summed rest score:  3 Summed reversibility score:  1 Function: End diastolic volume:  051PO Left ventricular ejection fraction:  51% Wall motion abnormalities:  Mild hypokinesis in the inferior wall TID score:  1.18 (scores greater than 1.39 are considered elevated for Lexiscan stress with Tc99m)     Perfusion:  Perfusion defects at stress involve 5% of left ventricular myocardium, with minimal reperfusion. Function:  Mild hypokinesis inferior wall. RCA distribution. Risk stratification: INTERMEDIATE Notes concerning risk stratification: Risk stratification incorporates both clinical history and some testing results.   Final risk determination is the responsibility of the ordering provider as other patient information and test results may increase or decrease the risk assessment reported for this examination. Risk stratification criteria are adapted from \"Noninvasive Risk Stratification\" criteria from [facility]. [place], ACC/AATS/AHA/ASE/ASNC/SCAI/SCCT/STS 2017 Appropriate Use Criteria For Coronary Revascularization in Patients With Stable Ischemic Heart Disease [facility] Volume 69, Issue 17, May 2017 High risk (>3% annual death or MI) 1. Severe resting LV dysfunction (LVEF <35%) not readily explained by non coronary causes 2. Resting perfusion abnormalities greater than 10% of the myocardium in patients without prior history or evidence of MI3. Stress-induced perfusion abnormalities encumbering greater than or equal to 10% myocardium or stress segmental scores indicating multiple vascular territories with abnormalities 4. Stress-induced LV dilatation (TID ratio greater than 1.19 for exercise and greater than 1.39 for regadenoson) Intermediate risk (1% to 3% annual death or MI) 1. Mild/moderate resting LV dysfunction (LVEF 35% to 49%) not readily explained by non coronary causes. 2. Resting perfusion abnormalities in 5%-9.9% of the myocardium in patients without a history or prior evidence of MI 3. Stress-induced perfusion abnormality encumbering 5%-9.9% of the myocardium or stress segmental scores indicating 1 vascular territory with abnormalities but without LV dilation 4. Small wall motion abnormality involving 1-2 segments and only 1 coronary bed. Low Risk (Less than 1% annual death or MI) 1. Normal or small myocardial perfusion defect at rest or with stress encumbering less than 5% of the myocardium. EK22. See Epic. Diagnosis:      1. CAROTID STENOSIS LEFT     Plans:     1.  LEFT CAROTID ENDARTERECTOMY       EDWIGE Jiménez CNP  2022  12:21 PM

## 2022-06-13 NOTE — PROGRESS NOTES
Patient came in today for pre admission testing. Patient having bouts of diarrhea throughout the questions. The NP was notified of his condition and she assessed him, patient is gray in color and diaphoretic. Patient was taken down to the emergency room report given to [facility].

## 2022-06-13 NOTE — ED PROVIDER NOTES
EMERGENCY DEPARTMENT ENCOUNTER    Pt Name: Inge Zarate  MRN: [id number]  [place] 1950  Date of evaluation: 6/13/22  CHIEF COMPLAINT       Chief Complaint   Patient presents with    Hypotension     HISTORY OF PRESENT ILLNESS   68-year-old male presents emergency room after episode of hypotension at outpatient testing. Patient has stenosis in his carotid and is having surgery at the end of June. He was having preop testing today and reports he had been having diarrhea over the weekend and suddenly became hypotensive there. Patient reports recurring issues with diarrhea. He has had previous colonoscopy and evaluation for the diarrhea however no cause has yet been identified. He intermittently takes Imodium. He reports no abdominal pain nausea or vomiting. Patient noted to be hypoxic upon initial evaluation. Patient does have history of COPD and is on oxygen at night. He reports lately his breathing has been worse than usual.          REVIEW OF SYSTEMS     Review of Systems   Constitutional: Positive for fatigue. Negative for activity change, chills and diaphoresis. HENT: Negative for congestion, sinus pain and tinnitus. Eyes: Negative. Respiratory: Positive for shortness of breath. Negative for apnea and chest tightness. Gastrointestinal: Negative for abdominal distention, constipation, diarrhea and vomiting. Genitourinary: Negative for difficulty urinating and frequency. Musculoskeletal: Negative for arthralgias and myalgias. Skin: Negative for color change and rash. Neurological: Positive for light-headedness. Negative for dizziness. Hematological: Negative. Psychiatric/Behavioral: Negative.         PASTMEDICAL HISTORY     Past Medical History:   Diagnosis Date    Arthritis     Cancer [facility])     chemotherapy lymphoma last was april 2022    Chemotherapy management, encounter for     [facility]est[facility]     COPD [facility]chronic obstructive [facility] disease) [facility]     Diabetes mellitus ([facility]     History of non-Hodgkin's lymphoma     Hx of blood clots     DVT in right leg     Hyperlipidemia     Hypertension     Hypothyroidism     On home O2     at night      Past Problem List  Patient Active Problem List   Diagnosis Code    NHL (non-Hodgkin's lymphoma) ([facility] [address] [facility] C85.90    Traumatic retroperitoneal hematoma S36.892A    Acute kidney injury ([facility] [address] T74.3    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    Hydronephrosis due to obstruction of ureter N13.1    Moderate malnutrition (HCC) E44.0    Chronic cholecystitis K81.1    Pleural effusion J90    Cardiomegaly I51.7    Hypothyroidism E03.9    Diabetes mellitus ([facility] [address] E11.9    Hypertensive urgency I16.0    Acute respiratory failure with hypoxia ([facility] [address] J96.01    Hypoxia R09.02     SURGICAL HISTORY       Past Surgical History:   Procedure Laterality Date    CHOLECYSTECTOMY, LAPAROSCOPIC N/A 10/9/2021    CHOLECYSTECTOMY LAPAROSCOPIC ROBOTIC XI performed by Shiela Borrero MD at [address] Left 9/24/2021    COLONOSCOPY POLYPECTOMY SNARE/COLD BIOPSY performed by Roe Watts MD at [address] Right 10/6/2021    CYSTOSCOPY PYELOGRAM URETERAL STENT INSERTION performed by Khai Lao MD at [address] Bilateral 11/24/2021    [address] LEFT INSERTION performed by Khai Lao MD at [address] [facility] [address] Bilateral 2/16/2022    CYSTOSCOPY WITH BILATERAL STENT EXCHANGE performed by Khai Lao MD at [address] [facility]      cataracts   [address]      IR BIOPSY ABDOMINAL MASS  7/6/2021    IR BIOPSY ABDOMINAL MASS 7/6/2021 [facility]    IR PORT PLACEMENT EQUAL OR GREATER THAN 5 YEARS  8/31/2021    IR PORT PLACEMENT EQUAL OR GREATER THAN 5 YEARS 8/31/2021 [facility]    TONSILLECTOMY      as child     CURRENT MEDICATIONS       Discharge Medication List as of 6/13/2022  3:40 PM      CONTINUE these medications which have NOT CHANGED    Details   OXYGEN Inhale 5 L into the lungs at bedtime NightlyHistorical Med      ipratropium-albuterol (DUONEB) 0.5-2.5 (3) MG/3ML SOLN nebulizer solution Inhale 3 mLs into the lungs every 4 hours as needed for Shortness of Breath, Disp-360 mL, R-3Normal      carvedilol (COREG) 12.5 MG tablet Take 1 tablet by mouth 2 times daily, Disp-60 tablet, R-5Print      apixaban (ELIQUIS) 5 MG TABS tablet Take 1 tablet by mouth 2 times daily, Disp-60 tablet, R-5Print      acetaminophen (TYLENOL) 325 MG tablet Take 2 tablets by mouth every 6 hours as needed for Pain or Fever, Disp-120 tablet, R-3NO PRINT      budesonide-formoterol (SYMBICORT) 80-4.5 MCG/ACT AERO Inhale 2 puffs into the lungs 2 times daily, Disp-10.2 g, R-0Normal      levothyroxine (SYNTHROID) 150 MCG tablet Take 1 tablet by mouth Daily, Disp-30 tablet, R-3Normal      fluticasone-salmeterol (ADVAIR) 100-50 MCG/DOSE diskus inhaler Inhale 1 puff into the lungs every 12 hours, Disp-60 each, R-0Normal      furosemide (LASIX) 20 MG tablet Take 1 tablet by mouth daily, Disp-30 tablet, R-0Normal      pregabalin (LYRICA) 75 MG capsule Take 75 mg by mouth 2 times daily. Historical Med      albuterol sulfate HFA (VENTOLIN HFA) 108 (90 Base) MCG/ACT inhaler Inhale 2 puffs into the lungs every 6 hours as needed for WheezingHistorical Med      amLODIPine (NORVASC) 10 MG tablet Take 1 tablet by mouth daily, Disp-30 tablet, R-5Normal      losartan (COZAAR) 50 MG tablet Take 1 tablet by mouth daily, Disp-30 tablet, R-5Print      glipiZIDE-metFORMIN (METAGLIP) 5-500 MG per tablet Take 1 tablet by mouth 2 times daily (before meals) Historical Med      donepezil (ARICEPT) 10 MG tablet Take 10 mg by mouth daily Historical Med      atorvastatin (LIPITOR) 10 MG tablet Take 10 mg by mouth dailyHistorical Med      FLUoxetine (PROZAC) 20 MG capsule Take 20 mg by mouth dailyHistorical Med      omeprazole (PRILOSEC) 20 MG delayed release capsule Take 20 mg by mouth 2 times dailyHistorical Med           ALLERGIES     has No Known Allergies. FAMILY HISTORY     He indicated that his mother is . He indicated that his father is . SOCIAL HISTORY       Social History     Tobacco Use    Smoking status: Former Smoker     Packs/day: 2.00     Years: 45.00     Pack years: 90.00     Types: Cigarettes     Quit date: 2006     Years since quittin.4    Smokeless tobacco: Never Used   Vaping Use    Vaping Use: Never used   Substance Use Topics    Alcohol use: Yes     Alcohol/week: 1.7 standard drinks     Types: 2 Standard drinks or equivalent per week     Comment: occassional    Drug use: Yes     Types: Marijuana Elf[facility] Rivera)     Comment:  CBD gummies no smoking     PHYSICAL EXAM     INITIAL VITALS: BP (!) 111/44   Pulse 56   Temp 97.7 °F (36.5 °C) (Oral)   Resp 15   Ht 5' 10\" (1.778 m)   Wt 176 lb (79.8 kg)   SpO2 97%   BMI 25.25 kg/m²    Physical Exam  Constitutional:       General: He is not in acute distress. Appearance: He is well-developed. HENT:      Head: Normocephalic. Eyes:      Pupils: Pupils are equal, round, and reactive to light. Cardiovascular:      Rate and Rhythm: Normal rate and regular rhythm. Heart sounds: Normal heart sounds. Pulmonary:      Effort: Pulmonary effort is normal. No respiratory distress. Breath sounds: Decreased breath sounds present. Abdominal:      General: Bowel sounds are normal.      Palpations: Abdomen is soft. Tenderness: There is no abdominal tenderness. Musculoskeletal:         General: Normal range of motion. Skin:     General: Skin is warm and dry. Neurological:      Mental Status: He is alert and oriented to person, place, and time. MEDICAL DECISION MAKIN-year-old male presenting to the emergency room after an episode of hypotension and lightheadedness at outpatient testing.   Patient feeling improved after IV fluids. Blood pressure is on the lower end of normal however patient is not dizzy short of breath or weak. Labs are within acceptable limits. Patient noted to be between 86 and 90% on room air. He was on 2 to 3 L nasal cannula here in the department. He does have oxygen at home for COPD. We discussed admission for the hypoxia versus follow-up with pulmonary for repeat home O2 testing. Patient is declining admission at this time. He is agreeable to follow-up with pulmonary. CRITICAL CARE:       PROCEDURES:    Procedures    DIAGNOSTIC RESULTS   EKG:All EKG's are interpreted by the Emergency Department Physician who either signs or Co-signs this chart in the absence of a cardiologist.    EKG sinus bradycardia ventricular rate 53  No significant ST or T wave changes    QTc 416  Unremarkable EKG    RADIOLOGY:All plain film, CT, MRI, and formal ultrasound images (except ED bedside ultrasound) are read by the radiologist, see reports below, unless otherwisenoted in MDM or here. XR CHEST PORTABLE   Final Result   Mild left basilar airspace infiltrates. This could represent atelectasis or   in the appropriate clinical setting pneumonia. LABS: All lab results were reviewed by myself, and all abnormals are listed below.   Labs Reviewed   POC GLUCOSE FINGERSTICK - Abnormal; Notable for the following components:       Result Value    POC Glucose 47 (*)     All other components within normal limits   POC GLUCOSE FINGERSTICK - Abnormal; Notable for the following components:    POC Glucose 139 (*)     All other components within normal limits   POCT GLUCOSE       EMERGENCY DEPARTMENTCOURSE:         Vitals:    Vitals:    06/13/22 1222 06/13/22 1229 06/13/22 1415 06/13/22 1515   BP:  (!) 111/55 (!) 131/51 (!) 111/44   Pulse:  53 57 56   Resp:  15 17 15   Temp: 97.7 °F (36.5 °C)      TempSrc: Oral      SpO2:  96% 95% 97%   Weight:       Height:           The patient was given the following medications while in the emergency department:  Orders Placed This Encounter   Medications    0.9 % sodium chloride bolus    loperamide (IMODIUM) capsule 2 mg    heparin flush 100 UNIT/ML injection 500 Units     CONSULTS:  None    FINAL IMPRESSION      1. Hypoglycemia    2. Chronic obstructive pulmonary disease, unspecified COPD type [facility])          DISPOSITION/PLAN   DISPOSITION Decision To Discharge 06/13/2022 03:37:15 PM      PATIENT REFERRED TO:  Odilia Umanzor MD  [address] [place] [address]  [phone]    Schedule an appointment as soon as possible for a visit in 1 week      DISCHARGE MEDICATIONS:  Discharge Medication List as of 6/13/2022  3:40 PM        Ryan Morrison MD  Attending Emergency Physician      Care during this encounter was due to an unprecedented national emergency due to COVID-19.            Tianna Jovel MD  06/13/22 6538

## 2022-06-13 NOTE — PRE-PROCEDURE INSTRUCTIONS
ARRIVE  [place] Tuesday, June 28 at 0730 AM    Once you enter the hospital lobby, take the elevators to the second floor. Check-In is at the surgery registration desk. Continue to take your home medications as you normally do up to and including the night before surgery with the exception of any blood thinning medications. Please stop any blood thinning medications as directed by your surgeon or prescribing physician. Failure to stop certain medications may interfere with your scheduled surgery. These may include:  Aspirin, Warfarin (Coumadin), Clopidogrel (Plavix), Ibuprofen (Motrin, Advil), Naproxen (Aleve), Meloxicam (Mobic), Celecoxib (Celebrex), Eliquis, Pradaxa, Xarelto, Effient, Fish Oil, Herbal supplements. Stop eliquis as directed by physician     If you are diabetic, do not take any of your diabetic medications by mouth the morning of surgery. If you are taking insulin contact the doctor that manages your diabetes for instructions about any changes to your insulin dosages the day before surgery. Do not inject insulin or other injectable diabetic medications the morning of surgery unless otherwise instructed by the doctor who manages your diabetes. Please take the following medication(s) the day of surgery with a small sip of water:  Coreg, levothyroxine     Please use your inhaler(s) if needed and bring your inhaler(s) from home the day of surgery. PREPARING FOR YOUR SURGERY:     Before surgery, you can play an important role in your own health. Because skin is not sterile, we need to be sure that your skin is as free of germs as possible before surgery by carefully washing before surgery. Preparing or prepping skin before surgery can reduce the risk of a surgical site infection.   Do not shave the area of your body where your surgery will be performed unless you received specific permission from your physician.     You will need to shower at home the night before surgery and the morning of surgery with a special soap called chlorhexidine gluconate (CHG*). *Not to be used by people allergic to Chlorhexidine Gluconate (CHG). Following these instructions will help you be sure that your skin is clean before surgery. Instructions on cleaning your skin before surgery: The night before your surgery:      You will need to shower with warm water (not hot) and the CHG soap.  Use a clean wash cloth and a clean towel. Have clean clothes available to put on after the shower.   First wash your hair with regular shampoo. Rinse your hair and body thoroughly to remove the shampoo.  Wash your face and genital area (private parts) with your regular soap or water only. Thoroughly rinse your body with warm water from the neck down.  Turn water off to prevent rinsing the soap off too soon.  With a clean wet washcloth and half of the CHG soap in the bottle, lather your entire body from the neck down. Do not use CHG soap near your eyes or ears to avoid injury to those areas.  Wash thoroughly, paying special attention to the area where your surgery will be performed.  Wash your body gently for five (5) minutes. Avoid scrubbing your skin too hard.  Turn the water back on and rinse your body thoroughly.  Pat yourself dry with a clean, soft towel. Do not apply lotion, cream or powder.  Dress with clean freshly washed clothes. The morning of surgery:     Repeat shower following steps above - using remaining half of CHG soap in bottle. Patient Instructions:    Adams Edward If you are having any type of anesthesia you are to have nothing to eat or drink after midnight the night before your surgery. This includes gum, hard candy, mints, water or smoking or chewing tobacco.  The only exception to this is a small sip of water to take with any morning dose of heart, blood pressure, or seizure medications.   No alcoholic beverages for 24 hours prior to surgery.  Brush your teeth but do not swallow water.  Bring your eye glasses and case with you. No contacts are to be worn the day of surgery. You also may bring your hearing aids. Most surgical procedures involving anesthesia will require that you remove your dentures prior to surgery.  If you are on C-PAP or Bi-PAP at home and plan on staying in the hospital overnight for your surgery please bring the machine with you. · Do not wear any jewelry or body piercings day of surgery. Also, NO lotion, perfume or deodorant to be used the day of surgery. No nail polish on the operative extremity (arm/leg surgeries)    · If you are staying overnight with us, please bring a small bag of necessary personal items.  Please wear loose, comfortable clothing. If you are potentially going to have a cast or brace bring clothing that will fit over them.  In case of illness - If you have cold or flu like symptoms (high fever, runny nose, sore throat, cough, etc.) rash, nausea, vomiting, loose stools, and/or recent contact with someone who has a contagious disease (chicken pox, measles, etc.) Please call your doctor before coming to the hospital.         Day of Surgery/Procedure:    As a patient at [facility] you can expect quality medical and nursing care that is centered on your individual needs. Our goal is to make your surgical experience as comfortable as possible    . Transportation After Your Surgery/Procedure: You will need a friend or family member to drive you home after your procedure. Your  must be 25years of age or older and able to sign off on your discharge instructions. A taxi cab or any other form of public transportation is not acceptable. Your friend or family member must stay at the hospital throughout your procedure.     Someone must remain with you for the first 24 hours after your surgery if you receive anesthesia or medication. If you do not have someone to stay with you, your procedure may be cancelled.       If you have any other questions regarding your procedure or the day of surgery, please call [phone]      _________________________  ____________________________  Signature (Patient)              Signature (Provider)               Date

## 2022-06-13 NOTE — ED NOTES
Pt visitor states pt seems confused, asking to blood sugar check, states pt has not eaten today.       Yessica Scherer RN  06/13/22 0707

## 2022-06-14 LAB
EKG ATRIAL RATE: 53 BPM
EKG P AXIS: 71 DEGREES
EKG P-R INTERVAL: 134 MS
EKG Q-T INTERVAL: 444 MS
EKG QRS DURATION: 98 MS
EKG QTC CALCULATION (BAZETT): 416 MS
EKG R AXIS: 47 DEGREES
EKG T AXIS: 61 DEGREES
EKG VENTRICULAR RATE: 53 BPM

## 2022-06-14 NOTE — PROGRESS NOTES
DR. Puente Most REVIEWED LABS,UA,CXR,ER NOTE,H/P,FUNCTIONAL CAPACITY AND ABCESS TOOTH. DR Marcus Cuevasch US TO CALL AND FOLLOW WITH PATIENT AND SEE HOW HE IS FEELING AND TOO SEE IF HE HAS ANY UTI SYMPTOMS.

## 2022-06-14 NOTE — PROGRESS NOTES
CALLED DR. RUBIN'S OFFICE AND TOLD HER THE ABOVE AND FAYE STATED HAVE THE PATIENT'S PCP TAKE CARE OF THE URINALYSIS.

## 2022-06-14 NOTE — PROGRESS NOTES
CALLED PATIENT AND DID A FOLLOW UP AFTER PATIENT'S ER AND PRETESTING VISIT  PATIENT DID STAT THAT HE HAS \"LITTLE PAIN WITH URINATION WITH  SOME DRIBBLING. SENT ALL URINE RESULTS TO DR. LEEηνίτσα Mando AMES.

## 2022-06-14 NOTE — PROGRESS NOTES
SPOKE WITH PATIENT MITUL AND HE STATED HE IS FEELING MUCH BETTER AND THAT HE IS HAVING SOME PAIN AND DRIBBLING WITH URINATION

## 2022-06-27 ENCOUNTER — ANESTHESIA EVENT (OUTPATIENT)
Dept: OPERATING ROOM | Age: 72
DRG: 038 | End: 2022-06-27
Payer: MEDICARE

## 2022-06-28 ENCOUNTER — HOSPITAL ENCOUNTER (INPATIENT)
Age: 72
LOS: 1 days | Discharge: HOME | DRG: 038 | End: 2022-06-29
Attending: SURGERY | Admitting: SURGERY
Payer: MEDICARE

## 2022-06-28 ENCOUNTER — ANESTHESIA (OUTPATIENT)
Dept: OPERATING ROOM | Age: 72
DRG: 038 | End: 2022-06-28
Payer: MEDICARE

## 2022-06-28 DIAGNOSIS — I65.22 STENOSIS OF LEFT CAROTID ARTERY: ICD-10-CM

## 2022-06-28 PROBLEM — Z99.81 DEPENDENCE ON NOCTURNAL OXYGEN THERAPY: Status: ACTIVE | Noted: 2022-06-28

## 2022-06-28 PROBLEM — J41.0 SIMPLE CHRONIC BRONCHITIS (HCC): Status: ACTIVE | Noted: 2022-06-28

## 2022-06-28 PROBLEM — I65.23 CAROTID STENOSIS, ASYMPTOMATIC, BILATERAL: Status: ACTIVE | Noted: 2022-06-28

## 2022-06-28 LAB
GLUCOSE BLD-MCNC: 112 MG/DL (ref 75–110)
GLUCOSE BLD-MCNC: 126 MG/DL (ref 75–110)
GLUCOSE BLD-MCNC: 147 MG/DL (ref 75–110)
GLUCOSE BLD-MCNC: 295 MG/DL (ref 75–110)
GLUCOSE BLD-MCNC: 316 MG/DL (ref 75–110)
SARS-COV-2, RAPID: NOT DETECTED
SPECIMEN DESCRIPTION: NORMAL

## 2022-06-28 PROCEDURE — 2060000000 HC ICU INTERMEDIATE R&B

## 2022-06-28 PROCEDURE — 6370000000 HC RX 637 (ALT 250 FOR IP): Performed by: ANESTHESIOLOGY

## 2022-06-28 PROCEDURE — 2580000003 HC RX 258: Performed by: SURGERY

## 2022-06-28 PROCEDURE — 99221 1ST HOSP IP/OBS SF/LOW 40: CPT | Performed by: FAMILY MEDICINE

## 2022-06-28 PROCEDURE — 2709999900 HC NON-CHARGEABLE SUPPLY: Performed by: SURGERY

## 2022-06-28 PROCEDURE — 3600000002 HC SURGERY LEVEL 2 BASE: Performed by: SURGERY

## 2022-06-28 PROCEDURE — 87635 SARS-COV-2 COVID-19 AMP PRB: CPT

## 2022-06-28 PROCEDURE — 3600000012 HC SURGERY LEVEL 2 ADDTL 15MIN: Performed by: SURGERY

## 2022-06-28 PROCEDURE — 3700000000 HC ANESTHESIA ATTENDED CARE: Performed by: SURGERY

## 2022-06-28 PROCEDURE — 2500000003 HC RX 250 WO HCPCS: Performed by: NURSE ANESTHETIST, CERTIFIED REGISTERED

## 2022-06-28 PROCEDURE — 6360000002 HC RX W HCPCS: Performed by: NURSE ANESTHETIST, CERTIFIED REGISTERED

## 2022-06-28 PROCEDURE — 2780000010 HC IMPLANT OTHER: Performed by: SURGERY

## 2022-06-28 PROCEDURE — 94640 AIRWAY INHALATION TREATMENT: CPT

## 2022-06-28 PROCEDURE — 82947 ASSAY GLUCOSE BLOOD QUANT: CPT

## 2022-06-28 PROCEDURE — 7100000001 HC PACU RECOVERY - ADDTL 15 MIN: Performed by: SURGERY

## 2022-06-28 PROCEDURE — 2580000003 HC RX 258: Performed by: ANESTHESIOLOGY

## 2022-06-28 PROCEDURE — 6360000002 HC RX W HCPCS: Performed by: SURGERY

## 2022-06-28 PROCEDURE — 6370000000 HC RX 637 (ALT 250 FOR IP): Performed by: NURSE PRACTITIONER

## 2022-06-28 PROCEDURE — 88304 TISSUE EXAM BY PATHOLOGIST: CPT

## 2022-06-28 PROCEDURE — A4217 STERILE WATER/SALINE, 500 ML: HCPCS | Performed by: SURGERY

## 2022-06-28 PROCEDURE — 3700000001 HC ADD 15 MINUTES (ANESTHESIA): Performed by: SURGERY

## 2022-06-28 PROCEDURE — 6370000000 HC RX 637 (ALT 250 FOR IP): Performed by: SURGERY

## 2022-06-28 PROCEDURE — 2500000003 HC RX 250 WO HCPCS: Performed by: SURGERY

## 2022-06-28 PROCEDURE — 2700000000 HC OXYGEN THERAPY PER DAY

## 2022-06-28 PROCEDURE — 6360000002 HC RX W HCPCS: Performed by: ANESTHESIOLOGY

## 2022-06-28 PROCEDURE — 03UL0JZ SUPPLEMENT LEFT INTERNAL CAROTID ARTERY WITH SYNTHETIC SUBSTITUTE, OPEN APPROACH: ICD-10-PCS | Performed by: SURGERY

## 2022-06-28 PROCEDURE — 7100000000 HC PACU RECOVERY - FIRST 15 MIN: Performed by: SURGERY

## 2022-06-28 PROCEDURE — 03CL0ZZ EXTIRPATION OF MATTER FROM LEFT INTERNAL CAROTID ARTERY, OPEN APPROACH: ICD-10-PCS | Performed by: SURGERY

## 2022-06-28 PROCEDURE — 6370000000 HC RX 637 (ALT 250 FOR IP): Performed by: FAMILY MEDICINE

## 2022-06-28 PROCEDURE — 94761 N-INVAS EAR/PLS OXIMETRY MLT: CPT

## 2022-06-28 RX ORDER — SODIUM CHLORIDE 0.9 % (FLUSH) 0.9 %
5-40 SYRINGE (ML) INJECTION PRN
Status: DISCONTINUED | OUTPATIENT
Start: 2022-06-28 | End: 2022-06-28 | Stop reason: HOSPADM

## 2022-06-28 RX ORDER — PANTOPRAZOLE SODIUM 40 MG/1
40 TABLET, DELAYED RELEASE ORAL
Status: DISCONTINUED | OUTPATIENT
Start: 2022-06-29 | End: 2022-06-29 | Stop reason: HOSPADM

## 2022-06-28 RX ORDER — EPHEDRINE SULFATE/0.9% NACL/PF 50 MG/5 ML
SYRINGE (ML) INTRAVENOUS PRN
Status: DISCONTINUED | OUTPATIENT
Start: 2022-06-28 | End: 2022-06-28 | Stop reason: SDUPTHER

## 2022-06-28 RX ORDER — DEXTROSE MONOHYDRATE 50 MG/ML
100 INJECTION, SOLUTION INTRAVENOUS PRN
Status: DISCONTINUED | OUTPATIENT
Start: 2022-06-28 | End: 2022-06-29 | Stop reason: HOSPADM

## 2022-06-28 RX ORDER — ALBUTEROL SULFATE 90 UG/1
2 AEROSOL, METERED RESPIRATORY (INHALATION) EVERY 6 HOURS PRN
Status: DISCONTINUED | OUTPATIENT
Start: 2022-06-28 | End: 2022-06-29 | Stop reason: HOSPADM

## 2022-06-28 RX ORDER — LIDOCAINE HYDROCHLORIDE 20 MG/ML
INJECTION, SOLUTION INFILTRATION; PERINEURAL PRN
Status: DISCONTINUED | OUTPATIENT
Start: 2022-06-28 | End: 2022-06-28 | Stop reason: SDUPTHER

## 2022-06-28 RX ORDER — IPRATROPIUM BROMIDE AND ALBUTEROL SULFATE 2.5; .5 MG/3ML; MG/3ML
1 SOLUTION RESPIRATORY (INHALATION) 3 TIMES DAILY
Status: DISCONTINUED | OUTPATIENT
Start: 2022-06-28 | End: 2022-06-29 | Stop reason: HOSPADM

## 2022-06-28 RX ORDER — PROTAMINE SULFATE 10 MG/ML
INJECTION, SOLUTION INTRAVENOUS PRN
Status: DISCONTINUED | OUTPATIENT
Start: 2022-06-28 | End: 2022-06-28 | Stop reason: SDUPTHER

## 2022-06-28 RX ORDER — MORPHINE SULFATE 2 MG/ML
2 INJECTION, SOLUTION INTRAMUSCULAR; INTRAVENOUS
Status: DISCONTINUED | OUTPATIENT
Start: 2022-06-28 | End: 2022-06-29 | Stop reason: HOSPADM

## 2022-06-28 RX ORDER — BUDESONIDE AND FORMOTEROL FUMARATE DIHYDRATE 80; 4.5 UG/1; UG/1
2 AEROSOL RESPIRATORY (INHALATION) 2 TIMES DAILY
Status: DISCONTINUED | OUTPATIENT
Start: 2022-06-28 | End: 2022-06-28

## 2022-06-28 RX ORDER — CARVEDILOL 6.25 MG/1
6.25 TABLET ORAL 2 TIMES DAILY
Status: DISCONTINUED | OUTPATIENT
Start: 2022-06-28 | End: 2022-06-29 | Stop reason: HOSPADM

## 2022-06-28 RX ORDER — SODIUM CHLORIDE 0.9 % (FLUSH) 0.9 %
5-40 SYRINGE (ML) INJECTION EVERY 12 HOURS SCHEDULED
Status: DISCONTINUED | OUTPATIENT
Start: 2022-06-28 | End: 2022-06-28 | Stop reason: HOSPADM

## 2022-06-28 RX ORDER — DONEPEZIL HYDROCHLORIDE 10 MG/1
10 TABLET, FILM COATED ORAL DAILY
Status: DISCONTINUED | OUTPATIENT
Start: 2022-06-28 | End: 2022-06-29 | Stop reason: HOSPADM

## 2022-06-28 RX ORDER — FLUOXETINE HYDROCHLORIDE 20 MG/1
20 CAPSULE ORAL DAILY
Status: DISCONTINUED | OUTPATIENT
Start: 2022-06-28 | End: 2022-06-29 | Stop reason: HOSPADM

## 2022-06-28 RX ORDER — ATORVASTATIN CALCIUM 10 MG/1
10 TABLET, FILM COATED ORAL NIGHTLY
Status: DISCONTINUED | OUTPATIENT
Start: 2022-06-28 | End: 2022-06-29 | Stop reason: HOSPADM

## 2022-06-28 RX ORDER — SODIUM CHLORIDE, SODIUM LACTATE, POTASSIUM CHLORIDE, CALCIUM CHLORIDE 600; 310; 30; 20 MG/100ML; MG/100ML; MG/100ML; MG/100ML
INJECTION, SOLUTION INTRAVENOUS CONTINUOUS
Status: DISCONTINUED | OUTPATIENT
Start: 2022-06-28 | End: 2022-06-29 | Stop reason: HOSPADM

## 2022-06-28 RX ORDER — ACETAMINOPHEN 325 MG/1
650 TABLET ORAL EVERY 6 HOURS PRN
Status: DISCONTINUED | OUTPATIENT
Start: 2022-06-28 | End: 2022-06-29 | Stop reason: HOSPADM

## 2022-06-28 RX ORDER — ONDANSETRON 2 MG/ML
INJECTION INTRAMUSCULAR; INTRAVENOUS PRN
Status: DISCONTINUED | OUTPATIENT
Start: 2022-06-28 | End: 2022-06-28 | Stop reason: SDUPTHER

## 2022-06-28 RX ORDER — MORPHINE SULFATE 4 MG/ML
4 INJECTION, SOLUTION INTRAMUSCULAR; INTRAVENOUS
Status: DISCONTINUED | OUTPATIENT
Start: 2022-06-28 | End: 2022-06-29 | Stop reason: HOSPADM

## 2022-06-28 RX ORDER — ROCURONIUM BROMIDE 10 MG/ML
INJECTION, SOLUTION INTRAVENOUS PRN
Status: DISCONTINUED | OUTPATIENT
Start: 2022-06-28 | End: 2022-06-28 | Stop reason: SDUPTHER

## 2022-06-28 RX ORDER — SODIUM CHLORIDE 0.9 % (FLUSH) 0.9 %
5-40 SYRINGE (ML) INJECTION PRN
Status: DISCONTINUED | OUTPATIENT
Start: 2022-06-28 | End: 2022-06-29 | Stop reason: HOSPADM

## 2022-06-28 RX ORDER — FENTANYL CITRATE 50 UG/ML
INJECTION, SOLUTION INTRAMUSCULAR; INTRAVENOUS PRN
Status: DISCONTINUED | OUTPATIENT
Start: 2022-06-28 | End: 2022-06-28 | Stop reason: SDUPTHER

## 2022-06-28 RX ORDER — HEPARIN SODIUM 10000 [USP'U]/ML
INJECTION, SOLUTION INTRAVENOUS; SUBCUTANEOUS PRN
Status: DISCONTINUED | OUTPATIENT
Start: 2022-06-28 | End: 2022-06-28 | Stop reason: SDUPTHER

## 2022-06-28 RX ORDER — HYDROMORPHONE HYDROCHLORIDE 1 MG/ML
0.5 INJECTION, SOLUTION INTRAMUSCULAR; INTRAVENOUS; SUBCUTANEOUS EVERY 5 MIN PRN
Status: DISCONTINUED | OUTPATIENT
Start: 2022-06-28 | End: 2022-06-28 | Stop reason: HOSPADM

## 2022-06-28 RX ORDER — ASPIRIN 81 MG/1
81 TABLET ORAL DAILY
Status: DISCONTINUED | OUTPATIENT
Start: 2022-06-28 | End: 2022-06-29 | Stop reason: HOSPADM

## 2022-06-28 RX ORDER — GLIPIZIDE AND METFORMIN HCL 5; 500 MG/1; MG/1
1 TABLET, FILM COATED ORAL
Status: DISCONTINUED | OUTPATIENT
Start: 2022-06-28 | End: 2022-06-28 | Stop reason: CLARIF

## 2022-06-28 RX ORDER — FUROSEMIDE 40 MG/1
40 TABLET ORAL 2 TIMES DAILY
Status: DISCONTINUED | OUTPATIENT
Start: 2022-06-28 | End: 2022-06-29 | Stop reason: HOSPADM

## 2022-06-28 RX ORDER — GLIPIZIDE 10 MG/1
5 TABLET ORAL
Status: DISCONTINUED | OUTPATIENT
Start: 2022-06-28 | End: 2022-06-29 | Stop reason: HOSPADM

## 2022-06-28 RX ORDER — IPRATROPIUM BROMIDE AND ALBUTEROL SULFATE 2.5; .5 MG/3ML; MG/3ML
1 SOLUTION RESPIRATORY (INHALATION) ONCE
Status: COMPLETED | OUTPATIENT
Start: 2022-06-28 | End: 2022-06-28

## 2022-06-28 RX ORDER — AMLODIPINE BESYLATE 10 MG/1
10 TABLET ORAL NIGHTLY
Status: DISCONTINUED | OUTPATIENT
Start: 2022-06-28 | End: 2022-06-29 | Stop reason: HOSPADM

## 2022-06-28 RX ORDER — METHYLPREDNISOLONE SODIUM SUCCINATE 125 MG/2ML
100 INJECTION, POWDER, LYOPHILIZED, FOR SOLUTION INTRAMUSCULAR; INTRAVENOUS ONCE
Status: COMPLETED | OUTPATIENT
Start: 2022-06-28 | End: 2022-06-28

## 2022-06-28 RX ORDER — LIDOCAINE HYDROCHLORIDE 10 MG/ML
1 INJECTION, SOLUTION EPIDURAL; INFILTRATION; INTRACAUDAL; PERINEURAL
Status: DISCONTINUED | OUTPATIENT
Start: 2022-06-29 | End: 2022-06-28 | Stop reason: HOSPADM

## 2022-06-28 RX ORDER — SODIUM CHLORIDE 9 MG/ML
INJECTION, SOLUTION INTRAVENOUS PRN
Status: DISCONTINUED | OUTPATIENT
Start: 2022-06-28 | End: 2022-06-28 | Stop reason: HOSPADM

## 2022-06-28 RX ORDER — SODIUM CHLORIDE 0.9 % (FLUSH) 0.9 %
5-40 SYRINGE (ML) INJECTION EVERY 12 HOURS SCHEDULED
Status: DISCONTINUED | OUTPATIENT
Start: 2022-06-28 | End: 2022-06-29 | Stop reason: HOSPADM

## 2022-06-28 RX ORDER — LOSARTAN POTASSIUM 50 MG/1
50 TABLET ORAL DAILY
Status: DISCONTINUED | OUTPATIENT
Start: 2022-06-28 | End: 2022-06-29 | Stop reason: HOSPADM

## 2022-06-28 RX ORDER — PREGABALIN 75 MG/1
75 CAPSULE ORAL 2 TIMES DAILY
Status: DISCONTINUED | OUTPATIENT
Start: 2022-06-28 | End: 2022-06-29 | Stop reason: HOSPADM

## 2022-06-28 RX ORDER — SODIUM CHLORIDE 9 MG/ML
INJECTION, SOLUTION INTRAVENOUS PRN
Status: DISCONTINUED | OUTPATIENT
Start: 2022-06-28 | End: 2022-06-29 | Stop reason: HOSPADM

## 2022-06-28 RX ORDER — ONDANSETRON 2 MG/ML
4 INJECTION INTRAMUSCULAR; INTRAVENOUS
Status: DISCONTINUED | OUTPATIENT
Start: 2022-06-28 | End: 2022-06-28 | Stop reason: HOSPADM

## 2022-06-28 RX ORDER — FLUTICASONE PROPIONATE AND SALMETEROL 100; 50 UG/1; UG/1
1 POWDER RESPIRATORY (INHALATION) 2 TIMES DAILY
Status: DISCONTINUED | OUTPATIENT
Start: 2022-06-28 | End: 2022-06-29 | Stop reason: HOSPADM

## 2022-06-28 RX ORDER — IPRATROPIUM BROMIDE AND ALBUTEROL SULFATE 2.5; .5 MG/3ML; MG/3ML
1 SOLUTION RESPIRATORY (INHALATION) EVERY 4 HOURS PRN
Status: DISCONTINUED | OUTPATIENT
Start: 2022-06-28 | End: 2022-06-28

## 2022-06-28 RX ORDER — PROPOFOL 10 MG/ML
INJECTION, EMULSION INTRAVENOUS PRN
Status: DISCONTINUED | OUTPATIENT
Start: 2022-06-28 | End: 2022-06-28 | Stop reason: SDUPTHER

## 2022-06-28 RX ORDER — IPRATROPIUM BROMIDE AND ALBUTEROL SULFATE 2.5; .5 MG/3ML; MG/3ML
1 SOLUTION RESPIRATORY (INHALATION)
Status: DISCONTINUED | OUTPATIENT
Start: 2022-06-28 | End: 2022-06-28

## 2022-06-28 RX ORDER — LEVOTHYROXINE SODIUM 0.07 MG/1
150 TABLET ORAL DAILY
Status: DISCONTINUED | OUTPATIENT
Start: 2022-06-28 | End: 2022-06-29 | Stop reason: HOSPADM

## 2022-06-28 RX ORDER — SODIUM CHLORIDE, SODIUM LACTATE, POTASSIUM CHLORIDE, CALCIUM CHLORIDE 600; 310; 30; 20 MG/100ML; MG/100ML; MG/100ML; MG/100ML
INJECTION, SOLUTION INTRAVENOUS CONTINUOUS
Status: DISCONTINUED | OUTPATIENT
Start: 2022-06-29 | End: 2022-06-28

## 2022-06-28 RX ORDER — INSULIN LISPRO 100 [IU]/ML
4 INJECTION, SOLUTION INTRAVENOUS; SUBCUTANEOUS ONCE
Status: COMPLETED | OUTPATIENT
Start: 2022-06-28 | End: 2022-06-28

## 2022-06-28 RX ORDER — ATORVASTATIN CALCIUM 20 MG/1
20 TABLET, FILM COATED ORAL NIGHTLY
Status: DISCONTINUED | OUTPATIENT
Start: 2022-06-29 | End: 2022-06-28 | Stop reason: SDUPTHER

## 2022-06-28 RX ORDER — FENTANYL CITRATE 50 UG/ML
25 INJECTION, SOLUTION INTRAMUSCULAR; INTRAVENOUS EVERY 5 MIN PRN
Status: DISCONTINUED | OUTPATIENT
Start: 2022-06-28 | End: 2022-06-28 | Stop reason: HOSPADM

## 2022-06-28 RX ORDER — OXYMETAZOLINE HYDROCHLORIDE 0.05 G/100ML
2 SPRAY NASAL 2 TIMES DAILY
COMMUNITY
End: 2022-07-06

## 2022-06-28 RX ADMIN — ATORVASTATIN CALCIUM 10 MG: 10 TABLET, FILM COATED ORAL at 19:34

## 2022-06-28 RX ADMIN — Medication 50 MCG: at 09:58

## 2022-06-28 RX ADMIN — Medication 10 MG: at 11:19

## 2022-06-28 RX ADMIN — HEPARIN SODIUM 8000 UNITS: 10000 INJECTION, SOLUTION INTRAVENOUS; SUBCUTANEOUS at 11:19

## 2022-06-28 RX ADMIN — IPRATROPIUM BROMIDE AND ALBUTEROL SULFATE 3 ML: 2.5; .5 SOLUTION RESPIRATORY (INHALATION) at 20:01

## 2022-06-28 RX ADMIN — AMLODIPINE BESYLATE 10 MG: 10 TABLET ORAL at 19:33

## 2022-06-28 RX ADMIN — ROCURONIUM BROMIDE 10 MG: 10 INJECTION, SOLUTION INTRAVENOUS at 12:11

## 2022-06-28 RX ADMIN — INSULIN LISPRO 4 UNITS: 100 INJECTION, SOLUTION INTRAVENOUS; SUBCUTANEOUS at 22:06

## 2022-06-28 RX ADMIN — LIDOCAINE HYDROCHLORIDE 100 MG: 20 INJECTION, SOLUTION INFILTRATION; PERINEURAL at 09:58

## 2022-06-28 RX ADMIN — ONDANSETRON 4 MG: 2 INJECTION INTRAMUSCULAR; INTRAVENOUS at 12:46

## 2022-06-28 RX ADMIN — Medication 10 MG: at 11:16

## 2022-06-28 RX ADMIN — ROCURONIUM BROMIDE 10 MG: 10 INJECTION, SOLUTION INTRAVENOUS at 11:08

## 2022-06-28 RX ADMIN — FLUOXETINE 20 MG: 20 CAPSULE ORAL at 15:37

## 2022-06-28 RX ADMIN — Medication 10 MG: at 11:53

## 2022-06-28 RX ADMIN — DONEPEZIL HYDROCHLORIDE 10 MG: 10 TABLET, FILM COATED ORAL at 15:35

## 2022-06-28 RX ADMIN — CEFAZOLIN SODIUM 2000 MG: 10 INJECTION, POWDER, FOR SOLUTION INTRAVENOUS at 10:15

## 2022-06-28 RX ADMIN — Medication 5 MG: at 11:09

## 2022-06-28 RX ADMIN — METFORMIN HYDROCHLORIDE 500 MG: 500 TABLET ORAL at 15:35

## 2022-06-28 RX ADMIN — PREGABALIN 75 MG: 75 CAPSULE ORAL at 15:35

## 2022-06-28 RX ADMIN — Medication 10 MG: at 12:06

## 2022-06-28 RX ADMIN — CEFAZOLIN SODIUM 2000 MG: 10 INJECTION, POWDER, FOR SOLUTION INTRAVENOUS at 19:32

## 2022-06-28 RX ADMIN — PREGABALIN 75 MG: 75 CAPSULE ORAL at 19:33

## 2022-06-28 RX ADMIN — PROTAMINE SULFATE 15 MG: 10 INJECTION, SOLUTION INTRAVENOUS at 12:38

## 2022-06-28 RX ADMIN — METHYLPREDNISOLONE SODIUM SUCCINATE 100 MG: 125 INJECTION, POWDER, FOR SOLUTION INTRAMUSCULAR; INTRAVENOUS at 09:02

## 2022-06-28 RX ADMIN — Medication 10 MG: at 11:11

## 2022-06-28 RX ADMIN — ROCURONIUM BROMIDE 50 MG: 10 INJECTION, SOLUTION INTRAVENOUS at 09:58

## 2022-06-28 RX ADMIN — IPRATROPIUM BROMIDE AND ALBUTEROL SULFATE 1 AMPULE: 2.5; .5 SOLUTION RESPIRATORY (INHALATION) at 09:01

## 2022-06-28 RX ADMIN — GLIPIZIDE 5 MG: 10 TABLET ORAL at 15:34

## 2022-06-28 RX ADMIN — ASPIRIN 81 MG: 81 TABLET, COATED ORAL at 15:35

## 2022-06-28 RX ADMIN — Medication 10 MG: at 12:26

## 2022-06-28 RX ADMIN — Medication 5 MG: at 11:05

## 2022-06-28 RX ADMIN — LOSARTAN POTASSIUM 50 MG: 50 TABLET, FILM COATED ORAL at 15:35

## 2022-06-28 RX ADMIN — SODIUM CHLORIDE, POTASSIUM CHLORIDE, SODIUM LACTATE AND CALCIUM CHLORIDE: 600; 310; 30; 20 INJECTION, SOLUTION INTRAVENOUS at 14:35

## 2022-06-28 RX ADMIN — CARVEDILOL 6.25 MG: 6.25 TABLET, FILM COATED ORAL at 15:35

## 2022-06-28 RX ADMIN — CARVEDILOL 6.25 MG: 6.25 TABLET, FILM COATED ORAL at 19:34

## 2022-06-28 RX ADMIN — SODIUM CHLORIDE, POTASSIUM CHLORIDE, SODIUM LACTATE AND CALCIUM CHLORIDE: 600; 310; 30; 20 INJECTION, SOLUTION INTRAVENOUS at 08:23

## 2022-06-28 RX ADMIN — SODIUM CHLORIDE, POTASSIUM CHLORIDE, SODIUM LACTATE AND CALCIUM CHLORIDE: 600; 310; 30; 20 INJECTION, SOLUTION INTRAVENOUS at 11:45

## 2022-06-28 RX ADMIN — FUROSEMIDE 40 MG: 40 TABLET ORAL at 19:34

## 2022-06-28 RX ADMIN — PROPOFOL 150 MG: 10 INJECTION, EMULSION INTRAVENOUS at 09:58

## 2022-06-28 RX ADMIN — SUGAMMADEX 200 MG: 100 INJECTION, SOLUTION INTRAVENOUS at 13:00

## 2022-06-28 ASSESSMENT — ENCOUNTER SYMPTOMS
WHEEZING: 1
CHEST TIGHTNESS: 0
CHOKING: 0
VOMITING: 0
SINUS PRESSURE: 0
RHINORRHEA: 0
NAUSEA: 0
COUGH: 0
DIARRHEA: 0
SHORTNESS OF BREATH: 1
ABDOMINAL PAIN: 0
CONSTIPATION: 0
VOICE CHANGE: 0
BACK PAIN: 0

## 2022-06-28 ASSESSMENT — LIFESTYLE VARIABLES: SMOKING_STATUS: 0

## 2022-06-28 ASSESSMENT — PAIN - FUNCTIONAL ASSESSMENT: PAIN_FUNCTIONAL_ASSESSMENT: 0-10

## 2022-06-28 ASSESSMENT — PAIN SCALES - GENERAL: PAINLEVEL_OUTOF10: 0

## 2022-06-28 NOTE — PROGRESS NOTES
Patient presented to pre op for procedure. L. .Carotid. Patient presented with SOB. Patient's initial oxygen level was 86%. Patient states that he does wear oxygen at home. Patient placed on oxygen per nasal cannula at 2 liters. Will notify Dr. Yohana Chan as well as Dr. Marquita Enciso.

## 2022-06-28 NOTE — PROGRESS NOTES
Transitions of Care Pharmacy Service   Medication Review    The patient's list of current home medications has been reviewed. Source(s) of information: patient, CareEverywhere, SureScripts    Based on information provided by the above source(s), I have updated the patient's home med list as described below. Please review the ACTION REQUESTED section of this note below for any discrepancies on current hospital orders. I changed or updated the following medications on the patient's home medication list:  Removed Acetaminophen (list cleanup)     Added Sinex 2 sprays each nostril BID     Adjusted   none   Other Notes none             Please feel free to call me with any questions about this encounter. Thank you. Frann Paget, [address] [place] [address]   Transitions [facility] Pharmacy Service  Phone:  [phone]  Fax: [phone]      Electronically signed by Frann Paget, [address] [place] [address] on 6/28/2022 at 6:48 PM         Medications Prior to Admission: oxymetazoline (AFRIN) 0.05 % nasal spray, 2 sprays by Nasal route 2 times daily  OXYGEN, Inhale 5 L into the lungs at bedtime Nightly  ipratropium-albuterol (DUONEB) 0.5-2.5 (3) MG/3ML SOLN nebulizer solution, Inhale 3 mLs into the lungs every 4 hours as needed for Shortness of Breath  carvedilol (COREG) 12.5 MG tablet, Take 1 tablet by mouth 2 times daily  apixaban (ELIQUIS) 5 MG TABS tablet, Take 1 tablet by mouth 2 times daily  budesonide-formoterol (SYMBICORT) 80-4.5 MCG/ACT AERO, Inhale 2 puffs into the lungs 2 times daily  levothyroxine (SYNTHROID) 150 MCG tablet, Take 1 tablet by mouth Daily  fluticasone-salmeterol (ADVAIR) 100-50 MCG/DOSE diskus inhaler, Inhale 1 puff into the lungs every 12 hours  furosemide (LASIX) 20 MG tablet, Take 1 tablet by mouth daily (Patient taking differently: Take 40 mg by mouth 2 times daily )  pregabalin (LYRICA) 75 MG capsule, Take 75 mg by mouth 2 times daily.   albuterol sulfate HFA (VENTOLIN HFA) 108 (90 Base) MCG/ACT inhaler, Inhale 2

## 2022-06-28 NOTE — FLOWSHEET NOTE
06/28/22 1445   Vital Signs   Temp 98.2 °F (36.8 °C)   Temp Source Temporal   Heart Rate 76   Resp 18   BP (!) 149/64   MAP (Calculated) 92.33   Oxygen Therapy   SpO2 92 %   Pulse Oximetry Type Continuous   O2 Device Nasal cannula   O2 Flow Rate (L/min) 5 L/min   Peripheral Vascular   RUE Sensation  Full sensation; No numbness; No pain; No tingling   LUE Sensation  Full sensation; No numbness; No pain; No tingling   RLE Sensation  Decreased;Numbness;Tingling; No pain   LLE Sensation  Decreased;Numbness;Tingling; No pain   Puncture Site Assessment 1   Location IJ - left  (s/p carotid)   Site Assessment No redness, drainage, swelling or hematoma   Hemostasis Intervention Other (comment)  (surgical glue)   Dressing Applied Other (Comment)  (island dressing)   Multiple puncture sites No     Patient arrived to room 2032 via bed post left carotid. Patient is alert and oriented and denies pain. Bed locked and placed in lowest position. Side rails up x2. Call light placed at the patient's bedside. Bedside report completed. Left carotid assessed. No redness, drainage, swelling, or hematoma present. Dressing remains clean, dry and intact. Pulses palpable. Vital signs obtained (see flowsheet). RN educated the patient on position orders and activity restrictions for recovery. Patient verbalizes understanding.

## 2022-06-28 NOTE — CONSULTS
[facility]  Office: [address], DO, Brendan Jacobo, DO, Dannie Alejandro, DO, Karlos Heller Brittany, DO, Giovany Gallagher MD, Jarocho Mckee MD, Leti Paiz MD, Cindy Cartwright MD, Remy West MD, Pedro Reed MD, Brenda Browning MD, Rachel Azar, DO, Magdalena Sales MD,  Manish Pineda, DO, Lesa Brown MD, Irineo Gonzalez MD, Megan Garcia, DO, Arpit Chung MD, Coleman Mclaughlin MD, Marlon Herndon, DO, Travon Muller MD, Ramirez Hoff MD, Yari Sweeney, [facility], [facility], CNP, Corinne Dennis, CNP, Yaquelin Delarosa, CNP, Munira Marvin, CNP, Chriss Blackman, CNP, FERN WarnerC, Kaleigh Adorno, DNP, Xavier Macias, CNP, Alina Krishnan, CNP, Juan Carlos Brito, CNP, Huey Hallman, CNS, Ambrocio Carranza, DNP, Jenn Phan, CNP, Mary Jo Mulligan, CNP, Colby Smith, [facility]      Consultation Note       Admit Date: 6/28/2022  Bed/Room No.  2032/2032-01  Admitting Physician : Heinz Denver, MD  Code Status :Full Code  Hospital Day:  LOS: 0 days   Patient is currently admitted for  treatment of  Carotid stenosis, asymptomatic, bilateral  Reason for Consult:  Medical Management   Requesting Physician: Heinz Denver, MD Elaine Hauser, DO    HISTORY OF PRESENT ILLNESS:   The patient is a 66 y.o. male who is admitted for surgical management of left carotid stenosis. Patient underwent left carotid endarterectomy. He has underlying history of non-Hodgkin's lymphoma with recurrence and is currently under chemotherapy. Other comorbidities include arthritis, COPD with chronic respiratory failure on nocturnal home oxygen, type 2 diabetes mellitus, CHF, hypertension, hypothyroidism. Patient is complaining of difficulty breathing and feels congested. He requests breathing treatment. He denies any chest pain, palpitations, nausea, vomiting. He does not have any focal weakness postsurgery.     Past Medical History:        Diagnosis Date    Arthritis     Cancer [facility])     chemotherapy lymphoma last was april 2022    Chemotherapy management, encounter for     [facility]     COPD (chronic obstructive pulmonary disease) [facility]     Diabetes mellitus ([facility]     H/O cardiovascular stress test     History of non-Hodgkin's lymphoma     Hx of blood clots     DVT in right leg     Hyperlipidemia     Hypertension     Hypothyroidism     On home O2     at night        Past Surgical History:        Procedure Laterality Date    CHOLECYSTECTOMY, LAPAROSCOPIC N/A 10/9/2021    CHOLECYSTECTOMY LAPAROSCOPIC ROBOTIC XI performed by Nitish Young MD at [place] [address] Left 9/24/2021    COLONOSCOPY POLYPECTOMY SNARE/COLD BIOPSY performed by Camacho Stern MD at [address] [place] [address] Drive Right 10/6/2021    CYSTOSCOPY PYELOGRAM URETERAL STENT INSERTION performed by Bhavna Rodríguez MD at [address] [place] [address] Drive Bilateral 11/24/2021    CYSTOSCOPY URETERAL STENT INSERTION RIGHT EXCHANGE & RETROGRADE PYELOGRAM LEFT INSERTION performed by Bhavna Rodríguez MD at [address] [place] [address] Bilateral 2/16/2022    CYSTOSCOPY WITH BILATERAL STENT EXCHANGE performed by [facility]na [facility]uez MD [facility] [address]      cataracts   [address]      IR BIOPSY ABDOMINAL MASS  7/6/2021    IR BIOPSY ABDOMINAL MASS 7/6/2021 [facility]    IR PORT PLACEMENT EQUAL OR GREATER THAN 5 YEARS  8/31/2021    IR PORT PLACEMENT EQUAL OR GREATER THAN 5 YEARS 8/31/2021 [facility]    TONSILLECTOMY      as child       Medications Prior to Admission:    Prior to Admission medications    Medication Sig Start Date End Date Taking?  Authorizing Provider   OXYGEN Inhale 5 L into the lungs at bedtime Nightly    Historical Provider, MD   ipratropium-albuterol (DUONEB) 0.5-2.5 (3) MG/3ML SOLN nebulizer solution Inhale 3 mLs into the lungs every 4 hours as needed for Shortness of Breath 2/20/22   EDWIGE Rae - CNP   carvedilol (COREG) 12.5 MG tablet Take 1 tablet by mouth 2 times daily 2/20/22 3/22/22  Skye Mile, DO   apixaban (ELIQUIS) 5 MG TABS tablet Take 1 tablet by mouth 2 times daily 2/20/22   Skyeie Mile, DO   acetaminophen (TYLENOL) 325 MG tablet Take 2 tablets by mouth every 6 hours as needed for Pain or Fever 2/20/22   Skyeie Mile, DO   budesonide-formoterol (SYMBICORT) 80-4.5 MCG/ACT AERO Inhale 2 puffs into the lungs 2 times daily 2/20/22   Skye Mile, DO   levothyroxine (SYNTHROID) 150 MCG tablet Take 1 tablet by mouth Daily 2/2/22   Ronna Leijai, DO   fluticasone-salmeterol (ADVAIR) 100-50 MCG/DOSE diskus inhaler Inhale 1 puff into the lungs every 12 hours 2/1/22   Ronna Asai, DO   furosemide (LASIX) 20 MG tablet Take 1 tablet by mouth daily  Patient taking differently: Take 40 mg by mouth 2 times daily  2/1/22   Ronna Leijai, DO   pregabalin (LYRICA) 75 MG capsule Take 75 mg by mouth 2 times daily. Historical Provider, MD   albuterol sulfate HFA (VENTOLIN HFA) 108 (90 Base) MCG/ACT inhaler Inhale 2 puffs into the lungs every 6 hours as needed for Wheezing    Historical Provider, MD   amLODIPine (NORVASC) 10 MG tablet Take 1 tablet by mouth daily  Patient taking differently: Take 10 mg by mouth at bedtime  10/12/21   Roderick Metcalf, DO   losartan (COZAAR) 50 MG tablet Take 1 tablet by mouth daily 10/12/21   Roderick Metcalf,    glipiZIDE-metFORMIN (METAGLIP) 5-500 MG per tablet Take 1 tablet by mouth 2 times daily (before meals)     Historical Provider, MD   donepezil (ARICEPT) 10 MG tablet Take 10 mg by mouth daily  7/11/21   Historical Provider, MD   atorvastatin (LIPITOR) 10 MG tablet Take 10 mg by mouth daily    Historical Provider, MD   FLUoxetine (PROZAC) 20 MG capsule Take 20 mg by mouth daily    Historical Provider, MD   omeprazole (PRILOSEC) 20 MG delayed release capsule Take 20 mg by mouth 2 times daily    Historical Provider, MD       Allergies:  Patient has no known allergies.     Social History:   TOBACCO:   reports that he quit smoking about 16 years ago. His smoking use included cigarettes. He has a 90.00 pack-year smoking history. He has never used smokeless tobacco.  ETOH:   reports current alcohol use of about 1.7 standard drinks of alcohol per week. OCCUPATION:      Family History:       Problem Relation Age of Onset    Diabetes Mother     Alzheimer's Disease Father     Heart Disease Brother     Heart Disease Brother        Review of Systems   Constitutional: Negative for activity change, appetite change, fatigue, fever and unexpected weight change. HENT: Negative for congestion, nosebleeds, rhinorrhea, sinus pressure, sneezing and voice change. Eyes: Negative for visual disturbance. Respiratory: Positive for shortness of breath and wheezing. Negative for cough, choking and chest tightness. Cardiovascular: Negative for chest pain, palpitations and leg swelling. Gastrointestinal: Negative for abdominal pain, constipation, diarrhea, nausea and vomiting. Genitourinary: Negative for difficulty urinating, dysuria, frequency, penile discharge and testicular pain. Musculoskeletal: Negative for back pain. Skin: Negative for rash. Neurological: Negative for dizziness, weakness, light-headedness, numbness and headaches. Hematological: Does not bruise/bleed easily. Psychiatric/Behavioral: Negative for agitation, behavioral problems, confusion, self-injury, sleep disturbance and suicidal ideas. Objective:   BP (!) 151/54   Pulse 71   Temp 97.8 °F (36.6 °C) (Temporal)   Resp 15   Ht 5' 10.5\" (1.791 m)   Wt 178 lb 3.2 oz (80.8 kg)   SpO2 91%   BMI 25.21 kg/m²       Intake/Output Summary (Last 24 hours) at 6/28/2022 1740  Last data filed at 6/28/2022 1415  Gross per 24 hour   Intake 1340 ml   Output --   Net 1340 ml       Physical Exam  Vitals and nursing note reviewed. Constitutional:       General: He is not in acute distress. Appearance: He is not diaphoretic.       Interventions: Nasal cannula in place. HENT:      Head: Normocephalic and atraumatic. Nose:      Right Sinus: No maxillary sinus tenderness or frontal sinus tenderness. Left Sinus: No maxillary sinus tenderness or frontal sinus tenderness. Mouth/Throat:      Pharynx: No oropharyngeal exudate. Eyes:      General: No scleral icterus. Conjunctiva/sclera: Conjunctivae normal.      Pupils: Pupils are equal, round, and reactive to light. Neck:      Thyroid: No thyromegaly. Vascular: No JVD. Comments: Left carotid surgical wound with dressing in place. Cardiovascular:      Rate and Rhythm: Normal rate and regular rhythm. Pulses:           Dorsalis pedis pulses are 2+ on the right side and 2+ on the left side. Heart sounds: Normal heart sounds. No murmur heard. Pulmonary:      Effort: Pulmonary effort is normal.      Breath sounds: Normal breath sounds. No wheezing or rales. Chest:      Comments: Port-A-Cath in place  Abdominal:      Palpations: Abdomen is soft. There is no mass. Tenderness: There is no abdominal tenderness. Musculoskeletal:      Cervical back: Full passive range of motion without pain and neck supple. Lymphadenopathy:      Head:      Right side of head: No submandibular adenopathy. Left side of head: No submandibular adenopathy. Cervical: No cervical adenopathy. Skin:     General: Skin is warm. Neurological:      Mental Status: He is alert and oriented to person, place, and time. Motor: No tremor. Psychiatric:         Behavior: Behavior is cooperative.          Laboratory findings:    Hematology:  Recent Labs     06/13/22  1056   WBC 11.3   HGB 13.3   HCT 45.8   MCV 88.9          Chemistry:  Lab Results   Component Value Date     (H) 06/13/2022    K 4.7 06/13/2022     06/13/2022    CO2 32 (H) 06/13/2022    BUN 45 (H) 06/13/2022    CREATININE 1.51 (H) 06/13/2022    GLUCOSE 78 06/13/2022    CALCIUM 8.9 06/13/2022    PROT 5.8 (L) 05/02/2022    LABALBU 3.5 05/02/2022    BILITOT 0.42 05/02/2022    ALKPHOS 119 05/02/2022    AST 18 05/02/2022    ALT 20 05/02/2022    LABGLOM 46 (L) 06/13/2022    GFRAA 55 (L) 06/13/2022    GLOB NOT REPORTED 10/06/2021     Lab Results   Component Value Date    LABPROT 7.3 01/07/2013    LABALBU 3.5 05/02/2022     Lab Results   Component Value Date    LABA1C 5.7 06/13/2022     Lab Results   Component Value Date     06/13/2022     Lab Results   Component Value Date    TSH 11.96 (H) 01/29/2022       Magnesium:   Lab Results   Component Value Date    MG 2.0 12/15/2019     Phosphorus:   Lab Results   Component Value Date    PHOS 3.4 10/05/2021     Ionized Calcium: No results found for: CAION   Last 3 Blood Glucose:   No results for input(s): GLUCOSE in the last 72 hours. Urinalysis:   PT/INR:    Lab Results   Component Value Date    PROTIME 14.5 06/13/2022    INR 1.1 06/13/2022     PTT:    Lab Results   Component Value Date    APTT 30.3 06/13/2022     Microbiology:    Imaging / Clinical Data:   No results found. Assessment and Plan   Principal Problem:    Carotid stenosis, asymptomatic, bilateral  Active Problems:    Simple chronic bronchitis (HCC)    Dependence on nocturnal oxygen therapy    NHL (non-Hodgkin's lymphoma) (HCC)    Hypoxia  Resolved Problems:    * No resolved hospital problems. *         1. Left carotid stenosis s/p left carotid endarterectomy -postsurgical management by vascular surgery. 2. Chronic bronchitis -DuoNeb every 4 hours as needed. Continue Symbicort  3. Former smoker -   4. Dependence on nocturnal oxygen therapy-continue O2 supplement. 5. Non-Hodgkin's lymphoma with recurrence on chemotherapy. 6. Type 2 diabetes mellitus -controlled last A1C 5.7.  7. Dementia - on Aricept       Thank you for allowing me in care of this patient and consult. call with any questions. Will follow with you. Patients questions answered . Updates discussed with patient and Staff Peg ADAIR. Jose Bean MD, MD  6/28/2022  Copy of note to Cris Machado MD and  Rob Marina DO    (Please note that portions of this note were completed with a voice recognition program. Efforts were made to edit the dictations but occasionally words are mis-transcribed.)

## 2022-06-28 NOTE — RT PROTOCOL NOTE
RT Nebulizer Bronchodilator Protocol Note    There is a bronchodilator order in the chart from a provider indicating to follow the RT Bronchodilator Protocol and there is an Initiate RT Bronchodilator Protocol order as well (see protocol at bottom of note). CXR Findings:  No results found. The findings from the last RT Protocol Assessment were as follows:  Smoking: Chronic pulmonary disease  Respiratory Pattern: Dyspnea on exertion or RR 21-25 bpm  Breath Sounds: Slightly diminished and/or crackles  Cough: Strong, productive  Indication for Bronchodilator Therapy: On home bronchodilators  Bronchodilator Assessment Score: 7    Aerosolized bronchodilator medication orders have been revised according to the RT Nebulizer Bronchodilator Protocol below. Respiratory Therapist to perform RT Therapy Protocol Assessment initially then follow the protocol. Repeat RT Therapy Protocol Assessment PRN for score 0-3 or on second treatment, BID, and PRN for scores above 3. No Indications - adjust the frequency to every 6 hours PRN wheezing or bronchospasm, if no treatments needed after 48 hours then discontinue using Per Protocol order mode. If indication present, adjust the RT bronchodilator orders based on the Bronchodilator Assessment Score as indicated below. If a patient is on this medication at home then do not decrease Frequency below that used at home. 0-3 - enter or revise RT bronchodilator order(s) to equivalent RT Bronchodilator order with Frequency of every 4 hours PRN for wheezing or increased work of breathing using Per Protocol order mode. 4-6 - enter or revise RT Bronchodilator order(s) to two equivalent RT bronchodilator orders with one order with BID Frequency and one order with Frequency of every 4 hours PRN wheezing or increased work of breathing using Per Protocol order mode.          7-10 - enter or revise RT Bronchodilator order(s) to two equivalent RT bronchodilator orders with one order with TID Frequency and one order with Frequency of every 4 hours PRN wheezing or increased work of breathing using Per Protocol order mode. 11-13 - enter or revise RT Bronchodilator order(s) to one equivalent RT bronchodilator order with QID Frequency and an Albuterol order with Frequency of every 4 hours PRN wheezing or increased work of breathing using Per Protocol order mode. Greater than 13 - enter or revise RT Bronchodilator order(s) to one equivalent RT bronchodilator order with every 4 hours Frequency and an Albuterol order with Frequency of every 2 hours PRN wheezing or increased work of breathing using Per Protocol order mode. RT to enter RT Home Evaluation for COPD & MDI Assessment order using Per Protocol order mode.     Electronically signed by Lisa Vega RCP on 6/28/2022 at 3:18 PM

## 2022-06-28 NOTE — BRIEF OP NOTE
Brief Postoperative Note      Patient: Stef Schneider  YOB: 1950  MRN: [id number]    Date of Procedure: 6/28/2022    Pre-Op Diagnosis: DX SEVERE 80-90% CAROTID STENOSIS LEFT    Post-Op Diagnosis: Same       Procedure(s):  LEFT TYPE I EVERSION LEFT CAROTID ENDARTERECTOMY  RE-IMPLANTATION LEFT ICA    Surgeon(s):  Patricia Keith MD    Assistant:  First Assistant: Yoko Funk RN    Anesthesia: General    Estimated Blood Loss (mL): 20    Complications: None    Specimens:   ID Type Source Tests Collected by Time Destination   A : PLAQUE- LEFT CAROTID Tissue Carotid Arteries SURGICAL PATHOLOGY Patricia Keith MD 6/28/2022 1143        Implants:  * No implants in log *      Drains: * No LDAs found *    Findings: Severe 85-90% Lt ICA stenosis with tortuosity.     Electronically signed by Patricia Keith MD on 6/28/2022 at 1:07 PM

## 2022-06-28 NOTE — ANESTHESIA POSTPROCEDURE EVALUATION
Department of Anesthesiology  Postprocedure Note    Patient: Alden Hadley  MRN: [id number]  YOB: 1950  Date of evaluation: 6/28/2022      Procedure Summary     Date: 06/28/22 Room / Location: [place] [address] / [facility] - INPATIENT    Anesthesia Start: 6868 Anesthesia Stop: 36    Procedure: LEFT TYPE I EVERSION LEFT CAROTID ENDARTERECTOMY RE-IMPLANTATION LEFT ICA (Left Neck) Diagnosis:       Stenosis of left carotid artery      (DX CAROTID STENOSIS LEFT)    Surgeons: Giovanna Zimmer MD Responsible Provider: Nic Barroso DO    Anesthesia Type: general ASA Status: 3          Anesthesia Type: No value filed.     Yvette Phase I: Yvette Score: 4    Yvette Phase II:        Anesthesia Post Evaluation    Patient location during evaluation: PACU  Patient participation: complete - patient participated  Level of consciousness: awake and alert  Airway patency: patent  Nausea & Vomiting: no nausea and no vomiting  Complications: no  Cardiovascular status: hemodynamically stable  Respiratory status: acceptable  Hydration status: stable

## 2022-06-28 NOTE — PROGRESS NOTES
ATTEMPTED TO CALL WIFE, BETTYE TO GIVE UPDATE, WIFE DID NOT ANSWER AND VOICEMAIL BOX FULL,UNABLE TO LEAVE MESSAGE. WILL TRY AGAIN.

## 2022-06-28 NOTE — ANESTHESIA PRE PROCEDURE
Department of Anesthesiology  Preprocedure Note       Name:  Eron Joya   Age:  66 y.o.  :  1950                                          MRN:  [id number]         Date:  2022      Surgeon: Kathleen Barrera):  Gianna Mcknight MD    Procedure: Procedure(s):  LEFT CAROTID ENDARTERECTOMY    Medications prior to admission:   Prior to Admission medications    Medication Sig Start Date End Date Taking? Authorizing Provider   OXYGEN Inhale 5 L into the lungs at bedtime Nightly    Historical Provider, MD   ipratropium-albuterol (DUONEB) 0.5-2.5 (3) MG/3ML SOLN nebulizer solution Inhale 3 mLs into the lungs every 4 hours as needed for Shortness of Breath 22   Marston Callander, APRN - CNP   carvedilol (COREG) 12.5 MG tablet Take 1 tablet by mouth 2 times daily 2/20/22 3/22/22  Elva Hamilton, DO   apixaban (ELIQUIS) 5 MG TABS tablet Take 1 tablet by mouth 2 times daily 22   Elva Hamilton, DO   acetaminophen (TYLENOL) 325 MG tablet Take 2 tablets by mouth every 6 hours as needed for Pain or Fever 22   Elva Hamilton, DO   budesonide-formoterol (SYMBICORT) 80-4.5 MCG/ACT AERO Inhale 2 puffs into the lungs 2 times daily 22   Elva Hamilton, DO   levothyroxine (SYNTHROID) 150 MCG tablet Take 1 tablet by mouth Daily 22   Mathew Waller, DO   fluticasone-salmeterol (ADVAIR) 100-50 MCG/DOSE diskus inhaler Inhale 1 puff into the lungs every 12 hours 22   Mathew Waller, DO   furosemide (LASIX) 20 MG tablet Take 1 tablet by mouth daily  Patient taking differently: Take 40 mg by mouth 2 times daily  22   Mathwe Waller, DO   pregabalin (LYRICA) 75 MG capsule Take 75 mg by mouth 2 times daily.     Historical Provider, MD   albuterol sulfate HFA (VENTOLIN HFA) 108 (90 Base) MCG/ACT inhaler Inhale 2 puffs into the lungs every 6 hours as needed for Wheezing    Historical Provider, MD   amLODIPine (NORVASC) 10 MG tablet Take 1 tablet by mouth daily  Patient taking differently: Take 10 mg by mouth at bedtime  10/12/21   Roderick Metcalf, DO   losartan (COZAAR) 50 MG tablet Take 1 tablet by mouth daily 10/12/21   Roderick Metcalf, DO   glipiZIDE-metFORMIN (METAGLIP) 5-500 MG per tablet Take 1 tablet by mouth 2 times daily (before meals)     Historical Provider, MD   donepezil (ARICEPT) 10 MG tablet Take 10 mg by mouth daily  7/11/21   Historical Provider, MD   atorvastatin (LIPITOR) 10 MG tablet Take 10 mg by mouth daily    Historical Provider, MD   FLUoxetine (PROZAC) 20 MG capsule Take 20 mg by mouth daily    Historical Provider, MD   omeprazole (PRILOSEC) 20 MG delayed release capsule Take 20 mg by mouth 2 times daily    Historical Provider, MD       Current medications:    Current Facility-Administered Medications   Medication Dose Route Frequency Provider Last Rate Last Admin    [START ON 6/29/2022] lidocaine PF 1 % injection 1 mL  1 mL IntraDERmal Once PRN Faisal Wilcox MD        [facility] ON 6/29/2022] lactated ringers infusion   IntraVENous Continuous Faisal Wilcox MD 50 mL/hr at 06/28/22 0823 New Bag at 06/28/22 0823    sodium chloride flush 0.9 % injection 5-40 mL  5-40 mL IntraVENous 2 times per day Faisal Wilcox MD        sodium chloride flush 0.9 % injection 5-40 mL  5-40 mL IntraVENous PRN Faisal Wilcox MD        0.9 % sodium chloride infusion   IntraVENous PRN Faisal Wilcox MD        ceFAZolin (ANCEF) 2000 mg in dextrose 5 % 50 mL IVPB  2,000 mg IntraVENous Once Michelle Canela MD           Allergies:  No Known Allergies    Problem List:    Patient Active Problem List   Diagnosis Code    NHL (non-Hodgkin's lymphoma) ([facility] [address] C85.90    Traumatic retroperitoneal hematoma S36.892A    Acute kidney injury ([facility] [address] I86.7    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    Hydronephrosis due to obstruction of ureter N13.1    Moderate malnutrition (HCC) E44.0    Chronic cholecystitis K81.1    Pleural effusion J90    Cardiomegaly I51.7    Hypothyroidism E03.9    Diabetes mellitus ([facility] [address] [address] E11.9    Hypertensive urgency I16.0    Acute respiratory failure with hypoxia (HCC) J96.01    Hypoxia R09.02       Past Medical History:        Diagnosis Date    Arthritis     Cancer [facility])     chemotherapy lymphoma last was 2022    Chemotherapy management, encounter for     CHF (congestive heart failure) (HCC)     COPD (chronic obstructive pulmonary disease) ([facility] [address]     Diabetes mellitus ([facility] [address]     H/O cardiovascular stress test     History of non-Hodgkin's lymphoma     Hx of blood clots     DVT in right leg     Hyperlipidemia     Hypertension     Hypothyroidism     On home O2     at night        Past Surgical History:        Procedure Laterality Date    CHOLECYSTECTOMY, LAPAROSCOPIC N/A 10/9/2021    CHOLECYSTECTOMY LAPAROSCOPIC ROBOTIC XI performed by Richard Jaime MD at [address] [facility] Left 2021    COLONOSCOPY POLYPECTOMY SNARE/COLD BIOPSY performed by Beverly Collazo MD at [facility] Right 10/6/2021    CYSTOSCOPY PYELOGRAM URETERAL STENT INSERTION performed by Jordin Murphy MD at [facility] Bilateral 2021    CYSTOSCOPY URETERAL STENT INSERTION RIGHT EXCHANGE & RETROGRADE PYELOGRAM LEFT INSERTION performed by Jordin Murphy MD at [facility] Bilateral 2022    CYSTOSCOPY WITH BILATERAL STENT EXCHANGE performed by Jordin Murphy MD at [facility]      cataracts   [facility]      IR BIOPSY ABDOMINAL MASS  2021    IR BIOPSY ABDOMINAL MASS 2021 [facility]    IR PORT PLACEMENT EQUAL OR GREATER THAN 5 YEARS  2021    IR PORT PLACEMENT EQUAL OR GREATER THAN 5 YEARS 2021 [facility]    TONSILLECTOMY      as child       Social History:    Social History     Tobacco Use    Smoking status: Former Smoker     Packs/day: 2.00     Years: 45.00     Pack years: 90.00     Types: Cigarettes     Quit date: 2006     Years since quittin.4    Smokeless tobacco: Never Used   Substance Use Topics    Alcohol use: Yes     Alcohol/week: 1.7 standard drinks     Types: 2 Standard drinks or equivalent per week     Comment: occassional                                Counseling given: Not Answered      Vital Signs (Current):   Vitals:    06/28/22 0745 06/28/22 0755   BP: (!) 113/48    Pulse: 64    Resp: 20    Temp: 98.2 °F (36.8 °C)    SpO2: (!) 89%    Weight:  178 lb 3.2 oz (80.8 kg)   Height:  5' 10.5\" (1.791 m)                                              BP Readings from Last 3 Encounters:   06/28/22 (!) 113/48   06/13/22 (!) 95/44   06/13/22 (!) 111/44       NPO Status: Time of last liquid consumption: 2300                        Time of last solid consumption: 2000                        Date of last liquid consumption: 06/27/22                        Date of last solid food consumption: 06/27/22    BMI:   Wt Readings from Last 3 Encounters:   06/28/22 178 lb 3.2 oz (80.8 kg)   06/13/22 175 lb (79.4 kg)   06/13/22 176 lb (79.8 kg)     Body mass index is 25.21 kg/m².     CBC:   Lab Results   Component Value Date    WBC 11.3 06/13/2022    RBC 5.15 06/13/2022    RBC 4.38 06/06/2012    HGB 13.3 06/13/2022    HCT 45.8 06/13/2022    MCV 88.9 06/13/2022    RDW 15.4 06/13/2022     06/13/2022     06/06/2012       CMP:   Lab Results   Component Value Date     06/13/2022    K 4.7 06/13/2022     06/13/2022    CO2 32 06/13/2022    BUN 45 06/13/2022    CREATININE 1.51 06/13/2022    GFRAA 55 06/13/2022    LABGLOM 46 06/13/2022    GLUCOSE 78 06/13/2022    GLUCOSE 97 06/06/2012    PROT 5.8 05/02/2022    CALCIUM 8.9 06/13/2022    BILITOT 0.42 05/02/2022    ALKPHOS 119 05/02/2022    AST 18 05/02/2022    ALT 20 05/02/2022       POC Tests:   Recent Labs     06/28/22  0820   POCGLU 112*       Coags:   Lab Results   Component Value Date    PROTIME 14.5 06/13/2022    INR 1.1 06/13/2022    APTT 30.3 06/13/2022       HCG (If Applicable): No results found for: PREGTESTUR, PREGSERUM, HCG, HCGQUANT     ABGs: No results found for: PHART, PO2ART, FZU3CPJ, HDZ4HYD, BEART, O8LDZYMY     Type & Screen (If Applicable):  No results found for: LABABO, LABRH    Drug/Infectious Status (If Applicable):  No results found for: HIV, HEPCAB    COVID-19 Screening (If Applicable):   Lab Results   Component Value Date    COVID19 Not Detected 02/16/2022           Anesthesia Evaluation  Patient summary reviewed and Nursing notes reviewed no history of anesthetic complications:   Airway: Mallampati: II  TM distance: >3 FB   Neck ROM: full  Mouth opening: > = 3 FB   Dental:          Pulmonary:   (+) COPD:  wheezes     (-) not a current smoker                           Cardiovascular:  Exercise tolerance: no interval change,   (+) hypertension:, CHF:, hyperlipidemia      ECG reviewed    Rate: normal    Stress test reviewed  Cleared by cardiology              Neuro/Psych:               GI/Hepatic/Renal:             Endo/Other:    (+) Diabetes, hypothyroidism::., malignancy/cancer. Abdominal:             Vascular: Other Findings:           Anesthesia Plan      general     ASA 3       Induction: intravenous. arterial line  MIPS: Prophylactic antiemetics administered. Anesthetic plan and risks discussed with patient. Plan discussed with CRNA.     Attending anesthesiologist reviewed and agrees with Preprocedure content        chronic wheeze  O2 sat 96 on a couple liters O2 in preop   Patient took his advair this morning  Recent ED visit for COPDe abx completed    preop duoneb and solumedrol IV this AM for optimization         Tobias Doherty DO   6/28/2022

## 2022-06-28 NOTE — H&P
into the lungs every 4 hours as needed for Shortness of Breath 2/20/22   Alyssa Jaffe APRN - CNP   carvedilol (COREG) 12.5 MG tablet Take 1 tablet by mouth 2 times daily 2/20/22 3/22/22  [facility] Console, DO   apixaban (ELIQUIS) 5 MG TABS tablet Take 1 tablet by mouth 2 times daily 2/20/22   [facility] Console, DO   acetaminophen (TYLENOL) 325 MG tablet Take 2 tablets by mouth every 6 hours as needed for Pain or Fever 2/20/22   Ida Console, DO   budesonide-formoterol (SYMBICORT) 80-4.5 MCG/ACT AERO Inhale 2 puffs into the lungs 2 times daily 2/20/22   Idaevgeny Mendoza, DO   levothyroxine (SYNTHROID) 150 MCG tablet Take 1 tablet by mouth Daily 2/2/22   Shayy Parker, DO   fluticasone-salmeterol (ADVAIR) 100-50 MCG/DOSE diskus inhaler Inhale 1 puff into the lungs every 12 hours 2/1/22   Shayy Parker, DO   furosemide (LASIX) 20 MG tablet Take 1 tablet by mouth daily  Patient taking differently: Take 40 mg by mouth 2 times daily  2/1/22   Shayy Parker,    pregabalin (LYRICA) 75 MG capsule Take 75 mg by mouth 2 times daily.     Historical Provider, MD   albuterol sulfate HFA (VENTOLIN HFA) 108 (90 Base) MCG/ACT inhaler Inhale 2 puffs into the lungs every 6 hours as needed for Wheezing    Historical Provider, MD   amLODIPine (NORVASC) 10 MG tablet Take 1 tablet by mouth daily  Patient taking differently: Take 10 mg by mouth at bedtime  10/12/21   Roderickevgeny Metcalf, DO   losartan (COZAAR) 50 MG tablet Take 1 tablet by mouth daily 10/12/21   Roderick Metcalf, DO   glipiZIDE-metFORMIN (METAGLIP) 5-500 MG per tablet Take 1 tablet by mouth 2 times daily (before meals)     Historical Provider, MD   donepezil (ARICEPT) 10 MG tablet Take 10 mg by mouth daily  7/11/21   Historical Provider, MD   atorvastatin (LIPITOR) 10 MG tablet Take 10 mg by mouth daily    Historical Provider, MD   FLUoxetine (PROZAC) 20 MG capsule Take 20 mg by mouth daily    Historical Provider, MD   omeprazole (PRILOSEC) 20 MG delayed release capsule Take 20 mg by mouth 2 times daily    Historical Provider, MD         This is a 66 y.o. male who is pleasant, cooperative, alert and oriented x3, in no acute distress. Heart: Heart sounds are normal.  HR 64 regular rate and rhythm without murmur, gallop or rub. Lungs: Increased respiratory effort with equal expansion, poor air exchange, unlabored and coarse rhonchi and wheezing to auscultation without rales bilaterally. Patient arrived to pre-op with SpO2 86% on room air. Patient currently on 2L nasal cannula. Visibly dyspneic with talking. Abdomen: soft, nontender, nondistended with bowel sounds active. Neurologic: There are no new focal motor or sensory deficits, normal muscle tone and bulk, no abnormal sensation, normal speech, cranial nerves II through XII grossly intact. Skin: Scattered bruising bilateral upper extremities. Labs:  Recent Labs     06/13/22  1056   HGB 13.3   HCT 45.8   WBC 11.3   MCV 88.9      *   K 4.7      CO2 32*   BUN 45*   CREATININE 1.51*   GLUCOSE 78   INR 1.1   PROTIME 14.5*   APTT 30.3       No results for input(s): COVID19 in the last 720 hours.     EDWIGE Rose CNP  Electronically signed 6/28/2022 at 8:51 AM      EDWIGE Terry CNP   Nurse Practitioner   General Surgery   H&P      Signed   Date of Service:  6/13/2022 11:00 AM         Related encounter: Pre-Admission Testing Visit 30 min from 6/13/2022 in [facility] PRE-ADMIT TESTING           Signed        Expand All Collapse All        Show:Clear all  [x]Manual[x]Template[x]Copied    Added by:  [x]EDWIGE Calderon CNP      []Holly for details    History and Physical Service   [facility]            Date of Evaluation:     6/13/2022  Patient name:              Kwame Donaldson  MRN:                           [id number]  YOB: 1950  PCP:                            Blanche Jacques DO     History Obtained From:      Patient, Pt's cousin, Medical records     History of Present Illness: This is Bozena Aleman a 66 y.o. male who presents for a pre-admission testing appointment for an upcoming LEFT CAROTID ENDARTERECTOMY by Dr. Becky Camarillo MD scheduled on 6/28/2022 at 0930 due to CAROTID STENOSIS LEFT. A bilateral carotid scan was done on 10/26/2021 (Please see result below). Pt denies sudden vision changes, facial droop, slurred speech, unilateral weakness, dizziness, and confusion. The pt has generalized weakness. Pt denies history of a stroke. He is taking Eliquis for a right leg DVT which was discovered 2 months ago. The pt's cousin is present to help answer questions. Bilateral Carotid Scan: 10/26/2021  Conclusions        Summary        Simultaneous real time imaging utilizing B-Mode, color flow doppler and    spectral waveform analysis was performed on the bilateral extracerebral    vascular system. The study demonstrates:        Right:    Internal carotid artery has a moderate, 50-69% stenosis based on    velocities. The vertebral artery is patent with antegrade flow. Left:    Internal carotid artery has a severe, 70-99% stenosis based on velocities. The vertebral artery is patent with antegrade flow. History of CHF, hyperlipidemia, hypertension, right leg DVT (detected 2 months ago), hypothyroidism, diabetes, COPD (pt wears 5 liters of oxygen at night per nasal cannula), and non-Hodgkin's lymphoma (Last chemotherapy treatment was in 4/2022. Port is in the right upper chest.). Stress test and ECHO dated 6/2/22 (Please see results below). Pt follows-up with [address] [place] [address] Cardiology, Dr. La Nena Otero from pulmonology, and Dr. Cheyenne Orozco from hematology and oncology. Blood pressures today were 92/52 and then 95/44. Resting blood pressure was 94/72 on 6/2/22 when the pt had a stress test. The pt is taking Lasix 40 mg twice per day, Norvasc, Coreg, and Cozaar.  His oxygen saturation was LAPAROSCOPIC N/A 10/9/2021     CHOLECYSTECTOMY LAPAROSCOPIC ROBOTIC XI performed by Wilhemina Olszewski, MD at [facility] Left 9/24/2021     COLONOSCOPY POLYPECTOMY SNARE/COLD BIOPSY performed by Danilo Tsang MD at [place] [address] Right 10/6/2021     CYSTOSCOPY PYELOGRAM URETERAL STENT INSERTION performed by Marilyn Amaya MD at [place] [address] Bilateral 11/24/2021     [address] [place] [address] Dr & RETROGRADE PYELOGRAM LEFT INSERTION performed by Marilyn Amaya MD at [place] [address] Bilateral 2/16/2022     CYSTOSCOPY WITH BILATERAL STENT EXCHANGE performed by Marilyn Amaya MD at [place] [address]         cataracts   [address]        IR BIOPSY ABDOMINAL MASS   7/6/2021     IR BIOPSY ABDOMINAL MASS 7/6/2021 [facility]    IR PORT PLACEMENT EQUAL OR GREATER THAN 5 YEARS   8/31/2021     IR PORT PLACEMENT EQUAL OR GREATER THAN 5 YEARS 8/31/2021 [facility]    TONSILLECTOMY         as child            Medications Prior to Admission:      Home Medications           Prior to Admission medications    Medication Sig Start Date End Date Taking?  Authorizing Provider   OXYGEN Inhale 5 L into the lungs at bedtime Nightly     Yes Historical Provider, MD   ipratropium-albuterol (DUONEB) 0.5-2.5 (3) MG/3ML SOLN nebulizer solution Inhale 3 mLs into the lungs every 4 hours as needed for Shortness of Breath 2/20/22     EDWIGE Sanon - CNP   carvedilol (COREG) 12.5 MG tablet Take 1 tablet by mouth 2 times daily 2/20/22 3/22/22   Shelbi Jacobson, DO   apixaban (ELIQUIS) 5 MG TABS tablet Take 1 tablet by mouth 2 times daily 2/20/22     Shelbi Indira, DO   acetaminophen (TYLENOL) 325 MG tablet Take 2 tablets by mouth every 6 hours as needed for Pain or Fever 2/20/22     Shelbi Jacobson, DO   budesonide-formoterol (SYMBICORT) 80-4.5 MCG/ACT AERO Inhale 2 puffs into the lungs 2 times daily 2/20/22     Shelbi Jacobson, DO   levothyroxine (SYNTHROID) 150 MCG tablet Take 1 tablet by mouth Daily 2/2/22     Rodo Edwards DO   fluticasone-salmeterol (ADVAIR) 100-50 MCG/DOSE diskus inhaler Inhale 1 puff into the lungs every 12 hours 2/1/22     Rodo Edwards DO   furosemide (LASIX) 20 MG tablet Take 1 tablet by mouth daily  Patient taking differently: Take 40 mg by mouth 2 times daily  2/1/22     Rodo Edwards DO   pregabalin (LYRICA) 75 MG capsule Take 75 mg by mouth 2 times daily. Historical Provider, MD   albuterol sulfate HFA (VENTOLIN HFA) 108 (90 Base) MCG/ACT inhaler Inhale 2 puffs into the lungs every 6 hours as needed for Wheezing       Historical Provider, MD   amLODIPine (NORVASC) 10 MG tablet Take 1 tablet by mouth daily  Patient taking differently: Take 10 mg by mouth at bedtime  10/12/21     Roderick Metcalf, DO   losartan (COZAAR) 50 MG tablet Take 1 tablet by mouth daily 10/12/21     Roderick Metcalf DO   glipiZIDE-metFORMIN (METAGLIP) 5-500 MG per tablet Take 1 tablet by mouth 2 times daily (before meals)        Historical Provider, MD   donepezil (ARICEPT) 10 MG tablet Take 10 mg by mouth daily  7/11/21     Historical Provider, MD   atorvastatin (LIPITOR) 10 MG tablet Take 10 mg by mouth daily       Historical Provider, MD   FLUoxetine (PROZAC) 20 MG capsule Take 20 mg by mouth daily       Historical Provider, MD   omeprazole (PRILOSEC) 20 MG delayed release capsule Take 20 mg by mouth 2 times daily       Historical Provider, MD            Allergies:      Patient has no known allergies. Social History:      Tobacco:    reports that he quit smoking about 16 years ago. His smoking use included cigarettes. He has a 90.00 pack-year smoking history. He has never used smokeless tobacco.  Alcohol:      reports current alcohol use of about 1.7 standard drinks of alcohol per week. Drug Use:  reports current drug use. Drug: Marijuana Joleen Postin).      Family History:      Family History         Family History   Problem Relation Age of Onset    Diabetes Mother      Alzheimer's Disease Father      Heart Disease Brother      Heart Disease Brother              Review of Systems:      Positive and Negative as described in HPI. CONSTITUTIONAL: Fatigue. Negative for fevers, chills, sweats, and weight loss. HEENT: Clear rhinorrhea. Nasal congestion. Negative for glasses, hearing changes, and throat pain. RESPIRATORY: Occasional dyspnea. Chest congestion. Wheezing. Dry cough. The pt uses a DuoNeb once every couple of days. He also takes Symbicort, Advair, and Ventolin. Pt wears 5 liters of oxygen at night per nasal cannula. CARDIOVASCULAR: Pt is taking Eliquis for a right leg DVT. Negative for chest pain, irregular heartbeat, and palpitations. GASTROINTESTINAL: Diarrhea. Nausea. Negative for reflux, vomiting, constipation, change in bowel habits, and abdominal pain. GENITOURINARY: Negative for difficulty of urination, burning with urination, and frequency. INTEGUMENT: Easy bruising. Negative for rash and skin lesions. HEMATOLOGIC/LYMPHATIC: Negative for swelling/edema. ALLERGIC/IMMUNOLOGIC: Negative for urticaria and itching. ENDOCRINE: Diabetes. Pt does not remember his last HbA1c. Negative for increase in thirst, increase in urination, and heat or cold intolerance. MUSCULOSKELETAL: Generalized joint pain. NEUROLOGICAL: Lightheadedness at this time. Negative for headaches, dizziness, numbness, and tingling extremities. BEHAVIOR/PSYCH: Negative for depression and anxiety. Physical Exam:   BP (!) 95/44   Pulse 76   Temp 96.9 °F (36.1 °C) (Temporal)   Resp 22   Ht 5' 10\" (1.778 m)   Wt 175 lb (79.4 kg)   SpO2 94%   BMI 25.11 kg/m²     No results for input(s): POCGLU in the last 72 hours. General Appearance:  Alert. Pale, lethargic, and ill appearing. The pt's cousin is at bedside. Mental status: Oriented to person, place, and time. Head: Normocephalic and atraumatic.   Eye: No icterus, redness, pupils equal and reactive, extraocular eye movements intact, and conjunctiva clear. Ear: Hearing grossly intact. Nose: No drainage noted. Neck: Supple. Lungs: Wheezes throughout bilateral lungs. Mildly labored breathing. Bilateral equal air entry. Cardiovascular: Distant heart tones. (Blood pressure was initially 92/52 and then 95/44). Normal rate, regular rhythm, no murmur, gallop, or rub. Abdomen: Soft, nontender, nondistended, and active bowel sounds. Neurologic: Normal speech. Skin: Multiple bruises scattered on bilateral upper extremities. No gross lesions, rashes, or bleeding on exposed skin area. Psych: Pleasant.        Investigations:       Laboratory Testing:  Recent Results         Recent Results (from the past 24 hour(s))   Urinalysis with Reflex to Culture     Collection Time: 06/13/22 10:46 AM     Specimen: Urine, clean catch   Result Value Ref Range     Color, UA Yellow Yellow     Turbidity UA SLIGHTLY CLOUDY (A) Clear     Glucose, Ur NEGATIVE NEGATIVE     Bilirubin Urine NEGATIVE NEGATIVE     Ketones, Urine NEGATIVE NEGATIVE     Specific Gravity, UA 1.020 1.005 - 1.030     Urine Hgb 3+ (A) NEGATIVE     pH, UA 6.0 5.0 - 8.0     Protein, UA 2+ (A) NEGATIVE     Urobilinogen, Urine Normal Normal     Nitrite, Urine NEGATIVE NEGATIVE     Leukocyte Esterase, Urine SMALL (A) NEGATIVE   Microscopic Urinalysis     Collection Time: 06/13/22 10:46 AM   Result Value Ref Range     -            WBC, UA 2 TO 5 0 - 5 /HPF     RBC, UA 20 TO 50 0 - 2 /HPF     Epithelial Cells UA 0 TO 2 0 - 5 /HPF     Yeast, UA FEW (A) None   CBC with Auto Differential     Collection Time: 06/13/22 10:56 AM   Result Value Ref Range     WBC 11.3 3.5 - 11.3 k/uL     RBC 5.15 4.21 - 5.77 m/uL     Hemoglobin 13.3 13.0 - 17.0 g/dL     Hematocrit 45.8 40.7 - 50.3 %     MCV 88.9 82.6 - 102.9 fL     MCH 25.8 25.2 - 33.5 pg     MCHC 29.0 28.0 - 38.0 g/dL     RDW 15.4 (H) 11.8 - 14.4 %     Platelets 453 581 - 896 k/uL     MPV 11.1 8.1 - 13.5 fL RBC Morphology ANISOCYTOSIS PRESENT       Seg Neutrophils 71 (H) 36 - 65 %     Lymphocytes 17 (L) 24 - 43 %     Monocytes 10 3 - 12 %     Eosinophils % 1 1 - 4 %     Basophils 0 0 - 2 %     Immature Granulocytes 1 (H) 0 %     Segs Absolute 8.00 1.50 - 8.10 k/uL     Absolute Lymph # 1.91 1.10 - 3.70 k/uL     Absolute Mono # 1.17 0.10 - 1.20 k/uL     Absolute Eos # 0.06 0.00 - 0.44 k/uL     Basophils Absolute <0.03 0.00 - 0.20 k/uL     Absolute Immature Granulocyte 0.11 0.00 - 0.30 k/uL   APTT     Collection Time: 06/13/22 10:56 AM   Result Value Ref Range     PTT 30.3 23.9 - 33.8 sec   Protime-INR     Collection Time: 06/13/22 10:56 AM   Result Value Ref Range     Protime 14.5 (H) 11.5 - 14.2 sec     INR 1.1     Basic Metabolic Panel     Collection Time: 06/13/22 10:56 AM   Result Value Ref Range     Glucose 78 70 - 99 mg/dL     BUN 45 (H) 8 - 23 mg/dL     CREATININE 1.51 (H) 0.70 - 1.20 mg/dL     Bun/Cre Ratio 30 (H) 9 - 20     Calcium 8.9 8.6 - 10.4 mg/dL     Sodium 145 (H) 135 - 144 mmol/L     Potassium 4.7 3.7 - 5.3 mmol/L     Chloride 104 98 - 107 mmol/L     CO2 32 (H) 20 - 31 mmol/L     Anion Gap 9 9 - 17 mmol/L     GFR Non- 46 (L) >60 mL/min     GFR  55 (L) >60 mL/min     GFR Comment                   Recent Labs     06/13/22  1056   HGB 13.3   HCT 45.8   WBC 11.3   MCV 88.9   *   K 4.7      CO2 32*   BUN 45*   CREATININE 1.51*   GLUCOSE 78   INR 1.1   PROTIME 14.5*   APTT 30.3         No results for input(s): COVID19 in the last 720 hours. *Please note that labs listed above are the most recent lab values available in EPIC at the time of the visit and additional labs may have been drawn or resulted since that time.      Imaging/Diagnostics:     Echocardiogram Limited 2D     Result Date: 6/2/2022  [facility] Transthoracic Echocardiography Report (TTE)  Patient Name Key Saleh    Date of Study               06/02/2022               [facility]   Date of 1950  Gender                      Male  Birth   Age          66 year(s)  Race                           Room Number              Height:                     70 inch, 177.8 cm   Corporate ID [id number]    Weight:                     180 pounds, 81.6 kg  #   Patient Acct [de-identified]   BSA:          2 m^2         BMI:      25.83  #                                                              kg/m^2   MR #         [id number]      Sonographer                 Lkaia Reed   Accession #  [id number]  Interpreting Physician      [address]   Fellow                   Referring Nurse                           Practitioner   Interpreting             Referring Physician         Rosy Jj  Fellow  Type of Study   TTE procedure:2D Echocardiogram, Contrast study. Procedure Date Date: 06/02/2022 Start: 09:53 AM Study Location: [address] [place] [address] Technical Quality: Fair visualization Indications:Congestive heart failure and Post-op Chemotherapy. Patient Status: Outpatient Contrast Medium: Definity. Amount - 2 ml Height: 70 inches Weight: 180 pounds BSA: 2 m^2 BMI: 25.83 kg/m^2 Rhythm: Within normal limits HR: 81 bpm CONCLUSIONS Summary Limited echo with contrast performed per order. Left ventricle is normal in size. Calculated EF via Herman's method 64% with global L. strain of -19.6%. Moderate left ventricular hypertrophy. No significant pericardial effusion is seen.  Signature ----------------------------------------------------------------------------  Electronically signed by Lakia Reed(Sonographer) on 06/02/2022 11:03  AM ---------------------------------------------------------------------------- ----------------------------------------------------------------------------  Electronically signed by Lourdes RezaInterpreting physician) on 06/02/2022  11:19 AM ---------------------------------------------------------------------------- FINDINGS Left Ventricle M-mode / 2D Measurements & Calculations:   LVIDd:4.35 cm(3.7 - 5.6 cm)            Diastolic OBZYUC:702.8 ml  LVIDs:2.8 cm(2.2 - 4.0 cm)             Systolic SOLPYT:05.7 ml  IATJ:9.69 cm(0.6 - 1.1 cm)  LVPWd:1.3 cm(0.6 - 1.1 cm)  Fractional Shortenin.63 %  Calculated LVEF (%): 67.98 %       NM MYOCARDIAL SPECT REST EXERCISE OR RX     Result Date: 2022  EXAMINATION: MYOCARDIAL PERFUSION IMAGING 2022 8:44 am TECHNIQUE: For the rest study, 9.8 mCi of Tc 99 labeled sestamibi were injected. SPECT images were acquired. Under cardiology supervision, 0.4mg Bob Quintana was infused. After pharmacologic stress, 32.7 mCi of Tc 99 labeled sestamibi were injected. SPECT images with ECG gating were acquired. COMPARISON: None Available. HISTORY: ORDERING SYSTEM PROVIDED HISTORY: Pre-operative cardiovascular examination TECHNOLOGIST PROVIDED HISTORY: Reason for Exam: Other Procedure Type->Rx Reason for Exam: Other, Pre-operative cardiovascular examination FINDINGS: Images interpreted utilizing [employer] system and [employer]. Perfusion defect at stress involve the inferior apical mid inferior and proximal inferior septal segments without significant reversibility, 5% of left ventricular myocardium involved at stress Perfusion scores are visually adjusted to account for artifact. Summed stress score:  4 Summed rest score:  3 Summed reversibility score:  1 Function: End diastolic volume:  942DQ Left ventricular ejection fraction:  51% Wall motion abnormalities:  Mild hypokinesis in the inferior wall TID score:  1.18 (scores greater than 1.39 are considered elevated for Lexiscan stress with Tc99m)      Perfusion:  Perfusion defects at stress involve 5% of left ventricular myocardium, with minimal reperfusion. Function:  Mild hypokinesis inferior wall. RCA distribution. Risk stratification: INTERMEDIATE Notes concerning risk stratification: Risk stratification incorporates both clinical history and some testing results.   Final risk determination is the responsibility of the ordering provider as other patient information and test results may increase or decrease the risk assessment reported for this examination. Risk stratification criteria are adapted from \"Noninvasive Risk Stratification\" criteria from [employer]. Al, ACC/AATS/AHA/ASE/ASNC/SCAI/SCCT/STS 2017 Appropriate Use Criteria For Coronary Revascularization in Patients With Stable Ischemic Heart Disease [facility] Volume 69, Issue 17, May 2017 High risk (>3% annual death or MI) 1. Severe resting LV dysfunction (LVEF <35%) not readily explained by non coronary causes 2. Resting perfusion abnormalities greater than 10% of the myocardium in patients without prior history or evidence of MI3. Stress-induced perfusion abnormalities encumbering greater than or equal to 10% myocardium or stress segmental scores indicating multiple vascular territories with abnormalities 4. Stress-induced LV dilatation (TID ratio greater than 1.19 for exercise and greater than 1.39 for regadenoson) Intermediate risk (1% to 3% annual death or MI) 1. Mild/moderate resting LV dysfunction (LVEF 35% to 49%) not readily explained by non coronary causes. 2. Resting perfusion abnormalities in 5%-9.9% of the myocardium in patients without a history or prior evidence of MI 3. Stress-induced perfusion abnormality encumbering 5%-9.9% of the myocardium or stress segmental scores indicating 1 vascular territory with abnormalities but without LV dilation 4. Small wall motion abnormality involving 1-2 segments and only 1 coronary bed. Low Risk (Less than 1% annual death or MI) 1. Normal or small myocardial perfusion defect at rest or with stress encumbering less than 5% of the myocardium. EK22. See Epic. Diagnosis:       1. CAROTID STENOSIS LEFT      Plans:      1.  LEFT CAROTID ENDARTERECTOMY         EDWIGE Kohli - CNP  2022  12:21 PM                 Cosigned by: Pawan Segundo MD at 2022  3:38 PM Revision History                                                  Routing History

## 2022-06-29 VITALS
RESPIRATION RATE: 14 BRPM | DIASTOLIC BLOOD PRESSURE: 50 MMHG | BODY MASS INDEX: 24.92 KG/M2 | TEMPERATURE: 98.3 F | SYSTOLIC BLOOD PRESSURE: 141 MMHG | HEIGHT: 71 IN | HEART RATE: 57 BPM | OXYGEN SATURATION: 94 % | WEIGHT: 178 LBS

## 2022-06-29 LAB
GLUCOSE BLD-MCNC: 111 MG/DL (ref 75–110)
GLUCOSE BLD-MCNC: 45 MG/DL (ref 75–110)
GLUCOSE BLD-MCNC: 87 MG/DL (ref 75–110)
HCT VFR BLD CALC: 31.8 % (ref 40.7–50.3)
HEMOGLOBIN: 9.4 G/DL (ref 13–17)
MAGNESIUM: 2 MG/DL (ref 1.6–2.6)
MCH RBC QN AUTO: 25.8 PG (ref 25.2–33.5)
MCHC RBC AUTO-ENTMCNC: 29.6 G/DL (ref 28.4–34.8)
MCV RBC AUTO: 87.4 FL (ref 82.6–102.9)
NRBC AUTOMATED: 0 PER 100 WBC
PDW BLD-RTO: 14.9 % (ref 11.8–14.4)
PHOSPHORUS: 3.3 MG/DL (ref 2.5–4.5)
PLATELET # BLD: 125 K/UL (ref 138–453)
PMV BLD AUTO: 12.2 FL (ref 8.1–13.5)
RBC # BLD: 3.64 M/UL (ref 4.21–5.77)
WBC # BLD: 8.9 K/UL (ref 3.5–11.3)

## 2022-06-29 PROCEDURE — 36415 COLL VENOUS BLD VENIPUNCTURE: CPT

## 2022-06-29 PROCEDURE — 84100 ASSAY OF PHOSPHORUS: CPT

## 2022-06-29 PROCEDURE — 82947 ASSAY GLUCOSE BLOOD QUANT: CPT

## 2022-06-29 PROCEDURE — 94640 AIRWAY INHALATION TREATMENT: CPT

## 2022-06-29 PROCEDURE — 6370000000 HC RX 637 (ALT 250 FOR IP): Performed by: FAMILY MEDICINE

## 2022-06-29 PROCEDURE — 99233 SBSQ HOSP IP/OBS HIGH 50: CPT | Performed by: FAMILY MEDICINE

## 2022-06-29 PROCEDURE — 85027 COMPLETE CBC AUTOMATED: CPT

## 2022-06-29 PROCEDURE — 6370000000 HC RX 637 (ALT 250 FOR IP): Performed by: SURGERY

## 2022-06-29 PROCEDURE — 2580000003 HC RX 258: Performed by: SURGERY

## 2022-06-29 PROCEDURE — 6360000002 HC RX W HCPCS: Performed by: SURGERY

## 2022-06-29 PROCEDURE — 83735 ASSAY OF MAGNESIUM: CPT

## 2022-06-29 RX ORDER — ATORVASTATIN CALCIUM 40 MG/1
40 TABLET, FILM COATED ORAL DAILY
Qty: 30 TABLET | Refills: 1 | Status: SHIPPED | OUTPATIENT
Start: 2022-06-29 | End: 2022-08-31 | Stop reason: DRUGHIGH

## 2022-06-29 RX ORDER — CARVEDILOL 6.25 MG/1
6.25 TABLET ORAL 2 TIMES DAILY
Qty: 60 TABLET | Refills: 3 | Status: SHIPPED | OUTPATIENT
Start: 2022-06-29 | End: 2022-07-14 | Stop reason: DRUGHIGH

## 2022-06-29 RX ADMIN — FLUTICASONE PROPIONATE AND SALMETEROL 1 PUFF: 100; 50 POWDER RESPIRATORY (INHALATION) at 08:12

## 2022-06-29 RX ADMIN — IPRATROPIUM BROMIDE AND ALBUTEROL SULFATE 3 ML: 2.5; .5 SOLUTION RESPIRATORY (INHALATION) at 08:12

## 2022-06-29 RX ADMIN — PREGABALIN 75 MG: 75 CAPSULE ORAL at 08:10

## 2022-06-29 RX ADMIN — Medication 16 G: at 08:09

## 2022-06-29 RX ADMIN — FUROSEMIDE 40 MG: 40 TABLET ORAL at 08:11

## 2022-06-29 RX ADMIN — CARVEDILOL 6.25 MG: 6.25 TABLET, FILM COATED ORAL at 08:11

## 2022-06-29 RX ADMIN — CEFAZOLIN SODIUM 2000 MG: 10 INJECTION, POWDER, FOR SOLUTION INTRAVENOUS at 02:39

## 2022-06-29 RX ADMIN — DONEPEZIL HYDROCHLORIDE 10 MG: 10 TABLET, FILM COATED ORAL at 08:10

## 2022-06-29 RX ADMIN — METFORMIN HYDROCHLORIDE 500 MG: 500 TABLET ORAL at 06:07

## 2022-06-29 RX ADMIN — LEVOTHYROXINE SODIUM 150 MCG: 75 TABLET ORAL at 06:07

## 2022-06-29 RX ADMIN — APIXABAN 5 MG: 5 TABLET, FILM COATED ORAL at 08:11

## 2022-06-29 RX ADMIN — PANTOPRAZOLE SODIUM 40 MG: 40 TABLET, DELAYED RELEASE ORAL at 06:07

## 2022-06-29 RX ADMIN — FLUOXETINE 20 MG: 20 CAPSULE ORAL at 08:11

## 2022-06-29 RX ADMIN — ASPIRIN 81 MG: 81 TABLET, COATED ORAL at 08:10

## 2022-06-29 RX ADMIN — LOSARTAN POTASSIUM 50 MG: 50 TABLET, FILM COATED ORAL at 08:11

## 2022-06-29 RX ADMIN — GLIPIZIDE 5 MG: 10 TABLET ORAL at 06:06

## 2022-06-29 RX ADMIN — SODIUM CHLORIDE, PRESERVATIVE FREE 10 ML: 5 INJECTION INTRAVENOUS at 08:11

## 2022-06-29 ASSESSMENT — ENCOUNTER SYMPTOMS
NAUSEA: 0
BACK PAIN: 0
DIARRHEA: 0
ABDOMINAL PAIN: 0
SHORTNESS OF BREATH: 0
SINUS PRESSURE: 0
RHINORRHEA: 0
CHOKING: 0
CHEST TIGHTNESS: 0
WHEEZING: 0
CONSTIPATION: 0
VOMITING: 0
COUGH: 0
VOICE CHANGE: 0

## 2022-06-29 NOTE — PROGRESS NOTES
VASCULAR SURGERY  PROGRESS NOTE  POST-OP CAROTID ENDARTERECTOMY    6/29/2022  12:12 PM     Heriberto Marsh    1950   [id number]        SUBJECTIVE:  Patient awake and alert. No complaints. Good pain control. Denies nausea. OBJECTIVE    Physical  VITALS:  BP (!) 141/50   Pulse 57   Temp 98.3 °F (36.8 °C) (Temporal)   Resp 14   Ht 5' 10.5\" (1.791 m)   Wt 178 lb (80.7 kg)   SpO2 94%   BMI 25.18 kg/m²     CONSTITUTIONAL:  awake, alert, cooperative, no apparent distress and appears stated age  NECK: Incision clean and dry with no unexpected swelling. NEUROLOGIC:  Mental status unchanged. Motor and sensory function intact. Tongue midline. Data  Hemoglobin   Date/Time Value Ref Range Status   06/29/2022 05:10 AM 9.4 (L) 13.0 - 17.0 g/dL Final     Hematocrit   Date/Time Value Ref Range Status   06/29/2022 05:10 AM 31.8 (L) 40.7 - 50.3 % Final     Sodium   Date/Time Value Ref Range Status   06/13/2022 10:56  (H) 135 - 144 mmol/L Final     Potassium   Date/Time Value Ref Range Status   06/13/2022 10:56 AM 4.7 3.7 - 5.3 mmol/L Final     Chloride   Date/Time Value Ref Range Status   06/13/2022 10:56  98 - 107 mmol/L Final     CO2   Date/Time Value Ref Range Status   06/13/2022 10:56 AM 32 (H) 20 - 31 mmol/L Final     BUN   Date/Time Value Ref Range Status   06/13/2022 10:56 AM 45 (H) 8 - 23 mg/dL Final       ASSESSMENT AND PLAN    66 y.o. male doing well status post Left Carotid Endarterectomy       Plan home today. Follow-up in Office. Continue aspirin. Call for problems.     Electronically signed by Abdoulaye Hunt MD on 6/29/2022 at 12:12 PM

## 2022-06-29 NOTE — RT PROTOCOL NOTE
RT Inhaler-Nebulizer Bronchodilator Protocol Note    There is a bronchodilator order in the chart from a provider indicating to follow the RT Bronchodilator Protocol and there is an Initiate RT Inhaler-Nebulizer Bronchodilator Protocol order as well (see protocol at bottom of note). CXR Findings:  No results found. The findings from the last RT Protocol Assessment were as follows:   History Pulmonary Disease: Chronic pulmonary disease  Respiratory Pattern: Dyspnea on exertion or RR 21-25 bpm  Breath Sounds: Inspiratory and expiratory or bilateral wheezing and/or rhonchi  Cough: Strong, spontaneous, non-productive  Indication for Bronchodilator Therapy: On home bronchodilators  Bronchodilator Assessment Score: 10    Aerosolized bronchodilator medication orders have been revised according to the RT Inhaler-Nebulizer Bronchodilator Protocol below. Respiratory Therapist to perform RT Therapy Protocol Assessment initially then follow the protocol. Repeat RT Therapy Protocol Assessment PRN for score 0-3 or on second treatment, BID, and PRN for scores above 3. No Indications - adjust the frequency to every 6 hours PRN wheezing or bronchospasm, if no treatments needed after 48 hours then discontinue using Per Protocol order mode. If indication present, adjust the RT bronchodilator orders based on the Bronchodilator Assessment Score as indicated below. Use Inhaler orders unless patient has one or more of the following: on home nebulizer, not able to hold breath for 10 seconds, is not alert and oriented, cannot activate and use MDI correctly, or respiratory rate 25 breaths per minute or more, then use the equivalent nebulizer order(s) with same Frequency and PRN reasons based on the score. If a patient is on this medication at home then do not decrease Frequency below that used at home.     0-3 - enter or revise RT bronchodilator order(s) to equivalent RT Bronchodilator order with Frequency of every 4 hours PRN for wheezing or increased work of breathing using Per Protocol order mode. 4-6 - enter or revise RT Bronchodilator order(s) to two equivalent RT bronchodilator orders with one order with BID Frequency and one order with Frequency of every 4 hours PRN wheezing or increased work of breathing using Per Protocol order mode. 7-10 - enter or revise RT Bronchodilator order(s) to two equivalent RT bronchodilator orders with one order with TID Frequency and one order with Frequency of every 4 hours PRN wheezing or increased work of breathing using Per Protocol order mode. 11-13 - enter or revise RT Bronchodilator order(s) to one equivalent RT bronchodilator order with QID Frequency and an Albuterol order with Frequency of every 4 hours PRN wheezing or increased work of breathing using Per Protocol order mode. Greater than 13 - enter or revise RT Bronchodilator order(s) to one equivalent RT bronchodilator order with every 4 hours Frequency and an Albuterol order with Frequency of every 2 hours PRN wheezing or increased work of breathing using Per Protocol order mode. RT to enter RT Home Evaluation for COPD & MDI Assessment order using Per Protocol order mode.     Electronically signed by Buck Subramanian RCP on 6/29/2022 at 10:49 AM

## 2022-06-29 NOTE — PROGRESS NOTES
06/29/22 1140   Resting (Room Air)   SpO2 85   HR 67   Resting (On O2)   SpO2 93   HR 67   O2 Device Nasal cannula   O2 Flow Rate (l/min) 3 l/min   During Walk (Room Air)   SpO2   (not attempted due to being on higher flow of oxygen)   Walk/Assistance Device Walker   Rate of Dyspnea 1   During Walk (On O2)   SpO2 93   HR 65   O2 Device Nasal cannula   O2 Flow Rate (l/min) 4 l/min   Need Additional O2 Flow Rate Rows Yes  (3L at rest; 4L with ambulation)   After Walk   Does the Patient Qualify for Home O2 Yes   Liter Flow at Rest 3   Liter Flow on Exertion 4   Does the Patient Need Portable Oxygen Tanks Yes

## 2022-06-29 NOTE — OP NOTE
[id number] [facility] [address]                [address] [place] [address]                                OPERATIVE REPORT    PATIENT NAME: Leny Castro                    :        1950  MED REC NO:   [id number]                             ROOM:         ACCOUNT NO:   [de-identified]                           ADMIT DATE: 2022  PROVIDER:     Elzbieta Ng MD    DATE OF PROCEDURE:  2022    PREOPERATIVE DIAGNOSIS:  Severe 80% to 90% left internal carotid artery  stenosis with tortuosity. POSTOPERATIVE DIAGNOSIS:  Severe 80% to 90% left internal carotid artery  stenosis with tortuosity. PROCEDURES:  1. Left type 1 eversion carotid endarterectomy. 2.  Reimplantation of left internal carotid artery. SURGEON:  Elzbieta Ng MD    ANESTHESIA:  MAC.    ESTIMATED BLOOD LOSS:  20 mL. COMPLICATIONS:  None. OPERATIVE INDICATIONS:  The patient is a 77-year-old male who presents  with significant carotid stenosis bilaterally. CTA demonstrates at  least a 80% to 90% stenosis in the proximal left internal carotid artery  with areas of tortuosity noted. At this time, he is being taken to the  operating room for elective left carotid endarterectomy. OPERATIVE TECHNIQUE: After informed consent had been obtained, the  patient was brought to the operating room, placed in the supine position  and general endotracheal anesthesia was induced. Left neck was then  prepped and draped in usual sterile fashion. Oblique incision was made  in the left neck and subcutaneous tissue was sharply taken down. Bleeding points being controlled with electrocautery. The platysma was  divided and the anterior border of the sternocleidomastoid was dissected  free. Internal jugular vein was readily identified and common facial  vein was suture ligated with 3-0 Vicryl and divided. Carotid  bifurcation was isolated.   Common internal and external carotid arteries  were circumferentially dissected and vessels placed around them. Superior thyroid artery was secured with a zero silk Barksdale tie. At this  point, the patient was intravenously heparinized with 8000 units of  aqueous heparin. External carotid was clamped with a Pooja followed  by the internal carotid artery. Common carotid was clamped with a  Islas clamp. The internal carotid was then sharply amputated from the  carotid bulb and the medial aspect of the internal was opened  longitudinally along the lateral aspect of the common carotid. There is  a focal 85% to 90% stenosis present within the proximal left internal  carotid. The plaque was everted from the internal carotid with an  excellent distal taper point. Vessels were irrigated with heparinized  saline and a 3 x 5 mm shunt was placed and flow reestablished to the  brain. Next, the remainder of the carotid bifurcation was  endarterectomized in a standard fashion with excellent distal taper  points at the external and common carotid arteries. Vessels were  irrigated with heparinized saline and small debris fragments were  removed. The edges of the internal were then spatulated along the  carotid bulb. The internal carotid was then brought down and  reimplanted to the carotid bulb using running 6-0 Prolene suture. Prior  to completing the closure, the shunt was clamped and removed. Carotids  irrigated with heparinized saline and closure completed. Flow was  reestablished first in the external carotid followed by the internal  carotid artery. Thrombin and Gelfoam was placed at the closure site. Doppler was used to interrogate both the internal and external carotid  arteries and excellent phasic flow was noted. 15 mg of protamine was  given intravenously. Wound was then irrigated with antibiotic solution  and Gelfoam removed. Suture line was inspected and found to be  hemostatic.   Further irrigation was carried out and the platysma was  reapproximated using running 3-0 Vicryl suture followed by closure of  skin with interrupted 4-0 Monocryl subcuticular suture. Dermabond was  applied followed by a sterile Kerlix dressing. The patient tolerated  the procedure well and transferred to the recovery room in satisfactory  condition. Sponge, instrument, and needle counts were correct at the  conclusion of operation.       Leia Schroeder MD    D: 06/28/2022 13:37:33       T: 06/28/2022 13:40:23     [email or web address]  Job#: [id number]     Doc#: [id number]    CC:  MD Cristino Gilbert

## 2022-06-29 NOTE — PROGRESS NOTES
CLINICAL PHARMACY NOTE: MEDS TO BEDS    Total # of Prescriptions Filled: 1   The following medications were delivered to the patient:  · Atorvastatin 40mg    Additional Documentation:  Carvedilol 6.25 too soon until 8/2/2022

## 2022-06-29 NOTE — CARE COORDINATION
Case Management Initial Discharge Plan  Porfirio Jcaome,         Zeke Risk              Risk of Unplanned Readmission:  32             Met with:patient, spouse/SO or family member patient and daughter to discuss discharge plans. Information verified: address, contacts, phone number, , insurance Yes  PCP: Ilana Mccullough DO  Date of last visit: 2 weeks ago    Insurance Provider: Jayesh Silveira    Discharge Planning  Current Residence: Home  Living Arrangements:      Home has 1 stories/ramp stairs to climb  Support Systems:     Current Services PTA:  none Agency:   Patient able to perform ADL's:Independent  DME in home:  Rolator and home O2 through [facility] on this admission  DME used to aid ambulation prior to admission:   rolator  DME used during admission:  rolator    Potential Assistance Needed:       Pharm[facility]: [facility] in [place] Medications:     Does patient want to participate in local refill/ meds to beds program?  Yes    Patient agreeable to home care: No  [place] of choice provided:  n/a      Type of Home Care Services:     Patient expects to be discharged to:       Prior SNF/Rehab Placement and Facility: n/a  Agreeable to SNF/Rehab: No  [place] of choice provided: n/a   Evaluation: no    Expected Discharge date: Follow Up Appointment: Best Day/ Time:      Transportation provider: spouse  Transportation arrangements needed for discharge: No    Discharge Plan: Thromboendartectomy  Patient lives with spouse in a 1 story home with a ramp to enter. Declines any skilled needs. Independent   Uses a rollator  Home O2 ordered through [facility]. Plan is home with spouse independently.         Electronically signed by Mansi Dan RN on 22 at 1:34 PM EDT

## 2022-06-29 NOTE — PROGRESS NOTES
[facility]  Office: [address], DO, Ling Mckeon, DO, Lavon Butt, DO, Trish Soto, DO, Severiano Duffy MD, Rosie Rios MD, Annmarie Lowery MD, Perez Gann MD, Hannah Goldstein MD, Td Frost MD, Louis Hassan MD, Anupama Root, DO, Ignacio Scott MD,  Esperanza Jane DO, Zay Oro MD, Edwige Beavers MD, Lydia Sales DO, Camille Valero MD, Yoko Todd MD, Sita Torres DO, Mack Torres MD, Suzi Sanchez MD, Ling Gabriel [facility], [facility], CNP, Bunny Pearl, CNP, Aleksander Waller, CNP, Cecil Gil, CNP, Remy Ibarra, CNP, [facility], Rajeev Reyes, DNP, Dmitri Kinney, CNP, Stewart Pedroza, CNP, Herb Rosas, CNP, Sol Pink, CNS, Saray Dumas, [facility], Shelby Terrell, CNP, Lele Castillo, CNP, [facility], [facility]      Daily Progress Note     Admit Date: 6/28/2022  Bed/Room No.  2032/2032-01  Admitting Physician : Anaya Childs MD  Code Status :[address] Day:  LOS: 1 day   Chief Complaint:     No chief complaint on file. Principal Problem:    Carotid stenosis, asymptomatic, bilateral  Active Problems:    Simple chronic bronchitis (HCC)    Dependence on nocturnal oxygen therapy    NHL (non-Hodgkin's lymphoma) (HCC)    Hypoxia  Resolved Problems:    * No resolved hospital problems. *    Subjective : Interval History/Significant events :  06/29/22    Patient is on oxygen support 2 L and unable to wean. He denies any cough, expectoration, wheezing, chest pain. Patient is eating and drinking well. No focal weakness. Vitals - Stable afebrile  Labs -blood sugar 111, WBC 8.9. Nursing notes , Consults notes reviewed. Overnight events and updates discussed with Nursing staff .    Background History:         Melita Avina is 66 y.o. male  Who was admitted to the hospital on 6/28/2022 for treatment of Carotid stenosis, asymptomatic, bilateral.   admitted for surgical management of left carotid stenosis. Patient underwent left carotid endarterectomy. He has underlying history of non-Hodgkin's lymphoma with recurrence and is currently under chemotherapy. Other comorbidities include arthritis, COPD with chronic respiratory failure on nocturnal home oxygen, type 2 diabetes mellitus, CHF, hypertension, hypothyroidism. Patient is complaining of difficulty breathing and feels congested. He requests breathing treatment. He denies any chest pain, palpitations, nausea, vomiting. He does not have any focal weakness postsurgery. Patient was requiring oxygen during hospitalization and was having hypoxia postsurgery.     PMH:  Past Medical History:   Diagnosis Date    Arthritis     Cancer [facility])     chemotherapy lymphoma last was april 2022    Chemotherapy management, encounter for     [facility]     [facility]     Diabetes mellitus ([facility]     H/O cardiovascular stress test     History of non-Hodgkin's lymphoma     Hx of blood clots     DVT in right leg     Hyperlipidemia     Hypertension     Hypothyroidism     On home O2     at night       Allergies: No Known Allergies   Medications :  atorvastatin, 10 mg, Oral, Nightly  FLUoxetine, 20 mg, Oral, Daily  pantoprazole, 40 mg, Oral, QAM AC  donepezil, 10 mg, Oral, Daily  amLODIPine, 10 mg, Oral, Nightly  losartan, 50 mg, Oral, Daily  pregabalin, 75 mg, Oral, BID  levothyroxine, 150 mcg, Oral, Daily  furosemide, 40 mg, Oral, BID  carvedilol, 6.25 mg, Oral, BID  apixaban, 5 mg, Oral, BID  sodium chloride flush, 5-40 mL, IntraVENous, 2 times per day  aspirin, 81 mg, Oral, Daily  [Held by provider] glipiZIDE, 5 mg, Oral, BID AC   And  [Held by provider] metFORMIN, 500 mg, Oral, BID AC  fluticasone-salmeterol, 1 puff, Inhalation, BID  ipratropium-albuterol, 1 vial, Inhalation, TID        Review of Systems   Review of Systems   Constitutional: Negative for activity change, appetite change, fatigue, fever and unexpected weight change. HENT: Negative for congestion, nosebleeds, rhinorrhea, sinus pressure, sneezing and voice change. Respiratory: Negative for cough, choking, chest tightness, shortness of breath and wheezing. Cardiovascular: Negative for chest pain, palpitations and leg swelling. Gastrointestinal: Negative for abdominal pain, constipation, diarrhea, nausea and vomiting. Genitourinary: Negative for difficulty urinating, dysuria, frequency, penile discharge and testicular pain. Musculoskeletal: Negative for back pain. Skin: Negative for rash. Neurological: Negative for dizziness, weakness, light-headedness and headaches. Hematological: Does not bruise/bleed easily. Psychiatric/Behavioral: Negative for agitation, behavioral problems, confusion, self-injury, sleep disturbance and suicidal ideas.      Objective :      Current Vitals : Temp: 97.9 °F (36.6 °C),  Heart Rate: 77, Resp: 23, BP: (!) 141/50, SpO2: 90 %  Last 24 Hrs Vitals   Patient Vitals for the past 24 hrs:   BP Temp Temp src Pulse Resp SpO2 Weight   06/29/22 0915 -- -- -- 77 23 90 % --   06/29/22 0816 -- -- -- 65 16 94 % --   06/29/22 0812 -- -- -- 76 12 91 % --   06/29/22 0800 (!) 141/50 97.9 °F (36.6 °C) Temporal 61 16 93 % --   06/29/22 0600 -- -- -- 62 12 -- --   06/29/22 0500 -- -- -- 62 12 -- --   06/29/22 0400 (!) 126/43 97.3 °F (36.3 °C) Temporal 65 15 94 % 178 lb (80.7 kg)   06/29/22 0300 (!) 133/49 -- -- 73 15 94 % --   06/29/22 0200 -- -- -- 72 17 92 % --   06/29/22 0100 -- -- -- 76 14 94 % --   06/29/22 0000 130/67 97.7 °F (36.5 °C) Temporal 75 16 94 % --   06/28/22 2300 -- -- -- 85 21 93 % --   06/28/22 2200 (!) 132/51 -- -- 79 15 95 % --   06/28/22 2100 (!) 133/51 -- -- 72 15 93 % --   06/28/22 2000 (!) 148/49 97.8 °F (36.6 °C) Temporal 72 15 96 % --   06/28/22 1933 (!) 146/65 -- -- 70 -- -- --   06/28/22 1900 (!) 135/94 -- -- 79 20 95 % --   06/28/22 1800 123/74 -- -- 69 19 94 % -- tenderness. Musculoskeletal:      Cervical back: Full passive range of motion without pain and neck supple. Lymphadenopathy:      Head:      Right side of head: No submandibular adenopathy. Left side of head: No submandibular adenopathy. Cervical: No cervical adenopathy. Skin:     General: Skin is warm. Neurological:      Mental Status: He is alert and oriented to person, place, and time. Motor: No tremor. Psychiatric:         Behavior: Behavior is cooperative. Laboratory findings:    Recent Labs     06/29/22  0510   WBC 8.9   HGB 9.4*   HCT 31.8*   *     Recent Labs     06/29/22  0510   MG 2.0   PHOS 3.3          Specific Gravity, UA   Date Value Ref Range Status   06/13/2022 1.020 1.005 - 1.030 Final     Protein, UA   Date Value Ref Range Status   06/13/2022 2+ (A) NEGATIVE Final     RBC, UA   Date Value Ref Range Status   06/13/2022 20 TO 50 0 - 2 /HPF Final     Bacteria, UA   Date Value Ref Range Status   01/30/2022 NOT REPORTED None Final     Nitrite, Urine   Date Value Ref Range Status   06/13/2022 NEGATIVE NEGATIVE Final     WBC, UA   Date Value Ref Range Status   06/13/2022 2 TO 5 0 - 5 /HPF Final     Leukocyte Esterase, Urine   Date Value Ref Range Status   06/13/2022 SMALL (A) NEGATIVE Final       Imaging / Clinical Data :-   No results found. Clinical Course : stable  Assessment and Plan  :        Left carotid stenosis s/p left carotid endarterectomy -doing well. Continue aspirin, Lipitor 40 mg nightly. Chronic bronchitis - DuoNeb every 4 hours as needed. Continue Symbicort. Former smoker -   Chronic respiratory failure--patient needs continuous O2 supplement at this time and is willing to continue using oxygen at home. Non-Hodgkin's lymphoma with recurrence on chemotherapy. Type 2 diabetes mellitus -controlled last A1C 5.7. Dementia - on Aricept      Patient was evaluated today for the diagnosis of COPD.   I entered a DME order for home oxygen because the diagnosis and testing requires the patient to have supplemental oxygen. Condition will improve or be benefited by oxygen use. The patient is  able to perform good mobility in a home setting and therefore does require the use of a portable oxygen system. The need for this equipment was discussed with the patient and he understands and is in agreement. Discharge per vascular surgery. Continue to monitor vitals , Intake / output ,  Cell count , HGB , Kidney function, oxygenation  as indicated . Plan and updates discussed with patient ,  answers  explained to satisfaction.    Plan discussed with Staff Waylon Polanco RN     (Please note that portions of this note were completed with a voice recognition program. Efforts were made to edit the dictations but occasionally words are mis-transcribed.)      Re Galvan MD  6/29/2022

## 2022-06-29 NOTE — DISCHARGE SUMMARY
VASCULAR SURGERY   DISCHARGE SUMMARY      Patient Identification  Melita Avina is a 66 y.o. male. :  1950  Admit Date:  2022    Discharge date:   No discharge date for patient encounter. 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)    Hydronephrosis due to obstruction of ureter    Moderate malnutrition (HCC)    Chronic cholecystitis    Pleural effusion    Cardiomegaly    Hypothyroidism    Diabetes mellitus ([facility]    Hypertensive urgency    Acute respiratory failure with hypoxia (HCC)    Hypoxia    Carotid stenosis, asymptomatic, bilateral    Simple chronic bronchitis (HCC)    Dependence on nocturnal oxygen therapy         Consults: IM    Surgery: Left type I eversion carotid endarterectomy  Reimplantation of left internal carotid artery    Patient Instructions: Activity: no heavy lifting, pushing, pulling for 6 weeks, no driving for 2 weeks or while on analgesics  Diet: As tolerated  Follow-up with Anaya Childs MD in 4 weeks. See pre-printed instructions in chart and given to patient upon discharge. Discharge Medications:      Medication List      CHANGE how you take these medications     amLODIPine 10 MG tablet; Commonly known as: [address]; Take 1 tablet by   mouth daily; What changed: when to take this   atorvastatin 40 MG tablet; Commonly known as: LIPITOR; Take 1 tablet by   mouth daily; What changed: medication strength, how much to take   carvedilol 6.25 MG tablet; Commonly known as: COREG; Take 1 tablet by   mouth 2 times daily; What changed: medication strength, how much to take   furosemide 20 MG tablet; Commonly known as: Lasix; Take 1 tablet by   mouth daily;  What changed: how much to take, when to take this     CONTINUE taking these medications     apixaban 5 MG Tabs tablet; Commonly known as: ELIQUIS; Take 1 tablet by   mouth 2 times daily   budesonide-formoterol 80-4.5 MCG/ACT Aero; Commonly known as: SYMBICORT; Inhale 2 puffs into the lungs 2 times daily   donepezil 10 MG tablet; Commonly known as: ARICEPT   FLUoxetine 20 MG capsule; Commonly known as: PROZAC   fluticasone-salmeterol 100-50 MCG/DOSE diskus inhaler; Commonly known   as: ADVAIR; Inhale 1 puff into the lungs every 12 hours   glipiZIDE-metFORMIN 5-500 MG per tablet; Commonly known as: METAGLIP   ipratropium-albuterol 0.5-2.5 (3) MG/3ML Soln nebulizer solution;   Commonly known as: DUONEB; Inhale 3 mLs into the lungs every 4 hours as   needed for Shortness of Breath   levothyroxine 150 MCG tablet; Commonly known as: SYNTHROID; Take 1   tablet by mouth Daily   losartan 50 MG tablet; Commonly known as: Cozaar; Take 1 tablet by mouth   daily   omeprazole 20 MG delayed release capsule; Commonly known as: PRILOSEC   OXYGEN   oxymetazoline 0.05 % nasal spray; Commonly known as: AFRIN   pregabalin 75 MG capsule; Commonly known as: LYRICA   Ventolin  (90 Base) MCG/ACT inhaler; Generic drug: albuterol   sulfate HFA          HPI and Hospital Course: 66-year-old male underwent a left type I eversion carotid endarterectomy without incident. Normal postoperative course. O2 saturation was low and patient qualified for home oxygen which was arranged. Discharged home on postoperative day #1 in good condition. Instructed to follow-up with the office in 4 weeks.         Nolan Trujillo MD MD FACS

## 2022-06-29 NOTE — FLOWSHEET NOTE
06/29/22 1353   Discharge Event   Lay Caregiver notified of pending discharge/transfer and educated on post discharge care Yes   Discharged with Documented Belongings Yes   Departure Mode With spouse   Mobility at [employer]   Discharged to Private Residence   Time of Discharge      Discharge Note:      All discharge instructions given at this time as well as all patient belongings returned to patient. Pt denies any further questions regarding discharge at this time. Pt given also given discharge packet with unit letter, discharge instructions/restrictions and medication handouts regarding all discharge medications and side effects. Pt denies any further issues at this time. Pt wheeled out to front discharge doors at this time. Pt left premises without any issues in private vehicle at this time.

## 2022-06-29 NOTE — RT PROTOCOL NOTE
RT Inhaler-Nebulizer Bronchodilator Protocol Note    There is a bronchodilator order in the chart from a provider indicating to follow the RT Bronchodilator Protocol and there is an Initiate RT Inhaler-Nebulizer Bronchodilator Protocol order as well (see protocol at bottom of note). CXR Findings:  No results found. The findings from the last RT Protocol Assessment were as follows:   History Pulmonary Disease: Chronic pulmonary disease  Respiratory Pattern: Dyspnea on exertion or RR 21-25 bpm  Breath Sounds: Intermittent or unilateral wheezes (Slight congestion t/o with an occasional faint exp wheeze)  Cough: Strong, spontaneous, non-productive  Indication for Bronchodilator Therapy: On home bronchodilators  Bronchodilator Assessment Score: 8    Aerosolized bronchodilator medication orders have been revised according to the RT Inhaler-Nebulizer Bronchodilator Protocol below. Respiratory Therapist to perform RT Therapy Protocol Assessment initially then follow the protocol. Repeat RT Therapy Protocol Assessment PRN for score 0-3 or on second treatment, BID, and PRN for scores above 3. No Indications - adjust the frequency to every 6 hours PRN wheezing or bronchospasm, if no treatments needed after 48 hours then discontinue using Per Protocol order mode. If indication present, adjust the RT bronchodilator orders based on the Bronchodilator Assessment Score as indicated below. Use Inhaler orders unless patient has one or more of the following: on home nebulizer, not able to hold breath for 10 seconds, is not alert and oriented, cannot activate and use MDI correctly, or respiratory rate 25 breaths per minute or more, then use the equivalent nebulizer order(s) with same Frequency and PRN reasons based on the score. If a patient is on this medication at home then do not decrease Frequency below that used at home.     0-3 - enter or revise RT bronchodilator order(s) to equivalent RT Bronchodilator order with Frequency of every 4 hours PRN for wheezing or increased work of breathing using Per Protocol order mode. 4-6 - enter or revise RT Bronchodilator order(s) to two equivalent RT bronchodilator orders with one order with BID Frequency and one order with Frequency of every 4 hours PRN wheezing or increased work of breathing using Per Protocol order mode. 7-10 - enter or revise RT Bronchodilator order(s) to two equivalent RT bronchodilator orders with one order with TID Frequency and one order with Frequency of every 4 hours PRN wheezing or increased work of breathing using Per Protocol order mode. 11-13 - enter or revise RT Bronchodilator order(s) to one equivalent RT bronchodilator order with QID Frequency and an Albuterol order with Frequency of every 4 hours PRN wheezing or increased work of breathing using Per Protocol order mode. Greater than 13 - enter or revise RT Bronchodilator order(s) to one equivalent RT bronchodilator order with every 4 hours Frequency and an Albuterol order with Frequency of every 2 hours PRN wheezing or increased work of breathing using Per Protocol order mode. RT to enter RT Home Evaluation for COPD & MDI Assessment order using Per Protocol order mode.     Electronically signed by Callie Bender RCP on 6/28/2022 at 8:09 PM

## 2022-06-30 LAB — SURGICAL PATHOLOGY REPORT: NORMAL

## 2022-07-06 ENCOUNTER — HOSPITAL ENCOUNTER (OUTPATIENT)
Dept: PREADMISSION TESTING | Age: 72
Discharge: HOME | End: 2022-07-10

## 2022-07-06 VITALS — WEIGHT: 178 LBS | BODY MASS INDEX: 24.92 KG/M2 | HEIGHT: 71 IN

## 2022-07-06 NOTE — PROGRESS NOTES
Pre-op Instructions For Out-Patient Endoscopy Surgery    Medication Instructions:  · Please stop herbs and any supplements now (includes vitamins and minerals). · Please contact your surgeon and prescribing physician for pre-op instructions for any blood thinners. · If you have inhalers/aerosol treatments at home, please use them the morning of your surgery and bring the inhalers with you to the hospital.    · Please take the following medications the morning of your surgery with a sip of water:  Inhaler, Losartan, Coreg, Synthroid, omeprazole. Surgery Instructions:  1. After midnight before surgery:  Do not eat or drink anything, including water, mints, gum, and hard candy. You may brush your teeth without swallowing. No smoking, chewing tobacco, or street drugs. 2. Please shower or bathe before surgery. 3. Please do not wear any cologne, lotion, powder, jewelry, piercings, perfume, makeup, nail polish, hair accessories, or hair spray on the day of surgery. Wear loose comfortable clothing. 4. Leave your valuables at home. Bring a storage case for any glasses/contacts. 5. An adult who is responsible for you MUST drive you home and should be with you for the first 24 hours after surgery. The Day of Surgery:  · Arrive at [address] [place] [address] Surgery Entrance at the time directed by your surgeon and check in at the desk. · If you have a living will or healthcare power of , please bring a copy. · You will be taken to the pre-op holding area where you will be prepared for surgery. A physical assessment will be performed by a nurse practitioner or house officer. Your IV will be started and you will meet your anesthesiologist.    · When you go to surgery, your family will be directed to the surgical waiting room, where the doctor should speak with them after your surgery.     · After surgery, you will be taken to the recovery room then when you are awake and stable you will go to the short stay unit for preparation to be discharged. Instructions read to Kristofer's cousin Sheryle Hun) and understanding verbalized.

## 2022-07-06 NOTE — PROGRESS NOTES
Dr. Ephraim Gee, anesthesia, was contacted and informed of patient's history and planned surgery. No orders received and no clearance required.

## 2022-07-14 ENCOUNTER — APPOINTMENT (OUTPATIENT)
Dept: GENERAL RADIOLOGY | Age: 72
DRG: 987 | End: 2022-07-14
Payer: MEDICARE

## 2022-07-14 ENCOUNTER — APPOINTMENT (OUTPATIENT)
Dept: CT IMAGING | Age: 72
DRG: 987 | End: 2022-07-14
Payer: MEDICARE

## 2022-07-14 ENCOUNTER — HOSPITAL ENCOUNTER (INPATIENT)
Age: 72
LOS: 7 days | Discharge: HOME | DRG: 987 | End: 2022-07-21
Attending: EMERGENCY MEDICINE | Admitting: FAMILY MEDICINE
Payer: MEDICARE

## 2022-07-14 DIAGNOSIS — N13.30 HYDRONEPHROSIS, UNSPECIFIED HYDRONEPHROSIS TYPE: ICD-10-CM

## 2022-07-14 DIAGNOSIS — R11.2 NAUSEA AND VOMITING, INTRACTABILITY OF VOMITING NOT SPECIFIED, UNSPECIFIED VOMITING TYPE: Primary | ICD-10-CM

## 2022-07-14 DIAGNOSIS — C85.90 NON-HODGKIN'S LYMPHOMA, UNSPECIFIED BODY REGION, UNSPECIFIED NON-HODGKIN LYMPHOMA TYPE (HCC): ICD-10-CM

## 2022-07-14 DIAGNOSIS — J18.9 PNEUMONIA OF LEFT LOWER LOBE DUE TO INFECTIOUS ORGANISM: ICD-10-CM

## 2022-07-14 LAB
ABSOLUTE EOS #: 0 K/UL (ref 0–0.4)
ABSOLUTE LYMPH #: 1.39 K/UL (ref 1–4.8)
ABSOLUTE MONO #: 0.78 K/UL (ref 0.1–1.3)
ALBUMIN SERPL-MCNC: 3.7 G/DL (ref 3.5–5.2)
ALP BLD-CCNC: 131 U/L (ref 40–129)
ALT SERPL-CCNC: 6 U/L (ref 5–41)
ANION GAP SERPL CALCULATED.3IONS-SCNC: 14 MMOL/L (ref 9–17)
AST SERPL-CCNC: 15 U/L
BACTERIA: ABNORMAL
BASOPHILS # BLD: 0 % (ref 0–2)
BASOPHILS ABSOLUTE: 0 K/UL (ref 0–0.2)
BILIRUB SERPL-MCNC: 0.8 MG/DL (ref 0.3–1.2)
BILIRUBIN URINE: NEGATIVE
BUN BLDV-MCNC: 27 MG/DL (ref 8–23)
CALCIUM SERPL-MCNC: 8.7 MG/DL (ref 8.6–10.4)
CASTS UA: ABNORMAL /LPF
CASTS UA: ABNORMAL /LPF
CHLORIDE BLD-SCNC: 102 MMOL/L (ref 98–107)
CO2: 24 MMOL/L (ref 20–31)
COLOR: YELLOW
CREAT SERPL-MCNC: 1.56 MG/DL (ref 0.7–1.2)
EOSINOPHILS RELATIVE PERCENT: 0 % (ref 0–4)
EPITHELIAL CELLS UA: ABNORMAL /HPF
GFR AFRICAN AMERICAN: 53 ML/MIN
GFR NON-AFRICAN AMERICAN: 44 ML/MIN
GFR SERPL CREATININE-BSD FRML MDRD: ABNORMAL ML/MIN/{1.73_M2}
GLUCOSE BLD-MCNC: 119 MG/DL (ref 70–99)
GLUCOSE BLD-MCNC: 239 MG/DL (ref 75–110)
GLUCOSE URINE: ABNORMAL
HCT VFR BLD CALC: 39.5 % (ref 41–53)
HEMOGLOBIN: 12.8 G/DL (ref 13.5–17.5)
KETONES, URINE: ABNORMAL
LACTIC ACID: 2 MMOL/L (ref 0.5–2.2)
LEUKOCYTE ESTERASE, URINE: ABNORMAL
LIPASE: 69 U/L (ref 13–60)
LYMPHOCYTES # BLD: 16 % (ref 24–44)
MCH RBC QN AUTO: 25.5 PG (ref 26–34)
MCHC RBC AUTO-ENTMCNC: 32.5 G/DL (ref 31–37)
MCV RBC AUTO: 78.5 FL (ref 80–100)
MONOCYTES # BLD: 9 % (ref 1–7)
MORPHOLOGY: ABNORMAL
MORPHOLOGY: ABNORMAL
MUCUS: ABNORMAL
NITRITE, URINE: NEGATIVE
PDW BLD-RTO: 16.4 % (ref 11.5–14.9)
PH UA: 6 (ref 5–8)
PLATELET # BLD: 221 K/UL (ref 150–450)
PMV BLD AUTO: 7.7 FL (ref 6–12)
POTASSIUM SERPL-SCNC: 3.9 MMOL/L (ref 3.7–5.3)
PROTEIN UA: ABNORMAL
RBC # BLD: 5.04 M/UL (ref 4.5–5.9)
RBC UA: ABNORMAL /HPF
SEG NEUTROPHILS: 75 % (ref 36–66)
SEGMENTED NEUTROPHILS ABSOLUTE COUNT: 6.53 K/UL (ref 1.3–9.1)
SODIUM BLD-SCNC: 140 MMOL/L (ref 135–144)
SPECIFIC GRAVITY UA: 1.02 (ref 1–1.03)
TOTAL PROTEIN: 6.1 G/DL (ref 6.4–8.3)
TROPONIN, HIGH SENSITIVITY: 39 NG/L (ref 0–22)
TROPONIN, HIGH SENSITIVITY: 39 NG/L (ref 0–22)
TURBIDITY: CLEAR
URINE HGB: ABNORMAL
UROBILINOGEN, URINE: NORMAL
WBC # BLD: 8.7 K/UL (ref 3.5–11)
WBC UA: ABNORMAL /HPF

## 2022-07-14 PROCEDURE — 2580000003 HC RX 258: Performed by: EMERGENCY MEDICINE

## 2022-07-14 PROCEDURE — 6360000002 HC RX W HCPCS: Performed by: FAMILY MEDICINE

## 2022-07-14 PROCEDURE — 6370000000 HC RX 637 (ALT 250 FOR IP): Performed by: FAMILY MEDICINE

## 2022-07-14 PROCEDURE — 87641 MR-STAPH DNA AMP PROBE: CPT

## 2022-07-14 PROCEDURE — 96375 TX/PRO/DX INJ NEW DRUG ADDON: CPT

## 2022-07-14 PROCEDURE — 74176 CT ABD & PELVIS W/O CONTRAST: CPT

## 2022-07-14 PROCEDURE — 84484 ASSAY OF TROPONIN QUANT: CPT

## 2022-07-14 PROCEDURE — 83690 ASSAY OF LIPASE: CPT

## 2022-07-14 PROCEDURE — 85025 COMPLETE CBC W/AUTO DIFF WBC: CPT

## 2022-07-14 PROCEDURE — 83605 ASSAY OF LACTIC ACID: CPT

## 2022-07-14 PROCEDURE — 93005 ELECTROCARDIOGRAM TRACING: CPT | Performed by: EMERGENCY MEDICINE

## 2022-07-14 PROCEDURE — 81001 URINALYSIS AUTO W/SCOPE: CPT

## 2022-07-14 PROCEDURE — 2060000000 HC ICU INTERMEDIATE R&B

## 2022-07-14 PROCEDURE — 36415 COLL VENOUS BLD VENIPUNCTURE: CPT

## 2022-07-14 PROCEDURE — 96374 THER/PROPH/DIAG INJ IV PUSH: CPT

## 2022-07-14 PROCEDURE — 2580000003 HC RX 258: Performed by: FAMILY MEDICINE

## 2022-07-14 PROCEDURE — 6360000002 HC RX W HCPCS: Performed by: EMERGENCY MEDICINE

## 2022-07-14 PROCEDURE — 96361 HYDRATE IV INFUSION ADD-ON: CPT

## 2022-07-14 PROCEDURE — 82947 ASSAY GLUCOSE BLOOD QUANT: CPT

## 2022-07-14 PROCEDURE — 71045 X-RAY EXAM CHEST 1 VIEW: CPT

## 2022-07-14 PROCEDURE — 99285 EMERGENCY DEPT VISIT HI MDM: CPT

## 2022-07-14 PROCEDURE — 80053 COMPREHEN METABOLIC PANEL: CPT

## 2022-07-14 RX ORDER — GLIPIZIDE 5 MG/1
5 TABLET ORAL
Status: ON HOLD | COMMUNITY
End: 2022-07-21 | Stop reason: HOSPADM

## 2022-07-14 RX ORDER — SODIUM CHLORIDE 9 MG/ML
INJECTION, SOLUTION INTRAVENOUS CONTINUOUS
Status: DISCONTINUED | OUTPATIENT
Start: 2022-07-14 | End: 2022-07-20

## 2022-07-14 RX ORDER — M-VIT,TX,IRON,MINS/CALC/FOLIC 27MG-0.4MG
1 TABLET ORAL DAILY
COMMUNITY

## 2022-07-14 RX ORDER — CHOLECALCIFEROL (VITAMIN D3) 125 MCG
1 CAPSULE ORAL DAILY
COMMUNITY

## 2022-07-14 RX ORDER — 0.9 % SODIUM CHLORIDE 0.9 %
1000 INTRAVENOUS SOLUTION INTRAVENOUS ONCE
Status: COMPLETED | OUTPATIENT
Start: 2022-07-14 | End: 2022-07-14

## 2022-07-14 RX ORDER — SODIUM CHLORIDE 0.9 % (FLUSH) 0.9 %
5-40 SYRINGE (ML) INJECTION EVERY 12 HOURS SCHEDULED
Status: DISCONTINUED | OUTPATIENT
Start: 2022-07-14 | End: 2022-07-21 | Stop reason: HOSPADM

## 2022-07-14 RX ORDER — MORPHINE SULFATE 4 MG/ML
4 INJECTION, SOLUTION INTRAMUSCULAR; INTRAVENOUS ONCE
Status: COMPLETED | OUTPATIENT
Start: 2022-07-14 | End: 2022-07-14

## 2022-07-14 RX ORDER — ONDANSETRON 2 MG/ML
4 INJECTION INTRAMUSCULAR; INTRAVENOUS EVERY 6 HOURS PRN
Status: DISCONTINUED | OUTPATIENT
Start: 2022-07-14 | End: 2022-07-21 | Stop reason: HOSPADM

## 2022-07-14 RX ORDER — PREGABALIN 75 MG/1
75 CAPSULE ORAL 2 TIMES DAILY
Status: DISCONTINUED | OUTPATIENT
Start: 2022-07-14 | End: 2022-07-21 | Stop reason: HOSPADM

## 2022-07-14 RX ORDER — ONDANSETRON 4 MG/1
4 TABLET, ORALLY DISINTEGRATING ORAL EVERY 8 HOURS PRN
Status: DISCONTINUED | OUTPATIENT
Start: 2022-07-14 | End: 2022-07-21 | Stop reason: HOSPADM

## 2022-07-14 RX ORDER — SODIUM CHLORIDE 9 MG/ML
INJECTION, SOLUTION INTRAVENOUS PRN
Status: DISCONTINUED | OUTPATIENT
Start: 2022-07-14 | End: 2022-07-21 | Stop reason: HOSPADM

## 2022-07-14 RX ORDER — METOCLOPRAMIDE HYDROCHLORIDE 5 MG/ML
10 INJECTION INTRAMUSCULAR; INTRAVENOUS ONCE
Status: COMPLETED | OUTPATIENT
Start: 2022-07-14 | End: 2022-07-14

## 2022-07-14 RX ORDER — ACETAMINOPHEN 325 MG/1
650 TABLET ORAL EVERY 4 HOURS PRN
Status: DISCONTINUED | OUTPATIENT
Start: 2022-07-14 | End: 2022-07-21 | Stop reason: HOSPADM

## 2022-07-14 RX ORDER — SODIUM CHLORIDE 0.9 % (FLUSH) 0.9 %
5-40 SYRINGE (ML) INJECTION PRN
Status: DISCONTINUED | OUTPATIENT
Start: 2022-07-14 | End: 2022-07-21 | Stop reason: HOSPADM

## 2022-07-14 RX ORDER — CARVEDILOL 6.25 MG/1
6.25 TABLET ORAL 2 TIMES DAILY
Status: ON HOLD | COMMUNITY
End: 2022-09-06 | Stop reason: HOSPADM

## 2022-07-14 RX ADMIN — CEFEPIME HYDROCHLORIDE 2000 MG: 2 INJECTION, POWDER, FOR SOLUTION INTRAMUSCULAR; INTRAVENOUS at 19:02

## 2022-07-14 RX ADMIN — SODIUM CHLORIDE 1000 ML: 9 INJECTION, SOLUTION INTRAVENOUS at 14:53

## 2022-07-14 RX ADMIN — METOCLOPRAMIDE 10 MG: 5 INJECTION, SOLUTION INTRAMUSCULAR; INTRAVENOUS at 14:29

## 2022-07-14 RX ADMIN — SODIUM CHLORIDE, PRESERVATIVE FREE 10 ML: 5 INJECTION INTRAVENOUS at 23:25

## 2022-07-14 RX ADMIN — VANCOMYCIN HYDROCHLORIDE 1750 MG: 1 INJECTION, POWDER, LYOPHILIZED, FOR SOLUTION INTRAVENOUS at 19:43

## 2022-07-14 RX ADMIN — PREGABALIN 75 MG: 75 CAPSULE ORAL at 23:06

## 2022-07-14 RX ADMIN — ONDANSETRON 4 MG: 2 INJECTION INTRAMUSCULAR; INTRAVENOUS at 23:07

## 2022-07-14 RX ADMIN — MORPHINE SULFATE 4 MG: 4 INJECTION, SOLUTION INTRAMUSCULAR; INTRAVENOUS at 14:29

## 2022-07-14 RX ADMIN — SODIUM CHLORIDE: 9 INJECTION, SOLUTION INTRAVENOUS at 19:03

## 2022-07-14 ASSESSMENT — PAIN SCALES - GENERAL
PAINLEVEL_OUTOF10: 5
PAINLEVEL_OUTOF10: 8

## 2022-07-14 ASSESSMENT — ENCOUNTER SYMPTOMS
VOMITING: 1
EYE PAIN: 0
ABDOMINAL PAIN: 1
NAUSEA: 1
COLOR CHANGE: 0
BACK PAIN: 0
SHORTNESS OF BREATH: 0

## 2022-07-14 ASSESSMENT — LIFESTYLE VARIABLES
HOW OFTEN DO YOU HAVE A DRINK CONTAINING ALCOHOL: NEVER
HOW MANY STANDARD DRINKS CONTAINING ALCOHOL DO YOU HAVE ON A TYPICAL DAY: PATIENT DECLINED

## 2022-07-14 ASSESSMENT — PAIN DESCRIPTION - ORIENTATION: ORIENTATION: MID

## 2022-07-14 ASSESSMENT — PAIN DESCRIPTION - FREQUENCY: FREQUENCY: INTERMITTENT

## 2022-07-14 ASSESSMENT — PAIN DESCRIPTION - LOCATION
LOCATION: ABDOMEN
LOCATION: ABDOMEN

## 2022-07-14 NOTE — PROGRESS NOTES
Medication History completed:    New medications: glipizide    Medications discontinued: psyllium packets, multivitamin, lactobacillus, glipizide-metformin, Symbicort    Changes to dosing:   Carvedilol changed to 6.25 mg nightly    Stated allergies: NKDA    Other pertinent information: Medications confirmed with [employer] and [employer]. OARRS history reviewed. The patient filled pregabalin 75 mg on 4/22/22, however the patient has decreased his dose to 1 capsule twice daily rather than 2 capsules 3 times daily. The patient has been intentionally holding his Eliquis for an upcoming procedure, however he has not taken any oral medications since 7/10/22 due to vomiting. [facility] [place] [facility] [place] [facility] and uses CBD gummies, which sometimes have THC in them as well. No gummies were seen on [place]. Medication history completed by Jonathon SteinerD Candidate 2024 under my direct supervision.      Thank you,  Jonathon DiasD, BCPS  [phone]

## 2022-07-14 NOTE — PROGRESS NOTES
Vancomycin Dosing by Pharmacy - Initial Note   TODAY'S DATE:  7/14/2022  Patient name, age:  Ashish Andersen, 66 y.o. Indication: Respiratory infection, MRSA suspected  Additional antimicrobials:  cefepime    Allergies:  Patient has no known allergies. Actual Weight:    Wt Readings from Last 1 Encounters:   07/14/22 165 lb (74.8 kg)     Labs/Ancillary Data  Estimated Creatinine Clearance: 44 mL/min (A) (based on SCr of 1.56 mg/dL (H)). Recent Labs     07/14/22  1416   CREATININE 1.56*   BUN 27*   WBC 8.7     Procalcitonin   Date Value Ref Range Status   02/17/2022 0.07 <0.09 ng/mL Final     Comment:           Suspected Sepsis:  <0.50 ng/mL     Low likelihood of sepsis. 0.50-2.00 ng/mL     Increased likelihood of sepsis. Antibiotics encouraged. >2.00 ng/mL     High risk of sepsis/shock. Antibiotics strongly encouraged. Suspected Lower Resp Tract Infections:  <0.24 ng/mL     Low likelihood of bacterial infection. >0.24 ng/mL     Increased likelihood of bacterial infection. Antibiotics encouraged. With successful antibiotic therapy, PCT levels should decrease rapidly. (Half-life of 24 to   36 hours.)        Procalcitonin values from samples collected within the first 6 hours of systemic infection   may still be low. Retesting may be indicated. Values from day 1 and day 4 can be entered into the Change in Procalcitonin Calculator   ([email or web address]-[email or web address]) to determine the patient's Mortality Risk Prognosis        In healthy neonates, plasma Procalcitonin (PCT) concentrations increase gradually after   birth, reaching peak values at about 24 hours of age then decrease to normal values below   0.5 ng/mL by 48-72 hours of age. No intake or output data in the 24 hours ending 07/14/22 1827  Temp: 97.5    Recent vancomycin administrations        No vancomycin IV orders with administrations found.             Orders not given:            vancomycin (VANCOCIN) 1,750 mg in dextrose 5 % 500 mL IVPB    vancomycin (VANCOCIN) intermittent dosing (placeholder)    vancomycin 1000 mg IVPB in 250 mL D5W addavial                  Culture Date / Source  /  Results  See micro    MRSA Nasal Swab: was ordered by provider, awaiting results. Melania Campbell PLAN   Initial loading dose of 25mg/kg (max of 2,500 mg) = 1750  mg  x 1, then  vancomycin 1000 mg IV every 24 hours. Ensured BUN/sCr ordered at baseline and every 48 hours x at least 3 levels, then at least weekly. Vancomycin level ordered for 7/16 @ 0600. Will use bayesian method for dosing. This level will not be a trough. Target AUC/MELANIE: 400-600. Vancomycin Target Concentration Parameters  Treatment  Population Target AUC/MELANIE Target Trough   Invasive MRSA Infection (bacteremia, pneumonia, meningitis, endocarditis, osteomyelitis)  Sepsis (undifferentiated) 400-600 N/A   Infection due to non-MRSA pathogen  Empiric treatment of non-invasive MRSA infection  (SSTI, UTI) <500 10-15 mg/L   CrCl < 29 mL/min  Rapidly fluctuating serum creatinine   LAURO N/A < 15 mg/L     Renal replacement therapy is dosed by levels, per hospital protocol. Abbreviations  * Pauc: probability that AUC is >400 (efficacy); Pconc: probability that Ctrough is above 20 ?g/mL (toxicity); Tox: Probability of nephrotoxicity, based on Lodise et al. Clin Infect Dis 2009. Loading dose: 1750 mg at 19:00 07/14/2022. Regimen: 1000 mg IV every 24 hours. Start time: 18:26 on 07/14/2022  Exposure target: AUC24 (range)400-600 mg/L.hr   AUC24,ss: 480 mg/L.hr  Probability of AUC24 > 400: 70 %  Ctrough,ss: 15.1 mg/L  Probability of Ctrough,ss > 20: 24 %  Probability of nephrotoxicity (Lodise BAO 2009): 10 %      Thank you for the consult. Pharmacy will continue to follow.    Vikas Montgomery RPh  7/14/2022  6:28 PM

## 2022-07-14 NOTE — ED PROVIDER NOTES
EMERGENCY DEPARTMENT ENCOUNTER    Pt Name: Heriberto Marsh  MRN: [id number]  [place] 1950  Date of evaluation: 7/14/22  CHIEF COMPLAINT       Chief Complaint   Patient presents with    Abdominal Pain     HISTORY OF PRESENT ILLNESS   78-year-old male with a history of non-Hodgkin's lymphoma presents for complaints of abdominal pain nausea and vomiting. Patient had chemo approximately 1 month ago, and reports that in the last few weeks has been having on and off nausea but reports in the last few days she been having worsening nausea with vomiting, and abdominal pain. Patient reports that he is not been able to keep anything down. Patient describes the pain as aching, denies any radiation, denies any making it better or worse, reports no change in bowel movements, denies any fevers, reportedly still has ureteral stents in, denies any difficulty urinating or pain with urination. Denies any associated chest pain or shortness of breath. Denies any recent travel, suspicious food intake, or sick contacts. The history is provided by the patient. REVIEW OF SYSTEMS     Review of Systems   Constitutional: Negative for chills and fever. HENT: Negative for congestion and ear pain. Eyes: Negative for pain. Respiratory: Negative for shortness of breath. Cardiovascular: Negative for chest pain, palpitations and leg swelling. Gastrointestinal: Positive for abdominal pain, nausea and vomiting. Genitourinary: Negative for dysuria and flank pain. Musculoskeletal: Negative for back pain. Skin: Negative for color change. Neurological: Negative for numbness and headaches. Psychiatric/Behavioral: Negative for confusion. All other systems reviewed and are negative.     PASTMEDICAL HISTORY     Past Medical History:   Diagnosis Date    Arthritis     Cancer [facility])     chemotherapy lymphoma last was april 2022    Chemotherapy management, encounter for     [facility]est[facility] failure) [facility] [address] [facility]     COPD [facility]chronic obstructive pulmonary disease) [facility]     Diabetes mellitus ([facility]     H/O cardiovascular stress test     History of non-Hodgkin's lymphoma     Hx of blood clots     DVT in right leg     Hyperlipidemia     Hypertension     Hypothyroidism     On home O2     at 5 liters per nasal cannula 24 hrs. per day.       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    Hydronephrosis due to obstruction of ureter N13.1    Moderate malnutrition (HCC) E44.0    Chronic cholecystitis K81.1    Pleural effusion J90    Cardiomegaly I51.7    Hypothyroidism E03.9    Diabetes mellitus ([facility] [address] [facility] E11.9    Hypertensive urgency I16.0    Acute respiratory failure with hypoxia (HCC) J96.01    Hypoxia R09.02    Carotid stenosis, asymptomatic, bilateral I65.23    Simple chronic bronchitis (HCC) J41.0    Dependence on nocturnal oxygen therapy Z99.81    Pneumonia J18.9     SURGICAL HISTORY       Past Surgical History:   Procedure Laterality Date    CAROTID ENDARTERECTOMY Left 6/28/2022    LEFT TYPE I EVERSION LEFT CAROTID ENDARTERECTOMY RE-IMPLANTATION LEFT ICA performed by Naga Maciel MD at [address] [place] [address], LAPAROSCOPIC N/A 10/9/2021    CHOLECYSTECTOMY LAPAROSCOPIC ROBOTIC XI performed by Monty Angel MD at [address] [place] [address] Left 9/24/2021    COLONOSCOPY POLYPECTOMY SNARE/COLD BIOPSY performed by Libia Fernandez MD at [place] [address] Right 10/6/2021    CYSTOSCOPY PYELOGRAM URETERAL STENT INSERTION performed by Macie Esquivel MD at [place] [address] Bilateral 11/24/2021    CYSTOSCOPY URETERAL STENT INSERTION RIGHT EXCHANGE & RETROGRADE PYELOGRAM LEFT INSERTION performed by Macie Esquivel MD at [place] [address] Bilateral 2/16/2022    CYSTOSCOPY WITH BILATERAL STENT EXCHANGE performed by Charisse Campbell MD at [address]      cataracts    HERNIA REPAIR      IR BIOPSY ABDOMINAL MASS  7/6/2021    IR BIOPSY ABDOMINAL MASS 7/6/2021 [facility]    IR PORT PLACEMENT EQUAL OR GREATER THAN 5 YEARS  8/31/2021    IR PORT PLACEMENT EQUAL OR GREATER THAN 5 YEARS 8/31/2021 [facility]    TONSILLECTOMY      as child     CURRENT MEDICATIONS       Previous Medications    ALBUTEROL SULFATE HFA (VENTOLIN HFA) 108 (90 BASE) MCG/ACT INHALER    Inhale 2 puffs into the lungs every 4 hours as needed for Wheezing     AMLODIPINE (NORVASC) 10 MG TABLET    Take 1 tablet by mouth daily    APIXABAN (ELIQUIS) 5 MG TABS TABLET    Take 5 mg by mouth 2 times daily    ATORVASTATIN (LIPITOR) 40 MG TABLET    Take 1 tablet by mouth daily    CARVEDILOL (COREG) 6.25 MG TABLET    Take 6.25 mg by mouth at bedtime    DONEPEZIL (ARICEPT) 10 MG TABLET    Take 10 mg by mouth daily     FLUOXETINE (PROZAC) 20 MG CAPSULE    Take 20 mg by mouth daily    FLUTICASONE-SALMETEROL (ADVAIR) 100-50 MCG/DOSE DISKUS INHALER    Inhale 1 puff into the lungs every 12 hours    FUROSEMIDE (LASIX) 20 MG TABLET    Take 1 tablet by mouth daily    GLIPIZIDE (GLUCOTROL) 5 MG TABLET    Take 5 mg by mouth 2 times daily (before meals)    IPRATROPIUM-ALBUTEROL (DUONEB) 0.5-2.5 (3) MG/3ML SOLN NEBULIZER SOLUTION    Inhale 3 mLs into the lungs every 4 hours as needed for Shortness of Breath    LACTOBACILLUS (PROBIOTIC ACIDOPHILUS) CAPS    Take 1 caplet by mouth daily    LEVOTHYROXINE (SYNTHROID) 150 MCG TABLET    Take 1 tablet by mouth Daily    LOSARTAN (COZAAR) 50 MG TABLET    Take 1 tablet by mouth daily    MULTIPLE VITAMINS-MINERALS (THERAPEUTIC MULTIVITAMIN-MINERALS) TABLET    Take 1 tablet by mouth daily    OMEPRAZOLE (PRILOSEC) 20 MG DELAYED RELEASE CAPSULE    Take 20 mg by mouth 2 times daily    OXYGEN    Inhale 5 L into the lungs at bedtime Nightly & during the day.     PREGABALIN (LYRICA) 75 MG CAPSULE    Take 75 mg by mouth 2 times daily. Indications: last fill on 22 #180 for 30 days     PSYLLIUM (KONSYL) 28.3 % PACK    Take 1 packet by mouth daily     ALLERGIES     has No Known Allergies. FAMILY HISTORY     He indicated that his mother is . He indicated that his father is . SOCIAL HISTORY       Social History     Tobacco Use    Smoking status: Former Smoker     Packs/day: 2.00     Years: 45.00     Pack years: 90.00     Types: Cigarettes     Quit date: 2006     Years since quittin.5    Smokeless tobacco: Never Used   Vaping Use    Vaping Use: Never used   Substance Use Topics    Alcohol use: Yes     Alcohol/week: 1.7 standard drinks     Types: 2 Standard drinks or equivalent per week     Comment: occassional    Drug use: Yes     Types: Marijuana Maurita Handsome)     Comment:  CBD gummies no smoking     PHYSICAL EXAM     INITIAL VITALS: BP (!) 148/71   Pulse 80   Temp 97.4 °F (36.3 °C) (Oral)   Resp 20   Ht 5' 10\" (1.778 m)   Wt 165 lb (74.8 kg)   SpO2 96%   BMI 23.68 kg/m²    Physical Exam  Vitals and nursing note reviewed. Constitutional:       General: He is not in acute distress. Appearance: Normal appearance. He is not ill-appearing. HENT:      Head: Normocephalic and atraumatic. Right Ear: External ear normal.      Left Ear: External ear normal.      Nose: Nose normal.      Mouth/Throat:      Mouth: Mucous membranes are moist.   Eyes:      Extraocular Movements: Extraocular movements intact. Pupils: Pupils are equal, round, and reactive to light. Cardiovascular:      Rate and Rhythm: Normal rate and regular rhythm. Pulses: Normal pulses. Heart sounds: Normal heart sounds. Pulmonary:      Effort: Pulmonary effort is normal.      Breath sounds: Normal breath sounds. Abdominal:      General: Abdomen is flat. Palpations: Abdomen is soft. Tenderness: There is generalized abdominal tenderness.    Musculoskeletal: General: No tenderness. Normal range of motion. Cervical back: Neck supple. No spinous process tenderness or muscular tenderness. Skin:     General: Skin is warm and dry. Capillary Refill: Capillary refill takes less than 2 seconds. Neurological:      General: No focal deficit present. Mental Status: He is alert and oriented to person, place, and time. Cranial Nerves: Cranial nerves are intact. Sensory: Sensation is intact. Motor: Motor function is intact. Psychiatric:         Behavior: Behavior normal.         Thought Content: Thought content does not include homicidal or suicidal ideation. MEDICAL DECISION MAKIN-year-old male presents for complaints of nausea, vomiting, and abdominal pain. On initial exam patient in no acute distress vitals are stable, abdomen is soft with generalized tenderness, will check labs and imaging provide IV fluids and symptomatic treatment      Labs reviewed and unremarkable, creatinine at baseline, troponin not significantly elevated, chest x-ray was reviewed concerning for a new left lower lobe pneumonia    CT was reviewed showing increased size of the known peritoneal mass consistent with his non-Hodgkin's lymphoma    Patient was reevaluated after meds and reports he is feeling better was able to tolerate p.o. while in the emergency department    Given the findings of pneumonia, patient was recently on antibiotics will treat for hospital-acquired pneumonia and started on Vanco and cefepime, will admit for further IV antibiotic treatment and further IV hydration    Results were discussed with patient and patient's family, discussed plan for admission and they are agreeable    Spoke with Dr. Suki Whelan who accepts admission. Patient demonstrates understanding and agreement with the plan, was given the opportunity to ask questions, and these questions were answered to the best of the provided information at this time.  VS stable for transfer. This dictation was prepared using [employer] voice recognition software. CRITICAL CARE:       PROCEDURES:    Procedures    DIAGNOSTIC RESULTS   EKG:All EKG's are interpreted by the Emergency Department Physician who either signs or Co-signs this chart in the absence of a cardiologist.    Sinus rhythm rate of 97, normal axis, normal intervals, no ST segment elevation or depression, nonspecific T wave changes    RADIOLOGY:All plain film, CT, MRI, and formal ultrasound images (except ED bedside ultrasound) are read by the radiologist, see reports below, unless otherwisenoted in MDM or here. CT ABDOMEN PELVIS WO CONTRAST Additional Contrast? None   Final Result   Interval marked increased size of the infiltrative retroperitoneal mass,   compatible with the known history of lymphoma. The mass now partially envelops the left kidney, but no hydronephrosis is   identified. The mass also is seen abutting the duodenal sweep, but without obvious   obstruction. New pelvic lymphadenopathy. The         XR CHEST PORTABLE   Final Result   Mild left lower lobe infiltrate suggesting early pneumonia           LABS: All lab results were reviewed by myself, and all abnormals are listed below.   Labs Reviewed   CBC WITH AUTO DIFFERENTIAL - Abnormal; Notable for the following components:       Result Value    Hemoglobin 12.8 (*)     Hematocrit 39.5 (*)     MCV 78.5 (*)     MCH 25.5 (*)     RDW 16.4 (*)     Seg Neutrophils 75 (*)     Lymphocytes 16 (*)     Monocytes 9 (*)     All other components within normal limits   COMPREHENSIVE METABOLIC PANEL - Abnormal; Notable for the following components:    Glucose 119 (*)     BUN 27 (*)     CREATININE 1.56 (*)     Alkaline Phosphatase 131 (*)     Total Protein 6.1 (*)     GFR Non- 44 (*)     GFR  53 (*)     All other components within normal limits   LIPASE - Abnormal; Notable for the following components:    Lipase 69 (*)     All other components within normal limits   URINALYSIS WITH REFLEX TO CULTURE - Abnormal; Notable for the following components:    Glucose, Ur TRACE (*)     Ketones, Urine TRACE (*)     Urine Hgb MOD (*)     Protein, UA 4+ (*)     Leukocyte Esterase, Urine TRACE (*)     All other components within normal limits   TROPONIN - Abnormal; Notable for the following components:    Troponin, High Sensitivity 39 (*)     All other components within normal limits   MICROSCOPIC URINALYSIS - Abnormal; Notable for the following components:    Mucus, UA 1+ (*)     All other components within normal limits   MRSA DNA PROBE, NASAL   LACTIC ACID   TROPONIN       EMERGENCY DEPARTMENTCOURSE:         Vitals:    Vitals:    07/14/22 1745 07/14/22 1930 07/14/22 2000 07/14/22 2015   BP: (!) 175/96 132/83 (!) 148/71 (!) 148/71   Pulse: 86   80   Resp:    20   Temp:    97.4 °F (36.3 °C)   TempSrc:    Oral   SpO2: 90% 98% 96% 96%   Weight:       Height:           The patient was given the following medications while in the emergency department:  Orders Placed This Encounter   Medications    morphine sulfate (PF) injection 4 mg    metoclopramide (REGLAN) injection 10 mg    0.9 % sodium chloride bolus    cefepime (MAXIPIME) 2000 mg IVPB minibag     Order Specific Question:   Antimicrobial Indications     Answer:   Pneumonia (HAP)    0.9 % sodium chloride infusion    vancomycin (VANCOCIN) 1,750 mg in dextrose 5 % 500 mL IVPB     Order Specific Question:   Antimicrobial Indications     Answer:   Pneumonia (HAP)    vancomycin (VANCOCIN) intermittent dosing (placeholder)     Order Specific Question:   Antimicrobial Indications     Answer:   Pneumonia (HAP)    vancomycin 1000 mg IVPB in 250 mL D5W addavial     Order Specific Question:   Antimicrobial Indications     Answer:   Pneumonia (HAP)     CONSULTS:  IP CONSULT TO FAMILY MEDICINE  PHARMACY TO DOSE VANCOMYCIN    FINAL IMPRESSION      1.  Nausea and vomiting, intractability of vomiting not specified, unspecified vomiting type    2. Pneumonia of left lower lobe due to infectious organism          DISPOSITION/PLAN   DISPOSITION Admitted 07/14/2022 07:13:48 PM      PATIENT REFERRED TO:  No follow-up provider specified. DISCHARGE MEDICATIONS:  New Prescriptions    No medications on file     The care is provided during an unprecedented national emergency due to the novel coronavirus, COVID 19.   DO Jaylan Valentino DO  07/14/22 2039

## 2022-07-14 NOTE — ED TRIAGE NOTES
Pt is having chemo for non-hodgkins lymphoma. Pt last had chemo 1 month ago, has never had nausea with. Pt for the last 2 days pt has not been hungry and has not been able to eat. Pt states he is having mid-abdominal pain.

## 2022-07-15 ENCOUNTER — APPOINTMENT (OUTPATIENT)
Dept: CT IMAGING | Age: 72
DRG: 987 | End: 2022-07-15
Payer: MEDICARE

## 2022-07-15 ENCOUNTER — APPOINTMENT (OUTPATIENT)
Dept: VASCULAR LAB | Age: 72
DRG: 987 | End: 2022-07-15
Payer: MEDICARE

## 2022-07-15 ENCOUNTER — TELEPHONE (OUTPATIENT)
Dept: CASE MANAGEMENT | Age: 72
End: 2022-07-15

## 2022-07-15 LAB
CORTISOL COLLECTION INFO: NORMAL
CORTISOL: 12.7 UG/DL (ref 2.7–18.4)
EKG ATRIAL RATE: 97 BPM
EKG P AXIS: 55 DEGREES
EKG P-R INTERVAL: 120 MS
EKG Q-T INTERVAL: 378 MS
EKG QRS DURATION: 90 MS
EKG QTC CALCULATION (BAZETT): 480 MS
EKG R AXIS: -26 DEGREES
EKG T AXIS: 21 DEGREES
EKG VENTRICULAR RATE: 97 BPM
GLUCOSE BLD-MCNC: 119 MG/DL (ref 75–110)
GLUCOSE BLD-MCNC: 170 MG/DL (ref 75–110)
GLUCOSE BLD-MCNC: 187 MG/DL (ref 75–110)
GLUCOSE BLD-MCNC: 56 MG/DL (ref 75–110)
GLUCOSE BLD-MCNC: 68 MG/DL (ref 75–110)
GLUCOSE BLD-MCNC: 87 MG/DL (ref 75–110)
GLUCOSE BLD-MCNC: 95 MG/DL (ref 75–110)
LACTATE DEHYDROGENASE: 547 U/L (ref 135–225)
LIPASE: 123 U/L (ref 13–60)
MRSA, DNA, NASAL: NEGATIVE
PROCALCITONIN: 0.1 NG/ML
SPECIMEN DESCRIPTION: NORMAL
TROPONIN, HIGH SENSITIVITY: 43 NG/L (ref 0–22)
URIC ACID: 9.4 MG/DL (ref 3.4–7)

## 2022-07-15 PROCEDURE — 71250 CT THORAX DX C-: CPT

## 2022-07-15 PROCEDURE — 2700000000 HC OXYGEN THERAPY PER DAY

## 2022-07-15 PROCEDURE — 84550 ASSAY OF BLOOD/URIC ACID: CPT

## 2022-07-15 PROCEDURE — 6370000000 HC RX 637 (ALT 250 FOR IP): Performed by: FAMILY MEDICINE

## 2022-07-15 PROCEDURE — 82533 TOTAL CORTISOL: CPT

## 2022-07-15 PROCEDURE — 93010 ELECTROCARDIOGRAM REPORT: CPT | Performed by: INTERNAL MEDICINE

## 2022-07-15 PROCEDURE — 83615 LACTATE (LD) (LDH) ENZYME: CPT

## 2022-07-15 PROCEDURE — 93970 EXTREMITY STUDY: CPT

## 2022-07-15 PROCEDURE — 84145 PROCALCITONIN (PCT): CPT

## 2022-07-15 PROCEDURE — 2580000003 HC RX 258: Performed by: FAMILY MEDICINE

## 2022-07-15 PROCEDURE — 82947 ASSAY GLUCOSE BLOOD QUANT: CPT

## 2022-07-15 PROCEDURE — 2060000000 HC ICU INTERMEDIATE R&B

## 2022-07-15 PROCEDURE — 99223 1ST HOSP IP/OBS HIGH 75: CPT | Performed by: INTERNAL MEDICINE

## 2022-07-15 PROCEDURE — 6360000002 HC RX W HCPCS: Performed by: INTERNAL MEDICINE

## 2022-07-15 PROCEDURE — 36415 COLL VENOUS BLD VENIPUNCTURE: CPT

## 2022-07-15 PROCEDURE — 6370000000 HC RX 637 (ALT 250 FOR IP): Performed by: INTERNAL MEDICINE

## 2022-07-15 PROCEDURE — 6360000002 HC RX W HCPCS: Performed by: FAMILY MEDICINE

## 2022-07-15 PROCEDURE — 84484 ASSAY OF TROPONIN QUANT: CPT

## 2022-07-15 PROCEDURE — 94640 AIRWAY INHALATION TREATMENT: CPT

## 2022-07-15 PROCEDURE — 83690 ASSAY OF LIPASE: CPT

## 2022-07-15 PROCEDURE — 94761 N-INVAS EAR/PLS OXIMETRY MLT: CPT

## 2022-07-15 RX ORDER — LACTOBACILLUS RHAMNOSUS GG 10B CELL
1 CAPSULE ORAL DAILY
Status: DISCONTINUED | OUTPATIENT
Start: 2022-07-15 | End: 2022-07-21 | Stop reason: HOSPADM

## 2022-07-15 RX ORDER — DEXTROSE MONOHYDRATE 50 MG/ML
100 INJECTION, SOLUTION INTRAVENOUS PRN
Status: DISCONTINUED | OUTPATIENT
Start: 2022-07-15 | End: 2022-07-21 | Stop reason: HOSPADM

## 2022-07-15 RX ORDER — GLIPIZIDE 5 MG/1
5 TABLET ORAL
Status: DISCONTINUED | OUTPATIENT
Start: 2022-07-15 | End: 2022-07-18

## 2022-07-15 RX ORDER — CARVEDILOL 6.25 MG/1
6.25 TABLET ORAL NIGHTLY
Status: DISCONTINUED | OUTPATIENT
Start: 2022-07-15 | End: 2022-07-21 | Stop reason: HOSPADM

## 2022-07-15 RX ORDER — FLUOXETINE HYDROCHLORIDE 20 MG/1
20 CAPSULE ORAL DAILY
Status: DISCONTINUED | OUTPATIENT
Start: 2022-07-15 | End: 2022-07-21 | Stop reason: HOSPADM

## 2022-07-15 RX ORDER — IPRATROPIUM BROMIDE AND ALBUTEROL SULFATE 2.5; .5 MG/3ML; MG/3ML
1 SOLUTION RESPIRATORY (INHALATION) EVERY 4 HOURS PRN
Status: DISCONTINUED | OUTPATIENT
Start: 2022-07-15 | End: 2022-07-19

## 2022-07-15 RX ORDER — FUROSEMIDE 20 MG/1
20 TABLET ORAL DAILY
Status: DISCONTINUED | OUTPATIENT
Start: 2022-07-15 | End: 2022-07-21 | Stop reason: HOSPADM

## 2022-07-15 RX ORDER — PREGABALIN 75 MG/1
75 CAPSULE ORAL 2 TIMES DAILY
Status: DISCONTINUED | OUTPATIENT
Start: 2022-07-15 | End: 2022-07-15 | Stop reason: SDUPTHER

## 2022-07-15 RX ORDER — ATORVASTATIN CALCIUM 40 MG/1
40 TABLET, FILM COATED ORAL DAILY
Status: DISCONTINUED | OUTPATIENT
Start: 2022-07-15 | End: 2022-07-21 | Stop reason: HOSPADM

## 2022-07-15 RX ORDER — M-VIT,TX,IRON,MINS/CALC/FOLIC 27MG-0.4MG
1 TABLET ORAL DAILY
Status: DISCONTINUED | OUTPATIENT
Start: 2022-07-15 | End: 2022-07-21 | Stop reason: HOSPADM

## 2022-07-15 RX ORDER — PANTOPRAZOLE SODIUM 40 MG/1
40 TABLET, DELAYED RELEASE ORAL
Status: DISCONTINUED | OUTPATIENT
Start: 2022-07-15 | End: 2022-07-21 | Stop reason: HOSPADM

## 2022-07-15 RX ORDER — LEVOTHYROXINE SODIUM 0.07 MG/1
150 TABLET ORAL DAILY
Status: DISCONTINUED | OUTPATIENT
Start: 2022-07-15 | End: 2022-07-21 | Stop reason: HOSPADM

## 2022-07-15 RX ORDER — OXYCODONE HYDROCHLORIDE 5 MG/1
5 TABLET ORAL EVERY 6 HOURS PRN
Status: DISCONTINUED | OUTPATIENT
Start: 2022-07-15 | End: 2022-07-16

## 2022-07-15 RX ORDER — MORPHINE SULFATE 2 MG/ML
2 INJECTION, SOLUTION INTRAMUSCULAR; INTRAVENOUS EVERY 4 HOURS PRN
Status: DISCONTINUED | OUTPATIENT
Start: 2022-07-15 | End: 2022-07-16

## 2022-07-15 RX ORDER — AMLODIPINE BESYLATE 10 MG/1
10 TABLET ORAL DAILY
Status: DISCONTINUED | OUTPATIENT
Start: 2022-07-15 | End: 2022-07-21 | Stop reason: HOSPADM

## 2022-07-15 RX ORDER — BUDESONIDE AND FORMOTEROL FUMARATE DIHYDRATE 80; 4.5 UG/1; UG/1
2 AEROSOL RESPIRATORY (INHALATION)
Refills: 0 | Status: DISCONTINUED | OUTPATIENT
Start: 2022-07-15 | End: 2022-07-21 | Stop reason: HOSPADM

## 2022-07-15 RX ORDER — MORPHINE SULFATE 2 MG/ML
2 INJECTION, SOLUTION INTRAMUSCULAR; INTRAVENOUS ONCE
Status: COMPLETED | OUTPATIENT
Start: 2022-07-15 | End: 2022-07-15

## 2022-07-15 RX ORDER — DONEPEZIL HYDROCHLORIDE 10 MG/1
10 TABLET, FILM COATED ORAL DAILY
Status: DISCONTINUED | OUTPATIENT
Start: 2022-07-15 | End: 2022-07-21 | Stop reason: HOSPADM

## 2022-07-15 RX ORDER — ALBUTEROL SULFATE 90 UG/1
2 AEROSOL, METERED RESPIRATORY (INHALATION) EVERY 4 HOURS PRN
Status: DISCONTINUED | OUTPATIENT
Start: 2022-07-15 | End: 2022-07-21 | Stop reason: HOSPADM

## 2022-07-15 RX ORDER — LOSARTAN POTASSIUM 50 MG/1
50 TABLET ORAL DAILY
Status: DISCONTINUED | OUTPATIENT
Start: 2022-07-15 | End: 2022-07-21 | Stop reason: HOSPADM

## 2022-07-15 RX ADMIN — PANTOPRAZOLE SODIUM 40 MG: 40 TABLET, DELAYED RELEASE ORAL at 07:54

## 2022-07-15 RX ADMIN — Medication 1 CAPSULE: at 07:44

## 2022-07-15 RX ADMIN — LEVOTHYROXINE SODIUM 150 MCG: 0.07 TABLET ORAL at 07:54

## 2022-07-15 RX ADMIN — CEFEPIME HYDROCHLORIDE 2000 MG: 2 INJECTION, POWDER, FOR SOLUTION INTRAMUSCULAR; INTRAVENOUS at 21:52

## 2022-07-15 RX ADMIN — AMLODIPINE BESYLATE 10 MG: 10 TABLET ORAL at 07:44

## 2022-07-15 RX ADMIN — OXYCODONE HYDROCHLORIDE 5 MG: 5 TABLET ORAL at 18:02

## 2022-07-15 RX ADMIN — ONDANSETRON 4 MG: 2 INJECTION INTRAMUSCULAR; INTRAVENOUS at 11:34

## 2022-07-15 RX ADMIN — GLIPIZIDE 5 MG: 5 TABLET ORAL at 18:02

## 2022-07-15 RX ADMIN — DONEPEZIL HYDROCHLORIDE 10 MG: 10 TABLET ORAL at 07:44

## 2022-07-15 RX ADMIN — BUDESONIDE AND FORMOTEROL FUMARATE DIHYDRATE 2 PUFF: 80; 4.5 AEROSOL RESPIRATORY (INHALATION) at 07:26

## 2022-07-15 RX ADMIN — ATORVASTATIN CALCIUM 40 MG: 40 TABLET, FILM COATED ORAL at 07:44

## 2022-07-15 RX ADMIN — APIXABAN 5 MG: 5 TABLET, FILM COATED ORAL at 21:49

## 2022-07-15 RX ADMIN — PREGABALIN 75 MG: 75 CAPSULE ORAL at 07:44

## 2022-07-15 RX ADMIN — CEFEPIME HYDROCHLORIDE 2000 MG: 2 INJECTION, POWDER, FOR SOLUTION INTRAMUSCULAR; INTRAVENOUS at 10:29

## 2022-07-15 RX ADMIN — GLIPIZIDE 5 MG: 5 TABLET ORAL at 07:54

## 2022-07-15 RX ADMIN — Medication 2 MG: at 10:25

## 2022-07-15 RX ADMIN — CARVEDILOL 6.25 MG: 6.25 TABLET, FILM COATED ORAL at 21:50

## 2022-07-15 RX ADMIN — AZITHROMYCIN MONOHYDRATE 500 MG: 500 INJECTION, POWDER, LYOPHILIZED, FOR SOLUTION INTRAVENOUS at 08:05

## 2022-07-15 RX ADMIN — FLUOXETINE 20 MG: 20 CAPSULE ORAL at 07:44

## 2022-07-15 RX ADMIN — Medication 16 G: at 12:33

## 2022-07-15 RX ADMIN — MORPHINE SULFATE 2 MG: 2 INJECTION, SOLUTION INTRAMUSCULAR; INTRAVENOUS at 14:39

## 2022-07-15 RX ADMIN — FUROSEMIDE 20 MG: 20 TABLET ORAL at 07:44

## 2022-07-15 RX ADMIN — APIXABAN 5 MG: 5 TABLET, FILM COATED ORAL at 07:44

## 2022-07-15 RX ADMIN — ONDANSETRON 4 MG: 2 INJECTION INTRAMUSCULAR; INTRAVENOUS at 05:18

## 2022-07-15 RX ADMIN — PSYLLIUM HUSK 1 PACKET: 3.4 POWDER ORAL at 07:44

## 2022-07-15 RX ADMIN — PREGABALIN 75 MG: 75 CAPSULE ORAL at 21:49

## 2022-07-15 RX ADMIN — BUDESONIDE AND FORMOTEROL FUMARATE DIHYDRATE 2 PUFF: 80; 4.5 AEROSOL RESPIRATORY (INHALATION) at 20:18

## 2022-07-15 RX ADMIN — ACETAMINOPHEN 325MG 650 MG: 325 TABLET ORAL at 07:44

## 2022-07-15 RX ADMIN — MULTIPLE VITAMINS W/ MINERALS TAB 1 TABLET: TAB at 07:44

## 2022-07-15 RX ADMIN — ONDANSETRON 4 MG: 2 INJECTION INTRAMUSCULAR; INTRAVENOUS at 18:00

## 2022-07-15 RX ADMIN — LOSARTAN POTASSIUM 50 MG: 50 TABLET, FILM COATED ORAL at 07:44

## 2022-07-15 ASSESSMENT — PAIN SCALES - GENERAL: PAINLEVEL_OUTOF10: 10

## 2022-07-15 NOTE — CARE COORDINATION
CASE MANAGEMENT NOTE:    Admission Date:  7/14/2022 Bozena Aleman is a 66 y.o.  male    Admitted for : Pneumonia [J18.9]  Pneumonia of left lower lobe due to infectious organism [J18.9]  Nausea and vomiting, intractability of vomiting not specified, unspecified vomiting type [R11.2]    Met with:  Patient & Patient's Daughters, [address] [facility]    PCP:  [address] [facility] [address]:  [facility] Medicare      Is patient alert and oriented at time of discussion:  Yes    Current Residence/ Living Arrangements:  independently at home w/ Wife & Cousin          Current Services PTA:  Yes, Follows at [facility] [employer] for [address]    Does patient go to outpatient dialysis: No  If yes, location and chair time: NA    Is patient agreeable to VNS: Yes    Freedom of choice provided:  Yes    List of [address] provided: No    VNS chosen:  Yes, Wants [facility], Pt. Has had in the past. [employer], is following, Pt. Is accepted. DME:  straight cane and wheelchair, Rollator, GB    Home Oxygen: Yes, Wears 5LNC at , Has Concentrator, [employer]: Yes    CPAP/BIPAP: No    Supplier: [employer]    Potential Assistance Needed: Yes Wants VNS, [facility]    SNF needed: No    Freedom of choice and list provided: NA    Pharmacy:  [facility] in [place]       Is patient currently receiving oral anticoagulation therapy? Yes, Eliquis PTA    Is the Patient an [facility] with Readmission Risk Score greater than 14%? No  If yes, pt needs a follow up appointment made within 7 days. Family Members/Caregivers that pt would like involved in their care:    Yes    If yes, list name here:  Wife, Barrie Hilton, Daughters, Chinedu Askew    Transportation Provider:  Family             Discharge Plan:  7/15/22 [employer] Pt. Lives in 1 story home w/ Ramp,w/ Wife & Cousin. DME- Rollator, Cane, SC, GB, WC.   Nebulizer, Oxygen- Wears 5LNC, at [facility], [facility] [employer] From [place], Wants VNS, -[facility], Accepted, [employer] following, Eliquis PTA, Current w/ [facility], gets Chemo for Lymphoma. IV Zithromax, Sating 94% on RA. Hemoc, Dahlia will need signed/completed, will follow//KB              Electronically signed by: Anaya Calderon RN on 7/15/2022 at 12:25 PM

## 2022-07-15 NOTE — H&P
Dr. Terrence Sheth        History and Physical Examination   Admission Note    CHIEF COMPLAINT:   Chief Complaint   Patient presents with    Abdominal Pain       Reason for Admission: Abdominal pain nausea coug    History Obtained From:  Patient     HISTORY OF PRESENT ILLNESS:      The patient is a pleasant 66 y.o. male with significant medical history of lymphoma and abdominal mass presented with abdominal pain he points to the epigastric and mid abdomen. Started yesterday he says. He felt nauseous but he did not vomit. He denies any diarrhea or fevers which chills but maybe he thinks he had some chills. He also described having little more cough lately than his baseline. Patient has a known abdominal mass that needs on recent CT last night they describe it may be enlarged low but he sees Dr. Ping Youssef. He said he saw him about 2 months ago and there was no change in their plan also patient looks like was on Lyrica for postherpetic neuralgia and he stopped that he said somebody told him he could stop it. Patient last night complained of abdominal pain and burning-like sensation he was started on it and this morning he feels much better he says. He is eating his breakfast he said he might feel little nauseous but he is able to keep his food down. He had no fevers or chills overnight or any back pain. Discussed with his PCP yesterdayDr Stephany Amin told his wife to go ahead and start him on his Lyrica.   The patient in the hospital.    Past Medical History:    Past Medical History:   Diagnosis Date    Arthritis     Cancer [facility])     chemotherapy lymphoma last was april 2022    Chemotherapy management, encounter for     CHF (congestive heart failure) ([facility] [address]     COPD (chronic obstructive pulmonary disease) ([facility] [address]     Diabetes mellitus ([facility] [address]     H/O cardiovascular stress test     History of non-Hodgkin's lymphoma     Hx of blood clots     DVT in right leg     Hyperlipidemia     Hypertension     Hypothyroidism     On home O2     at 5 liters per nasal cannula 24 hrs. per day.       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    Hydronephrosis due to obstruction of ureter N13.1    Moderate malnutrition (HCC) E44.0    Chronic cholecystitis K81.1    Pleural effusion J90    Cardiomegaly I51.7    Hypothyroidism E03.9    Diabetes mellitus ([facility] [address] E11.9    Hypertensive urgency I16.0    Acute respiratory failure with hypoxia (HCC) J96.01    Hypoxia R09.02    Carotid stenosis, asymptomatic, bilateral I65.23    Simple chronic bronchitis (HCC) J41.0    Dependence on nocturnal oxygen therapy Z99.81    Pneumonia J18.9       Past Surgical History:       Past Surgical History:   Procedure Laterality Date    CAROTID ENDARTERECTOMY Left 6/28/2022    LEFT TYPE I EVERSION LEFT CAROTID ENDARTERECTOMY RE-IMPLANTATION LEFT ICA performed by Mark Lozada MD at [address] [facility], LAPAROSCOPIC N/A 10/9/2021    CHOLECYSTECTOMY LAPAROSCOPIC ROBOTIC XI performed by Nitish Young MD at [address] [facility] Left 9/24/2021    COLONOSCOPY POLYPECTOMY SNARE/COLD BIOPSY performed by Camacho Stern MD at [address] Right 10/6/2021    CYSTOSCOPY PYELOGRAM URETERAL STENT INSERTION performed by Bhavna Rodríguez MD at [address] [facility] [address] Bilateral 11/24/2021    CYSTOSCOPY URETERAL [address] performed by Bhavna Rodríguez MD at [facility] [address] Bilateral 2/16/2022    CYSTOSCOPY WITH BILATERAL STENT EXCHANGE performed by Bhavna Rodríguez MD at [facility]      cataracts    HERNIA REPAIR      IR BIOPSY ABDOMINAL MASS  7/6/2021    IR BIOPSY ABDOMINAL MASS 7/6/2021 [facility] SPECIAL PROCEDURES    IR PORT PLACEMENT EQUAL OR GREATER THAN 5 YEARS  8/31/2021    IR PORT PLACEMENT EQUAL OR GREATER THAN 5 YEARS 8/31/2021 [facility] SPECIAL PROCEDURES    TONSILLECTOMY      as child       Current Medications:    Current Facility-Administered Medications   Medication Dose Route Frequency Provider Last Rate Last Admin    0.9 % sodium chloride infusion   IntraVENous Continuous Raydell Marleni Jose Luis,  mL/hr at 07/14/22 1903 New Bag at 07/14/22 1903    vancomycin (VANCOCIN) intermittent dosing (placeholder)   Other RX Placeholder Christiano Amaya, DO        vancomycin 1000 mg IVPB in 250 mL D5W addavial  1,000 mg IntraVENous Q24H Christiano Amaya, DO        sodium chloride flush 0.9 % injection 5-40 mL  5-40 mL IntraVENous 2 times per day Roderick T Dixon, DO   10 mL at 07/14/22 2325    sodium chloride flush 0.9 % injection 5-40 mL  5-40 mL IntraVENous PRN Roderick T Dixon, DO        0.9 % sodium chloride infusion   IntraVENous PRN Roderick T Dixon, DO        acetaminophen (TYLENOL) tablet 650 mg  650 mg Oral Q4H PRN Roderick T Dixon, DO        ondansetron (ZOFRAN-ODT) disintegrating tablet 4 mg  4 mg Oral Q8H PRN Roderick T Dixon, DO        Or    ondansetron (ZOFRAN) injection 4 mg  4 mg IntraVENous Q6H PRN Roderick T Dixon, DO   4 mg at 07/15/22 0518    pregabalin (LYRICA) capsule 75 mg  75 mg Oral BID Roderick T Dixon, DO   75 mg at 07/14/22 2306       Allergies:  Patient has no known allergies. Social History:    reports that he quit smoking about 16 years ago. His smoking use included cigarettes. He has a 90.00 pack-year smoking history. He has never used smokeless tobacco. He reports current alcohol use of about 1.7 standard drinks per week. He reports current drug use. Drug: Marijuana [place]).     Family History:   Family History   Problem Relation Age of Onset    Diabetes Mother     Alzheimer's Disease Father     Heart Disease Brother     Heart Disease Brother        REVIEW OF SYSTEMS:  RESPIRATORY:  negative for  dry cough, dyspnea, wheezing and chest pain positive for  dry cough history of pneumonia  CARDIOVASCULAR: negative for  chest pain, dyspnea, palpitations, orthopnea, exertional chest pressure/discomfort, fatigue, edema, syncope positive for    GASTROINTESTINAL:  negative for nausea, vomiting, change in bowel habits, diarrhea, constipation, abdominal pain and reflux positive for   GENITOURINARY:  negative for frequency, dysuria, nocturia, urinary incontinence and hesitancy positive for   HEMATOLOGIC/LYMPHATIC:  negative for easy bruising, bleeding and swelling/edemapositive for   ENDOCRINE:  negative for weight changes, change in bowel habits and diabetic symptoms including neither polyuria nor polydipsia nor blurred vision nor foot ulcerations nor neuropathypositive for   MUSCULOSKELETAL:  negative for  myalgias, arthralgias, pain, joint swelling, stiff joints and muscle weakness positive for    NEUROLOGICAL:  negative for headaches, dizziness, memory problems, speech problems, visual disturbance, gait problems, weakness and numbness positive for     Vitals:  /68   Pulse 69   Temp 97.2 °F (36.2 °C) (Oral)   Resp 20   Ht 5' 10\" (1.778 m)   Wt 164 lb 14.5 oz (74.8 kg)   SpO2 94%   BMI 23.66 kg/m²     PHYSICAL EXAM:    CONSTITUTIONAL:  awake, alert, cooperative, no apparent distress, and appears stated age looks comfortable this morning  EYES:  Lids and lashes normal, pupils equal, round and reactive to light, extra ocular muscles intact, sclera clear, conjunctiva normal    ENT:  Normocephalic, without obvious abnormality, atraumatic, sinuses nontender on palpation, external ears without lesions, oral pharynx with moist mucus membranes, tonsils without erythema or exudates,    NECK:  Supple, symmetrical, trachea midline, no adenopathy, thyroid symmetric, not enlarged and no tenderness, skin normal    HEMATOLOGIC/LYMPHATICS:  no cervical lymphadenopathy   BACK:  Symmetric, no curvature, spinous processes are non-tender on palpation, paraspinous muscles are non-tender on palpation, no costal vertebral tenderness LUNGS:  No increased work of breathing, good air exchange, clear to auscultation bilaterally, no crackles or wheezing   CARDIOVASCULAR:  Normal apical impulse, regular rate and rhythm, normal S1 and S2, no S3 or S4, and no murmur noted     ABDOMEN:  No scars, normal bowel sounds, soft, non-distended, non-tender, no masses palpated, no hepatosplenomegally no  abdominal tenderness  GENITAL/URINARY:    MUSCULOSKELETAL:  There is no redness, warmth, or swelling of the joints. Full range of motion noted. Motor strength is 5 out of 5 all extremities bilaterally. Tone is normal.   NEUROLOGIC:  Awake, alert, oriented to name, place and time. Cranial nerves II-XII are grossly intact. Motor is 5 out of 5 bilaterally.     Sensory is intact. gait is normal.      SKIN:  no bruising or bleeding, normal skin color, texture, turgor, no redness, warmth, or swelling and no rashes     RECENT DATA:  No results found for: CBC, BMP    DATA:     Component Value Units   EKG 12 Lead [[id number]]    Collected: 07/14/22 1423    Updated: 07/15/22 6178     Ventricular Rate 97 BPM    Atrial Rate 97 BPM    P-R Interval 120 ms    QRS Duration 90 ms    Q-T Interval 378 ms    QTc Calculation (Bazett) 480 ms    P Axis 55 degrees    R Axis -26 degrees    T Axis 21 degrees   Narrative:     Normal sinus rhythm   Inferior infarct , age undetermined   Cannot rule out Anterior infarct (cited on or before 08-OCT-2021)   Abnormal ECG   When compared with ECG of 16-FEB-2022 22:10,   QRS axis Shifted left   Inferior infarct is now Present   Non-specific change in ST segment in Anterior leads   POC Glucose Fingerstick [[id number]]    Collected: 07/15/22 0605    Updated: 07/15/22 0621     POC Glucose 95 mg/dL   Troponin [[id number]] (Abnormal)    Collected: 07/14/22 2108    Updated: 07/14/22 2131    Specimen Source: Blood     Troponin, High Sensitivity 39 High  ng/L    Comment:        High Sensitivity Troponin values cannot be compared with other Troponin methodologies. Patients with high levels of Biotin oral intake (i.e >5mg/day) may have falsely decreased   Troponin levels. Samples collected within 8 hours of biotin intake may require additional   information for diagnosis. POC Glucose Fingerstick [[id number]] (Abnormal)    Collected: 07/14/22 2051    Updated: 07/14/22 2112     POC Glucose 239 High  mg/dL   MRSA DNA Probe, Nasal [[id number]]    Collected: 07/14/22 1906    Updated: 07/14/22 1907    Specimen Source: Nares    CT ABDOMEN PELVIS WO CONTRAST Additional Contrast? None [[id number]]    Collected: 07/14/22 1651    Updated: 07/14/22 1720    Narrative:     EXAMINATION:   CT OF THE ABDOMEN AND PELVIS WITHOUT CONTRAST 7/14/2022 12:39 pm     TECHNIQUE:   CT of the abdomen and pelvis was performed without the administration of   intravenous contrast. Multiplanar reformatted images are provided for review. Automated exposure control, iterative reconstruction, and/or weight based   adjustment of the mA/kV was utilized to reduce the radiation dose to as low   as reasonably achievable. COMPARISON:   02/16/2022     HISTORY:   ORDERING SYSTEM PROVIDED HISTORY: pain   TECHNOLOGIST PROVIDED HISTORY:   pain     Decision Support Exception - unselect if not a suspected or confirmed   emergency medical condition->Emergency Medical Condition (MA)   Reason for Exam: pain   Additional signs and symptoms: PT states abdominal pain and unable to eat x3   days. Relevant Medical/Surgical History: hx of gallbladder removal and bilateral   ureteral stents     FINDINGS:   Lower Chest: Bronchial wall thickening is noted in the lower lungs   bilaterally. Debris is noted in the left lower lobe airways. Noncontrast   imaging of the base of the heart is unremarkable. Lack of intravenous contrast limits evaluation of the solid abdominal   viscera, the hollow abdominal viscera, and the vascular structures. Organs:  With that said, no acute hepatic abnormality No free intraperitoneal air is seen. Bones/Soft Tissues: No acute or suspicious bony abnormalities. The   extra-abdominal and extra pelvic soft tissues are unremarkable. Impression:     Interval marked increased size of the infiltrative retroperitoneal mass,   compatible with the known history of lymphoma. The mass now partially envelops the left kidney, but no hydronephrosis is   identified. The mass also is seen abutting the duodenal sweep, but without obvious   obstruction. New pelvic lymphadenopathy.   The    Urinalysis with Reflex to Culture [[id number]] (Abnormal)    Collected: 07/14/22 1459    Updated: 07/14/22 1522    Specimen Source: Urine     Color, UA Yellow    Turbidity UA Clear    Glucose, Ur TRACE Abnormal     Bilirubin Urine NEGATIVE    Ketones, Urine TRACE Abnormal     Specific Gravity, UA 1.024    Urine Hgb MOD Abnormal     pH, UA 6.0    Protein, UA 4+ Abnormal     Urobilinogen, Urine Normal    Nitrite, Urine NEGATIVE    Leukocyte Esterase, Urine TRACE Abnormal    Microscopic Urinalysis [[id number]] (Abnormal)    Collected: 07/14/22 1459    Updated: 07/14/22 1522     WBC, UA 6 TO 9 /HPF    RBC, UA 21 TO 50 /HPF    Casts UA 0 TO 2 /LPF    Casts UA HYALINE /LPF    Epithelial Cells UA 6 TO 9 /HPF    Bacteria, UA None    Mucus, UA 1+ Abnormal    CBC with Auto Differential [[id number]] (Abnormal)    Collected: 07/14/22 1416    Updated: 07/14/22 1452    Specimen Source: Blood     WBC 8.7 k/uL    RBC 5.04 m/uL    Hemoglobin 12.8 Low  g/dL    Hematocrit 39.5 Low  %    MCV 78.5 Low  fL    MCH 25.5 Low  pg    MCHC 32.5 g/dL    RDW 16.4 High  %    Platelets 207 k/uL    MPV 7.7 fL    Seg Neutrophils 75 High  %    Lymphocytes 16 Low  %    Monocytes 9 High  %    Eosinophils % 0 %    Basophils 0 %    Segs Absolute 6.53 k/uL    Absolute Lymph # 1.39 k/uL    Absolute Mono # 0.78 k/uL    Absolute Eos # 0.00 k/uL    Basophils Absolute 0.00 k/uL    Morphology ANISOCYTOSIS PRESENT    Morphology 1+ ELLIPTOCYTES   Lactic Acid [[id number]]    Collected: 07/14/22 1435    Updated: 07/14/22 1451    Specimen Source: Blood     Lactic Acid 2.0 mmol/L   Troponin [[id number]] (Abnormal)    Collected: 07/14/22 1416    Updated: 07/14/22 1447    Specimen Source: Blood     Troponin, High Sensitivity 39 High  ng/L    Comment:        High Sensitivity Troponin values cannot be compared with other Troponin methodologies. Patients with high levels of Biotin oral intake (i.e >5mg/day) may have falsely decreased   Troponin levels. Samples collected within 8 hours of biotin intake may require additional   information for diagnosis. CMP [[id number]] (Abnormal)    Collected: 07/14/22 1416    Updated: 07/14/22 1446    Specimen Source: Blood     Glucose 119 High  mg/dL    BUN 27 High  mg/dL    CREATININE 1.56 High  mg/dL    Calcium 8.7 mg/dL    Sodium 140 mmol/L    Potassium 3.9 mmol/L    Chloride 102 mmol/L    CO2 24 mmol/L    Anion Gap 14 mmol/L    Alkaline Phosphatase 131 High  U/L    ALT 6 U/L    AST 15 U/L    Total Bilirubin 0.80 mg/dL    Total Protein 6.1 Low  g/dL    Albumin 3.7 g/dL    GFR Non-African American 44 Low  mL/min    GFR  53 Low  mL/min    GFR Comment         Comment: Average GFR for 79or more years old:    76 mL/min/1.73sq m   Chronic Kidney Disease:    <60 mL/min/1.73sq m   Kidney failure:    <15 mL/min/1.73sq m               eGFR calculated using average adult body mass.  Additional eGFR calculator available at:         [email or web address]             Lipase [[id number]] (Abnormal)    Collected: 07/14/22 1416    Updated: 07/14/22 1446    Specimen Source: Blood     Lipase 69 High  U/L   XR CHEST PORTABLE [[id number]]    Collected: 07/14/22 1423    Updated: 07/14/22 1432    Narrative:     EXAMINATION:   ONE XRAY VIEW OF THE CHEST     7/14/2022 11:21 am     COMPARISON:   06/13/2022     HISTORY:   ORDERING SYSTEM PROVIDED HISTORY: pain   TECHNOLOGIST PROVIDED HISTORY:   pain   Reason for Exam: Pain, abdominal pain. Pt. states history of COPD     FINDINGS:   Stable right-sided brittany catheter. .The cardiac size is normal. No  pleural   effusions are seen. Pulmonary vascularity appears stable. Mild infiltrate in   the left lower lobe. Valeria Bloodgood Valeria Bloodgood No acute bony abnormalities.  The hilar structures are   normal.    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 ([facility]) C82.13    Hydronephrosis due to obstruction of ureter N13.1    Moderate malnutrition ([facility]) E44.0    Chronic cholecystitis K81.1    Pleural effusion J90    Cardiomegaly I51.7    Hypothyroidism E03.9    Diabetes mellitus ([facility] [address] E11.9    Hypertensive urgency I16.0    Acute respiratory failure with hypoxia ([facility]) J96.01    Hypoxia R09.02    Carotid stenosis, asymptomatic, bilateral I65.23    Simple chronic bronchitis ([facility]) J41.0    Dependence on nocturnal oxygen therapy Z99.81    Pneumonia J18.9     Pneumonia    Abdominal pads may be increased in size    History of lymphoma  Abdominal pain and neuropathic pain    History of postherpetic neuralgia    History of hydronephrosis but no evidence of that on recent CT except enlargement of the medicine abdomen    Dementia patient is alert and oriented this morning  UTI    PLAN:  Will continue with his antibiotics cefepime we will add Zithromax for now for better coverage    We will continue with his Lyrica 75 mg twice a day seems to help his abdominal symptoms    Will consult his hematologist    Will consult pulmonary discussed with Dr. Alan Herman I believe that he saw him in the past    Continue with his home meds for right now    We will get physical therapy    Will monitor his progress        Electronically signed by KRISTI Porter on 7/15/2022 at 7:07 [address] [facility], [address]

## 2022-07-15 NOTE — PROGRESS NOTES
It has been brought to my attention that there are pictures posted on social media by the patient's daughter of myself, Margaret ADAIR, Dr. Anshul Uribe, Dr. Roxy Pastrana and a medical student. These pictures were taken without our knowledge or consent.

## 2022-07-15 NOTE — DISCHARGE INSTR - COC
Continuity of Care Form    Patient Name: Hillary Buck   :    MRN:  [id number]    Admit date:  2022  Discharge date:  2022    Code Status Order: Full Code   Advance Directives:     Admitting Physician:  Philomena Capps DO  PCP: Betzaida Novoa DO    Discharging Nurse: [facility] Unit/Room#: 0/-01  Discharging Unit Phone Number: [phone]    Emergency Contact:   Extended Emergency Contact Information  Primary Emergency Contact: Nirali García, [address] [place] [address]  Home Phone: [phone]  Mobile Phone: [phone]  Relation: Spouse  Secondary Emergency Contact: Rhianna   [facility] Phone: [phone]  Mobile Phone: [phone]  Relation: Child   needed?  No    Past Surgical History:  Past Surgical History:   Procedure Laterality Date    CAROTID ENDARTERECTOMY Left 2022    LEFT TYPE I EVERSION LEFT CAROTID ENDARTERECTOMY RE-IMPLANTATION LEFT ICA performed by Jenna Wilson MD at [facility], LAPAROSCOPIC N/A 10/9/2021    CHOLECYSTECTOMY LAPAROSCOPIC ROBOTIC XI performed by Zee Wilde MD at [address] [facility] Left 2021    COLONOSCOPY POLYPECTOMY SNARE/COLD BIOPSY performed by Marylu Martell MD at [address] [place] [address] Right 10/6/2021    CYSTOSCOPY PYELOGRAM URETERAL STENT INSERTION performed by Rosas Dhillon MD at [address] [facility] Bilateral 2021    CYSTOSCOPY URETERAL [address] performed by Rosas Dhillon MD at [address] [place] [address] Bilateral 2022    CYSTOSCOPY WITH BILATERAL STENT EXCHANGE performed by Rosas Dhillon MD at [facility] [address]      IR BIOPSY ABDOMINAL MASS  2021    IR BIOPSY ABDOMINAL MASS 2021 [facility] SPECIAL PROCEDURES    IR PORT PLACEMENT EQUAL OR GREATER THAN 5 YEARS  2021    IR PORT PLACEMENT EQUAL OR GREATER THAN 5 YEARS 2021 [facility] SPECIAL PROCEDURES    TONSILLECTOMY Mental Status:  oriented, alert, coherent, logical, and thought processes intact    IV Access:  - None    Nursing Mobility/ADLs:  Walking   Independent  Transfer  Independent  Bathing  Independent  Dressing  Independent  Toileting  Independent  Feeding  Independent  Med [address] [facility]  Independent  Med Delivery   whole    Wound Care Documentation and Therapy:  Incision 06/28/22 Neck Left (Active)   Dressing Status Other (Comment) 06/29/22 0600   Incision Cleansed Not Cleansed 06/29/22 0800   Dressing/Treatment Open to air 06/29/22 0800   Closure Surgical glue 06/29/22 0800   Margins Approximated 06/29/22 0800   Drainage Amount None 06/29/22 0600   Odor None 06/29/22 0600   Ladi-incision Assessment Ecchymosis; Intact 06/29/22 0600   Number of days: 17        Elimination:  Continence: Bowel: Yes  Bladder: Yes  Urinary Catheter: None   Colostomy/Ileostomy/Ileal Conduit: No       Date of Last BM: 7/21/2022    Intake/Output Summary (Last 24 hours) at 7/15/2022 1250  Last data filed at 7/15/2022 0821  Gross per 24 hour   Intake 180 ml   Output --   Net 180 ml     No intake/output data recorded. Safety Concerns: At Risk for Falls    Impairments/Disabilities:      None    Nutrition Therapy:  Current Nutrition Therapy:   - Oral Diet:  General    Routes of Feeding: Oral  Liquids: No Restrictions  Daily Fluid Restriction: no  Last Modified Barium Swallow with Video (Video Swallowing Test): not done    Treatments at the Time of Hospital Discharge:   Respiratory Treatments: yes  Oxygen Therapy:  is on oxygen at 2 L/min per nasal cannula. Ventilator:    - No ventilator support    Rehab Therapies: Physical Therapy and Occupational Therapy  Weight Bearing Status/Restrictions: No weight bearing restrictions  Other Medical Equipment (for information only, NOT a DME order):  wheelchair, SC, Rollator, Cane  Other Treatments: Skilled Nursing Assessment Per Protocol. Medication Education & Monitoring.      Patient's personal belongings (please select all that are sent with patient):  Leandro Edwards RN SIGNATURE:  Electronically signed by Adrián Trotter RN on 7/21/22 at 4:09 PM EDT    CASE MANAGEMENT/SOCIAL WORK SECTION    Inpatient Status Date: 7/14/2022    Readmission Risk Assessment Score:  Readmission Risk              Risk of Unplanned Readmission:  [id number]           Discharging to Facility/ . [address] [address] [address]  Phone [phone]  Fax  [phone]       / signature: Electronically signed by Parker Jovel RN on 7/15/22 at 12:50 PM EDT    PHYSICIAN SECTION    Prognosis: Fair    Condition at Discharge: Stable    Rehab Potential (if transferring to Rehab): Fair    Recommended Labs or Other Treatments After Discharge: cbcd bmp in 7 days    Physician Certification: I certify the above information and transfer of Albino Matta  is necessary for the continuing treatment of the diagnosis listed and that he requires Home Care for greater 30 days.      Update Admission H&P: No change in H&P    PHYSICIAN SIGNATURE:  Electronically signed by Kylee Talamantes DO on 7/21/22 at 8:34 AM EDT

## 2022-07-15 NOTE — PROGRESS NOTES
RN walked into patient's room when call light was put on. Patient was seen in bed holding his stomach and complaining of pain. Family members were seen at bedside and proceeded to yell at RN, stating that patient was not receiving adequate medical care from doctors, and that they did not want Dr. Santa Brennan to see patient anymore. RN stated that pulmonology and oncology were consulted and that a page would be sent out to try and get other pain medication for the patient. RN also informed family that Dr. Santa Brennan had been paged 10 minutes prior. RN sent message via perfect serve to Dr. Philly Prasad inquiring about pain medication. Awaiting response.

## 2022-07-15 NOTE — PROGRESS NOTES
Cefepime Extended Interval Interchange    The following ordered dose of Cefepime has been changed to optimize its pharmacodynamic profile as approved by the Patient Care Review Committee. Ordered Dose    __ 1 gm IV every 24 hrs  (30 minute infusion)        CrCl Dose   >60 1-2 gm q8-12hrs over 4 hours   30-59 or CRRT 1-2gm [id number] over 4 hours   <30 500mg-2gm q24hrs over 4 hours or 1gm q12hrs over 4 hours   <10 or HD 500mg-1gm q24hrs over 4 hours       New Dose    Cefepime   2 gm  IV q 12 hrs  over 4 hours. Pharmacists should be contacted for issues concerning drug compatibility with multiple IV medications. All doses will be prepared using 50ml bag to be infused over 4-hours at a rate of 12.5ml/hr.     Thank Hiro Clark, RPH7/15/79346:27 AM

## 2022-07-15 NOTE — CONSULTS
inhaler, Inhale 1 puff into the lungs every 12 hours  furosemide (LASIX) 20 MG tablet, Take 1 tablet by mouth daily  pregabalin (LYRICA) 75 MG capsule, Take 75 mg by mouth 2 times daily. Indications: last fill on 4/22/22 #180 for 30 days   albuterol sulfate HFA (VENTOLIN HFA) 108 (90 Base) MCG/ACT inhaler, Inhale 2 puffs into the lungs every 4 hours as needed for Wheezing   amLODIPine (NORVASC) 10 MG tablet, Take 1 tablet by mouth daily (Patient taking differently: Take 10 mg by mouth at bedtime )  losartan (COZAAR) 50 MG tablet, Take 1 tablet by mouth daily  donepezil (ARICEPT) 10 MG tablet, Take 10 mg by mouth daily   FLUoxetine (PROZAC) 20 MG capsule, Take 20 mg by mouth daily  omeprazole (PRILOSEC) 20 MG delayed release capsule, Take 20 mg by mouth 2 times daily      PAST MEDICAL HISTORY:  Past Medical History:   Diagnosis Date    Arthritis     Cancer [facility])     chemotherapy lymphoma last was april 2022    Chemotherapy management, encounter for     CHF (congestive heart failure) (HCC)     COPD (chronic obstructive pulmonary disease) ([facility] [address]     Diabetes mellitus ([facility] [address]     H/O cardiovascular stress test     History of non-Hodgkin's lymphoma     Hx of blood clots     DVT in right leg     Hyperlipidemia     Hypertension     Hypothyroidism     On home O2     at 5 liters per nasal cannula 24 hrs. per day.         PAST SURGICAL HISTORY:  Past Surgical History:   Procedure Laterality Date    CAROTID ENDARTERECTOMY Left 6/28/2022    LEFT TYPE I EVERSION LEFT CAROTID ENDARTERECTOMY RE-IMPLANTATION LEFT ICA performed by Mark Lozada MD at [address] N/[address] 10/9/2021    CHOLECYSTECTOMY LAPAROSCOPIC ROBOTIC XI performed by Nitish Young MD at [address] Left 9/24/2021    COLONOSCOPY POLYPECTOMY SNARE/COLD BIOPSY performed by Camacho Stern MD [facility] [address] [facility] Right 10/6/2021    CYSTOSCOPY PYELOGRAM URETERAL STENT INSERTION performed by Bhavna Rodríguez MD at [address] CYSTOSCOPY Bilateral 2021    CYSTOSCOPY URETERAL STENT INSERTION RIGHT EXCHANGE & RETROGRADE PYELOGRAM LEFT INSERTION performed by Layla Prader, MD at [address] Bilateral 2022    CYSTOSCOPY WITH BILATERAL STENT EXCHANGE performed by Layla Prader, MD at [address]      IR BIOPSY ABDOMINAL MASS  2021    IR BIOPSY ABDOMINAL MASS 2021 [facility]    IR PORT PLACEMENT EQUAL OR GREATER THAN 5 YEARS  2021    IR PORT PLACEMENT EQUAL OR GREATER THAN 5 YEARS 2021 [facility]    TONSILLECTOMY      as child          SOCIAL HISTORY:  Social History     Socioeconomic History    Marital status:      Spouse name: Not on file    Number of children: Not on file    Years of education: Not on file    Highest education level: Not on file   Occupational History    Not on file   Tobacco Use    Smoking status: Former     Packs/day: 2.00     Years: 45.00     Pack years: 90.00     Types: Cigarettes     Quit date: 2006     Years since quittin.5    Smokeless tobacco: Never   Vaping Use    Vaping Use: Never used   Substance and Sexual Activity    Alcohol use:  Yes     Alcohol/week: 1.7 standard drinks     Types: 2 Standard drinks or equivalent per week     Comment: occassional    Drug use: Yes     Types: Marijuana Joleen Postin)     Comment:  CBD gummies no smoking    Sexual activity: Not on file   Other Topics Concern    Not on file   Social History Narrative    Not on file     Social Determinants of Health     Financial Resource Strain: Not on file   Food Insecurity: Not on file   Transportation Needs: Not on file   Physical Activity: Not on file   Stress: Not on file   Social Connections: Not on file   Intimate Partner Violence: Not on file   Housing Stability: Not on file       FAMILY HISTORY:  Family History   Problem Relation Age of Onset    Diabetes Mother     Alzheimer's Disease Father     Heart Disease Brother Heart Disease Brother        REVIEW OF SYSTEMS:  All other systems reviewed and are negative. PHYSICAL EXAM:  Vital Signs Blood pressure 120/68, pulse 74, temperature 97.2 °F (36.2 °C), temperature source Oral, resp. rate 18, height 5' 10\" (1.778 m), weight 164 lb 14.5 oz (74.8 kg), SpO2 94 %. Oxygen Amount and Delivery:      Admission Weight Weight: 165 lb (74.8 kg)    General Appearance   Head  Normocephalic, without obvious abnormality, atraumatic  Eyes  conjunctivae/corneas clear. PERRL, EOM's intact. Neck  no adenopathy, no carotid bruit, no JVD, supple, symmetrical, trachea midline and thyroid not enlarged, symmetric, no tenderness/mass/nodules  Lungs: minimal wheezes over lower lungs, good airway entry, no rales or rhonchi  Heart: regular rate and rhythm, S1, S2 normal, no murmur, click, rub or gallop  Abdomen  soft, non-tender; bowel sounds normal; no masses,  no organomegaly  Extremities: no peripheral edema   Skin  Skin color, texture, turgor normal. No rashes or lesions  Neurologic: Alert and oriented X 3, normal strength and tone.          Imaging      Lab Review  CBC     Lab Results   Component Value Date/Time    WBC 8.7 07/14/2022 02:16 PM    RBC 5.04 07/14/2022 02:16 PM    RBC 4.38 06/06/2012 06:57 AM    HGB 12.8 07/14/2022 02:16 PM    HCT 39.5 07/14/2022 02:16 PM     07/14/2022 02:16 PM     06/06/2012 06:57 AM    MCV 78.5 07/14/2022 02:16 PM    MCH 25.5 07/14/2022 02:16 PM    MCHC 32.5 07/14/2022 02:16 PM    RDW 16.4 07/14/2022 02:16 PM    LYMPHOPCT 16 07/14/2022 02:16 PM    MONOPCT 9 07/14/2022 02:16 PM    BASOPCT 0 07/14/2022 02:16 PM    MONOSABS 0.78 07/14/2022 02:16 PM    LYMPHSABS 1.39 07/14/2022 02:16 PM    EOSABS 0.00 07/14/2022 02:16 PM    BASOSABS 0.00 07/14/2022 02:16 PM    DIFFTYPE NOT REPORTED 02/20/2022 06:53 AM       BMP   Lab Results   Component Value Date/Time     07/14/2022 02:16 PM    K 3.9 07/14/2022 02:16 PM     07/14/2022 02:16 PM    CO2 24 07/14/2022 02:16 PM    BUN 27 07/14/2022 02:16 PM    CREATININE 1.56 07/14/2022 02:16 PM    GLUCOSE 119 07/14/2022 02:16 PM    GLUCOSE 97 06/06/2012 06:57 AM    CALCIUM 8.7 07/14/2022 02:16 PM       LFTS  Lab Results   Component Value Date/Time    ALKPHOS 131 07/14/2022 02:16 PM    ALT 6 07/14/2022 02:16 PM    AST 15 07/14/2022 02:16 PM    PROT 6.1 07/14/2022 02:16 PM    BILITOT 0.80 07/14/2022 02:16 PM    BILIDIR 0.17 10/06/2021 08:32 AM    IBILI 0.44 10/06/2021 08:32 AM    LABALBU 3.7 07/14/2022 02:16 PM       INR  No results for input(s): PROTIME, INR in the last 72 hours. APTT  No results for input(s): APTT in the last 72 hours. Lactic Acid  Lab Results   Component Value Date/Time    LACTA 2.0 07/14/2022 02:35 PM    LACTA 1.3 10/05/2021 04:19 PM        PRO-BNP   No results for input(s): PROBNP in the last 72 hours. ABGs: No results found for: PHART, PO2ART, [id number]    Lab Results   Component Value Date/Time    MODE NOT REPORTED 02/17/2022 01:28 AM         Impression    Abdominal lymphoma   Pneumonia   CKD  COPD   Hx of smoking   No documented PFTs or sleep study     Plan:      Left lower lobe infiltrate. Procal is 0.10. on IV cefepime and Zithromax   Pending respiratory culture  Continue home inhalers for COPD   Rest of management per primary         Hany Mora  Resident physician PGY2    7/15/2022  11:56 AM      Patient seen and examined independently by me. Above discussed and I agree with resident note except where indicated in the EMR revision history. Also see my additional comments and changes indicated by discrete font, text color, italics, and/or initials. Labs, cultures, and radiographs where available were reviewed. CT of abdomen and pelvis reviewed, he has an enlarging mass presumed lymphoma, bite on lower portions of his lungs, we do not see any evidence of pneumonia. Rather, it appears that he has bilateral atelectasis more on the left than the right.   Indeed, when we asked him to take deep breaths and, his oxygen saturations went from 85 to 86% on room air up to 93%. When he had normal breathing, they went back down and he required oxygen. He carries a diagnosis of COPD but there are no documented pulmonary function testing. According to his family, he had a sleep study many years ago but it sounds like it was a home sleep study and it was just with an Actigraph    CODE STATUS reviewed with him and his family and he wishes to be full code. His abdominal pain has improved after we gave him 2 mg of morphine and oncology has ordered every 4 hours as needed. He is coughing up some yellowish to green phlegm without any fevers. I suspect he may have an acute bronchitis rather than a true pneumonia. His procalcitonin is not significantly elevated. He cannot have a CT of the chest with contrast given his chronic kidney disease. We will go ahead and continue the cefepime and Zithromax get a sputum culture, and make sure he does not have Pseudomonas. We will continue his respiratory treatments for now. We will add incentive spirometry. We will check lower extremity venous Dopplers. He had a right lower extremity gastric DVT and had been on Eliquis although he is not on it currently. Will hold his Lasix as he is getting IV fluids. Finally, as an outpatient he had seen another pulmonologist.  Dr. Aliya Vazquez did see him in February in the hospital.  The family and the patient has requested our group to continue to see the patient in the hospital and then in follow-up.       Electronically signed by Tiffanie Nichols MD on 7/15/2022 at 12:36 PM

## 2022-07-15 NOTE — CONSULTS
Today's Date: 7/15/2022  Patient Name: Favian Adan  Date of admission: 7/14/2022  1:37 PM  Patient's age: 66 y.o., 1950  Admission Dx: Pneumonia [J18.9]  Pneumonia of left lower lobe due to infectious organism [J18.9]  Nausea and vomiting, intractability of vomiting not specified, unspecified vomiting type [R11.2]    Reason for Consult: management recommendations  Requesting Physician: Preethi Garza DO    CHIEF COMPLAINT: Abdominal pain. Progressive disease      History Obtained From:  patient, electronic medical record    HISTORY OF PRESENT ILLNESS:      The patient is a 66 y.o.  male who is admitted to the hospital for chief complaints of worsening abdominal pain. Patient is well-known to our practice. Patient has history of recurrent diffuse large B-cell lymphoma. At time of relapse biopsy showed follicular lymphoma. Patient's treatment history is as below. Most recently patient was treated with Rituxan followed by Rituxan maintenance. Patient was last seen in office back in March. CT scans from February showed stable disease patient's last reduction infusion was back in May 2022. Patient was recently discharged to the hospital on the 29th after undergoing a left carotid endarterectomy back in June the patient is now admitted with worsening of abdominal pain. Patient underwent CT scan in the ER 20 CT of abdomen pelvis without contrast which shows marked interval increase of infiltrative retroperitoneal mass compatible with known history of lymphoma. The mass now partially enveloped the left kidney but no hydronephrosis is noted. The mass is also abutting the duodenum. Patient's lab work-up shows hemoglobin of 12.8. MCV 78.5. 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 Evidence of relapse with biopsy-proven follicular lymphoma.   1-2 from treated medically lymph node biopsy July 6, 2021     CURRENT THERAPY:    1.   Observation. 2.   Status post treatment with Bexxar in May 2008. 3.   Status post previous treatment with Rituxan. 4.  Start treatment with Rituxan August 23, 2021. completed4 weeks of rituximab on 9/20/2021  5. Induction Rituxan is followed by consolidation Rituxan maintenance      Past Medical History:   has a past medical history of Arthritis, Cancer ([facility] [address] Chemotherapy management, encounter for, CHF (congestive heart failure) ([facility] [address] COPD (chronic obstructive pulmonary disease) ([facility] [address] Diabetes mellitus ([facility] [address] H/O cardiovascular stress test, History of non-Hodgkin's lymphoma, Hx of blood clots, Hyperlipidemia, Hypertension, Hypothyroidism, and On home O2. Past Surgical History:   has a past surgical history that includes hernia repair; IR BIOPSY ABDOMINAL/RETROPERITONEAL MASS PERCUTANEOUS (7/6/2021); IR PORT PLACEMENT > 5 YEARS (8/31/2021); Colonoscopy (Left, 9/24/2021); Cystoscopy (Right, 10/6/2021); Cholecystectomy, laparoscopic (N/A, 10/9/2021); Tonsillectomy; Cystoscopy (Bilateral, 11/24/2021); Cystoscopy (Bilateral, 2/16/2022); eye surgery; and Carotid endarterectomy (Left, 6/28/2022). Medications:    Prior to Admission medications    Medication Sig Start Date End Date Taking?  Authorizing Provider   apixaban (ELIQUIS) 5 MG TABS tablet Take 5 mg by mouth 2 times daily   Yes Historical Provider, MD   carvedilol (COREG) 6.25 MG tablet Take 6.25 mg by mouth at bedtime   Yes Historical Provider, MD   psyllium (KONSYL) 28.3 % PACK Take 1 packet by mouth daily   Yes Historical Provider, MD   Lactobacillus (PROBIOTIC ACIDOPHILUS) CAPS Take 1 caplet by mouth daily   Yes Historical Provider, MD   Multiple Vitamins-Minerals (THERAPEUTIC MULTIVITAMIN-MINERALS) tablet Take 1 tablet by mouth daily   Yes Historical Provider, MD   glipiZIDE (GLUCOTROL) 5 MG tablet Take 5 mg by mouth 2 times daily (before meals)   Yes Historical Provider, MD atorvastatin (LIPITOR) 40 MG tablet Take 1 tablet by mouth daily 6/29/22   Tray English MD   OXYGEN Inhale 5 L into the lungs at bedtime Nightly & during the day. Historical Provider, MD   ipratropium-albuterol (DUONEB) 0.5-2.5 (3) MG/3ML SOLN nebulizer solution Inhale 3 mLs into the lungs every 4 hours as needed for Shortness of Breath 2/20/22   EDWIGE Peterson - CNP   levothyroxine (SYNTHROID) 150 MCG tablet Take 1 tablet by mouth Daily 2/2/22   Shayy aPrker,    fluticasone-salmeterol (ADVAIR) 100-50 MCG/DOSE diskus inhaler Inhale 1 puff into the lungs every 12 hours 2/1/22   Shayy Parker, DO   furosemide (LASIX) 20 MG tablet Take 1 tablet by mouth daily 2/1/22   Shayy Parker,    pregabalin (LYRICA) 75 MG capsule Take 75 mg by mouth 2 times daily.  Indications: last fill on 4/22/22 #180 for 30 days     Historical Provider, MD   albuterol sulfate HFA (VENTOLIN HFA) 108 (90 Base) MCG/ACT inhaler Inhale 2 puffs into the lungs every 4 hours as needed for Wheezing     Historical Provider, MD   amLODIPine (NORVASC) 10 MG tablet Take 1 tablet by mouth daily  Patient taking differently: Take 10 mg by mouth at bedtime  10/12/21   Roderick Metcalf, DO   losartan (COZAAR) 50 MG tablet Take 1 tablet by mouth daily 10/12/21   Roderick Metcalf, DO   donepezil (ARICEPT) 10 MG tablet Take 10 mg by mouth daily  7/11/21   Historical Provider, MD   FLUoxetine (PROZAC) 20 MG capsule Take 20 mg by mouth daily    Historical Provider, MD   omeprazole (PRILOSEC) 20 MG delayed release capsule Take 20 mg by mouth 2 times daily    Historical Provider, MD     Current Facility-Administered Medications   Medication Dose Route Frequency Provider Last Rate Last Admin    azithromycin (ZITHROMAX) 500 mg in D5W 250ml addavial  500 mg IntraVENous Q24H Roderick Metcalf, DO   Stopped at 07/15/22 0905    FLUoxetine (PROZAC) capsule 20 mg  20 mg Oral Daily Roderick Metcalf, DO   20 mg at 07/15/22 0744    donepezil (ARICEPT) tablet 10 mg  10 mg Oral Daily Roderick T Dixon, DO   10 mg at 07/15/22 0744    amLODIPine (NORVASC) tablet 10 mg  10 mg Oral Daily Roderick T Dixon, DO   10 mg at 07/15/22 0744    losartan (COZAAR) tablet 50 mg  50 mg Oral Daily Roderick T Dixon, DO   50 mg at 07/15/22 0744    albuterol sulfate HFA (PROVENTIL;VENTOLIN;PROAIR) 108 (90 Base) MCG/ACT inhaler 2 puff  2 puff Inhalation Q4H PRN Roderick T Dixon, DO        levothyroxine (SYNTHROID) tablet 150 mcg  150 mcg Oral Daily Roderick T Dixon, DO   150 mcg at 07/15/22 0754    [Held by provider] furosemide (LASIX) tablet 20 mg  20 mg Oral Daily Roderick T Dixon, DO   20 mg at 07/15/22 0744    ipratropium-albuterol (DUONEB) nebulizer solution 3 mL  1 vial Inhalation Q4H PRN Roderick T Dixon, DO        atorvastatin (LIPITOR) tablet 40 mg  40 mg Oral Daily Roderick T Dixon, DO   40 mg at 07/15/22 0744    apixaban (ELIQUIS) tablet 5 mg  5 mg Oral BID Roderick T Dixon, DO   5 mg at 07/15/22 0744    carvedilol (COREG) tablet 6.25 mg  6.25 mg Oral Nightly Roderick T Dixon, DO        psyllium (METAMUCIL) 58.12 % packet 1 packet  1 packet Oral Daily Roderick T Dixon, DO   1 packet at 07/15/22 0744    lactobacillus (CULTURELLE) capsule 1 capsule  1 capsule Oral Daily Roderick T Dixon, DO   1 capsule at 07/15/22 0744    therapeutic multivitamin-minerals 1 tablet  1 tablet Oral Daily Roderick T Dixon, DO   1 tablet at 07/15/22 0744    glipiZIDE (GLUCOTROL) tablet 5 mg  5 mg Oral BID AC Roderick T Dixon, DO   5 mg at 07/15/22 0754    pantoprazole (PROTONIX) tablet 40 mg  40 mg Oral QAM AC Roderick T Dixon, DO   40 mg at 07/15/22 0754    budesonide-formoterol (SYMBICORT) 80-4.5 MCG/ACT inhaler 2 puff  2 puff Inhalation BID Roderick T Dixon, DO   2 puff at 07/15/22 0726    cefepime (MAXIPIME) 2000 mg IVPB minibag  2,000 mg IntraVENous Q12H Roderick T Dixon, DO 12.5 mL/hr at 07/15/22 1029 2,000 mg at 07/15/22 1029    glucose chewable tablet 16 g  4 tablet Oral PRN Roderick T Dixon, DO   16 g at 07/15/22 1233    dextrose bolus 10% 125 mL  125 mL IntraVENous PRN Roderick T Dixon, DO        Or    dextrose bolus 10% 250 mL  250 mL IntraVENous PRN Roderick T Dixon, DO        glucagon (rDNA) injection 1 mg  1 mg IntraMUSCular PRN Roderick T Dixon, DO        dextrose 5 % solution  100 mL/hr IntraVENous PRN Roderick T Dixon, DO        morphine (PF) injection 2 mg  2 mg IntraVENous Q4H PRN Haroldo Evans MD        0.9 % sodium chloride infusion   IntraVENous Continuous Shakeel Dominguez  mL/hr at 07/14/22 1903 New Bag at 07/14/22 1903    sodium chloride flush 0.9 % injection 5-40 mL  5-40 mL IntraVENous 2 times per day Radhames Sell, DO   10 mL at 07/14/22 2325    sodium chloride flush 0.9 % injection 5-40 mL  5-40 mL IntraVENous PRN Roderick T Dixon, DO        0.9 % sodium chloride infusion   IntraVENous PRN Roderick T Dixon, DO        acetaminophen (TYLENOL) tablet 650 mg  650 mg Oral Q4H PRN Roderick T Dixon, DO   650 mg at 07/15/22 0744    ondansetron (ZOFRAN-ODT) disintegrating tablet 4 mg  4 mg Oral Q8H PRN Roderick T Dixon, DO        Or    ondansetron (ZOFRAN) injection 4 mg  4 mg IntraVENous Q6H PRN Roderick T Dixon, DO   4 mg at 07/15/22 1134    pregabalin (LYRICA) capsule 75 mg  75 mg Oral BID Roderick T Dixon, DO   75 mg at 07/15/22 0744       Allergies:  Patient has no known allergies. Social History:   reports that he quit smoking about 16 years ago. His smoking use included cigarettes. He has a 90.00 pack-year smoking history. He has never used smokeless tobacco. He reports current alcohol use of about 1.7 standard drinks per week. He reports current drug use. Drug: Marijuana Rigo Milton). Family History: family history includes Alzheimer's Disease in his father; Diabetes in his mother; Heart Disease in his brother and brother. REVIEW OF SYSTEMS:      Constitutional: No fever or chills.  No night sweats, no weight loss   Eyes: No eye discharge, double vision, or eye pain   HEENT: negative for sore mouth, sore throat, hoarseness and voice change   Respiratory: negative for cough , sputum, dyspnea, wheezing, hemoptysis, chest pain   Cardiovascular: negative for chest pain, dyspnea, palpitations, orthopnea, PND   Gastrointestinal: negative for nausea, vomiting, diarrhea, constipation, abdominal pain, Dysphagia, hematemesis and hematochezia. Positive abdominal pain, much improved now  Genitourinary: negative for frequency, dysuria, nocturia, urinary incontinence, and hematuria   Integument: negative for rash, skin lesions, bruises.    Hematologic/Lymphatic: negative for easy bruising, bleeding, lymphadenopathy, or petechiae   Endocrine: negative for heat or cold intolerance,weight changes, change in bowel habits and hair loss   Musculoskeletal: negative for myalgias, arthralgias, pain, joint swelling,and bone pain   Neurological: negative for headaches, dizziness, seizures, weakness, numbness    PHYSICAL EXAM:        /73   Pulse 87   Temp 97.9 °F (36.6 °C) (Oral)   Resp 20   Ht 5' 10\" (1.778 m)   Wt 164 lb 14.5 oz (74.8 kg)   SpO2 (!) 89% Comment: will talk to nurse about possible nasal cannula placement  BMI 23.66 kg/m²    Temp (24hrs), Av.7 °F (36.5 °C), Min:97.2 °F (36.2 °C), Max:98.6 °F (37 °C)      General appearance - well appearing, no in pain or distress   Mental status - alert and cooperative   Eyes - pupils equal and reactive, extraocular eye movements intact   Ears - bilateral TM's and external ear canals normal   Mouth - mucous membranes moist, pharynx normal without lesions   Neck - supple, no significant adenopathy   Lymphatics - no palpable lymphadenopathy, no hepatosplenomegaly   Chest - clear to auscultation, no wheezes, rales or rhonchi, symmetric air entry   Heart - normal rate, regular rhythm, normal S1, S2, no murmurs  Abdomen - soft, nontender, nondistended, no masses or organomegaly   Neurological - alert, oriented, normal speech, no focal findings or movement disorder noted   Musculoskeletal - no joint tenderness, deformity or swelling Reflex to Culture    Specimen: Urine   Result Value Ref Range    Color, UA Yellow Yellow    Turbidity UA Clear Clear    Glucose, Ur TRACE (A) NEGATIVE    Bilirubin Urine NEGATIVE NEGATIVE    Ketones, Urine TRACE (A) NEGATIVE    Specific Gravity, UA 1.024 1.000 - 1.030    Urine Hgb MOD (A) NEGATIVE    pH, UA 6.0 5.0 - 8.0    Protein, UA 4+ (A) NEGATIVE    Urobilinogen, Urine Normal Normal    Nitrite, Urine NEGATIVE NEGATIVE    Leukocyte Esterase, Urine TRACE (A) NEGATIVE   Troponin   Result Value Ref Range    Troponin, High Sensitivity 39 (H) 0 - 22 ng/L   Microscopic Urinalysis   Result Value Ref Range    WBC, UA 6 TO 9 /HPF    RBC, UA 21 TO 50 /HPF    Casts UA 0 TO 2 /LPF    Casts UA HYALINE /LPF    Epithelial Cells UA 6 TO 9 /HPF    Bacteria, UA None None    Mucus, UA 1+ (A) None   Troponin   Result Value Ref Range    Troponin, High Sensitivity 39 (H) 0 - 22 ng/L   Troponin   Result Value Ref Range    Troponin, High Sensitivity 43 (H) 0 - 22 ng/L   Lipase   Result Value Ref Range    Lipase 123 (H) 13 - 60 U/L   Procalcitonin   Result Value Ref Range    Procalcitonin 0.10 (H) <0.09 ng/mL   POC Glucose Fingerstick   Result Value Ref Range    POC Glucose 239 (H) 75 - 110 mg/dL   POC Glucose Fingerstick   Result Value Ref Range    POC Glucose 95 75 - 110 mg/dL   POC Glucose Fingerstick   Result Value Ref Range    POC Glucose 87 75 - 110 mg/dL   POC Glucose Fingerstick   Result Value Ref Range    POC Glucose 56 (L) 75 - 110 mg/dL   POC Glucose Fingerstick   Result Value Ref Range    POC Glucose 68 (L) 75 - 110 mg/dL   EKG 12 Lead   Result Value Ref Range    Ventricular Rate 97 BPM    Atrial Rate 97 BPM    P-R Interval 120 ms    QRS Duration 90 ms    Q-T Interval 378 ms    QTc Calculation (Bazett) 480 ms    P Axis 55 degrees    R Axis -26 degrees    T Axis 21 degrees         IMAGING DATA:    CT ABDOMEN PELVIS WO CONTRAST Additional Contrast? None    Result Date: 7/14/2022  EXAMINATION: CT OF THE ABDOMEN AND PELVIS WITHOUT CONTRAST 7/14/2022 12:39 pm TECHNIQUE: CT of the abdomen and pelvis was performed without the administration of intravenous contrast. Multiplanar reformatted images are provided for review. Automated exposure control, iterative reconstruction, and/or weight based adjustment of the mA/kV was utilized to reduce the radiation dose to as low as reasonably achievable. COMPARISON: 02/16/2022 HISTORY: ORDERING SYSTEM PROVIDED HISTORY: pain TECHNOLOGIST PROVIDED HISTORY: pain Decision Support Exception - unselect if not a suspected or confirmed emergency medical condition->Emergency Medical Condition (MA) Reason for Exam: pain Additional signs and symptoms: PT states abdominal pain and unable to eat x3 days. Relevant Medical/Surgical History: hx of gallbladder removal and bilateral ureteral stents FINDINGS: Lower Chest: Bronchial wall thickening is noted in the lower lungs bilaterally. Debris is noted in the left lower lobe airways. Noncontrast imaging of the base of the heart is unremarkable. Lack of intravenous contrast limits evaluation of the solid abdominal viscera, the hollow abdominal viscera, and the vascular structures. Organs: With that said, no acute hepatic abnormality identified. Gallbladder is unremarkable. Noncontrast imaging of the spleen unremarkable. The left adrenal gland is enveloped by the retroperitoneal soft tissue mass. Right adrenal gland is unremarkable. Pancreas is partially surrounded by the mass. Visualized portions of the pancreas are unremarkable. The retroperitoneal mass now surrounds and may partially invade the left kidney. With that said, a ureteral stent on the left is noted, and there is no evidence of hydronephrosis. A right ureteral stent is in place, also with no evidence of hydronephrosis. Hyperdense cyst seen within the right kidney, unchanged when compared to multiple previous exams, and requiring no specific follow-up. GI/Bowel:  The distal esophagus and stomach are size is normal. No  pleural effusions are seen. Pulmonary vascularity appears stable. Mild infiltrate in the left lower lobe. Opal Chime Opal Chime No acute bony abnormalities. The hilar structures are normal.     Mild left lower lobe infiltrate suggesting early pneumonia         IMPRESSION:   Primary Problem  Pneumonia    Active Hospital Problems    Diagnosis Date Noted    Pneumonia [J18.9] 07/14/2022     Priority: Medium       Progressive non-Hodgkin lymphoma per CT scan  Pneumonia per chest x-ray  CKD  Intractable pain pneumonia  S/p carotid endarterectomy-6/2022    RECOMMENDATIONS:  I reviewed the labs/imaging available to me,outside records and discussed with the patient. I explained to the patient the nature of this problem. I explained the significance of these abnormalities and possible etiology and management options  Reviewed previous medical record  Reviewed hospitalization record  Discussed with patient's wife over the phone  Patient disease has progressed. Patient has been on maintenance Rituxan his next dose is scheduled for this coming Monday however given progressive disease I believe patient's treatment will need to be modified with addition of cytotoxic drug or oral agent  I will also obtain CT chest to assess disease burden  Obtain LDH and uric acid  Patient's pain is much improved after IV morphine. I will start patient on oral Roxicodone for pain control. Monitor kidney function closely as mass is now developing left kidney  Continue symptomatic supportive care  Treatment of pneumonia per primary          Discussed with patient and spouse and Nurse. Thank you for asking us to see this patient.           Radha Lane MD          This note is created with the assistance of a speech recognition program.  While intending to generate a document that actually reflects the content of the visit, the document can still have some errors including those of syntax and sound a like substitutions which may escape proof reading. It such instances, actual meaning can be extrapolated by contextual diversion.

## 2022-07-16 LAB
ABSOLUTE EOS #: 0.17 K/UL (ref 0–0.4)
ABSOLUTE LYMPH #: 1.66 K/UL (ref 1–4.8)
ABSOLUTE MONO #: 0.83 K/UL (ref 0.1–1.3)
ANION GAP SERPL CALCULATED.3IONS-SCNC: 11 MMOL/L (ref 9–17)
BASOPHILS # BLD: 0 % (ref 0–2)
BASOPHILS ABSOLUTE: 0 K/UL (ref 0–0.2)
BUN BLDV-MCNC: 27 MG/DL (ref 8–23)
CALCIUM SERPL-MCNC: 8 MG/DL (ref 8.6–10.4)
CHLORIDE BLD-SCNC: 103 MMOL/L (ref 98–107)
CO2: 26 MMOL/L (ref 20–31)
CREAT SERPL-MCNC: 1.49 MG/DL (ref 0.7–1.2)
EOSINOPHILS RELATIVE PERCENT: 2 % (ref 0–4)
GFR AFRICAN AMERICAN: 56 ML/MIN
GFR NON-AFRICAN AMERICAN: 46 ML/MIN
GFR SERPL CREATININE-BSD FRML MDRD: ABNORMAL ML/MIN/{1.73_M2}
GLUCOSE BLD-MCNC: 115 MG/DL (ref 75–110)
GLUCOSE BLD-MCNC: 149 MG/DL (ref 75–110)
GLUCOSE BLD-MCNC: 156 MG/DL (ref 75–110)
GLUCOSE BLD-MCNC: 69 MG/DL (ref 75–110)
GLUCOSE BLD-MCNC: 82 MG/DL (ref 70–99)
GLUCOSE BLD-MCNC: 87 MG/DL (ref 75–110)
HCT VFR BLD CALC: 38.4 % (ref 41–53)
HEMOGLOBIN: 12.4 G/DL (ref 13.5–17.5)
LIPASE: 114 U/L (ref 13–60)
LYMPHOCYTES # BLD: 20 % (ref 24–44)
MCH RBC QN AUTO: 25.8 PG (ref 26–34)
MCHC RBC AUTO-ENTMCNC: 32.2 G/DL (ref 31–37)
MCV RBC AUTO: 80.2 FL (ref 80–100)
MONOCYTES # BLD: 10 % (ref 1–7)
MORPHOLOGY: ABNORMAL
MORPHOLOGY: ABNORMAL
PDW BLD-RTO: 16.1 % (ref 11.5–14.9)
PLATELET # BLD: 195 K/UL (ref 150–450)
PMV BLD AUTO: 7.9 FL (ref 6–12)
POTASSIUM SERPL-SCNC: 3.7 MMOL/L (ref 3.7–5.3)
RBC # BLD: 4.79 M/UL (ref 4.5–5.9)
SEG NEUTROPHILS: 68 % (ref 36–66)
SEGMENTED NEUTROPHILS ABSOLUTE COUNT: 5.64 K/UL (ref 1.3–9.1)
SODIUM BLD-SCNC: 140 MMOL/L (ref 135–144)
WBC # BLD: 8.3 K/UL (ref 3.5–11)

## 2022-07-16 PROCEDURE — 94761 N-INVAS EAR/PLS OXIMETRY MLT: CPT

## 2022-07-16 PROCEDURE — 94640 AIRWAY INHALATION TREATMENT: CPT

## 2022-07-16 PROCEDURE — 2580000003 HC RX 258: Performed by: INTERNAL MEDICINE

## 2022-07-16 PROCEDURE — 36415 COLL VENOUS BLD VENIPUNCTURE: CPT

## 2022-07-16 PROCEDURE — 82947 ASSAY GLUCOSE BLOOD QUANT: CPT

## 2022-07-16 PROCEDURE — 6370000000 HC RX 637 (ALT 250 FOR IP): Performed by: FAMILY MEDICINE

## 2022-07-16 PROCEDURE — 99232 SBSQ HOSP IP/OBS MODERATE 35: CPT | Performed by: INTERNAL MEDICINE

## 2022-07-16 PROCEDURE — 87205 SMEAR GRAM STAIN: CPT

## 2022-07-16 PROCEDURE — 83690 ASSAY OF LIPASE: CPT

## 2022-07-16 PROCEDURE — 6370000000 HC RX 637 (ALT 250 FOR IP): Performed by: INTERNAL MEDICINE

## 2022-07-16 PROCEDURE — 6360000002 HC RX W HCPCS: Performed by: FAMILY MEDICINE

## 2022-07-16 PROCEDURE — 80048 BASIC METABOLIC PNL TOTAL CA: CPT

## 2022-07-16 PROCEDURE — 2580000003 HC RX 258: Performed by: FAMILY MEDICINE

## 2022-07-16 PROCEDURE — 87070 CULTURE OTHR SPECIMN AEROBIC: CPT

## 2022-07-16 PROCEDURE — 2060000000 HC ICU INTERMEDIATE R&B

## 2022-07-16 PROCEDURE — 6360000002 HC RX W HCPCS: Performed by: INTERNAL MEDICINE

## 2022-07-16 PROCEDURE — 2700000000 HC OXYGEN THERAPY PER DAY

## 2022-07-16 PROCEDURE — 85025 COMPLETE CBC W/AUTO DIFF WBC: CPT

## 2022-07-16 RX ORDER — OXYCODONE HCL 10 MG/1
10 TABLET, FILM COATED, EXTENDED RELEASE ORAL EVERY 12 HOURS SCHEDULED
Status: DISCONTINUED | OUTPATIENT
Start: 2022-07-16 | End: 2022-07-21 | Stop reason: HOSPADM

## 2022-07-16 RX ORDER — OXYCODONE HYDROCHLORIDE 5 MG/1
5 TABLET ORAL EVERY 4 HOURS PRN
Status: DISCONTINUED | OUTPATIENT
Start: 2022-07-16 | End: 2022-07-21 | Stop reason: HOSPADM

## 2022-07-16 RX ORDER — SUCRALFATE 1 G/1
1 TABLET ORAL EVERY 6 HOURS SCHEDULED
Status: DISCONTINUED | OUTPATIENT
Start: 2022-07-16 | End: 2022-07-21 | Stop reason: HOSPADM

## 2022-07-16 RX ADMIN — BUDESONIDE AND FORMOTEROL FUMARATE DIHYDRATE 2 PUFF: 80; 4.5 AEROSOL RESPIRATORY (INHALATION) at 07:16

## 2022-07-16 RX ADMIN — SUCRALFATE 1 G: 1 TABLET ORAL at 16:40

## 2022-07-16 RX ADMIN — SODIUM CHLORIDE: 9 INJECTION, SOLUTION INTRAVENOUS at 06:12

## 2022-07-16 RX ADMIN — OXYCODONE HYDROCHLORIDE 5 MG: 5 TABLET ORAL at 15:06

## 2022-07-16 RX ADMIN — AMLODIPINE BESYLATE 10 MG: 10 TABLET ORAL at 09:16

## 2022-07-16 RX ADMIN — BUDESONIDE AND FORMOTEROL FUMARATE DIHYDRATE 2 PUFF: 80; 4.5 AEROSOL RESPIRATORY (INHALATION) at 19:36

## 2022-07-16 RX ADMIN — PREGABALIN 75 MG: 75 CAPSULE ORAL at 09:16

## 2022-07-16 RX ADMIN — OXYCODONE HYDROCHLORIDE 5 MG: 5 TABLET ORAL at 00:08

## 2022-07-16 RX ADMIN — OXYCODONE HYDROCHLORIDE 5 MG: 5 TABLET ORAL at 11:19

## 2022-07-16 RX ADMIN — PANTOPRAZOLE SODIUM 40 MG: 40 TABLET, DELAYED RELEASE ORAL at 06:11

## 2022-07-16 RX ADMIN — OXYCODONE HYDROCHLORIDE 5 MG: 5 TABLET ORAL at 06:10

## 2022-07-16 RX ADMIN — DONEPEZIL HYDROCHLORIDE 10 MG: 10 TABLET ORAL at 09:16

## 2022-07-16 RX ADMIN — ONDANSETRON 4 MG: 2 INJECTION INTRAMUSCULAR; INTRAVENOUS at 06:11

## 2022-07-16 RX ADMIN — APIXABAN 5 MG: 5 TABLET, FILM COATED ORAL at 22:11

## 2022-07-16 RX ADMIN — LOSARTAN POTASSIUM 50 MG: 50 TABLET, FILM COATED ORAL at 09:16

## 2022-07-16 RX ADMIN — ATORVASTATIN CALCIUM 40 MG: 40 TABLET, FILM COATED ORAL at 09:16

## 2022-07-16 RX ADMIN — PREGABALIN 75 MG: 75 CAPSULE ORAL at 22:10

## 2022-07-16 RX ADMIN — LEVOTHYROXINE SODIUM 150 MCG: 0.07 TABLET ORAL at 06:10

## 2022-07-16 RX ADMIN — Medication 1 CAPSULE: at 09:16

## 2022-07-16 RX ADMIN — OXYCODONE HYDROCHLORIDE 5 MG: 5 TABLET ORAL at 19:20

## 2022-07-16 RX ADMIN — SUCRALFATE 1 G: 1 TABLET ORAL at 22:12

## 2022-07-16 RX ADMIN — MULTIPLE VITAMINS W/ MINERALS TAB 1 TABLET: TAB at 09:16

## 2022-07-16 RX ADMIN — CARVEDILOL 6.25 MG: 6.25 TABLET, FILM COATED ORAL at 22:12

## 2022-07-16 RX ADMIN — MORPHINE SULFATE 2 MG: 2 INJECTION, SOLUTION INTRAMUSCULAR; INTRAVENOUS at 09:16

## 2022-07-16 RX ADMIN — OXYCODONE HYDROCHLORIDE 10 MG: 10 TABLET, FILM COATED, EXTENDED RELEASE ORAL at 22:11

## 2022-07-16 RX ADMIN — PSYLLIUM HUSK 1 PACKET: 3.4 POWDER ORAL at 09:16

## 2022-07-16 RX ADMIN — FLUOXETINE 20 MG: 20 CAPSULE ORAL at 09:16

## 2022-07-16 RX ADMIN — CEFEPIME HYDROCHLORIDE 2000 MG: 2 INJECTION, POWDER, FOR SOLUTION INTRAMUSCULAR; INTRAVENOUS at 10:50

## 2022-07-16 RX ADMIN — ONDANSETRON 4 MG: 2 INJECTION INTRAMUSCULAR; INTRAVENOUS at 00:09

## 2022-07-16 RX ADMIN — APIXABAN 5 MG: 5 TABLET, FILM COATED ORAL at 09:16

## 2022-07-16 RX ADMIN — AZITHROMYCIN MONOHYDRATE 500 MG: 500 INJECTION, POWDER, LYOPHILIZED, FOR SOLUTION INTRAVENOUS at 09:15

## 2022-07-16 ASSESSMENT — PAIN DESCRIPTION - LOCATION: LOCATION: BACK

## 2022-07-16 ASSESSMENT — PAIN SCALES - GENERAL
PAINLEVEL_OUTOF10: 7
PAINLEVEL_OUTOF10: 3
PAINLEVEL_OUTOF10: 9
PAINLEVEL_OUTOF10: 7
PAINLEVEL_OUTOF10: 4

## 2022-07-16 NOTE — PROGRESS NOTES
[id number] [employer]      PROGRESS NOTE        Patient:  Favian Adan  YOB: 1950    MRN: [id number]     Acct: [de-identified]     Admit date: 7/14/2022    Pt seen and Chart reviewed. Consultant notes reviewed and care evaluated. Subjective: Patient is doing better now. He was eating his lunch today and has no nausea or vomiting but he tells me after he eats after about 30 minutes he started having some nausea sensation the pain but this morning the pain came back and was about 6. He was given pain medicine and seems to help him out Dr. Munroe the stool was in the room discussing with him his plan of care that they think he needs to start getting some chemo with the treatment he has and the mass has increased and the be planning that in the next week or so. They also discussed that with Dr. Abdoulaye Millard his primary oncologist.  The looks like patient slept okay last night he says. He is having no chest pain his cough is improving he says. Patient also tells me that he was supposed to have his stent removed by Dr. Manny Colindres on Wednesday and he is wondering if he needs to have that taken care of before he starts any treatment since his CT did not show any hydro we did call Dr. Manny Colindres but now with this plan there was already set up for him we will call Dr. Susanne Vogel to see if he could do any stent exchange or removal before he starts chemo. Patient has no family in the room.   But discussed his case and care yesterday with his daughter Peri Prader who my understanding earlier in the day when they came in to see him he started having some pain patient states when I saw him he was continuing doing okay and had no pain since my documentation states he was eating breakfast feeling better after couple hours he started having severe pain which she was getting at home but he said it was not as bad since he has been in the hospital pain medication were initiated by the other services and patient said the pain calmed down. His daughter saw him and pain got somewhat emotional which she tells me and wanted to know why he was getting some pain meds I explained to her that when I saw him he was doing okay and there was no need to add more medication that he does not need but since his situation changes his medication has not got adjusted patient daughter understood that she was okay with that asked her multiple times if she still wants me to see her dad or find other service she was okay to continue his care with our service. Diet:  ADULT DIET;  Regular      Medications:Current Inpatient    Scheduled Meds:   azithromycin  500 mg IntraVENous Q24H    FLUoxetine  20 mg Oral Daily    donepezil  10 mg Oral Daily    amLODIPine  10 mg Oral Daily    losartan  50 mg Oral Daily    levothyroxine  150 mcg Oral Daily    [Held by provider] furosemide  20 mg Oral Daily    atorvastatin  40 mg Oral Daily    apixaban  5 mg Oral BID    carvedilol  6.25 mg Oral Nightly    psyllium  1 packet Oral Daily    lactobacillus  1 capsule Oral Daily    therapeutic multivitamin-minerals  1 tablet Oral Daily    glipiZIDE  5 mg Oral BID AC    pantoprazole  40 mg Oral QAM AC    budesonide-formoterol  2 puff Inhalation BID    cefepime  2,000 mg IntraVENous Q12H    sodium chloride flush  5-40 mL IntraVENous 2 times per day    pregabalin  75 mg Oral BID     Continuous Infusions:   dextrose      sodium chloride 75 mL/hr at 07/16/22 0612    sodium chloride       PRN Meds:albuterol sulfate HFA, ipratropium-albuterol, glucose, dextrose bolus **OR** dextrose bolus, glucagon (rDNA), dextrose, morphine, oxyCODONE, sodium chloride flush, sodium chloride, acetaminophen, ondansetron **OR** ondansetron        Physical Exam:  Vitals: BP (!) 151/92   Pulse 80   Temp 97.5 °F (36.4 °C) (Axillary)   Resp 16   Ht 5' 10\" (1.778 m)   Wt 174 lb 2.6 oz (79 kg)   SpO2 98%   BMI 24.99 kg/m²   24 hour intake/output:  Intake/Output Summary (Last 24 hours) at 7/16/2022 1050  Last data filed at 7/16/2022 0828  Gross per 24 hour   Intake 960 ml   Output 300 ml   Net 660 ml     Last 3 weights: Wt Readings from Last 3 Encounters:   07/16/22 174 lb 2.6 oz (79 kg)   07/06/22 178 lb (80.7 kg)   06/29/22 178 lb (80.7 kg)       Physical Examination:   General appearance - alert, well appearing, and in no distress seems to be comfortable now eating his lunch he says now he feels okay  Mental status - alert, oriented to person, place, and time  sclera clear, anicteric  Chest - clear to auscultation, no wheezes, rales or rhonchi, symmetric air entry  Heart - normal rate, regular rhythm, normal S1, S2, no murmurs, rubs, clicks or gallops  Abdomen - soft, nontender, nondistended, no masses or organomegaly some mild epigastric tenderness. Neurological - alert, oriented, normal speech, no focal findings or movement disorder noted}  Extremities - peripheral pulses normal, no pedal edema, no clubbing or cyanosis no edema or calf tenderness  Skin - normal coloration and turgor, no rashes, no suspicious skin lesions noted     Results     Component Value Units   Culture, Respiratory [[id number]] (Abnormal)    Collected: 07/16/22 0511    Updated: 07/16/22 1035    Specimen Source: Sputum Expectorated     Specimen Description . EXPECTORATED SPUTUM    Direct Exam >25 NEUTROPHILS/LPF     < 10 EPITHELIAL CELLS/LPF     MIXED BACTERIAL MORPHOTYPES SEEN ON GRAM STAIN.  Abnormal     Culture PENDING   CBC with Auto Differential [[id number]] (Abnormal)    Collected: 07/16/22 0600    Updated: 07/16/22 0808    Specimen Source: Blood     WBC 8.3 k/uL    RBC 4.79 m/uL    Hemoglobin 12.4 Low  g/dL    Hematocrit 38.4 Low  %    MCV 80.2 fL    MCH 25.8 Low  pg    MCHC 32.2 g/dL    RDW 16.1 High  %    Platelets 462 k/uL    MPV 7.9 fL    Seg Neutrophils 68 High  %    Lymphocytes 20 Low  %    Monocytes 10 High  %    Eosinophils % 2 %    Basophils 0 %    Segs Absolute 5.64 k/uL    Absolute Lymph # 1.66 k/uL    Absolute Mono # 0.83 k/uL    Absolute Eos # 0.17 k/uL    Basophils Absolute 0.00 k/uL    Morphology ANISOCYTOSIS PRESENT    Morphology FEW GIANT PLATELETS   POC Glucose Fingerstick [[id number]] (Abnormal)    Collected: 07/16/22 0608    Updated: 07/16/22 0715     POC Glucose 69 Low  mg/dL   POC Glucose Fingerstick [[id number]]    Collected: 07/16/22 0701    Updated: 07/16/22 0715     POC Glucose 87 mg/dL   Basic Metabolic Panel [[id number]] (Abnormal)    Collected: 07/16/22 0600    Updated: 07/16/22 0646    Specimen Source: Blood     Glucose 82 mg/dL    BUN 27 High  mg/dL    CREATININE 1.49 High  mg/dL    Calcium 8.0 Low  mg/dL    Sodium 140 mmol/L    Potassium 3.7 mmol/L    Chloride 103 mmol/L    CO2 26 mmol/L    Anion Gap 11 mmol/L    GFR Non-African American 46 Low  mL/min    GFR  56 Low  mL/min    GFR Comment         Comment: Average GFR for 79or more years old:    76 mL/min/1.73sq m   Chronic Kidney Disease:    <60 mL/min/1.73sq m   Kidney failure:    <15 mL/min/1.73sq m               eGFR calculated using average adult body mass.  Additional eGFR calculator available at:         [employer]             Lipase [[id number]] (Abnormal)    Collected: 07/16/22 0600    Updated: 07/16/22 0646    Specimen Source: Blood     Lipase 114 High  U/L   POC Glucose Fingerstick [[id number]] (Abnormal)    Collected: 07/15/22 2003    Updated: 07/15/22 2025     POC Glucose 119 High  mg/dL   VL DUP LOWER EXTREMITY VENOUS BILATERAL [[id number]]    Collected: 07/15/22 1325    Updated: 07/15/22 2005    Narrative:          [address]    Vascular Lower Extremities DVT Study Procedure        Patient Name   Mag Handler    Date of Study           07/15/2022                   Prudence Sis D        Date of Birth  1950  Gender                  Male        Age            66 year(s)  Race                            Room Number    9014        Height: 70 inch, 177.8 cm        Corporate ID # [id number]    Weight:                 164 pounds, 74.4 kg        Patient Acct # [de-identified]   BSA:        1.92 m^2    BMI:       23.53 kg/m^2        MR #           [id number]      Sonographer             Yosef Sofia        Accession #    [id number]  Interpreting Physician  Stephen Mott        Referring                  Referring Physician     Nicole Moreno    Nurse    Practitioner       Procedure   Type of Study:        Veins: Lower Extremities DVT Study, Venous Scan Lower Bilateral.       Indications for Study:Hx of DVT. Patient Status: In Patient. Technical Quality:Limited visualization.       - Critical Result:Kayla ADAIR on 07/15/2022 @ 1:40pm.      Conclusions        Summary        No evidence of deep venous thrombosis in both lower extremities. 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 Signature        ----------------------------------------------------------------    Electronically signed by Yosef Sofia(Sonographer) on    07/15/2022 01:54 PM    ----------------------------------------------------------------        ----------------------------------------------------------------    Electronically signed by Laverne Box Reyes,Arthur(Interpreting    physician) on 07/15/2022 08:05 PM    ----------------------------------------------------------------       Findings:        Right Impression:                         Left Impression:        The common femoral, femoral, popliteal    The common femoral, femoral,    and tibial veins demonstrate normal       popliteal and tibial veins    compressibility and augmentation. demonstrate normal                                              compressibility and    Normal compressibility of the great       augmentation. saphenous vein. Normal compressibility of the    The small saphenous vein demonstrate      great saphenous vein. !Yes       !Yes            ! None      !   +------------------------------------+----------+---------------+----------+   ! Prox Femoral                        !Yes       ! Yes            ! None      !   +------------------------------------+----------+---------------+----------+   ! Mid Femoral                         !Yes       ! Yes            ! None      !   +------------------------------------+----------+---------------+----------+   ! Dist Femoral                        !Yes       ! Yes            ! None      !   +------------------------------------+----------+---------------+----------+   ! Deep Femoral                        !Partial   !Yes            ! None      !   +------------------------------------+----------+---------------+----------+   ! Popliteal                           !Yes       ! Yes            ! None      !   +------------------------------------+----------+---------------+----------+   ! Sapheno Femoral Junction            ! Yes       ! Yes            ! None      !   +------------------------------------+----------+---------------+----------+   ! PTV                                 ! Partial   !Yes            ! None      !   +------------------------------------+----------+---------------+----------+   ! Peroneal                            !Partial   !Yes            ! None      !   +------------------------------------+----------+---------------+----------+   ! Gastroc                             ! Yes       ! Yes            ! None      !   +------------------------------------+----------+---------------+----------+   ! GSV Thigh                           ! Yes       ! Yes            ! None      !   +------------------------------------+----------+---------------+----------+   ! GSV Knee                            ! Yes       ! Yes            ! None      !   +------------------------------------+----------+---------------+----------+   ! GSV Ankle                           ! Yes       ! Yes            ! None      ! +------------------------------------+----------+---------------+----------+   ! SSV                                 ! Yes       ! Yes            ! None      !   +------------------------------------+----------+---------------+----------+     Left Doppler Measurements   +---------------------------+------+------+--------------------------------+   ! Location                   ! Signal!Reflux! Reflux (msec)                   ! +---------------------------+------+------+--------------------------------+   ! Common Femoral             !Phasic!      !                                !   +---------------------------+------+------+--------------------------------+   ! Prox Femoral               !Phasic!      !                                !   +---------------------------+------+------+--------------------------------+   ! Popliteal                  !Phasic!      !                                !   +---------------------------+------+------+--------------------------------+   CT CHEST WO CONTRAST [[id number]]    Collected: 07/15/22 1653    Updated: 07/15/22 2039    Narrative:     EXAMINATION:   CT OF THE CHEST WITHOUT CONTRAST 7/15/2022 4:38 pm     TECHNIQUE:   CT of the chest was performed without the administration of intravenous   contrast. Multiplanar reformatted images are provided for review. Automated   exposure control, iterative reconstruction, and/or weight based adjustment of   the mA/kV was utilized to reduce the radiation dose to as low as reasonably   achievable. COMPARISON:   CT and pelvis 07/14/2022     HISTORY:   ORDERING SYSTEM PROVIDED HISTORY: History of lymphoma, also cough with sputum   production   TECHNOLOGIST PROVIDED HISTORY:   History of lymphoma, also cough with sputum production   Reason for Exam: History of lymphoma, also cough with sputum production     FINDINGS:   Mediastinum: Coronary disease. Cardiomegaly. No effusion. Right-sided   MediPort device identified.      Lungs/pleura: Consolidative change involving lingula worrisome for possible   and left upper lobe worrisome for possible pneumonia or atelectasis. Right   middle lobe areas of scarring or subsegmental atelectasis identified. Mild   bronchial thickening identified involving the lingular branches and left   lower lobe branches of the bronchi likely due to bronchitis versus airspace   right lower lobe calcified granuloma identified. Upper Abdomen: Infiltrative mass within the retroperitoneum again identified   likely suggestive of lymphoma. Soft Tissues/Bones: Mild degenerate change. Slight sclerotic appearance of   the post margin of the C5 vertebral body noted. There is a posterior related   changes versus chronic finding. Impression:     Lingular and left upper lobe consolidative changes may represent areas of   developing pneumonia. Please correlate exam findings. Infiltrative soft tissue mass within the upper abdomen partially visualized. Please see CT abdomen pelvis report.     Lactate Dehydrogenase [[id number]] (Abnormal)    Collected: 07/15/22 1655    Updated: 07/15/22 1726      High  U/L   Uric Acid [[id number]] (Abnormal)    Collected: 07/15/22 1655    Updated: 07/15/22 1726    Specimen Source: Blood     Uric Acid 9.4 High  mg/dL   POC Glucose Fingerstick [[id number]] (Abnormal)    Collected: 07/15/22 1608    Updated: 07/15/22 1622     POC Glucose 170 High  mg/dL   Cortisol Total [[id number]]    Collected: 07/15/22 0741    Updated: 07/15/22 1459    Specimen Source: Blood     Cortisol 12.7 ug/dL    Comment:        Cortisol Reference Range:       AM     6.0-18.4       PM     2.7-10.5        Cortisol Collection Info AM   POC Glucose Fingerstick [[id number]] (Abnormal)    Collected: 07/15/22 1338    Updated: 07/15/22 1412     POC Glucose 187 High  mg/dL   POC Glucose Fingerstick [[id number]] (Abnormal)    Collected: 07/15/22 1255    Updated: 07/15/22 1306     POC Glucose 68 Low  mg/dL   POC Glucose Fingerstick [[id number]] (Abnormal)    Collected: 07/15/22 1230    Updated: 07/15/22 1238     POC Glucose 56 Low  mg/dL    Comment: Critical Noted      MRSA DNA Probe, Nasal [[id number]]    Collected: 07/14/22 1906    Updated: 07/15/22 1226    Specimen Source: Nasal     Specimen Description . NASAL SWAB    MRSA, DNA, Nasal NEGATIVE    Comment: NEGATIVE:  MRSA DNA not detected by nucleic acid amplification. Results should be used as an adjunct to nosocomial control efforts to identify patients   needing enhanced precautions. The test is not intended to identify patients with staphylococcal infections. Results   should not be used to guide or monitor treatment for MRSA infections. POC Glucose Fingerstick [[id number]]    Collected: 07/15/22 1134    Updated: 07/15/22 1144     POC Glucose 87 mg/dL   EKG 12 Lead [[id number]]    Collected: 07/14/22 1423    Updated: 07/15/22 1115     Ventricular Rate 97 BPM    Atrial Rate 97 BPM    P-R Interval 120 ms    QRS Duration 90 ms    Q-T Interval 378 ms    QTc Calculation (Bazett) 480 ms    P Axis 55 degrees    R Axis -26 degrees    T Axis 21 degrees   Narrative:     Normal sinus rhythm   Inferior infarct , age undetermined   Cannot rule out Anterior infarct (cited on or before 08-OCT-2021)   Abnormal ECG   When compared with ECG of 16-FEB-2022 22:10,   QRS axis Shifted left   Inferior infarct is now Present   Non-specific change in ST segment in Anterior leads   Procalcitonin [[id number]] (Abnormal)    Collected: 07/15/22 0741    Updated: 07/15/22 1107     Procalcitonin 0.10 High  ng/mL    Comment:        Suspected Sepsis:   <0.50 ng/mL      Low likelihood of sepsis. 0.50-2.00 ng/mL      Increased likelihood of sepsis. Antibiotics encouraged. >2.00 ng/mL      High risk of sepsis/shock. Antibiotics strongly encouraged.          Suspected Lower Resp Tract Infections:   <0.24 ng/mL      Low likelihood of bacterial infection. >0.24 ng/mL      Increased likelihood of bacterial infection. Antibiotics encouraged. With successful antibiotic therapy, PCT levels should decrease rapidly. (Half-life of 24 to   36 hours.)         Procalcitonin values from samples collected within the first 6 hours of systemic infection   may still be low. Retesting may be indicated. Values from day 1 and day 4 can be entered into the Change in Procalcitonin Calculator   (www[employer]calculator. [employer]) to determine the patient's Mortality Risk Prognosis         In healthy neonates, plasma Procalcitonin (PCT) concentrations increase gradually after   birth, reaching peak values at about 24 hours of age then decrease to normal values below   0.5 ng/mL by 48-72 hours of age. Assessment:  Principal Problem:    Pneumonia  Resolved Problems:    * No resolved hospital problems.  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    Hydronephrosis due to obstruction of ureter N13.1    Moderate malnutrition ([facility]) E44.0    Chronic cholecystitis K81.1    Pleural effusion J90    Cardiomegaly I51.7    Hypothyroidism E03.9    Diabetes mellitus ([facility]) E11.9    Hypertensive urgency I16.0    Acute respiratory failure with hypoxia ([facility]) J96.01    Hypoxia R09.02    Carotid stenosis, asymptomatic, bilateral I65.23    Simple chronic bronchitis ([facility]) J41.0    Dependence on nocturnal oxygen therapy Z99.81    Pneumonia J18.9      Pneumonia    Abdominal pads may be increased in size    History of lymphoma  Abdominal pain and neuropathic pain    History of postherpetic neuralgia    History of hydronephrosis but no evidence of that on recent CT except enlargement of the medicine abdomen    Dementia patient is alert and oriented this morning  UTI  inTractable abdominal pain yesterday now it is better with pain meds  On the sore is going adjust his pain medications to higher dose  Uretral stent possibly changed or removed on Wednesday by Dr. Leroy Harley  Renal insufficiency we will monitor       Plan:   Will consult Dr. Joseph Willson to see patient and see if he could do the stent before patient started any chemo treatment    Pain medication being adjusted by hematology today    We will give patient some Carafate to see if it helps some of the nausea he gets after he eats    No family in the room but left a message for them to call when they come to see him  Monitor lipase  Continue with antibiotics for now  Monitor kidney function    Philomena Capps DO  [facility]           7/16/2022, 10:50 AM

## 2022-07-16 NOTE — FLOWSHEET NOTE
Patient, who was very tired, spoke about the pain which has moved from his abdomen to his back. Stated he wasn't sure what the future would hold. Writer provided listening presence and emotional support.      07/16/22 1200   Encounter Summary   Encounter Overview/Reason  Initial Encounter   Service Provided For: Patient   Referral/Consult From: [facility] [place]   Support System Spouse; Children;Family members   Last Encounter  07/16/22   Complexity of Encounter Moderate   Spiritual/Emotional needs   Type Spiritual Support   Assessment/Intervention/Outcome   Assessment Anxious; Concerns with suffering   Intervention Active listening;Discussed illness injury and its impact; Explored/Affirmed feelings, thoughts, concerns;Prayer (assurance of)/[place];Sustaining Presence/Ministry of presence   Outcome Engaged in conversation;Expressed feelings, needs, and concerns;Expressed Gratitude;Receptive

## 2022-07-16 NOTE — PLAN OF CARE
Problem: Discharge Planning  Goal: Discharge to home or other facility with appropriate resources  7/16/2022 0418 by Alfonso Patricia RN  Outcome: Progressing     Problem: Pain  Goal: Verbalizes/displays adequate comfort level or baseline comfort level  7/16/2022 0418 by Alfonso Patricia RN  Outcome: Progressing

## 2022-07-16 NOTE — PROGRESS NOTES
Today's Date: 7/16/2022  Patient Name: Perez Blackman  Date of admission: 7/14/2022  1:37 PM  Patient's age: 66 y.o., 1950  Admission Dx: Pneumonia [J18.9]  Pneumonia of left lower lobe due to infectious organism [J18.9]  Nausea and vomiting, intractability of vomiting not specified, unspecified vomiting type [R11.2]    Reason for Consult: management recommendations  Requesting Physician: Shelbi Jacobson DO    CHIEF COMPLAINT: Abdominal pain. Progressive disease      Interval history  Patient is seen and evaluated. He states that the pain medication especially oxycodone is working but it does not hold for 6 hours. Will change the medication to every 4 hours as needed but we will add OxyContin 10 mg twice daily  It seemed that the pain is mostly abdominal but it radiates to the back. Likely related to the enlarging mass in his abdomen. The patient will be due to exchange his urinary stent. Discussed with primary team, will consult urology to have that done before his discharge. The patient is aware that he has progressive lymphoma and that we will need chemoimmunotherapy as an outpatient. He is agreeable  HISTORY OF PRESENT ILLNESS:      The patient is a 66 y.o.  male who is admitted to the hospital for chief complaints of worsening abdominal pain. Patient is well-known to our practice. Patient has history of recurrent diffuse large B-cell lymphoma. At time of relapse biopsy showed follicular lymphoma. Patient's treatment history is as below. Most recently patient was treated with Rituxan followed by Rituxan maintenance. Patient was last seen in office back in March. CT scans from February showed stable disease patient's last reduction infusion was back in May 2022. Patient was recently discharged to the hospital on the 29th after undergoing a left carotid endarterectomy back in June the patient is now admitted with worsening of abdominal pain.   Patient underwent CT scan in the ER 20 CT of abdomen pelvis without contrast which shows marked interval increase of infiltrative retroperitoneal mass compatible with known history of lymphoma. The mass now partially enveloped the left kidney but no hydronephrosis is noted. The mass is also abutting the duodenum. Patient's lab work-up shows hemoglobin of 12.8. MCV 78.5. 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 Evidence of relapse with biopsy-proven follicular lymphoma. 1-2 from treated medically lymph node biopsy July 6, 2021     CURRENT THERAPY:    1.   Observation. 2.   Status post treatment with Bexxar in May 2008. 3.   Status post previous treatment with Rituxan. 4.  Start treatment with Rituxan August 23, 2021. completed4 weeks of rituximab on 9/20/2021  5. Induction Rituxan is followed by consolidation Rituxan maintenance      Past Medical History:   has a past medical history of Arthritis, Cancer ([facility] [address] Chemotherapy management, encounter for, CHF (congestive heart failure) ([facility] [address] COPD (chronic obstructive pulmonary disease) ([facility] [address] Diabetes mellitus ([facility] [address] H/O cardiovascular stress test, History of non-Hodgkin's lymphoma, Hx of blood clots, Hyperlipidemia, Hypertension, Hypothyroidism, and On home O2. Past Surgical History:   has a past surgical history that includes hernia repair; IR BIOPSY ABDOMINAL/RETROPERITONEAL MASS PERCUTANEOUS (7/6/2021); IR PORT PLACEMENT > 5 YEARS (8/31/2021); Colonoscopy (Left, 9/24/2021); Cystoscopy (Right, 10/6/2021); Cholecystectomy, laparoscopic (N/A, 10/9/2021); Tonsillectomy; Cystoscopy (Bilateral, 11/24/2021); Cystoscopy (Bilateral, 2/16/2022); eye surgery; and Carotid endarterectomy (Left, 6/28/2022). Medications:    Prior to Admission medications    Medication Sig Start Date End Date Taking?  Authorizing Provider   apixaban (ELIQUIS) 5 MG TABS tablet Take 5 mg by mouth 2 Provider, MD   FLUoxetine (PROZAC) 20 MG capsule Take 20 mg by mouth daily    Historical Provider, MD   omeprazole (PRILOSEC) 20 MG delayed release capsule Take 20 mg by mouth 2 times daily    Historical Provider, MD     Current Facility-Administered Medications   Medication Dose Route Frequency Provider Last Rate Last Admin    oxyCODONE (ROXICODONE) immediate release tablet 5 mg  5 mg Oral Q4H PRN Brent Colon MD   5 mg at 07/16/22 1119    oxyCODONE (OXYCONTIN) extended release tablet 10 mg  10 mg Oral 2 times per day Carmelita Davis MD        azithromycin (ZITHROMAX) 500 mg in D5W 250ml addavial  500 mg IntraVENous Q24H Roderick T Dixon, DO   Stopped at 07/16/22 1035    FLUoxetine (PROZAC) capsule 20 mg  20 mg Oral Daily Roderick T Dixon, DO   20 mg at 07/16/22 0916    donepezil (ARICEPT) tablet 10 mg  10 mg Oral Daily Roderick T Dixon, DO   10 mg at 07/16/22 0916    amLODIPine (NORVASC) tablet 10 mg  10 mg Oral Daily Roderick T Dixon, DO   10 mg at 07/16/22 0916    losartan (COZAAR) tablet 50 mg  50 mg Oral Daily Roderick T Dixon, DO   50 mg at 07/16/22 0916    albuterol sulfate HFA (PROVENTIL;VENTOLIN;PROAIR) 108 (90 Base) MCG/ACT inhaler 2 puff  2 puff Inhalation Q4H PRN Roderick T Dixon, DO        levothyroxine (SYNTHROID) tablet 150 mcg  150 mcg Oral Daily Roderick T Dixon, DO   150 mcg at 07/16/22 0610    [Held by provider] furosemide (LASIX) tablet 20 mg  20 mg Oral Daily Roderick T Dixon, DO   20 mg at 07/15/22 0744    ipratropium-albuterol (DUONEB) nebulizer solution 3 mL  1 vial Inhalation Q4H PRN Roderick T Dixon, DO        atorvastatin (LIPITOR) tablet 40 mg  40 mg Oral Daily Roderick T Dixon, DO   40 mg at 07/16/22 0916    apixaban (ELIQUIS) tablet 5 mg  5 mg Oral BID Roderick T Dixon, DO   5 mg at 07/16/22 0916    carvedilol (COREG) tablet 6.25 mg  6.25 mg Oral Nightly Roderick T Dixon, DO   6.25 mg at 07/15/22 2150    psyllium (METAMUCIL) 58.12 % packet 1 packet  1 packet Oral Daily Roderick NAVEEN Metcalf, DO   1 packet at 07/16/22 0916    lactobacillus (CULTURELLE) capsule 1 capsule  1 capsule Oral Daily Roderick T Dixon, DO   1 capsule at 07/16/22 0916    therapeutic multivitamin-minerals 1 tablet  1 tablet Oral Daily Roderick T Dixon, DO   1 tablet at 07/16/22 0916    glipiZIDE (GLUCOTROL) tablet 5 mg  5 mg Oral BID AC Roderick T Dixon, DO   5 mg at 07/15/22 1802    pantoprazole (PROTONIX) tablet 40 mg  40 mg Oral QAM AC Roderick T Dixon, DO   40 mg at 07/16/22 0611    budesonide-formoterol (SYMBICORT) 80-4.5 MCG/ACT inhaler 2 puff  2 puff Inhalation BID Bryan Fry, DO   2 puff at 07/16/22 0716    cefepime (MAXIPIME) 2000 mg IVPB minibag  2,000 mg IntraVENous Q12H Roderick T Dixon, DO 12.5 mL/hr at 07/16/22 1050 2,000 mg at 07/16/22 1050    glucose chewable tablet 16 g  4 tablet Oral PRN Roderick T Dixon, DO   16 g at 07/15/22 1233    dextrose bolus 10% 125 mL  125 mL IntraVENous PRN Roderick T Dixon, DO        Or    dextrose bolus 10% 250 mL  250 mL IntraVENous PRN Roderick T Dixon, DO        glucagon (rDNA) injection 1 mg  1 mg IntraMUSCular PRN Roderick T Dixon, DO        dextrose 5 % solution  100 mL/hr IntraVENous PRN Roderick T Dixon, DO        0.9 % sodium chloride infusion   IntraVENous Continuous Eros Polk MD 75 mL/hr at 07/16/22 0612 New Bag at 07/16/22 0612    sodium chloride flush 0.9 % injection 5-40 mL  5-40 mL IntraVENous 2 times per day Bryan Fry, DO   10 mL at 07/14/22 2325    sodium chloride flush 0.9 % injection 5-40 mL  5-40 mL IntraVENous PRN Roderick T Dixon, DO        0.9 % sodium chloride infusion   IntraVENous PRN Roderick T Dixon, DO        acetaminophen (TYLENOL) tablet 650 mg  650 mg Oral Q4H PRN Roderick T Dixon, DO   650 mg at 07/15/22 0744    ondansetron (ZOFRAN-ODT) disintegrating tablet 4 mg  4 mg Oral Q8H PRN Roderick T Dixon, DO        Or    ondansetron (ZOFRAN) injection 4 mg  4 mg IntraVENous Q6H PRN Roderick T Dixon, DO   4 mg at 07/16/22 0611    pregabalin (LYRICA) capsule 75 mg  75 mg Oral BID Roderick T Dixon, DO   75 mg at 22 0916       Allergies:  Patient has no known allergies. Social History:   reports that he quit smoking about 16 years ago. His smoking use included cigarettes. He has a 90.00 pack-year smoking history. He has never used smokeless tobacco. He reports current alcohol use of about 1.7 standard drinks per week. He reports current drug use. Drug: Marijuana Sarika Esa). Family History: family history includes Alzheimer's Disease in his father; Diabetes in his mother; Heart Disease in his brother and brother. REVIEW OF SYSTEMS:      Constitutional: No fever or chills. No night sweats, no weight loss   Eyes: No eye discharge, double vision, or eye pain   HEENT: negative for sore mouth, sore throat, hoarseness and voice change   Respiratory: negative for cough , sputum, dyspnea, wheezing, hemoptysis, chest pain   Cardiovascular: negative for chest pain, dyspnea, palpitations, orthopnea, PND   Gastrointestinal: negative for nausea, vomiting, diarrhea, constipation, abdominal pain, Dysphagia, hematemesis and hematochezia. Positive abdominal pain, much improved now  Genitourinary: negative for frequency, dysuria, nocturia, urinary incontinence, and hematuria   Integument: negative for rash, skin lesions, bruises.    Hematologic/Lymphatic: negative for easy bruising, bleeding, lymphadenopathy, or petechiae   Endocrine: negative for heat or cold intolerance,weight changes, change in bowel habits and hair loss   Musculoskeletal: negative for myalgias, arthralgias, pain, joint swelling,and bone pain   Neurological: negative for headaches, dizziness, seizures, weakness, numbness    PHYSICAL EXAM:        /66   Pulse 82   Temp 98.4 °F (36.9 °C) (Oral)   Resp 16   Ht 5' 10\" (1.778 m)   Wt 174 lb 2.6 oz (79 kg)   SpO2 96%   BMI 24.99 kg/m²    Temp (24hrs), Av.9 °F (36.6 °C), Min:97.5 °F (36.4 °C), Max:98.4 °F (36.9 °C)      General appearance - well appearing, no in pain or distress   Mental status - alert and cooperative   Eyes - pupils equal and reactive, extraocular eye movements intact   Ears - bilateral TM's and external ear canals normal   Mouth - mucous membranes moist, pharynx normal without lesions   Neck - supple, no significant adenopathy   Lymphatics - no palpable lymphadenopathy, no hepatosplenomegaly   Chest - clear to auscultation, no wheezes, rales or rhonchi, symmetric air entry   Heart - normal rate, regular rhythm, normal S1, S2, no murmurs  Abdomen - soft, nontender, nondistended, no masses or organomegaly   Neurological - alert, oriented, normal speech, no focal findings or movement disorder noted   Musculoskeletal - no joint tenderness, deformity or swelling   Extremities - peripheral pulses normal, no pedal edema, no clubbing or cyanosis   Skin - normal coloration and turgor, no rashes, no suspicious skin lesions noted ,      DATA:      Labs:     Results for orders placed or performed during the hospital encounter of 07/14/22   MRSA DNA Probe, Nasal    Specimen: Nasal   Result Value Ref Range    Specimen Description . NASAL SWAB     MRSA, DNA, Nasal NEGATIVE NEGATIVE   Culture, Respiratory    Specimen: Sputum Expectorated   Result Value Ref Range    Specimen Description . EXPECTORATED SPUTUM     Direct Exam >25 NEUTROPHILS/LPF     Direct Exam < 10 EPITHELIAL CELLS/LPF     Direct Exam MIXED BACTERIAL MORPHOTYPES SEEN ON GRAM STAIN.  (A)     Culture PENDING    CBC with Auto Differential   Result Value Ref Range    WBC 8.7 3.5 - 11.0 k/uL    RBC 5.04 4.5 - 5.9 m/uL    Hemoglobin 12.8 (L) 13.5 - 17.5 g/dL    Hematocrit 39.5 (L) 41 - 53 %    MCV 78.5 (L) 80 - 100 fL    MCH 25.5 (L) 26 - 34 pg    MCHC 32.5 31 - 37 g/dL    RDW 16.4 (H) 11.5 - 14.9 %    Platelets 149 850 - 371 k/uL    MPV 7.7 6.0 - 12.0 fL    Seg Neutrophils 75 (H) 36 - 66 %    Lymphocytes 16 (L) 24 - 44 %    Monocytes 9 (H) 1 - 7 %    Eosinophils % 0 0 - 4 %    Basophils 0 0 - 2 %    Segs Absolute 6.53 1.3 - 9.1 k/uL    Absolute Lymph # 1. 39 1.0 - 4.8 k/uL    Absolute Mono # 0.78 0.1 - 1.3 k/uL    Absolute Eos # 0.00 0.0 - 0.4 k/uL    Basophils Absolute 0.00 0.0 - 0.2 k/uL    Morphology ANISOCYTOSIS PRESENT     Morphology 1+ ELLIPTOCYTES    CMP   Result Value Ref Range    Glucose 119 (H) 70 - 99 mg/dL    BUN 27 (H) 8 - 23 mg/dL    CREATININE 1.56 (H) 0.70 - 1.20 mg/dL    Calcium 8.7 8.6 - 10.4 mg/dL    Sodium 140 135 - 144 mmol/L    Potassium 3.9 3.7 - 5.3 mmol/L    Chloride 102 98 - 107 mmol/L    CO2 24 20 - 31 mmol/L    Anion Gap 14 9 - 17 mmol/L    Alkaline Phosphatase 131 (H) 40 - 129 U/L    ALT 6 5 - 41 U/L    AST 15 <40 U/L    Total Bilirubin 0.80 0.3 - 1.2 mg/dL    Total Protein 6.1 (L) 6.4 - 8.3 g/dL    Albumin 3.7 3.5 - 5.2 g/dL    GFR Non- 44 (L) >60 mL/min    GFR  53 (L) >60 mL/min    GFR Comment         Lipase   Result Value Ref Range    Lipase 69 (H) 13 - 60 U/L   Lactic Acid   Result Value Ref Range    Lactic Acid 2.0 0.5 - 2.2 mmol/L   Urinalysis with Reflex to Culture    Specimen: Urine   Result Value Ref Range    Color, UA Yellow Yellow    Turbidity UA Clear Clear    Glucose, Ur TRACE (A) NEGATIVE    Bilirubin Urine NEGATIVE NEGATIVE    Ketones, Urine TRACE (A) NEGATIVE    Specific Gravity, UA 1.024 1.000 - 1.030    Urine Hgb MOD (A) NEGATIVE    pH, UA 6.0 5.0 - 8.0    Protein, UA 4+ (A) NEGATIVE    Urobilinogen, Urine Normal Normal    Nitrite, Urine NEGATIVE NEGATIVE    Leukocyte Esterase, Urine TRACE (A) NEGATIVE   Troponin   Result Value Ref Range    Troponin, High Sensitivity 39 (H) 0 - 22 ng/L   Microscopic Urinalysis   Result Value Ref Range    WBC, UA 6 TO 9 /HPF    RBC, UA 21 TO 50 /HPF    Casts UA 0 TO 2 /LPF    Casts UA HYALINE /LPF    Epithelial Cells UA 6 TO 9 /HPF    Bacteria, UA None None    Mucus, UA 1+ (A) None   Troponin   Result Value Ref Range    Troponin, High Sensitivity 39 (H) 0 - 22 ng/L   Troponin   Result Value Ref Range    Troponin, High Sensitivity 43 (H) 0 - 22 ng/L Result Value Ref Range    POC Glucose 56 (L) 75 - 110 mg/dL   POC Glucose Fingerstick   Result Value Ref Range    POC Glucose 68 (L) 75 - 110 mg/dL   POC Glucose Fingerstick   Result Value Ref Range    POC Glucose 187 (H) 75 - 110 mg/dL   POC Glucose Fingerstick   Result Value Ref Range    POC Glucose 170 (H) 75 - 110 mg/dL   POC Glucose Fingerstick   Result Value Ref Range    POC Glucose 119 (H) 75 - 110 mg/dL   POC Glucose Fingerstick   Result Value Ref Range    POC Glucose 69 (L) 75 - 110 mg/dL   POC Glucose Fingerstick   Result Value Ref Range    POC Glucose 87 75 - 110 mg/dL   POC Glucose Fingerstick   Result Value Ref Range    POC Glucose 149 (H) 75 - 110 mg/dL   EKG 12 Lead   Result Value Ref Range    Ventricular Rate 97 BPM    Atrial Rate 97 BPM    P-R Interval 120 ms    QRS Duration 90 ms    Q-T Interval 378 ms    QTc Calculation (Bazett) 480 ms    P Axis 55 degrees    R Axis -26 degrees    T Axis 21 degrees         IMAGING DATA:    CT ABDOMEN PELVIS WO CONTRAST Additional Contrast? None    Result Date: 7/14/2022  EXAMINATION: CT OF THE ABDOMEN AND PELVIS WITHOUT CONTRAST 7/14/2022 12:39 pm TECHNIQUE: CT of the abdomen and pelvis was performed without the administration of intravenous contrast. Multiplanar reformatted images are provided for review. Automated exposure control, iterative reconstruction, and/or weight based adjustment of the mA/kV was utilized to reduce the radiation dose to as low as reasonably achievable. COMPARISON: 02/16/2022 HISTORY: ORDERING SYSTEM PROVIDED HISTORY: pain TECHNOLOGIST PROVIDED HISTORY: pain Decision Support Exception - unselect if not a suspected or confirmed emergency medical condition->Emergency Medical Condition (MA) Reason for Exam: pain Additional signs and symptoms: PT states abdominal pain and unable to eat x3 days.  Relevant Medical/Surgical History: hx of gallbladder removal and bilateral ureteral stents FINDINGS: Lower Chest: Bronchial wall thickening is noted within the retroperitoneum is again seen, extending into the mesentery, and completely surrounding the abdominal aorta and inferior vena cava. This mass has become much more consolidated, with the conglomerate now measuring roughly 10.9 x 12.1 cm. No free intraperitoneal air is seen. Bones/Soft Tissues: No acute or suspicious bony abnormalities. The extra-abdominal and extra pelvic soft tissues are unremarkable. Interval marked increased size of the infiltrative retroperitoneal mass, compatible with the known history of lymphoma. The mass now partially envelops the left kidney, but no hydronephrosis is identified. The mass also is seen abutting the duodenal sweep, but without obvious obstruction. New pelvic lymphadenopathy. The     XR CHEST PORTABLE    Result Date: 7/14/2022  EXAMINATION: ONE XRAY VIEW OF THE CHEST 7/14/2022 11:21 am COMPARISON: 06/13/2022 HISTORY: ORDERING SYSTEM PROVIDED HISTORY: pain TECHNOLOGIST PROVIDED HISTORY: pain Reason for Exam: Pain, abdominal pain. Pt. states history of COPD FINDINGS: Stable right-sided brittany catheter. .The cardiac size is normal. No  pleural effusions are seen. Pulmonary vascularity appears stable. Mild infiltrate in the left lower lobe. Trula Blew Trula Blew No acute bony abnormalities. The hilar structures are normal.     Mild left lower lobe infiltrate suggesting early pneumonia         IMPRESSION:   Primary Problem  Pneumonia    Active Hospital Problems    Diagnosis Date Noted    Pneumonia [J18.9] 07/14/2022     Priority: Medium       Progressive non-Hodgkin lymphoma per CT scan  Pneumonia per chest x-ray  CKD  Intractable pain pneumonia  S/p carotid endarterectomy-6/2022    RECOMMENDATIONS:  I reviewed the labs/imaging available to me,outside records and discussed with the patient. I explained to the patient the nature of this problem.  I explained the significance of these abnormalities and possible etiology and management options  Reviewed previous medical record  Reviewed hospitalization record  The patient has progressive non-Hodgkin's lymphoma with a mass compressing his kidney and urine outflow, S/P stenting. Need the stent exchange, appreciate primary care input, we will consult urology  For pain control, we will add OxyContin 10 mg twice daily as well as oxycodone as needed  Continue with antibiotics for pneumonia  Will definitely need chemoimmunotherapy as an outpatient after discharge. We will set it up after he finishes antibiotics for his pneumonia              Discussed with patient and spouse and Nurse. Thank you for asking us to see this patient. Alfonso Colon MD  Hematologist/Medical [id number] [facility] hematology oncology physicians            This note is created with the assistance of a speech recognition program.  While intending to generate a document that actually reflects the content of the visit, the document can still have some errors including those of syntax and sound a like substitutions which may escape proof reading. It such instances, actual meaning can be extrapolated by contextual diversion.

## 2022-07-16 NOTE — PROGRESS NOTES
Pulmonary Progress Note  [facility] Care Specialists      Patient - Susana Fernandez,  Age - 66 y.o.    - 1950      Room Number - -01   MRN -  [id number]   [facility]  Date of Admission -  2022  1:37 PM    Consulting  Pieter Humphries DO  Primary Care Physician - Cristino Carrillo DO     SUBJECTIVE   Patient appears to be in fair spirits. No increase shortness of breath no chest pain    OBJECTIVE   VITALS    height is 5' 10\" (1.778 m) and weight is 174 lb 2.6 oz (79 kg). His oral temperature is 98.4 °F (36.9 °C). His blood pressure is 105/66 and his pulse is 82. His respiration is 16 and oxygen saturation is 96%. Pain   Body mass index is 24.99 kg/m². Temperature Range: Temp: 98.4 °F (36.9 °C) Temp  Av.9 °F (36.6 °C)  Min: 97.5 °F (36.4 °C)  Max: 98.4 °F (36.9 °C)  BP Range:  Systolic (88PTB), QFY:476 , Min:105 , HDD:693     Diastolic (74BBB), NJW:44, Min:66, Max:92    Pulse Range: Pulse  Av.6  Min: 67  Max: 82  Respiration Range: Resp  Av.7  Min: 16  Max: 18  Current Pulse Ox[de-identified]  SpO2: 96 %  24HR Pulse Ox Range:  SpO2  Av.5 %  Min: 92 %  Max: 99 %  Oxygen Amount and Delivery: O2 Flow Rate (L/min): 2 L/min    Wt Readings from Last 3 Encounters:   22 174 lb 2.6 oz (79 kg)   22 178 lb (80.7 kg)   22 178 lb (80.7 kg)       I/O (24 Hours)    Intake/Output Summary (Last 24 hours) at 2022 1240  Last data filed at 2022 2316  Gross per 24 hour   Intake 960 ml   Output 300 ml   Net 660 ml       EXAM     General Appearance  Awake, alert, oriented, in no acute distress  HEENT - normocephalic, atraumatic. Neck - Supple,  trachea midline   Lungs -coarse breath sounds no crackles rales or wheeze  Heart Exam:PMI normal. No lifts, heaves, or thrills. RRR. No murmurs, clicks, gallops, or rubs  Abdomen Exam: Abdomen soft, non-tender.    Extremity Exam: No signs of cyanosis    MEDS      azithromycin  500 mg IntraVENous Q24H    FLUoxetine  20 mg Oral Daily    donepezil  10 mg Oral Daily    amLODIPine  10 mg Oral Daily    losartan  50 mg Oral Daily    levothyroxine  150 mcg Oral Daily    [Held by provider] furosemide  20 mg Oral Daily    atorvastatin  40 mg Oral Daily    apixaban  5 mg Oral BID    carvedilol  6.25 mg Oral Nightly    psyllium  1 packet Oral Daily    lactobacillus  1 capsule Oral Daily    therapeutic multivitamin-minerals  1 tablet Oral Daily    glipiZIDE  5 mg Oral BID AC    pantoprazole  40 mg Oral QAM AC    budesonide-formoterol  2 puff Inhalation BID    cefepime  2,000 mg IntraVENous Q12H    sodium chloride flush  5-40 mL IntraVENous 2 times per day    pregabalin  75 mg Oral BID      dextrose      sodium chloride 75 mL/hr at 07/16/22 0612    sodium chloride       oxyCODONE, albuterol sulfate HFA, ipratropium-albuterol, glucose, dextrose bolus **OR** dextrose bolus, glucagon (rDNA), dextrose, morphine, sodium chloride flush, sodium chloride, acetaminophen, ondansetron **OR** ondansetron    LABS   CBC   Recent Labs     07/16/22  0600   WBC 8.3   HGB 12.4*   HCT 38.4*   MCV 80.2        BMP:   Lab Results   Component Value Date/Time     07/16/2022 06:00 AM    K 3.7 07/16/2022 06:00 AM     07/16/2022 06:00 AM    CO2 26 07/16/2022 06:00 AM    BUN 27 07/16/2022 06:00 AM    LABALBU 3.7 07/14/2022 02:16 PM    CREATININE 1.49 07/16/2022 06:00 AM    CALCIUM 8.0 07/16/2022 06:00 AM    GFRAA 56 07/16/2022 06:00 AM    LABGLOM 46 07/16/2022 06:00 AM     ABGs:No results found for: PHART, PO2ART, FLK9KDG   Lab Results   Component Value Date/Time    MODE NOT REPORTED 02/17/2022 01:28 AM     Ionized Calcium:  No results found for: IONCA  Magnesium:    Lab Results   Component Value Date/Time    MG 2.0 06/29/2022 05:10 AM     Phosphorus:    Lab Results   Component Value Date/Time    PHOS 3.3 06/29/2022 05:10 AM        LIVER PROFILE   Recent Labs     07/14/22  1416 07/15/22  0741 07/16/22  0600   AST 15  --   -- ALT 6  --   --    LIPASE 69*   < > 114*   BILITOT 0.80  --   --    ALKPHOS 131*  --   --     < > = values in this interval not displayed. INR No results for input(s): INR in the last 72 hours. PTT   Lab Results   Component Value Date    APTT 30.3 06/13/2022         RADIOLOGY     (See actual reports for details)    ASSESSMENT/PLAN     Patient Active Problem List   Diagnosis    NHL (non-Hodgkin's lymphoma) ([address]    Traumatic retroperitoneal hematoma    Acute kidney injury ([address]    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)    Hydronephrosis due to obstruction of ureter    Moderate malnutrition (HCC)    Chronic cholecystitis    Pleural effusion    Cardiomegaly    Hypothyroidism    Diabetes mellitus ([address]    Hypertensive urgency    Acute respiratory failure with hypoxia (HCC)    Hypoxia    Carotid stenosis, asymptomatic, bilateral    Simple chronic bronchitis (HCC)    Dependence on nocturnal oxygen therapy    Pneumonia     #1, concern for lymphoma in the patient with a known enlarging abdominal mass. #2.  A diagnosis of COPD, no documented PFTs. #3.  Had a history of a work-up for KATIE. #4.  He may have an acute bronchitis. Awaiting final culture results. On breathing treatments  On cefepime and Zithromax, trying to make sure he does not have Pseudomonas. He did have Doppler ultrasounds of lower extremities which revealed no acute DVT. Follow with other services.     Electronically signed by Manish Bauer MD on 7/16/2022 at 12:40 PM

## 2022-07-16 NOTE — CARE COORDINATION
ONGOING DISCHARGE PLAN:    Patient is alert and oriented x4. Spoke with patient regarding discharge plan and patient confirms that plan is still to discharge to home with [facility]  Awaiting cultures  Consult urology   On iv atb    needs chemoimmunotherapy as an outpatient after discharge  Breathing treatments     Will continue to follow for additional discharge needs.     Electronically signed by Lion Xie RN on 7/16/2022 at 4:40 PM

## 2022-07-16 NOTE — PROGRESS NOTES
Spoke with Dr. Chen Query with urology regarding consult. MD stated that he will forward this message to Dr. Armando Villarreal on Monday because he does not know pts. History and is not going to remove stents that he did not place.

## 2022-07-17 LAB
ABSOLUTE EOS #: 0.2 K/UL (ref 0–0.4)
ABSOLUTE LYMPH #: 1.4 K/UL (ref 1–4.8)
ABSOLUTE MONO #: 0.7 K/UL (ref 0.1–1.3)
ANION GAP SERPL CALCULATED.3IONS-SCNC: 9 MMOL/L (ref 9–17)
BASOPHILS # BLD: 0 % (ref 0–2)
BASOPHILS ABSOLUTE: 0 K/UL (ref 0–0.2)
BUN BLDV-MCNC: 27 MG/DL (ref 8–23)
CALCIUM SERPL-MCNC: 7.8 MG/DL (ref 8.6–10.4)
CHLORIDE BLD-SCNC: 104 MMOL/L (ref 98–107)
CO2: 23 MMOL/L (ref 20–31)
CREAT SERPL-MCNC: 1.53 MG/DL (ref 0.7–1.2)
CULTURE: ABNORMAL
DIRECT EXAM: ABNORMAL
EOSINOPHILS RELATIVE PERCENT: 2 % (ref 0–4)
GFR AFRICAN AMERICAN: 54 ML/MIN
GFR NON-AFRICAN AMERICAN: 45 ML/MIN
GFR SERPL CREATININE-BSD FRML MDRD: ABNORMAL ML/MIN/{1.73_M2}
GLUCOSE BLD-MCNC: 122 MG/DL (ref 75–110)
GLUCOSE BLD-MCNC: 126 MG/DL (ref 75–110)
GLUCOSE BLD-MCNC: 159 MG/DL (ref 75–110)
GLUCOSE BLD-MCNC: 86 MG/DL (ref 75–110)
GLUCOSE BLD-MCNC: 92 MG/DL (ref 70–99)
HCT VFR BLD CALC: 33.5 % (ref 41–53)
HEMOGLOBIN: 10.6 G/DL (ref 13.5–17.5)
LIPASE: 125 U/L (ref 13–60)
LYMPHOCYTES # BLD: 17 % (ref 24–44)
MCH RBC QN AUTO: 25.8 PG (ref 26–34)
MCHC RBC AUTO-ENTMCNC: 31.7 G/DL (ref 31–37)
MCV RBC AUTO: 81.3 FL (ref 80–100)
MONOCYTES # BLD: 9 % (ref 1–7)
PDW BLD-RTO: 16.2 % (ref 11.5–14.9)
PLATELET # BLD: 158 K/UL (ref 150–450)
PMV BLD AUTO: 7.9 FL (ref 6–12)
POTASSIUM SERPL-SCNC: 4.2 MMOL/L (ref 3.7–5.3)
RBC # BLD: 4.12 M/UL (ref 4.5–5.9)
SEG NEUTROPHILS: 72 % (ref 36–66)
SEGMENTED NEUTROPHILS ABSOLUTE COUNT: 5.7 K/UL (ref 1.3–9.1)
SODIUM BLD-SCNC: 136 MMOL/L (ref 135–144)
SPECIMEN DESCRIPTION: ABNORMAL
WBC # BLD: 8 K/UL (ref 3.5–11)

## 2022-07-17 PROCEDURE — 80048 BASIC METABOLIC PNL TOTAL CA: CPT

## 2022-07-17 PROCEDURE — 2580000003 HC RX 258: Performed by: INTERNAL MEDICINE

## 2022-07-17 PROCEDURE — 2700000000 HC OXYGEN THERAPY PER DAY

## 2022-07-17 PROCEDURE — 94640 AIRWAY INHALATION TREATMENT: CPT

## 2022-07-17 PROCEDURE — 94761 N-INVAS EAR/PLS OXIMETRY MLT: CPT

## 2022-07-17 PROCEDURE — 99232 SBSQ HOSP IP/OBS MODERATE 35: CPT | Performed by: INTERNAL MEDICINE

## 2022-07-17 PROCEDURE — 6370000000 HC RX 637 (ALT 250 FOR IP): Performed by: FAMILY MEDICINE

## 2022-07-17 PROCEDURE — 82947 ASSAY GLUCOSE BLOOD QUANT: CPT

## 2022-07-17 PROCEDURE — 85025 COMPLETE CBC W/AUTO DIFF WBC: CPT

## 2022-07-17 PROCEDURE — 97162 PT EVAL MOD COMPLEX 30 MIN: CPT

## 2022-07-17 PROCEDURE — 6370000000 HC RX 637 (ALT 250 FOR IP): Performed by: INTERNAL MEDICINE

## 2022-07-17 PROCEDURE — 2580000003 HC RX 258: Performed by: FAMILY MEDICINE

## 2022-07-17 PROCEDURE — 6360000002 HC RX W HCPCS: Performed by: FAMILY MEDICINE

## 2022-07-17 PROCEDURE — 2060000000 HC ICU INTERMEDIATE R&B

## 2022-07-17 PROCEDURE — 83690 ASSAY OF LIPASE: CPT

## 2022-07-17 PROCEDURE — 36415 COLL VENOUS BLD VENIPUNCTURE: CPT

## 2022-07-17 RX ORDER — HEPARIN SODIUM (PORCINE) LOCK FLUSH IV SOLN 100 UNIT/ML 100 UNIT/ML
300 SOLUTION INTRAVENOUS ONCE
Status: COMPLETED | OUTPATIENT
Start: 2022-07-17 | End: 2022-07-17

## 2022-07-17 RX ADMIN — PREGABALIN 75 MG: 75 CAPSULE ORAL at 20:26

## 2022-07-17 RX ADMIN — SUCRALFATE 1 G: 1 TABLET ORAL at 11:45

## 2022-07-17 RX ADMIN — APIXABAN 5 MG: 5 TABLET, FILM COATED ORAL at 09:34

## 2022-07-17 RX ADMIN — OXYCODONE HYDROCHLORIDE 5 MG: 5 TABLET ORAL at 16:59

## 2022-07-17 RX ADMIN — SODIUM CHLORIDE: 9 INJECTION, SOLUTION INTRAVENOUS at 05:27

## 2022-07-17 RX ADMIN — Medication 300 UNITS: at 21:36

## 2022-07-17 RX ADMIN — OXYCODONE HYDROCHLORIDE 10 MG: 10 TABLET, FILM COATED, EXTENDED RELEASE ORAL at 09:33

## 2022-07-17 RX ADMIN — DONEPEZIL HYDROCHLORIDE 10 MG: 10 TABLET ORAL at 09:34

## 2022-07-17 RX ADMIN — OXYCODONE HYDROCHLORIDE 5 MG: 5 TABLET ORAL at 12:55

## 2022-07-17 RX ADMIN — OXYCODONE HYDROCHLORIDE 10 MG: 10 TABLET, FILM COATED, EXTENDED RELEASE ORAL at 20:26

## 2022-07-17 RX ADMIN — PSYLLIUM HUSK 1 PACKET: 3.4 POWDER ORAL at 09:33

## 2022-07-17 RX ADMIN — CARVEDILOL 6.25 MG: 6.25 TABLET, FILM COATED ORAL at 20:27

## 2022-07-17 RX ADMIN — PANTOPRAZOLE SODIUM 40 MG: 40 TABLET, DELAYED RELEASE ORAL at 05:22

## 2022-07-17 RX ADMIN — PREGABALIN 75 MG: 75 CAPSULE ORAL at 09:34

## 2022-07-17 RX ADMIN — SUCRALFATE 1 G: 1 TABLET ORAL at 16:52

## 2022-07-17 RX ADMIN — BUDESONIDE AND FORMOTEROL FUMARATE DIHYDRATE 2 PUFF: 80; 4.5 AEROSOL RESPIRATORY (INHALATION) at 19:25

## 2022-07-17 RX ADMIN — Medication 1 CAPSULE: at 09:33

## 2022-07-17 RX ADMIN — ATORVASTATIN CALCIUM 40 MG: 40 TABLET, FILM COATED ORAL at 09:34

## 2022-07-17 RX ADMIN — MULTIPLE VITAMINS W/ MINERALS TAB 1 TABLET: TAB at 09:33

## 2022-07-17 RX ADMIN — APIXABAN 5 MG: 5 TABLET, FILM COATED ORAL at 20:27

## 2022-07-17 RX ADMIN — FLUOXETINE 20 MG: 20 CAPSULE ORAL at 09:33

## 2022-07-17 RX ADMIN — SODIUM CHLORIDE, PRESERVATIVE FREE 10 ML: 5 INJECTION INTRAVENOUS at 09:34

## 2022-07-17 RX ADMIN — SUCRALFATE 1 G: 1 TABLET ORAL at 05:20

## 2022-07-17 RX ADMIN — BUDESONIDE AND FORMOTEROL FUMARATE DIHYDRATE 2 PUFF: 80; 4.5 AEROSOL RESPIRATORY (INHALATION) at 07:17

## 2022-07-17 RX ADMIN — AZITHROMYCIN MONOHYDRATE 500 MG: 500 INJECTION, POWDER, LYOPHILIZED, FOR SOLUTION INTRAVENOUS at 09:40

## 2022-07-17 RX ADMIN — LOSARTAN POTASSIUM 50 MG: 50 TABLET, FILM COATED ORAL at 09:34

## 2022-07-17 RX ADMIN — CEFEPIME HYDROCHLORIDE 1000 MG: 1 INJECTION, POWDER, FOR SOLUTION INTRAMUSCULAR; INTRAVENOUS at 14:57

## 2022-07-17 RX ADMIN — AMLODIPINE BESYLATE 10 MG: 10 TABLET ORAL at 09:34

## 2022-07-17 RX ADMIN — LEVOTHYROXINE SODIUM 150 MCG: 0.07 TABLET ORAL at 05:20

## 2022-07-17 ASSESSMENT — PAIN DESCRIPTION - LOCATION
LOCATION: BACK;ABDOMEN
LOCATION: BACK

## 2022-07-17 ASSESSMENT — PAIN DESCRIPTION - ORIENTATION: ORIENTATION: LOWER;UPPER

## 2022-07-17 ASSESSMENT — PAIN SCALES - GENERAL
PAINLEVEL_OUTOF10: 7
PAINLEVEL_OUTOF10: 5

## 2022-07-17 ASSESSMENT — PAIN DESCRIPTION - DESCRIPTORS: DESCRIPTORS: ACHING;SHARP

## 2022-07-17 NOTE — PROGRESS NOTES
[id number] [facility]      PROGRESS NOTE        Patient:  David Farah  YOB: 1950    MRN: [id number]     Acct: [de-identified]     Admit date: 7/14/2022    Pt seen and Chart reviewed. Consultant notes reviewed and care evaluated. Subjective: States he is actually feeling much better he has not asked for pain medication or he is not sure even he had pain meds but he was placed still OxyContin every 12 hours yesterday and seems to help his pain he slept okay except he is getting night sweats and no chills or fevers though that could be second to his lymphoma. He has no nausea anymore he says. He is eating good. He has not get out of bed. His IV infiltrated and somehow cefepime dropped off his last but his kidney function slightly going up we consulted urology to see if his stent needs to be replaced before he starts any chemo therapy in the next week or so as per hematology recommendation. Also like to keep him on antibiotic till that is taking care of. Patient having no chest pain or increased shortness of breath cough is improving as well. Last night he says because the pain may be his oxygen dropped and he put oxygen on him he is at 2 L satting okay now. Diet:  ADULT DIET;  Regular      Medications:Current Inpatient    Scheduled Meds:   oxyCODONE  10 mg Oral 2 times per day    sucralfate  1 g Oral 4 times per day    azithromycin  500 mg IntraVENous Q24H    FLUoxetine  20 mg Oral Daily    donepezil  10 mg Oral Daily    amLODIPine  10 mg Oral Daily    losartan  50 mg Oral Daily    levothyroxine  150 mcg Oral Daily    [Held by provider] furosemide  20 mg Oral Daily    atorvastatin  40 mg Oral Daily    apixaban  5 mg Oral BID    carvedilol  6.25 mg Oral Nightly    psyllium  1 packet Oral Daily    lactobacillus  1 capsule Oral Daily    therapeutic multivitamin-minerals  1 tablet Oral Daily    glipiZIDE  5 mg Oral BID AC    pantoprazole  40 mg Oral QAM AC    budesonide-formoterol  2 puff Inhalation BID    sodium chloride flush  5-40 mL IntraVENous 2 times per day    pregabalin  75 mg Oral BID     Continuous Infusions:   dextrose      sodium chloride 75 mL/hr at 07/17/22 0527    sodium chloride       PRN Meds:oxyCODONE, albuterol sulfate HFA, ipratropium-albuterol, glucose, dextrose bolus **OR** dextrose bolus, glucagon (rDNA), dextrose, sodium chloride flush, sodium chloride, acetaminophen, ondansetron **OR** ondansetron        Physical Exam:  Vitals: BP (!) 142/64   Pulse 67   Temp 97.5 °F (36.4 °C) (Oral)   Resp 18   Ht 5' 10\" (1.778 m)   Wt 174 lb 2.6 oz (79 kg)   SpO2 95%   BMI 24.99 kg/m²   24 hour intake/output:  Intake/Output Summary (Last 24 hours) at 7/17/2022 1143  Last data filed at 7/17/2022 0829  Gross per 24 hour   Intake 1500 ml   Output 250 ml   Net 1250 ml     Last 3 weights:   Wt Readings from Last 3 Encounters:   07/16/22 174 lb 2.6 oz (79 kg)   07/06/22 178 lb (80.7 kg)   06/29/22 178 lb (80.7 kg)       Physical Examination:   General appearance - alert, well appearing, and in no distress he sitting up pleasant not complaining of any pain at this time or nausea he just finished eating lunch his cousin is visiting with him he seems comfortable  Mental status - alert, oriented to person, place, and time  sclera clear, anicteric  Chest - clear to auscultation, no wheezes, rales or rhonchi, symmetric air entry  Heart - normal rate, regular rhythm, normal S1, S2, no murmurs, rubs, clicks or gallops  Abdomen - soft, nontender, nondistended, no masses or organomegaly did not reproduce any tenderness on palpation abdomen is soft is obese abdomen  Neurological - alert, oriented, normal speech, no focal findings or movement disorder noted}  Extremities - peripheral pulses normal, no pedal edema, no clubbing or cyanosis  Skin - normal coloration and turgor, no rashes, no suspicious skin lesions noted      Component Value Units   Culture, Respiratory [[id number]] (Abnormal)    Collected: 07/16/22 0511    Updated: 07/17/22 1123    Specimen Source: Sputum Expectorated     Specimen Description . EXPECTORATED SPUTUM    Direct Exam >25 NEUTROPHILS/LPF     < 10 EPITHELIAL CELLS/LPF     MIXED BACTERIAL MORPHOTYPES SEEN ON GRAM STAIN. Abnormal     Culture NORMAL RESPIRATORY SHARRI LIGHT GROWTH   POC Glucose Fingerstick [[id number]]    Collected: 07/17/22 0609    Updated: 07/17/22 0620     POC Glucose 86 mg/dL   Basic Metabolic Panel [[id number]] (Abnormal)    Collected: 07/17/22 0453    Updated: 07/17/22 0619    Specimen Source: Blood     Glucose 92 mg/dL    BUN 27 High  mg/dL    CREATININE 1.53 High  mg/dL    Calcium 7.8 Low  mg/dL    Sodium 136 mmol/L    Potassium 4.2 mmol/L    Chloride 104 mmol/L    CO2 23 mmol/L    Anion Gap 9 mmol/L    GFR Non-African American 45 Low  mL/min    GFR  54 Low  mL/min    GFR Comment         Comment: Average GFR for 79or more years old:    76 mL/min/1.73sq m   Chronic Kidney Disease:    <60 mL/min/1.73sq m   Kidney failure:    <15 mL/min/1.73sq m               eGFR calculated using average adult body mass.  Additional eGFR calculator available at:         [email or web address]             Lipase [[id number]] (Abnormal)    Collected: 07/17/22 0453    Updated: 07/17/22 0619    Specimen Source: Blood     Lipase 125 High  U/L   CBC with Auto Differential [[id number]] (Abnormal)    Collected: 07/17/22 0453    Updated: 07/17/22 0611    Specimen Source: Blood     WBC 8.0 k/uL    RBC 4.12 Low  m/uL    Hemoglobin 10.6 Low  g/dL    Hematocrit 33.5 Low  %    MCV 81.3 fL    MCH 25.8 Low  pg    MCHC 31.7 g/dL    RDW 16.2 High  %    Platelets 547 k/uL    MPV 7.9 fL    Seg Neutrophils 72 High  %    Lymphocytes 17 Low  %    Monocytes 9 High  %    Eosinophils % 2 %    Basophils 0 %    Segs Absolute 5.70 k/uL    Absolute Lymph # 1.40 k/uL    Absolute Mono # 0.70 k/uL    Absolute Eos # 0.20 k/uL Basophils Absolute 0.00 k/uL   POC Glucose Fingerstick [[id number]] (Abnormal)    Collected: 07/16/22 2042    Updated: 07/16/22 2050     POC Glucose 156 High  mg/dL   POC Glucose Fingerstick [[id number]] (Abnormal)    Collected: 07/16/22 1618    Updated: 07/16/22 1628     POC Glucose 115 High         Assessment:  Principal Problem:    Pneumonia  Resolved Problems:    * No resolved hospital problems. *    * No resolved hospital problems.  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.13    Hydronephrosis due to obstruction of ureter N13.1    Moderate malnutrition ([facility]) E44.0    Chronic cholecystitis K81.1    Pleural effusion J90    Cardiomegaly I51.7    Hypothyroidism E03.9    Diabetes mellitus ([facility]) E11.9    Hypertensive urgency I16.0    Acute respiratory failure with hypoxia ([facility]) J96.01    Hypoxia R09.02    Carotid stenosis, asymptomatic, bilateral I65.23    Simple chronic bronchitis ([facility]) J41.0    Dependence on nocturnal oxygen therapy Z99.81    Pneumonia J18.9      Pneumonia    Abdominal pads may be increased in size    History of lymphoma  Abdominal pain and neuropathic pain    History of postherpetic neuralgia    History of hydronephrosis but no evidence of that on recent CT except enlargement of the medicine abdomen    Dementia patient is alert and oriented this morning  UTI  inTractable abdominal pain yesterday now it is better with pain meds  On the sore is going adjust his pain medications to higher dose  Uretral stent possibly changed or removed on Wednesday by Dr. Carpenter Backzahra  Renal insufficiency we will monitor  Renal insufficiency could be second to his mass and he has a history of stenting    Is continue with elevation is likely is also secondary to mass    His pain and nausea much improved            Plan:  Vic with his RN and we will try to see if we can get him in midline since his IV infiltrated for now    We will continue with cefepime and Zithromax for renal coverage as well as pneumonia and awaiting urology input in regard to stent he supposed to have that taken care of on Wednesday anyway but will see if there is a way to have it done sooner so he could get ready for his chemo treatment and we will keep cefepime at 1000 mg again to his kidney function daily    We will make sure physical therapy works with him. Patient feels weak    Continue with his pain medications as ordered    Discussed with patient and his cousin in the room and patient was okay to discuss his case and appointment with his family.     Terrence Sheth DO [facility]            7/17/2022, 11:43 AM

## 2022-07-17 NOTE — PROGRESS NOTES
Pulmonary Progress Note  Pulmonary and Critical Care Specialists      Patient - Susana Fernandez,  Age - 66 y.o.    - 1950      Room Number - -01   N -  [id number]   [facility] # - [de-identified]  Date of Admission -  2022  1:37 PM    Consulting Service/Physician   Consulting - Anita Braga DO  Primary Care Physician - Cristino Carrillo DO     SUBJECTIVE   Patient appears to be in fair spirits but resting quietly watching television  Saturations 96% on 2 L  OBJECTIVE   VITALS    height is 5' 10\" (1.778 m) and weight is 174 lb 2.6 oz (79 kg). His oral temperature is 97.8 °F (36.6 °C). His blood pressure is 136/63 and his pulse is 74. His respiration is 18 and oxygen saturation is 96%. Body mass index is 24.99 kg/m². Temperature Range: Temp: 97.8 °F (36.6 °C) Temp  Av.7 °F (36.5 °C)  Min: 97.3 °F (36.3 °C)  Max: 98.1 °F (36.7 °C)  BP Range:  Systolic (24YST), CI , Min:127 , VDW:252     Diastolic (76NKE), CGF:12, Min:63, Max:70    Pulse Range: Pulse  Av.7  Min: 67  Max: 81  Respiration Range: Resp  Av.3  Min: 18  Max: 20  Current Pulse Ox[de-identified]  SpO2: 96 %  24HR Pulse Ox Range:  SpO2  Av.2 %  Min: 95 %  Max: 97 %  Oxygen Amount and Delivery: O2 Flow Rate (L/min): 2 L/min    Wt Readings from Last 3 Encounters:   22 174 lb 2.6 oz (79 kg)   22 178 lb (80.7 kg)   22 178 lb (80.7 kg)       I/O (24 Hours)    Intake/Output Summary (Last 24 hours) at 2022 1330  Last data filed at 2022 1220  Gross per 24 hour   Intake 1860 ml   Output 250 ml   Net 1610 ml       EXAM     General Appearance  Awake, alert, oriented, in no acute distress  HEENT - normocephalic, atraumatic. Neck - Supple,  trachea midline   Lungs -breath sounds no crackles rales or wheezes  Heart Exam:PMI normal. No lifts, heaves, or thrills. RRR. No murmurs, clicks, gallops, or rubs  Abdomen Exam: Abdomen soft, non-tender.  BS normal.   Extremity Exam: No signs of cyanosis    MEDS      cefepime  1,000 mg IntraVENous Q24H    oxyCODONE  10 mg Oral 2 times per day    sucralfate  1 g Oral 4 times per day    azithromycin  500 mg IntraVENous Q24H    FLUoxetine  20 mg Oral Daily    donepezil  10 mg Oral Daily    amLODIPine  10 mg Oral Daily    losartan  50 mg Oral Daily    levothyroxine  150 mcg Oral Daily    [Held by provider] furosemide  20 mg Oral Daily    atorvastatin  40 mg Oral Daily    apixaban  5 mg Oral BID    carvedilol  6.25 mg Oral Nightly    psyllium  1 packet Oral Daily    lactobacillus  1 capsule Oral Daily    therapeutic multivitamin-minerals  1 tablet Oral Daily    glipiZIDE  5 mg Oral BID AC    pantoprazole  40 mg Oral QAM AC    budesonide-formoterol  2 puff Inhalation BID    sodium chloride flush  5-40 mL IntraVENous 2 times per day    pregabalin  75 mg Oral BID      dextrose      sodium chloride 75 mL/hr at 07/17/22 0527    sodium chloride       oxyCODONE, albuterol sulfate HFA, ipratropium-albuterol, glucose, dextrose bolus **OR** dextrose bolus, glucagon (rDNA), dextrose, sodium chloride flush, sodium chloride, acetaminophen, ondansetron **OR** ondansetron    LABS   CBC   Recent Labs     07/17/22  0453   WBC 8.0   HGB 10.6*   HCT 33.5*   MCV 81.3        BMP:   Lab Results   Component Value Date/Time     07/17/2022 04:53 AM    K 4.2 07/17/2022 04:53 AM     07/17/2022 04:53 AM    CO2 23 07/17/2022 04:53 AM    BUN 27 07/17/2022 04:53 AM    LABALBU 3.7 07/14/2022 02:16 PM    CREATININE 1.53 07/17/2022 04:53 AM    CALCIUM 7.8 07/17/2022 04:53 AM    GFRAA 54 07/17/2022 04:53 AM    LABGLOM 45 07/17/2022 04:53 AM     ABGs:No results found for: PHART, PO2ART, XLW8VQT   Lab Results   Component Value Date/Time    MODE NOT REPORTED 02/17/2022 01:28 AM     Ionized Calcium:  No results found for: IONCA  Magnesium:    Lab Results   Component Value Date/Time    MG 2.0 06/29/2022 05:10 AM     Phosphorus:    Lab Results   Component Value Date/Time    PHOS 3.3 06/29/2022 05:10 AM        LIVER PROFILE   Recent Labs     07/14/22  1416 07/15/22  0741 07/17/22  0453   AST 15  --   --    ALT 6  --   --    LIPASE 69*   < > 125*   BILITOT 0.80  --   --    ALKPHOS 131*  --   --     < > = values in this interval not displayed. INR No results for input(s): INR in the last 72 hours. 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 #2.  A diagnosis of COPD, no documented PFTs. #3.  Had a history of a work-up for KATIE. #4.  He may have an acute bronchitis. Antibiotics per Dr. María Huynh team   On Eliquis. Check chest x-ray PA and left lateral  in a.m.       Electronically signed by Israel Carpio MD on 7/17/2022 at 1:30 PM

## 2022-07-17 NOTE — PLAN OF CARE
Problem: Discharge Planning  Goal: Discharge to home or other facility with appropriate resources  Outcome: Progressing  Flowsheets (Taken 7/17/2022 0930)  Discharge to home or other facility with appropriate resources: Identify barriers to discharge with patient and caregiver     Problem: Pain  Goal: Verbalizes/displays adequate comfort level or baseline comfort level  Outcome: Progressing     Problem: Chronic Conditions and Co-morbidities  Goal: Patient's chronic conditions and co-morbidity symptoms are monitored and maintained or improved  Outcome: Progressing  Flowsheets (Taken 7/17/2022 0930)  Care Plan - Patient's Chronic Conditions and Co-Morbidity Symptoms are Monitored and Maintained or Improved: Monitor and assess patient's chronic conditions and comorbid symptoms for stability, deterioration, or improvement     Problem: Safety - Adult  Goal: Free from fall injury  Outcome: Progressing  Flowsheets (Taken 7/17/2022 0929)  Free From Fall Injury: Instruct family/caregiver on patient safety

## 2022-07-17 NOTE — PROGRESS NOTES
Today's Date: 7/17/2022  Patient Name: Rema Bazan  Date of admission: 7/14/2022  1:37 PM  Patient's age: 66 y.o., 1950  Admission Dx: Pneumonia [J18.9]  Pneumonia of left lower lobe due to infectious organism [J18.9]  Nausea and vomiting, intractability of vomiting not specified, unspecified vomiting type [R11.2]    Reason for Consult: management recommendations  Requesting Physician: Faizan Valdez DO    CHIEF COMPLAINT: Abdominal pain. Progressive disease      Interval history  Patient is seen and evaluated. He states that the pain medication is helping significantly. He is taking OxyContin 10 mg twice daily with breakthrough oxycodone. Awaiting urology work-up and management      HISTORY OF PRESENT ILLNESS:      The patient is a 66 y.o.  male who is admitted to the hospital for chief complaints of worsening abdominal pain. Patient is well-known to our practice. Patient has history of recurrent diffuse large B-cell lymphoma. At time of relapse biopsy showed follicular lymphoma. Patient's treatment history is as below. Most recently patient was treated with Rituxan followed by Rituxan maintenance. Patient was last seen in office back in March. CT scans from February showed stable disease patient's last reduction infusion was back in May 2022. Patient was recently discharged to the hospital on the 29th after undergoing a left carotid endarterectomy back in June the patient is now admitted with worsening of abdominal pain. Patient underwent CT scan in the ER 20 CT of abdomen pelvis without contrast which shows marked interval increase of infiltrative retroperitoneal mass compatible with known history of lymphoma. The mass now partially enveloped the left kidney but no hydronephrosis is noted. The mass is also abutting the duodenum. Patient's lab work-up shows hemoglobin of 12.8. MCV 78.5.   Patient's LFTs show bilirubin to be within normal range albumin is 3. 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 Evidence of relapse with biopsy-proven follicular lymphoma. 1-2 from treated medically lymph node biopsy July 6, 2021     CURRENT THERAPY:    1.   Observation. 2.   Status post treatment with Bexxar in May 2008. 3.   Status post previous treatment with Rituxan. 4.  Start treatment with Rituxan August 23, 2021. completed4 weeks of rituximab on 9/20/2021  5. Induction Rituxan is followed by consolidation Rituxan maintenance      Past Medical History:   has a past medical history of Arthritis, Cancer ([facility] [address] Chemotherapy management, encounter for, CHF (congestive heart failure) ([facility] [address] COPD (chronic obstructive pulmonary disease) ([facility] [address] Diabetes mellitus ([facility] [address] H/O cardiovascular stress test, History of non-Hodgkin's lymphoma, Hx of blood clots, Hyperlipidemia, Hypertension, Hypothyroidism, and On home O2. Past Surgical History:   has a past surgical history that includes hernia repair; IR BIOPSY ABDOMINAL/RETROPERITONEAL MASS PERCUTANEOUS (7/6/2021); IR PORT PLACEMENT > 5 YEARS (8/31/2021); Colonoscopy (Left, 9/24/2021); Cystoscopy (Right, 10/6/2021); Cholecystectomy, laparoscopic (N/A, 10/9/2021); Tonsillectomy; Cystoscopy (Bilateral, 11/24/2021); Cystoscopy (Bilateral, 2/16/2022); eye surgery; and Carotid endarterectomy (Left, 6/28/2022). Medications:    Prior to Admission medications    Medication Sig Start Date End Date Taking?  Authorizing Provider   apixaban (ELIQUIS) 5 MG TABS tablet Take 5 mg by mouth 2 times daily   Yes Historical Provider, MD   carvedilol (COREG) 6.25 MG tablet Take 6.25 mg by mouth at bedtime   Yes Historical Provider, MD   psyllium (KONSYL) 28.3 % PACK Take 1 packet by mouth daily   Yes Historical Provider, MD   Lactobacillus (PROBIOTIC ACIDOPHILUS) CAPS Take 1 caplet by mouth daily   Yes Historical Provider, MD   Multiple Vitamins-Minerals (THERAPEUTIC MULTIVITAMIN-MINERALS) tablet Rhonda Parikh at 07/17/22 1530    oxyCODONE (ROXICODONE) immediate release tablet 5 mg  5 mg Oral Q4H PRN Tray Colon MD   5 mg at 07/17/22 1659    oxyCODONE (OXYCONTIN) extended release tablet 10 mg  10 mg Oral 2 times per day Daiana Lugo MD   10 mg at 07/17/22 0933    sucralfate (CARAFATE) tablet 1 g  1 g Oral 4 times per day Natali Amador, DO   1 g at 07/17/22 1652    azithromycin (ZITHROMAX) 500 mg in D5W 250ml addavial  500 mg IntraVENous Q24H Roderick T Dixon, DO   Stopped at 07/17/22 1144    FLUoxetine (PROZAC) capsule 20 mg  20 mg Oral Daily Roderick T Dixon, DO   20 mg at 07/17/22 0933    donepezil (ARICEPT) tablet 10 mg  10 mg Oral Daily Roderick T Dixon, DO   10 mg at 07/17/22 0934    amLODIPine (NORVASC) tablet 10 mg  10 mg Oral Daily Roderick T Dixon, DO   10 mg at 07/17/22 0934    losartan (COZAAR) tablet 50 mg  50 mg Oral Daily Roderick T Dixon, DO   50 mg at 07/17/22 0934    albuterol sulfate HFA (PROVENTIL;VENTOLIN;PROAIR) 108 (90 Base) MCG/ACT inhaler 2 puff  2 puff Inhalation Q4H PRN Roderick T Dixon, DO        levothyroxine (SYNTHROID) tablet 150 mcg  150 mcg Oral Daily Roderick T Dixon, DO   150 mcg at 07/17/22 0520    [Held by provider] furosemide (LASIX) tablet 20 mg  20 mg Oral Daily Roderick T Dixon, DO   20 mg at 07/15/22 0744    ipratropium-albuterol (DUONEB) nebulizer solution 3 mL  1 vial Inhalation Q4H PRN Roderick T Dixon, DO        atorvastatin (LIPITOR) tablet 40 mg  40 mg Oral Daily Roderick T Dixon, DO   40 mg at 07/17/22 0934    apixaban (ELIQUIS) tablet 5 mg  5 mg Oral BID Roderick T Dixon, DO   5 mg at 07/17/22 0934    carvedilol (COREG) tablet 6.25 mg  6.25 mg Oral Nightly Roderick T Dixon, DO   6.25 mg at 07/16/22 2212    psyllium (METAMUCIL) 58.12 % packet 1 packet  1 packet Oral Daily Natali Amador, DO   1 packet at 07/17/22 0933    lactobacillus (CULTURELLE) capsule 1 capsule  1 capsule Oral Daily Roderick NAVEEN Dixon, DO   1 capsule at 07/17/22 0933    therapeutic multivitamin-minerals 1 tablet  1 tablet Oral Daily Roberto Marquez, DO   1 tablet at 07/17/22 0933    glipiZIDE (GLUCOTROL) tablet 5 mg  5 mg Oral BID AC Roderick T Dixon, DO   5 mg at 07/15/22 1802    pantoprazole (PROTONIX) tablet 40 mg  40 mg Oral QAM AC Roderick T Dixon, DO   40 mg at 07/17/22 0522    budesonide-formoterol (SYMBICORT) 80-4.5 MCG/ACT inhaler 2 puff  2 puff Inhalation BID Roberto Marquez, DO   2 puff at 07/17/22 0717    glucose chewable tablet 16 g  4 tablet Oral PRN Roderick T Dixon, DO   16 g at 07/15/22 1233    dextrose bolus 10% 125 mL  125 mL IntraVENous PRN Roderick T Dixon, DO        Or    dextrose bolus 10% 250 mL  250 mL IntraVENous PRN Roderick T Dixon, DO        glucagon (rDNA) injection 1 mg  1 mg IntraMUSCular PRN Roderick T Dixon, DO        dextrose 5 % solution  100 mL/hr IntraVENous PRN Roderick T Dixon, DO        0.9 % sodium chloride infusion   IntraVENous Continuous Amber Leyva MD 75 mL/hr at 07/17/22 0527 New Bag at 07/17/22 0527    sodium chloride flush 0.9 % injection 5-40 mL  5-40 mL IntraVENous 2 times per day Roberto Marquez, DO   10 mL at 07/17/22 0934    sodium chloride flush 0.9 % injection 5-40 mL  5-40 mL IntraVENous PRN Roderick T Dixon, DO        0.9 % sodium chloride infusion   IntraVENous PRN Roberto Marquez, DO        acetaminophen (TYLENOL) tablet 650 mg  650 mg Oral Q4H PRN Roderick T Dixon, DO   650 mg at 07/15/22 0744    ondansetron (ZOFRAN-ODT) disintegrating tablet 4 mg  4 mg Oral Q8H PRN Roderick T Dixon, DO        Or    ondansetron (ZOFRAN) injection 4 mg  4 mg IntraVENous Q6H PRN Roderick T Dixon, DO   4 mg at 07/16/22 0611    pregabalin (LYRICA) capsule 75 mg  75 mg Oral BID Roderick T Dixon, DO   75 mg at 07/17/22 7670       Allergies:  Patient has no known allergies. Social History:   reports that he quit smoking about 16 years ago. His smoking use included cigarettes. He has a 90.00 pack-year smoking history.  He has never used smokeless tobacco. He reports current alcohol use of about 1.7 standard drinks per week. He reports current drug use. Drug: Marijuana [place] [employer]). Family History: family history includes Alzheimer's Disease in his father; Diabetes in his mother; Heart Disease in his brother and brother. REVIEW OF SYSTEMS:      Constitutional: No fever or chills. No night sweats, no weight loss   Eyes: No eye discharge, double vision, or eye pain   HEENT: negative for sore mouth, sore throat, hoarseness and voice change   Respiratory: negative for cough , sputum, dyspnea, wheezing, hemoptysis, chest pain   Cardiovascular: negative for chest pain, dyspnea, palpitations, orthopnea, PND   Gastrointestinal: negative for nausea, vomiting, diarrhea, constipation, abdominal pain, Dysphagia, hematemesis and hematochezia. Positive abdominal pain, much improved now  Genitourinary: negative for frequency, dysuria, nocturia, urinary incontinence, and hematuria   Integument: negative for rash, skin lesions, bruises.    Hematologic/Lymphatic: negative for easy bruising, bleeding, lymphadenopathy, or petechiae   Endocrine: negative for heat or cold intolerance,weight changes, change in bowel habits and hair loss   Musculoskeletal: negative for myalgias, arthralgias, pain, joint swelling,and bone pain   Neurological: negative for headaches, dizziness, seizures, weakness, numbness    PHYSICAL EXAM:        /63   Pulse 74   Temp 97.8 °F (36.6 °C) (Oral)   Resp 18   Ht 5' 10\" (1.778 m)   Wt 174 lb 2.6 oz (79 kg)   SpO2 96%   BMI 24.99 kg/m²    Temp (24hrs), Av.7 °F (36.5 °C), Min:97.3 °F (36.3 °C), Max:98.1 °F (36.7 °C)      General appearance - well appearing, no in pain or distress   Mental status - alert and cooperative   Eyes - pupils equal and reactive, extraocular eye movements intact   Ears - bilateral TM's and external ear canals normal   Mouth - mucous membranes moist, pharynx normal without lesions   Neck - supple, no significant adenopathy   Lymphatics - no palpable lymphadenopathy, no hepatosplenomegaly   Chest - clear to auscultation, no wheezes, rales or rhonchi, symmetric air entry   Heart - normal rate, regular rhythm, normal S1, S2, no murmurs  Abdomen - soft, nontender, nondistended, no masses or organomegaly   Neurological - alert, oriented, normal speech, no focal findings or movement disorder noted   Musculoskeletal - no joint tenderness, deformity or swelling   Extremities - peripheral pulses normal, no pedal edema, no clubbing or cyanosis   Skin - normal coloration and turgor, no rashes, no suspicious skin lesions noted ,      DATA:      Labs:     Results for orders placed or performed during the hospital encounter of 07/14/22   MRSA DNA Probe, Nasal    Specimen: Nasal   Result Value Ref Range    Specimen Description . NASAL SWAB     MRSA, DNA, Nasal NEGATIVE NEGATIVE   Culture, Respiratory    Specimen: Sputum Expectorated   Result Value Ref Range    Specimen Description . EXPECTORATED SPUTUM     Direct Exam >25 NEUTROPHILS/LPF     Direct Exam < 10 EPITHELIAL CELLS/LPF     Direct Exam MIXED BACTERIAL MORPHOTYPES SEEN ON GRAM STAIN.  (A)     Culture NORMAL RESPIRATORY SHARRI LIGHT GROWTH    CBC with Auto Differential   Result Value Ref Range    WBC 8.7 3.5 - 11.0 k/uL    RBC 5.04 4.5 - 5.9 m/uL    Hemoglobin 12.8 (L) 13.5 - 17.5 g/dL    Hematocrit 39.5 (L) 41 - 53 %    MCV 78.5 (L) 80 - 100 fL    MCH 25.5 (L) 26 - 34 pg    MCHC 32.5 31 - 37 g/dL    RDW 16.4 (H) 11.5 - 14.9 %    Platelets 818 679 - 346 k/uL    MPV 7.7 6.0 - 12.0 fL    Seg Neutrophils 75 (H) 36 - 66 %    Lymphocytes 16 (L) 24 - 44 %    Monocytes 9 (H) 1 - 7 %    Eosinophils % 0 0 - 4 %    Basophils 0 0 - 2 %    Segs Absolute 6.53 1.3 - 9.1 k/uL    Absolute Lymph # 1.39 1.0 - 4.8 k/uL    Absolute Mono # 0.78 0.1 - 1.3 k/uL    Absolute Eos # 0.00 0.0 - 0.4 k/uL    Basophils Absolute 0.00 0.0 - 0.2 k/uL    Morphology ANISOCYTOSIS PRESENT     Morphology 1+ ELLIPTOCYTES    CMP   Result Value Ref Range    Glucose 119 (H) 70 - 99 mg/dL    BUN 27 (H) 8 - 23 mg/dL    CREATININE 1.56 (H) 0.70 - 1.20 mg/dL    Calcium 8.7 8.6 - 10.4 mg/dL    Sodium 140 135 - 144 mmol/L    Potassium 3.9 3.7 - 5.3 mmol/L    Chloride 102 98 - 107 mmol/L    CO2 24 20 - 31 mmol/L    Anion Gap 14 9 - 17 mmol/L    Alkaline Phosphatase 131 (H) 40 - 129 U/L    ALT 6 5 - 41 U/L    AST 15 <40 U/L    Total Bilirubin 0.80 0.3 - 1.2 mg/dL    Total Protein 6.1 (L) 6.4 - 8.3 g/dL    Albumin 3.7 3.5 - 5.2 g/dL    GFR Non- 44 (L) >60 mL/min    GFR  53 (L) >60 mL/min    GFR Comment         Lipase   Result Value Ref Range    Lipase 69 (H) 13 - 60 U/L   Lactic Acid   Result Value Ref Range    Lactic Acid 2.0 0.5 - 2.2 mmol/L   Urinalysis with Reflex to Culture    Specimen: Urine   Result Value Ref Range    Color, UA Yellow Yellow    Turbidity UA Clear Clear    Glucose, Ur TRACE (A) NEGATIVE    Bilirubin Urine NEGATIVE NEGATIVE    Ketones, Urine TRACE (A) NEGATIVE    Specific Gravity, UA 1.024 1.000 - 1.030    Urine Hgb MOD (A) NEGATIVE    pH, UA 6.0 5.0 - 8.0    Protein, UA 4+ (A) NEGATIVE    Urobilinogen, Urine Normal Normal    Nitrite, Urine NEGATIVE NEGATIVE    Leukocyte Esterase, Urine TRACE (A) NEGATIVE   Troponin   Result Value Ref Range    Troponin, High Sensitivity 39 (H) 0 - 22 ng/L   Microscopic Urinalysis   Result Value Ref Range    WBC, UA 6 TO 9 /HPF    RBC, UA 21 TO 50 /HPF    Casts UA 0 TO 2 /LPF    Casts UA HYALINE /LPF    Epithelial Cells UA 6 TO 9 /HPF    Bacteria, UA None None    Mucus, UA 1+ (A) None   Troponin   Result Value Ref Range    Troponin, High Sensitivity 39 (H) 0 - 22 ng/L   Troponin   Result Value Ref Range    Troponin, High Sensitivity 43 (H) 0 - 22 ng/L   Lipase   Result Value Ref Range    Lipase 123 (H) 13 - 60 U/L   Procalcitonin   Result Value Ref Range    Procalcitonin 0.10 (H) <0.09 ng/mL   Cortisol Total   Result Value Ref Range    Cortisol 12.7 2.7 - 18.4 ug/dL    Cortisol Collection Info AM    Lactate Dehydrogenase   Result Value Ref Range     (H) 135 - 225 U/L   Uric Acid   Result Value Ref Range    Uric Acid 9.4 (H) 3.4 - 7.0 mg/dL   Basic Metabolic Panel   Result Value Ref Range    Glucose 82 70 - 99 mg/dL    BUN 27 (H) 8 - 23 mg/dL    CREATININE 1.49 (H) 0.70 - 1.20 mg/dL    Calcium 8.0 (L) 8.6 - 10.4 mg/dL    Sodium 140 135 - 144 mmol/L    Potassium 3.7 3.7 - 5.3 mmol/L    Chloride 103 98 - 107 mmol/L    CO2 26 20 - 31 mmol/L    Anion Gap 11 9 - 17 mmol/L    GFR Non-African American 46 (L) >60 mL/min    GFR  56 (L) >60 mL/min    GFR Comment         CBC with Auto Differential   Result Value Ref Range    WBC 8.3 3.5 - 11.0 k/uL    RBC 4.79 4.5 - 5.9 m/uL    Hemoglobin 12.4 (L) 13.5 - 17.5 g/dL    Hematocrit 38.4 (L) 41 - 53 %    MCV 80.2 80 - 100 fL    MCH 25.8 (L) 26 - 34 pg    MCHC 32.2 31 - 37 g/dL    RDW 16.1 (H) 11.5 - 14.9 %    Platelets 019 842 - 572 k/uL    MPV 7.9 6.0 - 12.0 fL    Seg Neutrophils 68 (H) 36 - 66 %    Lymphocytes 20 (L) 24 - 44 %    Monocytes 10 (H) 1 - 7 %    Eosinophils % 2 0 - 4 %    Basophils 0 0 - 2 %    Segs Absolute 5.64 1.3 - 9.1 k/uL    Absolute Lymph # 1.66 1.0 - 4.8 k/uL    Absolute Mono # 0.83 0.1 - 1.3 k/uL    Absolute Eos # 0.17 0.0 - 0.4 k/uL    Basophils Absolute 0.00 0.0 - 0.2 k/uL    Morphology ANISOCYTOSIS PRESENT     Morphology FEW GIANT PLATELETS    Lipase   Result Value Ref Range    Lipase 114 (H) 13 - 60 U/L   Basic Metabolic Panel   Result Value Ref Range    Glucose 92 70 - 99 mg/dL    BUN 27 (H) 8 - 23 mg/dL    CREATININE 1.53 (H) 0.70 - 1.20 mg/dL    Calcium 7.8 (L) 8.6 - 10.4 mg/dL    Sodium 136 135 - 144 mmol/L    Potassium 4.2 3.7 - 5.3 mmol/L    Chloride 104 98 - 107 mmol/L    CO2 23 20 - 31 mmol/L    Anion Gap 9 9 - 17 mmol/L    GFR Non-African American 45 (L) >60 mL/min    GFR  54 (L) >60 mL/min    GFR Comment         CBC with Auto Differential   Result Value Ref Range    WBC 8.0 3.5 - 11.0 k/uL    RBC 4.12 (L) 4.5 - 5.9 m/uL    Hemoglobin 10.6 (L) 13.5 - 17.5 g/dL    Hematocrit 33.5 (L) 41 - 53 %    MCV 81.3 80 - 100 fL    MCH 25.8 (L) 26 - 34 pg    MCHC 31.7 31 - 37 g/dL    RDW 16.2 (H) 11.5 - 14.9 %    Platelets 769 472 - 786 k/uL    MPV 7.9 6.0 - 12.0 fL    Seg Neutrophils 72 (H) 36 - 66 %    Lymphocytes 17 (L) 24 - 44 %    Monocytes 9 (H) 1 - 7 %    Eosinophils % 2 0 - 4 %    Basophils 0 0 - 2 %    Segs Absolute 5.70 1.3 - 9.1 k/uL    Absolute Lymph # 1.40 1.0 - 4.8 k/uL    Absolute Mono # 0.70 0.1 - 1.3 k/uL    Absolute Eos # 0.20 0.0 - 0.4 k/uL    Basophils Absolute 0.00 0.0 - 0.2 k/uL   Lipase   Result Value Ref Range    Lipase 125 (H) 13 - 60 U/L   POC Glucose Fingerstick   Result Value Ref Range    POC Glucose 239 (H) 75 - 110 mg/dL   POC Glucose Fingerstick   Result Value Ref Range    POC Glucose 95 75 - 110 mg/dL   POC Glucose Fingerstick   Result Value Ref Range    POC Glucose 87 75 - 110 mg/dL   POC Glucose Fingerstick   Result Value Ref Range    POC Glucose 56 (L) 75 - 110 mg/dL   POC Glucose Fingerstick   Result Value Ref Range    POC Glucose 68 (L) 75 - 110 mg/dL   POC Glucose Fingerstick   Result Value Ref Range    POC Glucose 187 (H) 75 - 110 mg/dL   POC Glucose Fingerstick   Result Value Ref Range    POC Glucose 170 (H) 75 - 110 mg/dL   POC Glucose Fingerstick   Result Value Ref Range    POC Glucose 119 (H) 75 - 110 mg/dL   POC Glucose Fingerstick   Result Value Ref Range    POC Glucose 69 (L) 75 - 110 mg/dL   POC Glucose Fingerstick   Result Value Ref Range    POC Glucose 87 75 - 110 mg/dL   POC Glucose Fingerstick   Result Value Ref Range    POC Glucose 149 (H) 75 - 110 mg/dL   POC Glucose Fingerstick   Result Value Ref Range    POC Glucose 115 (H) 75 - 110 mg/dL   POC Glucose Fingerstick   Result Value Ref Range    POC Glucose 156 (H) 75 - 110 mg/dL   POC Glucose Fingerstick   Result Value Ref Range    POC Glucose 86 75 - 110 mg/dL   POC Glucose Fingerstick   Result Value Ref Range    POC Glucose 159 (H) by the mass. Visualized portions of the pancreas are unremarkable. The retroperitoneal mass now surrounds and may partially invade the left kidney. With that said, a ureteral stent on the left is noted, and there is no evidence of hydronephrosis. A right ureteral stent is in place, also with no evidence of hydronephrosis. Hyperdense cyst seen within the right kidney, unchanged when compared to multiple previous exams, and requiring no specific follow-up. GI/Bowel: The distal esophagus and stomach are unremarkable appearance. The retroperitoneal mass partially envelops the duodenal sweep, but obstruction does not appear to be present. More distally, the small bowel is normal in caliber. No focal mural thickening identified. Moderate to severe diverticulosis of the large bowel is present without CT evidence of diverticulitis. Large bowel is otherwise unremarkable. Appendix is normal. Pelvis: Ureteral stents are noted within the urinary bladder. Prostate unremarkable. Ill-defined soft tissue is seen within the rightward aspect of the pelvis are noted, increased in size when compared to the previous exam, measuring roughly 5.9 x 3.8 cm (2, 142). Enlarged lymph node in the distal left iliac chain is markedly increased in size, measuring 3.3 x 2.4 cm (2, 127), not well visualized previously. Peritoneum/Retroperitoneum: Very large infiltrative mass within the retroperitoneum is again seen, extending into the mesentery, and completely surrounding the abdominal aorta and inferior vena cava. This mass has become much more consolidated, with the conglomerate now measuring roughly 10.9 x 12.1 cm. No free intraperitoneal air is seen. Bones/Soft Tissues: No acute or suspicious bony abnormalities. The extra-abdominal and extra pelvic soft tissues are unremarkable. Interval marked increased size of the infiltrative retroperitoneal mass, compatible with the known history of lymphoma.  The mass now partially envelops the left kidney, but no hydronephrosis is identified. The mass also is seen abutting the duodenal sweep, but without obvious obstruction. New pelvic lymphadenopathy. The     XR CHEST PORTABLE    Result Date: 7/14/2022  EXAMINATION: ONE XRAY VIEW OF THE CHEST 7/14/2022 11:21 am COMPARISON: 06/13/2022 HISTORY: ORDERING SYSTEM PROVIDED HISTORY: pain TECHNOLOGIST PROVIDED HISTORY: pain Reason for Exam: Pain, abdominal pain. Pt. states history of COPD FINDINGS: Stable right-sided brittany catheter. .The cardiac size is normal. No  pleural effusions are seen. Pulmonary vascularity appears stable. Mild infiltrate in the left lower lobe. Melania Gull Melania Gull No acute bony abnormalities. The hilar structures are normal.     Mild left lower lobe infiltrate suggesting early pneumonia         IMPRESSION:   Primary Problem  Pneumonia    Active Hospital Problems    Diagnosis Date Noted    Pneumonia [J18.9] 07/14/2022     Priority: Medium       Progressive non-Hodgkin lymphoma per CT scan  Pneumonia per chest x-ray  CKD  Intractable pain pneumonia  S/p carotid endarterectomy-6/2022    RECOMMENDATIONS:  I reviewed the labs/imaging available to me,outside records and discussed with the patient. I explained to the patient the nature of this problem. I explained the significance of these abnormalities and possible etiology and management options  Reviewed previous medical record  Reviewed hospitalization record  The patient has progressive non-Hodgkin's lymphoma with a mass compressing his kidney and urine outflow, S/P stenting. Need the stent exchange, appreciate primary care input, we will consult urology  Pain control recommended the current therapy. Continue without changes  Continue with antibiotics for pneumonia  Will definitely need chemoimmunotherapy as an outpatient after discharge. We will set it up after he finishes antibiotics for his pneumonia              Discussed with patient and spouse and Nurse.       Thank you for asking us to see this patient. William Collet Al-Nsour,MD  Hematologist/Medical [facility] [place] hematology oncology physicians            This note is created with the assistance of a speech recognition program.  While intending to generate a document that actually reflects the content of the visit, the document can still have some errors including those of syntax and sound a like substitutions which may escape proof reading. It such instances, actual meaning can be extrapolated by contextual diversion.

## 2022-07-17 NOTE — PROGRESS NOTES
Physical Therapy  Facility/Department: [address] [place] [address]  Physical Therapy Initial Assessment    Name: Stef Schneider  : 1950  MRN: [id number]  Date of Service: 2022    Discharge Recommendations:  Home with assist PRN      Patient Diagnosis(es): The primary encounter diagnosis was Nausea and vomiting, intractability of vomiting not specified, unspecified vomiting type. Diagnoses of Pneumonia of left lower lobe due to infectious organism and Non-Hodgkin's lymphoma, unspecified body region, unspecified non-Hodgkin lymphoma type [facility]) were also pertinent to this visit. Past Medical History:  has a past medical history of Arthritis, Cancer ([facility] [address] Chemotherapy management, encounter for, CHF (congestive heart failure) ([facility] [address] COPD (chronic obstructive pulmonary disease) ([facility] [address] Diabetes mellitus ([facility] [address] H/O cardiovascular stress test, History of non-Hodgkin's lymphoma, Hx of blood clots, Hyperlipidemia, Hypertension, Hypothyroidism, and On home O2. Past Surgical History:  has a past surgical history that includes hernia repair; IR BIOPSY ABDOMINAL/RETROPERITONEAL MASS PERCUTANEOUS (2021); IR PORT PLACEMENT > 5 YEARS (2021); Colonoscopy (Left, 2021); Cystoscopy (Right, 10/6/2021); Cholecystectomy, laparoscopic (N/A, 10/9/2021); Tonsillectomy; Cystoscopy (Bilateral, 2021); Cystoscopy (Bilateral, 2022); eye surgery; and Carotid endarterectomy (Left, 2022). Assessment   Body Structures, Functions, Activity Limitations Requiring Skilled Therapeutic Intervention: Decreased functional mobility ; Decreased balance;Decreased endurance  Treatment Diagnosis: dec function  Therapy Prognosis: Good  Decision Making: Medium Complexity  Requires PT Follow-Up: Yes  Activity Tolerance  Activity Tolerance: Patient tolerated treatment well     Plan   Plan  Plan: 1 time a day 3-6 times a week  Current Treatment Recommendations: Strengthening, Balance training, Functional mobility training, Transfer training, Neuromuscular re-education, Stair training, Gait training  Safety Devices  Type of Devices: Nurse notified, Gait belt, Left in bed, Call light within reach     Restrictions  Restrictions/Precautions  Restrictions/Precautions: General Precautions (Patient curently receiving chemotherapy for a lymphoma tumor in abdomen)     Subjective   Subjective  Subjective: Patient is agreeable to therapy and reports aching pain only     Social/Functional History  Social/Functional History  Lives With: Spouse (and cousin)  Type of Home: House  Home Layout: One level  Home Access: Ramped entrance  Bathroom Shower/Tub: Tub/Shower unit  Bathroom Toilet: Handicap height  Bathroom Equipment: Shower chair, Grab bars in [address]: Cane, Grab bars, Rollator, [employer]  Has the patient had two or more falls in the past year or any fall with injury in the past year?: No  Receives Help From: Family  ADL Assistance: Independent  Homemaking Assistance: Needs assistance  Homemaking Responsibilities: Yes (shared with wife and cousin)  Ambulation Assistance: Independent  Transfer Assistance: Independent  Active : Yes  Mode of Transportation: Car  Occupation: Retired  Additional Comments: Patient has 2 cats    Vision/Hearing  Vision  Vision: Within Functional Limits  Hearing  Hearing: Within functional limits      Objective   Heart Rate: 74  Heart Rate Source: Monitor  BP: 136/63  BP Location: Right upper arm  Patient Position: Supine  MAP (Calculated): 87.33  Resp: 18  SpO2: 96 %  O2 Device: Nasal cannula       AROM RLE (degrees)  RLE AROM: WFL  AROM LLE (degrees)  LLE AROM : WFL  AROM RUE (degrees)  RUE AROM : WFL  AROM LUE (degrees)  LUE AROM : WFL       Bed mobility  Rolling to Right: Supervision  Supine to Sit: Supervision  Sit to Supine: Supervision  Scooting: Supervision    Transfers  Sit to Stand: Supervision  Stand to sit: Contact guard assistance  Lateral Transfers: Contact guard assistance  Comment: Patient is unsteady without rollator    Ambulation  Surface: level tile  Device: Rolling Walker  Assistance: Stand by assistance  Distance: 75ft x2  More Ambulation?: No  Stairs/Curb  Stairs?: No     Balance  Sitting - Static: Good  Sitting - Dynamic: Good  Standing - Static: Fair;+  Standing - Dynamic: Fair;+  Comments: unsteady without rollator and reaches out for wall and furniture    Goals  Short Term Goals  Short term goal 1: Patient to demonstrate IND with ambulation x75ft  Short term goal 2: Patient to demonstrate good standing dynamic balance  Short term goal 3: Patient to demonstrate IND with transfers    Therapy Time   Individual Concurrent Group Co-treatment   Time In [address] [place] [address]         Time Out 0315         Minutes [facility],

## 2022-07-17 NOTE — PLAN OF CARE
Problem: Discharge Planning  Goal: Discharge to home or other facility with appropriate resources  Outcome: Progressing     Problem: Pain  Goal: Verbalizes/displays adequate comfort level or baseline comfort level  Outcome: Progressing  Note: Patient with chronic back pain. Started on oxycodone twice a day. Positioning self comfortably. Problem: Chronic Conditions and Co-morbidities  Goal: Patient's chronic conditions and co-morbidity symptoms are monitored and maintained or improved  Outcome: Progressing     Problem: Safety - Adult  Goal: Free from fall injury  Outcome: Progressing  Note: Patient weak. Up to bathroom with one assist. Alert and oriented. Bed alarm on .      Problem: ABCDS Injury Assessment  Goal: Absence of physical injury  Outcome: Progressing

## 2022-07-17 NOTE — CONSULTS
Department of Urology  Urology Consult Note    Patient:  Alexy Langston  MRN: [id number]  YOB: 1950    Reason for Consult:  abd pain, hydro    CHIEF COMPLAINT:    Chief Complaint   Patient presents with    Abdominal Pain       History Obtained From:   patient    HISTORY OF PRESENT ILLNESS:    The patient is a 66 y.o. male with significant past medical history of lymphoma who presents with abd pain. He has a large retroperitoneal mass causing ureteral compression and thus he has a chronic right stent. Past Medical History:        Diagnosis Date    Arthritis     Cancer [facility])     chemotherapy lymphoma last was april 2022    Chemotherapy management, encounter for     CHF (congestive heart failure) (HCC)     COPD (chronic obstructive pulmonary disease) ([facility] [address]     Diabetes mellitus ([facility] [address] [facility]     H/O cardiovascular stress test     History of non-Hodgkin's lymphoma     Hx of blood clots     DVT in right leg     Hyperlipidemia     Hypertension     Hypothyroidism     On home O2     at 5 liters per nasal cannula 24 hrs. per day.       Past Surgical History:        Procedure Laterality Date    CAROTID ENDARTERECTOMY Left 6/28/2022    LEFT TYPE I EVERSION LEFT CAROTID ENDARTERECTOMY RE-IMPLANTATION LEFT ICA performed by Greta Carrasquillo MD at [address] [facility], LAPAROSCOPIC N/A 10/9/2021    CHOLECYSTECTOMY LAPAROSCOPIC ROBOTIC XI performed by Roe Freitas MD at [address] Left 9/24/2021    COLONOSCOPY POLYPECTOMY SNARE/COLD BIOPSY performed by Jeanetta Pallas, MD at . [address] Right 10/6/2021    CYSTOSCOPY PYELOGRAM URETERAL STENT INSERTION performed by Dave Briggs MD at . [address] Bilateral 11/24/2021    CYSTOSCOPY URETERAL [address] performed by Dave Briggs MD at . [address] Bilateral 2/16/2022    CYSTOSCOPY WITH BILATERAL STENT EXCHANGE performed by Dave Briggs MD [facility] [address] [facility] [address] cataracts    HERNIA REPAIR      IR BIOPSY ABDOMINAL MASS  7/6/2021    IR BIOPSY ABDOMINAL MASS 7/6/2021 [facility] SPECIAL PROCEDURES    IR PORT PLACEMENT EQUAL OR GREATER THAN 5 YEARS  8/31/2021    IR PORT PLACEMENT EQUAL OR GREATER THAN 5 YEARS 8/31/2021 [facility] SPECIAL PROCEDURES    TONSILLECTOMY      as child     Current Medications:   Current Facility-Administered Medications: cefepime (MAXIPIME) 1000 mg IVPB minibag, 1,000 mg, IntraVENous, Q24H  oxyCODONE (ROXICODONE) immediate release tablet 5 mg, 5 mg, Oral, Q4H PRN  oxyCODONE (OXYCONTIN) extended release tablet 10 mg, 10 mg, Oral, 2 times per day  sucralfate (CARAFATE) tablet 1 g, 1 g, Oral, 4 times per day  azithromycin (ZITHROMAX) 500 mg in D5W 250ml addavial, 500 mg, IntraVENous, Q24H  FLUoxetine (PROZAC) capsule 20 mg, 20 mg, Oral, Daily  donepezil (ARICEPT) tablet 10 mg, 10 mg, Oral, Daily  amLODIPine (NORVASC) tablet 10 mg, 10 mg, Oral, Daily  losartan (COZAAR) tablet 50 mg, 50 mg, Oral, Daily  albuterol sulfate HFA (PROVENTIL;VENTOLIN;PROAIR) 108 (90 Base) MCG/ACT inhaler 2 puff, 2 puff, Inhalation, Q4H PRN  levothyroxine (SYNTHROID) tablet 150 mcg, 150 mcg, Oral, Daily  [Held by provider] furosemide (LASIX) tablet 20 mg, 20 mg, Oral, Daily  ipratropium-albuterol (DUONEB) nebulizer solution 3 mL, 1 vial, Inhalation, Q4H PRN  atorvastatin (LIPITOR) tablet 40 mg, 40 mg, Oral, Daily  apixaban (ELIQUIS) tablet 5 mg, 5 mg, Oral, BID  carvedilol (COREG) tablet 6.25 mg, 6.25 mg, Oral, Nightly  psyllium (METAMUCIL) 58.12 % packet 1 packet, 1 packet, Oral, Daily  lactobacillus (CULTURELLE) capsule 1 capsule, 1 capsule, Oral, Daily  therapeutic multivitamin-minerals 1 tablet, 1 tablet, Oral, Daily  glipiZIDE (GLUCOTROL) tablet 5 mg, 5 mg, Oral, BID AC  pantoprazole (PROTONIX) tablet 40 mg, 40 mg, Oral, QAM AC  budesonide-formoterol (SYMBICORT) 80-4.5 MCG/ACT inhaler 2 puff, 2 puff, Inhalation, BID  glucose chewable tablet 16 g, 4 tablet, Oral, PRN  dextrose bolus 10% 125 mL, 125 mL, IntraVENous, PRN **OR** dextrose bolus 10% 250 mL, 250 mL, IntraVENous, PRN  glucagon (rDNA) injection 1 mg, 1 mg, IntraMUSCular, PRN  dextrose 5 % solution, 100 mL/hr, IntraVENous, PRN  0.9 % sodium chloride infusion, , IntraVENous, Continuous  sodium chloride flush 0.9 % injection 5-40 mL, 5-40 mL, IntraVENous, 2 times per day  sodium chloride flush 0.9 % injection 5-40 mL, 5-40 mL, IntraVENous, PRN  0.9 % sodium chloride infusion, , IntraVENous, PRN  acetaminophen (TYLENOL) tablet 650 mg, 650 mg, Oral, Q4H PRN  ondansetron (ZOFRAN-ODT) disintegrating tablet 4 mg, 4 mg, Oral, Q8H PRN **OR** ondansetron (ZOFRAN) injection 4 mg, 4 mg, IntraVENous, Q6H PRN  pregabalin (LYRICA) capsule 75 mg, 75 mg, Oral, BID    Allergies: ALG@    Social History:   Social History     Socioeconomic History    Marital status:      Spouse name: Not on file    Number of children: Not on file    Years of education: Not on file    Highest education level: Not on file   Occupational History    Not on file   Tobacco Use    Smoking status: Former     Packs/day: 2.00     Years: 45.00     Pack years: 90.00     Types: Cigarettes     Quit date: 2006     Years since quittin.5    Smokeless tobacco: Never   Vaping Use    Vaping Use: Never used   Substance and Sexual Activity    Alcohol use:  Yes     Alcohol/week: 1.7 standard drinks     Types: 2 Standard drinks or equivalent per week     Comment: occassional    Drug use: Yes     Types: Marijuana [facility])     Comment:  CBD gummies no smoking    Sexual activity: Not on file   Other Topics Concern    Not on file   Social History Narrative    Not on file     Social Determinants of Health     Financial Resource Strain: Not on file   Food Insecurity: Not on file   Transportation Needs: Not on file   Physical Activity: Not on file   Stress: Not on file   Social Connections: Not on file   Intimate Partner Violence: Not on file   Housing Stability: Not on file       Family History:       Problem Relation Age of Onset    Diabetes Mother     Alzheimer's Disease Father     Heart Disease Brother     Heart Disease Brother        Review of Systems:  Constitutional: Negative for fever, chills and activity change. Eyes: Negative for pain, redness and visual disturbance. Respiratory: Negative for cough, shortness of breath and wheezing. Cardiovascular: Negative for chest pain and leg swelling. Gastrointestinal: Negative for nausea, vomiting and abdominal pain. Endocrine: Negative for polydipsia and polyphagia. Genitourinary: Negative for dysuria, frequency, hematuria, flank pain and difficulty urinating. Musculoskeletal: Negative for myalgias, back pain and joint swelling. Skin: Negative for color change, rash and wound. Allergic/Immunologic: Negative for environmental allergies and food allergies. Neurological: Negative for dizziness, tremors and numbness. Hematological: Negative for adenopathy. Does not bruise/bleed easily. Psychiatric/Behavioral: Negative for confusion and dysphoric mood. The patient is not nervous/anxious.        Patient Vitals for the past 24 hrs:   BP Temp Temp src Pulse Resp SpO2 Weight   07/17/22 1215 136/63 97.8 °F (36.6 °C) Oral 74 18 96 % --   07/17/22 0717 -- -- -- 67 18 95 % --   07/17/22 0645 (!) 142/64 97.5 °F (36.4 °C) Oral 67 18 96 % --   07/16/22 2300 (!) 145/70 97.3 °F (36.3 °C) Oral 81 20 97 % 174 lb 2.6 oz (79 kg)   07/16/22 1937 -- -- -- 72 18 97 % --   07/16/22 1845 127/63 98.1 °F (36.7 °C) Oral 75 18 96 % --       Intake/Output Summary (Last 24 hours) at 7/17/2022 1507  Last data filed at 7/17/2022 1220  Gross per 24 hour   Intake 1860 ml   Output 250 ml   Net 1610 ml       Recent Labs     07/16/22  0600 07/17/22  0453   WBC 8.3 8.0   HGB 12.4* 10.6*   HCT 38.4* 33.5*   MCV 80.2 81.3    158     Recent Labs     07/16/22  0600 07/17/22 0453    136   K 3.7 4.2    104   CO2 26 23   BUN 27* 27*   CREATININE 1.49* 1.53*       No results for input(s): COLORU, PHUR, LABCAST, WBCUA, RBCUA, MUCUS, TRICHOMONAS, YEAST, BACTERIA, CLARITYU, SPECGRAV, LEUKOCYTESUR, UROBILINOGEN, Marijane Eis in the last 72 hours. Invalid input(s): NITRATE, GLUCOSEUKETONESUAMORPHOUS    Additional Lab/culture results:    Physical Exam:  Constitutional: Patient in no acute distress; Neuro: alert and oriented to person place and time. Psych: Mood and affect normal.  Skin: Normal  Lungs: Respiratory effort normal  Cardiovascular:  Normal peripheral pulses  Abdomen: Soft, non-tender, non-distended with no CVA, flank pain, hepatosplenomegaly or hernia. Kidneys normal.      Interval Imaging Findings:   CT ABDOMEN PELVIS WO CONTRAST Additional Contrast? None    Result Date: 7/14/2022  EXAMINATION: CT OF THE ABDOMEN AND PELVIS WITHOUT CONTRAST 7/14/2022 12:39 pm TECHNIQUE: CT of the abdomen and pelvis was performed without the administration of intravenous contrast. Multiplanar reformatted images are provided for review. Automated exposure control, iterative reconstruction, and/or weight based adjustment of the mA/kV was utilized to reduce the radiation dose to as low as reasonably achievable. COMPARISON: 02/16/2022 HISTORY: ORDERING SYSTEM PROVIDED HISTORY: pain TECHNOLOGIST PROVIDED HISTORY: pain Decision Support Exception - unselect if not a suspected or confirmed emergency medical condition->Emergency Medical Condition (MA) Reason for Exam: pain Additional signs and symptoms: PT states abdominal pain and unable to eat x3 days. Relevant Medical/Surgical History: hx of gallbladder removal and bilateral ureteral stents FINDINGS: Lower Chest: Bronchial wall thickening is noted in the lower lungs bilaterally. Debris is noted in the left lower lobe airways. Noncontrast imaging of the base of the heart is unremarkable.  Lack of intravenous contrast limits evaluation of the solid abdominal viscera, the hollow abdominal viscera, and the vascular structures. Organs: With that said, no acute hepatic abnormality identified. Gallbladder is unremarkable. Noncontrast imaging of the spleen unremarkable. The left adrenal gland is enveloped by the retroperitoneal soft tissue mass. Right adrenal gland is unremarkable. Pancreas is partially surrounded by the mass. Visualized portions of the pancreas are unremarkable. The retroperitoneal mass now surrounds and may partially invade the left kidney. With that said, a ureteral stent on the left is noted, and there is no evidence of hydronephrosis. A right ureteral stent is in place, also with no evidence of hydronephrosis. Hyperdense cyst seen within the right kidney, unchanged when compared to multiple previous exams, and requiring no specific follow-up. GI/Bowel: The distal esophagus and stomach are unremarkable appearance. The retroperitoneal mass partially envelops the duodenal sweep, but obstruction does not appear to be present. More distally, the small bowel is normal in caliber. No focal mural thickening identified. Moderate to severe diverticulosis of the large bowel is present without CT evidence of diverticulitis. Large bowel is otherwise unremarkable. Appendix is normal. Pelvis: Ureteral stents are noted within the urinary bladder. Prostate unremarkable. Ill-defined soft tissue is seen within the rightward aspect of the pelvis are noted, increased in size when compared to the previous exam, measuring roughly 5.9 x 3.8 cm (2, 142). Enlarged lymph node in the distal left iliac chain is markedly increased in size, measuring 3.3 x 2.4 cm (2, 127), not well visualized previously. Peritoneum/Retroperitoneum: Very large infiltrative mass within the retroperitoneum is again seen, extending into the mesentery, and completely surrounding the abdominal aorta and inferior vena cava. This mass has become much more consolidated, with the conglomerate now measuring roughly 10.9 x 12.1 cm.  No free intraperitoneal air is seen. Bones/Soft Tissues: No acute or suspicious bony abnormalities. The extra-abdominal and extra pelvic soft tissues are unremarkable. Interval marked increased size of the infiltrative retroperitoneal mass, compatible with the known history of lymphoma. The mass now partially envelops the left kidney, but no hydronephrosis is identified. The mass also is seen abutting the duodenal sweep, but without obvious obstruction. New pelvic lymphadenopathy. The     CT CHEST WO CONTRAST    Result Date: 7/15/2022  EXAMINATION: CT OF THE CHEST WITHOUT CONTRAST 7/15/2022 4:38 pm TECHNIQUE: CT of the chest was performed without the administration of intravenous contrast. Multiplanar reformatted images are provided for review. Automated exposure control, iterative reconstruction, and/or weight based adjustment of the mA/kV was utilized to reduce the radiation dose to as low as reasonably achievable. COMPARISON: CT and pelvis 07/14/2022 HISTORY: ORDERING SYSTEM PROVIDED HISTORY: History of lymphoma, also cough with sputum  PROVIDED HISTORY: History of lymphoma, also cough with sputum production Reason for Exam: History of lymphoma, also cough with sputum production FINDINGS: Mediastinum: Coronary disease. Cardiomegaly. No effusion. Right-sided MediPort device identified. Lungs/pleura: Consolidative change involving lingula worrisome for possible and left upper lobe worrisome for possible pneumonia or atelectasis. Right middle lobe areas of scarring or subsegmental atelectasis identified. Mild bronchial thickening identified involving the lingular branches and left lower lobe branches of the bronchi likely due to bronchitis versus airspace right lower lobe calcified granuloma identified. Upper Abdomen: Infiltrative mass within the retroperitoneum again identified likely suggestive of lymphoma. Soft Tissues/Bones: Mild degenerate change.   Slight sclerotic appearance of the post margin of the C5 vertebral body noted. There is a posterior related changes versus chronic finding. Lingular and left upper lobe consolidative changes may represent areas of developing pneumonia. Please correlate exam findings. Infiltrative soft tissue mass within the upper abdomen partially visualized. Please see CT abdomen pelvis report. XR CHEST PORTABLE    Result Date: 7/14/2022  EXAMINATION: ONE XRAY VIEW OF THE CHEST 7/14/2022 11:21 am COMPARISON: 06/13/2022 HISTORY: ORDERING SYSTEM PROVIDED HISTORY: pain TECHNOLOGIST PROVIDED HISTORY: pain Reason for Exam: Pain, abdominal pain. Pt. states history of COPD FINDINGS: Stable right-sided brittany catheter. .The cardiac size is normal. No  pleural effusions are seen. Pulmonary vascularity appears stable. Mild infiltrate in the left lower lobe. Salbador Toure Salbador Toure No acute bony abnormalities. The hilar structures are normal.     Mild left lower lobe infiltrate suggesting early pneumonia     VL DUP LOWER EXTREMITY VENOUS BILATERAL    Result Date: 7/15/2022    [facility]  Vascular Lower Extremities DVT Study Procedure   Patient Name   Cat Varghese    Date of Study           07/15/2022                 Carlo ROSE   Date of Birth  1950  Gender                  Male   Age            66 year(s)  Race                       Room Number    2090        Height:                 70 inch, 177.8 cm   Corporate ID # [id number]    Weight:                 164 pounds, 74.4 kg   Patient Acct # [de-identified]   BSA:        1.92 m^2    BMI:       23.53 kg/m^2   MR #           [id number]      Sonographer             Yosef Sofia   Accession #    [id number]  Interpreting Physician  [address] [place] [address]   Referring                  Referring Physician     Lalita Noble  Nurse  Practitioner  Procedure Type of Study:   Veins: Lower Extremities DVT Study, Venous Scan Lower Bilateral.  Indications for Study:Hx of DVT. Patient Status: In Patient.  Technical Quality:Limited visualization.   - Critical Result:Kayla ADAIR on 07/15/2022 @ 1:40pm.  Conclusions   Summary   No evidence of deep venous thrombosis in both lower extremities. 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 Signature   ----------------------------------------------------------------  Electronically signed by Yosef Sofia(Sonographer) on  07/15/2022 01:54 PM  ----------------------------------------------------------------   ----------------------------------------------------------------  Electronically signed by Tommi Del Reyes,Arthur(Interpreting  physician) on 07/15/2022 08:05 PM  ----------------------------------------------------------------  Findings:   Right Impression:                         Left Impression:   The common femoral, femoral, popliteal    The common femoral, femoral,  and tibial veins demonstrate normal       popliteal and tibial veins  compressibility and augmentation. demonstrate normal                                            compressibility and  Normal compressibility of the great       augmentation. saphenous vein. Normal compressibility of the  The small saphenous vein demonstrate      great saphenous vein. non-compressibility with hyper echoic  echoes present within the lumen. Vessel   Normal compressibility of the  does not appear to be dilated. small saphenous vein. Velocities are measured in cm/s ; Diameters are measured in cm Right Lower Extremities DVT Study Measurements Right 2D Measurements +------------------------------------+----------+---------------+----------+ ! Location                            ! Visualized! Compressibility! Thrombosis! +------------------------------------+----------+---------------+----------+ ! Common Femoral                      !Yes       ! Yes            ! None      ! +------------------------------------+----------+---------------+----------+ ! Prox Femoral                        !Yes       ! Yes Right Doppler Measurements +---------------------------+------+------+--------------------------------+ ! Location                   ! Signal!Reflux! Reflux (msec)                   ! +---------------------------+------+------+--------------------------------+ ! Common Femoral             !Phasic!      !                                ! +---------------------------+------+------+--------------------------------+ ! Prox Femoral               !Phasic!      !                                ! +---------------------------+------+------+--------------------------------+ ! Popliteal                  !Phasic!      !                                ! +---------------------------+------+------+--------------------------------+ Left Lower Extremities DVT Study Measurements Left 2D Measurements +------------------------------------+----------+---------------+----------+ ! Location                            ! Visualized! Compressibility! Thrombosis! +------------------------------------+----------+---------------+----------+ ! Common Femoral                      !Yes       ! Yes            ! None      ! +------------------------------------+----------+---------------+----------+ ! Prox Femoral                        !Yes       ! Yes            ! None      ! +------------------------------------+----------+---------------+----------+ ! Mid Femoral                         !Yes       ! Yes            ! None      ! +------------------------------------+----------+---------------+----------+ ! Dist Femoral                        !Yes       ! Yes            ! None      ! +------------------------------------+----------+---------------+----------+ ! Deep Femoral                        !Partial   !Yes            ! None      ! +------------------------------------+----------+---------------+----------+ ! Popliteal                           !Yes       ! Yes            ! None      ! +------------------------------------+----------+---------------+----------+ !Sapheno Femoral Junction            ! Yes       ! Yes            ! None      ! +------------------------------------+----------+---------------+----------+ ! PTV                                 ! Partial   !Yes            ! None      ! +------------------------------------+----------+---------------+----------+ ! Peroneal                            !Partial   !Yes            ! None      ! +------------------------------------+----------+---------------+----------+ ! Gastroc                             ! Yes       ! Yes            ! None      ! +------------------------------------+----------+---------------+----------+ ! GSV Thigh                           ! Yes       ! Yes            ! None      ! +------------------------------------+----------+---------------+----------+ ! GSV Knee                            ! Yes       ! Yes            ! None      ! +------------------------------------+----------+---------------+----------+ ! GSV Ankle                           ! Yes       ! Yes            ! None      ! +------------------------------------+----------+---------------+----------+ ! SSV                                 ! Yes       ! Yes            ! None      ! +------------------------------------+----------+---------------+----------+ Left Doppler Measurements +---------------------------+------+------+--------------------------------+ ! Location                   ! Signal!Reflux! Reflux (msec)                   ! +---------------------------+------+------+--------------------------------+ ! Common Femoral             !Phasic!      !                                ! +---------------------------+------+------+--------------------------------+ ! Prox Femoral               !Phasic!      !                                ! +---------------------------+------+------+--------------------------------+ ! Popliteal                  !Phasic!      !                                ! 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 I will inform dr Efrain Rose about his admission.     Electronically signed by Kan Varghese MD on 7/17/2022 at 3:07 PM

## 2022-07-17 NOTE — CARE COORDINATION
ONGOING DISCHARGE PLAN:    Patient is alert and oriented x4. Spoke with patient regarding discharge plan and patient confirms that plan is still to discharge to home with [place]  Cxr ordered for the am  On eliquis   Midline ordered  Per urology scheduled for a stent exchange this week     Will continue to follow for additional discharge needs.     Electronically signed by Aniya Caruso RN on 7/17/2022 at 4:26 PM

## 2022-07-18 ENCOUNTER — HOSPITAL ENCOUNTER (OUTPATIENT)
Dept: INFUSION THERAPY | Age: 72
Discharge: HOME | End: 2022-07-18

## 2022-07-18 ENCOUNTER — APPOINTMENT (OUTPATIENT)
Dept: GENERAL RADIOLOGY | Age: 72
DRG: 987 | End: 2022-07-18
Payer: MEDICARE

## 2022-07-18 LAB
ABSOLUTE EOS #: 0.1 K/UL (ref 0–0.4)
ABSOLUTE LYMPH #: 1 K/UL (ref 1–4.8)
ABSOLUTE MONO #: 0.7 K/UL (ref 0.1–1.3)
ANION GAP SERPL CALCULATED.3IONS-SCNC: 8 MMOL/L (ref 9–17)
BASOPHILS # BLD: 0 % (ref 0–2)
BASOPHILS ABSOLUTE: 0 K/UL (ref 0–0.2)
BUN BLDV-MCNC: 28 MG/DL (ref 8–23)
CALCIUM SERPL-MCNC: 8 MG/DL (ref 8.6–10.4)
CHLORIDE BLD-SCNC: 106 MMOL/L (ref 98–107)
CO2: 25 MMOL/L (ref 20–31)
CREAT SERPL-MCNC: 1.43 MG/DL (ref 0.7–1.2)
EOSINOPHILS RELATIVE PERCENT: 2 % (ref 0–4)
GFR AFRICAN AMERICAN: 59 ML/MIN
GFR NON-AFRICAN AMERICAN: 48 ML/MIN
GFR SERPL CREATININE-BSD FRML MDRD: ABNORMAL ML/MIN/{1.73_M2}
GLUCOSE BLD-MCNC: 104 MG/DL (ref 70–99)
GLUCOSE BLD-MCNC: 108 MG/DL (ref 75–110)
GLUCOSE BLD-MCNC: 91 MG/DL (ref 75–110)
HCT VFR BLD CALC: 32.2 % (ref 41–53)
HEMOGLOBIN: 10.3 G/DL (ref 13.5–17.5)
LYMPHOCYTES # BLD: 14 % (ref 24–44)
MCH RBC QN AUTO: 25.6 PG (ref 26–34)
MCHC RBC AUTO-ENTMCNC: 31.9 G/DL (ref 31–37)
MCV RBC AUTO: 80.3 FL (ref 80–100)
MONOCYTES # BLD: 10 % (ref 1–7)
PDW BLD-RTO: 16.2 % (ref 11.5–14.9)
PLATELET # BLD: 141 K/UL (ref 150–450)
PMV BLD AUTO: 7.6 FL (ref 6–12)
POTASSIUM SERPL-SCNC: 4.8 MMOL/L (ref 3.7–5.3)
RBC # BLD: 4.01 M/UL (ref 4.5–5.9)
SEG NEUTROPHILS: 74 % (ref 36–66)
SEGMENTED NEUTROPHILS ABSOLUTE COUNT: 5.1 K/UL (ref 1.3–9.1)
SODIUM BLD-SCNC: 139 MMOL/L (ref 135–144)
WBC # BLD: 7 K/UL (ref 3.5–11)

## 2022-07-18 PROCEDURE — 99232 SBSQ HOSP IP/OBS MODERATE 35: CPT | Performed by: INTERNAL MEDICINE

## 2022-07-18 PROCEDURE — 6370000000 HC RX 637 (ALT 250 FOR IP): Performed by: FAMILY MEDICINE

## 2022-07-18 PROCEDURE — 71046 X-RAY EXAM CHEST 2 VIEWS: CPT

## 2022-07-18 PROCEDURE — 2580000003 HC RX 258: Performed by: FAMILY MEDICINE

## 2022-07-18 PROCEDURE — 94761 N-INVAS EAR/PLS OXIMETRY MLT: CPT

## 2022-07-18 PROCEDURE — 2060000000 HC ICU INTERMEDIATE R&B

## 2022-07-18 PROCEDURE — 97116 GAIT TRAINING THERAPY: CPT

## 2022-07-18 PROCEDURE — 6370000000 HC RX 637 (ALT 250 FOR IP): Performed by: INTERNAL MEDICINE

## 2022-07-18 PROCEDURE — 82947 ASSAY GLUCOSE BLOOD QUANT: CPT

## 2022-07-18 PROCEDURE — 2700000000 HC OXYGEN THERAPY PER DAY

## 2022-07-18 PROCEDURE — 85025 COMPLETE CBC W/AUTO DIFF WBC: CPT

## 2022-07-18 PROCEDURE — 6360000002 HC RX W HCPCS: Performed by: FAMILY MEDICINE

## 2022-07-18 PROCEDURE — 36415 COLL VENOUS BLD VENIPUNCTURE: CPT

## 2022-07-18 PROCEDURE — 97110 THERAPEUTIC EXERCISES: CPT

## 2022-07-18 PROCEDURE — 94640 AIRWAY INHALATION TREATMENT: CPT

## 2022-07-18 PROCEDURE — 80048 BASIC METABOLIC PNL TOTAL CA: CPT

## 2022-07-18 PROCEDURE — 2580000003 HC RX 258: Performed by: INTERNAL MEDICINE

## 2022-07-18 RX ORDER — DOCUSATE SODIUM 100 MG/1
100 CAPSULE, LIQUID FILLED ORAL 2 TIMES DAILY PRN
Status: DISCONTINUED | OUTPATIENT
Start: 2022-07-18 | End: 2022-07-21 | Stop reason: HOSPADM

## 2022-07-18 RX ORDER — GLIPIZIDE 5 MG/1
2.5 TABLET ORAL
Status: DISCONTINUED | OUTPATIENT
Start: 2022-07-18 | End: 2022-07-21 | Stop reason: HOSPADM

## 2022-07-18 RX ADMIN — SODIUM CHLORIDE, PRESERVATIVE FREE 10 ML: 5 INJECTION INTRAVENOUS at 08:47

## 2022-07-18 RX ADMIN — SODIUM CHLORIDE: 9 INJECTION, SOLUTION INTRAVENOUS at 12:32

## 2022-07-18 RX ADMIN — DOCUSATE SODIUM 100 MG: 100 CAPSULE, LIQUID FILLED ORAL at 10:35

## 2022-07-18 RX ADMIN — ATORVASTATIN CALCIUM 40 MG: 40 TABLET, FILM COATED ORAL at 08:42

## 2022-07-18 RX ADMIN — PANTOPRAZOLE SODIUM 40 MG: 40 TABLET, DELAYED RELEASE ORAL at 05:50

## 2022-07-18 RX ADMIN — LEVOTHYROXINE SODIUM 150 MCG: 0.07 TABLET ORAL at 05:50

## 2022-07-18 RX ADMIN — PREGABALIN 75 MG: 75 CAPSULE ORAL at 08:43

## 2022-07-18 RX ADMIN — PREGABALIN 75 MG: 75 CAPSULE ORAL at 19:54

## 2022-07-18 RX ADMIN — OXYCODONE HYDROCHLORIDE 5 MG: 5 TABLET ORAL at 18:17

## 2022-07-18 RX ADMIN — BUDESONIDE AND FORMOTEROL FUMARATE DIHYDRATE 2 PUFF: 80; 4.5 AEROSOL RESPIRATORY (INHALATION) at 08:12

## 2022-07-18 RX ADMIN — OXYCODONE HYDROCHLORIDE 5 MG: 5 TABLET ORAL at 10:30

## 2022-07-18 RX ADMIN — OXYCODONE HYDROCHLORIDE 5 MG: 5 TABLET ORAL at 00:35

## 2022-07-18 RX ADMIN — OXYCODONE HYDROCHLORIDE 10 MG: 10 TABLET, FILM COATED, EXTENDED RELEASE ORAL at 19:53

## 2022-07-18 RX ADMIN — SUCRALFATE 1 G: 1 TABLET ORAL at 17:33

## 2022-07-18 RX ADMIN — AMLODIPINE BESYLATE 10 MG: 10 TABLET ORAL at 08:42

## 2022-07-18 RX ADMIN — SODIUM CHLORIDE: 9 INJECTION, SOLUTION INTRAVENOUS at 00:35

## 2022-07-18 RX ADMIN — SUCRALFATE 1 G: 1 TABLET ORAL at 00:35

## 2022-07-18 RX ADMIN — CARVEDILOL 6.25 MG: 6.25 TABLET, FILM COATED ORAL at 19:54

## 2022-07-18 RX ADMIN — APIXABAN 5 MG: 5 TABLET, FILM COATED ORAL at 08:42

## 2022-07-18 RX ADMIN — CEFEPIME HYDROCHLORIDE 1000 MG: 1 INJECTION, POWDER, FOR SOLUTION INTRAMUSCULAR; INTRAVENOUS at 12:34

## 2022-07-18 RX ADMIN — BUDESONIDE AND FORMOTEROL FUMARATE DIHYDRATE 2 PUFF: 80; 4.5 AEROSOL RESPIRATORY (INHALATION) at 19:42

## 2022-07-18 RX ADMIN — MULTIPLE VITAMINS W/ MINERALS TAB 1 TABLET: TAB at 08:43

## 2022-07-18 RX ADMIN — DONEPEZIL HYDROCHLORIDE 10 MG: 10 TABLET ORAL at 08:43

## 2022-07-18 RX ADMIN — GLIPIZIDE 2.5 MG: 5 TABLET ORAL at 17:33

## 2022-07-18 RX ADMIN — SUCRALFATE 1 G: 1 TABLET ORAL at 05:50

## 2022-07-18 RX ADMIN — AZITHROMYCIN MONOHYDRATE 500 MG: 500 INJECTION, POWDER, LYOPHILIZED, FOR SOLUTION INTRAVENOUS at 09:01

## 2022-07-18 RX ADMIN — LOSARTAN POTASSIUM 50 MG: 50 TABLET, FILM COATED ORAL at 08:43

## 2022-07-18 RX ADMIN — SUCRALFATE 1 G: 1 TABLET ORAL at 12:29

## 2022-07-18 RX ADMIN — OXYCODONE HYDROCHLORIDE 10 MG: 10 TABLET, FILM COATED, EXTENDED RELEASE ORAL at 08:42

## 2022-07-18 RX ADMIN — Medication 1 CAPSULE: at 08:43

## 2022-07-18 RX ADMIN — FLUOXETINE 20 MG: 20 CAPSULE ORAL at 08:42

## 2022-07-18 RX ADMIN — SUCRALFATE 1 G: 1 TABLET ORAL at 23:58

## 2022-07-18 ASSESSMENT — PAIN DESCRIPTION - LOCATION
LOCATION: BACK

## 2022-07-18 ASSESSMENT — PAIN SCALES - GENERAL
PAINLEVEL_OUTOF10: 4
PAINLEVEL_OUTOF10: 2
PAINLEVEL_OUTOF10: 5
PAINLEVEL_OUTOF10: 4
PAINLEVEL_OUTOF10: 5
PAINLEVEL_OUTOF10: 2
PAINLEVEL_OUTOF10: 7
PAINLEVEL_OUTOF10: 4
PAINLEVEL_OUTOF10: 7
PAINLEVEL_OUTOF10: 3
PAINLEVEL_OUTOF10: 0
PAINLEVEL_OUTOF10: 3

## 2022-07-18 ASSESSMENT — PAIN DESCRIPTION - ORIENTATION
ORIENTATION: UPPER
ORIENTATION: UPPER
ORIENTATION: RIGHT;LEFT;INNER
ORIENTATION: UPPER

## 2022-07-18 ASSESSMENT — PAIN DESCRIPTION - DESCRIPTORS
DESCRIPTORS: CRAMPING
DESCRIPTORS: ACHING
DESCRIPTORS: ACHING;BURNING;CRAMPING
DESCRIPTORS: CRAMPING

## 2022-07-18 ASSESSMENT — PAIN - FUNCTIONAL ASSESSMENT: PAIN_FUNCTIONAL_ASSESSMENT: ACTIVITIES ARE NOT PREVENTED

## 2022-07-18 ASSESSMENT — PAIN DESCRIPTION - FREQUENCY
FREQUENCY: CONTINUOUS

## 2022-07-18 NOTE — PLAN OF CARE
Problem: Discharge Planning  Goal: Discharge to home or other facility with appropriate resources  7/18/2022 1635 by Majo Tran RN  Outcome: Progressing Towards Goal  DC plan is to go home with [facility]     Problem: Pain  Goal: Verbalizes/displays adequate comfort level or baseline comfort level  7/18/2022 1635 by Majo Tran RN  Outcome: Progressing Towards Goal  Pt medicated with pain medication prn. Assessed all pain characteristics including level, type, location, frequency, and onset. Non-pharmacologic interventions offered to pt as well. Pt states pain is tolerable at this time. Will continue to monitor. Problem: Safety - Adult  Goal: Free from fall injury  7/18/2022 1635 by Majo Tran RN  Outcome: Progressing Towards Goal  No falls noted this shift. Patient ambulates with x1 staff assistance without difficulty. Bed kept in low position. Safe environment maintained. Bedside table & call light in reach. Uses call light appropriately when needing assistance.

## 2022-07-18 NOTE — PROGRESS NOTES
lactobacillus  1 capsule Oral Daily    therapeutic multivitamin-minerals  1 tablet Oral Daily    glipiZIDE  5 mg Oral BID AC    pantoprazole  40 mg Oral QAM AC    budesonide-formoterol  2 puff Inhalation BID    sodium chloride flush  5-40 mL IntraVENous 2 times per day    pregabalin  75 mg Oral BID     Continuous Infusions:   dextrose      sodium chloride 75 mL/hr at 07/18/22 0035    sodium chloride       PRN Meds:oxyCODONE, albuterol sulfate HFA, ipratropium-albuterol, glucose, dextrose bolus **OR** dextrose bolus, glucagon (rDNA), dextrose, sodium chloride flush, sodium chloride, acetaminophen, ondansetron **OR** ondansetron        Physical Exam:  Vitals: BP (!) 156/63   Pulse 71   Temp 97.3 °F (36.3 °C) (Oral)   Resp 18   Ht 5' 10\" (1.778 m)   Wt 178 lb 9.2 oz (81 kg)   SpO2 96%   BMI 25.62 kg/m²   24 hour intake/output:  Intake/Output Summary (Last 24 hours) at 7/18/2022 0725  Last data filed at 7/17/2022 2027  Gross per 24 hour   Intake 1320 ml   Output --   Net 1320 ml     Last 3 weights:   Wt Readings from Last 3 Encounters:   07/18/22 178 lb 9.2 oz (81 kg)   07/06/22 178 lb (80.7 kg)   06/29/22 178 lb (80.7 kg)       Physical Examination:   General appearance - alert, well appearing, and in no distress  Mental status - alert, oriented to person, place, and time  Eyes ;sclera clear, anicteric  Chest - clear to auscultation, no wheezes, rales or rhonchi, symmetric air entry  Heart - normal rate, regular rhythm, normal S1, S2, no murmurs, rubs, clicks or gallops  Abdomen - soft, nontender, nondistended, no masses or organomegaly soft abdomen  Neurological - alert, oriented, normal speech, no focal findings or movement disorder noted}  Extremities - peripheral pulses normal, no pedal edema, no clubbing or cyanosis  Skin - normal coloration and turgor, no rashes, no suspicious skin lesions noted      Component Value Units   POC Glucose Fingerstick [[id number]]    Collected: 07/18/22 0701    Updated: 07/18/22 0708     POC Glucose 91 mg/dL   Basic Metabolic Panel [[id number]] (Abnormal)    Collected: 07/18/22 0452    Updated: 07/18/22 0520    Specimen Source: Blood     Glucose 104 High  mg/dL    BUN 28 High  mg/dL    CREATININE 1.43 High  mg/dL    Calcium 8.0 Low  mg/dL    Sodium 139 mmol/L    Potassium 4.8 mmol/L    Chloride 106 mmol/L    CO2 25 mmol/L    Anion Gap 8 Low  mmol/L    GFR Non-African American 48 Low  mL/min    GFR  59 Low  mL/min    GFR Comment         Comment: Average GFR for 79or more years old:    76 mL/min/1.73sq m   Chronic Kidney Disease:    <60 mL/min/1.73sq m   Kidney failure:    <15 mL/min/1.73sq m               eGFR calculated using average adult body mass.  Additional eGFR calculator available at:         [email or web address]             CBC with Auto Differential [[id number]] (Abnormal)    Collected: 07/18/22 0452    Updated: 07/18/22 0459    Specimen Source: Blood     WBC 7.0 k/uL    RBC 4.01 Low  m/uL    Hemoglobin 10.3 Low  g/dL    Hematocrit 32.2 Low  %    MCV 80.3 fL    MCH 25.6 Low  pg    MCHC 31.9 g/dL    RDW 16.2 High  %    Platelets 358 Low  k/uL    MPV 7.6 fL    Seg Neutrophils 74 High  %    Lymphocytes 14 Low  %    Monocytes 10 High  %    Eosinophils % 2 %    Basophils 0 %    Segs Absolute 5.10 k/uL    Absolute Lymph # 1.00 k/uL    Absolute Mono # 0.70 k/uL    Absolute Eos # 0.10 k/uL    Basophils Absolute 0.00 k/uL   POC Glucose Fingerstick [[id number]] (Abnormal)    Collected: 07/17/22 2027    Updated: 07/17/22 2039     POC Glucose 122 High  mg/dL   POC Glucose Fingerstick [[id number]] (Abnormal)    Collected: 07/17/22 1605    Updated: 07/17/22 1613     POC Glucose 126 High  mg/dL   POC Glucose Fingerstick [[id number]] (Abnormal)    Collected: 07/17/22 1152    Updated: 07/17/22 1200     POC Glucose 159 High  mg/dL   Culture, Respiratory [[id number]] (Abnormal)    Collected: 07/16/22 0511    Updated: 07/17/22 1123    Specimen Source: Sputum Expectorated     Specimen Description . EXPECTORATED SPUTUM    Direct Exam >25 NEUTROPHILS/LPF     < 10 EPITHELIAL CELLS/LPF     MIXED BACTERIAL MORPHOTYPES SEEN ON GRAM STAIN. Abnormal     Culture NORMAL RESPIRATORY SHARRI LIGHT GROWTH       Current IP Meds      Assessment:  Principal Problem:    Pneumonia  Resolved Problems:    * No resolved hospital problems. *    Resolved Problems:    * No resolved hospital problems. *    * No resolved hospital problems.  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    Hydronephrosis due to obstruction of ureter N13.1    Moderate malnutrition ([facility]) E44.0    Chronic cholecystitis K81.1    Pleural effusion J90    Cardiomegaly I51.7    Hypothyroidism E03.9    Diabetes mellitus ([facility]) E11.9    Hypertensive urgency I16.0    Acute respiratory failure with hypoxia ([facility]) J96.01    Hypoxia R09.02    Carotid stenosis, asymptomatic, bilateral I65.23    Simple chronic bronchitis ([facility]) J41.0    Dependence on nocturnal oxygen therapy Z99.81    Pneumonia J18.9      Pneumonia    Abdominal pads may be increased in size    History of lymphoma  Abdominal pain and neuropathic pain    History of postherpetic neuralgia    History of hydronephrosis but no evidence of that on recent CT except enlargement of the medicine abdomen    Dementia patient is alert and oriented this morning  UTI  inTractable abdominal pain yesterday now it is better with pain meds  On the sore is going adjust his pain medications to higher dose  Uretral stent possibly changed or removed on Wednesday by Dr. Esa Christensen  Renal insufficiency we will monitor  Renal insufficiency could be second to his mass and he has a history of stenting    Is continue with elevation is likely is also secondary to mass    His pain and nausea much improved  Some upper to lower back pain mostly positional and straining. It was away when he walks does not seem from his mass           Plan:  Continue with his current antibiotics for now    Await urology input in regard to his procedure    Continue with physical therapy  We will cut down his glipizide to 2.5 mg twice daily and will see exactly which medications he was taking at home.   We will add Colace 100 mg twice a day    Brittanie Franco,   [facility]           7/18/2022, 7:25 AM

## 2022-07-18 NOTE — PROGRESS NOTES
Physician Progress Note      Emmy Vanegas  LAURA #:                  [id number]  :                       1950  ADMIT DATE:       2022 1:37 PM  [address] [place] DATE:  RESPONDING  PROVIDER #:        Nany Box MD        QUERY TEXT:    Stage of Chronic Kidney Disease: Please provide further specificity, if known. Clinical indicators include: bun, creatinine, pro-bnp, probnp, chronic kidney   disease, gfr, ckd  Options provided:  -- Chronic kidney disease stage 1  -- Chronic kidney disease stage 2  -- Chronic kidney disease stage 3  -- Chronic kidney disease stage 3a  -- Chronic kidney disease stage 3b  -- Chronic kidney disease stage 4  -- Chronic kidney disease stage 5  -- Chronic kidney disease stage 5, requiring dialysis  -- End stage renal disease  -- Other - I will add my own diagnosis  -- Disagree - Not applicable / Not valid  -- Disagree - Clinically Unable to determine / Unknown        PROVIDER RESPONSE TEXT:    The patient has chronic kidney disease stage 3a.       Electronically signed by:  Nany Box MD 2022 4:07 PM

## 2022-07-18 NOTE — PROGRESS NOTES
Today's Date: 7/18/2022  Patient Name: Susana Fernandez  Date of admission: 7/14/2022  1:37 PM  Patient's age: 66 y.o., 1950  Admission Dx: Pneumonia [J18.9]  Pneumonia of left lower lobe due to infectious organism [J18.9]  Nausea and vomiting, intractability of vomiting not specified, unspecified vomiting type [R11.2]    Reason for Consult: management recommendations  Requesting Physician: Anita Braga DO    CHIEF COMPLAINT: Abdominal pain. Progressive disease      Interval history  Patient is seen and evaluated. He states that the pain medication is helping significantly. He is taking OxyContin 10 mg twice daily with breakthrough oxycodone. Still awaiting urology input. HISTORY OF PRESENT ILLNESS:      The patient is a 66 y.o.  male who is admitted to the hospital for chief complaints of worsening abdominal pain. Patient is well-known to our practice. Patient has history of recurrent diffuse large B-cell lymphoma. At time of relapse biopsy showed follicular lymphoma. Patient's treatment history is as below. Most recently patient was treated with Rituxan followed by Rituxan maintenance. Patient was last seen in office back in March. CT scans from February showed stable disease patient's last reduction infusion was back in May 2022. Patient was recently discharged to the hospital on the 29th after undergoing a left carotid endarterectomy back in June the patient is now admitted with worsening of abdominal pain. Patient underwent CT scan in the ER 20 CT of abdomen pelvis without contrast which shows marked interval increase of infiltrative retroperitoneal mass compatible with known history of lymphoma. The mass now partially enveloped the left kidney but no hydronephrosis is noted. The mass is also abutting the duodenum. Patient's lab work-up shows hemoglobin of 12.8. MCV 78.5.   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 Evidence of relapse with biopsy-proven follicular lymphoma. 1-2 from treated medically lymph node biopsy July 6, 2021     CURRENT THERAPY:    1.   Observation. 2.   Status post treatment with Bexxar in May 2008. 3.   Status post previous treatment with Rituxan. 4.  Start treatment with Rituxan August 23, 2021. completed4 weeks of rituximab on 9/20/2021  5. Induction Rituxan is followed by consolidation Rituxan maintenance      Past Medical History:   has a past medical history of Arthritis, Cancer ([facility] [address] Chemotherapy management, encounter for, CHF (congestive heart failure) ([facility] [address] COPD (chronic obstructive pulmonary disease) ([facility] [address] Diabetes mellitus ([facility] [address] H/O cardiovascular stress test, History of non-Hodgkin's lymphoma, Hx of blood clots, Hyperlipidemia, Hypertension, Hypothyroidism, and On home O2. Past Surgical History:   has a past surgical history that includes hernia repair; IR BIOPSY ABDOMINAL/RETROPERITONEAL MASS PERCUTANEOUS (7/6/2021); IR PORT PLACEMENT > 5 YEARS (8/31/2021); Colonoscopy (Left, 9/24/2021); Cystoscopy (Right, 10/6/2021); Cholecystectomy, laparoscopic (N/A, 10/9/2021); Tonsillectomy; Cystoscopy (Bilateral, 11/24/2021); Cystoscopy (Bilateral, 2/16/2022); eye surgery; and Carotid endarterectomy (Left, 6/28/2022). Medications:    Prior to Admission medications    Medication Sig Start Date End Date Taking?  Authorizing Provider   apixaban (ELIQUIS) 5 MG TABS tablet Take 5 mg by mouth 2 times daily   Yes Historical Provider, MD   carvedilol (COREG) 6.25 MG tablet Take 6.25 mg by mouth at bedtime   Yes Historical Provider, MD   psyllium (KONSYL) 28.3 % PACK Take 1 packet by mouth daily   Yes Historical Provider, MD   Lactobacillus (PROBIOTIC ACIDOPHILUS) CAPS Take 1 caplet by mouth daily   Yes Historical Provider, MD   Multiple Vitamins-Minerals (THERAPEUTIC MULTIVITAMIN-MINERALS) tablet Take 1 tablet by mouth daily   Yes Historical Provider, MD   glipiZIDE (GLUCOTROL) 5 MG tablet Take 5 mg by mouth 2 times daily (before meals)   Yes Historical Provider, MD   atorvastatin (LIPITOR) 40 MG tablet Take 1 tablet by mouth daily 6/29/22   Karen Mcfadden MD   OXYGEN Inhale 5 L into the lungs at bedtime Nightly & during the day. Historical Provider, MD   ipratropium-albuterol (DUONEB) 0.5-2.5 (3) MG/3ML SOLN nebulizer solution Inhale 3 mLs into the lungs every 4 hours as needed for Shortness of Breath 2/20/22   Ty Martha, APRN - CNP   levothyroxine (SYNTHROID) 150 MCG tablet Take 1 tablet by mouth Daily 2/2/22   Dionicio Chung,    fluticasone-salmeterol (ADVAIR) 100-50 MCG/DOSE diskus inhaler Inhale 1 puff into the lungs every 12 hours 2/1/22   Dionicio Chung DO   furosemide (LASIX) 20 MG tablet Take 1 tablet by mouth daily 2/1/22   Dionicio Chung DO   pregabalin (LYRICA) 75 MG capsule Take 75 mg by mouth 2 times daily.  Indications: last fill on 4/22/22 #180 for 30 days     Historical Provider, MD   albuterol sulfate HFA (VENTOLIN HFA) 108 (90 Base) MCG/ACT inhaler Inhale 2 puffs into the lungs every 4 hours as needed for Wheezing     Historical Provider, MD   amLODIPine (NORVASC) 10 MG tablet Take 1 tablet by mouth daily  Patient taking differently: Take 10 mg by mouth at bedtime  10/12/21   Roderick Metcalf DO   losartan (COZAAR) 50 MG tablet Take 1 tablet by mouth daily 10/12/21   Roderick Metcalf,    donepezil (ARICEPT) 10 MG tablet Take 10 mg by mouth daily  7/11/21   Historical Provider, MD   FLUoxetine (PROZAC) 20 MG capsule Take 20 mg by mouth daily    Historical Provider, MD   omeprazole (PRILOSEC) 20 MG delayed release capsule Take 20 mg by mouth 2 times daily    Historical Provider, MD     Current Facility-Administered Medications   Medication Dose Route Frequency Provider Last Rate Last Admin    glipiZIDE (GLUCOTROL) tablet 2.5 mg  2.5 mg Oral BID AC Roderick Metcalf, DO        docusate sodium (COLACE) capsule 100 mg  100 mg Oral BID PRN Roderick T Dixon, DO   100 mg at 07/18/22 1035    cefepime (MAXIPIME) 1000 mg IVPB minibag  1,000 mg IntraVENous Q24H Roderick T Dixon, DO   Stopped at 07/18/22 1305    oxyCODONE (ROXICODONE) immediate release tablet 5 mg  5 mg Oral Q4H PRN Brent Colon MD   5 mg at 07/18/22 1030    oxyCODONE (OXYCONTIN) extended release tablet 10 mg  10 mg Oral 2 times per day Heaven Machado MD   10 mg at 07/18/22 0842    sucralfate (CARAFATE) tablet 1 g  1 g Oral 4 times per day Ida Mendoza, DO   1 g at 07/18/22 1229    azithromycin (ZITHROMAX) 500 mg in D5W 250ml addavial  500 mg IntraVENous Q24H Roderick T Dixon, DO   Stopped at 07/18/22 1019    FLUoxetine (PROZAC) capsule 20 mg  20 mg Oral Daily Roderick T Dixon, DO   20 mg at 07/18/22 0842    donepezil (ARICEPT) tablet 10 mg  10 mg Oral Daily Roderick T Dixon, DO   10 mg at 07/18/22 0843    amLODIPine (NORVASC) tablet 10 mg  10 mg Oral Daily Roderick T Dixon, DO   10 mg at 07/18/22 0842    losartan (COZAAR) tablet 50 mg  50 mg Oral Daily Roderick T Dixon, DO   50 mg at 07/18/22 0843    albuterol sulfate HFA (PROVENTIL;VENTOLIN;PROAIR) 108 (90 Base) MCG/ACT inhaler 2 puff  2 puff Inhalation Q4H PRN Roderick T Dixon, DO        levothyroxine (SYNTHROID) tablet 150 mcg  150 mcg Oral Daily Roderick T Dixon, DO   150 mcg at 07/18/22 0550    [Held by provider] furosemide (LASIX) tablet 20 mg  20 mg Oral Daily Roderick T Dixon, DO   20 mg at 07/15/22 0744    ipratropium-albuterol (DUONEB) nebulizer solution 3 mL  1 vial Inhalation Q4H PRN Roderick T Dixon, DO        atorvastatin (LIPITOR) tablet 40 mg  40 mg Oral Daily Roderick T Dixon, DO   40 mg at 07/18/22 0842    apixaban (ELIQUIS) tablet 5 mg  5 mg Oral BID Roderick T Dixon, DO   5 mg at 07/18/22 0842    carvedilol (COREG) tablet 6.25 mg  6.25 mg Oral Nightly Roderick T Dixon, DO   6.25 mg at 07/17/22 2027    psyllium (METAMUCIL) 58.12 % packet 1 packet  1 packet Oral Daily Orderick T Dixon, DO   1 packet at 07/17/22 0933    lactobacillus (CULTURELLE) capsule 1 capsule  1 capsule Oral Daily Roderick T Dixon, DO   1 capsule at 07/18/22 0843    therapeutic multivitamin-minerals 1 tablet  1 tablet Oral Daily Roderick T Dixon, DO   1 tablet at 07/18/22 0843    pantoprazole (PROTONIX) tablet 40 mg  40 mg Oral QAM AC Roderick T Dixon, DO   40 mg at 07/18/22 0550    budesonide-formoterol (SYMBICORT) 80-4.5 MCG/ACT inhaler 2 puff  2 puff Inhalation BID Betty Madera, DO   2 puff at 07/18/22 0812    glucose chewable tablet 16 g  4 tablet Oral PRN Roderick T Dixon, DO   16 g at 07/15/22 1233    dextrose bolus 10% 125 mL  125 mL IntraVENous PRN Roderick T Dixon, DO        Or    dextrose bolus 10% 250 mL  250 mL IntraVENous PRN Roderick T Dixon, DO        glucagon (rDNA) injection 1 mg  1 mg IntraMUSCular PRN Roderick T Dixon, DO        dextrose 5 % solution  100 mL/hr IntraVENous PRN Roderick T Dixon, DO        0.9 % sodium chloride infusion   IntraVENous Continuous Oracio Madison MD 75 mL/hr at 07/18/22 1232 New Bag at 07/18/22 1232    sodium chloride flush 0.9 % injection 5-40 mL  5-40 mL IntraVENous 2 times per day Betty Motgonzalo, DO   10 mL at 07/18/22 0847    sodium chloride flush 0.9 % injection 5-40 mL  5-40 mL IntraVENous PRN Roderick T Dixon, DO        0.9 % sodium chloride infusion   IntraVENous PRN Roderick T Dixon, DO        acetaminophen (TYLENOL) tablet 650 mg  650 mg Oral Q4H PRN Roderick T Dixon, DO   650 mg at 07/15/22 0744    ondansetron (ZOFRAN-ODT) disintegrating tablet 4 mg  4 mg Oral Q8H PRN Roderick T Dixon, DO        Or    ondansetron (ZOFRAN) injection 4 mg  4 mg IntraVENous Q6H PRN Roderick T Dixon, DO   4 mg at 07/16/22 0611    pregabalin (LYRICA) capsule 75 mg  75 mg Oral BID Roderick T Dixon, DO   75 mg at 07/18/22 9363       Allergies:  Patient has no known allergies. Social History:   reports that he quit smoking about 16 years ago. His smoking use included cigarettes. He has a 90.00 pack-year smoking history.  He has never used smokeless tobacco. He reports current alcohol use of about 1.7 standard drinks per week. He reports current drug use. Drug: Marijuana Rosa Maria Kos). Family History: family history includes Alzheimer's Disease in his father; Diabetes in his mother; Heart Disease in his brother and brother. REVIEW OF SYSTEMS:      Constitutional: No fever or chills. No night sweats, no weight loss   Eyes: No eye discharge, double vision, or eye pain   HEENT: negative for sore mouth, sore throat, hoarseness and voice change   Respiratory: negative for cough , sputum, dyspnea, wheezing, hemoptysis, chest pain   Cardiovascular: negative for chest pain, dyspnea, palpitations, orthopnea, PND   Gastrointestinal: negative for nausea, vomiting, diarrhea, constipation, abdominal pain, Dysphagia, hematemesis and hematochezia. Positive abdominal pain, much improved now  Genitourinary: negative for frequency, dysuria, nocturia, urinary incontinence, and hematuria   Integument: negative for rash, skin lesions, bruises.    Hematologic/Lymphatic: negative for easy bruising, bleeding, lymphadenopathy, or petechiae   Endocrine: negative for heat or cold intolerance,weight changes, change in bowel habits and hair loss   Musculoskeletal: negative for myalgias, arthralgias, pain, joint swelling,and bone pain   Neurological: negative for headaches, dizziness, seizures, weakness, numbness    PHYSICAL EXAM:        BP (!) 117/52   Pulse 73   Temp 97.9 °F (36.6 °C) (Oral)   Resp 16   Ht 5' 10\" (1.778 m)   Wt 178 lb 9.2 oz (81 kg)   SpO2 92%   BMI 25.62 kg/m²    Temp (24hrs), Av.8 °F (36.6 °C), Min:97.3 °F (36.3 °C), Max:98.2 °F (36.8 °C)      General appearance - well appearing, no in pain or distress   Mental status - alert and cooperative   Eyes - pupils equal and reactive, extraocular eye movements intact   Ears - bilateral TM's and external ear canals normal   Mouth - mucous membranes moist, pharynx normal without lesions   Neck - supple, no significant adenopathy   Lymphatics - no palpable lymphadenopathy, no hepatosplenomegaly   Chest - clear to auscultation, no wheezes, rales or rhonchi, symmetric air entry   Heart - normal rate, regular rhythm, normal S1, S2, no murmurs  Abdomen - soft, nontender, nondistended, no masses or organomegaly   Neurological - alert, oriented, normal speech, no focal findings or movement disorder noted   Musculoskeletal - no joint tenderness, deformity or swelling   Extremities - peripheral pulses normal, no pedal edema, no clubbing or cyanosis   Skin - normal coloration and turgor, no rashes, no suspicious skin lesions noted ,      DATA:      Labs:     Results for orders placed or performed during the hospital encounter of 07/14/22   MRSA DNA Probe, Nasal    Specimen: Nasal   Result Value Ref Range    Specimen Description . NASAL SWAB     MRSA, DNA, Nasal NEGATIVE NEGATIVE   Culture, Respiratory    Specimen: Sputum Expectorated   Result Value Ref Range    Specimen Description . EXPECTORATED SPUTUM     Direct Exam >25 NEUTROPHILS/LPF     Direct Exam < 10 EPITHELIAL CELLS/LPF     Direct Exam MIXED BACTERIAL MORPHOTYPES SEEN ON GRAM STAIN.  (A)     Culture NORMAL RESPIRATORY SHARRI LIGHT GROWTH    CBC with Auto Differential   Result Value Ref Range    WBC 8.7 3.5 - 11.0 k/uL    RBC 5.04 4.5 - 5.9 m/uL    Hemoglobin 12.8 (L) 13.5 - 17.5 g/dL    Hematocrit 39.5 (L) 41 - 53 %    MCV 78.5 (L) 80 - 100 fL    MCH 25.5 (L) 26 - 34 pg    MCHC 32.5 31 - 37 g/dL    RDW 16.4 (H) 11.5 - 14.9 %    Platelets 357 951 - 476 k/uL    MPV 7.7 6.0 - 12.0 fL    Seg Neutrophils 75 (H) 36 - 66 %    Lymphocytes 16 (L) 24 - 44 %    Monocytes 9 (H) 1 - 7 %    Eosinophils % 0 0 - 4 %    Basophils 0 0 - 2 %    Segs Absolute 6.53 1.3 - 9.1 k/uL    Absolute Lymph # 1.39 1.0 - 4.8 k/uL    Absolute Mono # 0.78 0.1 - 1.3 k/uL    Absolute Eos # 0.00 0.0 - 0.4 k/uL    Basophils Absolute 0.00 0.0 - 0.2 k/uL    Morphology ANISOCYTOSIS PRESENT     Morphology 1+ ELLIPTOCYTES    CMP   Result Value Ref Range    Glucose 119 (H) 70 - 99 mg/dL    BUN 27 (H) 8 - 23 mg/dL    CREATININE 1.56 (H) 0.70 - 1.20 mg/dL    Calcium 8.7 8.6 - 10.4 mg/dL    Sodium 140 135 - 144 mmol/L    Potassium 3.9 3.7 - 5.3 mmol/L    Chloride 102 98 - 107 mmol/L    CO2 24 20 - 31 mmol/L    Anion Gap 14 9 - 17 mmol/L    Alkaline Phosphatase 131 (H) 40 - 129 U/L    ALT 6 5 - 41 U/L    AST 15 <40 U/L    Total Bilirubin 0.80 0.3 - 1.2 mg/dL    Total Protein 6.1 (L) 6.4 - 8.3 g/dL    Albumin 3.7 3.5 - 5.2 g/dL    GFR Non- 44 (L) >60 mL/min    GFR  53 (L) >60 mL/min    GFR Comment         Lipase   Result Value Ref Range    Lipase 69 (H) 13 - 60 U/L   Lactic Acid   Result Value Ref Range    Lactic Acid 2.0 0.5 - 2.2 mmol/L   Urinalysis with Reflex to Culture    Specimen: Urine   Result Value Ref Range    Color, UA Yellow Yellow    Turbidity UA Clear Clear    Glucose, Ur TRACE (A) NEGATIVE    Bilirubin Urine NEGATIVE NEGATIVE    Ketones, Urine TRACE (A) NEGATIVE    Specific Gravity, UA 1.024 1.000 - 1.030    Urine Hgb MOD (A) NEGATIVE    pH, UA 6.0 5.0 - 8.0    Protein, UA 4+ (A) NEGATIVE    Urobilinogen, Urine Normal Normal    Nitrite, Urine NEGATIVE NEGATIVE    Leukocyte Esterase, Urine TRACE (A) NEGATIVE   Troponin   Result Value Ref Range    Troponin, High Sensitivity 39 (H) 0 - 22 ng/L   Microscopic Urinalysis   Result Value Ref Range    WBC, UA 6 TO 9 /HPF    RBC, UA 21 TO 50 /HPF    Casts UA 0 TO 2 /LPF    Casts UA HYALINE /LPF    Epithelial Cells UA 6 TO 9 /HPF    Bacteria, UA None None    Mucus, UA 1+ (A) None   Troponin   Result Value Ref Range    Troponin, High Sensitivity 39 (H) 0 - 22 ng/L   Troponin   Result Value Ref Range    Troponin, High Sensitivity 43 (H) 0 - 22 ng/L   Lipase   Result Value Ref Range    Lipase 123 (H) 13 - 60 U/L   Procalcitonin   Result Value Ref Range    Procalcitonin 0.10 (H) <0.09 ng/mL   Cortisol Total   Result Value Ref Range    Cortisol 12.7 2.7 - 18.4 ug/dL    Cortisol Collection Info AM    Lactate Dehydrogenase   Result Value Ref Range     (H) 135 - 225 U/L   Uric Acid   Result Value Ref Range    Uric Acid 9.4 (H) 3.4 - 7.0 mg/dL   Basic Metabolic Panel   Result Value Ref Range    Glucose 82 70 - 99 mg/dL    BUN 27 (H) 8 - 23 mg/dL    CREATININE 1.49 (H) 0.70 - 1.20 mg/dL    Calcium 8.0 (L) 8.6 - 10.4 mg/dL    Sodium 140 135 - 144 mmol/L    Potassium 3.7 3.7 - 5.3 mmol/L    Chloride 103 98 - 107 mmol/L    CO2 26 20 - 31 mmol/L    Anion Gap 11 9 - 17 mmol/L    GFR Non-African American 46 (L) >60 mL/min    GFR  56 (L) >60 mL/min    GFR Comment         CBC with Auto Differential   Result Value Ref Range    WBC 8.3 3.5 - 11.0 k/uL    RBC 4.79 4.5 - 5.9 m/uL    Hemoglobin 12.4 (L) 13.5 - 17.5 g/dL    Hematocrit 38.4 (L) 41 - 53 %    MCV 80.2 80 - 100 fL    MCH 25.8 (L) 26 - 34 pg    MCHC 32.2 31 - 37 g/dL    RDW 16.1 (H) 11.5 - 14.9 %    Platelets 386 407 - 241 k/uL    MPV 7.9 6.0 - 12.0 fL    Seg Neutrophils 68 (H) 36 - 66 %    Lymphocytes 20 (L) 24 - 44 %    Monocytes 10 (H) 1 - 7 %    Eosinophils % 2 0 - 4 %    Basophils 0 0 - 2 %    Segs Absolute 5.64 1.3 - 9.1 k/uL    Absolute Lymph # 1.66 1.0 - 4.8 k/uL    Absolute Mono # 0.83 0.1 - 1.3 k/uL    Absolute Eos # 0.17 0.0 - 0.4 k/uL    Basophils Absolute 0.00 0.0 - 0.2 k/uL    Morphology ANISOCYTOSIS PRESENT     Morphology FEW GIANT PLATELETS    Lipase   Result Value Ref Range    Lipase 114 (H) 13 - 60 U/L   Basic Metabolic Panel   Result Value Ref Range    Glucose 92 70 - 99 mg/dL    BUN 27 (H) 8 - 23 mg/dL    CREATININE 1.53 (H) 0.70 - 1.20 mg/dL    Calcium 7.8 (L) 8.6 - 10.4 mg/dL    Sodium 136 135 - 144 mmol/L    Potassium 4.2 3.7 - 5.3 mmol/L    Chloride 104 98 - 107 mmol/L    CO2 23 20 - 31 mmol/L    Anion Gap 9 9 - 17 mmol/L    GFR Non-African American 45 (L) >60 mL/min    GFR  54 (L) >60 mL/min    GFR Comment         CBC with Auto Differential   Result Value Ref Range    WBC 8.0 3.5 - 11.0 k/uL    RBC 4.12 (L) 4.5 - 5.9 m/uL    Hemoglobin 10.6 (L) 13.5 - 17.5 g/dL    Hematocrit 33.5 (L) 41 - 53 %    MCV 81.3 80 - 100 fL    MCH 25.8 (L) 26 - 34 pg    MCHC 31.7 31 - 37 g/dL    RDW 16.2 (H) 11.5 - 14.9 %    Platelets 407 180 - 407 k/uL    MPV 7.9 6.0 - 12.0 fL    Seg Neutrophils 72 (H) 36 - 66 %    Lymphocytes 17 (L) 24 - 44 %    Monocytes 9 (H) 1 - 7 %    Eosinophils % 2 0 - 4 %    Basophils 0 0 - 2 %    Segs Absolute 5.70 1.3 - 9.1 k/uL    Absolute Lymph # 1.40 1.0 - 4.8 k/uL    Absolute Mono # 0.70 0.1 - 1.3 k/uL    Absolute Eos # 0.20 0.0 - 0.4 k/uL    Basophils Absolute 0.00 0.0 - 0.2 k/uL   Lipase   Result Value Ref Range    Lipase 125 (H) 13 - 60 U/L   Basic Metabolic Panel   Result Value Ref Range    Glucose 104 (H) 70 - 99 mg/dL    BUN 28 (H) 8 - 23 mg/dL    CREATININE 1.43 (H) 0.70 - 1.20 mg/dL    Calcium 8.0 (L) 8.6 - 10.4 mg/dL    Sodium 139 135 - 144 mmol/L    Potassium 4.8 3.7 - 5.3 mmol/L    Chloride 106 98 - 107 mmol/L    CO2 25 20 - 31 mmol/L    Anion Gap 8 (L) 9 - 17 mmol/L    GFR Non-African American 48 (L) >60 mL/min    GFR  59 (L) >60 mL/min    GFR Comment         CBC with Auto Differential   Result Value Ref Range    WBC 7.0 3.5 - 11.0 k/uL    RBC 4.01 (L) 4.5 - 5.9 m/uL    Hemoglobin 10.3 (L) 13.5 - 17.5 g/dL    Hematocrit 32.2 (L) 41 - 53 %    MCV 80.3 80 - 100 fL    MCH 25.6 (L) 26 - 34 pg    MCHC 31.9 31 - 37 g/dL    RDW 16.2 (H) 11.5 - 14.9 %    Platelets 453 (L) 449 - 450 k/uL    MPV 7.6 6.0 - 12.0 fL    Seg Neutrophils 74 (H) 36 - 66 %    Lymphocytes 14 (L) 24 - 44 %    Monocytes 10 (H) 1 - 7 %    Eosinophils % 2 0 - 4 %    Basophils 0 0 - 2 %    Segs Absolute 5.10 1.3 - 9.1 k/uL    Absolute Lymph # 1.00 1.0 - 4.8 k/uL    Absolute Mono # 0.70 0.1 - 1.3 k/uL    Absolute Eos # 0.10 0.0 - 0.4 k/uL    Basophils Absolute 0.00 0.0 - 0.2 k/uL   POC Glucose Fingerstick   Result Value Ref Range    POC Glucose 239 (H) 75 - 110 mg/dL POC Glucose Fingerstick   Result Value Ref Range    POC Glucose 95 75 - 110 mg/dL   POC Glucose Fingerstick   Result Value Ref Range    POC Glucose 87 75 - 110 mg/dL   POC Glucose Fingerstick   Result Value Ref Range    POC Glucose 56 (L) 75 - 110 mg/dL   POC Glucose Fingerstick   Result Value Ref Range    POC Glucose 68 (L) 75 - 110 mg/dL   POC Glucose Fingerstick   Result Value Ref Range    POC Glucose 187 (H) 75 - 110 mg/dL   POC Glucose Fingerstick   Result Value Ref Range    POC Glucose 170 (H) 75 - 110 mg/dL   POC Glucose Fingerstick   Result Value Ref Range    POC Glucose 119 (H) 75 - 110 mg/dL   POC Glucose Fingerstick   Result Value Ref Range    POC Glucose 69 (L) 75 - 110 mg/dL   POC Glucose Fingerstick   Result Value Ref Range    POC Glucose 87 75 - 110 mg/dL   POC Glucose Fingerstick   Result Value Ref Range    POC Glucose 149 (H) 75 - 110 mg/dL   POC Glucose Fingerstick   Result Value Ref Range    POC Glucose 115 (H) 75 - 110 mg/dL   POC Glucose Fingerstick   Result Value Ref Range    POC Glucose 156 (H) 75 - 110 mg/dL   POC Glucose Fingerstick   Result Value Ref Range    POC Glucose 86 75 - 110 mg/dL   POC Glucose Fingerstick   Result Value Ref Range    POC Glucose 159 (H) 75 - 110 mg/dL   POC Glucose Fingerstick   Result Value Ref Range    POC Glucose 126 (H) 75 - 110 mg/dL   POC Glucose Fingerstick   Result Value Ref Range    POC Glucose 122 (H) 75 - 110 mg/dL   POC Glucose Fingerstick   Result Value Ref Range    POC Glucose 91 75 - 110 mg/dL   EKG 12 Lead   Result Value Ref Range    Ventricular Rate 97 BPM    Atrial Rate 97 BPM    P-R Interval 120 ms    QRS Duration 90 ms    Q-T Interval 378 ms    QTc Calculation (Bazett) 480 ms    P Axis 55 degrees    R Axis -26 degrees    T Axis 21 degrees         IMAGING DATA:    CT ABDOMEN PELVIS WO CONTRAST Additional Contrast? None    Result Date: 7/14/2022  EXAMINATION: CT OF THE ABDOMEN AND PELVIS WITHOUT CONTRAST 7/14/2022 12:39 pm TECHNIQUE: CT of the abdomen and pelvis was performed without the administration of intravenous contrast. Multiplanar reformatted images are provided for review. Automated exposure control, iterative reconstruction, and/or weight based adjustment of the mA/kV was utilized to reduce the radiation dose to as low as reasonably achievable. COMPARISON: 02/16/2022 HISTORY: ORDERING SYSTEM PROVIDED HISTORY: pain TECHNOLOGIST PROVIDED HISTORY: pain Decision Support Exception - unselect if not a suspected or confirmed emergency medical condition->Emergency Medical Condition (MA) Reason for Exam: pain Additional signs and symptoms: PT states abdominal pain and unable to eat x3 days. Relevant Medical/Surgical History: hx of gallbladder removal and bilateral ureteral stents FINDINGS: Lower Chest: Bronchial wall thickening is noted in the lower lungs bilaterally. Debris is noted in the left lower lobe airways. Noncontrast imaging of the base of the heart is unremarkable. Lack of intravenous contrast limits evaluation of the solid abdominal viscera, the hollow abdominal viscera, and the vascular structures. Organs: With that said, no acute hepatic abnormality identified. Gallbladder is unremarkable. Noncontrast imaging of the spleen unremarkable. The left adrenal gland is enveloped by the retroperitoneal soft tissue mass. Right adrenal gland is unremarkable. Pancreas is partially surrounded by the mass. Visualized portions of the pancreas are unremarkable. The retroperitoneal mass now surrounds and may partially invade the left kidney. With that said, a ureteral stent on the left is noted, and there is no evidence of hydronephrosis. A right ureteral stent is in place, also with no evidence of hydronephrosis. Hyperdense cyst seen within the right kidney, unchanged when compared to multiple previous exams, and requiring no specific follow-up. GI/Bowel: The distal esophagus and stomach are unremarkable appearance.   The retroperitoneal mass partially envelops the duodenal sweep, but obstruction does not appear to be present. More distally, the small bowel is normal in caliber. No focal mural thickening identified. Moderate to severe diverticulosis of the large bowel is present without CT evidence of diverticulitis. Large bowel is otherwise unremarkable. Appendix is normal. Pelvis: Ureteral stents are noted within the urinary bladder. Prostate unremarkable. Ill-defined soft tissue is seen within the rightward aspect of the pelvis are noted, increased in size when compared to the previous exam, measuring roughly 5.9 x 3.8 cm (2, 142). Enlarged lymph node in the distal left iliac chain is markedly increased in size, measuring 3.3 x 2.4 cm (2, 127), not well visualized previously. Peritoneum/Retroperitoneum: Very large infiltrative mass within the retroperitoneum is again seen, extending into the mesentery, and completely surrounding the abdominal aorta and inferior vena cava. This mass has become much more consolidated, with the conglomerate now measuring roughly 10.9 x 12.1 cm. No free intraperitoneal air is seen. Bones/Soft Tissues: No acute or suspicious bony abnormalities. The extra-abdominal and extra pelvic soft tissues are unremarkable. Interval marked increased size of the infiltrative retroperitoneal mass, compatible with the known history of lymphoma. The mass now partially envelops the left kidney, but no hydronephrosis is identified. The mass also is seen abutting the duodenal sweep, but without obvious obstruction. New pelvic lymphadenopathy. The     XR CHEST PORTABLE    Result Date: 7/14/2022  EXAMINATION: ONE XRAY VIEW OF THE CHEST 7/14/2022 11:21 am COMPARISON: 06/13/2022 HISTORY: ORDERING SYSTEM PROVIDED HISTORY: pain TECHNOLOGIST PROVIDED HISTORY: pain Reason for Exam: Pain, abdominal pain. Pt. states history of COPD FINDINGS: Stable right-sided brittany catheter. .The cardiac size is normal. No  pleural effusions are seen. Pulmonary vascularity appears stable. Mild infiltrate in the left lower lobe. Vearl Pippins Vearl Pippins No acute bony abnormalities. The hilar structures are normal.     Mild left lower lobe infiltrate suggesting early pneumonia         IMPRESSION:   Primary Problem  Pneumonia    Active Hospital Problems    Diagnosis Date Noted    Pneumonia [J18.9] 07/14/2022     Priority: Medium       Progressive non-Hodgkin lymphoma per CT scan  Pneumonia per chest x-ray  CKD  Intractable pain pneumonia  S/p carotid endarterectomy-6/2022    RECOMMENDATIONS:  I reviewed the labs/imaging available to me,outside records and discussed with the patient. I explained to the patient the nature of this problem. I explained the significance of these abnormalities and possible etiology and management options  Reviewed previous medical record  Reviewed hospitalization record, also based on patient request, I went over the imaging study with his family. It is clear to me that the patient has recurrent/progressive non-Hodgkin's lymphoma. I am not sure if this is still low-grade or if he is transformed to high-grade lymphoma. At any rate he is not responding to rituximab and he would need systemic chemotherapy. The patient has progressive non-Hodgkin's lymphoma with a mass compressing his kidney and urine outflow, S/P stenting. Need the stent exchange, appreciate primary care input, we will consult urology  Pain control recommended the current therapy. Continue without changes  Continue with antibiotics for pneumonia  Will definitely need chemoimmunotherapy as an outpatient after discharge. We will set it up after he finishes antibiotics for his pneumonia              Discussed with patient and spouse and Nurse. Thank you for asking us to see this patient.             Andrey Collet Al-Nsour,MD  Hematologist/Medical [id number] [facility] hematology oncology physicians            This note is created with the assistance of a speech recognition program.  While intending to generate a document that actually reflects the content of the visit, the document can still have some errors including those of syntax and sound a like substitutions which may escape proof reading. It such instances, actual meaning can be extrapolated by contextual diversion.

## 2022-07-18 NOTE — CARE COORDINATION
ONGOING DISCHARGE PLAN:    Patient is alert and oriented x4. Spoke with patient regarding discharge plan and patient confirms that plan is still home with [facility] and referral accepted. Current with [facility] where he gets chemo for lymphoma. Follow with Oncology nurse navigator. IV zithromax and cefepime. Currently on 2lpm NC , wears 5lpm NC at HS at home. Eliquis PTA for DVT/PE. Will continue to follow for additional discharge needs.     Electronically signed by Papa Madison RN on 7/18/2022 at 1:09 PM

## 2022-07-18 NOTE — PLAN OF CARE
Problem: Discharge Planning  Goal: Discharge to home or other facility with appropriate resources  7/18/2022 0502 by Anderson Wakefield RN  Outcome: Progressing     Problem: Pain  Goal: Verbalizes/displays adequate comfort level or baseline comfort level  7/18/2022 0502 by Anderson Wakefield RN  Outcome: Progressing  Note: Prn and scheduled pain medication given      Problem: Chronic Conditions and Co-morbidities  Goal: Patient's chronic conditions and co-morbidity symptoms are monitored and maintained or improved  7/18/2022 0502 by Anderson Wakefield RN  Outcome: Progressing     Problem: Safety - Adult  Goal: Free from fall injury  7/18/2022 0502 by Anderson Wakefield RN  Outcome: Progressing  Note: Bed alarm on, nurses call number on board      Problem: ABCDS Injury Assessment  Goal: Absence of physical injury  7/18/2022 0502 by Anderson Wakefield RN  Outcome: Progressing

## 2022-07-18 NOTE — PROGRESS NOTES
Physical Therapy  Facility/Department: [address] [place] [address]  Physical Therapy Initial Assessment    Name: Severino Lopez  : 1950  MRN: [id number]  Date of Service: 2022    Discharge Recommendations:  Home with assist PRN          Patient Diagnosis(es): The primary encounter diagnosis was Nausea and vomiting, intractability of vomiting not specified, unspecified vomiting type. Diagnoses of Pneumonia of left lower lobe due to infectious organism and Non-Hodgkin's lymphoma, unspecified body region, unspecified non-Hodgkin lymphoma type [facility]) were also pertinent to this visit. Past Medical History:  has a past medical history of Arthritis, Cancer ([facility] [address] Chemotherapy management, encounter for, CHF (congestive heart failure) ([facility] [address] COPD (chronic obstructive pulmonary disease) ([facility] [address] Diabetes mellitus ([facility] [address] H/O cardiovascular stress test, History of non-Hodgkin's lymphoma, Hx of blood clots, Hyperlipidemia, Hypertension, Hypothyroidism, and On home O2. Past Surgical History:  has a past surgical history that includes hernia repair; IR BIOPSY ABDOMINAL/RETROPERITONEAL MASS PERCUTANEOUS (2021); IR PORT PLACEMENT > 5 YEARS (2021); Colonoscopy (Left, 2021); Cystoscopy (Right, 10/6/2021); Cholecystectomy, laparoscopic (N/A, 10/9/2021); Tonsillectomy; Cystoscopy (Bilateral, 2021); Cystoscopy (Bilateral, 2022); eye surgery; and Carotid endarterectomy (Left, 2022).     Assessment      Activity Tolerance  Activity Tolerance: Patient tolerated treatment well     Plan   Plan  Plan: 1 time a day 3-6 times a week  Current Treatment Recommendations: Strengthening, Balance training, Functional mobility training, Transfer training, Neuromuscular re-education, Stair training, Gait training  Safety Devices  Type of Devices: Nurse notified, Gait belt, Left in bed, Call light within reach     Restrictions  Restrictions/Precautions  Restrictions/Precautions: General Precautions (Patient curently receiving chemotherapy for a lymphoma tumor in abdomen)     Subjective    Reporting pain in back improving with moving and walking         Social/Functional History  Social/Functional History  Lives With: Spouse (and cousin)  Type of Home: House  Home Layout: One level  Home Access: Ramped entrance  Bathroom Shower/Tub: Tub/Shower unit  Bathroom Toilet: Handicap height  Bathroom Equipment: Shower chair, Grab bars in shower  Home Equipment: Cane, Grab bars, Rollator, Wheelchair-manual  Has the patient had two or more falls in the past year or any fall with injury in the past year?: No  Receives Help From: Family  ADL Assistance: [address]: Needs assistance  Homemaking Responsibilities: Yes (shared with wife and cousin)  Ambulation Assistance: Independent  Transfer Assistance: Independent  Active : Yes  Mode of Transportation: Car  Occupation: Retired  Additional Comments: Patient has 2 cats                                     Bed mobility  Bridging: Modified independent   Rolling to Left: Modified independent  Rolling to Right: Modified independent  Supine to Sit: Modified independent  Sit to Supine: Modified independent  Scooting: Modified independent  Transfers  Sit to Stand: Modified independent  Stand to sit: Modified independent  Bed to Chair: Modified independent  Ambulation  Surface: level tile  Device: Rollator  Assistance: Stand by assistance  Distance: 200 feet x 2  Stairs/Curb  Stairs?: Yes  Stairs  # Steps : 4  Rails: Right ascending  Device: No Device  Assistance: Contact guard assistance  Comment: slightly unsteady        Standing Open/Closed Kinetic Chain Exercises: BLE 10 reps with walker support                Goals  Short Term Goals  Short term goal 1: Patient to demonstrate IND with ambulation x75ft  Short term goal 2: Patient to demonstrate good standing dynamic balance  Short term goal 3: Patient to demonstrate IND with transfers       Education  Patient Education  Education Given To: Patient; Family  Education Provided: Transfer Training  Education Method: Demonstration  Barriers to Learning: Cognition  Education Outcome: Verbalized understanding;Continued education needed      Therapy Time   Individual Concurrent Group Co-treatment   Time In 1330         Time Out [address], PTA

## 2022-07-18 NOTE — PLAN OF CARE
Problem: Discharge Planning  Goal: Discharge to home or other facility with appropriate resources  7/18/2022 1634 by Alla Mckee RN  Outcome: Progressing Towards Goal     Problem: Pain  Goal: Verbalizes/displays adequate comfort level or baseline comfort level  7/18/2022 1634 by Alla Mckee RN  Outcome: Progressing Towards Goal     Problem: Safety - Adult  Goal: Free from fall injury  7/18/2022 1634 by Alla Mckee RN  Outcome: Progressing Towards Goal

## 2022-07-18 NOTE — DISCHARGE SUMMARY
Physician Discharge Summary     Patient ID:  Fariba Gage  [id number]  4 y.o.  1950    Admit date: 7/14/2022    Discharge date and time: 7/21/2022    Admitting Physician: Ida Mendoza DO     Discharge Physician: Ida Mendoza DO      Admission Diagnoses:   Pneumonia [J18.9]  Pneumonia of left lower lobe due to infectious organism [J18.9]  Nausea and vomiting, intractability of vomiting not specified, unspecified vomiting type [R11.2]    Discharge Diagnoses:   Principal Problem:    Pneumonia  Resolved Problems:    * No resolved hospital problems. *  Resolved Problems:    * No resolved hospital problems. *    * No resolved hospital problems.  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 ([facility]) C82.13    Hydronephrosis due to obstruction of ureter N13.1    Moderate malnutrition ([facility]) E44.0    Chronic cholecystitis K81.1    Pleural effusion J90    Cardiomegaly I51.7    Hypothyroidism E03.9    Diabetes mellitus ([facility]) E11.9    Hypertensive urgency I16.0    Acute respiratory failure with hypoxia ([facility]) J96.01    Hypoxia R09.02    Carotid stenosis, asymptomatic, bilateral I65.23    Simple chronic bronchitis ([facility]) J41.0    Dependence on nocturnal oxygen therapy Z99.81    Pneumonia J18.9      Pneumonia    Abdominal pads may be increased in size    History of lymphoma  Abdominal pain and neuropathic pain    History of postherpetic neuralgia    History of hydronephrosis but no evidence of that on recent CT except enlargement of the medicine abdomen    Dementia patient is alert and oriented this morning  UTI  inTractable abdominal pain yesterday now it is better with pain meds  On the sore is going adjust his pain medications to higher dose  Uretral stent possibly changed or removed on Wednesday by Dr. Terence Castle  Renal insufficiency we will monitor  Renal insufficiency could be second to his mass and he has a history of stenting    Is continue with elevation is likely is also secondary to mass    His pain and nausea much improved  Some upper to lower back pain mostly positional and straining. It was away when he walks does not seem from his mass    His pain and nausea much improved  Some upper to lower back pain mostly positional and straining. It was away when he walks does not seem from his mass  Low back pain probably just secondary to him being in bed and positional  Status post cystoscopy and stent replacement  Patient is feeling much better with no pain this morning he is ready to go home         Hospital Course: Patient was admitted with intractable abdominal pain. Patient has a history of lymphoma and a CT done in emergency room showed his mass has increased from previous testing. Patient sees Dr. Ct Nolen as an outpatient for hematology. Patient also showed possible pneumonia on x-ray pulmonary and hematology were consulted he was started on antibiotics. Patient pain will come and go. During first day of admission he was doing okay and then suddenly his pain became severe enough requiring pain medication which was initiated by pulmonary and hematology and hematology adjusted his meds his pain got much improved and controlled as well as his nausea with adding Carafate to his regimen. Patient had also renal stents second to the mass pushing on his kidney system and there was supposed to be changed the week of his admission his urologist was consulted. Hematology plan for patient to have continue immunotherapy as an outpatient to change his regiment of treatment since his illness has been increasing with his previous treatment.   POC Glucose Fingerstick [[id number]]     Collected: 07/21/22 0642     Updated: 07/21/22 0649       POC Glucose 95 mg/dL   Basic Metabolic Panel [[id number]] (Abnormal)     Collected: 07/21/22 0454     Updated: 07/21/22 0536     Specimen Source: Blood Urine NEGATIVE     Specific [place], UA 1.011     Urine Hgb LARGE Abnormal      pH, UA 5.5     Protein, UA 3+ Abnormal      Urobilinogen, Urine Normal     Nitrite, Urine NEGATIVE     Leukocyte Esterase, Urine TRACE Abnormal    Microscopic Urinalysis [[id number]]     Collected: 07/20/22 0933     Updated: 07/20/22 0955       -          WBC, UA 3 to 5 /HPF     RBC, UA 51  /HPF     Epithelial Cells UA 3 to 5 /HPF     Bacteria, UA None   FLUORO FOR SURGICAL PROCEDURES [[id number]]     Resulted: 07/20/22 0113     Updated: 07/20/22 5760     Narrative:     Radiology exam is complete. No Radiologist dictation. Please follow up   with ordering provider. Disposition to home with VNS    Condition on discharge stable    Patient Instructions:  Follow-up with his hematologist as well as other specialist and PCP    Discharge Medications:Current IP Meds  Hide  (From admission, onward)    Frequency    ipratropium-albuterol (DUONEB) nebulizer solution 3 mL     Discontinue      4 TIMES DAILY    lidocaine 4 % external patch 1 patch     Discontinue      DAILY    glipiZIDE (GLUCOTROL) tablet 2.5 mg     Discontinue      2 TIMES DAILY BEFORE MEALS    docusate sodium (COLACE) capsule 100 mg     Discontinue      2 TIMES DAILY PRN    cefepime (MAXIPIME) 1000 mg IVPB minibag     Discontinue      EVERY 24 HOURS    oxyCODONE (OXYCONTIN) extended release tablet 10 mg     Discontinue      2 times per day    sucralfate (CARAFATE) tablet 1 g     Discontinue      4 times per day    oxyCODONE (ROXICODONE) immediate release tablet 5 mg     Discontinue      EVERY 4 HOURS PRN    carvedilol (COREG) tablet 6.25 mg     Discontinue      Nightly    FLUoxetine (PROZAC) capsule 20 mg     Discontinue      DAILY    donepezil (ARICEPT) tablet 10 mg     Discontinue      DAILY    amLODIPine (NORVASC) tablet 10 mg     Discontinue      DAILY    losartan (COZAAR) tablet 50 mg     Discontinue      DAILY    [Held by provider]  furosemide (LASIX) tablet 20 mg Discontinue      DAILY    atorvastatin (LIPITOR) tablet 40 mg     Discontinue      DAILY    [Held by provider]  apixaban (ELIQUIS) tablet 5 mg     Discontinue      2 TIMES DAILY    psyllium (METAMUCIL) 58.12 % packet 1 packet     Discontinue      DAILY    lactobacillus (CULTURELLE) capsule 1 capsule     Discontinue      DAILY    therapeutic multivitamin-minerals 1 tablet     Discontinue      DAILY    budesonide-formoterol (SYMBICORT) 80-4.5 MCG/ACT inhaler 2 puff     Discontinue      2 TIMES DAILY    [MAR Hold]  azithromycin (ZITHROMAX) 500 mg in D5W 250ml addavial     Discontinue      EVERY 24 HOURS    levothyroxine (SYNTHROID) tablet 150 mcg     Discontinue      DAILY    pantoprazole (PROTONIX) tablet 40 mg     Discontinue      DAILY BEFORE BREAKFAST    glucose chewable tablet 16 g  (Hypoglycemia Treatment Orders)     Discontinue      PRN    dextrose bolus 10% 125 mL  (Hypoglycemia Treatment Orders)     Discontinue     \"Or\" Linked Group Details    PRN    dextrose bolus 10% 250 mL  (Hypoglycemia Treatment Orders)     Discontinue     \"Or\" Linked Group Details    PRN    glucagon (rDNA) injection 1 mg  (Hypoglycemia Treatment Orders)     Discontinue      PRN    dextrose 5 % solution  (Hypoglycemia Treatment Orders)     Discontinue      PRN    albuterol sulfate HFA (PROVENTIL;VENTOLIN;PROAIR) 108 (90 Base) MCG/ACT inhaler 2 puff     Discontinue      EVERY 4 HOURS PRN    pregabalin (LYRICA) capsule 75 mg     Discontinue      2 TIMES DAILY    sodium chloride flush 0.9 % injection 5-40 mL  (Saline Flushes)     Discontinue      2 times per day    sodium chloride flush 0.9 % injection 5-40 mL  (Saline Flushes)     Discontinue      PRN    0.9 % sodium chloride infusion  (Saline Flushes)     Discontinue      PRN    acetaminophen (TYLENOL) tablet 650 mg     Discontinue      EVERY 4 HOURS PRN    ondansetron (ZOFRAN-ODT) disintegrating tablet 4 mg  (ondansetron (ZOFRAN) ODT or ondansetron (ZOFRAN) IV)     Discontinue     \"Or\" Linked Group Details    EVERY 8 HOURS PRN    ondansetron (ZOFRAN) injection 4 mg  (ondansetron (ZOFRAN) ODT or ondansetron (ZOFRAN) IV)     Discontinue     \"Or\" Linked Group Details         [unfilled]    Activity: activity as tolerated    Diet: ADULT DIET; Regular low-fat low-cholesterol    Follow-up with Dr Michelle Given in 1 to 2 weeks, patient to call  for an appointment and if has any problems.       Electronically signed by KRISTI Carreon  on 7/18/2022 at 8:17 AM

## 2022-07-18 NOTE — PROGRESS NOTES
Dr Nathalie Bailey paged to inquire about elquis being held.  Dr ordered it to be held until after the surgery

## 2022-07-18 NOTE — PROGRESS NOTES
Pulmonary Progress Note  [facility]      Patient - Perez Blackman,  Age - 66 y.o.    - 1950      Room Number - 1480/4791-69   N -  [id number]   [facility] # - [de-identified]  Date of Admission -  2022  1:37 PM    Consulting  Garland DO Kristel  Primary Care Physician - Raheel Rainey DO     SUBJECTIVE   Patient appears to be doing well all things considered on 2 L of oxygen. OBJECTIVE   VITALS    height is 5' 10\" (1.778 m) and weight is 178 lb 9.2 oz (81 kg). His oral temperature is 97.9 °F (36.6 °C). His blood pressure is 117/52 (abnormal) and his pulse is 73. His respiration is 16 and oxygen saturation is 92%. Body mass index is 25.62 kg/m². Temperature Range: Temp: 97.9 °F (36.6 °C) Temp  Av.8 °F (36.6 °C)  Min: 97.3 °F (36.3 °C)  Max: 98.2 °F (36.8 °C)  BP Range:  Systolic (10YUK), YQC:474 , Min:117 , QNE:818     Diastolic (22GZY), QNF:76, Min:52, Max:63    Pulse Range: Pulse  Av.3  Min: 70  Max: 73  Respiration Range: Resp  Av.3  Min: 16  Max: 18  Current Pulse Ox[de-identified]  SpO2: 92 %  24HR Pulse Ox Range:  SpO2  Av.2 %  Min: 92 %  Max: 96 %  Oxygen Amount and Delivery: O2 Flow Rate (L/min): 2 L/min    Wt Readings from Last 3 Encounters:   22 178 lb 9.2 oz (81 kg)   22 178 lb (80.7 kg)   22 178 lb (80.7 kg)       I/O (24 Hours)    Intake/Output Summary (Last 24 hours) at 2022 1516  Last data filed at 2022 1209  Gross per 24 hour   Intake 1320 ml   Output --   Net 1320 ml       EXAM     General Appearance  Awake, alert, oriented, in no acute distress  HEENT - normocephalic, atraumatic. Neck - Supple,  trachea midline   Lungs breath sounds no crackles rales or wheezes  Heart Exam:PMI normal. No lifts, heaves, or thrills. RRR. No murmurs, clicks, gallops, or rubs  Abdomen Exam: Abdomen soft, non-tender. BS normal.  Extremity Exam: No signs of cyanosis.     MEDS      glipiZIDE  2.5 mg Oral BID AC cefepime  1,000 mg IntraVENous Q24H    oxyCODONE  10 mg Oral 2 times per day    sucralfate  1 g Oral 4 times per day    azithromycin  500 mg IntraVENous Q24H    FLUoxetine  20 mg Oral Daily    donepezil  10 mg Oral Daily    amLODIPine  10 mg Oral Daily    losartan  50 mg Oral Daily    levothyroxine  150 mcg Oral Daily    [Held by provider] furosemide  20 mg Oral Daily    atorvastatin  40 mg Oral Daily    apixaban  5 mg Oral BID    carvedilol  6.25 mg Oral Nightly    psyllium  1 packet Oral Daily    lactobacillus  1 capsule Oral Daily    therapeutic multivitamin-minerals  1 tablet Oral Daily    pantoprazole  40 mg Oral QAM AC    budesonide-formoterol  2 puff Inhalation BID    sodium chloride flush  5-40 mL IntraVENous 2 times per day    pregabalin  75 mg Oral BID      dextrose      sodium chloride 75 mL/hr at 07/18/22 1232    sodium chloride       docusate sodium, oxyCODONE, albuterol sulfate HFA, ipratropium-albuterol, glucose, dextrose bolus **OR** dextrose bolus, glucagon (rDNA), dextrose, sodium chloride flush, sodium chloride, acetaminophen, ondansetron **OR** ondansetron    LABS   CBC   Recent Labs     07/18/22  0452   WBC 7.0   HGB 10.3*   HCT 32.2*   MCV 80.3   *     BMP:   Lab Results   Component Value Date/Time     07/18/2022 04:52 AM    K 4.8 07/18/2022 04:52 AM     07/18/2022 04:52 AM    CO2 25 07/18/2022 04:52 AM    BUN 28 07/18/2022 04:52 AM    LABALBU 3.7 07/14/2022 02:16 PM    CREATININE 1.43 07/18/2022 04:52 AM    CALCIUM 8.0 07/18/2022 04:52 AM    GFRAA 59 07/18/2022 04:52 AM    LABGLOM 48 07/18/2022 04:52 AM     ABGs:No results found for: PHART, PO2ART, [id number]   Lab Results   Component Value Date/Time    MODE NOT REPORTED 02/17/2022 01:28 AM     Ionized Calcium:  No results found for: IONCA  Magnesium:    Lab Results   Component Value Date/Time    MG 2.0 06/29/2022 05:10 AM     Phosphorus:    Lab Results   Component Value Date/Time    PHOS 3.3 06/29/2022 05:10 AM        LIVER PROFILE   Recent Labs     07/17/22  0453   LIPASE 125*     INR No results for input(s): INR in the last 72 hours. 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 #2.  A diagnosis of COPD, no documented PFTs. #3.  Had a history of a work-up for KATIE. #4.  He may have an acute bronchitis. Chest x-ray reviewed, no consolidation appreciated. I was told that patient is scheduled for a urological procedure in 2 days. Antibiotics per Dr. Kacy Lim team.    Family had questions. I answered them to the best of my ability.   Electronically signed by Stephen Asencio MD on 7/18/2022 at 3:15 PM

## 2022-07-19 ENCOUNTER — ANESTHESIA EVENT (OUTPATIENT)
Dept: OPERATING ROOM | Age: 72
DRG: 987 | End: 2022-07-19
Payer: MEDICARE

## 2022-07-19 LAB
ABSOLUTE EOS #: 0.16 K/UL (ref 0–0.4)
ABSOLUTE LYMPH #: 1.33 K/UL (ref 1–4.8)
ABSOLUTE MONO #: 0.86 K/UL (ref 0.1–1.3)
ANION GAP SERPL CALCULATED.3IONS-SCNC: 7 MMOL/L (ref 9–17)
BASOPHILS # BLD: 1 % (ref 0–2)
BASOPHILS ABSOLUTE: 0.08 K/UL (ref 0–0.2)
BUN BLDV-MCNC: 26 MG/DL (ref 8–23)
CALCIUM SERPL-MCNC: 8 MG/DL (ref 8.6–10.4)
CHLORIDE BLD-SCNC: 108 MMOL/L (ref 98–107)
CO2: 23 MMOL/L (ref 20–31)
CREAT SERPL-MCNC: 1.37 MG/DL (ref 0.7–1.2)
EOSINOPHILS RELATIVE PERCENT: 2 % (ref 0–4)
GFR AFRICAN AMERICAN: >60 ML/MIN
GFR NON-AFRICAN AMERICAN: 51 ML/MIN
GFR SERPL CREATININE-BSD FRML MDRD: ABNORMAL ML/MIN/{1.73_M2}
GLUCOSE BLD-MCNC: 108 MG/DL (ref 75–110)
GLUCOSE BLD-MCNC: 135 MG/DL (ref 75–110)
GLUCOSE BLD-MCNC: 155 MG/DL (ref 75–110)
GLUCOSE BLD-MCNC: 76 MG/DL (ref 70–99)
HCT VFR BLD CALC: 30.6 % (ref 41–53)
HEMOGLOBIN: 9.7 G/DL (ref 13.5–17.5)
LYMPHOCYTES # BLD: 17 % (ref 24–44)
MCH RBC QN AUTO: 25.5 PG (ref 26–34)
MCHC RBC AUTO-ENTMCNC: 31.7 G/DL (ref 31–37)
MCV RBC AUTO: 80.4 FL (ref 80–100)
MONOCYTES # BLD: 11 % (ref 1–7)
MORPHOLOGY: ABNORMAL
MORPHOLOGY: ABNORMAL
PDW BLD-RTO: 16.5 % (ref 11.5–14.9)
PLATELET # BLD: 133 K/UL (ref 150–450)
PMV BLD AUTO: 8.1 FL (ref 6–12)
POTASSIUM SERPL-SCNC: 4.7 MMOL/L (ref 3.7–5.3)
PRO-BNP: 1714 PG/ML
RBC # BLD: 3.81 M/UL (ref 4.5–5.9)
SEG NEUTROPHILS: 69 % (ref 36–66)
SEGMENTED NEUTROPHILS ABSOLUTE COUNT: 5.37 K/UL (ref 1.3–9.1)
SODIUM BLD-SCNC: 138 MMOL/L (ref 135–144)
WBC # BLD: 7.8 K/UL (ref 3.5–11)

## 2022-07-19 PROCEDURE — 83880 ASSAY OF NATRIURETIC PEPTIDE: CPT

## 2022-07-19 PROCEDURE — 2060000000 HC ICU INTERMEDIATE R&B

## 2022-07-19 PROCEDURE — 6370000000 HC RX 637 (ALT 250 FOR IP): Performed by: FAMILY MEDICINE

## 2022-07-19 PROCEDURE — 2700000000 HC OXYGEN THERAPY PER DAY

## 2022-07-19 PROCEDURE — 97530 THERAPEUTIC ACTIVITIES: CPT

## 2022-07-19 PROCEDURE — 6360000002 HC RX W HCPCS: Performed by: FAMILY MEDICINE

## 2022-07-19 PROCEDURE — 85025 COMPLETE CBC W/AUTO DIFF WBC: CPT

## 2022-07-19 PROCEDURE — 2580000003 HC RX 258: Performed by: INTERNAL MEDICINE

## 2022-07-19 PROCEDURE — 99232 SBSQ HOSP IP/OBS MODERATE 35: CPT | Performed by: INTERNAL MEDICINE

## 2022-07-19 PROCEDURE — 94761 N-INVAS EAR/PLS OXIMETRY MLT: CPT

## 2022-07-19 PROCEDURE — 80048 BASIC METABOLIC PNL TOTAL CA: CPT

## 2022-07-19 PROCEDURE — 36415 COLL VENOUS BLD VENIPUNCTURE: CPT

## 2022-07-19 PROCEDURE — 2580000003 HC RX 258: Performed by: FAMILY MEDICINE

## 2022-07-19 PROCEDURE — 6370000000 HC RX 637 (ALT 250 FOR IP): Performed by: INTERNAL MEDICINE

## 2022-07-19 PROCEDURE — 82947 ASSAY GLUCOSE BLOOD QUANT: CPT

## 2022-07-19 PROCEDURE — 94640 AIRWAY INHALATION TREATMENT: CPT

## 2022-07-19 PROCEDURE — 6370000000 HC RX 637 (ALT 250 FOR IP): Performed by: NURSE PRACTITIONER

## 2022-07-19 RX ORDER — LIDOCAINE 4 G/G
1 PATCH TOPICAL DAILY
Status: DISCONTINUED | OUTPATIENT
Start: 2022-07-19 | End: 2022-07-21 | Stop reason: HOSPADM

## 2022-07-19 RX ORDER — IPRATROPIUM BROMIDE AND ALBUTEROL SULFATE 2.5; .5 MG/3ML; MG/3ML
1 SOLUTION RESPIRATORY (INHALATION)
Status: DISCONTINUED | OUTPATIENT
Start: 2022-07-19 | End: 2022-07-21 | Stop reason: HOSPADM

## 2022-07-19 RX ADMIN — IPRATROPIUM BROMIDE AND ALBUTEROL SULFATE 3 ML: 2.5; .5 SOLUTION RESPIRATORY (INHALATION) at 11:09

## 2022-07-19 RX ADMIN — SODIUM CHLORIDE, PRESERVATIVE FREE 10 ML: 5 INJECTION INTRAVENOUS at 20:57

## 2022-07-19 RX ADMIN — SODIUM CHLORIDE: 9 INJECTION, SOLUTION INTRAVENOUS at 16:25

## 2022-07-19 RX ADMIN — Medication 1 CAPSULE: at 08:18

## 2022-07-19 RX ADMIN — FLUOXETINE 20 MG: 20 CAPSULE ORAL at 08:18

## 2022-07-19 RX ADMIN — SODIUM CHLORIDE: 9 INJECTION, SOLUTION INTRAVENOUS at 00:00

## 2022-07-19 RX ADMIN — CEFEPIME HYDROCHLORIDE 1000 MG: 1 INJECTION, POWDER, FOR SOLUTION INTRAMUSCULAR; INTRAVENOUS at 13:32

## 2022-07-19 RX ADMIN — OXYCODONE HYDROCHLORIDE 10 MG: 10 TABLET, FILM COATED, EXTENDED RELEASE ORAL at 08:17

## 2022-07-19 RX ADMIN — SUCRALFATE 1 G: 1 TABLET ORAL at 16:25

## 2022-07-19 RX ADMIN — OXYCODONE HYDROCHLORIDE 10 MG: 10 TABLET, FILM COATED, EXTENDED RELEASE ORAL at 20:57

## 2022-07-19 RX ADMIN — CARVEDILOL 6.25 MG: 6.25 TABLET, FILM COATED ORAL at 20:57

## 2022-07-19 RX ADMIN — BUDESONIDE AND FORMOTEROL FUMARATE DIHYDRATE 2 PUFF: 80; 4.5 AEROSOL RESPIRATORY (INHALATION) at 08:52

## 2022-07-19 RX ADMIN — OXYCODONE HYDROCHLORIDE 5 MG: 5 TABLET ORAL at 03:03

## 2022-07-19 RX ADMIN — DONEPEZIL HYDROCHLORIDE 10 MG: 10 TABLET ORAL at 08:18

## 2022-07-19 RX ADMIN — SUCRALFATE 1 G: 1 TABLET ORAL at 07:33

## 2022-07-19 RX ADMIN — IPRATROPIUM BROMIDE AND ALBUTEROL SULFATE 3 ML: 2.5; .5 SOLUTION RESPIRATORY (INHALATION) at 19:07

## 2022-07-19 RX ADMIN — AZITHROMYCIN MONOHYDRATE 500 MG: 500 INJECTION, POWDER, LYOPHILIZED, FOR SOLUTION INTRAVENOUS at 08:22

## 2022-07-19 RX ADMIN — LEVOTHYROXINE SODIUM 150 MCG: 0.07 TABLET ORAL at 07:33

## 2022-07-19 RX ADMIN — SUCRALFATE 1 G: 1 TABLET ORAL at 11:37

## 2022-07-19 RX ADMIN — OXYCODONE HYDROCHLORIDE 5 MG: 5 TABLET ORAL at 12:35

## 2022-07-19 RX ADMIN — PREGABALIN 75 MG: 75 CAPSULE ORAL at 08:17

## 2022-07-19 RX ADMIN — IPRATROPIUM BROMIDE AND ALBUTEROL SULFATE 3 ML: 2.5; .5 SOLUTION RESPIRATORY (INHALATION) at 14:36

## 2022-07-19 RX ADMIN — PREGABALIN 75 MG: 75 CAPSULE ORAL at 20:57

## 2022-07-19 RX ADMIN — LOSARTAN POTASSIUM 50 MG: 50 TABLET, FILM COATED ORAL at 08:18

## 2022-07-19 RX ADMIN — ATORVASTATIN CALCIUM 40 MG: 40 TABLET, FILM COATED ORAL at 08:18

## 2022-07-19 RX ADMIN — SUCRALFATE 1 G: 1 TABLET ORAL at 22:49

## 2022-07-19 RX ADMIN — PANTOPRAZOLE SODIUM 40 MG: 40 TABLET, DELAYED RELEASE ORAL at 07:33

## 2022-07-19 RX ADMIN — AMLODIPINE BESYLATE 10 MG: 10 TABLET ORAL at 08:18

## 2022-07-19 RX ADMIN — MULTIPLE VITAMINS W/ MINERALS TAB 1 TABLET: TAB at 08:18

## 2022-07-19 RX ADMIN — PSYLLIUM HUSK 1 PACKET: 3.4 POWDER ORAL at 11:37

## 2022-07-19 RX ADMIN — BUDESONIDE AND FORMOTEROL FUMARATE DIHYDRATE 2 PUFF: 80; 4.5 AEROSOL RESPIRATORY (INHALATION) at 19:14

## 2022-07-19 ASSESSMENT — LIFESTYLE VARIABLES: SMOKING_STATUS: 0

## 2022-07-19 ASSESSMENT — ENCOUNTER SYMPTOMS
STRIDOR: 0
SHORTNESS OF BREATH: 0

## 2022-07-19 ASSESSMENT — PAIN SCALES - GENERAL
PAINLEVEL_OUTOF10: 7
PAINLEVEL_OUTOF10: 5

## 2022-07-19 ASSESSMENT — PAIN DESCRIPTION - LOCATION: LOCATION: BACK

## 2022-07-19 ASSESSMENT — COPD QUESTIONNAIRES: CAT_SEVERITY: NO INTERVAL CHANGE

## 2022-07-19 NOTE — CARE COORDINATION
ONGOING DISCHARGE PLAN:    Patient is alert and oriented x4. Spoke with patient regarding discharge plan and patient confirms that plan is still to go home with wife and [facility] [place]. Pt to have cysto with ureteral stent replacement tomorrow with Dr Armando Villarreal. IV Zithromax and Cefepime    Will continue to follow for additional discharge needs.     Electronically signed by Pranay Francisco RN on 7/19/2022 at 11:52 AM

## 2022-07-19 NOTE — PROGRESS NOTES
Today's Date: 7/19/2022  Patient Name: Alexy Course  Date of admission: 7/14/2022  1:37 PM  Patient's age: 74 y.o., 1950  Admission Dx: Pneumonia [J18.9]  Pneumonia of left lower lobe due to infectious organism [J18.9]  Nausea and vomiting, intractability of vomiting not specified, unspecified vomiting type [R11.2]    Reason for Consult: management recommendations  Requesting Physician: Valery Khalil DO    CHIEF COMPLAINT: Abdominal pain. Progressive disease      Interval history  Patient is seen and evaluated. Pain is controlled. He is on OxyContin 10 mg twice daily with breakthrough oxycodone. Urology plans to replace stents tomorrow morning. HISTORY OF PRESENT ILLNESS:      The patient is a 74 y.o.  male who is admitted to the hospital for chief complaints of worsening abdominal pain. Patient is well-known to our practice. Patient has history of recurrent diffuse large B-cell lymphoma. At time of relapse biopsy showed follicular lymphoma. Patient's treatment history is as below. Most recently patient was treated with Rituxan followed by Rituxan maintenance. Patient was last seen in office back in March. CT scans from February showed stable disease patient's last reduction infusion was back in May 2022. Patient was recently discharged to the hospital on the 29th after undergoing a left carotid endarterectomy back in June the patient is now admitted with worsening of abdominal pain. Patient underwent CT scan in the ER 20 CT of abdomen pelvis without contrast which shows marked interval increase of infiltrative retroperitoneal mass compatible with known history of lymphoma. The mass now partially enveloped the left kidney but no hydronephrosis is noted. The mass is also abutting the duodenum. Patient's lab work-up shows hemoglobin of 12.8. MCV 78.5.   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 Evidence of relapse with biopsy-proven follicular lymphoma. 1-2 from treated medically lymph node biopsy July 6, 2021     CURRENT THERAPY:    1.   Observation. 2.   Status post treatment with Bexxar in May 2008. 3.   Status post previous treatment with Rituxan. 4.  Start treatment with Rituxan August 23, 2021. completed4 weeks of rituximab on 9/20/2021  5. Induction Rituxan is followed by consolidation Rituxan maintenance      Past Medical History:   has a past medical history of Arthritis, Cancer ([facility] [address] Chemotherapy management, encounter for, CHF (congestive heart failure) ([facility] [address] COPD (chronic obstructive pulmonary disease) ([facility] [address] Diabetes mellitus ([facility] [address] H/O cardiovascular stress test, History of non-Hodgkin's lymphoma, Hx of blood clots, Hyperlipidemia, Hypertension, Hypothyroidism, and On home O2. Past Surgical History:   has a past surgical history that includes hernia repair; IR BIOPSY ABDOMINAL/RETROPERITONEAL MASS PERCUTANEOUS (7/6/2021); IR PORT PLACEMENT > 5 YEARS (8/31/2021); Colonoscopy (Left, 9/24/2021); Cystoscopy (Right, 10/6/2021); Cholecystectomy, laparoscopic (N/A, 10/9/2021); Tonsillectomy; Cystoscopy (Bilateral, 11/24/2021); Cystoscopy (Bilateral, 2/16/2022); eye surgery; and Carotid endarterectomy (Left, 6/28/2022). Medications:    Prior to Admission medications    Medication Sig Start Date End Date Taking?  Authorizing Provider   apixaban (ELIQUIS) 5 MG TABS tablet Take 5 mg by mouth 2 times daily   Yes Historical Provider, MD   carvedilol (COREG) 6.25 MG tablet Take 6.25 mg by mouth at bedtime   Yes Historical Provider, MD   psyllium (KONSYL) 28.3 % PACK Take 1 packet by mouth daily   Yes Historical Provider, MD   Lactobacillus (PROBIOTIC ACIDOPHILUS) CAPS Take 1 caplet by mouth daily   Yes Historical Provider, MD   Multiple Vitamins-Minerals (THERAPEUTIC MULTIVITAMIN-MINERALS) tablet Take 1 tablet by mouth daily   Yes Historical Provider, MD   glipiZIDE (GLUCOTROL) 5 MG tablet Take 5 mg by mouth 2 times daily (before meals)   Yes Historical Provider, MD   atorvastatin (LIPITOR) 40 MG tablet Take 1 tablet by mouth daily 6/29/22   Carter Pinedo MD   OXYGEN Inhale 5 L into the lungs at bedtime Nightly & during the day. Historical Provider, MD   ipratropium-albuterol (DUONEB) 0.5-2.5 (3) MG/3ML SOLN nebulizer solution Inhale 3 mLs into the lungs every 4 hours as needed for Shortness of Breath 2/20/22   EDWIGE Metcalf - CNP   levothyroxine (SYNTHROID) 150 MCG tablet Take 1 tablet by mouth Daily 2/2/22   Velia Naranoj,    fluticasone-salmeterol (ADVAIR) 100-50 MCG/DOSE diskus inhaler Inhale 1 puff into the lungs every 12 hours 2/1/22   Velia Naranjo, DO   furosemide (LASIX) 20 MG tablet Take 1 tablet by mouth daily 2/1/22   Velia Naranjo,    pregabalin (LYRICA) 75 MG capsule Take 75 mg by mouth 2 times daily.  Indications: last fill on 4/22/22 #180 for 30 days     Historical Provider, MD   albuterol sulfate HFA (VENTOLIN HFA) 108 (90 Base) MCG/ACT inhaler Inhale 2 puffs into the lungs every 4 hours as needed for Wheezing     Historical Provider, MD   amLODIPine (NORVASC) 10 MG tablet Take 1 tablet by mouth daily  Patient taking differently: Take 10 mg by mouth at bedtime  10/12/21   Roderick Metcalf, DO   losartan (COZAAR) 50 MG tablet Take 1 tablet by mouth daily 10/12/21   Roderick Metcalf, DO   donepezil (ARICEPT) 10 MG tablet Take 10 mg by mouth daily  7/11/21   Historical Provider, MD   FLUoxetine (PROZAC) 20 MG capsule Take 20 mg by mouth daily    Historical Provider, MD   omeprazole (PRILOSEC) 20 MG delayed release capsule Take 20 mg by mouth 2 times daily    Historical Provider, MD     Current Facility-Administered Medications   Medication Dose Route Frequency Provider Last Rate Last Admin    lidocaine 4 % external patch 1 patch  1 patch TransDERmal Daily Roderick Metcalf, DO   1 patch at 07/19/22 113 ipratropium-albuterol (DUONEB) nebulizer solution 3 mL  1 vial Inhalation 4x daily EDWIGE Dasilva - CNP   3 mL at 07/19/22 1436    glipiZIDE (GLUCOTROL) tablet 2.5 mg  2.5 mg Oral BID AC Roderick T Dixon, DO   2.5 mg at 07/18/22 1733    docusate sodium (COLACE) capsule 100 mg  100 mg Oral BID PRN Roderick T Dixon, DO   100 mg at 07/18/22 1035    cefepime (MAXIPIME) 1000 mg IVPB minibag  1,000 mg IntraVENous Q24H Roderick T Dixon, DO   Stopped at 07/19/22 1402    oxyCODONE (ROXICODONE) immediate release tablet 5 mg  5 mg Oral Q4H PRN Brent Colon MD   5 mg at 07/19/22 1235    oxyCODONE (OXYCONTIN) extended release tablet 10 mg  10 mg Oral 2 times per day Marichuy Gottlieb MD   10 mg at 07/19/22 0817    sucralfate (CARAFATE) tablet 1 g  1 g Oral 4 times per day Ashley Ernestina, DO   1 g at 07/19/22 1137    azithromycin (ZITHROMAX) 500 mg in D5W 250ml addavial  500 mg IntraVENous Q24H Roderick T Dixon, DO   Stopped at 07/19/22 0922    FLUoxetine (PROZAC) capsule 20 mg  20 mg Oral Daily Roderick T Dixon, DO   20 mg at 07/19/22 0818    donepezil (ARICEPT) tablet 10 mg  10 mg Oral Daily Roderick T Dixon, DO   10 mg at 07/19/22 0818    amLODIPine (NORVASC) tablet 10 mg  10 mg Oral Daily Roderick T Dixon, DO   10 mg at 07/19/22 0818    losartan (COZAAR) tablet 50 mg  50 mg Oral Daily Roderick T Dixon, DO   50 mg at 07/19/22 0818    albuterol sulfate HFA (PROVENTIL;VENTOLIN;PROAIR) 108 (90 Base) MCG/ACT inhaler 2 puff  2 puff Inhalation Q4H PRN Roderick T Dixon, DO        levothyroxine (SYNTHROID) tablet 150 mcg  150 mcg Oral Daily Roderick T Dixon, DO   150 mcg at 07/19/22 0733    [Held by provider] furosemide (LASIX) tablet 20 mg  20 mg Oral Daily Roderick T Dixon, DO   20 mg at 07/15/22 0744    atorvastatin (LIPITOR) tablet 40 mg  40 mg Oral Daily Roderick T Dixon, DO   40 mg at 07/19/22 0818    [Held by provider] apixaban (ELIQUIS) tablet 5 mg  5 mg Oral BID Roderick T Dixon, DO   5 mg at 07/18/22 0842    carvedilol (COREG) tablet 6.25 mg  6.25 mg Oral Nightly Roderick T Dixon, DO   6.25 mg at 07/18/22 1954    psyllium (METAMUCIL) 58.12 % packet 1 packet  1 packet Oral Daily Clayburn Gonsalo, DO   1 packet at 07/19/22 1137    lactobacillus (CULTURELLE) capsule 1 capsule  1 capsule Oral Daily Roderick T Dixon, DO   1 capsule at 07/19/22 0818    therapeutic multivitamin-minerals 1 tablet  1 tablet Oral Daily Roderick T Dixon, DO   1 tablet at 07/19/22 0818    pantoprazole (PROTONIX) tablet 40 mg  40 mg Oral QAM AC Roderick T Dixon, DO   40 mg at 07/19/22 0733    budesonide-formoterol (SYMBICORT) 80-4.5 MCG/ACT inhaler 2 puff  2 puff Inhalation BID Clayburn Gonsalo, DO   2 puff at 07/19/22 0852    glucose chewable tablet 16 g  4 tablet Oral PRN Roderick T Dixon, DO   16 g at 07/15/22 1233    dextrose bolus 10% 125 mL  125 mL IntraVENous PRN Roderick T Dixon, DO        Or    dextrose bolus 10% 250 mL  250 mL IntraVENous PRN Roderick T Dixon, DO        glucagon (rDNA) injection 1 mg  1 mg IntraMUSCular PRN Roderick T Dixon, DO        dextrose 5 % solution  100 mL/hr IntraVENous PRN Roderick T Dixon, DO        0.9 % sodium chloride infusion   IntraVENous Continuous Patito Parks MD 75 mL/hr at 07/19/22 0000 New Bag at 07/19/22 0000    sodium chloride flush 0.9 % injection 5-40 mL  5-40 mL IntraVENous 2 times per day Clayburn Gonsalo, DO   10 mL at 07/18/22 0847    sodium chloride flush 0.9 % injection 5-40 mL  5-40 mL IntraVENous PRN Roderick T Dixon, DO        0.9 % sodium chloride infusion   IntraVENous PRN Roderick T Dixon, DO        acetaminophen (TYLENOL) tablet 650 mg  650 mg Oral Q4H PRN Roderick T Dixon, DO   650 mg at 07/15/22 0744    ondansetron (ZOFRAN-ODT) disintegrating tablet 4 mg  4 mg Oral Q8H PRN Roderick T Dixon, DO        Or    ondansetron (ZOFRAN) injection 4 mg  4 mg IntraVENous Q6H PRN Roderick T Dixon, DO   4 mg at 07/16/22 0611    pregabalin (LYRICA) capsule 75 mg  75 mg Oral BID Roderick T Dixon, DO   75 mg at 07/19/22 5318       Allergies:  Patient has no known allergies.     Social History:   reports that he quit smoking about 16 years ago. His smoking use included cigarettes. He has a 90.00 pack-year smoking history. He has never used smokeless tobacco. He reports current alcohol use of about 1.7 standard drinks per week. He reports current drug use. Drug: Marijuana Venkata Blow). Family History: family history includes Alzheimer's Disease in his father; Diabetes in his mother; Heart Disease in his brother and brother. REVIEW OF SYSTEMS:      Constitutional: No fever or chills. No night sweats, no weight loss   Eyes: No eye discharge, double vision, or eye pain   HEENT: negative for sore mouth, sore throat, hoarseness and voice change   Respiratory: negative for cough , sputum, dyspnea, wheezing, hemoptysis, chest pain   Cardiovascular: negative for chest pain, dyspnea, palpitations, orthopnea, PND   Gastrointestinal: negative for nausea, vomiting, diarrhea, constipation, abdominal pain, Dysphagia, hematemesis and hematochezia. Positive abdominal pain, much improved now  Genitourinary: negative for frequency, dysuria, nocturia, urinary incontinence, and hematuria   Integument: negative for rash, skin lesions, bruises.    Hematologic/Lymphatic: negative for easy bruising, bleeding, lymphadenopathy, or petechiae   Endocrine: negative for heat or cold intolerance,weight changes, change in bowel habits and hair loss   Musculoskeletal: negative for myalgias, arthralgias, pain, joint swelling,and bone pain   Neurological: negative for headaches, dizziness, seizures, weakness, numbness    PHYSICAL EXAM:        /61   Pulse 66   Temp 97.7 °F (36.5 °C) (Oral)   Resp 17   Ht 5' 10\" (1.778 m)   Wt 178 lb 9.2 oz (81 kg)   SpO2 93%   BMI 25.62 kg/m²    Temp (24hrs), Av.5 °F (36.4 °C), Min:97.3 °F (36.3 °C), Max:97.7 °F (36.5 °C)      General appearance - well appearing, no in pain or distress   Mental status - alert and cooperative   Eyes - pupils equal and reactive, extraocular eye movements intact   Ears - bilateral TM's and external ear canals normal   Mouth - mucous membranes moist, pharynx normal without lesions   Neck - supple, no significant adenopathy   Lymphatics - no palpable lymphadenopathy, no hepatosplenomegaly   Chest - clear to auscultation, no wheezes, rales or rhonchi, symmetric air entry   Heart - normal rate, regular rhythm, normal S1, S2, no murmurs  Abdomen - soft, nontender, nondistended, no masses or organomegaly   Neurological - alert, oriented, normal speech, no focal findings or movement disorder noted   Musculoskeletal - no joint tenderness, deformity or swelling   Extremities - peripheral pulses normal, no pedal edema, no clubbing or cyanosis   Skin - normal coloration and turgor, no rashes, no suspicious skin lesions noted ,      DATA:      Labs:     Results for orders placed or performed during the hospital encounter of 07/14/22   MRSA DNA Probe, Nasal    Specimen: Nasal   Result Value Ref Range    Specimen Description . NASAL SWAB     MRSA, DNA, Nasal NEGATIVE NEGATIVE   Culture, Respiratory    Specimen: Sputum Expectorated   Result Value Ref Range    Specimen Description . EXPECTORATED SPUTUM     Direct Exam >25 NEUTROPHILS/LPF     Direct Exam < 10 EPITHELIAL CELLS/LPF     Direct Exam MIXED BACTERIAL MORPHOTYPES SEEN ON GRAM STAIN.  (A)     Culture NORMAL RESPIRATORY SHARRI LIGHT GROWTH    CBC with Auto Differential   Result Value Ref Range    WBC 8.7 3.5 - 11.0 k/uL    RBC 5.04 4.5 - 5.9 m/uL    Hemoglobin 12.8 (L) 13.5 - 17.5 g/dL    Hematocrit 39.5 (L) 41 - 53 %    MCV 78.5 (L) 80 - 100 fL    MCH 25.5 (L) 26 - 34 pg    MCHC 32.5 31 - 37 g/dL    RDW 16.4 (H) 11.5 - 14.9 %    Platelets 645 316 - 631 k/uL    MPV 7.7 6.0 - 12.0 fL    Seg Neutrophils 75 (H) 36 - 66 %    Lymphocytes 16 (L) 24 - 44 %    Monocytes 9 (H) 1 - 7 %    Eosinophils % 0 0 - 4 %    Basophils 0 0 - 2 %    Segs Absolute 6.53 1.3 - 9.1 k/uL    Absolute Lymph # 1.39 1.0 - 4.8 k/uL    Absolute Mono # 0. 78 0.1 - 1.3 k/uL    Absolute Eos # 0.00 0.0 - 0.4 k/uL    Basophils Absolute 0.00 0.0 - 0.2 k/uL    Morphology ANISOCYTOSIS PRESENT     Morphology 1+ ELLIPTOCYTES    CMP   Result Value Ref Range    Glucose 119 (H) 70 - 99 mg/dL    BUN 27 (H) 8 - 23 mg/dL    CREATININE 1.56 (H) 0.70 - 1.20 mg/dL    Calcium 8.7 8.6 - 10.4 mg/dL    Sodium 140 135 - 144 mmol/L    Potassium 3.9 3.7 - 5.3 mmol/L    Chloride 102 98 - 107 mmol/L    CO2 24 20 - 31 mmol/L    Anion Gap 14 9 - 17 mmol/L    Alkaline Phosphatase 131 (H) 40 - 129 U/L    ALT 6 5 - 41 U/L    AST 15 <40 U/L    Total Bilirubin 0.80 0.3 - 1.2 mg/dL    Total Protein 6.1 (L) 6.4 - 8.3 g/dL    Albumin 3.7 3.5 - 5.2 g/dL    GFR Non- 44 (L) >60 mL/min    GFR  53 (L) >60 mL/min    GFR Comment         Lipase   Result Value Ref Range    Lipase 69 (H) 13 - 60 U/L   Lactic Acid   Result Value Ref Range    Lactic Acid 2.0 0.5 - 2.2 mmol/L   Urinalysis with Reflex to Culture    Specimen: Urine   Result Value Ref Range    Color, UA Yellow Yellow    Turbidity UA Clear Clear    Glucose, Ur TRACE (A) NEGATIVE    Bilirubin Urine NEGATIVE NEGATIVE    Ketones, Urine TRACE (A) NEGATIVE    Specific Gravity, UA 1.024 1.000 - 1.030    Urine Hgb MOD (A) NEGATIVE    pH, UA 6.0 5.0 - 8.0    Protein, UA 4+ (A) NEGATIVE    Urobilinogen, Urine Normal Normal    Nitrite, Urine NEGATIVE NEGATIVE    Leukocyte Esterase, Urine TRACE (A) NEGATIVE   Troponin   Result Value Ref Range    Troponin, High Sensitivity 39 (H) 0 - 22 ng/L   Microscopic Urinalysis   Result Value Ref Range    WBC, UA 6 TO 9 /HPF    RBC, UA 21 TO 50 /HPF    Casts UA 0 TO 2 /LPF    Casts UA HYALINE /LPF    Epithelial Cells UA 6 TO 9 /HPF    Bacteria, UA None None    Mucus, UA 1+ (A) None   Troponin   Result Value Ref Range    Troponin, High Sensitivity 39 (H) 0 - 22 ng/L   Troponin   Result Value Ref Range    Troponin, High Sensitivity 43 (H) 0 - 22 ng/L   Lipase   Result Value Ref Range    Lipase 123 (H) 13 - 60 U/L   Procalcitonin   Result Value Ref Range    Procalcitonin 0.10 (H) <0.09 ng/mL   Cortisol Total   Result Value Ref Range    Cortisol 12.7 2.7 - 18.4 ug/dL    Cortisol Collection Info AM    Lactate Dehydrogenase   Result Value Ref Range     (H) 135 - 225 U/L   Uric Acid   Result Value Ref Range    Uric Acid 9.4 (H) 3.4 - 7.0 mg/dL   Basic Metabolic Panel   Result Value Ref Range    Glucose 82 70 - 99 mg/dL    BUN 27 (H) 8 - 23 mg/dL    CREATININE 1.49 (H) 0.70 - 1.20 mg/dL    Calcium 8.0 (L) 8.6 - 10.4 mg/dL    Sodium 140 135 - 144 mmol/L    Potassium 3.7 3.7 - 5.3 mmol/L    Chloride 103 98 - 107 mmol/L    CO2 26 20 - 31 mmol/L    Anion Gap 11 9 - 17 mmol/L    GFR Non-African American 46 (L) >60 mL/min    GFR  56 (L) >60 mL/min    GFR Comment         CBC with Auto Differential   Result Value Ref Range    WBC 8.3 3.5 - 11.0 k/uL    RBC 4.79 4.5 - 5.9 m/uL    Hemoglobin 12.4 (L) 13.5 - 17.5 g/dL    Hematocrit 38.4 (L) 41 - 53 %    MCV 80.2 80 - 100 fL    MCH 25.8 (L) 26 - 34 pg    MCHC 32.2 31 - 37 g/dL    RDW 16.1 (H) 11.5 - 14.9 %    Platelets 456 371 - 567 k/uL    MPV 7.9 6.0 - 12.0 fL    Seg Neutrophils 68 (H) 36 - 66 %    Lymphocytes 20 (L) 24 - 44 %    Monocytes 10 (H) 1 - 7 %    Eosinophils % 2 0 - 4 %    Basophils 0 0 - 2 %    Segs Absolute 5.64 1.3 - 9.1 k/uL    Absolute Lymph # 1.66 1.0 - 4.8 k/uL    Absolute Mono # 0.83 0.1 - 1.3 k/uL    Absolute Eos # 0.17 0.0 - 0.4 k/uL    Basophils Absolute 0.00 0.0 - 0.2 k/uL    Morphology ANISOCYTOSIS PRESENT     Morphology FEW GIANT PLATELETS    Lipase   Result Value Ref Range    Lipase 114 (H) 13 - 60 U/L   Basic Metabolic Panel   Result Value Ref Range    Glucose 92 70 - 99 mg/dL    BUN 27 (H) 8 - 23 mg/dL    CREATININE 1.53 (H) 0.70 - 1.20 mg/dL    Calcium 7.8 (L) 8.6 - 10.4 mg/dL    Sodium 136 135 - 144 mmol/L    Potassium 4.2 3.7 - 5.3 mmol/L    Chloride 104 98 - 107 mmol/L    CO2 23 20 - 31 mmol/L    Anion Gap 9 9 - 17 mmol/L    GFR Non-African American 45 (L) >60 mL/min    GFR  54 (L) >60 mL/min    GFR Comment         CBC with Auto Differential   Result Value Ref Range    WBC 8.0 3.5 - 11.0 k/uL    RBC 4.12 (L) 4.5 - 5.9 m/uL    Hemoglobin 10.6 (L) 13.5 - 17.5 g/dL    Hematocrit 33.5 (L) 41 - 53 %    MCV 81.3 80 - 100 fL    MCH 25.8 (L) 26 - 34 pg    MCHC 31.7 31 - 37 g/dL    RDW 16.2 (H) 11.5 - 14.9 %    Platelets 104 931 - 980 k/uL    MPV 7.9 6.0 - 12.0 fL    Seg Neutrophils 72 (H) 36 - 66 %    Lymphocytes 17 (L) 24 - 44 %    Monocytes 9 (H) 1 - 7 %    Eosinophils % 2 0 - 4 %    Basophils 0 0 - 2 %    Segs Absolute 5.70 1.3 - 9.1 k/uL    Absolute Lymph # 1.40 1.0 - 4.8 k/uL    Absolute Mono # 0.70 0.1 - 1.3 k/uL    Absolute Eos # 0.20 0.0 - 0.4 k/uL    Basophils Absolute 0.00 0.0 - 0.2 k/uL   Lipase   Result Value Ref Range    Lipase 125 (H) 13 - 60 U/L   Basic Metabolic Panel   Result Value Ref Range    Glucose 104 (H) 70 - 99 mg/dL    BUN 28 (H) 8 - 23 mg/dL    CREATININE 1.43 (H) 0.70 - 1.20 mg/dL    Calcium 8.0 (L) 8.6 - 10.4 mg/dL    Sodium 139 135 - 144 mmol/L    Potassium 4.8 3.7 - 5.3 mmol/L    Chloride 106 98 - 107 mmol/L    CO2 25 20 - 31 mmol/L    Anion Gap 8 (L) 9 - 17 mmol/L    GFR Non-African American 48 (L) >60 mL/min    GFR  59 (L) >60 mL/min    GFR Comment         CBC with Auto Differential   Result Value Ref Range    WBC 7.0 3.5 - 11.0 k/uL    RBC 4.01 (L) 4.5 - 5.9 m/uL    Hemoglobin 10.3 (L) 13.5 - 17.5 g/dL    Hematocrit 32.2 (L) 41 - 53 %    MCV 80.3 80 - 100 fL    MCH 25.6 (L) 26 - 34 pg    MCHC 31.9 31 - 37 g/dL    RDW 16.2 (H) 11.5 - 14.9 %    Platelets 391 (L) 938 - 450 k/uL    MPV 7.6 6.0 - 12.0 fL    Seg Neutrophils 74 (H) 36 - 66 %    Lymphocytes 14 (L) 24 - 44 %    Monocytes 10 (H) 1 - 7 %    Eosinophils % 2 0 - 4 %    Basophils 0 0 - 2 %    Segs Absolute 5.10 1.3 - 9.1 k/uL    Absolute Lymph # 1.00 1.0 - 4.8 k/uL    Absolute Mono # 0.70 0.1 - 1.3 k/uL    Absolute Eos # 0.10 0.0 - 0.4 k/uL    Basophils Absolute 0.00 0.0 - 0.2 k/uL   Basic Metabolic Panel   Result Value Ref Range    Glucose 76 70 - 99 mg/dL    BUN 26 (H) 8 - 23 mg/dL    CREATININE 1.37 (H) 0.70 - 1.20 mg/dL    Calcium 8.0 (L) 8.6 - 10.4 mg/dL    Sodium 138 135 - 144 mmol/L    Potassium 4.7 3.7 - 5.3 mmol/L    Chloride 108 (H) 98 - 107 mmol/L    CO2 23 20 - 31 mmol/L    Anion Gap 7 (L) 9 - 17 mmol/L    GFR Non-African American 51 (L) >60 mL/min    GFR African American >60 >60 mL/min    GFR Comment         CBC with Auto Differential   Result Value Ref Range    WBC 7.8 3.5 - 11.0 k/uL    RBC 3.81 (L) 4.5 - 5.9 m/uL    Hemoglobin 9.7 (L) 13.5 - 17.5 g/dL    Hematocrit 30.6 (L) 41 - 53 %    MCV 80.4 80 - 100 fL    MCH 25.5 (L) 26 - 34 pg    MCHC 31.7 31 - 37 g/dL    RDW 16.5 (H) 11.5 - 14.9 %    Platelets 511 (L) 770 - 450 k/uL    MPV 8.1 6.0 - 12.0 fL    Seg Neutrophils 69 (H) 36 - 66 %    Lymphocytes 17 (L) 24 - 44 %    Monocytes 11 (H) 1 - 7 %    Eosinophils % 2 0 - 4 %    Basophils 1 0 - 2 %    Segs Absolute 5.37 1.3 - 9.1 k/uL    Absolute Lymph # 1.33 1.0 - 4.8 k/uL    Absolute Mono # 0.86 0.1 - 1.3 k/uL    Absolute Eos # 0.16 0.0 - 0.4 k/uL    Basophils Absolute 0.08 0.0 - 0.2 k/uL    Morphology ANISOCYTOSIS PRESENT     Morphology MICROCYTOSIS PRESENT    Brain Natriuretic Peptide   Result Value Ref Range    Pro-BNP 1,714 (H) <300 pg/mL   POC Glucose Fingerstick   Result Value Ref Range    POC Glucose 239 (H) 75 - 110 mg/dL   POC Glucose Fingerstick   Result Value Ref Range    POC Glucose 95 75 - 110 mg/dL   POC Glucose Fingerstick   Result Value Ref Range    POC Glucose 87 75 - 110 mg/dL   POC Glucose Fingerstick   Result Value Ref Range    POC Glucose 56 (L) 75 - 110 mg/dL   POC Glucose Fingerstick   Result Value Ref Range    POC Glucose 68 (L) 75 - 110 mg/dL   POC Glucose Fingerstick   Result Value Ref Range    POC Glucose 187 (H) 75 - 110 mg/dL   POC Glucose Fingerstick   Result Value Ref Range    POC Glucose 170 (H) 75 - 110 mg/dL   POC Glucose Fingerstick   Result Value Ref Range    POC Glucose 119 (H) 75 - 110 mg/dL   POC Glucose Fingerstick   Result Value Ref Range    POC Glucose 69 (L) 75 - 110 mg/dL   POC Glucose Fingerstick   Result Value Ref Range    POC Glucose 87 75 - 110 mg/dL   POC Glucose Fingerstick   Result Value Ref Range    POC Glucose 149 (H) 75 - 110 mg/dL   POC Glucose Fingerstick   Result Value Ref Range    POC Glucose 115 (H) 75 - 110 mg/dL   POC Glucose Fingerstick   Result Value Ref Range    POC Glucose 156 (H) 75 - 110 mg/dL   POC Glucose Fingerstick   Result Value Ref Range    POC Glucose 86 75 - 110 mg/dL   POC Glucose Fingerstick   Result Value Ref Range    POC Glucose 159 (H) 75 - 110 mg/dL   POC Glucose Fingerstick   Result Value Ref Range    POC Glucose 126 (H) 75 - 110 mg/dL   POC Glucose Fingerstick   Result Value Ref Range    POC Glucose 122 (H) 75 - 110 mg/dL   POC Glucose Fingerstick   Result Value Ref Range    POC Glucose 91 75 - 110 mg/dL   POC Glucose Fingerstick   Result Value Ref Range    POC Glucose 108 75 - 110 mg/dL   POC Glucose Fingerstick   Result Value Ref Range    POC Glucose 108 75 - 110 mg/dL   POC Glucose Fingerstick   Result Value Ref Range    POC Glucose 135 (H) 75 - 110 mg/dL   EKG 12 Lead   Result Value Ref Range    Ventricular Rate 97 BPM    Atrial Rate 97 BPM    P-R Interval 120 ms    QRS Duration 90 ms    Q-T Interval 378 ms    QTc Calculation (Bazett) 480 ms    P Axis 55 degrees    R Axis -26 degrees    T Axis 21 degrees         IMAGING DATA:    CT ABDOMEN PELVIS WO CONTRAST Additional Contrast? None    Result Date: 7/14/2022  EXAMINATION: CT OF THE ABDOMEN AND PELVIS WITHOUT CONTRAST 7/14/2022 12:39 pm TECHNIQUE: CT of the abdomen and pelvis was performed without the administration of intravenous contrast. Multiplanar reformatted images are provided for review.  Automated exposure control, iterative reconstruction, and/or weight based adjustment of the mA/kV was utilized to reduce the radiation dose to as low as reasonably achievable. COMPARISON: 02/16/2022 HISTORY: ORDERING SYSTEM PROVIDED HISTORY: pain TECHNOLOGIST PROVIDED HISTORY: pain Decision Support Exception - unselect if not a suspected or confirmed emergency medical condition->Emergency Medical Condition (MA) Reason for Exam: pain Additional signs and symptoms: PT states abdominal pain and unable to eat x3 days. Relevant Medical/Surgical History: hx of gallbladder removal and bilateral ureteral stents FINDINGS: Lower Chest: Bronchial wall thickening is noted in the lower lungs bilaterally. Debris is noted in the left lower lobe airways. Noncontrast imaging of the base of the heart is unremarkable. Lack of intravenous contrast limits evaluation of the solid abdominal viscera, the hollow abdominal viscera, and the vascular structures. Organs: With that said, no acute hepatic abnormality identified. Gallbladder is unremarkable. Noncontrast imaging of the spleen unremarkable. The left adrenal gland is enveloped by the retroperitoneal soft tissue mass. Right adrenal gland is unremarkable. Pancreas is partially surrounded by the mass. Visualized portions of the pancreas are unremarkable. The retroperitoneal mass now surrounds and may partially invade the left kidney. With that said, a ureteral stent on the left is noted, and there is no evidence of hydronephrosis. A right ureteral stent is in place, also with no evidence of hydronephrosis. Hyperdense cyst seen within the right kidney, unchanged when compared to multiple previous exams, and requiring no specific follow-up. GI/Bowel: The distal esophagus and stomach are unremarkable appearance. The retroperitoneal mass partially envelops the duodenal sweep, but obstruction does not appear to be present. More distally, the small bowel is normal in caliber. No focal mural thickening identified.  Moderate to severe diverticulosis of the large bowel is present without CT evidence of diverticulitis. Large bowel is otherwise unremarkable. Appendix is normal. Pelvis: Ureteral stents are noted within the urinary bladder. Prostate unremarkable. Ill-defined soft tissue is seen within the rightward aspect of the pelvis are noted, increased in size when compared to the previous exam, measuring roughly 5.9 x 3.8 cm (2, 142). Enlarged lymph node in the distal left iliac chain is markedly increased in size, measuring 3.3 x 2.4 cm (2, 127), not well visualized previously. Peritoneum/Retroperitoneum: Very large infiltrative mass within the retroperitoneum is again seen, extending into the mesentery, and completely surrounding the abdominal aorta and inferior vena cava. This mass has become much more consolidated, with the conglomerate now measuring roughly 10.9 x 12.1 cm. No free intraperitoneal air is seen. Bones/Soft Tissues: No acute or suspicious bony abnormalities. The extra-abdominal and extra pelvic soft tissues are unremarkable. Interval marked increased size of the infiltrative retroperitoneal mass, compatible with the known history of lymphoma. The mass now partially envelops the left kidney, but no hydronephrosis is identified. The mass also is seen abutting the duodenal sweep, but without obvious obstruction. New pelvic lymphadenopathy. The     XR CHEST PORTABLE    Result Date: 7/14/2022  EXAMINATION: ONE XRAY VIEW OF THE CHEST 7/14/2022 11:21 am COMPARISON: 06/13/2022 HISTORY: ORDERING SYSTEM PROVIDED HISTORY: pain TECHNOLOGIST PROVIDED HISTORY: pain Reason for Exam: Pain, abdominal pain. Pt. states history of COPD FINDINGS: Stable right-sided brittany catheter. .The cardiac size is normal. No  pleural effusions are seen. Pulmonary vascularity appears stable. Mild infiltrate in the left lower lobe. [place] Crape Christy Crape No acute bony abnormalities.  The hilar structures are normal.     Mild left lower lobe infiltrate suggesting early pneumonia         IMPRESSION:   Primary Problem  Pneumonia    Active Hospital Problems    Diagnosis Date Noted    Pneumonia [J18.9] 07/14/2022     Priority: Medium       Progressive non-Hodgkin lymphoma per CT scan  Pneumonia per chest x-ray  CKD  Intractable pain pneumonia  S/p carotid endarterectomy-6/2022    RECOMMENDATIONS:  \As discussed previously, the patient's progressive lymphoma causing obstructive uropathy   Stents will be replaced tomorrow  Will need systemic therapy after discharge  Kidney function continues to improve  Pain control is very good with current therapy. Continue without changes  Continue with antibiotics as per primary team/pulmonary service  Will definitely need chemoimmunotherapy as an outpatient after discharge. We will set it up after he finishes antibiotics for his pneumonia              Discussed with patient and spouse and Nurse. Thank you for asking us to see this patient. Sylvie Colon MD  Hematologist/Medical [id number] [facility] hematology oncology physicians            This note is created with the assistance of a speech recognition program.  While intending to generate a document that actually reflects the content of the visit, the document can still have some errors including those of syntax and sound a like substitutions which may escape proof reading. It such instances, actual meaning can be extrapolated by contextual diversion.

## 2022-07-19 NOTE — PROGRESS NOTES
RN received call from [facility] in surgery to inform that patient will have a cysto with Dr. Hilliard July 7/20/22 @ 4788.

## 2022-07-19 NOTE — PROGRESS NOTES
Alexis Garcia MD/Wilder Crews MD/ Santana Stokes MD/Dr Patricia GIBBONS AGACRYSTALP-BC, NP-C      Crow GIBBONS NP-C     Lis Monsalve APRMARCIE NP-C                                           Pulmonary Progress Note    Patient - Main Cherycal   Age - 74 y.o.   - 1950  MRN - [id number]  Acct # - [de-identified]  Date of Admission - 2022  1:37 PM    Consulting Service/Physician:       Primary Care Physician: Karmen Mccullough DO    SUBJECTIVE:     Chief Complaint:   Chief Complaint   Patient presents with    Abdominal Pain     Subjective:    Italo Lugo tells me he is slightly short of breath today. He is afebrile. He reports occasional cough with yellow sputum. There is plan for ureteral stent exchange tomorrow. Sputum culture was negative. Creatinine is 1.37. He continues on IV cefepime and Zithromax. VITALS  BP (!) 141/64   Pulse 70   Temp 97.4 °F (36.3 °C) (Oral)   Resp 18   Ht 5' 10\" (1.778 m)   Wt 178 lb 9.2 oz (81 kg)   SpO2 93%   BMI 25.62 kg/m²   Wt Readings from Last 3 Encounters:   22 178 lb 9.2 oz (81 kg)   22 178 lb (80.7 kg)   22 178 lb (80.7 kg)     I/O (24 Hours)    Intake/Output Summary (Last 24 hours) at 2022 0911  Last data filed at 2022 0000  Gross per 24 hour   Intake 2020 ml   Output 800 ml   Net 1220 ml     Ventilator:   Settings  FiO2 : 28 %  Exam:   Physical Exam   Constitutional: Lying in bed on 3 L in no acute distress  HENT: Unremarkable  Head: Normocephalic and atraumatic. Eyes: EOM are normal. Pupils are equal, round, and reactive to light. Neck: Neck supple. Cardiovascular:  Regular rate and rhythm. Normal heart tones. No JVD. Pulmonary/Chest: Respirations even and nonlabored, lungs diminished with a few scattered wheezes, on 3 L with pulse ox 92%, congested cough  Abdominal: Soft. Bowel sounds are normal.   Musculoskeletal: Normal range of motion. Neurological: Patient is alert and oriented to person, place, and time. Skin: Skin is warm and dry. No rash noted.    Extremities: No edema or discoloration  Infusions:      dextrose      sodium chloride 75 mL/hr at 07/19/22 0000    sodium chloride       Meds:     Current Facility-Administered Medications:     lidocaine 4 % external patch 1 patch, 1 patch, TransDERmal, Daily, Roderick T Dixon, DO    glipiZIDE (GLUCOTROL) tablet 2.5 mg, 2.5 mg, Oral, BID AC, Roderick T Dixon, DO, 2.5 mg at 07/18/22 1733    docusate sodium (COLACE) capsule 100 mg, 100 mg, Oral, BID PRN, Roderick T Dixon, DO, 100 mg at 07/18/22 1035    cefepime (MAXIPIME) 1000 mg IVPB minibag, 1,000 mg, IntraVENous, Q24H, Roderick T Dixon, DO, Stopped at 07/18/22 1305    oxyCODONE (ROXICODONE) immediate release tablet 5 mg, 5 mg, Oral, Q4H PRN, Serafin Colon MD, 5 mg at 07/19/22 0303    oxyCODONE (OXYCONTIN) extended release tablet 10 mg, 10 mg, Oral, 2 times per day, David Lo MD, 10 mg at 07/19/22 0817    sucralfate (CARAFATE) tablet 1 g, 1 g, Oral, 4 times per day, Roderick T Dixon, DO, 1 g at 07/19/22 0733    azithromycin (ZITHROMAX) 500 mg in D5W 250ml addavial, 500 mg, IntraVENous, Q24H, Roderick T Dixon, DO, Last Rate: 250 mL/hr at 07/19/22 0822, 500 mg at 07/19/22 4764    FLUoxetine (PROZAC) capsule 20 mg, 20 mg, Oral, Daily, Roderick T Dixon, DO, 20 mg at 07/19/22 0818    donepezil (ARICEPT) tablet 10 mg, 10 mg, Oral, Daily, Roderick T Dixon, DO, 10 mg at 07/19/22 0818    amLODIPine (NORVASC) tablet 10 mg, 10 mg, Oral, Daily, Roderick T Dixon, DO, 10 mg at 07/19/22 0818    losartan (COZAAR) tablet 50 mg, 50 mg, Oral, Daily, Roderick T Dixon, DO, 50 mg at 07/19/22 0818    albuterol sulfate HFA (PROVENTIL;VENTOLIN;PROAIR) 108 (90 Base) MCG/ACT inhaler 2 puff, 2 puff, Inhalation, Q4H PRN, Roderick T Dixon, DO    levothyroxine (SYNTHROID) tablet 150 mcg, 150 mcg, Oral, Daily, Roderick T Dixon, DO, 150 mcg at 07/19/22 0733    [Held by provider] furosemide (LASIX) tablet 20 mg, 20 mg, Oral, Daily, Roderick T Dixon, DO, 20 mg at 07/15/22 0744    ipratropium-albuterol (DUONEB) nebulizer solution 3 mL, 1 vial, Inhalation, Q4H PRN, Roderick T Dixon, DO    atorvastatin (LIPITOR) tablet 40 mg, 40 mg, Oral, Daily, Roderick T Dixon, DO, 40 mg at 07/19/22 0818    [Held by provider] apixaban (ELIQUIS) tablet 5 mg, 5 mg, Oral, BID, Roderick T Dixon, DO, 5 mg at 07/18/22 0842    carvedilol (COREG) tablet 6.25 mg, 6.25 mg, Oral, Nightly, Roderick T Dixon, DO, 6.25 mg at 07/18/22 1954    psyllium (METAMUCIL) 58.12 % packet 1 packet, 1 packet, Oral, Daily, Roderick T Dixon, DO, 1 packet at 07/17/22 0933    lactobacillus (CULTURELLE) capsule 1 capsule, 1 capsule, Oral, Daily, Roderick T Dixon, DO, 1 capsule at 07/19/22 0818    therapeutic multivitamin-minerals 1 tablet, 1 tablet, Oral, Daily, Roderick T Dixon, DO, 1 tablet at 07/19/22 0818    pantoprazole (PROTONIX) tablet 40 mg, 40 mg, Oral, QAM AC, Roderick T Dixon, DO, 40 mg at 07/19/22 0733    budesonide-formoterol (SYMBICORT) 80-4.5 MCG/ACT inhaler 2 puff, 2 puff, Inhalation, BID, Roderick T Dixon, DO, 2 puff at 07/19/22 0852    glucose chewable tablet 16 g, 4 tablet, Oral, PRN, Roderick T Dixon, DO, 16 g at 07/15/22 1233    dextrose bolus 10% 125 mL, 125 mL, IntraVENous, PRN **OR** dextrose bolus 10% 250 mL, 250 mL, IntraVENous, PRN, Roderick T Dixon, DO    glucagon (rDNA) injection 1 mg, 1 mg, IntraMUSCular, PRN, Roderick T Dixon, DO    dextrose 5 % solution, 100 mL/hr, IntraVENous, PRN, Roderick T Dixon, DO    0.9 % sodium chloride infusion, , IntraVENous, Continuous, Eros Polk MD, Last Rate: 75 mL/hr at 07/19/22 0000, New Bag at 07/19/22 0000    sodium chloride flush 0.9 % injection 5-40 mL, 5-40 mL, IntraVENous, 2 times per day, Roderick T Dixon, DO, 10 mL at 07/18/22 0847    sodium chloride flush 0.9 % injection 5-40 mL, 5-40 mL, IntraVENous, PRN, Roderick T Dixon, DO    0.9 % sodium chloride infusion, , IntraVENous, PRN, Roderick T Dixon, DO    acetaminophen (TYLENOL) tablet 650 mg, 650 mg, Oral, Q4H PRN, Roderick T Dixon, DO, 650 mg at 07/15/22 0744    ondansetron (ZOFRAN-ODT) disintegrating tablet 4 mg, 4 mg, Oral, Q8H PRN **OR** ondansetron (ZOFRAN) injection 4 mg, 4 mg, IntraVENous, Q6H PRN, Roderick T Dixon, DO, 4 mg at 07/16/22 0611    pregabalin (LYRICA) capsule 75 mg, 75 mg, Oral, BID, Roderick T Dixon, DO, 75 mg at 07/19/22 0817    Lab Results:     Lab Results   Component Value Date    WBC 7.8 07/19/2022    HGB 9.7 (L) 07/19/2022    HCT 30.6 (L) 07/19/2022    MCV 80.4 07/19/2022     (L) 07/19/2022     Lab Results   Component Value Date    CALCIUM 8.0 (L) 07/19/2022     07/19/2022    K 4.7 07/19/2022    CO2 23 07/19/2022     (H) 07/19/2022    BUN 26 (H) 07/19/2022    CREATININE 1.37 (H) 07/19/2022       Lab Results   Component Value Date    INR 1.1 06/13/2022    PROTIME 14.5 (H) 06/13/2022       Radiology:     7/18/2022 7/14/2022    My reading of film: Trace left pleural effusion. ASSESSMENT:       LLL Pneumonia, improving  Suspected COPD- no documented PFT  Acute on chronic Hypoxic respiratory failure- currently on 2LNC  Recurrent diffuse large B-cell lymphoma/follicular lymphoma- treated with Rituxan- follows with Dr. Richard Beckham  Abdominal Pain- CT with increasing retroperitoneal mass  Recent LCEA 6/2022  Nocturnal Hypoxemia- on 5LNC at night  History of tobacco use- quit 15 years ago- 60 pk year  Question of KATIE with prior home PSG  History of hydronephrosis with ureteral stents, Dr. Lal He  CKD  Anemia  History of DVT  History of diastolic CHF  Full Code    PLAN:   Will add on BNP  Will change nebulizers to scheduled, continue Symbicort  Titrate oxygen to keep pulse ox above 89%  Continue antibiotics  Plans for stent exchange/cystoscopy for tomorrow noted      Electronically signed by EDWIGE Contreras CNP on 07/19/22     This progress note was completed using a voice transcription system. Every effort was made to ensure accuracy.  However, inadvertent computerized transcription errors may be present.     Cecilia Hendrickson, NP-C, MSN  [facility] Pulmonary, Critical Care & Sleep

## 2022-07-19 NOTE — PROGRESS NOTES
Physical Therapy  Facility/Department: [facility] PROGRESSIVE CARE  Physical Therapy Treatment note      Name: Ashish Andersen  : 1950  MRN: [id number]  Date of Service: 2022    Discharge Recommendations:  Home with assist PRN          Patient Diagnosis(es): The primary encounter diagnosis was Nausea and vomiting, intractability of vomiting not specified, unspecified vomiting type. Diagnoses of Pneumonia of left lower lobe due to infectious organism and Non-Hodgkin's lymphoma, unspecified body region, unspecified non-Hodgkin lymphoma type [facility]) were also pertinent to this visit. Past Medical History:  has a past medical history of Arthritis, Cancer ([facility] [address] Chemotherapy management, encounter for, CHF (congestive heart failure) ([facility] [address] COPD (chronic obstructive pulmonary disease) ([facility] [address] Diabetes mellitus ([facility] [address] H/O cardiovascular stress test, History of non-Hodgkin's lymphoma, Hx of blood clots, Hyperlipidemia, Hypertension, Hypothyroidism, and On home O2. Plan   Plan  Plan: 1 time a day 3-6 times a week  Current Treatment Recommendations: Strengthening, Balance training, Functional mobility training, Transfer training, Neuromuscular re-education, Stair training, Gait training  Safety Devices  Type of Devices: Gait belt, Call light within reach, Bed alarm in place     Restrictions  Restrictions/Precautions  Restrictions/Precautions: General Precautions (Patient curently receiving chemotherapy for a lymphoma tumor in abdomen)     Subjective   Pain: denies pain  General  Chart Reviewed: Yes  Subjective  Subjective: pt in bed and agreeable to therapy. pt on 3L O2. Cognition   Orientation  Overall Orientation Status: Within Normal Limits     Objective   Heart Rate:71  O2:   Restin% 3L  Ambulation with 1.5 L 89-90%  Ambulation with no O2-87%  Resting post ambulation with 2L 90%  Comment: attempted to titrate down supplemental O2, pts vitals demonstrate need for supplemental O2 with ambulation.            Bed mobility  Supine to Sit: Modified independent  Sit to Supine: Modified independent  Scooting: Independent  Bed Mobility Comments: performs with HOB slightly elevated  Transfers  Sit to Stand: Stand by assistance  Stand to sit: Stand by assistance  Bed to Chair: Stand by assistance;Contact guard assistance  Comment: CGA-SBA with use of rollator for stability. Performed STS from bed x4 trials with education on use of rollator  Ambulation  Surface: level tile  Device: Rollator  Assistance: Stand by assistance;Contact guard assistance  Gait Deviations: Slow Marta;Decreased step height;Decreased step length;Shuffles  Distance: 200ft and 150 ft  Comments: monitored O2 needs with ambulation, pt with desat with trial on on walking O2 (to 87%) supplemental O2 put to 1 L then 2 L. pt demonstrates decreased cardiopulmonary endurance.      Balance  Sitting - Static: Good  Sitting - Dynamic: Good;-  Standing - Static: Good;-  Standing - Dynamic: Fair;+  Exercise Treatment: Performed therapeitic activity, see above            Goals  Short Term Goals  Short term goal 1: Patient to demonstrate IND with ambulation x75ft  Short term goal 2: Patient to demonstrate good standing dynamic balance  Short term goal 3: Patient to demonstrate IND with transfers         Therapy Time   Individual Concurrent Group Co-treatment   Time In 1338         Time Out 1408         Minutes 30                 Clary Lopez PT

## 2022-07-19 NOTE — CONSULTS
Lissa Redd  Urology Consultation    Patient:  Richard Mandujano  MRN: [id number]  YOB: 1950    CHIEF COMPLAINT:  Bilateral hydronephrosis    HISTORY OF PRESENT ILLNESS:   The patient is a 74 y.o. male who presents with Abdominal pain, patient with large B-cell lymphoma and follicular lymphoma  Most recent CT demonstrating increase of the retroperitoneal mass  We received a phone call from nursing staff about urologic evaluation and stent exchange versus stent removal which is scheduled for Wednesday      Patient's old records, notes and chart reviewed and summarized above. Past Medical History:    Past Medical History:   Diagnosis Date    Arthritis     Cancer [facility])     chemotherapy lymphoma last was april 2022    Chemotherapy management, encounter for     CHF (congestive heart failure) ([facility] [address]     COPD (chronic obstructive pulmonary disease) ([facility] [address]     Diabetes mellitus ([facility] [address]     H/O cardiovascular stress test     History of non-Hodgkin's lymphoma     Hx of blood clots     DVT in right leg     Hyperlipidemia     Hypertension     Hypothyroidism     On home O2     at 5 liters per nasal cannula 24 hrs. per day.         Past Surgical History:    Past Surgical History:   Procedure Laterality Date    CAROTID ENDARTERECTOMY Left 6/28/2022    LEFT TYPE I EVERSION LEFT CAROTID ENDARTERECTOMY RE-IMPLANTATION LEFT ICA performed by Barak Herrmann MD at [address] [facility], LAPAROSCOPIC N/A 10/9/2021    CHOLECYSTECTOMY LAPAROSCOPIC ROBOTIC XI performed by mOer Gonzalez MD at [address] [address],[facility] [address] Left 9/24/2021    COLONOSCOPY POLYPECTOMY SNARE/COLD BIOPSY performed by Nancy Vazquez MD at . [address] Right 10/6/2021    CYSTOSCOPY PYELOGRAM URETERAL STENT INSERTION performed by Jayshree Gómez MD at . [address] Bilateral 11/24/2021    CYSTOSCOPY URETERAL STENT INSERTION RIGHT EXCHANGE & RETROGRADE PYELOGRAM LEFT INSERTION performed by Jayshree Gómez MD at . [address] Bilateral 2/16/2022    CYSTOSCOPY WITH BILATERAL STENT EXCHANGE performed by Foster Agrawal MD at [facility] [address]      IR BIOPSY ABDOMINAL MASS  7/6/2021    IR BIOPSY ABDOMINAL MASS 7/6/2021 [facility] SPECIAL PROCEDURES    IR PORT PLACEMENT EQUAL OR GREATER THAN 5 YEARS  8/31/2021    IR PORT PLACEMENT EQUAL OR GREATER THAN 5 YEARS 8/31/2021 [facility] SPECIAL PROCEDURES    TONSILLECTOMY      as child     Previous  surgery:  Cystoscopy and stent placement      Medications:    Scheduled Meds:   glipiZIDE  2.5 mg Oral BID AC    cefepime  1,000 mg IntraVENous Q24H    oxyCODONE  10 mg Oral 2 times per day    sucralfate  1 g Oral 4 times per day    azithromycin  500 mg IntraVENous Q24H    FLUoxetine  20 mg Oral Daily    donepezil  10 mg Oral Daily    amLODIPine  10 mg Oral Daily    losartan  50 mg Oral Daily    levothyroxine  150 mcg Oral Daily    [Held by provider] furosemide  20 mg Oral Daily    atorvastatin  40 mg Oral Daily    [Held by provider] apixaban  5 mg Oral BID    carvedilol  6.25 mg Oral Nightly    psyllium  1 packet Oral Daily    lactobacillus  1 capsule Oral Daily    therapeutic multivitamin-minerals  1 tablet Oral Daily    pantoprazole  40 mg Oral QAM AC    budesonide-formoterol  2 puff Inhalation BID    sodium chloride flush  5-40 mL IntraVENous 2 times per day    pregabalin  75 mg Oral BID     Continuous Infusions:   dextrose      sodium chloride 75 mL/hr at 07/19/22 0000    sodium chloride       PRN Meds:.docusate sodium, oxyCODONE, albuterol sulfate HFA, ipratropium-albuterol, glucose, dextrose bolus **OR** dextrose bolus, glucagon (rDNA), dextrose, sodium chloride flush, sodium chloride, acetaminophen, ondansetron **OR** ondansetron    Allergies:  Patient has no known allergies.     Social History:    Social History     Socioeconomic History    Marital status:      Spouse name: Not on file    Number of children: Not on file    Years of education: Not on file Highest education level: Not on file   Occupational History    Not on file   Tobacco Use    Smoking status: Former     Packs/day: 2.00     Years: 45.00     Pack years: 90.00     Types: Cigarettes     Quit date: 2006     Years since quittin.5    Smokeless tobacco: Never   Vaping Use    Vaping Use: Never used   Substance and Sexual Activity    Alcohol use:  Yes     Alcohol/week: 1.7 standard drinks     Types: 2 Standard drinks or equivalent per week     Comment: occassional    Drug use: Yes     Types: Marijuana Elizabeth Deric)     Comment:  CBD gummies no smoking    Sexual activity: Not on file   Other Topics Concern    Not on file   Social History Narrative    Not on file     Social Determinants of Health     Financial Resource Strain: Not on file   Food Insecurity: Not on file   Transportation Needs: Not on file   Physical Activity: Not on file   Stress: Not on file   Social Connections: Not on file   Intimate Partner Violence: Not on file   Housing Stability: Not on file       Family History:    Family History   Problem Relation Age of Onset    Diabetes Mother     Alzheimer's Disease Father     Heart Disease Brother     Heart Disease Brother      Previous Urologic Family history: none    REVIEW OF SYSTEMS:  Constitutional: negative  Eyes: negative  Respiratory: negative  Cardiovascular: negative  Gastrointestinal: Abdominal pain  Genitourinary: see HPI  Musculoskeletal: negative  Skin: negative   Neurological: negative  Hematological/Lymphatic: negative  Psychological: negative    Physical Exam:    This a 74 y.o. male   Patient Vitals for the past 24 hrs:   BP Temp Temp src Pulse Resp SpO2   22 0645 (!) 141/64 97.4 °F (36.3 °C) Oral 78 18 90 %   22 2358 (!) 126/53 97.3 °F (36.3 °C) -- 73 18 93 %   22 -- -- -- -- 20 --   22 -- -- -- 72 20 92 %   22 1845 122/66 97.7 °F (36.5 °C) Oral 75 18 96 %   22 1200 (!) 117/52 97.9 °F (36.6 °C) Oral 73 16 92 %   22 0812 -- -- -- 72 18 96 %     Constitutional: Patient in no acute distress; Neuro: alert and oriented to person place and time. Psych: Mood and affect normal.  Skin: Normal  Lungs: Respiratory effort normal  Cardiovascular:  Normal peripheral pulses  Abdomen: Soft, non-tender, non-distended with no CVA, flank pain, hepatosplenomegaly or hernia. Kidneys normal.  Bladder non-tender and not distended. Lymphatics: no palpable lymphadenopathy  Penis normal and circumcised  Urethral meatus normal  Scrotal exam normal  Testicles normal bilaterally  Epididymis normal bilaterally    LABS:  Recent Labs     07/17/22 0453 07/18/22 0452 07/19/22  0533   WBC 8.0 7.0 7.8   HGB 10.6* 10.3* 9.7*   HCT 33.5* 32.2* 30.6*   MCV 81.3 80.3 80.4    141* 133*     Recent Labs     07/17/22 0453 07/18/22 0452 07/19/22  0533    139 138   K 4.2 4.8 4.7    106 108*   CO2 23 25 23   BUN 27* 28* 26*   CREATININE 1.53* 1.43* 1.37*     Lab Results   Component Value Date    PSA 2.50 09/16/2021    PSA 2.1 09/16/2021    PSA 1.65 02/29/2016       Additional Lab/culture results:    Urinalysis: No results for input(s): COLORU, PHUR, LABCAST, WBCUA, RBCUA, MUCUS, TRICHOMONAS, YEAST, BACTERIA, CLARITYU, SPECGRAV, LEUKOCYTESUR, UROBILINOGEN, [place] in the last 72 hours.     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 [address]    Hypertensive urgency    Acute respiratory failure with hypoxia (HCC)    Hypoxia    Carotid stenosis, asymptomatic, bilateral    Simple chronic bronchitis (HCC)    Dependence on nocturnal oxygen therapy    Pneumonia       Plan:  We advised the nursing staff about plan for cystoscopy and stent exchange on Wednesday    Fer Akbar MD  7:15 AM 7/19/2022

## 2022-07-19 NOTE — PROGRESS NOTES
[id number] [employer]      PROGRESS NOTE        Patient:  Adrián Alaniz  YOB: 1950    MRN: [id number]     Acct: [de-identified]     Admit date: 7/14/2022    Pt seen and Chart reviewed. Consultant notes reviewed and care evaluated. Subjective: Patient is doing okay eating breakfast he feels okay now he said last night l pain but he described more lower back pain and abdominal pain that was not as bad he required a breakthrough pain pill and that helped he says. But this morning is okay is eating breakfast he has no nausea no vomiting but he also tells me that Eliquis is on hold he is still in the on the schedule for stent removal tomorrow chest x-ray improved as well his kidney function improved as well    Diet:  ADULT DIET;  Regular      Medications:Current Inpatient    Scheduled Meds:   glipiZIDE  2.5 mg Oral BID AC    cefepime  1,000 mg IntraVENous Q24H    oxyCODONE  10 mg Oral 2 times per day    sucralfate  1 g Oral 4 times per day    azithromycin  500 mg IntraVENous Q24H    FLUoxetine  20 mg Oral Daily    donepezil  10 mg Oral Daily    amLODIPine  10 mg Oral Daily    losartan  50 mg Oral Daily    levothyroxine  150 mcg Oral Daily    [Held by provider] furosemide  20 mg Oral Daily    atorvastatin  40 mg Oral Daily    [Held by provider] apixaban  5 mg Oral BID    carvedilol  6.25 mg Oral Nightly    psyllium  1 packet Oral Daily    lactobacillus  1 capsule Oral Daily    therapeutic multivitamin-minerals  1 tablet Oral Daily    pantoprazole  40 mg Oral QAM AC    budesonide-formoterol  2 puff Inhalation BID    sodium chloride flush  5-40 mL IntraVENous 2 times per day    pregabalin  75 mg Oral BID     Continuous Infusions:   dextrose      sodium chloride 75 mL/hr at 07/19/22 0000    sodium chloride       PRN Meds:docusate sodium, oxyCODONE, albuterol sulfate HFA, ipratropium-albuterol, glucose, dextrose bolus **OR** dextrose bolus, glucagon (rDNA), dextrose, sodium chloride flush, sodium chloride, acetaminophen, ondansetron **OR** ondansetron        Physical Exam:  Vitals: BP (!) 141/64   Pulse 78   Temp 97.4 °F (36.3 °C) (Oral)   Resp 18   Ht 5' 10\" (1.778 m)   Wt 178 lb 9.2 oz (81 kg)   SpO2 90%   BMI 25.62 kg/m²   24 hour intake/output:  Intake/Output Summary (Last 24 hours) at 7/19/2022 0746  Last data filed at 7/19/2022 0000  Gross per 24 hour   Intake 2020 ml   Output 800 ml   Net 1220 ml     Last 3 weights: Wt Readings from Last 3 Encounters:   07/18/22 178 lb 9.2 oz (81 kg)   07/06/22 178 lb (80.7 kg)   06/29/22 178 lb (80.7 kg)       Physical Examination:   General appearance - alert, well appearing, and in no distress  Mental status - alert, oriented to person, place, and time  sclera clear, anicteric  Chest - clear to auscultation, no wheezes, rales or rhonchi, symmetric air entry  Heart - normal rate, regular rhythm, normal S1, S2, no murmurs, rubs, clicks or gallops  Abdomen - soft, nontender, nondistended, no masses or organomegaly  Neurological - alert, oriented, normal speech, no focal findings or movement disorder noted}  Extremities - peripheral pulses normal, no pedal edema, no clubbing or cyanosis  Back some tenderness in lower back on palpation.   But mostly in the paraspinal muscles there  Skin - normal coloration and turgor, no rashes, no suspicious skin lesions noted    Component Value Units   CBC with Auto Differential [[id number]] (Abnormal)    Collected: 07/19/22 0533    Updated: 07/19/22 0634    Specimen Source: Blood     WBC 7.8 k/uL    RBC 3.81 Low  m/uL    Hemoglobin 9.7 Low  g/dL    Hematocrit 30.6 Low  %    MCV 80.4 fL    MCH 25.5 Low  pg    MCHC 31.7 g/dL    RDW 16.5 High  %    Platelets 056 Low  k/uL    MPV 8.1 fL    Seg Neutrophils 69 High  %    Lymphocytes 17 Low  %    Monocytes 11 High  %    Eosinophils % 2 %    Basophils 1 %    Segs Absolute 5.37 k/uL    Absolute Lymph # 1.33 k/uL    Absolute Mono # 0.86 k/uL Absolute Eos # 0.16 k/uL    Basophils Absolute 0.08 k/uL    Morphology ANISOCYTOSIS PRESENT    Morphology MICROCYTOSIS PRESENT   POC Glucose Fingerstick [[id number]]    Collected: 07/19/22 0613    Updated: 07/19/22 0621     POC Glucose 108 mg/dL   Basic Metabolic Panel [[id number]] (Abnormal)    Collected: 07/19/22 0533    Updated: 07/19/22 0618    Specimen Source: Blood     Glucose 76 mg/dL    BUN 26 High  mg/dL    CREATININE 1.37 High  mg/dL    Calcium 8.0 Low  mg/dL    Sodium 138 mmol/L    Potassium 4.7 mmol/L    Chloride 108 High  mmol/L    CO2 23 mmol/L    Anion Gap 7 Low  mmol/L    GFR Non-African American 51 Low  mL/min    GFR African American >60 mL/min    GFR Comment         Comment: Average GFR for 79or more years old:    76 mL/min/1.73sq m   Chronic Kidney Disease:    <60 mL/min/1.73sq m   Kidney failure:    <15 mL/min/1.73sq m               eGFR calculated using average adult body mass. Additional eGFR calculator available at:         [email or web address]             POC Glucose Fingerstick [[id number]]    Collected: 07/18/22 2015    Updated: 07/18/22 2024     POC Glucose 108 mg/dL   XR CHEST (2 VW) [[id number]]    Collected: 07/18/22 0926    Updated: 07/18/22 1011    Specimen Type: Chest    Narrative:     EXAMINATION:   TWO XRAY VIEWS OF THE CHEST     7/18/2022 9:07 am     COMPARISON:   Chest x-ray dated 14 July 2022     HISTORY:   ORDERING SYSTEM PROVIDED HISTORY: Reevaluate for potential pneumonia   TECHNOLOGIST PROVIDED HISTORY:   Reevaluate for potential pneumonia   Reason for Exam: pneumonia, cough, abd pain     FINDINGS:   Mild left lower lobe airspace disease improved from the prior study. No   pneumothorax or pleural effusion. Right-sided chest port catheter with tip   in the superior vena cava.   Normal cardiomediastinal silhouette    Impression:     Mild left lower lobe airspace disease improved from the prior study could   represent atelectasis or improving pneumonia. Culture, Respiratory [[id number]] (Abnormal)    Collected: 07/16/22 0511    Updated: 07/18/22 0808    Specimen Source: Sputum Expectorated     Specimen Description . EXPECTORATED SPUTUM    Direct Exam >25 NEUTROPHILS/LPF     < 10 EPITHELIAL CELLS/LPF     MIXED BACTERIAL MORPHOTYPES SEEN ON GRAM STAIN. Abnormal     Culture NORMAL RESPIRATORY SHARRI LIGHT GROWTH       Current IP Meds        Assessment:  Principal Problem:    Pneumonia  Resolved Problems:    * No resolved hospital problems. *  Resolved Problems:    * No resolved hospital problems. *    * No resolved hospital problems.  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 ([facility]) C82.13    Hydronephrosis due to obstruction of ureter N13.1    Moderate malnutrition ([facility]) E44.0    Chronic cholecystitis K81.1    Pleural effusion J90    Cardiomegaly I51.7    Hypothyroidism E03.9    Diabetes mellitus ([facility]) E11.9    Hypertensive urgency I16.0    Acute respiratory failure with hypoxia ([facility]) J96.01    Hypoxia R09.02    Carotid stenosis, asymptomatic, bilateral I65.23    Simple chronic bronchitis ([facility]) J41.0    Dependence on nocturnal oxygen therapy Z99.81    Pneumonia J18.9      Pneumonia    Abdominal pads may be increased in size    History of lymphoma  Abdominal pain and neuropathic pain    History of postherpetic neuralgia    History of hydronephrosis but no evidence of that on recent CT except enlargement of the medicine abdomen    Dementia patient is alert and oriented this morning  UTI  inTractable abdominal pain yesterday now it is better with pain meds  On the sore is going adjust his pain medications to higher dose  Uretral stent possibly changed or removed on Wednesday by Dr. Jayden Arias  Renal insufficiency we will monitor  Renal insufficiency could be second to his mass and he has a history of stenting    Is continue with elevation is likely is also secondary to mass    His pain and nausea much improved  Some upper to lower back pain mostly positional and straining.   It was away when he walks does not seem from his mass  Low back pain probably just secondary to him being in bed and positional            Plan:  Try Lidoderm patch for 12 hours on for now    Continue holding Eliquis pending his stent removal restart after procedure    Continue with antibiotic pending the stent removal    Continue monitor    No family in the room to discuss with    KRISTI Chatman             7/19/2022, 7:46 AM

## 2022-07-19 NOTE — PROGRESS NOTES
RN informed that patient's family members had posted pictures to social media of staff members, including this RN and physicians. Family captioned pictures with negative caption, even though verbally expressing gratitude to RN and physicians for their family members care.

## 2022-07-20 ENCOUNTER — APPOINTMENT (OUTPATIENT)
Dept: GENERAL RADIOLOGY | Age: 72
DRG: 987 | End: 2022-07-20
Payer: MEDICARE

## 2022-07-20 ENCOUNTER — ANESTHESIA (OUTPATIENT)
Dept: OPERATING ROOM | Age: 72
DRG: 987 | End: 2022-07-20
Payer: MEDICARE

## 2022-07-20 LAB
-: NORMAL
ABSOLUTE EOS #: 0.13 K/UL (ref 0–0.4)
ABSOLUTE LYMPH #: 1.12 K/UL (ref 1–4.8)
ABSOLUTE MONO #: 0.79 K/UL (ref 0.1–1.3)
ANION GAP SERPL CALCULATED.3IONS-SCNC: 7 MMOL/L (ref 9–17)
BACTERIA: NORMAL
BASOPHILS # BLD: 0 % (ref 0–2)
BASOPHILS ABSOLUTE: 0 K/UL (ref 0–0.2)
BILIRUBIN URINE: NEGATIVE
BUN BLDV-MCNC: 22 MG/DL (ref 8–23)
CALCIUM SERPL-MCNC: 8.1 MG/DL (ref 8.6–10.4)
CHLORIDE BLD-SCNC: 110 MMOL/L (ref 98–107)
CO2: 22 MMOL/L (ref 20–31)
COLOR: YELLOW
CREAT SERPL-MCNC: 1.39 MG/DL (ref 0.7–1.2)
EOSINOPHILS RELATIVE PERCENT: 2 % (ref 0–4)
EPITHELIAL CELLS UA: NORMAL /HPF
GFR AFRICAN AMERICAN: >60 ML/MIN
GFR NON-AFRICAN AMERICAN: 50 ML/MIN
GFR SERPL CREATININE-BSD FRML MDRD: ABNORMAL ML/MIN/{1.73_M2}
GLUCOSE BLD-MCNC: 105 MG/DL (ref 75–110)
GLUCOSE BLD-MCNC: 131 MG/DL (ref 70–99)
GLUCOSE BLD-MCNC: 167 MG/DL (ref 75–110)
GLUCOSE BLD-MCNC: 186 MG/DL (ref 75–110)
GLUCOSE URINE: NEGATIVE
HCT VFR BLD CALC: 30.3 % (ref 41–53)
HEMOGLOBIN: 9.3 G/DL (ref 13.5–17.5)
KETONES, URINE: NEGATIVE
LEUKOCYTE ESTERASE, URINE: ABNORMAL
LYMPHOCYTES # BLD: 17 % (ref 24–44)
MCH RBC QN AUTO: 25.3 PG (ref 26–34)
MCHC RBC AUTO-ENTMCNC: 30.8 G/DL (ref 31–37)
MCV RBC AUTO: 82 FL (ref 80–100)
MONOCYTES # BLD: 12 % (ref 1–7)
MORPHOLOGY: ABNORMAL
MORPHOLOGY: ABNORMAL
NITRITE, URINE: NEGATIVE
PDW BLD-RTO: 16.7 % (ref 11.5–14.9)
PH UA: 5.5 (ref 5–8)
PLATELET # BLD: 133 K/UL (ref 150–450)
PMV BLD AUTO: 8.3 FL (ref 6–12)
POTASSIUM SERPL-SCNC: 4.8 MMOL/L (ref 3.7–5.3)
PROTEIN UA: ABNORMAL
RBC # BLD: 3.7 M/UL (ref 4.5–5.9)
RBC UA: NORMAL /HPF
SEG NEUTROPHILS: 69 % (ref 36–66)
SEGMENTED NEUTROPHILS ABSOLUTE COUNT: 4.56 K/UL (ref 1.3–9.1)
SODIUM BLD-SCNC: 139 MMOL/L (ref 135–144)
SPECIFIC GRAVITY UA: 1.01 (ref 1–1.03)
TURBIDITY: CLEAR
URINE HGB: ABNORMAL
UROBILINOGEN, URINE: NORMAL
WBC # BLD: 6.6 K/UL (ref 3.5–11)
WBC UA: NORMAL /HPF

## 2022-07-20 PROCEDURE — 2060000000 HC ICU INTERMEDIATE R&B

## 2022-07-20 PROCEDURE — 2580000003 HC RX 258: Performed by: INTERNAL MEDICINE

## 2022-07-20 PROCEDURE — 6360000002 HC RX W HCPCS: Performed by: NURSE ANESTHETIST, CERTIFIED REGISTERED

## 2022-07-20 PROCEDURE — 94761 N-INVAS EAR/PLS OXIMETRY MLT: CPT

## 2022-07-20 PROCEDURE — 0TP98DZ REMOVAL OF INTRALUMINAL DEVICE FROM URETER, VIA NATURAL OR ARTIFICIAL OPENING ENDOSCOPIC: ICD-10-PCS | Performed by: UROLOGY

## 2022-07-20 PROCEDURE — 6370000000 HC RX 637 (ALT 250 FOR IP): Performed by: UROLOGY

## 2022-07-20 PROCEDURE — 7100000001 HC PACU RECOVERY - ADDTL 15 MIN: Performed by: UROLOGY

## 2022-07-20 PROCEDURE — 2500000003 HC RX 250 WO HCPCS: Performed by: NURSE ANESTHETIST, CERTIFIED REGISTERED

## 2022-07-20 PROCEDURE — 7100000000 HC PACU RECOVERY - FIRST 15 MIN: Performed by: UROLOGY

## 2022-07-20 PROCEDURE — 80048 BASIC METABOLIC PNL TOTAL CA: CPT

## 2022-07-20 PROCEDURE — 36415 COLL VENOUS BLD VENIPUNCTURE: CPT

## 2022-07-20 PROCEDURE — 6360000002 HC RX W HCPCS: Performed by: UROLOGY

## 2022-07-20 PROCEDURE — 85025 COMPLETE CBC W/AUTO DIFF WBC: CPT

## 2022-07-20 PROCEDURE — 99231 SBSQ HOSP IP/OBS SF/LOW 25: CPT | Performed by: INTERNAL MEDICINE

## 2022-07-20 PROCEDURE — 2709999900 HC NON-CHARGEABLE SUPPLY: Performed by: UROLOGY

## 2022-07-20 PROCEDURE — 0T788DZ DILATION OF BILATERAL URETERS WITH INTRALUMINAL DEVICE, VIA NATURAL OR ARTIFICIAL OPENING ENDOSCOPIC: ICD-10-PCS | Performed by: UROLOGY

## 2022-07-20 PROCEDURE — 82947 ASSAY GLUCOSE BLOOD QUANT: CPT

## 2022-07-20 PROCEDURE — 3700000001 HC ADD 15 MINUTES (ANESTHESIA): Performed by: UROLOGY

## 2022-07-20 PROCEDURE — 3209999900 FLUORO FOR SURGICAL PROCEDURES

## 2022-07-20 PROCEDURE — 2580000003 HC RX 258: Performed by: ANESTHESIOLOGY

## 2022-07-20 PROCEDURE — 2700000000 HC OXYGEN THERAPY PER DAY

## 2022-07-20 PROCEDURE — 81001 URINALYSIS AUTO W/SCOPE: CPT

## 2022-07-20 PROCEDURE — 6360000002 HC RX W HCPCS: Performed by: ANESTHESIOLOGY

## 2022-07-20 PROCEDURE — 94640 AIRWAY INHALATION TREATMENT: CPT

## 2022-07-20 PROCEDURE — 3600000002 HC SURGERY LEVEL 2 BASE: Performed by: UROLOGY

## 2022-07-20 PROCEDURE — C1769 GUIDE WIRE: HCPCS | Performed by: UROLOGY

## 2022-07-20 PROCEDURE — 3600000012 HC SURGERY LEVEL 2 ADDTL 15MIN: Performed by: UROLOGY

## 2022-07-20 PROCEDURE — C2617 STENT, NON-COR, TEM W/O DEL: HCPCS | Performed by: UROLOGY

## 2022-07-20 PROCEDURE — 3700000000 HC ANESTHESIA ATTENDED CARE: Performed by: UROLOGY

## 2022-07-20 PROCEDURE — 6360000002 HC RX W HCPCS: Performed by: INTERNAL MEDICINE

## 2022-07-20 PROCEDURE — 2580000003 HC RX 258: Performed by: UROLOGY

## 2022-07-20 DEVICE — URETERAL STENT
Type: IMPLANTABLE DEVICE | Site: URETER | Status: FUNCTIONAL
Brand: POLARIS™ ULTRA

## 2022-07-20 RX ORDER — LIDOCAINE HYDROCHLORIDE 10 MG/ML
1 INJECTION, SOLUTION EPIDURAL; INFILTRATION; INTRACAUDAL; PERINEURAL
Status: DISCONTINUED | OUTPATIENT
Start: 2022-07-20 | End: 2022-07-20 | Stop reason: HOSPADM

## 2022-07-20 RX ORDER — METOCLOPRAMIDE HYDROCHLORIDE 5 MG/ML
10 INJECTION INTRAMUSCULAR; INTRAVENOUS
Status: DISCONTINUED | OUTPATIENT
Start: 2022-07-20 | End: 2022-07-20 | Stop reason: HOSPADM

## 2022-07-20 RX ORDER — FENTANYL CITRATE 50 UG/ML
INJECTION, SOLUTION INTRAMUSCULAR; INTRAVENOUS PRN
Status: DISCONTINUED | OUTPATIENT
Start: 2022-07-20 | End: 2022-07-20 | Stop reason: SDUPTHER

## 2022-07-20 RX ORDER — FUROSEMIDE 10 MG/ML
40 INJECTION INTRAMUSCULAR; INTRAVENOUS ONCE
Status: COMPLETED | OUTPATIENT
Start: 2022-07-20 | End: 2022-07-20

## 2022-07-20 RX ORDER — SODIUM CHLORIDE 9 MG/ML
INJECTION, SOLUTION INTRAVENOUS PRN
Status: DISCONTINUED | OUTPATIENT
Start: 2022-07-20 | End: 2022-07-20 | Stop reason: HOSPADM

## 2022-07-20 RX ORDER — SODIUM CHLORIDE 0.9 % (FLUSH) 0.9 %
5-40 SYRINGE (ML) INJECTION EVERY 12 HOURS SCHEDULED
Status: DISCONTINUED | OUTPATIENT
Start: 2022-07-20 | End: 2022-07-20 | Stop reason: HOSPADM

## 2022-07-20 RX ORDER — LIDOCAINE HYDROCHLORIDE 20 MG/ML
INJECTION, SOLUTION EPIDURAL; INFILTRATION; INTRACAUDAL; PERINEURAL PRN
Status: DISCONTINUED | OUTPATIENT
Start: 2022-07-20 | End: 2022-07-20 | Stop reason: SDUPTHER

## 2022-07-20 RX ORDER — LIDOCAINE HYDROCHLORIDE 20 MG/ML
JELLY TOPICAL PRN
Status: DISCONTINUED | OUTPATIENT
Start: 2022-07-20 | End: 2022-07-20 | Stop reason: ALTCHOICE

## 2022-07-20 RX ORDER — ONDANSETRON 2 MG/ML
INJECTION INTRAMUSCULAR; INTRAVENOUS PRN
Status: DISCONTINUED | OUTPATIENT
Start: 2022-07-20 | End: 2022-07-20 | Stop reason: SDUPTHER

## 2022-07-20 RX ORDER — SODIUM CHLORIDE 0.9 % (FLUSH) 0.9 %
5-40 SYRINGE (ML) INJECTION PRN
Status: DISCONTINUED | OUTPATIENT
Start: 2022-07-20 | End: 2022-07-20 | Stop reason: HOSPADM

## 2022-07-20 RX ORDER — FENTANYL CITRATE 50 UG/ML
25 INJECTION, SOLUTION INTRAMUSCULAR; INTRAVENOUS EVERY 5 MIN PRN
Status: DISCONTINUED | OUTPATIENT
Start: 2022-07-20 | End: 2022-07-20 | Stop reason: HOSPADM

## 2022-07-20 RX ORDER — SODIUM CHLORIDE 9 MG/ML
INJECTION, SOLUTION INTRAVENOUS CONTINUOUS
Status: DISCONTINUED | OUTPATIENT
Start: 2022-07-20 | End: 2022-07-20

## 2022-07-20 RX ORDER — PROPOFOL 10 MG/ML
INJECTION, EMULSION INTRAVENOUS PRN
Status: DISCONTINUED | OUTPATIENT
Start: 2022-07-20 | End: 2022-07-20 | Stop reason: SDUPTHER

## 2022-07-20 RX ORDER — ONDANSETRON 2 MG/ML
4 INJECTION INTRAMUSCULAR; INTRAVENOUS
Status: DISCONTINUED | OUTPATIENT
Start: 2022-07-20 | End: 2022-07-20 | Stop reason: HOSPADM

## 2022-07-20 RX ORDER — ALBUTEROL SULFATE 2.5 MG/3ML
2.5 SOLUTION RESPIRATORY (INHALATION)
Status: COMPLETED | OUTPATIENT
Start: 2022-07-20 | End: 2022-07-20

## 2022-07-20 RX ORDER — DIPHENHYDRAMINE HYDROCHLORIDE 50 MG/ML
12.5 INJECTION INTRAMUSCULAR; INTRAVENOUS
Status: DISCONTINUED | OUTPATIENT
Start: 2022-07-20 | End: 2022-07-20 | Stop reason: HOSPADM

## 2022-07-20 RX ADMIN — PROPOFOL 90 MG: 10 INJECTION, EMULSION INTRAVENOUS at 07:23

## 2022-07-20 RX ADMIN — PREGABALIN 75 MG: 75 CAPSULE ORAL at 19:38

## 2022-07-20 RX ADMIN — ONDANSETRON 4 MG: 2 INJECTION INTRAMUSCULAR; INTRAVENOUS at 07:33

## 2022-07-20 RX ADMIN — SODIUM CHLORIDE, PRESERVATIVE FREE 10 ML: 5 INJECTION INTRAVENOUS at 19:38

## 2022-07-20 RX ADMIN — ALBUTEROL SULFATE 2.5 MG: 2.5 SOLUTION RESPIRATORY (INHALATION) at 07:04

## 2022-07-20 RX ADMIN — SUCRALFATE 1 G: 1 TABLET ORAL at 09:38

## 2022-07-20 RX ADMIN — OXYCODONE HYDROCHLORIDE 5 MG: 5 TABLET ORAL at 09:38

## 2022-07-20 RX ADMIN — CEFEPIME HYDROCHLORIDE 1000 MG: 1 INJECTION, POWDER, FOR SOLUTION INTRAMUSCULAR; INTRAVENOUS at 13:35

## 2022-07-20 RX ADMIN — CARVEDILOL 6.25 MG: 6.25 TABLET, FILM COATED ORAL at 19:38

## 2022-07-20 RX ADMIN — FUROSEMIDE 40 MG: 10 INJECTION, SOLUTION INTRAMUSCULAR; INTRAVENOUS at 15:06

## 2022-07-20 RX ADMIN — PANTOPRAZOLE SODIUM 40 MG: 40 TABLET, DELAYED RELEASE ORAL at 09:27

## 2022-07-20 RX ADMIN — BUDESONIDE AND FORMOTEROL FUMARATE DIHYDRATE 2 PUFF: 80; 4.5 AEROSOL RESPIRATORY (INHALATION) at 19:42

## 2022-07-20 RX ADMIN — OXYCODONE HYDROCHLORIDE 10 MG: 10 TABLET, FILM COATED, EXTENDED RELEASE ORAL at 19:38

## 2022-07-20 RX ADMIN — IPRATROPIUM BROMIDE AND ALBUTEROL SULFATE 3 ML: 2.5; .5 SOLUTION RESPIRATORY (INHALATION) at 19:42

## 2022-07-20 RX ADMIN — LEVOTHYROXINE SODIUM 150 MCG: 0.07 TABLET ORAL at 09:36

## 2022-07-20 RX ADMIN — SUCRALFATE 1 G: 1 TABLET ORAL at 22:22

## 2022-07-20 RX ADMIN — LIDOCAINE HYDROCHLORIDE 100 MG: 20 INJECTION, SOLUTION EPIDURAL; INFILTRATION; INTRACAUDAL; PERINEURAL at 07:23

## 2022-07-20 RX ADMIN — SUCRALFATE 1 G: 1 TABLET ORAL at 17:39

## 2022-07-20 RX ADMIN — IPRATROPIUM BROMIDE AND ALBUTEROL SULFATE 3 ML: 2.5; .5 SOLUTION RESPIRATORY (INHALATION) at 11:38

## 2022-07-20 RX ADMIN — SODIUM CHLORIDE: 9 INJECTION, SOLUTION INTRAVENOUS at 03:30

## 2022-07-20 RX ADMIN — IPRATROPIUM BROMIDE AND ALBUTEROL SULFATE 3 ML: 2.5; .5 SOLUTION RESPIRATORY (INHALATION) at 14:57

## 2022-07-20 RX ADMIN — FENTANYL CITRATE 25 MCG: 50 INJECTION, SOLUTION INTRAMUSCULAR; INTRAVENOUS at 07:22

## 2022-07-20 RX ADMIN — SODIUM CHLORIDE: 9 INJECTION, SOLUTION INTRAVENOUS at 09:26

## 2022-07-20 RX ADMIN — SUCRALFATE 1 G: 1 TABLET ORAL at 13:35

## 2022-07-20 RX ADMIN — SODIUM CHLORIDE: 9 INJECTION, SOLUTION INTRAVENOUS at 06:55

## 2022-07-20 ASSESSMENT — PAIN DESCRIPTION - LOCATION
LOCATION: BACK;ABDOMEN
LOCATION: BACK

## 2022-07-20 ASSESSMENT — PAIN SCALES - GENERAL
PAINLEVEL_OUTOF10: 0
PAINLEVEL_OUTOF10: 6
PAINLEVEL_OUTOF10: 2
PAINLEVEL_OUTOF10: 5

## 2022-07-20 ASSESSMENT — PAIN DESCRIPTION - DESCRIPTORS
DESCRIPTORS: ACHING;CRAMPING
DESCRIPTORS: CRAMPING;PRESSURE

## 2022-07-20 ASSESSMENT — PAIN DESCRIPTION - ORIENTATION
ORIENTATION: MID
ORIENTATION: MID

## 2022-07-20 ASSESSMENT — PAIN DESCRIPTION - PAIN TYPE: TYPE: ACUTE PAIN;CHRONIC PAIN

## 2022-07-20 ASSESSMENT — PAIN DESCRIPTION - ONSET: ONSET: ON-GOING

## 2022-07-20 ASSESSMENT — PAIN - FUNCTIONAL ASSESSMENT
PAIN_FUNCTIONAL_ASSESSMENT: 0-10
PAIN_FUNCTIONAL_ASSESSMENT: ACTIVITIES ARE NOT PREVENTED

## 2022-07-20 ASSESSMENT — PAIN DESCRIPTION - FREQUENCY: FREQUENCY: INTERMITTENT

## 2022-07-20 NOTE — ANESTHESIA POSTPROCEDURE EVALUATION
POST- ANESTHESIA EVALUATION       Pt Name: Pranay Foss  MRN: [id number]  YOB: 1950  Date of evaluation: 7/20/2022  Time:  12:16 PM      BP (!) 150/83   Pulse 79   Temp 97.4 °F (36.3 °C) (Axillary)   Resp 20   Ht 5' 10\" (1.778 m)   Wt 178 lb 9.2 oz (81 kg)   SpO2 95%   BMI 25.62 kg/m²      Consciousness Level  Awake  Cardiopulmonary Status  Stable  Pain Adequately Treated YES  Nausea / Vomiting  NO  Adequate Hydration  YES  Anesthesia Related Complications NONE      Electronically signed by Christy Treadwell MD on 7/20/2022 at 12:16 PM       Department of Anesthesiology  Postprocedure Note    Patient: Pranay Foss  MRN: [id number]  YOB: 1950  Date of evaluation: 7/20/2022      Procedure Summary     Date: 07/20/22 Room / Location: [facility] 01 / [address] [facility]    Anesthesia Start: 0178 Anesthesia Stop: 4998    Procedure: CYSTOSCOPY BILATERAL STENT EXCHANGE ([place]) Diagnosis:       Hydronephrosis, unspecified hydronephrosis type      (Hydronephrosis, unspecified hydronephrosis type [N13.30])    Surgeons: Beverly Self MD Responsible Provider: Christy Treadwell MD    Anesthesia Type: general, MAC ASA Status: 3          Anesthesia Type: No value filed.     Yvette Phase I: Yvette Score: 8    Yvette Phase II:        Anesthesia Post Evaluation

## 2022-07-20 NOTE — PROGRESS NOTES
Pulmonary Progress Note  [facility]      Patient - Favian Adan,  Age - 74 y.o.    - 1950      Room Number - 1480/8321-48   N -  [id number]   [facility]  Date of Admission -  2022  1:37 PM    Consulting [id number] Pieter Humphries DO  Primary Care Physician - Starla Frazier DO     SUBJECTIVE   Patient appears to be in fair spirits. Has slight congestive cough after surgery. He reportedly had a cystoscopy with bilateral stent exchange. OBJECTIVE   VITALS    height is 5' 10\" (1.778 m) and weight is 178 lb 9.2 oz (81 kg). His axillary temperature is 97.4 °F (36.3 °C). His blood pressure is 146/63 (abnormal) and his pulse is 80. His respiration is 18 and oxygen saturation is 96%. Body mass index is 25.62 kg/m². Temperature Range: Temp: 97.4 °F (36.3 °C) Temp  Av.8 °F (36.6 °C)  Min: 97.1 °F (36.2 °C)  Max: 98.2 °F (36.8 °C)  BP Range:  Systolic (84UFN), MEU:341 , Min:133 , EQE:028     Diastolic (81RIS), HIH:80, Min:55, Max:83    Pulse Range: Pulse  Av.1  Min: 72  Max: 82  Respiration Range: Resp  Av.2  Min: 11  Max: 20  Current Pulse Ox[de-identified]  SpO2: 96 %  24HR Pulse Ox Range:  SpO2  Av.1 %  Min: 91 %  Max: 98 %  Oxygen Amount and Delivery: O2 Flow Rate (L/min): 3 L/min    Wt Readings from Last 3 Encounters:   22 178 lb 9.2 oz (81 kg)   22 178 lb (80.7 kg)   22 178 lb (80.7 kg)       I/O (24 Hours)    Intake/Output Summary (Last 24 hours) at 2022 1343  Last data filed at 2022 0825  Gross per 24 hour   Intake 2618.74 ml   Output --   Net 2618.74 ml       EXAM     General Appearance  Awake, alert, oriented, in no acute distress  HEENT - normocephalic, atraumatic. Neck - Supple,  trachea midline   Lungs -coarse breath sounds some rhonchi appreciated  Heart Exam:PMI normal. No lifts, heaves, or thrills. RRR. No murmurs, clicks, gallops, or rubs  Abdomen Exam: Abdomen soft, non-tender.  BS mandi  Extremity Exam: Signs of cyanosis    MEDS      lidocaine  1 patch TransDERmal Daily    ipratropium-albuterol  1 vial Inhalation 4x daily    glipiZIDE  2.5 mg Oral BID AC    cefepime  1,000 mg IntraVENous Q24H    oxyCODONE  10 mg Oral 2 times per day    sucralfate  1 g Oral 4 times per day    [facility] Hold] azithromycin  500 mg IntraVENous Q24H    FLUoxetine  20 mg Oral Daily    donepezil  10 mg Oral Daily    amLODIPine  10 mg Oral Daily    losartan  50 mg Oral Daily    levothyroxine  150 mcg Oral Daily    [Held by provider] furosemide  20 mg Oral Daily    atorvastatin  40 mg Oral Daily    [Held by provider] apixaban  5 mg Oral BID    carvedilol  6.25 mg Oral Nightly    psyllium  1 packet Oral Daily    lactobacillus  1 capsule Oral Daily    therapeutic multivitamin-minerals  1 tablet Oral Daily    pantoprazole  40 mg Oral QAM AC    budesonide-formoterol  2 puff Inhalation BID    sodium chloride flush  5-40 mL IntraVENous 2 times per day    pregabalin  75 mg Oral BID      sodium chloride 100 mL/hr at 07/20/22 0926    sodium chloride 125 mL/hr at 07/20/22 0655    dextrose      sodium chloride 75 mL/hr at 07/20/22 0330    sodium chloride       docusate sodium, oxyCODONE, albuterol sulfate HFA, glucose, dextrose bolus **OR** dextrose bolus, glucagon (rDNA), dextrose, sodium chloride flush, sodium chloride, acetaminophen, ondansetron **OR** ondansetron    LABS   CBC   Recent Labs     07/20/22  0533   WBC 6.6   HGB 9.3*   HCT 30.3*   MCV 82.0   *     BMP:   Lab Results   Component Value Date/Time     07/20/2022 05:33 AM    K 4.8 07/20/2022 05:33 AM     07/20/2022 05:33 AM    CO2 22 07/20/2022 05:33 AM    BUN 22 07/20/2022 05:33 AM    LABALBU 3.7 07/14/2022 02:16 PM    CREATININE 1.39 07/20/2022 05:33 AM    CALCIUM 8.1 07/20/2022 05:33 AM    GFRAA >60 07/20/2022 05:33 AM    LABGLOM 50 07/20/2022 05:33 AM     ABGs:No results found for: PHART, PO2ART, DYT8HZX   Lab Results   Component Value Date/Time MODE NOT REPORTED 02/17/2022 01:28 AM     Ionized Calcium:  No results found for: IONCA  Magnesium:    Lab Results   Component Value Date/Time    MG 2.0 06/29/2022 05:10 AM     Phosphorus:    Lab Results   Component Value Date/Time    PHOS 3.3 06/29/2022 05:10 AM        LIVER PROFILE No results for input(s): AST, ALT, LIPASE, BILIDIR, BILITOT, ALKPHOS in the last 72 hours. Invalid input(s): AMYLASE,  ALB  INR No results for input(s): INR in the last 72 hours. 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)    Hydronephrosis due to obstruction of ureter    Moderate malnutrition (HCC)    Chronic cholecystitis    Pleural effusion    Cardiomegaly    Hypothyroidism    Diabetes mellitus ([address]    Hypertensive urgency    Acute respiratory failure with hypoxia (HCC)    Hypoxia    Carotid stenosis, asymptomatic, bilateral    Simple chronic bronchitis (HCC)    Dependence on nocturnal oxygen therapy    Pneumonia     LLL Pneumonia, improving  Suspected COPD- no documented PFT  Acute on chronic Hypoxic respiratory failure- currently on 2LNC  Recurrent diffuse large B-cell lymphoma/follicular lymphoma- treated with Rituxan- follows with Dr. Loren Car  Abdominal Pain- CT with increasing retroperitoneal mass  Recent LCEA 6/2022  Nocturnal Hypoxemia- on 5LNC at night  History of tobacco use- quit 15 years ago- 60 pk year  Question of KATIE with prior home PSG  History of hydronephrosis with ureteral stents, Dr. Roberts Ped  CKD  Anemia  History of DVT  History of diastolic CHF  Full Code    proBNP 1714. On Maxipime and Zithromax ordered per Dr. Leeta Lundborg. The antibiotics may be finishing up today.   DuoNeb treatments  1 dose of IV Lasix. Following with other services.       Electronically signed by Cece Lewis MD on 7/20/2022 at 1:43 PM

## 2022-07-20 NOTE — PLAN OF CARE
Problem: Discharge Planning  Goal: Discharge to home or other facility with appropriate resources  Outcome: Progressing  Note: Patient updated on plan of care ,questions answered and denies any needs      Problem: Pain  Goal: Verbalizes/displays adequate comfort level or baseline comfort level  Outcome: Progressing  Flowsheets (Taken 7/20/2022 0248)  Verbalizes/displays adequate comfort level or baseline comfort level:   Encourage patient to monitor pain and request assistance   Assess pain using appropriate pain scale   Administer analgesics based on type and severity of pain and evaluate response  Note: Patient has intermittent complaints of pain , medicated with scheduled medication , will continue to monitor for control      Problem: Chronic Conditions and Co-morbidities  Goal: Patient's chronic conditions and co-morbidity symptoms are monitored and maintained or improved  Outcome: Progressing     Problem: Safety - Adult  Goal: Free from fall injury  Outcome: Progressing  Note: Patient remains safe and free from fall      Problem: ABCDS Injury Assessment  Goal: Absence of physical injury  Outcome: Progressing  Note: Patient remains safe and free from injury during admission

## 2022-07-20 NOTE — PROGRESS NOTES
[id number] [place]      PROGRESS NOTE        Patient:  Richard Mandujano  YOB: 1950    MRN: [id number]     Acct: [de-identified]     Admit date: 7/14/2022    Pt seen and Chart reviewed. Consultant notes reviewed and care evaluated. Subjective: Patient is doing okay he just came from cystoscopy and discussed with Dr. Saul Ag to change his stent. Patient states he did not have any abdominal pain except last night he had little bit but it went away. But the pain is not as bad as it was. Now since he came from procedure he feels maybe the pain is getting come back so is asking for a pain pill. Diet:  ADULT DIET;  Regular      Medications:Current Inpatient    Scheduled Meds:   sodium chloride flush  5-40 mL IntraVENous 2 times per day    [MAR Hold] lidocaine  1 patch TransDERmal Daily    [MAR Hold] ipratropium-albuterol  1 vial Inhalation 4x daily    [MAR Hold] glipiZIDE  2.5 mg Oral BID AC    [MAR Hold] cefepime  1,000 mg IntraVENous Q24H    [MAR Hold] oxyCODONE  10 mg Oral 2 times per day    [MAR Hold] sucralfate  1 g Oral 4 times per day    [facility] Hold] azithromycin  500 mg IntraVENous Q24H    [MAR Hold] FLUoxetine  20 mg Oral Daily    [MAR Hold] donepezil  10 mg Oral Daily    [MAR Hold] amLODIPine  10 mg Oral Daily    [MAR Hold] losartan  50 mg Oral Daily    [MAR Hold] levothyroxine  150 mcg Oral Daily    [Held by provider] furosemide  20 mg Oral Daily    [MAR Hold] atorvastatin  40 mg Oral Daily    [Held by provider] apixaban  5 mg Oral BID    [MAR Hold] carvedilol  6.25 mg Oral Nightly    [MAR Hold] psyllium  1 packet Oral Daily    [MAR Hold] lactobacillus  1 capsule Oral Daily    [MAR Hold] therapeutic multivitamin-minerals  1 tablet Oral Daily    [MAR Hold] pantoprazole  40 mg Oral QAM AC    [MAR Hold] budesonide-formoterol  2 puff Inhalation BID    [MAR Hold] sodium chloride flush  5-40 mL IntraVENous 2 times per day    [MAR Hold] pregabalin  75 mg Oral BID     Continuous Infusions:   sodium chloride      sodium chloride 125 mL/hr at 07/20/22 0655    sodium chloride      [MAR Hold] dextrose      [MAR Hold] sodium chloride 75 mL/hr at 07/20/22 0330    [MAR Hold] sodium chloride       PRN Meds:lidocaine PF, sodium chloride flush, sodium chloride, [MAR Hold] docusate sodium, [MAR Hold] oxyCODONE, [MAR Hold] albuterol sulfate HFA, [MAR Hold] glucose, [MAR Hold] dextrose bolus **OR** [MAR Hold] dextrose bolus, [MAR Hold] glucagon (rDNA), [MAR Hold] dextrose, [MAR Hold] sodium chloride flush, [MAR Hold] sodium chloride, [MAR Hold] acetaminophen, [MAR Hold] ondansetron **OR** [MAR Hold] ondansetron        Physical Exam:  Vitals: /64   Pulse 76   Temp 98.1 °F (36.7 °C) (Infrared)   Resp 16   Ht 5' 10\" (1.778 m)   Wt 178 lb 9.2 oz (81 kg)   SpO2 95%   BMI 25.62 kg/m²   24 hour intake/output:  Intake/Output Summary (Last 24 hours) at 7/20/2022 0825  Last data filed at 7/20/2022 0752  Gross per 24 hour   Intake 2543.74 ml   Output --   Net 2543.74 ml     Last 3 weights:   Wt Readings from Last 3 Encounters:   07/18/22 178 lb 9.2 oz (81 kg)   07/06/22 178 lb (80.7 kg)   06/29/22 178 lb (80.7 kg)       Physical Examination:   General appearance - alert, well appearing, and in no distress  Mental status - alert, oriented to person, place, and time  sclera clear, anicteric  Chest - clear to auscultation, no wheezes, rales or rhonchi, symmetric air entry  Heart - normal rate, regular rhythm, normal S1, S2, no murmurs, rubs, clicks or gallops  Abdomen - soft, nontender, nondistended, no masses or organomegaly  Neurological - alert, oriented, normal speech, no focal findings or movement disorder noted}  Extremities - peripheral pulses normal, no pedal edema, no clubbing or cyanosis  Skin - normal coloration and turgor, no rashes, no suspicious skin lesions noted     FLUORO FOR SURGICAL PROCEDURES [[id number]]    Resulted: 07/20/22 3357    Updated: 07/20/22 9219 CBC with Auto Differential [[id number]] (Abnormal)    Collected: 07/20/22 0533    Updated: 07/20/22 4820    Specimen Source: Blood     WBC 6.6 k/uL    RBC 3.70 Low  m/uL    Hemoglobin 9.3 Low  g/dL    Hematocrit 30.3 Low  %    MCV 82.0 fL    MCH 25.3 Low  pg    MCHC 30.8 Low  g/dL    RDW 16.7 High  %    Platelets 118 Low  k/uL    MPV 8.3 fL    Seg Neutrophils 69 High  %    Lymphocytes 17 Low  %    Monocytes 12 High  %    Eosinophils % 2 %    Basophils 0 %    Segs Absolute 4.56 k/uL    Absolute Lymph # 1.12 k/uL    Absolute Mono # 0.79 k/uL    Absolute Eos # 0.13 k/uL    Basophils Absolute 0.00 k/uL    Morphology HYPOCHROMIA PRESENT    Morphology ANISOCYTOSIS PRESENT   Basic Metabolic Panel [[id number]] (Abnormal)    Collected: 07/20/22 0533    Updated: 07/20/22 0607    Specimen Source: Blood     Glucose 131 High  mg/dL    BUN 22 mg/dL    CREATININE 1.39 High  mg/dL    Calcium 8.1 Low  mg/dL    Sodium 139 mmol/L    Potassium 4.8 mmol/L    Chloride 110 High  mmol/L    CO2 22 mmol/L    Anion Gap 7 Low  mmol/L    GFR Non-African American 50 Low  mL/min    GFR African American >60 mL/min    GFR Comment         Comment: Average GFR for 79or more years old:    76 mL/min/1.73sq m   Chronic Kidney Disease:    <60 mL/min/1.73sq m   Kidney failure:    <15 mL/min/1.73sq m               eGFR calculated using average adult body mass.  Additional eGFR calculator available at:         [email or web address]             POC Glucose Fingerstick [[id number]] (Abnormal)    Collected: 07/19/22 1653    Updated: 07/19/22 1709     POC Glucose 155 High  mg/dL   POC Glucose Fingerstick [[id number]] (Abnormal)    Collected: 07/19/22 1121    Updated: 07/19/22 1128     POC Glucose 135 High  mg/dL   Brain Natriuretic Peptide [[id number]] (Abnormal)    Collected: 07/19/22 0533    Updated: 07/19/22 1058     Pro-BNP 1,714 High  pg/mL    Comment:        An age-independent cutoff point of 300 pg/ml has a 98% negative predictive value excluding   acute heart failure. Assessment:  Principal Problem:    Pneumonia  Resolved Problems:    * No resolved hospital problems. *    * No resolved hospital problems. *    * No resolved hospital problems. 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 ([facility]) C82.13    Hydronephrosis due to obstruction of ureter N13.1    Moderate malnutrition ([facility]) E44.0    Chronic cholecystitis K81.1    Pleural effusion J90    Cardiomegaly I51.7    Hypothyroidism E03.9    Diabetes mellitus ([facility]) E11.9    Hypertensive urgency I16.0    Acute respiratory failure with hypoxia ([facility]) J96.01    Hypoxia R09.02    Carotid stenosis, asymptomatic, bilateral I65.23    Simple chronic bronchitis ([facility]) J41.0    Dependence on nocturnal oxygen therapy Z99.81    Pneumonia J18.9      Pneumonia    Abdominal pads may be increased in size    History of lymphoma  Abdominal pain and neuropathic pain    History of postherpetic neuralgia    History of hydronephrosis but no evidence of that on recent CT except enlargement of the medicine abdomen    Dementia patient is alert and oriented this morning  UTI  inTractable abdominal pain yesterday now it is better with pain meds  On the sore is going adjust his pain medications to higher dose  Uretral stent possibly changed or removed on Wednesday by Dr. Terence Castle  Renal insufficiency we will monitor  Renal insufficiency could be second to his mass and he has a history of stenting    Is continue with elevation is likely is also secondary to mass    His pain and nausea much improved  Some upper to lower back pain mostly positional and straining.   It was away when he walks does not seem from his mass  Low back pain probably just secondary to him being in bed and positional  Status post cystoscopy and stent replacement               Plan:  Continue the current treatment    Will monitor him for the next 24 hours    If he seems to be okay we will plan discharge to home he wants to go home with VNS. No family in the room    Did made a call to his daughter Dwaine Harry and his wife Archie Hendricks yesterday night nobody answered the phone. Left messages.     Symone Brush DO FAAFP            7/20/2022, 8:25 AM

## 2022-07-20 NOTE — PLAN OF CARE
Problem: Pain  Goal: Verbalizes/displays adequate comfort level or baseline comfort level  Outcome: Progressing   Pt medicated with pain medication prn. Assessed all pain characteristics including level, type, location, frequency, and onset. Non-pharmacologic interventions offered to pt as well. Pt states pain is tolerable at this time. Will continue to monitor.

## 2022-07-20 NOTE — OP NOTE
Operative Note      Patient: Ava Irsael  YOB: 1950  MRN: [id number]    Date of Procedure: 7/20/2022    Pre-Op Diagnosis: Hydronephrosis, unspecified hydronephrosis type [N13.30]  Bilateral ureteral stents in place  Post-Op Diagnosis: Same       Procedure(s):  CYSTOSCOPY BILATERAL STENT EXCHANGE    Surgeon(s):  Adrian Gómez MD    Assistant:   * No surgical staff found *    Anesthesia: General    Estimated Blood Loss (mL): Minimal    Complications: None    Specimens:   ID Type Source Tests Collected by Time Destination   1 : cysto urine  Urine Urine, Cystoscopic URINALYSIS WITH REFLEX TO CULTURE Adrian Gómez MD 7/20/2022 1921        Implants:  Implant Name Type Inv. Item Serial No.  Lot No. LRB No. Used Action   STENT URET 7FR L28CM PERCFLX HYDR+ DBL PGTL THRD 2 - [id number]  STENT URET 7FR L28CM PERCFLX HYDR+ DBL PGTL THRD 2  [employer] [facility] [id number] Right 1 Implanted   STENT URET 7FR L28CM PERCFLX HYDR+ DBL PGTL THRD 2 - [id number]  STENT URET 7FR L28CM PERCFLX HYDR+ DBL PGTL THRD 2  [employer] [facility]- [id number] Left 1 Implanted         Drains: * No LDAs found *    Findings: Indications: 68-year-old male with history of lymphoma, retroperitoneal lymphadenopathy, patient has had stents placed because of external compression on the ureters from lymphadenopathy in the retroperitoneum patient scheduled today for stent exchange    Detailed Description of Procedure:   Patient was brought to the operating room positioned in dorsolithotomy proper patient identification procedure identification prepping and draping. We entered the bladder with the cystoscope, prostate hypertrophy identified with visual occlusion of the bladder outlet. The interior of the bladder was examined a urine specimen was obtained for culture and sensitivity there were no tumors ulcers or stones in the bladder.     The left ureteral stent was grasped and gently removed while a Glidewire was backloaded into the the left ureter. A #7 double-J stent was then inserted in the left ureter, positioned in the renal pelvis with the distal end in the bladder. We then addressed the stent in the right ureter in the same fashion, and a #7 stent was also inserted in the right ureter. The bladder was then irrigated, the scope removed, imaging obtained during the course of the procedure ascertained proper positioning of the stents bilaterally. At the completion the patient was returned to recovery room in stable condition.     Recommendations stent exchange will be scheduled in 3-month    Electronically signed by Ahsan Pedraza MD on 7/20/2022 at 7:56 AM

## 2022-07-20 NOTE — PROGRESS NOTES
Today's Date: 7/20/2022  Patient Name: Porfirio Jacome  Date of admission: 7/14/2022  1:37 PM  Patient's age: 74 y.o., 1950  Admission Dx: Pneumonia [J18.9]  Pneumonia of left lower lobe due to infectious organism [J18.9]  Nausea and vomiting, intractability of vomiting not specified, unspecified vomiting type [R11.2]    Reason for Consult: management recommendations  Requesting Physician: Ashley Do DO    CHIEF COMPLAINT: Abdominal pain. Progressive disease      Interval history  Patient is seen and evaluated. Pain is controlled. Surgery was done today and he did very well. He is on OxyContin 10 mg twice daily with breakthrough oxycodone. Finishing antibiotic course. HISTORY OF PRESENT ILLNESS:      The patient is a 74 y.o.  male who is admitted to the hospital for chief complaints of worsening abdominal pain. Patient is well-known to our practice. Patient has history of recurrent diffuse large B-cell lymphoma. At time of relapse biopsy showed follicular lymphoma. Patient's treatment history is as below. Most recently patient was treated with Rituxan followed by Rituxan maintenance. Patient was last seen in office back in March. CT scans from February showed stable disease patient's last reduction infusion was back in May 2022. Patient was recently discharged to the hospital on the 29th after undergoing a left carotid endarterectomy back in June the patient is now admitted with worsening of abdominal pain. Patient underwent CT scan in the ER 20 CT of abdomen pelvis without contrast which shows marked interval increase of infiltrative retroperitoneal mass compatible with known history of lymphoma. The mass now partially enveloped the left kidney but no hydronephrosis is noted. The mass is also abutting the duodenum. Patient's lab work-up shows hemoglobin of 12.8. MCV 78.5.   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 Evidence of relapse with biopsy-proven follicular lymphoma. 1-2 from treated medically lymph node biopsy July 6, 2021     CURRENT THERAPY:    1.   Observation. 2.   Status post treatment with Bexxar in May 2008. 3.   Status post previous treatment with Rituxan. 4.  Start treatment with Rituxan August 23, 2021. completed4 weeks of rituximab on 9/20/2021  5. Induction Rituxan is followed by consolidation Rituxan maintenance      Past Medical History:   has a past medical history of Arthritis, Cancer ([facility] [address] Chemotherapy management, encounter for, CHF (congestive heart failure) ([facility] [address] COPD (chronic obstructive pulmonary disease) ([facility] [address] Diabetes mellitus ([facility] [address] H/O cardiovascular stress test, History of non-Hodgkin's lymphoma, Hx of blood clots, Hyperlipidemia, Hypertension, Hypothyroidism, and On home O2. Past Surgical History:   has a past surgical history that includes hernia repair; IR BIOPSY ABDOMINAL/RETROPERITONEAL MASS PERCUTANEOUS (7/6/2021); IR PORT PLACEMENT > 5 YEARS (8/31/2021); Colonoscopy (Left, 9/24/2021); Cystoscopy (Right, 10/6/2021); Cholecystectomy, laparoscopic (N/A, 10/9/2021); Tonsillectomy; Cystoscopy (Bilateral, 11/24/2021); Cystoscopy (Bilateral, 2/16/2022); eye surgery; Carotid endarterectomy (Left, 6/28/2022); and Cystoscopy (N/A, 7/20/2022). Medications:    Prior to Admission medications    Medication Sig Start Date End Date Taking?  Authorizing Provider   apixaban (ELIQUIS) 5 MG TABS tablet Take 5 mg by mouth 2 times daily   Yes Historical Provider, MD   carvedilol (COREG) 6.25 MG tablet Take 6.25 mg by mouth at bedtime   Yes Historical Provider, MD   psyllium (KONSYL) 28.3 % PACK Take 1 packet by mouth daily   Yes Historical Provider, MD   Lactobacillus (PROBIOTIC ACIDOPHILUS) CAPS Take 1 caplet by mouth daily   Yes Historical Provider, MD   Multiple Vitamins-Minerals (THERAPEUTIC MULTIVITAMIN-MINERALS) tablet Take 1 tablet by mouth daily   Yes Historical Provider, MD   glipiZIDE (GLUCOTROL) 5 MG tablet Take 5 mg by mouth 2 times daily (before meals)   Yes Historical Provider, MD   atorvastatin (LIPITOR) 40 MG tablet Take 1 tablet by mouth daily 6/29/22   Gela Mccullough MD   OXYGEN Inhale 5 L into the lungs at bedtime Nightly & during the day. Historical Provider, MD   ipratropium-albuterol (DUONEB) 0.5-2.5 (3) MG/3ML SOLN nebulizer solution Inhale 3 mLs into the lungs every 4 hours as needed for Shortness of Breath 2/20/22   Ronald Home, APRN - CNP   levothyroxine (SYNTHROID) 150 MCG tablet Take 1 tablet by mouth Daily 2/2/22   Jody Vivasa, DO   fluticasone-salmeterol (ADVAIR) 100-50 MCG/DOSE diskus inhaler Inhale 1 puff into the lungs every 12 hours 2/1/22   Jody Vivasa, DO   furosemide (LASIX) 20 MG tablet Take 1 tablet by mouth daily 2/1/22   Jody Vivasa, DO   pregabalin (LYRICA) 75 MG capsule Take 75 mg by mouth 2 times daily.  Indications: last fill on 4/22/22 #180 for 30 days     Historical Provider, MD   albuterol sulfate HFA (VENTOLIN HFA) 108 (90 Base) MCG/ACT inhaler Inhale 2 puffs into the lungs every 4 hours as needed for Wheezing     Historical Provider, MD   amLODIPine (NORVASC) 10 MG tablet Take 1 tablet by mouth daily  Patient taking differently: Take 10 mg by mouth at bedtime  10/12/21   Roderick Metcalf,    losartan (COZAAR) 50 MG tablet Take 1 tablet by mouth daily 10/12/21   Roderickevgeny Metcalf, DO   donepezil (ARICEPT) 10 MG tablet Take 10 mg by mouth daily  7/11/21   Historical Provider, MD   FLUoxetine (PROZAC) 20 MG capsule Take 20 mg by mouth daily    Historical Provider, MD   omeprazole (PRILOSEC) 20 MG delayed release capsule Take 20 mg by mouth 2 times daily    Historical Provider, MD     Current Facility-Administered Medications   Medication Dose Route Frequency Provider Last Rate Last Admin    lidocaine 4 % external patch 1 patch  1 patch TransDERmal Daily Yamila Hidalgo Tani Monroe MD   1 patch at 07/19/22 1137    ipratropium-albuterol (DUONEB) nebulizer solution 3 mL  1 vial Inhalation 4x daily Macie Esquivel MD   3 mL at 07/20/22 1457    glipiZIDE (GLUCOTROL) tablet 2.5 mg  2.5 mg Oral BID AC Macie Esquivel MD   2.5 mg at 07/18/22 1733    docusate sodium (COLACE) capsule 100 mg  100 mg Oral BID PRN Macie Esquivel MD   100 mg at 07/18/22 1035    cefepime (MAXIPIME) 1000 mg IVPB minibag  1,000 mg IntraVENous Q24H Macie Esquivel MD   Stopped at 07/20/22 1405    oxyCODONE (ROXICODONE) immediate release tablet 5 mg  5 mg Oral Q4H PRN Macie Esquivel MD   5 mg at 07/20/22 8079    oxyCODONE (OXYCONTIN) extended release tablet 10 mg  10 mg Oral 2 times per day Macie Esquivel MD   10 mg at 07/19/22 2057    sucralfate (CARAFATE) tablet 1 g  1 g Oral 4 times per day Macie Esquivel MD   1 g at 07/20/22 1739    [MAR Hold] azithromycin (ZITHROMAX) 500 mg in D5W 250ml addavial  500 mg IntraVENous Q24H Roderick Metcalf DO   Stopped at 07/19/22 7264    FLUoxetine (PROZAC) capsule 20 mg  20 mg Oral Daily Macie Esquivel MD   20 mg at 07/19/22 0818    donepezil (ARICEPT) tablet 10 mg  10 mg Oral Daily Macie Esquivel MD   10 mg at 07/19/22 0818    amLODIPine (NORVASC) tablet 10 mg  10 mg Oral Daily Macie Esquivel MD   10 mg at 07/19/22 0818    losartan (COZAAR) tablet 50 mg  50 mg Oral Daily Macie Esquivel MD   50 mg at 07/19/22 0818    albuterol sulfate HFA (PROVENTIL;VENTOLIN;PROAIR) 108 (90 Base) MCG/ACT inhaler 2 puff  2 puff Inhalation Q4H PRN Macie Esquivel MD        levothyroxine (SYNTHROID) tablet 150 mcg  150 mcg Oral Daily Macie Esquivel MD   150 mcg at 07/20/22 5752    [Held by provider] furosemide (LASIX) tablet 20 mg  20 mg Oral Daily Roderick T Dixon, DO   20 mg at 07/15/22 0744    atorvastatin (LIPITOR) tablet 40 mg  40 mg Oral Daily Macie Esquivel MD   40 mg at 07/19/22 0818    [Held by provider] apixaban (ELIQUIS) tablet 5 mg  5 mg Oral BID Roderick T Dixon, DO   5 mg at 07/18/22 4599    carvedilol (COREG) tablet 6.25 mg  6.25 mg Oral Nightly Beverly Self MD   6.25 mg at 07/19/22 2057    psyllium (METAMUCIL) 58.12 % packet 1 packet  1 packet Oral Daily Beverly Self MD   1 packet at 07/19/22 1137    lactobacillus (CULTURELLE) capsule 1 capsule  1 capsule Oral Daily Beverly Self MD   1 capsule at 07/19/22 0818    therapeutic multivitamin-minerals 1 tablet  1 tablet Oral Daily Beverly Self MD   1 tablet at 07/19/22 0818    pantoprazole (PROTONIX) tablet 40 mg  40 mg Oral QAM AC Beverly Self MD   40 mg at 07/20/22 2233    budesonide-formoterol (SYMBICORT) 80-4.5 MCG/ACT inhaler 2 puff  2 puff Inhalation BID Beverly Self MD   2 puff at 07/19/22 1914    glucose chewable tablet 16 g  4 tablet Oral PRN Beverly Self MD   16 g at 07/15/22 1233    dextrose bolus 10% 125 mL  125 mL IntraVENous PRN Beverly Self MD        Or    dextrose bolus 10% 250 mL  250 mL IntraVENous PRN Beverly Self MD        glucagon (rDNA) injection 1 mg  1 mg IntraMUSCular PRN Beverly Self MD        dextrose 5 % solution  100 mL/hr IntraVENous PRN Beverly Self MD        sodium chloride flush 0.9 % injection 5-40 mL  5-40 mL IntraVENous 2 times per day Beverly Self MD   10 mL at 07/19/22 2057    sodium chloride flush 0.9 % injection 5-40 mL  5-40 mL IntraVENous PRN Beverly Self MD        0.9 % sodium chloride infusion   IntraVENous PRN Beverly Self MD        acetaminophen (TYLENOL) tablet 650 mg  650 mg Oral Q4H PRN Beverly Self MD   650 mg at 07/15/22 0744    ondansetron (ZOFRAN-ODT) disintegrating tablet 4 mg  4 mg Oral Q8H PRN Beverly Self MD        Or    ondansetron [facility]) injection 4 mg  4 mg IntraVENous Q6H PRN Beverly Self MD   4 mg at 07/16/22 4013    pregabalin (LYRICA) capsule 75 mg  75 mg Oral BID Beverly Self MD   75 mg at 07/19/22 2627       Allergies:  Patient has no known allergies. Social History:   reports that he quit smoking about 16 years ago.  His smoking use included cigarettes. He has a 90.00 pack-year smoking history. He has never used smokeless tobacco. He reports current alcohol use of about 1.7 standard drinks per week. He reports current drug use. Drug: Marijuana KS12). Family History: family history includes Alzheimer's Disease in his father; Diabetes in his mother; Heart Disease in his brother and brother. REVIEW OF SYSTEMS:      Constitutional: No fever or chills. No night sweats, no weight loss   Eyes: No eye discharge, double vision, or eye pain   HEENT: negative for sore mouth, sore throat, hoarseness and voice change   Respiratory: negative for cough , sputum, dyspnea, wheezing, hemoptysis, chest pain   Cardiovascular: negative for chest pain, dyspnea, palpitations, orthopnea, PND   Gastrointestinal: negative for nausea, vomiting, diarrhea, constipation, abdominal pain, Dysphagia, hematemesis and hematochezia. Positive abdominal pain, much improved now  Genitourinary: negative for frequency, dysuria, nocturia, urinary incontinence, and hematuria   Integument: negative for rash, skin lesions, bruises.    Hematologic/Lymphatic: negative for easy bruising, bleeding, lymphadenopathy, or petechiae   Endocrine: negative for heat or cold intolerance,weight changes, change in bowel habits and hair loss   Musculoskeletal: negative for myalgias, arthralgias, pain, joint swelling,and bone pain   Neurological: negative for headaches, dizziness, seizures, weakness, numbness    PHYSICAL EXAM:        BP (!) 118/45   Pulse 95   Temp 99.4 °F (37.4 °C) (Oral)   Resp 18   Ht 5' 10\" (1.778 m)   Wt 178 lb 9.2 oz (81 kg)   SpO2 (!) 86%   BMI 25.62 kg/m²    Temp (24hrs), Av.9 °F (36.6 °C), Min:97.1 °F (36.2 °C), Max:99.4 °F (37.4 °C)      General appearance - well appearing, no in pain or distress   Mental status - alert and cooperative   Eyes - pupils equal and reactive, extraocular eye movements intact   Ears - bilateral TM's and external ear canals normal   Mouth - mucous membranes moist, pharynx normal without lesions   Neck - supple, no significant adenopathy   Lymphatics - no palpable lymphadenopathy, no hepatosplenomegaly   Chest - clear to auscultation, no wheezes, rales or rhonchi, symmetric air entry   Heart - normal rate, regular rhythm, normal S1, S2, no murmurs  Abdomen - soft, nontender, nondistended, no masses or organomegaly   Neurological - alert, oriented, normal speech, no focal findings or movement disorder noted   Musculoskeletal - no joint tenderness, deformity or swelling   Extremities - peripheral pulses normal, no pedal edema, no clubbing or cyanosis   Skin - normal coloration and turgor, no rashes, no suspicious skin lesions noted ,      DATA:      Labs:     Results for orders placed or performed during the hospital encounter of 07/14/22   MRSA DNA Probe, Nasal    Specimen: Nasal   Result Value Ref Range    Specimen Description . NASAL SWAB     MRSA, DNA, Nasal NEGATIVE NEGATIVE   Culture, Respiratory    Specimen: Sputum Expectorated   Result Value Ref Range    Specimen Description . EXPECTORATED SPUTUM     Direct Exam >25 NEUTROPHILS/LPF     Direct Exam < 10 EPITHELIAL CELLS/LPF     Direct Exam MIXED BACTERIAL MORPHOTYPES SEEN ON GRAM STAIN.  (A)     Culture NORMAL RESPIRATORY [facility]    CBC with Auto Differential   Result Value Ref Range    WBC 8.7 3.5 - 11.0 k/uL    RBC 5.04 4.5 - 5.9 m/uL    Hemoglobin 12.8 (L) 13.5 - 17.5 g/dL    Hematocrit 39.5 (L) 41 - 53 %    MCV 78.5 (L) 80 - 100 fL    MCH 25.5 (L) 26 - 34 pg    MCHC 32.5 31 - 37 g/dL    RDW 16.4 (H) 11.5 - 14.9 %    Platelets 261 441 - 712 k/uL    MPV 7.7 6.0 - 12.0 fL    Seg Neutrophils 75 (H) 36 - 66 %    Lymphocytes 16 (L) 24 - 44 %    Monocytes 9 (H) 1 - 7 %    Eosinophils % 0 0 - 4 %    Basophils 0 0 - 2 %    Segs Absolute 6.53 1.3 - 9.1 k/uL    Absolute Lymph # 1.39 1.0 - 4.8 k/uL    Absolute Mono # 0.78 0.1 - 1.3 k/uL    Absolute Eos # 0.00 0.0 - 0.4 k/uL Basophils Absolute 0.00 0.0 - 0.2 k/uL    Morphology ANISOCYTOSIS PRESENT     Morphology 1+ ELLIPTOCYTES    CMP   Result Value Ref Range    Glucose 119 (H) 70 - 99 mg/dL    BUN 27 (H) 8 - 23 mg/dL    Creatinine 1.56 (H) 0.70 - 1.20 mg/dL    Calcium 8.7 8.6 - 10.4 mg/dL    Sodium 140 135 - 144 mmol/L    Potassium 3.9 3.7 - 5.3 mmol/L    Chloride 102 98 - 107 mmol/L    CO2 24 20 - 31 mmol/L    Anion Gap 14 9 - 17 mmol/L    Alkaline Phosphatase 131 (H) 40 - 129 U/L    ALT 6 5 - 41 U/L    AST 15 <40 U/L    Total Bilirubin 0.80 0.3 - 1.2 mg/dL    Total Protein 6.1 (L) 6.4 - 8.3 g/dL    Albumin 3.7 3.5 - 5.2 g/dL    GFR Non- 44 (L) >60 mL/min    GFR  53 (L) >60 mL/min    GFR Comment         Lipase   Result Value Ref Range    Lipase 69 (H) 13 - 60 U/L   Lactic Acid   Result Value Ref Range    Lactic Acid 2.0 0.5 - 2.2 mmol/L   Urinalysis with Reflex to Culture    Specimen: Urine   Result Value Ref Range    Color, UA Yellow Yellow    Turbidity UA Clear Clear    Glucose, Ur TRACE (A) NEGATIVE    Bilirubin Urine NEGATIVE NEGATIVE    Ketones, Urine TRACE (A) NEGATIVE    Specific Gravity, UA 1.024 1.000 - 1.030    Urine Hgb MOD (A) NEGATIVE    pH, UA 6.0 5.0 - 8.0    Protein, UA 4+ (A) NEGATIVE    Urobilinogen, Urine Normal Normal    Nitrite, Urine NEGATIVE NEGATIVE    Leukocyte Esterase, Urine TRACE (A) NEGATIVE   Troponin   Result Value Ref Range    Troponin, High Sensitivity 39 (H) 0 - 22 ng/L   Microscopic Urinalysis   Result Value Ref Range    WBC, UA 6 TO 9 /HPF    RBC, UA 21 TO 50 /HPF    Casts UA 0 TO 2 /LPF    Casts UA HYALINE /LPF    Epithelial Cells UA 6 TO 9 /HPF    Bacteria, UA None None    Mucus, UA 1+ (A) None   Troponin   Result Value Ref Range    Troponin, High Sensitivity 39 (H) 0 - 22 ng/L   Troponin   Result Value Ref Range    Troponin, High Sensitivity 43 (H) 0 - 22 ng/L   Lipase   Result Value Ref Range    Lipase 123 (H) 13 - 60 U/L   Procalcitonin   Result Value Ref Range    Procalcitonin 0.10 (H) <0.09 ng/mL   Cortisol Total   Result Value Ref Range    Cortisol 12.7 2.7 - 18.4 ug/dL    Cortisol Collection Info AM    Lactate Dehydrogenase   Result Value Ref Range     (H) 135 - 225 U/L   Uric Acid   Result Value Ref Range    Uric Acid 9.4 (H) 3.4 - 7.0 mg/dL   Basic Metabolic Panel   Result Value Ref Range    Glucose 82 70 - 99 mg/dL    BUN 27 (H) 8 - 23 mg/dL    Creatinine 1.49 (H) 0.70 - 1.20 mg/dL    Calcium 8.0 (L) 8.6 - 10.4 mg/dL    Sodium 140 135 - 144 mmol/L    Potassium 3.7 3.7 - 5.3 mmol/L    Chloride 103 98 - 107 mmol/L    CO2 26 20 - 31 mmol/L    Anion Gap 11 9 - 17 mmol/L    GFR Non-African American 46 (L) >60 mL/min    GFR  56 (L) >60 mL/min    GFR Comment         CBC with Auto Differential   Result Value Ref Range    WBC 8.3 3.5 - 11.0 k/uL    RBC 4.79 4.5 - 5.9 m/uL    Hemoglobin 12.4 (L) 13.5 - 17.5 g/dL    Hematocrit 38.4 (L) 41 - 53 %    MCV 80.2 80 - 100 fL    MCH 25.8 (L) 26 - 34 pg    MCHC 32.2 31 - 37 g/dL    RDW 16.1 (H) 11.5 - 14.9 %    Platelets 790 232 - 108 k/uL    MPV 7.9 6.0 - 12.0 fL    Seg Neutrophils 68 (H) 36 - 66 %    Lymphocytes 20 (L) 24 - 44 %    Monocytes 10 (H) 1 - 7 %    Eosinophils % 2 0 - 4 %    Basophils 0 0 - 2 %    Segs Absolute 5.64 1.3 - 9.1 k/uL    Absolute Lymph # 1.66 1.0 - 4.8 k/uL    Absolute Mono # 0.83 0.1 - 1.3 k/uL    Absolute Eos # 0.17 0.0 - 0.4 k/uL    Basophils Absolute 0.00 0.0 - 0.2 k/uL    Morphology ANISOCYTOSIS PRESENT     Morphology FEW GIANT PLATELETS    Lipase   Result Value Ref Range    Lipase 114 (H) 13 - 60 U/L   Basic Metabolic Panel   Result Value Ref Range    Glucose 92 70 - 99 mg/dL    BUN 27 (H) 8 - 23 mg/dL    Creatinine 1.53 (H) 0.70 - 1.20 mg/dL    Calcium 7.8 (L) 8.6 - 10.4 mg/dL    Sodium 136 135 - 144 mmol/L    Potassium 4.2 3.7 - 5.3 mmol/L    Chloride 104 98 - 107 mmol/L    CO2 23 20 - 31 mmol/L    Anion Gap 9 9 - 17 mmol/L    GFR Non-African American 45 (L) >60 mL/min 0.2 k/uL   Basic Metabolic Panel   Result Value Ref Range    Glucose 76 70 - 99 mg/dL    BUN 26 (H) 8 - 23 mg/dL    Creatinine 1.37 (H) 0.70 - 1.20 mg/dL    Calcium 8.0 (L) 8.6 - 10.4 mg/dL    Sodium 138 135 - 144 mmol/L    Potassium 4.7 3.7 - 5.3 mmol/L    Chloride 108 (H) 98 - 107 mmol/L    CO2 23 20 - 31 mmol/L    Anion Gap 7 (L) 9 - 17 mmol/L    GFR Non-African American 51 (L) >60 mL/min    GFR African American >60 >60 mL/min    GFR Comment         CBC with Auto Differential   Result Value Ref Range    WBC 7.8 3.5 - 11.0 k/uL    RBC 3.81 (L) 4.5 - 5.9 m/uL    Hemoglobin 9.7 (L) 13.5 - 17.5 g/dL    Hematocrit 30.6 (L) 41 - 53 %    MCV 80.4 80 - 100 fL    MCH 25.5 (L) 26 - 34 pg    MCHC 31.7 31 - 37 g/dL    RDW 16.5 (H) 11.5 - 14.9 %    Platelets 258 (L) 407 - 450 k/uL    MPV 8.1 6.0 - 12.0 fL    Seg Neutrophils 69 (H) 36 - 66 %    Lymphocytes 17 (L) 24 - 44 %    Monocytes 11 (H) 1 - 7 %    Eosinophils % 2 0 - 4 %    Basophils 1 0 - 2 %    Segs Absolute 5.37 1.3 - 9.1 k/uL    Absolute Lymph # 1.33 1.0 - 4.8 k/uL    Absolute Mono # 0.86 0.1 - 1.3 k/uL    Absolute Eos # 0.16 0.0 - 0.4 k/uL    Basophils Absolute 0.08 0.0 - 0.2 k/uL    Morphology ANISOCYTOSIS PRESENT     Morphology MICROCYTOSIS PRESENT    Brain Natriuretic Peptide   Result Value Ref Range    Pro-BNP 1,714 (H) <300 pg/mL   Basic Metabolic Panel   Result Value Ref Range    Glucose 131 (H) 70 - 99 mg/dL    BUN 22 8 - 23 mg/dL    Creatinine 1.39 (H) 0.70 - 1.20 mg/dL    Calcium 8.1 (L) 8.6 - 10.4 mg/dL    Sodium 139 135 - 144 mmol/L    Potassium 4.8 3.7 - 5.3 mmol/L    Chloride 110 (H) 98 - 107 mmol/L    CO2 22 20 - 31 mmol/L    Anion Gap 7 (L) 9 - 17 mmol/L    GFR Non-African American 50 (L) >60 mL/min    GFR African American >60 >60 mL/min    GFR Comment         CBC with Auto Differential   Result Value Ref Range    WBC 6.6 3.5 - 11.0 k/uL    RBC 3.70 (L) 4.5 - 5.9 m/uL    Hemoglobin 9.3 (L) 13.5 - 17.5 g/dL    Hematocrit 30.3 (L) 41 - 53 %    MCV 82.0 80 - 100 fL    MCH 25.3 (L) 26 - 34 pg    MCHC 30.8 (L) 31 - 37 g/dL    RDW 16.7 (H) 11.5 - 14.9 %    Platelets 428 (L) 709 - 450 k/uL    MPV 8.3 6.0 - 12.0 fL    Seg Neutrophils 69 (H) 36 - 66 %    Lymphocytes 17 (L) 24 - 44 %    Monocytes 12 (H) 1 - 7 %    Eosinophils % 2 0 - 4 %    Basophils 0 0 - 2 %    Segs Absolute 4.56 1.3 - 9.1 k/uL    Absolute Lymph # 1.12 1.0 - 4.8 k/uL    Absolute Mono # 0.79 0.1 - 1.3 k/uL    Absolute Eos # 0.13 0.0 - 0.4 k/uL    Basophils Absolute 0.00 0.0 - 0.2 k/uL    Morphology HYPOCHROMIA PRESENT     Morphology ANISOCYTOSIS PRESENT    Urinalysis with Reflex to Culture    Specimen: Urine, Cystoscopic   Result Value Ref Range    Color, UA Yellow Yellow    Turbidity UA Clear Clear    Glucose, Ur NEGATIVE NEGATIVE    Bilirubin Urine NEGATIVE NEGATIVE    Ketones, Urine NEGATIVE NEGATIVE    Specific [place], UA 1.011 1.000 - 1.030    Urine Hgb LARGE (A) NEGATIVE    pH, UA 5.5 5.0 - 8.0    Protein, UA 3+ (A) NEGATIVE    Urobilinogen, Urine Normal Normal    Nitrite, Urine NEGATIVE NEGATIVE    Leukocyte Esterase, Urine TRACE (A) NEGATIVE   Microscopic Urinalysis   Result Value Ref Range    -          WBC, UA 3 to 5 /HPF    RBC, UA 51  /HPF    Epithelial Cells UA 3 to 5 /HPF    Bacteria, UA None None   POC Glucose Fingerstick   Result Value Ref Range    POC Glucose 239 (H) 75 - 110 mg/dL   POC Glucose Fingerstick   Result Value Ref Range    POC Glucose 95 75 - 110 mg/dL   POC Glucose Fingerstick   Result Value Ref Range    POC Glucose 87 75 - 110 mg/dL   POC Glucose Fingerstick   Result Value Ref Range    POC Glucose 56 (L) 75 - 110 mg/dL   POC Glucose Fingerstick   Result Value Ref Range    POC Glucose 68 (L) 75 - 110 mg/dL   POC Glucose Fingerstick   Result Value Ref Range    POC Glucose 187 (H) 75 - 110 mg/dL   POC Glucose Fingerstick   Result Value Ref Range    POC Glucose 170 (H) 75 - 110 mg/dL   POC Glucose Fingerstick   Result Value Ref Range    POC Glucose 119 (H) 75 - 110 mg/dL POC Glucose Fingerstick   Result Value Ref Range    POC Glucose 69 (L) 75 - 110 mg/dL   POC Glucose Fingerstick   Result Value Ref Range    POC Glucose 87 75 - 110 mg/dL   POC Glucose Fingerstick   Result Value Ref Range    POC Glucose 149 (H) 75 - 110 mg/dL   POC Glucose Fingerstick   Result Value Ref Range    POC Glucose 115 (H) 75 - 110 mg/dL   POC Glucose Fingerstick   Result Value Ref Range    POC Glucose 156 (H) 75 - 110 mg/dL   POC Glucose Fingerstick   Result Value Ref Range    POC Glucose 86 75 - 110 mg/dL   POC Glucose Fingerstick   Result Value Ref Range    POC Glucose 159 (H) 75 - 110 mg/dL   POC Glucose Fingerstick   Result Value Ref Range    POC Glucose 126 (H) 75 - 110 mg/dL   POC Glucose Fingerstick   Result Value Ref Range    POC Glucose 122 (H) 75 - 110 mg/dL   POC Glucose Fingerstick   Result Value Ref Range    POC Glucose 91 75 - 110 mg/dL   POC Glucose Fingerstick   Result Value Ref Range    POC Glucose 108 75 - 110 mg/dL   POC Glucose Fingerstick   Result Value Ref Range    POC Glucose 108 75 - 110 mg/dL   POC Glucose Fingerstick   Result Value Ref Range    POC Glucose 135 (H) 75 - 110 mg/dL   POC Glucose Fingerstick   Result Value Ref Range    POC Glucose 155 (H) 75 - 110 mg/dL   POC Glucose Fingerstick   Result Value Ref Range    POC Glucose 105 75 - 110 mg/dL   POC Glucose Fingerstick   Result Value Ref Range    POC Glucose 186 (H) 75 - 110 mg/dL   EKG 12 Lead   Result Value Ref Range    Ventricular Rate 97 BPM    Atrial Rate 97 BPM    P-R Interval 120 ms    QRS Duration 90 ms    Q-T Interval 378 ms    QTc Calculation (Bazett) 480 ms    P Axis 55 degrees    R Axis -26 degrees    T Axis 21 degrees         IMAGING DATA:    CT ABDOMEN PELVIS WO CONTRAST Additional Contrast? None    Result Date: 7/14/2022  EXAMINATION: CT OF THE ABDOMEN AND PELVIS WITHOUT CONTRAST 7/14/2022 12:39 pm TECHNIQUE: CT of the abdomen and pelvis was performed without the administration of intravenous contrast. Multiplanar reformatted images are provided for review. Automated exposure control, iterative reconstruction, and/or weight based adjustment of the mA/kV was utilized to reduce the radiation dose to as low as reasonably achievable. COMPARISON: 02/16/2022 HISTORY: ORDERING SYSTEM PROVIDED HISTORY: pain TECHNOLOGIST PROVIDED HISTORY: pain Decision Support Exception - unselect if not a suspected or confirmed emergency medical condition->Emergency Medical Condition (MA) Reason for Exam: pain Additional signs and symptoms: PT states abdominal pain and unable to eat x3 days. Relevant Medical/Surgical History: hx of gallbladder removal and bilateral ureteral stents FINDINGS: Lower Chest: Bronchial wall thickening is noted in the lower lungs bilaterally. Debris is noted in the left lower lobe airways. Noncontrast imaging of the base of the heart is unremarkable. Lack of intravenous contrast limits evaluation of the solid abdominal viscera, the hollow abdominal viscera, and the vascular structures. Organs: With that said, no acute hepatic abnormality identified. Gallbladder is unremarkable. Noncontrast imaging of the spleen unremarkable. The left adrenal gland is enveloped by the retroperitoneal soft tissue mass. Right adrenal gland is unremarkable. Pancreas is partially surrounded by the mass. Visualized portions of the pancreas are unremarkable. The retroperitoneal mass now surrounds and may partially invade the left kidney. With that said, a ureteral stent on the left is noted, and there is no evidence of hydronephrosis. A right ureteral stent is in place, also with no evidence of hydronephrosis. Hyperdense cyst seen within the right kidney, unchanged when compared to multiple previous exams, and requiring no specific follow-up. GI/Bowel: The distal esophagus and stomach are unremarkable appearance. The retroperitoneal mass partially envelops the duodenal sweep, but obstruction does not appear to be present.  More abnormalities. The hilar structures are normal.     Mild left lower lobe infiltrate suggesting early pneumonia         IMPRESSION:   Primary Problem  Pneumonia    Active Hospital Problems    Diagnosis Date Noted    Pneumonia [J18.9] 07/14/2022     Priority: Medium       Progressive non-Hodgkin lymphoma per CT scan  Pneumonia per chest x-ray  CKD  Intractable pain pneumonia  S/p carotid endarterectomy-6/2022    RECOMMENDATIONS:  \As discussed previously, the patient's progressive lymphoma causing obstructive uropathy   Stents were replaced  Will need systemic therapy after discharge  Okay to start Eliquis tomorrow  Creatinine continues to improve  Pain control is very good with current therapy. Continue without changes  Continue with antibiotics as per primary team/pulmonary service  Will definitely need chemoimmunotherapy as an outpatient after discharge. We will set it up after he finishes antibiotics for his pneumonia              Discussed with patient and spouse and Nurse. Thank you for asking us to see this patient. Alfonso Colon MD  Hematologist/Medical [id number] [facility] hematology oncology physicians            This note is created with the assistance of a speech recognition program.  While intending to generate a document that actually reflects the content of the visit, the document can still have some errors including those of syntax and sound a like substitutions which may escape proof reading. It such instances, actual meaning can be extrapolated by contextual diversion.

## 2022-07-20 NOTE — CARE COORDINATION
ONGOING DISCHARGE PLAN:    Patient is alert and oriented x4. Spoke with patient regarding discharge plan and patient confirms that plan is still to go home with [facility] . Pt has cysto with bilat stent replacement today. Follow up with Dr Ben Orellana as outpt. IV cefepime. Will continue to follow for additional discharge needs.     Electronically signed by Sapphire Mcclain RN on 7/20/2022 at 11:58 AM

## 2022-07-20 NOTE — ANESTHESIA PRE PROCEDURE
Department of Anesthesiology  Preprocedure Note       Name:  Henri Montgomery   Age:  74 y.o.  :  1950                                          MRN:  [id number]         Date:  2022      Surgeon: Kalina Morrissey):  Karthikeyan Boss MD    Procedure: Procedure(s):  CYSTOSCOPY STENT REMOVAL    Medications prior to admission:   Prior to Admission medications    Medication Sig Start Date End Date Taking? Authorizing Provider   apixaban (ELIQUIS) 5 MG TABS tablet Take 5 mg by mouth 2 times daily   Yes Historical Provider, MD   carvedilol (COREG) 6.25 MG tablet Take 6.25 mg by mouth at bedtime   Yes Historical Provider, MD   psyllium (KONSYL) 28.3 % PACK Take 1 packet by mouth daily   Yes Historical Provider, MD   Lactobacillus (PROBIOTIC ACIDOPHILUS) CAPS Take 1 caplet by mouth daily   Yes Historical Provider, MD   Multiple Vitamins-Minerals (THERAPEUTIC MULTIVITAMIN-MINERALS) tablet Take 1 tablet by mouth daily   Yes Historical Provider, MD   glipiZIDE (GLUCOTROL) 5 MG tablet Take 5 mg by mouth 2 times daily (before meals)   Yes Historical Provider, MD   atorvastatin (LIPITOR) 40 MG tablet Take 1 tablet by mouth daily 22   Jose Daily MD   OXYGEN Inhale 5 L into the lungs at bedtime Nightly & during the day. Historical Provider, MD   ipratropium-albuterol (DUONEB) 0.5-2.5 (3) MG/3ML SOLN nebulizer solution Inhale 3 mLs into the lungs every 4 hours as needed for Shortness of Breath 22   EDWIGE Rachel - CNP   levothyroxine (SYNTHROID) 150 MCG tablet Take 1 tablet by mouth Daily 22   Stevie Alpha, DO   fluticasone-salmeterol (ADVAIR) 100-50 MCG/DOSE diskus inhaler Inhale 1 puff into the lungs every 12 hours 22   Stevie Alpha, DO   furosemide (LASIX) 20 MG tablet Take 1 tablet by mouth daily 22   Stevie Alpha, DO   pregabalin (LYRICA) 75 MG capsule Take 75 mg by mouth 2 times daily.  Indications: last fill on 22 #180 for 30 days     Historical Provider, MD albuterol sulfate HFA (VENTOLIN HFA) 108 (90 Base) MCG/ACT inhaler Inhale 2 puffs into the lungs every 4 hours as needed for Wheezing     Historical Provider, MD   amLODIPine (NORVASC) 10 MG tablet Take 1 tablet by mouth daily  Patient taking differently: Take 10 mg by mouth at bedtime  10/12/21   Roderick T Dixon, DO   losartan (COZAAR) 50 MG tablet Take 1 tablet by mouth daily 10/12/21   Roderick T Dixon, DO   donepezil (ARICEPT) 10 MG tablet Take 10 mg by mouth daily  7/11/21   Historical Provider, MD   FLUoxetine (PROZAC) 20 MG capsule Take 20 mg by mouth daily    Historical Provider, MD   omeprazole (PRILOSEC) 20 MG delayed release capsule Take 20 mg by mouth 2 times daily    Historical Provider, MD       Current medications:    Current Facility-Administered Medications   Medication Dose Route Frequency Provider Last Rate Last Admin    lidocaine 4 % external patch 1 patch  1 patch TransDERmal Daily Roderick  Dixon, DO   1 patch at 07/19/22 1137    ipratropium-albuterol (DUONEB) nebulizer solution 3 mL  1 vial Inhalation 4x daily EDWIGE Dasilva - CNP   3 mL at 07/19/22 1907    glipiZIDE (GLUCOTROL) tablet 2.5 mg  2.5 mg Oral BID AC Roderick  Dixon, DO   2.5 mg at 07/18/22 1733    docusate sodium (COLACE) capsule 100 mg  100 mg Oral BID PRN Roderick  Dixon, DO   100 mg at 07/18/22 1035    cefepime (MAXIPIME) 1000 mg IVPB minibag  1,000 mg IntraVENous Q24H Roderick T Dixon, DO   Stopped at 07/19/22 1402    oxyCODONE (ROXICODONE) immediate release tablet 5 mg  5 mg Oral Q4H PRN Brent Colon MD   5 mg at 07/19/22 1235    oxyCODONE (OXYCONTIN) extended release tablet 10 mg  10 mg Oral 2 times per day Katja Webster MD   10 mg at 07/19/22 2057    sucralfate (CARAFATE) tablet 1 g  1 g Oral 4 times per day Radhames Alvarez DO   1 g at 07/19/22 1625    azithromycin (ZITHROMAX) 500 mg in D5W 250ml addavial  500 mg IntraVENous Q24H Roderick Metcalf DO   Stopped at 07/19/22 0922    FLUoxetine (PROZAC) capsule Dixon, DO        dextrose 5 % solution  100 mL/hr IntraVENous PRN Roderick T Dixon, DO        0.9 % sodium chloride infusion   IntraVENous Continuous Kristen Ruffin MD 75 mL/hr at 07/19/22 2057 Rate Verify at 07/19/22 2057    sodium chloride flush 0.9 % injection 5-40 mL  5-40 mL IntraVENous 2 times per day Mook Bruce, DO   10 mL at 07/19/22 2057    sodium chloride flush 0.9 % injection 5-40 mL  5-40 mL IntraVENous PRN Roderick T Dixon, DO        0.9 % sodium chloride infusion   IntraVENous PRN Mook Bruce, DO        acetaminophen (TYLENOL) tablet 650 mg  650 mg Oral Q4H PRN Roderick T Dixon, DO   650 mg at 07/15/22 0744    ondansetron (ZOFRAN-ODT) disintegrating tablet 4 mg  4 mg Oral Q8H PRN Mook Bruce, DO        Or    ondansetron (ZOFRAN) injection 4 mg  4 mg IntraVENous Q6H PRN Mook Bruce, DO   4 mg at 07/16/22 0611    pregabalin (LYRICA) capsule 75 mg  75 mg Oral BID Mook Bruce, DO   75 mg at 07/19/22 2057       Allergies:  No Known Allergies    Problem List:    Patient Active Problem List   Diagnosis Code    NHL (non-Hodgkin's lymphoma) ([facility] C85.90    Traumatic retroperitoneal hematoma S36.892A    Acute kidney injury ([facility] W04.7    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    Hydronephrosis due to obstruction of ureter N13.1    Moderate malnutrition (HCC) E44.0    Chronic cholecystitis K81.1    Pleural effusion J90    Cardiomegaly I51.7    Hypothyroidism E03.9    Diabetes mellitus ([facility] E11.9    Hypertensive urgency I16.0    Acute respiratory failure with hypoxia (HCC) J96.01    Hypoxia R09.02    Carotid stenosis, asymptomatic, bilateral I65.23    Simple chronic bronchitis (HCC) J41.0    Dependence on nocturnal oxygen therapy Z99.81    Pneumonia J18.9       Past Medical History:        Diagnosis Date    Arthritis     Cancer [facility])     chemotherapy lymphoma last was april 2022    Chemotherapy management, encounter for     CHF (congestive heart failure) [facility]     COPD (chronic obstructive pulmonary disease) ([facility]     Diabetes mellitus ([facility]     H/O cardiovascular stress test     History of non-Hodgkin's lymphoma     Hx of blood clots     DVT in right leg     Hyperlipidemia     Hypertension     Hypothyroidism     On home O2     at 5 liters per nasal cannula 24 hrs. per day. Past Surgical History:        Procedure Laterality Date    CAROTID ENDARTERECTOMY Left 2022    LEFT TYPE I EVERSION LEFT CAROTID ENDARTERECTOMY RE-IMPLANTATION LEFT ICA performed by Manoj Anguiano MD at [address] [place] [address], LAPAROSCOPIC N/A 10/9/2021    CHOLECYSTECTOMY LAPAROSCOPIC ROBOTIC XI performed by Earl Urbina MD at [place] [address] Left 2021    COLONOSCOPY POLYPECTOMY SNARE/COLD BIOPSY performed by Jerry Hamilton MD at [address] [place] [address] Drive Right 10/6/2021    CYSTOSCOPY PYELOGRAM URETERAL STENT INSERTION performed by Nancy Suazo MD at [address] [place] [address] Drive Bilateral 2021    [address] LEFT INSERTION performed by Nancy Suazo MD at [address] [place] [address] Drive Bilateral 2022    CYSTOSCOPY WITH BILATERAL STENT EXCHANGE performed by Nancy Suazo MD at [address] [facility]      cataracts   [address] [facility]      IR BIOPSY ABDOMINAL MASS  2021    IR BIOPSY ABDOMINAL MASS 2021 [facility]    IR PORT PLACEMENT EQUAL OR GREATER THAN 5 YEARS  2021    IR PORT PLACEMENT EQUAL OR GREATER THAN 5 YEARS 2021 [facility]    TONSILLECTOMY      as child       Social History:    Social History     Tobacco Use    Smoking status: Former     Packs/day: 2.00     Years: 45.00     Pack years: 90.00     Types: Cigarettes     Quit date: 2006     Years since quittin.5    Smokeless tobacco: Never   Substance Use Topics    Alcohol use:  Yes     Alcohol/week: 1.7 standard drinks     Types: 2 Standard drinks or equivalent per week Comment: occassional                                Counseling given: Not Answered      Vital Signs (Current):   Vitals:    07/19/22 1245 07/19/22 1436 07/19/22 1845 07/19/22 1907   BP: 125/61  (!) 155/55    Pulse: 66  79    Resp: 17  16    Temp: 97.7 °F (36.5 °C)  98.1 °F (36.7 °C)    TempSrc: Oral  Oral    SpO2: 92% 93% 94% 94%   Weight:       Height:                                                  BP Readings from Last 3 Encounters:   07/19/22 (!) 155/55   06/29/22 (!) 141/50   06/13/22 (!) 95/44       NPO Status:                                                                                 BMI:   Wt Readings from Last 3 Encounters:   07/18/22 178 lb 9.2 oz (81 kg)   07/06/22 178 lb (80.7 kg)   06/29/22 178 lb (80.7 kg)     Body mass index is 25.62 kg/m².     CBC:   Lab Results   Component Value Date/Time    WBC 7.8 07/19/2022 05:33 AM    RBC 3.81 07/19/2022 05:33 AM    RBC 4.38 06/06/2012 06:57 AM    HGB 9.7 07/19/2022 05:33 AM    HCT 30.6 07/19/2022 05:33 AM    MCV 80.4 07/19/2022 05:33 AM    RDW 16.5 07/19/2022 05:33 AM     07/19/2022 05:33 AM     06/06/2012 06:57 AM     HB 9.7    CMP:   Lab Results   Component Value Date/Time     07/19/2022 05:33 AM    K 4.7 07/19/2022 05:33 AM     07/19/2022 05:33 AM    CO2 23 07/19/2022 05:33 AM    BUN 26 07/19/2022 05:33 AM    CREATININE 1.37 07/19/2022 05:33 AM    GFRAA >60 07/19/2022 05:33 AM    LABGLOM 51 07/19/2022 05:33 AM    GLUCOSE 76 07/19/2022 05:33 AM    GLUCOSE 97 06/06/2012 06:57 AM    PROT 6.1 07/14/2022 02:16 PM    CALCIUM 8.0 07/19/2022 05:33 AM    BILITOT 0.80 07/14/2022 02:16 PM    ALKPHOS 131 07/14/2022 02:16 PM    AST 15 07/14/2022 02:16 PM    ALT 6 07/14/2022 02:16 PM       POC Tests:   Recent Labs     07/19/22  1653   POCGLU 155*       Coags:   Lab Results   Component Value Date/Time    PROTIME 14.5 06/13/2022 10:56 AM    INR 1.1 06/13/2022 10:56 AM    APTT 30.3 06/13/2022 10:56 AM       HCG (If Applicable): No results found for: PREGTESTUR, PREGSERUM, HCG, HCGQUANT     ABGs: No results found for: PHART, PO2ART, ABK4HNS, BDE0MMU, BEART, U8GHJFBP     Type & Screen (If Applicable):  No results found for: LABABO, LABRH    Drug/Infectious Status (If Applicable):  No results found for: HIV, HEPCAB    COVID-19 Screening (If Applicable):   Lab Results   Component Value Date/Time    COVID19 Not Detected 06/28/2022 04:54 PM    COVID19 Not Detected 02/16/2022 11:48 PM           Anesthesia Evaluation  Patient summary reviewed and Nursing notes reviewed no history of anesthetic complications:   Airway: Mallampati: III  TM distance: >3 FB   Neck ROM: full  Mouth opening: > = 3 FB   Dental:    (+) caps      Pulmonary:normal exam  breath sounds clear to auscultation  (+) pneumonia: no interval change and resolved,  COPD: no interval change,      (-) asthma, shortness of breath, recent URI, sleep apnea, rhonchi, wheezes, rales, stridor, not a current smoker and no decreased breath sounds          Patient did not smoke on day of surgery. Cardiovascular:  Exercise tolerance: good (>4 METS),   (+) hypertension: no interval change, CHF: no interval change,     (-) pacemaker, valvular problems/murmurs, past MI, CAD, CABG/stent, dysrhythmias,  angina, orthopnea, PND,  DIALLO, murmur, weak pulses,  friction rub, systolic click, carotid bruit,  JVD, peripheral edema, no pulmonary hypertension and no hyperlipidemia    ECG reviewed  Rhythm: regular  Rate: normal  Echocardiogram reviewed         Beta Blocker:  Dose within 24 Hrs      ROS comment: Left ventricle is normal in size. Calculated EF via Herman's method 64% with global L. strain of -19.6%. Moderate left ventricular hypertrophy. No significant pericardial effusion is seen.      Neuro/Psych:   Negative Neuro/Psych ROS     (-) seizures, neuromuscular disease, TIA, CVA, headaches, psychiatric history and depression/anxiety            GI/Hepatic/Renal:        (-) hiatal hernia, GERD, PUD, hepatitis, liver disease, no renal disease, bowel prep and no morbid obesity       Endo/Other:    (+) DiabetesType II DM, , hypothyroidism::., no malignancy/cancer. (-) hyperthyroidism, blood dyscrasia, arthritis, no electrolyte abnormalities, no malignancy/cancer               Abdominal:             Vascular: negative vascular ROS. - PVD, DVT and PE. Other Findings:           Anesthesia Plan      general and MAC     ASA 3       Induction: intravenous. MIPS: Postoperative opioids intended and Prophylactic antiemetics administered. Anesthetic plan and risks discussed with patient. Plan discussed with CRNA.                     Cynthia Hewitt MD   7/19/2022

## 2022-07-21 VITALS
BODY MASS INDEX: 25.56 KG/M2 | TEMPERATURE: 98.1 F | HEIGHT: 70 IN | WEIGHT: 178.57 LBS | RESPIRATION RATE: 18 BRPM | HEART RATE: 81 BPM | SYSTOLIC BLOOD PRESSURE: 131 MMHG | OXYGEN SATURATION: 93 % | DIASTOLIC BLOOD PRESSURE: 55 MMHG

## 2022-07-21 LAB
ABSOLUTE EOS #: 0.1 K/UL (ref 0–0.4)
ABSOLUTE LYMPH #: 1.2 K/UL (ref 1–4.8)
ABSOLUTE MONO #: 0.9 K/UL (ref 0.1–1.3)
ANION GAP SERPL CALCULATED.3IONS-SCNC: 8 MMOL/L (ref 9–17)
BASOPHILS # BLD: 1 % (ref 0–2)
BASOPHILS ABSOLUTE: 0 K/UL (ref 0–0.2)
BUN BLDV-MCNC: 23 MG/DL (ref 8–23)
CALCIUM SERPL-MCNC: 8.3 MG/DL (ref 8.6–10.4)
CHLORIDE BLD-SCNC: 110 MMOL/L (ref 98–107)
CO2: 24 MMOL/L (ref 20–31)
CREAT SERPL-MCNC: 1.28 MG/DL (ref 0.7–1.2)
EOSINOPHILS RELATIVE PERCENT: 2 % (ref 0–4)
GFR AFRICAN AMERICAN: >60 ML/MIN
GFR NON-AFRICAN AMERICAN: 55 ML/MIN
GFR SERPL CREATININE-BSD FRML MDRD: ABNORMAL ML/MIN/{1.73_M2}
GLUCOSE BLD-MCNC: 206 MG/DL (ref 75–110)
GLUCOSE BLD-MCNC: 95 MG/DL (ref 75–110)
GLUCOSE BLD-MCNC: 97 MG/DL (ref 70–99)
HCT VFR BLD CALC: 28.6 % (ref 41–53)
HEMOGLOBIN: 9 G/DL (ref 13.5–17.5)
LYMPHOCYTES # BLD: 18 % (ref 24–44)
MCH RBC QN AUTO: 25.3 PG (ref 26–34)
MCHC RBC AUTO-ENTMCNC: 31.6 G/DL (ref 31–37)
MCV RBC AUTO: 80.3 FL (ref 80–100)
MONOCYTES # BLD: 13 % (ref 1–7)
PDW BLD-RTO: 16.5 % (ref 11.5–14.9)
PLATELET # BLD: 127 K/UL (ref 150–450)
PMV BLD AUTO: 7.9 FL (ref 6–12)
POTASSIUM SERPL-SCNC: 4.8 MMOL/L (ref 3.7–5.3)
RBC # BLD: 3.56 M/UL (ref 4.5–5.9)
SEG NEUTROPHILS: 66 % (ref 36–66)
SEGMENTED NEUTROPHILS ABSOLUTE COUNT: 4.4 K/UL (ref 1.3–9.1)
SODIUM BLD-SCNC: 142 MMOL/L (ref 135–144)
WBC # BLD: 6.7 K/UL (ref 3.5–11)

## 2022-07-21 PROCEDURE — 97110 THERAPEUTIC EXERCISES: CPT

## 2022-07-21 PROCEDURE — 80048 BASIC METABOLIC PNL TOTAL CA: CPT

## 2022-07-21 PROCEDURE — 2700000000 HC OXYGEN THERAPY PER DAY

## 2022-07-21 PROCEDURE — 99231 SBSQ HOSP IP/OBS SF/LOW 25: CPT | Performed by: INTERNAL MEDICINE

## 2022-07-21 PROCEDURE — 85025 COMPLETE CBC W/AUTO DIFF WBC: CPT

## 2022-07-21 PROCEDURE — 94761 N-INVAS EAR/PLS OXIMETRY MLT: CPT

## 2022-07-21 PROCEDURE — 36415 COLL VENOUS BLD VENIPUNCTURE: CPT

## 2022-07-21 PROCEDURE — 6370000000 HC RX 637 (ALT 250 FOR IP): Performed by: UROLOGY

## 2022-07-21 PROCEDURE — 94669 MECHANICAL CHEST WALL OSCILL: CPT

## 2022-07-21 PROCEDURE — 97530 THERAPEUTIC ACTIVITIES: CPT

## 2022-07-21 PROCEDURE — 82947 ASSAY GLUCOSE BLOOD QUANT: CPT

## 2022-07-21 PROCEDURE — 94640 AIRWAY INHALATION TREATMENT: CPT

## 2022-07-21 PROCEDURE — 2580000003 HC RX 258: Performed by: UROLOGY

## 2022-07-21 PROCEDURE — 97116 GAIT TRAINING THERAPY: CPT

## 2022-07-21 PROCEDURE — 6360000002 HC RX W HCPCS: Performed by: UROLOGY

## 2022-07-21 RX ORDER — GLIPIZIDE 5 MG/1
2.5 TABLET ORAL
Qty: 60 TABLET | Refills: 3 | Status: SHIPPED | OUTPATIENT
Start: 2022-07-21 | End: 2022-08-13

## 2022-07-21 RX ORDER — OXYCODONE HYDROCHLORIDE 5 MG/1
5 TABLET ORAL EVERY 4 HOURS PRN
Qty: 30 TABLET | Refills: 0 | Status: SHIPPED | OUTPATIENT
Start: 2022-07-21 | End: 2022-07-28

## 2022-07-21 RX ORDER — LIDOCAINE 4 G/G
1 PATCH TOPICAL DAILY
Qty: 30 PATCH | Refills: 1 | Status: SHIPPED | OUTPATIENT
Start: 2022-07-22 | End: 2022-08-21

## 2022-07-21 RX ORDER — OXYCODONE HCL 10 MG/1
10 TABLET, FILM COATED, EXTENDED RELEASE ORAL EVERY 12 HOURS SCHEDULED
Qty: 14 TABLET | Refills: 0 | Status: SHIPPED | OUTPATIENT
Start: 2022-07-21 | End: 2022-07-28

## 2022-07-21 RX ORDER — CEFUROXIME AXETIL 500 MG/1
500 TABLET ORAL 2 TIMES DAILY
Qty: 10 TABLET | Refills: 0 | Status: SHIPPED | OUTPATIENT
Start: 2022-07-21 | End: 2022-07-26

## 2022-07-21 RX ORDER — SUCRALFATE 1 G/1
1 TABLET ORAL 4 TIMES DAILY
Qty: 120 TABLET | Refills: 1 | Status: SHIPPED | OUTPATIENT
Start: 2022-07-21 | End: 2022-12-05 | Stop reason: SDUPTHER

## 2022-07-21 RX ORDER — PSEUDOEPHEDRINE HCL 30 MG
100 TABLET ORAL 2 TIMES DAILY PRN
Qty: 60 CAPSULE | Refills: 5 | Status: SHIPPED | OUTPATIENT
Start: 2022-07-21 | End: 2022-08-20

## 2022-07-21 RX ORDER — ONDANSETRON 4 MG/1
4 TABLET, ORALLY DISINTEGRATING ORAL EVERY 8 HOURS PRN
Qty: 20 TABLET | Refills: 2 | Status: SHIPPED | OUTPATIENT
Start: 2022-07-21 | End: 2022-12-05 | Stop reason: SDUPTHER

## 2022-07-21 RX ADMIN — AMLODIPINE BESYLATE 10 MG: 10 TABLET ORAL at 07:48

## 2022-07-21 RX ADMIN — OXYCODONE HYDROCHLORIDE 10 MG: 10 TABLET, FILM COATED, EXTENDED RELEASE ORAL at 07:48

## 2022-07-21 RX ADMIN — MULTIPLE VITAMINS W/ MINERALS TAB 1 TABLET: TAB at 07:48

## 2022-07-21 RX ADMIN — PREGABALIN 75 MG: 75 CAPSULE ORAL at 07:52

## 2022-07-21 RX ADMIN — Medication 1 CAPSULE: at 07:48

## 2022-07-21 RX ADMIN — ATORVASTATIN CALCIUM 40 MG: 40 TABLET, FILM COATED ORAL at 07:46

## 2022-07-21 RX ADMIN — LOSARTAN POTASSIUM 50 MG: 50 TABLET, FILM COATED ORAL at 07:48

## 2022-07-21 RX ADMIN — SUCRALFATE 1 G: 1 TABLET ORAL at 12:19

## 2022-07-21 RX ADMIN — DONEPEZIL HYDROCHLORIDE 10 MG: 10 TABLET ORAL at 07:48

## 2022-07-21 RX ADMIN — SODIUM CHLORIDE, PRESERVATIVE FREE 10 ML: 5 INJECTION INTRAVENOUS at 07:52

## 2022-07-21 RX ADMIN — FLUOXETINE 20 MG: 20 CAPSULE ORAL at 07:48

## 2022-07-21 RX ADMIN — CEFEPIME HYDROCHLORIDE 1000 MG: 1 INJECTION, POWDER, FOR SOLUTION INTRAMUSCULAR; INTRAVENOUS at 13:20

## 2022-07-21 RX ADMIN — IPRATROPIUM BROMIDE AND ALBUTEROL SULFATE 3 ML: 2.5; .5 SOLUTION RESPIRATORY (INHALATION) at 15:11

## 2022-07-21 RX ADMIN — PANTOPRAZOLE SODIUM 40 MG: 40 TABLET, DELAYED RELEASE ORAL at 05:21

## 2022-07-21 RX ADMIN — OXYCODONE HYDROCHLORIDE 5 MG: 5 TABLET ORAL at 13:17

## 2022-07-21 RX ADMIN — SUCRALFATE 1 G: 1 TABLET ORAL at 05:21

## 2022-07-21 RX ADMIN — LEVOTHYROXINE SODIUM 150 MCG: 0.07 TABLET ORAL at 05:21

## 2022-07-21 ASSESSMENT — PAIN DESCRIPTION - LOCATION: LOCATION: BACK

## 2022-07-21 ASSESSMENT — PAIN SCALES - GENERAL
PAINLEVEL_OUTOF10: 4
PAINLEVEL_OUTOF10: 4

## 2022-07-21 ASSESSMENT — PAIN DESCRIPTION - ORIENTATION: ORIENTATION: MID;UPPER

## 2022-07-21 NOTE — PROGRESS NOTES
Alexis Garcia MD/Wilder Montez MD/ Nicole Alarcon MD/Dr Laurence Ashton APRN AGACRYSTALP-BC, NP-C      Chalino Perea APRN NP-C     Sana Records APRN NP-C                                           Pulmonary Progress Note    Patient - Ramón Novak   Age - 74 y.o.   - 1950  MRN - [id number]  Acct # - [de-identified]  Date of Admission - 2022  1:37 PM    Consulting Service/Physician:       Primary Care Physician: Nitish Hernández DO    SUBJECTIVE:     Chief Complaint:   Chief Complaint   Patient presents with    Abdominal Pain     Subjective:    Dax Matamoros tells me he is feeling better today. Plan is for discharge home. He is being discharged on 4 additional days of Ceftin. He was given a dose of IV Lasix yesterday. He is currently on 2 L nasal cannula pulse ox 93%. He does have oxygen at home. He previously was following with Dr. Selena Berry but plans on following up with our office after discharge per his report. He is afebrile. [facility]  BP (!) 156/65   Pulse 78   Temp 98.2 °F (36.8 °C) (Oral)   Resp 18   Ht 5' 10\" (1.778 m)   Wt 178 lb 9.2 oz (81 kg)   SpO2 93% Comment: on 2lpm. Pt's SPO2 destats to 87% on RA when \"shallow breathing\". Pt states this is baseline. See amb section for SPO2 details. BMI 25.62 kg/m²   Wt Readings from Last 3 Encounters:   22 178 lb 9.2 oz (81 kg)   22 178 lb (80.7 kg)   22 178 lb (80.7 kg)     I/O (24 Hours)    Intake/Output Summary (Last 24 hours) at 2022 1156  Last data filed at 2022 1837  Gross per 24 hour   Intake 793 ml   Output 450 ml   Net 343 ml     Ventilator:   Settings  FiO2 : 21 % (decreased from 3 l)  Exam:   Physical Exam   Constitutional: Sitting up in bed on 2 L in no acute distress  HENT: Unremarkable  Head: Normocephalic and atraumatic. Eyes: EOM are normal. Pupils are equal, round, and reactive to light. Neck: Neck supple.    Cardiovascular:  Regular rate and rhythm. Normal heart tones. No JVD. Pulmonary/Chest: Respirations even and nonlabored, lungs diminished with a few scattered wheezes, on 2 L with pulse ox 93%  Abdominal: Soft. Bowel sounds are normal.   Musculoskeletal: Normal range of motion. Neurological: Patient is alert and oriented to person, place, and time. Skin: Skin is warm and dry. No rash noted.    Extremities: No edema or discoloration  Infusions:      dextrose      sodium chloride       Meds:     Current Facility-Administered Medications:     lidocaine 4 % external patch 1 patch, 1 patch, TransDERmal, Daily, Fer Akbar MD, 1 patch at 07/19/22 1137    ipratropium-albuterol (DUONEB) nebulizer solution 3 mL, 1 vial, Inhalation, 4x daily, Fer Akbar MD, 3 mL at 07/20/22 1942    glipiZIDE (GLUCOTROL) tablet 2.5 mg, 2.5 mg, Oral, BID AC, Fer Akbar MD, 2.5 mg at 07/18/22 1733    docusate sodium (COLACE) capsule 100 mg, 100 mg, Oral, BID PRN, Fer Akbar MD, 100 mg at 07/18/22 1035    cefepime (MAXIPIME) 1000 mg IVPB minibag, 1,000 mg, IntraVENous, Q24H, Fer Akbar MD, Stopped at 07/20/22 1405    oxyCODONE (ROXICODONE) immediate release tablet 5 mg, 5 mg, Oral, Q4H PRN, Fer Akbar MD, 5 mg at 07/20/22 0068    oxyCODONE (OXYCONTIN) extended release tablet 10 mg, 10 mg, Oral, 2 times per day, Fer Akbar MD, 10 mg at 07/21/22 0748    sucralfate (CARAFATE) tablet 1 g, 1 g, Oral, 4 times per day, Fer Akbar MD, 1 g at 07/21/22 0521    [MAR Hold] azithromycin (ZITHROMAX) 500 mg in D5W 250ml addavial, 500 mg, IntraVENous, Q24H, Roderick Metcalf DO, Stopped at 07/19/22 1374    FLUoxetine (PROZAC) capsule 20 mg, 20 mg, Oral, Daily, Fer Akbar MD, 20 mg at 07/21/22 0748    donepezil (ARICEPT) tablet 10 mg, 10 mg, Oral, Daily, Fer Akbar MD, 10 mg at 07/21/22 0748    amLODIPine (NORVASC) tablet 10 mg, 10 mg, Oral, Daily, Fer Akbar MD, 10 mg at 07/21/22 0748    losartan (COZAAR) tablet 50 mg, 50 mg, Oral, Daily, Charisse Campbell MD, 50 mg at 07/21/22 0748    albuterol sulfate HFA (PROVENTIL;VENTOLIN;PROAIR) 108 (90 Base) MCG/ACT inhaler 2 puff, 2 puff, Inhalation, Q4H PRN, Charisse Campbell MD    levothyroxine (SYNTHROID) tablet 150 mcg, 150 mcg, Oral, Daily, Charisse Campbell MD, 150 mcg at 07/21/22 0521    [Held by provider] furosemide (LASIX) tablet 20 mg, 20 mg, Oral, Daily, Roderick T Dixon, DO, 20 mg at 07/15/22 0744    atorvastatin (LIPITOR) tablet 40 mg, 40 mg, Oral, Daily, Charisse Campbell MD, 40 mg at 07/21/22 0746    [Held by provider] apixaban (ELIQUIS) tablet 5 mg, 5 mg, Oral, BID, Roderick T Dixon, DO, 5 mg at 07/18/22 0842    carvedilol (COREG) tablet 6.25 mg, 6.25 mg, Oral, Nightly, Charisse Campbell MD, 6.25 mg at 07/20/22 1938    psyllium (METAMUCIL) 58.12 % packet 1 packet, 1 packet, Oral, Daily, Charisse Campbell MD, 1 packet at 07/19/22 1137    lactobacillus (CULTURELLE) capsule 1 capsule, 1 capsule, Oral, Daily, Charisse Campbell MD, 1 capsule at 07/21/22 0748    therapeutic multivitamin-minerals 1 tablet, 1 tablet, Oral, Daily, Charisse Campbell MD, 1 tablet at 07/21/22 0748    pantoprazole (PROTONIX) tablet 40 mg, 40 mg, Oral, QAM AC, Charisse Campbell MD, 40 mg at 07/21/22 0521    budesonide-formoterol (SYMBICORT) 80-4.5 MCG/ACT inhaler 2 puff, 2 puff, Inhalation, BID, Charisse Campbell MD, 2 puff at 07/20/22 1942    glucose chewable tablet 16 g, 4 tablet, Oral, PRN, Charisse Campbell MD, 16 g at 07/15/22 1233    dextrose bolus 10% 125 mL, 125 mL, IntraVENous, PRN **OR** dextrose bolus 10% 250 mL, 250 mL, IntraVENous, PRN, Charisse Campbell MD    glucagon (rDNA) injection 1 mg, 1 mg, IntraMUSCular, PRN, Charisse Campbell MD    dextrose 5 % solution, 100 mL/hr, IntraVENous, PRN, Charisse Campbell MD    sodium chloride flush 0.9 % injection 5-40 mL, 5-40 mL, IntraVENous, 2 times per day, Charisse Campbell MD, 10 mL at 07/21/22 0752    sodium chloride flush 0.9 % injection 5-40 mL, 5-40 mL, IntraVENous, PRN, Charisse Campbell MD 0.9 % sodium chloride infusion, , IntraVENous, PRN, Yodit Kirkland MD    acetaminophen (TYLENOL) tablet 650 mg, 650 mg, Oral, Q4H PRN, Yodit Kirkland MD, 650 mg at 07/15/22 0744    ondansetron (ZOFRAN-ODT) disintegrating tablet 4 mg, 4 mg, Oral, Q8H PRN **OR** ondansetron (ZOFRAN) injection 4 mg, 4 mg, IntraVENous, Q6H PRN, Yodit Kirkland MD, 4 mg at 07/16/22 3567    pregabalin (LYRICA) capsule 75 mg, 75 mg, Oral, BID, Yodit Kirkland MD, 75 mg at 07/21/22 7996    Lab Results:     Lab Results   Component Value Date    WBC 6.7 07/21/2022    HGB 9.0 (L) 07/21/2022    HCT 28.6 (L) 07/21/2022    MCV 80.3 07/21/2022     (L) 07/21/2022     Lab Results   Component Value Date    CALCIUM 8.3 (L) 07/21/2022     07/21/2022    K 4.8 07/21/2022    CO2 24 07/21/2022     (H) 07/21/2022    BUN 23 07/21/2022    CREATININE 1.28 (H) 07/21/2022       Lab Results   Component Value Date    INR 1.1 06/13/2022    PROTIME 14.5 (H) 06/13/2022       Radiology:         ASSESSMENT:       LLL Pneumonia, improving  Suspected COPD- no documented PFT  Acute on chronic Hypoxic respiratory failure- currently on 2LNC  Recurrent diffuse large B-cell lymphoma/follicular lymphoma- treated with Rituxan- follows with Dr. Juli Forrest  Abdominal Pain- CT with increasing retroperitoneal mass  Recent LCEA 6/2022  Nocturnal Hypoxemia- on 5LNC at night  History of tobacco use- quit 15 years ago- 60 pk year  Question of KATIE with prior home PSG  History of hydronephrosis with ureteral stents, Dr. Vesta Hoang  CKD  Anemia  History of DVT  History of diastolic CHF  Full Code    PLAN:   Should continue his Lasix after discharge  Has oxygen at home, should use 2 L during the day, continue oxygen at night  Titrate oxygen to keep pulse ox above 89%  Ceftin as ordered at discharge  Will order flutter valve  Continue inhalers at discharge  Outpatient oncology follow-up  Outpatient follow-up in our office in 2 to 4 weeks, [phone]      Electronically signed by EDWIGE Villeda CNP on 07/21/22     This progress note was completed using a voice transcription system. Every effort was made to ensure accuracy. However, inadvertent computerized transcription errors may be present.     Dori Juares, NP-C, MSN  [facility] Pulmonary, Critical Care [facility] Sleep

## 2022-07-21 NOTE — PROGRESS NOTES
Patient discharges at this time. Midline removed. Discharge instructions reviewed with patient, all questions answered. Scripts sent with patients family. Writer takes patient to main entrance where ride awaits via wheelchair.

## 2022-07-21 NOTE — PROGRESS NOTES
[id number] [facility]      PROGRESS NOTE        Patient:  Bambi Acosta  YOB: 1950    MRN: [id number]     Acct: [de-identified]     Admit date: 7/14/2022    Pt seen and Chart reviewed. Consultant notes reviewed and care evaluated. Subjective: Patient states he is ready for his discharge papers he is ready to go home he talk to his family he says. He wants to go home with VNS not to go to rehab he has no abdominal pain he says no increased shortness of breath his cough is much improved he says. Labs stable for him    Diet:  ADULT DIET;  Regular      Medications:Current Inpatient    Scheduled Meds:   lidocaine  1 patch TransDERmal Daily    ipratropium-albuterol  1 vial Inhalation 4x daily    glipiZIDE  2.5 mg Oral BID AC    cefepime  1,000 mg IntraVENous Q24H    oxyCODONE  10 mg Oral 2 times per day    sucralfate  1 g Oral 4 times per day    [facility] Hold] azithromycin  500 mg IntraVENous Q24H    FLUoxetine  20 mg Oral Daily    donepezil  10 mg Oral Daily    amLODIPine  10 mg Oral Daily    losartan  50 mg Oral Daily    levothyroxine  150 mcg Oral Daily    [Held by provider] furosemide  20 mg Oral Daily    atorvastatin  40 mg Oral Daily    [Held by provider] apixaban  5 mg Oral BID    carvedilol  6.25 mg Oral Nightly    psyllium  1 packet Oral Daily    lactobacillus  1 capsule Oral Daily    therapeutic multivitamin-minerals  1 tablet Oral Daily    pantoprazole  40 mg Oral QAM AC    budesonide-formoterol  2 puff Inhalation BID    sodium chloride flush  5-40 mL IntraVENous 2 times per day    pregabalin  75 mg Oral BID     Continuous Infusions:   dextrose      sodium chloride       PRN Meds:docusate sodium, oxyCODONE, albuterol sulfate HFA, glucose, dextrose bolus **OR** dextrose bolus, glucagon (rDNA), dextrose, sodium chloride flush, sodium chloride, acetaminophen, ondansetron **OR** ondansetron        Physical Exam:  Vitals: BP (!) 156/65   Pulse 78 Temp 98.2 °F (36.8 °C) (Oral)   Resp 18   Ht 5' 10\" (1.778 m)   Wt 178 lb 9.2 oz (81 kg)   SpO2 93%   BMI 25.62 kg/m²   24 hour intake/output:  Intake/Output Summary (Last 24 hours) at 7/21/2022 0740  Last data filed at 7/20/2022 1837  Gross per 24 hour   Intake 1708 ml   Output 450 ml   Net 1258 ml     Last 3 weights:   Wt Readings from Last 3 Encounters:   07/18/22 178 lb 9.2 oz (81 kg)   07/06/22 178 lb (80.7 kg)   06/29/22 178 lb (80.7 kg)       Physical Examination:   General appearance - alert, well appearing, and in no distress  Mental status - alert, oriented to person, place, and time  oropharynx clear, no lesions  Chest - clear to auscultation, no wheezes, rales or rhonchi, symmetric air entry  Heart - normal rate, regular rhythm, normal S1, S2, no murmurs, rubs, clicks or gallops  Abdomen - soft, nontender, nondistended, no masses or organomegaly reproduce any tenderness today  Neurological - alert, oriented, normal speech, no focal findings or movement disorder noted}  Extremities - peripheral pulses normal, swollen pitting but no calf tenderness edema, no clubbing or cyanosis  Skin - normal coloration and turgor, no rashes, no suspicious skin lesions noted      Component Value Units   POC Glucose Fingerstick [[id number]]    Collected: 07/21/22 0642    Updated: 07/21/22 0649     POC Glucose 95 mg/dL   Basic Metabolic Panel [[id number]] (Abnormal)    Collected: 07/21/22 0454    Updated: 07/21/22 0536    Specimen Source: Blood     Glucose 97 mg/dL    BUN 23 mg/dL    Creatinine 1.28 High  mg/dL    Calcium 8.3 Low  mg/dL    Sodium 142 mmol/L    Potassium 4.8 mmol/L    Chloride 110 High  mmol/L    CO2 24 mmol/L    Anion Gap 8 Low  mmol/L    GFR Non-African American 55 Low  mL/min    GFR African American >60 mL/min    GFR Comment         Comment: Average GFR for 79or more years old:    76 mL/min/1.73sq m   Chronic Kidney Disease:    <60 mL/min/1.73sq m   Kidney failure:    <15 mL/min/1.73sq m Radiology exam is complete. No Radiologist dictation. Please follow up   with ordering provider. Assessment:  Principal Problem:    Pneumonia  Resolved Problems:    * No resolved hospital problems. *   No resolved hospital problems. *    * No resolved hospital problems. *    * No resolved hospital problems. 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    Hydronephrosis due to obstruction of ureter N13.1    Moderate malnutrition ([facility]) E44.0    Chronic cholecystitis K81.1    Pleural effusion J90    Cardiomegaly I51.7    Hypothyroidism E03.9    Diabetes mellitus ([facility]) E11.9    Hypertensive urgency I16.0    Acute respiratory failure with hypoxia ([facility]) J96.01    Hypoxia R09.02    Carotid stenosis, asymptomatic, bilateral I65.23    Simple chronic bronchitis ([facility]) J41.0    Dependence on nocturnal oxygen therapy Z99.81    Pneumonia J18.9      Pneumonia    Abdominal pads may be increased in size    History of lymphoma  Abdominal pain and neuropathic pain    History of postherpetic neuralgia    History of hydronephrosis but no evidence of that on recent CT except enlargement of the medicine abdomen    Dementia patient is alert and oriented this morning  UTI  inTractable abdominal pain yesterday now it is better with pain meds  On the sore is going adjust his pain medications to higher dose  Uretral stent possibly changed or removed on Wednesday by Dr. Alon Hung  Renal insufficiency we will monitor  Renal insufficiency could be second to his mass and he has a history of stenting    Is continue with elevation is likely is also secondary to mass    His pain and nausea much improved  Some upper to lower back pain mostly positional and straining.   It was away when he walks does not seem from his mass  Low back pain probably just secondary to him being in bed and positional  Status post cystoscopy and stent replacement  Patient is feeling much better with no pain this morning he is ready to go home               Plan:  On discharge today    With VNS at home and physical therapy    Continue with his antibiotic list Ceftin 500 p.o. twice daily for 4 more days    Continue with his home meds    Continue with his pain regiment second to his malignancy increasing mass and increasing pain    Oarrs checked Narcan prescribed    KRISTI De La Cruz             7/21/2022, 7:40 AM

## 2022-07-21 NOTE — PROGRESS NOTES
Today's Date: 7/21/2022  Patient Name: Susana Fernandez  Date of admission: 7/14/2022  1:37 PM  Patient's age: 74 y.o., 1950  Admission Dx: Pneumonia [J18.9]  Pneumonia of left lower lobe due to infectious organism [J18.9]  Nausea and vomiting, intractability of vomiting not specified, unspecified vomiting type [R11.2]    Reason for Consult: management recommendations  Requesting Physician: Anita Braga DO    CHIEF COMPLAINT: Abdominal pain. Progressive disease      Interval history  Patient is seen and evaluated. Pain is controlled. Surgery was done today and he did very well. Pain is well controlled on current medication  Looking at discharge plans        HISTORY OF PRESENT ILLNESS:      The patient is a 74 y.o.  male who is admitted to the hospital for chief complaints of worsening abdominal pain. Patient is well-known to our practice. Patient has history of recurrent diffuse large B-cell lymphoma. At time of relapse biopsy showed follicular lymphoma. Patient's treatment history is as below. Most recently patient was treated with Rituxan followed by Rituxan maintenance. Patient was last seen in office back in March. CT scans from February showed stable disease patient's last reduction infusion was back in May 2022. Patient was recently discharged to the hospital on the 29th after undergoing a left carotid endarterectomy back in June the patient is now admitted with worsening of abdominal pain. Patient underwent CT scan in the ER 20 CT of abdomen pelvis without contrast which shows marked interval increase of infiltrative retroperitoneal mass compatible with known history of lymphoma. The mass now partially enveloped the left kidney but no hydronephrosis is noted. The mass is also abutting the duodenum. Patient's lab work-up shows hemoglobin of 12.8. MCV 78.5.   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 Evidence of relapse with biopsy-proven follicular lymphoma. 1-2 from treated medically lymph node biopsy July 6, 2021     CURRENT THERAPY:    1.   Observation. 2.   Status post treatment with Bexxar in May 2008. 3.   Status post previous treatment with Rituxan. 4.  Start treatment with Rituxan August 23, 2021. completed4 weeks of rituximab on 9/20/2021  5. Induction Rituxan is followed by consolidation Rituxan maintenance      Past Medical History:   has a past medical history of Arthritis, Cancer ([facility] [address] Chemotherapy management, encounter for, CHF (congestive heart failure) ([facility] [address] COPD (chronic obstructive pulmonary disease) ([facility] [address] Diabetes mellitus ([facility] [address] H/O cardiovascular stress test, History of non-Hodgkin's lymphoma, Hx of blood clots, Hyperlipidemia, Hypertension, Hypothyroidism, and On home O2. Past Surgical History:   has a past surgical history that includes hernia repair; IR BIOPSY ABDOMINAL/RETROPERITONEAL MASS PERCUTANEOUS (7/6/2021); IR PORT PLACEMENT > 5 YEARS (8/31/2021); Colonoscopy (Left, 9/24/2021); Cystoscopy (Right, 10/6/2021); Cholecystectomy, laparoscopic (N/A, 10/9/2021); Tonsillectomy; Cystoscopy (Bilateral, 11/24/2021); Cystoscopy (Bilateral, 2/16/2022); eye surgery; Carotid endarterectomy (Left, 6/28/2022); and Cystoscopy (N/A, 7/20/2022). Medications:    Prior to Admission medications    Medication Sig Start Date End Date Taking? Authorizing Provider   apixaban (ELIQUIS) 5 MG TABS tablet Take 1 tablet by mouth in the morning and 1 tablet before bedtime. 7/21/22  Yes Roderickevgeny Metcalf, DO   glipiZIDE (GLUCOTROL) 5 MG tablet Take 0.5 tablets by mouth in the morning and 0.5 tablets in the evening. Take before meals. 7/21/22  Yes Roderick T Dixon, DO   sucralfate (CARAFATE) 1 GM tablet Take 1 tablet by mouth in the morning and 1 tablet at noon and 1 tablet in the evening and 1 tablet before bedtime.  7/21/22  Yes Roberto Marquez, DO docusate sodium (COLACE, DULCOLAX) 100 MG CAPS Take 100 mg by mouth 2 times daily as needed for Constipation 7/21/22 8/20/22 Yes Roderick Metcalf, DO   lidocaine 4 % external patch Place 1 patch onto the skin in the morning. 7/22/22 8/21/22 Yes Roderick Metcalf, DO   ondansetron (ZOFRAN-ODT) 4 MG disintegrating tablet Take 1 tablet by mouth every 8 hours as needed for Nausea or Vomiting 7/21/22 8/20/22 Yes Roderick HODGE Odeh, DO   oxyCODONE (OXYCONTIN) 10 MG extended release tablet Take 1 tablet by mouth in the morning and 1 tablet before bedtime. Do all this for 7 days. 7/21/22 7/28/22 Yes Roderick Metcalf, DO   oxyCODONE (ROXICODONE) 5 MG immediate release tablet Take 1 tablet by mouth every 4 hours as needed for Pain for up to 7 days. 7/21/22 7/28/22 Yes Roderick Metcalf, DO   cefUROXime (CEFTIN) 500 MG tablet Take 1 tablet by mouth in the morning and 1 tablet before bedtime. Do all this for 5 days. 7/21/22 7/26/22 Yes Roderick Metcalf, DO   Naloxone HCl (NALOXONE OPIATE OVERDOSE KIT) 1 each by Nasal route once for 1 dose 7/21/22 7/21/22 Yes Roderick Metcalf, DO   apixaban (ELIQUIS) 5 MG TABS tablet Take 5 mg by mouth 2 times daily   Yes Historical Provider, MD   carvedilol (COREG) 6.25 MG tablet Take 6.25 mg by mouth at bedtime   Yes Historical Provider, MD   psyllium (KONSYL) 28.3 % PACK Take 1 packet by mouth daily   Yes Historical Provider, MD   Lactobacillus (PROBIOTIC ACIDOPHILUS) CAPS Take 1 caplet by mouth daily   Yes Historical Provider, MD   Multiple Vitamins-Minerals (THERAPEUTIC MULTIVITAMIN-MINERALS) tablet Take 1 tablet by mouth daily   Yes Historical Provider, MD   atorvastatin (LIPITOR) 40 MG tablet Take 1 tablet by mouth daily 6/29/22   Patito Mccloud MD   OXYGEN Inhale 5 L into the lungs at bedtime Nightly & during the day.     Historical Provider, MD   ipratropium-albuterol (DUONEB) 0.5-2.5 (3) MG/3ML SOLN nebulizer solution Inhale 3 mLs into the lungs every 4 hours as needed for Shortness of Breath 2/20/22 Gloria Harley, APRN - CNP   levothyroxine (SYNTHROID) 150 MCG tablet Take 1 tablet by mouth Daily 2/2/22   Duc Jordan,    fluticasone-salmeterol (ADVAIR) 100-50 MCG/DOSE diskus inhaler Inhale 1 puff into the lungs every 12 hours 2/1/22   Duc Jordan, DO   furosemide (LASIX) 20 MG tablet Take 1 tablet by mouth daily 2/1/22   Duc Jordan, DO   pregabalin (LYRICA) 75 MG capsule Take 75 mg by mouth 2 times daily.  Indications: last fill on 4/22/22 #180 for 30 days     Historical Provider, MD   albuterol sulfate HFA (VENTOLIN HFA) 108 (90 Base) MCG/ACT inhaler Inhale 2 puffs into the lungs every 4 hours as needed for Wheezing     Historical Provider, MD   amLODIPine (NORVASC) 10 MG tablet Take 1 tablet by mouth daily 10/12/21   Roderick Metcalf, DO   losartan (COZAAR) 50 MG tablet Take 1 tablet by mouth daily 10/12/21   Roderick Metcalf, DO   donepezil (ARICEPT) 10 MG tablet Take 10 mg by mouth daily  7/11/21   Historical Provider, MD   FLUoxetine (PROZAC) 20 MG capsule Take 20 mg by mouth daily    Historical Provider, MD   omeprazole (PRILOSEC) 20 MG delayed release capsule Take 20 mg by mouth 2 times daily    Historical Provider, MD     Current Facility-Administered Medications   Medication Dose Route Frequency Provider Last Rate Last Admin    lidocaine 4 % external patch 1 patch  1 patch TransDERmal Daily Evangelina Alejandro MD   1 patch at 07/19/22 1137    ipratropium-albuterol (DUONEB) nebulizer solution 3 mL  1 vial Inhalation 4x daily Evangelina Alejandro MD   3 mL at 07/21/22 1511    glipiZIDE (GLUCOTROL) tablet 2.5 mg  2.5 mg Oral BID AC Evangelina Alejandro MD   2.5 mg at 07/18/22 1733    docusate sodium (COLACE) capsule 100 mg  100 mg Oral BID PRN Evangelina Alejandro MD   100 mg at 07/18/22 1035    oxyCODONE (ROXICODONE) immediate release tablet 5 mg  5 mg Oral Q4H PRN Evangelina Alejandro MD   5 mg at 07/21/22 1317    oxyCODONE (OXYCONTIN) extended release tablet 10 mg  10 mg Oral 2 times per day Evangelina Alejandro MD   10 mg at 07/21/22 0748    sucralfate (CARAFATE) tablet 1 g  1 g Oral 4 times per day Evangelina Alejandro MD   1 g at 07/21/22 1219    [MAR Hold] azithromycin (ZITHROMAX) 500 mg in D5W 250ml addavial  500 mg IntraVENous Q24H Roderick T Dixon, DO   Stopped at 07/19/22 1500    FLUoxetine (PROZAC) capsule 20 mg  20 mg Oral Daily Evangelina Alejandro MD   20 mg at 07/21/22 0748    donepezil (ARICEPT) tablet 10 mg  10 mg Oral Daily Evangelina Alejandro MD   10 mg at 07/21/22 0748    amLODIPine (NORVASC) tablet 10 mg  10 mg Oral Daily Evangelina Alejandro MD   10 mg at 07/21/22 0748    losartan (COZAAR) tablet 50 mg  50 mg Oral Daily Evangelina Alejandro MD   50 mg at 07/21/22 0748    albuterol sulfate HFA (PROVENTIL;VENTOLIN;PROAIR) 108 (90 Base) MCG/ACT inhaler 2 puff  2 puff Inhalation Q4H PRN Evangelina Alejandro MD        levothyroxine (SYNTHROID) tablet 150 mcg  150 mcg Oral Daily Evangelina Alejandro MD   150 mcg at 07/21/22 0521    [Held by provider] furosemide (LASIX) tablet 20 mg  20 mg Oral Daily Roderick Metcalf, DO   20 mg at 07/15/22 0744    atorvastatin (LIPITOR) tablet 40 mg  40 mg Oral Daily Evangelina Alejandro MD   40 mg at 07/21/22 0746    [Held by provider] apixaban (ELIQUIS) tablet 5 mg  5 mg Oral BID Roderickevgeny Metcalf, DO   5 mg at 07/18/22 0842    carvedilol (COREG) tablet 6.25 mg  6.25 mg Oral Nightly Evangelina Alejandro MD   6.25 mg at 07/20/22 1938    psyllium (METAMUCIL) 58.12 % packet 1 packet  1 packet Oral Daily Evangelina Alejandro MD   1 packet at 07/19/22 1137    lactobacillus (CULTURELLE) capsule 1 capsule  1 capsule Oral Daily Evangelina Alejandro MD   1 capsule at 07/21/22 0748    therapeutic multivitamin-minerals 1 tablet  1 tablet Oral Daily Evangelina Alejandro MD   1 tablet at 07/21/22 0748    pantoprazole (PROTONIX) tablet 40 mg  40 mg Oral QAM AC Evangelina Alejandro MD   40 mg at 07/21/22 0521    budesonide-formoterol (SYMBICORT) 80-4.5 MCG/ACT inhaler 2 puff  2 puff Inhalation BID Evangelina Alejandro MD   2 puff at 07/20/22 1942    glucose chewable tablet 16 g  4 tablet Oral PRN Nakul Lopez MD   16 g at 07/15/22 1233    dextrose bolus 10% 125 mL  125 mL IntraVENous PRN Nakul Lopez MD        Or    dextrose bolus 10% 250 mL  250 mL IntraVENous PRN Nakul Lopez MD        glucagon (rDNA) injection 1 mg  1 mg IntraMUSCular PRN Nakul Lopez MD        dextrose 5 % solution  100 mL/hr IntraVENous PRN Nakul Lopez MD        sodium chloride flush 0.9 % injection 5-40 mL  5-40 mL IntraVENous 2 times per day Nakul Lopez MD   10 mL at 07/21/22 0752    sodium chloride flush 0.9 % injection 5-40 mL  5-40 mL IntraVENous PRN Nakul Lopez MD        0.9 % sodium chloride infusion   IntraVENous PRN Nakul Lopez MD        acetaminophen (TYLENOL) tablet 650 mg  650 mg Oral Q4H PRN Nakul Lopez MD   650 mg at 07/15/22 0744    ondansetron (ZOFRAN-ODT) disintegrating tablet 4 mg  4 mg Oral Q8H PRN Nakul Lopez MD        Or    [facility]ron [facility]) injection 4 mg  4 mg IntraVENous Q6H PRN Nakul Lopez MD   4 mg at 07/16/22 7049    pregabalin (LYRICA) capsule 75 mg  75 mg Oral BID Nakul Lopez MD   75 mg at 07/21/22 7026       Allergies:  Patient has no known allergies. Social History:   reports that he quit smoking about 16 years ago. His smoking use included cigarettes. He has a 90.00 pack-year smoking history. He has never used smokeless tobacco. He reports current alcohol use of about 1.7 standard drinks per week. He reports current drug use. Drug: Marijuana Sanderson Dexter). Family History: family history includes Alzheimer's Disease in his father; Diabetes in his mother; Heart Disease in his brother and brother. REVIEW OF SYSTEMS:      Constitutional: No fever or chills.  No night sweats, no weight loss   Eyes: No eye discharge, double vision, or eye pain   HEENT: negative for sore mouth, sore throat, hoarseness and voice change   Respiratory: negative for cough , sputum, dyspnea, wheezing, hemoptysis, chest pain   Cardiovascular: negative for chest pain, dyspnea, palpitations, orthopnea, PND   Gastrointestinal: negative for nausea, vomiting, diarrhea, constipation, abdominal pain, Dysphagia, hematemesis and hematochezia. Positive abdominal pain, much improved now  Genitourinary: negative for frequency, dysuria, nocturia, urinary incontinence, and hematuria   Integument: negative for rash, skin lesions, bruises.    Hematologic/Lymphatic: negative for easy bruising, bleeding, lymphadenopathy, or petechiae   Endocrine: negative for heat or cold intolerance,weight changes, change in bowel habits and hair loss   Musculoskeletal: negative for myalgias, arthralgias, pain, joint swelling,and bone pain   Neurological: negative for headaches, dizziness, seizures, weakness, numbness    PHYSICAL EXAM:        BP (!) 131/55   Pulse 81   Temp 98.1 °F (36.7 °C) (Oral)   Resp 18   Ht 5' 10\" (1.778 m)   Wt 178 lb 9.2 oz (81 kg)   SpO2 93%   BMI 25.62 kg/m²    Temp (24hrs), Av.6 °F (37 °C), Min:98.1 °F (36.7 °C), Max:99.4 °F (37.4 °C)      General appearance - well appearing, no in pain or distress   Mental status - alert and cooperative   Eyes - pupils equal and reactive, extraocular eye movements intact   Ears - bilateral TM's and external ear canals normal   Mouth - mucous membranes moist, pharynx normal without lesions   Neck - supple, no significant adenopathy   Lymphatics - no palpable lymphadenopathy, no hepatosplenomegaly   Chest - clear to auscultation, no wheezes, rales or rhonchi, symmetric air entry   Heart - normal rate, regular rhythm, normal S1, S2, no murmurs  Abdomen - soft, nontender, nondistended, no masses or organomegaly   Neurological - alert, oriented, normal speech, no focal findings or movement disorder noted   Musculoskeletal - no joint tenderness, deformity or swelling   Extremities - peripheral pulses normal, no pedal edema, no clubbing or cyanosis   Skin - normal coloration and turgor, no rashes, no suspicious skin lesions noted ,      DATA:      Labs:     Results for orders placed or performed during the hospital encounter of 07/14/22   MRSA DNA Probe, Nasal    Specimen: Nasal   Result Value Ref Range    Specimen Description . NASAL SWAB     MRSA, DNA, Nasal NEGATIVE NEGATIVE   Culture, Respiratory    Specimen: Sputum Expectorated   Result Value Ref Range    Specimen Description . EXPECTORATED SPUTUM     Direct Exam >25 NEUTROPHILS/LPF     Direct Exam < 10 EPITHELIAL CELLS/LPF     Direct Exam MIXED BACTERIAL MORPHOTYPES SEEN ON GRAM STAIN.  (A)     Culture NORMAL RESPIRATORY SHARRI LIGHT GROWTH    CBC with Auto Differential   Result Value Ref Range    WBC 8.7 3.5 - 11.0 k/uL    RBC 5.04 4.5 - 5.9 m/uL    Hemoglobin 12.8 (L) 13.5 - 17.5 g/dL    Hematocrit 39.5 (L) 41 - 53 %    MCV 78.5 (L) 80 - 100 fL    MCH 25.5 (L) 26 - 34 pg    MCHC 32.5 31 - 37 g/dL    RDW 16.4 (H) 11.5 - 14.9 %    Platelets 847 832 - 927 k/uL    MPV 7.7 6.0 - 12.0 fL    Seg Neutrophils 75 (H) 36 - 66 %    Lymphocytes 16 (L) 24 - 44 %    Monocytes 9 (H) 1 - 7 %    Eosinophils % 0 0 - 4 %    Basophils 0 0 - 2 %    Segs Absolute 6.53 1.3 - 9.1 k/uL    Absolute Lymph # 1.39 1.0 - 4.8 k/uL    Absolute Mono # 0.78 0.1 - 1.3 k/uL    Absolute Eos # 0.00 0.0 - 0.4 k/uL    Basophils Absolute 0.00 0.0 - 0.2 k/uL    Morphology ANISOCYTOSIS PRESENT     Morphology 1+ ELLIPTOCYTES    CMP   Result Value Ref Range    Glucose 119 (H) 70 - 99 mg/dL    BUN 27 (H) 8 - 23 mg/dL    Creatinine 1.56 (H) 0.70 - 1.20 mg/dL    Calcium 8.7 8.6 - 10.4 mg/dL    Sodium 140 135 - 144 mmol/L    Potassium 3.9 3.7 - 5.3 mmol/L    Chloride 102 98 - 107 mmol/L    CO2 24 20 - 31 mmol/L    Anion Gap 14 9 - 17 mmol/L    Alkaline Phosphatase 131 (H) 40 - 129 U/L    ALT 6 5 - 41 U/L    AST 15 <40 U/L    Total Bilirubin 0.80 0.3 - 1.2 mg/dL    Total Protein 6.1 (L) 6.4 - 8.3 g/dL    Albumin 3.7 3.5 - 5.2 g/dL    GFR Non- 44 (L) >60 mL/min    GFR  53 (L) >60 mL/min    GFR Comment Lipase   Result Value Ref Range    Lipase 69 (H) 13 - 60 U/L   Lactic Acid   Result Value Ref Range    Lactic Acid 2.0 0.5 - 2.2 mmol/L   Urinalysis with Reflex to Culture    Specimen: Urine   Result Value Ref Range    Color, UA Yellow Yellow    Turbidity UA Clear Clear    Glucose, Ur TRACE (A) NEGATIVE    Bilirubin Urine NEGATIVE NEGATIVE    Ketones, Urine TRACE (A) NEGATIVE    Specific Gravity, UA 1.024 1.000 - 1.030    Urine Hgb MOD (A) NEGATIVE    pH, UA 6.0 5.0 - 8.0    Protein, UA 4+ (A) NEGATIVE    Urobilinogen, Urine Normal Normal    Nitrite, Urine NEGATIVE NEGATIVE    Leukocyte Esterase, Urine TRACE (A) NEGATIVE   Troponin   Result Value Ref Range    Troponin, High Sensitivity 39 (H) 0 - 22 ng/L   Microscopic Urinalysis   Result Value Ref Range    WBC, UA 6 TO 9 /HPF    RBC, UA 21 TO 50 /HPF    Casts UA 0 TO 2 /LPF    Casts UA HYALINE /LPF    Epithelial Cells UA 6 TO 9 /HPF    Bacteria, UA None None    Mucus, UA 1+ (A) None   Troponin   Result Value Ref Range    Troponin, High Sensitivity 39 (H) 0 - 22 ng/L   Troponin   Result Value Ref Range    Troponin, High Sensitivity 43 (H) 0 - 22 ng/L   Lipase   Result Value Ref Range    Lipase 123 (H) 13 - 60 U/L   Procalcitonin   Result Value Ref Range    Procalcitonin 0.10 (H) <0.09 ng/mL   Cortisol Total   Result Value Ref Range    Cortisol 12.7 2.7 - 18.4 ug/dL    Cortisol Collection Info AM    Lactate Dehydrogenase   Result Value Ref Range     (H) 135 - 225 U/L   Uric Acid   Result Value Ref Range    Uric Acid 9.4 (H) 3.4 - 7.0 mg/dL   Basic Metabolic Panel   Result Value Ref Range    Glucose 82 70 - 99 mg/dL    BUN 27 (H) 8 - 23 mg/dL    Creatinine 1.49 (H) 0.70 - 1.20 mg/dL    Calcium 8.0 (L) 8.6 - 10.4 mg/dL    Sodium 140 135 - 144 mmol/L    Potassium 3.7 3.7 - 5.3 mmol/L    Chloride 103 98 - 107 mmol/L    CO2 26 20 - 31 mmol/L    Anion Gap 11 9 - 17 mmol/L    GFR Non-African American 46 (L) >60 mL/min    GFR  56 (L) >60 mL/min    GFR 0. 00 0.0 - 0.2 k/uL   Lipase   Result Value Ref Range    Lipase 125 (H) 13 - 60 U/L   Basic Metabolic Panel   Result Value Ref Range    Glucose 104 (H) 70 - 99 mg/dL    BUN 28 (H) 8 - 23 mg/dL    Creatinine 1.43 (H) 0.70 - 1.20 mg/dL    Calcium 8.0 (L) 8.6 - 10.4 mg/dL    Sodium 139 135 - 144 mmol/L    Potassium 4.8 3.7 - 5.3 mmol/L    Chloride 106 98 - 107 mmol/L    CO2 25 20 - 31 mmol/L    Anion Gap 8 (L) 9 - 17 mmol/L    GFR Non-African American 48 (L) >60 mL/min    GFR  59 (L) >60 mL/min    GFR Comment         CBC with Auto Differential   Result Value Ref Range    WBC 7.0 3.5 - 11.0 k/uL    RBC 4.01 (L) 4.5 - 5.9 m/uL    Hemoglobin 10.3 (L) 13.5 - 17.5 g/dL    Hematocrit 32.2 (L) 41 - 53 %    MCV 80.3 80 - 100 fL    MCH 25.6 (L) 26 - 34 pg    MCHC 31.9 31 - 37 g/dL    RDW 16.2 (H) 11.5 - 14.9 %    Platelets 879 (L) 979 - 450 k/uL    MPV 7.6 6.0 - 12.0 fL    Seg Neutrophils 74 (H) 36 - 66 %    Lymphocytes 14 (L) 24 - 44 %    Monocytes 10 (H) 1 - 7 %    Eosinophils % 2 0 - 4 %    Basophils 0 0 - 2 %    Segs Absolute 5.10 1.3 - 9.1 k/uL    Absolute Lymph # 1.00 1.0 - 4.8 k/uL    Absolute Mono # 0.70 0.1 - 1.3 k/uL    Absolute Eos # 0.10 0.0 - 0.4 k/uL    Basophils Absolute 0.00 0.0 - 0.2 k/uL   Basic Metabolic Panel   Result Value Ref Range    Glucose 76 70 - 99 mg/dL    BUN 26 (H) 8 - 23 mg/dL    Creatinine 1.37 (H) 0.70 - 1.20 mg/dL    Calcium 8.0 (L) 8.6 - 10.4 mg/dL    Sodium 138 135 - 144 mmol/L    Potassium 4.7 3.7 - 5.3 mmol/L    Chloride 108 (H) 98 - 107 mmol/L    CO2 23 20 - 31 mmol/L    Anion Gap 7 (L) 9 - 17 mmol/L    GFR Non-African American 51 (L) >60 mL/min    GFR African American >60 >60 mL/min    GFR Comment         CBC with Auto Differential   Result Value Ref Range    WBC 7.8 3.5 - 11.0 k/uL    RBC 3.81 (L) 4.5 - 5.9 m/uL    Hemoglobin 9.7 (L) 13.5 - 17.5 g/dL    Hematocrit 30.6 (L) 41 - 53 %    MCV 80.4 80 - 100 fL    MCH 25.5 (L) 26 - 34 pg    MCHC 31.7 31 - 37 g/dL    RDW 16.5 (H) 11.5 - 14.9 %    Platelets 227 (L) 787 - 450 k/uL    MPV 8.1 6.0 - 12.0 fL    Seg Neutrophils 69 (H) 36 - 66 %    Lymphocytes 17 (L) 24 - 44 %    Monocytes 11 (H) 1 - 7 %    Eosinophils % 2 0 - 4 %    Basophils 1 0 - 2 %    Segs Absolute 5.37 1.3 - 9.1 k/uL    Absolute Lymph # 1.33 1.0 - 4.8 k/uL    Absolute Mono # 0.86 0.1 - 1.3 k/uL    Absolute Eos # 0.16 0.0 - 0.4 k/uL    Basophils Absolute 0.08 0.0 - 0.2 k/uL    Morphology ANISOCYTOSIS PRESENT     Morphology MICROCYTOSIS PRESENT    Brain Natriuretic Peptide   Result Value Ref Range    Pro-BNP 1,714 (H) <300 pg/mL   Basic Metabolic Panel   Result Value Ref Range    Glucose 131 (H) 70 - 99 mg/dL    BUN 22 8 - 23 mg/dL    Creatinine 1.39 (H) 0.70 - 1.20 mg/dL    Calcium 8.1 (L) 8.6 - 10.4 mg/dL    Sodium 139 135 - 144 mmol/L    Potassium 4.8 3.7 - 5.3 mmol/L    Chloride 110 (H) 98 - 107 mmol/L    CO2 22 20 - 31 mmol/L    Anion Gap 7 (L) 9 - 17 mmol/L    GFR Non-African American 50 (L) >60 mL/min    GFR African American >60 >60 mL/min    GFR Comment         CBC with Auto Differential   Result Value Ref Range    WBC 6.6 3.5 - 11.0 k/uL    RBC 3.70 (L) 4.5 - 5.9 m/uL    Hemoglobin 9.3 (L) 13.5 - 17.5 g/dL    Hematocrit 30.3 (L) 41 - 53 %    MCV 82.0 80 - 100 fL    MCH 25.3 (L) 26 - 34 pg    MCHC 30.8 (L) 31 - 37 g/dL    RDW 16.7 (H) 11.5 - 14.9 %    Platelets 135 (L) 753 - 450 k/uL    MPV 8.3 6.0 - 12.0 fL    Seg Neutrophils 69 (H) 36 - 66 %    Lymphocytes 17 (L) 24 - 44 %    Monocytes 12 (H) 1 - 7 %    Eosinophils % 2 0 - 4 %    Basophils 0 0 - 2 %    Segs Absolute 4.56 1.3 - 9.1 k/uL    Absolute Lymph # 1.12 1.0 - 4.8 k/uL    Absolute Mono # 0.79 0.1 - 1.3 k/uL    Absolute Eos # 0.13 0.0 - 0.4 k/uL    Basophils Absolute 0.00 0.0 - 0.2 k/uL    Morphology HYPOCHROMIA PRESENT     Morphology ANISOCYTOSIS PRESENT    Urinalysis with Reflex to Culture    Specimen: Urine, Cystoscopic   Result Value Ref Range    Color, UA Yellow Yellow    Turbidity UA Clear Clear Glucose, Ur NEGATIVE NEGATIVE    Bilirubin Urine NEGATIVE NEGATIVE    Ketones, Urine NEGATIVE NEGATIVE    Specific Harmon, UA 1.011 1.000 - 1.030    Urine Hgb LARGE (A) NEGATIVE    pH, UA 5.5 5.0 - 8.0    Protein, UA 3+ (A) NEGATIVE    Urobilinogen, Urine Normal Normal    Nitrite, Urine NEGATIVE NEGATIVE    Leukocyte Esterase, Urine TRACE (A) NEGATIVE   Microscopic Urinalysis   Result Value Ref Range    -          WBC, UA 3 to 5 /HPF    RBC, UA 51  /HPF    Epithelial Cells UA 3 to 5 /HPF    Bacteria, UA None None   Basic Metabolic Panel   Result Value Ref Range    Glucose 97 70 - 99 mg/dL    BUN 23 8 - 23 mg/dL    Creatinine 1.28 (H) 0.70 - 1.20 mg/dL    Calcium 8.3 (L) 8.6 - 10.4 mg/dL    Sodium 142 135 - 144 mmol/L    Potassium 4.8 3.7 - 5.3 mmol/L    Chloride 110 (H) 98 - 107 mmol/L    CO2 24 20 - 31 mmol/L    Anion Gap 8 (L) 9 - 17 mmol/L    GFR Non-African American 55 (L) >60 mL/min    GFR African American >60 >60 mL/min    GFR Comment         CBC with Auto Differential   Result Value Ref Range    WBC 6.7 3.5 - 11.0 k/uL    RBC 3.56 (L) 4.5 - 5.9 m/uL    Hemoglobin 9.0 (L) 13.5 - 17.5 g/dL    Hematocrit 28.6 (L) 41 - 53 %    MCV 80.3 80 - 100 fL    MCH 25.3 (L) 26 - 34 pg    MCHC 31.6 31 - 37 g/dL    RDW 16.5 (H) 11.5 - 14.9 %    Platelets 159 (L) 359 - 450 k/uL    MPV 7.9 6.0 - 12.0 fL    Seg Neutrophils 66 36 - 66 %    Lymphocytes 18 (L) 24 - 44 %    Monocytes 13 (H) 1 - 7 %    Eosinophils % 2 0 - 4 %    Basophils 1 0 - 2 %    Segs Absolute 4.40 1.3 - 9.1 k/uL    Absolute Lymph # 1.20 1.0 - 4.8 k/uL    Absolute Mono # 0.90 0.1 - 1.3 k/uL    Absolute Eos # 0.10 0.0 - 0.4 k/uL    Basophils Absolute 0.00 0.0 - 0.2 k/uL   POC Glucose Fingerstick   Result Value Ref Range    POC Glucose 239 (H) 75 - 110 mg/dL   POC Glucose Fingerstick   Result Value Ref Range    POC Glucose 95 75 - 110 mg/dL   POC Glucose Fingerstick   Result Value Ref Range    POC Glucose 87 75 - 110 mg/dL   POC Glucose Fingerstick   Result Value Ref Range    POC Glucose 56 (L) 75 - 110 mg/dL   POC Glucose Fingerstick   Result Value Ref Range    POC Glucose 68 (L) 75 - 110 mg/dL   POC Glucose Fingerstick   Result Value Ref Range    POC Glucose 187 (H) 75 - 110 mg/dL   POC Glucose Fingerstick   Result Value Ref Range    POC Glucose 170 (H) 75 - 110 mg/dL   POC Glucose Fingerstick   Result Value Ref Range    POC Glucose 119 (H) 75 - 110 mg/dL   POC Glucose Fingerstick   Result Value Ref Range    POC Glucose 69 (L) 75 - 110 mg/dL   POC Glucose Fingerstick   Result Value Ref Range    POC Glucose 87 75 - 110 mg/dL   POC Glucose Fingerstick   Result Value Ref Range    POC Glucose 149 (H) 75 - 110 mg/dL   POC Glucose Fingerstick   Result Value Ref Range    POC Glucose 115 (H) 75 - 110 mg/dL   POC Glucose Fingerstick   Result Value Ref Range    POC Glucose 156 (H) 75 - 110 mg/dL   POC Glucose Fingerstick   Result Value Ref Range    POC Glucose 86 75 - 110 mg/dL   POC Glucose Fingerstick   Result Value Ref Range    POC Glucose 159 (H) 75 - 110 mg/dL   POC Glucose Fingerstick   Result Value Ref Range    POC Glucose 126 (H) 75 - 110 mg/dL   POC Glucose Fingerstick   Result Value Ref Range    POC Glucose 122 (H) 75 - 110 mg/dL   POC Glucose Fingerstick   Result Value Ref Range    POC Glucose 91 75 - 110 mg/dL   POC Glucose Fingerstick   Result Value Ref Range    POC Glucose 108 75 - 110 mg/dL   POC Glucose Fingerstick   Result Value Ref Range    POC Glucose 108 75 - 110 mg/dL   POC Glucose Fingerstick   Result Value Ref Range    POC Glucose 135 (H) 75 - 110 mg/dL   POC Glucose Fingerstick   Result Value Ref Range    POC Glucose 155 (H) 75 - 110 mg/dL   POC Glucose Fingerstick   Result Value Ref Range    POC Glucose 105 75 - 110 mg/dL   POC Glucose Fingerstick   Result Value Ref Range    POC Glucose 186 (H) 75 - 110 mg/dL   POC Glucose Fingerstick   Result Value Ref Range    POC Glucose 167 (H) 75 - 110 mg/dL   POC Glucose Fingerstick   Result Value Ref Range POC Glucose 95 75 - 110 mg/dL   POC Glucose Fingerstick   Result Value Ref Range    POC Glucose 206 (H) 75 - 110 mg/dL   EKG 12 Lead   Result Value Ref Range    Ventricular Rate 97 BPM    Atrial Rate 97 BPM    P-R Interval 120 ms    QRS Duration 90 ms    Q-T Interval 378 ms    QTc Calculation (Bazett) 480 ms    P Axis 55 degrees    R Axis -26 degrees    T Axis 21 degrees         IMAGING DATA:    CT ABDOMEN PELVIS WO CONTRAST Additional Contrast? None    Result Date: 7/14/2022  EXAMINATION: CT OF THE ABDOMEN AND PELVIS WITHOUT CONTRAST 7/14/2022 12:39 pm TECHNIQUE: CT of the abdomen and pelvis was performed without the administration of intravenous contrast. Multiplanar reformatted images are provided for review. Automated exposure control, iterative reconstruction, and/or weight based adjustment of the mA/kV was utilized to reduce the radiation dose to as low as reasonably achievable. COMPARISON: 02/16/2022 HISTORY: ORDERING SYSTEM PROVIDED HISTORY: pain TECHNOLOGIST PROVIDED HISTORY: pain Decision Support Exception - unselect if not a suspected or confirmed emergency medical condition->Emergency Medical Condition (MA) Reason for Exam: pain Additional signs and symptoms: PT states abdominal pain and unable to eat x3 days. Relevant Medical/Surgical History: hx of gallbladder removal and bilateral ureteral stents FINDINGS: Lower Chest: Bronchial wall thickening is noted in the lower lungs bilaterally. Debris is noted in the left lower lobe airways. Noncontrast imaging of the base of the heart is unremarkable. Lack of intravenous contrast limits evaluation of the solid abdominal viscera, the hollow abdominal viscera, and the vascular structures. Organs: With that said, no acute hepatic abnormality identified. Gallbladder is unremarkable. Noncontrast imaging of the spleen unremarkable. The left adrenal gland is enveloped by the retroperitoneal soft tissue mass. Right adrenal gland is unremarkable.  Pancreas is partially surrounded by the mass. Visualized portions of the pancreas are unremarkable. The retroperitoneal mass now surrounds and may partially invade the left kidney. With that said, a ureteral stent on the left is noted, and there is no evidence of hydronephrosis. A right ureteral stent is in place, also with no evidence of hydronephrosis. Hyperdense cyst seen within the right kidney, unchanged when compared to multiple previous exams, and requiring no specific follow-up. GI/Bowel: The distal esophagus and stomach are unremarkable appearance. The retroperitoneal mass partially envelops the duodenal sweep, but obstruction does not appear to be present. More distally, the small bowel is normal in caliber. No focal mural thickening identified. Moderate to severe diverticulosis of the large bowel is present without CT evidence of diverticulitis. Large bowel is otherwise unremarkable. Appendix is normal. Pelvis: Ureteral stents are noted within the urinary bladder. Prostate unremarkable. Ill-defined soft tissue is seen within the rightward aspect of the pelvis are noted, increased in size when compared to the previous exam, measuring roughly 5.9 x 3.8 cm (2, 142). Enlarged lymph node in the distal left iliac chain is markedly increased in size, measuring 3.3 x 2.4 cm (2, 127), not well visualized previously. Peritoneum/Retroperitoneum: Very large infiltrative mass within the retroperitoneum is again seen, extending into the mesentery, and completely surrounding the abdominal aorta and inferior vena cava. This mass has become much more consolidated, with the conglomerate now measuring roughly 10.9 x 12.1 cm. No free intraperitoneal air is seen. Bones/Soft Tissues: No acute or suspicious bony abnormalities. The extra-abdominal and extra pelvic soft tissues are unremarkable. Interval marked increased size of the infiltrative retroperitoneal mass, compatible with the known history of lymphoma.  The mass now partially envelops the left kidney, but no hydronephrosis is identified. The mass also is seen abutting the duodenal sweep, but without obvious obstruction. New pelvic lymphadenopathy. The     XR CHEST PORTABLE    Result Date: 7/14/2022  EXAMINATION: ONE XRAY VIEW OF THE CHEST 7/14/2022 11:21 am COMPARISON: 06/13/2022 HISTORY: ORDERING SYSTEM PROVIDED HISTORY: pain TECHNOLOGIST PROVIDED HISTORY: pain Reason for Exam: Pain, abdominal pain. Pt. states history of COPD FINDINGS: Stable right-sided brittany catheter. .The cardiac size is normal. No  pleural effusions are seen. Pulmonary vascularity appears stable. Mild infiltrate in the left lower lobe. Olamide Red Olamide Red No acute bony abnormalities. The hilar structures are normal.     Mild left lower lobe infiltrate suggesting early pneumonia         IMPRESSION:   Primary Problem  Pneumonia    Active Hospital Problems    Diagnosis Date Noted    Pneumonia [J18.9] 07/14/2022     Priority: Medium       Progressive non-Hodgkin lymphoma per CT scan  Pneumonia per chest x-ray  CKD  Intractable pain pneumonia  S/p carotid endarterectomy-6/2022    RECOMMENDATIONS:  \As discussed previously, the patient's progressive lymphoma causing obstructive uropathy   Stents were replaced  Will need systemic therapy after discharge  Back on Eliquis  Okay to discharge from my perspective, appointment with Dr. Senia Mcfarland on 7/25               Discussed with patient and spouse and Nurse. Thank you for asking us to see this patient. Perry Colon MD  Hematologist/Medical [id number] [facility] hematology oncology physicians            This note is created with the assistance of a speech recognition program.  While intending to generate a document that actually reflects the content of the visit, the document can still have some errors including those of syntax and sound a like substitutions which may escape proof reading.   It such instances, actual meaning can be extrapolated by contextual

## 2022-07-21 NOTE — ANESTHESIA POSTPROCEDURE EVALUATION
Department of Anesthesiology  Postprocedure Note    Patient: Richard Mandujano  MRN: [id number]  YOB: 1950  Date of evaluation: 7/21/2022      Procedure Summary     Date: 07/20/22 Room / Location: [address] [place] [address] / [facility]: DESTINEE POTTER    Anesthesia Start: 9331 Anesthesia Stop: 1518    Procedure: CYSTOSCOPY BILATERAL STENT EXCHANGE ([place]) Diagnosis:       Hydronephrosis, unspecified hydronephrosis type      (Hydronephrosis, unspecified hydronephrosis type [N13.30])    Surgeons: Jayshree Gómez MD Responsible Provider: Dewayne Mclaughlin MD    Anesthesia Type: general, MAC ASA Status: 3          Anesthesia Type: No value filed. Yvette Phase I: Yvette Score: 8    Yvette Phase II:        Anesthesia Post Evaluation    Comments: POD #1 Patient seen sitting up in chair and eating. Ready for discharge. No anesthesia related issues.

## 2022-07-21 NOTE — DISCHARGE INSTRUCTIONS
Your information:  Name: Hillary Buck  : 1950    Your instructions:    ***    What to do after you leave the hospital:    Recommended diet: regular diet    Recommended activity: activity as tolerated        The following personal items were collected during your admission and were returned to you:    Belongings  Dental Appliances: None  Vision - Corrective Lenses: None  Hearing Aid: None  Clothing: Footwear, Shirt, Shorts  Jewelry: Ring (one ring left hand third finger)  Body Piercings Removed: N/A  Electronic Devices: None  Weapons (Notify Protective Services/Security): None  Other Valuables: Other (Comment) (telemetry removed and placed in bag hanging on cart)  Home Medications: None  Valuables Given To: Stretcher  Responsible person(s) in the waiting room: wife Melissa  Patient approves for provider to speak to responsible person post operatively: Yes    Information obtained by:  By signing below, I understand that if any problems occur once I leave the hospital I am to contact my PCP or got to the ER. I understand and acknowledge receipt of the instructions indicated above.

## 2022-07-21 NOTE — PROGRESS NOTES
Physical Therapy  Facility/Department: [facility] MED SURG  Daily Treatment Note  NAME: Brielle Jon  : 1950  MRN: [id number]    Date of Service: 2022    Discharge Recommendations:  Home with assist PRN        Patient Diagnosis(es): The primary encounter diagnosis was Nausea and vomiting, intractability of vomiting not specified, unspecified vomiting type. Diagnoses of Pneumonia of left lower lobe due to infectious organism, Non-Hodgkin's lymphoma, unspecified body region, unspecified non-Hodgkin lymphoma type ([address] and Hydronephrosis, unspecified hydronephrosis type were also pertinent to this visit. Assessment   Activity Tolerance: Patient tolerated treatment well     Plan    Plan  Plan: 1 time a day 3-6 times a week  Current Treatment Recommendations: Strengthening;Balance training;Functional mobility training;Transfer training;Neuromuscular re-education;Stair training;Gait training     Restrictions  Restrictions/Precautions  Restrictions/Precautions: General Precautions (Patient curently receiving chemotherapy for a lymphoma tumor in abdomen)     Subjective    Subjective  Subjective: Pt is pleasant and agreeable to PT. Pain: \"3-4/10 Upper/Mid back\". Orientation  Overall Orientation Status: Within Normal Limits     Objective   Vitals  BP Location: Left upper arm  SpO2: 93 % (on 2lpm. Pt's SPO2 destats to 87% on RA when \"shallow breathing\". Pt states this is baseline. See amb section for SPO2 details.)  O2 Device: Nasal cannula  Bed Mobility Training  Bed Mobility Training: Yes  Rolling: Modified independent  Supine to Sit: Modified independent  Sit to Supine: Modified independent  Scooting: Modified independent  Duration: 15 (Pt's SPO2 destats to 87% while sitting EOB d/t \"shallow breathing\". Cues for proper breathing technique with SPO2 increasing to 90%. )  Balance  Sitting: Without support (SPO2 destats to 87-89% on RA d/t \"shallow breathing\" per pt.  Pt states this is baseline.)  Standing: With support  Transfer Training  Transfer Training: Yes  Overall Level of Assistance: Modified independent (Good brake management noted.)  Sit to Stand: Modified independent  Stand to Sit: Modified independent  Gait Training  Gait Training: Yes  Gait  Overall Level of Assistance: Supervision  Interventions: Verbal cues; Safety awareness training  Base of Support: Widened  Speed/Marta: Slow  Step Length: Left shortened;Right shortened (quick, short steps with decrease heel strike.)  Swing Pattern: Left symmetrical;Right symmetrical  Gait Abnormalities:  (Pt reports SOB, however states \"it's about baseline\". SPO2 93-94%% on 2lpm and 89-91% on RA. Pt's HR is ~100 with amb. RN informed.)  Distance (ft): 150 Feet (x2)  Assistive Device: mitzi Layton     PT Exercises  Exercise Treatment: Bed mob x2. Dynamic Standing Balance Exercises: Functional dyn act x1. No LOB noted. Standing Open/Closed Kinetic Chain Exercises: BLE 15-20 reps with walker support. SPO2 ranges 87-91% on RA. Cues for breathing technique with good carryover. 07/21/22 1028   [facility] Mobility Inpatient    How much difficulty turning over in bed? 4   How much difficulty sitting down on / standing up from a chair with arms? 4   How much difficulty moving from lying on back to sitting on side of bed? 4   How much help from another person moving to and from a bed to a chair? 3   How much help from another person needed to walk in hospital room? 3   How much help from another person for climbing 3-5 steps with a railing?  3   [facility] Inpatient Mobility Raw Score  21   [facility] Inpatient T-Scale Score  50.25   Mobility Inpatient CMS 0-100% Score 28.97   Mobility Inpatient CMS G-Code Modifier  CJ       Goals  Short Term Goals  Short term goal 1: Patient to demonstrate IND with ambulation x75ft  Short term goal 2: Patient to demonstrate good standing dynamic balance  Short term goal 3: Patient to demonstrate IND with transfers    Education  Patient Education  Education Given To: Patient; Family  Education Provided: Transfer Training;Role of Therapy;Home Exercise Program;Energy Conservation; Fall Prevention Strategies  Education Provided Comments: Edu pt on the importance of energy conservation with functional mobility, breathing techniques,  Education Method: Demonstration  Barriers to Learning: Cognition  Education Outcome: Verbalized understanding;Continued education needed    Therapy Time   Individual Concurrent Group Co-treatment   Time In 0836         Time Out 0925         Minutes [address] [place] [address]

## 2022-07-21 NOTE — PROGRESS NOTES
Writer spoke with Dr. Robert Beasley regarding discharge. Per Dr. Robert Beasley, okay to discharge patient and follow up in 6 weeks. Follow up added to discharge instructions.

## 2022-07-21 NOTE — CARE COORDINATION
ONGOING DISCHARGE PLAN:    Patient is alert and oriented x4. Spoke with patient regarding discharge plan and patient confirms that plan is still home with [facility]. Notified Susi from [facility] that pt will discharge home today. POD#1 cysto with bilat stent exchange. Will continue to follow for additional discharge needs.     Electronically signed by Kavon Candelario RN on 7/21/2022 at 2:00 PM

## 2022-07-21 NOTE — PROGRESS NOTES
Patient arrived to 2060 from PCU via wheelchair. Bed in low locked postion with one rail up. Patient acclimated to room and call light. All questions answered to patient satisfaction.

## 2022-07-22 ENCOUNTER — TELEPHONE (OUTPATIENT)
Dept: CASE MANAGEMENT | Age: 72
End: 2022-07-22

## 2022-07-22 NOTE — TELEPHONE ENCOUNTER
Name: Albino Matta  : 1950  MRN: [id number]    On  Navigator reaching out to pt. And spouse and introducing self  and contact information included. Pt. To F/U with MO  and will plan to follow. Writer offering examples of resources available to pt. [facility] Nurse coming to home today. No questions per pt. Or spouse at this time.      Electronically signed by Bernarda Rodriguez RN on 2022 at 10:46 AM

## 2022-07-25 ENCOUNTER — OFFICE VISIT (OUTPATIENT)
Dept: ONCOLOGY | Age: 72
End: 2022-07-25
Payer: MEDICARE

## 2022-07-25 ENCOUNTER — TELEPHONE (OUTPATIENT)
Dept: ONCOLOGY | Age: 72
End: 2022-07-25

## 2022-07-25 VITALS
HEART RATE: 65 BPM | DIASTOLIC BLOOD PRESSURE: 52 MMHG | TEMPERATURE: 97.2 F | OXYGEN SATURATION: 95 % | SYSTOLIC BLOOD PRESSURE: 87 MMHG | WEIGHT: 173.7 LBS | BODY MASS INDEX: 24.92 KG/M2

## 2022-07-25 DIAGNOSIS — C85.90 NON-HODGKIN'S LYMPHOMA, UNSPECIFIED BODY REGION, UNSPECIFIED NON-HODGKIN LYMPHOMA TYPE (HCC): Primary | ICD-10-CM

## 2022-07-25 DIAGNOSIS — R09.02 HYPOXIA: ICD-10-CM

## 2022-07-25 DIAGNOSIS — C82.13 FOLLICULAR LYMPHOMA GRADE II OF INTRA-ABDOMINAL LYMPH NODES (HCC): ICD-10-CM

## 2022-07-25 PROCEDURE — 99211 OFF/OP EST MAY X REQ PHY/QHP: CPT | Performed by: INTERNAL MEDICINE

## 2022-07-25 PROCEDURE — 1111F DSCHRG MED/CURRENT MED MERGE: CPT | Performed by: INTERNAL MEDICINE

## 2022-07-25 PROCEDURE — 1123F ACP DISCUSS/DSCN MKR DOCD: CPT | Performed by: INTERNAL MEDICINE

## 2022-07-25 PROCEDURE — G8420 CALC BMI NORM PARAMETERS: HCPCS | Performed by: INTERNAL MEDICINE

## 2022-07-25 PROCEDURE — G8427 DOCREV CUR MEDS BY ELIG CLIN: HCPCS | Performed by: INTERNAL MEDICINE

## 2022-07-25 PROCEDURE — 3017F COLORECTAL CA SCREEN DOC REV: CPT | Performed by: INTERNAL MEDICINE

## 2022-07-25 PROCEDURE — 99214 OFFICE O/P EST MOD 30 MIN: CPT | Performed by: INTERNAL MEDICINE

## 2022-07-25 PROCEDURE — 1036F TOBACCO NON-USER: CPT | Performed by: INTERNAL MEDICINE

## 2022-07-25 RX ORDER — MEGESTROL ACETATE 40 MG/ML
400 SUSPENSION ORAL DAILY
Qty: 240 ML | Refills: 3 | Status: SHIPPED | OUTPATIENT
Start: 2022-07-25

## 2022-07-25 NOTE — TELEPHONE ENCOUNTER
AVS from 07/25/22    Dietitian  [place]  Discontinue Rituxan  Start Imbruvica  RV 4-6 weeks with cbc, CMP at [employer] will follow referral for Dietitian  Notified Pharmacy, spoke with Dar Orellana [facility]  RV scheduled for 08/22/22 at 10:30. Draw cbc, cmp.     Electronically signed by Den Cardona on 7/25/2022 at 1:15 PM

## 2022-07-25 NOTE — PROGRESS NOTES
Chief Complaint   Patient presents with    Follow-Up from Hospital     Patient was in for abdomen and back pain, he was in [address] [facility].             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 Evidence of relapse with biopsy-proven follicular lymphoma. 1-2 from treated medically lymph node biopsy July 6, 2021    CURRENT THERAPY:    1. Observation. 2. Status post treatment with Bexxar in May 2008. 3. Status post previous treatment with Rituxan. 4.  Start treatment with Rituxan August 23, 2021. completed4 weeks of rituximab on 9/20/2021  5. Induction Rituxan is followed by consolidation Rituxan maintenance   6. Due to progression we will start Imbruvica July 2022. INTERIM HISTORY:  The patient is seen for follow-up of his lymphoma. He was treated medically because of abdominal pain. Further work-up showed enlarged lymph nodes in the retroperitoneum and iliac area. Biopsy proved recurrence of follicular lymphoma. Patient did well on rituximab. Clinically he is stable. No significant abdominal pain at the present time. No GI symptoms. No urinary symptoms. He is much better with use of Norco). Patient was continued on rituximab every 2 months with plan to be continued for for 2 years. Repeated CT scan February 2022 showed stable disease. Recent hospitalization with repeated CT scans significant progression. Currently patient is having abdominal discomfort with shortness of breath and hypoxia. He is using oxygen by nasal cannula. REVIEW OF SYSTEMS:   General: No weight loss. Significant shortness of breath. Eyes: No blurred vision, eye pain or double vision. Ears: No hearing problems or drainage. No tinnitus. Throat: No sore throat, problems with swallowing or dysphagia. Respiratory: As above. Cardiovascular: No chest pain, orthopnea or PND. No lower extremity edema. No palpitation. Gastrointestinal: No problems with swallowing.  No abdominal pain or bloating. No nausea or vomiting. No diarrhea or constipation. No GI bleeding. Genitourinary: No dysuria, hematuria, frequency or urgency. Musculoskeletal: No muscle aches or pains. No limitation of movement. No back pain. Dermatologic: No skin rashes or pruritus. No skin lesions or discolorations. Psychiatric: No depression, anxiety, or stress or signs of schizophrenia. Hematologic: No history of bleeding tendency. No bruises or ecchymosis. No history of clotting problems. Infectious disease: No fever, chills or frequent infections. Endocrine: No problems with opacity. No polydipsia or polyuria. Neurologic: No headaches or dizziness. No weakness or numbness of the extremities. SOCIAL HISTORY:  No smoking, no alcohol drinking. PHYSICAL EXAM: Patient is having significant shortness of breath using oxygen by portable oxygen tank. Vital signs: Blood pressure (!) 87/52, pulse 65, temperature 97.2 °F (36.2 °C), temperature source Temporal, weight 173 lb 11.2 oz (78.8 kg), SpO2 95 %. HEENT:  Eyes are normal. Ears, nose and throat are normal.  Neck: Supple. No lymph node enlargement. No thyroid enlargement. Trachea is centrally located. Chest: Decreased air entry with hypoxia heart: Regular sinus rhythm. Abdomen: Soft, nontender. No hepatosplenomegaly. No masses. Extremities:  With no edema. Lymph Nodes:  No cervical, axillary or inguinal lymph node enlargement. Neurologic:  Conscious and oriented. No focal neurological deficits. Psychosocial: No depression, anxiety or stress. Skin: No rashes, bruises or ecchymoses.     REVIEW OF DIAGNOSTIC DATA:     Lab Results   Component Value Date    WBC 6.7 07/21/2022    HGB 9.0 (L) 07/21/2022    HCT 28.6 (L) 07/21/2022    MCV 80.3 07/21/2022     (L) 07/21/2022       Chemistry        Component Value Date/Time     07/21/2022 0454    K 4.8 07/21/2022 0454     (H) 07/21/2022 0454    CO2 24 07/21/2022 0454    BUN 23 07/21/2022 0454 CREATININE 1.28 (H) 07/21/2022 0454        Component Value Date/Time    CALCIUM 8.3 (L) 07/21/2022 0454    ALKPHOS 131 (H) 07/14/2022 1416    AST 15 07/14/2022 1416    ALT 6 07/14/2022 1416    BILITOT 0.80 07/14/2022 1416        CT scan showed stable findings with no evidence of progression. FLUORO FOR SURGICAL PROCEDURES  Radiology exam is complete. No Radiologist dictation. Please follow up   with ordering provider. ASSESSMENT:    Small cell, B-cell non-Hodgkins lymphoma in remission. Evidence of relapse with biopsy-proven follicular lymphoma grade 1-2 July 5, 2021  Progression after treatment with rituximab    PLAN:     Lymph node biopsy showed relapse of low-grade lymphoma. Grade 1 - 2 follicular lymphoma. Patient was treated with rituximab induction followed by maintenance. Initially he has stable disease. Recent scans showed significant progression in the abdomen. Discussed options of treatment. Considering his performance and other comorbidities I would like to start him on Imbruvica. Explained infection side effects. He agreed. Will monitor response and toxicity. Patient is having significant problem with shortness of breath and hypoxia. Oxygen saturation today was 86% at room air. It was up to 95% at 2 L of oxygen. Oxygen dropped significantly without even movement. Will recommend portable oxygen concentrator since he is having significant difficulty handling the oxygen tank being large and heavy for the patient to use it and ambulate and to come for his visits. Will continue norco and megace. Patient's questions were answered to the best of his satisfaction and he verbalized full understanding and agreement. cc: Johnny Rivas D.O. Saroj Chi M.D.         Zaid Oliva M.D.

## 2022-07-26 ENCOUNTER — TELEPHONE (OUTPATIENT)
Dept: INFUSION THERAPY | Age: 72
End: 2022-07-26

## 2022-07-26 ENCOUNTER — TELEPHONE (OUTPATIENT)
Dept: CASE MANAGEMENT | Age: 72
End: 2022-07-26

## 2022-07-26 ENCOUNTER — TELEPHONE (OUTPATIENT)
Dept: INFUSION THERAPY | Facility: MEDICAL CENTER | Age: 72
End: 2022-07-26

## 2022-07-26 DIAGNOSIS — C85.90 NON-HODGKIN'S LYMPHOMA, UNSPECIFIED BODY REGION, UNSPECIFIED NON-HODGKIN LYMPHOMA TYPE (HCC): Primary | ICD-10-CM

## 2022-07-26 RX ORDER — PROCHLORPERAZINE MALEATE 10 MG
10 TABLET ORAL EVERY 6 HOURS PRN
Qty: 120 TABLET | Refills: 3 | Status: ON HOLD | OUTPATIENT
Start: 2022-07-26 | End: 2022-09-06 | Stop reason: HOSPADM

## 2022-07-26 NOTE — TELEPHONE ENCOUNTER
Called to discuss pt's symptoms with wife. She stated he developed and nausea, abd pain, and diarrhea after Ehsan Ewing home care nurse had left. Pt was currently sleeping. Advised wife to hold stool softener at this time, give Zofran as needed for nausea and take Imodium for diarrhea and to continue taking pain medication as ordered. If these medications do not control symptoms to give us a call tomorrow. If any symptoms worse, over night to go to ER, wife verbalized understanding.

## 2022-07-26 NOTE — TELEPHONE ENCOUNTER
Patients spouse called stating that the patient is having nausea, vomiting and diarrhea. RN asked if he is taking his zofran. She stated that he took it once. RN ordered compazine and explained to Jose Jacome (spouse) that he should take the zofran and then 3 hours later if still nauseous take the compazine. RN also told her to give him immodium for the diarrhea. She verbalized understanding.

## 2022-07-26 NOTE — TELEPHONE ENCOUNTER
Name: Kwame Donaldson  : 1950  MRN: [id number]    Navigator making F/U call to pt. And spoke with spouse. Spouse does not remember earlier conversation with navigator on . Spouse stating, \"this is a real mess\" Spouse sharing she has reached out to Palliative Care(self referral), not sure from where, but writers thoughts are it's [facility] because spouse spoke with Fran Darden at the home early this morning. 12:45 Pt. Now c/o pain and having diarrhea, spouse very anxious and sharing she had left VM with Dr. Carri Barakat? Triage? Writer unsure why spouse did not call [facility] with concerns. Spouse stating, \"Palliative care instructed me to call Dr. Nuria Cheney. Writer left VM with Triage updating them and asking if they could reach out to pt. , navigator available if needed.    Electronically signed by Jose Guadalupe Rowe RN on 2022 at 12:50 PM

## 2022-07-26 NOTE — TELEPHONE ENCOUNTER
O2 Order demographics and progress note faxed to [address] [place] [address].   Confirmation received

## 2022-07-26 NOTE — TELEPHONE ENCOUNTER
PT'S WIFE CALLING,  PT WAS SEEN AT [facility] ON 7/25/22. CALL FROM 1225 RECEIVED AT [facility] AND FWD TO [facility] AT 0788, BUT IN CASE [facility] DOES NOT GET MESSAGE TODAY, WRITER CALLED AND SPOKE ITH WIFE. WHILE ON PHONE [address]

## 2022-07-27 ENCOUNTER — TELEPHONE (OUTPATIENT)
Dept: ONCOLOGY | Age: 72
End: 2022-07-27

## 2022-07-27 ENCOUNTER — TELEPHONE (OUTPATIENT)
Dept: CASE MANAGEMENT | Age: 72
End: 2022-07-27

## 2022-07-27 NOTE — TELEPHONE ENCOUNTER
30 day free voucher applied to patient's account. Writer contacted Melissa to have Kennedy Dominguez complete his portion of the pap application.

## 2022-07-27 NOTE — TELEPHONE ENCOUNTER
Name: Henri Montgomery  : 1950  MRN: [id number]  Navigator spoke with pts. Spouse and states, \"he's doing better\" Spouse concerned about dehydration, but pt. Is drinking small amt. Hopefully appetite will  once pain is better relieved. [place] following and spouse planning to proceed with palliative care as well. Plan to follow.    Electronically signed by Jeff Gilbert RN on 2022 at 2:46 PM

## 2022-07-29 ENCOUNTER — TELEPHONE (OUTPATIENT)
Dept: ONCOLOGY | Age: 72
End: 2022-07-29

## 2022-07-29 DIAGNOSIS — C85.90 NON-HODGKIN'S LYMPHOMA, UNSPECIFIED BODY REGION, UNSPECIFIED NON-HODGKIN LYMPHOMA TYPE (HCC): Primary | ICD-10-CM

## 2022-07-29 NOTE — TELEPHONE ENCOUNTER
[facility] Nutrition Note:    Nutrition referral received from Dr. Nicole Callahan. Will follow up.      Neno Mckeon, MS, RD, LD  Registered Dietitian  [facility] [place]  [phone]

## 2022-08-03 ENCOUNTER — TELEPHONE (OUTPATIENT)
Dept: ONCOLOGY | Age: 72
End: 2022-08-03

## 2022-08-03 NOTE — TELEPHONE ENCOUNTER
spoke with Srinivasa Collazo about completion of the application for financial assistance. Melissa forwarded the application to  per phone. The application was received. The signature was typed and it will needed to be signed.  called and spoke with Marizol Camp. He will sign and have Melissa forward the signed application to rosar.

## 2022-08-05 ENCOUNTER — TELEPHONE (OUTPATIENT)
Dept: ONCOLOGY | Age: 72
End: 2022-08-05

## 2022-08-05 NOTE — TELEPHONE ENCOUNTER
[facility] Nutrition Follow Up       Fariba Gage is a 74 y.o.  male     NUTRITION RECOMMENDATIONS / Christina Sari / EVALUATION  Encourage several small high calorie meals throughout the day  2. Continued compliance of med regimen (appetite stimulant, zofran, compazine, immodium)  3. Will monitor s/s, po intake, labs, care plan    Subjective/Current Data:  Spoke with wife who reports that pt is doing better with symptoms after medication adjustment. Pt is taking appetite stimulant which appears to be working and taking anti-emetics regularly with relief. She states that he is only needing immodium intermittently. Wife reports that PT was working with him Wednesday and he slept most of the day Thursday. Is eating well today. States he lost down to 160 with last treatment but has regained up to 175 and has maintained that over past week. Family is making his favorite foods and he is taking bites/small meals throughout the day at this time. Family goal is now to get him up and more active. Nutritional Requirements:  Estimated Energy Needs:  Weight Used: 78 kg (current wt)   Method Used: [place] [facility] [employer]  Estimated kcal Needs: 0203-2503 kcal per day  Protein Needs:  Weight used: 78  1.1-1.3 g/kg =  g per day    Nutrition-focused Physical Findings  GI Symptoms: poor appetite, nausea, vomiting/regurgitation, and diarrhea              Skin: no issues reported    Nutrition Diagnosis and Goal  Problem:  inadequate oral intake  Etiology/related to: catabolic illness  Symptoms/Signs/as evidenced by: N/V/D, poor appetite, recent significant wt loss. Goal: Adequate intake to aide in wt maintenaince, signs and symptoms management, and overall wellbeing. Progress towards goal: gradually worsening  Education needs: Reviewed high calorie/high protein foods and general nutrition oncology guidelines.     Siria Wakefield, MS, RD, LD  Registered Dietitian  [facility]  [phone]

## 2022-08-09 ENCOUNTER — TELEPHONE (OUTPATIENT)
Dept: ONCOLOGY | Age: 72
End: 2022-08-09

## 2022-08-09 ENCOUNTER — TELEPHONE (OUTPATIENT)
Dept: CASE MANAGEMENT | Age: 72
End: 2022-08-09

## 2022-08-09 NOTE — TELEPHONE ENCOUNTER
Name: Roberto Garsia  : 1950  MRN: [id number]    Navigator spoke with pts. Spouse and Pt. Started Imbruvica a week ago . Pt. Experiencing mild diarrhea and nausea only. Pt. Very fatigued. Palliative care to open case next Tuesday. Writer reinforcing spouse to call [facility] triage if pt. Symptomatic. Plan to follow.    Electronically signed by Donal Benjamin RN on 2022 at 1:43 PM

## 2022-08-10 ENCOUNTER — TELEPHONE (OUTPATIENT)
Dept: INFUSION THERAPY | Age: 72
End: 2022-08-10

## 2022-08-10 ENCOUNTER — TELEPHONE (OUTPATIENT)
Dept: ONCOLOGY | Age: 72
End: 2022-08-10

## 2022-08-10 NOTE — TELEPHONE ENCOUNTER
Patients wife Sarika called stating that she is concerned that patient is getting weaker, is not eating or drinking and has lost weight. She stated that she called Tad Terry also. Kinga Castañeda and is waiting a response on what he would like to do. RN suggested to take the patient to the ER but Sarika stated that she would prefer to bring him in for hydration if that was OK with Dr. Desiree Castañeda. RN spoke with Dr. Desiree Castañeda and he would like to see the patient on Friday.   Schedulers updated

## 2022-08-10 NOTE — TELEPHONE ENCOUNTER
Patients wife is calling for [employer]. Sarika says that [employer] is getting weaker by the day. [employer] has been on imbruvica for 12 days now. He is not eating or drinking much. Wife is worried about [employer] getting weaker by the day. Writer sent a perfect serve message to Dr Kevin Akers for direction. Waiting for a response.

## 2022-08-11 ENCOUNTER — TELEPHONE (OUTPATIENT)
Dept: INFUSION THERAPY | Age: 72
End: 2022-08-11

## 2022-08-11 ENCOUNTER — TELEPHONE (OUTPATIENT)
Dept: ONCOLOGY | Age: 72
End: 2022-08-11

## 2022-08-11 NOTE — TELEPHONE ENCOUNTER
Pt's wife, Sarika, left  in triage stating pt was very lethargic, unresponsive, and his blood sugar was 37. She states she wanted Dr. Sade Mendoza aware that she was going to call an ambulance to take him to the ER. Writer called her back to discuss. She states she decided not to call an ambulance yet; she gave him orange juice and applesauce and he is now able to sit up and is responding to her. She states he is improving a little since she left the , but she is very scared. Emotional support offered. Advised for her to take pt to ER. She states she is going to check his sugar again, and if it is still low, she is going to take him to the ER. She states she feels he is dehydrated and she cannot get him to eat anything. Pt is taking compazine and megace, but still has no appetite. She states she will try to get him to drink Ensure, but he is \"stubborn\". She will call the office if she takes him to ER and he gets admitted. Pt has f/u tomorrow with Dr. Sade Mendoza. Will keep appt unless he is admitted.

## 2022-08-12 ENCOUNTER — TELEPHONE (OUTPATIENT)
Dept: CASE MANAGEMENT | Age: 72
End: 2022-08-12

## 2022-08-12 ENCOUNTER — OFFICE VISIT (OUTPATIENT)
Dept: ONCOLOGY | Age: 72
End: 2022-08-12
Payer: MEDICARE

## 2022-08-12 ENCOUNTER — TELEPHONE (OUTPATIENT)
Dept: ONCOLOGY | Age: 72
End: 2022-08-12

## 2022-08-12 ENCOUNTER — APPOINTMENT (OUTPATIENT)
Dept: CT IMAGING | Age: 72
End: 2022-08-12
Payer: MEDICARE

## 2022-08-12 ENCOUNTER — HOSPITAL ENCOUNTER (OUTPATIENT)
Age: 72
Setting detail: OBSERVATION
Discharge: HOME | End: 2022-08-13
Attending: EMERGENCY MEDICINE | Admitting: STUDENT IN AN ORGANIZED HEALTH CARE EDUCATION/TRAINING PROGRAM
Payer: MEDICARE

## 2022-08-12 ENCOUNTER — TELEPHONE (OUTPATIENT)
Dept: INFUSION THERAPY | Age: 72
End: 2022-08-12

## 2022-08-12 VITALS
HEART RATE: 75 BPM | DIASTOLIC BLOOD PRESSURE: 66 MMHG | BODY MASS INDEX: 24.82 KG/M2 | SYSTOLIC BLOOD PRESSURE: 107 MMHG | WEIGHT: 173 LBS | TEMPERATURE: 97.1 F

## 2022-08-12 DIAGNOSIS — C82.13 FOLLICULAR LYMPHOMA GRADE II OF INTRA-ABDOMINAL LYMPH NODES (HCC): Primary | ICD-10-CM

## 2022-08-12 DIAGNOSIS — E86.0 DEHYDRATION: Primary | ICD-10-CM

## 2022-08-12 DIAGNOSIS — Z85.72 HISTORY OF NON-HODGKIN'S LYMPHOMA: ICD-10-CM

## 2022-08-12 PROBLEM — N17.9 AKI (ACUTE KIDNEY INJURY) (HCC): Status: ACTIVE | Noted: 2022-08-12

## 2022-08-12 PROBLEM — I82.409 DVT (DEEP VENOUS THROMBOSIS) (HCC): Status: ACTIVE | Noted: 2022-08-12

## 2022-08-12 PROBLEM — R62.7 FAILURE TO THRIVE IN ADULT: Status: ACTIVE | Noted: 2022-08-12

## 2022-08-12 LAB
ABSOLUTE EOS #: 0.1 K/UL (ref 0–0.4)
ABSOLUTE LYMPH #: 1.7 K/UL (ref 1–4.8)
ABSOLUTE MONO #: 0.9 K/UL (ref 0.1–1.2)
ALBUMIN SERPL-MCNC: 3.4 G/DL (ref 3.5–5.2)
ALBUMIN/GLOBULIN RATIO: 1.6 (ref 1–2.5)
ALP BLD-CCNC: 146 U/L (ref 40–129)
ALT SERPL-CCNC: 14 U/L (ref 5–41)
AMYLASE: 220 U/L (ref 28–100)
ANION GAP SERPL CALCULATED.3IONS-SCNC: 11 MMOL/L (ref 9–17)
AST SERPL-CCNC: 13 U/L
BASOPHILS # BLD: 0 % (ref 0–2)
BASOPHILS ABSOLUTE: 0 K/UL (ref 0–0.2)
BILIRUB SERPL-MCNC: 0.88 MG/DL (ref 0.3–1.2)
BUN BLDV-MCNC: 33 MG/DL (ref 8–23)
CALCIUM SERPL-MCNC: 8.7 MG/DL (ref 8.6–10.4)
CHLORIDE BLD-SCNC: 105 MMOL/L (ref 98–107)
CO2: 24 MMOL/L (ref 20–31)
CREAT SERPL-MCNC: 1.77 MG/DL (ref 0.7–1.2)
EOSINOPHILS RELATIVE PERCENT: 1 % (ref 1–4)
GFR AFRICAN AMERICAN: 46 ML/MIN
GFR NON-AFRICAN AMERICAN: 38 ML/MIN
GFR SERPL CREATININE-BSD FRML MDRD: ABNORMAL ML/MIN/{1.73_M2}
GLUCOSE BLD-MCNC: 147 MG/DL (ref 70–99)
HCT VFR BLD CALC: 37.7 % (ref 41–53)
HEMOGLOBIN: 12.3 G/DL (ref 13.5–17.5)
LACTIC ACID: 1.8 MMOL/L (ref 0.5–2.2)
LIPASE: 209 U/L (ref 13–60)
LYMPHOCYTES # BLD: 21 % (ref 24–44)
MCH RBC QN AUTO: 25.8 PG (ref 26–34)
MCHC RBC AUTO-ENTMCNC: 32.5 G/DL (ref 31–37)
MCV RBC AUTO: 79.1 FL (ref 80–100)
MONOCYTES # BLD: 12 % (ref 2–11)
PDW BLD-RTO: 16.6 % (ref 12.5–15.4)
PLATELET # BLD: 131 K/UL (ref 140–450)
PMV BLD AUTO: 8.5 FL (ref 6–12)
POTASSIUM SERPL-SCNC: 4.2 MMOL/L (ref 3.7–5.3)
RBC # BLD: 4.76 M/UL (ref 4.5–5.9)
SEG NEUTROPHILS: 66 % (ref 36–66)
SEGMENTED NEUTROPHILS ABSOLUTE COUNT: 5.4 K/UL (ref 1.8–7.7)
SODIUM BLD-SCNC: 140 MMOL/L (ref 135–144)
TOTAL PROTEIN: 5.5 G/DL (ref 6.4–8.3)
WBC # BLD: 8.1 K/UL (ref 3.5–11)

## 2022-08-12 PROCEDURE — 3017F COLORECTAL CA SCREEN DOC REV: CPT | Performed by: INTERNAL MEDICINE

## 2022-08-12 PROCEDURE — 6360000002 HC RX W HCPCS: Performed by: EMERGENCY MEDICINE

## 2022-08-12 PROCEDURE — 99285 EMERGENCY DEPT VISIT HI MDM: CPT

## 2022-08-12 PROCEDURE — G0378 HOSPITAL OBSERVATION PER HR: HCPCS

## 2022-08-12 PROCEDURE — G8427 DOCREV CUR MEDS BY ELIG CLIN: HCPCS | Performed by: INTERNAL MEDICINE

## 2022-08-12 PROCEDURE — 6360000002 HC RX W HCPCS: Performed by: STUDENT IN AN ORGANIZED HEALTH CARE EDUCATION/TRAINING PROGRAM

## 2022-08-12 PROCEDURE — 2580000003 HC RX 258: Performed by: STUDENT IN AN ORGANIZED HEALTH CARE EDUCATION/TRAINING PROGRAM

## 2022-08-12 PROCEDURE — 99202 OFFICE O/P NEW SF 15 MIN: CPT | Performed by: INTERNAL MEDICINE

## 2022-08-12 PROCEDURE — 83605 ASSAY OF LACTIC ACID: CPT

## 2022-08-12 PROCEDURE — 85025 COMPLETE CBC W/AUTO DIFF WBC: CPT

## 2022-08-12 PROCEDURE — 6370000000 HC RX 637 (ALT 250 FOR IP): Performed by: STUDENT IN AN ORGANIZED HEALTH CARE EDUCATION/TRAINING PROGRAM

## 2022-08-12 PROCEDURE — 99220 PR INITIAL OBSERVATION CARE/DAY 70 MINUTES: CPT | Performed by: STUDENT IN AN ORGANIZED HEALTH CARE EDUCATION/TRAINING PROGRAM

## 2022-08-12 PROCEDURE — 96376 TX/PRO/DX INJ SAME DRUG ADON: CPT

## 2022-08-12 PROCEDURE — 87040 BLOOD CULTURE FOR BACTERIA: CPT

## 2022-08-12 PROCEDURE — 82150 ASSAY OF AMYLASE: CPT

## 2022-08-12 PROCEDURE — 96374 THER/PROPH/DIAG INJ IV PUSH: CPT

## 2022-08-12 PROCEDURE — G8420 CALC BMI NORM PARAMETERS: HCPCS | Performed by: INTERNAL MEDICINE

## 2022-08-12 PROCEDURE — 96361 HYDRATE IV INFUSION ADD-ON: CPT

## 2022-08-12 PROCEDURE — 1123F ACP DISCUSS/DSCN MKR DOCD: CPT | Performed by: INTERNAL MEDICINE

## 2022-08-12 PROCEDURE — 1111F DSCHRG MED/CURRENT MED MERGE: CPT | Performed by: INTERNAL MEDICINE

## 2022-08-12 PROCEDURE — 99214 OFFICE O/P EST MOD 30 MIN: CPT | Performed by: INTERNAL MEDICINE

## 2022-08-12 PROCEDURE — 2580000003 HC RX 258: Performed by: EMERGENCY MEDICINE

## 2022-08-12 PROCEDURE — 94640 AIRWAY INHALATION TREATMENT: CPT

## 2022-08-12 PROCEDURE — 1036F TOBACCO NON-USER: CPT | Performed by: INTERNAL MEDICINE

## 2022-08-12 PROCEDURE — 80053 COMPREHEN METABOLIC PANEL: CPT

## 2022-08-12 PROCEDURE — 74176 CT ABD & PELVIS W/O CONTRAST: CPT

## 2022-08-12 PROCEDURE — 36415 COLL VENOUS BLD VENIPUNCTURE: CPT

## 2022-08-12 PROCEDURE — 83690 ASSAY OF LIPASE: CPT

## 2022-08-12 RX ORDER — SODIUM CHLORIDE 0.9 % (FLUSH) 0.9 %
5-40 SYRINGE (ML) INJECTION EVERY 12 HOURS SCHEDULED
Status: DISCONTINUED | OUTPATIENT
Start: 2022-08-12 | End: 2022-08-13 | Stop reason: HOSPADM

## 2022-08-12 RX ORDER — ACETAMINOPHEN 650 MG/1
650 SUPPOSITORY RECTAL EVERY 6 HOURS PRN
Status: DISCONTINUED | OUTPATIENT
Start: 2022-08-12 | End: 2022-08-13 | Stop reason: HOSPADM

## 2022-08-12 RX ORDER — OXYCODONE HYDROCHLORIDE 5 MG/1
5 TABLET ORAL EVERY 4 HOURS PRN
Status: ON HOLD | COMMUNITY
End: 2022-09-06 | Stop reason: SDUPTHER

## 2022-08-12 RX ORDER — ONDANSETRON 2 MG/ML
4 INJECTION INTRAMUSCULAR; INTRAVENOUS EVERY 6 HOURS PRN
Status: DISCONTINUED | OUTPATIENT
Start: 2022-08-12 | End: 2022-08-13 | Stop reason: HOSPADM

## 2022-08-12 RX ORDER — PREGABALIN 75 MG/1
75 CAPSULE ORAL 2 TIMES DAILY
Status: DISCONTINUED | OUTPATIENT
Start: 2022-08-12 | End: 2022-08-12

## 2022-08-12 RX ORDER — LEVOTHYROXINE SODIUM 0.07 MG/1
150 TABLET ORAL DAILY
Status: DISCONTINUED | OUTPATIENT
Start: 2022-08-12 | End: 2022-08-13 | Stop reason: HOSPADM

## 2022-08-12 RX ORDER — SODIUM CHLORIDE 0.9 % (FLUSH) 0.9 %
5-40 SYRINGE (ML) INJECTION PRN
Status: DISCONTINUED | OUTPATIENT
Start: 2022-08-12 | End: 2022-08-13 | Stop reason: HOSPADM

## 2022-08-12 RX ORDER — ACETAMINOPHEN 325 MG/1
650 TABLET ORAL EVERY 6 HOURS PRN
Status: DISCONTINUED | OUTPATIENT
Start: 2022-08-12 | End: 2022-08-13 | Stop reason: HOSPADM

## 2022-08-12 RX ORDER — ONDANSETRON 4 MG/1
4 TABLET, ORALLY DISINTEGRATING ORAL EVERY 8 HOURS PRN
Status: DISCONTINUED | OUTPATIENT
Start: 2022-08-12 | End: 2022-08-13 | Stop reason: HOSPADM

## 2022-08-12 RX ORDER — MORPHINE SULFATE 4 MG/ML
4 INJECTION, SOLUTION INTRAMUSCULAR; INTRAVENOUS ONCE
Status: COMPLETED | OUTPATIENT
Start: 2022-08-12 | End: 2022-08-12

## 2022-08-12 RX ORDER — SODIUM CHLORIDE 9 MG/ML
INJECTION, SOLUTION INTRAVENOUS PRN
Status: DISCONTINUED | OUTPATIENT
Start: 2022-08-12 | End: 2022-08-13 | Stop reason: HOSPADM

## 2022-08-12 RX ORDER — ATORVASTATIN CALCIUM 40 MG/1
40 TABLET, FILM COATED ORAL DAILY
Status: DISCONTINUED | OUTPATIENT
Start: 2022-08-12 | End: 2022-08-13 | Stop reason: HOSPADM

## 2022-08-12 RX ORDER — MORPHINE SULFATE 2 MG/ML
1 INJECTION, SOLUTION INTRAMUSCULAR; INTRAVENOUS EVERY 4 HOURS PRN
Status: DISCONTINUED | OUTPATIENT
Start: 2022-08-12 | End: 2022-08-13 | Stop reason: HOSPADM

## 2022-08-12 RX ORDER — POLYETHYLENE GLYCOL 3350 17 G/17G
17 POWDER, FOR SOLUTION ORAL DAILY PRN
Status: DISCONTINUED | OUTPATIENT
Start: 2022-08-12 | End: 2022-08-13 | Stop reason: HOSPADM

## 2022-08-12 RX ORDER — BUDESONIDE AND FORMOTEROL FUMARATE DIHYDRATE 80; 4.5 UG/1; UG/1
2 AEROSOL RESPIRATORY (INHALATION) 2 TIMES DAILY
Status: DISCONTINUED | OUTPATIENT
Start: 2022-08-12 | End: 2022-08-13 | Stop reason: HOSPADM

## 2022-08-12 RX ORDER — ALBUTEROL SULFATE 90 UG/1
2 AEROSOL, METERED RESPIRATORY (INHALATION) EVERY 4 HOURS PRN
Status: DISCONTINUED | OUTPATIENT
Start: 2022-08-12 | End: 2022-08-13 | Stop reason: HOSPADM

## 2022-08-12 RX ORDER — 0.9 % SODIUM CHLORIDE 0.9 %
1000 INTRAVENOUS SOLUTION INTRAVENOUS ONCE
Status: COMPLETED | OUTPATIENT
Start: 2022-08-12 | End: 2022-08-13

## 2022-08-12 RX ORDER — SODIUM CHLORIDE 9 MG/ML
INJECTION, SOLUTION INTRAVENOUS CONTINUOUS
Status: DISCONTINUED | OUTPATIENT
Start: 2022-08-12 | End: 2022-08-13 | Stop reason: HOSPADM

## 2022-08-12 RX ORDER — FLUOXETINE HYDROCHLORIDE 20 MG/1
20 CAPSULE ORAL DAILY
Status: DISCONTINUED | OUTPATIENT
Start: 2022-08-12 | End: 2022-08-13 | Stop reason: HOSPADM

## 2022-08-12 RX ORDER — OXYCODONE HCL 10 MG/1
10 TABLET, FILM COATED, EXTENDED RELEASE ORAL EVERY 12 HOURS
Status: ON HOLD | COMMUNITY
End: 2022-09-06 | Stop reason: SDUPTHER

## 2022-08-12 RX ORDER — DONEPEZIL HYDROCHLORIDE 5 MG/1
10 TABLET, FILM COATED ORAL DAILY
Status: DISCONTINUED | OUTPATIENT
Start: 2022-08-12 | End: 2022-08-13 | Stop reason: HOSPADM

## 2022-08-12 RX ADMIN — SODIUM CHLORIDE: 9 INJECTION, SOLUTION INTRAVENOUS at 15:10

## 2022-08-12 RX ADMIN — BUDESONIDE AND FORMOTEROL FUMARATE DIHYDRATE 2 PUFF: 80; 4.5 AEROSOL RESPIRATORY (INHALATION) at 19:39

## 2022-08-12 RX ADMIN — MORPHINE SULFATE 1 MG: 2 INJECTION, SOLUTION INTRAMUSCULAR; INTRAVENOUS at 21:05

## 2022-08-12 RX ADMIN — MORPHINE SULFATE 4 MG: 4 INJECTION INTRAVENOUS at 12:35

## 2022-08-12 RX ADMIN — SODIUM CHLORIDE 1000 ML: 9 INJECTION, SOLUTION INTRAVENOUS at 12:00

## 2022-08-12 RX ADMIN — MORPHINE SULFATE 1 MG: 2 INJECTION, SOLUTION INTRAMUSCULAR; INTRAVENOUS at 16:42

## 2022-08-12 ASSESSMENT — PAIN DESCRIPTION - LOCATION
LOCATION: ABDOMEN

## 2022-08-12 ASSESSMENT — PAIN SCALES - GENERAL
PAINLEVEL_OUTOF10: 7
PAINLEVEL_OUTOF10: 7
PAINLEVEL_OUTOF10: 2
PAINLEVEL_OUTOF10: 5
PAINLEVEL_OUTOF10: 5

## 2022-08-12 ASSESSMENT — PAIN - FUNCTIONAL ASSESSMENT
PAIN_FUNCTIONAL_ASSESSMENT: ACTIVITIES ARE NOT PREVENTED
PAIN_FUNCTIONAL_ASSESSMENT: 0-10

## 2022-08-12 ASSESSMENT — PAIN DESCRIPTION - DESCRIPTORS: DESCRIPTORS: STABBING

## 2022-08-12 ASSESSMENT — PAIN DESCRIPTION - ORIENTATION
ORIENTATION: LOWER
ORIENTATION: RIGHT

## 2022-08-12 NOTE — PROGRESS NOTES
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 Evidence of relapse with biopsy-proven follicular lymphoma. 1-2 from treated medically lymph node biopsy July 6, 2021    CURRENT THERAPY:    1. Observation. 2. Status post treatment with Bexxar in May 2008. 3. Status post previous treatment with Rituxan. 4.  Start treatment with Rituxan August 23, 2021. completed4 weeks of rituximab on 9/20/2021  5. Induction Rituxan is followed by consolidation Rituxan maintenance   6. Due to progression we started Imbruvica July 2022. INTERIM HISTORY:  The patient is seen for follow-up of his lymphoma. He was treated medically because of abdominal pain. Further work-up showed enlarged lymph nodes in the retroperitoneum and iliac area. Biopsy proved recurrence of follicular lymphoma. Patient did well on rituximab. Clinically he is stable. No significant abdominal pain at the present time. No GI symptoms. No urinary symptoms. He is much better with use of Norco). Patient was continued on rituximab every 2 months with plan to be continued for for 2 years. Repeated CT scan February 2022 showed stable disease. Recent hospitalization with repeated CT scans significant progression. Started on Imbruvica in July. He has nausea. Failure to thrive. Very limited nutrition intake. Currently patient is having difficulty swallowing solids and liquids. He has excessive weakness and failure to thrive. REVIEW OF SYSTEMS:   General: ++weight loss. Significant shortness of breath. Eyes: No blurred vision, eye pain or double vision. Ears: No hearing problems or drainage. No tinnitus. Throat: No sore throat, problems with swallowing or dysphagia. Respiratory: As above. Cardiovascular: No chest pain, orthopnea or PND. No lower extremity edema. No palpitation. Gastrointestinal: No problems with swallowing. No abdominal pain or bloating. No nausea or vomiting. No diarrhea or constipation. No GI bleeding. Genitourinary: No dysuria, hematuria, frequency or urgency. Musculoskeletal: No muscle aches or pains. No limitation of movement. No back pain. Dermatologic: No skin rashes or pruritus. No skin lesions or discolorations. Psychiatric: No depression, anxiety, or stress or signs of schizophrenia. Hematologic: No history of bleeding tendency. No bruises or ecchymosis. No history of clotting problems. Infectious disease: No fever, chills or frequent infections. Endocrine: No problems with opacity. No polydipsia or polyuria. Neurologic: No headaches or dizziness. No weakness or numbness of the extremities. SOCIAL HISTORY:  No smoking, no alcohol drinking. PHYSICAL EXAM: Patient is having significant shortness of breath using oxygen by portable oxygen tank. Vital signs: Blood pressure 107/66, pulse 75, temperature 97.1 °F (36.2 °C), temperature source Temporal, weight 173 lb (78.5 kg). HEENT:  Eyes are normal. Ears, nose and throat are normal.  Neck: Supple. No lymph node enlargement. No thyroid enlargement. Trachea is centrally located. Chest: Decreased air entry with hypoxia heart: Regular sinus rhythm. Abdomen: Soft, nontender. No hepatosplenomegaly. No masses. Extremities:  With no edema. Lymph Nodes:  No cervical, axillary or inguinal lymph node enlargement. Neurologic:  Conscious and oriented. No focal neurological deficits. Psychosocial: No depression, anxiety or stress. Skin: No rashes, bruises or ecchymoses.     REVIEW OF DIAGNOSTIC DATA:     Lab Results   Component Value Date    WBC 8.1 08/12/2022    HGB 12.3 (L) 08/12/2022    HCT 37.7 (L) 08/12/2022    MCV 79.1 (L) 08/12/2022     (L) 08/12/2022       Chemistry        Component Value Date/Time     08/12/2022 1150    K 4.2 08/12/2022 1150     08/12/2022 1150    CO2 24 08/12/2022 1150    BUN 33 (H) 08/12/2022 1150    CREATININE 1.77 (H) 08/12/2022 1150        Component Value Date/Time    CALCIUM 8.7 08/12/2022 1150    ALKPHOS 146 (H) 08/12/2022 1150    AST 13 08/12/2022 1150    ALT 14 08/12/2022 1150    BILITOT 0.88 08/12/2022 1150        CT scan showed stable findings with no evidence of progression. CT ABDOMEN PELVIS WO CONTRAST Additional Contrast? None  Narrative: EXAMINATION:  CT OF THE ABDOMEN AND PELVIS WITHOUT CONTRAST 8/12/2022 12:23 pm    TECHNIQUE:  CT of the abdomen and pelvis was performed without the administration of  intravenous contrast. Multiplanar reformatted images are provided for review. Automated exposure control, iterative reconstruction, and/or weight based  adjustment of the mA/kV was utilized to reduce the radiation dose to as low  as reasonably achievable. COMPARISON:  07/14/2022    HISTORY:  ORDERING SYSTEM PROVIDED HISTORY: pain  TECHNOLOGIST PROVIDED HISTORY:  pain    Decision Support Exception - unselect if not a suspected or confirmed  emergency medical condition->Emergency Medical Condition (MA)  Reason for Exam: The patient was sent from the cancer center for admission. They are worried that he is dehydrated and needs a feeding tube placed. He  is being treated for non-Hodgkin's lymphoma. FINDINGS:  LOWER CHEST: No acute findings. ORGANS: Lack of intravenous contrast limits evaluation of the solid organs  and bowel. The gallbladder is surgically absent. The liver, pancreas,  spleen, and adrenals reveal no acute findings. Bilateral double-J ureteral  stents appear in appropriate position. No hydronephrosis. GI/BOWEL: No bowel dilatation or wall thickening. Diverticulosis. PELVIS: No acute findings.     PERITONEUM/RETROPERITONEUM: Confluent soft tissue mass primarily in the  retroperitoneum again demonstrated involving the left kidney and coursing  along the ureters. There is also involvement of the root of the mesentery  and this mass may also involve the dorsal aspect of the pancreas. Delineation of this mass separate from bowel and vasculature is limited in  the absence of contrast.  The anterior posterior extent appears less  prominent however. A more discrete lobulated component of this mass  inferiorly at the level of the common iliac arteries is measurably smaller  measuring 3.6 cm in transverse dimension, previously 5.1 cm upon my  measurement. A more discrete mesenteric component of this process measures  approximately 8.3 x 3.6 cm on axial image 71 versus a comparable region that  measured 11.6 x 6.0 cm on the most recent exam.  No new lymphadenopathy. No  free air. Trace pelvic free fluid. BONES/SOFT TISSUES: No acute osseous abnormality is identified. Impression: 1. Confluent retroperitoneal and mesenteric mass again demonstrated  corresponding to known lymphoma. This appears improved in the interval,  although measurement is limited in the absence of contrast.    2.  Bilateral double-J ureteral stents in place without hydronephrosis. 3.  No new findings identified in the interval.        ASSESSMENT:    Small cell, B-cell non-Hodgkins lymphoma in remission. Evidence of relapse with biopsy-proven follicular lymphoma grade 1-2 July 5, 2021  Progression after treatment with rituximab    PLAN:     Lymph node biopsy showed relapse of low-grade lymphoma. Grade 1 - 2 follicular lymphoma. Patient was treated with rituximab induction followed by maintenance. Initially he has stable disease. Recent scans showed significant progression in the abdomen. Considering his performance and other comorbidities he was started on Imbruvica. Tolerated well except for symptoms as above. Will monitor response and toxicity. Patient is having significant problem with shortness of breath and hypoxia.  He has severe dehydration and malnutrition. He has failure to thrive. Patient will need evalaution by GI. Will need EGD. Will admit patient for rehydration and for nutritional support. Possible TPN and possibly PEG tube insertion for feeding. In the interim will hold Imbruvica. Patient agreed on admission. [facility]                            This note is created with the assistance of a speech recognition program.  While intending to generate a document that actually reflects the content of the visit, the document can still have some errors including those of syntax and sound a like substitutions which may escape proof reading. It such instances, actual meaning can be extrapolated by contextual diversion.

## 2022-08-12 NOTE — TELEPHONE ENCOUNTER
Name: Hillary Buck  : 1950  MRN: [id number]    Navigator met with pt and family face to face . Spouse given navigation packet and business card. Pt. Very weak, not eating , difficulty swallowing . [facility] to meet with pt. And family next week. Family concerned about weight and lethargy. Pt. On Imbruvica for Lymphoma. Pt. More than likely will need hydration and possible feeding tube? Pt. Sent to ED due to dehydration and malnutrition.      Electronically signed by Giulia Herring RN on 2022 at 12:31 PM

## 2022-08-12 NOTE — ED PROVIDER NOTES
[address]      Pt Name: Hillary Buck  MRN: [id number]  Jenarogfreuben 1950  Date of evaluation: 8/12/2022      CHIEF COMPLAINT       Chief Complaint   Patient presents with    Dehydration         HISTORY OF PRESENT ILLNESS      The patient was sent from the cancer center for admission. They are worried that he is dehydrated and needs a feeding tube placed. He is being treated for non-Hodgkin's lymphoma. He has a history of diabetes and COPD. He says that his throat feels dry and painful. It makes it difficult for him to swallow. He has had swallowing issues several years ago but that was thought to have been resolved. The patient says he sometimes has edema in his legs but not recently. He is complaining of right upper quadrant abdominal pain. He has already had a cholecystectomy. He is not having diarrhea or vomiting. He does not request medicine for nausea at this time. The patient denies any recent trauma. He denies fever. REVIEW OF SYSTEMS       All systems reviewed and negative unless noted in HPI. The patient denies fever or constitutional symptoms. Reports failure to thrive. Denies vision change. Denies any sore throat or rhinorrhea. Denies any neck pain or stiffness. Denies chest pain or shortness of breath. Notes right upper quadrant abdominal pain. Denies any dysuria. Denies urinary frequency or hematuria. Denies musculoskeletal injury or pain. Denies any weakness, numbness or focal neurologic deficit. Denies any skin rash or edema. No recent psychiatric issues. History of non-Hodgkin's lymphoma. History of diabetes. History of chemotherapy for non-Hodgkin's lymphoma. Opal Fields        PAST MEDICAL HISTORY    has a past medical history of Arthritis, Cancer ([address] Chemotherapy management, encounter for, CHF (congestive heart failure) ([address] COPD (chronic obstructive pulmonary disease) ([address] Diabetes mellitus ([address] H/O cardiovascular stress test, History of non-Hodgkin's lymphoma, Hx of blood clots, Hyperlipidemia, Hypertension, Hypothyroidism, and On home O2.    SURGICAL HISTORY      has a past surgical history that includes hernia repair; IR BIOPSY ABDOMINAL/RETROPERITONEAL MASS PERCUTANEOUS (7/6/2021); IR PORT PLACEMENT > 5 YEARS (8/31/2021); Colonoscopy (Left, 9/24/2021); Cystoscopy (Right, 10/6/2021); Cholecystectomy, laparoscopic (N/A, 10/9/2021); Tonsillectomy; Cystoscopy (Bilateral, 11/24/2021); Cystoscopy (Bilateral, 2/16/2022); eye surgery; Carotid endarterectomy (Left, 6/28/2022); and Cystoscopy (N/A, 7/20/2022). CURRENT MEDICATIONS       Previous Medications    ALBUTEROL SULFATE HFA (PROVENTIL;VENTOLIN;PROAIR) 108 (90 BASE) MCG/ACT INHALER    Inhale 2 puffs into the lungs every 4 hours as needed for Wheezing     AMLODIPINE (NORVASC) 10 MG TABLET    Take 1 tablet by mouth daily    APIXABAN (ELIQUIS) 5 MG TABS TABLET    Take 1 tablet by mouth in the morning and 1 tablet before bedtime. ATORVASTATIN (LIPITOR) 40 MG TABLET    Take 1 tablet by mouth daily    CARVEDILOL (COREG) 6.25 MG TABLET    Take 6.25 mg by mouth at bedtime    DOCUSATE SODIUM (COLACE, DULCOLAX) 100 MG CAPS    Take 100 mg by mouth 2 times daily as needed for Constipation    DONEPEZIL (ARICEPT) 10 MG TABLET    Take 10 mg by mouth daily     FLUOXETINE (PROZAC) 20 MG CAPSULE    Take 20 mg by mouth daily    FLUTICASONE-SALMETEROL (ADVAIR) 100-50 MCG/DOSE DISKUS INHALER    Inhale 1 puff into the lungs every 12 hours    FUROSEMIDE (LASIX) 20 MG TABLET    Take 1 tablet by mouth daily    GLIPIZIDE (GLUCOTROL) 5 MG TABLET    Take 0.5 tablets by mouth in the morning and 0.5 tablets in the evening. Take before meals.     IBRUTINIB (IMBRUVICA) 420 MG TABLET    Take 1 tablet by mouth daily    IPRATROPIUM-ALBUTEROL (DUONEB) 0.5-2.5 (3) MG/3ML SOLN NEBULIZER SOLUTION    Inhale 3 mLs into the lungs every 4 hours as needed for Shortness of Breath    LACTOBACILLUS (PROBIOTIC ACIDOPHILUS) CAPS    Take 1 caplet by mouth daily    LEVOTHYROXINE (SYNTHROID) 150 MCG TABLET    Take 1 tablet by mouth Daily    LIDOCAINE 4 % EXTERNAL PATCH    Place 1 patch onto the skin in the morning. LOSARTAN (COZAAR) 50 MG TABLET    Take 1 tablet by mouth daily    MEGESTROL (MEGACE) 40 MG/ML SUSPENSION    Take 10 mLs by mouth in the morning. MULTIPLE VITAMINS-MINERALS (THERAPEUTIC MULTIVITAMIN-MINERALS) TABLET    Take 1 tablet by mouth daily    OMEPRAZOLE (PRILOSEC) 20 MG DELAYED RELEASE CAPSULE    Take 20 mg by mouth 2 times daily    ONDANSETRON (ZOFRAN-ODT) 4 MG DISINTEGRATING TABLET    Take 1 tablet by mouth every 8 hours as needed for Nausea or Vomiting    OXYGEN    Inhale 5 L into the lungs at bedtime Nightly & during the day. PREGABALIN (LYRICA) 75 MG CAPSULE    Take 75 mg by mouth 2 times daily. Indications: last fill on 22 [address]180 for 30 days     PROCHLORPERAZINE (COMPAZINE) 10 MG TABLET    Take 1 tablet by mouth every 6 hours as needed (nausea vomiting)    PSYLLIUM (KONSYL) 28.3 % PACK    Take 1 packet by mouth daily    SUCRALFATE (CARAFATE) 1 GM TABLET    Take 1 tablet by mouth in the morning and 1 tablet at noon and 1 tablet in the evening and 1 tablet before bedtime. ALLERGIES     has No Known Allergies. FAMILY HISTORY     He indicated that his mother is . He indicated that his father is . family history includes Alzheimer's Disease in his father; Diabetes in his mother; Heart Disease in his brother and brother. SOCIAL HISTORY      reports that he quit smoking about 16 years ago. His smoking use included cigarettes. He has a 90.00 pack-year smoking history. He has never used smokeless tobacco. He reports current alcohol use of about 1.7 standard drinks per week. He reports current drug use. Drug: Marijuana Venkata Blow). PHYSICAL EXAM     INITIAL VITALS:  height is 5' 10\" (1.778 m) and weight is 78.5 kg (173 lb).  His blood pressure is 96/61 and his pulse is 60. His respiration is 16 and oxygen saturation is 96%. The patient is alert and oriented, in no apparent distress. HEENT is atraumatic. Pupils are PERRL at 4 mm. Mucous membranes moist.    Neck is supple with no lymphadenopathy. Heart sounds regular rate and rhythm with no gallops, murmurs, or rubs. Lungs clear, no wheezes, rales or rhonchi. Abdomen: soft, with mild right upper quadrant tenderness to palpation. No distention or bloating. No tympany. Musculoskeletal exam shows no evidence of trauma. Normal distal pulses in all extremities. Skin: no rash or edema. Neurological exam reveals cranial nerves 2 through 12 grossly intact. Patient has equal  and normal deep tendon reflexes. Psychiatric: no hallucinations or suicidal ideation. Lymphatics.:  No lymphadenopathy. DIFFERENTIAL DIAGNOSIS/ MDM:     Dehydration, electrolyte imbalance, failure to thrive, abdominal mass    DIAGNOSTIC RESULTS         RADIOLOGY:   I reviewed the radiologist interpretations:  CT ABDOMEN PELVIS WO CONTRAST Additional Contrast? None   Final Result   1. Confluent retroperitoneal and mesenteric mass again demonstrated   corresponding to known lymphoma. This appears improved in the interval,   although measurement is limited in the absence of contrast.      2.  Bilateral double-J ureteral stents in place without hydronephrosis. 3.  No new findings identified in the interval.                CT ABDOMEN PELVIS WO CONTRAST Additional Contrast? None (Final result)  Result time 08/12/22 12:40:11  Final result by Wendy Ness MD (08/12/22 12:40:11)                Impression:    1. Confluent retroperitoneal and mesenteric mass again demonstrated   corresponding to known lymphoma. This appears improved in the interval,   although measurement is limited in the absence of contrast.     2.  Bilateral double-J ureteral stents in place without hydronephrosis.      3.  No new findings identified in the interval.             Narrative:    EXAMINATION:   CT OF THE ABDOMEN AND PELVIS WITHOUT CONTRAST 8/12/2022 12:23 pm     TECHNIQUE:   CT of the abdomen and pelvis was performed without the administration of   intravenous contrast. Multiplanar reformatted images are provided for review. Automated exposure control, iterative reconstruction, and/or weight based   adjustment of the mA/kV was utilized to reduce the radiation dose to as low   as reasonably achievable. COMPARISON:   07/14/2022     HISTORY:   ORDERING SYSTEM PROVIDED HISTORY: pain   TECHNOLOGIST PROVIDED HISTORY:   pain     Decision Support Exception - unselect if not a suspected or confirmed   emergency medical condition->Emergency Medical Condition (MA)   Reason for Exam: The patient was sent from the cancer center for admission. They are worried that he is dehydrated and needs a feeding tube placed. He   is being treated for non-Hodgkin's lymphoma. FINDINGS:   LOWER CHEST: No acute findings. ORGANS: Lack of intravenous contrast limits evaluation of the solid organs   and bowel. The gallbladder is surgically absent. The liver, pancreas,   spleen, and adrenals reveal no acute findings. Bilateral double-J ureteral   stents appear in appropriate position. No hydronephrosis. GI/BOWEL: No bowel dilatation or wall thickening. Diverticulosis. PELVIS: No acute findings. PERITONEUM/RETROPERITONEUM: Confluent soft tissue mass primarily in the   retroperitoneum again demonstrated involving the left kidney and coursing   along the ureters. There is also involvement of the root of the mesentery   and this mass may also involve the dorsal aspect of the pancreas. Delineation of this mass separate from bowel and vasculature is limited in   the absence of contrast.  The anterior posterior extent appears less   prominent however.   A more discrete lobulated component of this mass   inferiorly at the level of the common iliac arteries is measurably smaller   measuring 3.6 cm in transverse dimension, previously 5.1 cm upon my   measurement. A more discrete mesenteric component of this process measures   approximately 8.3 x 3.6 cm on axial image 71 versus a comparable region that   measured 11.6 x 6.0 cm on the most recent exam.  No new lymphadenopathy. No   free air. Trace pelvic free fluid. BONES/SOFT TISSUES: No acute osseous abnormality is identified.                      LABS:  Results for orders placed or performed during the hospital encounter of 08/12/22   Lactic Acid   Result Value Ref Range    Lactic Acid 1.8 0.5 - 2.2 mmol/L   CBC with Auto Differential   Result Value Ref Range    WBC 8.1 3.5 - 11.0 k/uL    RBC 4.76 4.5 - 5.9 m/uL    Hemoglobin 12.3 (L) 13.5 - 17.5 g/dL    Hematocrit 37.7 (L) 41 - 53 %    MCV 79.1 (L) 80 - 100 fL    MCH 25.8 (L) 26 - 34 pg    MCHC 32.5 31 - 37 g/dL    RDW 16.6 (H) 12.5 - 15.4 %    Platelets 522 (L) 821 - 450 k/uL    MPV 8.5 6.0 - 12.0 fL    Seg Neutrophils 66 36 - 66 %    Lymphocytes 21 (L) 24 - 44 %    Monocytes 12 (H) 2 - 11 %    Eosinophils % 1 1 - 4 %    Basophils 0 0 - 2 %    Segs Absolute 5.40 1.8 - 7.7 k/uL    Absolute Lymph # 1.70 1.0 - 4.8 k/uL    Absolute Mono # 0.90 0.1 - 1.2 k/uL    Absolute Eos # 0.10 0.0 - 0.4 k/uL    Basophils Absolute 0.00 0.0 - 0.2 k/uL   Comprehensive Metabolic Panel   Result Value Ref Range    Glucose 147 (H) 70 - 99 mg/dL    BUN 33 (H) 8 - 23 mg/dL    Creatinine 1.77 (H) 0.70 - 1.20 mg/dL    Calcium 8.7 8.6 - 10.4 mg/dL    Sodium 140 135 - 144 mmol/L    Potassium 4.2 3.7 - 5.3 mmol/L    Chloride 105 98 - 107 mmol/L    CO2 24 20 - 31 mmol/L    Anion Gap 11 9 - 17 mmol/L    Alkaline Phosphatase 146 (H) 40 - 129 U/L    ALT 14 5 - 41 U/L    AST 13 <40 U/L    Total Bilirubin 0.88 0.3 - 1.2 mg/dL    Total Protein 5.5 (L) 6.4 - 8.3 g/dL    Albumin 3.4 (L) 3.5 - 5.2 g/dL    Albumin/Globulin Ratio 1.6 1.0 - 2.5    GFR Non- 38 (L) >60 mL/min    GFR  American 46 (L) >60 mL/min    GFR Comment         Amylase   Result Value Ref Range    Amylase 220 (H) 28 - 100 U/L   Lipase   Result Value Ref Range    Lipase 209 (H) 13 - 60 U/L         EMERGENCY DEPARTMENT COURSE:   Vitals:    Vitals:    08/12/22 1148   BP: 96/61   Pulse: 60   Resp: 16   TempSrc: Oral   SpO2: 96%   Weight: 78.5 kg (173 lb)   Height: 5' 10\" (1.778 m)     -------------------------  BP: 96/61,  , Heart Rate: 60, Resp: 16      Re-evaluation Notes    I think the patient is somewhat dehydrated. The patient does have some elevation of his LFTs. He has a retroperitoneal mass that was demonstrated previously as well. Ureteral stents are in place. I have discussed the case with Dr. Jose Vazquez who is happy to admit to the hospitalist service. The patient is admitted in stable condition. CONSULTS:    Suzy Vazquez. Will admit. FINAL IMPRESSION      1. Dehydration    2. History of non-Hodgkin's lymphoma          DISPOSITION/PLAN   DISPOSITION Decision To Admit 08/12/2022 01:04:03 PM      Condition on Disposition    stable    PATIENT REFERRED TO:  No follow-up provider specified.     DISCHARGE MEDICATIONS:  New Prescriptions    No medications on file       (Please note that portions of this note were completed with a voice recognition program.  Efforts were made to edit the dictations but occasionally words are mis-transcribed.)    Teresa Ledbetter MD,, MD   Attending Emergency Physician         Vipul Ghosh MD  08/12/22 1514

## 2022-08-12 NOTE — PROGRESS NOTES
Spoke to Dr. Afsaneh Juares in person. Would like impruvica d/c at this time. Consult placed to oncology for follow up. Ordered verified d/c accordingly      Elisa Larsen.  Martha,   Gal  8/12/2022 4:07 PM

## 2022-08-12 NOTE — CARE COORDINATION
Case Management Initial Discharge Plan  Favian Broody,         Readmission Risk              Risk of Unplanned Readmission:  0             Met with:patient to discuss discharge plans. Information verified: address, contacts, phone number, , insurance Yes  PCP: Starla Frazier DO  Date of last visit: saw this week    Insurance Provider: [address] Medicare    Discharge Planning  Current Residence:   home  Living Arrangements:   spouse   Home has 1 stories/no stairs to climb-has ramp  Support Systems:   spouse  Current Services PTA:   no Supplier: none  Patient able to perform ADL's:Independent  DME used to aid ambulation prior to admission: none  During admission: none    Potential Assistance Needed:   home care    Pharmacy: [facility] [place]   Potential Assistance Purchasing Medications:   no  Does patient want to participate in local refill/ meds to beds program?   no    Patient agreeable to home care: Yes  Freedom of choice provided:  yes      Type of Home Care Services:   PT/OT/SN  Patient expects to be discharged to:   Home    Prior SNF/Rehab Placement and Facility: no  Agreeable to SNF/Rehab: No  [place] of choice provided: yes   Evaluation: yes    Expected Discharge date:   22  Follow Up Appointment: Best Day/ Time:      Transportation provider: spouse  Transportation arrangements needed for discharge: No    Discharge Plan: home with spouse and may need home care. Discharge needs tbd.         Electronically signed by PRISCILLA Conte on 22 at 2:59 PM EDT

## 2022-08-12 NOTE — CONSULTS
GI ATTENDING  Gastroenterology  Consultation Note     . REASON FOR CONSULTATION: Malnourishment, evaluation for gastrostomy tube placement, failure to thrive      CC : \"Weight loss, weakness, abdominal pain\"    HISTORY OF PRESENT ILLNESS:         64-year-old pleasant male, with a past medical history of non-Hodgkin's lymphoma (small B-cell lymphoma), currently on chemotherapy, history of DVT, on Eliquis, CHF, COPD, on home oxygen, hypothyroidism, type 2 diabetes, hypertension, hyperlipidemia, undergoing treatment with oncology (Dr. Wenceslao Santizo), received induction with Rituxan followed by consolidation with Rituxan maintenance, started on Imbruvica, referred from oncology for suspected dehydration and failure to thrive. Over the last 2 weeks patient reported approximately 10 to 15 pound weight loss, declining appetite, and poor oral intake. Due to his declining caloric intake patient has reported increased fatigue and weakness which prompted a visit to the emergency department for further optimization. Per oncology needs to be considered for possible feeding tube placement to optimize his nutritional status. Prior to arrival to the emergency department patient denied any preceding infectious symptoms. Reports abnormal bowel movements when able to take p.o. Does report some vague abdominal periumbilical discomfort in the postprandial setting but no significant pain. Denies any vomiting. Does report intermittent nausea symptoms. On arrival to the emergency department patient was identified to be hemodynamically stable, afebrile. Placed on supplemental oxygen for a COPD    Initial labs revealed  Sodium 140, potassium 4.2, BUN of 33, creatinine of 1.7 (ALURO), lactic acid 1.8  CBC revealed white count of 8.1, hemoglobin 12.3, platelet count of 802, some concern for hemoconcentration  INR 1.1    CT abdomen pelvis was performed which revealed:    1.   Confluent retroperitoneal and mesenteric mass again demonstrated   corresponding to known lymphoma. This appears improved in the interval,   although measurement is limited in the absence of contrast.       2.  Bilateral double-J ureteral stents in place without hydronephrosis. 3.  No new findings identified in the interval           PAST MEDICAL HISTORY:  Past Medical History:   Diagnosis Date    Arthritis     Cancer [facility])     chemotherapy lymphoma last was april 2022    Chemotherapy management, encounter for     CHF (congestive heart failure) (HCC)     COPD (chronic obstructive pulmonary disease) ([facility]     Diabetes mellitus ([facility]     H/O cardiovascular stress test     History of non-Hodgkin's lymphoma     Hx of blood clots     DVT in right leg     Hyperlipidemia     Hypertension     Hypothyroidism     On home O2     at 5 liters per nasal cannula 24 hrs. per day.       Past Surgical History:   Procedure Laterality Date    CAROTID ENDARTERECTOMY Left 6/28/2022    LEFT TYPE I EVERSION LEFT CAROTID ENDARTERECTOMY RE-IMPLANTATION LEFT ICA performed by Patricia Keith MD at [facility], LAPAROSCOPIC N/A 10/9/2021    CHOLECYSTECTOMY LAPAROSCOPIC ROBOTIC XI performed by Akira Corona MD at [address] [facility] Left 9/24/2021    COLONOSCOPY POLYPECTOMY SNARE/COLD BIOPSY performed by Kiya Britt MD at [address] [place] [address] Right 10/6/2021    CYSTOSCOPY PYELOGRAM URETERAL STENT INSERTION performed by Houston Varner MD at [address] [place] [address] Bilateral 11/24/2021    CYSTOSCOPY URETERAL [address] performed by Houston Varner MD at [address] [place] [address] Bilateral 2/16/2022    CYSTOSCOPY WITH BILATERAL STENT EXCHANGE performed by Houston Varner MD at [address] [place] [address] N/A 7/20/2022    CYSTOSCOPY BILATERAL STENT EXCHANGE performed by Houston Varner MD at [facility]      IR BIOPSY ABDOMINAL MASS  7/6/2021    IR BIOPSY ABDOMINAL MASS carvedilol (COREG) 6.25 MG tablet Take 6.25 mg by mouth at bedtime    Historical Provider, MD   psyllium (KONSYL) 28.3 % PACK Take 1 packet by mouth daily    Historical Provider, MD   Lactobacillus (PROBIOTIC ACIDOPHILUS) CAPS Take 1 caplet by mouth daily    Historical Provider, MD   Multiple Vitamins-Minerals (THERAPEUTIC MULTIVITAMIN-MINERALS) tablet Take 1 tablet by mouth daily    Historical Provider, MD   atorvastatin (LIPITOR) 40 MG tablet Take 1 tablet by mouth daily 6/29/22   Sobia Morelos MD   OXYGEN Inhale 2 L into the lungs at bedtime Nightly & during the day. Historical Provider, MD   ipratropium-albuterol (DUONEB) 0.5-2.5 (3) MG/3ML SOLN nebulizer solution Inhale 3 mLs into the lungs every 4 hours as needed for Shortness of Breath 2/20/22   EDWIGE Palencia CNP   levothyroxine (SYNTHROID) 150 MCG tablet Take 1 tablet by mouth Daily 2/2/22   Milli Lawson DO   fluticasone-salmeterol (ADVAIR) 100-50 MCG/DOSE diskus inhaler Inhale 1 puff into the lungs every 12 hours 2/1/22   Milli Lawson DO   furosemide (LASIX) 20 MG tablet Take 1 tablet by mouth daily  Patient taking differently: Take 20 mg by mouth See Admin Instructions Pt only takes when legs start to swell 2/1/22   Brent Ulloa DO   pregabalin (LYRICA) 75 MG capsule Take 75 mg by mouth 2 times daily.  Indications: last fill on 4/22/22 #180 for 30 days   Patient not taking: Reported on 8/12/2022    Historical Provider, MD   albuterol sulfate HFA (PROVENTIL;VENTOLIN;PROAIR) 108 (90 Base) MCG/ACT inhaler Inhale 2 puffs into the lungs every 4 hours as needed for Wheezing     Historical Provider, MD   amLODIPine (NORVASC) 10 MG tablet Take 1 tablet by mouth daily 10/12/21   Roderick Metcalf DO   losartan (COZAAR) 50 MG tablet Take 1 tablet by mouth daily 10/12/21   Roderickevgeny Metcalf, DO   donepezil (ARICEPT) 10 MG tablet Take 10 mg by mouth daily  7/11/21   Historical Provider, MD   FLUoxetine (PROZAC) 20 MG capsule Take 20 mg by mouth daily    Historical Provider, MD   omeprazole (PRILOSEC) 20 MG delayed release capsule Take 20 mg by mouth 2 times daily    Historical Provider, MD     .  CURRENT MEDICATIONS:  Scheduled Meds:   [Held by provider] apixaban  5 mg Oral BID    atorvastatin  40 mg Oral Daily    donepezil  10 mg Oral Daily    FLUoxetine  20 mg Oral Daily    budesonide-formoterol  2 puff Inhalation BID    levothyroxine  150 mcg Oral Daily    sodium chloride flush  5-40 mL IntraVENous 2 times per day     . Continuous Infusions:   sodium chloride 100 mL/hr at 22 1510    sodium chloride       . PRN Meds:albuterol sulfate HFA, sodium chloride flush, sodium chloride, ondansetron **OR** ondansetron, polyethylene glycol, acetaminophen **OR** acetaminophen, morphine  . SOCIAL HISTORY:     Social History     Socioeconomic History    Marital status:      Spouse name: Not on file    Number of children: Not on file    Years of education: Not on file    Highest education level: Not on file   Occupational History    Not on file   Tobacco Use    Smoking status: Former     Packs/day: 2.00     Years: 45.00     Pack years: 90.00     Types: Cigarettes     Quit date: 2006     Years since quittin.6    Smokeless tobacco: Never   Vaping Use    Vaping Use: Never used   Substance and Sexual Activity    Alcohol use:  Yes     Alcohol/week: 1.7 standard drinks     Types: 2 Standard drinks or equivalent per week     Comment: occassional    Drug use: Yes     Types: Marijuana Suyapa Mendes)     Comment:  CBD gummies no smoking    Sexual activity: Not on file   Other Topics Concern    Not on file   Social History Narrative    Not on file     Social Determinants of Health     Financial Resource Strain: Not on file   Food Insecurity: Not on file   Transportation Needs: Not on file   Physical Activity: Not on file   Stress: Not on file   Social Connections: Not on file   Intimate Partner Violence: Not on file   Housing Stability: Not on file FAMILY HISTORY:   Family History   Problem Relation Age of Onset    Diabetes Mother     Alzheimer's Disease Father     Heart Disease Brother     Heart Disease Brother        REVIEW OF SYSTEMS:     Constitutional: No fever, no chills, no lethargy, no weakness. HEENT:  No headache, otalgia, itchy eyes, nasal discharge or sore throat. Cardiac:  No chest pain, dyspnea, orthopnea or PND. Chest:   No cough, phlegm or wheezing. Abdomen:  No abdominal pain, nausea or vomiting. Neuro:  No focal weakness, abnormal movements or seizure like activity. Skin:   No rashes, no itching. :   No hematuria, no pyuria, no dysuria, no flank pain. Extremities:  No swelling or joint pains. ROS was otherwise negative except as mentioned in the [address] PHYSICAL EXAM:    BP (!) 128/55   Pulse 53   Temp 97.7 °F (36.5 °C) (Oral)   Resp 18   Ht 5' 10\" (1.778 m)   Wt 173 lb (78.5 kg)   SpO2 100%   BMI 24.82 kg/m²   . TMAX[24]    General: Well developed, Well nourished, No apparent distress  Head:  Normocephalic, Atraumatic  EENT: EOMI, Sclera not icteric, Oropharynx moist  Neck:  Supple, Trachea midline  Lungs:CTA Bilaterally  Heart: RRR, No murmur, No rub, No gallop, PMI nondisplaced. Abdomen:Soft, Non tender, Not distended, BS WNL,  No masses. Ext:No clubbing. No cyanosis. No edema. Skin: No rashes. No jaundice. No stigmata of liver disease. Neuro:  A&O x Three, No focal neurological deficits    LABS and IMAGING:     Hemotological labs:   ANEMIA STUDIES  No results for input(s): LABIRON, TIBC, FERRITIN, [id number], FOLATE, OCCULTBLD in the last 72 hours. CBC  Recent Labs     08/12/22  1150   WBC 8.1   HGB 12.3*   MCV 79.1*   RDW 16.6*   *       PT/INR  No results for input(s): PROTIME, INR in the last 72 hours.     BMP  Recent Labs     08/12/22  1150      K 4.2      CO2 24   BUN 33*   CREATININE 1.77*   GLUCOSE 147*   CALCIUM 8.7       LIVER WORK UP  Hepatitis Functional Panel:  Recent Labs     08/12/22  1150   ALKPHOS 146*   ALT 14   AST 13   PROT 5.5*   BILITOT 0.88   LABALBU 3.4*       AMYLASE/LIPASE/AMMONIA  Recent Labs     08/12/22  1150   AMYLASE 220*   LIPASE 209*       Acute Hepatitis Panel   Lab Results   Component Value Date/Time    HEPBSAG NONREACTIVE 08/19/2021 09:47 AM       HCV Genotype   No results found for: HEPATITISCGENOTYPE    HCV Quantitative   No results found for: HCVQNT    Metabolic work up:  Ceruloplasmin  AA work up:  Alpha 1 antitrypsin   No results found for: A1A  AMA:  No results found for: MITOAB    ASMA:  No results found for: SMOOTHMUSCAB    ANCA:    MIRELLA:  No results found for: MIRELLA    Anti - Liver/Kidney Ab:  No results found for: LIVER-KIDNEYMICROSOMALAB    Ceruloplasmin:  No results found for: CERULOPLSM    Celiac panel:  No results found for: TISSTRNTIIGG, TTGIGA, IGA    Cancer Markers:  CEA:    No results for input(s): CEA in the last 72 hours. Ca 125:   No results for input(s):  in the last 72 hours. Ca 19-9:     Invalid input(s):   AFP: No results for input(s): AFP in the last 72 hours. ASSESSMENT and PLAN:     64-year-old male with a history of non-small cell B-cell lymphoma (non-Hodgkin's lymphoma), undergoing chemotherapy, prior history of DVT on Eliquis, COPD, diabetes, hypertension, hyperlipidemia, admitted to the inpatient medicine for failure to thrive, poor nutritional status and LAURO with evidence of dehydration. GI was consulted to evaluate for possible gastrostomy tube placement to optimize the patient's nutritional status. During admission, patient was eating a full meal, solid food with \"mashed potatoes and meat loaf\" without any significant discomfort or GI symptoms. Significantly much improved with IV fluids. He is deferring gastrostomy tube placement especially in the context of tolerating full solid meals without issue.     Recommendation:  - Recommend nutrition/dietitian consultation, ongoing IV fluid as currently ordered to optimize the patient's intravascular volume and treat LAURO  - Antiemetics as needed, patient continue with Pepcid IV during inpatient course  - Calorie counting may be considered to optimize the patient's caloric intake along with nutritional supplementation with Ensure/boost protein supplementations. Continue to monitor the patient's electrolytes, prealbumin and albumin.  - No strong indication for gastrostomy tube placement given patient's clinical response to IV fluids alone and well tolerated full solid meals. GI to sign off, please do not hesitate to contact our service should there be any residual questions. Thank you for allowing us to take part in the patients care  Contact GI for any remaining questions, we are available. Electronically signed by:  Meng Webb MD   Gastroenterology  8/12/2022    7:12 PM       This note is created with the assistance of a speech recognition program.  While intending to generate a document that actually reflects the content of the visit, the document can still have some errors including those of syntax and sound a like substitutions which may escape proof reading. It such instances, actual meaning can be extrapolated by contextual diversion.

## 2022-08-12 NOTE — H&P
[facility]  Office: [address], DO, Melita Cassidy, DO, Ephraim Sparrow, DO, Kathyernagonzalo Portillo Soto, DO, Staci Bonner MD, Emeli Barrera MD, Amber Brooke MD, Brain Fernandez MD,  Richie Deluca MD, Terry Martinez MD, Hilaria Ahuja DO, Galo Brannon MD,  Nick Ferrer MD, Alfonso Thornton MD, Bertha Pastrana DO, Burt Owens MD, Freida Vail MD, Tiara Finley MD, Rosa Alcaraz DO, Madeline Martin MD, Lauren Porras MD, Laurent Lim, CNP,  Emerita Norris, CNP, Pedro Monsivais, CNP, Benjamin Hernandez, CNP, FERN DysonC, Yeny Taylor, DNP, Kiel Belle, CNP, Austyn Almonte, CNP, Theresa Graham, CNP, Susie Parsons, CNP, Cecil Ambriz, CNP, Elsa Nurse, CNS, Norma Davis, [facility], Valeria Weaver, CNP, Barbara Lunsford, CNP, Becky Ballard, [facility]   [address] [facility]    HISTORY AND PHYSICAL EXAMINATION            Date:   8/12/2022  Patient name:  Ramón Hurtado  Date of admission:  8/12/2022 11:42 AM  MRN:   [id number]  Account:  [de-identified]  YOB: 1950  PCP:    Jeff Issa DO  Room:   [address] [place] [address]  Code Status:    Full Code    Chief Complaint:     Chief Complaint   Patient presents with    Dehydration     History Obtained From:     patient    History of Present Illness:     Ramón Hurtado is a 74 y.o. male with a past medical history of non-Hodgkin lymphoma (currently on chemotherapy), DVT (on Eliquis), COPD, DM2, HTN and HLD who presented to the emergency department on 8/12/2022 per the instruction of his oncologist for dehydration. The patient and his family state that he has become progressively weaker over the past few weeks and has had very little to eat or drink. He is currently on Imbruvica and states that he has experienced loss of appetite and persistent nausea over the past several weeks.  The patient's family states that oncology has recommended he have a feeding tube placed to improve his nutritional status. In the ED, the patient was afebrile and nontoxic appearing. CMP was remarkable for LAURO with a creatinine of 1.77 (baseline 1.3). The patient is admitted to internal medicine for dehydration and failure to thrive. Past Medical History:     Past Medical History:   Diagnosis Date    Arthritis     Cancer [facility])     chemotherapy lymphoma last was april 2022    Chemotherapy management, encounter for     CHF (congestive heart failure) ([facility] [address]     COPD (chronic obstructive pulmonary disease) ([facility] [address]     Diabetes mellitus ([facility] [address]     H/O cardiovascular stress test     History of non-Hodgkin's lymphoma     Hx of blood clots     DVT in right leg     Hyperlipidemia     Hypertension     Hypothyroidism     On home O2     at 5 liters per nasal cannula 24 hrs. per day.          Past Surgical History:     Past Surgical History:   Procedure Laterality Date    CAROTID ENDARTERECTOMY Left 6/28/2022    LEFT TYPE I EVERSION LEFT CAROTID ENDARTERECTOMY RE-IMPLANTATION LEFT ICA performed by Aaliyah Cornelius MD at [facility], LAPAROSCOPIC N/A 10/9/2021    CHOLECYSTECTOMY LAPAROSCOPIC ROBOTIC XI performed by Bud Albright MD at [address] [place] [address] Left 9/24/2021    COLONOSCOPY POLYPECTOMY SNARE/COLD BIOPSY performed by Nkechi Becerra MD at [facility] 19 Right 10/6/2021    CYSTOSCOPY PYELOGRAM URETERAL STENT INSERTION performed by Noam Barr MD at [facility] 19 Bilateral 11/24/2021    CYSTOSCOPY U[address]TERAL [address] performed by Noam Barr MD at [facility] 19 Bilateral 2/16/2022    CYSTOSCOPY WITH BILATERAL STENT EXCHANGE performed by Noam Barr MD at [facility] 19 N/A 7/20/2022    CYSTOSCOPY BILATERAL STENT EXCHANGE performed by Noam Barr MD at [facility] 130      IR BIOPSY ABDOMINAL MASS  7/6/2021    IR BIOPSY ABDOMINAL MASS 7/6/2021 [facility] PROCEDURES    IR PORT PLACEMENT EQUAL OR GREATER THAN 5 YEARS  8/31/2021    IR PORT PLACEMENT EQUAL OR GREATER THAN 5 YEARS 8/31/2021 [facility] PROCEDURES    TONSILLECTOMY      as child        Medications Prior to Admission:     Prior to Admission medications    Medication Sig Start Date End Date Taking? Authorizing Provider   oxyCODONE (OXYCONTIN) 10 MG extended release tablet Take 10 mg by mouth in the morning and 10 mg in the evening. Yes Historical Provider, MD   oxyCODONE (ROXICODONE) 5 MG immediate release tablet Take 5 mg by mouth every 4 hours as needed for Pain. Yes Historical Provider, MD   ibrutinib (IMBRUVICA) 420 MG tablet Take 1 tablet by mouth daily 7/26/22 8/25/22  Bonnie Sarmiento MD   prochlorperazine (COMPAZINE) 10 MG tablet Take 1 tablet by mouth every 6 hours as needed (nausea vomiting) 7/26/22   Bonnie Sarmiento MD   megestrol (MEGACE) 40 MG/ML suspension Take 10 mLs by mouth in the morning. 7/25/22   Bnonie Sarmiento MD   apixaban (ELIQUIS) 5 MG TABS tablet Take 1 tablet by mouth in the morning and 1 tablet before bedtime. 7/21/22   Roderick Metcalf, DO   glipiZIDE (GLUCOTROL) 5 MG tablet Take 0.5 tablets by mouth in the morning and 0.5 tablets in the evening. Take before meals. Patient not taking: Reported on 8/12/2022 7/21/22   Roderick Metcalf, DO   sucralfate (CARAFATE) 1 GM tablet Take 1 tablet by mouth in the morning and 1 tablet at noon and 1 tablet in the evening and 1 tablet before bedtime.  7/21/22   Tutu Overall, DO   docusate sodium (COLACE, DULCOLAX) 100 MG CAPS Take 100 mg by mouth 2 times daily as needed for Constipation 7/21/22 8/20/22  Tutu Overall, DO   lidocaine 4 % external patch Place 1 patch onto the skin in the morning. 7/22/22 8/21/22  Roderick Metcalf, DO   ondansetron (ZOFRAN-ODT) 4 MG disintegrating tablet Take 1 tablet by mouth every 8 hours as needed for Nausea or Vomiting 7/21/22 8/20/22  Roderick Metcalf DO   carvedilol (COREG) 6.25 MG tablet Take 6.25 mg by mouth at bedtime    Historical Provider, MD   psyllium (KONSYL) 28.3 % PACK Take 1 packet by mouth daily    Historical Provider, MD   Lactobacillus (PROBIOTIC ACIDOPHILUS) CAPS Take 1 caplet by mouth daily    Historical Provider, MD   Multiple Vitamins-Minerals (THERAPEUTIC MULTIVITAMIN-MINERALS) tablet Take 1 tablet by mouth daily    Historical Provider, MD   atorvastatin (LIPITOR) 40 MG tablet Take 1 tablet by mouth daily 6/29/22   Brionna Garcia MD   OXYGEN Inhale 2 L into the lungs at bedtime Nightly & during the day. Historical Provider, MD   ipratropium-albuterol (DUONEB) 0.5-2.5 (3) MG/3ML SOLN nebulizer solution Inhale 3 mLs into the lungs every 4 hours as needed for Shortness of Breath 2/20/22   Clide Brain, APRN - CNP   levothyroxine (SYNTHROID) 150 MCG tablet Take 1 tablet by mouth Daily 2/2/22   Zhoue Barrier, DO   fluticasone-salmeterol (ADVAIR) 100-50 MCG/DOSE diskus inhaler Inhale 1 puff into the lungs every 12 hours 2/1/22   Zhoue Barrier, DO   furosemide (LASIX) 20 MG tablet Take 1 tablet by mouth daily  Patient taking differently: Take 20 mg by mouth See Admin Instructions Pt only takes when legs start to swell 2/1/22   Brent Ulloa,    pregabalin (LYRICA) 75 MG capsule Take 75 mg by mouth 2 times daily.  Indications: last fill on 4/22/22 #180 for 30 days   Patient not taking: Reported on 8/12/2022    Historical Provider, MD   albuterol sulfate HFA (PROVENTIL;VENTOLIN;PROAIR) 108 (90 Base) MCG/ACT inhaler Inhale 2 puffs into the lungs every 4 hours as needed for Wheezing     Historical Provider, MD   amLODIPine (NORVASC) 10 MG tablet Take 1 tablet by mouth daily 10/12/21   Roderick T Dixon, DO   losartan (COZAAR) 50 MG tablet Take 1 tablet by mouth daily 10/12/21   Roderick T Dixon, DO   donepezil (ARICEPT) 10 MG tablet Take 10 mg by mouth daily  7/11/21   Historical Provider, MD   FLUoxetine (PROZAC) 20 MG capsule Take 20 mg by mouth daily    Historical Provider, MD last 72 hours.   No intake or output data in the 24 hours ending 08/12/22 3657    General Appearance:  alert, well appearing, and in no acute distress  Mental status: oriented to person, place, and time  Head:  normocephalic, atraumatic  Eye: no icterus, redness, pupils equal and reactive, extraocular eye movements intact, conjunctiva clear  Ear: normal external ear, no discharge, hearing intact  Nose:  no drainage noted  Mouth: mucous membranes dry  Neck: supple, no carotid bruits, thyroid not palpable  Lungs: Bilateral equal air entry, clear to ausculation, no wheezing, rales or rhonchi, normal effort  Cardiovascular: normal rate, regular rhythm, no murmur, gallop, rub  Abdomen: Soft, nontender, nondistended, normal bowel sounds, no hepatomegaly or splenomegaly  Neurologic: There are no new focal motor or sensory deficits, normal muscle tone and bulk, no abnormal sensation, normal speech, cranial nerves II through XII grossly intact  Skin: No gross lesions, rashes, bruising or bleeding on exposed skin area  Extremities:  peripheral pulses palpable, no pedal edema or calf pain with palpation  Psych: normal affect     Investigations:      Laboratory Testing:  Recent Results (from the past 24 hour(s))   Lactic Acid    Collection Time: 08/12/22 11:50 AM   Result Value Ref Range    Lactic Acid 1.8 0.5 - 2.2 mmol/L   CBC with Auto Differential    Collection Time: 08/12/22 11:50 AM   Result Value Ref Range    WBC 8.1 3.5 - 11.0 k/uL    RBC 4.76 4.5 - 5.9 m/uL    Hemoglobin 12.3 (L) 13.5 - 17.5 g/dL    Hematocrit 37.7 (L) 41 - 53 %    MCV 79.1 (L) 80 - 100 fL    MCH 25.8 (L) 26 - 34 pg    MCHC 32.5 31 - 37 g/dL    RDW 16.6 (H) 12.5 - 15.4 %    Platelets 030 (L) 083 - 450 k/uL    MPV 8.5 6.0 - 12.0 fL    Seg Neutrophils 66 36 - 66 %    Lymphocytes 21 (L) 24 - 44 %    Monocytes 12 (H) 2 - 11 %    Eosinophils % 1 1 - 4 %    Basophils 0 0 - 2 %    Segs Absolute 5.40 1.8 - 7.7 k/uL    Absolute Lymph # 1.70 1.0 - 4.8 k/uL Med/Surge    Failure to thrive   -Secondary to underlying non-Hodgkin's lymphoma   -Maintenance fluids at 100 mL/hr   -Patient and family would like to discuss PEG-tube placement with GI and oncology   -Consult oncology; appreciate recommendations   -Will consult GI for PEG-tube placement   -Patient will benefit from palliative care consultation     LAURO   -Secondary to dehydration   -Maintenance fluids as above     Non-Hodgkin's lymphoma   -Stable; holding home Imbruvica per oncology recommendation   -Consult oncology as above     Hypothyroidism   -Continue home levothyroxine 150 mcg daily     DVT   -Holding home Eliquis pending GI recommendations regarding PEG-tube placement     DM2   -LDSSI   -Hypoglycemia protocol     COPD  -Continue home inhaler regimen     HTN   -Holding home BP meds due to hypotension     HLD  -Continue home atorvastatin 40 mg daily     Consultations:   Keke Gomez MD  8/12/2022  2:57 PM    Copy sent to Dr. Mayra Jenkins DO

## 2022-08-12 NOTE — TELEPHONE ENCOUNTER
POWER from 8/12/22    ER for dehydration and malnutrition      Pt was admitted today     Electronically signed by Ewa Carreno on 8/12/2022 at 11:16 AM

## 2022-08-12 NOTE — PROGRESS NOTES
Pt arrived to floor via stretcher from ED and ambulated to bed. Patient oriented to room and use of call light. Call light and personal items within reach. Admission and assessment initiated. POC and education initiated and reviewed with patient and family. Denied further needs or questions at this time. Will continue to monitor.

## 2022-08-12 NOTE — TELEPHONE ENCOUNTER
Kristofer's wife is calling writer stating that Nikolay George is going down hill in front of her eyes and she does not know what to do. Writer asked if she feels that Nikolay Getting should go to the ER however she cannot decide. writer asked about hydration and Sarika says that she is doing her best to get fluids in him. Sarika says that his blood sugar is ok. Sarika is still undecided about what to do as Nikolay George has an appointment on Friday at 9:30. Writer asked about visiting nurse that comes in. Sarika says that she does not come today however if she calls her she will come. Writer advised her to call so an assessment could be made for her to decide if Nikolay George needs to go to the ER. Writer also suggested to take him to the ER if he gets any worse. Sarika is agreeable. If Hazeline Getting stays the same she will come to the appointment on Friday.

## 2022-08-13 VITALS
HEIGHT: 70 IN | SYSTOLIC BLOOD PRESSURE: 177 MMHG | DIASTOLIC BLOOD PRESSURE: 69 MMHG | BODY MASS INDEX: 24.77 KG/M2 | WEIGHT: 173 LBS | TEMPERATURE: 97.9 F | OXYGEN SATURATION: 97 % | HEART RATE: 70 BPM | RESPIRATION RATE: 18 BRPM

## 2022-08-13 LAB
ABSOLUTE EOS #: 0.1 K/UL (ref 0–0.4)
ABSOLUTE LYMPH #: 1.6 K/UL (ref 1–4.8)
ABSOLUTE MONO #: 0.8 K/UL (ref 0.1–1.2)
ANION GAP SERPL CALCULATED.3IONS-SCNC: 7 MMOL/L (ref 9–17)
BASOPHILS # BLD: 0 % (ref 0–2)
BASOPHILS ABSOLUTE: 0 K/UL (ref 0–0.2)
BUN BLDV-MCNC: 29 MG/DL (ref 8–23)
CALCIUM SERPL-MCNC: 7.7 MG/DL (ref 8.6–10.4)
CHLORIDE BLD-SCNC: 109 MMOL/L (ref 98–107)
CO2: 22 MMOL/L (ref 20–31)
CREAT SERPL-MCNC: 1.3 MG/DL (ref 0.7–1.2)
EOSINOPHILS RELATIVE PERCENT: 1 % (ref 1–4)
GFR AFRICAN AMERICAN: >60 ML/MIN
GFR NON-AFRICAN AMERICAN: 54 ML/MIN
GFR SERPL CREATININE-BSD FRML MDRD: ABNORMAL ML/MIN/{1.73_M2}
GLUCOSE BLD-MCNC: 119 MG/DL (ref 70–99)
HCT VFR BLD CALC: 31.5 % (ref 41–53)
HEMOGLOBIN: 10.2 G/DL (ref 13.5–17.5)
LYMPHOCYTES # BLD: 22 % (ref 24–44)
MCH RBC QN AUTO: 25.6 PG (ref 26–34)
MCHC RBC AUTO-ENTMCNC: 32.3 G/DL (ref 31–37)
MCV RBC AUTO: 79.3 FL (ref 80–100)
MONOCYTES # BLD: 11 % (ref 2–11)
PDW BLD-RTO: 16.5 % (ref 12.5–15.4)
PLATELET # BLD: 117 K/UL (ref 140–450)
PMV BLD AUTO: 9.4 FL (ref 6–12)
POTASSIUM SERPL-SCNC: 3.9 MMOL/L (ref 3.7–5.3)
RBC # BLD: 3.97 M/UL (ref 4.5–5.9)
SEG NEUTROPHILS: 66 % (ref 36–66)
SEGMENTED NEUTROPHILS ABSOLUTE COUNT: 5 K/UL (ref 1.8–7.7)
SODIUM BLD-SCNC: 138 MMOL/L (ref 135–144)
WBC # BLD: 7.6 K/UL (ref 3.5–11)

## 2022-08-13 PROCEDURE — 96376 TX/PRO/DX INJ SAME DRUG ADON: CPT

## 2022-08-13 PROCEDURE — 99217 PR OBSERVATION CARE DISCHARGE MANAGEMENT: CPT | Performed by: STUDENT IN AN ORGANIZED HEALTH CARE EDUCATION/TRAINING PROGRAM

## 2022-08-13 PROCEDURE — 96361 HYDRATE IV INFUSION ADD-ON: CPT

## 2022-08-13 PROCEDURE — 6360000002 HC RX W HCPCS: Performed by: STUDENT IN AN ORGANIZED HEALTH CARE EDUCATION/TRAINING PROGRAM

## 2022-08-13 PROCEDURE — 80048 BASIC METABOLIC PNL TOTAL CA: CPT

## 2022-08-13 PROCEDURE — 6370000000 HC RX 637 (ALT 250 FOR IP): Performed by: STUDENT IN AN ORGANIZED HEALTH CARE EDUCATION/TRAINING PROGRAM

## 2022-08-13 PROCEDURE — 85025 COMPLETE CBC W/AUTO DIFF WBC: CPT

## 2022-08-13 PROCEDURE — 94760 N-INVAS EAR/PLS OXIMETRY 1: CPT

## 2022-08-13 PROCEDURE — 36415 COLL VENOUS BLD VENIPUNCTURE: CPT

## 2022-08-13 PROCEDURE — G0378 HOSPITAL OBSERVATION PER HR: HCPCS

## 2022-08-13 PROCEDURE — 94640 AIRWAY INHALATION TREATMENT: CPT

## 2022-08-13 PROCEDURE — 96375 TX/PRO/DX INJ NEW DRUG ADDON: CPT

## 2022-08-13 RX ORDER — LOSARTAN POTASSIUM 50 MG/1
50 TABLET ORAL DAILY
Status: DISCONTINUED | OUTPATIENT
Start: 2022-08-13 | End: 2022-08-13 | Stop reason: HOSPADM

## 2022-08-13 RX ORDER — AMLODIPINE BESYLATE 5 MG/1
10 TABLET ORAL DAILY
Status: DISCONTINUED | OUTPATIENT
Start: 2022-08-13 | End: 2022-08-13 | Stop reason: HOSPADM

## 2022-08-13 RX ADMIN — MORPHINE SULFATE 1 MG: 2 INJECTION, SOLUTION INTRAMUSCULAR; INTRAVENOUS at 07:42

## 2022-08-13 RX ADMIN — DONEPEZIL HYDROCHLORIDE 10 MG: 5 TABLET ORAL at 07:42

## 2022-08-13 RX ADMIN — LOSARTAN POTASSIUM 50 MG: 50 TABLET, FILM COATED ORAL at 08:12

## 2022-08-13 RX ADMIN — LEVOTHYROXINE SODIUM 150 MCG: 75 TABLET ORAL at 07:42

## 2022-08-13 RX ADMIN — FLUOXETINE 20 MG: 20 CAPSULE ORAL at 07:42

## 2022-08-13 RX ADMIN — AMLODIPINE BESYLATE 10 MG: 5 TABLET ORAL at 08:12

## 2022-08-13 RX ADMIN — ONDANSETRON 4 MG: 2 INJECTION INTRAMUSCULAR; INTRAVENOUS at 11:48

## 2022-08-13 RX ADMIN — BUDESONIDE AND FORMOTEROL FUMARATE DIHYDRATE 2 PUFF: 80; 4.5 AEROSOL RESPIRATORY (INHALATION) at 09:12

## 2022-08-13 RX ADMIN — ATORVASTATIN CALCIUM 40 MG: 40 TABLET, FILM COATED ORAL at 07:42

## 2022-08-13 ASSESSMENT — PAIN DESCRIPTION - LOCATION: LOCATION: ABDOMEN

## 2022-08-13 ASSESSMENT — PAIN DESCRIPTION - ONSET: ONSET: ON-GOING

## 2022-08-13 ASSESSMENT — PAIN SCALES - GENERAL
PAINLEVEL_OUTOF10: 7
PAINLEVEL_OUTOF10: 6
PAINLEVEL_OUTOF10: 7

## 2022-08-13 ASSESSMENT — PAIN DESCRIPTION - FREQUENCY: FREQUENCY: CONTINUOUS

## 2022-08-13 ASSESSMENT — PAIN DESCRIPTION - ORIENTATION: ORIENTATION: MID

## 2022-08-13 ASSESSMENT — PAIN DESCRIPTION - PAIN TYPE: TYPE: CHRONIC PAIN

## 2022-08-13 ASSESSMENT — PAIN DESCRIPTION - DESCRIPTORS: DESCRIPTORS: STABBING

## 2022-08-13 ASSESSMENT — PAIN - FUNCTIONAL ASSESSMENT: PAIN_FUNCTIONAL_ASSESSMENT: ACTIVITIES ARE NOT PREVENTED

## 2022-08-13 NOTE — PLAN OF CARE
Problem: Discharge Planning  Goal: Discharge to home or other facility with appropriate resources  8/13/2022 1026 by Daisy Leslie RN  Outcome: Completed  Flowsheets (Taken 8/13/2022 0746)  Discharge to home or other facility with appropriate resources: Identify barriers to discharge with patient and caregiver  8/13/2022 0129 by Mary Shirley RN  Outcome: Progressing  Flowsheets  Taken 8/12/2022 2000 by Mary Shirley RN  Discharge to home or other facility with appropriate resources:   Identify barriers to discharge with patient and caregiver   Arrange for needed discharge resources and transportation as appropriate   Identify discharge learning needs (meds, wound care, etc)   Refer to discharge planning if patient needs post-hospital services based on physician order or complex needs related to functional status, cognitive ability or social support system  Taken 8/12/2022 1606 by Daisy Leslie RN  Discharge to home or other facility with appropriate resources: Identify barriers to discharge with patient and caregiver  Taken 8/12/2022 1445 by Daisy Leslie RN  Discharge to home or other facility with appropriate resources: Identify barriers to discharge with patient and caregiver   Patients questions and concerns addressed and answered.       Problem: Pain  Goal: Verbalizes/displays adequate comfort level or baseline comfort level  8/13/2022 1026 by Daisy Leslie RN  Outcome: Completed  8/13/2022 0129 by Mary Shirley RN  Outcome: Progressing  Flowsheets (Taken 8/12/2022 2105)  Verbalizes/displays adequate comfort level or baseline comfort level:   Encourage patient to monitor pain and request assistance   Assess pain using appropriate pain scale   Administer analgesics based on type and severity of pain and evaluate response   Consider cultural and social influences on pain and pain management   Notify Licensed Independent Practitioner if interventions unsuccessful or patient reports new pain   Pain scale preformed per protocol and pt treated for pain as documented. Education given. Problem: Skin/Tissue Integrity  Goal: Absence of new skin breakdown  Description: 1. Monitor for areas of redness and/or skin breakdown  2. Assess vascular access sites hourly  3. Every 4-6 hours minimum:  Change oxygen saturation probe site  4. Every 4-6 hours:  If on nasal continuous positive airway pressure, respiratory therapy assess nares and determine need for appliance change or resting period. 8/13/2022 1026 by Herbert Gaona RN  Outcome: Completed  8/13/2022 0129 by Hesham Ballard RN  Outcome: Progressing  Skin assessment preformed. Pt encouraged to turn  every 2 hours with heals elevated off bed. Will continue to monitor. Problem: Safety - Adult  Goal: Free from fall injury  8/13/2022 1026 by Herbert Gaona RN  Outcome: Completed  Flowsheets (Taken 8/13/2022 0748)  Free From Fall Injury: Instruct family/caregiver on patient safety  8/13/2022 0129 by Hesham Ballard RN  Outcome: Progressing  Flowsheets (Taken 8/13/2022 0122)  Free From Fall Injury: Instruct family/caregiver on patient safety   Fall assessment preformed. Bed in low locked position with call light and tray table within reach. Education given. Will continue to monitor. Problem: ABCDS Injury Assessment  Goal: Absence of physical injury  8/13/2022 1026 by Herbert Gaona RN  Outcome: Completed  Flowsheets (Taken 8/13/2022 0748)  Absence of Physical Injury: Implement safety measures based on patient assessment  8/13/2022 0129 by Hesham Ballard RN  Outcome: Progressing  Flowsheets (Taken 8/13/2022 0122)  Absence of Physical Injury: Implement safety measures based on patient assessment  Fall assessment preformed. Bed in low locked position with call light and tray table within reach. Education given. Will continue to monitor.

## 2022-08-13 NOTE — DISCHARGE SUMMARY
[facility]  Office: [address], DO, Vikfelisha Stevensonet, DO, Lisa Lopez, DO, Rivka Nestor Soto, DO, Elyse Gastelum MD, Shana Resendiz MD, Luann Reilly MD, Juan Carlos Lester MD,  Mariama Mills MD, Laurent Feldman MD, Eusebio Feldman, DO, Mirela Amato MD,  Ladi Barnhart MD, Marily Chi MD, Vic Almonte, DO, Hiwot Pond MD, Clarissa Hodge MD, Robin Vega MD, Tigist Ortiz DO, Tai Borges MD, Pinky Christensen MD, Mikael Chou, CNP,  Trinidad Gonzalez, CNP, Marizol Reese, CNP, Sheryl Freire, CNP, Isabela Frances PA-C, Shavon Callahan, [facility], Hazel Mendoza, CNP, Gail Heller, CNP, Codey Yanez, CNP, Peg Gonzalez, CNP, Hayden Hernandez, CNP, Katherine Hernandez, CNS, Stone Terrell, [facility], Marshall Pratt, CNP, Telma Gleason, CNP, Tatiana VeeRachel, [facility]    [place]charge Summary     Patient ID: Stef Schneider  :     MRN: [id number]     ACCOUNT:  [de-identified]   Patient's PCP: Ruslan Wyman DO  Admit Date: 2022   Discharge Date: 2022     Length of Stay: 1  Code Status:  Full Code  Admitting Physician: Marily Chi MD  Discharge Physician: Marily Chi MD     Active Discharge Diagnoses:     Hospital Problem Lists:  Principal Problem:    LAURO (acute kidney injury) [facility])  Active Problems:    Failure to thrive in adult    DVT (deep venous thrombosis) (HCC)    Dehydration    NHL (non-Hodgkin's lymphoma) ([facility] [address]    Moderate malnutrition ([facility] [address]    Hypothyroidism    Diabetes mellitus ([facility] [address]  Resolved Problems:    * No resolved hospital problems.  *      Admission Condition:  fair     Discharged Condition: good    Hospital Stay:     Hospital Course:  Stef Schneider is a 74 y.o. male with a past medical history of non-Hodgkin lymphoma (currently on chemotherapy), DVT (on Eliquis), COPD, DM2, HTN and HLD who presented to the emergency department on 2022 per the instruction of his oncologist for dehydration. The patient and his family state that he has become progressively weaker over the past few weeks and has had very little to eat or drink. He is currently on Imbruvica and states that he has experienced loss of appetite and persistent nausea over the past several weeks. The patient's family states that oncology has recommended he have a feeding tube placed to improve his nutritional status. In the ED, the patient was afebrile and nontoxic appearing. CMP was remarkable for LAURO with a creatinine of 1.77 (baseline 1.3). The patient was admitted to internal medicine for dehydration and failure to thrive. He improved with IV fluids during admission. The patient was eating and drinking appropriately during hospitalization. Gastroenterology was consulted and did not feel that a PEG-tube was appropriate. The patient is discharged today (8/13) in stable condition. He is instructed to follow-up with oncology outpatient as scheduled. Significant therapeutic interventions: IV fluids; GI consultation     Significant Diagnostic Studies:   Labs / Micro:  BMP:    Lab Results   Component Value Date/Time    GLUCOSE 119 08/13/2022 05:44 AM    GLUCOSE 97 06/06/2012 06:57 AM     08/13/2022 05:44 AM    K 3.9 08/13/2022 05:44 AM     08/13/2022 05:44 AM    CO2 22 08/13/2022 05:44 AM    ANIONGAP 7 08/13/2022 05:44 AM    BUN 29 08/13/2022 05:44 AM    CREATININE 1.30 08/13/2022 05:44 AM    BUNCRER 30 06/13/2022 10:56 AM    CALCIUM 7.7 08/13/2022 05:44 AM    LABGLOM 54 08/13/2022 05:44 AM    GFRAA >60 08/13/2022 05:44 AM    GFR      08/13/2022 05:44 AM       Radiology:  CT ABDOMEN PELVIS WO CONTRAST Additional Contrast? None    Result Date: 8/12/2022  1. Confluent retroperitoneal and mesenteric mass again demonstrated corresponding to known lymphoma.   This appears improved in the interval, although measurement is limited in the absence of contrast. 2.  Bilateral double-J ureteral stents in place without hydronephrosis. 3.  No new findings identified in the interval.       Consultations:    Consults:     Final Specialist Recommendations/Findings:   IP CONSULT TO GI  IP CONSULT TO PALLIATIVE CARE  IP CONSULT TO ONCOLOGY      The patient was seen and examined on day of discharge and this discharge summary is in conjunction with any daily progress note from day of discharge. Discharge plan:     Disposition: Home    Physician Follow Up:     [address] [place] [address]  [phone]    Call  Hospital follow-up     Requiring Further Evaluation/Follow Up POST HOSPITALIZATION/Incidental Findings: None. Diet: regular diet    Activity: As tolerated    Instructions to Patient: Follow-up with oncology outpatient as scheduled. Discharge Medications:      Medication List        CONTINUE taking these medications      albuterol sulfate  (90 Base) MCG/ACT inhaler  Commonly known as: PROVENTIL;VENTOLIN;PROAIR     amLODIPine 10 MG tablet  Commonly known as: NORVASC  Take 1 tablet by mouth daily     apixaban 5 MG Tabs tablet  Commonly known as: ELIQUIS  Take 1 tablet by mouth in the morning and 1 tablet before bedtime.      atorvastatin 40 MG tablet  Commonly known as: LIPITOR  Take 1 tablet by mouth daily     carvedilol 6.25 MG tablet  Commonly known as: COREG     docusate 100 MG Caps  Commonly known as: COLACE, DULCOLAX  Take 100 mg by mouth 2 times daily as needed for Constipation     donepezil 10 MG tablet  Commonly known as: ARICEPT     FLUoxetine 20 MG capsule  Commonly known as: PROZAC     fluticasone-salmeterol 100-50 MCG/DOSE diskus inhaler  Commonly known as: ADVAIR  Inhale 1 puff into the lungs every 12 hours     ibrutinib 420 MG tablet  Commonly known as: IMBRUVICA  Take 1 tablet by mouth daily     ipratropium-albuterol 0.5-2.5 (3) MG/3ML Soln nebulizer solution  Commonly known as: DUONEB  Inhale 3 mLs into the lungs every 4 hours as needed for Shortness of Breath levothyroxine 150 MCG tablet  Commonly known as: SYNTHROID  Take 1 tablet by mouth Daily     lidocaine 4 % external patch  Place 1 patch onto the skin in the morning. losartan 50 MG tablet  Commonly known as: Cozaar  Take 1 tablet by mouth daily     megestrol 40 MG/ML suspension  Commonly known as: MEGACE  Take 10 mLs by mouth in the morning. omeprazole 20 MG delayed release capsule  Commonly known as: PRILOSEC     ondansetron 4 MG disintegrating tablet  Commonly known as: ZOFRAN-ODT  Take 1 tablet by mouth every 8 hours as needed for Nausea or Vomiting     * oxyCODONE 10 MG extended release tablet  Commonly known as: OXYCONTIN     * oxyCODONE 5 MG immediate release tablet  Commonly known as: ROXICODONE     OXYGEN     Probiotic Acidophilus Caps     prochlorperazine 10 MG tablet  Commonly known as: COMPAZINE  Take 1 tablet by mouth every 6 hours as needed (nausea vomiting)     psyllium 28.3 % Pack  Commonly known as: KONSYL     sucralfate 1 GM tablet  Commonly known as: CARAFATE  Take 1 tablet by mouth in the morning and 1 tablet at noon and 1 tablet in the evening and 1 tablet before bedtime. therapeutic multivitamin-minerals tablet           * This list has 2 medication(s) that are the same as other medications prescribed for you. Read the directions carefully, and ask your doctor or other care provider to review them with you. STOP taking these medications      glipiZIDE 5 MG tablet  Commonly known as: GLUCOTROL     pregabalin 75 MG capsule  Commonly known as: LYRICA            ASK your doctor about these medications      furosemide 20 MG tablet  Commonly known as: Lasix  Take 1 tablet by mouth daily              No discharge procedures on file. Time Spent on discharge is  20 mins in patient examination, evaluation, counseling as well as medication reconciliation, prescriptions for required medications, discharge plan and follow up.     Electronically signed by   Aissatou Méndez MD  8/13/2022  10:25 AM      Thank you Dr. Stone Quesada DO for the opportunity to be involved in this patient's care.

## 2022-08-13 NOTE — PLAN OF CARE
Problem: Discharge Planning  Goal: Discharge to home or other facility with appropriate resources  Outcome: Progressing  Flowsheets  Taken 8/12/2022 2000 by Jaspal Saleem RN  Discharge to home or other facility with appropriate resources:   Identify barriers to discharge with patient and caregiver   Arrange for needed discharge resources and transportation as appropriate   Identify discharge learning needs (meds, wound care, etc)   Refer to discharge planning if patient needs post-hospital services based on physician order or complex needs related to functional status, cognitive ability or social support system  Taken 8/12/2022 1606 by Yohana Pantoja RN  Discharge to home or other facility with appropriate resources: Identify barriers to discharge with patient and caregiver  Taken 8/12/2022 1445 by Yohana Pantoja RN  Discharge to home or other facility with appropriate resources: Identify barriers to discharge with patient and caregiver   Problem: Pain  Goal: Verbalizes/displays adequate comfort level or baseline comfort level  Outcome: Progressing  Flowsheets (Taken 8/12/2022 2105)  Verbalizes/displays adequate comfort level or baseline comfort level:   Encourage patient to monitor pain and request assistance   Assess pain using appropriate pain scale   Administer analgesics based on type and severity of pain and evaluate response   Consider cultural and social influences on pain and pain management   Notify Licensed Independent Practitioner if interventions unsuccessful or patient reports new pain   Problem: Skin/Tissue Integrity  Goal: Absence of new skin breakdown  Description: 1. Monitor for areas of redness and/or skin breakdown  2. Assess vascular access sites hourly  3. Every 4-6 hours minimum:  Change oxygen saturation probe site  4. Every 4-6 hours:  If on nasal continuous positive airway pressure, respiratory therapy assess nares and determine need for appliance change or resting period.   Outcome: Progressing     Problem: Safety - Adult  Goal: Free from fall injury  Outcome: Progressing  Flowsheets (Taken 8/13/2022 0122)  Free From Fall Injury: Instruct family/caregiver on patient safety   Problem: ABCDS Injury Assessment  Goal: Absence of physical injury  Outcome: Progressing  Flowsheets (Taken 8/13/2022 0122)  Absence of Physical Injury: Implement safety measures based on patient assessment

## 2022-08-13 NOTE — PROGRESS NOTES
RT in to see pt regarding Inhaler administration . Pt awake . 02 sat on room air presents at 97% . Breath sounds , diminished . Water provided for mouth rinsing .

## 2022-08-15 ENCOUNTER — TELEPHONE (OUTPATIENT)
Dept: CASE MANAGEMENT | Age: 72
End: 2022-08-15

## 2022-08-15 NOTE — TELEPHONE ENCOUNTER
Name: Len Ayon  : 1950  MRN: [id number]  Navigator reviewing chart and pt. Eating solids without difficulty. GI to see pt. And determined pt. Not in need of feeding tube at this time, but would need to continue hydration and dietary supplements and close monitoring for malnutrition. Plan to follow.    Electronically signed by Rachel Small RN on 8/15/2022 at 12:45 PM

## 2022-08-17 LAB
CULTURE: NORMAL
CULTURE: NORMAL
Lab: NORMAL
Lab: NORMAL
SPECIMEN DESCRIPTION: NORMAL
SPECIMEN DESCRIPTION: NORMAL

## 2022-08-19 DIAGNOSIS — C82.13 FOLLICULAR LYMPHOMA GRADE II OF INTRA-ABDOMINAL LYMPH NODES (HCC): Primary | ICD-10-CM

## 2022-08-22 ENCOUNTER — TELEPHONE (OUTPATIENT)
Dept: ONCOLOGY | Age: 72
End: 2022-08-22

## 2022-08-22 ENCOUNTER — OFFICE VISIT (OUTPATIENT)
Dept: ONCOLOGY | Age: 72
End: 2022-08-22
Payer: MEDICARE

## 2022-08-22 ENCOUNTER — TELEPHONE (OUTPATIENT)
Dept: INFUSION THERAPY | Age: 72
End: 2022-08-22

## 2022-08-22 ENCOUNTER — TELEPHONE (OUTPATIENT)
Dept: CASE MANAGEMENT | Age: 72
End: 2022-08-22

## 2022-08-22 ENCOUNTER — HOSPITAL ENCOUNTER (OUTPATIENT)
Age: 72
Discharge: HOME | End: 2022-08-22
Payer: MEDICARE

## 2022-08-22 VITALS
SYSTOLIC BLOOD PRESSURE: 148 MMHG | HEART RATE: 74 BPM | BODY MASS INDEX: 22.99 KG/M2 | WEIGHT: 160.2 LBS | TEMPERATURE: 96.9 F | DIASTOLIC BLOOD PRESSURE: 77 MMHG

## 2022-08-22 DIAGNOSIS — C82.13 FOLLICULAR LYMPHOMA GRADE II OF INTRA-ABDOMINAL LYMPH NODES (HCC): ICD-10-CM

## 2022-08-22 DIAGNOSIS — C82.13 FOLLICULAR LYMPHOMA GRADE II OF INTRA-ABDOMINAL LYMPH NODES (HCC): Primary | ICD-10-CM

## 2022-08-22 LAB
ABSOLUTE EOS #: 0 K/UL (ref 0–0.4)
ABSOLUTE LYMPH #: 2.2 K/UL (ref 1–4.8)
ABSOLUTE MONO #: 0.7 K/UL (ref 0.1–1.2)
ALBUMIN SERPL-MCNC: 3.2 G/DL (ref 3.5–5.2)
ALBUMIN/GLOBULIN RATIO: 1.9 (ref 1–2.5)
ALP BLD-CCNC: 121 U/L (ref 40–129)
ALT SERPL-CCNC: 12 U/L (ref 5–41)
ANION GAP SERPL CALCULATED.3IONS-SCNC: 7 MMOL/L (ref 9–17)
AST SERPL-CCNC: 11 U/L
BASOPHILS # BLD: 0 % (ref 0–2)
BASOPHILS ABSOLUTE: 0 K/UL (ref 0–0.2)
BILIRUB SERPL-MCNC: 0.53 MG/DL (ref 0.3–1.2)
BUN BLDV-MCNC: 26 MG/DL (ref 8–23)
CALCIUM SERPL-MCNC: 8.2 MG/DL (ref 8.6–10.4)
CHLORIDE BLD-SCNC: 106 MMOL/L (ref 98–107)
CO2: 23 MMOL/L (ref 20–31)
CREAT SERPL-MCNC: 1.08 MG/DL (ref 0.7–1.2)
EOSINOPHILS RELATIVE PERCENT: 1 % (ref 1–4)
GFR AFRICAN AMERICAN: >60 ML/MIN
GFR NON-AFRICAN AMERICAN: >60 ML/MIN
GFR SERPL CREATININE-BSD FRML MDRD: ABNORMAL ML/MIN/{1.73_M2}
GLUCOSE BLD-MCNC: 196 MG/DL (ref 70–99)
HCT VFR BLD CALC: 35.2 % (ref 41–53)
HEMOGLOBIN: 11.3 G/DL (ref 13.5–17.5)
LYMPHOCYTES # BLD: 25 % (ref 24–44)
MCH RBC QN AUTO: 25.4 PG (ref 26–34)
MCHC RBC AUTO-ENTMCNC: 32.1 G/DL (ref 31–37)
MCV RBC AUTO: 79.2 FL (ref 80–100)
MONOCYTES # BLD: 8 % (ref 2–11)
PDW BLD-RTO: 17.5 % (ref 12.5–15.4)
PLATELET # BLD: 115 K/UL (ref 140–450)
PMV BLD AUTO: 9 FL (ref 6–12)
POTASSIUM SERPL-SCNC: 4.3 MMOL/L (ref 3.7–5.3)
RBC # BLD: 4.45 M/UL (ref 4.5–5.9)
SEG NEUTROPHILS: 66 % (ref 36–66)
SEGMENTED NEUTROPHILS ABSOLUTE COUNT: 5.9 K/UL (ref 1.8–7.7)
SODIUM BLD-SCNC: 136 MMOL/L (ref 135–144)
TOTAL PROTEIN: 4.9 G/DL (ref 6.4–8.3)
WBC # BLD: 8.8 K/UL (ref 3.5–11)

## 2022-08-22 PROCEDURE — 99214 OFFICE O/P EST MOD 30 MIN: CPT | Performed by: INTERNAL MEDICINE

## 2022-08-22 PROCEDURE — 85025 COMPLETE CBC W/AUTO DIFF WBC: CPT

## 2022-08-22 PROCEDURE — G8427 DOCREV CUR MEDS BY ELIG CLIN: HCPCS | Performed by: INTERNAL MEDICINE

## 2022-08-22 PROCEDURE — 1036F TOBACCO NON-USER: CPT | Performed by: INTERNAL MEDICINE

## 2022-08-22 PROCEDURE — 3017F COLORECTAL CA SCREEN DOC REV: CPT | Performed by: INTERNAL MEDICINE

## 2022-08-22 PROCEDURE — 80053 COMPREHEN METABOLIC PANEL: CPT

## 2022-08-22 PROCEDURE — G8420 CALC BMI NORM PARAMETERS: HCPCS | Performed by: INTERNAL MEDICINE

## 2022-08-22 PROCEDURE — 99211 OFF/OP EST MAY X REQ PHY/QHP: CPT | Performed by: INTERNAL MEDICINE

## 2022-08-22 PROCEDURE — 36415 COLL VENOUS BLD VENIPUNCTURE: CPT

## 2022-08-22 PROCEDURE — 1123F ACP DISCUSS/DSCN MKR DOCD: CPT | Performed by: INTERNAL MEDICINE

## 2022-08-22 NOTE — TELEPHONE ENCOUNTER
Name: Ashish Andersen  : 1950  MRN: [id number]    Navigator reaching out to pt. Offering assistance . Pts. Spouse sharing with writer \"I'm amazed at how well he's doing \" Pt. Eating well , no needs at this time. Writer will plan to follow.    Electronically signed by Sherine Ordonez RN on 2022 at 4:53 PM

## 2022-08-22 NOTE — TELEPHONE ENCOUNTER
[facility] paperwork for Dominique Doshi(daughter) of Rita Mary faxed to [phone] as requested.

## 2022-08-22 NOTE — TELEPHONE ENCOUNTER
Imbruvica  RV 4-6 weeks with CBC, CMP    Oral compliance nurse have been notified via email of oral meds, avs given to triage to follow, pt given oral chemo information sheet & [facility]    RV scheduled 9/26/22 @ 1pm    PT was given AVS and appt schedule    Electronically signed by Coco Young on 8/22/2022 at 2:25 PM

## 2022-08-22 NOTE — TELEPHONE ENCOUNTER
Initial application mailed out for patient assistance 8/4/22. Patient received a 30 day trial of imbruvica. Writer was informed today that a new prescription was entered and patient has a high co-pay. Writer spoke with Archie Hendricks and she said that the application was mailed back to .  mailed another application to be signed and returned. Will monitor.

## 2022-08-22 NOTE — PROGRESS NOTES
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 Evidence of relapse with biopsy-proven follicular lymphoma. 1-2 from treated medically lymph node biopsy July 6, 2021    CURRENT THERAPY:    1. Observation. 2. Status post treatment with Bexxar in May 2008. 3. Status post previous treatment with Rituxan. 4.  Start treatment with Rituxan August 23, 2021. completed4 weeks of rituximab on 9/20/2021  5. Induction Rituxan is followed by consolidation Rituxan maintenance   6. Due to progression we started Imbruvica July 2022. INTERIM HISTORY:  The patient is seen for follow-up of his lymphoma. Further work-up showed enlarged lymph nodes in the retroperitoneum and iliac area. Biopsy proved recurrence of follicular lymphoma. Patient did well on rituximab induction and was maintained on maintenance Rituxan. Repeated CT scan February 2022 showed stable disease. He had hospitalization in July which showed significant progressive disease. Started on Imbruvica. He was hospitalized 1 week ago due to dehydration and malnutrition. He started feeling much better after rehydration with improvement in kidney function. Repeated CT scan showed positive results with significant improvement on the Imbruvica treatment. REVIEW OF SYSTEMS:   General: ++weight loss. Significant shortness of breath. Eyes: No blurred vision, eye pain or double vision. Ears: No hearing problems or drainage. No tinnitus. Throat: No sore throat, problems with swallowing or dysphagia. Respiratory: As above. Cardiovascular: No chest pain, orthopnea or PND. No lower extremity edema. No palpitation. Gastrointestinal: No problems with swallowing. No abdominal pain or bloating. No nausea or vomiting. No diarrhea or constipation. No GI bleeding.  Genitourinary: No dysuria, hematuria, frequency or urgency. Musculoskeletal: No muscle aches or pains. No limitation of movement. No back pain. Dermatologic: No skin rashes or pruritus. No skin lesions or discolorations. Psychiatric: No depression, anxiety, or stress or signs of schizophrenia. Hematologic: No history of bleeding tendency. No bruises or ecchymosis. No history of clotting problems. Infectious disease: No fever, chills or frequent infections. Endocrine: No problems with opacity. No polydipsia or polyuria. Neurologic: No headaches or dizziness. No weakness or numbness of the extremities. SOCIAL HISTORY:  No smoking, no alcohol drinking. PHYSICAL EXAM: Patient is having significant shortness of breath using oxygen by portable oxygen tank. Vital signs: Blood pressure (!) 148/77, pulse 74, temperature 96.9 °F (36.1 °C), temperature source Temporal, weight 160 lb 3.2 oz (72.7 kg). HEENT:  Eyes are normal. Ears, nose and throat are normal.  Neck: Supple. No lymph node enlargement. No thyroid enlargement. Trachea is centrally located. Chest: Decreased air entry with hypoxia heart: Regular sinus rhythm. Abdomen: Soft, nontender. No hepatosplenomegaly. No masses. Extremities:  With no edema. Lymph Nodes:  No cervical, axillary or inguinal lymph node enlargement. Neurologic:  Conscious and oriented. No focal neurological deficits. Psychosocial: No depression, anxiety or stress. Skin: No rashes, bruises or ecchymoses.     REVIEW OF DIAGNOSTIC DATA:     Lab Results   Component Value Date    WBC 8.8 08/22/2022    HGB 11.3 (L) 08/22/2022    HCT 35.2 (L) 08/22/2022    MCV 79.2 (L) 08/22/2022     (L) 08/22/2022       Chemistry        Component Value Date/Time     08/22/2022 1030    K 4.3 08/22/2022 1030     08/22/2022 1030    CO2 23 08/22/2022 1030    BUN 26 (H) 08/22/2022 1030    CREATININE 1.08 08/22/2022 1030        Component Value Date/Time    CALCIUM 8.2 (L) 08/22/2022 1030    ALKPHOS 121 08/22/2022 1030    AST 11 08/22/2022 1030    ALT 12 08/22/2022 1030    BILITOT 0.53 08/22/2022 1030        CT scan showed stable findings with no evidence of progression. CT ABDOMEN PELVIS WO CONTRAST Additional Contrast? None  Narrative: EXAMINATION:  CT OF THE ABDOMEN AND PELVIS WITHOUT CONTRAST 8/12/2022 12:23 pm    TECHNIQUE:  CT of the abdomen and pelvis was performed without the administration of  intravenous contrast. Multiplanar reformatted images are provided for review. Automated exposure control, iterative reconstruction, and/or weight based  adjustment of the mA/kV was utilized to reduce the radiation dose to as low  as reasonably achievable. COMPARISON:  07/14/2022    HISTORY:  ORDERING SYSTEM PROVIDED HISTORY: pain  TECHNOLOGIST PROVIDED HISTORY:  pain    Decision Support Exception - unselect if not a suspected or confirmed  emergency medical condition->Emergency Medical Condition (MA)  Reason for Exam: The patient was sent from the cancer center for admission. They are worried that he is dehydrated and needs a feeding tube placed. He  is being treated for non-Hodgkin's lymphoma. FINDINGS:  LOWER CHEST: No acute findings. ORGANS: Lack of intravenous contrast limits evaluation of the solid organs  and bowel. The gallbladder is surgically absent. The liver, pancreas,  spleen, and adrenals reveal no acute findings. Bilateral double-J ureteral  stents appear in appropriate position. No hydronephrosis. GI/BOWEL: No bowel dilatation or wall thickening. Diverticulosis. PELVIS: No acute findings. PERITONEUM/RETROPERITONEUM: Confluent soft tissue mass primarily in the  retroperitoneum again demonstrated involving the left kidney and coursing  along the ureters. There is also involvement of the root of the mesentery  and this mass may also involve the dorsal aspect of the pancreas.   Delineation of this mass separate from bowel and vasculature is limited in  the absence of contrast.  The anterior posterior extent appears less  prominent however. A more discrete lobulated component of this mass  inferiorly at the level of the common iliac arteries is measurably smaller  measuring 3.6 cm in transverse dimension, previously 5.1 cm upon my  measurement. A more discrete mesenteric component of this process measures  approximately 8.3 x 3.6 cm on axial image 71 versus a comparable region that  measured 11.6 x 6.0 cm on the most recent exam.  No new lymphadenopathy. No  free air. Trace pelvic free fluid. BONES/SOFT TISSUES: No acute osseous abnormality is identified. Impression: 1. Confluent retroperitoneal and mesenteric mass again demonstrated  corresponding to known lymphoma. This appears improved in the interval,  although measurement is limited in the absence of contrast.    2.  Bilateral double-J ureteral stents in place without hydronephrosis. 3.  No new findings identified in the interval.        ASSESSMENT:    Small cell, B-cell non-Hodgkins lymphoma in remission. Evidence of relapse with biopsy-proven follicular lymphoma grade 1-2 July 5, 2021  Progression after treatment with rituximab  Good results to Imbruvica treatment. PLAN:     Lymph node biopsy showed relapse of low-grade lymphoma. Grade 1 - 2 follicular lymphoma. Patient was treated with rituximab induction followed by maintenance. Initially he has stable disease. Recent scans showed significant progression in the abdomen. Considering his performance and other comorbidities he was started on Imbruvica. Tolerated well except for symptoms as above. He is much better after recent hospitalization. Discussed the repeated labs. Kidney function is better. Needs to stay hydrated. We may consider IV fluids as outpatient. We discussed further management. Repeated CT scan showed good response to Imbruvica. I will resume treatment and we will monitor closely.   We will monitor for toxicity and side effects and to monitor treatment response. Patient's questions were answered to the best of his satisfaction and he verbalized full understanding and agreement. [address]                            This note is created with the assistance of a speech recognition program.  While intending to generate a document that actually reflects the content of the visit, the document can still have some errors including those of syntax and sound a like substitutions which may escape proof reading. It such instances, actual meaning can be extrapolated by contextual diversion.

## 2022-08-22 NOTE — TELEPHONE ENCOUNTER
[facility] Nutrition Follow Up       Kwame Donaldson is a 74 y.o.  male     NUTRITION RECOMMENDATIONS / MONITORING / EVALUATION  Increase protein intake  2. Encourage high calorie meals throughout the day  3. Will monitor po intakes, wts, s/s, care plan    Subjective/Current Data:  Spoke with wife who states pt was hospitalized last week, Has since been eating well (was not eating much of anything the few days before admission). Reports visit with MD today and discussed need for high protein foods. Reviewed high protein food options - pt is eating a 4-5 oz portion of meat for dinner as well as one additional meal and snack each day. Is adding peanut butter to toast as well as eating egg on occasion. Discussed use of adding a protein drink to breakfast to boost intake - wife receptive and reviewed options. Pt is also drinking electrolyte replacement drinks, gatorade, etc. Wife very appreciative of call and info. Nutrition Diagnosis and Goal  Problem:  inadequate oral intake  Etiology/related to: catabolic illness  Symptoms/Signs/as evidenced by: recent wt loss (78 to 72 kg)       Goal: Adequate intake to aide in wt maintenaince, signs and symptoms management, and overall wellbeing.     Progress towards goal: gradually improving    Samantha Campbell, MS, RD, LD  Registered Dietitian  [facility]  [phone]

## 2022-08-29 ENCOUNTER — TELEPHONE (OUTPATIENT)
Dept: INFUSION THERAPY | Age: 72
End: 2022-08-29

## 2022-08-29 ENCOUNTER — TELEPHONE (OUTPATIENT)
Dept: ONCOLOGY | Age: 72
End: 2022-08-29

## 2022-08-29 NOTE — TELEPHONE ENCOUNTER
received referral from Triage Nurse stating patient's spouse called in asking about placement at a facility.  called Sarika who states patient continues to get weaker and that he is not getting enough from home care ([facility]).  went over possible SNF placement steps and also provided contact information for [facility].  encouraged Sarika to call with any questions.

## 2022-08-29 NOTE — TELEPHONE ENCOUNTER
Pt's wife, Maria M Owen, called stating pt is very weak. She states he is not eating well, losing weight, and she needs more help taking care of him. She states pt has home care and palliative services ordered, but they are not helping as much as she needs. She would like to have pt placed in a [facility]. She states he needs someone to monitor his nutrition and he needs physical therapy. Writer will reach out to [place] with social work to discuss SNF, and tx Maria M Owen to scheduling to make earlier appt with Dr. Erma Bryant.

## 2022-08-29 NOTE — TELEPHONE ENCOUNTER
Application for imbruvica completed. Application faxed to Roselyn Arzate for free drug. Melissa phoned writer requesting palliative care and physical therapy. Jose Jacome says that Yrn Palacios has his good days and bad days. Yrn Palacios can't barley make it to the commode due to weakness. Writer reached out to navigation to initiate the referrals.

## 2022-08-30 ENCOUNTER — TELEPHONE (OUTPATIENT)
Dept: CASE MANAGEMENT | Age: 72
End: 2022-08-30

## 2022-08-30 NOTE — TELEPHONE ENCOUNTER
Name: Richard Mandujano  : 1950  MRN: [id number]    Oncology Navigation Follow-Up Note  Navigator spoke with pts. Spouse and spouse voicing her concerns with navigator and had previously spoke with Sagrario ADAIR . Pt. To see MO this week and will plan to follow. [id number] Jayesh Mireles visit 9/15.    Electronically signed by Leigh Ann Brower RN on 2022 at 1:45 PM

## 2022-08-31 ENCOUNTER — APPOINTMENT (OUTPATIENT)
Dept: CT IMAGING | Age: 72
DRG: 698 | End: 2022-08-31
Payer: MEDICARE

## 2022-08-31 ENCOUNTER — HOSPITAL ENCOUNTER (INPATIENT)
Age: 72
LOS: 7 days | Discharge: NURSING, REHAB OR LONG-TERM CARE | DRG: 698 | End: 2022-09-07
Attending: STUDENT IN AN ORGANIZED HEALTH CARE EDUCATION/TRAINING PROGRAM | Admitting: FAMILY MEDICINE
Payer: MEDICARE

## 2022-08-31 ENCOUNTER — TELEPHONE (OUTPATIENT)
Dept: ONCOLOGY | Age: 72
End: 2022-08-31

## 2022-08-31 DIAGNOSIS — N30.01 ACUTE CYSTITIS WITH HEMATURIA: ICD-10-CM

## 2022-08-31 DIAGNOSIS — J18.9 PNEUMONIA DUE TO INFECTIOUS ORGANISM, UNSPECIFIED LATERALITY, UNSPECIFIED PART OF LUNG: Primary | ICD-10-CM

## 2022-08-31 DIAGNOSIS — C85.99 LYMPHOMA OF BODY OF STOMACH (HCC): ICD-10-CM

## 2022-08-31 PROBLEM — C85.90 LYMPHOMA, DIFFUSE (HCC): Status: ACTIVE | Noted: 2022-08-31

## 2022-08-31 LAB
ABSOLUTE EOS #: 0 K/UL (ref 0–0.4)
ABSOLUTE LYMPH #: 1.64 K/UL (ref 1–4.8)
ABSOLUTE MONO #: 0.55 K/UL (ref 0.1–1.3)
ALBUMIN SERPL-MCNC: 2.7 G/DL (ref 3.5–5.2)
ALP BLD-CCNC: 131 U/L (ref 40–129)
ALT SERPL-CCNC: 17 U/L (ref 5–41)
AMORPHOUS: ABNORMAL
ANION GAP SERPL CALCULATED.3IONS-SCNC: 11 MMOL/L (ref 9–17)
AST SERPL-CCNC: 11 U/L
BACTERIA: ABNORMAL
BASOPHILS # BLD: 0 % (ref 0–2)
BASOPHILS ABSOLUTE: 0 K/UL (ref 0–0.2)
BILIRUB SERPL-MCNC: 0.65 MG/DL (ref 0.3–1.2)
BILIRUBIN DIRECT: 0.21 MG/DL
BILIRUBIN URINE: NEGATIVE
BILIRUBIN, INDIRECT: 0.44 MG/DL (ref 0–1)
BUN BLDV-MCNC: 39 MG/DL (ref 8–23)
CALCIUM SERPL-MCNC: 8.4 MG/DL (ref 8.6–10.4)
CHLORIDE BLD-SCNC: 102 MMOL/L (ref 98–107)
CO2: 23 MMOL/L (ref 20–31)
COLOR: ABNORMAL
CREAT SERPL-MCNC: 1.7 MG/DL (ref 0.7–1.2)
EKG ATRIAL RATE: 61 BPM
EKG P AXIS: 68 DEGREES
EKG P-R INTERVAL: 126 MS
EKG Q-T INTERVAL: 458 MS
EKG QRS DURATION: 94 MS
EKG QTC CALCULATION (BAZETT): 461 MS
EKG R AXIS: 25 DEGREES
EKG T AXIS: 34 DEGREES
EKG VENTRICULAR RATE: 61 BPM
EOSINOPHILS RELATIVE PERCENT: 0 % (ref 0–4)
EPITHELIAL CELLS UA: ABNORMAL /HPF
GFR AFRICAN AMERICAN: 48 ML/MIN
GFR NON-AFRICAN AMERICAN: 40 ML/MIN
GFR SERPL CREATININE-BSD FRML MDRD: ABNORMAL ML/MIN/{1.73_M2}
GLUCOSE BLD-MCNC: 137 MG/DL (ref 70–99)
GLUCOSE URINE: NEGATIVE
HCT VFR BLD CALC: 30.5 % (ref 41–53)
HEMOGLOBIN: 9.9 G/DL (ref 13.5–17.5)
INR BLD: 1.9
KETONES, URINE: NEGATIVE
LACTIC ACID: 0.9 MMOL/L (ref 0.5–2.2)
LEUKOCYTE ESTERASE, URINE: ABNORMAL
LYMPHOCYTES # BLD: 18 % (ref 24–44)
MAGNESIUM: 1.7 MG/DL (ref 1.6–2.6)
MCH RBC QN AUTO: 25.5 PG (ref 26–34)
MCHC RBC AUTO-ENTMCNC: 32.5 G/DL (ref 31–37)
MCV RBC AUTO: 78.4 FL (ref 80–100)
MONOCYTES # BLD: 6 % (ref 1–7)
MORPHOLOGY: ABNORMAL
NITRITE, URINE: NEGATIVE
PARTIAL THROMBOPLASTIN TIME: 54.8 SEC (ref 24–36)
PDW BLD-RTO: 17.4 % (ref 11.5–14.9)
PH UA: 5.5 (ref 5–8)
PLATELET # BLD: 150 K/UL (ref 150–450)
PMV BLD AUTO: 8.9 FL (ref 6–12)
POTASSIUM SERPL-SCNC: 4 MMOL/L (ref 3.7–5.3)
PROTEIN UA: ABNORMAL
PROTHROMBIN TIME: 21.7 SEC (ref 11.8–14.6)
RBC # BLD: 3.89 M/UL (ref 4.5–5.9)
RBC UA: ABNORMAL /HPF
SARS-COV-2, RAPID: NOT DETECTED
SEG NEUTROPHILS: 76 % (ref 36–66)
SEGMENTED NEUTROPHILS ABSOLUTE COUNT: 6.91 K/UL (ref 1.3–9.1)
SODIUM BLD-SCNC: 136 MMOL/L (ref 135–144)
SPECIFIC GRAVITY UA: 1.01 (ref 1–1.03)
SPECIMEN DESCRIPTION: NORMAL
TOTAL PROTEIN: 4.9 G/DL (ref 6.4–8.3)
TURBIDITY: ABNORMAL
URINE HGB: ABNORMAL
UROBILINOGEN, URINE: NORMAL
WBC # BLD: 9.1 K/UL (ref 3.5–11)
WBC UA: ABNORMAL /HPF

## 2022-08-31 PROCEDURE — 2580000003 HC RX 258: Performed by: STUDENT IN AN ORGANIZED HEALTH CARE EDUCATION/TRAINING PROGRAM

## 2022-08-31 PROCEDURE — 96375 TX/PRO/DX INJ NEW DRUG ADDON: CPT

## 2022-08-31 PROCEDURE — 93010 ELECTROCARDIOGRAM REPORT: CPT | Performed by: INTERNAL MEDICINE

## 2022-08-31 PROCEDURE — 96365 THER/PROPH/DIAG IV INF INIT: CPT

## 2022-08-31 PROCEDURE — 85610 PROTHROMBIN TIME: CPT

## 2022-08-31 PROCEDURE — 99285 EMERGENCY DEPT VISIT HI MDM: CPT

## 2022-08-31 PROCEDURE — 6360000002 HC RX W HCPCS: Performed by: STUDENT IN AN ORGANIZED HEALTH CARE EDUCATION/TRAINING PROGRAM

## 2022-08-31 PROCEDURE — 96361 HYDRATE IV INFUSION ADD-ON: CPT

## 2022-08-31 PROCEDURE — 6370000000 HC RX 637 (ALT 250 FOR IP): Performed by: FAMILY MEDICINE

## 2022-08-31 PROCEDURE — 74176 CT ABD & PELVIS W/O CONTRAST: CPT

## 2022-08-31 PROCEDURE — 81001 URINALYSIS AUTO W/SCOPE: CPT

## 2022-08-31 PROCEDURE — 83605 ASSAY OF LACTIC ACID: CPT

## 2022-08-31 PROCEDURE — 2060000000 HC ICU INTERMEDIATE R&B

## 2022-08-31 PROCEDURE — 85730 THROMBOPLASTIN TIME PARTIAL: CPT

## 2022-08-31 PROCEDURE — 87086 URINE CULTURE/COLONY COUNT: CPT

## 2022-08-31 PROCEDURE — 94640 AIRWAY INHALATION TREATMENT: CPT

## 2022-08-31 PROCEDURE — 83735 ASSAY OF MAGNESIUM: CPT

## 2022-08-31 PROCEDURE — 87040 BLOOD CULTURE FOR BACTERIA: CPT

## 2022-08-31 PROCEDURE — 87635 SARS-COV-2 COVID-19 AMP PRB: CPT

## 2022-08-31 PROCEDURE — 36415 COLL VENOUS BLD VENIPUNCTURE: CPT

## 2022-08-31 PROCEDURE — 93005 ELECTROCARDIOGRAM TRACING: CPT | Performed by: STUDENT IN AN ORGANIZED HEALTH CARE EDUCATION/TRAINING PROGRAM

## 2022-08-31 PROCEDURE — 85025 COMPLETE CBC W/AUTO DIFF WBC: CPT

## 2022-08-31 PROCEDURE — 2700000000 HC OXYGEN THERAPY PER DAY

## 2022-08-31 PROCEDURE — 80048 BASIC METABOLIC PNL TOTAL CA: CPT

## 2022-08-31 PROCEDURE — 80076 HEPATIC FUNCTION PANEL: CPT

## 2022-08-31 RX ORDER — SODIUM CHLORIDE 0.9 % (FLUSH) 0.9 %
5-40 SYRINGE (ML) INJECTION PRN
Status: DISCONTINUED | OUTPATIENT
Start: 2022-08-31 | End: 2022-09-07 | Stop reason: HOSPADM

## 2022-08-31 RX ORDER — FLUOXETINE HYDROCHLORIDE 20 MG/1
20 CAPSULE ORAL DAILY
Status: DISCONTINUED | OUTPATIENT
Start: 2022-09-01 | End: 2022-09-07 | Stop reason: HOSPADM

## 2022-08-31 RX ORDER — ATORVASTATIN CALCIUM 10 MG/1
10 TABLET, FILM COATED ORAL DAILY
COMMUNITY

## 2022-08-31 RX ORDER — SODIUM CHLORIDE 9 MG/ML
INJECTION, SOLUTION INTRAVENOUS PRN
Status: DISCONTINUED | OUTPATIENT
Start: 2022-08-31 | End: 2022-09-07 | Stop reason: HOSPADM

## 2022-08-31 RX ORDER — CARVEDILOL 6.25 MG/1
6.25 TABLET ORAL 2 TIMES DAILY WITH MEALS
Status: DISCONTINUED | OUTPATIENT
Start: 2022-09-01 | End: 2022-09-05

## 2022-08-31 RX ORDER — ACETAMINOPHEN 325 MG/1
650 TABLET ORAL EVERY 4 HOURS PRN
Status: DISCONTINUED | OUTPATIENT
Start: 2022-08-31 | End: 2022-09-07 | Stop reason: HOSPADM

## 2022-08-31 RX ORDER — ONDANSETRON 2 MG/ML
4 INJECTION INTRAMUSCULAR; INTRAVENOUS ONCE
Status: COMPLETED | OUTPATIENT
Start: 2022-08-31 | End: 2022-08-31

## 2022-08-31 RX ORDER — AMLODIPINE BESYLATE 10 MG/1
10 TABLET ORAL DAILY
Status: CANCELLED | OUTPATIENT
Start: 2022-09-01

## 2022-08-31 RX ORDER — LEVOTHYROXINE SODIUM 0.07 MG/1
150 TABLET ORAL DAILY
Status: DISCONTINUED | OUTPATIENT
Start: 2022-09-01 | End: 2022-09-07 | Stop reason: HOSPADM

## 2022-08-31 RX ORDER — BUDESONIDE AND FORMOTEROL FUMARATE DIHYDRATE 80; 4.5 UG/1; UG/1
2 AEROSOL RESPIRATORY (INHALATION) 2 TIMES DAILY
Status: DISCONTINUED | OUTPATIENT
Start: 2022-08-31 | End: 2022-09-07 | Stop reason: HOSPADM

## 2022-08-31 RX ORDER — M-VIT,TX,IRON,MINS/CALC/FOLIC 27MG-0.4MG
1 TABLET ORAL DAILY
Status: DISCONTINUED | OUTPATIENT
Start: 2022-09-01 | End: 2022-09-07 | Stop reason: HOSPADM

## 2022-08-31 RX ORDER — LACTOBACILLUS RHAMNOSUS GG 10B CELL
1 CAPSULE ORAL DAILY
Status: DISCONTINUED | OUTPATIENT
Start: 2022-09-01 | End: 2022-09-07 | Stop reason: HOSPADM

## 2022-08-31 RX ORDER — PANTOPRAZOLE SODIUM 40 MG/1
40 TABLET, DELAYED RELEASE ORAL
Status: DISCONTINUED | OUTPATIENT
Start: 2022-09-01 | End: 2022-09-07 | Stop reason: HOSPADM

## 2022-08-31 RX ORDER — 0.9 % SODIUM CHLORIDE 0.9 %
1000 INTRAVENOUS SOLUTION INTRAVENOUS ONCE
Status: COMPLETED | OUTPATIENT
Start: 2022-08-31 | End: 2022-08-31

## 2022-08-31 RX ORDER — DONEPEZIL HYDROCHLORIDE 10 MG/1
10 TABLET, FILM COATED ORAL DAILY
Status: DISCONTINUED | OUTPATIENT
Start: 2022-09-01 | End: 2022-09-07 | Stop reason: HOSPADM

## 2022-08-31 RX ORDER — HEPARIN SODIUM 5000 [USP'U]/ML
5000 INJECTION, SOLUTION INTRAVENOUS; SUBCUTANEOUS EVERY 8 HOURS SCHEDULED
Status: DISCONTINUED | OUTPATIENT
Start: 2022-08-31 | End: 2022-09-01

## 2022-08-31 RX ORDER — PROCHLORPERAZINE MALEATE 10 MG
10 TABLET ORAL EVERY 6 HOURS PRN
Status: DISCONTINUED | OUTPATIENT
Start: 2022-08-31 | End: 2022-09-07 | Stop reason: HOSPADM

## 2022-08-31 RX ORDER — OXYCODONE HYDROCHLORIDE 5 MG/1
5 TABLET ORAL EVERY 4 HOURS PRN
Status: DISCONTINUED | OUTPATIENT
Start: 2022-08-31 | End: 2022-09-07 | Stop reason: HOSPADM

## 2022-08-31 RX ORDER — MORPHINE SULFATE 4 MG/ML
4 INJECTION, SOLUTION INTRAMUSCULAR; INTRAVENOUS
Status: DISCONTINUED | OUTPATIENT
Start: 2022-08-31 | End: 2022-09-07 | Stop reason: HOSPADM

## 2022-08-31 RX ORDER — ATORVASTATIN CALCIUM 10 MG/1
10 TABLET, FILM COATED ORAL DAILY
Status: DISCONTINUED | OUTPATIENT
Start: 2022-09-01 | End: 2022-09-07 | Stop reason: HOSPADM

## 2022-08-31 RX ORDER — MEGESTROL ACETATE 40 MG/ML
400 SUSPENSION ORAL DAILY
Status: DISCONTINUED | OUTPATIENT
Start: 2022-09-01 | End: 2022-09-07 | Stop reason: HOSPADM

## 2022-08-31 RX ORDER — IPRATROPIUM BROMIDE AND ALBUTEROL SULFATE 2.5; .5 MG/3ML; MG/3ML
1 SOLUTION RESPIRATORY (INHALATION)
Status: DISCONTINUED | OUTPATIENT
Start: 2022-08-31 | End: 2022-09-07 | Stop reason: HOSPADM

## 2022-08-31 RX ORDER — SODIUM CHLORIDE 0.9 % (FLUSH) 0.9 %
5-40 SYRINGE (ML) INJECTION EVERY 12 HOURS SCHEDULED
Status: DISCONTINUED | OUTPATIENT
Start: 2022-08-31 | End: 2022-09-07 | Stop reason: HOSPADM

## 2022-08-31 RX ORDER — OXYCODONE HCL 10 MG/1
10 TABLET, FILM COATED, EXTENDED RELEASE ORAL EVERY 12 HOURS
Status: DISCONTINUED | OUTPATIENT
Start: 2022-08-31 | End: 2022-09-07 | Stop reason: HOSPADM

## 2022-08-31 RX ORDER — ONDANSETRON 2 MG/ML
4 INJECTION INTRAMUSCULAR; INTRAVENOUS EVERY 6 HOURS PRN
Status: DISCONTINUED | OUTPATIENT
Start: 2022-08-31 | End: 2022-09-07 | Stop reason: HOSPADM

## 2022-08-31 RX ORDER — SUCRALFATE 1 G/1
1 TABLET ORAL
Status: DISCONTINUED | OUTPATIENT
Start: 2022-08-31 | End: 2022-09-07 | Stop reason: HOSPADM

## 2022-08-31 RX ORDER — SODIUM CHLORIDE 9 MG/ML
1000 INJECTION, SOLUTION INTRAVENOUS CONTINUOUS
Status: ACTIVE | OUTPATIENT
Start: 2022-08-31 | End: 2022-09-01

## 2022-08-31 RX ORDER — LOSARTAN POTASSIUM 50 MG/1
50 TABLET ORAL DAILY
Status: DISCONTINUED | OUTPATIENT
Start: 2022-09-01 | End: 2022-09-07 | Stop reason: HOSPADM

## 2022-08-31 RX ORDER — MORPHINE SULFATE 2 MG/ML
2 INJECTION, SOLUTION INTRAMUSCULAR; INTRAVENOUS
Status: DISCONTINUED | OUTPATIENT
Start: 2022-08-31 | End: 2022-09-07 | Stop reason: HOSPADM

## 2022-08-31 RX ORDER — ONDANSETRON 4 MG/1
4 TABLET, ORALLY DISINTEGRATING ORAL EVERY 8 HOURS PRN
Status: DISCONTINUED | OUTPATIENT
Start: 2022-08-31 | End: 2022-09-07 | Stop reason: HOSPADM

## 2022-08-31 RX ADMIN — CEFEPIME 2000 MG: 2 INJECTION, POWDER, FOR SOLUTION INTRAVENOUS at 16:42

## 2022-08-31 RX ADMIN — IPRATROPIUM BROMIDE AND ALBUTEROL SULFATE 3 ML: 2.5; .5 SOLUTION RESPIRATORY (INHALATION) at 23:52

## 2022-08-31 RX ADMIN — SODIUM CHLORIDE 1000 ML: 9 INJECTION, SOLUTION INTRAVENOUS at 14:12

## 2022-08-31 RX ADMIN — VANCOMYCIN HYDROCHLORIDE 1750 MG: 1 INJECTION, POWDER, LYOPHILIZED, FOR SOLUTION INTRAVENOUS at 17:13

## 2022-08-31 RX ADMIN — ONDANSETRON 4 MG: 2 INJECTION INTRAMUSCULAR; INTRAVENOUS at 14:10

## 2022-08-31 RX ADMIN — SODIUM CHLORIDE 1000 ML: 9 INJECTION, SOLUTION INTRAVENOUS at 20:40

## 2022-08-31 ASSESSMENT — LIFESTYLE VARIABLES: HOW OFTEN DO YOU HAVE A DRINK CONTAINING ALCOHOL: NEVER

## 2022-08-31 ASSESSMENT — PAIN - FUNCTIONAL ASSESSMENT
PAIN_FUNCTIONAL_ASSESSMENT: NONE - DENIES PAIN
PAIN_FUNCTIONAL_ASSESSMENT: NONE - DENIES PAIN

## 2022-08-31 ASSESSMENT — ENCOUNTER SYMPTOMS
NAUSEA: 1
BLOOD IN STOOL: 0
ABDOMINAL PAIN: 1
RHINORRHEA: 0
BACK PAIN: 0
VOMITING: 0
COUGH: 0
SHORTNESS OF BREATH: 0

## 2022-08-31 NOTE — ED PROVIDER NOTES
[facility]  Emergency Department Encounter  Emergency Medicine Physician     Pt Name: Porfirio Jacome  MRN: [id number]  [place] 1950  Date of evaluation: 8/31/22  PCP:  Ilana Mccullough DO    CHIEF COMPLAINT       Chief Complaint   Patient presents with    Fatigue    Extremity Weakness     Lower    Emesis       HISTORY OF PRESENT ILLNESS  (Location/Symptom, Timing/Onset, Context/Setting, Quality, Duration, Modifying Factors, Severity.)    Porfirio Jacome is a 74 y.o. male who presents with fatigue, weakness, nausea. Worsening over the past 2 days. Patient states that he has had several bouts of dizziness which she describes as feeling off balance. States that he has had some near falls at home does not believe he has actually fallen or hit his head. He states that he feels considerably weaker than he normally does. He states he is have difficulty getting around at home. Endorses some dysuria, he is unsure about hematuria. Denies any blood in his stools. He denies fever denies chills. He does endorse lack of appetite along with some nausea without vomiting. Patient states that he has a history of lymphoma and his last IV treatment of chemotherapy was 2 months ago but states that he is on daily ibrutinib for maintenance therapy. He states that he has noticed an increased amount of bruising        PAST MEDICAL / SURGICAL / SOCIAL / FAMILY HISTORY    has a past medical history of Arthritis, Cancer ([address] Chemotherapy management, encounter for, CHF (congestive heart failure) ([address] COPD (chronic obstructive pulmonary disease) ([address] Diabetes mellitus ([address] H/O cardiovascular stress test, History of non-Hodgkin's lymphoma, Hx of blood clots, Hyperlipidemia, Hypertension, Hypothyroidism, and On home O2.     has a past surgical history that includes hernia repair; IR BIOPSY ABDOMINAL/RETROPERITONEAL MASS PERCUTANEOUS (7/6/2021); IR PORT PLACEMENT > 5 YEARS (8/31/2021);  Colonoscopy (Left, 2021); Cystoscopy (Right, 10/6/2021); Cholecystectomy, laparoscopic (N/A, 10/9/2021); Tonsillectomy; Cystoscopy (Bilateral, 2021); Cystoscopy (Bilateral, 2022); eye surgery; Carotid endarterectomy (Left, 2022); and Cystoscopy (N/A, 2022). Social History     Socioeconomic History    Marital status:      Spouse name: Not on file    Number of children: Not on file    Years of education: Not on file    Highest education level: Not on file   Occupational History    Not on file   Tobacco Use    Smoking status: Former     Packs/day: 2.00     Years: 45.00     Pack years: 90.00     Types: Cigarettes     Quit date: 2006     Years since quittin.6    Smokeless tobacco: Never   Vaping Use    Vaping Use: Never used   Substance and Sexual Activity    Alcohol use: Yes     Alcohol/week: 1.7 standard drinks     Types: 2 Standard drinks or equivalent per week     Comment: occassional    Drug use: Yes     Types: Marijuana [place])     Comment:  CBD gummies no smoking    Sexual activity: Not on file   Other Topics Concern    Not on file   Social History Narrative    Not on file     Social Determinants of Health     Financial Resource Strain: Not on file   Food Insecurity: Not on file   Transportation Needs: Not on file   Physical Activity: Not on file   Stress: Not on file   Social Connections: Not on file   Intimate Partner Violence: Not on file   Housing Stability: Not on file       Family History   Problem Relation Age of Onset    Diabetes Mother     Alzheimer's Disease Father     Heart Disease Brother     Heart Disease Brother        Allergies:    Patient has no known allergies. Home Medications:  Prior to Admission medications    Medication Sig Start Date End Date Taking?  Authorizing Provider   atorvastatin (LIPITOR) 10 MG tablet Take 10 mg by mouth daily   Yes Historical Provider, MD   ibrutinib (IMBRUVICA) 420 MG tablet Take 1 tablet by mouth daily 22  Corie RECINOS MD Sangeetha   oxyCODONE (OXYCONTIN) 10 MG extended release tablet Take 10 mg by mouth in the morning and 10 mg in the evening. Historical Provider, MD   oxyCODONE (ROXICODONE) 5 MG immediate release tablet Take 5 mg by mouth every 4 hours as needed for Pain. Historical Provider, MD   prochlorperazine (COMPAZINE) 10 MG tablet Take 1 tablet by mouth every 6 hours as needed (nausea vomiting) 7/26/22   Manish Delvalle MD   megestrol (MEGACE) 40 MG/ML suspension Take 10 mLs by mouth in the morning. 7/25/22   Manish Delvalle MD   apixaban (ELIQUIS) 5 MG TABS tablet Take 1 tablet by mouth in the morning and 1 tablet before bedtime. 7/21/22   Roderick Metcalf DO   sucralfate (CARAFATE) 1 GM tablet Take 1 tablet by mouth in the morning and 1 tablet at noon and 1 tablet in the evening and 1 tablet before bedtime. 7/21/22   Roderick Metcalf DO   glipiZIDE (GLUCOTROL) 5 MG tablet Take 0.5 tablets by mouth in the morning and 0.5 tablets in the evening. Take before meals. Patient not taking: Reported on 8/12/2022 7/21/22 8/13/22  Roderick Metcalf DO   carvedilol (COREG) 6.25 MG tablet Take 6.25 mg by mouth 2 times daily    Historical Provider, MD   psyllium (KONSYL) 28.3 % PACK Take 1 packet by mouth daily    Historical Provider, MD   Lactobacillus (PROBIOTIC ACIDOPHILUS) CAPS Take 1 caplet by mouth daily    Historical Provider, MD   Multiple Vitamins-Minerals (THERAPEUTIC MULTIVITAMIN-MINERALS) tablet Take 1 tablet by mouth daily    Historical Provider, MD   OXYGEN Inhale 2 L into the lungs at bedtime Nightly & during the day.     Historical Provider, MD   ipratropium-albuterol (DUONEB) 0.5-2.5 (3) MG/3ML SOLN nebulizer solution Inhale 3 mLs into the lungs every 4 hours as needed for Shortness of Breath 2/20/22   EDWIGE Schuler - CNP   levothyroxine (SYNTHROID) 150 MCG tablet Take 1 tablet by mouth Daily 2/2/22   Reda Chars, DO   fluticasone-salmeterol (ADVAIR) 100-50 MCG/DOSE diskus inhaler Inhale 1 puff into the lungs every 12 hours 2/1/22   Chad Candelario, DO   furosemide (LASIX) 20 MG tablet Take 1 tablet by mouth daily  Patient taking differently: Take 20 mg by mouth See Admin Instructions Pt only takes when legs start to swell 2/1/22   Brent Ulloa, DO   albuterol sulfate HFA (PROVENTIL;VENTOLIN;PROAIR) 108 (90 Base) MCG/ACT inhaler Inhale 2 puffs into the lungs every 4 hours as needed for Wheezing     Historical Provider, MD   amLODIPine (NORVASC) 10 MG tablet Take 1 tablet by mouth daily  Patient not taking: Reported on 8/22/2022 10/12/21   Alberto Reinadamia NAVEEN Metcalf, DO   losartan (COZAAR) 50 MG tablet Take 1 tablet by mouth daily 10/12/21   Roderick NAVEEN Metcalf, DO   donepezil (ARICEPT) 10 MG tablet Take 10 mg by mouth daily  7/11/21   Historical Provider, MD   FLUoxetine (PROZAC) 20 MG capsule Take 20 mg by mouth daily    Historical Provider, MD   omeprazole (PRILOSEC) 20 MG delayed release capsule Take 20 mg by mouth 2 times daily    Historical Provider, MD       REVIEW OF SYSTEMS    (2-9 systems for level 4, 10 or more for level 5)    Review of Systems   Constitutional:  Positive for activity change, appetite change, chills and fatigue. Negative for fever. HENT:  Negative for congestion and rhinorrhea. Respiratory:  Negative for cough and shortness of breath. Cardiovascular:  Negative for chest pain. Gastrointestinal:  Positive for abdominal pain and nausea. Negative for blood in stool and vomiting. Genitourinary:  Positive for dysuria. Negative for flank pain. Musculoskeletal:  Positive for myalgias. Negative for back pain. Neurological:  Negative for light-headedness and headaches. All other systems reviewed and are negative.     PHYSICAL EXAM   (up to 7 for level 4, 8 or more for level 5)    INITIAL VITALS:   ED Triage Vitals [08/31/22 1307]   BP Temp Temp Source Heart Rate Resp SpO2 Height Weight   (!) 149/65 98.3 °F (36.8 °C) Oral 60 22 92 % 5' 10.5\" (1.791 m) 160 lb (72.6 kg) Physical Exam  Vitals and nursing note reviewed. Constitutional:       Appearance: Normal appearance. He is ill-appearing. He is not toxic-appearing or diaphoretic. Eyes:      Extraocular Movements: Extraocular movements intact. Pupils: Pupils are equal, round, and reactive to light. Cardiovascular:      Rate and Rhythm: Normal rate and regular rhythm. Pulses:           Radial pulses are 2+ on the right side and 2+ on the left side. Dorsalis pedis pulses are 1+ on the right side and 1+ on the left side. Posterior tibial pulses are 1+ on the right side and 1+ on the left side. Heart sounds: Normal heart sounds. No murmur heard. Pulmonary:      Effort: Pulmonary effort is normal. No respiratory distress. Breath sounds: Normal breath sounds. No decreased breath sounds, wheezing or rhonchi. Abdominal:      Palpations: Abdomen is soft. Tenderness: There is abdominal tenderness in the right lower quadrant, suprapubic area and left lower quadrant. There is no right CVA tenderness, left CVA tenderness or guarding. Musculoskeletal:      Cervical back: Normal range of motion and neck supple. No tenderness. Right lower leg: No edema. Left lower leg: No edema. Skin:     General: Skin is warm and dry. Capillary Refill: Capillary refill takes less than 2 seconds. Coloration: Skin is pale. Findings: Bruising present. Neurological:      General: No focal deficit present. Mental Status: He is alert and oriented to person, place, and time. Psychiatric:         Behavior: Behavior is cooperative.        DIFFERENTIAL  DIAGNOSIS   PLAN (LABS / IMAGING / EKG):  Orders Placed This Encounter   Procedures    COVID-19, Rapid    Culture, Urine    Culture, Blood 1    Culture, Blood 1    CT ABDOMEN PELVIS WO CONTRAST Additional Contrast? None    BMP    CBC with Auto Differential    Lactic Acid    Magnesium    Urinalysis with Microscopic    APTT Protime-INR    Hepatic Function Panel    Inpatient consult to Internal Medicine    Inpatient consult to Urology    Inpatient consult to Oncology    Nasal Cannula Oxygen    EKG 12 Lead    ADMIT TO INPATIENT       MEDICATIONS ORDERED:  Orders Placed This Encounter   Medications    0.9 % sodium chloride bolus    ondansetron (ZOFRAN) injection 4 mg    cefepime (MAXIPIME) 2,000 mg in sodium chloride 0.9 % 50 mL IVPB mini-bag     Order Specific Question:   Antimicrobial Indications     Answer:   Pneumonia (CAP)     Order Specific Question:   Antimicrobial Indications     Answer:   Urinary Tract Infection    vancomycin (VANCOCIN) 1,750 mg in dextrose 5 % 500 mL IVPB     Order Specific Question:   Antimicrobial Indications     Answer:   Urinary Tract Infection     Order Specific Question:   Antimicrobial Indications     Answer:   Pneumonia (HAP)         DIAGNOSTIC RESULTS / EMERGENCYDEPARTMENT COURSE / MDM   LABS:  Labs Reviewed   BASIC METABOLIC PANEL - Abnormal; Notable for the following components:       Result Value    Glucose 137 (*)     BUN 39 (*)     Creatinine 1.70 (*)     Calcium 8.4 (*)     GFR Non- 40 (*)     GFR  48 (*)     All other components within normal limits   CBC WITH AUTO DIFFERENTIAL - Abnormal; Notable for the following components:    RBC 3.89 (*)     Hemoglobin 9.9 (*)     Hematocrit 30.5 (*)     MCV 78.4 (*)     MCH 25.5 (*)     RDW 17.4 (*)     Seg Neutrophils 76 (*)     Lymphocytes 18 (*)     All other components within normal limits   URINALYSIS WITH MICROSCOPIC - Abnormal; Notable for the following components:    Color, UA Orange (*)     Turbidity UA Turbid (*)     Urine Hgb LARGE (*)     Protein, UA 3+ (*)     Leukocyte Esterase, Urine MOD (*)     Bacteria, UA MODERATE (*)     Amorphous, UA 1+ (*)     All other components within normal limits   APTT - Abnormal; Notable for the following components:    PTT 54.8 (*)     All other components within normal limits PROTIME-INR - Abnormal; Notable for the following components:    Protime 21.7 (*)     All other components within normal limits   COVID-19, RAPID   CULTURE, URINE   CULTURE, BLOOD 1   CULTURE, BLOOD 1   LACTIC ACID   MAGNESIUM   HEPATIC FUNCTION PANEL       RADIOLOGY:  CT ABDOMEN PELVIS WO CONTRAST Additional Contrast? None    Result Date: 8/31/2022  EXAMINATION: CT OF THE ABDOMEN AND PELVIS WITHOUT CONTRAST 8/31/2022 2:46 pm TECHNIQUE: CT of the abdomen and pelvis was performed without the administration of intravenous contrast. Multiplanar reformatted images are provided for review. Automated exposure control, iterative reconstruction, and/or weight based adjustment of the mA/kV was utilized to reduce the radiation dose to as low as reasonably achievable. COMPARISON: 08/12/2022 HISTORY: Acute lower abdominal pain. FINDINGS: Lower Chest: New left lower lobe consolidation. Dependent atelectasis in the right lower lobe. Coronary artery calcification. Organs: Cholecystectomy. Calcified splenic granulomas. Bilateral ureteral stents remain in place. Stable small right renal stone along with a hyperdense right renal cyst.  Remaining solid organs are unremarkable. GI/Bowel: Stomach and small bowel are unremarkable. Normal appendix. Extensive colonic diverticulosis without definite acute inflammation. Pelvis: Bladder is unremarkable. Prostate is enlarged. No lymphadenopathy or free fluid. Peritoneum/Retroperitoneum: Abnormal retroperitoneal soft tissue, left greater than right, with involvement of the left kidney has not significantly changed. No ascites. Severe atherosclerotic disease without abdominal aortic aneurysm. Bones/Soft Tissues: No suspicious osseous lesions. 1. New left lower lobe consolidation is suspicious for pneumonia. 2. No significant change in abnormal retroperitoneal soft tissue, left greater than right, with involvement of the left kidney. This is consistent with known lymphoma.  3. Extensive colonic diverticulosis without definite diverticulitis. 4. Enlarged prostate. EKG    EKG Interpretation    Interpreted by me    Rhythm: normal sinus   Rate: normal, 61  Axis: normal  Ectopy: none  Conduction: normal  ST Segments: no acute change  T Waves: no acute change  Q Waves: none    Clinical Impression: no acute changes and normal EKG        All EKG's are interpreted by the Emergency Department Physician whoeither signs or Co-signs this chart in the absence of a cardiologist.    Impression:  Patient presents with weakness, fatigue, worsening balance over the past few days. Appears to not be able to care for himself well at home. Does have ongoing history of lymphoma, currently is on maintenance oral chemotherapy. He does have extensive bruising, patient states he bruising from \"nearly everything\". Pain on LLQ worse than RLQ. Will get labs, give fluids, placed patient on 2L O2 due to SpO2 at 92% on RA. Will check covid. Abdominal pain concerning for possible UTI vs intraabdominal pathology      EMERGENCY DEPARTMENT COURSE:  ED Course as of 08/31/22 1807   Wed Aug 31, 2022   1519 SARS-CoV-2, Rapid: Not Detected [AP]   0465 Spoke with Dr. Serafin Sheriff who requested the input from hematology/oncology. Spoke with  of oncology who is concerned that the patient is going septic and wants the patient admitted with broad-spectrum antibiotics. [AP]      ED Course User Index  [AP] Iliana Harry DO     Spoke with oncologist who wanted patient admitted and then spoke with the primary care provider covering who accepted patient to his service. Code S was called due to the fact that recommended of blood cultures. Patient however is not tachycardic or tachypneic or febrile nausea white blood cell count elevation. However given his history of lymphoma and chemotherapy, concern for immunosuppression and therefore occult infection. Broad-spectrum antibiotics started.   Patient admitted. CONSULTS:  IP CONSULT TO INTERNAL MEDICINE  IP CONSULT TO UROLOGY  IP CONSULT TO ONCOLOGY  PHARMACY TO DOSE VANCOMYCIN    CRITICAL CARE:  There was a high probability of clinically significant/life threatening deterioration in this patient's condition which required my urgent intervention. Total critical care time was 20 minutes. This excludes any time for separately reportable procedures. FINAL IMPRESSION     1. Pneumonia due to infectious organism, unspecified laterality, unspecified part of lung    2. Acute cystitis with hematuria          DISPOSITION / PLAN   DISPOSITION Admitted 08/31/2022 06:01:39 PM      PATIENT REFERRED TO:  No follow-up provider specified.     DISCHARGE MEDICATIONS:  New Prescriptions    No medications on file       Paloma Meeks DO  Emergency Medicine Attending    (Please note that portions of this note were completed with a voice recognition program.  Efforts were made to edit the dictations but occasionally words are mis-transcribed.)         Paloma Meeks DO  08/31/22 8723

## 2022-08-31 NOTE — ED TRIAGE NOTES
Mode of arrival (squad #, walk in, police, etc) : [facility]        Chief complaint(s): lower extremity weakness        Arrival Note (brief scenario, treatment PTA, etc). : Squad says patient has been feeling tired and weak for the 7 days. Patient has one episode of vomiting. Patient is on chemotherapy last June for Non-hodgkin's lymphoma. Denies chest pain, shortness of breath or loss of consciousness. C= \"Have you ever felt that you should Cut down on your drinking? \"  No  A= \"Have people Annoyed you by criticizing your drinking? \"  No  G= \"Have you ever felt bad or Guilty about your drinking? \"  No  E= \"Have you ever had a drink as an Eye-opener first thing in the morning to steady your nerves or to help a hangover? \"  No      Deferred []      Reason for deferring: N/A    *If yes to two or more: probable alcohol abuse. *

## 2022-08-31 NOTE — ED NOTES
Called the laboratory c/o Kavitha to add the hepatic panel to the previous laboratory test. They would run the test now     Gilford Redwood, RN  00/55/40 4933

## 2022-08-31 NOTE — ED NOTES
100% of the time Report given to GIOVANY Hester RN from [facility]. Report method BY [id number]   The following was reviewed with receiving RN:   Current vital signs:  /60   Pulse 64   Temp 98.2 °F (36.8 °C) (Oral)   Resp 22   Ht 5' 10.5\" (1.791 m)   Wt 160 lb (72.6 kg)   SpO2 99%   BMI 22.63 kg/m²                MEWS Score: 1     Any medication or safety alerts were reviewed. Any pending diagnostics and notifications were also reviewed, as well as any safety concerns or issues, abnormal labs, abnormal imaging, and abnormal assessment findings. Questions were answered.             Alea Pittman RN  08/31/22 5399

## 2022-08-31 NOTE — ED NOTES
REPORT:    Verbal report given to Jaime Avila RN on Ramón Gift    Report consisted of patient's Situation, Background, Assessment and    Recommendations(SBAR). Information from the following report(s) ED SBAR was reviewed with the receiving nurse. Lines:   Implanted Venous Port (Single) 08/31/21 Subclavian Right (Active)       Peripheral IV 08/31/22 Left Antecubital (Active)    Pending room availability  Vancomycin infusion ongoing. Patient in oxygen at 2L/min via nasal cannula    Opportunity for questions and clarification was provided.              Rambo Sevilla RN  33/19/88 8449

## 2022-09-01 PROBLEM — E43 SEVERE MALNUTRITION (HCC): Status: ACTIVE | Noted: 2022-09-01

## 2022-09-01 PROBLEM — N30.01 ACUTE CYSTITIS WITH HEMATURIA: Status: ACTIVE | Noted: 2022-09-01

## 2022-09-01 LAB
ABSOLUTE EOS #: 0.1 K/UL (ref 0–0.4)
ABSOLUTE LYMPH #: 1.3 K/UL (ref 1–4.8)
ABSOLUTE MONO #: 0.6 K/UL (ref 0.1–1.3)
ANION GAP SERPL CALCULATED.3IONS-SCNC: 8 MMOL/L (ref 9–17)
BASOPHILS # BLD: 0 % (ref 0–2)
BASOPHILS ABSOLUTE: 0 K/UL (ref 0–0.2)
BUN BLDV-MCNC: 40 MG/DL (ref 8–23)
CALCIUM SERPL-MCNC: 8.1 MG/DL (ref 8.6–10.4)
CHLORIDE BLD-SCNC: 106 MMOL/L (ref 98–107)
CO2: 23 MMOL/L (ref 20–31)
CREAT SERPL-MCNC: 1.66 MG/DL (ref 0.7–1.2)
CULTURE: NO GROWTH
EOSINOPHILS RELATIVE PERCENT: 1 % (ref 0–4)
GFR AFRICAN AMERICAN: 49 ML/MIN
GFR NON-AFRICAN AMERICAN: 41 ML/MIN
GFR SERPL CREATININE-BSD FRML MDRD: ABNORMAL ML/MIN/{1.73_M2}
GLUCOSE BLD-MCNC: 107 MG/DL (ref 70–99)
HCT VFR BLD CALC: 28.5 % (ref 41–53)
HEMOGLOBIN: 9 G/DL (ref 13.5–17.5)
LYMPHOCYTES # BLD: 23 % (ref 24–44)
MCH RBC QN AUTO: 25 PG (ref 26–34)
MCHC RBC AUTO-ENTMCNC: 31.7 G/DL (ref 31–37)
MCV RBC AUTO: 78.7 FL (ref 80–100)
MONOCYTES # BLD: 10 % (ref 1–7)
PDW BLD-RTO: 17.3 % (ref 11.5–14.9)
PLATELET # BLD: 127 K/UL (ref 150–450)
PMV BLD AUTO: 8.5 FL (ref 6–12)
POTASSIUM SERPL-SCNC: 4 MMOL/L (ref 3.7–5.3)
PROCALCITONIN: 0.17 NG/ML
RBC # BLD: 3.62 M/UL (ref 4.5–5.9)
SEG NEUTROPHILS: 66 % (ref 36–66)
SEGMENTED NEUTROPHILS ABSOLUTE COUNT: 3.7 K/UL (ref 1.3–9.1)
SODIUM BLD-SCNC: 137 MMOL/L (ref 135–144)
SPECIMEN DESCRIPTION: NORMAL
WBC # BLD: 5.6 K/UL (ref 3.5–11)

## 2022-09-01 PROCEDURE — 6360000002 HC RX W HCPCS: Performed by: STUDENT IN AN ORGANIZED HEALTH CARE EDUCATION/TRAINING PROGRAM

## 2022-09-01 PROCEDURE — 97162 PT EVAL MOD COMPLEX 30 MIN: CPT

## 2022-09-01 PROCEDURE — 80048 BASIC METABOLIC PNL TOTAL CA: CPT

## 2022-09-01 PROCEDURE — 94640 AIRWAY INHALATION TREATMENT: CPT

## 2022-09-01 PROCEDURE — 99223 1ST HOSP IP/OBS HIGH 75: CPT | Performed by: INTERNAL MEDICINE

## 2022-09-01 PROCEDURE — 2700000000 HC OXYGEN THERAPY PER DAY

## 2022-09-01 PROCEDURE — 94761 N-INVAS EAR/PLS OXIMETRY MLT: CPT

## 2022-09-01 PROCEDURE — 97530 THERAPEUTIC ACTIVITIES: CPT

## 2022-09-01 PROCEDURE — 97166 OT EVAL MOD COMPLEX 45 MIN: CPT

## 2022-09-01 PROCEDURE — 6370000000 HC RX 637 (ALT 250 FOR IP): Performed by: FAMILY MEDICINE

## 2022-09-01 PROCEDURE — 36415 COLL VENOUS BLD VENIPUNCTURE: CPT

## 2022-09-01 PROCEDURE — 85025 COMPLETE CBC W/AUTO DIFF WBC: CPT

## 2022-09-01 PROCEDURE — 84145 PROCALCITONIN (PCT): CPT

## 2022-09-01 PROCEDURE — 87040 BLOOD CULTURE FOR BACTERIA: CPT

## 2022-09-01 PROCEDURE — 2060000000 HC ICU INTERMEDIATE R&B

## 2022-09-01 PROCEDURE — 2580000003 HC RX 258: Performed by: STUDENT IN AN ORGANIZED HEALTH CARE EDUCATION/TRAINING PROGRAM

## 2022-09-01 RX ADMIN — Medication 2 PUFF: at 08:26

## 2022-09-01 RX ADMIN — SODIUM CHLORIDE 1000 ML: 9 INJECTION, SOLUTION INTRAVENOUS at 11:29

## 2022-09-01 RX ADMIN — CARVEDILOL 6.25 MG: 6.25 TABLET, FILM COATED ORAL at 18:06

## 2022-09-01 RX ADMIN — Medication 2 PUFF: at 19:50

## 2022-09-01 RX ADMIN — DONEPEZIL HYDROCHLORIDE 10 MG: 10 TABLET ORAL at 08:41

## 2022-09-01 RX ADMIN — SUCRALFATE 1 G: 1 TABLET ORAL at 18:06

## 2022-09-01 RX ADMIN — IPRATROPIUM BROMIDE AND ALBUTEROL SULFATE 3 ML: 2.5; .5 SOLUTION RESPIRATORY (INHALATION) at 15:07

## 2022-09-01 RX ADMIN — HEPARIN SODIUM 5000 UNITS: 5000 INJECTION INTRAVENOUS; SUBCUTANEOUS at 00:38

## 2022-09-01 RX ADMIN — CARVEDILOL 6.25 MG: 6.25 TABLET, FILM COATED ORAL at 08:41

## 2022-09-01 RX ADMIN — IPRATROPIUM BROMIDE AND ALBUTEROL SULFATE 3 ML: 2.5; .5 SOLUTION RESPIRATORY (INHALATION) at 19:50

## 2022-09-01 RX ADMIN — SUCRALFATE 1 G: 1 TABLET ORAL at 00:37

## 2022-09-01 RX ADMIN — LEVOTHYROXINE SODIUM 150 MCG: 0.07 TABLET ORAL at 08:49

## 2022-09-01 RX ADMIN — FLUOXETINE 20 MG: 20 CAPSULE ORAL at 08:41

## 2022-09-01 RX ADMIN — PANTOPRAZOLE SODIUM 40 MG: 40 TABLET, DELAYED RELEASE ORAL at 08:41

## 2022-09-01 RX ADMIN — LOSARTAN POTASSIUM 50 MG: 50 TABLET, FILM COATED ORAL at 08:41

## 2022-09-01 RX ADMIN — Medication 1 CAPSULE: at 08:41

## 2022-09-01 RX ADMIN — SUCRALFATE 1 G: 1 TABLET ORAL at 22:19

## 2022-09-01 RX ADMIN — CEFEPIME 2000 MG: 2 INJECTION, POWDER, FOR SOLUTION INTRAVENOUS at 11:32

## 2022-09-01 RX ADMIN — SODIUM CHLORIDE, PRESERVATIVE FREE 10 ML: 5 INJECTION INTRAVENOUS at 08:40

## 2022-09-01 RX ADMIN — MEGESTROL ACETATE 400 MG: 40 SUSPENSION ORAL at 08:45

## 2022-09-01 RX ADMIN — MULTIPLE VITAMINS W/ MINERALS TAB 1 TABLET: TAB at 08:41

## 2022-09-01 RX ADMIN — SUCRALFATE 1 G: 1 TABLET ORAL at 08:41

## 2022-09-01 RX ADMIN — OXYCODONE HYDROCHLORIDE 10 MG: 10 TABLET, FILM COATED, EXTENDED RELEASE ORAL at 11:32

## 2022-09-01 RX ADMIN — OXYCODONE HYDROCHLORIDE 10 MG: 10 TABLET, FILM COATED, EXTENDED RELEASE ORAL at 00:37

## 2022-09-01 RX ADMIN — SUCRALFATE 1 G: 1 TABLET ORAL at 11:32

## 2022-09-01 RX ADMIN — OXYCODONE HYDROCHLORIDE 10 MG: 10 TABLET, FILM COATED, EXTENDED RELEASE ORAL at 22:24

## 2022-09-01 RX ADMIN — APIXABAN 2.5 MG: 2.5 TABLET, FILM COATED ORAL at 08:41

## 2022-09-01 RX ADMIN — ATORVASTATIN CALCIUM 10 MG: 10 TABLET, FILM COATED ORAL at 08:41

## 2022-09-01 RX ADMIN — APIXABAN 2.5 MG: 2.5 TABLET, FILM COATED ORAL at 22:19

## 2022-09-01 RX ADMIN — IPRATROPIUM BROMIDE AND ALBUTEROL SULFATE 3 ML: 2.5; .5 SOLUTION RESPIRATORY (INHALATION) at 08:26

## 2022-09-01 ASSESSMENT — PAIN DESCRIPTION - DESCRIPTORS
DESCRIPTORS: STABBING;SHOOTING
DESCRIPTORS: SHARP;STABBING

## 2022-09-01 ASSESSMENT — PAIN DESCRIPTION - LOCATION
LOCATION: ABDOMEN
LOCATION: ABDOMEN

## 2022-09-01 ASSESSMENT — PAIN SCALES - GENERAL
PAINLEVEL_OUTOF10: 6
PAINLEVEL_OUTOF10: 7
PAINLEVEL_OUTOF10: 6

## 2022-09-01 ASSESSMENT — PAIN DESCRIPTION - ORIENTATION
ORIENTATION: RIGHT;LEFT;MID
ORIENTATION: MID;LEFT;RIGHT

## 2022-09-01 NOTE — PROGRESS NOTES
Comprehensive Nutrition Assessment    Type and Reason for Visit:  Positive Nutrition Screen (weight loss and poor intake)    Nutrition Recommendations/Plan:   Continue diet as ordered. Malnutrition Assessment:  Malnutrition Status:  Severe malnutrition (09/01/22 1609)    Context:  Chronic Illness     Findings of the 6 clinical characteristics of malnutrition:  Energy Intake:  75% or less estimated energy requirements for 1 month or longer  Weight Loss:  Greater than 20% over 1 year     Body Fat Loss:  Severe body fat loss Orbital, Triceps   Muscle Mass Loss:  Severe muscle mass loss Temples (temporalis), Clavicles (pectoralis & deltoids), Hand (interosseous)  Fluid Accumulation:  No significant fluid accumulation     Strength:  Not Performed    Nutrition Assessment:    Pt has hx of Lymphoma,  follows at [facility] center. Pt went to ED with lower extremity weakness and nausea. Writer asked how he was feeling which he responded \"periodically feel better\". Pt said he did ok with lunch but it wasn't what he expected, he got a sandwich but they gave him no condiments. Pt will not take any type of nutritional supplement not even a milkshakes. Pt was on IV chemo treatment with last treatment 2 months ago. Currently on oral chemo, Ibrutinib for maintenance therapy. Pt appears very thin, has lost a considerable amount of weight. Pt has been on med regimen that includes Megace, Zofran, & Compazine. Nutrition Related Findings:    GI: nausea. No edema. Hx: Lymphoma. Labs & meds reviewed. Wound Type: None       Current Nutrition Intake & Therapies:    Average Meal Intake: %, 51-75%     ADULT DIET; Regular    Anthropometric Measures:  Height: 5' 10\" (177.8 cm)  Ideal Body Weight (IBW): 166 lbs (75 kg)    Admission Body Weight: 132 lb (59.9 kg)  Current Body Weight: 132 lb (59.9 kg), 79.5 % IBW.  Weight Source: Stated  Current BMI (kg/m2): 18.9  Usual Body Weight: 170 lb (77.1 kg) (12/5/21)  % Weight Change (Calculated): -22.4                    BMI Categories: Underweight (BMI less than 22) age over 72    Estimated Daily Nutrient Needs:  Energy Requirements Based On: Formula  Weight Used for Energy Requirements: Admission  Energy (kcal/day): 8678-0143 kcals based on [facility] including activity and stress factors  Weight Used for Protein Requirements: Admission  Protein (g/day): 78-90 gm protein based on 1.3-1.5 gm/kg       Nutrition Diagnosis:   Severe malnutrition related to catabolic illness, inadequate protein-energy intake as evidenced by Criteria as identified in malnutrition assessment    Nutrition Interventions:   Food and/or Nutrient Delivery: Continue Current Diet  Nutrition Education/Counseling: No recommendation at this time  Coordination of Nutrition Care: Continue to monitor while inpatient       Goals:     Goals: Meet at least 75% of estimated needs       Nutrition Monitoring and Evaluation:   Behavioral-Environmental Outcomes: None Identified  Food/Nutrient Intake Outcomes: Food and Nutrient Intake  Physical Signs/Symptoms Outcomes: Biochemical Data, GI Status, Nausea or Vomiting, Nutrition Focused Physical Findings, Skin, Weight    Discharge Planning:    Continue current diet     Evon Hernandes, [address] [place], [place] [address]

## 2022-09-01 NOTE — ACP (ADVANCE CARE PLANNING)
Advance Care Planning     Advance Care Planning Activator (Inpatient)  Conversation Note      Date of ACP Conversation: 9/1/2022     Conversation Conducted with: Patient with Decision Making Capacity    ACP Activator: Kvng Sher RN        Health Care Decision Maker:     Current Designated Health Care Decision Maker:     Click here to complete Healthcare Decision Makers including section of the Healthcare Decision Maker Relationship (ie \"Primary\")  Today we documented Decision Maker(s) consistent with Legal Next of Kin hierarchy. Care Preferences    Ventilation: \"If you were in your present state of health and suddenly became very ill and were unable to breathe on your own, what would your preference be about the use of a ventilator (breathing machine) if it were available to you? \"      Would the patient desire the use of ventilator (breathing machine)?: yes    \"If your health worsens and it becomes clear that your chance of recovery is unlikely, what would your preference be about the use of a ventilator (breathing machine) if it were available to you? \"     Would the patient desire the use of ventilator (breathing machine)?: Yes      Resuscitation  \"CPR works best to restart the heart when there is a sudden event, like a heart attack, in someone who is otherwise healthy. Unfortunately, CPR does not typically restart the heart for people who have serious health conditions or who are very sick. \"    \"In the event your heart stopped as a result of an underlying serious health condition, would you want attempts to be made to restart your heart (answer \"yes\" for attempt to resuscitate) or would you prefer a natural death (answer \"no\" for do not attempt to resuscitate)? \" yes       [] Yes   [] No   Educated Patient / Iman Diaz regarding differences between Advance Directives and portable DNR orders.     Length of ACP Conversation in minutes:      Conversation Outcomes:  [x] ACP discussion completed  [] Existing advance directive reviewed with patient; no changes to patient's previously recorded wishes  [] New Advance Directive completed  [] Portable Do Not Rescitate prepared for Provider review and signature  [] POLST/POST/MOLST/MOST prepared for Provider review and signature      Follow-up plan:    [] Schedule follow-up conversation to continue planning  [] Referred individual to Provider for additional questions/concerns   [] Advised patient/agent/surrogate to review completed ACP document and update if needed with changes in condition, patient preferences or care setting    [] This note routed to one or more involved healthcare providers    {

## 2022-09-01 NOTE — PROGRESS NOTES
Vancomycin Dosing by Pharmacy - Initial Note   TODAY'S DATE:  8/31/2022  Patient name, age:  David Farah, 74 y.o. Indication: Respiratory infection, MRSA suspected  Additional antimicrobials:  cefepime    Allergies:  Patient has no known allergies. Actual Weight:    Wt Readings from Last 1 Encounters:   08/31/22 132 lb 0.9 oz (59.9 kg)     Labs/Ancillary Data  Estimated Creatinine Clearance: 32 mL/min (A) (based on SCr of 1.7 mg/dL (H)). Recent Labs     08/31/22  1410   CREATININE 1.70*   BUN 39*   WBC 9.1     Procalcitonin   Date Value Ref Range Status   07/15/2022 0.10 (H) <0.09 ng/mL Final     Comment:           Suspected Sepsis:  <0.50 ng/mL     Low likelihood of sepsis. 0.50-2.00 ng/mL     Increased likelihood of sepsis. Antibiotics encouraged. >2.00 ng/mL     High risk of sepsis/shock. Antibiotics strongly encouraged. Suspected Lower Resp Tract Infections:  <0.24 ng/mL     Low likelihood of bacterial infection. >0.24 ng/mL     Increased likelihood of bacterial infection. Antibiotics encouraged. With successful antibiotic therapy, PCT levels should decrease rapidly. (Half-life of 24 to   36 hours.)        Procalcitonin values from samples collected within the first 6 hours of systemic infection   may still be low. Retesting may be indicated. Values from day 1 and day 4 can be entered into the Change in Procalcitonin Calculator   (www.Criptexts-pct-[email or web address] com) to determine the patient's Mortality Risk Prognosis        In healthy neonates, plasma Procalcitonin (PCT) concentrations increase gradually after   birth, reaching peak values at about 24 hours of age then decrease to normal values below   0.5 ng/mL by 48-72 hours of age.          Intake/Output Summary (Last 24 hours) at 8/31/2022 2112  Last data filed at 8/31/2022 2004  Gross per 24 hour   Intake 500 ml   Output 150 ml   Net 350 ml     Temp: 97.4    Recent vancomycin administrations                     vancomycin (VANCOCIN) 1,750 mg in dextrose 5 % 500 mL IVPB (mg) 1,750 mg New Bag 08/31/22 1713                  Culture Date / Source  /  Results  See micro    MRSA Nasal Swab: was ordered by provider, awaiting results. [employer] PLAN   Initial loading dose of 25mg/kg (max of 2,500 mg) = 1750  mg  x 1, then  vancomycin 750 mg IV every 24 hours. Ensured BUN/sCr ordered at baseline and every 48 hours x at least 3 levels, then at least weekly. Vancomycin level ordered for 9/2 @ 0600. Will use bayesian method for dosing. This level will not be a trough. Target AUC/MELANIE: 400-600. Vancomycin Target Concentration Parameters  Treatment  Population Target AUC/MELANIE Target Trough   Invasive MRSA Infection (bacteremia, pneumonia, meningitis, endocarditis, osteomyelitis)  Sepsis (undifferentiated) 400-600 N/A   Infection due to non-MRSA pathogen  Empiric treatment of non-invasive MRSA infection  (SSTI, UTI) <500 10-15 mg/L   CrCl < 29 mL/min  Rapidly fluctuating serum creatinine   LAURO N/A < 15 mg/L     Renal replacement therapy is dosed by levels, per hospital protocol. Abbreviations  * Pauc: probability that AUC is >400 (efficacy); Pconc: probability that Ctrough is above 20 ?g/mL (toxicity); Tox: Probability of nephrotoxicity, based on Lodise et al. Clin Infect Dis 2009. Loading dose: 1750 mg at 17:13 08/31/2022. Regimen: 750 mg IV every 24 hours. Start time: 05:13 on 09/01/2022  Exposure target: AUC24 (range)400-600 mg/L.hr   AUC24,ss: 475 mg/L.hr  Probability of AUC24 > 400: 69 %  Ctrough,ss: 15.4 mg/L  Probability of Ctrough,ss > 20: 25 %  Probability of nephrotoxicity (Lodise BAO 2009): 11 %      Thank you for the consult. Pharmacy will continue to follow.    Erin Mahmood, [address]  8/31/2022  9:12 PM

## 2022-09-01 NOTE — PROGRESS NOTES
[address]   Occupational Therapy Evaluation  Date: 22  Patient Name: Porfirio Jacome       Room: 3460/0771-14  MRN: [id number]  Account: [de-identified]   : 1950  (69 y.o.) Gender: male     Discharge Recommendations:  Further Occupational Therapy is recommended upon facility discharge. OT Equipment Recommendations  Other: TBD    Referring Practitioner: Ashley Do DO  Diagnosis: Diffuse lymphoma Additional Pertinent Hx: 74 y.o. male with significant medical history of lymphoma on chemo treatment p.o. his presented with increasing weakness in the last 2 weeks patient denies to me having any cough or respiratory problems. This may be some shortness of breath but nothing new to him he says. He has had been having some urinary burning he says but he has had this since he had the stents but denies any fevers or chills or nausea or vomiting he does have decreased appetite. He is feeling better this morning looks like he had all his breakfast this morning with no nausea vomiting    Treatment Diagnosis: Impaired self-care status    Past Medical History:  has a past medical history of Arthritis, Cancer ([address] Chemotherapy management, encounter for, CHF (congestive heart failure) ([facility] COPD (chronic obstructive pulmonary disease) ([facility] [facility] Diabetes mellitus ([address] H/O cardiovascular stress test, History of non-Hodgkin's lymphoma, Hx of blood clots, Hyperlipidemia, Hypertension, Hypothyroidism, and On home O2. Past Surgical History:   has a past surgical history that includes hernia repair; IR BIOPSY ABDOMINAL/RETROPERITONEAL MASS PERCUTANEOUS (2021); IR PORT PLACEMENT > 5 YEARS (2021); Colonoscopy (Left, 2021); Cystoscopy (Right, 10/6/2021); Cholecystectomy, laparoscopic (N/A, 10/9/2021); Tonsillectomy; Cystoscopy (Bilateral, 2021); Cystoscopy (Bilateral, 2022); eye surgery;  Carotid endarterectomy (Left, 2022); and Cystoscopy (N/A, 7/20/2022).     Restrictions  Restrictions/Precautions  Restrictions/Precautions: Fall Risk;General Precautions  Required Braces or Orthoses?: No  Implants present? :  (denies)      Vitals  Vitals  Heart Rate: 77  Heart Rate Source: Monitor  BP: (!) 148/67  BP Location: Left upper arm  Patient Position: Supine  MAP (Calculated): 94  Resp: 18  SpO2: 96 %  O2 Device: Nasal cannula (2 L O2)     Subjective  Subjective: \"I feel weak\"  Comments: Okay for OT PT eval and treat per RN  Subjective  Pain: Pt reported 4/10 \"middle stomach\"      Social/Functional History  Social/Functional History  Lives With: Spouse, Family (Cousin)  Type of Home: House  Home Layout: One level  Home Access: Ramped entrance, Stairs to enter with rails  Entrance Stairs - Number of Steps: 1  Entrance Stairs - Rails: Left  Bathroom Shower/Tub: Tub/Shower unit, Shower chair without back  Bathroom Toilet: Handicap height (Support nearby if needed)  Bathroom Equipment: Grab bars in shower, Hand-held shower, Shower chair (Suction cup grab bar)  Bathroom Accessibility: Accessible  Home Equipment: Cane, Rollator, Wheelchair-manual, Oxygen  Has the patient had two or more falls in the past year or any fall with injury in the past year?: Yes (4 falls in past six months, pt reported feeling weak)  ADL Assistance: Independent (Pt reported IND but sometimes has difficulty getting up/down)  Homemaking Assistance: Independent (Wife/cousin are primary)  Ambulation Assistance: Independent (with rollator)  Transfer Assistance: Independent  Active : Yes  Mode of Transportation: Car  Occupation: Retired  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[employer] [place]: Enjoys going to the [address]  IADL Comments: sleeps in a recliner chair  Additional Comments: Pt's wife and cousin are available to assist 24/7      Objective  Cognition  Orientation  Overall Orientation Status: Within Functional Limits  Orientation Level: Oriented X4 Sensation  Overall Sensation Status: Impaired (numbness/tingling B toes)    Activities of Daily Living  ADL  Feeding: Setup  Grooming: Setup  UE Bathing: Supervision  LE Bathing: Moderate assistance  UE Dressing: Supervision  LE Dressing: Moderate assistance  Toileting: Moderate assistance  Additional Comments: ADL scores based on skilled observation and clinical reasoning, unless otherwise noted. Pt is currently limited due to generalized weakness and shortness of breath with minimal activity, impacting independence with self care. UE Function  LUE AROM (degrees)  LUE AROM : WFL  Left Hand AROM (degrees)  Left Hand AROM: WFL  Tone LUE  LUE Tone: Normotonic  LUE Strength  L Hand General: 4-/5  LUE Strength Comment: Overall 4-/5    RUE AROM (degrees)  RUE AROM : WFL  Right Hand AROM (degrees)  Right Hand AROM: WFL  Tone RUE  RUE Tone: Normotonic  RUE Strength  R Hand General: 4-/5  RUE Strength Comment: Overall 4-/5         Fine Motor Skills/Coordination  Coordination  Movements Are Fluid And Coordinated: No  Coordination and Movement Description: Decreased accuracy, Right UE, Left UE                Mobility  Bed Mobility  Bed mobility  Supine to Sit: Stand by assistance  Sit to Supine: Stand by assistance  Scooting: Stand by assistance  Bed Mobility Comments: HOB elevated with use of hand rails.  No complaints of lightheadedness/dizziness, but pt endorsed feeling \"weak\" sitting EOB    Balance  Balance  Sitting Balance: Stand by assistance  Standing Balance: Contact guard assistance  Standing Balance  Time: 2-3 minutes x2  Activity: functional transfers, functional mobility  Comment: with RW for UE support    Transfers  Transfers  Sit to stand: Contact guard assistance  Stand to sit: Contact guard assistance  Transfer Comments: Min cues for hand placement for safety    Functional Mobility  Functional - Mobility Device: Rolling Walker  Activity:  (Throughout patient's room)  Assist Level: Contact guard assistance  Functional Mobility Comments: Assist with line mgmt. Pt motivated to walk but required rest breaks as needed    Assessment  Assessment  Performance deficits / Impairments: Decreased functional mobility , Decreased ADL status, Decreased strength, Decreased safe awareness, Decreased endurance, Decreased balance, Decreased sensation, Decreased high-level IADLs, Decreased fine motor control  Treatment Diagnosis: Impaired self-care status  Prognosis: Good  Decision Making: Medium Complexity  Discharge Recommendations: Home with Home health OT, 24 hour supervision or assist    Activity Tolerance  Activity Tolerance: Patient Tolerated treatment well, Patient limited by fatigue    Safety Devices  Type of Devices:  All fall risk precautions in place, Call light within reach, Gait belt, Patient at risk for falls, Left in bed, Nurse notified    Patient Education  Patient Education  Education Given To: Patient  Education Provided: Role of Therapy, Plan of Care  Education Method: Verbal  Barriers to Learning: None  Education Outcome: Verbalized understanding, Continued education needed      Functional Outcome Measures  AM-PAC Daily Activity Inpatient   How much help for putting on and taking off regular lower body clothing?: A Lot  How much help for Bathing?: A Lot  How much help for Toileting?: A Lot  How much help for putting on and taking off regular upper body clothing?: A Little  How much help for taking care of personal grooming?: A Little  How much help for eating meals?: A Little  AM-[facility] Inpatient Daily Activity Raw Score: 15  AM-PAC Inpatient ADL T-Scale Score : 34.69  ADL Inpatient CMS 0-100% Score: 56.46  ADL Inpatient CMS G-Code Modifier : CK       Goals     Short Term Goals  Time Frame for Short term goals: By discharge  Short Term Goal 1: Pt will perform BADLs mod I, using appropriate adaptive strategies/equipment  Short Term Goal 2: Pt will perform transfers/functional mobility, mod I, using least

## 2022-09-01 NOTE — PROGRESS NOTES
Pt's BP was lower, recheck via manual yielded 122/50, Dr. Mynor Rivers aware. Pt asymptomatic with O2, sats drop if he is off for a short period of time and confusion starts to present itself.

## 2022-09-01 NOTE — CONSULTS
Urology Consultation    Patient:  Cathy Olaery  MRN: [id number]  YOB: 1950    CHIEF COMPLAINT:  hydronephrosis    HISTORY OF PRESENT ILLNESS:   The patient is a 74 y.o. male who presents with weakness. He has lymphoma with known bilateral hydro. He is a patient of Dr Ronald Abarca. He has chronic stents, which were changed about 6 weeks ago. He has some dysuria. Patient's old records, notes and chart reviewed and summarized above. Past Medical History:    Past Medical History:   Diagnosis Date    Arthritis     Cancer [facility])     chemotherapy lymphoma last was april 2022    Chemotherapy management, encounter for     CHF (congestive heart failure) ([facility] [address]     COPD (chronic obstructive pulmonary disease) ([facility] [address]     Diabetes mellitus ([facility] [address]     H/O cardiovascular stress test     History of non-Hodgkin's lymphoma     Hx of blood clots     DVT in right leg     Hyperlipidemia     Hypertension     Hypothyroidism     On home O2     at 5 liters per nasal cannula 24 hrs. per day.         Past Surgical History:    Past Surgical History:   Procedure Laterality Date    CAROTID ENDARTERECTOMY Left 6/28/2022    LEFT TYPE I EVERSION LEFT CAROTID ENDARTERECTOMY RE-IMPLANTATION LEFT ICA performed by Fatemeh Wheatley MD at [address] [place] [address], LAPAROSCOPIC N/A 10/9/2021    CHOLECYSTECTOMY LAPAROSCOPIC ROBOTIC XI performed by Loy Garg MD at [place] [address] Left 9/24/2021    COLONOSCOPY POLYPECTOMY SNARE/COLD BIOPSY performed by Maria Ines Zheng MD at [facility] Right 10/6/2021    CYSTOSCOPY PYELOGRAM URETERAL STENT INSERTION performed by Layla Prader, MD at [facility] Bilateral 11/24/2021    CYSTOSCOPY URETERAL [address] LEFT INSERTION performed by Layla Prader, MD at [facility] Bilateral 2/16/2022    CYSTOSCOPY WITH BILATERAL STENT EXCHANGE performed by Layla Prader, MD at [facility] N/A 7/20/2022 CYSTOSCOPY BILATERAL STENT EXCHANGE performed by Panda Langston MD at [facility]      IR BIOPSY ABDOMINAL MASS  7/6/2021    IR BIOPSY ABDOMINAL MASS 7/6/2021 [facility] SPECIAL PROCEDURES    IR PORT PLACEMENT EQUAL OR GREATER THAN 5 YEARS  8/31/2021    IR PORT PLACEMENT EQUAL OR GREATER THAN 5 YEARS 8/31/2021 [facility] SPECIAL PROCEDURES    TONSILLECTOMY      as child     Previous  surgery:  cysto, stent placement. Medications:    Scheduled Meds:   cefepime  2,000 mg IntraVENous Q24H    sodium chloride flush  5-40 mL IntraVENous 2 times per day    apixaban  2.5 mg Oral BID    atorvastatin  10 mg Oral Daily    carvedilol  6.25 mg Oral BID WC    donepezil  10 mg Oral Daily    FLUoxetine  20 mg Oral Daily    budesonide-formoterol  2 puff Inhalation BID    ibrutinib  420 mg Oral Daily    ipratropium-albuterol  1 vial Inhalation Q6H WA    lactobacillus  1 capsule Oral Daily    levothyroxine  150 mcg Oral Daily    losartan  50 mg Oral Daily    megestrol  400 mg Oral Daily    therapeutic multivitamin-minerals  1 tablet Oral Daily    pantoprazole  40 mg Oral QAM AC    oxyCODONE  10 mg Oral Q12H    sucralfate  1 g Oral 4x Daily AC & HS    psyllium  1 packet Oral Daily     Continuous Infusions:   sodium chloride      sodium chloride 1,000 mL (09/01/22 1129)     PRN Meds:.sodium chloride flush, sodium chloride, acetaminophen, ondansetron **OR** ondansetron, morphine **OR** morphine, oxyCODONE, prochlorperazine    Allergies:  Patient has no known allergies.     Social History:    Social History     Socioeconomic History    Marital status:      Spouse name: Not on file    Number of children: Not on file    Years of education: Not on file    Highest education level: Not on file   Occupational History    Not on file   Tobacco Use    Smoking status: Former     Packs/day: 2.00     Years: 45.00     Pack years: 90.00     Types: Cigarettes     Quit date: 1/7/2006     Years since quitting: 16.6    Smokeless tobacco: Never   Vaping Use    Vaping Use: Never used   Substance and Sexual Activity    Alcohol use:  Yes     Alcohol/week: 1.7 standard drinks     Types: 2 Standard drinks or equivalent per week     Comment: occassional    Drug use: Yes     Types: Marijuana Shanon Coil)     Comment:  CBD gummies no smoking    Sexual activity: Not on file   Other Topics Concern    Not on file   Social History Narrative    Not on file     Social Determinants of Health     Financial Resource Strain: Not on file   Food Insecurity: Not on file   Transportation Needs: Not on file   Physical Activity: Not on file   Stress: Not on file   Social Connections: Not on file   Intimate Partner Violence: Not on file   Housing Stability: Not on file       Family History:    Family History   Problem Relation Age of Onset    Diabetes Mother     Alzheimer's Disease Father     Heart Disease Brother     Heart Disease Brother      Previous Urologic Family history: none    REVIEW OF SYSTEMS:  Constitutional: negative  weakness  Eyes: negative  Respiratory: negative  Cardiovascular: negative  Gastrointestinal: negative  Genitourinary: see HPI  Musculoskeletal: negative  Skin: negative   Neurological: negative  Hematological/Lymphatic: negative  Psychological: negative    Physical Exam:    This a 74 y.o. male   Patient Vitals for the past 24 hrs:   BP Temp Temp src Pulse Resp SpO2 Height Weight   09/01/22 1542 -- -- -- -- -- -- 5' 10\" (1.778 m) --   09/01/22 1508 -- -- -- -- -- 95 % -- --   09/01/22 1400 (!) 122/50 -- -- 74 -- -- -- --   09/01/22 1300 (!) 94/48 98.1 °F (36.7 °C) Axillary 63 18 91 % -- --   09/01/22 0826 -- -- -- 77 18 96 % -- --   09/01/22 0820 -- -- -- 71 -- -- -- --   09/01/22 0504 (!) 148/67 98.1 °F (36.7 °C) Oral 57 20 96 % -- --   09/01/22 0037 -- -- -- -- 22 -- -- --   09/01/22 0006 (!) 119/54 97.7 °F (36.5 °C) Oral 59 22 96 % -- --   08/31/22 2352 -- -- -- 63 20 99 % -- --   08/31/22 2040 (!) 99/50 97.4 °F (36.3 °C) Oral 64 28 95 % 5' 10\" (1.778 m) 132 lb 0.9 oz (59.9 kg)   08/31/22 1915 118/60 -- -- 64 22 99 % -- --   08/31/22 1900 (!) 132/107 -- -- 65 16 97 % -- --   08/31/22 1815 (!) 100/40 -- -- 63 21 98 % -- --     Constitutional: Patient in no acute distress; Neuro: alert and oriented to person place and time. Psych: Mood and affect normal.  Skin: Normal  Lungs: Respiratory effort normal  Cardiovascular:  Normal peripheral pulses  Abdomen: Soft, non-tender, non-distended with no CVA, flank pain, hepatosplenomegaly or hernia. Kidneys normal.  Bladder non-tender and not distended. Lymphatics: no palpable lymphadenopathy  Penis normal and circumcised  Urethral meatus normal  Scrotal exam normal  Testicles normal bilaterally  Epididymis normal bilaterally  No evidence of inguinal hernia  Anus and perineum normal  Normal rectal tone with no masses  Prostate soft, non-tender to palpation. No palpable nodules. Estimated 40 grams. LABS:  Recent Labs     08/31/22  1410 09/01/22  0546   WBC 9.1 5.6   HGB 9.9* 9.0*   HCT 30.5* 28.5*   MCV 78.4* 78.7*    127*     Recent Labs     08/31/22  1410 09/01/22  0546    137   K 4.0 4.0    106   CO2 23 23   BUN 39* 40*   CREATININE 1.70* 1.66*     Lab Results   Component Value Date    PSA 2.50 09/16/2021    PSA 2.1 09/16/2021    PSA 1.65 02/29/2016       Additional Lab/culture results:    Urinalysis:   Recent Labs     08/31/22  1503   COLORU [place]*   PHUR 5.5   WBCUA 10 TO 20   RBCUA TOO NUMEROUS TO COUNT   BACTERIA MODERATE*   SPECGRAV 1.012   LEUKOCYTESUR MOD*   UROBILINOGEN Normal   BILIRUBINUR NEGATIVE        -----------------------------------------------------------------  Imaging Results:    CT images reviewed. No new hydro noted.      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)    Hydronephrosis due to obstruction of ureter    Moderate malnutrition (HCC)    Chronic cholecystitis    Pleural effusion    Cardiomegaly    Hypothyroidism    Diabetes mellitus (HCC)    Hypertensive urgency    Acute respiratory failure with hypoxia (HCC)    Hypoxia    Carotid stenosis, asymptomatic, bilateral    Simple chronic bronchitis (HCC)    Dependence on nocturnal oxygen therapy    Pneumonia    LAURO (acute kidney injury) ([facility] [address]    Failure to thrive in adult    DVT (deep venous thrombosis) (HCC)    Dehydration    Lymphoma, diffuse (HCC)    Severe malnutrition ([facility] [address]       Plan:     74year-old male with chronic bilateral hydro. He sees Dr Cristo Antunez and had his stents changed about 6 weeks ago. Typically, we would change stents every 3 months. Await cultures. Clinically, he is doing fine   Would only change stents if there is concern for infection and colonization. Thank you.      Bety Mcintosh MD  4:57 PM 9/1/2022

## 2022-09-01 NOTE — TELEPHONE ENCOUNTER
Sarika called Mira Leung has fallen x 2 and he is getting worse. Sarika says that she can no longer take care of Kennedy Dominguez. She is taking him to [facility] ER and wanted to let Dr Juliann Montez know what was happening. Sarika says she would put the imbruvica on her credit card if the application for imbruvica is not approved soon. The co-pay is $2000.  is working with [employer] to obtain free drug.  sent a phone message to Dr Juliann Montez.

## 2022-09-01 NOTE — PROGRESS NOTES
Vancomycin Dosing by Pharmacy - Daily Note   Vancomycin Therapy Day:  2  Indication: CAP, UTI    Allergies:  Patient has no known allergies. Actual Weight:    Wt Readings from Last 1 Encounters:   08/31/22 132 lb 0.9 oz (59.9 kg)       Labs/Ancillary Data  Estimated Creatinine Clearance: 33 mL/min (A) (based on SCr of 1.66 mg/dL (H)). Recent Labs     08/31/22  1410 09/01/22  0546   CREATININE 1.70* 1.66*   BUN 39* 40*   WBC 9.1 5.6     Procalcitonin   Date Value Ref Range Status   09/01/2022 0.17 (H) <0.09 ng/mL Final     Comment:           Suspected Sepsis:  <0.50 ng/mL     Low likelihood of sepsis. 0.50-2.00 ng/mL     Increased likelihood of sepsis. Antibiotics encouraged. >2.00 ng/mL     High risk of sepsis/shock. Antibiotics strongly encouraged. Suspected Lower Resp Tract Infections:  <0.24 ng/mL     Low likelihood of bacterial infection. >0.24 ng/mL     Increased likelihood of bacterial infection. Antibiotics encouraged. With successful antibiotic therapy, PCT levels should decrease rapidly. (Half-life of 24 to   36 hours.)        Procalcitonin values from samples collected within the first 6 hours of systemic infection   may still be low. Retesting may be indicated. Values from day 1 and day 4 can be entered into the Change in Procalcitonin Calculator   (www.[employer]. llcs-pct-calculator. [employer]) to determine the patient's Mortality Risk Prognosis        In healthy neonates, plasma Procalcitonin (PCT) concentrations increase gradually after   birth, reaching peak values at about 24 hours of age then decrease to normal values below   0.5 ng/mL by 48-72 hours of age.          Intake/Output Summary (Last 24 hours) at 9/1/2022 0809  Last data filed at 9/1/2022 0600  Gross per 24 hour   Intake 1175 ml   Output 150 ml   Net 1025 ml     Temp: 98.1 F    Culture Date / Source  /  Results  See micro  Recent vancomycin administrations                     vancomycin (VANCOCIN) 1,750 mg in dextrose 5 % 500 mL IVPB (mg) 1,750 mg New Bag 08/31/22 1713                    Vancomycin Concentrations:   TROUGH:  No results for input(s): VANCOTROUGH in the last 72 hours. RANDOM:  No results for input(s): VANCORANDOM in the last 72 hours. MRSA Nasal Swab: was ordered by provider, awaiting results. Mikaela Garsia PLAN     Continue current dose of 750 mg q24h IV  Ensured BUN/sCr ordered at baseline and every 48 hours x at least 3 levels, then at least weekly. Repeat vancomycin concentration ordered for 09/02 @ 0600   Pharmacy will continue to monitor patient and adjust therapy as indicated      Vancomycin Target Concentration Parameters  Treatment  Population Target AUC/MELANIE Target Trough   Invasive MRSA Infection (bacteremia, pneumonia, meningitis, endocarditis, osteomyelitis)  Sepsis (undifferentiated) 400-600 N/A   Infection due to non-MRSA pathogen  Empiric treatment of non-invasive MRSA infection  (SSTI, UTI) <500 10-15 mg/L   CrCl < 29 mL/min  Rapidly fluctuating serum creatinine   LAURO N/A < 15 mg/L     Renal replacement therapy is dosed by levels, per hospital protocol. Abbreviations  * Pauc: probability that AUC is >400 (efficacy); Pconc: probability that Ctrough is above 20 ?g/mL (toxicity); Tox: Probability of nephrotoxicity, based on Lodchidi et al. Clin Infect Dis 2009. Loading dose: N/A  Regimen: 750 mg IV every 24 hours. Start time: 08:07 on 09/01/2022  Exposure target: AUC24 (range)400-600 mg/L.hr   AUC24,ss: 466 mg/L.hr  Probability of AUC24 > 400: 67 %  Ctrough,ss: 15.0 mg/L  Probability of Ctrough,ss > 20: 25 %  Probability of nephrotoxicity (Lodise BAO 2009): 10 %    Thank you for the consult. Pharmacy will continue to follow. Heide Petersen. Ph.  9/1/2022  8:12 AM

## 2022-09-01 NOTE — PROGRESS NOTES
Pt states wife will bring home cancer Rx. Writer informed pt that same can be verified and dosed while inpatient. As of writing wife has not presented with the home med. Bladder scanned pt post void, 5mL remained. Pt was concerned his stream was poor and he was not voiding completely.

## 2022-09-01 NOTE — H&P
Dr. Saint Coats        History and Physical Examination   Admission Note    CHIEF COMPLAINT:   Chief Complaint   Patient presents with    Fatigue    Extremity Weakness     Lower    Emesis       Reason for Admission: Weakness    History Obtained From:  Patient     HISTORY OF PRESENT ILLNESS:      The patient is a pleasant 74 y.o. male with significant medical history of lymphoma on chemo treatment p.o. his presented with increasing weakness in the last 2 weeks patient denies to me having any cough or respiratory problems. This may be some shortness of breath but nothing new to him he says. He has had been having some urinary burning he says but he has had this since he had the stents but denies any fevers or chills or nausea or vomiting he does have decreased appetite. He is feeling better this morning looks like he had all his breakfast this morning with no nausea vomiting he is getting aerosol treatment now he feels better he says. Patient has been falling at home so he has a lot of bruising. Yesterday they thought he might have some septic picture because his INR was 1.8 or so but patient is on Eliquis for DVT and chronic use. That was decreased to 2.5 twice daily last night secondary to his kidney function patient hematologist knows about the admission and see if he needs to adjust his treatment which probably causing some of his symptoms. CT shows questionable pneumonia but feel this most likely atelectasis than true pneumonia his UTI is true secondary having his stent and chronically probably having a problem.     Past Medical History:    Past Medical History:   Diagnosis Date    Arthritis     Cancer [facility])     chemotherapy lymphoma last was april 2022    Chemotherapy management, encounter for     CHF (congestive heart failure) ([facility] [address]     COPD (chronic obstructive pulmonary disease) ([facility] [address]     Diabetes mellitus ([facility] [address]     H/O cardiovascular stress test     History of non-Hodgkin's lymphoma     Hx of blood clots     DVT in right leg     Hyperlipidemia     Hypertension     Hypothyroidism     On home O2     at 5 liters per nasal cannula 24 hrs. per day.       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    Hydronephrosis due to obstruction of ureter N13.1    Moderate malnutrition (HCC) E44.0    Chronic cholecystitis K81.1    Pleural effusion J90    Cardiomegaly I51.7    Hypothyroidism E03.9    Diabetes mellitus ([facility] E11.9    Hypertensive urgency I16.0    Acute respiratory failure with hypoxia (HCC) J96.01    Hypoxia R09.02    Carotid stenosis, asymptomatic, bilateral I65.23    Simple chronic bronchitis (HCC) J41.0    Dependence on nocturnal oxygen therapy Z99.81    Pneumonia J18.9    LAURO (acute kidney injury) ([facility] N17.9    Failure to thrive in adult R62.7    DVT (deep venous thrombosis) ([facility] I82.409    Dehydration E86.0    Lymphoma, diffuse ([facility] C85.90       Past Surgical History:       Past Surgical History:   Procedure Laterality Date    CAROTID ENDARTERECTOMY Left 6/28/2022    LEFT TYPE I EVERSION LEFT CAROTID ENDARTERECTOMY RE-IMPLANTATION LEFT ICA performed by Becky Camarillo MD at [address] [place] [address], LAPAROSCOPIC N/A 10/9/2021    CHOLECYSTECTOMY LAPAROSCOPIC ROBOTIC XI performed by Jenn Paez MD at [address] [place] [address] Left 9/24/2021    COLONOSCOPY POLYPECTOMY SNARE/COLD BIOPSY performed by Bobo Mata MD at [facility] Right 10/6/2021    CYSTOSCOPY PYELOGRAM URETERAL STENT INSERTION performed by Nell Ulloa MD at [facility] Bilateral 11/24/2021    CYSTOSCOPY URETERAL STENT INSERTION RIGHT EXCHANGE & RETROGRADE PYELOGRAM LEFT INSERTION performed by Nell Ulloa MD at [facility] Bilateral 2/16/2022    CYSTOSCOPY WITH BILATERAL STENT EXCHANGE performed by IV[facility]   IntraVENous Q24H Roderick T Dixon, DO        apixaban (ELIQUIS) tablet 2.5 mg  2.5 mg Oral BID Roderick T Odeh, DO        atorvastatin (LIPITOR) tablet 10 mg  10 mg Oral Daily Roderick T Dixon, DO        carvedilol (COREG) tablet 6.25 mg  6.25 mg Oral BID WC Roderick T Dixon, DO        donepezil (ARICEPT) tablet 10 mg  10 mg Oral Daily Neiman T Odeh, DO        FLUoxetine (PROZAC) capsule 20 mg  20 mg Oral Daily Roderick T Dixon, DO        budesonide-formoterol (SYMBICORT) 80-4.5 MCG/ACT inhaler 2 puff  2 puff Inhalation BID Roderick T Odeh, DO        ibrutinib (IMBRUVICA) chemo tablet 420 mg  420 mg Oral Daily Neiman T Odeh, DO        ipratropium-albuterol (DUONEB) nebulizer solution 3 mL  1 vial Inhalation Q6H WA Neiman T Odeh, DO   3 mL at 08/31/22 3502    lactobacillus (CULTURELLE) capsule 1 capsule  1 capsule Oral Daily Neiman T Odeh, DO        levothyroxine (SYNTHROID) tablet 150 mcg  150 mcg Oral Daily Neiman T Odeh, DO        losartan (COZAAR) tablet 50 mg  50 mg Oral Daily Neiman T Odeh, DO        megestrol (MEGACE) 40 MG/ML suspension 400 mg  400 mg Oral Daily Neiman T Odeh, DO        therapeutic multivitamin-minerals 1 tablet  1 tablet Oral Daily Neiman T Odeh, DO        pantoprazole (PROTONIX) tablet 40 mg  40 mg Oral QAM AC Roderick T Odeh, DO        oxyCODONE (OXYCONTIN) extended release tablet 10 mg  10 mg Oral Q12H Roderick T Dixon, DO   10 mg at 09/01/22 0037    oxyCODONE (ROXICODONE) immediate release tablet 5 mg  5 mg Oral Q4H PRN Roderick T Dixon, DO        prochlorperazine (COMPAZINE) tablet 10 mg  10 mg Oral Q6H PRN Roderick T Dixon, DO        sucralfate (CARAFATE) tablet 1 g  1 g Oral 4x Daily AC & HS Roderick T Dixon, DO   1 g at 09/01/22 0037    psyllium (METAMUCIL) 58.12 % packet 1 packet  1 packet Oral Daily Roderick Metcalf DO           Allergies:  Patient has no known allergies. Social History:    reports that he quit smoking about 16 years ago. His smoking use included cigarettes.  He has a 90.00 pack-year smoking history. He has never used smokeless tobacco. He reports current alcohol use of about 1.7 standard drinks per week. He reports current drug use. Drug: Marijuana Sarika Esa).     Family History:   Family History   Problem Relation Age of Onset    Diabetes Mother     Alzheimer's Disease Father     Heart Disease Brother     Heart Disease Brother        REVIEW OF SYSTEMS:  RESPIRATORY:  negative for  dry cough, dyspnea, wheezing and chest pain positive for hypertension  CARDIOVASCULAR:  negative for  chest pain, dyspnea, palpitations, orthopnea, exertional chest pressure/discomfort, fatigue, edema, syncope positive for pretension  GASTROINTESTINAL:  negative for nausea, vomiting, change in bowel habits, diarrhea, constipation, abdominal pain and reflux positive for   GENITOURINARY:  negative for frequency, dysuria, nocturia, urinary incontinence and hesitancy positive for   HEMATOLOGIC/LYMPHATIC:  negative for easy bruising, bleeding and swelling/edemapositive for lymphoma  ENDOCRINE:  negative for weight changes, change in bowel habits and diabetic symptoms including neither polyuria nor polydipsia nor blurred vision nor foot ulcerations nor neuropathypositive for   MUSCULOSKELETAL:  negative for  myalgias, arthralgias, pain, joint swelling, stiff joints and muscle weakness positive for    NEUROLOGICAL:  negative for headaches, dizziness, memory problems, speech problems, visual disturbance, gait problems, weakness and numbness positive for dementia    Vitals:  BP (!) 148/67   Pulse 57   Temp 98.1 °F (36.7 °C) (Oral)   Resp 20   Ht 5' 10\" (1.778 m)   Wt 132 lb 0.9 oz (59.9 kg)   SpO2 96%   BMI 18.95 kg/m²     PHYSICAL EXAM:    CONSTITUTIONAL:  awake, alert, cooperative, no apparent distress, and appears stated age{  EYES:  Lids and lashes normal, pupils equal, round and reactive to light, extra ocular muscles intact, sclera clear, conjunctiva normal    ENT:  Normocephalic, without obvious abnormality, atraumatic, sinuses nontender on palpation, external ears without lesions, oral pharynx with moist mucus membranes, tonsils without erythema or exudates,   NECK:  Supple, symmetrical, trachea midline, no adenopathy, thyroid symmetric, not enlarged and no tenderness, skin normal    HEMATOLOGIC/LYMPHATICS:  no cervical lymphadenopathy   BACK:  Symmetric, no curvature, spinous processes are non-tender on palpation, paraspinous muscles are non-tender on palpation, no costal vertebral tenderness   LUNGS:  No increased work of breathing, good air exchange, clear to auscultation bilaterally, no crackles or wheezing  CARDIOVASCULAR:  Normal apical impulse, regular rate and rhythm, normal S1 and S2, no S3 or S4, and no murmur noted     ABDOMEN:  No scars, normal bowel sounds, soft, non-distended, non-tender, no masses palpated, no hepatosplenomegally    GENITAL/URINARY: na  MUSCULOSKELETAL:  There is no redness, warmth, or swelling of the joints. Full range of motion noted. Motor strength is 5 out of 5 all extremities bilaterally. Tone is normal.   NEUROLOGIC:  Awake, alert, oriented to name, place and time. Cranial nerves II-XII are grossly intact. Motor is 5 out of 5 bilaterally. Sensory is intact. gait is normal.     Transient this morning responds appropriate to questions  SKIN: A lot of bruising on his arms second to fall also on his chest.  No tenderness redness, warmth, or swelling and no rashes     RECENT DATA:  No results found for: CBC, BMP    DATA:     Component Value Units   Procalcitonin [[id number]] (Abnormal)    Collected: 09/01/22 0546    Updated: 09/01/22 0619    Specimen Type: Blood     Procalcitonin 0.17 High  ng/mL    Comment:        Suspected Sepsis:   <0.50 ng/mL      Low likelihood of sepsis. 0.50-2.00 ng/mL      Increased likelihood of sepsis. Antibiotics encouraged. >2.00 ng/mL      High risk of sepsis/shock. Antibiotics strongly encouraged.          Suspected Lower Resp Tract Infections: <0.24 ng/mL      Low likelihood of bacterial infection. >0.24 ng/mL      Increased likelihood of bacterial infection. Antibiotics encouraged. With successful antibiotic therapy, PCT levels should decrease rapidly. (Half-life of 24 to   36 hours.)         Procalcitonin values from samples collected within the first 6 hours of systemic infection   may still be low. Retesting may be indicated. Values from day 1 and day 4 can be entered into the Change in Procalcitonin Calculator   ([email or web address]) to determine the patient's Mortality Risk Prognosis         In healthy neonates, plasma Procalcitonin (PCT) concentrations increase gradually after   birth, reaching peak values at about 24 hours of age then decrease to normal values below   0.5 ng/mL by 48-72 hours of age. Basic Metabolic Panel [[id number]] (Abnormal)    Collected: 09/01/22 0546    Updated: 09/01/22 0611    Specimen Source: Blood     Glucose 107 High  mg/dL    BUN 40 High  mg/dL    Creatinine 1.66 High  mg/dL    Calcium 8.1 Low  mg/dL    Sodium 137 mmol/L    Potassium 4.0 mmol/L    Chloride 106 mmol/L    CO2 23 mmol/L    Anion Gap 8 Low  mmol/L    GFR Non-African American 41 Low  mL/min    GFR  49 Low  mL/min    GFR Comment         Comment: Average GFR for 79or more years old:    76 mL/min/1.73sq m   Chronic Kidney Disease:    <60 mL/min/1.73sq m   Kidney failure:    <15 mL/min/1.73sq m               eGFR calculated using average adult body mass.  Additional eGFR calculator available at:         [employer]             CBC with Auto Differential [[id number]] (Abnormal)    Collected: 09/01/22 0546    Updated: 09/01/22 0555    Specimen Source: Blood     WBC 5.6 k/uL    RBC 3.62 Low  m/uL    Hemoglobin 9.0 Low  g/dL    Hematocrit 28.5 Low  %    MCV 78.7 Low  fL    MCH 25.0 Low  pg    MCHC 31.7 g/dL    RDW 17.3 High  %    Platelets 397 Low  k/uL    MPV 8.5 fL    Seg Neutrophils 66 %    Lymphocytes 23 Low  %    Monocytes 10 High  %    Eosinophils % 1 %    Basophils 0 %    Segs Absolute 3.70 k/uL    Absolute Lymph # 1.30 k/uL    Absolute Mono # 0.60 k/uL    Absolute Eos # 0.10 k/uL    Basophils Absolute 0.00 k/uL   Culture, Urine [[id number]]    Collected: 08/31/22 1503    Updated: 08/31/22 2327    Specimen Source: Urine, clean catch    Culture, Blood 1 [[id number]]    Collected: 08/31/22 1748    Updated: 08/31/22 2228    Specimen Source: Blood     Specimen Description . BLOOD    Culture NO GROWTH <24 HRS   Culture, Blood 1 [[id number]]    Collected: 08/31/22 1748    Updated: 08/31/22 2227    Specimen Source: Blood     Specimen Description . BLOOD    Culture NO GROWTH <24 HRS   Hepatic Function Panel [[id number]] (Abnormal)    Collected: 08/31/22 1410    Updated: 08/31/22 1842     Albumin 2.7 Low  g/dL    Alkaline Phosphatase 131 High  U/L    ALT 17 U/L    AST 11 U/L    Total Bilirubin 0.65 mg/dL    Bilirubin, Direct 0.21 mg/dL    Bilirubin, Indirect 0.44 mg/dL    Total Protein 4.9 Low  g/dL   EKG 12 Lead [[id number]]    Collected: 08/31/22 1352    Updated: 08/31/22 1741     Ventricular Rate 61 BPM    Atrial Rate 61 BPM    P-R Interval 126 ms    QRS Duration 94 ms    Q-T Interval 458 ms    QTc Calculation (Bazett) 461 ms    P Axis 68 degrees    R Axis 25 degrees    T Axis 34 degrees   Narrative:      Poor data quality, interpretation may be adversely affected   Normal sinus rhythm   Inferior infarct (cited on or before 29-JAN-2005)   Abnormal ECG   When compared with ECG of 14-JUL-2022 14:23,   Vent.  rate has decreased BY  36 BPM   Urinalysis with Microscopic [[id number]] (Abnormal)    Collected: 08/31/22 1503    Updated: 08/31/22 1547    Specimen Source: Urine, clean catch     Color, UA Orange Abnormal     Turbidity UA Turbid Abnormal     Glucose, Ur NEGATIVE    Bilirubin Urine NEGATIVE    Ketones, Urine NEGATIVE    Specific [place], UA 1.012    Urine Hgb LARGE Abnormal     pH, UA 5.5    Protein, UA 3+ Abnormal     Urobilinogen, Urine Normal    Nitrite, Urine NEGATIVE    Leukocyte Esterase, Urine MOD Abnormal     WBC, UA 10 TO 20 /HPF    RBC, UA TOO NUMEROUS TO COUNT /HPF    Epithelial Cells UA 0 TO 2 /HPF    Bacteria, UA MODERATE Abnormal     Amorphous, UA 1+ Abnormal    CT ABDOMEN PELVIS WO CONTRAST Additional Contrast? None [[id number]]    Collected: 08/31/22 1507    Updated: 08/31/22 1520    Narrative:     EXAMINATION:   CT OF THE ABDOMEN AND PELVIS WITHOUT CONTRAST 8/31/2022 2:46 pm     TECHNIQUE:   CT of the abdomen and pelvis was performed without the administration of   intravenous contrast. Multiplanar reformatted images are provided for review. Automated exposure control, iterative reconstruction, and/or weight based   adjustment of the mA/kV was utilized to reduce the radiation dose to as low   as reasonably achievable. COMPARISON:   08/12/2022     HISTORY:   Acute lower abdominal pain. FINDINGS:   Lower Chest: New left lower lobe consolidation. Dependent atelectasis in the   right lower lobe. Coronary artery calcification. Organs: Cholecystectomy. Calcified splenic granulomas. Bilateral ureteral   stents remain in place. Stable small right renal stone along with a   hyperdense right renal cyst.  Remaining solid organs are unremarkable. GI/Bowel: Stomach and small bowel are unremarkable. Normal appendix. Extensive colonic diverticulosis without definite acute inflammation. Pelvis: Bladder is unremarkable. Prostate is enlarged. No lymphadenopathy   or free fluid. Peritoneum/Retroperitoneum: Abnormal retroperitoneal soft tissue, left   greater than right, with involvement of the left kidney has not significantly   changed. No ascites. Severe atherosclerotic disease without abdominal   aortic aneurysm. Bones/Soft Tissues: No suspicious osseous lesions. Impression:     1. New left lower lobe consolidation is suspicious for pneumonia.    2. No significant change in abnormal retroperitoneal soft tissue, left   greater than right, with involvement of the left kidney. This is consistent   with known lymphoma. 3. Extensive colonic diverticulosis without definite diverticulitis. 4. Enlarged prostate. COVID-19, Rapid [[id number]]    Collected: 08/31/22 1424    Updated: 08/31/22 1448    Specimen Source: Nasopharyngeal Swab     Specimen Description . NASOPHARYNGEAL SWAB    SARS-CoV-2, Rapid Not Detected    Comment:        Rapid NAAT:  The specimen is NEGATIVE for SARS-CoV-2, the novel coronavirus associated with   COVID-19. The ID NOW COVID-19 assay is designed to detect the virus that causes COVID-19 in patients   with signs and symptoms of infection who are suspected of COVID-19. An individual without symptoms of COVID-19 and who is not shedding SARS-CoV-2 virus would   expect to have a negative (not detected) result in this assay. Negative results should be treated as presumptive and, if inconsistent with clinical signs   and symptoms or necessary for patient management,   should be tested with an alternative molecular assay. Negative results do not preclude   SARS-CoV-2 infection and   should not be used as the sole basis for patient management decisions.          Fact sheet for Healthcare Providers: BuildHer.es   Fact sheet for Patients: BuildHer.es           Methodology: Isothermal Nucleic Acid Amplification       CBC with Auto Differential [[id number]] (Abnormal)    Collected: 08/31/22 1410    Updated: 08/31/22 1447    Specimen Source: Blood     WBC 9.1 k/uL    RBC 3.89 Low  m/uL    Hemoglobin 9.9 Low  g/dL    Hematocrit 30.5 Low  %    MCV 78.4 Low  fL    MCH 25.5 Low  pg    MCHC 32.5 g/dL    RDW 17.4 High  %    Platelets 569 k/uL    MPV 8.9 fL    Seg Neutrophils 76 High  %    Lymphocytes 18 Low  %    Monocytes 6 %    Eosinophils % 0 %    Basophils 0 %    Segs Absolute 6.91 k/uL Absolute Lymph # 1.64 k/uL    Absolute Mono # 0.55 k/uL    Absolute Eos # 0.00 k/uL    Basophils Absolute 0.00 k/uL    Morphology ANISOCYTOSIS PRESENT    Morphology MICROCYTOSIS PRESENT    Morphology HYPOCHROMIA PRESENT   Lactic Acid [[id number]]    Collected: 08/31/22 1410    Updated: 08/31/22 1438    Specimen Source: Blood     Lactic Acid 0.9 mmol/L   BMP [[id number]] (Abnormal)    Collected: 08/31/22 1410    Updated: 08/31/22 1434    Specimen Source: Blood     Glucose 137 High  mg/dL    BUN 39 High  mg/dL    Creatinine 1.70 High  mg/dL    Calcium 8.4 Low  mg/dL    Sodium 136 mmol/L    Potassium 4.0 mmol/L    Chloride 102 mmol/L    CO2 23 mmol/L    Anion Gap 11 mmol/L    GFR Non-African American 40 Low  mL/min    GFR  48 Low  mL/min    GFR Comment         Comment: Average GFR for 79or more years old:    76 mL/min/1.73sq m   Chronic Kidney Disease:    <60 mL/min/1.73sq m   Kidney failure:    <15 mL/min/1.73sq m               eGFR calculated using average adult body mass. Additional eGFR calculator available at:         [employer]             Magnesium [[id number]]    Collected: 08/31/22 1410    Updated: 08/31/22 1434    Specimen Source: Blood     Magnesium 1.7 mg/dL   APTT [[id number]] (Abnormal)    Collected: 08/31/22 1410    Updated: 08/31/22 1433    Specimen Source: Blood     PTT 54.8 High  sec    Comment:        IV Heparin Therapy Range:       62.0-94.0             Protime-INR [[id number]] (Abnormal)    Collected: 08/31/22 1410    Updated: 08/31/22 1427    Specimen Source: Blood     Protime 21.7 High  sec    INR 1.9    Comment:        Non-therapeutic Range:      INR = 0.9-1.2   Therapeutic Range:     Moderate Anticoagulant Intensity:      INR = 2.0-3.0    High Anticoagulant Intensity:      INR = 2.5-3.5               Current  Meds        ASSESSMENT:  Patient Active Problem List   Diagnosis Code    NHL ([facility]-Hodgkin's [facility] C85.90    Traumatic retroperitoneal hematoma S36.892A    Acute kidney injury ([address] F18.1    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    Hydronephrosis due to obstruction of ureter N13.1    Moderate malnutrition (HCC) E44.0    Chronic cholecystitis K81.1    Pleural effusion J90    Cardiomegaly I51.7    Hypothyroidism E03.9    Diabetes mellitus (HCC) E11.9    Hypertensive urgency I16.0    Acute respiratory failure with hypoxia ([facility]) J96.01    Hypoxia R09.02    Carotid stenosis, asymptomatic, bilateral I65.23    Simple chronic bronchitis ([facility]) J41.0    Dependence on nocturnal oxygen therapy Z99.81    Pneumonia J18.9    LAURO (acute kidney injury) ([facility] N17.9    Failure to thrive in adult R62.7    DVT (deep venous thrombosis) ([facility] I82.409    Dehydration E86.0    Lymphoma, diffuse ([facility]) C85.90     Weakness most likely secondary to his lymphoma and treatment.     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    Renal insufficiency    It is dehydration second is chronic second to the mass and hydronephrosis    PLAN:  Continue the antibiotic but will DC vancomycin second to kidney function we will keep on cefepime pending urine culture    Hematology is consulted    We will continue to monitor his progress    We will get physical therapy to work with him    No family in the room discussed with    Continue with mild hydration        Electronically signed by DO Garett Sofia 9/1/2022 at [address] [place] [address]

## 2022-09-01 NOTE — PROGRESS NOTES
Physical Therapy  Facility/Department: [address] [place] [address]  Physical Therapy Initial Assessment    Name: Heriberto Marsh  : 1950  MRN: [id number]  Date of Service: 2022    Discharge Recommendations:  Home with assist PRN, Home with Home health PT   PT Equipment Recommendations  Equipment Needed: No      Patient Diagnosis(es): The primary encounter diagnosis was Pneumonia due to infectious organism, unspecified laterality, unspecified part of lung. A diagnosis of Acute cystitis with hematuria was also pertinent to this visit. Past Medical History:  has a past medical history of Arthritis, Cancer ([facility] Chemotherapy management, encounter for, CHF (congestive heart failure) ([facility] COPD (chronic obstructive pulmonary disease) ([facility] Diabetes mellitus ([facility] H/O cardiovascular stress test, History of non-Hodgkin's lymphoma, Hx of blood clots, Hyperlipidemia, Hypertension, Hypothyroidism, and On home O2. Past Surgical History:  has a past surgical history that includes hernia repair; IR BIOPSY ABDOMINAL/RETROPERITONEAL MASS PERCUTANEOUS (2021); IR PORT PLACEMENT > 5 YEARS (2021); Colonoscopy (Left, 2021); Cystoscopy (Right, 10/6/2021); Cholecystectomy, laparoscopic (N/A, 10/9/2021); Tonsillectomy; Cystoscopy (Bilateral, 2021); Cystoscopy (Bilateral, 2022); eye surgery; Carotid endarterectomy (Left, 2022); and Cystoscopy (N/A, 2022). Assessment   Body Structures, Functions, Activity Limitations Requiring Skilled Therapeutic Intervention: Decreased functional mobility ; Decreased endurance;Decreased balance  Assessment: Impaired mobility due to decreased tolerance to activity  Decision Making: Medium Complexity  History: Lymphoma  Exam: decreased mobility, balance, endurance  Clinical Presentation: evolving  Requires PT Follow-Up: Yes  Activity Tolerance  Activity Tolerance: Patient tolerated evaluation without incident;Patient limited by endurance     Plan   Plan  Plan: 5-7 times per week  Current Treatment Recommendations: Functional mobility training, Transfer training, Strengthening, Endurance training, Stair training, Gait training, Safety education & training  Safety Devices  Type of Devices:  All fall risk precautions in place, Call light within reach, Gait belt, Patient at risk for falls, Left in bed, Nurse notified     Restrictions  Restrictions/Precautions  Restrictions/Precautions: Fall Risk, General Precautions  Required Braces or Orthoses?: No  Implants present? :  (denies)     Subjective   Pain: Pt reported 4/10 \"middle stomach\"  General  Patient assessed for rehabilitation services?: Yes  Family / Caregiver Present: No  Follows Commands: Within Functional Limits  Subjective  Subjective: pt is pleasant and cooperative         Social/Functional History  Social/Functional History  Lives With: Spouse, Family (Cousin)  Type of Home: House  Home Layout: One level  Home Access: Ramped entrance, Stairs to enter with rails  Entrance Stairs - Number of Steps: 1  Entrance Stairs - Rails: Left  Bathroom Shower/Tub: Tub/Shower unit, Shower chair without back  Bathroom Toilet: Handicap height (Support nearby if needed)  Bathroom Equipment: Grab bars in shower, Hand-held shower, Shower chair (Suction cup grab bar)  Bathroom Accessibility: Accessible  Home Equipment: Cane, Rollator, Wheelchair-manual, Oxygen  Has the patient had two or more falls in the past year or any fall with injury in the past year?: Yes (4 falls in past six months, pt reported feeling weak)  ADL Assistance: Independent (Pt reported IND but sometimes has difficulty getting up/down)  Homemaking Assistance: Independent (Wife/cousin are primary)  Ambulation Assistance: Independent (with rollator)  Transfer Assistance: Independent  Active : Yes  Mode of Transportation: Car  Occupation: Retired  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[employer] [place]: Enjoys going to the [employer]  IADL Comments: sleeps in a recliner chair  Additional Comments: Pt's wife and cousin are available to assist 24/7       Cognition   Orientation  Overall Orientation Status: Within Functional Limits  Orientation Level: Oriented X4     Objective   O2 Device: Nasal cannula (2 L O2)     AROM RLE (degrees)  RLE AROM: WNL  AROM LLE (degrees)  LLE AROM : WNL  Strength RLE  Comment: grossly 4/5  Strength LLE  Comment: grossly 4/5           Bed mobility  Supine to Sit: Stand by assistance  Sit to Supine: Stand by assistance  Scooting: Stand by assistance  Bed Mobility Comments: HOB elevated with use of hand rails.  No complaints of lightheadedness/dizziness, but pt endorsed feeling \"weak\" sitting EOB  Transfers  Sit to Stand: Contact guard assistance  Stand to sit: Contact guard assistance  Comment: pt stood bedside with RW  Ambulation  Device: Rolling Walker  Other Apparatus: O2 (2L)  Assistance: Contact guard assistance (second assist for IV and O2 lines only)  Quality of Gait: pt demonstrated an almost shuffling gait  Gait Deviations: Decreased step length;Decreased step height  Distance: 20ft x 2  Comments: pt required a few minute rest break between walks  Stairs/Curb  Stairs?: No     Balance  Sitting - Static: Good  Sitting - Dynamic: Good  Standing - Static: Fair;+ (SBA)  Standing - Dynamic: Fair (CGA)       AM-PAC Score  AM-PAC Inpatient Mobility Raw Score : 17 (09/01/22 1247)  AM-PAC Inpatient T-Scale Score : 42.13 (09/01/22 1247)  Mobility Inpatient CMS 0-100% Score: 50.57 (09/01/22 1247)  Mobility Inpatient CMS G-Code Modifier : CK (09/01/22 1247)        Goals  Short Term Goals  Time Frame for Short term goals: 4-5 days  Short term goal 1: transfers with supervision for safe ADLs  Short term goal 2: gait with Rollator and supervision x 100-150ft for safe community ambulation  Short term goal 3: pt able to tolerate 20min of therapeutic activity/exercises to improve endurance for functional activites  Short term goal 4: negotiate 1-2 steps with rail and SBA for safe entry into home         Therapy Time   Individual Concurrent Group Co-treatment   Time In 1027         Time Out 1054         Minutes 27         Timed Code Treatment Minutes: [facility] Amelia Carpenter, PT

## 2022-09-01 NOTE — CARE COORDINATION
CASE MANAGEMENT NOTE:    Admission Date:  8/31/2022 Mona Goff is a 74 y.o.  male    Admitted for : Lymphoma, diffuse ([facility] [C85.90]  Acute cystitis with hematuria [N30.01]  Pneumonia due to infectious organism, unspecified laterality, unspecified part of lung [J18.9]    Met with:  Patient    PCP:  Star lara                                Insurance:  [employer]      Is patient alert and oriented at time of discussion:  Yes    Current Residence/ Living Arrangements:  independently at home with spouse            Current Services PTA:  Yes- [facility] for lymphoma    Does patient go to outpatient dialysis: No  If yes, location and chair time: NA  Who is their nephrologist? NA    Is patient agreeable to VNS: Yes    Freedom of choice provided:  Yes    List of [address] provided: Yes    VNS chosen:  Yes- current with Let[facility]     DME:  straight cane, wheelchair, and other GB , rollator    Home Oxygen: Yes- O2 at 5lpm NC at night (port and conc)    Nebulizer: Yes    CPAP/BIPAP: No    Supplier: [facility]    Potential Assistance Needed: NA    SNF needed: No    Freedom of choice and list provided: No    Pharmacy:  [facility] in [place]       Is patient currently receiving oral anticoagulation therapy? Yes_ eliquis PTA    Is the Patient an [facility] with Readmission Risk Score greater than 14%? No  If yes, pt needs a follow up appointment made within 7 days. Family Members/Caregivers that pt would like involved in their care:    Yes    If yes, list name here:  Spouse Sarika and dtr Eri Jimenez     Transportation Provider:  Family             Discharge Plan:  9/1/22 [employer] MEDIC[employer] From home with spouse DME:  straight cane, wheelchair, GB , rollator, O2 per NC at 5lpm at HS(port/conc), neb thru [facility] VNS: current with [facility], referral sent to notify of admit. Pt [facility] with [facility] for lymphoma . Recent cysto with stents on 7/20/22 . Consult to Urology , Oncology.  IV cefepime and Tiaaco . Eliquis PTA.  Following for needs//JF                        Electronically signed by: Zona Schwab, RN on 9/1/2022 at 8:53 AM

## 2022-09-01 NOTE — PROGRESS NOTES
Spoke with Dr. Mirna Carranza, notified of need for admission orders, review patient's home medication list, updated on VS and mews of 3, labs reviewed. Orders received.

## 2022-09-01 NOTE — PLAN OF CARE
Problem: Discharge Planning  Goal: Discharge to home or other facility with appropriate resources  Outcome: Progressing     Problem: Safety - Adult  Goal: Free from fall injury  Outcome: Progressing     Problem: ABCDS Injury Assessment  Goal: Absence of physical injury  Outcome: Progressing     Problem: Skin/Tissue Integrity  Goal: Absence of new skin breakdown  Description: 1. Monitor for areas of redness and/or skin breakdown  2. Assess vascular access sites hourly  3. Every 4-6 hours minimum:  Change oxygen saturation probe site  4. Every 4-6 hours:  If on nasal continuous positive airway pressure, respiratory therapy assess nares and determine need for appliance change or resting period.   Outcome: Progressing     Problem: Chronic Conditions and Co-morbidities  Goal: Patient's chronic conditions and co-morbidity symptoms are monitored and maintained or improved  Outcome: Progressing     Problem: Nutrition Deficit:  Goal: Optimize nutritional status  Outcome: Progressing  Note: PO intake has been adequate this shift

## 2022-09-02 LAB
ABSOLUTE EOS #: 0.1 K/UL (ref 0–0.4)
ABSOLUTE LYMPH #: 1.3 K/UL (ref 1–4.8)
ABSOLUTE MONO #: 0.6 K/UL (ref 0.1–1.3)
ANION GAP SERPL CALCULATED.3IONS-SCNC: 9 MMOL/L (ref 9–17)
BASOPHILS # BLD: 0 % (ref 0–2)
BASOPHILS ABSOLUTE: 0 K/UL (ref 0–0.2)
BUN BLDV-MCNC: 39 MG/DL (ref 8–23)
CALCIUM SERPL-MCNC: 8 MG/DL (ref 8.6–10.4)
CHLORIDE BLD-SCNC: 112 MMOL/L (ref 98–107)
CO2: 21 MMOL/L (ref 20–31)
CREAT SERPL-MCNC: 1.64 MG/DL (ref 0.7–1.2)
EOSINOPHILS RELATIVE PERCENT: 2 % (ref 0–4)
GFR AFRICAN AMERICAN: 50 ML/MIN
GFR NON-AFRICAN AMERICAN: 41 ML/MIN
GFR SERPL CREATININE-BSD FRML MDRD: ABNORMAL ML/MIN/{1.73_M2}
GLUCOSE BLD-MCNC: 103 MG/DL (ref 70–99)
HCT VFR BLD CALC: 27.6 % (ref 41–53)
HEMOGLOBIN: 8.8 G/DL (ref 13.5–17.5)
LYMPHOCYTES # BLD: 21 % (ref 24–44)
MCH RBC QN AUTO: 25.4 PG (ref 26–34)
MCHC RBC AUTO-ENTMCNC: 32.1 G/DL (ref 31–37)
MCV RBC AUTO: 79.3 FL (ref 80–100)
MONOCYTES # BLD: 10 % (ref 1–7)
PDW BLD-RTO: 17.5 % (ref 11.5–14.9)
PLATELET # BLD: 145 K/UL (ref 150–450)
PMV BLD AUTO: 8.6 FL (ref 6–12)
POTASSIUM SERPL-SCNC: 4.1 MMOL/L (ref 3.7–5.3)
RBC # BLD: 3.48 M/UL (ref 4.5–5.9)
SEG NEUTROPHILS: 67 % (ref 36–66)
SEGMENTED NEUTROPHILS ABSOLUTE COUNT: 4.1 K/UL (ref 1.3–9.1)
SODIUM BLD-SCNC: 142 MMOL/L (ref 135–144)
WBC # BLD: 6 K/UL (ref 3.5–11)

## 2022-09-02 PROCEDURE — 2580000003 HC RX 258: Performed by: STUDENT IN AN ORGANIZED HEALTH CARE EDUCATION/TRAINING PROGRAM

## 2022-09-02 PROCEDURE — 6360000002 HC RX W HCPCS: Performed by: STUDENT IN AN ORGANIZED HEALTH CARE EDUCATION/TRAINING PROGRAM

## 2022-09-02 PROCEDURE — 94640 AIRWAY INHALATION TREATMENT: CPT

## 2022-09-02 PROCEDURE — 80048 BASIC METABOLIC PNL TOTAL CA: CPT

## 2022-09-02 PROCEDURE — 85025 COMPLETE CBC W/AUTO DIFF WBC: CPT

## 2022-09-02 PROCEDURE — 2060000000 HC ICU INTERMEDIATE R&B

## 2022-09-02 PROCEDURE — 97535 SELF CARE MNGMENT TRAINING: CPT

## 2022-09-02 PROCEDURE — 2700000000 HC OXYGEN THERAPY PER DAY

## 2022-09-02 PROCEDURE — 6370000000 HC RX 637 (ALT 250 FOR IP): Performed by: FAMILY MEDICINE

## 2022-09-02 PROCEDURE — 94761 N-INVAS EAR/PLS OXIMETRY MLT: CPT

## 2022-09-02 PROCEDURE — 2580000003 HC RX 258: Performed by: FAMILY MEDICINE

## 2022-09-02 PROCEDURE — 87641 MR-STAPH DNA AMP PROBE: CPT

## 2022-09-02 PROCEDURE — 99232 SBSQ HOSP IP/OBS MODERATE 35: CPT | Performed by: INTERNAL MEDICINE

## 2022-09-02 PROCEDURE — 36415 COLL VENOUS BLD VENIPUNCTURE: CPT

## 2022-09-02 PROCEDURE — 97530 THERAPEUTIC ACTIVITIES: CPT

## 2022-09-02 RX ORDER — SODIUM CHLORIDE 9 MG/ML
INJECTION, SOLUTION INTRAVENOUS CONTINUOUS
Status: DISCONTINUED | OUTPATIENT
Start: 2022-09-02 | End: 2022-09-07 | Stop reason: HOSPADM

## 2022-09-02 RX ORDER — GUAIFENESIN 600 MG/1
600 TABLET, EXTENDED RELEASE ORAL 2 TIMES DAILY
Status: DISCONTINUED | OUTPATIENT
Start: 2022-09-02 | End: 2022-09-07 | Stop reason: HOSPADM

## 2022-09-02 RX ADMIN — IPRATROPIUM BROMIDE AND ALBUTEROL SULFATE 3 ML: 2.5; .5 SOLUTION RESPIRATORY (INHALATION) at 13:31

## 2022-09-02 RX ADMIN — MULTIPLE VITAMINS W/ MINERALS TAB 1 TABLET: TAB at 08:18

## 2022-09-02 RX ADMIN — GUAIFENESIN 600 MG: 600 TABLET, EXTENDED RELEASE ORAL at 12:36

## 2022-09-02 RX ADMIN — SODIUM CHLORIDE, PRESERVATIVE FREE 10 ML: 5 INJECTION INTRAVENOUS at 08:17

## 2022-09-02 RX ADMIN — PSYLLIUM HUSK 1 PACKET: 3.4 POWDER ORAL at 08:21

## 2022-09-02 RX ADMIN — SUCRALFATE 1 G: 1 TABLET ORAL at 18:02

## 2022-09-02 RX ADMIN — APIXABAN 2.5 MG: 2.5 TABLET, FILM COATED ORAL at 21:15

## 2022-09-02 RX ADMIN — GUAIFENESIN 600 MG: 600 TABLET, EXTENDED RELEASE ORAL at 21:15

## 2022-09-02 RX ADMIN — DONEPEZIL HYDROCHLORIDE 10 MG: 10 TABLET ORAL at 08:18

## 2022-09-02 RX ADMIN — SUCRALFATE 1 G: 1 TABLET ORAL at 12:36

## 2022-09-02 RX ADMIN — MEGESTROL ACETATE 400 MG: 40 SUSPENSION ORAL at 08:18

## 2022-09-02 RX ADMIN — ATORVASTATIN CALCIUM 10 MG: 10 TABLET, FILM COATED ORAL at 08:18

## 2022-09-02 RX ADMIN — CARVEDILOL 6.25 MG: 6.25 TABLET, FILM COATED ORAL at 08:17

## 2022-09-02 RX ADMIN — Medication 2 PUFF: at 08:07

## 2022-09-02 RX ADMIN — CEFEPIME 2000 MG: 2 INJECTION, POWDER, FOR SOLUTION INTRAVENOUS at 12:36

## 2022-09-02 RX ADMIN — SODIUM CHLORIDE: 9 INJECTION, SOLUTION INTRAVENOUS at 15:09

## 2022-09-02 RX ADMIN — CARVEDILOL 6.25 MG: 6.25 TABLET, FILM COATED ORAL at 18:01

## 2022-09-02 RX ADMIN — OXYCODONE HYDROCHLORIDE 10 MG: 10 TABLET, FILM COATED, EXTENDED RELEASE ORAL at 23:12

## 2022-09-02 RX ADMIN — IPRATROPIUM BROMIDE AND ALBUTEROL SULFATE 3 ML: 2.5; .5 SOLUTION RESPIRATORY (INHALATION) at 19:42

## 2022-09-02 RX ADMIN — OXYCODONE HYDROCHLORIDE 10 MG: 10 TABLET, FILM COATED, EXTENDED RELEASE ORAL at 12:36

## 2022-09-02 RX ADMIN — SODIUM CHLORIDE: 9 INJECTION, SOLUTION INTRAVENOUS at 08:23

## 2022-09-02 RX ADMIN — SODIUM CHLORIDE, PRESERVATIVE FREE 10 ML: 5 INJECTION INTRAVENOUS at 08:16

## 2022-09-02 RX ADMIN — LOSARTAN POTASSIUM 50 MG: 50 TABLET, FILM COATED ORAL at 08:18

## 2022-09-02 RX ADMIN — Medication 2 PUFF: at 19:48

## 2022-09-02 RX ADMIN — SODIUM CHLORIDE, PRESERVATIVE FREE 10 ML: 5 INJECTION INTRAVENOUS at 21:15

## 2022-09-02 RX ADMIN — APIXABAN 2.5 MG: 2.5 TABLET, FILM COATED ORAL at 08:17

## 2022-09-02 RX ADMIN — Medication 1 CAPSULE: at 08:18

## 2022-09-02 RX ADMIN — IPRATROPIUM BROMIDE AND ALBUTEROL SULFATE 3 ML: 2.5; .5 SOLUTION RESPIRATORY (INHALATION) at 08:06

## 2022-09-02 RX ADMIN — SUCRALFATE 1 G: 1 TABLET ORAL at 21:14

## 2022-09-02 RX ADMIN — SUCRALFATE 1 G: 1 TABLET ORAL at 06:22

## 2022-09-02 RX ADMIN — PANTOPRAZOLE SODIUM 40 MG: 40 TABLET, DELAYED RELEASE ORAL at 06:22

## 2022-09-02 RX ADMIN — FLUOXETINE 20 MG: 20 CAPSULE ORAL at 08:17

## 2022-09-02 RX ADMIN — LEVOTHYROXINE SODIUM 150 MCG: 0.07 TABLET ORAL at 06:22

## 2022-09-02 ASSESSMENT — PAIN DESCRIPTION - DESCRIPTORS
DESCRIPTORS: DISCOMFORT;ACHING
DESCRIPTORS: DISCOMFORT;ACHING

## 2022-09-02 ASSESSMENT — PAIN DESCRIPTION - ORIENTATION: ORIENTATION: RIGHT;MID

## 2022-09-02 ASSESSMENT — PAIN SCALES - GENERAL
PAINLEVEL_OUTOF10: 5
PAINLEVEL_OUTOF10: 5

## 2022-09-02 ASSESSMENT — PAIN DESCRIPTION - LOCATION
LOCATION: GENERALIZED
LOCATION: ABDOMEN;RIB CAGE

## 2022-09-02 ASSESSMENT — PAIN - FUNCTIONAL ASSESSMENT: PAIN_FUNCTIONAL_ASSESSMENT: ACTIVITIES ARE NOT PREVENTED

## 2022-09-02 NOTE — CARE COORDINATION
DISCHARGE PLANNING NOTE:   Spoke to patient's wife at the bedside and she would like for the patient to go to SNF at discharge. Choices are 1. [facility]   2. [facility]  3. [facility] . Kelvin Chritsina notified.    Electronically signed by Robert Colindres RN on 9/2/2022 at 12:20 PM

## 2022-09-02 NOTE — CARE COORDINATION
ONGOING DISCHARGE PLAN:    Patient is alert and oriented x4. Spoke with patient regarding discharge plan and patient confirms that plan is still to go home with [facility] . Pt is current with [facility] for home O2 at 5lpm NC at hs and PRN, will need home O2 eval if daytime O2 is needed. Currently on 2lpm NC. Eliquis PTA. IV cefepime     Hem onc following     Will continue to follow for additional discharge needs.     Electronically signed by Edi Bell RN on 9/2/2022 at 9:14 AM

## 2022-09-02 NOTE — CARE COORDINATION
[place], GIOVANY informed  that patient wants to go to skilled nursing facility. Patients first choice is [facility]. SW met with patient to confirm these discharge plans. Patient was agreeable to [facility]. SW sent referral via Epic to [facility]. SW informed facility that new referral has been sent for patient. Electronically signed by Arlene Snellen, LSW on 9/2/2022 at 12:43 PM     [facility] informed SW that patients cancer medication cost about $484/day and will require financial approval. Facility did inform SW that patient could not use marijuana at facility either. SW requested that admissions contact SW once they speak with financial department. Electronically signed by Arlene Snellen, LSW on 9/2/2022 at 1:56 PM     SW spoke with patient about using Marijuana usage. Patient reported that he uses \"gummies\". SW explained to patient that he could not use gummies while at facility. Patient was agreeable. SW then asked if patient can bring in his cancer medication from home. Patient reported that it should not be an issue. SW informed patient that [facility] facility did not carry that cancer medication at all. Patient was understanding. SW spoke with spouse to verify. Spouse reported that a \"compound \" creates and delivers the medication. Spouse reported that she can bring the medication into facility. SW informed spouse the [facility] facility does not carry the medication at all.  Electronically signed by Arlene Snellen, LSW on 9/2/2022 at 4:04 PM

## 2022-09-02 NOTE — DISCHARGE INSTR - COC
Continuity of Care Form    Patient Name: Mona Goff   :    MRN:  [id number]    Admit date:  2022  Discharge date:  2022      Code Status Order: Full Code   Advance Directives:     Admitting Physician:  Soumya Jackson DO  PCP: Stone Quesada DO    Discharging Nurse: Bobo Tyler Unit/Room#: 2090/2090-01  Discharging Unit Phone Number: [phone]    Emergency Contact:   Extended Emergency Contact Information  Primary Emergency Contact: [place], [address]  Phone: [phone]  Mobile Phone: [phone]  Relation: Spouse  Secondary Emergency Contact: Luis Navarrete  Address:  [address] [facility] [address] [place] [address]  Phone: [phone]  Mobile Phone: [phone]  Relation: Child   needed?  No    Past Surgical History:  Past Surgical History:   Procedure Laterality Date    CAROTID ENDARTERECTOMY Left 2022    LEFT TYPE I EVERSION LEFT CAROTID ENDARTERECTOMY RE-IMPLANTATION LEFT ICA performed by Trudee Cabot, MD at [facility], LAPAROSCOPIC N/A 10/9/2021    CHOLECYSTECTOMY LAPAROSCOPIC ROBOTIC XI performed by Luci Phelan MD at [address],[facility] [address] Left 2021    COLONOSCOPY POLYPECTOMY SNARE/COLD BIOPSY performed by Florentin Anna MD at . [facility] [address] Right 10/6/2021    CYSTOSCOPY PYELOGRAM URETERAL STENT INSERTION performed by Chana Carias MD at . [facility] [address] Bilateral 2021    CYSTOSCOPY URETERAL [address] performed by Chana Carias MD at . [facility] [address] Bilateral 2022    CYSTOSCOPY WITH BILATERAL STENT EXCHANGE performed by Chana Carias MD at [facility] 2022    CYSTOSCOPY BILATERAL STENT EXCHANGE performed by Chana Carias MD at [facility]      IR BIOPSY ABDOMINAL MASS  2021    IR BIOPSY ABDOMINAL MASS 7/6/2021 [facility] SPECIAL PROCEDURES    IR PORT PLACEMENT EQUAL OR GREATER THAN 5 YEARS  8/31/2021    IR PORT PLACEMENT EQUAL OR GREATER THAN 5 YEARS 8/31/2021 [facility] SPECIAL PROCEDURES    TONSILLECTOMY      as child       Immunization History:   Immunization History   Administered Date(s) Administered    Influenza, FLUARIX, FLULAVAL, Budd Ginny (age 10 mo+) AND AFLURIA, (age 1 y+), PF, 0.5mL 10/12/2021    Influenza, Triv, inactivated, subunit, adjuvanted, IM (Fluad 65 yrs and older) 09/28/2019       Active Problems:  Patient Active Problem List   Diagnosis Code    NHL (non-Hodgkin's lymphoma) ([facility] C85.90    Traumatic retroperitoneal hematoma S36.892A    Acute kidney injury ([facility] S04.2    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    Hydronephrosis due to obstruction of ureter N13.1    Moderate malnutrition (HCC) E44.0    Chronic cholecystitis K81.1    Pleural effusion J90    Cardiomegaly I51.7    Hypothyroidism E03.9    Diabetes mellitus ([facility] E11.9    Hypertensive urgency I16.0    Acute respiratory failure with hypoxia (HCC) J96.01    Hypoxia R09.02    Carotid stenosis, asymptomatic, bilateral I65.23    Simple chronic bronchitis (HCC) J41.0    Dependence on nocturnal oxygen therapy Z99.81    Pneumonia J18.9    LAURO (acute kidney injury) ([facility] N17.9    Failure to thrive in adult R62.7    DVT (deep venous thrombosis) ([facility] I82.409    Dehydration E86.0    Lymphoma, diffuse (HCC) C85.90    Severe malnutrition ([facility] E43    Acute cystitis with hematuria N30.01       Isolation/Infection:   Isolation            No Isolation          Patient Infection Status       Infection Onset Added Last Indicated Last Indicated By Review Planned Expiration Resolved Resolved By    None active    Resolved    COVID-19 (Rule Out) 08/31/22 08/31/22 08/31/22 COVID-19, Rapid (Ordered)   08/31/22 Rule-Out Test Resulted    COVID-19 (Rule Out) 02/16/22 02/16/22 02/16/22 COVID-19 & Influenza Combo (Ordered)   02/17/22 Rule-Out Test Resulted    COVID-19 (Rule Out) 01/28/22 01/28/22 01/28/22 COVID-19, Rapid (Ordered)   01/28/22 Rule-Out Test Resulted            Nurse Assessment:  Last Vital Signs: BP (!) 150/78   Pulse 71   Temp 97.6 °F (36.4 °C) (Axillary)   Resp 20   Ht 5' 10\" (1.778 m)   Wt 153 lb 10.6 oz (69.7 kg)   SpO2 98%   BMI 22.05 kg/m²     Last documented pain score (0-10 scale): Pain Level: 6  Last Weight:   Wt Readings from Last 1 Encounters:   09/02/22 153 lb 10.6 oz (69.7 kg)     Mental Status:  oriented    IV Access:  - Central Venous Catheter  - site  right and subclavian, insertion date: n/a    Nursing Mobility/ADLs:  Walking   Assisted  Transfer  Assisted  Bathing  Assisted  Dressing  Assisted  Toileting  Assisted  Feeding  Independent  Med Admin  Dependent  Med Delivery   whole and in applesauce    Wound Care Documentation and Therapy:        Elimination:  Continence: Bowel: Yes  Bladder: Yes  Urinary Catheter: None   Colostomy/Ileostomy/Ileal Conduit: No       Date of Last BM: 9/6/2022    No intake or output data in the 24 hours ending 09/02/22 0810  I/O last 3 completed shifts: In: 1175 [I.V.:675; IV Piggyback:500]  Out: -     Safety Concerns: At Risk for Falls    Impairments/Disabilities:      Vision    Nutrition Therapy:  Current Nutrition Therapy:   - Oral Diet:  General    Routes of Feeding: Oral  Liquids: No Restrictions  Daily Fluid Restriction: no  Last Modified Barium Swallow with Video (Video Swallowing Test): not done    Treatments at the Time of Hospital Discharge:   Respiratory Treatments: as ordered  Oxygen Therapy:  is on oxygen at 2 L/min per nasal cannula.   Ventilator:    - No ventilator support    Rehab Therapies: Physical Therapy and Occupational Therapy  Weight Bearing Status/Restrictions: No weight bearing restrictions  Other Medical Equipment (for information only, NOT a DME order):  walker and hospital bed  Other Treatments: Skilled Nursing assessment and monitoring. Medication education and monitoring per protocol. Patient's personal belongings (please select all that are sent with patient):  Glasses    RN SIGNATURE:  Electronically signed by Nina Snowden RN on 9/7/22 at 7:42 AM EDT    CASE MANAGEMENT/SOCIAL WORK SECTION    Inpatient Status Date: 8/31/2022    Readmission Risk Assessment Score:  Readmission Risk              Risk of Unplanned Readmission:  47           Discharging to Facility/ [facility]  Phone: [phone]  Fax: [phone]       Dialysis Facility (if applicable)   Name:  Address:  Dialysis Schedule:  Phone:  Fax:    / signature: Electronically signed by Dmitriy Avina RN on 9/2/22 at 8:11 AM EDT    PHYSICIAN SECTION    Prognosis: Fair    Condition at Discharge: Stable    Rehab Potential (if transferring to Rehab): Fair    Recommended Labs or Other Treatments After Discharge: CBC BMP IN 3 DAYS    Physician Certification: I certify the above information and transfer of [facility] [place]  is necessary for the continuing treatment of the diagnosis listed and that he requires [facility] for greater 30 days.      Update Admission H&P: No change in H&P    PHYSICIAN SIGNATURE:  Electronically signed by Roberto Marquez DO on 9/6/22 at 8:30 AM EDT

## 2022-09-02 NOTE — PROGRESS NOTES
[id number] [employer]      PROGRESS NOTE        Patient:  Heriberto Marsh  YOB: 1950    MRN: [id number]     Acct: [de-identified]     Admit date: 8/31/2022    Pt seen and Chart reviewed. Consultant notes reviewed and care evaluated. Subjective: Patient feels doing much better he is eating very good now. He just finished his breakfast as well. Denies any pain he said the pain is controlled. Denies any increased shortness of breath or nausea vomiting coughing is just some phlegm in the throat he is takes Mucinex we okayed that for him to continue patient IV somehow stopped he supposed to have 75 mm hours discussed with Alejandra his RN to continue with his IV fluid to help his kidney function and hydration since he responds well to IV fluid usually on admission    Diet:  ADULT DIET;  Regular      Medications:Current Inpatient    Scheduled Meds:   cefepime  2,000 mg IntraVENous Q24H    sodium chloride flush  5-40 mL IntraVENous 2 times per day    apixaban  2.5 mg Oral BID    atorvastatin  10 mg Oral Daily    carvedilol  6.25 mg Oral BID WC    donepezil  10 mg Oral Daily    FLUoxetine  20 mg Oral Daily    budesonide-formoterol  2 puff Inhalation BID    ibrutinib  420 mg Oral Daily    ipratropium-albuterol  1 vial Inhalation Q6H WA    lactobacillus  1 capsule Oral Daily    levothyroxine  150 mcg Oral Daily    losartan  50 mg Oral Daily    megestrol  400 mg Oral Daily    therapeutic multivitamin-minerals  1 tablet Oral Daily    pantoprazole  40 mg Oral QAM AC    oxyCODONE  10 mg Oral Q12H    sucralfate  1 g Oral 4x Daily AC & HS    psyllium  1 packet Oral Daily     Continuous Infusions:   sodium chloride       PRN Meds:sodium chloride flush, sodium chloride, acetaminophen, ondansetron **OR** ondansetron, morphine **OR** morphine, oxyCODONE, prochlorperazine        Physical Exam:  Vitals: BP (!) 150/78   Pulse 71   Temp 97.6 °F (36.4 °C) (Axillary)   Resp 20    5' 10\" (1.778 m)   Wt 153 lb 10.6 oz (69.7 kg)   SpO2 98%   BMI 22.05 kg/m²   24 hour intake/output:No intake or output data in the 24 hours ending 09/02/22 0750  Last 3 weights: Wt Readings from Last 3 Encounters:   09/02/22 153 lb 10.6 oz (69.7 kg)   08/22/22 160 lb 3.2 oz (72.7 kg)   08/12/22 173 lb (78.5 kg)       Physical Examination:   General appearance - alert, well appearing, and in no distress  Mental status - alert, oriented to person, place, and time  sclera clear, anicteric  Chest - clear to auscultation, no wheezes, rales or rhonchi, symmetric air entry  Heart - normal rate, regular rhythm, normal S1, S2, no murmurs, rubs, clicks or gallops  Abdomen - soft, nontender, nondistended, no masses or organomegaly  Neurological - alert, oriented, normal speech, no focal findings or movement disorder noted}  Extremities - peripheral pulses normal, no pedal edema, no clubbing or cyanosis  Skin - normal coloration and turgor, no rashes, no suspicious skin lesions noted      Component Value Units   Basic Metabolic Panel [[id number]] (Abnormal)    Collected: 09/02/22 0538    Updated: 09/02/22 0608    Specimen Source: Blood     Glucose 103 High  mg/dL    BUN 39 High  mg/dL    Creatinine 1.64 High  mg/dL    Calcium 8.0 Low  mg/dL    Sodium 142 mmol/L    Potassium 4.1 mmol/L    Chloride 112 High  mmol/L    CO2 21 mmol/L    Anion Gap 9 mmol/L    GFR Non-African American 41 Low  mL/min    GFR African American 50 Low  mL/min    GFR Comment         Comment: Average GFR for 79or more years old:    76 mL/min/1.73sq m   Chronic Kidney Disease:    <60 mL/min/1.73sq m   Kidney failure:    <15 mL/min/1.73sq m               eGFR calculated using average adult body mass.  Additional eGFR calculator available at:         [employer]             CBC with Auto Differential [[id number]] (Abnormal)    Collected: 09/02/22 0538    Updated: 09/02/22 0557    Specimen Source: Blood     WBC 6.0 k/uL    RBC 3.48 Low  m/uL    Hemoglobin 8.8 Low  g/dL    Hematocrit 27.6 Low  %    MCV 79.3 Low  fL    MCH 25.4 Low  pg    MCHC 32.1 g/dL    RDW 17.5 High  %    Platelets 896 Low  k/uL    MPV 8.6 fL    Seg Neutrophils 67 High  %    Lymphocytes 21 Low  %    Monocytes 10 High  %    Eosinophils % 2 %    Basophils 0 %    Segs Absolute 4.10 k/uL    Absolute Lymph # 1.30 k/uL    Absolute Mono # 0.60 k/uL    Absolute Eos # 0.10 k/uL    Basophils Absolute 0.00 k/uL   Culture, Blood 1 [[id number]]    Collected: 09/01/22 7860    Updated: 09/02/22 0223    Specimen Source: Blood     Specimen Description . BLOOD    Culture NO GROWTH 12 HOURS   Culture, Blood 1 [[id number]]    Collected: 09/01/22 2614    Updated: 09/02/22 0223    Specimen Source: Blood     Specimen Description . BLOOD    Culture NO GROWTH 12 HOURS   Culture, Blood 1 [[id number]]    Collected: 08/31/22 1748    Updated: 09/01/22 2229    Specimen Source: Blood     Specimen Description . BLOOD    Culture NO GROWTH 1 DAY   Culture, Blood 1 [[id number]]    Collected: 08/31/22 1748    Updated: 09/01/22 2227    Specimen Source: Blood     Specimen Description . BLOOD    Culture NO GROWTH 1 DAY   Culture, Urine [[id number]]    Collected: 08/31/22 1503    Updated: 09/01/22 2112    Specimen Source: Urine, clean catch     Specimen Description . CLEAN CATCH URINE    Culture NO GROWTH     Current  Meds      Assessment:  Principal Problem:    Lymphoma, diffuse ([address]  Active Problems:    Severe malnutrition ([address]    Acute cystitis with hematuria  Resolved Problems:    * No resolved hospital problems.  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    Hydronephrosis due to obstruction of ureter N13.1    Moderate malnutrition (HCC) E44.0    Chronic cholecystitis K81.1    Pleural effusion J90    Cardiomegaly I51.7 Hypothyroidism E03.9    Diabetes mellitus ([facility] E11.9    Hypertensive urgency I16.0    Acute respiratory failure with hypoxia ([facility]) J96.01    Hypoxia R09.02    Carotid stenosis, asymptomatic, bilateral I65.23    Simple chronic bronchitis ([facility]) J41.0    Dependence on nocturnal oxygen therapy Z99.81    Pneumonia J18.9    LAURO (acute kidney injury) ([facility] N17.9    Failure to thrive in adult R62.7    DVT (deep venous thrombosis) ([email or web address] I82.409    Dehydration E86.0    Lymphoma, diffuse ([facility]) C85.90      Weakness most likely secondary to his lymphoma and treatment.     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

## 2022-09-02 NOTE — PROGRESS NOTES
_                         Today's Date: 9/2/2022  Patient Name: Cathy Oleary  Date of admission: 8/31/2022  1:04 PM  Patient's age: 74 y.o., 1950  Admission Dx: Lymphoma, diffuse ([facility] [C85.90]  Acute cystitis with hematuria [N30.01]  Pneumonia due to infectious organism, unspecified laterality, unspecified part of lung [J18.9]      Requesting Physician: Ruba Cantu DO    CHIEF COMPLAINT:  weakness and fatigue. SOB. Pneumonia. UTI. NHL    SUBJECTIVE:  . Patient was seen and examined. Clinically stable. No events overnight. Overall he is feeling better. Better appetite. No active bleeding. No fever. Labs are stable. Creatinine still 1.6. BRIEF CASE HISTORY:    The patient is a 74 y.o.  male who is admitted to the hospital for further management of UTI, pneumonia and failure to thrive. He was having increasing weakness and fatigue. He has UTI secondary to stents. He had SOB and cough. CT scan showed pneumonia. Started on antibiotics with IVF and nutritional support. He feels better. Patient is on Imbruvica for NHL. 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 Evidence of relapse with biopsy-proven follicular lymphoma. 1-2 from treated medically lymph node biopsy July 6, 2021     CURRENT THERAPY:    1.   Observation. 2.   Status post treatment with Bexxar in May 2008. 3.   Status post previous treatment with Rituxan. 4.  Start treatment with Rituxan August 23, 2021. completed4 weeks of rituximab on 9/20/2021  5. Induction Rituxan is followed by consolidation Rituxan maintenance   6. Due to progression we started Imbruvica July 2022. INTERIM HISTORY:  The patient is seen for follow-up of his lymphoma. Further work-up showed enlarged lymph nodes in the retroperitoneum and iliac area. Biopsy proved recurrence of follicular lymphoma.    Patient did well Take 10 mg by mouth daily   Yes Historical Provider, MD   ibrutinib (IMBRUVICA) 420 MG tablet Take 1 tablet by mouth daily 8/22/22 9/21/22  Olamide Flores MD   oxyCODONE (OXYCONTIN) 10 MG extended release tablet Take 10 mg by mouth in the morning and 10 mg in the evening. Historical Provider, MD   oxyCODONE (ROXICODONE) 5 MG immediate release tablet Take 5 mg by mouth every 4 hours as needed for Pain. Historical Provider, MD   prochlorperazine (COMPAZINE) 10 MG tablet Take 1 tablet by mouth every 6 hours as needed (nausea vomiting) 7/26/22   Olamide Flores MD   megestrol (MEGACE) 40 MG/ML suspension Take 10 mLs by mouth in the morning. 7/25/22   Olamide Flores MD   apixaban (ELIQUIS) 5 MG TABS tablet Take 1 tablet by mouth in the morning and 1 tablet before bedtime. 7/21/22   Roderick Metcalf DO   sucralfate (CARAFATE) 1 GM tablet Take 1 tablet by mouth in the morning and 1 tablet at noon and 1 tablet in the evening and 1 tablet before bedtime. 7/21/22   Roderick Metcalf,    glipiZIDE (GLUCOTROL) 5 MG tablet Take 0.5 tablets by mouth in the morning and 0.5 tablets in the evening. Take before meals. Patient not taking: Reported on 8/12/2022 7/21/22 8/13/22  Roderick Metcalf DO   carvedilol (COREG) 6.25 MG tablet Take 6.25 mg by mouth 2 times daily    Historical Provider, MD   psyllium (KONSYL) 28.3 % PACK Take 1 packet by mouth daily    Historical Provider, MD   Lactobacillus (PROBIOTIC ACIDOPHILUS) CAPS Take 1 caplet by mouth daily    Historical Provider, MD   Multiple Vitamins-Minerals (THERAPEUTIC MULTIVITAMIN-MINERALS) tablet Take 1 tablet by mouth daily    Historical Provider, MD   OXYGEN Inhale 2 L into the lungs at bedtime Nightly & during the day.     Historical Provider, MD   ipratropium-albuterol (DUONEB) 0.5-2.5 (3) MG/3ML SOLN nebulizer solution Inhale 3 mLs into the lungs every 4 hours as needed for Shortness of Breath 2/20/22   Андрей Dodge, APRN - CNP   levothyroxine (SYNTHROID) 150 MCG tablet Take 1 tablet by mouth Daily 2/2/22   Velia Naranjo DO   fluticasone-salmeterol (ADVAIR) 100-50 MCG/DOSE diskus inhaler Inhale 1 puff into the lungs every 12 hours 2/1/22   Velia Naranjo, DO   furosemide (LASIX) 20 MG tablet Take 1 tablet by mouth daily  Patient taking differently: Take 20 mg by mouth See Admin Instructions Pt only takes when legs start to swell 2/1/22   Brent Ulloa DO   albuterol sulfate HFA (PROVENTIL;VENTOLIN;PROAIR) 108 (90 Base) MCG/ACT inhaler Inhale 2 puffs into the lungs every 4 hours as needed for Wheezing     Historical Provider, MD   amLODIPine (NORVASC) 10 MG tablet Take 1 tablet by mouth daily  Patient not taking: Reported on 8/22/2022 10/12/21   Boubacar Metcalf DO   losartan (COZAAR) 50 MG tablet Take 1 tablet by mouth daily 10/12/21   Roderick Metcalf DO   donepezil (ARICEPT) 10 MG tablet Take 10 mg by mouth daily  7/11/21   Historical Provider, MD   FLUoxetine (PROZAC) 20 MG capsule Take 20 mg by mouth daily    Historical Provider, MD   omeprazole (PRILOSEC) 20 MG delayed release capsule Take 20 mg by mouth 2 times daily    Historical Provider, MD     Current Facility-Administered Medications   Medication Dose Route Frequency Provider Last Rate Last Admin    0.9 % sodium chloride infusion   IntraVENous Continuous Roderickevgeny Metcalf, DO 75 mL/hr at 09/02/22 1509 New Bag at 09/02/22 1509    guaiFENesin (MUCINEX) extended release tablet 600 mg  600 mg Oral BID Roderickevgeny Metcalf, DO   600 mg at 09/02/22 1236    cefepime (MAXIPIME) 2,000 mg in sodium chloride 0.9 % 50 mL IVPB mini-bag  2,000 mg IntraVENous Q24H Steve Bhatt, DO   Stopped at 09/02/22 1306    sodium chloride flush 0.9 % injection 5-40 mL  5-40 mL IntraVENous 2 times per day Emilio Prabhakar DO   10 mL at 09/02/22 0816    sodium chloride flush 0.9 % injection 5-40 mL  5-40 mL IntraVENous PRN Emilio Prabhakar DO   10 mL at 09/02/22 0817    0.9 % sodium chloride infusion   IntraVENous PRN Shane Fatima, DO        acetaminophen (TYLENOL) tablet 650 mg  650 mg Oral Q4H PRN Emilio Faitmavandi, DO        ondansetron (ZOFRAN-ODT) disintegrating tablet 4 mg  4 mg Oral Q8H PRN Emilio Fatimavandi, DO        Or    ondansetron (ZOFRAN) injection 4 mg  4 mg IntraVENous Q6H PRN Emilio Bakerdi, DO        morphine (PF) injection 2 mg  2 mg IntraVENous Q2H PRN Emilio Bakerdi, DO        Or    morphine sulfate (PF) injection 4 mg  4 mg IntraVENous Q2H PRN Shane Fatima, DO        apixaban (ELIQUIS) tablet 2.5 mg  2.5 mg Oral BID Roderick T Dixon, DO   2.5 mg at 09/02/22 0817    atorvastatin (LIPITOR) tablet 10 mg  10 mg Oral Daily Roderick T Dixon, DO   10 mg at 09/02/22 0818    carvedilol (COREG) tablet 6.25 mg  6.25 mg Oral BID WC Roderick T Dixon, DO   6.25 mg at 09/02/22 0817    donepezil (ARICEPT) tablet 10 mg  10 mg Oral Daily Roderick T Dixon, DO   10 mg at 09/02/22 0818    FLUoxetine (PROZAC) capsule 20 mg  20 mg Oral Daily Roderick T Dixon, DO   20 mg at 09/02/22 0817    budesonide-formoterol (SYMBICORT) 80-4.5 MCG/ACT inhaler 2 puff  2 puff Inhalation BID Vincent Mile, DO   2 puff at 09/02/22 0807    ibrutinib (IMBRUVICA) chemo tablet 420 mg  420 mg Oral Daily Roderick T Dixon, DO        ipratropium-albuterol (DUONEB) nebulizer solution 3 mL  1 vial Inhalation Q6H WA Roderick T Dixon, DO   3 mL at 09/02/22 1331    lactobacillus (CULTURELLE) capsule 1 capsule  1 capsule Oral Daily Roderick T Dixon, DO   1 capsule at 09/02/22 0818    levothyroxine (SYNTHROID) tablet 150 mcg  150 mcg Oral Daily Roderick T Dixon, DO   150 mcg at 09/02/22 0622    losartan (COZAAR) tablet 50 mg  50 mg Oral Daily Roderick T Dixon, DO   50 mg at 09/02/22 0818    megestrol (MEGACE) 40 MG/ML suspension 400 mg  400 mg Oral Daily Roderick T Dixon, DO   400 mg at 09/02/22 0818    therapeutic multivitamin-minerals 1 tablet  1 tablet Oral Daily Roderick T Dixon, DO   1 tablet at 09/02/22 0818    pantoprazole (PROTONIX) tablet 40 mg  40 mg Oral [facility] AC Roberto Marquez, DO   40 mg at 09/02/22 0622    oxyCODONE (OXYCONTIN) extended release tablet 10 mg  10 mg Oral Q12H Roderick T Dixon, DO   10 mg at 09/02/22 1236    oxyCODONE (ROXICODONE) immediate release tablet 5 mg  5 mg Oral Q4H PRN Roderick T Dixon, DO        prochlorperazine (COMPAZINE) tablet 10 mg  10 mg Oral Q6H PRN Roderick T Dixon, DO        sucralfate (CARAFATE) tablet 1 g  1 g Oral 4x Daily AC & HS Roderick T Dixon, DO   1 g at 09/02/22 1236    psyllium (METAMUCIL) 58.12 % packet 1 packet  1 packet Oral Daily Roberto Marquez, DO   1 packet at 09/02/22 7135       Allergies:  Patient has no known allergies. REVIEW OF SYSTEMS:      General: + weakness + fatigue. + unanticipated weight loss or decreased appetite. No fever or chills. Eyes: No blurred vision, eye pain or double vision. Ears: No hearing problems or drainage. No tinnitus. Throat: No sore throat, problems with swallowing or dysphagia. Respiratory: as above     Cardiovascular: No chest pain, orthopnea or PND. No lower extremity edema. No palpitation. Gastrointestinal: as above   Genitourinary: No dysuria, hematuria, frequency or urgency. Musculoskeletal: No muscle aches or pains. No limitation of movement. No back pain. No gait disturbance, No joint complaints. Dermatologic: No skin rashes or pruritus. No skin lesions or discolorations. Psychiatric: No depression, anxiety, or stress or signs of schizophrenia. No change in mood or affect. Hematologic: No history of bleeding tendency. No bruises or ecchymosis. No history of clotting problems. Infectious disease: No fever, chills or frequent infections. Endocrine: No polydipsia or polyuria. No temperature intolerance. Neurologic: No headaches or dizziness. No weakness or numbness of the extremities. No changes in balance, coordination,  memory, mentation, behavior. Allergic/Immunologic: No nasal congestion or hives. No repeated infections.        PHYSICAL EXAM:      BP (!) 150/60 Pulse 64   Temp 97.7 °F (36.5 °C) (Axillary)   Resp 16   Ht 5' 10\" (1.778 m)   Wt 153 lb 10.6 oz (69.7 kg)   SpO2 94%   BMI 22.05 kg/m²    Temp (24hrs), Av.8 °F (36.6 °C), Min:97.4 °F (36.3 °C), Max:98.5 °F (36.9 °C)      General appearance - not in pain or distress. Looks chronically ill.    Mental status - alert and oriented  Eyes - pupils equal and reactive, extraocular eye movements intact  Ears - bilateral TM's and external ear canals normal  Nose - normal and patent, no erythema, discharge or polyps  Mouth - mucous membranes moist, pharynx normal without lesions  Neck - supple, no significant adenopathy  Lymphatics - no palpable lymphadenopathy, no hepatosplenomegaly  Chest - clear to auscultation, no wheezes, rales or rhonchi, symmetric air entry  Heart - normal rate, regular rhythm, normal S1, S2, no murmurs, rubs, clicks or gallops  Abdomen - soft, nontender, nondistended, no masses or organomegaly  Neurological - alert, oriented, normal speech, no focal findings or movement disorder noted  Musculoskeletal - no joint tenderness, deformity or swelling  Extremities - peripheral pulses normal, no pedal edema, no clubbing or cyanosis  Skin - normal coloration and turgor, no rashes, no suspicious skin lesions noted           DATA:      Labs:       CBC:   Recent Labs     22  0546 22  0538   WBC 5.6 6.0   HGB 9.0* 8.8*   HCT 28.5* 27.6*   * 145*     BMP:   Recent Labs     22  0546 22  0538    142   K 4.0 4.1   CO2 23 21   BUN 40* 39*   CREATININE 1.66* 1.64*   LABGLOM 41* 41*   GLUCOSE 107* 103*     PT/INR:   Recent Labs     22  1410   PROTIME 21.7*   INR 1.9     APTT:  Recent Labs     22  1410   APTT 54.8*     LIVER PROFILE:  Recent Labs     22  1410   AST 11   ALT 17   LABALBU 2.7*     CT ABDOMEN PELVIS WO CONTRAST Additional Contrast? None  Narrative: EXAMINATION:  CT OF THE ABDOMEN AND PELVIS WITHOUT CONTRAST 2022 2:46 pm    TECHNIQUE:  CT of the abdomen and pelvis was performed without the administration of  intravenous contrast. Multiplanar reformatted images are provided for review. Automated exposure control, iterative reconstruction, and/or weight based  adjustment of the mA/kV was utilized to reduce the radiation dose to as low  as reasonably achievable. COMPARISON:  08/12/2022    HISTORY:  Acute lower abdominal pain. FINDINGS:  Lower Chest: New left lower lobe consolidation. Dependent atelectasis in the  right lower lobe. Coronary artery calcification. Organs: Cholecystectomy. Calcified splenic granulomas. Bilateral ureteral  stents remain in place. Stable small right renal stone along with a  hyperdense right renal cyst.  Remaining solid organs are unremarkable. GI/Bowel: Stomach and small bowel are unremarkable. Normal appendix. Extensive colonic diverticulosis without definite acute inflammation. Pelvis: Bladder is unremarkable. Prostate is enlarged. No lymphadenopathy  or free fluid. Peritoneum/Retroperitoneum: Abnormal retroperitoneal soft tissue, left  greater than right, with involvement of the left kidney has not significantly  changed. No ascites. Severe atherosclerotic disease without abdominal  aortic aneurysm. Bones/Soft Tissues: No suspicious osseous lesions. Impression: 1. New left lower lobe consolidation is suspicious for pneumonia. 2. No significant change in abnormal retroperitoneal soft tissue, left  greater than right, with involvement of the left kidney. This is consistent  with known lymphoma. 3. Extensive colonic diverticulosis without definite diverticulitis. 4. Enlarged prostate.             IMPRESSION:    Primary Problem  Lymphoma, diffuse ([address]    Active Hospital Problems    Diagnosis Date Noted    Severe malnutrition ([address] [E43] 09/01/2022     Priority: Medium    Acute cystitis with hematuria [N30.01] 09/01/2022     Priority: Medium    Lymphoma, diffuse ([address] [C85.90] 08/31/2022 Priority: Medium   Failure to thrive  NHL  Pneumonia  UTI    RECOMMENDATIONS:  Records, labs and images were reviewed and discussed. Feeling better with IVF and nutritional support. Continue antibiotics as per primary team  Discussed further care for his lymphoma. We will decrease the dose of Imbruvica due to tolerance. We will monitor response. Patient's questions were answered to the best of his satisfaction and he verbalized full understanding and agreement. Owen Shrestha MD, MD                            [address] [place] [address] [facility]                            This note is created with the assistance of a speech recognition program.  While intending to generate a document that actually reflects the content of the visit, the document can still have some errors including those of syntax and sound a like substitutions which may escape proof reading. It such instances, actual meaning can be extrapolated by contextual diversion.

## 2022-09-02 NOTE — PROGRESS NOTES
[address]   INPATIENT OCCUPATIONAL THERAPY  PROGRESS NOTE  Date: 2022  Patient Name: Ava Israel       Room:   MRN: [id number]    : 1950  (74 y.o.)  Gender: male   Referring Practitioner: Tutu Redmond DO  Diagnosis: Diffuse lymphoma    Restrictions/Precautions  Restrictions/Precautions  Restrictions/Precautions: Fall Risk;General Precautions  Required Braces or Orthoses?: No  Implants present? :  (denies)      Subjective  Subjective  Subjective: pt states that he is feeling weak but agreeable to work with therapy. Subjective  Pain: pt states that his pain is 5/10, mid abdomen    Objective  Orientation  Overall Orientation Status: Within Functional Limits  Orientation Level: Oriented X4    ADL  Feeding: Setup  Grooming: Setup (pt completes oral care and washes face/neck sitting EOB)  UE Bathing: Supervision (completes BUE sitting EOB)  LE Bathing: Moderate assistance  UE Dressing: Supervision  LE Dressing: Minimal assistance  Toileting: Minimal assistance  Additional Comments: ADL scores based on skilled observation and clinical reasoning, unless otherwise noted. Pt is currently limited due to generalized weakness and shortness of breath with minimal activity, impacting independence with self care. pt requires frequent RB 2* fatigue      Balance  Balance  Sitting Balance: Stand by assistance (tolerates sitting unsupported EOB x 30minutes. pt requires frequent RB 2* fatigue)    Transfers/Mobility  Bed mobility  Supine to Sit: Stand by assistance  Sit to Supine: Stand by assistance  Scooting: Stand by assistance  Bed Mobility Comments: HOB elevated with use of hand rails.  No complaints of lightheadedness/dizziness, but pt endorsed feeling \"weak\" sitting EOB      Patient Education  Patient Education  Education Given To: Patient  Education Provided: Plan of Care, ADL Adaptive Strategies, Energy Conservation, Fall Prevention Strategies (pursed lip breathing notified      AM-PAC Daily Activities Inpatient     AM-PAC Daily Activity Inpatient   How much help for putting on and taking off regular lower body clothing?: A Lot  How much help for Bathing?: A Lot  How much help for Toileting?: A Lot  How much help for putting on and taking off regular upper body clothing?: A Little  How much help for taking care of personal grooming?: A Little  How much help for eating meals?: A Little  AM-PAC Inpatient Daily Activity Raw Score: 15  AM-PAC Inpatient ADL T-Scale Score : 34.69  ADL Inpatient CMS 0-100% Score: 56.46  ADL Inpatient CMS G-Code Modifier : CK    OT Minutes  OT Individual Minutes  Time In: 1000  Time Out: Armin Deput[address]  Minutes: Martín 32, EUGENE

## 2022-09-02 NOTE — PROGRESS NOTES
09/02/22 1820   Encounter Summary   Encounter Overview/Reason  Spiritual/Emotional Needs   Service Provided For: Patient   Referral/Consult From: Rounding   Complexity of Encounter Low   Assessment/Intervention/Outcome   Assessment Unable to assess  (patient sleeping)   Intervention Prayer (assurance of)/[place]

## 2022-09-02 NOTE — PLAN OF CARE
Problem: Safety - Adult  Goal: Free from fall injury  Outcome: Progressing  Note: No falls this shift. Pt uses call light appropriately. Up to the chair or to the bathroom with 1 assist. Gait steady, but weak. Problem: Skin/Tissue Integrity  Goal: Absence of new skin breakdown  Description: 1. Monitor for areas of redness and/or skin breakdown  2. Assess vascular access sites hourly  3. Every 4-6 hours minimum:  Change oxygen saturation probe site  4. Every 4-6 hours:  If on nasal continuous positive airway pressure, respiratory therapy assess nares and determine need for appliance change or resting period. Outcome: Progressing  Note: No new skin breakdown noted this shift. Pt able to reposition self without difficulty in bed. Problem: Chronic Conditions and Co-morbidities  Goal: Patient's chronic conditions and co-morbidity symptoms are monitored and maintained or improved  Outcome: Progressing  Note: O2/NC continued at 2L majority of the shift. O2 most recently changed to RA. No acute distress noted. Mucinex initatied this shift. Scattered rhonchi noted upper airways.

## 2022-09-02 NOTE — CONSULTS
_                         Today's Date: 9/1/2022  Patient Name: Kwame Donaldson  Date of admission: 8/31/2022  1:04 PM  Patient's age: 74 y.o., 1950  Admission Dx: Lymphoma, diffuse ([facility] [address] [C85.90]  Acute cystitis with hematuria [N30.01]  Pneumonia due to infectious organism, unspecified laterality, unspecified part of lung [J18.9]      Requesting Physician: Skye Fairchild DO    CHIEF COMPLAINT:  weakness and fatigue. SOB. Pneumonia. UTI. NHL    History Obtained From:  patient, electronic medical record    HISTORY OF PRESENT ILLNESS:      The patient is a 74 y.o.  male who is admitted to the hospital for further management of UTI, pneumonia and failure to thrive. He was having increasing weakness and fatigue. He has UTI secondary to stents. He had SOB and cough. CT scan showed pneumonia. Started on antibiotics with IVF and nutritional support. He feels better. Patient is on Imbruvica for NHL. 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 Evidence of relapse with biopsy-proven follicular lymphoma. 1-2 from treated medically lymph node biopsy July 6, 2021     CURRENT THERAPY:    1.   Observation. 2.   Status post treatment with Bexxar in May 2008. 3.   Status post previous treatment with Rituxan. 4.  Start treatment with Rituxan August 23, 2021. completed4 weeks of rituximab on 9/20/2021  5. Induction Rituxan is followed by consolidation Rituxan maintenance   6. Due to progression we started Imbruvica July 2022. INTERIM HISTORY:  The patient is seen for follow-up of his lymphoma. Further work-up showed enlarged lymph nodes in the retroperitoneum and iliac area. Biopsy proved recurrence of follicular lymphoma. Patient did well on rituximab induction and was maintained on maintenance Rituxan. Repeated CT scan February 2022 showed stable disease.   He had hospitalization in July which showed significant progressive disease. Started on Imbruvica. He was hospitalized 1 week ago due to dehydration and malnutrition. He started feeling much better after rehydration with improvement in kidney function. Repeated CT scan showed positive results with significant improvement on the Imbruvica treatment. Past Medical History:   has a past medical history of Arthritis, Cancer ([facility] [address] Chemotherapy management, encounter for, CHF (congestive heart failure) ([facility] [address] COPD (chronic obstructive pulmonary disease) ([facility] [address] Diabetes mellitus ([facility] [address] H/O cardiovascular stress test, History of non-Hodgkin's lymphoma, Hx of blood clots, Hyperlipidemia, Hypertension, Hypothyroidism, and On home O2. Past Surgical History:   has a past surgical history that includes hernia repair; IR BIOPSY ABDOMINAL/RETROPERITONEAL MASS PERCUTANEOUS (7/6/2021); IR PORT PLACEMENT > 5 YEARS (8/31/2021); Colonoscopy (Left, 9/24/2021); Cystoscopy (Right, 10/6/2021); Cholecystectomy, laparoscopic (N/A, 10/9/2021); Tonsillectomy; Cystoscopy (Bilateral, 11/24/2021); Cystoscopy (Bilateral, 2/16/2022); eye surgery; Carotid endarterectomy (Left, 6/28/2022); and Cystoscopy (N/A, 7/20/2022). Family History: family history includes Alzheimer's Disease in his father; Diabetes in his mother; Heart Disease in his brother and brother. Social History:   reports that he quit smoking about 16 years ago. His smoking use included cigarettes. He has a 90.00 pack-year smoking history. He has never used smokeless tobacco. He reports current alcohol use of about 1.7 standard drinks per week. He reports current drug use. Drug: Marijuana Arsenio Jock). Medications:    Prior to Admission medications    Medication Sig Start Date End Date Taking?  Authorizing Provider   atorvastatin (LIPITOR) 10 MG tablet Take 10 mg by mouth daily   Yes Historical Provider, MD   ibrutinib (IMBRUVICA) 420 MG tablet Take 1 tablet by mouth daily 8/22/22 9/21/22  Sol Johnston MD   oxyCODONE (OXYCONTIN) 10 MG extended release tablet Take 10 mg by mouth in the morning and 10 mg in the evening. Historical Provider, MD   oxyCODONE (ROXICODONE) 5 MG immediate release tablet Take 5 mg by mouth every 4 hours as needed for Pain. Historical Provider, MD   prochlorperazine (COMPAZINE) 10 MG tablet Take 1 tablet by mouth every 6 hours as needed (nausea vomiting) 7/26/22   Sol Johnston MD   megestrol (MEGACE) 40 MG/ML suspension Take 10 mLs by mouth in the morning. 7/25/22   Sol Johnston MD   apixaban (ELIQUIS) 5 MG TABS tablet Take 1 tablet by mouth in the morning and 1 tablet before bedtime. 7/21/22   Roderick Metcalf DO   sucralfate (CARAFATE) 1 GM tablet Take 1 tablet by mouth in the morning and 1 tablet at noon and 1 tablet in the evening and 1 tablet before bedtime. 7/21/22   Roderick Metcalf DO   glipiZIDE (GLUCOTROL) 5 MG tablet Take 0.5 tablets by mouth in the morning and 0.5 tablets in the evening. Take before meals. Patient not taking: Reported on 8/12/2022 7/21/22 8/13/22  Roderick Metcalf DO   carvedilol (COREG) 6.25 MG tablet Take 6.25 mg by mouth 2 times daily    Historical Provider, MD   psyllium (KONSYL) 28.3 % PACK Take 1 packet by mouth daily    Historical Provider, MD   Lactobacillus (PROBIOTIC ACIDOPHILUS) CAPS Take 1 caplet by mouth daily    Historical Provider, MD   Multiple Vitamins-Minerals (THERAPEUTIC MULTIVITAMIN-MINERALS) tablet Take 1 tablet by mouth daily    Historical Provider, MD   OXYGEN Inhale 2 L into the lungs at bedtime Nightly & during the day.     Historical Provider, MD   ipratropium-albuterol (DUONEB) 0.5-2.5 (3) MG/3ML SOLN nebulizer solution Inhale 3 mLs into the lungs every 4 hours as needed for Shortness of Breath 2/20/22   Birdie Garcia APRN - CNP   levothyroxine (SYNTHROID) 150 MCG tablet Take 1 tablet by mouth Daily 2/2/22   Steive Segura DO   fluticasone-salmeterol (ADVAIR) 100-50 MCG/DOSE diskus inhaler Inhale 1 puff into the lungs every 12 hours 2/1/22   Ronna Almanza, DO   furosemide (LASIX) 20 MG tablet Take 1 tablet by mouth daily  Patient taking differently: Take 20 mg by mouth See Admin Instructions Pt only takes when legs start to swell 2/1/22   Brent Ulloa DO   albuterol sulfate HFA (PROVENTIL;VENTOLIN;PROAIR) 108 (90 Base) MCG/ACT inhaler Inhale 2 puffs into the lungs every 4 hours as needed for Wheezing     Historical Provider, MD   amLODIPine (NORVASC) 10 MG tablet Take 1 tablet by mouth daily  Patient not taking: Reported on 8/22/2022 10/12/21   Juju Colder NAVEEN Metcalf, DO   losartan (COZAAR) 50 MG tablet Take 1 tablet by mouth daily 10/12/21   Roderickevgeny Metcalf DO   donepezil (ARICEPT) 10 MG tablet Take 10 mg by mouth daily  7/11/21   Historical Provider, MD   FLUoxetine (PROZAC) 20 MG capsule Take 20 mg by mouth daily    Historical Provider, MD   omeprazole (PRILOSEC) 20 MG delayed release capsule Take 20 mg by mouth 2 times daily    Historical Provider, MD     Current Facility-Administered Medications   Medication Dose Route Frequency Provider Last Rate Last Admin    cefepime (MAXIPIME) 2,000 mg in sodium chloride 0.9 % 50 mL IVPB mini-bag  2,000 mg IntraVENous Q24H Shane Fatima DO   Stopped at 09/01/22 1244    sodium chloride flush 0.9 % injection 5-40 mL  5-40 mL IntraVENous 2 times per day Emilio Prabhakar DO   10 mL at 09/01/22 0840    sodium chloride flush 0.9 % injection 5-40 mL  5-40 mL IntraVENous PRN Emilio Prabhakar, DO        0.9 % sodium chloride infusion   IntraVENous PRN Emilio Prabhakar DO        acetaminophen (TYLENOL) tablet 650 mg  650 mg Oral Q4H PRN Emilio Prabhakar, DO        ondansetron (ZOFRAN-ODT) disintegrating tablet 4 mg  4 mg Oral Q8H PRN Emilio Prabhakar, DO        Or    ondansetron (ZOFRAN) injection 4 mg  4 mg IntraVENous Q6H PRN Emilio Prabhakar, DO        morphine (PF) injection 2 mg  2 mg IntraVENous Q2H PRN Shane Fatima DO Or    morphine sulfate (PF) injection 4 mg  4 mg IntraVENous Q2H PRN Suzy Commander, DO        apixaban (ELIQUIS) tablet 2.5 mg  2.5 mg Oral BID Roderick T Dixon, DO   2.5 mg at 09/01/22 0841    atorvastatin (LIPITOR) tablet 10 mg  10 mg Oral Daily Roderick T Dixon, DO   10 mg at 09/01/22 0841    carvedilol (COREG) tablet 6.25 mg  6.25 mg Oral BID WC Roderick T Dixon, DO   6.25 mg at 09/01/22 1806    donepezil (ARICEPT) tablet 10 mg  10 mg Oral Daily Roderick T Dixon, DO   10 mg at 09/01/22 0841    FLUoxetine (PROZAC) capsule 20 mg  20 mg Oral Daily Roderick T Dixon, DO   20 mg at 09/01/22 0841    budesonide-formoterol (SYMBICORT) 80-4.5 MCG/ACT inhaler 2 puff  2 puff Inhalation BID [place], DO   2 puff at 09/01/22 1950    ibrutinib (IMBRUVICA) chemo tablet 420 mg  420 mg Oral Daily Roderick T Dixon, DO        ipratropium-albuterol (DUONEB) nebulizer solution 3 mL  1 vial Inhalation Q6H WA Roderick T Dixon, DO   3 mL at 09/01/22 1950    lactobacillus (CULTURELLE) capsule 1 capsule  1 capsule Oral Daily Roderick T Dixon, DO   1 capsule at 09/01/22 0841    levothyroxine (SYNTHROID) tablet 150 mcg  150 mcg Oral Daily Roderick T Dixon, DO   150 mcg at 09/01/22 0849    losartan (COZAAR) tablet 50 mg  50 mg Oral Daily Roderick T Dixon, DO   50 mg at 09/01/22 0841    megestrol (MEGACE) 40 MG/ML suspension 400 mg  400 mg Oral Daily Roderick T Dixon, DO   400 mg at 09/01/22 0845    therapeutic multivitamin-minerals 1 tablet  1 tablet Oral Daily Roderick T Dixon, DO   1 tablet at 09/01/22 0841    pantoprazole (PROTONIX) tablet 40 mg  40 mg Oral QAM AC Roderick T Dixon, DO   40 mg at 09/01/22 0841    oxyCODONE (OXYCONTIN) extended release tablet 10 mg  10 mg Oral Q12H Roderick T Dixon, DO   10 mg at 09/01/22 1132    oxyCODONE (ROXICODONE) immediate release tablet 5 mg  5 mg Oral Q4H PRN Roderick T Dixon, DO        prochlorperazine (COMPAZINE) tablet 10 mg  10 mg Oral Q6H PRN Roderick T Dixon, DO        sucralfate (CARAFATE) tablet 1 g  1 g Oral 4x Daily AC & HS Roberto Maqruez DO   1 g at 22 1806    psyllium (METAMUCIL) 58.12 % packet 1 packet  1 packet Oral Daily Roderick NAVEEN DO Dixon           Allergies:  Patient has no known allergies. REVIEW OF SYSTEMS:      General: + weakness + fatigue. + unanticipated weight loss or decreased appetite. No fever or chills. Eyes: No blurred vision, eye pain or double vision. Ears: No hearing problems or drainage. No tinnitus. Throat: No sore throat, problems with swallowing or dysphagia. Respiratory: as above     Cardiovascular: No chest pain, orthopnea or PND. No lower extremity edema. No palpitation. Gastrointestinal: as above   Genitourinary: No dysuria, hematuria, frequency or urgency. Musculoskeletal: No muscle aches or pains. No limitation of movement. No back pain. No gait disturbance, No joint complaints. Dermatologic: No skin rashes or pruritus. No skin lesions or discolorations. Psychiatric: No depression, anxiety, or stress or signs of schizophrenia. No change in mood or affect. Hematologic: No history of bleeding tendency. No bruises or ecchymosis. No history of clotting problems. Infectious disease: No fever, chills or frequent infections. Endocrine: No polydipsia or polyuria. No temperature intolerance. Neurologic: No headaches or dizziness. No weakness or numbness of the extremities. No changes in balance, coordination,  memory, mentation, behavior. Allergic/Immunologic: No nasal congestion or hives. No repeated infections. PHYSICAL EXAM:      BP (!) 130/54   Pulse 63   Temp 97.4 °F (36.3 °C) (Axillary)   Resp 20   Ht 5' 10\" (1.778 m)   Wt 132 lb 0.9 oz (59.9 kg)   SpO2 96%   BMI 18.95 kg/m²    Temp (24hrs), Av.8 °F (36.6 °C), Min:97.4 °F (36.3 °C), Max:98.1 °F (36.7 °C)      General appearance - not in pain or distress. Looks chronically ill.    Mental status - alert and oriented  Eyes - pupils equal and reactive, extraocular eye movements intact  Ears - bilateral TM's and external ear canals normal  Nose - normal and patent, no erythema, discharge or polyps  Mouth - mucous membranes moist, pharynx normal without lesions  Neck - supple, no significant adenopathy  Lymphatics - no palpable lymphadenopathy, no hepatosplenomegaly  Chest - clear to auscultation, no wheezes, rales or rhonchi, symmetric air entry  Heart - normal rate, regular rhythm, normal S1, S2, no murmurs, rubs, clicks or gallops  Abdomen - soft, nontender, nondistended, no masses or organomegaly  Neurological - alert, oriented, normal speech, no focal findings or movement disorder noted  Musculoskeletal - no joint tenderness, deformity or swelling  Extremities - peripheral pulses normal, no pedal edema, no clubbing or cyanosis  Skin - normal coloration and turgor, no rashes, no suspicious skin lesions noted           DATA:      Labs:       CBC:   Recent Labs     08/31/22  1410 09/01/22  0546   WBC 9.1 5.6   HGB 9.9* 9.0*   HCT 30.5* 28.5*    127*     BMP:   Recent Labs     08/31/22  1410 09/01/22  0546    137   K 4.0 4.0   CO2 23 23   BUN 39* 40*   CREATININE 1.70* 1.66*   LABGLOM 40* 41*   GLUCOSE 137* 107*     PT/INR:   Recent Labs     08/31/22  1410   PROTIME 21.7*   INR 1.9     APTT:  Recent Labs     08/31/22  1410   APTT 54.8*     LIVER PROFILE:  Recent Labs     08/31/22  1410   AST 11   ALT 17   LABALBU 2.7*     CT ABDOMEN PELVIS WO CONTRAST Additional Contrast? None  Narrative: EXAMINATION:  CT OF THE ABDOMEN AND PELVIS WITHOUT CONTRAST 8/31/2022 2:46 pm    TECHNIQUE:  CT of the abdomen and pelvis was performed without the administration of  intravenous contrast. Multiplanar reformatted images are provided for review. Automated exposure control, iterative reconstruction, and/or weight based  adjustment of the mA/kV was utilized to reduce the radiation dose to as low  as reasonably achievable. COMPARISON:  08/12/2022    HISTORY:  Acute lower abdominal pain.     FINDINGS:  Lower Chest: New left Vero Quintero PA-C, spoke with patient's son Ross lower lobe consolidation. Dependent atelectasis in the  right lower lobe. Coronary artery calcification. Organs: Cholecystectomy. Calcified splenic granulomas. Bilateral ureteral  stents remain in place. Stable small right renal stone along with a  hyperdense right renal cyst.  Remaining solid organs are unremarkable. GI/Bowel: Stomach and small bowel are unremarkable. Normal appendix. Extensive colonic diverticulosis without definite acute inflammation. Pelvis: Bladder is unremarkable. Prostate is enlarged. No lymphadenopathy  or free fluid. Peritoneum/Retroperitoneum: Abnormal retroperitoneal soft tissue, left  greater than right, with involvement of the left kidney has not significantly  changed. No ascites. Severe atherosclerotic disease without abdominal  aortic aneurysm. Bones/Soft Tissues: No suspicious osseous lesions. Impression: 1. New left lower lobe consolidation is suspicious for pneumonia. 2. No significant change in abnormal retroperitoneal soft tissue, left  greater than right, with involvement of the left kidney. This is consistent  with known lymphoma. 3. Extensive colonic diverticulosis without definite diverticulitis. 4. Enlarged prostate. IMPRESSION:    Primary Problem  Lymphoma, diffuse ([facility] [address]    Active Hospital Problems    Diagnosis Date Noted    Severe malnutrition ([address] [E43] 09/01/2022     Priority: Medium    Lymphoma, diffuse ([address] [C85.90] 08/31/2022     Priority: Medium   Failure to thrive  NHL  Pneumonia  UTI    RECOMMENDATIONS:  Records, labs and images were reviewed and discussed. Feeling better with IVF and nutritional support. Continue antibiotics as per primary team  Discussed further care for his lymphoma. We will decrease the dose of Imbruvica due to tolerance. We will monitor response. Patient's questions were answered to the best of his satisfaction and he verbalized full understanding and agreement.   Thank you for allowing us to participate in the care of this pleasant patient. Discussed with patient and family. Adelso Darden MD, MD                            [facility]                            This note is created with the assistance of a speech recognition program.  While intending to generate a document that actually reflects the content of the visit, the document can still have some errors including those of syntax and sound a like substitutions which may escape proof reading. It such instances, actual meaning can be extrapolated by contextual diversion.

## 2022-09-03 ENCOUNTER — APPOINTMENT (OUTPATIENT)
Dept: GENERAL RADIOLOGY | Age: 72
DRG: 698 | End: 2022-09-03
Payer: MEDICARE

## 2022-09-03 LAB
ABSOLUTE EOS #: 0.13 K/UL (ref 0–0.4)
ABSOLUTE LYMPH #: 1.41 K/UL (ref 1–4.8)
ABSOLUTE MONO #: 0.58 K/UL (ref 0.1–1.3)
ABSOLUTE RETIC #: 0.05 M/UL (ref 0.02–0.1)
ANION GAP SERPL CALCULATED.3IONS-SCNC: 9 MMOL/L (ref 9–17)
BASOPHILS # BLD: 0 % (ref 0–2)
BASOPHILS ABSOLUTE: 0 K/UL (ref 0–0.2)
BUN BLDV-MCNC: 33 MG/DL (ref 8–23)
CALCIUM SERPL-MCNC: 8.1 MG/DL (ref 8.6–10.4)
CHLORIDE BLD-SCNC: 114 MMOL/L (ref 98–107)
CO2: 20 MMOL/L (ref 20–31)
CREAT SERPL-MCNC: 1.25 MG/DL (ref 0.7–1.2)
EOSINOPHILS RELATIVE PERCENT: 2 % (ref 0–4)
FERRITIN: 76 NG/ML (ref 30–400)
FOLATE: >20 NG/ML
GFR AFRICAN AMERICAN: >60 ML/MIN
GFR NON-AFRICAN AMERICAN: 56 ML/MIN
GFR SERPL CREATININE-BSD FRML MDRD: ABNORMAL ML/MIN/{1.73_M2}
GLUCOSE BLD-MCNC: 130 MG/DL (ref 70–99)
HCT VFR BLD CALC: 27.2 % (ref 41–53)
HEMOGLOBIN: 8.9 G/DL (ref 13.5–17.5)
IGA: 98 MG/DL (ref 70–400)
IGG: <300 MG/DL (ref 700–1600)
IGM: <25 MG/DL (ref 40–230)
IRON SATURATION: 16 % (ref 20–55)
IRON: 31 UG/DL (ref 59–158)
LYMPHOCYTES # BLD: 22 % (ref 24–44)
MCH RBC QN AUTO: 25.8 PG (ref 26–34)
MCHC RBC AUTO-ENTMCNC: 32.6 G/DL (ref 31–37)
MCV RBC AUTO: 79.2 FL (ref 80–100)
MONOCYTES # BLD: 9 % (ref 1–7)
MORPHOLOGY: ABNORMAL
MRSA, DNA, NASAL: NEGATIVE
PDW BLD-RTO: 18 % (ref 11.5–14.9)
PLATELET # BLD: 151 K/UL (ref 150–450)
PMV BLD AUTO: 8.5 FL (ref 6–12)
POTASSIUM SERPL-SCNC: 4.1 MMOL/L (ref 3.7–5.3)
RBC # BLD: 3.44 M/UL (ref 4.5–5.9)
RETIC %: 1.5 % (ref 0.5–2)
SEG NEUTROPHILS: 67 % (ref 36–66)
SEGMENTED NEUTROPHILS ABSOLUTE COUNT: 4.28 K/UL (ref 1.3–9.1)
SODIUM BLD-SCNC: 143 MMOL/L (ref 135–144)
SPECIMEN DESCRIPTION: NORMAL
TOTAL IRON BINDING CAPACITY: 199 UG/DL (ref 250–450)
UNSATURATED IRON BINDING CAPACITY: 168 UG/DL (ref 112–347)
VITAMIN B-12: 737 PG/ML (ref 232–1245)
WBC # BLD: 6.4 K/UL (ref 3.5–11)

## 2022-09-03 PROCEDURE — 83550 IRON BINDING TEST: CPT

## 2022-09-03 PROCEDURE — 97110 THERAPEUTIC EXERCISES: CPT

## 2022-09-03 PROCEDURE — 2580000003 HC RX 258: Performed by: STUDENT IN AN ORGANIZED HEALTH CARE EDUCATION/TRAINING PROGRAM

## 2022-09-03 PROCEDURE — 6370000000 HC RX 637 (ALT 250 FOR IP): Performed by: FAMILY MEDICINE

## 2022-09-03 PROCEDURE — 85025 COMPLETE CBC W/AUTO DIFF WBC: CPT

## 2022-09-03 PROCEDURE — 82784 ASSAY IGA/IGD/IGG/IGM EACH: CPT

## 2022-09-03 PROCEDURE — 82746 ASSAY OF FOLIC ACID SERUM: CPT

## 2022-09-03 PROCEDURE — 36415 COLL VENOUS BLD VENIPUNCTURE: CPT

## 2022-09-03 PROCEDURE — 83540 ASSAY OF IRON: CPT

## 2022-09-03 PROCEDURE — 97116 GAIT TRAINING THERAPY: CPT

## 2022-09-03 PROCEDURE — 99233 SBSQ HOSP IP/OBS HIGH 50: CPT | Performed by: INTERNAL MEDICINE

## 2022-09-03 PROCEDURE — 6360000002 HC RX W HCPCS: Performed by: STUDENT IN AN ORGANIZED HEALTH CARE EDUCATION/TRAINING PROGRAM

## 2022-09-03 PROCEDURE — 2580000003 HC RX 258: Performed by: FAMILY MEDICINE

## 2022-09-03 PROCEDURE — 82607 VITAMIN B-12: CPT

## 2022-09-03 PROCEDURE — 82728 ASSAY OF FERRITIN: CPT

## 2022-09-03 PROCEDURE — 94640 AIRWAY INHALATION TREATMENT: CPT

## 2022-09-03 PROCEDURE — 71045 X-RAY EXAM CHEST 1 VIEW: CPT

## 2022-09-03 PROCEDURE — 80048 BASIC METABOLIC PNL TOTAL CA: CPT

## 2022-09-03 PROCEDURE — 2060000000 HC ICU INTERMEDIATE R&B

## 2022-09-03 PROCEDURE — 85045 AUTOMATED RETICULOCYTE COUNT: CPT

## 2022-09-03 PROCEDURE — 94761 N-INVAS EAR/PLS OXIMETRY MLT: CPT

## 2022-09-03 PROCEDURE — 2700000000 HC OXYGEN THERAPY PER DAY

## 2022-09-03 RX ADMIN — LOSARTAN POTASSIUM 50 MG: 50 TABLET, FILM COATED ORAL at 08:28

## 2022-09-03 RX ADMIN — Medication 1 CAPSULE: at 08:28

## 2022-09-03 RX ADMIN — MEGESTROL ACETATE 400 MG: 40 SUSPENSION ORAL at 08:27

## 2022-09-03 RX ADMIN — SUCRALFATE 1 G: 1 TABLET ORAL at 19:53

## 2022-09-03 RX ADMIN — SUCRALFATE 1 G: 1 TABLET ORAL at 04:54

## 2022-09-03 RX ADMIN — IPRATROPIUM BROMIDE AND ALBUTEROL SULFATE 3 ML: 2.5; .5 SOLUTION RESPIRATORY (INHALATION) at 07:04

## 2022-09-03 RX ADMIN — APIXABAN 2.5 MG: 2.5 TABLET, FILM COATED ORAL at 19:53

## 2022-09-03 RX ADMIN — LEVOTHYROXINE SODIUM 150 MCG: 0.07 TABLET ORAL at 04:54

## 2022-09-03 RX ADMIN — CARVEDILOL 6.25 MG: 6.25 TABLET, FILM COATED ORAL at 08:28

## 2022-09-03 RX ADMIN — ATORVASTATIN CALCIUM 10 MG: 10 TABLET, FILM COATED ORAL at 08:28

## 2022-09-03 RX ADMIN — FLUOXETINE 20 MG: 20 CAPSULE ORAL at 08:28

## 2022-09-03 RX ADMIN — SODIUM CHLORIDE, PRESERVATIVE FREE 10 ML: 5 INJECTION INTRAVENOUS at 08:27

## 2022-09-03 RX ADMIN — Medication 2 PUFF: at 07:11

## 2022-09-03 RX ADMIN — PANTOPRAZOLE SODIUM 40 MG: 40 TABLET, DELAYED RELEASE ORAL at 04:54

## 2022-09-03 RX ADMIN — OXYCODONE HYDROCHLORIDE 10 MG: 10 TABLET, FILM COATED, EXTENDED RELEASE ORAL at 23:24

## 2022-09-03 RX ADMIN — MULTIPLE VITAMINS W/ MINERALS TAB 1 TABLET: TAB at 08:28

## 2022-09-03 RX ADMIN — SUCRALFATE 1 G: 1 TABLET ORAL at 11:00

## 2022-09-03 RX ADMIN — SUCRALFATE 1 G: 1 TABLET ORAL at 18:11

## 2022-09-03 RX ADMIN — CARVEDILOL 6.25 MG: 6.25 TABLET, FILM COATED ORAL at 18:11

## 2022-09-03 RX ADMIN — IPRATROPIUM BROMIDE AND ALBUTEROL SULFATE 3 ML: 2.5; .5 SOLUTION RESPIRATORY (INHALATION) at 20:26

## 2022-09-03 RX ADMIN — Medication 2 PUFF: at 20:35

## 2022-09-03 RX ADMIN — GUAIFENESIN 600 MG: 600 TABLET, EXTENDED RELEASE ORAL at 19:53

## 2022-09-03 RX ADMIN — SODIUM CHLORIDE: 9 INJECTION, SOLUTION INTRAVENOUS at 04:06

## 2022-09-03 RX ADMIN — APIXABAN 2.5 MG: 2.5 TABLET, FILM COATED ORAL at 08:28

## 2022-09-03 RX ADMIN — IPRATROPIUM BROMIDE AND ALBUTEROL SULFATE 3 ML: 2.5; .5 SOLUTION RESPIRATORY (INHALATION) at 13:06

## 2022-09-03 RX ADMIN — SODIUM CHLORIDE, PRESERVATIVE FREE 10 ML: 5 INJECTION INTRAVENOUS at 08:30

## 2022-09-03 RX ADMIN — DONEPEZIL HYDROCHLORIDE 10 MG: 10 TABLET ORAL at 08:27

## 2022-09-03 RX ADMIN — GUAIFENESIN 600 MG: 600 TABLET, EXTENDED RELEASE ORAL at 08:28

## 2022-09-03 RX ADMIN — CEFEPIME 2000 MG: 2 INJECTION, POWDER, FOR SOLUTION INTRAVENOUS at 11:52

## 2022-09-03 RX ADMIN — OXYCODONE HYDROCHLORIDE 10 MG: 10 TABLET, FILM COATED, EXTENDED RELEASE ORAL at 11:48

## 2022-09-03 ASSESSMENT — PAIN DESCRIPTION - LOCATION: LOCATION: ABDOMEN

## 2022-09-03 ASSESSMENT — PAIN DESCRIPTION - ORIENTATION: ORIENTATION: LOWER

## 2022-09-03 ASSESSMENT — PAIN SCALES - GENERAL
PAINLEVEL_OUTOF10: 7
PAINLEVEL_OUTOF10: 5

## 2022-09-03 ASSESSMENT — PAIN DESCRIPTION - DESCRIPTORS: DESCRIPTORS: ACHING

## 2022-09-03 NOTE — PROGRESS NOTES
BRONCHOSPASM/BRONCHOCONSTRICTION     [x]         IMPROVE AERATION/BREATH SOUNDS  [x]   ADMINISTER BRONCHODILATOR THERAPY AS APPROPRIATE  [x]   ASSESS BREATH SOUNDS  []   IMPLEMENT AEROSOL/MDI PROTOCOL  [x]   PATIENT EDUCATION AS NEEDED  PROVIDE ADEQUATE OXYGENATION WITH ACCEPTABLE SP02/ABG'S    [x]  IDENTIFY APPROPRIATE OXYGEN THERAPY  []   MONITOR SP02/ABG'S AS NEEDED   [x]   PATIENT EDUCATION AS NEEDED     Stable on 2L NC/RA. Diminished with expiratory wheezes present on the right side.

## 2022-09-03 NOTE — PROGRESS NOTES
_                         Today's Date: 9/3/2022  Patient Name: Fariba Gage  Date of admission: 8/31/2022  1:04 PM  Patient's age: 74 y.o., 1950  Admission Dx: Lymphoma, diffuse ([facility] [address] [C85.90]  Acute cystitis with hematuria [N30.01]  Pneumonia due to infectious organism, unspecified laterality, unspecified part of lung [J18.9]      Requesting Physician: Iad Mendoza DO    CHIEF COMPLAINT:  weakness and fatigue. SOB. Pneumonia. UTI. NHL    SUBJECTIVE:  Patient was seen and examined. Clinically stable. No events overnight. Overall he is feeling better. Better appetite. No active bleeding. No fever. Labs are stable. Creatinine down to 1.25    BRIEF CASE HISTORY:    The patient is a 74 y.o.  male who is admitted to the hospital for further management of UTI, pneumonia and failure to thrive. He was having increasing weakness and fatigue. He has UTI secondary to stents. He had SOB and cough. CT scan showed pneumonia. Started on antibiotics with IVF and nutritional support. He feels better. Patient is on Imbruvica for NHL. 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 Evidence of relapse with biopsy-proven follicular lymphoma. 1-2 from treated medically lymph node biopsy July 6, 2021     CURRENT THERAPY:    1.   Observation. 2.   Status post treatment with Bexxar in May 2008. 3.   Status post previous treatment with Rituxan. 4.  Start treatment with Rituxan August 23, 2021. completed4 weeks of rituximab on 9/20/2021  5. Induction Rituxan is followed by consolidation Rituxan maintenance   6. Due to progression we started Imbruvica July 2022. INTERIM HISTORY:  The patient is seen for follow-up of his lymphoma. Further work-up showed enlarged lymph nodes in the retroperitoneum and iliac area. Biopsy proved recurrence of follicular lymphoma.    Patient did well Take 10 mg by mouth daily   Yes Historical Provider, MD   ibrutinib (IMBRUVICA) 420 MG tablet Take 1 tablet by mouth daily 8/22/22 9/21/22  Rosalia Foster MD   oxyCODONE (OXYCONTIN) 10 MG extended release tablet Take 10 mg by mouth in the morning and 10 mg in the evening. Historical Provider, MD   oxyCODONE (ROXICODONE) 5 MG immediate release tablet Take 5 mg by mouth every 4 hours as needed for Pain. Historical Provider, MD   prochlorperazine (COMPAZINE) 10 MG tablet Take 1 tablet by mouth every 6 hours as needed (nausea vomiting) 7/26/22   Rosalia Foster MD   megestrol (MEGACE) 40 MG/ML suspension Take 10 mLs by mouth in the morning. 7/25/22   Rosalia Foster MD   apixaban (ELIQUIS) 5 MG TABS tablet Take 1 tablet by mouth in the morning and 1 tablet before bedtime. 7/21/22   Roderick Metcalf,    sucralfate (CARAFATE) 1 GM tablet Take 1 tablet by mouth in the morning and 1 tablet at noon and 1 tablet in the evening and 1 tablet before bedtime. 7/21/22   Roderick Metcalf,    glipiZIDE (GLUCOTROL) 5 MG tablet Take 0.5 tablets by mouth in the morning and 0.5 tablets in the evening. Take before meals. Patient not taking: Reported on 8/12/2022 7/21/22 8/13/22  Roderick Metcalf DO   carvedilol (COREG) 6.25 MG tablet Take 6.25 mg by mouth 2 times daily    Historical Provider, MD   psyllium (KONSYL) 28.3 % PACK Take 1 packet by mouth daily    Historical Provider, MD   Lactobacillus (PROBIOTIC ACIDOPHILUS) CAPS Take 1 caplet by mouth daily    Historical Provider, MD   Multiple Vitamins-Minerals (THERAPEUTIC MULTIVITAMIN-MINERALS) tablet Take 1 tablet by mouth daily    Historical Provider, MD   OXYGEN Inhale 2 L into the lungs at bedtime Nightly & during the day.     Historical Provider, MD   ipratropium-albuterol (DUONEB) 0.5-2.5 (3) MG/3ML SOLN nebulizer solution Inhale 3 mLs into the lungs every 4 hours as needed for Shortness of Breath 2/20/22   Ariadne Knapp, APRN - CNP   levothyroxine (SYNTHROID) 150 MCG tablet Take 1 tablet by mouth Daily 2/2/22   Duc Jordan,    fluticasone-salmeterol (ADVAIR) 100-50 MCG/DOSE diskus inhaler Inhale 1 puff into the lungs every 12 hours 2/1/22   Duc Jordan, DO   furosemide (LASIX) 20 MG tablet Take 1 tablet by mouth daily  Patient taking differently: Take 20 mg by mouth See Admin Instructions Pt only takes when legs start to swell 2/1/22   Brent Ulloa,    albuterol sulfate HFA (PROVENTIL;VENTOLIN;PROAIR) 108 (90 Base) MCG/ACT inhaler Inhale 2 puffs into the lungs every 4 hours as needed for Wheezing     Historical Provider, MD   amLODIPine (NORVASC) 10 MG tablet Take 1 tablet by mouth daily  Patient not taking: Reported on 8/22/2022 10/12/21   Mercy Metcalf, DO   losartan (COZAAR) 50 MG tablet Take 1 tablet by mouth daily 10/12/21   Roderick Metcalf, DO   donepezil (ARICEPT) 10 MG tablet Take 10 mg by mouth daily  7/11/21   Historical Provider, MD   FLUoxetine (PROZAC) 20 MG capsule Take 20 mg by mouth daily    Historical Provider, MD   omeprazole (PRILOSEC) 20 MG delayed release capsule Take 20 mg by mouth 2 times daily    Historical Provider, MD     Current Facility-Administered Medications   Medication Dose Route Frequency Provider Last Rate Last Admin    0.9 % sodium chloride infusion   IntraVENous Continuous Roderick Metcalf, DO 75 mL/hr at 09/03/22 0406 New Bag at 09/03/22 0406    guaiFENesin (MUCINEX) extended release tablet 600 mg  600 mg Oral BID Roderickevgeny Metcalf, DO   600 mg at 09/03/22 0828    cefepime (MAXIPIME) 2,000 mg in sodium chloride 0.9 % 50 mL IVPB mini-bag  2,000 mg IntraVENous Q24H Lisa Albany, DO   Stopped at 09/02/22 1306    sodium chloride flush 0.9 % injection 5-40 mL  5-40 mL IntraVENous 2 times per day Emilio Prabhakar, DO   10 mL at 09/03/22 0830    sodium chloride flush 0.9 % injection 5-40 mL  5-40 mL IntraVENous PRN Emilio Prabhakar DO   10 mL at 09/02/22 0817    0.9 % sodium chloride infusion   IntraVENous PRN Santana Mater, DO        acetaminophen (TYLENOL) tablet 650 mg  650 mg Oral Q4H PRN Emilio Payvandi, DO        ondansetron (ZOFRAN-ODT) disintegrating tablet 4 mg  4 mg Oral Q8H PRN Emilio Payvandi, DO        Or    ondansetron (ZOFRAN) injection 4 mg  4 mg IntraVENous Q6H PRN Emilio Payvandi, DO        morphine (PF) injection 2 mg  2 mg IntraVENous Q2H PRN Emilio Payvandi, DO        Or    morphine sulfate (PF) injection 4 mg  4 mg IntraVENous Q2H PRN Santana Mater, DO        apixaban (ELIQUIS) tablet 2.5 mg  2.5 mg Oral BID Roderick T Dixon, DO   2.5 mg at 09/03/22 0828    atorvastatin (LIPITOR) tablet 10 mg  10 mg Oral Daily Roderick T Dixon, DO   10 mg at 09/03/22 0828    carvedilol (COREG) tablet 6.25 mg  6.25 mg Oral BID WC Roderick T Dixon, DO   6.25 mg at 09/03/22 0828    donepezil (ARICEPT) tablet 10 mg  10 mg Oral Daily Roderick T Dixon, DO   10 mg at 09/03/22 0827    FLUoxetine (PROZAC) capsule 20 mg  20 mg Oral Daily Roderick T Dixon, DO   20 mg at 09/03/22 0828    budesonide-formoterol (SYMBICORT) 80-4.5 MCG/ACT inhaler 2 puff  2 puff Inhalation BID Francisco J Ripper, DO   2 puff at 09/03/22 0711    ibrutinib (IMBRUVICA) chemo tablet 420 mg  420 mg Oral Daily Roderick T Dixon, DO        ipratropium-albuterol (DUONEB) nebulizer solution 3 mL  1 vial Inhalation Q6H WA Roderick T Dixon, DO   3 mL at 09/03/22 0704    lactobacillus (CULTURELLE) capsule 1 capsule  1 capsule Oral Daily Roderick T Dixon, DO   1 capsule at 09/03/22 0828    levothyroxine (SYNTHROID) tablet 150 mcg  150 mcg Oral Daily Roderick T Dixon, DO   150 mcg at 09/03/22 0454    losartan (COZAAR) tablet 50 mg  50 mg Oral Daily Roderick T Dixon, DO   50 mg at 09/03/22 0828    megestrol (MEGACE) 40 MG/ML suspension 400 mg  400 mg Oral Daily Roderick T Dixon, DO   400 mg at 09/03/22 0827    therapeutic multivitamin-minerals 1 tablet  1 tablet Oral Daily Roderick T Dixon, DO   1 tablet at 09/03/22 0828    pantoprazole (PROTONIX) tablet 40 mg  40 mg Oral [facility] Adali Capuchin, DO   40 mg at 09/03/22 0454    oxyCODONE (OXYCONTIN) extended release tablet 10 mg  10 mg Oral Q12H Roderick T Dixon, DO   10 mg at 09/02/22 2312    oxyCODONE (ROXICODONE) immediate release tablet 5 mg  5 mg Oral Q4H PRN Roderick T Dixon, DO        prochlorperazine (COMPAZINE) tablet 10 mg  10 mg Oral Q6H PRN Roderick T Dixon, DO        sucralfate (CARAFATE) tablet 1 g  1 g Oral 4x Daily AC & HS Roderick T Dixon, DO   1 g at 09/03/22 0454    psyllium (METAMUCIL) 58.12 % packet 1 packet  1 packet Oral Daily Adali Capuchin, DO   1 packet at 09/02/22 5622       Allergies:  Patient has no known allergies. REVIEW OF SYSTEMS:      General: + weakness + fatigue. + unanticipated weight loss or decreased appetite. No fever or chills. Eyes: No blurred vision, eye pain or double vision. Ears: No hearing problems or drainage. No tinnitus. Throat: No sore throat, problems with swallowing or dysphagia. Respiratory: as above     Cardiovascular: No chest pain, orthopnea or PND. No lower extremity edema. No palpitation. Gastrointestinal: as above   Genitourinary: No dysuria, hematuria, frequency or urgency. Musculoskeletal: No muscle aches or pains. No limitation of movement. No back pain. No gait disturbance, No joint complaints. Dermatologic: No skin rashes or pruritus. No skin lesions or discolorations. Psychiatric: No depression, anxiety, or stress or signs of schizophrenia. No change in mood or affect. Hematologic: No history of bleeding tendency. No bruises or ecchymosis. No history of clotting problems. Infectious disease: No fever, chills or frequent infections. Endocrine: No polydipsia or polyuria. No temperature intolerance. Neurologic: No headaches or dizziness. No weakness or numbness of the extremities. No changes in balance, coordination,  memory, mentation, behavior. Allergic/Immunologic: No nasal congestion or hives. No repeated infections.        PHYSICAL EXAM:      BP (!) 178/77 pm    TECHNIQUE:  CT of the abdomen and pelvis was performed without the administration of  intravenous contrast. Multiplanar reformatted images are provided for review. Automated exposure control, iterative reconstruction, and/or weight based  adjustment of the mA/kV was utilized to reduce the radiation dose to as low  as reasonably achievable. COMPARISON:  08/12/2022    HISTORY:  Acute lower abdominal pain. FINDINGS:  Lower Chest: New left lower lobe consolidation. Dependent atelectasis in the  right lower lobe. Coronary artery calcification. Organs: Cholecystectomy. Calcified splenic granulomas. Bilateral ureteral  stents remain in place. Stable small right renal stone along with a  hyperdense right renal cyst.  Remaining solid organs are unremarkable. GI/Bowel: Stomach and small bowel are unremarkable. Normal appendix. Extensive colonic diverticulosis without definite acute inflammation. Pelvis: Bladder is unremarkable. Prostate is enlarged. No lymphadenopathy  or free fluid. Peritoneum/Retroperitoneum: Abnormal retroperitoneal soft tissue, left  greater than right, with involvement of the left kidney has not significantly  changed. No ascites. Severe atherosclerotic disease without abdominal  aortic aneurysm. Bones/Soft Tissues: No suspicious osseous lesions. Impression: 1. New left lower lobe consolidation is suspicious for pneumonia. 2. No significant change in abnormal retroperitoneal soft tissue, left  greater than right, with involvement of the left kidney. This is consistent  with known lymphoma. 3. Extensive colonic diverticulosis without definite diverticulitis. 4. Enlarged prostate.             IMPRESSION:    Primary Problem  Lymphoma, diffuse ([address]    Active Hospital Problems    Diagnosis Date Noted    Severe malnutrition ([address] [E43] 09/01/2022     Priority: Medium    Acute cystitis with hematuria [N30.01] 09/01/2022     Priority: Medium    Lymphoma, diffuse ([address]

## 2022-09-03 NOTE — PLAN OF CARE
Problem: Safety - Adult  Goal: Free from fall injury  9/3/2022 0325 by Maryse Gaitan RN  Outcome: Progressing  Note: No falls this shift. Pt appropriately uses call light. Pt ambulates to the bathroom with one staff assist and a walker. Problem: Skin/Tissue Integrity  Goal: Absence of new skin breakdown  Description: 1. Monitor for areas of redness and/or skin breakdown  2. Assess vascular access sites hourly  3. Every 4-6 hours minimum:  Change oxygen saturation probe site  4. Every 4-6 hours:  If on nasal continuous positive airway pressure, respiratory therapy assess nares and determine need for appliance change or resting period. 9/3/2022 0325 by Maryse Gaitan RN  Outcome: Progressing  Note: Pt skin assessed this shift. No new skin breakdown noted. Pt able to turn self in bed as needed. Problem: Chronic Conditions and Co-morbidities  Goal: Patient's chronic conditions and co-morbidity symptoms are monitored and maintained or improved  9/3/2022 0325 by Maryse Gaitan RN  Outcome: Progressing     Problem: Pain  Goal: Verbalizes/displays adequate comfort level or baseline comfort level  Outcome: Progressing  Note: Pt on routine oxycontin for pain issues. No pt distress, pain appeared to be under control this shift.

## 2022-09-03 NOTE — PROGRESS NOTES
Physical Therapy  Facility/Department: [facility] PROGRESSIVE CARE  Daily Treatment Note  NAME: Fariba Gage  : 1950  MRN: [id number]    Date of Service: 9/3/2022    Discharge Recommendations:  Home with assist PRN, Home with Home health PT   PT Equipment Recommendations  Equipment Needed: No    Patient Diagnosis(es): The primary encounter diagnosis was Pneumonia due to infectious organism, unspecified laterality, unspecified part of lung. A diagnosis of Acute cystitis with hematuria was also pertinent to this visit. Assessment   Activity Tolerance: Patient limited by fatigue;Treatment limited secondary to medical complications (SOB , SpO2 > 90% 2 LO2 )  Equipment Needed: No     Plan    Plan  Plan: 5-7 times per week  Current Treatment Recommendations: Functional mobility training;Transfer training;Strengthening; Endurance training;Stair training;Gait training; Safety education & training     Restrictions  Restrictions/Precautions  Restrictions/Precautions: Fall Risk, General Precautions  Required Braces or Orthoses?: No  Implants present? :  (denies)     Subjective    Subjective  Subjective: nursing Lili Infante approves treatment , pt agreable , requests to return to bed post ambulation due to fatigue  Pain: 5/10 right lower abdomen chronic > 1 yr due to\" shingles\"  Orientation  Overall Orientation Status: Within Functional Limits     Objective   Vitals  Heart Rate: 72  SpO2: 96 %  O2 Device: Nasal cannula (2 L O2)  Bed Mobility Training  Bed Mobility Training: Yes (extended rest break post transfer for recovery SOB)  Supine to Sit: Stand-by assistance (head of bed elevated left hand rail)  Sit to Supine: Stand-by assistance (increased time LE clearance)  Scooting: Stand-by assistance  Balance  Sitting: With support (UEs)  Standing: With support (RW)  Transfer Training  Transfer Training: Yes  Sit to Stand: Contact-guard assistance (EOB)  Stand to Sit: Contact-guard assistance  Gait Training  Gait Training: Yes  Gait  Overall Level of Assistance: Contact-guard assistance  Interventions: Verbal cues; Safety awareness training  Base of Support: Widened  Speed/Marta: Slow  Step Length: Left shortened;Right shortened  Gait Abnormalities: Shuffling gait; Decreased step clearance  Distance (ft): 25 Feet (SpO2 95 % HR 80 pt requests to return to bed post ambulation due to fatigue)    Goals  Short Term Goals  Time Frame for Short term goals: 4-5 days  Short term goal 1: transfers with supervision for safe ADLs  Short term goal 2: gait with Rollator and supervision x 100-150ft for safe community ambulation  Short term goal 3: pt able to tolerate 20min of therapeutic activity/exercises to improve endurance for functional activites  Short term goal 4: negotiate 1-2 steps with rail and SBA for safe entry into home    Education  Patient Education  Education Given To: Patient  Education Provided: Plan of Care;Energy Conservation  Education Method: Verbal  Barriers to Learning: Other (Comment) (SOB endurance)  Education Outcome: Continued education needed    Therapy Time   Individual Concurrent Group Co-treatment   Time In (P) 0915         Time Out (P) 0940         Minutes (P) 25             AM-[facility] Mobility Inpatient   How much difficulty turning over in bed?: None  How much difficulty sitting down on / standing up from a chair with arms?: A Little  How much difficulty moving from lying on back to sitting on side of bed?: None  How much help from another person moving to and from a bed to a chair?: A Little  How much help from another person needed to walk in hospital room?: A Little  How much help from another person for climbing 3-5 steps with a railing?: Total  AM-PAC Inpatient Mobility Raw Score : 18  AM-PAC Inpatient T-Scale Score : 43.63  Mobility Inpatient CMS 0-100% Score: 46.58  Mobility Inpatient CMS G-Code Modifier : YOGESH Salas, DIONNE

## 2022-09-03 NOTE — CARE COORDINATION
Pre-cert was started for pt to go to [facility] on 9/2/2022. No updates have been given regarding pre-cert. SW will follow up with facility tomorrow; however, pre-cert is not likely to return on Tuesday. SW will continue to follow for discharge planning.

## 2022-09-03 NOTE — PROGRESS NOTES
[id number] [employer]      PROGRESS NOTE        Patient:  Bozena Aleman  YOB: 1950    MRN: [id number]     Acct: [de-identified]     Admit date: 8/31/2022    Pt seen and Chart reviewed. Consultant notes reviewed and care evaluated. Subjective: Patient feels okay his wife is in the room he is improving. Had a long discussion with her about him when he goes home he still declining otherwise he perks up in the hospital I feel the mostly fluid the medicine. She said he sees old male at home and that he is goes in sleep all the time does not he keeps up with his fluid at home. She is at hand planning this time may be to go to [facility] [facility] if he qualifies. Awaiting pre-CERT otherwise he is eating better here is doing better with therapy. He is sleeping okay his pain comes and goes but when he takes the pain medications he is comfortable. Patient chest x-ray is negative which is a mention since beginning out that he has pneumonia he is responding for the UTI pending his culture and the results before switching his cefepime to something else and his kidney function improving with hydration    Diet:  ADULT DIET;  Regular      Medications:Current Inpatient    Scheduled Meds:   guaiFENesin  600 mg Oral BID    cefepime  2,000 mg IntraVENous Q24H    sodium chloride flush  5-40 mL IntraVENous 2 times per day    apixaban  2.5 mg Oral BID    atorvastatin  10 mg Oral Daily    carvedilol  6.25 mg Oral BID WC    donepezil  10 mg Oral Daily    FLUoxetine  20 mg Oral Daily    budesonide-formoterol  2 puff Inhalation BID    ibrutinib  420 mg Oral Daily    ipratropium-albuterol  1 vial Inhalation Q6H WA    lactobacillus  1 capsule Oral Daily    levothyroxine  150 mcg Oral Daily    losartan  50 mg Oral Daily    megestrol  400 mg Oral Daily    therapeutic multivitamin-minerals  1 tablet Oral Daily    pantoprazole  40 mg Oral QAM AC    oxyCODONE  10 mg Oral Q12H sucralfate  1 g Oral 4x Daily AC & HS    psyllium  1 packet Oral Daily     Continuous Infusions:   sodium chloride 75 mL/hr at 09/03/22 0406    sodium chloride       PRN Meds:sodium chloride flush, sodium chloride, acetaminophen, ondansetron **OR** ondansetron, morphine **OR** morphine, oxyCODONE, prochlorperazine        Physical Exam:  Vitals: BP (!) 153/62   Pulse 75   Temp 97.7 °F (36.5 °C) (Oral)   Resp 16   Ht 5' 10\" (1.778 m)   Wt 153 lb 10.6 oz (69.7 kg)   SpO2 97%   BMI 22.05 kg/m²   24 hour intake/output:  Intake/Output Summary (Last 24 hours) at 9/3/2022 1344  Last data filed at 9/3/2022 1214  Gross per 24 hour   Intake 2071.12 ml   Output --   Net 2071.12 ml     Last 3 weights: Wt Readings from Last 3 Encounters:   09/02/22 153 lb 10.6 oz (69.7 kg)   08/22/22 160 lb 3.2 oz (72.7 kg)   08/12/22 173 lb (78.5 kg)       Physical Examination:   General appearance - alert, well appearing, and in no distress  Mental status - alert, oriented to person, place, and time  oropharynx clear, no lesions  Chest - clear to auscultation, no wheezes, rales or rhonchi, symmetric air entry  Heart - normal rate, regular rhythm, normal S1, S2, no murmurs, rubs, clicks or gallops  Abdomen - soft, nontender, nondistended, no masses or organomegaly  Neurological - alert, oriented, normal speech, no focal findings or movement disorder noted}  Extremities - peripheral pulses normal, no pedal edema, no clubbing or cyanosis  Skin - normal coloration and turgor, no rashes, no suspicious skin lesions noted     XR CHEST PORTABLE [[id number]]    Collected: 09/03/22 0932    Updated: 09/03/22 1134    Narrative:     EXAMINATION:   ONE XRAY VIEW OF THE CHEST     9/3/2022 9:30 am     COMPARISON:   Chest radiograph performed 07/18/2022. HISTORY:   ORDERING SYSTEM PROVIDED HISTORY: Lymphoma   TECHNOLOGIST PROVIDED HISTORY:   Lymphoma   Reason for Exam: Lymphoma     FINDINGS:   The lungs are without consolidation or effusion.   There is no pneumothorax. The mediastinal structures are unremarkable. The upper abdomen unremarkable. The extrathoracic soft tissues are unremarkable. There is a right internal   jugular port. Impression:     No acute cardiopulmonary process. MRSA DNA Probe, Nasal [[id number]]    Collected: 09/02/22 0840    Updated: 09/03/22 1047    Specimen Source: Nasal     Specimen Description . NASAL SWAB    MRSA, DNA, Nasal NEGATIVE    Comment: NEGATIVE:  MRSA DNA not detected by nucleic acid amplification. Results should be used as an adjunct to nosocomial control efforts to identify patients   needing enhanced precautions. The test is not intended to identify patients with staphylococcal infections. Results   should not be used to guide or monitor treatment for MRSA infections.        CBC with Auto Differential [[id number]] (Abnormal)    Collected: 09/03/22 0547    Updated: 09/03/22 0702    Specimen Source: Blood     WBC 6.4 k/uL    RBC 3.44 Low  m/uL    Hemoglobin 8.9 Low  g/dL    Hematocrit 27.2 Low  %    MCV 79.2 Low  fL    MCH 25.8 Low  pg    MCHC 32.6 g/dL    RDW 18.0 High  %    Platelets 162 k/uL    MPV 8.5 fL    Seg Neutrophils 67 High  %    Lymphocytes 22 Low  %    Monocytes 9 High  %    Eosinophils % 2 %    Basophils 0 %    Segs Absolute 4.28 k/uL    Absolute Lymph # 1.41 k/uL    Absolute Mono # 0.58 k/uL    Absolute Eos # 0.13 k/uL    Basophils Absolute 0.00 k/uL    Morphology ANISOCYTOSIS PRESENT    Morphology 1+ ELLIPTOCYTES    Morphology FEW ECHINOCYTES   Basic Metabolic Panel [[id number]] (Abnormal)    Collected: 09/03/22 0547    Updated: 09/03/22 0620    Specimen Source: Blood     Glucose 130 High  mg/dL    BUN 33 High  mg/dL    Creatinine 1.25 High  mg/dL    Calcium 8.1 Low  mg/dL    Sodium 143 mmol/L    Potassium 4.1 mmol/L    Chloride 114 High  mmol/L    CO2 20 mmol/L    Anion Gap 9 mmol/L    GFR Non-African American 56 Low  mL/min    GFR  American >60 mL/min    GFR Comment         Comment: Average GFR for 79or more years old:    76 mL/min/1.73sq m   Chronic Kidney Disease:    <60 mL/min/1.73sq m   Kidney failure:    <15 mL/min/1.73sq m               eGFR calculated using average adult body mass. Additional eGFR calculator available at:         [email or web address]             Culture, Blood 1 [[id number]]    Collected: 08/31/22 1748    Updated: 09/02/22 2229    Specimen Source: Blood     Specimen Description . BLOOD    Culture NO GROWTH 2 DAYS   Culture, Blood 1 [[id number]]    Collected: 08/31/22 1748    Updated: 09/02/22 2227    Specimen Source: Blood     Specimen Description . BLOOD    Culture NO GROWTH 2 DAYS   Culture, Blood 1 [[id number]]    Collected: 09/01/22 0939    Updated: 09/02/22 1423    Specimen Source: Blood     Specimen Description . BLOOD    Culture NO GROWTH 1 DAY   Culture, Blood 1 [[id number]]    Collected: 09/01/22 0939    Updated: 09/02/22 1423    Specimen Source: Blood     Specimen Description . BLOOD    Culture NO GROWTH 1 DAY     Current IP Meds      Assessment:  Principal Problem:    Lymphoma, diffuse ([facility])  Active Problems:    Severe malnutrition ([address]    Acute cystitis with hematuria  Resolved Problems:    * No resolved hospital problems. *  Resolved Problems:    * No resolved hospital problems.  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    Hydronephrosis due to obstruction of ureter N13.1    Moderate malnutrition ([facility]) E44.0    Chronic cholecystitis K81.1    Pleural effusion J90    Cardiomegaly I51.7    Hypothyroidism E03.9    Diabetes mellitus ([facility]) E11.9    Hypertensive urgency I16.0    Acute respiratory failure with hypoxia ([facility]) J96.01    Hypoxia R09.02    Carotid stenosis, asymptomatic, bilateral I65.23    Simple chronic bronchitis ([facility] J41.0    Dependence on nocturnal oxygen therapy Z99.81    Pneumonia J18.9    LAURO (acute kidney injury) ([facility] N17.9    Failure to thrive in adult R62.7    DVT (deep venous thrombosis) ([facility] I82.409    Dehydration E86.0    Lymphoma, diffuse ([facility]) C85.90      Weakness most likely secondary to his lymphoma and treatment.     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    Renal insufficiency    It is dehydration second is chronic second to the mass and hydronephrosis  Still negative chest x-ray            Plan:  Continue with the cefepime pending sensitivity on the urine    Continue with IV fluid to 75 an hour    We will continue monitor his progress    Awaiting pre-CERT to go to ECF    As mentioned above had a long discussion with his wife and patient and encouraged him that if he goes home to make sure he hydrates himself we also discussed the possibility that if he starts slowly declining we could always arrange for his visiting nurse to apply a IV fluid at home which his wife would like to have that may be started before bringing him to the hospital.    Brittanie Franco, [facility]             9/3/2022, 1:44 PM

## 2022-09-04 LAB
ABSOLUTE EOS #: 0.1 K/UL (ref 0–0.4)
ABSOLUTE LYMPH #: 1.6 K/UL (ref 1–4.8)
ABSOLUTE MONO #: 0.8 K/UL (ref 0.1–1.3)
ANION GAP SERPL CALCULATED.3IONS-SCNC: 9 MMOL/L (ref 9–17)
BASOPHILS # BLD: 0 % (ref 0–2)
BASOPHILS ABSOLUTE: 0 K/UL (ref 0–0.2)
BUN BLDV-MCNC: 28 MG/DL (ref 8–23)
CALCIUM SERPL-MCNC: 8.4 MG/DL (ref 8.6–10.4)
CHLORIDE BLD-SCNC: 115 MMOL/L (ref 98–107)
CO2: 19 MMOL/L (ref 20–31)
CREAT SERPL-MCNC: 1.06 MG/DL (ref 0.7–1.2)
EOSINOPHILS RELATIVE PERCENT: 2 % (ref 0–4)
GFR AFRICAN AMERICAN: >60 ML/MIN
GFR NON-AFRICAN AMERICAN: >60 ML/MIN
GFR SERPL CREATININE-BSD FRML MDRD: ABNORMAL ML/MIN/{1.73_M2}
GLUCOSE BLD-MCNC: 117 MG/DL (ref 70–99)
HCT VFR BLD CALC: 27.8 % (ref 41–53)
HEMOGLOBIN: 9.1 G/DL (ref 13.5–17.5)
LYMPHOCYTES # BLD: 22 % (ref 24–44)
MCH RBC QN AUTO: 25.6 PG (ref 26–34)
MCHC RBC AUTO-ENTMCNC: 32.7 G/DL (ref 31–37)
MCV RBC AUTO: 78.4 FL (ref 80–100)
MONOCYTES # BLD: 11 % (ref 1–7)
PDW BLD-RTO: 17.7 % (ref 11.5–14.9)
PLATELET # BLD: 157 K/UL (ref 150–450)
PMV BLD AUTO: 8.6 FL (ref 6–12)
POTASSIUM SERPL-SCNC: 4 MMOL/L (ref 3.7–5.3)
RBC # BLD: 3.55 M/UL (ref 4.5–5.9)
SEG NEUTROPHILS: 65 % (ref 36–66)
SEGMENTED NEUTROPHILS ABSOLUTE COUNT: 4.8 K/UL (ref 1.3–9.1)
SODIUM BLD-SCNC: 143 MMOL/L (ref 135–144)
WBC # BLD: 7.4 K/UL (ref 3.5–11)

## 2022-09-04 PROCEDURE — 94761 N-INVAS EAR/PLS OXIMETRY MLT: CPT

## 2022-09-04 PROCEDURE — 85025 COMPLETE CBC W/AUTO DIFF WBC: CPT

## 2022-09-04 PROCEDURE — 6360000002 HC RX W HCPCS: Performed by: INTERNAL MEDICINE

## 2022-09-04 PROCEDURE — 2580000003 HC RX 258: Performed by: FAMILY MEDICINE

## 2022-09-04 PROCEDURE — 99233 SBSQ HOSP IP/OBS HIGH 50: CPT | Performed by: INTERNAL MEDICINE

## 2022-09-04 PROCEDURE — 80048 BASIC METABOLIC PNL TOTAL CA: CPT

## 2022-09-04 PROCEDURE — 36415 COLL VENOUS BLD VENIPUNCTURE: CPT

## 2022-09-04 PROCEDURE — 2580000003 HC RX 258: Performed by: INTERNAL MEDICINE

## 2022-09-04 PROCEDURE — 6370000000 HC RX 637 (ALT 250 FOR IP): Performed by: FAMILY MEDICINE

## 2022-09-04 PROCEDURE — 6360000002 HC RX W HCPCS: Performed by: STUDENT IN AN ORGANIZED HEALTH CARE EDUCATION/TRAINING PROGRAM

## 2022-09-04 PROCEDURE — 94640 AIRWAY INHALATION TREATMENT: CPT

## 2022-09-04 PROCEDURE — 2700000000 HC OXYGEN THERAPY PER DAY

## 2022-09-04 PROCEDURE — 2580000003 HC RX 258: Performed by: STUDENT IN AN ORGANIZED HEALTH CARE EDUCATION/TRAINING PROGRAM

## 2022-09-04 PROCEDURE — 2060000000 HC ICU INTERMEDIATE R&B

## 2022-09-04 RX ADMIN — OXYCODONE HYDROCHLORIDE 10 MG: 10 TABLET, FILM COATED, EXTENDED RELEASE ORAL at 09:49

## 2022-09-04 RX ADMIN — IPRATROPIUM BROMIDE AND ALBUTEROL SULFATE 3 ML: 2.5; .5 SOLUTION RESPIRATORY (INHALATION) at 13:07

## 2022-09-04 RX ADMIN — APIXABAN 2.5 MG: 2.5 TABLET, FILM COATED ORAL at 20:06

## 2022-09-04 RX ADMIN — IRON SUCROSE 200 MG: 20 INJECTION, SOLUTION INTRAVENOUS at 17:17

## 2022-09-04 RX ADMIN — IPRATROPIUM BROMIDE AND ALBUTEROL SULFATE 3 ML: 2.5; .5 SOLUTION RESPIRATORY (INHALATION) at 07:54

## 2022-09-04 RX ADMIN — DONEPEZIL HYDROCHLORIDE 10 MG: 10 TABLET ORAL at 09:49

## 2022-09-04 RX ADMIN — Medication 2 PUFF: at 07:54

## 2022-09-04 RX ADMIN — GUAIFENESIN 600 MG: 600 TABLET, EXTENDED RELEASE ORAL at 09:49

## 2022-09-04 RX ADMIN — OXYCODONE HYDROCHLORIDE 10 MG: 10 TABLET, FILM COATED, EXTENDED RELEASE ORAL at 23:17

## 2022-09-04 RX ADMIN — ATORVASTATIN CALCIUM 10 MG: 10 TABLET, FILM COATED ORAL at 09:50

## 2022-09-04 RX ADMIN — SUCRALFATE 1 G: 1 TABLET ORAL at 16:53

## 2022-09-04 RX ADMIN — CARVEDILOL 6.25 MG: 6.25 TABLET, FILM COATED ORAL at 16:53

## 2022-09-04 RX ADMIN — FLUOXETINE 20 MG: 20 CAPSULE ORAL at 09:50

## 2022-09-04 RX ADMIN — LOSARTAN POTASSIUM 50 MG: 50 TABLET, FILM COATED ORAL at 09:49

## 2022-09-04 RX ADMIN — PANTOPRAZOLE SODIUM 40 MG: 40 TABLET, DELAYED RELEASE ORAL at 05:39

## 2022-09-04 RX ADMIN — SUCRALFATE 1 G: 1 TABLET ORAL at 09:49

## 2022-09-04 RX ADMIN — Medication 2 PUFF: at 19:28

## 2022-09-04 RX ADMIN — SODIUM CHLORIDE, PRESERVATIVE FREE 10 ML: 5 INJECTION INTRAVENOUS at 09:51

## 2022-09-04 RX ADMIN — APIXABAN 2.5 MG: 2.5 TABLET, FILM COATED ORAL at 09:49

## 2022-09-04 RX ADMIN — SODIUM CHLORIDE: 9 INJECTION, SOLUTION INTRAVENOUS at 20:16

## 2022-09-04 RX ADMIN — IPRATROPIUM BROMIDE AND ALBUTEROL SULFATE 3 ML: 2.5; .5 SOLUTION RESPIRATORY (INHALATION) at 19:28

## 2022-09-04 RX ADMIN — SUCRALFATE 1 G: 1 TABLET ORAL at 20:06

## 2022-09-04 RX ADMIN — MEGESTROL ACETATE 400 MG: 40 SUSPENSION ORAL at 09:55

## 2022-09-04 RX ADMIN — SUCRALFATE 1 G: 1 TABLET ORAL at 05:39

## 2022-09-04 RX ADMIN — CARVEDILOL 6.25 MG: 6.25 TABLET, FILM COATED ORAL at 09:55

## 2022-09-04 RX ADMIN — Medication 1 CAPSULE: at 09:49

## 2022-09-04 RX ADMIN — LEVOTHYROXINE SODIUM 150 MCG: 0.07 TABLET ORAL at 05:39

## 2022-09-04 RX ADMIN — MULTIPLE VITAMINS W/ MINERALS TAB 1 TABLET: TAB at 09:50

## 2022-09-04 RX ADMIN — CEFEPIME 2000 MG: 2 INJECTION, POWDER, FOR SOLUTION INTRAVENOUS at 13:24

## 2022-09-04 ASSESSMENT — PULMONARY FUNCTION TESTS
PEFR_L/MIN: 16
PEFR_L/MIN: 16

## 2022-09-04 ASSESSMENT — PAIN SCALES - GENERAL
PAINLEVEL_OUTOF10: 6
PAINLEVEL_OUTOF10: 0
PAINLEVEL_OUTOF10: 6

## 2022-09-04 ASSESSMENT — PAIN DESCRIPTION - ORIENTATION: ORIENTATION: RIGHT;LOWER

## 2022-09-04 ASSESSMENT — PAIN DESCRIPTION - LOCATION
LOCATION: ABDOMEN
LOCATION: ABDOMEN

## 2022-09-04 ASSESSMENT — PAIN DESCRIPTION - DESCRIPTORS: DESCRIPTORS: ACHING;DISCOMFORT

## 2022-09-04 NOTE — PROGRESS NOTES
Physician Progress Note      PATIENTXavi Jurado  CSN #:                  [id number]  :                       1950  ADMIT DATE:       2022 1:04 PM  DISCH DATE:  RESPONDING  PROVIDER #:        Berna García DO          QUERY TEXT:    Patient admitted with weakness primarily related to lymphoma and treatment for   same. Noted documentation of UTI in  H & P and  progress notes. In   order to support the diagnosis of UTI, please include additional clinical   indicators in your documentation. Or please document if the diagnosis of UTI   has been ruled out after further study. 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 \"  Normal WBC and afebrile;  urine culture (sent prior to   antibiotic administration):  No growth  Treatment: urology consult, antibiotics  Options provided:  -- UTI secondary to obstruction from retroperitoneal soft tissue lymphoma with   hydronephrosis present as evidenced by, Please document evidence.   -- UTI was ruled out  -- Other - I will add my own diagnosis  -- Disagree - Not applicable / Not valid  -- Disagree - Clinically unable to determine / Unknown  -- Refer to Clinical Documentation Reviewer    PROVIDER RESPONSE TEXT:    UTI secondary to obstruction from retroperitoneal soft tissue lymphoma with   hydronephrosis is present as evidenced by chronic stent    Query created by: Bud Dandy on 2022 12:29 PM      Electronically signed by:  Berna García DO 2022 1:54 PM

## 2022-09-04 NOTE — PROGRESS NOTES
**OR** ondansetron, morphine **OR** morphine, oxyCODONE, prochlorperazine        Physical Exam:  Vitals: /63   Pulse 70   Temp 98 °F (36.7 °C) (Oral)   Resp 18   Ht 5' 10\" (1.778 m)   Wt 151 lb 10.8 oz (68.8 kg)   SpO2 94%   BMI 21.76 kg/m²   24 hour intake/output:  Intake/Output Summary (Last 24 hours) at 9/4/2022 1354  Last data filed at 9/4/2022 4178  Gross per 24 hour   Intake 700 ml   Output 575 ml   Net 125 ml     Last 3 weights:   Wt Readings from Last 3 Encounters:   09/04/22 151 lb 10.8 oz (68.8 kg)   08/22/22 160 lb 3.2 oz (72.7 kg)   08/12/22 173 lb (78.5 kg)       Physical Examination:   General appearance - alert, well appearing, and in no distress  Mental status - alert, oriented to person, place, and time  oropharynx clear, no lesions  Chest - clear to auscultation, no wheezes, rales or rhonchi, symmetric air entry  Heart - normal rate, regular rhythm, normal S1, S2, no murmurs, rubs, clicks or gallops  Abdomen - soft, nontender, nondistended, no masses or organomegaly  Neurological - alert, oriented, normal speech, no focal findings or movement disorder noted  Extremities - peripheral pulses normal, no pedal edema, no clubbing or cyanosis  Skin -she has multiple bruising on chest and arms second to him falling also second to his treatments and Eliquis but no active bleeding    Basic Metabolic Panel [[id number]] (Abnormal)    Collected: 09/04/22 0457    Updated: 09/04/22 0550    Specimen Source: Blood     Glucose 117 High  mg/dL    BUN 28 High  mg/dL    Creatinine 1.06 mg/dL    Calcium 8.4 Low  mg/dL    Sodium 143 mmol/L    Potassium 4.0 mmol/L    Chloride 115 High  mmol/L    CO2 19 Low  mmol/L    Anion Gap 9 mmol/L    GFR Non-African American >60 mL/min    GFR African American >60 mL/min    GFR Comment         Comment: Average GFR for 79or more years old:    76 mL/min/1.73sq m   Chronic Kidney Disease:    <60 mL/min/1.73sq m   Kidney failure:    <15 mL/min/1.73sq m               eGFR antibiotics for now    Awaiting ECF placement    Will see if PMR/acute rehab evaluation can be done as well    Continue with physical therapy    Adali Castro DO   [facility]          9/4/2022, 1:54 PM

## 2022-09-04 NOTE — PROGRESS NOTES
_                         Today's Date: 9/4/2022  Patient Name: Bozena Aleman  Date of admission: 8/31/2022  1:04 PM  Patient's age: 74 y.o., 1950  Admission Dx: Lymphoma, diffuse ([facility] [address] [C85.90]  Acute cystitis with hematuria [N30.01]  Pneumonia due to infectious organism, unspecified laterality, unspecified part of lung [J18.9]      Requesting Physician: Tania Naylor DO    CHIEF COMPLAINT:  weakness and fatigue. SOB. Pneumonia. UTI. NHL    SUBJECTIVE:  Patient was seen and examined. Clinically stable. No events overnight. Overall he is feeling better. Better appetite. No active bleeding. No fever. Labs are stable. Quantitative immunoglobulin IgG and IgM very low with IgG below 300  Anemia panel compatible with iron deficiency anemia      BRIEF CASE HISTORY:    The patient is a 74 y.o.  male who is admitted to the hospital for further management of UTI, pneumonia and failure to thrive. He was having increasing weakness and fatigue. He has UTI secondary to stents. He had SOB and cough. CT scan showed pneumonia. Started on antibiotics with IVF and nutritional support. He feels better. Patient is on Imbruvica for NHL. 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 Evidence of relapse with biopsy-proven follicular lymphoma. 1-2 from treated medically lymph node biopsy July 6, 2021     CURRENT THERAPY:    1.   Observation. 2.   Status post treatment with Bexxar in May 2008. 3.   Status post previous treatment with Rituxan. 4.  Start treatment with Rituxan August 23, 2021. completed4 weeks of rituximab on 9/20/2021  5. Induction Rituxan is followed by consolidation Rituxan maintenance   6. Due to progression we started Imbruvica July 2022. INTERIM HISTORY:  The patient is seen for follow-up of his lymphoma.    Further work-up showed enlarged lymph nodes in the retroperitoneum and iliac area. Biopsy proved recurrence of follicular lymphoma. Patient did well on rituximab induction and was maintained on maintenance Rituxan. Repeated CT scan February 2022 showed stable disease. He had hospitalization in July which showed significant progressive disease. Started on Imbruvica. He was hospitalized 1 week ago due to dehydration and malnutrition. He started feeling much better after rehydration with improvement in kidney function. Repeated CT scan showed positive results with significant improvement on the Imbruvica treatment. Past Medical History:   has a past medical history of Arthritis, Cancer ([facility] [address] Chemotherapy management, encounter for, CHF (congestive heart failure) ([facility] [address] COPD (chronic obstructive pulmonary disease) ([facility] [address] Diabetes mellitus ([facility] [address] H/O cardiovascular stress test, History of non-Hodgkin's lymphoma, Hx of blood clots, Hyperlipidemia, Hypertension, Hypothyroidism, and On home O2. Past Surgical History:   has a past surgical history that includes hernia repair; IR BIOPSY ABDOMINAL/RETROPERITONEAL MASS PERCUTANEOUS (7/6/2021); IR PORT PLACEMENT > 5 YEARS (8/31/2021); Colonoscopy (Left, 9/24/2021); Cystoscopy (Right, 10/6/2021); Cholecystectomy, laparoscopic (N/A, 10/9/2021); Tonsillectomy; Cystoscopy (Bilateral, 11/24/2021); Cystoscopy (Bilateral, 2/16/2022); eye surgery; Carotid endarterectomy (Left, 6/28/2022); and Cystoscopy (N/A, 7/20/2022). Family History: family history includes Alzheimer's Disease in his father; Diabetes in his mother; Heart Disease in his brother and brother. Social History:   reports that he quit smoking about 16 years ago. His smoking use included cigarettes. He has a 90.00 pack-year smoking history. He has never used smokeless tobacco. He reports current alcohol use of about 1.7 standard drinks per week. He reports current drug use. Drug: Marijuana Joleen Postin).    Medications:    Prior to Admission medications as needed for Shortness of Breath 2/20/22   Michael Stallworth, APRN - CNP   levothyroxine (SYNTHROID) 150 MCG tablet Take 1 tablet by mouth Daily 2/2/22   Shaquille Jacobo DO   fluticasone-salmeterol (ADVAIR) 100-50 MCG/DOSE diskus inhaler Inhale 1 puff into the lungs every 12 hours 2/1/22   Shaquille Jacobo, DO   furosemide (LASIX) 20 MG tablet Take 1 tablet by mouth daily  Patient taking differently: Take 20 mg by mouth See Admin Instructions Pt only takes when legs start to swell 2/1/22   Brent Ulloa DO   albuterol sulfate HFA (PROVENTIL;VENTOLIN;PROAIR) 108 (90 Base) MCG/ACT inhaler Inhale 2 puffs into the lungs every 4 hours as needed for Wheezing     Historical Provider, MD   amLODIPine (NORVASC) 10 MG tablet Take 1 tablet by mouth daily  Patient not taking: Reported on 8/22/2022 10/12/21   [place] NAVEEN Metcalf, DO   losartan (COZAAR) 50 MG tablet Take 1 tablet by mouth daily 10/12/21   Roderick NAVEEN Metcalf, DO   donepezil (ARICEPT) 10 MG tablet Take 10 mg by mouth daily  7/11/21   Historical Provider, MD   FLUoxetine (PROZAC) 20 MG capsule Take 20 mg by mouth daily    Historical Provider, MD   omeprazole (PRILOSEC) 20 MG delayed release capsule Take 20 mg by mouth 2 times daily    Historical Provider, MD     Current Facility-Administered Medications   Medication Dose Route Frequency Provider Last Rate Last Admin    iron sucrose (VENOFER) 200 mg in sodium chloride 0.9 % 100 mL IVPB  200 mg IntraVENous Q24H April Jean MD        immune globulin (FLEBOGAMMA) 10% solution 20 g  20 g IntraVENous Continuous April Jean MD        0.9 % sodium chloride infusion   IntraVENous Continuous Roderickevgeny Metcalf, DO 75 mL/hr at 09/03/22 0406 New Bag at 09/03/22 0406    guaiFENesin (MUCINEX) extended release tablet 600 mg  600 mg Oral BID Roderick T Dixon, DO   600 mg at 09/04/22 0949    cefepime (MAXIPIME) 2,000 mg in sodium chloride 0.9 % 50 mL IVPB mini-bag  2,000 mg IntraVENous Q24H Davy Wilkins, DO   Stopped at 09/04/22 1355    sodium chloride flush 0.9 % injection 5-40 mL  5-40 mL IntraVENous 2 times per day Emilio Payvandi, DO   10 mL at 09/04/22 0951    sodium chloride flush 0.9 % injection 5-40 mL  5-40 mL IntraVENous PRN Emilio Payvandi, DO   10 mL at 09/02/22 0817    0.9 % sodium chloride infusion   IntraVENous PRN Emilio Payvandi, DO        acetaminophen (TYLENOL) tablet 650 mg  650 mg Oral Q4H PRN Emilio Payvandi, DO        ondansetron (ZOFRAN-ODT) disintegrating tablet 4 mg  4 mg Oral Q8H PRN Emilio Payvandi, DO        Or    ondansetron (ZOFRAN) injection 4 mg  4 mg IntraVENous Q6H PRN Emilio Payvandi, DO        morphine (PF) injection 2 mg  2 mg IntraVENous Q2H PRN Emilio Payvandi, DO        Or    morphine sulfate (PF) injection 4 mg  4 mg IntraVENous Q2H PRN Emilio Payvandi, DO        apixaban (ELIQUIS) tablet 2.5 mg  2.5 mg Oral BID Roderick T Dixon, DO   2.5 mg at 09/04/22 0949    atorvastatin (LIPITOR) tablet 10 mg  10 mg Oral Daily Roderick T Dixon, DO   10 mg at 09/04/22 0950    carvedilol (COREG) tablet 6.25 mg  6.25 mg Oral BID  Roderick T Dixon, DO   6.25 mg at 09/04/22 0955    donepezil (ARICEPT) tablet 10 mg  10 mg Oral Daily Roderick T Dixon, DO   10 mg at 09/04/22 0949    FLUoxetine (PROZAC) capsule 20 mg  20 mg Oral Daily Roderick T Dixon, DO   20 mg at 09/04/22 0950    budesonide-formoterol (SYMBICORT) 80-4.5 MCG/ACT inhaler 2 puff  2 puff Inhalation BID Fadumoa Génesis, DO   2 puff at 09/04/22 0754    ibrutinib (IMBRUVICA) chemo tablet 420 mg  420 mg Oral Daily Roderick T Dixon, DO        ipratropium-albuterol (DUONEB) nebulizer solution 3 mL  1 vial Inhalation Q6H WA Roderick T Dixon, DO   3 mL at 09/04/22 1307    lactobacillus (CULTURELLE) capsule 1 capsule  1 capsule Oral Daily Roderick T Dixon, DO   1 capsule at 09/04/22 0949    levothyroxine (SYNTHROID) tablet 150 mcg  150 mcg Oral Daily Roderick T Dixon, DO   150 mcg at 09/04/22 0539    losartan (COZAAR) tablet 50 mg  50 mg Oral Daily Roderick T Dixon, DO   50 mg at 09/04/22 0949    megestrol (MEGACE) 40 MG/ML suspension 400 mg  400 mg Oral Daily Roderick T Dixon, DO   400 mg at 09/04/22 0955    therapeutic multivitamin-minerals 1 tablet  1 tablet Oral Daily Roderick T Dixon, DO   1 tablet at 09/04/22 0950    pantoprazole (PROTONIX) tablet 40 mg  40 mg Oral QAM AC Roderick T Dixon, DO   40 mg at 09/04/22 0539    oxyCODONE (OXYCONTIN) extended release tablet 10 mg  10 mg Oral Q12H Roderick T Dixon, DO   10 mg at 09/04/22 0949    oxyCODONE (ROXICODONE) immediate release tablet 5 mg  5 mg Oral Q4H PRN Roderick T Dixon, DO        prochlorperazine (COMPAZINE) tablet 10 mg  10 mg Oral Q6H PRN Roderick T Dixon, DO        sucralfate (CARAFATE) tablet 1 g  1 g Oral 4x Daily AC & HS Roderick T Dixon, DO   1 g at 09/04/22 0949    psyllium (METAMUCIL) 58.12 % packet 1 packet  1 packet Oral Daily [place], DO   1 packet at 09/02/22 9252       Allergies:  Patient has no known allergies. REVIEW OF SYSTEMS:      General: + weakness + fatigue. + unanticipated weight loss or decreased appetite. No fever or chills. Eyes: No blurred vision, eye pain or double vision. Ears: No hearing problems or drainage. No tinnitus. Throat: No sore throat, problems with swallowing or dysphagia. Respiratory: as above     Cardiovascular: No chest pain, orthopnea or PND. No lower extremity edema. No palpitation. Gastrointestinal: as above   Genitourinary: No dysuria, hematuria, frequency or urgency. Musculoskeletal: No muscle aches or pains. No limitation of movement. No back pain. No gait disturbance, No joint complaints. Dermatologic: No skin rashes or pruritus. No skin lesions or discolorations. Psychiatric: No depression, anxiety, or stress or signs of schizophrenia. No change in mood or affect. Hematologic: No history of bleeding tendency. No bruises or ecchymosis. No history of clotting problems. Infectious disease: No fever, chills or frequent infections.    Endocrine: No polydipsia or polyuria. No temperature intolerance. Neurologic: No headaches or dizziness. No weakness or numbness of the extremities. No changes in balance, coordination,  memory, mentation, behavior. Allergic/Immunologic: No nasal congestion or hives. No repeated infections. PHYSICAL EXAM:      /63   Pulse 70   Temp 98 °F (36.7 °C) (Oral)   Resp 18   Ht 5' 10\" (1.778 m)   Wt 151 lb 10.8 oz (68.8 kg)   SpO2 94%   BMI 21.76 kg/m²    Temp (24hrs), Av °F (36.7 °C), Min:97.9 °F (36.6 °C), Max:98.1 °F (36.7 °C)      General appearance - not in pain or distress. Looks chronically ill.    Mental status - alert and oriented  Eyes - pupils equal and reactive, extraocular eye movements intact  Ears - bilateral TM's and external ear canals normal  Nose - normal and patent, no erythema, discharge or polyps  Mouth - mucous membranes moist, pharynx normal without lesions  Neck - supple, no significant adenopathy  Lymphatics - no palpable lymphadenopathy, no hepatosplenomegaly  Chest - clear to auscultation, no wheezes, rales or rhonchi, symmetric air entry  Heart - normal rate, regular rhythm, normal S1, S2, no murmurs, rubs, clicks or gallops  Abdomen - soft, nontender, nondistended, no masses or organomegaly  Neurological - alert, oriented, normal speech, no focal findings or movement disorder noted  Musculoskeletal - no joint tenderness, deformity or swelling  Extremities - peripheral pulses normal, no pedal edema, no clubbing or cyanosis  Skin - normal coloration and turgor, no rashes, no suspicious skin lesions noted           DATA:      Labs:       CBC:   Recent Labs     22  0547 22  0457   WBC 6.4 7.4   HGB 8.9* 9.1*   HCT 27.2* 27.8*    157       BMP:   Recent Labs     22  0547 22  0457    143   K 4.1 4.0   CO2 20 19*   BUN 33* 28*   CREATININE 1.25* 1.06   LABGLOM 56* >60   GLUCOSE 130* 117*       PT/INR:   No results for input(s): PROTIME, INR in the last 72 hours.    APTT:  No results for input(s): APTT in the last 72 hours. LIVER PROFILE:  No results for input(s): AST, ALT, LABALBU in the last 72 hours. XR CHEST PORTABLE  Narrative: EXAMINATION:  ONE XRAY VIEW OF THE CHEST    9/3/2022 9:30 am    COMPARISON:  Chest radiograph performed 07/18/2022. HISTORY:  ORDERING SYSTEM PROVIDED HISTORY: Lymphoma  TECHNOLOGIST PROVIDED HISTORY:  Lymphoma  Reason for Exam: Lymphoma    FINDINGS:  The lungs are without consolidation or effusion. There is no pneumothorax. The mediastinal structures are unremarkable. The upper abdomen unremarkable. The extrathoracic soft tissues are unremarkable. There is a right internal  jugular port. Impression: No acute cardiopulmonary process. 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 Feeling better with IVF and nutritional support. Continue antibiotics as per primary team  Discussed further care for his lymphoma. Dose adjusted Imbruvica will be done as an outpatient we will monitor response. Patient's questions were answered to the best of his satisfaction and he verbalized full understanding and agreement.   Anemia work-up/microcytosis compatible of iron deficiency anemia will give IV iron  Hypogammaglobulinemia with IgG below 300 with recurrent infection in the setting of lymphoma> IVIG at dose of 300 mg/kg, discussed with the patient  About risk versus benefits/discussed about side effects which include but not limited acute kidney injury/fluid overload/hemolytic anemia and he is willing to proceed> hydration and monitor kidney function                                          Ra[facility]                            This note is created with the assistance of a speech recognition program.  While intending to generate a document that actually reflects the content of the visit, the document can still have some errors including those of syntax and sound a like substitutions which may escape proof reading. It such instances, actual meaning can be extrapolated by contextual diversion.

## 2022-09-05 LAB
ALBUMIN SERPL-MCNC: 2.6 G/DL (ref 3.5–5.2)
ALP BLD-CCNC: 93 U/L (ref 40–129)
ALT SERPL-CCNC: 9 U/L (ref 5–41)
ANION GAP SERPL CALCULATED.3IONS-SCNC: 8 MMOL/L (ref 9–17)
AST SERPL-CCNC: 9 U/L
BILIRUB SERPL-MCNC: 0.3 MG/DL (ref 0.3–1.2)
BUN BLDV-MCNC: 25 MG/DL (ref 8–23)
CALCIUM SERPL-MCNC: 8.2 MG/DL (ref 8.6–10.4)
CHLORIDE BLD-SCNC: 112 MMOL/L (ref 98–107)
CO2: 21 MMOL/L (ref 20–31)
CREAT SERPL-MCNC: 0.99 MG/DL (ref 0.7–1.2)
CULTURE: NORMAL
CULTURE: NORMAL
GFR AFRICAN AMERICAN: >60 ML/MIN
GFR NON-AFRICAN AMERICAN: >60 ML/MIN
GFR SERPL CREATININE-BSD FRML MDRD: ABNORMAL ML/MIN/{1.73_M2}
GLUCOSE BLD-MCNC: 169 MG/DL (ref 70–99)
POTASSIUM SERPL-SCNC: 3.9 MMOL/L (ref 3.7–5.3)
SODIUM BLD-SCNC: 141 MMOL/L (ref 135–144)
SPECIMEN DESCRIPTION: NORMAL
SPECIMEN DESCRIPTION: NORMAL
TOTAL PROTEIN: 5.1 G/DL (ref 6.4–8.3)

## 2022-09-05 PROCEDURE — 6370000000 HC RX 637 (ALT 250 FOR IP): Performed by: FAMILY MEDICINE

## 2022-09-05 PROCEDURE — 6360000002 HC RX W HCPCS: Performed by: INTERNAL MEDICINE

## 2022-09-05 PROCEDURE — 2580000003 HC RX 258: Performed by: INTERNAL MEDICINE

## 2022-09-05 PROCEDURE — 97530 THERAPEUTIC ACTIVITIES: CPT

## 2022-09-05 PROCEDURE — 6360000002 HC RX W HCPCS: Performed by: FAMILY MEDICINE

## 2022-09-05 PROCEDURE — 94761 N-INVAS EAR/PLS OXIMETRY MLT: CPT

## 2022-09-05 PROCEDURE — 36415 COLL VENOUS BLD VENIPUNCTURE: CPT

## 2022-09-05 PROCEDURE — 2580000003 HC RX 258: Performed by: STUDENT IN AN ORGANIZED HEALTH CARE EDUCATION/TRAINING PROGRAM

## 2022-09-05 PROCEDURE — 99233 SBSQ HOSP IP/OBS HIGH 50: CPT | Performed by: INTERNAL MEDICINE

## 2022-09-05 PROCEDURE — 80053 COMPREHEN METABOLIC PANEL: CPT

## 2022-09-05 PROCEDURE — 2580000003 HC RX 258: Performed by: FAMILY MEDICINE

## 2022-09-05 PROCEDURE — 94640 AIRWAY INHALATION TREATMENT: CPT

## 2022-09-05 PROCEDURE — 2060000000 HC ICU INTERMEDIATE R&B

## 2022-09-05 PROCEDURE — 97116 GAIT TRAINING THERAPY: CPT

## 2022-09-05 RX ORDER — CARVEDILOL 12.5 MG/1
12.5 TABLET ORAL 2 TIMES DAILY WITH MEALS
Status: DISCONTINUED | OUTPATIENT
Start: 2022-09-05 | End: 2022-09-07 | Stop reason: HOSPADM

## 2022-09-05 RX ADMIN — APIXABAN 2.5 MG: 2.5 TABLET, FILM COATED ORAL at 08:09

## 2022-09-05 RX ADMIN — APIXABAN 2.5 MG: 2.5 TABLET, FILM COATED ORAL at 20:41

## 2022-09-05 RX ADMIN — ATORVASTATIN CALCIUM 10 MG: 10 TABLET, FILM COATED ORAL at 08:10

## 2022-09-05 RX ADMIN — CEFEPIME 2000 MG: 2 INJECTION, POWDER, FOR SOLUTION INTRAVENOUS at 20:42

## 2022-09-05 RX ADMIN — PANTOPRAZOLE SODIUM 40 MG: 40 TABLET, DELAYED RELEASE ORAL at 05:00

## 2022-09-05 RX ADMIN — SUCRALFATE 1 G: 1 TABLET ORAL at 19:03

## 2022-09-05 RX ADMIN — CEFEPIME 2000 MG: 2 INJECTION, POWDER, FOR SOLUTION INTRAVENOUS at 11:12

## 2022-09-05 RX ADMIN — Medication 1 CAPSULE: at 08:09

## 2022-09-05 RX ADMIN — SUCRALFATE 1 G: 1 TABLET ORAL at 05:00

## 2022-09-05 RX ADMIN — OXYCODONE HYDROCHLORIDE 10 MG: 10 TABLET, FILM COATED, EXTENDED RELEASE ORAL at 11:09

## 2022-09-05 RX ADMIN — MULTIPLE VITAMINS W/ MINERALS TAB 1 TABLET: TAB at 08:09

## 2022-09-05 RX ADMIN — MEGESTROL ACETATE 400 MG: 40 SUSPENSION ORAL at 11:14

## 2022-09-05 RX ADMIN — IPRATROPIUM BROMIDE AND ALBUTEROL SULFATE 3 ML: 2.5; .5 SOLUTION RESPIRATORY (INHALATION) at 13:42

## 2022-09-05 RX ADMIN — LOSARTAN POTASSIUM 50 MG: 50 TABLET, FILM COATED ORAL at 08:10

## 2022-09-05 RX ADMIN — IPRATROPIUM BROMIDE AND ALBUTEROL SULFATE 3 ML: 2.5; .5 SOLUTION RESPIRATORY (INHALATION) at 07:08

## 2022-09-05 RX ADMIN — FLUOXETINE 20 MG: 20 CAPSULE ORAL at 08:09

## 2022-09-05 RX ADMIN — OXYCODONE HYDROCHLORIDE 10 MG: 10 TABLET, FILM COATED, EXTENDED RELEASE ORAL at 22:34

## 2022-09-05 RX ADMIN — SODIUM CHLORIDE: 9 INJECTION, SOLUTION INTRAVENOUS at 05:03

## 2022-09-05 RX ADMIN — SUCRALFATE 1 G: 1 TABLET ORAL at 22:34

## 2022-09-05 RX ADMIN — SUCRALFATE 1 G: 1 TABLET ORAL at 11:09

## 2022-09-05 RX ADMIN — LEVOTHYROXINE SODIUM 150 MCG: 0.07 TABLET ORAL at 05:00

## 2022-09-05 RX ADMIN — IPRATROPIUM BROMIDE AND ALBUTEROL SULFATE 3 ML: 2.5; .5 SOLUTION RESPIRATORY (INHALATION) at 19:49

## 2022-09-05 RX ADMIN — Medication 2 PUFF: at 07:13

## 2022-09-05 RX ADMIN — Medication 2 PUFF: at 19:54

## 2022-09-05 RX ADMIN — SODIUM CHLORIDE: 9 INJECTION, SOLUTION INTRAVENOUS at 18:16

## 2022-09-05 RX ADMIN — DONEPEZIL HYDROCHLORIDE 10 MG: 10 TABLET ORAL at 08:10

## 2022-09-05 RX ADMIN — IRON SUCROSE 200 MG: 20 INJECTION, SOLUTION INTRAVENOUS at 18:21

## 2022-09-05 RX ADMIN — CARVEDILOL 6.25 MG: 6.25 TABLET, FILM COATED ORAL at 08:09

## 2022-09-05 RX ADMIN — GUAIFENESIN 600 MG: 600 TABLET, EXTENDED RELEASE ORAL at 08:10

## 2022-09-05 RX ADMIN — SODIUM CHLORIDE, PRESERVATIVE FREE 10 ML: 5 INJECTION INTRAVENOUS at 08:11

## 2022-09-05 RX ADMIN — CARVEDILOL 12.5 MG: 12.5 TABLET, FILM COATED ORAL at 19:04

## 2022-09-05 ASSESSMENT — PAIN DESCRIPTION - ORIENTATION
ORIENTATION: RIGHT;MID
ORIENTATION: LEFT;RIGHT

## 2022-09-05 ASSESSMENT — PAIN - FUNCTIONAL ASSESSMENT: PAIN_FUNCTIONAL_ASSESSMENT: PREVENTS OR INTERFERES SOME ACTIVE ACTIVITIES AND ADLS

## 2022-09-05 ASSESSMENT — PAIN SCALES - GENERAL
PAINLEVEL_OUTOF10: 6
PAINLEVEL_OUTOF10: 5
PAINLEVEL_OUTOF10: 1
PAINLEVEL_OUTOF10: 7

## 2022-09-05 ASSESSMENT — PAIN DESCRIPTION - LOCATION
LOCATION: ABDOMEN
LOCATION: ABDOMEN

## 2022-09-05 ASSESSMENT — PAIN DESCRIPTION - DESCRIPTORS
DESCRIPTORS: ACHING;DISCOMFORT
DESCRIPTORS: ACHING;CRAMPING

## 2022-09-05 NOTE — PROGRESS NOTES
All fall risk precautions in place, Call light within reach, Gait belt, Patient at risk for falls, Left in bed, Nurse notified     Restrictions  Restrictions/Precautions  Restrictions/Precautions: Fall Risk, General Precautions  Required Braces or Orthoses?: No  Implants present? :  (denies)     Subjective   Pain: 5/10 R abdomen and R flank - Chronic pain associated with shingles         Social/Functional History  Social/Functional History  Lives With: Spouse, Family (Cousin)  Type of Home: House  Home Layout: One level  Home Access: Ramped entrance, Stairs to enter with rails  Entrance Stairs - Number of Steps: 1  Entrance Stairs - Rails: Left  Bathroom Shower/Tub: Tub/Shower unit, Shower chair without back  Bathroom Toilet: Handicap height (Support nearby if needed)  Bathroom Equipment: Grab bars in shower, Hand-held shower, Shower chair (Suction cup grab bar)  Bathroom Accessibility: [facility]: Cane, Rollator, Wheelchair-manual, Oxygen  Has the patient had two or more falls in the past year or any fall with injury in the past year?: Yes (4 falls in past six months, pt reported feeling weak)  ADL Assistance: Independent (Pt reported IND but sometimes has difficulty getting up/down)  Homemaking Assistance: Independent (Wife/cousin are primary)  Ambulation Assistance: Independent (with rollator)  Transfer Assistance: Independent  Active : Yes  Mode of Transportation: Car  Occupation: Retired  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[employer] [place]: Enjoys going to the [email or web address]  IADL Comments: sleeps in a recliner chair  Additional Comments: Pt's wife and cousin are available to assist 24/7  [address]   Orientation  Overall Orientation Status: Within Functional Limits  Orientation Level: Oriented X4  Cognition  Overall Cognitive Status: WNL     Objective   Heart Rate: 69  Heart Rate Source: Monitor  BP: (!) 175/68  BP Location: Left upper arm  BP Method: Automatic  Patient Position: Semi fowlers  MAP (Calculated): 103.67  Resp: 18  SpO2: 90 %  O2 Device: None (Room air)  Comment: Pt provided 2L O2 via NC during activity/mobility with SPO2 at 95-96% with activity. Pt planned to sleep/rest post treatment therefor 2 L O2 kept on pt. Bed Mobility Training  Bed Mobility Training: Yes  Overall Level of Assistance: Modified independent  Rolling: Modified independent  Supine to Sit: Modified independent  Sit to Supine: Modified independent  Scooting: Modified independent  Balance  Sitting: With support (occasional single UE)  Standing: With support (RW)  Transfer Training  Transfer Training: Yes (Rollator Used)  Overall Level of Assistance: Stand-by assistance  Interventions: Safety awareness training;Verbal cues (Cues for brakes management; pt difficulty managing Brakes of Rollator)  Sit to Stand: Stand-by assistance  Stand to Sit: Stand-by assistance  Toilet Transfer:  (Pt stands to void at toilet; Red Tinged Urine)  Gait Training  Gait Training: Yes  Gait  Overall Level of Assistance: Contact-guard assistance  Interventions: Verbal cues; Visual cues; Safety awareness training (Cues for forward gaze, increasing step length and heel contact bilaterally with poor carryover demo this session)  Speed/Marta: Slow;Shuffled (High Marta/slow non-functional pace; festinating in appearance)  Step Length: Left shortened;Right shortened  Gait Abnormalities: Festinating gait; Decreased step clearance;Shuffling gait  Distance (ft): 45 Feet (Extended seated rest break needed post 39' amb; with Subsequent 8'x2 distance to/from restroom)  Assistive Device: Walker, rollator                 Exercise Treatment: [employer], Ambulation 45', 8', 8' with rollator and 2L O2, Pt participates in standing to void and standing to wash hands at sink  Dynamic Standing Balance Exercises: Functional dyn act x2. No LOB noted.           AM-PAC Score  AM-PAC Inpatient Mobility Raw Score : 19 (09/05/22 1525)  AM-PAC Inpatient T-Scale Score : 45.44 (09/05/22 1525)  Mobility Inpatient CMS 0-100% Score: 41.77 (09/05/22 1525)  Mobility Inpatient CMS G-Code Modifier : CK (09/05/22 1525)      Goals  Short Term Goals  Time Frame for Short term goals: 4-5 days  Short term goal 1: transfers with supervision for safe ADLs  Short term goal 2: gait with Rollator and supervision x 100-150ft for safe community ambulation  Short term goal 3: pt able to tolerate 20min of therapeutic activity/exercises to improve endurance for functional activites  Short term goal 4: negotiate 1-2 steps with rail and SBA for safe entry into home       Education  Patient Education  Education Given To: Patient  Education Provided: Transfer Training; Fall Prevention Strategies; Equipment; Energy Conservation  Education Method: Verbal  Education Outcome: Continued education needed;Verbalized understanding      Therapy Time   Individual Concurrent Group Co-treatment   Time In 1525         Time Out 1553         Minutes 28         Timed Code Treatment Minutes: 25 Minutes       Armaan Parikh PT   Electronically signed by Armaan Parikh PT on 9/5/2022 at 4:38 PM

## 2022-09-05 NOTE — DISCHARGE SUMMARY
Physician Discharge Summary     Patient ID:  Alexy Langston  [id number]  54 y.o.  1950    Admit date: 8/31/2022    Discharge date and time: 9/07/2022    Admitting Physician: Valery Khalil DO     Discharge Physician: Valery Khalil DO      Admission Diagnoses:   Lymphoma, diffuse ([address] [C85.90]  Acute cystitis with hematuria [N30.01]  Pneumonia due to infectious organism, unspecified laterality, unspecified part of lung [J18.9]    Discharge Diagnoses:   Principal Problem:    Lymphoma, diffuse ([address]  Active Problems:    Severe malnutrition ([address]    Acute cystitis with hematuria  Resolved Problems:    * No resolved hospital problems. *    Lymphoma, diffuse ([address]  Active Problems:    Severe malnutrition ([address]    Acute cystitis with hematuria  Resolved Problems:    * No resolved hospital problems. *    Lymphoma, diffuse ([address]  Active Problems:    Severe malnutrition ([address]    Acute cystitis with hematuria  Resolved Problems:    * No resolved hospital problems. *       Lymphoma, diffuse ([address]  Active Problems:    Severe malnutrition ([address]    Acute cystitis with hematuria  Resolved Problems:    * No resolved hospital problems. *  Resolved Problems:    * No resolved hospital problems.  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    Hydronephrosis due to obstruction of ureter N13.1    Moderate malnutrition (HCC) E44.0    Chronic cholecystitis K81.1    Pleural effusion J90    Cardiomegaly I51.7    Hypothyroidism E03.9    Diabetes mellitus (HCC) E11.9    Hypertensive urgency I16.0    Acute respiratory failure with hypoxia (HCC) J96.01    Hypoxia R09.02    Carotid stenosis, asymptomatic, bilateral I65.23    Simple chronic bronchitis (HCC) J41.0    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) I82.409    Dehydration E86.0    Lymphoma, diffuse ([facility]) C85.90      Weakness most likely secondary to his lymphoma and treatment. 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 Repeating chest x-ray was negative but patient was treated with antibiotic for his UTI he does have a chronic stent second to his lymphoma and the mass that is pushing on his ureter. Was seen by urology and there was no plan to change his stent at this time patient responded to IV hydration his kidney function improved he was eating much better in the hospital which usually what happens with him once he gets some fluid he started eating really good and looking better which surprises his wife. Patient agreed to go to rehab at this time and  working on either [facility] or acute rehab once that approved patient was discharged to those facilities. Patient did not qualify to go to acute rehab but he will be going to [facility] for further rehab.     Component Value Units   Basic Metabolic Panel [[id number]] (Abnormal)     Collected: 09/06/22 0536     Updated: 09/06/22 0605     Specimen Source: Blood       Glucose 122 High  mg/dL     BUN 24 High  mg/dL     Creatinine 1.01 mg/dL     Calcium 8.2 Low  mg/dL     Sodium 142 mmol/L     Potassium 3.8 mmol/L     Chloride 115 High  mmol/L     CO2 22 mmol/L     Anion Gap 5 Low  mmol/L     GFR Non-African American >60 mL/min GFR African American >60 mL/min     GFR Comment          Comment: Average GFR for 79or more years old:    76 mL/min/1.73sq m   Chronic Kidney Disease:    <60 mL/min/1.73sq m   Kidney failure:    <15 mL/min/1.73sq m               eGFR calculated using average adult body mass. Additional eGFR calculator available at:         [email or web address]             CBC with Auto Differential [[id number]] (Abnormal)     Collected: 09/06/22 0536     Updated: 09/06/22 0546     Specimen Source: Blood       WBC 7.0 k/uL     RBC 3.16 Low  m/uL     Hemoglobin 8.3 Low  g/dL     Hematocrit 24.9 Low  %     MCV 78.7 Low  fL     MCH 26.2 pg     MCHC 33.3 g/dL     RDW 18.2 High  %     Platelets 890 Low  k/uL     MPV 7.7 fL     Seg Neutrophils 68 High  %     Lymphocytes 18 Low  %     Monocytes 12 High  %     Eosinophils % 2 %     Basophils 0 %     Segs Absolute 4.80 k/uL     Absolute Lymph # 1.30 k/uL     Absolute Mono # 0.80 k/uL     Absolute Eos # 0.10 k/uL     Basophils Absolute 0.00 k/uL   Culture, Blood 1 [[id number]]     Collected: 08/31/22 1748     Updated: 09/05/22 2231     Specimen Source: Blood       Specimen Description . BLOOD     Culture NO GROWTH 5 DAYS   Culture, Blood 1 [[id number]]     Collected: 08/31/22 1748     Updated: 09/05/22 2231     Specimen Source: Blood       Specimen Description . BLOOD     Culture NO GROWTH 5 DAYS   Culture, Blood 1 [[id number]]     Collected: 09/01/22 0939     Updated: 09/05/22 1423     Specimen Source: Blood       Specimen Description . BLOOD     Culture NO GROWTH 4 DAYS   Culture, Blood 1 [[id number]]     Collected: 09/01/22 0939     Updated: 09/05/22 1423     Specimen Source: Blood       Specimen Description . BLOOD     Culture NO GROWTH 4 DAYS   Comprehensive Metabolic Panel [[id number]] (Abnormal)     Collected: 09/05/22 1200     Updated: 09/05/22 1241     Specimen Type: Blood       Glucose 169 High  mg/dL     BUN 25 High  mg/dL     Creatinine 0.99 mg/dL Calcium 8.2 Low  mg/dL     Sodium 141 mmol/L     Potassium 3.9 mmol/L     Chloride 112 High  mmol/L     CO2 21 mmol/L     Anion Gap 8 Low  mmol/L     Alkaline Phosphatase 93 U/L     ALT 9 U/L     AST 9 U/L     Total Bilirubin 0.3 mg/dL     Total Protein 5.1 Low  g/dL     Albumin 2.6 Low  g/dL     GFR Non-African American >60 mL/min     GFR African American >60 mL/min     GFR Comment          Comment: Average GFR for 79or more years old:    76 mL/min/1.73sq m   Chronic Kidney Disease:    <60 mL/min/1.73sq m   Kidney failure:    <15 mL/min/1.73sq m               eGFR calculated using average adult body mass. Additional eGFR calculator available at:         [email or web address]                  Patient Instructions:  To continue with fluid intake    Discharge Medications: Continue with his home medications  [email or web address] IP Meds  Hide  (From admission, onward)    Frequency    carvedilol (COREG) tablet 12.5 mg     Discontinue      2 TIMES DAILY WITH MEALS    cefepime (MAXIPIME) 2,000 mg in sodium chloride 0.9 % 50 mL IVPB mini-bag     Discontinue      EVERY 12 HOURS    iron sucrose (VENOFER) 200 mg in sodium chloride 0.9 % 100 mL IVPB     Discontinue      EVERY 24 HOURS    immune globulin (GAMUNEX-C) 10% solution 20 g     Discontinue      ONCE    guaiFENesin (MUCINEX) extended release tablet 600 mg     Discontinue      2 TIMES DAILY    0.9 % sodium chloride infusion     Discontinue      CONTINUOUS    apixaban (ELIQUIS) tablet 2.5 mg     Discontinue      2 TIMES DAILY    atorvastatin (LIPITOR) tablet 10 mg     Discontinue      DAILY    donepezil (ARICEPT) tablet 10 mg     Discontinue      DAILY    FLUoxetine (PROZAC) capsule 20 mg     Discontinue      DAILY    lactobacillus (CULTURELLE) capsule 1 capsule     Discontinue      DAILY    losartan (COZAAR) tablet 50 mg     Discontinue      DAILY    megestrol (MEGACE) 40 MG/ML suspension 400 mg     Discontinue      DAILY    therapeutic multivitamin-minerals 1 tablet     Discontinue      DAILY    psyllium (METAMUCIL) 58.12 % packet 1 packet     Discontinue      DAILY    levothyroxine (SYNTHROID) tablet 150 mcg     Discontinue      DAILY    pantoprazole (PROTONIX) tablet 40 mg     Discontinue      DAILY BEFORE BREAKFAST    budesonide-formoterol (SYMBICORT) 80-4.5 MCG/ACT inhaler 2 puff     Discontinue      2 TIMES DAILY    ipratropium-albuterol (DUONEB) nebulizer solution 3 mL     Discontinue      EVERY 6 HOURS WHILE AWAKE    oxyCODONE (OXYCONTIN) extended release tablet 10 mg     Discontinue      EVERY 12 HOURS    sucralfate (CARAFATE) tablet 1 g     Discontinue      4 TIMES DAILY BEFORE MEALS & NIGHTLY    prochlorperazine (COMPAZINE) tablet 10 mg     Discontinue      EVERY 6 HOURS PRN    oxyCODONE (ROXICODONE) immediate release tablet 5 mg     Discontinue      EVERY 4 HOURS PRN    sodium chloride flush 0.9 % injection 5-40 mL  (Saline Flushes)     Discontinue      2 times per day    sodium chloride flush 0.9 % injection 5-40 mL  (Saline Flushes)     Discontinue      PRN    0.9 % sodium chloride infusion  (Saline Flushes)     Discontinue      PRN    acetaminophen (TYLENOL) tablet 650 mg     Discontinue      EVERY 4 HOURS PRN    ondansetron (ZOFRAN-ODT) disintegrating tablet 4 mg  (ondansetron (ZOFRAN) ODT or ondansetron (ZOFRAN) IV)     Discontinue     \"Or\" Linked Group Details    EVERY 8 HOURS PRN    ondansetron (ZOFRAN) injection 4 mg  (ondansetron (ZOFRAN) ODT or ondansetron (ZOFRAN) IV)     Discontinue     \"Or\" Linked Group Details    EVERY 6 HOURS PRN    morphine (PF) injection 2 mg  (Morphine (Moderate and Severe) Pain Panel)     Discontinue     \"Or\" Linked Group Details    EVERY 2 HOURS PRN    morphine sulfate (PF) injection 4 mg  (Morphine (Moderate and Severe) Pain Panel)     Discontinue     \"Or\" Linked Group Details    EVERY 2 HOURS PRN   Iron sulfate 325 mg 3 times daily    Keflex 500 p.o. 3 times daily for 5

## 2022-09-05 NOTE — PLAN OF CARE
Problem: Discharge Planning  Goal: Discharge to home or other facility with appropriate resources  Outcome: Progressing  Note: Pt to discharge to [facility]. Problem: Safety - Adult  Goal: Free from fall injury  Outcome: Progressing  Note: Pt free from falls this shift. Pt was a one staff assist to the bathroom this shift. Bed remains locked in low position, bed alarm on per forgetfulness to use call light, side rails up x2, call light in reach. Problem: Skin/Tissue Integrity  Goal: Absence of new skin breakdown  Description: 1. Monitor for areas of redness and/or skin breakdown  2. Assess vascular access sites hourly  3. Every 4-6 hours minimum:  Change oxygen saturation probe site  4. Every 4-6 hours:  If on nasal continuous positive airway pressure, respiratory therapy assess nares and determine need for appliance change or resting period. Outcome: Progressing  Note: Skin assessed this shift. No new breakdown noted. Pt able to reposition self.       Problem: Pain  Goal: Verbalizes/displays adequate comfort level or baseline comfort level  Outcome: Progressing

## 2022-09-05 NOTE — PROGRESS NOTES
_                         Today's Date: 9/5/2022  Patient Name: Donavon Bond  Date of admission: 8/31/2022  1:04 PM  Patient's age: 74 y.o., 1950  Admission Dx: Lymphoma, diffuse ([facility] [address] [C85.90]  Acute cystitis with hematuria [N30.01]  Pneumonia due to infectious organism, unspecified laterality, unspecified part of lung [J18.9]      Requesting Physician: Haresh Stein DO    CHIEF COMPLAINT:  weakness and fatigue. SOB. Pneumonia. UTI. NHL    SUBJECTIVE:  Patient was seen and examined. Clinically stable. No events overnight. Overall he is feeling better. Better appetite. No active bleeding. No fever. Labs are stable. Tolerated IVIG no side effect  Undergoing IV iron      BRIEF CASE HISTORY:    The patient is a 74 y.o.  male who is admitted to the hospital for further management of UTI, pneumonia and failure to thrive. He was having increasing weakness and fatigue. He has UTI secondary to stents. He had SOB and cough. CT scan showed pneumonia. Started on antibiotics with IVF and nutritional support. He feels better. Patient is on Imbruvica for NHL. 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 Evidence of relapse with biopsy-proven follicular lymphoma. 1-2 from treated medically lymph node biopsy July 6, 2021     CURRENT THERAPY:    1.   Observation. 2.   Status post treatment with Bexxar in May 2008. 3.   Status post previous treatment with Rituxan. 4.  Start treatment with Rituxan August 23, 2021. completed4 weeks of rituximab on 9/20/2021  5. Induction Rituxan is followed by consolidation Rituxan maintenance   6. Due to progression we started Imbruvica July 2022. INTERIM HISTORY:  The patient is seen for follow-up of his lymphoma. Further work-up showed enlarged lymph nodes in the retroperitoneum and iliac area.   Biopsy proved recurrence of follicular lymphoma. Patient did well on rituximab induction and was maintained on maintenance Rituxan. Repeated CT scan February 2022 showed stable disease. He had hospitalization in July which showed significant progressive disease. Started on Imbruvica. He was hospitalized 1 week ago due to dehydration and malnutrition. He started feeling much better after rehydration with improvement in kidney function. Repeated CT scan showed positive results with significant improvement on the Imbruvica treatment. Past Medical History:   has a past medical history of Arthritis, Cancer ([facility] [address] Chemotherapy management, encounter for, CHF (congestive heart failure) ([facility] [address] COPD (chronic obstructive pulmonary disease) ([facility] [address] Diabetes mellitus ([facility] [address] H/O cardiovascular stress test, History of non-Hodgkin's lymphoma, Hx of blood clots, Hyperlipidemia, Hypertension, Hypothyroidism, and On home O2. Past Surgical History:   has a past surgical history that includes hernia repair; IR BIOPSY ABDOMINAL/RETROPERITONEAL MASS PERCUTANEOUS (7/6/2021); IR PORT PLACEMENT > 5 YEARS (8/31/2021); Colonoscopy (Left, 9/24/2021); Cystoscopy (Right, 10/6/2021); Cholecystectomy, laparoscopic (N/A, 10/9/2021); Tonsillectomy; Cystoscopy (Bilateral, 11/24/2021); Cystoscopy (Bilateral, 2/16/2022); eye surgery; Carotid endarterectomy (Left, 6/28/2022); and Cystoscopy (N/A, 7/20/2022). Family History: family history includes Alzheimer's Disease in his father; Diabetes in his mother; Heart Disease in his brother and brother. Social History:   reports that he quit smoking about 16 years ago. His smoking use included cigarettes. He has a 90.00 pack-year smoking history. He has never used smokeless tobacco. He reports current alcohol use of about 1.7 standard drinks per week. He reports current drug use. Drug: Marijuana Rigo Rose). Medications:    Prior to Admission medications    Medication Sig Start Date End Date Taking?  Authorizing Provider atorvastatin (LIPITOR) 10 MG tablet Take 10 mg by mouth daily   Yes Historical Provider, MD   ibrutinib (IMBRUVICA) 420 MG tablet Take 1 tablet by mouth daily 8/22/22 9/21/22  Wilmar Delgado MD   oxyCODONE (OXYCONTIN) 10 MG extended release tablet Take 10 mg by mouth in the morning and 10 mg in the evening. Historical Provider, MD   oxyCODONE (ROXICODONE) 5 MG immediate release tablet Take 5 mg by mouth every 4 hours as needed for Pain. Historical Provider, MD   prochlorperazine (COMPAZINE) 10 MG tablet Take 1 tablet by mouth every 6 hours as needed (nausea vomiting) 7/26/22   Wilmar Delgado MD   megestrol (MEGACE) 40 MG/ML suspension Take 10 mLs by mouth in the morning. 7/25/22   Wilmar Delgado MD   apixaban (ELIQUIS) 5 MG TABS tablet Take 1 tablet by mouth in the morning and 1 tablet before bedtime. 7/21/22   Roderick Metcalf DO   sucralfate (CARAFATE) 1 GM tablet Take 1 tablet by mouth in the morning and 1 tablet at noon and 1 tablet in the evening and 1 tablet before bedtime. 7/21/22   Roderick Metcalf DO   glipiZIDE (GLUCOTROL) 5 MG tablet Take 0.5 tablets by mouth in the morning and 0.5 tablets in the evening. Take before meals. Patient not taking: Reported on 8/12/2022 7/21/22 8/13/22  Roderick Metcalf DO   carvedilol (COREG) 6.25 MG tablet Take 6.25 mg by mouth 2 times daily    Historical Provider, MD   psyllium (KONSYL) 28.3 % PACK Take 1 packet by mouth daily    Historical Provider, MD   Lactobacillus (PROBIOTIC ACIDOPHILUS) CAPS Take 1 caplet by mouth daily    Historical Provider, MD   Multiple Vitamins-Minerals (THERAPEUTIC MULTIVITAMIN-MINERALS) tablet Take 1 tablet by mouth daily    Historical Provider, MD   OXYGEN Inhale 2 L into the lungs at bedtime Nightly & during the day.     Historical Provider, MD   ipratropium-albuterol (DUONEB) 0.5-2.5 (3) MG/3ML SOLN nebulizer solution Inhale 3 mLs into the lungs every 4 hours as needed for Shortness of Breath 2/20/22   Asmita Rios, APRN - CNP   levothyroxine (SYNTHROID) 150 MCG tablet Take 1 tablet by mouth Daily 2/2/22   Shayy Parker DO   fluticasone-salmeterol (ADVAIR) 100-50 MCG/DOSE diskus inhaler Inhale 1 puff into the lungs every 12 hours 2/1/22   Shayy Parker DO   furosemide (LASIX) 20 MG tablet Take 1 tablet by mouth daily  Patient taking differently: Take 20 mg by mouth See Admin Instructions Pt only takes when legs start to swell 2/1/22   Brent Ulloa DO   albuterol sulfate HFA (PROVENTIL;VENTOLIN;PROAIR) 108 (90 Base) MCG/ACT inhaler Inhale 2 puffs into the lungs every 4 hours as needed for Wheezing     Historical Provider, MD   amLODIPine (NORVASC) 10 MG tablet Take 1 tablet by mouth daily  Patient not taking: Reported on 8/22/2022 10/12/21   Yue Metcalf DO   losartan (COZAAR) 50 MG tablet Take 1 tablet by mouth daily 10/12/21   Roderick Metcalf DO   donepezil (ARICEPT) 10 MG tablet Take 10 mg by mouth daily  7/11/21   Historical Provider, MD   FLUoxetine (PROZAC) 20 MG capsule Take 20 mg by mouth daily    Historical Provider, MD   omeprazole (PRILOSEC) 20 MG delayed release capsule Take 20 mg by mouth 2 times daily    Historical Provider, MD     Current Facility-Administered Medications   Medication Dose Route Frequency Provider Last Rate Last Admin    cefepime (MAXIPIME) 2,000 mg in sodium chloride 0.9 % 50 mL IVPB mini-bag  2,000 mg IntraVENous Q12H Roderick Metcalf DO   Stopped at 09/05/22 1312    carvedilol (COREG) tablet 12.5 mg  12.5 mg Oral BID  Roderick Metcalf DO        iron sucrose (VENOFER) 200 mg in sodium chloride 0.9 % 100 mL IVPB  200 mg IntraVENous Q24H Sangita Dorman MD   Stopped at 09/04/22 1826    immune globulin (GAMUNEX-C) 10% solution 20 g  20 g IntraVENous Once Sangita Dorman  mL/hr at 09/04/22 1901 Rate Change at 09/04/22 1901    0.9 % sodium chloride infusion   IntraVENous Continuous Roderick Metcalf DO 75 mL/hr at 09/05/22 0503 New Bag at 09/05/22 0503    guaiFENesin (Romi Giraldo) extended release tablet 600 mg  600 mg Oral BID Roderick T Dixon, DO   600 mg at 09/05/22 0810    sodium chloride flush 0.9 % injection 5-40 mL  5-40 mL IntraVENous 2 times per day Emilio Kushalvanjannie, DO   10 mL at 09/05/22 0811    sodium chloride flush 0.9 % injection 5-40 mL  5-40 mL IntraVENous PRN Emilio Kushalvandi, DO   10 mL at 09/02/22 0817    0.9 % sodium chloride infusion   IntraVENous PRN Emilio Fatimavandi, DO        acetaminophen (TYLENOL) tablet 650 mg  650 mg Oral Q4H PRN Emilio Payvandi, DO        ondansetron (ZOFRAN-ODT) disintegrating tablet 4 mg  4 mg Oral Q8H PRN Emilio Payvandi, DO        Or    ondansetron (ZOFRAN) injection 4 mg  4 mg IntraVENous Q6H PRN Emilio Fatimavandi, DO        morphine (PF) injection 2 mg  2 mg IntraVENous Q2H PRN Emilio Payvandi, DO        Or    morphine sulfate (PF) injection 4 mg  4 mg IntraVENous Q2H PRN Elsi Searing, DO        apixaban (ELIQUIS) tablet 2.5 mg  2.5 mg Oral BID Roderick T Dixon, DO   2.5 mg at 09/05/22 0809    atorvastatin (LIPITOR) tablet 10 mg  10 mg Oral Daily Roderick T Dixon, DO   10 mg at 09/05/22 0810    donepezil (ARICEPT) tablet 10 mg  10 mg Oral Daily Roderick T Dixon, DO   10 mg at 09/05/22 0810    FLUoxetine (PROZAC) capsule 20 mg  20 mg Oral Daily Roderick T Dixon, DO   20 mg at 09/05/22 0809    budesonide-formoterol (SYMBICORT) 80-4.5 MCG/ACT inhaler 2 puff  2 puff Inhalation BID Roderick T Dixon, DO   2 puff at 09/05/22 0713    ipratropium-albuterol (DUONEB) nebulizer solution 3 mL  1 vial Inhalation Q6H WA Roderick T Dixon, DO   3 mL at 09/05/22 1342    lactobacillus (CULTURELLE) capsule 1 capsule  1 capsule Oral Daily Roderick T Dixon, DO   1 capsule at 09/05/22 0809    levothyroxine (SYNTHROID) tablet 150 mcg  150 mcg Oral Daily Roderick T Dixon, DO   150 mcg at 09/05/22 0500    losartan (COZAAR) tablet 50 mg  50 mg Oral Daily Roderick T Dixon, DO   50 mg at 09/05/22 0810    megestrol (MEGACE) 40 MG/ML suspension 400 mg  400 mg Oral Daily Roderick T Dixon, DO   400 mg at 09/05/22 1114    therapeutic multivitamin-minerals 1 tablet  1 tablet Oral Daily Roderick T Dixon, DO   1 tablet at 09/05/22 0809    pantoprazole (PROTONIX) tablet 40 mg  40 mg Oral QAM AC Roderick T Dixon, DO   40 mg at 09/05/22 0500    oxyCODONE (OXYCONTIN) extended release tablet 10 mg  10 mg Oral Q12H Roderick T Dixon, DO   10 mg at 09/05/22 1109    oxyCODONE (ROXICODONE) immediate release tablet 5 mg  5 mg Oral Q4H PRN Roderick T Dixon, DO        prochlorperazine (COMPAZINE) tablet 10 mg  10 mg Oral Q6H PRN Rodercik T Dixon, DO        sucralfate (CARAFATE) tablet 1 g  1 g Oral 4x Daily AC & HS Roderick T Dixon, DO   1 g at 09/05/22 1109    psyllium (METAMUCIL) 58.12 % packet 1 packet  1 packet Oral Daily Adali Capuchin, DO   1 packet at 09/02/22 4542       Allergies:  Patient has no known allergies. REVIEW OF SYSTEMS:      General: + weakness + fatigue. + unanticipated weight loss or decreased appetite. No fever or chills. Eyes: No blurred vision, eye pain or double vision. Ears: No hearing problems or drainage. No tinnitus. Throat: No sore throat, problems with swallowing or dysphagia. Respiratory: as above     Cardiovascular: No chest pain, orthopnea or PND. No lower extremity edema. No palpitation. Gastrointestinal: as above   Genitourinary: No dysuria, hematuria, frequency or urgency. Musculoskeletal: No muscle aches or pains. No limitation of movement. No back pain. No gait disturbance, No joint complaints. Dermatologic: No skin rashes or pruritus. No skin lesions or discolorations. Psychiatric: No depression, anxiety, or stress or signs of schizophrenia. No change in mood or affect. Hematologic: No history of bleeding tendency. No bruises or ecchymosis. No history of clotting problems. Infectious disease: No fever, chills or frequent infections. Endocrine: No polydipsia or polyuria. No temperature intolerance. Neurologic: No headaches or dizziness.  No weakness or numbness of the extremities. No changes in balance, coordination,  memory, mentation, behavior. Allergic/Immunologic: No nasal congestion or hives. No repeated infections. PHYSICAL EXAM:      BP (!) 175/68   Pulse 69   Temp 98.2 °F (36.8 °C) (Oral)   Resp 18   Ht 5' 10\" (1.778 m)   Wt 151 lb 10.8 oz (68.8 kg)   SpO2 90%   BMI 21.76 kg/m²    Temp (24hrs), Av.3 °F (36.8 °C), Min:97.8 °F (36.6 °C), Max:98.8 °F (37.1 °C)      General appearance - not in pain or distress. Looks chronically ill. Mental status - alert and oriented  Eyes - pupils equal and reactive, extraocular eye movements intact  Ears - bilateral TM's and external ear canals normal  Nose - normal and patent, no erythema, discharge or polyps  Mouth - mucous membranes moist, pharynx normal without lesions  Neck - supple, no significant adenopathy  Lymphatics - no palpable lymphadenopathy, no hepatosplenomegaly  Chest - clear to auscultation, no wheezes, rales or rhonchi, symmetric air entry  Heart - normal rate, regular rhythm, normal S1, S2, no murmurs, rubs, clicks or gallops  Abdomen - soft, nontender, nondistended, no masses or organomegaly  Neurological - alert, oriented, normal speech, no focal findings or movement disorder noted  Musculoskeletal - no joint tenderness, deformity or swelling  Extremities - peripheral pulses normal, no pedal edema, no clubbing or cyanosis  Skin - normal coloration and turgor, no rashes, no suspicious skin lesions noted           DATA:      Labs:       CBC:   Recent Labs     22  0547 22  0457   WBC 6.4 7.4   HGB 8.9* 9.1*   HCT 27.2* 27.8*    157       BMP:   Recent Labs     22  0457 22  1200    141   K 4.0 3.9   CO2 19* 21   BUN 28* 25*   CREATININE 1.06 0.99   LABGLOM >60 >60   GLUCOSE 117* 169*       PT/INR:   No results for input(s): PROTIME, INR in the last 72 hours. APTT:  No results for input(s): APTT in the last 72 hours.     LIVER PROFILE:  Recent Labs 09/05/22  1200   AST 9   ALT 9   LABALBU 2.6*       XR CHEST PORTABLE  Narrative: EXAMINATION:  ONE XRAY VIEW OF THE CHEST    9/3/2022 9:30 am    COMPARISON:  Chest radiograph performed 07/18/2022. HISTORY:  ORDERING SYSTEM PROVIDED HISTORY: Lymphoma  TECHNOLOGIST PROVIDED HISTORY:  Lymphoma  Reason for Exam: Lymphoma    FINDINGS:  The lungs are without consolidation or effusion. There is no pneumothorax. The mediastinal structures are unremarkable. The upper abdomen unremarkable. The extrathoracic soft tissues are unremarkable. There is a right internal  jugular port. Impression: No acute cardiopulmonary process. 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 Feeling better with IVF and nutritional support. Continue antibiotics as per primary team  Discussed further care for his lymphoma. Dose adjusted Imbruvica will be done as an outpatient we will monitor response. Patient's questions were answered to the best of his satisfaction and he verbalized full understanding and agreement.   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

## 2022-09-05 NOTE — PROGRESS NOTES
BRONCHOSPASM/BRONCHOCONSTRICTION     [x]         IMPROVE AERATION/BREATH SOUNDS  [x]   ADMINISTER BRONCHODILATOR THERAPY AS APPROPRIATE  [x]   ASSESS BREATH SOUNDS  []   IMPLEMENT AEROSOL/MDI PROTOCOL  [x]   PATIENT EDUCATION AS NEEDED   PROVIDE ADEQUATE OXYGENATION WITH ACCEPTABLE SP02/ABG'S    [x]  IDENTIFY APPROPRIATE OXYGEN THERAPY  [x]   MONITOR SP02/ABG'S AS NEEDED   [x]   PATIENT EDUCATION AS NEEDED     Pt stable on RA, SpO2 94%. Diminished/some expiratory wheezes present on the RLL.

## 2022-09-05 NOTE — CARE COORDINATION
SW spoke with patient to provide updates about discharge plans. SW informed patient that authorization is still pending and was started on 09/02/22. Patient did not have any further questions.  Electronically signed by PRISCILLA Wheat on 9/5/2022 at 10:30 AM

## 2022-09-05 NOTE — PROGRESS NOTES
[id number] [facility] ([facility])      PROGRESS NOTE        Patient:  David Farah  YOB: 1950    MRN: [id number]     Acct: [de-identified]     Admit date: 8/31/2022    Pt seen and Chart reviewed. Consultant notes reviewed and care evaluated. Subjective: Patient still doing okay he states. Denies any chest pain or increased shortness of breath he still complaining some pain about 5 on a scale of 10 but when he gets the pain medicine and it holds him and he seems comfortable he does look comfortable actually is watching a tennis game on TV. He ate his lunch and states it was good with no nausea or vomiting or increased abdominal pain. Still awaiting pre-CERT to rehab facility but they still will get acute rehab that evaluation has not been done yet. Has GIOVANY Ga reports that he has been having high blood pressure most couple days we will increase his Coreg to 12.5 mg twice daily and continue with the losartan 50 mg daily    Diet:  ADULT DIET;  Regular      Medications:Current Inpatient    Scheduled Meds:   cefepime  2,000 mg IntraVENous Q12H    iron sucrose  200 mg IntraVENous Q24H    immune globulin  20 g IntraVENous Once    guaiFENesin  600 mg Oral BID    sodium chloride flush  5-40 mL IntraVENous 2 times per day    apixaban  2.5 mg Oral BID    atorvastatin  10 mg Oral Daily    carvedilol  6.25 mg Oral BID WC    donepezil  10 mg Oral Daily    FLUoxetine  20 mg Oral Daily    budesonide-formoterol  2 puff Inhalation BID    ipratropium-albuterol  1 vial Inhalation Q6H WA    lactobacillus  1 capsule Oral Daily    levothyroxine  150 mcg Oral Daily    losartan  50 mg Oral Daily    megestrol  400 mg Oral Daily    therapeutic multivitamin-minerals  1 tablet Oral Daily    pantoprazole  40 mg Oral QAM AC    oxyCODONE  10 mg Oral Q12H    sucralfate  1 g Oral 4x Daily AC & HS    psyllium  1 packet Oral Daily     Continuous Infusions:   sodium chloride 75 mL/hr at 09/05/22 0503    sodium chloride       PRN Meds:sodium chloride flush, sodium chloride, acetaminophen, ondansetron **OR** ondansetron, morphine **OR** morphine, oxyCODONE, prochlorperazine        Physical Exam:  Vitals: BP (!) 156/77   Pulse 62   Temp 97.8 °F (36.6 °C) (Oral)   Resp 16   Ht 5' 10\" (1.778 m)   Wt 151 lb 10.8 oz (68.8 kg)   SpO2 96%   BMI 21.76 kg/m²   24 hour intake/output:  Intake/Output Summary (Last 24 hours) at 9/5/2022 1254  Last data filed at 9/5/2022 0459  Gross per 24 hour   Intake 2140 ml   Output --   Net 2140 ml     Last 3 weights: Wt Readings from Last 3 Encounters:   09/04/22 151 lb 10.8 oz (68.8 kg)   08/22/22 160 lb 3.2 oz (72.7 kg)   08/12/22 173 lb (78.5 kg)       Physical Examination:   General appearance - alert, well appearing, and in no distress he looks comfortable in bed.   Mental status - alert, oriented to person, place, and time, normal mood, behavior, speech, dress, motor activity, and thought processes  neck supple with midline trachea  Chest - clear to auscultation, no wheezes, rales or rhonchi, symmetric air entry  Heart - normal rate, regular rhythm, normal S1, S2, no murmurs, rubs, clicks or gallops  Abdomen - soft, nontender, nondistended, no masses or organomegaly  Neurological - alert, oriented, normal speech, no focal findings or movement disorder noted}  Extremities - peripheral pulses normal, no pedal edema, no clubbing or cyanosis  Skin -has bruising on his arms and chest and abdomen are about the same has not increased all second to falling episodes at home and being on Eliquis and second to his lymphoma   Component Value Units   Comprehensive Metabolic Panel [[id number]] (Abnormal)    Collected: 09/05/22 1200    Updated: 09/05/22 1241    Specimen Type: Blood     Glucose 169 High  mg/dL    BUN 25 High  mg/dL    Creatinine 0.99 mg/dL    Calcium 8.2 Low  mg/dL    Sodium 141 mmol/L    Potassium 3.9 mmol/L    Chloride 112 High  mmol/L    CO2 21 mmol/L Anion Gap 8 Low  mmol/L    Alkaline Phosphatase 93 U/L    ALT 9 U/L    AST 9 U/L    Total Bilirubin 0.3 mg/dL    Total Protein 5.1 Low  g/dL    Albumin 2.6 Low  g/dL    GFR Non-African American >60 mL/min    GFR African American >60 mL/min    GFR Comment         Comment: Average GFR for 79or more years old:    76 mL/min/1.73sq m   Chronic Kidney Disease:    <60 mL/min/1.73sq m   Kidney failure:    <15 mL/min/1.73sq m               eGFR calculated using average adult body mass. Additional eGFR calculator available at:         [email or web address]             Culture, Blood 1 [[id number]]    Collected: 08/31/22 1748    Updated: 09/04/22 2229    Specimen Source: Blood     Specimen Description . BLOOD    Culture NO GROWTH 4 DAYS   Culture, Blood 1 [[id number]]    Collected: 08/31/22 1748    Updated: 09/04/22 2227    Specimen Source: Blood     Specimen Description . BLOOD    Culture NO GROWTH 4 DAYS   Culture, Blood 1 [[id number]]    Collected: 09/01/22 0939    Updated: 09/04/22 1423    Specimen Source: Blood     Specimen Description . BLOOD    Culture NO GROWTH 3 DAYS   Culture, Blood 1 [[id number]]    Collected: 09/01/22 0939    Updated: 09/04/22 1423    Specimen Source: Blood     Specimen Description . BLOOD    Culture NO GROWTH 3 DAYS     Current  Meds        Assessment:  Principal Problem:    Lymphoma, diffuse (HCC)  Active Problems:    Severe malnutrition ([address]    Acute cystitis with hematuria  Resolved Problems:    * No resolved hospital problems. *    Lymphoma, diffuse ([address]  Active Problems:    Severe malnutrition ([address]    Acute cystitis with hematuria  Resolved Problems:    * No resolved hospital problems. *       Lymphoma, diffuse ([address]  Active Problems:    Severe malnutrition ([address]    Acute cystitis with hematuria  Resolved Problems:    * No resolved hospital problems. *  Resolved Problems:    * No resolved hospital problems.  *   NHL (non-Hodgkin's lymphoma) (HCC) C85.90    Traumatic retroperitoneal hematoma S36.892A    Acute kidney injury ([facility] X14.3    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    Hydronephrosis due to obstruction of ureter N13.1    Moderate malnutrition (HCC) E44.0    Chronic cholecystitis K81.1    Pleural effusion J90    Cardiomegaly I51.7    Hypothyroidism E03.9    Diabetes mellitus (HCC) E11.9    Hypertensive urgency I16.0    Acute respiratory failure with hypoxia (HCC) J96.01    Hypoxia R09.02    Carotid stenosis, asymptomatic, bilateral I65.23    Simple chronic bronchitis (HCC) J41.0    Dependence on nocturnal oxygen therapy Z99.81    Pneumonia J18.9    LAURO (acute kidney injury) ([facility] N17.9    Failure to thrive in adult R62.7    DVT (deep venous thrombosis) ([facility] I82.409    Dehydration E86.0    Lymphoma, diffuse ([facility]) C85.90      Weakness most likely secondary to his lymphoma and treatment.     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    Renal insufficiency    It is dehydration second is chronic second to the mass and hydronephrosis  Still negative chest x-ray  Blood cultures are negative so far  Elevated blood pressure         Plan:  Continue the current treatment    Continue with the current pain medication seems to help him    Awaiting ECF for acute rehab admission    Continue with antibiotic that he is discharged  Increase carvedilol to 12.5 mg twice daily hold for parameters and continue with the losartan at 50 mg daily for now    Haresh Stein DO FAAFP            9/5/2022, 12:54 PM

## 2022-09-05 NOTE — CONSULTS
Physical Medicine & Rehabilitation  Consult Note      Admitting Physician:  Mike Coats DO    Primary Care Provider:  Eden Barrera DO     Reason for Consult:  Acute Inpatient Rehabilitation    Chief Complaint:  Weakness    History of Present Illness:  Referring Provider is requesting an evaluation for appropriate placement upon discharge from acute care. History from chart review and patient. Rubén Moreira is a 74 y.o. male with history of non-Hodgkin's lymphoma (on oral chemo), hydronephrosis s/p ureteral stenting, CHF, HTN, HLD, diabetes, hypothyroidism, DVT, and COPD admitted to [facility] on 8/31/2022. He initially presented with increasing weakness over 2 weeks as well as multiple falls at home. He is being treated for UTI. He is also being given IV iron for anemia and had a dose of IVIG for hypogammaglobulinemia. He reports ongoing weakness as well as shortness of breath with exertion. He also notes chronic abdominal pain and numbness/tingling in the bilateral feet. Review of Systems:  Review of Systems   Constitutional:  Negative for fever. HENT:  Negative for hearing loss. Eyes:  Negative for blurred vision and double vision. Respiratory:  Positive for shortness of breath. Cardiovascular:  Negative for chest pain. Gastrointestinal:  Positive for abdominal pain (chronic). No change in bowel control   Genitourinary:         No change in bladder control   Musculoskeletal:  Negative for back pain. Skin:  Negative for rash. Neurological:  Positive for sensory change (chronic) and weakness. Negative for headaches. Premorbid function:  Independent    Current function:    Physical Therapy    Restrictions/Precautions: Fall Risk, General Precautions  Implants present? :  (denies)    Bed mobility  Supine to Sit: Stand by assistance  Sit to Supine: Stand by assistance  Scooting: Stand by assistance  Bed Mobility Comments: HOB elevated with use of hand rails.  No complaints of lightheadedness/dizziness, but pt endorsed feeling \"weak\" sitting EOB    Transfers  Sit to Stand: Contact guard assistance  Stand to sit: Contact guard assistance  Comment: pt stood bedside with RW    Ambulation  Device: Rolling Walker  Other Apparatus: O2 (2L)  Assistance: Contact guard assistance (second assist for IV and O2 lines only)  Quality of Gait: pt demonstrated an almost shuffling gait  Gait Deviations: Decreased step length, Decreased step height  Distance: 20ft x 2  Comments: pt required a few minute rest break between walks      Occupational Therapy    ADL  Feeding: Setup  Grooming: Setup (pt completes oral care and washes face/neck sitting EOB)  UE Bathing: Supervision (completes BUE sitting EOB)  LE Bathing: Moderate assistance  UE Dressing: Supervision  LE Dressing: Moderate assistance  Toileting: Moderate assistance  Additional Comments: ADL scores based on skilled observation and clinical reasoning, unless otherwise noted. Pt is currently limited due to generalized weakness and shortness of breath with minimal activity, impacting independence with self care. Balance  Sitting Balance: Stand by assistance (tolerates sitting unsupported EOB x 30minutes.)  Standing Balance: Contact guard assistance  Standing Balance  Time: 2-3 minutes x2  Activity: functional transfers, functional mobility  Comment: with RW for UE support  Functional Mobility  Functional - Mobility Device: Rolling Walker  Activity:  (Throughout patient's room)  Assist Level: Contact guard assistance  Functional Mobility Comments: Assist with line mgmt.  Pt motivated to walk but required rest breaks as needed     Transfers  Sit to stand: Contact guard assistance  Stand to sit: Contact guard assistance  Transfer Comments: Min cues for hand placement for safety                  Speech Therapy      Past Medical History:        Diagnosis Date    Arthritis     Cancer [facility])     chemotherapy lymphoma last was april 2022 Chemotherapy management, encounter for     CHF (congestive heart failure) (HCC)     COPD (chronic obstructive pulmonary disease) ([facility]     Diabetes mellitus ([facility]     H/O cardiovascular stress test     History of non-Hodgkin's lymphoma     Hx of blood clots     DVT in right leg     Hyperlipidemia     Hypertension     Hypothyroidism     On home O2     at 5 liters per nasal cannula 24 hrs. per day.         Past Surgical History:        Procedure Laterality Date    CAROTID ENDARTERECTOMY Left 6/28/2022    LEFT TYPE I EVERSION LEFT CAROTID ENDARTERECTOMY RE-IMPLANTATION LEFT ICA performed by Shira Minaya MD at [address] [facility], LAPAROSCOPIC N/A 10/9/2021    CHOLECYSTECTOMY LAPAROSCOPIC ROBOTIC XI performed by Kraig Martini MD at [address] [facility] Left 9/24/2021    COLONOSCOPY POLYPECTOMY SNARE/COLD BIOPSY performed by Virgen Melo MD at [address] Right 10/6/2021    CYSTOSCOPY PYELOGRAM URETERAL STENT INSERTION performed by Adrian Gómez MD at [address] Bilateral 11/24/2021    CYSTOSCOPY URETERAL [address] performed by Adrian Gómez MD at [address] Bilateral 2/16/2022    CYSTOSCOPY WITH BILATERAL STENT EXCHANGE performed by Adrian Gómez MD at [address] N/A 7/20/2022    CYSTOSCOPY BILATERAL STENT EXCHANGE performed by Adrian Gómez MD at [facility]      IR BIOPSY ABDOMINAL MASS  7/6/2021    IR BIOPSY ABDOMINAL MASS 7/6/2021 [facility]    IR PORT PLACEMENT EQUAL OR GREATER THAN 5 YEARS  8/31/2021    IR PORT PLACEMENT EQUAL OR GREATER THAN 5 YEARS 8/31/2021 [facility]    TONSILLECTOMY      as child       Allergies:    No Known Allergies     Current Medications:   Current Facility-Administered Medications: amLODIPine (NORVASC) tablet 5 mg, 5 mg, Oral, Daily  cefepime (MAXIPIME) 2,000 mg in sodium chloride 0.9 % 50 mL IVPB mini-bag, 2,000 mg, IntraVENous, Q12H  carvedilol (COREG) tablet 12.5 mg, 12.5 mg, Oral, BID WC  iron sucrose (VENOFER) 200 mg in sodium chloride 0.9 % 100 mL IVPB, 200 mg, IntraVENous, Q24H  immune globulin (GAMUNEX-C) 10% solution 20 g, 20 g, IntraVENous, Once  0.9 % sodium chloride infusion, , IntraVENous, Continuous  guaiFENesin (MUCINEX) extended release tablet 600 mg, 600 mg, Oral, BID  sodium chloride flush 0.9 % injection 5-40 mL, 5-40 mL, IntraVENous, 2 times per day  sodium chloride flush 0.9 % injection 5-40 mL, 5-40 mL, IntraVENous, PRN  0.9 % sodium chloride infusion, , IntraVENous, PRN  acetaminophen (TYLENOL) tablet 650 mg, 650 mg, Oral, Q4H PRN  ondansetron (ZOFRAN-ODT) disintegrating tablet 4 mg, 4 mg, Oral, Q8H PRN **OR** ondansetron (ZOFRAN) injection 4 mg, 4 mg, IntraVENous, Q6H PRN  morphine (PF) injection 2 mg, 2 mg, IntraVENous, Q2H PRN **OR** morphine sulfate (PF) injection 4 mg, 4 mg, IntraVENous, Q2H PRN  apixaban (ELIQUIS) tablet 2.5 mg, 2.5 mg, Oral, BID  atorvastatin (LIPITOR) tablet 10 mg, 10 mg, Oral, Daily  donepezil (ARICEPT) tablet 10 mg, 10 mg, Oral, Daily  FLUoxetine (PROZAC) capsule 20 mg, 20 mg, Oral, Daily  budesonide-formoterol (SYMBICORT) 80-4.5 MCG/ACT inhaler 2 puff, 2 puff, Inhalation, BID  ipratropium-albuterol (DUONEB) nebulizer solution 3 mL, 1 vial, Inhalation, Q6H WA  lactobacillus (CULTURELLE) capsule 1 capsule, 1 capsule, Oral, Daily  levothyroxine (SYNTHROID) tablet 150 mcg, 150 mcg, Oral, Daily  losartan (COZAAR) tablet 50 mg, 50 mg, Oral, Daily  megestrol (MEGACE) 40 MG/ML suspension 400 mg, 400 mg, Oral, Daily  therapeutic multivitamin-minerals 1 tablet, 1 tablet, Oral, Daily  pantoprazole (PROTONIX) tablet 40 mg, 40 mg, Oral, QAM AC  oxyCODONE (OXYCONTIN) extended release tablet 10 mg, 10 mg, Oral, Q12H  oxyCODONE (ROXICODONE) immediate release tablet 5 mg, 5 mg, Oral, Q4H PRN  prochlorperazine (COMPAZINE) tablet 10 mg, 10 mg, Oral, Q6H PRN  sucralfate Sexual Activity    Alcohol use: Yes     Alcohol/week: 1.7 standard drinks     Types: 2 Standard drinks or equivalent per week     Comment: occassional    Drug use: Yes     Types: Marijuana Joleen Postin)     Comment:  CBD gummies no smoking       Physical Exam:  BP (!) 167/59   Pulse 70   Temp 98.4 °F (36.9 °C) (Oral)   Resp 18   Ht 5' 10\" (1.778 m)   Wt 151 lb 10.8 oz (68.8 kg)   SpO2 96%   BMI 21.76 kg/m²     GEN: Well developed, well nourished, no acute distress  HEENT: NCAT. EOM grossly intact. Hearing grossly intact. Mucous membranes pink and moist.  RESP: Normal breath sounds with no wheezing, rales, or rhonchi. Respirations WNL and unlabored. On nasal cannula oxygen. CV: Regular rate and rhythm. No murmurs, rubs, or gallops. ABD: Soft, non-distended, BS+ and equal.  NEURO: Alert. Speech fluent. Sensation to light touch intact. MSK:  Muscle tone and bulk are normal bilaterally. Strength 4+/5 in bilateral upper limbs. Strength 4+/5 with bilateral ankle dorsiflexion and plantarflexion. LIMBS: No edema in bilateral lower limbs. SKIN: Warm and dry with good turgor. PSYCH: Mood WNL. Flat affect. Appropriately interactive. Diagnostics:    CBC:   Recent Labs     09/04/22  0457 09/06/22  0536   WBC 7.4 7.0   RBC 3.55* 3.16*   HGB 9.1* 8.3*   HCT 27.8* 24.9*   MCV 78.4* 78.7*   RDW 17.7* 18.2*    145*     BMP:   Recent Labs     09/04/22  0457 09/05/22  1200 09/06/22  0536    141 142   K 4.0 3.9 3.8   * 112* 115*   CO2 19* 21 22   BUN 28* 25* 24*   CREATININE 1.06 0.99 1.01   GLUCOSE 117* 169* 122*      HbA1c:   Lab Results   Component Value Date    LABA1C 5.7 06/13/2022     BNP: No results for input(s): BNP in the last 72 hours. PT/INR: No results for input(s): PROTIME, INR in the last 72 hours. APTT: No results for input(s): APTT in the last 72 hours. CARDIAC ENZYMES: No results for input(s): CKMB, CKMBINDEX, TROPONINT in the last 72 hours.     Invalid input(s): CKTOTAL;3  FASTING LIPID PANEL:  Lab Results   Component Value Date    CHOL 132 02/29/2016    HDL 26 (L) 02/29/2016    TRIG 410 (H) 02/29/2016     LIVER PROFILE:   Recent Labs     09/05/22  1200   AST 9   ALT 9   BILITOT 0.3   ALKPHOS 93       Radiology:  XR CHEST PORTABLE   Final Result   No acute cardiopulmonary process. CT ABDOMEN PELVIS WO CONTRAST Additional Contrast? None   Final Result   1. New left lower lobe consolidation is suspicious for pneumonia. 2. No significant change in abnormal retroperitoneal soft tissue, left   greater than right, with involvement of the left kidney. This is consistent   with known lymphoma. 3. Extensive colonic diverticulosis without definite diverticulitis. 4. Enlarged prostate. Impression:    Debility  Non-Hodgkin's lymphoma (on oral chemo)  UTI in the setting of hydronephrosis s/p ureteral stenting  Iron deficiency anemia, on IV iron  Thrombocytopenia  Hypogammaglobulinemia, s/p IVIG  CHF  HTN  HLD  Diabetes  Hypothyroidism  History of DVT  COPD    Recommendations:    Diagnosis:  Debility  Therapy: Has PT/OT needs  Medical Necessity: As above  Support: Lives with spouse, cousin  Rehab Recommendation: The patient would benefit from additional therapies after discharge. It appears that he has been accepted and approved for subacute rehabilitation at a skilled nursing facility, which would be appropriate for him at this time. DVT Prophylaxis: On eliquis    It was my pleasure to evaluate Henri Montgomery today. Please call with questions.     Nira Kanner, MD

## 2022-09-06 ENCOUNTER — TELEPHONE (OUTPATIENT)
Dept: ONCOLOGY | Age: 72
End: 2022-09-06

## 2022-09-06 LAB
ABSOLUTE EOS #: 0.1 K/UL (ref 0–0.4)
ABSOLUTE LYMPH #: 1.3 K/UL (ref 1–4.8)
ABSOLUTE MONO #: 0.8 K/UL (ref 0.1–1.3)
ANION GAP SERPL CALCULATED.3IONS-SCNC: 5 MMOL/L (ref 9–17)
BASOPHILS # BLD: 0 % (ref 0–2)
BASOPHILS ABSOLUTE: 0 K/UL (ref 0–0.2)
BUN BLDV-MCNC: 24 MG/DL (ref 8–23)
CALCIUM SERPL-MCNC: 8.2 MG/DL (ref 8.6–10.4)
CHLORIDE BLD-SCNC: 115 MMOL/L (ref 98–107)
CO2: 22 MMOL/L (ref 20–31)
CREAT SERPL-MCNC: 1.01 MG/DL (ref 0.7–1.2)
CULTURE: NORMAL
CULTURE: NORMAL
EOSINOPHILS RELATIVE PERCENT: 2 % (ref 0–4)
GFR AFRICAN AMERICAN: >60 ML/MIN
GFR NON-AFRICAN AMERICAN: >60 ML/MIN
GFR SERPL CREATININE-BSD FRML MDRD: ABNORMAL ML/MIN/{1.73_M2}
GLUCOSE BLD-MCNC: 122 MG/DL (ref 70–99)
HCT VFR BLD CALC: 24.9 % (ref 41–53)
HEMOGLOBIN: 8.3 G/DL (ref 13.5–17.5)
LYMPHOCYTES # BLD: 18 % (ref 24–44)
MCH RBC QN AUTO: 26.2 PG (ref 26–34)
MCHC RBC AUTO-ENTMCNC: 33.3 G/DL (ref 31–37)
MCV RBC AUTO: 78.7 FL (ref 80–100)
MONOCYTES # BLD: 12 % (ref 1–7)
PDW BLD-RTO: 18.2 % (ref 11.5–14.9)
PLATELET # BLD: 145 K/UL (ref 150–450)
PMV BLD AUTO: 7.7 FL (ref 6–12)
POTASSIUM SERPL-SCNC: 3.8 MMOL/L (ref 3.7–5.3)
RBC # BLD: 3.16 M/UL (ref 4.5–5.9)
SEG NEUTROPHILS: 68 % (ref 36–66)
SEGMENTED NEUTROPHILS ABSOLUTE COUNT: 4.8 K/UL (ref 1.3–9.1)
SODIUM BLD-SCNC: 142 MMOL/L (ref 135–144)
SPECIMEN DESCRIPTION: NORMAL
SPECIMEN DESCRIPTION: NORMAL
WBC # BLD: 7 K/UL (ref 3.5–11)

## 2022-09-06 PROCEDURE — 97116 GAIT TRAINING THERAPY: CPT

## 2022-09-06 PROCEDURE — 2700000000 HC OXYGEN THERAPY PER DAY

## 2022-09-06 PROCEDURE — 99232 SBSQ HOSP IP/OBS MODERATE 35: CPT | Performed by: INTERNAL MEDICINE

## 2022-09-06 PROCEDURE — 2580000003 HC RX 258: Performed by: STUDENT IN AN ORGANIZED HEALTH CARE EDUCATION/TRAINING PROGRAM

## 2022-09-06 PROCEDURE — 85025 COMPLETE CBC W/AUTO DIFF WBC: CPT

## 2022-09-06 PROCEDURE — 94761 N-INVAS EAR/PLS OXIMETRY MLT: CPT

## 2022-09-06 PROCEDURE — 6370000000 HC RX 637 (ALT 250 FOR IP): Performed by: FAMILY MEDICINE

## 2022-09-06 PROCEDURE — 2580000003 HC RX 258: Performed by: FAMILY MEDICINE

## 2022-09-06 PROCEDURE — 2060000000 HC ICU INTERMEDIATE R&B

## 2022-09-06 PROCEDURE — 6360000002 HC RX W HCPCS: Performed by: INTERNAL MEDICINE

## 2022-09-06 PROCEDURE — 97535 SELF CARE MNGMENT TRAINING: CPT

## 2022-09-06 PROCEDURE — 80048 BASIC METABOLIC PNL TOTAL CA: CPT

## 2022-09-06 PROCEDURE — 36415 COLL VENOUS BLD VENIPUNCTURE: CPT

## 2022-09-06 PROCEDURE — 99222 1ST HOSP IP/OBS MODERATE 55: CPT | Performed by: STUDENT IN AN ORGANIZED HEALTH CARE EDUCATION/TRAINING PROGRAM

## 2022-09-06 PROCEDURE — 94640 AIRWAY INHALATION TREATMENT: CPT

## 2022-09-06 PROCEDURE — 2580000003 HC RX 258: Performed by: INTERNAL MEDICINE

## 2022-09-06 PROCEDURE — 6360000002 HC RX W HCPCS: Performed by: FAMILY MEDICINE

## 2022-09-06 RX ORDER — OXYCODONE HCL 10 MG/1
10 TABLET, FILM COATED, EXTENDED RELEASE ORAL EVERY 12 HOURS
Qty: 14 TABLET | Refills: 0 | Status: SHIPPED | OUTPATIENT
Start: 2022-09-06 | End: 2022-09-13

## 2022-09-06 RX ORDER — OXYCODONE HYDROCHLORIDE 5 MG/1
5 TABLET ORAL EVERY 4 HOURS PRN
Qty: 28 TABLET | Refills: 0 | Status: SHIPPED | OUTPATIENT
Start: 2022-09-06 | End: 2022-09-13

## 2022-09-06 RX ORDER — CARVEDILOL 12.5 MG/1
12.5 TABLET ORAL 2 TIMES DAILY WITH MEALS
Qty: 60 TABLET | Refills: 3 | Status: SHIPPED | OUTPATIENT
Start: 2022-09-06 | End: 2022-09-26

## 2022-09-06 RX ORDER — AMLODIPINE BESYLATE 5 MG/1
5 TABLET ORAL DAILY
Status: DISCONTINUED | OUTPATIENT
Start: 2022-09-06 | End: 2022-09-07 | Stop reason: HOSPADM

## 2022-09-06 RX ORDER — NALOXONE HYDROCHLORIDE 4 MG/.1ML
1 SPRAY NASAL PRN
Qty: 1 EACH | Refills: 2 | Status: SHIPPED | OUTPATIENT
Start: 2022-09-06 | End: 2022-10-06

## 2022-09-06 RX ADMIN — CARVEDILOL 12.5 MG: 12.5 TABLET, FILM COATED ORAL at 17:37

## 2022-09-06 RX ADMIN — APIXABAN 2.5 MG: 2.5 TABLET, FILM COATED ORAL at 20:30

## 2022-09-06 RX ADMIN — OXYCODONE HYDROCHLORIDE 10 MG: 10 TABLET, FILM COATED, EXTENDED RELEASE ORAL at 11:40

## 2022-09-06 RX ADMIN — CARVEDILOL 12.5 MG: 12.5 TABLET, FILM COATED ORAL at 08:38

## 2022-09-06 RX ADMIN — SUCRALFATE 1 G: 1 TABLET ORAL at 20:30

## 2022-09-06 RX ADMIN — LOSARTAN POTASSIUM 50 MG: 50 TABLET, FILM COATED ORAL at 08:38

## 2022-09-06 RX ADMIN — FLUOXETINE 20 MG: 20 CAPSULE ORAL at 08:38

## 2022-09-06 RX ADMIN — SUCRALFATE 1 G: 1 TABLET ORAL at 05:19

## 2022-09-06 RX ADMIN — CEFEPIME 2000 MG: 2 INJECTION, POWDER, FOR SOLUTION INTRAVENOUS at 08:41

## 2022-09-06 RX ADMIN — OXYCODONE HYDROCHLORIDE 10 MG: 10 TABLET, FILM COATED, EXTENDED RELEASE ORAL at 23:17

## 2022-09-06 RX ADMIN — OXYCODONE HYDROCHLORIDE 5 MG: 5 TABLET ORAL at 12:35

## 2022-09-06 RX ADMIN — Medication 2 PUFF: at 07:43

## 2022-09-06 RX ADMIN — IRON SUCROSE 200 MG: 20 INJECTION, SOLUTION INTRAVENOUS at 17:50

## 2022-09-06 RX ADMIN — CEFEPIME 2000 MG: 2 INJECTION, POWDER, FOR SOLUTION INTRAVENOUS at 20:34

## 2022-09-06 RX ADMIN — IPRATROPIUM BROMIDE AND ALBUTEROL SULFATE 3 ML: 2.5; .5 SOLUTION RESPIRATORY (INHALATION) at 19:58

## 2022-09-06 RX ADMIN — SODIUM CHLORIDE: 9 INJECTION, SOLUTION INTRAVENOUS at 20:00

## 2022-09-06 RX ADMIN — PANTOPRAZOLE SODIUM 40 MG: 40 TABLET, DELAYED RELEASE ORAL at 05:19

## 2022-09-06 RX ADMIN — DONEPEZIL HYDROCHLORIDE 10 MG: 10 TABLET ORAL at 08:38

## 2022-09-06 RX ADMIN — SUCRALFATE 1 G: 1 TABLET ORAL at 11:41

## 2022-09-06 RX ADMIN — MULTIPLE VITAMINS W/ MINERALS TAB 1 TABLET: TAB at 08:38

## 2022-09-06 RX ADMIN — IPRATROPIUM BROMIDE AND ALBUTEROL SULFATE 3 ML: 2.5; .5 SOLUTION RESPIRATORY (INHALATION) at 14:38

## 2022-09-06 RX ADMIN — SODIUM CHLORIDE: 9 INJECTION, SOLUTION INTRAVENOUS at 05:22

## 2022-09-06 RX ADMIN — SUCRALFATE 1 G: 1 TABLET ORAL at 17:37

## 2022-09-06 RX ADMIN — GUAIFENESIN 600 MG: 600 TABLET, EXTENDED RELEASE ORAL at 08:38

## 2022-09-06 RX ADMIN — APIXABAN 2.5 MG: 2.5 TABLET, FILM COATED ORAL at 08:38

## 2022-09-06 RX ADMIN — SODIUM CHLORIDE, PRESERVATIVE FREE 10 ML: 5 INJECTION INTRAVENOUS at 20:31

## 2022-09-06 RX ADMIN — MEGESTROL ACETATE 400 MG: 40 SUSPENSION ORAL at 08:45

## 2022-09-06 RX ADMIN — PSYLLIUM HUSK 1 PACKET: 3.4 POWDER ORAL at 11:40

## 2022-09-06 RX ADMIN — LEVOTHYROXINE SODIUM 150 MCG: 0.07 TABLET ORAL at 05:19

## 2022-09-06 RX ADMIN — Medication 2 PUFF: at 19:58

## 2022-09-06 RX ADMIN — ATORVASTATIN CALCIUM 10 MG: 10 TABLET, FILM COATED ORAL at 08:38

## 2022-09-06 RX ADMIN — AMLODIPINE BESYLATE 5 MG: 5 TABLET ORAL at 10:02

## 2022-09-06 RX ADMIN — IPRATROPIUM BROMIDE AND ALBUTEROL SULFATE 3 ML: 2.5; .5 SOLUTION RESPIRATORY (INHALATION) at 07:41

## 2022-09-06 RX ADMIN — Medication 1 CAPSULE: at 08:45

## 2022-09-06 ASSESSMENT — PAIN DESCRIPTION - LOCATION
LOCATION: ABDOMEN

## 2022-09-06 ASSESSMENT — PAIN SCALES - GENERAL
PAINLEVEL_OUTOF10: 6
PAINLEVEL_OUTOF10: 5
PAINLEVEL_OUTOF10: 6
PAINLEVEL_OUTOF10: 10

## 2022-09-06 ASSESSMENT — ENCOUNTER SYMPTOMS
ABDOMINAL PAIN: 1
DOUBLE VISION: 0
BACK PAIN: 0
BLURRED VISION: 0
SHORTNESS OF BREATH: 1

## 2022-09-06 ASSESSMENT — PAIN DESCRIPTION - DESCRIPTORS: DESCRIPTORS: ACHING

## 2022-09-06 ASSESSMENT — PAIN DESCRIPTION - ONSET: ONSET: ON-GOING

## 2022-09-06 ASSESSMENT — PAIN - FUNCTIONAL ASSESSMENT: PAIN_FUNCTIONAL_ASSESSMENT: ACTIVITIES ARE NOT PREVENTED

## 2022-09-06 ASSESSMENT — PAIN DESCRIPTION - ORIENTATION: ORIENTATION: LOWER;RIGHT

## 2022-09-06 ASSESSMENT — PAIN DESCRIPTION - PAIN TYPE: TYPE: CHRONIC PAIN

## 2022-09-06 ASSESSMENT — PAIN DESCRIPTION - FREQUENCY: FREQUENCY: CONTINUOUS

## 2022-09-06 NOTE — PROGRESS NOTES
Occupational Therapy  Facility/Department: [facility] PROGRESSIVE CARE  Daily Treatment Note  NAME: Mona Goff  : 1950  MRN: [id number]    Date of Service: 2022    Discharge Recommendations:  Home with Home health OT, 24 hour supervision or assist  OT Equipment Recommendations  Other: TBD      Patient Diagnosis(es): The primary encounter diagnosis was Pneumonia due to infectious organism, unspecified laterality, unspecified part of lung. Diagnoses of Acute cystitis with hematuria and Lymphoma of body of stomach ([address] were also pertinent to this visit. Assessment    Activity Tolerance: Patient limited by fatigue;Patient limited by endurance;Treatment limited secondary to medical complications  Discharge Recommendations: Home with Home health OT;24 hour supervision or assist  Other: TBD      Plan   Plan  Times per Week: 4-5  Current Treatment Recommendations: Strengthening;ROM;Balance training;Functional mobility training; Endurance training; Safety education & training;Patient/Caregiver education & training;Equipment evaluation, education, & procurement;Self-Care / ADL     Restrictions  Restrictions/Precautions  Restrictions/Precautions: Fall Risk;General Precautions  Required Braces or Orthoses?: No  Implants present? :  (denies)    Subjective   Subjective  Subjective: Pt sitting EOB with call light on upon entry. Pt reporting needing to use restroom. Writer initiated treatment session. Pain: Pt denies pain at this time. Orientation  Overall Orientation Status: Within Functional Limits  Cognition  Overall Cognitive Status: WNL        Objective    Vitals  O2 Device: Nasal cannula  Comment: Pt uses 2L O2 while in room and in bed. Removed for toileting and fucntional task at sink. Pt on room air at 92% with no noted SOB.   Bed Mobility Training  Bed Mobility Training: Yes  Overall Level of Assistance: Modified independent  Rolling: Modified independent  Supine to Sit: Modified independent  Sit to Supine: Modified independent  Scooting: Modified independent  Balance  Sitting: Intact  Standing: With support (1-2 UE support for functional task. Pt tolerated 10 min total standing time for functional mobility to toilet, standing to urinate and standing at sink to wash hands and brush teeth before returning to bed.)  Transfer Training  Transfer Training: Yes  Overall Level of Assistance: Stand-by assistance  Interventions: Verbal cues (safety cues with RW)  Sit to Stand: Stand-by assistance  Stand to Sit: Stand-by assistance  Gait  Overall Level of Assistance: Contact-guard assistance (SBA-CGA)  Assistive Device: Walker, rolling (bed<>bahroom)     ADL  Feeding: Modified independent   Grooming: Stand by assistance  Grooming Skilled Clinical Factors: sinkside standing. UE Bathing: Supervision  LE Bathing: Minimal assistance  UE Dressing: Supervision  LE Dressing: Minimal assistance  Toileting: Contact guard assistance  Toileting Skilled Clinical Factors: Pt managaed gown/brief to urinate in standing at toilet. Additional Comments: ADL scores based on skilled observation and clinical reasoning, unless otherwise noted. Pt is currently limited due to generalized weakness and shortness of breath with minimal activity, impacting independence with self care. OT Exercises  Exercise Treatment: Pt declined therapeutic exericise this date due to requesting to get some rest.     Safety Devices  Type of Devices: All fall risk precautions in place;Call light within reach;Gait belt;Patient at risk for falls; Left in bed;Nurse notified     Patient Education  Education Given To: Patient  Education Provided: Plan of Care;Energy Conservation; Fall Prevention Strategies;Transfer Training  Education Method: Verbal  Barriers to Learning: None  Education Outcome: Verbalized understanding;Continued education needed    Goals  Short Term Goals  Time Frame for Short term goals: By discharge  Short Term Goal 1: Pt will perform BADLs mod I, using appropriate adaptive strategies/equipment  Short Term Goal 2: Pt will perform transfers/functional mobility, mod I, using least restrictive device and Good safety  Short Term Goal 3: Pt will V/D use of energy conservation/work simplifcation techniques that are applicable to his daily routine  Short Term Goal 4: Pt will V/D use of fall prevention/home safety techniques to increase safety and independence during self care routine  Short Term Goal 5: Pt will actively participate in 15+ minutes of therapeutic exercise/functional activity to promote safety and independence with self care and mobility     [facility] Daily Activity Inpatient   How much help for putting on and taking off regular lower body clothing?: A Little  How much help for Bathing?: A Little  How much help for Toileting?: A Little  How much help for putting on and taking off regular upper body clothing?: A Little  How much help for taking care of personal grooming?: A Little  How much help for eating meals?: None  [facility] Inpatient Daily Activity Raw Score: 19  AM-PAC Inpatient ADL T-Scale Score : 40.22  ADL Inpatient CMS 0-100% Score: 42.8  ADL Inpatient CMS G-Code Modifier : CK    Therapy Time   Individual Concurrent Group Co-treatment   Time In 1340         Time Out 1356         Minutes 16               Safety measures utilized: Gait belt, Call light within reach, Bed alarm, Left in bed, and Nurse notified    Electronically signed by TIFFANY Napoles on 9/6/2022 at 3:31 PM

## 2022-09-06 NOTE — PROGRESS NOTES
Writer was visiting with pt and answered phone for pt; it was his wife, who is known to Crystal Lover. Had discussion and thanked me for visiting pt.    09/06/22 1513   Encounter Summary   Encounter Overview/Reason  Spiritual/Emotional Needs   Service Provided For: Patient and family together   Referral/Consult From: [address] [place] [address]; Children;Family members   Last Encounter  09/06/22   Complexity of Encounter Moderate   Spiritual/Emotional needs   Type Spiritual Support   Assessment/Intervention/Outcome   Assessment Calm; Compromised coping; Impaired resilience   Intervention Active listening;Discussed illness injury and its impact; Explored/Affirmed feelings, thoughts, concerns;Sustaining Presence/Ministry of presence   Outcome Engaged in conversation;Expressed feelings, needs, and concerns;Expressed Gratitude;Receptive

## 2022-09-06 NOTE — PLAN OF CARE
Problem: Discharge Planning  Goal: Discharge to home or other facility with appropriate resources  9/6/2022 1615 by Calixto Whelan RN  Outcome: Progressing     Evaluated for ARU  not appropriate  will be going to [facility] at [address] per [facility]       Problem: Safety - Adult  Goal: Free from fall injury  9/6/2022 1615 by Calixto Whelan RN  Outcome: Progressing   The patient remained free from falls this shift, call light within reach, bed in locked and lowest position. Side rails up x2. Continue to monitor closely.      Problem: Pain  Goal: Verbalizes/displays adequate comfort level or baseline comfort level  9/6/2022 1615 by Calixto Whelan RN  Outcome: Progressing   Scheduled pain meds given as ordered  and one dose of breakthrough pain med per patient request

## 2022-09-06 NOTE — CARE COORDINATION
Authorization has been accepted. Authorization expires 09/07/2022. Electronically signed by PRISCILLA Vargas on 9/6/2022 at 11:29 AM    PM&R consult in and ARU doctor is aware. Electronically signed by PRISCILLA Vargas on 9/6/2022 at 11:30 AM      PM&R consult has been completed. Patient will discharge to [facility].  Electronically signed by PRISCILLA Vargas on 9/6/2022 at 1:42 PM

## 2022-09-06 NOTE — PROGRESS NOTES
[id number] [employer]      PROGRESS NOTE        Patient:  Roberto Garsia  YOB: 1950    MRN: [id number]     Acct: [de-identified]     Admit date: 8/31/2022    Pt seen and Chart reviewed. Consultant notes reviewed and care evaluated. Subjective: Patient states that very good last night he feels good he had no fevers no chills appetite is good and coming back eating very well and even has SNACKS   His family brought him from [facility] he has no nausea no vomiting still having some of his normal abdominal pain he says second to his lymphoma but the pain medication controls that for him. Were awaiting today acute rehab evaluation as well as possibly ECF if he does not qualify. Meanwhile his blood work seems to be stable for him and kidney function still improved    Diet:  ADULT DIET;  Regular      Medications:Current Inpatient    Scheduled Meds:   cefepime  2,000 mg IntraVENous Q12H    carvedilol  12.5 mg Oral BID WC    iron sucrose  200 mg IntraVENous Q24H    immune globulin  20 g IntraVENous Once    guaiFENesin  600 mg Oral BID    sodium chloride flush  5-40 mL IntraVENous 2 times per day    apixaban  2.5 mg Oral BID    atorvastatin  10 mg Oral Daily    donepezil  10 mg Oral Daily    FLUoxetine  20 mg Oral Daily    budesonide-formoterol  2 puff Inhalation BID    ipratropium-albuterol  1 vial Inhalation Q6H WA    lactobacillus  1 capsule Oral Daily    levothyroxine  150 mcg Oral Daily    losartan  50 mg Oral Daily    megestrol  400 mg Oral Daily    therapeutic multivitamin-minerals  1 tablet Oral Daily    pantoprazole  40 mg Oral QAM AC    oxyCODONE  10 mg Oral Q12H    sucralfate  1 g Oral 4x Daily AC & HS    psyllium  1 packet Oral Daily     Continuous Infusions:   sodium chloride 75 mL/hr at 09/06/22 0522    sodium chloride       PRN Meds:sodium chloride flush, sodium chloride, acetaminophen, ondansetron **OR** ondansetron, morphine **OR** morphine, oxyCODONE, prochlorperazine        Physical Exam:  Vitals: BP (!) 184/65   Pulse 77   Temp 98.7 °F (37.1 °C) (Oral)   Resp 18   Ht 5' 10\" (1.778 m)   Wt 151 lb 10.8 oz (68.8 kg)   SpO2 95%   BMI 21.76 kg/m²   24 hour intake/output:No intake or output data in the 24 hours ending 09/06/22 0757  Last 3 weights: Wt Readings from Last 3 Encounters:   09/06/22 151 lb 10.8 oz (68.8 kg)   08/22/22 160 lb 3.2 oz (72.7 kg)   08/12/22 173 lb (78.5 kg)       Physical Examination:   General appearance - alert, well appearing, and in no distress and oriented to person, place, and time  Mental status - alert, oriented to person, place, and time  oropharynx clear, no lesions  Chest - clear to auscultation, no wheezes, rales or rhonchi, symmetric air entry  Heart - normal rate, regular rhythm, normal S1, S2, no murmurs, rubs, clicks or gallops  Abdomen - soft, nontender, nondistended, no masses or organomegaly  Neurological - alert, oriented, normal speech, no focal findings or movement disorder noted  Extremities - peripheral pulses normal, no pedal edema, no clubbing or cyanosis  Skin - bruising no infection     Component Value Units   Basic Metabolic Panel [[id number]] (Abnormal)    Collected: 09/06/22 0536    Updated: 09/06/22 0605    Specimen Source: Blood     Glucose 122 High  mg/dL    BUN 24 High  mg/dL    Creatinine 1.01 mg/dL    Calcium 8.2 Low  mg/dL    Sodium 142 mmol/L    Potassium 3.8 mmol/L    Chloride 115 High  mmol/L    CO2 22 mmol/L    Anion Gap 5 Low  mmol/L    GFR Non-African American >60 mL/min    GFR African American >60 mL/min    GFR Comment         Comment: Average GFR for 79or more years old:    76 mL/min/1.73sq m   Chronic Kidney Disease:    <60 mL/min/1.73sq m   Kidney failure:    <15 mL/min/1.73sq m               eGFR calculated using average adult body mass.  Additional eGFR calculator available at:         [email or web address]             CBC with Auto Differential stent placement and    Failure to thrive weight loss and decreased appetite  Number cytopenia secondary to his cancer    Renal insufficiency    It is dehydration second is chronic second to the mass and hydronephrosis  Still negative chest x-ray  Blood cultures are negative so far  Elevated blood pressure we will monitor his blood pressure            Plan:  Continue the current treatment for now    If patient does get qualify for acute rehab he could be transferred there this afternoon    Otherwise the pre-CERT is still working for other facilities as well but the family and his wife especially wanted acute rehab to look at him as well as well and may be first choice.     Discussed with his RN    Mike Coats DO  FAAFP           9/6/2022, 7:57 AM

## 2022-09-06 NOTE — PROGRESS NOTES
RN called Dr. Bibiana Burris to update on DC time tomorrow morning and to discuss elevated BP. Norvasc was added today and Dr. Bibiana Burris stated to continue to monitor BP.

## 2022-09-06 NOTE — CARE COORDINATION
Transport arranged tomorrow (09/07) to go to [facility]. [facility] pickup @9:00am. Facility informed. Spouse informed.      Number for Report: [phone]    Electronically signed by PRISCILLA Shaffer on 9/6/2022 at 1:43 PM

## 2022-09-06 NOTE — PROGRESS NOTES
Physical Therapy  [facility]    Date: 22  Patient Name: Rema Bazan       Room:   MRN: [id number]   Account: [de-identified]   : 1950  (69 y.o.) Gender: male     Referring Practitioner: Faizan Valdez DO  Diagnosis: Diffuse lymphoma  Past Medical History:  has a past medical history of Arthritis, Cancer ([facility] Chemotherapy management, encounter for, CHF (congestive heart failure) ([facility] COPD (chronic obstructive pulmonary disease) ([facility] Diabetes mellitus ([facility] H/O cardiovascular stress test, History of non-Hodgkin's lymphoma, Hx of blood clots, Hyperlipidemia, Hypertension, Hypothyroidism, and On home O2. Past Surgical History:   has a past surgical history that includes hernia repair; IR BIOPSY ABDOMINAL/RETROPERITONEAL MASS PERCUTANEOUS (2021); IR PORT PLACEMENT > 5 YEARS (2021); Colonoscopy (Left, 2021); Cystoscopy (Right, 10/6/2021); Cholecystectomy, laparoscopic (N/A, 10/9/2021); Tonsillectomy; Cystoscopy (Bilateral, 2021); Cystoscopy (Bilateral, 2022); eye surgery; Carotid endarterectomy (Left, 2022); and Cystoscopy (N/A, 2022).        Overall Orientation Status: Within Functional Limits  Restrictions/Precautions  Restrictions/Precautions: Fall Risk;General Precautions  Required Braces or Orthoses?: No  Implants present? :  (denies)    Subjective: Pt laying in bed, agreeable to therapy  Comments: GIOVANY Andrea approved therapy       Pain Assessment: 0-10  Pain Level: 10  Pain Location: Abdomen     Oxygen Therapy  O2 Device: Nasal cannula  O2 Flow Rate (L/min): 2 L/min          Bed Mobility:   Bed Mobility  Rolling: Stand by assistance  Supine to Sit: Stand by assistance  Sit to Supine: Stand by assistance  Scooting: Stand by assistance  Bed mobility  Scooting: Stand by assistance    Transfers:  Sit to Stand: Stand by assistance  Stand to sit: Stand by assistance                 Ambulation  Device: Rollator  Other Apparatus: O2 (2L)  Assistance: Stand by assistance  Quality of Gait: pt demonstrated an almost shuffling gait  Gait Deviations: Decreased step length;Decreased step height  Distance: 75ft        Stairs/Curb  Stairs?: No      EXERCISES       Other Activities  Comment: Pt deferred sitting up in a chair and defers any other exercises post ambulation        Activity Tolerance: Patient limited by fatigue, Patient limited by endurance, Treatment limited secondary to medical complications  PT Equipment Recommendations  Equipment Needed: No    Current Treatment Recommendations: Functional mobility training, Transfer training, Strengthening, Endurance training, Stair training, Gait training, Safety education & training    Conditions Requiring Skilled Therapeutic Intervention  Body Structures, Functions, Activity Limitations Requiring Skilled Therapeutic Intervention: Decreased functional mobility ; Decreased endurance;Decreased balance  History: Lymphoma  Discharge Recommendations: Patient would benefit from continued therapy after discharge    [facility] Mobility Inpatient   How much difficulty turning over in bed?: A Little  How much difficulty sitting down on / standing up from a chair with arms?: A Little  How much difficulty moving from lying on back to sitting on side of bed?: A Little  How much help from another person moving to and from a bed to a chair?: A Little  How much help from another person needed to walk in hospital room?: A Little  How much help from another person for climbing 3-5 steps with a railing?: Total  AM-PAC Inpatient Mobility Raw Score : 16  AM-PAC Inpatient T-Scale Score : 40.78  Mobility Inpatient CMS 0-100% Score: 54.16  Mobility Inpatient CMS G-Code Modifier : CK      Goals  Short Term Goals  Time Frame for Short term goals: 4-5 days  Short term goal 1: transfers with supervision for safe ADLs  Short term goal 2: gait with Rollator and supervision x 100-150ft for safe community ambulation  Short term goal 3: pt able to tolerate 20min of therapeutic activity/exercises to improve endurance for functional activites  Short term goal 4: negotiate 1-2 steps with rail and SBA for safe entry into home       09/06/22 1527   PT Individual Minutes   Time In 1502   Time Out 1515   Minutes 13       Electronically signed by Isreal Pinot PTA on 9/6/22 at 3:27 PM EDT

## 2022-09-06 NOTE — TELEPHONE ENCOUNTER
Daniel Osborne is approved for free drug until the end of the calender year with David Trinidad and [employer]. Writer notified Dione Nix of the above. Daniel Osborne is going to [facility] after he is discharged from [facility]. The imbruvica was received yesterday. Dione Nix called Eloina haley for instruction as to when to start. The call was Referred to writer. Writer reached out to Dione Nix with instruction from Dr Te Garcia. If [facility] approves administration, Daniel Osborne can start. Dr Te Garcia would like to see Daniel Dylon in 4 to 6 weeks. Dione Nix was instructed to make [facility] follow up appointment 4 to 6 weeks. Understanding voiced. Dione Nix says that Dr Te Garcia was going to reduce the dose of impbruvica. Writer will need to follow up with Dr Te Garcia. The attestation form from David Trinidad and David Trinidad still needs to be signed. Kofi Ga will contact writer with  the fax number from [facility]. Dionesumaya Nix will have the form signed by Daniel Osborne and faxed back to writer. Will monitor.

## 2022-09-06 NOTE — PROGRESS NOTES
Comprehensive Nutrition Assessment    Type and Reason for Visit:  Reassess    Nutrition Recommendations/Plan:   Continue current diet. Monitor wt. Malnutrition Assessment:  Malnutrition Status:  Severe malnutrition (09/01/22 1609)    Context:  Chronic Illness     Findings of the 6 clinical characteristics of malnutrition:  Energy Intake:  75% or less estimated energy requirements for 1 month or longer  Weight Loss:  Unable to assess     Body Fat Loss:  Severe body fat loss Orbital, Triceps   Muscle Mass Loss:  Severe muscle mass loss Temples (temporalis), Clavicles (pectoralis & deltoids), Hand (interosseous)  Fluid Accumulation:  No significant fluid accumulation     Strength:  Not Performed    Nutrition Assessment:    Pt states he has been eating well and consumed the majority of breakfast and lunch today. Continues to have abdominal pain, but states that he is still able to eat. Nutrition Related Findings:    Edema: trace RLE, LLE. Labs and meds reviewed. Wound Type: None       Current Nutrition Intake & Therapies:    Average Meal Intake: %, 51-75%     ADULT DIET; Regular    Anthropometric Measures:  Height: 5' 10\" (177.8 cm)  Ideal Body Weight (IBW): 166 lbs (75 kg)    Admission Body Weight: 132 lb (59.9 kg) (Method was not specified)  Current Body Weight: 151 lb 10.8 oz (68.8 kg), 79.5 % IBW.  Weight Source: Bed Scale  Current BMI (kg/m2): 21.8  Usual Body Weight: 170 lb (77.1 kg)  % Weight Change (Calculated): -10.8                    BMI Categories: Underweight (BMI less than 22) age over 72    Estimated Daily Nutrient Needs:  Energy Requirements Based On: Formula  Weight Used for Energy Requirements: Admission  Energy (kcal/day): 7438-3302 kcals based on [place] including activity and stress factors  Weight Used for Protein Requirements: Admission  Protein (g/day): 78-90 gm protein based on 1.3-1.5 gm/kg    Nutrition Diagnosis:   Severe malnutrition related to catabolic illness, inadequate protein-energy intake as evidenced by Criteria as identified in malnutrition assessment    Nutrition Interventions:   Food and/or Nutrient Delivery: Continue Current Diet  Nutrition Education/Counseling: No recommendation at this time  Coordination of Nutrition Care: Continue to monitor while inpatient       Goals:  Previous Goal Met: Progressing toward Goal(s)  Goals: Meet at least 75% of estimated needs       Nutrition Monitoring and Evaluation:   Behavioral-Environmental Outcomes: None Identified  Food/Nutrient Intake Outcomes: Food and Nutrient Intake  Physical Signs/Symptoms Outcomes: Biochemical Data, GI Status, Nausea or Vomiting, Nutrition Focused Physical Findings, Skin, Weight    Discharge Planning:    Continue current diet     Candi Lazcano RD, LD  Contact: [phone]

## 2022-09-07 ENCOUNTER — TELEPHONE (OUTPATIENT)
Dept: INFUSION THERAPY | Age: 72
End: 2022-09-07

## 2022-09-07 VITALS
RESPIRATION RATE: 16 BRPM | OXYGEN SATURATION: 96 % | HEIGHT: 70 IN | TEMPERATURE: 98.5 F | WEIGHT: 172.84 LBS | DIASTOLIC BLOOD PRESSURE: 81 MMHG | HEART RATE: 68 BPM | BODY MASS INDEX: 24.74 KG/M2 | SYSTOLIC BLOOD PRESSURE: 190 MMHG

## 2022-09-07 LAB — GLUCOSE BLD-MCNC: 103 MG/DL (ref 75–110)

## 2022-09-07 PROCEDURE — 6370000000 HC RX 637 (ALT 250 FOR IP): Performed by: FAMILY MEDICINE

## 2022-09-07 PROCEDURE — 2580000003 HC RX 258: Performed by: FAMILY MEDICINE

## 2022-09-07 PROCEDURE — 82947 ASSAY GLUCOSE BLOOD QUANT: CPT

## 2022-09-07 PROCEDURE — 94640 AIRWAY INHALATION TREATMENT: CPT

## 2022-09-07 PROCEDURE — 2700000000 HC OXYGEN THERAPY PER DAY

## 2022-09-07 PROCEDURE — 94761 N-INVAS EAR/PLS OXIMETRY MLT: CPT

## 2022-09-07 PROCEDURE — 6360000002 HC RX W HCPCS: Performed by: FAMILY MEDICINE

## 2022-09-07 RX ORDER — LANOLIN ALCOHOL/MO/W.PET/CERES
325 CREAM (GRAM) TOPICAL
Qty: 90 TABLET | Refills: 5 | Status: SHIPPED | OUTPATIENT
Start: 2022-09-07

## 2022-09-07 RX ORDER — CEPHALEXIN 500 MG/1
500 CAPSULE ORAL 3 TIMES DAILY
Qty: 15 CAPSULE | Refills: 0
Start: 2022-09-07 | End: 2022-09-12

## 2022-09-07 RX ORDER — CEPHALEXIN 250 MG/1
250 CAPSULE ORAL EVERY 8 HOURS SCHEDULED
Status: DISCONTINUED | OUTPATIENT
Start: 2022-09-07 | End: 2022-09-07 | Stop reason: HOSPADM

## 2022-09-07 RX ORDER — FERROUS SULFATE 325(65) MG
325 TABLET ORAL
Status: DISCONTINUED | OUTPATIENT
Start: 2022-09-07 | End: 2022-09-07 | Stop reason: HOSPADM

## 2022-09-07 RX ADMIN — ATORVASTATIN CALCIUM 10 MG: 10 TABLET, FILM COATED ORAL at 07:58

## 2022-09-07 RX ADMIN — SUCRALFATE 1 G: 1 TABLET ORAL at 06:31

## 2022-09-07 RX ADMIN — LOSARTAN POTASSIUM 50 MG: 50 TABLET, FILM COATED ORAL at 07:57

## 2022-09-07 RX ADMIN — SODIUM CHLORIDE: 9 INJECTION, SOLUTION INTRAVENOUS at 06:33

## 2022-09-07 RX ADMIN — Medication 2 PUFF: at 07:59

## 2022-09-07 RX ADMIN — LEVOTHYROXINE SODIUM 150 MCG: 0.07 TABLET ORAL at 06:31

## 2022-09-07 RX ADMIN — FLUOXETINE 20 MG: 20 CAPSULE ORAL at 07:58

## 2022-09-07 RX ADMIN — MEGESTROL ACETATE 400 MG: 40 SUSPENSION ORAL at 08:01

## 2022-09-07 RX ADMIN — PANTOPRAZOLE SODIUM 40 MG: 40 TABLET, DELAYED RELEASE ORAL at 06:31

## 2022-09-07 RX ADMIN — DONEPEZIL HYDROCHLORIDE 10 MG: 10 TABLET ORAL at 07:59

## 2022-09-07 RX ADMIN — IPRATROPIUM BROMIDE AND ALBUTEROL SULFATE 3 ML: 2.5; .5 SOLUTION RESPIRATORY (INHALATION) at 07:51

## 2022-09-07 RX ADMIN — CEFEPIME 2000 MG: 2 INJECTION, POWDER, FOR SOLUTION INTRAVENOUS at 07:56

## 2022-09-07 RX ADMIN — CARVEDILOL 12.5 MG: 12.5 TABLET, FILM COATED ORAL at 07:58

## 2022-09-07 RX ADMIN — AMLODIPINE BESYLATE 5 MG: 5 TABLET ORAL at 07:59

## 2022-09-07 RX ADMIN — GUAIFENESIN 600 MG: 600 TABLET, EXTENDED RELEASE ORAL at 07:59

## 2022-09-07 RX ADMIN — MULTIPLE VITAMINS W/ MINERALS TAB 1 TABLET: TAB at 07:58

## 2022-09-07 RX ADMIN — Medication 1 CAPSULE: at 07:58

## 2022-09-07 RX ADMIN — APIXABAN 2.5 MG: 2.5 TABLET, FILM COATED ORAL at 07:58

## 2022-09-07 NOTE — PLAN OF CARE
Problem: Discharge Planning  Goal: Discharge to home or other facility with appropriate resources  9/7/2022 0742 by Lupe Camarillo RN  Outcome: Adequate for Discharge  9/7/2022 0326 by Wero Colin RN  Outcome: Progressing  Flowsheets (Taken 9/7/2022 0326)  Discharge to home or other facility with appropriate resources:   Identify barriers to discharge with patient and caregiver   Arrange for needed discharge resources and transportation as appropriate   Identify discharge learning needs (meds, wound care, etc)  Note: Patient updated on plan of care , denies any needs at this time      Problem: Safety - Adult  Goal: Free from fall injury  9/7/2022 0742 by Lupe Camarillo RN  Outcome: Adequate for Discharge  9/7/2022 0326 by Wero Colin RN  Flowsheets (Taken 9/7/2022 0326)  Free From Fall Injury: Instruct family/caregiver on patient safety  Note: Patient remains safe and free from fall during admission      Problem: ABCDS Injury Assessment  Goal: Absence of physical injury  9/7/2022 0742 by Lupe Camarillo RN  Outcome: Adequate for Discharge  9/7/2022 0326 by Wero Colin RN  Outcome: Progressing  Note: Patient remains safe and free from injury during hospital stay      Problem: Skin/Tissue Integrity  Goal: Absence of new skin breakdown  Description: 1. Monitor for areas of redness and/or skin breakdown  2. Assess vascular access sites hourly  3. Every 4-6 hours minimum:  Change oxygen saturation probe site  4. Every 4-6 hours:  If on nasal continuous positive airway pressure, respiratory therapy assess nares and determine need for appliance change or resting period.   9/7/2022 0742 by Lupe Camarillo RN  Outcome: Adequate for Discharge  9/7/2022 0326 by Wero Colin RN  Outcome: Progressing  Note: Patient has no signs of new skin breakdown      Problem: Chronic Conditions and Co-morbidities  Goal: Patient's chronic conditions and co-morbidity symptoms are monitored and maintained or improved  Outcome: Adequate for Discharge     Problem: Nutrition Deficit:  Goal: Optimize nutritional status  Outcome: Adequate for Discharge     Problem: Pain  Goal: Verbalizes/displays adequate comfort level or baseline comfort level  Outcome: Adequate for Discharge

## 2022-09-07 NOTE — PLAN OF CARE
Problem: Discharge Planning  Goal: Discharge to home or other facility with appropriate resources  9/7/2022 0743 by Cherie Doll RN  Outcome: Completed  9/7/2022 0742 by Cherie Doll RN  Outcome: Adequate for Discharge  9/7/2022 0326 by Todd Friedman RN  Outcome: Progressing  Flowsheets (Taken 9/7/2022 0326)  Discharge to home or other facility with appropriate resources:   Identify barriers to discharge with patient and caregiver   Arrange for needed discharge resources and transportation as appropriate   Identify discharge learning needs (meds, wound care, etc)  Note: Patient updated on plan of care , denies any needs at this time      Problem: Safety - Adult  Goal: Free from fall injury  9/7/2022 0743 by Cherie Doll RN  Outcome: Completed  9/7/2022 0742 by Cherie Doll RN  Outcome: Adequate for Discharge  9/7/2022 0326 by Todd Friedman RN  Flowsheets (Taken 9/7/2022 0326)  Free From Fall Injury: Instruct family/caregiver on patient safety  Note: Patient remains safe and free from fall during admission      Problem: ABCDS Injury Assessment  Goal: Absence of physical injury  9/7/2022 0743 by Cherie Doll RN  Outcome: Completed  9/7/2022 0742 by Cherie Doll RN  Outcome: Adequate for Discharge  9/7/2022 0326 by Todd Friedman RN  Outcome: Progressing  Note: Patient remains safe and free from injury during hospital stay      Problem: Skin/Tissue Integrity  Goal: Absence of new skin breakdown  Description: 1. Monitor for areas of redness and/or skin breakdown  2. Assess vascular access sites hourly  3. Every 4-6 hours minimum:  Change oxygen saturation probe site  4. Every 4-6 hours:  If on nasal continuous positive airway pressure, respiratory therapy assess nares and determine need for appliance change or resting period.   9/7/2022 0743 by Cherie Doll RN  Outcome: Completed  9/7/2022 0742 by Cherie Doll RN  Outcome: Adequate for Discharge  9/7/2022 0486 by Denzel Hadley RN  Outcome: Progressing  Note: Patient has no signs of new skin breakdown      Problem: Chronic Conditions and Co-morbidities  Goal: Patient's chronic conditions and co-morbidity symptoms are monitored and maintained or improved  9/7/2022 0743 by Gregor Rahman RN  Outcome: Completed  9/7/2022 0742 by Gregor Rahman RN  Outcome: Adequate for Discharge     Problem: Nutrition Deficit:  Goal: Optimize nutritional status  9/7/2022 0743 by Gregor Rahman RN  Outcome: Completed  9/7/2022 0742 by Gregor Rahman RN  Outcome: Adequate for Discharge     Problem: Pain  Goal: Verbalizes/displays adequate comfort level or baseline comfort level  9/7/2022 0743 by Gregor Rahman RN  Outcome: Completed  9/7/2022 0742 by Gregor Rahman RN  Outcome: Adequate for Discharge

## 2022-09-07 NOTE — PLAN OF CARE
Problem: Discharge Planning  Goal: Discharge to home or other facility with appropriate resources  9/7/2022 0326 by Marie Penny RN  Outcome: Progressing  Flowsheets (Taken 9/7/2022 0326)  Discharge to home or other facility with appropriate resources:   Identify barriers to discharge with patient and caregiver   Arrange for needed discharge resources and transportation as appropriate   Identify discharge learning needs (meds, wound care, etc)  Note: Patient updated on plan of care , denies any needs at this time      Problem: Safety - Adult  Goal: Free from fall injury  9/7/2022 0326 by Marie Penny RN  Flowsheets (Taken 9/7/2022 0326)  Free From Fall Injury: Instruct family/caregiver on patient safety  Note: Patient remains safe and free from fall during admission      Problem: ABCDS Injury Assessment  Goal: Absence of physical injury  9/7/2022 0326 by Marie Penny RN  Outcome: Progressing  Note: Patient remains safe and free from injury during hospital stay      Problem: Skin/Tissue Integrity  Goal: Absence of new skin breakdown  Description: 1. Monitor for areas of redness and/or skin breakdown  2. Assess vascular access sites hourly  3. Every 4-6 hours minimum:  Change oxygen saturation probe site  4. Every 4-6 hours:  If on nasal continuous positive airway pressure, respiratory therapy assess nares and determine need for appliance change or resting period.   9/7/2022 0326 by Marie Penny RN  Outcome: Progressing  Note: Patient has no signs of new skin breakdown

## 2022-09-07 NOTE — PROGRESS NOTES
Today's Date: 9/6/2022  Patient Name: Roberto Garsia  Date of admission: 8/31/2022  1:04 PM  Patient's age: 74 y.o., 1950  Admission Dx: Lymphoma, diffuse ([facility] [address] [C85.90]  Acute cystitis with hematuria [N30.01]  Pneumonia due to infectious organism, unspecified laterality, unspecified part of lung [J18.9]      Requesting Physician: Berna García DO    CHIEF COMPLAINT:  weakness and fatigue. SOB. Pneumonia. UTI. NHL    SUBJECTIVE:  Patient was seen and examined. No fever chills  NO NV   Tolerating iron well        BRIEF CASE HISTORY:    The patient is a 74 y.o.  male who is admitted to the hospital for further management of UTI, pneumonia and failure to thrive. He was having increasing weakness and fatigue. He has UTI secondary to stents. He had SOB and cough. CT scan showed pneumonia. Started on antibiotics with IVF and nutritional support. He feels better. Patient is on Imbruvica for NHL. 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 Evidence of relapse with biopsy-proven follicular lymphoma. 1-2 from treated medically lymph node biopsy July 6, 2021     CURRENT THERAPY:    1.   Observation. 2.   Status post treatment with Bexxar in May 2008. 3.   Status post previous treatment with Rituxan. 4.  Start treatment with Rituxan August 23, 2021. completed4 weeks of rituximab on 9/20/2021  5. Induction Rituxan is followed by consolidation Rituxan maintenance   6. Due to progression we started Imbruvica July 2022. INTERIM HISTORY:  The patient is seen for follow-up of his lymphoma. Further work-up showed enlarged lymph nodes in the retroperitoneum and iliac area. Biopsy proved recurrence of follicular lymphoma. Patient did well on rituximab induction and was maintained on maintenance Rituxan. Repeated CT scan February 2022 showed stable disease.   He had hospitalization in July which showed significant progressive disease. Started on Imbruvica. He was hospitalized 1 week ago due to dehydration and malnutrition. He started feeling much better after rehydration with improvement in kidney function. Repeated CT scan showed positive results with significant improvement on the Imbruvica treatment. Past Medical History:   has a past medical history of Arthritis, Cancer ([facility] [address] Chemotherapy management, encounter for, CHF (congestive heart failure) ([facility] [address] COPD (chronic obstructive pulmonary disease) ([facility] [address] Diabetes mellitus ([facility] [address] H/O cardiovascular stress test, History of non-Hodgkin's lymphoma, Hx of blood clots, Hyperlipidemia, Hypertension, Hypothyroidism, and On home O2. Past Surgical History:   has a past surgical history that includes hernia repair; IR BIOPSY ABDOMINAL/RETROPERITONEAL MASS PERCUTANEOUS (7/6/2021); IR PORT PLACEMENT > 5 YEARS (8/31/2021); Colonoscopy (Left, 9/24/2021); Cystoscopy (Right, 10/6/2021); Cholecystectomy, laparoscopic (N/A, 10/9/2021); Tonsillectomy; Cystoscopy (Bilateral, 11/24/2021); Cystoscopy (Bilateral, 2/16/2022); eye surgery; Carotid endarterectomy (Left, 6/28/2022); and Cystoscopy (N/A, 7/20/2022). Family History: family history includes Alzheimer's Disease in his father; Diabetes in his mother; Heart Disease in his brother and brother. Social History:   reports that he quit smoking about 16 years ago. His smoking use included cigarettes. He has a 90.00 pack-year smoking history. He has never used smokeless tobacco. He reports current alcohol use of about 1.7 standard drinks per week. He reports current drug use. Drug: Marijuana Estil Catena). Medications:    Prior to Admission medications    Medication Sig Start Date End Date Taking? Authorizing Provider   oxyCODONE (OXYCONTIN) 10 MG extended release tablet Take 1 tablet by mouth in the morning and 1 tablet in the evening. Do all this for 7 days.  9/6/22 9/13/22 Yes Juju Wright NAVEEN Metcalf, DO   oxyCODONE (ROXICODONE) 5 MG immediate release tablet Take 1 tablet by mouth every 4 hours as needed for Pain for up to 7 days. 9/6/22 9/13/22 Yes Roderick Metcalf, DO   carvedilol (COREG) 12.5 MG tablet Take 1 tablet by mouth 2 times daily (with meals) 9/6/22  Yes Roderick Metcalf, DO   naloxone 4 MG/0.1ML LIQD nasal spray 1 spray by Nasal route as needed for Opioid Reversal 9/6/22 10/6/22 Yes Roderick Metcalf, DO   atorvastatin (LIPITOR) 10 MG tablet Take 10 mg by mouth daily   Yes Historical Provider, MD   ibrutinib (IMBRUVICA) 420 MG tablet Take 1 tablet by mouth daily 8/22/22 9/21/22  Emery Wyman MD   prochlorperazine (COMPAZINE) 10 MG tablet Take 1 tablet by mouth every 6 hours as needed (nausea vomiting) 7/26/22 9/6/22  Emery Wyman MD   megestrol (MEGACE) 40 MG/ML suspension Take 10 mLs by mouth in the morning. 7/25/22   Emery Wyman MD   apixaban (ELIQUIS) 5 MG TABS tablet Take 1 tablet by mouth in the morning and 1 tablet before bedtime. 7/21/22   Roderick Metcalf, DO   sucralfate (CARAFATE) 1 GM tablet Take 1 tablet by mouth in the morning and 1 tablet at noon and 1 tablet in the evening and 1 tablet before bedtime. 7/21/22   Roderick Metcalf, DO   glipiZIDE (GLUCOTROL) 5 MG tablet Take 0.5 tablets by mouth in the morning and 0.5 tablets in the evening. Take before meals. Patient not taking: Reported on 8/12/2022 7/21/22 8/13/22  Jeffrey Metcalf DO   Lactobacillus (PROBIOTIC ACIDOPHILUS) CAPS Take 1 caplet by mouth daily    Historical Provider, MD   Multiple Vitamins-Minerals (THERAPEUTIC MULTIVITAMIN-MINERALS) tablet Take 1 tablet by mouth daily    Historical Provider, MD   OXYGEN Inhale 2 L into the lungs at bedtime Nightly & during the day.     Historical Provider, MD   ipratropium-albuterol (DUONEB) 0.5-2.5 (3) MG/3ML SOLN nebulizer solution Inhale 3 mLs into the lungs every 4 hours as needed for Shortness of Breath 2/20/22   Ronald Home, APRN - CNP   levothyroxine (SYNTHROID) 150 MCG tablet Take 1 tablet by mouth Daily 2/2/22   Sai Tinajero DO   fluticasone-salmeterol (ADVAIR) 100-50 MCG/DOSE diskus inhaler Inhale 1 puff into the lungs every 12 hours 2/1/22   Sai Tinajero DO   furosemide (LASIX) 20 MG tablet Take 1 tablet by mouth daily  Patient taking differently: Take 20 mg by mouth See Admin Instructions Pt only takes when legs start to swell 2/1/22 9/6/22  Brent Ulloa DO   albuterol sulfate HFA (PROVENTIL;VENTOLIN;PROAIR) 108 (90 Base) MCG/ACT inhaler Inhale 2 puffs into the lungs every 4 hours as needed for Wheezing     Historical Provider, MD   amLODIPine (NORVASC) 10 MG tablet Take 1 tablet by mouth daily 10/12/21   Roderick Metcalf, DO   losartan (COZAAR) 50 MG tablet Take 1 tablet by mouth daily 10/12/21   Roderick Metcalf, DO   donepezil (ARICEPT) 10 MG tablet Take 10 mg by mouth daily  7/11/21   Historical Provider, MD   FLUoxetine (PROZAC) 20 MG capsule Take 20 mg by mouth daily    Historical Provider, MD   omeprazole (PRILOSEC) 20 MG delayed release capsule Take 20 mg by mouth 2 times daily    Historical Provider, MD     Current Facility-Administered Medications   Medication Dose Route Frequency Provider Last Rate Last Admin    amLODIPine (NORVASC) tablet 5 mg  5 mg Oral Daily Roderick T Dixon, DO   5 mg at 09/06/22 1002    cefepime (MAXIPIME) 2,000 mg in sodium chloride 0.9 % 50 mL IVPB mini-bag  2,000 mg IntraVENous Q12H Roderick T Dixon, DO   Stopped at 09/06/22 2110    carvedilol (COREG) tablet 12.5 mg  12.5 mg Oral BID WC Roderick T Dixon, DO   12.5 mg at 09/06/22 1737    iron sucrose (VENOFER) 200 mg in sodium chloride 0.9 % 100 mL IVPB  200 mg IntraVENous Q24H Samuel Abdullahi MD   Stopped at 09/06/22 1850    immune globulin (GAMUNEX-C) 10% solution 20 g  20 g IntraVENous Once Samuel Abdullahi  mL/hr at 09/04/22 1901 Rate Change at 09/04/22 1901    0.9 % sodium chloride infusion   IntraVENous Continuous Roderick Metcalf DO 75 mL/hr at 09/06/22 2002 Rate Verify at 09/06/22 2002    guaiFENesin [facility]) extended release tablet 600 mg  600 mg Oral BID Roderick T Dixon, DO   600 mg at 09/06/22 2917    sodium chloride flush 0.9 % injection 5-40 mL  5-40 mL IntraVENous 2 times per day Erle Searing, DO   10 mL at 09/06/22 2031    sodium chloride flush 0.9 % injection 5-40 mL  5-40 mL IntraVENous PRN Emilio Payvandi, DO   10 mL at 09/02/22 0817    0.9 % sodium chloride infusion   IntraVENous PRN Erle Searing, DO        acetaminophen (TYLENOL) tablet 650 mg  650 mg Oral Q4H PRN Emilio Payvandi, DO        ondansetron (ZOFRAN-ODT) disintegrating tablet 4 mg  4 mg Oral Q8H PRN Emilio Payvandi, DO        Or    ondansetron (ZOFRAN) injection 4 mg  4 mg IntraVENous Q6H PRN Emilio Payvandi, DO        morphine (PF) injection 2 mg  2 mg IntraVENous Q2H PRN Emilio Payvandi, DO        Or    morphine sulfate (PF) injection 4 mg  4 mg IntraVENous Q2H PRN Erle Searing, DO        apixaban (ELIQUIS) tablet 2.5 mg  2.5 mg Oral BID Roderick T Dixon, DO   2.5 mg at 09/06/22 2030    atorvastatin (LIPITOR) tablet 10 mg  10 mg Oral Daily Roderick T Dixon, DO   10 mg at 09/06/22 0838    donepezil (ARICEPT) tablet 10 mg  10 mg Oral Daily Roderick T Dixon, DO   10 mg at 09/06/22 0838    FLUoxetine (PROZAC) capsule 20 mg  20 mg Oral Daily Roderick T Dixon, DO   20 mg at 09/06/22 0838    budesonide-formoterol (SYMBICORT) 80-4.5 MCG/ACT inhaler 2 puff  2 puff Inhalation BID Roderick T Dixon, DO   2 puff at 09/06/22 1958    ipratropium-albuterol (DUONEB) nebulizer solution 3 mL  1 vial Inhalation Q6H WA Roderick T Dixon, DO   3 mL at 09/06/22 1958    lactobacillus (CULTURELLE) capsule 1 capsule  1 capsule Oral Daily Roderick T Dixon, DO   1 capsule at 09/06/22 0845    levothyroxine (SYNTHROID) tablet 150 mcg  150 mcg Oral Daily Roderick T Dixon, DO   150 mcg at 09/06/22 0519    losartan (COZAAR) tablet 50 mg  50 mg Oral Daily Roderick T Dixon, DO   50 mg at 09/06/22 0838    megestrol (MEGACE) 40 MG/ML suspension 400 mg  400 mg Oral Daily Roderick T Dixon, DO   400 mg at 09/06/22 0845    therapeutic multivitamin-minerals 1 tablet  1 tablet Oral Daily Roderick T Dixon, DO   1 tablet at 09/06/22 0838    pantoprazole (PROTONIX) tablet 40 mg  40 mg Oral QAM AC Roderick T Dixon, DO   40 mg at 09/06/22 0519    oxyCODONE (OXYCONTIN) extended release tablet 10 mg  10 mg Oral Q12H Roderick T Dixon, DO   10 mg at 09/06/22 1140    oxyCODONE (ROXICODONE) immediate release tablet 5 mg  5 mg Oral Q4H PRN Anita Omi, DO   5 mg at 09/06/22 1235    prochlorperazine (COMPAZINE) tablet 10 mg  10 mg Oral Q6H PRN Roderick T Dixon, DO        sucralfate (CARAFATE) tablet 1 g  1 g Oral 4x Daily AC & HS Roderick T Dixon, DO   1 g at 09/06/22 2030    psyllium (METAMUCIL) 58.12 % packet 1 packet  1 packet Oral Daily Anita Omi, DO   1 packet at 09/06/22 1140       Allergies:  Patient has no known allergies. REVIEW OF SYSTEMS:      General: + weakness + fatigue. + unanticipated weight loss or decreased appetite. No fever or chills. Eyes: No blurred vision, eye pain or double vision. Ears: No hearing problems or drainage. No tinnitus. Throat: No sore throat, problems with swallowing or dysphagia. Respiratory: as above     Cardiovascular: No chest pain, orthopnea or PND. No lower extremity edema. No palpitation. Gastrointestinal: as above   Genitourinary: No dysuria, hematuria, frequency or urgency. Musculoskeletal: No muscle aches or pains. No limitation of movement. No back pain. No gait disturbance, No joint complaints. Dermatologic: No skin rashes or pruritus. No skin lesions or discolorations. Psychiatric: No depression, anxiety, or stress or signs of schizophrenia. No change in mood or affect. Hematologic: No history of bleeding tendency. No bruises or ecchymosis. No history of clotting problems. Infectious disease: No fever, chills or frequent infections. Endocrine: No polydipsia or polyuria.  No temperature intolerance. Neurologic: No headaches or dizziness. No weakness or numbness of the extremities. No changes in balance, coordination,  memory, mentation, behavior. Allergic/Immunologic: No nasal congestion or hives. No repeated infections. PHYSICAL EXAM:      BP (!) 150/59   Pulse 70   Temp 98.6 °F (37 °C) (Oral)   Resp 16   Ht 5' 10\" (1.778 m)   Wt 151 lb 10.8 oz (68.8 kg)   SpO2 98%   BMI 21.76 kg/m²    Temp (24hrs), Av.6 °F (37 °C), Min:98.4 °F (36.9 °C), Max:98.7 °F (37.1 °C)      General appearance - not in pain or distress. Looks chronically ill. Mental status - alert and oriented  Eyes - pupils equal and reactive, extraocular eye movements intact  Ears - bilateral TM's and external ear canals normal  Nose - normal and patent, no erythema, discharge or polyps  Mouth - mucous membranes moist, pharynx normal without lesions  Neck - supple, no significant adenopathy  Lymphatics - no palpable lymphadenopathy, no hepatosplenomegaly  Chest - clear to auscultation, no wheezes, rales or rhonchi, symmetric air entry  Heart - normal rate, regular rhythm, normal S1, S2, no murmurs, rubs, clicks or gallops  Abdomen - soft, nontender, nondistended, no masses or organomegaly  Neurological - alert, oriented, normal speech, no focal findings or movement disorder noted  Musculoskeletal - no joint tenderness, deformity or swelling  Extremities - peripheral pulses normal, no pedal edema, no clubbing or cyanosis  Skin - normal coloration and turgor, no rashes, no suspicious skin lesions noted           DATA:      Labs:       CBC:   Recent Labs     22  0457 22  0536   WBC 7.4 7.0   HGB 9.1* 8.3*   HCT 27.8* 24.9*    145*       BMP:   Recent Labs     22  1200 22  0536    142   K 3.9 3.8   CO2 21 22   BUN 25* 24*   CREATININE 0.99 1.01   LABGLOM >60 >60   GLUCOSE 169* 122*       PT/INR:   No results for input(s): PROTIME, INR in the last 72 hours.     APTT:  No results for input(s): APTT in the last 72 hours. LIVER PROFILE:  Recent Labs     09/05/22  1200   AST 9   ALT 9   LABALBU 2.6*       XR CHEST PORTABLE  Narrative: EXAMINATION:  ONE XRAY VIEW OF THE CHEST    9/3/2022 9:30 am    COMPARISON:  Chest radiograph performed 07/18/2022. HISTORY:  ORDERING SYSTEM PROVIDED HISTORY: Lymphoma  TECHNOLOGIST PROVIDED HISTORY:  Lymphoma  Reason for Exam: Lymphoma    FINDINGS:  The lungs are without consolidation or effusion. There is no pneumothorax. The mediastinal structures are unremarkable. The upper abdomen unremarkable. The extrathoracic soft tissues are unremarkable. There is a right internal  jugular port. Impression: No acute cardiopulmonary process. IMPRESSION:    Primary Problem  Lymphoma, diffuse ([facility] [address]    Active Hospital Problems    Diagnosis Date Noted    Severe malnutrition ([address] [E43] 09/01/2022     Priority: Medium    Acute cystitis with hematuria [N30.01] 09/01/2022     Priority: Medium    Lymphoma, diffuse (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 Feeling better with IVF and nutritional support. Continue antibiotics as per primary team  Discussed further care for his lymphoma. Dose adjusted Imbruvica will be done as an outpatient we will monitor response. Patient's questions were answered to the best of his satisfaction and he verbalized full understanding and agreement. 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  While intending to generate a document that actually reflects the content of the visit, the document can still have some errors including those of syntax and sound a like substitutions which may escape proof reading. It such instances, actual meaning can be extrapolated by contextual diversion.

## 2022-09-07 NOTE — TELEPHONE ENCOUNTER
Pts spouse called in asking to clarify with [facility] pt's Imbruvica dosing. Per Dr Jocelynn Christian, pt to take Imbruvica 1 tab 2 days on, then 1 day off-repeat. Rio Thakur RN at [facility] notified. Updated script with instructions faxed to [facility] at [phone], confirmation rec'd.

## 2022-09-07 NOTE — PROGRESS NOTES
9351  Last data filed at 9/7/2022 0635  Gross per 24 hour   Intake 2678.74 ml   Output 500 ml   Net 2178.74 ml     Last 3 weights: Wt Readings from Last 3 Encounters:   09/07/22 172 lb 13.5 oz (78.4 kg)   08/22/22 160 lb 3.2 oz (72.7 kg)   08/12/22 173 lb (78.5 kg)       Physical Examination:   General appearance - alert, well appearing, and in no distress  Mental status - alert, oriented to person, place, and time  oropharynx clear, no lesions  Chest - clear to auscultation, no wheezes, rales or rhonchi, symmetric air entry  Heart - normal rate, regular rhythm, normal S1, S2, no murmurs, rubs, clicks or gallops  Abdomen - soft, nontender, nondistended, no masses or organomegaly  Neurological - alert, oriented, normal speech, no focal findings or movement disorder noted}  Extremities - peripheral pulses normal, no pedal edema, no clubbing or cyanosis  Skin - normal coloration and turgor, no rashes, no suspicious skin lesions noted bruising on the skin about the same        Assessment:  Principal Problem:    Lymphoma, diffuse ([facility])  Active Problems:    Severe malnutrition ([address]    Acute cystitis with hematuria  Resolved Problems:    * No resolved hospital problems. *  Active Problems:    Severe malnutrition ([address]    Acute cystitis with hematuria  Resolved Problems:    * No resolved hospital problems. *    Severe malnutrition ([address]    Acute cystitis with hematuria  Resolved Problems:    * No resolved hospital problems. *    Lymphoma, diffuse ([address]  Active Problems:    Severe malnutrition ([address]    Acute cystitis with hematuria  Resolved Problems:    * No resolved hospital problems. *       Lymphoma, diffuse ([address]  Active Problems:    Severe malnutrition ([address]    Acute cystitis with hematuria  Resolved Problems:    * No resolved hospital problems. *  Resolved Problems:    * No resolved hospital problems.  *   NHL (non-Hodgkin's lymphoma) (HCC) C85.90    Traumatic retroperitoneal hematoma S36.892A    Acute kidney injury ([facility] L30.3    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    Hydronephrosis due to obstruction of ureter N13.1    Moderate malnutrition (HCC) E44.0    Chronic cholecystitis K81.1    Pleural effusion J90    Cardiomegaly I51.7    Hypothyroidism E03.9    Diabetes mellitus (HCC) E11.9    Hypertensive urgency I16.0    Acute respiratory failure with hypoxia (HCC) J96.01    Hypoxia R09.02    Carotid stenosis, asymptomatic, bilateral I65.23    Simple chronic bronchitis (HCC) J41.0    Dependence on nocturnal oxygen therapy Z99.81    Pneumonia J18.9    LAURO (acute kidney injury) ([facility] N17.9    Failure to thrive in adult R62.7    DVT (deep venous thrombosis) ([facility] I82.409    Dehydration E86.0    Lymphoma, diffuse (HCC) C85.90      Weakness most likely secondary to his lymphoma and treatment.     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    Renal insufficiency    It is dehydration second is chronic second to the mass and hydronephrosis  Still negative chest x-ray  Blood cultures are negative so far  Elevated blood pressure we will monitor his blood pressure                  Plan:  Okay to discharge patient to [facility]    We will put him on Keflex 500 p.o. 3 times daily for 5 days    Continue with iron supplement 325 mg 3 times daily    Skye Fairchild DO FAAFP           9/7/2022, 8:17 AM

## 2022-09-11 PROBLEM — E86.0 DEHYDRATION: Status: RESOLVED | Noted: 2022-08-12 | Resolved: 2022-09-11

## 2022-09-12 ENCOUNTER — TELEPHONE (OUTPATIENT)
Dept: ONCOLOGY | Age: 72
End: 2022-09-12

## 2022-09-12 NOTE — TELEPHONE ENCOUNTER
[facility] Nutrition Note:    Chart reviewed for nutrition f/u. Note pt currently at rehab at [facility]. Will follow for continuation of care at discharge.      Robel Swanson MS, RD, LD  Registered Dietitian  [facility]  [phone]

## 2022-09-15 ENCOUNTER — TELEPHONE (OUTPATIENT)
Dept: CASE MANAGEMENT | Age: 72
End: 2022-09-15

## 2022-09-15 NOTE — TELEPHONE ENCOUNTER
Name: Pranay Foss  : 1950  MRN: [id number]    Oncology Navigation Follow-Up Note    Navigator left VM for pt. Offering assistance if needed. Plan to follow.    Electronically signed by Nikita Valadez RN on 9/15/2022 at 12:07 PM

## 2022-09-17 PROBLEM — C85.99: Status: ACTIVE | Noted: 2022-09-17

## 2022-09-20 ENCOUNTER — TELEPHONE (OUTPATIENT)
Dept: ONCOLOGY | Age: 72
End: 2022-09-20

## 2022-09-20 NOTE — TELEPHONE ENCOUNTER
Sarika called writer to confirm how Sam Franco will be taking his imbruvica. Patient is discharged from the extended care today. Writer clarified dosing with Dr Emily Marcos and the instructions are the same. Imbruvica 420 mg take for 2 days and than 1 day off. Writer spoke with Sarika to let her know that the dosing was confirmed. Sarika said Sam Franco was getting his med 2 days on and 2 days off. Will monitor.

## 2022-09-26 ENCOUNTER — TELEPHONE (OUTPATIENT)
Dept: OTHER | Facility: CLINIC | Age: 72
End: 2022-09-26

## 2022-09-26 ENCOUNTER — APPOINTMENT (OUTPATIENT)
Dept: GENERAL RADIOLOGY | Age: 72
DRG: 392 | End: 2022-09-26
Payer: MEDICARE

## 2022-09-26 ENCOUNTER — APPOINTMENT (OUTPATIENT)
Dept: CT IMAGING | Age: 72
DRG: 392 | End: 2022-09-26
Payer: MEDICARE

## 2022-09-26 ENCOUNTER — HOSPITAL ENCOUNTER (INPATIENT)
Age: 72
LOS: 4 days | Discharge: HOME HEALTH | DRG: 392 | End: 2022-09-30
Attending: EMERGENCY MEDICINE | Admitting: FAMILY MEDICINE
Payer: MEDICARE

## 2022-09-26 DIAGNOSIS — R10.13 EPIGASTRIC PAIN: ICD-10-CM

## 2022-09-26 DIAGNOSIS — I48.91 ATRIAL FIBRILLATION WITH RVR (HCC): ICD-10-CM

## 2022-09-26 DIAGNOSIS — J18.9 PNEUMONIA DUE TO INFECTIOUS ORGANISM, UNSPECIFIED LATERALITY, UNSPECIFIED PART OF LUNG: ICD-10-CM

## 2022-09-26 DIAGNOSIS — N30.00 ACUTE CYSTITIS WITHOUT HEMATURIA: Primary | ICD-10-CM

## 2022-09-26 LAB
ABSOLUTE EOS #: 0 K/UL (ref 0–0.4)
ABSOLUTE LYMPH #: 1.5 K/UL (ref 1–4.8)
ABSOLUTE MONO #: 0.6 K/UL (ref 0.1–1.3)
ALBUMIN SERPL-MCNC: 2.9 G/DL (ref 3.5–5.2)
ALP BLD-CCNC: 149 U/L (ref 40–129)
ALT SERPL-CCNC: 10 U/L (ref 5–41)
ANION GAP SERPL CALCULATED.3IONS-SCNC: 13 MMOL/L (ref 9–17)
AST SERPL-CCNC: 9 U/L
BACTERIA: ABNORMAL
BASOPHILS # BLD: 0 % (ref 0–2)
BASOPHILS ABSOLUTE: 0 K/UL (ref 0–0.2)
BILIRUB SERPL-MCNC: 0.5 MG/DL (ref 0.3–1.2)
BILIRUBIN URINE: NEGATIVE
BUN BLDV-MCNC: 19 MG/DL (ref 8–23)
CALCIUM SERPL-MCNC: 8.1 MG/DL (ref 8.6–10.4)
CARBOXYHEMOGLOBIN: 2.8 % (ref 0–5)
CHLORIDE BLD-SCNC: 112 MMOL/L (ref 98–107)
CO2: 19 MMOL/L (ref 20–31)
COLOR: ABNORMAL
CREAT SERPL-MCNC: 0.82 MG/DL (ref 0.7–1.2)
EOSINOPHILS RELATIVE PERCENT: 0 % (ref 0–4)
EPITHELIAL CELLS UA: ABNORMAL /HPF
GFR AFRICAN AMERICAN: >60 ML/MIN
GFR NON-AFRICAN AMERICAN: >60 ML/MIN
GFR SERPL CREATININE-BSD FRML MDRD: ABNORMAL ML/MIN/{1.73_M2}
GLUCOSE BLD-MCNC: 104 MG/DL (ref 75–110)
GLUCOSE BLD-MCNC: 139 MG/DL (ref 70–99)
GLUCOSE URINE: NEGATIVE
HCO3 VENOUS: 21.8 MMOL/L (ref 24–30)
HCT VFR BLD CALC: 31.3 % (ref 41–53)
HEMOGLOBIN: 10.3 G/DL (ref 13.5–17.5)
INFLUENZA A: NOT DETECTED
INFLUENZA B: NOT DETECTED
INR BLD: 1.1
KETONES, URINE: NEGATIVE
LACTIC ACID: 0.6 MMOL/L (ref 0.5–2.2)
LEUKOCYTE ESTERASE, URINE: ABNORMAL
LIPASE: 46 U/L (ref 13–60)
LYMPHOCYTES # BLD: 20 % (ref 24–44)
MAGNESIUM: 1.5 MG/DL (ref 1.6–2.6)
MCH RBC QN AUTO: 27.2 PG (ref 26–34)
MCHC RBC AUTO-ENTMCNC: 33 G/DL (ref 31–37)
MCV RBC AUTO: 82.2 FL (ref 80–100)
METHEMOGLOBIN: 0.8 % (ref 0–1.9)
MONOCYTES # BLD: 9 % (ref 1–7)
NEGATIVE BASE EXCESS, VEN: 3.1 MMOL/L (ref 0–2)
NITRITE, URINE: NEGATIVE
O2 DEVICE/FLOW/%: ABNORMAL
O2 SAT, VEN: 93 % (ref 60–85)
PATIENT TEMP: 37
PCO2, VEN: 36.2 MM HG (ref 39–55)
PDW BLD-RTO: 20 % (ref 11.5–14.9)
PH UA: 6.5 (ref 5–8)
PH VENOUS: 7.39 (ref 7.32–7.42)
PLATELET # BLD: 185 K/UL (ref 150–450)
PMV BLD AUTO: 8.5 FL (ref 6–12)
PO2, VEN: 84.3 MM HG (ref 30–50)
POTASSIUM SERPL-SCNC: 3.6 MMOL/L (ref 3.7–5.3)
PROTEIN UA: ABNORMAL
PROTHROMBIN TIME: 14.4 SEC (ref 11.8–14.6)
RBC # BLD: 3.8 M/UL (ref 4.5–5.9)
RBC UA: ABNORMAL /HPF
SARS-COV-2 RNA, RT PCR: NOT DETECTED
SEG NEUTROPHILS: 71 % (ref 36–66)
SEGMENTED NEUTROPHILS ABSOLUTE COUNT: 5.3 K/UL (ref 1.3–9.1)
SODIUM BLD-SCNC: 144 MMOL/L (ref 135–144)
SOURCE: NORMAL
SPECIFIC GRAVITY UA: 1.01 (ref 1–1.03)
SPECIMEN DESCRIPTION: NORMAL
THYROXINE, FREE: 1.33 NG/DL (ref 0.93–1.7)
TOTAL PROTEIN: 5.2 G/DL (ref 6.4–8.3)
TROPONIN, HIGH SENSITIVITY: 42 NG/L (ref 0–22)
TROPONIN, HIGH SENSITIVITY: 43 NG/L (ref 0–22)
TROPONIN, HIGH SENSITIVITY: 57 NG/L (ref 0–22)
TSH SERPL DL<=0.05 MIU/L-ACNC: 2.83 UIU/ML (ref 0.3–5)
TURBIDITY: ABNORMAL
URINE HGB: ABNORMAL
UROBILINOGEN, URINE: NORMAL
WBC # BLD: 7.5 K/UL (ref 3.5–11)
WBC UA: ABNORMAL /HPF

## 2022-09-26 PROCEDURE — 6360000004 HC RX CONTRAST MEDICATION: Performed by: EMERGENCY MEDICINE

## 2022-09-26 PROCEDURE — 82805 BLOOD GASES W/O2 SATURATION: CPT

## 2022-09-26 PROCEDURE — 84484 ASSAY OF TROPONIN QUANT: CPT

## 2022-09-26 PROCEDURE — 83735 ASSAY OF MAGNESIUM: CPT

## 2022-09-26 PROCEDURE — 80053 COMPREHEN METABOLIC PANEL: CPT

## 2022-09-26 PROCEDURE — 74178 CT ABD&PLV WO CNTR FLWD CNTR: CPT

## 2022-09-26 PROCEDURE — 36415 COLL VENOUS BLD VENIPUNCTURE: CPT

## 2022-09-26 PROCEDURE — 2580000003 HC RX 258: Performed by: FAMILY MEDICINE

## 2022-09-26 PROCEDURE — 2580000003 HC RX 258: Performed by: EMERGENCY MEDICINE

## 2022-09-26 PROCEDURE — 87641 MR-STAPH DNA AMP PROBE: CPT

## 2022-09-26 PROCEDURE — 82947 ASSAY GLUCOSE BLOOD QUANT: CPT

## 2022-09-26 PROCEDURE — 96365 THER/PROPH/DIAG IV INF INIT: CPT

## 2022-09-26 PROCEDURE — 96375 TX/PRO/DX INJ NEW DRUG ADDON: CPT

## 2022-09-26 PROCEDURE — 6370000000 HC RX 637 (ALT 250 FOR IP): Performed by: FAMILY MEDICINE

## 2022-09-26 PROCEDURE — 6370000000 HC RX 637 (ALT 250 FOR IP): Performed by: EMERGENCY MEDICINE

## 2022-09-26 PROCEDURE — 6360000002 HC RX W HCPCS: Performed by: EMERGENCY MEDICINE

## 2022-09-26 PROCEDURE — 81001 URINALYSIS AUTO W/SCOPE: CPT

## 2022-09-26 PROCEDURE — 87086 URINE CULTURE/COLONY COUNT: CPT

## 2022-09-26 PROCEDURE — 84443 ASSAY THYROID STIM HORMONE: CPT

## 2022-09-26 PROCEDURE — 83690 ASSAY OF LIPASE: CPT

## 2022-09-26 PROCEDURE — 87636 SARSCOV2 & INF A&B AMP PRB: CPT

## 2022-09-26 PROCEDURE — 83605 ASSAY OF LACTIC ACID: CPT

## 2022-09-26 PROCEDURE — 96368 THER/DIAG CONCURRENT INF: CPT

## 2022-09-26 PROCEDURE — 84439 ASSAY OF FREE THYROXINE: CPT

## 2022-09-26 PROCEDURE — 85610 PROTHROMBIN TIME: CPT

## 2022-09-26 PROCEDURE — 85025 COMPLETE CBC W/AUTO DIFF WBC: CPT

## 2022-09-26 PROCEDURE — 71045 X-RAY EXAM CHEST 1 VIEW: CPT

## 2022-09-26 PROCEDURE — 93005 ELECTROCARDIOGRAM TRACING: CPT | Performed by: EMERGENCY MEDICINE

## 2022-09-26 PROCEDURE — 99285 EMERGENCY DEPT VISIT HI MDM: CPT

## 2022-09-26 PROCEDURE — 2060000000 HC ICU INTERMEDIATE R&B

## 2022-09-26 PROCEDURE — 2500000003 HC RX 250 WO HCPCS: Performed by: EMERGENCY MEDICINE

## 2022-09-26 RX ORDER — ONDANSETRON 2 MG/ML
4 INJECTION INTRAMUSCULAR; INTRAVENOUS ONCE
Status: COMPLETED | OUTPATIENT
Start: 2022-09-26 | End: 2022-09-26

## 2022-09-26 RX ORDER — SUCRALFATE 1 G/1
1 TABLET ORAL 4 TIMES DAILY
Status: DISCONTINUED | OUTPATIENT
Start: 2022-09-27 | End: 2022-09-30 | Stop reason: HOSPADM

## 2022-09-26 RX ORDER — BUDESONIDE AND FORMOTEROL FUMARATE DIHYDRATE 80; 4.5 UG/1; UG/1
2 AEROSOL RESPIRATORY (INHALATION) 2 TIMES DAILY
Status: DISCONTINUED | OUTPATIENT
Start: 2022-09-27 | End: 2022-09-30 | Stop reason: HOSPADM

## 2022-09-26 RX ORDER — SODIUM CHLORIDE 0.9 % (FLUSH) 0.9 %
5-40 SYRINGE (ML) INJECTION EVERY 12 HOURS SCHEDULED
Status: DISCONTINUED | OUTPATIENT
Start: 2022-09-26 | End: 2022-09-30 | Stop reason: HOSPADM

## 2022-09-26 RX ORDER — ONDANSETRON 4 MG/1
4 TABLET, ORALLY DISINTEGRATING ORAL EVERY 8 HOURS PRN
Status: DISCONTINUED | OUTPATIENT
Start: 2022-09-26 | End: 2022-09-30 | Stop reason: HOSPADM

## 2022-09-26 RX ORDER — CARVEDILOL 12.5 MG/1
12.5 TABLET ORAL 2 TIMES DAILY WITH MEALS
Status: DISCONTINUED | OUTPATIENT
Start: 2022-09-27 | End: 2022-09-30 | Stop reason: HOSPADM

## 2022-09-26 RX ORDER — CARVEDILOL 6.25 MG/1
12.5 TABLET ORAL 2 TIMES DAILY WITH MEALS
COMMUNITY

## 2022-09-26 RX ORDER — ATORVASTATIN CALCIUM 10 MG/1
10 TABLET, FILM COATED ORAL DAILY
Status: DISCONTINUED | OUTPATIENT
Start: 2022-09-27 | End: 2022-09-30 | Stop reason: HOSPADM

## 2022-09-26 RX ORDER — OXYCODONE HCL 10 MG/1
10 TABLET, FILM COATED, EXTENDED RELEASE ORAL EVERY 12 HOURS
Status: DISCONTINUED | OUTPATIENT
Start: 2022-09-27 | End: 2022-09-30 | Stop reason: HOSPADM

## 2022-09-26 RX ORDER — FLUOXETINE HYDROCHLORIDE 20 MG/1
20 CAPSULE ORAL DAILY
Status: DISCONTINUED | OUTPATIENT
Start: 2022-09-27 | End: 2022-09-30 | Stop reason: HOSPADM

## 2022-09-26 RX ORDER — MEGESTROL ACETATE 40 MG/ML
400 SUSPENSION ORAL DAILY
Status: DISCONTINUED | OUTPATIENT
Start: 2022-09-27 | End: 2022-09-30 | Stop reason: HOSPADM

## 2022-09-26 RX ORDER — 0.9 % SODIUM CHLORIDE 0.9 %
2000 INTRAVENOUS SOLUTION INTRAVENOUS ONCE
Status: COMPLETED | OUTPATIENT
Start: 2022-09-26 | End: 2022-09-26

## 2022-09-26 RX ORDER — SODIUM CHLORIDE 9 MG/ML
INJECTION, SOLUTION INTRAVENOUS PRN
Status: DISCONTINUED | OUTPATIENT
Start: 2022-09-26 | End: 2022-09-30 | Stop reason: HOSPADM

## 2022-09-26 RX ORDER — M-VIT,TX,IRON,MINS/CALC/FOLIC 27MG-0.4MG
1 TABLET ORAL DAILY
Status: DISCONTINUED | OUTPATIENT
Start: 2022-09-27 | End: 2022-09-30 | Stop reason: HOSPADM

## 2022-09-26 RX ORDER — 0.9 % SODIUM CHLORIDE 0.9 %
100 INTRAVENOUS SOLUTION INTRAVENOUS ONCE
Status: COMPLETED | OUTPATIENT
Start: 2022-09-26 | End: 2022-09-26

## 2022-09-26 RX ORDER — ACETAMINOPHEN 325 MG/1
650 TABLET ORAL EVERY 4 HOURS PRN
Status: DISCONTINUED | OUTPATIENT
Start: 2022-09-26 | End: 2022-09-30 | Stop reason: HOSPADM

## 2022-09-26 RX ORDER — FUROSEMIDE 40 MG/1
40 TABLET ORAL 2 TIMES DAILY PRN
Status: ON HOLD | COMMUNITY
End: 2022-09-26

## 2022-09-26 RX ORDER — MAGNESIUM SULFATE HEPTAHYDRATE 40 MG/ML
2000 INJECTION, SOLUTION INTRAVENOUS ONCE
Status: COMPLETED | OUTPATIENT
Start: 2022-09-26 | End: 2022-09-26

## 2022-09-26 RX ORDER — LACTOBACILLUS RHAMNOSUS GG 10B CELL
1 CAPSULE ORAL DAILY
Status: DISCONTINUED | OUTPATIENT
Start: 2022-09-27 | End: 2022-09-30 | Stop reason: HOSPADM

## 2022-09-26 RX ORDER — DONEPEZIL HYDROCHLORIDE 10 MG/1
10 TABLET, FILM COATED ORAL DAILY
Status: DISCONTINUED | OUTPATIENT
Start: 2022-09-27 | End: 2022-09-30 | Stop reason: HOSPADM

## 2022-09-26 RX ORDER — SODIUM CHLORIDE 0.9 % (FLUSH) 0.9 %
10 SYRINGE (ML) INJECTION AS NEEDED
Status: DISCONTINUED | OUTPATIENT
Start: 2022-09-26 | End: 2022-09-30 | Stop reason: HOSPADM

## 2022-09-26 RX ORDER — SODIUM CHLORIDE 9 MG/ML
INJECTION, SOLUTION INTRAVENOUS CONTINUOUS
Status: DISCONTINUED | OUTPATIENT
Start: 2022-09-26 | End: 2022-09-30 | Stop reason: HOSPADM

## 2022-09-26 RX ORDER — ACETAMINOPHEN 325 MG/1
650 TABLET ORAL ONCE
Status: COMPLETED | OUTPATIENT
Start: 2022-09-26 | End: 2022-09-26

## 2022-09-26 RX ORDER — PREGABALIN 25 MG/1
25 CAPSULE ORAL 2 TIMES DAILY
COMMUNITY
End: 2022-09-26

## 2022-09-26 RX ORDER — OMEPRAZOLE 20 MG/1
20 CAPSULE, DELAYED RELEASE ORAL 2 TIMES DAILY
Status: DISCONTINUED | OUTPATIENT
Start: 2022-09-27 | End: 2022-09-30 | Stop reason: HOSPADM

## 2022-09-26 RX ORDER — OXYCODONE HCL 10 MG/1
10 TABLET, FILM COATED, EXTENDED RELEASE ORAL EVERY 12 HOURS
COMMUNITY

## 2022-09-26 RX ORDER — AMLODIPINE BESYLATE 5 MG/1
10 TABLET ORAL DAILY
Status: ON HOLD | COMMUNITY
End: 2022-09-30 | Stop reason: HOSPADM

## 2022-09-26 RX ORDER — IPRATROPIUM BROMIDE AND ALBUTEROL SULFATE 2.5; .5 MG/3ML; MG/3ML
1 SOLUTION RESPIRATORY (INHALATION) EVERY 4 HOURS PRN
Status: DISCONTINUED | OUTPATIENT
Start: 2022-09-26 | End: 2022-09-30 | Stop reason: HOSPADM

## 2022-09-26 RX ORDER — GLIPIZIDE AND METFORMIN HCL 5; 500 MG/1; MG/1
1 TABLET, FILM COATED ORAL NIGHTLY
Status: ON HOLD | COMMUNITY
End: 2022-09-26

## 2022-09-26 RX ORDER — SODIUM CHLORIDE 0.9 % (FLUSH) 0.9 %
5-40 SYRINGE (ML) INJECTION PRN
Status: DISCONTINUED | OUTPATIENT
Start: 2022-09-26 | End: 2022-09-30 | Stop reason: HOSPADM

## 2022-09-26 RX ORDER — LOSARTAN POTASSIUM 50 MG/1
50 TABLET ORAL DAILY
Status: DISCONTINUED | OUTPATIENT
Start: 2022-09-27 | End: 2022-09-27

## 2022-09-26 RX ORDER — ENOXAPARIN SODIUM 100 MG/ML
40 INJECTION SUBCUTANEOUS DAILY
Status: DISCONTINUED | OUTPATIENT
Start: 2022-09-26 | End: 2022-09-26 | Stop reason: SDUPTHER

## 2022-09-26 RX ORDER — OXYCODONE HYDROCHLORIDE 5 MG/1
5 TABLET ORAL EVERY 4 HOURS PRN
COMMUNITY

## 2022-09-26 RX ORDER — OXYCODONE HYDROCHLORIDE 5 MG/1
5 TABLET ORAL EVERY 4 HOURS PRN
Status: DISCONTINUED | OUTPATIENT
Start: 2022-09-26 | End: 2022-09-30 | Stop reason: HOSPADM

## 2022-09-26 RX ORDER — ALBUTEROL SULFATE 90 UG/1
2 AEROSOL, METERED RESPIRATORY (INHALATION) EVERY 4 HOURS PRN
Status: DISCONTINUED | OUTPATIENT
Start: 2022-09-26 | End: 2022-09-30 | Stop reason: HOSPADM

## 2022-09-26 RX ORDER — ONDANSETRON 2 MG/ML
4 INJECTION INTRAMUSCULAR; INTRAVENOUS EVERY 6 HOURS PRN
Status: DISCONTINUED | OUTPATIENT
Start: 2022-09-26 | End: 2022-09-30 | Stop reason: HOSPADM

## 2022-09-26 RX ORDER — LEVOTHYROXINE SODIUM 0.07 MG/1
150 TABLET ORAL DAILY
Status: DISCONTINUED | OUTPATIENT
Start: 2022-09-27 | End: 2022-09-30 | Stop reason: HOSPADM

## 2022-09-26 RX ORDER — GLIPIZIDE AND METFORMIN HCL 5; 500 MG/1; MG/1
2 TABLET, FILM COATED ORAL DAILY
Status: ON HOLD | COMMUNITY
End: 2022-09-26

## 2022-09-26 RX ORDER — AMLODIPINE BESYLATE 10 MG/1
10 TABLET ORAL DAILY
Status: DISCONTINUED | OUTPATIENT
Start: 2022-09-27 | End: 2022-09-27

## 2022-09-26 RX ADMIN — SODIUM CHLORIDE, PRESERVATIVE FREE 10 ML: 5 INJECTION INTRAVENOUS at 11:00

## 2022-09-26 RX ADMIN — SODIUM CHLORIDE: 9 INJECTION, SOLUTION INTRAVENOUS at 18:34

## 2022-09-26 RX ADMIN — VANCOMYCIN HYDROCHLORIDE 2000 MG: 1 INJECTION, POWDER, LYOPHILIZED, FOR SOLUTION INTRAVENOUS at 11:14

## 2022-09-26 RX ADMIN — METOPROLOL TARTRATE 25 MG: 25 TABLET, FILM COATED ORAL at 13:55

## 2022-09-26 RX ADMIN — IOPAMIDOL 75 ML: 755 INJECTION, SOLUTION INTRAVENOUS at 11:00

## 2022-09-26 RX ADMIN — ONDANSETRON 4 MG: 2 INJECTION INTRAMUSCULAR; INTRAVENOUS at 09:26

## 2022-09-26 RX ADMIN — APIXABAN 5 MG: 5 TABLET, FILM COATED ORAL at 18:36

## 2022-09-26 RX ADMIN — MAGNESIUM SULFATE HEPTAHYDRATE 2000 MG: 40 INJECTION, SOLUTION INTRAVENOUS at 10:08

## 2022-09-26 RX ADMIN — SODIUM CHLORIDE 2000 ML: 9 INJECTION, SOLUTION INTRAVENOUS at 09:25

## 2022-09-26 RX ADMIN — OXYCODONE HYDROCHLORIDE 5 MG: 5 TABLET ORAL at 19:34

## 2022-09-26 RX ADMIN — SODIUM CHLORIDE 100 ML: 9 INJECTION, SOLUTION INTRAVENOUS at 11:03

## 2022-09-26 RX ADMIN — CEFTRIAXONE SODIUM 1000 MG: 1 INJECTION, POWDER, FOR SOLUTION INTRAMUSCULAR; INTRAVENOUS at 10:08

## 2022-09-26 RX ADMIN — ACETAMINOPHEN 650 MG: 325 TABLET, FILM COATED ORAL at 10:09

## 2022-09-26 ASSESSMENT — ENCOUNTER SYMPTOMS
NAUSEA: 0
DIARRHEA: 0
COUGH: 1
BACK PAIN: 0
SHORTNESS OF BREATH: 1
ABDOMINAL PAIN: 1
VOMITING: 1

## 2022-09-26 ASSESSMENT — PAIN DESCRIPTION - DESCRIPTORS: DESCRIPTORS: SHARP

## 2022-09-26 ASSESSMENT — PAIN SCALES - GENERAL: PAINLEVEL_OUTOF10: 6

## 2022-09-26 ASSESSMENT — PAIN DESCRIPTION - LOCATION: LOCATION: OTHER (COMMENT)

## 2022-09-26 ASSESSMENT — PAIN DESCRIPTION - ORIENTATION: ORIENTATION: LOWER

## 2022-09-26 NOTE — PLAN OF CARE
Problem: Discharge Planning  Goal: Discharge to home or other facility with appropriate resources  Outcome: Progressing     Problem: Skin/Tissue Integrity  Goal: Absence of new skin breakdown  Description: 1. Monitor for areas of redness and/or skin breakdown  2. Assess vascular access sites hourly  3. Every 4-6 hours minimum:  Change oxygen saturation probe site  4. Every 4-6 hours:  If on nasal continuous positive airway pressure, respiratory therapy assess nares and determine need for appliance change or resting period. Outcome: Progressing  Note: No presence of new skin breakdown throughout shift. Pt shifts own weight in the bed and calls out when assistance is needed. Pt heels maintained off bed with the foot of bed elevated. Pt understands reasoning for interventions to prevent skin breakdown. Problem: Safety - Adult  Goal: Free from fall injury  Outcome: Progressing  Note: Free from falls throughout shift. Pt is compliant with safety measures into place. Pt seen by care team every hour with purposefully hourly rounding, bed is in the lowest position, call light and personal belongings within reach. Two side rails are up and bed alarm is on. Pt calls out appropriately. Problem: Pain  Goal: Verbalizes/displays adequate comfort level or baseline comfort level  Outcome: Progressing  Note: No complaints of pain throughout shift. Patient is able to verbalize pain on 1-10 scale.

## 2022-09-26 NOTE — ED TRIAGE NOTES
Patient presents from home via ems for nausea vomiting x3 days. Patient is on oral chemo for abdominal cancer and has not been feeling well. HR on arrival 138-155.  Patient was given a Percocet by wife prior to arrival

## 2022-09-26 NOTE — TELEPHONE ENCOUNTER
Writer contacted ED provider, Jan Peace, to inform of 30-day readmission risk via phone . ED provider informed writer of readmission. Callback: If you need to callback to inform of disposition, please call [phone].

## 2022-09-26 NOTE — PROGRESS NOTES
Patient arrived on floor, RN notified Dr. Anika Mcdowell. See new orders. Writer spoke with family later who states they would like Dr. Anika Mcdowell to be aware that patient has had tingling in his feet and that his ring does not fit as it used to before.

## 2022-09-26 NOTE — PROGRESS NOTES
Medication History completed    New medications: furosemide, oxycodone    Medications discontinued: Glipizide, Lyrica    Changes to dosing:  Amlodipine - decreased from 10 mg to 5 mg daily  Glipizide/metformin - increased from 1 tablet twice daily to 2 tablets in the morning and 1 tablet in the evening    Stated allergies: As listed    Other pertinent information: Spoke to patient's family, [facility], and [facility] pharmacy to confirm medications. Family reports that they stopped giving him Lyrica due to it causing psychosis. Oxycodone 5 mg BID was last dispensed 8/2 for a 30 day supply per OARRS. Patients family reported that the last dose of ibrutinib was on 9/25 and the patient should be due for a dose today.     Thank you,  Teresa Hewitt  PharmD Candidate [id number]

## 2022-09-26 NOTE — ED PROVIDER NOTES
[facility]      Pt Name: Arland Opitz  MRN: [id number]  [place] 1950  Date of evaluation: 9/26/22      CHIEF COMPLAINT     Abd pain and sob. HISTORY OF PRESENT ILLNESS   HPI 74 y.o. male presents with c/o n/v. Symptoms started about 3 days ago. Symptoms rated as severe in severity and constant in course. Pt currently receiving chemotherapy for bcell lymphoma. . Pt reports associated cramping pain in the suprapubic area that radiates across to bilateral lower abdomens. Also reports that over the last 2 days has been having a productive cough of yeallow sputum. REVIEW OF SYSTEMS       Review of Systems   Constitutional:  Positive for activity change, appetite change and fatigue. Negative for fever. HENT:  Negative for congestion. Eyes:  Negative for visual disturbance. Respiratory:  Positive for cough and shortness of breath. Cardiovascular:  Negative for chest pain. Gastrointestinal:  Positive for abdominal pain and vomiting. Negative for diarrhea and nausea. Genitourinary:  Positive for dysuria. Musculoskeletal:  Negative for back pain. Skin:  Negative for wound. Neurological:  Positive for weakness (generalized). Negative for headaches. Hematological:  Does not bruise/bleed easily. PAST MEDICAL HISTORY     Past Medical History:   Diagnosis Date    Arthritis     Cancer [facility])     chemotherapy lymphoma last was april 2022    Chemotherapy management, encounter for     CHF (congestive heart failure) (HCC)     COPD (chronic obstructive pulmonary disease) ([facility]     Dementia ([facility]     Diabetes mellitus ([facility]     H/O cardiovascular stress test     History of non-Hodgkin's lymphoma     Hx of blood clots     DVT in right leg     Hyperlipidemia     Hypertension     Hypothyroidism     On home O2     at 5 liters per nasal cannula 24 hrs. per day.         SURGICAL HISTORY       Past Surgical History:   Procedure Laterality Date    CAROTID ENDARTERECTOMY Left 6/28/2022    LEFT TYPE I EVERSION LEFT CAROTID ENDARTERECTOMY RE-IMPLANTATION LEFT ICA performed by Raissa Sahu MD at [address], LAPAROSCOPIC N/A 10/9/2021    CHOLECYSTECTOMY LAPAROSCOPIC ROBOTIC XI performed by Alexandra Shields MD at [address] Left 9/24/2021    COLONOSCOPY POLYPECTOMY SNARE/COLD BIOPSY performed by Maryjane Fields MD at . [address] Right 10/6/2021    CYSTOSCOPY PYELOGRAM URETERAL STENT INSERTION performed by Lucille Huggins MD at . [address] Bilateral 11/24/2021    CYSTOSCOPY URETERAL [address] performed by Lucille Huggins MD at . [address] Bilateral 2/16/2022    CYSTOSCOPY WITH BILATERAL STENT EXCHANGE performed by Lucille Huggins MD at [address] N/A 7/20/2022    CYSTOSCOPY BILATERAL STENT EXCHANGE performed by Lucille Huggins MD at [facility]      IR BIOPSY ABDOMINAL MASS  7/6/2021    IR BIOPSY ABDOMINAL MASS 7/6/2021 [facility]    IR PORT PLACEMENT EQUAL OR GREATER THAN 5 YEARS  8/31/2021    IR PORT PLACEMENT EQUAL OR GREATER THAN 5 YEARS 8/31/2021 [facility]    TONSILLECTOMY      as child       CURRENT MEDICATIONS       Current Discharge Medication List        CONTINUE these medications which have NOT CHANGED    Details   amLODIPine (NORVASC) 5 MG tablet Take 10 mg by mouth daily      carvedilol (COREG) 6.25 MG tablet Take 12.5 mg by mouth 2 times daily (with meals)      oxyCODONE (ROXICODONE) 5 MG immediate release tablet Take 5 mg by mouth every 4 hours as needed for Pain. Last dispensed 8/2 for 30 days      oxyCODONE (OXYCONTIN) 10 MG extended release tablet Take 10 mg by mouth in the morning and 10 mg in the evening.       ferrous sulfate (FE TABS 325) 325 (65 Fe) MG EC tablet Take 1 tablet by mouth 3 times daily (with meals)  Qty: 90 tablet, Refills: 5      ibrutinib (IMBRUVICA) 420 MG tablet Take 1 tablet by mouth See Admin Instructions 2 days on, then 1 day off-per Dr Demarcus Mathew 9/7/22  Qty: 30 tablet, Refills: 0      naloxone 4 MG/0.1ML LIQD nasal spray 1 spray by Nasal route as needed for Opioid Reversal  Qty: 1 each, Refills: 2    Associated Diagnoses: Lymphoma of body of stomach (HCC)      atorvastatin (LIPITOR) 10 MG tablet Take 10 mg by mouth daily      megestrol (MEGACE) 40 MG/ML suspension Take 10 mLs by mouth in the morning. Qty: 240 mL, Refills: 3      apixaban (ELIQUIS) 5 MG TABS tablet Take 1 tablet by mouth in the morning and 1 tablet before bedtime. Qty: 60 tablet, Refills: 5      sucralfate (CARAFATE) 1 GM tablet Take 1 tablet by mouth in the morning and 1 tablet at noon and 1 tablet in the evening and 1 tablet before bedtime. Qty: 120 tablet, Refills: 1      Lactobacillus (PROBIOTIC ACIDOPHILUS) CAPS Take 1 caplet by mouth daily      Multiple Vitamins-Minerals (THERAPEUTIC MULTIVITAMIN-MINERALS) tablet Take 1 tablet by mouth daily      OXYGEN Inhale 2 L into the lungs at bedtime Nightly & during the day.       ipratropium-albuterol (DUONEB) 0.5-2.5 (3) MG/3ML SOLN nebulizer solution Inhale 3 mLs into the lungs every 4 hours as needed for Shortness of Breath  Qty: 360 mL, Refills: 3      levothyroxine (SYNTHROID) 150 MCG tablet Take 1 tablet by mouth Daily  Qty: 30 tablet, Refills: 3      fluticasone-salmeterol (ADVAIR) 100-50 MCG/DOSE diskus inhaler Inhale 1 puff into the lungs every 12 hours  Qty: 60 each, Refills: 0      albuterol sulfate HFA (PROVENTIL;VENTOLIN;PROAIR) 108 (90 Base) MCG/ACT inhaler Inhale 2 puffs into the lungs every 4 hours as needed for Wheezing       losartan (COZAAR) 50 MG tablet Take 1 tablet by mouth daily  Qty: 30 tablet, Refills: 5      donepezil (ARICEPT) 10 MG tablet Take 10 mg by mouth daily       FLUoxetine (PROZAC) 20 MG capsule Take 20 mg by mouth daily      omeprazole (PRILOSEC) 20 MG delayed release capsule Take 20 mg by mouth 2 times daily             ALLERGIES     has No Known Allergies. FAMILY HISTORY     He indicated that his mother is . He indicated that his father is . SOCIAL HISTORY      reports that he quit smoking about 16 years ago. His smoking use included cigarettes. He has a 90.00 pack-year smoking history. He has never used smokeless tobacco. He reports current alcohol use of about 1.7 standard drinks per week. He reports current drug use. Drug: Marijuana Katheren Ling). PHYSICAL EXAM     INITIAL VITALS: BP (!) 163/62   Pulse 69   Temp 98.5 °F (36.9 °C) (Oral)   Resp 18   Wt 173 lb (78.5 kg)   SpO2 92%   BMI 24.82 kg/m²   Gen: appears weak and fatigued. Head: Normocephalic, atraumatic  Eye: Pupils equal round reactive to light, no conjunctivitis  ENT: MMM  Neck: no JVD  Heart: Tachycardic with an irregularly irregular rhythm no murmurs  Lungs: Clear to auscultation bilaterally, no respiratory distress  Chest wall: No crepitus, no tenderness palpation  Abdomen: Soft, bilateral lower abdominal and suprapubic ttp nondistended, with no peritoneal signs  MSK: No cva ttp  Neurologic: Patient is alert and oriented x3, motor and     MEDICAL DECISION MAKING:     MDM    74 y.o. male presenting with lower abdomianl pain, vomiting, sob. In regards to lower abdominal pain, UA has pyuria. He has indwelling ureteral stents. Concern for infection. CT scan shows no sign of colitis, diveriticulitis, bowel obstruction, perforation etc.  Lactic acid is normal.  Pt was put on antibiotics. Pt also reporting new cough. CXR showing possible developing infiltrate in the RLL. Vancomycin added to cover for possible MRSA infection. Pt has new onset afib. Likely related to dehydration from all his vomiting and infection. There was improvement in his HR with IVF rehydration. Started on oral metoprolol. Admitting to hospital after discussion with Dr. Nadeem Kaur.   Consults placed to his oncologist and his urologist.       EKG: All EKG's are interpreted by the Emergency Department Physician who either signs or Co-signs this chart in the absence of a cardiologist.    EKG shows a atrial fibrillation with rvr rhythm. HR is 132, QRS 90, , no ELIANA, No STD, No TWI, the axis is normal.        RADIOLOGY:All plain film, CT, MRI, and formal ultrasound images (except ED bedside ultrasound) are read by the radiologist and the images and interpretations are directly viewed by the emergency physician. CT ABDOMEN PELVIS W WO CONTRAST Additional Contrast? None   Final Result   1. Bilateral ureteric stents in place. No hydronephrosis. 2. Amorphous periaortic retroperitoneal soft tissue consistent with   adenopathy of lymphoma. Similar density findings appear 20 circled the left   kidney and may be extension of the lymphadenopathy. Underlying more discrete   lymph nodes are partly obscured. Lymphadenopathy extends along the root of   the mesentery similar to prior. 3. Trace free fluid manifested by haziness and stranding in the peritoneal   fat mainly along the right flank. 4. Appendix normal.         XR CHEST PORTABLE   Final Result   Mild increased strandy densities in the medial right base, possibly related   to atelectasis versus developing infiltrate. LABS: All lab results were reviewed by myself, and all abnormals are listed below.   Labs Reviewed   CBC WITH AUTO DIFFERENTIAL - Abnormal; Notable for the following components:       Result Value    RBC 3.80 (*)     Hemoglobin 10.3 (*)     Hematocrit 31.3 (*)     RDW 20.0 (*)     Seg Neutrophils 71 (*)     Lymphocytes 20 (*)     Monocytes 9 (*)     All other components within normal limits   COMPREHENSIVE METABOLIC PANEL - Abnormal; Notable for the following components:    Glucose 139 (*)     Calcium 8.1 (*)     Potassium 3.6 (*)     Chloride 112 (*)     CO2 19 (*)     Alkaline Phosphatase 149 (*)     Total Protein 5.2 (*)     Albumin 2.9 (*) All other components within normal limits   MAGNESIUM - Abnormal; Notable for the following components:    Magnesium 1.5 (*)     All other components within normal limits   TROPONIN - Abnormal; Notable for the following components:    Troponin, High Sensitivity 42 (*)     All other components within normal limits   TROPONIN - Abnormal; Notable for the following components:    Troponin, High Sensitivity 43 (*)     All other components within normal limits   BLOOD GAS, VENOUS - Abnormal; Notable for the following components:    pCO2, Odin 36.2 (*)     pO2, Odin 84.3 (*)     HCO3, Venous 21.8 (*)     Negative Base Excess, Odin 3.1 (*)     O2 Sat, Odin 93.0 (*)     All other components within normal limits   URINALYSIS WITH REFLEX TO CULTURE - Abnormal; Notable for the following components:    Color, UA Orange (*)     Turbidity UA Cloudy (*)     Urine Hgb LARGE (*)     Protein, UA 3+ (*)     Leukocyte Esterase, Urine MOD (*)     All other components within normal limits   MICROSCOPIC URINALYSIS - Abnormal; Notable for the following components:    Bacteria, UA FEW (*)     All other components within normal limits   TROPONIN - Abnormal; Notable for the following components:    Troponin, High Sensitivity 57 (*)     All other components within normal limits   CBC - Abnormal; Notable for the following components:    RBC 3.70 (*)     Hemoglobin 10.1 (*)     Hematocrit 30.7 (*)     RDW 20.1 (*)     All other components within normal limits   BASIC METABOLIC PANEL - Abnormal; Notable for the following components:    Calcium 8.4 (*)     Chloride 111 (*)     All other components within normal limits   TROPONIN - Abnormal; Notable for the following components:    Troponin, High Sensitivity 60 (*)     All other components within normal limits   COVID-19 & INFLUENZA COMBO   CULTURE, URINE   MRSA DNA PROBE, NASAL   LACTIC ACID   PROTIME-INR   LIPASE   T4, FREE   TSH   MAGNESIUM   POC GLUCOSE FINGERSTICK       EMERGENCY DEPARTMENT COURSE: Vitals:    Vitals:    09/26/22 1841 09/27/22 0045 09/27/22 0300 09/27/22 0715   BP: (!) 167/66 (!) 181/77 (!) 180/65 (!) 163/62   Pulse: 76 70  69   Resp: 18 16  18   Temp: 97.7 °F (36.5 °C) 98 °F (36.7 °C)  98.5 °F (36.9 °C)   TempSrc: Oral Oral  Oral   SpO2: 94% 91%  92%   Weight:           The patient was given the following medications while in the emergency department:  Orders Placed This Encounter   Medications    0.9 % sodium chloride bolus    ondansetron (ZOFRAN) injection 4 mg    acetaminophen (TYLENOL) tablet 650 mg    cefTRIAXone (ROCEPHIN) 1,000 mg in sodium chloride 0.9 % 50 mL IVPB mini-bag     Order Specific Question:   Antimicrobial Indications     Answer:   Urinary Tract Infection    magnesium sulfate 2000 mg in water 50 mL IVPB    0.9 % sodium chloride bolus    iopamidol (ISOVUE-370) 76 % injection 75 mL    sodium chloride flush 0.9 % injection 10 mL    vancomycin (VANCOCIN) 2,000 mg in dextrose 5 % 500 mL IVPB     Order Specific Question:   Antimicrobial Indications     Answer:   Pneumonia (CAP)    metoprolol tartrate (LOPRESSOR) tablet 25 mg    sodium chloride flush 0.9 % injection 5-40 mL    sodium chloride flush 0.9 % injection 5-40 mL    0.9 % sodium chloride infusion    acetaminophen (TYLENOL) tablet 650 mg    OR Linked Order Group     ondansetron (ZOFRAN-ODT) disintegrating tablet 4 mg     ondansetron (ZOFRAN) injection 4 mg    DISCONTD: enoxaparin (LOVENOX) injection 40 mg     Order Specific Question:   Indication of Use     Answer:   Prophylaxis-DVT/PE    0.9 % sodium chloride infusion    apixaban (ELIQUIS) tablet 5 mg     Order Specific Question:   Indication of Use     Answer:   A Fib/A Flutter    albuterol sulfate HFA (PROVENTIL;VENTOLIN;PROAIR) 108 (90 Base) MCG/ACT inhaler 2 puff     Order Specific Question:   Initiate RT Bronchodilator Protocol     Answer:   Yes - Inpatient Protocol    DISCONTD: amLODIPine (NORVASC) tablet 10 mg    atorvastatin (LIPITOR) tablet 10 mg    carvedilol (COREG) tablet 12.5 mg    donepezil (ARICEPT) tablet 10 mg    FLUoxetine (PROZAC) capsule 20 mg    DISCONTD: fluticasone-salmeterol (ADVAIR) 100-50 MCG/DOSE diskus inhaler 1 puff    ibrutinib (IMBRUVICA) chemo tablet 420 mg    ipratropium-albuterol (DUONEB) nebulizer solution 3 mL     Order Specific Question:   Initiate RT Bronchodilator Protocol     Answer:   Yes - Inpatient Protocol    lactobacillus (CULTURELLE) capsule 1 capsule    levothyroxine (SYNTHROID) tablet 150 mcg    losartan (COZAAR) tablet 50 mg    megestrol (MEGACE) 40 MG/ML suspension 400 mg    therapeutic multivitamin-minerals 1 tablet    omeprazole (PRILOSEC) delayed release capsule 20 mg    oxyCODONE (OXYCONTIN) extended release tablet 10 mg    oxyCODONE (ROXICODONE) immediate release tablet 5 mg    DISCONTD: OXYGEN 2 L    sucralfate (CARAFATE) tablet 1 g    budesonide-formoterol (SYMBICORT) 80-4.5 MCG/ACT inhaler 2 puff    amLODIPine (NORVASC) tablet 10 mg     -------------------------  CRITICAL CARE:   CONSULTS: IP CONSULT TO PRIMARY CARE PROVIDER  IP CONSULT TO UROLOGY  IP CONSULT TO ONCOLOGY  PROCEDURES: Procedures     FINAL IMPRESSION      1. Acute cystitis without hematuria    2. Atrial fibrillation with RVR ([facility]    3. Pneumonia due to infectious organism, unspecified laterality, unspecified part of lung          DISPOSITION/PLAN   DISPOSITION Admitted 09/26/2022 01:46:05 PM      PATIENT REFERRED TO:  No follow-up provider specified.     DISCHARGE MEDICATIONS:  Current Discharge Medication List            Yvette Krishnamurthy MD  Attending Emergency Physician                    Yvette Krishnamurthy MD  09/27/22 0820

## 2022-09-27 LAB
ANION GAP SERPL CALCULATED.3IONS-SCNC: 9 MMOL/L (ref 9–17)
BUN BLDV-MCNC: 14 MG/DL (ref 8–23)
CALCIUM SERPL-MCNC: 8.4 MG/DL (ref 8.6–10.4)
CHLORIDE BLD-SCNC: 111 MMOL/L (ref 98–107)
CHOLESTEROL/HDL RATIO: 3.6
CHOLESTEROL: 90 MG/DL
CO2: 22 MMOL/L (ref 20–31)
CREAT SERPL-MCNC: 0.89 MG/DL (ref 0.7–1.2)
CULTURE: NO GROWTH
EKG ATRIAL RATE: 119 BPM
EKG Q-T INTERVAL: 312 MS
EKG QRS DURATION: 90 MS
EKG QTC CALCULATION (BAZETT): 462 MS
EKG R AXIS: 20 DEGREES
EKG T AXIS: 7 DEGREES
EKG VENTRICULAR RATE: 132 BPM
GFR AFRICAN AMERICAN: >60 ML/MIN
GFR NON-AFRICAN AMERICAN: >60 ML/MIN
GFR SERPL CREATININE-BSD FRML MDRD: ABNORMAL ML/MIN/{1.73_M2}
GLUCOSE BLD-MCNC: 100 MG/DL (ref 75–110)
GLUCOSE BLD-MCNC: 117 MG/DL (ref 75–110)
GLUCOSE BLD-MCNC: 125 MG/DL (ref 75–110)
GLUCOSE BLD-MCNC: 87 MG/DL (ref 70–99)
HCT VFR BLD CALC: 30.7 % (ref 41–53)
HDLC SERPL-MCNC: 25 MG/DL
HEMOGLOBIN: 10.1 G/DL (ref 13.5–17.5)
LDL CHOLESTEROL: 45 MG/DL (ref 0–130)
MAGNESIUM: 1.8 MG/DL (ref 1.6–2.6)
MCH RBC QN AUTO: 27.3 PG (ref 26–34)
MCHC RBC AUTO-ENTMCNC: 32.9 G/DL (ref 31–37)
MCV RBC AUTO: 82.9 FL (ref 80–100)
MRSA, DNA, NASAL: ABNORMAL
PDW BLD-RTO: 20.1 % (ref 11.5–14.9)
PLATELET # BLD: 175 K/UL (ref 150–450)
PMV BLD AUTO: 8.2 FL (ref 6–12)
POTASSIUM SERPL-SCNC: 3.9 MMOL/L (ref 3.7–5.3)
RBC # BLD: 3.7 M/UL (ref 4.5–5.9)
SODIUM BLD-SCNC: 142 MMOL/L (ref 135–144)
SPECIMEN DESCRIPTION: ABNORMAL
SPECIMEN DESCRIPTION: NORMAL
TRIGL SERPL-MCNC: 100 MG/DL
TROPONIN, HIGH SENSITIVITY: 60 NG/L (ref 0–22)
TSH SERPL DL<=0.05 MIU/L-ACNC: 4.65 UIU/ML (ref 0.3–5)
WBC # BLD: 6.6 K/UL (ref 3.5–11)

## 2022-09-27 PROCEDURE — 6370000000 HC RX 637 (ALT 250 FOR IP): Performed by: FAMILY MEDICINE

## 2022-09-27 PROCEDURE — 99223 1ST HOSP IP/OBS HIGH 75: CPT | Performed by: INTERNAL MEDICINE

## 2022-09-27 PROCEDURE — 97535 SELF CARE MNGMENT TRAINING: CPT

## 2022-09-27 PROCEDURE — 93005 ELECTROCARDIOGRAM TRACING: CPT | Performed by: INTERNAL MEDICINE

## 2022-09-27 PROCEDURE — 93010 ELECTROCARDIOGRAM REPORT: CPT | Performed by: INTERNAL MEDICINE

## 2022-09-27 PROCEDURE — 97166 OT EVAL MOD COMPLEX 45 MIN: CPT

## 2022-09-27 PROCEDURE — 84484 ASSAY OF TROPONIN QUANT: CPT

## 2022-09-27 PROCEDURE — 36415 COLL VENOUS BLD VENIPUNCTURE: CPT

## 2022-09-27 PROCEDURE — 2500000003 HC RX 250 WO HCPCS: Performed by: FAMILY MEDICINE

## 2022-09-27 PROCEDURE — 84443 ASSAY THYROID STIM HORMONE: CPT

## 2022-09-27 PROCEDURE — 80061 LIPID PANEL: CPT

## 2022-09-27 PROCEDURE — 94640 AIRWAY INHALATION TREATMENT: CPT

## 2022-09-27 PROCEDURE — 82947 ASSAY GLUCOSE BLOOD QUANT: CPT

## 2022-09-27 PROCEDURE — 94761 N-INVAS EAR/PLS OXIMETRY MLT: CPT

## 2022-09-27 PROCEDURE — 80048 BASIC METABOLIC PNL TOTAL CA: CPT

## 2022-09-27 PROCEDURE — 85027 COMPLETE CBC AUTOMATED: CPT

## 2022-09-27 PROCEDURE — 6360000002 HC RX W HCPCS: Performed by: FAMILY MEDICINE

## 2022-09-27 PROCEDURE — 83735 ASSAY OF MAGNESIUM: CPT

## 2022-09-27 PROCEDURE — 2580000003 HC RX 258: Performed by: FAMILY MEDICINE

## 2022-09-27 PROCEDURE — 2060000000 HC ICU INTERMEDIATE R&B

## 2022-09-27 PROCEDURE — 6370000000 HC RX 637 (ALT 250 FOR IP): Performed by: INTERNAL MEDICINE

## 2022-09-27 RX ORDER — AMLODIPINE BESYLATE 10 MG/1
10 TABLET ORAL DAILY
Status: DISCONTINUED | OUTPATIENT
Start: 2022-09-27 | End: 2022-09-30 | Stop reason: HOSPADM

## 2022-09-27 RX ORDER — LANOLIN ALCOHOL/MO/W.PET/CERES
400 CREAM (GRAM) TOPICAL DAILY
Status: DISCONTINUED | OUTPATIENT
Start: 2022-09-27 | End: 2022-09-30 | Stop reason: HOSPADM

## 2022-09-27 RX ORDER — LOSARTAN POTASSIUM 50 MG/1
50 TABLET ORAL DAILY
Status: DISCONTINUED | OUTPATIENT
Start: 2022-09-27 | End: 2022-09-30 | Stop reason: HOSPADM

## 2022-09-27 RX ADMIN — MAGNESIUM OXIDE TAB 400 MG (241.3 MG ELEMENTAL MG) 400 MG: 400 (241.3 MG) TAB at 10:10

## 2022-09-27 RX ADMIN — APIXABAN 5 MG: 5 TABLET, FILM COATED ORAL at 10:11

## 2022-09-27 RX ADMIN — LEVOTHYROXINE SODIUM 150 MCG: 0.07 TABLET ORAL at 05:43

## 2022-09-27 RX ADMIN — BUDESONIDE AND FORMOTEROL FUMARATE DIHYDRATE 2 PUFF: 80; 4.5 AEROSOL RESPIRATORY (INHALATION) at 19:54

## 2022-09-27 RX ADMIN — APIXABAN 5 MG: 5 TABLET, FILM COATED ORAL at 20:43

## 2022-09-27 RX ADMIN — OXYCODONE HYDROCHLORIDE 10 MG: 10 TABLET, FILM COATED, EXTENDED RELEASE ORAL at 00:08

## 2022-09-27 RX ADMIN — LOSARTAN POTASSIUM 50 MG: 50 TABLET, FILM COATED ORAL at 11:55

## 2022-09-27 RX ADMIN — SUCRALFATE 1 G: 1 TABLET ORAL at 10:10

## 2022-09-27 RX ADMIN — SODIUM CHLORIDE, PRESERVATIVE FREE 10 ML: 5 INJECTION INTRAVENOUS at 22:23

## 2022-09-27 RX ADMIN — ATORVASTATIN CALCIUM 10 MG: 10 TABLET, FILM COATED ORAL at 10:11

## 2022-09-27 RX ADMIN — SUCRALFATE 1 G: 1 TABLET ORAL at 20:43

## 2022-09-27 RX ADMIN — OMEPRAZOLE 20 MG: 20 CAPSULE, DELAYED RELEASE ORAL at 10:14

## 2022-09-27 RX ADMIN — SODIUM CHLORIDE: 9 INJECTION, SOLUTION INTRAVENOUS at 22:25

## 2022-09-27 RX ADMIN — OMEPRAZOLE 20 MG: 20 CAPSULE, DELAYED RELEASE ORAL at 22:23

## 2022-09-27 RX ADMIN — OXYCODONE HYDROCHLORIDE 5 MG: 5 TABLET ORAL at 17:19

## 2022-09-27 RX ADMIN — MEGESTROL ACETATE 400 MG: 40 SUSPENSION ORAL at 10:14

## 2022-09-27 RX ADMIN — SUCRALFATE 1 G: 1 TABLET ORAL at 12:50

## 2022-09-27 RX ADMIN — CARVEDILOL 12.5 MG: 12.5 TABLET, FILM COATED ORAL at 10:10

## 2022-09-27 RX ADMIN — OXYCODONE HYDROCHLORIDE 10 MG: 10 TABLET, FILM COATED, EXTENDED RELEASE ORAL at 11:55

## 2022-09-27 RX ADMIN — IPRATROPIUM BROMIDE AND ALBUTEROL SULFATE 3 ML: 2.5; .5 SOLUTION RESPIRATORY (INHALATION) at 08:05

## 2022-09-27 RX ADMIN — MULTIPLE VITAMINS W/ MINERALS TAB 1 TABLET: TAB at 10:11

## 2022-09-27 RX ADMIN — CARVEDILOL 12.5 MG: 12.5 TABLET, FILM COATED ORAL at 17:17

## 2022-09-27 RX ADMIN — SODIUM CHLORIDE, PRESERVATIVE FREE 10 ML: 5 INJECTION INTRAVENOUS at 10:13

## 2022-09-27 RX ADMIN — BUDESONIDE AND FORMOTEROL FUMARATE DIHYDRATE 2 PUFF: 80; 4.5 AEROSOL RESPIRATORY (INHALATION) at 08:13

## 2022-09-27 RX ADMIN — FLUOXETINE 20 MG: 20 CAPSULE ORAL at 10:11

## 2022-09-27 RX ADMIN — SUCRALFATE 1 G: 1 TABLET ORAL at 17:17

## 2022-09-27 RX ADMIN — AMLODIPINE BESYLATE 10 MG: 10 TABLET ORAL at 05:44

## 2022-09-27 RX ADMIN — DONEPEZIL HYDROCHLORIDE 10 MG: 10 TABLET ORAL at 10:11

## 2022-09-27 RX ADMIN — SODIUM CHLORIDE: 9 INJECTION, SOLUTION INTRAVENOUS at 10:24

## 2022-09-27 RX ADMIN — CEFTRIAXONE SODIUM 1000 MG: 1 INJECTION, POWDER, FOR SOLUTION INTRAMUSCULAR; INTRAVENOUS at 10:30

## 2022-09-27 RX ADMIN — ONDANSETRON 4 MG: 4 TABLET, ORALLY DISINTEGRATING ORAL at 10:11

## 2022-09-27 RX ADMIN — DOXYCYCLINE 100 MG: 100 INJECTION, POWDER, LYOPHILIZED, FOR SOLUTION INTRAVENOUS at 17:16

## 2022-09-27 RX ADMIN — Medication 1 CAPSULE: at 10:10

## 2022-09-27 ASSESSMENT — PAIN SCALES - GENERAL
PAINLEVEL_OUTOF10: 6
PAINLEVEL_OUTOF10: 5
PAINLEVEL_OUTOF10: 4
PAINLEVEL_OUTOF10: 7
PAINLEVEL_OUTOF10: 6

## 2022-09-27 ASSESSMENT — PULMONARY FUNCTION TESTS
PEFR_L/MIN: 96
PEFR_L/MIN: 96

## 2022-09-27 ASSESSMENT — PAIN DESCRIPTION - LOCATION
LOCATION: ABDOMEN

## 2022-09-27 ASSESSMENT — PAIN DESCRIPTION - DESCRIPTORS
DESCRIPTORS: ACHING

## 2022-09-27 ASSESSMENT — PAIN DESCRIPTION - ORIENTATION
ORIENTATION: MID;LOWER;UPPER
ORIENTATION: MID;LOWER;UPPER
ORIENTATION: MID;LEFT;RIGHT
ORIENTATION: MID;LEFT;RIGHT

## 2022-09-27 NOTE — CARE COORDINATION
DISCHARGE PLANNING NOTE:    Spoke with Susi from [facility] regarding spouse's request for additional visits at home from [facility]. [facility] states that [facility] only allows so many visits so they would not be able to provide any more visits. Left message for pt's spouse, Sarika, regarding this.     Electronically signed by Catia Rosario RN on 9/27/2022 at 11:08 AM

## 2022-09-27 NOTE — CONSULTS
[facility] PHYSICIANS CARDIOLOGY CONSULT NOTE      Date of Admission:  9/26/2022    Date of Consultation:  9/27/2022    PCP:  Riki Goodman, DO      REASON FOR CONSULT: Elevated Troponin, ? Afib RVR? HISTORY OF PRESENT ILLNESS:  Shawn Ro is a 74 y.o. male  With non-Hodgkin's lymphoma diagnosed 8 years ago, paroxysmal atrial fibrillation, hypertension, hyperlipidemia who presents with nausea and heart racing. No associated chest pain or shortness of breath. Found not to be in atrial fibrillation with rapid  ventricular rate. I do not have EKG and the records available for review. Patient was found to have minimally elevated high sensitivity troponin and we are asked to see in consultation. Denies any anginal chest pain or lightheadedness or leg swelling or bleeding or syncope. Please refer to admitting H&P and other provider's notes for further details. PMH:   has a past medical history of Arthritis, Cancer ([facility] [address] Chemotherapy management, encounter for, CHF (congestive heart failure) ([facility] [address] COPD (chronic obstructive pulmonary disease) ([facility] [address] Dementia ([facility] [address] Diabetes mellitus ([facility] [address] H/O cardiovascular stress test, History of non-Hodgkin's lymphoma, Hx of blood clots, Hyperlipidemia, Hypertension, Hypothyroidism, and On home O2. PSH:   has a past surgical history that includes hernia repair; IR BIOPSY ABDOMINAL/RETROPERITONEAL MASS PERCUTANEOUS (7/6/2021); IR PORT PLACEMENT > 5 YEARS (8/31/2021); Colonoscopy (Left, 9/24/2021); Cystoscopy (Right, 10/6/2021); Cholecystectomy, laparoscopic (N/A, 10/9/2021); Tonsillectomy; Cystoscopy (Bilateral, 11/24/2021); Cystoscopy (Bilateral, 2/16/2022); eye surgery; Carotid endarterectomy (Left, 6/28/2022); and Cystoscopy (N/A, 7/20/2022). Allergies:  No Known Allergies     Home Meds:    Prior to Admission medications    Medication Sig Start Date End Date Taking?  Authorizing Provider   amLODIPine (NORVASC) 5 MG tablet Take 10 mg by mouth daily   Yes Historical Provider, MD   carvedilol (COREG) 6.25 MG tablet Take 12.5 mg by mouth 2 times daily (with meals)   Yes Historical Provider, MD   oxyCODONE (ROXICODONE) 5 MG immediate release tablet Take 5 mg by mouth every 4 hours as needed for Pain. Last dispensed 8/2 for 30 days   Yes Historical Provider, MD   oxyCODONE (OXYCONTIN) 10 MG extended release tablet Take 10 mg by mouth in the morning and 10 mg in the evening. Yes Historical Provider, MD   ferrous sulfate (FE TABS 325) 325 (65 Fe) MG EC tablet Take 1 tablet by mouth 3 times daily (with meals) 9/7/22   Roderick Metcalf,    ibrutinib (IMBRUVICA) 420 MG tablet Take 1 tablet by mouth See Admin Instructions 2 days on, then 1 day off-per Dr Santiago Simons 9/7/22  Patient taking differently: Take 420 mg by mouth See Admin Instructions 2 days on, then 1 day off-per Dr Santiago Simons 9/7/22  Day off is today (9/26). Needs dose 9/27 9/7/22 10/7/22  Titi Mathew MD   naloxone 4 MG/0.1ML LIQD nasal spray 1 spray by Nasal route as needed for Opioid Reversal 9/6/22 10/6/22  Roderick Metcalf DO   atorvastatin (LIPITOR) 10 MG tablet Take 10 mg by mouth daily    Historical Provider, MD   prochlorperazine (COMPAZINE) 10 MG tablet Take 1 tablet by mouth every 6 hours as needed (nausea vomiting) 7/26/22 9/6/22  Titi Mathew MD   megestrol (MEGACE) 40 MG/ML suspension Take 10 mLs by mouth in the morning. 7/25/22   Titi Mathew MD   apixaban (ELIQUIS) 5 MG TABS tablet Take 1 tablet by mouth in the morning and 1 tablet before bedtime. 7/21/22   Roderick Metcalf DO   sucralfate (CARAFATE) 1 GM tablet Take 1 tablet by mouth in the morning and 1 tablet at noon and 1 tablet in the evening and 1 tablet before bedtime. 7/21/22   Roderick Metcalf DO   glipiZIDE (GLUCOTROL) 5 MG tablet Take 0.5 tablets by mouth in the morning and 0.5 tablets in the evening. Take before meals.   Patient not taking: Reported on 8/12/2022 7/21/22 8/13/22  Júnior Tarango DO Lactobacillus (PROBIOTIC ACIDOPHILUS) CAPS Take 1 caplet by mouth daily    Historical Provider, MD   Multiple Vitamins-Minerals (THERAPEUTIC MULTIVITAMIN-MINERALS) tablet Take 1 tablet by mouth daily    Historical Provider, MD   OXYGEN Inhale 2 L into the lungs at bedtime Nightly & during the day.     Historical Provider, MD   ipratropium-albuterol (DUONEB) 0.5-2.5 (3) MG/3ML SOLN nebulizer solution Inhale 3 mLs into the lungs every 4 hours as needed for Shortness of Breath 2/20/22   Rachell Bolanoso APRN - CNP   levothyroxine (SYNTHROID) 150 MCG tablet Take 1 tablet by mouth Daily 2/2/22   Sharon Cubaene, DO   fluticasone-salmeterol (ADVAIR) 100-50 MCG/DOSE diskus inhaler Inhale 1 puff into the lungs every 12 hours 2/1/22   Brent Ulloa, DO   albuterol sulfate HFA (PROVENTIL;VENTOLIN;PROAIR) 108 (90 Base) MCG/ACT inhaler Inhale 2 puffs into the lungs every 4 hours as needed for Wheezing     Historical Provider, MD   losartan (COZAAR) 50 MG tablet Take 1 tablet by mouth daily 10/12/21   Roderick T Dixon, DO   donepezil (ARICEPT) 10 MG tablet Take 10 mg by mouth daily  7/11/21   Historical Provider, MD   FLUoxetine (PROZAC) 20 MG capsule Take 20 mg by mouth daily    Historical Provider, MD   omeprazole (PRILOSEC) 20 MG delayed release capsule Take 20 mg by mouth 2 times daily    Historical Provider, MD        [facility] Meds:    Current Facility-Administered Medications   Medication Dose Route Frequency Provider Last Rate Last Admin    amLODIPine (NORVASC) tablet 10 mg  10 mg Oral Daily Roderick T Dixon, DO   10 mg at 09/27/22 0544    sodium chloride flush 0.9 % injection 10 mL  10 mL IntraVENous PRN Faby Hector MD   10 mL at 09/26/22 1100    sodium chloride flush 0.9 % injection 5-40 mL  5-40 mL IntraVENous 2 times per day Roderick T Dixon, DO        sodium chloride flush 0.9 % injection 5-40 mL  5-40 mL IntraVENous PRN Roderick T Dixon, DO        0.9 % sodium chloride infusion   IntraVENous PRN Roderick T Dixon, DO        acetaminophen (TYLENOL) tablet 650 mg  650 mg Oral Q4H PRN Roderick T Dixon, DO        ondansetron (ZOFRAN-ODT) disintegrating tablet 4 mg  4 mg Oral Q8H PRN Roderick T Dixon, DO        Or    ondansetron (ZOFRAN) injection 4 mg  4 mg IntraVENous Q6H PRN Roderick T Dixon, DO        0.9 % sodium chloride infusion   IntraVENous Continuous Roderick T Dixon,  mL/hr at 09/26/22 1834 New Bag at 09/26/22 1834    apixaban (ELIQUIS) tablet 5 mg  5 mg Oral BID Roderick T Dixon, DO   5 mg at 09/26/22 1836    albuterol sulfate HFA (PROVENTIL;VENTOLIN;PROAIR) 108 (90 Base) MCG/ACT inhaler 2 puff  2 puff Inhalation Q4H PRN Roderick T Dixon, DO        atorvastatin (LIPITOR) tablet 10 mg  10 mg Oral Daily Roderick T Dixon, DO        carvedilol (COREG) tablet 12.5 mg  12.5 mg Oral BID WC Roderick T Dixon, DO        donepezil (ARICEPT) tablet 10 mg  10 mg Oral Daily Roderick T Dixon, DO        FLUoxetine (PROZAC) capsule 20 mg  20 mg Oral Daily Roderick T Dixon, DO        ibrutinib (IMBRUVICA) chemo tablet 420 mg  420 mg Oral See Admin Instructions Roderick T Dixon, DO        ipratropium-albuterol (DUONEB) nebulizer solution 3 mL  1 vial Inhalation Q4H PRN Vijay French, DO   3 mL at 09/27/22 0805    lactobacillus (CULTURELLE) capsule 1 capsule  1 capsule Oral Daily Roderick T Dixon, DO        levothyroxine (SYNTHROID) tablet 150 mcg  150 mcg Oral Daily Roderick T Dixon, DO   150 mcg at 09/27/22 0543    losartan (COZAAR) tablet 50 mg  50 mg Oral Daily Roderick T Dixon, DO        megestrol (MEGACE) 40 MG/ML suspension 400 mg  400 mg Oral Daily Roderick T Dixon, DO        therapeutic multivitamin-minerals 1 tablet  1 tablet Oral Daily Roderick T Dixon, DO        omeprazole (PRILOSEC) delayed release capsule 20 mg  20 mg Oral BID Roderick T Dixon, DO        oxyCODONE (OXYCONTIN) extended release tablet 10 mg  10 mg Oral Q12H Roderick T Dixon, DO   10 mg at 09/27/22 0008    oxyCODONE (ROXICODONE) immediate release tablet 5 mg  5 mg Oral Q4H PRN Roderick T Dixon, DO   5 mg at 22 1934    sucralfate (CARAFATE) tablet 1 g  1 g Oral 4x Daily Roderick NAVEEN Kingstoneh, DO        budesonide-formoterol (SYMBICORT) 80-4.5 MCG/ACT inhaler 2 puff  2 puff Inhalation BID Archie Low, DO   2 puff at 22 0813       Social History:    Social History     Socioeconomic History    Marital status:      Spouse name: None    Number of children: None    Years of education: None    Highest education level: None   Tobacco Use    Smoking status: Former     Packs/day: 2.00     Years: 45.00     Pack years: 90.00     Types: Cigarettes     Quit date: 2006     Years since quittin.7    Smokeless tobacco: Never   Vaping Use    Vaping Use: Never used   Substance and Sexual Activity    Alcohol use: Yes     Alcohol/week: 1.7 standard drinks     Types: 2 Standard drinks or equivalent per week     Comment: occassional    Drug use: Yes     Types: Marijuana Vinceell Goldberg)     Comment:  CBD gummies no smoking       Family History:    Family History   Problem Relation Age of Onset    Diabetes Mother     Alzheimer's Disease Father     Heart Disease Brother     Heart Disease Brother        Review of Systems:      Constitutional: no weight loss or gain, no fever or chills. Eyes: No new visual changes. ENT: No new hearing loss. Cardiovascular: see HPI. Respiratory: No cough. No hemoptysis. Gastrointestinal: No abdominal pain, no blood in stools. Genitourinary: No hematuria or increased frequency. Musculoskeletal:  No new gait disturbance. Integumentary: No rash or pruritis. Neurological: No headache. No seizures or recent CVA/TIA. Psychiatric: No anxiety or depression. Hematologic/Lymphatic: No abnormal bruising or bleeding. Allergic/Immunologic: No new allergies. Physical Exam   Vital Signs: BP (!) 163/62   Pulse 95   Temp 98.5 °F (36.9 °C) (Oral)   Resp 16   Wt 173 lb (78.5 kg)   SpO2 96%   BMI 24.82 kg/m²        Admission Weight: 173 lb (78.5 kg)     Sinus rhythm on Telemetry.     General appearance: Awake, Alert, well developed, well nourished, pleasant. Cooperative. Laying in bed comfortably. Head: Normocephalic, atraumatic. Neck: no JVD, no carotid bruits, no thyromegaly. Chest: Clear bilaterally. No chest wall tenderness. No wheezing. Cardiac: Regular rate and rhythm, no S3 or S4 gallop, no murmur or rubs or clicks. Abdomen: Soft, non-tender. Extremities: no cyanosis or clubbing or leg edema. No calf tenderness. Pulses:  Bilaterally symmetrical and intact radial pulses. Neurologic:  Able to move all 4 extremities. Skin:  No rashes. Warm and dry.       EKG by report:  No official EKG available for my review at the time of my rounding -     Atrial fibrillation with rapid ventricular response with premature ventricular or aberrantly conducted complexes   Inferior infarct (cited on or before 29-JAN-2005)   Abnormal ECG       Labs:      CBC:   Recent Labs     09/26/22  0855 09/27/22  0531   WBC 7.5 6.6   HGB 10.3* 10.1*   HCT 31.3* 30.7*   MCV 82.2 82.9    175     BMP:   Recent Labs     09/26/22  0855 09/27/22  0531    142   K 3.6* 3.9   * 111*   CO2 19* 22   BUN 19 14   CREATININE 0.82 0.89     PT/INR:   Recent Labs     09/26/22  0855   PROTIME 14.4   INR 1.1       MAG:   Recent Labs     09/26/22  0855 09/27/22  0531   MG 1.5* 1.8       Recent Results (from the past 24 hour(s))   Blood Gas, Venous    Collection Time: 09/26/22  8:51 AM   Result Value Ref Range    pH, Odin 7.389 7.320 - 7.420    pCO2, Odin 36.2 (L) 39.0 - 55.0 mm Hg    pO2, Odin 84.3 (H) 30.0 - 50.0 mm Hg    HCO3, Venous 21.8 (L) 24.0 - 30.0 mmol/L    Negative Base Excess, Odin 3.1 (H) 0.0 - 2.0 mmol/L    O2 Sat, Odin 93.0 (H) 60.0 - 85.0 %    Carboxyhemoglobin 2.8 0 - 5 %    Methemoglobin 0.8 0.0 - 1.9 %    Pt Temp 37     O2 Device/Flow/% ROOM AIR    CBC with Auto Differential    Collection Time: 09/26/22  8:55 AM   Result Value Ref Range    WBC 7.5 3.5 - 11.0 k/uL    RBC 3.80 (L) 4.5 - Atrial Rate 119 BPM    QRS Duration 90 ms    Q-T Interval 312 ms    QTc Calculation (Bazett) 462 ms    R Axis 20 degrees    T Axis 7 degrees   Urinalysis with Reflex to Culture    Collection Time: 09/26/22  9:31 AM    Specimen: Urine   Result Value Ref Range    Color, UA Orange (A) Yellow    Turbidity UA Cloudy (A) Clear    Glucose, Ur NEGATIVE NEGATIVE    Bilirubin Urine NEGATIVE NEGATIVE    Ketones, Urine NEGATIVE NEGATIVE    Specific [place], UA 1.015 1.000 - 1.030    Urine Hgb LARGE (A) NEGATIVE    pH, UA 6.5 5.0 - 8.0    Protein, UA 3+ (A) NEGATIVE    Urobilinogen, Urine Normal Normal    Nitrite, Urine NEGATIVE NEGATIVE    Leukocyte Esterase, Urine MOD (A) NEGATIVE   Microscopic Urinalysis    Collection Time: 09/26/22  9:31 AM   Result Value Ref Range    WBC, UA 10 TO 20 /HPF    RBC, UA 51  /HPF    Epithelial Cells UA 0 TO 2 /HPF    Bacteria, UA FEW (A) None   COVID-19 & Influenza Combo    Collection Time: 09/26/22  9:36 AM    Specimen: Nasopharyngeal Swab   Result Value Ref Range    Specimen Description . NASOPHARYNGEAL SWAB     Source . NASOPHARYNGEAL SWAB     SARS-CoV-2 RNA, RT PCR Not Detected Not Detected    INFLUENZA A Not Detected Not Detected    INFLUENZA B Not Detected Not Detected   Culture, Urine    Collection Time: 09/26/22 10:10 AM    Specimen: Urine   Result Value Ref Range    Specimen Description . URINE     Culture NO GROWTH    Troponin    Collection Time: 09/26/22 11:17 AM   Result Value Ref Range    Troponin, High Sensitivity 43 (H) 0 - 22 ng/L   T4, Free    Collection Time: 09/26/22 11:17 AM   Result Value Ref Range    Thyroxine, Free 1.33 0.93 - 1.70 ng/dL   TSH    Collection Time: 09/26/22 11:17 AM   Result Value Ref Range    TSH 2.83 0.30 - 5.00 uIU/mL   Troponin    Collection Time: 09/26/22  9:08 PM   Result Value Ref Range    Troponin, High Sensitivity 57 (HH) 0 - 22 ng/L   POC Glucose Fingerstick    Collection Time: 09/26/22 10:01 PM   Result Value Ref Range    POC Glucose 104 75 - 110 mg/dL   CBC    Collection Time: 09/27/22  5:31 AM   Result Value Ref Range    WBC 6.6 3.5 - 11.0 k/uL    RBC 3.70 (L) 4.5 - 5.9 m/uL    Hemoglobin 10.1 (L) 13.5 - 17.5 g/dL    Hematocrit 30.7 (L) 41 - 53 %    MCV 82.9 80 - 100 fL    MCH 27.3 26 - 34 pg    MCHC 32.9 31 - 37 g/dL    RDW 20.1 (H) 11.5 - 14.9 %    Platelets 494 360 - 939 k/uL    MPV 8.2 6.0 - 12.0 fL   Magnesium    Collection Time: 09/27/22  5:31 AM   Result Value Ref Range    Magnesium 1.8 1.6 - 2.6 mg/dL   Basic Metabolic Panel    Collection Time: 09/27/22  5:31 AM   Result Value Ref Range    Glucose 87 70 - 99 mg/dL    BUN 14 8 - 23 mg/dL    Creatinine 0.89 0.70 - 1.20 mg/dL    Calcium 8.4 (L) 8.6 - 10.4 mg/dL    Sodium 142 135 - 144 mmol/L    Potassium 3.9 3.7 - 5.3 mmol/L    Chloride 111 (H) 98 - 107 mmol/L    CO2 22 20 - 31 mmol/L    Anion Gap 9 9 - 17 mmol/L    GFR Non-African American >60 >60 mL/min    GFR African American >60 >60 mL/min    GFR Comment         Troponin    Collection Time: 09/27/22  5:31 AM   Result Value Ref Range    Troponin, High Sensitivity 60 (HH) 0 - 22 ng/L         Nuclear Stress test June 2022:    Impression       Perfusion:  Perfusion defects at stress involve 5% of left ventricular   myocardium, with minimal reperfusion. Function:  Mild hypokinesis inferior wall. RCA distribution. Risk stratification: INTERMEDIATE         2 D ECHOCARDIOGRAM June 2022:    Summary  Limited echo with contrast performed per order. Left ventricle is normal in size. Calculated EF via Herman's method 64% with global L. strain of -19.6%. Moderate left ventricular hypertrophy. No significant pericardial effusion is seen. 2 D Echocardiogram Jan 2022:    Summary  Normal LV size and wall thickness. No obvious wall motion abnormality seen. Normal LV systolic function with LVEF >55%. Normal RV size and function.  RV systolic pressure 44 mmHg  LA appears moderately dilated  No obvious significant structural valvular abnormality noted. Mild MR and TR  Normal aortic root dimension. No significant pericardial effusion noted. IVC appears dilated, impaired inspiratory variation indicating elevated RA  filling pressure. IMPRESSION:    Non specific minimally elevated hs Troponin peak 60 due to Atrial fibrillation with RVR. Doubt any acute MI . Can not exclude any underlying ASCAD. No active angina pectoris. Paroxysmal Atrial fibrillation with RVR and intermittent palpitations. Normal LVEF. Abnormal EKG. Essential Hypertension. Non Hodgkin's Lymphoma. Other problems as charted. REC/PLAN:    As ordered. Continue on all current cardiac medications including Eliquis, Coreg, Amlodipine, Atorvastatin, Losartan, Telemetric monitoring, other supportive care. No plans for any further cardiac work up at this time. Will follow. Discussed with Dr Caryl Barroso and the charge nurse. Thank you. Electronically signed by Vic Claros MD, [facility]      PLEASE NOTE:  This progress note was completed using a voice transcription system. Every effort was made to ensure accuracy. However, inadvertent computerized transcription errors may be present.

## 2022-09-27 NOTE — PROGRESS NOTES
09/27/22 1417   Encounter Summary   Encounter Overview/Reason  Spiritual/Emotional Needs   Service Provided For: Patient   Referral/Consult From: Rounding   Last Encounter  09/27/22   Complexity of Encounter Low   Spiritual/Emotional needs   Type Spiritual Support   Assessment/Intervention/Outcome   Assessment Calm;Coping   Intervention Active listening;Prayer (assurance of)/Orr;Sustaining Presence/Ministry of presence   Outcome Coping;Engaged in conversation;Receptive

## 2022-09-27 NOTE — PROGRESS NOTES
[address]    Occupational Therapy Evaluation  Date: 22  Patient Name: Fela Acosta       Room: 5824/1214-24  MRN: [id number]  Account: [de-identified]   : 1950  (69 y.o.) Gender: male     Discharge Recommendations:  Further Occupational Therapy is recommended upon facility discharge. Referring Practitioner: Rubén Rockwell DO  Diagnosis: Atrial fibrillation with RVR Additional Pertinent Hx: Copied from chart: The patient is a 74 y.o.  male who is admitted to the hospital for abdominal pain. Patient is known to us with history of non-Hodgkin's lymphoma, recently relapsed. He has been started on Imbruvica with essentially stable disease. Upon emergency room evaluation, the patient was found to be in atrial fibrillation with RVR. He was managed conservatively and now his heart rhythm is back in sinus with frequent PVCs/PACs. Patient is following with cardiology for congestive heart failure. He has multiple comorbidities. Patient is seen and evaluated. Continues to complain of lower abdominal pain. Urology was consulted for ureteric obstruction and the need for stent. He has an underlying diabetes, cardiovascular disease, dementia, hypertension, hyperlipidemia and hypothyroidism. Overall performance status is ECOG 2    Treatment Diagnosis: impaired self care status    Past Medical History:  has a past medical history of Arthritis, Cancer ([address] Chemotherapy management, encounter for, CHF (congestive heart failure) ([address] COPD (chronic obstructive pulmonary disease) ([address] Dementia ([address] Diabetes mellitus ([address] H/O cardiovascular stress test, History of non-Hodgkin's lymphoma, Hx of blood clots, Hyperlipidemia, Hypertension, Hypothyroidism, and On home O2. Past Surgical History:   has a past surgical history that includes hernia repair; IR BIOPSY ABDOMINAL/RETROPERITONEAL MASS PERCUTANEOUS (2021);  IR PORT PLACEMENT > 5 YEARS (8/31/2021); Colonoscopy (Left, 9/24/2021); Cystoscopy (Right, 10/6/2021); Cholecystectomy, laparoscopic (N/A, 10/9/2021); Tonsillectomy; Cystoscopy (Bilateral, 11/24/2021); Cystoscopy (Bilateral, 2/16/2022); eye surgery; Carotid endarterectomy (Left, 6/28/2022); and Cystoscopy (N/A, 7/20/2022). Restrictions  Restrictions/Precautions  Restrictions/Precautions: Fall Risk;General Precautions; Up as Tolerated  Required Braces or Orthoses?: No      Vitals  Vitals  Heart Rate: 80  Heart Rate Source: Monitor  BP: (!) 172/78  BP Location: Left upper arm  MAP (Calculated): 109.33  Resp: 18  SpO2: 94 %  O2 Device: None (Room air)     Subjective  Subjective: \"I guess I can try since we're going to be up moving\" pt reports in response to using the bathroom. Comments: RN Adelso Salazar ok'd pt for OT eval this date. Pt agreeable to participate and pleasant/cooperative throughout.   Subjective  Pain: pt denies pain at this time and states \"I only feel nauseous\"      Social/Functional History  Social/Functional History  Lives With: Spouse (cousin)  Type of Home: House  Home Layout: One level  Home Access: Ramped entrance, Stairs to enter with rails  Entrance Stairs - Number of Steps: 1  Entrance Stairs - Rails: Left  Bathroom Shower/Tub: Shower chair without back, Tub/Shower unit, Doors  Bathroom Toilet: Handicap height (sink next to toilet to assist with transfers)  [employer]: Grab bars in shower, Hand-held shower (suction cup grab bar)  Bathroom Accessibility: Accessible  Home Equipment: Cane, Rollator, Wheelchair-manual, Oxygen, Reacher  Has the patient had two or more falls in the past year or any fall with injury in the past year?: Yes (pt reports 4 falls within the last year, feeling weaker than usual)  ADL Assistance: [address] [place] [address] Avenue: Independent (wife/cousin are primary)  Ambulation Assistance: Independent (using rollator at home and in community)  Transfer Assistance: Independent  Active : Yes  Mode of Transportation: Car  Occupation: Retired  Type of Occupation: supervisor at [employer]  Leisure & Hobbies: going to [employer], watching TV  IADL Comments: pt reports sleeping in recliner chair most of the time; pt reports occasionally sleeping in a flat bed  Additional Comments: Pt's wife and cousin are available to assist 24/7      Objective  Cognition  Orientation  Orientation Level: Oriented to person, Oriented to place, Disoriented to time, Oriented to situation (pt reports year is 2024)  Cognition  Overall Cognitive Status: WFL  Cognition Comment: pt required 1-2 verbal cues for RW safety/safety awareness this date   Sensation  Overall Sensation Status: Impaired (pt reports chronic neuropathy in B feet)    Activities of Daily Living  ADL  Feeding: Setup  Grooming: Setup  Grooming Skilled Clinical Factors: standing sinkside to complete hand hygiene  UE Bathing: Stand by assistance  LE Bathing: Contact guard assistance  UE Dressing: Stand by assistance  LE Dressing: Contact guard assistance  LE Dressing Skilled Clinical Factors: pt able to don/doff B socks utilizing figure 4 technique  Toileting: Contact guard assistance  Toileting Skilled Clinical Factors: standing at toilet to urinate; pt able to pull up/down pants while standing with CGA  Additional Comments: All ADLs based on skilled observation and clinical reasoning unless otherwise noted. Pt is currently limited by strength, nausea, balance, and safety awareness impacting performance in self care tasks.          UE Function  LUE AROM (degrees)  LUE AROM : WFL  Left Hand AROM (degrees)  Left Hand AROM: WFL  Tone LUE  LUE Tone: Normotonic  LUE Strength  Gross LUE Strength: WFL  L Hand General: 3+/5  LUE Strength Comment: grossly 3+/5    RUE AROM (degrees)  RUE AROM : WFL  Right Hand AROM (degrees)  Right Hand AROM: WFL  Tone RUE  RUE Tone: Normotonic  RUE Strength  Gross RUE Strength: WFL  R Hand General: 3+/5  RUE Strength Comment: grossly 3+/5         Fine Motor Skills/Coordination  Coordination  Movements Are Fluid And Coordinated: No  Coordination and Movement Description: Fine motor impairments, Right UE, Left UE                Mobility  Bed Mobility  Bed mobility  Supine to Sit: Stand by assistance  Sit to Supine: Stand by assistance  Scooting: Stand by assistance  Bed Mobility Comments: no use of bed rails, HOB elevated ~20 degrees    Balance  Balance  Sitting Balance: Stand by assistance (static and dynamic sitting EOB ~2-3 minutes, no LOB)  Standing Balance: Contact guard assistance  Standing Balance  Time: 4-5 minutes  Activity: functional mobility, functional transfers, self care, toileting  Comment: utilizing RW for UE support    Transfers  Transfers  Sit to stand: Contact guard assistance  Stand to sit: Contact guard assistance  Transfer Comments: CGA for safety this date    Functional Mobility  Functional - Mobility Device: Rolling Walker  Activity: To/from bathroom  Assist Level: Contact guard assistance  Functional Mobility Comments: CGA for safety due to limited endurance    Assessment  Assessment  Performance deficits / Impairments: Decreased functional mobility , Decreased ADL status, Decreased strength, Decreased safe awareness, Decreased endurance, Decreased sensation, Decreased balance, Decreased high-level IADLs  Treatment Diagnosis: impaired self care status  Prognosis: Fair  Decision Making: Medium Complexity  Discharge Recommendations: Patient would benefit from continued therapy after discharge    Activity Tolerance  Activity Tolerance: Patient limited by fatigue, Patient Tolerated treatment well    Safety Devices  Type of Devices: Nurse notified, Left in bed, Bed alarm in place, Call light within reach (RN present in room upon exit)    Patient Education  Patient Education  Education Given To: Patient  Education Provided: Role of Therapy, Plan of Care, Transfer Training, Energy Conservation  Education Provided Comments: safety awareness training  Education Method: Verbal  Barriers to Learning: None  Education Outcome: Verbalized understanding, Demonstrated understanding      Functional Outcome Measures  AM-PAC Daily Activity Inpatient   How much help for putting on and taking off regular lower body clothing?: A Little  How much help for Bathing?: A Little  How much help for Toileting?: A Little  How much help for putting on and taking off regular upper body clothing?: A Little  How much help for taking care of personal grooming?: A Little  How much help for eating meals?: A Little  AM-[facility] Inpatient Daily Activity Raw Score: 18  AM-PAC Inpatient ADL T-Scale Score : 38.66  ADL Inpatient CMS 0-100% Score: 46.65  ADL Inpatient CMS G-Code Modifier : CK       Goals     Short Term Goals  Time Frame for Short term goals: By discharge  Short Term Goal 1: Pt will perform BADLs mod I, using appropriate adaptive strategies/equipment  Short Term Goal 2: Pt will perform transfers/functional mobility, mod I, using least restrictive device and Good safety  Short Term Goal 3: Pt will V/D use of energy conservation/work simplifcation techniques that are applicable to his daily routine  Short Term Goal 4: Pt will tolerate standing for 8+ minutes with SUP during functional task to increase endurance and strength needed for ADLs/IADLs  Short Term Goal 5: Pt will actively participate in 15+ minutes of therapeutic exercise/functional activity to promote safety and independence with self care and mobility  Short Term Goal 6: Pt will participate in BUE HEP to increase strength and endurance needed for ADLs/IADLs    Plan  Plan  Times per Week: 4-6  Current Treatment Recommendations: Strengthening, Balance training, Functional mobility training, Endurance training, Safety education & training, Patient/Caregiver education & training, Self-Care / ADL, Pain management      OT Individual Minutes  OT Individual Minutes  Time In: 1351  Time Out: 1416  Minutes: 25  Time Code Minutes   Timed Code Treatment Minutes: 8 Minutes        Electronically signed by Abbey Henderson OT on 9/27/22 at 3:50 PM EDT

## 2022-09-27 NOTE — PLAN OF CARE
Problem: Discharge Planning  Goal: Discharge to home or other facility with appropriate resources  9/27/2022 1656 by Devi Carl RN  Outcome: Progressing     Problem: Skin/Tissue Integrity  Goal: Absence of new skin breakdown  Description: 1. Monitor for areas of redness and/or skin breakdown  2. Assess vascular access sites hourly  3. Every 4-6 hours minimum:  Change oxygen saturation probe site  4. Every 4-6 hours:  If on nasal continuous positive airway pressure, respiratory therapy assess nares and determine need for appliance change or resting period. 9/27/2022 1656 by Devi Carl RN  Outcome: Progressing  Note: No presence of new skin breakdown throughout shift. Pt shifts own weight in the bed and calls out when assistance is needed. Pt heels maintained off bed with the foot of bed elevated. Pt understands reasoning for interventions to prevent skin breakdown. Problem: Safety - Adult  Goal: Free from fall injury  9/27/2022 1656 by Devi Carl RN  Outcome: Progressing  Note: Free from falls throughout shift. Pt is compliant with safety measures into place. Pt seen by care team every hour with purposefully hourly rounding, bed is in the lowest position, call light and personal belongings within reach. Two side rails are up and bed alarm is on. Pt calls out appropriately. Problem: Pain  Goal: Verbalizes/displays adequate comfort level or baseline comfort level  9/27/2022 1656 by Devi Carl RN  Outcome: Progressing  Note: Patient complaints of pain 6 out of 10. Pain medication given as prescribed. Patient states pain decreased. Problem: ABCDS Injury Assessment  Goal: Absence of physical injury  Outcome: Progressing  Note: No presence of new injury throughout shift.

## 2022-09-27 NOTE — DISCHARGE INSTR - COC
Continuity of Care Form    Patient Name: Arland Opitz   :    MRN:  [id number]    Admit date:  2022  Discharge date:  2022    Code Status Order: Full Code   Advance Directives:     Admitting Physician:  Caitlin Anna DO  PCP: Rody Prater DO    Discharging Nurse: [facility] Unit/Room#: 2094/2094-01  Discharging Unit Phone Number: [phone]    Emergency Contact:   Extended Emergency Contact Information  Primary Emergency Contact: [place], [address] [facility] of [address] Phone: [phone]  Mobile Phone: [phone]  Relation: Spouse  Secondary Emergency Contact: Sumaya Levine  Address:  [address] [facility] [address] [facility] of [address] Phone: [phone]  Mobile Phone: [phone]  Relation: Child   needed?  No    Past Surgical History:  Past Surgical History:   Procedure Laterality Date    CAROTID ENDARTERECTOMY Left 2022    LEFT TYPE I EVERSION LEFT CAROTID ENDARTERECTOMY RE-IMPLANTATION LEFT ICA performed by Marquez Eli MD at [facility], LAPAROSCOPIC N/A 10/9/2021    CHOLECYSTECTOMY LAPAROSCOPIC ROBOTIC XI performed by Faina Alberto MD at [address] [facility] [address] [place],[address] Left 2021    COLONOSCOPY POLYPECTOMY SNARE/COLD BIOPSY performed by Riana Marshall MD at . [address] Right 10/6/2021    CYSTOSCOPY PYELOGRAM URETERAL STENT INSERTION performed by Amara Watson MD at . [address] Bilateral 2021    CYSTOSCOPY URETERAL [address] performed by Amara Watson MD at . [address] Bilateral 2022    CYSTOSCOPY WITH BILATERAL STENT EXCHANGE performed by Amara Watson MD at [facility] 2022    CYSTOSCOPY BILATERAL STENT EXCHANGE performed by Amara Watson MD at [facility]      IR BIOPSY ABDOMINAL MASS  2021    IR BIOPSY ABDOMINAL MASS 7/6/2021 [facility] SPECIAL PROCEDURES    IR PORT PLACEMENT EQUAL OR GREATER THAN 5 YEARS  8/31/2021    IR PORT PLACEMENT EQUAL OR GREATER THAN 5 YEARS 8/31/2021 [facility] SPECIAL PROCEDURES    TONSILLECTOMY      as child       Immunization History:   Immunization History   Administered Date(s) Administered    Influenza, FLUARIX, FLULAVAL, Deborrah Block (age 10 mo+) AND AFLURIA, (age 1 y+), PF, 0.5mL 10/12/2021    Influenza, Triv, inactivated, subunit, adjuvanted, IM (Fluad 65 yrs and older) 09/28/2019       Active Problems:  Patient Active Problem List   Diagnosis Code    NHL (non-Hodgkin's lymphoma) [facility] C85.90    Traumatic retroperitoneal hematoma S36.892A    Acute kidney injury (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 E11.9    Hypertensive urgency I16.0    Acute respiratory failure with hypoxia (HCC) J96.01    Hypoxia R09.02    Carotid stenosis, asymptomatic, bilateral I65.23    Simple chronic bronchitis (HCC) J41.0    Dependence on nocturnal oxygen therapy Z99.81    Pneumonia J18.9    LAURO (acute kidney injury) ([facility] N17.9    Failure to thrive in adult R62.7    DVT (deep venous thrombosis) (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    Atrial fibrillation with RVR ([address] I48.91       Isolation/Infection:   Isolation            No Isolation          Patient Infection Status       Infection Onset Added Last Indicated Last Indicated By Review Planned Expiration Resolved Resolved By    MRSA 09/26/22 09/27/22 09/26/22 MRSA DNA Probe, Nasal        Resolved    COVID-19 (Rule Out) 09/26/22 09/26/22 09/26/22 COVID-19 & Influenza Combo (Ordered)   09/26/22 Rule-Out Test Resulted    COVID-19 (Rule Out) 08/31/22 08/31/22 08/31/22 COVID-19, Rapid (Ordered)   08/31/22 Rule-Out Test Resulted    COVID-19 (Rule Out) 02/16/22 02/16/22 02/16/22 COVID-19 & Influenza Combo (Ordered)   02/17/22 Rule-Out Test Resulted    COVID-19 (Rule Out) 01/28/22 01/28/22 01/28/22 COVID-19, Rapid (Ordered)   01/28/22 Rule-Out Test Resulted            Nurse Assessment:  Last Vital Signs: BP (!) 169/72   Pulse 79   Temp 98.5 °F (36.9 °C) (Oral)   Resp 16   Wt 173 lb (78.5 kg)   SpO2 96%   BMI 24.82 kg/m²     Last documented pain score (0-10 scale): Pain Level: 6  Last Weight:   Wt Readings from Last 1 Encounters:   09/26/22 173 lb (78.5 kg)     Mental Status:  oriented and alert    IV Access:  - None    Nursing Mobility/ADLs:  Walking   Independent  Transfer  Independent  Bathing  Independent  Dressing  Independent  Toileting  Independent  Feeding  Independent  Med Admin  Independent  Med Delivery   whole and prefers mixed with applesauce    Wound Care Documentation and Therapy:        Elimination:  Continence: Bowel: No  Bladder: No  Urinary Catheter: None   Colostomy/Ileostomy/Ileal Conduit: No       Date of Last BM: 9/30/2022    Intake/Output Summary (Last 24 hours) at 9/27/2022 1143  Last data filed at 9/26/2022 2000  Gross per 24 hour   Intake 121 ml   Output 225 ml   Net -104 ml     I/O last 3 completed shifts: In: 121 [P.O.:121]  Out: 225 [Urine:225]    Safety Concerns: At Risk for Falls    Impairments/Disabilities:      None    Nutrition Therapy:  Current Nutrition Therapy:   - Oral Diet:  General and Low Sodium (2gm)    Routes of Feeding: Oral  Liquids: No Restrictions  Daily Fluid Restriction: no  Last Modified Barium Swallow with Video (Video Swallowing Test): not done    Treatments at the Time of Hospital Discharge:   Respiratory Treatments: see MAR  Oxygen Therapy:  is on oxygen at 2 L/min per nasal cannula.  German Fail)  Ventilator:    - No ventilator support    Rehab Therapies: Physical Therapy and Occupational Therapy  Weight Bearing Status/Restrictions: No weight bearing restrictions  Other Medical Equipment (for information only, NOT a DME order): Other Treatments: skilled nursing assessment, medication education and monitoring, resume previous orders, Palliative Care consult    Patient's personal belongings (please select all that are sent with patient):  None    RN SIGNATURE:  Electronically signed by Shai Gutierrez RN on 9/30/22 at 1:23 PM EDT    CASE MANAGEMENT/SOCIAL WORK SECTION    Inpatient Status Date: 9/26/22    Readmission Risk Assessment Score:  Readmission Risk              Risk of Unplanned Readmission:  39           Discharging to Facility/ . [address]  [place] [address]  Phone [phone]  Fax  [phone]      Dialysis Facility (if applicable)   Name:  Address:  Dialysis Schedule:  Phone:  Fax:    / signature: Electronically signed by Hannah Fontana RN on 9/27/22 at 11:43 AM EDT    PHYSICIAN SECTION    Prognosis: Fair    Condition at Discharge: Stable    Rehab Potential (if transferring to Rehab): Fair    Recommended Labs or Other Treatments After Discharge: cbcd bmp in 7 days    Physician Certification: I certify the above information and transfer of Werner Pink  is necessary for the continuing treatment of the diagnosis listed and that he requires Home Care for greater 30 days.      Update Admission H&P: No change in H&P    PHYSICIAN SIGNATURE:  Electronically signed by Mariana Ngo DO on 9/30/22 at 8:25 AM EDT

## 2022-09-27 NOTE — PLAN OF CARE
Problem: Discharge Planning  Goal: Discharge to home or other facility with appropriate resources  9/27/2022 0552 by Shanna Daily RN  Outcome: Progressing     Problem: Skin/Tissue Integrity  Goal: Absence of new skin breakdown  Description: 1. Monitor for areas of redness and/or skin breakdown  2. Assess vascular access sites hourly  3. Every 4-6 hours minimum:  Change oxygen saturation probe site  4. Every 4-6 hours:  If on nasal continuous positive airway pressure, respiratory therapy assess nares and determine need for appliance change or resting period.   9/27/2022 0552 by Shanna Daily RN  Outcome: Progressing     Problem: Safety - Adult  Goal: Free from fall injury  9/27/2022 0552 by Shanna Daily RN  Outcome: Progressing     Problem: Pain  Goal: Verbalizes/displays adequate comfort level or baseline comfort level  9/27/2022 0552 by Shanna Daily RN  Outcome: Progressing

## 2022-09-27 NOTE — PROGRESS NOTES
[address]   OCCUPATIONAL THERAPY MISSED TREATMENT NOTE   INPATIENT   Date: 22  Patient Name: Jg David       Room: 6280/3830-33  MRN: [id number]   Account #: [de-identified]    : 1950  (69 y.o.)  Gender: male     REASON FOR MISSED TREATMENT:  Patient refusal   -   pt stating \"I feel horrible\".  OT will continue to follow and will re-attempt PM.     Lisa Haddad OT

## 2022-09-27 NOTE — PROGRESS NOTES
Patient complaints of nausea despite zofran being given at 10:11am. Writer gave patient hot tea. Patient still complaints of nausea. Writer put page out to Dr. Nadeem Kaur. Waiting for response.

## 2022-09-27 NOTE — CONSULTS
Today's Date: 9/27/2022  Patient Name: Neha Lechuga  Date of admission: 9/26/2022  8:16 AM  Patient's age: 74 y.o., 1950  Admission Dx: Atrial fibrillation with RVR ([address] [I48.91]  Acute cystitis without hematuria [N30.00]  Pneumonia due to infectious organism, unspecified laterality, unspecified part of lung [J18.9]    Reason for Consult: management recommendations  Requesting Physician: Pepper Drake DO    CHIEF COMPLAINT: Non-Hodgkin's lymphoma, atrial fibrillation    History Obtained From:  patient    HISTORY OF PRESENT ILLNESS:      The patient is a 74 y.o.  male who is admitted to the hospital for abdominal pain. Patient is known to us with history of non-Hodgkin's lymphoma, recently relapsed. He has been started on Imbruvica with essentially stable disease. Upon emergency room evaluation, the patient was found to be in atrial fibrillation with RVR. He was managed conservatively and now his heart rhythm is back in sinus with frequent PVCs/PACs. Patient is following with cardiology for congestive heart failure. He has multiple comorbidities. Patient is seen and evaluated. Continues to complain of lower abdominal pain. Urology was consulted for ureteric obstruction and the need for stent. He has an underlying diabetes, cardiovascular disease, dementia, hypertension, hyperlipidemia and hypothyroidism. Overall performance status is ECOG 2    Past Medical History:   has a past medical history of Arthritis, Cancer ([address] Chemotherapy management, encounter for, CHF (congestive heart failure) ([address] COPD (chronic obstructive pulmonary disease) ([address] Dementia ([address] Diabetes mellitus ([address] H/O cardiovascular stress test, History of non-Hodgkin's lymphoma, Hx of blood clots, Hyperlipidemia, Hypertension, Hypothyroidism, and On home O2. Past Surgical History:   has a past surgical history that includes hernia repair; IR BIOPSY ABDOMINAL/RETROPERITONEAL MASS PERCUTANEOUS (7/6/2021);  IR PORT PLACEMENT > 5 YEARS (8/31/2021); Colonoscopy (Left, 9/24/2021); Cystoscopy (Right, 10/6/2021); Cholecystectomy, laparoscopic (N/A, 10/9/2021); Tonsillectomy; Cystoscopy (Bilateral, 11/24/2021); Cystoscopy (Bilateral, 2/16/2022); eye surgery; Carotid endarterectomy (Left, 6/28/2022); and Cystoscopy (N/A, 7/20/2022). Medications:    Reviewed in Epic     Allergies:  Patient has no known allergies. Social History:   reports that he quit smoking about 16 years ago. His smoking use included cigarettes. He has a 90.00 pack-year smoking history. He has never used smokeless tobacco. He reports current alcohol use of about 1.7 standard drinks per week. He reports current drug use. Drug: Marijuana Aniyah Cotton). Family History: family history includes Alzheimer's Disease in his father; Diabetes in his mother; Heart Disease in his brother and brother. REVIEW OF SYSTEMS:    Constitutional: No fever or chills. Forgetful, could not give much history. Most information obtained from the chart. He denies any weight loss. He has lower abdominal pain  Eyes: No eye discharge, double vision, or eye pain   HEENT: negative for sore mouth, sore throat, hoarseness and voice change   Respiratory: negative for cough , sputum, dyspnea, wheezing, hemoptysis, chest pain   Cardiovascular: negative for chest pain, dyspnea, palpitations, orthopnea, PND   Gastrointestinal: negative for nausea, vomiting, diarrhea, constipation, positive for lower abdominal pain, no  Dysphagia, hematemesis and hematochezia   Genitourinary: Dysuria but no hematuria, frequency  Integument: negative for rash, skin lesions, bruises.    Hematologic/Lymphatic: negative for easy bruising, bleeding, lymphadenopathy, or petechiae   Endocrine: negative for heat or cold intolerance,weight changes, change in bowel habits and hair loss   Musculoskeletal: negative for myalgias, arthralgias, pain, joint swelling,and bone pain   Neurological: negative for headaches, dizziness, seizures, weakness, numbness    PHYSICAL EXAM:      BP (!) 172/78   Pulse 80   Temp 98.3 °F (36.8 °C) (Oral)   Resp 18   Wt 173 lb (78.5 kg)   SpO2 94%   BMI 24.82 kg/m²    Temp (24hrs), Av.1 °F (36.7 °C), Min:97.7 °F (36.5 °C), Max:98.5 °F (36.9 °C)    General appearance -ill-appearing elderly man who appears her stated age. He is forgetful and could not give me much history. Most information obtained from the chart.   Mental status - alert and cooperative   Eyes - pupils equal and reactive, extraocular eye movements intact   Ears - bilateral TM's and external ear canals normal   Mouth - mucous membranes moist, pharynx normal without lesions   Neck - supple, no significant adenopathy   Lymphatics - no palpable lymphadenopathy, no hepatosplenomegaly   Chest - clear to auscultation, no wheezes, rales or rhonchi, symmetric air entry   Heart -heart examination is irregular but the monitor shows sinus rhythm with frequent PVCs  Abdomen - soft,  mildly distended, mild diffuse tenderness, no hepatosplenomegaly appreciated  Neurological - alert, oriented, normal speech, no focal findings or movement disorder noted   Musculoskeletal - no joint tenderness, deformity or swelling   Extremities - peripheral pulses normal, no pedal edema, no clubbing or cyanosis   Skin - normal coloration and turgor, no rashes, no suspicious skin lesions noted ,    DATA:    Labs:   CBC:   Recent Labs     22  0855 22  0531   WBC 7.5 6.6   HGB 10.3* 10.1*   HCT 31.3* 30.7*    175     BMP:   Recent Labs     22  0855 22  0531    142   K 3.6* 3.9   CO2 19* 22   BUN 19 14   CREATININE 0.82 0.89   LABGLOM >60 >60   GLUCOSE 139* 87     PT/INR:   Recent Labs     22  0855   PROTIME 14.4   INR 1.1       IMAGING DATA:      Primary Problem  Atrial fibrillation with RVR [facility])    Active Hospital Problems    Diagnosis Date Noted    Atrial fibrillation with RVR ([facility] [I48.91] 2022 Priority: Medium         IMPRESSION:   Atrial fibrillation, reverted to sinus rhythm  Non-Hodgkin's lymphoma, relapsed on Imbruvica  Retroperitoneal/root of mesentery adenopathy  Urinary obstruction, requiring stent    RECOMMENDATIONS:  I had a very long discussion with patient, he has essentially stable disease with Imbruvica. Ibrutinib has been known to cause atrial fibrillation about 12% of the patients. However the patient is out of atrial fibrillation and back to sinus rhythm. At this point, I will not agree with supportive care, electrolyte replacement, we appreciate cardiology consultation, try to keep potassium above 4 and magnesium above 2  If the patient goes back to atrial fibrillation, then we have to stop the Imbruvica and consider a different medication  The patient verbalized understanding and agreement  Okay with placement to SNF    Discussed with patient and Nurse. Thank you for asking us to see this patient.     HONG PLUMMER [facility] MD Darlene  Hematologist/Medical Oncologist  Cell: [phone]

## 2022-09-27 NOTE — CARE COORDINATION
KEILA spoke with Sarika patients spouse about discharge plans. KEILA explained to patient that [facility] could retry to submit for authorization for patient to admit to facility. Sarika stated, Melly Ruiz is a hard one\" and then wanted to know if [facility] can come out more than once a week. Sarika reported that she has adequate support for patient at home, and would prefer if he returned home with spouse. KEILA is no longer following patient since he has a discharge plan home. KEILA informed case management of the updates for discharge plans.  Electronically signed by PRISCILLA Moss on 9/27/2022 at 10:39 AM

## 2022-09-27 NOTE — CARE COORDINATION
CASE MANAGEMENT NOTE:    Admission Date:  9/26/2022 Ulysses Chapman is a 74 y.o.  male    Admitted for : Atrial fibrillation with RVR ([address] [I48.91]  Acute cystitis without hematuria [N30.00]  Pneumonia due to infectious organism, unspecified laterality, unspecified part of lung [J18.9]    Met with:  Patient    PCP:  Dr. Jennifer Rodrigez:  [employer]      Is patient alert and oriented at time of discussion:  Yes    Current Residence/ Living Arrangements:  independently at home with wife, Sarika           Current Services PTA:  Follows at [facility] with Dr. Klever Camejo for [address] Does patient go to outpatient dialysis: No  If yes, location and chair time: n/a  Who is their nephrologist? N/a    Is patient agreeable to [facility]: Yes    Freedom of choice provided:  Yes    List of [address] provided: No, already current with [facility] and satisfied with their care    [facility] chosen:  [facility] with referral faxed to notify of admission. Notified Susi from [facility] as well. DME:  cane, w/c, grab bars, rollator, glucometer    Home Oxygen: Yes 2L NC at HS    Nebulizer: Yes    CPAP/BIPAP: No    Supplier: [facility]    Potential Assistance Needed: resume [facility], could possibly benefit from Palliative Care consult    SNF needed: No    Freedom of choice and list provided: NA    Pharmacy:  [facility] in [place]       Is patient currently receiving oral anticoagulation therapy? Yes - Eliquis    Is the Patient an [facility] with Readmission Risk Score greater than 14%? Yes  If yes, pt needs a follow up appointment made within 7 days.     Family Members/Caregivers that pt would like involved in their care:    Yes    If yes, list name here:  [address]    Transportation Provider:  Patient and Family             Discharge Plan:  home with [facility]                 Electronically signed by: Chai Pedersen RN on 9/27/2022 at 11:43 AM

## 2022-09-27 NOTE — PROGRESS NOTES
Latest Reference Range & Units 9/26/22 21:08   Troponin, High Sensitivity 0 - 22 ng/L 57 (HH)   (HH): Data is critically high  Dr. Devin Morelos paged to notify of critical lab.

## 2022-09-27 NOTE — CARE COORDINATION
KEILA spoke with Paige Drew from [facility] about patient arriving at hospital.     Patient was discharged from [facility] on 09/22-09/23 due to an insurance cut. Family would have to be agreeable to continue to bring in patients chemo medication, and facility could attempt to submit for another authorization. KEILA will discuss with family and patient.  Electronically signed by PRISCILLA Izquierdo on 9/27/2022 at 9:07 AM

## 2022-09-27 NOTE — CONSULTS
Michael Frederick  Urology Consultation    Patient:  Roxy Santana  MRN: [id number]  YOB: 1950    CHIEF COMPLAINT:  follow-up bilateral ureteral obstruction, requiring ureteral stents    HISTORY OF PRESENT ILLNESS:   The patient is a 74 y.o. male who presents with abdominal pain and shortness of breath the patient has a history of B-cell lymphoma with bilateral ureteral compression leading to hydronephrosis    This has required bilateral ureteral stent placement, the stents are being exchange periodically until resolution of the ureteral compression and improvement of the malignancy the patient is aware of this above plan of care for urology    Patient's old records, notes and chart reviewed and summarized above. Past Medical History:    Past Medical History:   Diagnosis Date    Arthritis     Cancer [facility])     chemotherapy lymphoma last was april 2022    Chemotherapy management, encounter for     CHF (congestive heart failure) ([facility]     COPD (chronic obstructive pulmonary disease) ([facility]     Dementia ([facility]     Diabetes mellitus ([facility]     H/O cardiovascular stress test     History of non-Hodgkin's lymphoma     Hx of blood clots     DVT in right leg     Hyperlipidemia     Hypertension     Hypothyroidism     On home O2     at 5 liters per nasal cannula 24 hrs. per day.         Past Surgical History:    Past Surgical History:   Procedure Laterality Date    CAROTID ENDARTERECTOMY Left 6/28/2022    LEFT TYPE I EVERSION LEFT CAROTID ENDARTERECTOMY RE-IMPLANTATION LEFT ICA performed by Suzanne Calle MD at [address] [place] [address], LAPAROSCOPIC N/A 10/9/2021    CHOLECYSTECTOMY LAPAROSCOPIC ROBOTIC XI performed by Gerald Morrow MD at [address] [place] [address] Left 9/24/2021    COLONOSCOPY POLYPECTOMY SNARE/COLD BIOPSY performed by Myron Goldstein MD at [address] [place] [address] Right 10/6/2021    CYSTOSCOPY PYELOGRAM URETERAL STENT INSERTION performed by Kenn Quintana MD at [address] [place] [address] 11/24/2021    CYSTOSCOPY URETERAL STENT INSERTION RIGHT EXCHANGE & RETROGRADE PYELOGRAM LEFT INSERTION performed by Svitlana Celaya MD at [address] Bilateral 2/16/2022    CYSTOSCOPY WITH BILATERAL STENT EXCHANGE performed by Svitlana Celaya MD at [address] N/A 7/20/2022    CYSTOSCOPY BILATERAL STENT EXCHANGE performed by Svitlana Celaya MD at [address]      IR BIOPSY ABDOMINAL MASS  7/6/2021    IR BIOPSY ABDOMINAL MASS 7/6/2021 [facility]    IR PORT PLACEMENT EQUAL OR GREATER THAN 5 YEARS  8/31/2021    IR PORT PLACEMENT EQUAL OR GREATER THAN 5 YEARS 8/31/2021 [facility]    TONSILLECTOMY      as child     Previous  surgery: none     Medications:    Scheduled Meds:   sodium chloride flush  5-40 mL IntraVENous 2 times per day    apixaban  5 mg Oral BID    [START ON 9/27/2022] amLODIPine  10 mg Oral Daily    [START ON 9/27/2022] atorvastatin  10 mg Oral Daily    [START ON 9/27/2022] carvedilol  12.5 mg Oral BID WC    [START ON 9/27/2022] donepezil  10 mg Oral Daily    [START ON 9/27/2022] FLUoxetine  20 mg Oral Daily    [START ON 9/27/2022] ibrutinib  420 mg Oral See Admin Instructions    [START ON 9/27/2022] lactobacillus  1 capsule Oral Daily    [START ON 9/27/2022] levothyroxine  150 mcg Oral Daily    [START ON 9/27/2022] losartan  50 mg Oral Daily    [START ON 9/27/2022] megestrol  400 mg Oral Daily    [START ON 9/27/2022] therapeutic multivitamin-minerals  1 tablet Oral Daily    [START ON 9/27/2022] omeprazole  20 mg Oral BID    [START ON 9/27/2022] oxyCODONE  10 mg Oral Q12H    [START ON 9/27/2022] sucralfate  1 g Oral 4x Daily    [START ON 9/27/2022] budesonide-formoterol  2 puff Inhalation BID     Continuous Infusions:   sodium chloride      sodium chloride 100 mL/hr at 09/26/22 1834     PRN Meds:.sodium chloride flush, sodium chloride flush, sodium chloride, acetaminophen, ondansetron **OR** ondansetron, albuterol sulfate HFA, ipratropium-albuterol, oxyCODONE    Allergies:  Patient has no known allergies. Social History:    Social History     Socioeconomic History    Marital status:      Spouse name: Not on file    Number of children: Not on file    Years of education: Not on file    Highest education level: Not on file   Occupational History    Not on file   Tobacco Use    Smoking status: Former     Packs/day: 2.00     Years: 45.00     Pack years: 90.00     Types: Cigarettes     Quit date: 2006     Years since quittin.7    Smokeless tobacco: Never   Vaping Use    Vaping Use: Never used   Substance and Sexual Activity    Alcohol use:  Yes     Alcohol/week: 1.7 standard drinks     Types: 2 Standard drinks or equivalent per week     Comment: occassional    Drug use: Yes     Types: Marijuana Navi Radon)     Comment:  CBD gummies no smoking    Sexual activity: Not on file   Other Topics Concern    Not on file   Social History Narrative    Not on file     Social Determinants of Health     Financial Resource Strain: Not on file   Food Insecurity: Not on file   Transportation Needs: Not on file   Physical Activity: Not on file   Stress: Not on file   Social Connections: Not on file   Intimate Partner Violence: Not on file   Housing Stability: Not on file     Bilateral ureteral stent placement  Family History:    Family History   Problem Relation Age of Onset    Diabetes Mother     Alzheimer's Disease Father     Heart Disease Brother     Heart Disease Brother      Previous Urologic Family history: none    REVIEW OF SYSTEMS:  Constitutional: fatigue and poor appetite  Eyes: negative  Respiratory: shortness of breath  Cardiovascular: negative  Gastrointestinal: see history of present illness  Genitourinary: see HPI  Musculoskeletal: negative  Skin: negative   Neurological: negative  Hematological/Lymphatic: B-cell lymphoma, see history of present illness  Psychological: negative    Physical Exam:    This a 74 y.o. male Patient Vitals for the past 24 hrs:   BP Temp Temp src Pulse Resp SpO2 Weight   09/26/22 1841 (!) 167/66 97.7 °F (36.5 °C) Oral 76 18 94 % --   09/26/22 1445 (!) 168/77 98.1 °F (36.7 °C) Oral 63 18 96 % --   09/26/22 1355 (!) 142/59 -- -- 85 -- -- --   09/26/22 1230 107/74 -- -- (!) 129 18 -- --   09/26/22 1200 (!) 130/103 -- -- (!) 111 17 92 % --   09/26/22 1000 116/86 -- -- (!) 124 25 92 % --   09/26/22 0909 -- -- -- -- -- -- 173 lb (78.5 kg)   09/26/22 0900 (!) 129/99 100 °F (37.8 °C) Oral (!) 138 21 93 % --   09/26/22 0830 (!) 154/94 98 °F (36.7 °C) -- (!) 142 21 -- --     Constitutional: Patient in no acute distress; Neuro: alert and oriented to person place and time. Psych: Mood and affect normal.  Skin: Normal  Lungs: Respiratory effort normal  Cardiovascular:  Normal peripheral pulses  Abdomen: Soft, non-tender, non-distended with no CVA, flank pain, hepatosplenomegaly or hernia. Kidneys normal.  Bladder non-tender and not distended. Lymphatics: no palpable lymphadenopathy  Penis normal and circumcised  Urethral meatus normal  Scrotal exam normal  Testicles normal bilaterally  Epididymis normal bilaterally  No evidence of inguinal hernia  Anus and perineum normal  Normal rectal tone with no masses  Prostate soft, non-tender to palpation. No palpable nodules. Estimated 40 grams.    LABS:  Recent Labs     09/26/22  0855   WBC 7.5   HGB 10.3*   HCT 31.3*   MCV 82.2        Recent Labs     09/26/22  0855      K 3.6*   *   CO2 19*   BUN 19   CREATININE 0.82     Lab Results   Component Value Date    PSA 2.50 09/16/2021    PSA 2.1 09/16/2021    PSA 1.65 02/29/2016       Additional Lab/culture results:    Urinalysis:   Recent Labs     09/26/22  0931   COLORU [place]*   PHUR 6.5   WBCUA 10 TO 20   RBCUA 51    BACTERIA FEW*   SPECGRAV 1.015   LEUKOCYTESUR MOD*   UROBILINOGEN Normal   BILIRUBINUR NEGATIVE        -----------------------------------------------------------------  Imaging Results:. No splenomegaly right adrenal gland is   unremarkable. Left adrenal gland is not clearly visualized. May be obscured   by lymphadenopathy. No clear evidence for focal pancreatic lesion. Bilateral ureteric stents in place with proximal coil in in the kidneys. 17   mm proteinaceous right renal cyst in the posterior midpole and nonobstructing   3 mm calculus in the lower pole. Bilateral simple small cysts are noted. Symmetric enhancement of the kidneys     Pelvis: Urinary bladder grossly normal showing the distal ends of the   ureteric stents. No significant pelvic lymphadenopathy. Peritoneum/Retroperitoneum: Amorphous periaortic soft tissue with some   underlying nodular areas representing retroperitoneal lymphadenopathy   consistent with the history of lymphoma. The nodular components are partly   obscured for example 2.1 cm left periaortic series 4, image 39. Circumferential left perinephric soft tissue density continuous with the   retroperitoneal adenopathy felt to be extension of the disease. Component of   similar confluent adenopathy along the root of the mesentery about 3.4 x 7.2   cm similar to prior. There is right perinephric low-density stranding with right flank and lower   quadrant hazy peritoneal fat stranding which may consistent with trace free   fluid. Bones/Soft Tissues: Moderate aortic atherosclerotic calcifications. No   aggressive bony lesion.        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)    Hydronephrosis due to obstruction of ureter    Moderate malnutrition (HCC)    Chronic cholecystitis    Pleural effusion    Cardiomegaly    Hypothyroidism    Diabetes mellitus ([address]    Hypertensive urgency    Acute respiratory failure with hypoxia (HCC)    Hypoxia    Carotid stenosis, asymptomatic, bilateral    Simple chronic bronchitis (HCC)    Dependence on nocturnal oxygen therapy    Pneumonia    LAURO (acute kidney injury) ([address]    Failure to thrive in adult    DVT (deep venous thrombosis) (HCC)    Lymphoma, diffuse (HCC)    Severe malnutrition (HCC)    Acute cystitis with hematuria    Lymphoma of body of stomach (HCC)    Atrial fibrillation with RVR ([address]       Plan: the patient at the present time is stable from urology standpoint  Monitor for urinary tract infection, catheter associated UTIs with the stents  Monitor renal function in view of the obstruction and chemotherapy  monitored from urology standpoint we will continue with periodic stent exchange    Ray Odonnell MD  9:59 PM 9/26/2022

## 2022-09-27 NOTE — PROGRESS NOTES
Physical Therapy        Physical Therapy Cancel Note      DATE: 2022    NAME: Tanmay Vasquez  MRN: [id number]   : 1950      Patient not seen this date for Physical Therapy due to:    Patient Declined: pt states \" I feel horrible\".  Will attempt to see later today if time permits      Electronically signed by Nicole Gonzalez PT on 2022 at 10:05 AM

## 2022-09-28 ENCOUNTER — APPOINTMENT (OUTPATIENT)
Dept: GENERAL RADIOLOGY | Age: 72
DRG: 392 | End: 2022-09-28
Payer: MEDICARE

## 2022-09-28 LAB
ABSOLUTE EOS #: 0.2 K/UL (ref 0–0.4)
ABSOLUTE LYMPH #: 2 K/UL (ref 1–4.8)
ABSOLUTE MONO #: 0.7 K/UL (ref 0.1–1.3)
ANION GAP SERPL CALCULATED.3IONS-SCNC: 7 MMOL/L (ref 9–17)
BASOPHILS # BLD: 1 % (ref 0–2)
BASOPHILS ABSOLUTE: 0 K/UL (ref 0–0.2)
BUN BLDV-MCNC: 12 MG/DL (ref 8–23)
CALCIUM SERPL-MCNC: 8.2 MG/DL (ref 8.6–10.4)
CHLORIDE BLD-SCNC: 111 MMOL/L (ref 98–107)
CO2: 22 MMOL/L (ref 20–31)
CREAT SERPL-MCNC: 0.94 MG/DL (ref 0.7–1.2)
EKG ATRIAL RATE: 75 BPM
EKG P AXIS: 71 DEGREES
EKG P-R INTERVAL: 132 MS
EKG Q-T INTERVAL: 388 MS
EKG QRS DURATION: 88 MS
EKG QTC CALCULATION (BAZETT): 433 MS
EKG R AXIS: 45 DEGREES
EKG T AXIS: 58 DEGREES
EKG VENTRICULAR RATE: 75 BPM
EOSINOPHILS RELATIVE PERCENT: 2 % (ref 0–4)
GFR AFRICAN AMERICAN: >60 ML/MIN
GFR NON-AFRICAN AMERICAN: >60 ML/MIN
GFR SERPL CREATININE-BSD FRML MDRD: ABNORMAL ML/MIN/{1.73_M2}
GLUCOSE BLD-MCNC: 106 MG/DL (ref 75–110)
GLUCOSE BLD-MCNC: 114 MG/DL (ref 75–110)
GLUCOSE BLD-MCNC: 130 MG/DL (ref 75–110)
GLUCOSE BLD-MCNC: 74 MG/DL (ref 75–110)
GLUCOSE BLD-MCNC: 90 MG/DL (ref 70–99)
HCT VFR BLD CALC: 27.8 % (ref 41–53)
HEMOGLOBIN: 9.3 G/DL (ref 13.5–17.5)
LYMPHOCYTES # BLD: 31 % (ref 24–44)
MCH RBC QN AUTO: 27.5 PG (ref 26–34)
MCHC RBC AUTO-ENTMCNC: 33.3 G/DL (ref 31–37)
MCV RBC AUTO: 82.4 FL (ref 80–100)
MONOCYTES # BLD: 11 % (ref 1–7)
PDW BLD-RTO: 19.5 % (ref 11.5–14.9)
PLATELET # BLD: 147 K/UL (ref 150–450)
PMV BLD AUTO: 7.6 FL (ref 6–12)
POTASSIUM SERPL-SCNC: 4 MMOL/L (ref 3.7–5.3)
RBC # BLD: 3.37 M/UL (ref 4.5–5.9)
SEG NEUTROPHILS: 55 % (ref 36–66)
SEGMENTED NEUTROPHILS ABSOLUTE COUNT: 3.6 K/UL (ref 1.3–9.1)
SODIUM BLD-SCNC: 140 MMOL/L (ref 135–144)
TROPONIN, HIGH SENSITIVITY: 59 NG/L (ref 0–22)
WBC # BLD: 6.5 K/UL (ref 3.5–11)

## 2022-09-28 PROCEDURE — 2500000003 HC RX 250 WO HCPCS: Performed by: FAMILY MEDICINE

## 2022-09-28 PROCEDURE — 93010 ELECTROCARDIOGRAM REPORT: CPT | Performed by: INTERNAL MEDICINE

## 2022-09-28 PROCEDURE — 82947 ASSAY GLUCOSE BLOOD QUANT: CPT

## 2022-09-28 PROCEDURE — 6370000000 HC RX 637 (ALT 250 FOR IP): Performed by: FAMILY MEDICINE

## 2022-09-28 PROCEDURE — 97162 PT EVAL MOD COMPLEX 30 MIN: CPT

## 2022-09-28 PROCEDURE — 80048 BASIC METABOLIC PNL TOTAL CA: CPT

## 2022-09-28 PROCEDURE — 94640 AIRWAY INHALATION TREATMENT: CPT

## 2022-09-28 PROCEDURE — 6360000002 HC RX W HCPCS: Performed by: FAMILY MEDICINE

## 2022-09-28 PROCEDURE — 97116 GAIT TRAINING THERAPY: CPT

## 2022-09-28 PROCEDURE — 2580000003 HC RX 258: Performed by: FAMILY MEDICINE

## 2022-09-28 PROCEDURE — 94760 N-INVAS EAR/PLS OXIMETRY 1: CPT

## 2022-09-28 PROCEDURE — 2060000000 HC ICU INTERMEDIATE R&B

## 2022-09-28 PROCEDURE — 6370000000 HC RX 637 (ALT 250 FOR IP): Performed by: INTERNAL MEDICINE

## 2022-09-28 PROCEDURE — 97535 SELF CARE MNGMENT TRAINING: CPT

## 2022-09-28 PROCEDURE — 85025 COMPLETE CBC W/AUTO DIFF WBC: CPT

## 2022-09-28 PROCEDURE — 36415 COLL VENOUS BLD VENIPUNCTURE: CPT

## 2022-09-28 PROCEDURE — 99232 SBSQ HOSP IP/OBS MODERATE 35: CPT | Performed by: INTERNAL MEDICINE

## 2022-09-28 PROCEDURE — 84484 ASSAY OF TROPONIN QUANT: CPT

## 2022-09-28 PROCEDURE — 71045 X-RAY EXAM CHEST 1 VIEW: CPT

## 2022-09-28 RX ADMIN — MAGNESIUM OXIDE TAB 400 MG (241.3 MG ELEMENTAL MG) 400 MG: 400 (241.3 MG) TAB at 09:17

## 2022-09-28 RX ADMIN — IPRATROPIUM BROMIDE AND ALBUTEROL SULFATE 3 ML: 2.5; .5 SOLUTION RESPIRATORY (INHALATION) at 16:21

## 2022-09-28 RX ADMIN — LOSARTAN POTASSIUM 50 MG: 50 TABLET, FILM COATED ORAL at 09:18

## 2022-09-28 RX ADMIN — SUCRALFATE 1 G: 1 TABLET ORAL at 21:14

## 2022-09-28 RX ADMIN — CARVEDILOL 12.5 MG: 12.5 TABLET, FILM COATED ORAL at 09:17

## 2022-09-28 RX ADMIN — BUDESONIDE AND FORMOTEROL FUMARATE DIHYDRATE 2 PUFF: 80; 4.5 AEROSOL RESPIRATORY (INHALATION) at 07:28

## 2022-09-28 RX ADMIN — LEVOTHYROXINE SODIUM 150 MCG: 0.07 TABLET ORAL at 05:21

## 2022-09-28 RX ADMIN — MEGESTROL ACETATE 400 MG: 40 SUSPENSION ORAL at 09:14

## 2022-09-28 RX ADMIN — FLUOXETINE 20 MG: 20 CAPSULE ORAL at 09:18

## 2022-09-28 RX ADMIN — CARVEDILOL 12.5 MG: 12.5 TABLET, FILM COATED ORAL at 16:05

## 2022-09-28 RX ADMIN — OXYCODONE HYDROCHLORIDE 5 MG: 5 TABLET ORAL at 09:49

## 2022-09-28 RX ADMIN — IPRATROPIUM BROMIDE AND ALBUTEROL SULFATE 3 ML: 2.5; .5 SOLUTION RESPIRATORY (INHALATION) at 19:55

## 2022-09-28 RX ADMIN — OXYCODONE HYDROCHLORIDE 5 MG: 5 TABLET ORAL at 05:23

## 2022-09-28 RX ADMIN — OXYCODONE HYDROCHLORIDE 10 MG: 10 TABLET, FILM COATED, EXTENDED RELEASE ORAL at 12:10

## 2022-09-28 RX ADMIN — BUDESONIDE AND FORMOTEROL FUMARATE DIHYDRATE 2 PUFF: 80; 4.5 AEROSOL RESPIRATORY (INHALATION) at 19:49

## 2022-09-28 RX ADMIN — Medication 1 CAPSULE: at 09:17

## 2022-09-28 RX ADMIN — ATORVASTATIN CALCIUM 10 MG: 10 TABLET, FILM COATED ORAL at 09:17

## 2022-09-28 RX ADMIN — MUPIROCIN: 20 OINTMENT TOPICAL at 09:54

## 2022-09-28 RX ADMIN — OXYCODONE HYDROCHLORIDE 10 MG: 10 TABLET, FILM COATED, EXTENDED RELEASE ORAL at 01:51

## 2022-09-28 RX ADMIN — OXYCODONE HYDROCHLORIDE 10 MG: 10 TABLET, FILM COATED, EXTENDED RELEASE ORAL at 23:39

## 2022-09-28 RX ADMIN — SODIUM CHLORIDE: 9 INJECTION, SOLUTION INTRAVENOUS at 19:36

## 2022-09-28 RX ADMIN — SUCRALFATE 1 G: 1 TABLET ORAL at 12:10

## 2022-09-28 RX ADMIN — OMEPRAZOLE 20 MG: 20 CAPSULE, DELAYED RELEASE ORAL at 09:16

## 2022-09-28 RX ADMIN — CEFTRIAXONE SODIUM 1000 MG: 1 INJECTION, POWDER, FOR SOLUTION INTRAMUSCULAR; INTRAVENOUS at 09:24

## 2022-09-28 RX ADMIN — SUCRALFATE 1 G: 1 TABLET ORAL at 09:18

## 2022-09-28 RX ADMIN — DOXYCYCLINE 100 MG: 100 INJECTION, POWDER, LYOPHILIZED, FOR SOLUTION INTRAVENOUS at 15:16

## 2022-09-28 RX ADMIN — DOXYCYCLINE 100 MG: 100 INJECTION, POWDER, LYOPHILIZED, FOR SOLUTION INTRAVENOUS at 03:12

## 2022-09-28 RX ADMIN — MUPIROCIN: 20 OINTMENT TOPICAL at 21:14

## 2022-09-28 RX ADMIN — SUCRALFATE 1 G: 1 TABLET ORAL at 16:05

## 2022-09-28 RX ADMIN — SODIUM CHLORIDE: 9 INJECTION, SOLUTION INTRAVENOUS at 05:27

## 2022-09-28 RX ADMIN — AMLODIPINE BESYLATE 10 MG: 10 TABLET ORAL at 09:18

## 2022-09-28 RX ADMIN — IPRATROPIUM BROMIDE AND ALBUTEROL SULFATE 3 ML: 2.5; .5 SOLUTION RESPIRATORY (INHALATION) at 13:13

## 2022-09-28 RX ADMIN — DONEPEZIL HYDROCHLORIDE 10 MG: 10 TABLET ORAL at 09:17

## 2022-09-28 RX ADMIN — OMEPRAZOLE 20 MG: 20 CAPSULE, DELAYED RELEASE ORAL at 21:14

## 2022-09-28 RX ADMIN — IPRATROPIUM BROMIDE AND ALBUTEROL SULFATE 3 ML: 2.5; .5 SOLUTION RESPIRATORY (INHALATION) at 07:30

## 2022-09-28 RX ADMIN — MULTIPLE VITAMINS W/ MINERALS TAB 1 TABLET: TAB at 09:17

## 2022-09-28 ASSESSMENT — PAIN - FUNCTIONAL ASSESSMENT
PAIN_FUNCTIONAL_ASSESSMENT: ACTIVITIES ARE NOT PREVENTED
PAIN_FUNCTIONAL_ASSESSMENT: ACTIVITIES ARE NOT PREVENTED

## 2022-09-28 ASSESSMENT — PAIN SCALES - GENERAL
PAINLEVEL_OUTOF10: 4
PAINLEVEL_OUTOF10: 5
PAINLEVEL_OUTOF10: 5
PAINLEVEL_OUTOF10: 6
PAINLEVEL_OUTOF10: 3
PAINLEVEL_OUTOF10: 6
PAINLEVEL_OUTOF10: 6
PAINLEVEL_OUTOF10: 5
PAINLEVEL_OUTOF10: 5
PAINLEVEL_OUTOF10: 4

## 2022-09-28 ASSESSMENT — PAIN DESCRIPTION - DESCRIPTORS
DESCRIPTORS: SHARP
DESCRIPTORS: ACHING;CRUSHING
DESCRIPTORS: SHOOTING
DESCRIPTORS: ACHING
DESCRIPTORS: SHARP
DESCRIPTORS: STABBING
DESCRIPTORS: SHOOTING

## 2022-09-28 ASSESSMENT — PAIN DESCRIPTION - ORIENTATION
ORIENTATION: LOWER
ORIENTATION: RIGHT;ANTERIOR
ORIENTATION: LOWER
ORIENTATION: LOWER
ORIENTATION: RIGHT
ORIENTATION: LOWER
ORIENTATION: LOWER
ORIENTATION: ANTERIOR;MID;RIGHT

## 2022-09-28 ASSESSMENT — PAIN DESCRIPTION - LOCATION
LOCATION: ABDOMEN

## 2022-09-28 ASSESSMENT — PAIN DESCRIPTION - PAIN TYPE
TYPE: CHRONIC PAIN
TYPE: CHRONIC PAIN

## 2022-09-28 NOTE — CARE COORDINATION
ONGOING DISCHARGE PLAN:    Patient is alert and oriented x4. Spoke with patient regarding discharge plan and patient confirms that plan is still to discharge to home with [facility]    Continue hydration, electrolyte replacement   Surgery consult     EGD tomorrow     On IV fluids     On IV atb     CXR today       Will continue to follow for additional discharge needs.     Electronically signed by Gabo Corea RN on 9/28/2022 at 2:40 PM

## 2022-09-28 NOTE — PROGRESS NOTES
[address]    INPATIENT OCCUPATIONAL THERAPY  PROGRESS NOTE  Date: 2022  Patient Name: Wendy Maldonado       Room: 2780/8736-58  MRN: [id number]    : 1950  (74 y.o.)  Gender: male   Referring Practitioner: Rian Navarro DO  Diagnosis: Atrial fibrillation with RVR    Restrictions/Precautions  Restrictions/Precautions  Restrictions/Precautions: Fall Risk;General Precautions; Up as Tolerated  Required Braces or Orthoses?: No  Position Activity Restriction       Subjective  Subjective  Pain: No pain at this time. Objective  Orientation  Overall Orientation Status: Within Normal Limits  Orientation Level: Oriented X4  Cognition  Overall Cognitive Status: WFL  ADL  Feeding: Setup  Grooming: Setup  Grooming Skilled Clinical Factors: Standing at sink side for oral hygiene and hand washing.   Toileting: Contact guard assistance  Toileting Skilled Clinical Factors: standing at toilet to urinate; pt able to pull up/down pants while standing with CGA         Balance  Balance  Sitting Balance: Stand by assistance (EOB 5min)  Standing Balance: Contact guard assistance  Standing Balance  Time: 4-5 minutes  Activity: functional mobility, functional transfers, self care, toileting  Comment: utilizing RW for UE support    Transfers/Mobility  Bed mobility  Supine to Sit: Stand by assistance  Sit to Supine: Stand by assistance  Scooting: Stand by assistance  Bed Mobility Comments: no use of bed rails, HOB elevated ~20 degrees  Transfers  Sit to stand: Contact guard assistance  Stand to sit: Contact guard assistance  Transfer Comments: CGA for safety this date  Toilet Transfers  Toilet Transfer: Contact guard assistance (Pt. stood in front of toilet for urination, CGA)    Functional Mobility  Functional - Mobility Device: Rolling Walker  Activity: To/from bathroom  Assist Level: Contact guard assistance  Functional Mobility Comments: CGA for safety due to limited endurance    Functional Activities         Patient Education  Patient Education  Education Given To: Patient  Education Provided: Role of Therapy, Plan of Care, Transfer Training, Energy Conservation  Education Provided Comments: safety awareness training  Education Method: Verbal  Barriers to Learning: None  Education Outcome: Verbalized understanding, Demonstrated understanding      Goals  Short Term Goals  Time Frame for Short term goals: By discharge  Short Term Goal 1: Pt will perform BADLs mod I, using appropriate adaptive strategies/equipment  Short Term Goal 2: Pt will perform transfers/functional mobility, mod I, using least restrictive device and Good safety  Short Term Goal 3: Pt will V/D use of energy conservation/work simplifcation techniques that are applicable to his daily routine  Short Term Goal 4: Pt will tolerate standing for 8+ minutes with SUP during functional task to increase endurance and strength needed for ADLs/IADLs  Short Term Goal 5: Pt will actively participate in 15+ minutes of therapeutic exercise/functional activity to promote safety and independence with self care and mobility  Short Term Goal 6: Pt will participate in BUE HEP to increase strength and endurance needed for ADLs/IADLs  Plan  Times per Week: 4-6  Current Treatment Recommendations: Strengthening, Balance training, Functional mobility training, Endurance training, Safety education & training, Patient/Caregiver education & training, Self-Care / ADL, Pain management      Assessment  Activity Tolerance  Activity Tolerance: Patient limited by fatigue (Pt. had just finished with physical therapy session upon [facility]'s arrival.)  Assessment  Performance deficits / Impairments: Decreased functional mobility , Decreased ADL status, Decreased strength, Decreased safe awareness, Decreased endurance, Decreased sensation, Decreased balance, Decreased high-level IADLs  Treatment Diagnosis: impaired self care status  Prognosis: Fair  Decision Making: Medium Complexity  Discharge Recommendations: Patient would benefit from continued therapy after discharge  Safety Devices  Type of Devices: Nurse notified, Left in bed, Bed alarm in place, Call light within reach      AM-PAC Daily Activities Inpatient  AM-PAC Daily Activity Inpatient   How much help for putting on and taking off regular lower body clothing?: A Little  How much help for Bathing?: A Little  How much help for Toileting?: A Little  How much help for putting on and taking off regular upper body clothing?: A Little  How much help for taking care of personal grooming?: None  How much help for eating meals?: A Little  AM-[facility] Inpatient Daily Activity Raw Score: 19  AM-PAC Inpatient ADL T-Scale Score : 40.22  ADL Inpatient CMS 0-100% Score: 42.8  ADL Inpatient CMS G-Code Modifier : CK    OT Minutes  OT Individual Minutes  Time In: 1355  Time Out: [address]  Minutes: [address], [facility]/L

## 2022-09-28 NOTE — PROGRESS NOTES
Today's Date: 9/28/2022  Patient Name: Karis Ojeda  Date of admission: 9/26/2022  8:16 AM  Patient's age: 74 y.o., 1950  Admission Dx: Atrial fibrillation with RVR ([facility] [address] [I48.91]  Acute cystitis without hematuria [N30.00]  Pneumonia due to infectious organism, unspecified laterality, unspecified part of lung [J18.9]    Reason for Consult: management recommendations  Requesting Physician: Maddy Manrique DO    CHIEF COMPLAINT: Non-Hodgkin's lymphoma, atrial fibrillation    INTERIM HISTORY  Pt is seen and examined  Still complaining of mild abd pain. Urology input appreciated, no hydronephrosis. CT scan reviewed, lymphoma infiltrates the root of mesentery and I think that is partially causing his pain. Heart is irregular today, monitor keeps showing PVCs. HISTORY OF PRESENT ILLNESS:      The patient is a 74 y.o.  male who is admitted to the hospital for abdominal pain. Patient is known to us with history of non-Hodgkin's lymphoma, recently relapsed. He has been started on Imbruvica with essentially stable disease. Upon emergency room evaluation, the patient was found to be in atrial fibrillation with RVR. He was managed conservatively and now his heart rhythm is back in sinus with frequent PVCs/PACs. Patient is following with cardiology for congestive heart failure. He has multiple comorbidities. Patient is seen and evaluated. Continues to complain of lower abdominal pain. Urology was consulted for ureteric obstruction and the need for stent. He has an underlying diabetes, cardiovascular disease, dementia, hypertension, hyperlipidemia and hypothyroidism.   Overall performance status is ECOG 2    Past Medical History:   has a past medical history of Arthritis, Cancer ([address] Chemotherapy management, encounter for, CHF (congestive heart failure) ([address] COPD (chronic obstructive pulmonary disease) ([address] Dementia ([address] Diabetes mellitus ([address] H/O cardiovascular stress test, History of non-Hodgkin's lymphoma, Hx of blood clots, Hyperlipidemia, Hypertension, Hypothyroidism, and On home O2. Past Surgical History:   has a past surgical history that includes hernia repair; IR BIOPSY ABDOMINAL/RETROPERITONEAL MASS PERCUTANEOUS (7/6/2021); IR PORT PLACEMENT > 5 YEARS (8/31/2021); Colonoscopy (Left, 9/24/2021); Cystoscopy (Right, 10/6/2021); Cholecystectomy, laparoscopic (N/A, 10/9/2021); Tonsillectomy; Cystoscopy (Bilateral, 11/24/2021); Cystoscopy (Bilateral, 2/16/2022); eye surgery; Carotid endarterectomy (Left, 6/28/2022); and Cystoscopy (N/A, 7/20/2022). Medications:    Reviewed in Epic     Allergies:  Patient has no known allergies. Social History:   reports that he quit smoking about 16 years ago. His smoking use included cigarettes. He has a 90.00 pack-year smoking history. He has never used smokeless tobacco. He reports current alcohol use of about 1.7 standard drinks per week. He reports current drug use. Drug: Marijuana Elaine Garcia). Family History: family history includes Alzheimer's Disease in his father; Diabetes in his mother; Heart Disease in his brother and brother. REVIEW OF SYSTEMS:    Constitutional: No fever or chills. Forgetful, could not give much history. Most information obtained from the chart. He denies any weight loss. He has lower abdominal pain  Eyes: No eye discharge, double vision, or eye pain   HEENT: negative for sore mouth, sore throat, hoarseness and voice change   Respiratory: negative for cough , sputum, dyspnea, wheezing, hemoptysis, chest pain   Cardiovascular: negative for chest pain, dyspnea, palpitations, orthopnea, PND   Gastrointestinal: negative for nausea, vomiting, diarrhea, constipation, positive for lower abdominal pain, no  Dysphagia, hematemesis and hematochezia   Genitourinary: Dysuria but no hematuria, frequency  Integument: negative for rash, skin lesions, bruises.    Hematologic/Lymphatic: negative for easy bruising, bleeding, lymphadenopathy, or petechiae   Endocrine: negative for heat or cold intolerance,weight changes, change in bowel habits and hair loss   Musculoskeletal: negative for myalgias, arthralgias, pain, joint swelling,and bone pain   Neurological: negative for headaches, dizziness, seizures, weakness, numbness    PHYSICAL EXAM:      BP (!) 173/64   Pulse 65   Temp 98.1 °F (36.7 °C) (Oral)   Resp 18   Wt 159 lb 13.3 oz (72.5 kg)   SpO2 91%   BMI 22.93 kg/m²    Temp (24hrs), Av.1 °F (36.7 °C), Min:97.9 °F (36.6 °C), Max:98.3 °F (36.8 °C)    General appearance -ill-appearing elderly man who appears her stated age. He is forgetful and could not give me much history. Most information obtained from the chart.   Mental status - alert and cooperative   Eyes - pupils equal and reactive, extraocular eye movements intact   Ears - bilateral TM's and external ear canals normal   Mouth - mucous membranes moist, pharynx normal without lesions   Neck - supple, no significant adenopathy   Lymphatics - no palpable lymphadenopathy, no hepatosplenomegaly   Chest - clear to auscultation, no wheezes, rales or rhonchi, symmetric air entry   Heart -heart examination is irregular but the monitor shows sinus rhythm with frequent PVCs  Abdomen - soft,  mildly distended, mild diffuse tenderness, no hepatosplenomegaly appreciated  Neurological - alert, oriented, normal speech, no focal findings or movement disorder noted   Musculoskeletal - no joint tenderness, deformity or swelling   Extremities - peripheral pulses normal, no pedal edema, no clubbing or cyanosis   Skin - normal coloration and turgor, no rashes, no suspicious skin lesions noted ,    DATA:    Labs:   CBC:   Recent Labs     22  0531 22  0542   WBC 6.6 6.5   HGB 10.1* 9.3*   HCT 30.7* 27.8*    147*     BMP:   Recent Labs     22  0531 22  0542    140   K 3.9 4.0   CO2    BUN 14 12   CREATININE 0.89 0.94   LABGLOM >60 >60   GLUCOSE 87 90 PT/INR:   Recent Labs     09/26/22  0855   PROTIME 14.4   INR 1.1       IMAGING DATA:      Primary Problem  Atrial fibrillation with RVR [facility])    Active Hospital Problems    Diagnosis Date Noted    Atrial fibrillation with RVR ([facility] [address] [I48.91] 09/26/2022     Priority: Medium         IMPRESSION:   Atrial fibrillation, reverted to sinus rhythm  Non-Hodgkin's lymphoma, relapsed on Imbruvica  Retroperitoneal/root of mesentery adenopathy  Urinary obstruction    RECOMMENDATIONS:  I had a very long discussion with patient, he has essentially stable disease with Imbruvica. (Could not remember yesterdays conversation)   Still on ibrutinib with stable disease. Back to SR with PVC's if Afib recurs, will stop ibrutinib and consider switching to a different medication   Continue hydration, electrolyte replacement. Discussed with primary team, I feel the lymphoma infiltration of the root of mesentery is contributing to his pain. Suggest dietary modification, low fat diet. Pt has palliative care at home [facility])   Discharge planning     Discussed with patient and Nurse. Thank you for asking us to see this patient.     HONG PLUMMER [facility] MD Darlene  Hematologist/Medical Oncologist  Cell: [phone]

## 2022-09-28 NOTE — CONSULTS
General Surgery Consult      Pt Name: Sigrid Oliveira  MRN: [id number]  YOB: 1950  Date of evaluation: 9/28/2022  Primary Care Physician: Lila Butt DO   Patient evaluated at the request of  Dr. Christiane Matamoros  Reason for evaluation: Epigastric pain    SUBJECTIVE:   History of Chief Complaint:    Sigrid Oliveira is a 74 y.o. male who presents with history of lymphoma. Started complaining of upper abdominal pain epigastric region. Had some nausea. Phenergan was given. Pain was worse after eating. Case discussed with admitting physician. Symptom onset has been acute for a time period of few hour(s). Severity is described as mild-moderate. Course of his symptoms over time is acute. Past Medical History   has a past medical history of Arthritis, Cancer ([facility] [address] Chemotherapy management, encounter for, CHF (congestive heart failure) ([facility] [address] COPD (chronic obstructive pulmonary disease) ([facility] [address] Dementia ([facility] [address] Diabetes mellitus ([facility] [address] H/O cardiovascular stress test, History of non-Hodgkin's lymphoma, Hx of blood clots, Hyperlipidemia, Hypertension, Hypothyroidism, and On home O2. Past Surgical History   has a past surgical history that includes hernia repair; IR BIOPSY ABDOMINAL/RETROPERITONEAL MASS PERCUTANEOUS (7/6/2021); IR PORT PLACEMENT > 5 YEARS (8/31/2021); Colonoscopy (Left, 9/24/2021); Cystoscopy (Right, 10/6/2021); Cholecystectomy, laparoscopic (N/A, 10/9/2021); Tonsillectomy; Cystoscopy (Bilateral, 11/24/2021); Cystoscopy (Bilateral, 2/16/2022); eye surgery; Carotid endarterectomy (Left, 6/28/2022); and Cystoscopy (N/A, 7/20/2022). Medications  Prior to Admission medications    Medication Sig Start Date End Date Taking?  Authorizing Provider   amLODIPine (NORVASC) 5 MG tablet Take 10 mg by mouth daily   Yes Historical Provider, MD   carvedilol (COREG) 6.25 MG tablet Take 12.5 mg by mouth 2 times daily (with meals)   Yes Historical Provider, MD   oxyCODONE (ROXICODONE) 5 MG immediate release tablet Take 5 mg by mouth every 4 hours as needed for Pain. Last dispensed 8/2 for 30 days   Yes Historical Provider, MD   oxyCODONE (OXYCONTIN) 10 MG extended release tablet Take 10 mg by mouth in the morning and 10 mg in the evening. Yes Historical Provider, MD   ferrous sulfate (FE TABS 325) 325 (65 Fe) MG EC tablet Take 1 tablet by mouth 3 times daily (with meals) 9/7/22   Roderick Metcalf, DO   ibrutinib (IMBRUVICA) 420 MG tablet Take 1 tablet by mouth See Admin Instructions 2 days on, then 1 day off-per Dr Jalyn Alberts 9/7/22  Patient taking differently: Take 420 mg by mouth See Admin Instructions 2 days on, then 1 day off-per Dr Jalyn Alberts 9/7/22  Day off is today (9/26). Needs dose 9/27 9/7/22 10/7/22  Diallo Anand MD   naloxone 4 MG/0.1ML LIQD nasal spray 1 spray by Nasal route as needed for Opioid Reversal 9/6/22 10/6/22  Roderick Metcalf, DO   atorvastatin (LIPITOR) 10 MG tablet Take 10 mg by mouth daily    Historical Provider, MD   prochlorperazine (COMPAZINE) 10 MG tablet Take 1 tablet by mouth every 6 hours as needed (nausea vomiting) 7/26/22 9/6/22  Diallo Anand MD   megestrol (MEGACE) 40 MG/ML suspension Take 10 mLs by mouth in the morning. 7/25/22   Diallo Anand MD   apixaban (ELIQUIS) 5 MG TABS tablet Take 1 tablet by mouth in the morning and 1 tablet before bedtime. 7/21/22   Roderick Metcalf, DO   sucralfate (CARAFATE) 1 GM tablet Take 1 tablet by mouth in the morning and 1 tablet at noon and 1 tablet in the evening and 1 tablet before bedtime. 7/21/22   Roderick Metcalf, DO   glipiZIDE (GLUCOTROL) 5 MG tablet Take 0.5 tablets by mouth in the morning and 0.5 tablets in the evening. Take before meals.   Patient not taking: Reported on 8/12/2022 7/21/22 8/13/22  Tara Metcalf DO   Lactobacillus (PROBIOTIC ACIDOPHILUS) CAPS Take 1 caplet by mouth daily    Historical Provider, MD   Multiple Vitamins-Minerals (THERAPEUTIC MULTIVITAMIN-MINERALS) tablet Take 1 tablet by mouth daily    Historical Provider, MD   OXYGEN Inhale 2 L into the lungs at bedtime Nightly & during the day. Historical Provider, MD   ipratropium-albuterol (DUONEB) 0.5-2.5 (3) MG/3ML SOLN nebulizer solution Inhale 3 mLs into the lungs every 4 hours as needed for Shortness of Breath 2/20/22   EDWIGE Mondragon - CNP   levothyroxine (SYNTHROID) 150 MCG tablet Take 1 tablet by mouth Daily 2/2/22   Dominic Osuna, DO   fluticasone-salmeterol (ADVAIR) 100-50 MCG/DOSE diskus inhaler Inhale 1 puff into the lungs every 12 hours 2/1/22   Brent Ulloa, DO   albuterol sulfate HFA (PROVENTIL;VENTOLIN;PROAIR) 108 (90 Base) MCG/ACT inhaler Inhale 2 puffs into the lungs every 4 hours as needed for Wheezing     Historical Provider, MD   losartan (COZAAR) 50 MG tablet Take 1 tablet by mouth daily 10/12/21   Roderick Metcalf DO   donepezil (ARICEPT) 10 MG tablet Take 10 mg by mouth daily  7/11/21   Historical Provider, MD   FLUoxetine (PROZAC) 20 MG capsule Take 20 mg by mouth daily    Historical Provider, MD   omeprazole (PRILOSEC) 20 MG delayed release capsule Take 20 mg by mouth 2 times daily    Historical Provider, MD     Allergies  has No Known Allergies. Family History  family history includes Alzheimer's Disease in his father; Diabetes in his mother; Heart Disease in his brother and brother. Social History   reports that he quit smoking about 16 years ago. His smoking use included cigarettes. He has a 90.00 pack-year smoking history. He has never used smokeless tobacco. He reports current alcohol use of about 1.7 standard drinks per week. He reports current drug use. Drug: Marijuana Aranza Palm). Review of Systems:  All 10 system review was conducted. Please refer to chart. OBJECTIVE:   VITALS:  weight is 159 lb 13.3 oz (72.5 kg). His oral temperature is 98.7 °F (37.1 °C). His blood pressure is 148/67 (abnormal) and his pulse is 66. His respiration is 18 and oxygen saturation is 89% (abnormal).    CONSTITUTIONAL: Resting comfortably  SKIN: Skin color, texture, turgor normal. No rashes or lesions. LYMPH: no cervical nodes, no inguinal nodes  HEENT: Head is normocephalic, atraumatic. EOMI, PERRLA  NECK: Supple, symmetrical, trachea midline, no adenopathy, thyroid symmetric, not enlarged and no tenderness, skin normal  CHEST/LUNGS: chest symmetric with normal A/P diameter, normal respiratory rate and rhythm, lungs clear to auscultation without wheezes, rales or rhonchi. No accessory muscle use. Scars None   CARDIOVASCULAR: Heart regular rate and rhythm Normal S1 and S2. . Carotid and femoral pulses 2+/4 and equal bilaterally  ABDOMEN: Soft abdomen nondistended epigastric tenderness nothing acute.     RECTAL: deferred, not clinically indicated  NEUROLOGIC: Detailed neurological examination deferred  EXTREMITIES: no cyanosis, no clubbing, and no edema    LABS:   CBC with Differential:    Lab Results   Component Value Date/Time    WBC 6.5 09/28/2022 05:42 AM    RBC 3.37 09/28/2022 05:42 AM    RBC 4.38 06/06/2012 06:57 AM    HGB 9.3 09/28/2022 05:42 AM    HCT 27.8 09/28/2022 05:42 AM     09/28/2022 05:42 AM     06/06/2012 06:57 AM    MCV 82.4 09/28/2022 05:42 AM    MCH 27.5 09/28/2022 05:42 AM    MCHC 33.3 09/28/2022 05:42 AM    RDW 19.5 09/28/2022 05:42 AM    LYMPHOPCT 31 09/28/2022 05:42 AM    MONOPCT 11 09/28/2022 05:42 AM    BASOPCT 1 09/28/2022 05:42 AM    MONOSABS 0.70 09/28/2022 05:42 AM    LYMPHSABS 2.00 09/28/2022 05:42 AM    EOSABS 0.20 09/28/2022 05:42 AM    BASOSABS 0.00 09/28/2022 05:42 AM    DIFFTYPE NOT REPORTED 02/20/2022 06:53 AM     BMP:    Lab Results   Component Value Date/Time     09/28/2022 05:42 AM    K 4.0 09/28/2022 05:42 AM     09/28/2022 05:42 AM    CO2 22 09/28/2022 05:42 AM    BUN 12 09/28/2022 05:42 AM    LABALBU 2.9 09/26/2022 08:55 AM    CREATININE 0.94 09/28/2022 05:42 AM    CALCIUM 8.2 09/28/2022 05:42 AM    GFRAA >60 09/28/2022 05:42 AM    LABGLOM >60 09/28/2022 05:42 AM GLUCOSE 90 09/28/2022 05:42 AM    GLUCOSE 97 06/06/2012 06:57 AM     Hepatic Function Panel:    Lab Results   Component Value Date/Time    ALKPHOS 149 09/26/2022 08:55 AM    ALT 10 09/26/2022 08:55 AM    AST 9 09/26/2022 08:55 AM    PROT 5.2 09/26/2022 08:55 AM    BILITOT 0.5 09/26/2022 08:55 AM    BILIDIR 0.21 08/31/2022 02:10 PM    IBILI 0.44 08/31/2022 02:10 PM    LABALBU 2.9 09/26/2022 08:55 AM     Calcium:    Lab Results   Component Value Date/Time    CALCIUM 8.2 09/28/2022 05:42 AM     Magnesium:    Lab Results   Component Value Date/Time    MG 1.8 09/27/2022 05:31 AM     Phosphorus:    Lab Results   Component Value Date/Time    PHOS 3.3 06/29/2022 05:10 AM     PT/INR:    Lab Results   Component Value Date/Time    PROTIME 14.4 09/26/2022 08:55 AM    INR 1.1 09/26/2022 08:55 AM     ABG:  No results found for: PHART, PH, VNE1GZI, PCO2, PO2ART, PO2, LXP9GLW, HCO3, BEART, BE, THGBART, THB, GYI1SQP, X7JYMIIY, O2SAT  Urine Culture:  No components found for: CURINE  Blood Culture:  No components found for: CBLOOD, CFUNGUSBL  Stool Culture:  No components found for: CSTOOL    RADIOLOGY:   I have personally reviewed the following films:  CT ABDOMEN PELVIS W WO CONTRAST Additional Contrast? None    Result Date: 9/26/2022  EXAMINATION: CT OF THE ABDOMEN AND PELVIS WITH AND WITHOUT CONTRAST 9/26/2022 10:29 am TECHNIQUE: CT of the abdomen and pelvis was performed with and without the administration of intravenous contrast.  Multiplanar reformatted images are provided for review. Automated exposure control, iterative reconstruction, and/or weight based adjustment of the mA/kV was utilized to reduce the radiation dose to as low as reasonably achievable.  COMPARISON: 08/31/2022 HISTORY: ORDERING SYSTEM PROVIDED HISTORY: [facility] abdominal pain TECHNOLOGIST PROVIDED HISTORY: [facility] abdominal pain Decision Support Exception - unselect if not a suspected or confirmed emergency medical condition->Emergency Medical Condition (MA) Reason for Exam: Patient is on oral chemo for abdominal cancer and has not been feeling well. non-Hodgkin's lymphoma Additional signs and symptoms: RLQ abdominal pain, N/V for 3 days Relevant Medical/Surgical History: cholecystectomy, hernia repair, port, FINDINGS: Lower Chest: The visualized heart and lungs show no acute abnormalities. Organs: Liver, spleen show no significant abnormalities noting splenic granulomatous calcifications. No splenomegaly right adrenal gland is unremarkable. Left adrenal gland is not clearly visualized. May be obscured by lymphadenopathy. No clear evidence for focal pancreatic lesion. Bilateral ureteric stents in place with proximal coil in in the kidneys. 17 mm proteinaceous right renal cyst in the posterior midpole and nonobstructing 3 mm calculus in the lower pole. Bilateral simple small cysts are noted. Symmetric enhancement of the kidneys. GI/Bowel: There is limited evaluation due to absence of oral contrast. Stomach collapsed otherwise unremarkable. No evidence for bowel obstruction. Appendix appears normal.  No acute process in the colon. Pelvis: Urinary bladder grossly normal showing the distal ends of the ureteric stents. No significant pelvic lymphadenopathy. Peritoneum/Retroperitoneum: Amorphous periaortic soft tissue with some underlying nodular areas representing retroperitoneal lymphadenopathy consistent with the history of lymphoma. The nodular components are partly obscured for example 2.1 cm left periaortic series 4, image 39. Circumferential left perinephric soft tissue density continuous with the retroperitoneal adenopathy felt to be extension of the disease. Component of similar confluent adenopathy along the root of the mesentery about 3.4 x 7.2 cm similar to prior. There is right perinephric low-density stranding with right flank and lower quadrant hazy peritoneal fat stranding which may consistent with trace free fluid. Bones/Soft Tissues:  Moderate aortic atherosclerotic calcifications. No aggressive bony lesion. 1. Bilateral ureteric stents in place. No hydronephrosis. 2. Amorphous periaortic retroperitoneal soft tissue consistent with adenopathy of lymphoma. Similar density findings appear 20 circled the left kidney and may be extension of the lymphadenopathy. Underlying more discrete lymph nodes are partly obscured. Lymphadenopathy extends along the root of the mesentery similar to prior. 3. Trace free fluid manifested by haziness and stranding in the peritoneal fat mainly along the right flank. 4. Appendix normal.     XR CHEST PORTABLE    Result Date: 9/26/2022  EXAMINATION: ONE XRAY VIEW OF THE CHEST 9/26/2022 9:16 am COMPARISON: 09/03/2022 and 06/13/2022. CT chest dated 07/15/2022 HISTORY: ORDERING SYSTEM PROVIDED HISTORY: cough, sob TECHNOLOGIST PROVIDED HISTORY: cough, sob Reason for Exam: cough, sob FINDINGS: Right chest port is in place with distal tip at the cavoatrial junction. Heart size is within normal limits. There are mildly increased strandy opacities in the medial right base. A nodular density in the mid right lung is unchanged and corresponds to a calcified granuloma on previous CT. Left lung appears to be clear. No evidence of pneumothorax or pleural effusion. Mild increased strandy densities in the medial right base, possibly related to atelectasis versus developing infiltrate. IMPRESSION:   Epigastric pain rule out peptic ulcer disease  Multiple other comorbidities including cardiac arrhythmia history of lymphoma  CT of the abdomen and pelvis shows no hydronephrosis. Findings consistent with lymphoma. Lymphadenopathy extends to the root of the mesentery. Appendix normal.  Trace free fluid and stranding in the peritoneal fat mainly along the right flank. Blood work reveals unremarkable BMP. WBC count normal.  Hemoglobin 9.3. Platelet count adequate. does not have any pertinent problems on file.     PLAN:   Hold Eliquis. N.p.o. after midnight. For EGD tomorrow. Discussed with . PPI. Thank you for this interesting consult and for allowing us to participate in the care of this patient. If you have any questions please don't hesitate to call.           Electronically signed by Tavia Simpson MD  on 9/28/2022 at 5:30 PM

## 2022-09-28 NOTE — PROGRESS NOTES
[id number] Primary Data      PROGRESS NOTE        Patient:  Karis Ojeda  YOB: 1950    MRN: [id number]     Acct: [de-identified]     Admit date: 9/26/2022    Pt seen and Chart reviewed. Consultant notes reviewed and care evaluated. Subjective: Patient is doing okay this morning he just finished eating breakfast he said he did okay with this as well he did okay with dinner yesterday. But he keeps complaining of abdominal pain about 4 on a scale of 10 mostly points to the mid abdomen which most likely second to his lymphoma. But he also complains of nausea sometimes. Yesterday some in the morning he was doing fine did not get a call midday that he was nauseous he required Phenergan to calm him down he thinks that helped he could not tell me because of his dementia of the nausea with the pain got worse after eating he thinks sometimes it does. Urology and hematology and cardiology notes noted from yesterday. Discussed with his wife yesterday was in the office for her visit she is have already palliative care at home but she does not think they come in of the visit they just come in once a month she thinks that he may be able to come more often which I agree with her I think if they give the family some support and help him as well as the patient may be could avoid frequent readmission to the hospital.  Did see hematology note patient is not suggested to go to hospice yet but at least to get some support at home. Diet:  ADULT DIET; Regular;  Low Sodium (2 gm)      Medications:Current Inpatient    Scheduled Meds:   amLODIPine  10 mg Oral Daily    cefTRIAXone (ROCEPHIN) IV  1,000 mg IntraVENous Q24H    magnesium oxide  400 mg Oral Daily    losartan  50 mg Oral Daily    ibrutinib  420 mg Oral See Admin Instructions    doxycycline (VIBRAMYCIN) IV  100 mg IntraVENous Q12H    sodium chloride flush  5-40 mL IntraVENous 2 times per day    apixaban  5 mg Oral BID    atorvastatin  10 mg Oral Daily    carvedilol  12.5 mg Oral BID WC    donepezil  10 mg Oral Daily    FLUoxetine  20 mg Oral Daily    lactobacillus  1 capsule Oral Daily    levothyroxine  150 mcg Oral Daily    megestrol  400 mg Oral Daily    therapeutic multivitamin-minerals  1 tablet Oral Daily    omeprazole  20 mg Oral BID    oxyCODONE  10 mg Oral Q12H    sucralfate  1 g Oral 4x Daily    budesonide-formoterol  2 puff Inhalation BID     Continuous Infusions:   sodium chloride      sodium chloride 100 mL/hr at 09/28/22 0527     PRN Meds:promethazine (PHENERGAN) in sodium chloride 0.9% IVPB, sodium chloride flush, sodium chloride flush, sodium chloride, acetaminophen, ondansetron **OR** ondansetron, albuterol sulfate HFA, ipratropium-albuterol, oxyCODONE        Physical Exam:  Vitals: BP (!) 173/64   Pulse 65   Temp 98.1 °F (36.7 °C) (Oral)   Resp 18   Wt 159 lb 13.3 oz (72.5 kg)   SpO2 91%   BMI 22.93 kg/m²   24 hour intake/output:  Intake/Output Summary (Last 24 hours) at 9/28/2022 0738  Last data filed at 9/28/2022 0527  Gross per 24 hour   Intake 3424 ml   Output 1750 ml   Net 1674 ml     Last 3 weights: Wt Readings from Last 3 Encounters:   09/27/22 159 lb 13.3 oz (72.5 kg)   09/07/22 172 lb 13.5 oz (78.4 kg)   08/22/22 160 lb 3.2 oz (72.7 kg)       Physical Examination:   General appearance - alert, well appearing, and in no distress  Mental status - alert, oriented to person, place, and time  oropharynx clear, no lesions  Chest - clear to auscultation, no wheezes, rales or rhonchi, symmetric air entry  Heart - normal rate, regular rhythm, normal S1, S2, no murmurs, rubs, clicks or gallops  Abdomen - soft, patient has some tenderness in epigastric area on palpation still softer abdomen still about good bowel sounds.   Nondistended, no masses or organomegaly  Neurological - alert, oriented, normal speech, no focal findings or movement disorder noted}  Extremities - peripheral pulses normal, no pedal edema, no clubbing or cyanosis  Skin - normal coloration and turgor, no rashes, no suspicious skin lesions noted      Component Value Units   POC Glucose Fingerstick [[id number]] (Abnormal)    Collected: 09/28/22 0640    Updated: 09/28/22 0705     POC Glucose 74 Low  mg/dL   Troponin [[id number]] (Abnormal)    Collected: 09/28/22 0542    Updated: 09/28/22 0606    Specimen Source: Blood     Troponin, High Sensitivity 59 High Panic   ng/L    Comment:        High Sensitivity Troponin values cannot be compared with other Troponin methodologies. Patients with high levels of Biotin oral intake (i.e >5mg/day) may have falsely decreased   Troponin levels. Samples collected within 8 hours of biotin intake may require additional   information for diagnosis. Basic Metabolic Panel [[id number]] (Abnormal)    Collected: 09/28/22 0542    Updated: 09/28/22 0605    Specimen Source: Blood     Glucose 90 mg/dL    BUN 12 mg/dL    Creatinine 0.94 mg/dL    Calcium 8.2 Low  mg/dL    Sodium 140 mmol/L    Potassium 4.0 mmol/L    Chloride 111 High  mmol/L    CO2 22 mmol/L    Anion Gap 7 Low  mmol/L    GFR Non-African American >60 mL/min    GFR African American >60 mL/min    GFR Comment         Comment: Average GFR for 79or more years old:    76 mL/min/1.73sq m   Chronic Kidney Disease:    <60 mL/min/1.73sq m   Kidney failure:    <15 mL/min/1.73sq m               eGFR calculated using average adult body mass.  Additional eGFR calculator available at:         [email or web address]             CBC with Auto Differential [[id number]] (Abnormal)    Collected: 09/28/22 0542    Updated: 09/28/22 0547    Specimen Source: Blood     WBC 6.5 k/uL    RBC 3.37 Low  m/uL    Hemoglobin 9.3 Low  g/dL    Hematocrit 27.8 Low  %    MCV 82.4 fL    MCH 27.5 pg    MCHC 33.3 g/dL    RDW 19.5 High  %    Platelets 837 Low  k/uL    MPV 7.6 fL    Seg Neutrophils 55 %    Lymphocytes 31 %    Monocytes 11 High  % Eosinophils % 2 %    Basophils 1 %    Segs Absolute 3.60 k/uL    Absolute Lymph # 2.00 k/uL    Absolute Mono # 0.70 k/uL    Absolute Eos # 0.20 k/uL    Basophils Absolute 0.00 k/uL   POC Glucose Fingerstick [[id number]] (Abnormal)    Collected: 09/27/22 2119    Updated: 09/27/22 2126     POC Glucose 117 High  mg/dL   POC Glucose Fingerstick [[id number]]    Collected: 09/27/22 1630    Updated: 09/27/22 1637     POC Glucose 100 mg/dL   EKG 12 Lead [[id number]]    Collected: 09/27/22 0836    Updated: 09/27/22 1429     Ventricular Rate 75 BPM    Atrial Rate 75 BPM    P-R Interval 132 ms    QRS Duration 88 ms    Q-T Interval 388 ms    QTc Calculation (Bazett) 433 ms    P Axis 71 degrees    R Axis 45 degrees    T Axis 58 degrees   Narrative:     Sinus rhythm with marked sinus arrhythmia   Low voltage QRS   Cannot rule out Anterior infarct , age undetermined   Abnormal ECG   When compared with ECG of 26-SEP-2022 08:55, (unconfirmed)   Sinus rhythm has replaced Atrial fibrillation   Vent. rate has decreased BY  57 BPM   QRS voltage has decreased   Criteria for Inferior infarct are no longer Present   EKG 12 Lead [[id number]]    Collected: 09/26/22 0855    Updated: 09/27/22 1123     Ventricular Rate 132 BPM    Atrial Rate 119 BPM    QRS Duration 90 ms    Q-T Interval 312 ms    QTc Calculation (Bazett) 462 ms    R Axis 20 degrees    T Axis 7 degrees   Narrative:      Poor data quality, interpretation may be adversely affected   Atrial fibrillation with rapid ventricular response with premature ventricular or aberrantly conducted complexes   Inferior infarct (cited on or before 29-JAN-2005)   Abnormal ECG   When compared with ECG of 31-AUG-2022 13:52,   Atrial fibrillation has replaced Sinus rhythm   Vent.  rate has increased BY  71 BPM   POC Glucose Fingerstick [[id number]] (Abnormal)    Collected: 09/27/22 1101    Updated: 09/27/22 1114     POC Glucose 125 High  mg/dL   MRSA DNA Probe, Nasal [[id number]] (Abnormal) Collected: 09/26/22 1126    Updated: 09/27/22 1009    Specimen Source: Nasal     Specimen Description . NASAL SWAB    MRSA, DNA, Nasal POSITIVE:  MRSA DNA detected by nucleic acid amplification. Abnormal     Comment:                                                    Results should be used as an adjunct to nosocomial control efforts to identify patients   needing enhanced precautions. The test is not intended to identify patients with staphylococcal infections. Results   should not be used to guide or monitor treatment for MRSA infections. TSH with Reflex [[id number]]    Collected: 09/27/22 0531    Updated: 09/27/22 0941     TSH 4.65 uIU/mL   Lipid Panel [[id number]] (Abnormal)    Collected: 09/27/22 0531    Updated: 09/27/22 0941     Cholesterol 90 mg/dL    Comment:     Cholesterol Guidelines:       <200  Desirable    200-240  Borderline       >240  Undesirable            HDL 25 Low  mg/dL    Comment:     HDL Guidelines:     <40     Undesirable    40-59    Borderline     >59     Desirable            LDL Cholesterol 45 mg/dL    Comment:     LDL Guidelines:      <100    Desirable    100-129   Near to/above Desirable    130-159   Borderline       >159   Undesirable       Direct (measured) LDL and calculated LDL are not interchangeable tests. Chol/HDL Ratio 3.6    Comment:            Triglycerides 100 mg/dL    Comment:     Triglyceride Guidelines:      <150   Desirable    150-199  Borderline    200-499  High      >499   Very high    Based on AHA Guidelines for fasting triglyceride, October 2012. Culture, Urine [[id number]]    Collected: 09/26/22 1010    Updated: 09/27/22 0817    Specimen Source: Urine     Specimen Description . URINE    Culture NO GROWTH       Assessment:  Principal Problem:    Atrial fibrillation with RVR ([facility])  Resolved Problems:    * No resolved hospital problems.  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    Hydronephrosis due to obstruction of ureter N13.1    Moderate malnutrition (HCC) E44.0    Chronic cholecystitis K81.1    Pleural effusion J90    Cardiomegaly I51.7    Hypothyroidism E03.9    Diabetes mellitus (HCC) E11.9    Hypertensive urgency I16.0    Acute respiratory failure with hypoxia ([facility]) J96.01    Hypoxia R09.02    Carotid stenosis, asymptomatic, bilateral I65.23    Simple chronic bronchitis ([facility]) J41.0    Dependence on nocturnal oxygen therapy Z99.81    Pneumonia J18.9    LAURO (acute kidney injury) ([facility] [address] N17.9    Failure to thrive in adult R62.7    DVT (deep venous thrombosis) ([facility]) I82.409    Lymphoma, diffuse ([facility]) C85.90    Severe malnutrition ([facility]) E43    Acute cystitis with hematuria N30.01    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    Atrial fibrillation with RVR ([facility]) I48.91      Cardiac arrhythmias with more PVCs and PVCs history converted normal sinus this morning    Hypomagnesemia corrected    Slight bump in his troponin second to his heart rate yesterday doubt acute MI    No evidence of pneumonia on my reading on the chest x-ray probably some linear opacity looks more atelectasis    UTI chronic second to his stents but no evidence of hydro    Abdominal pain which is chronic and recurrent second to his lymphoma controlled with his pain meds    Nausea but no vomiting this time resolved  Abdominal pain still feel most likely second to his lymphoma but he does have epigastric tenderness on palpation to make sure he has no gastric issues such as gastritis or ulcer we will consult surgery for EGD  Nasal MRSA    Plan:  Consult surgery for EGD discussed with  who said he will probably plan on doing EGD tomorrow even if patient on Eliquis he just asked for it to be held maybe today. Discussed with his RN in regard to that.     Meanwhile continue with the current treatment    We will decrease his IV fluid 75 mils an hour    We will add Carafate 1 g 4 times a day for now.   Patient started on doxycycline yesterday but will add Bactroban nasal ointment twice a day at least for now for at least 2 weeks  Chest x-ray today    Maddy Manrique DO [facility]            9/28/2022, 7:38 AM

## 2022-09-28 NOTE — PROGRESS NOTES
[facility] CARDIOLOGY Progress Note    9/28/2022 10:05 AM      Subjective:  Mr. Darlin Primrose  is feeling better and denies any chest pain or shortness of breath or palpitations or lightheadedness or dizziness.                LABS:     Recent Results (from the past 24 hour(s))   POC Glucose Fingerstick    Collection Time: 09/27/22 11:01 AM   Result Value Ref Range    POC Glucose 125 (H) 75 - 110 mg/dL   POC Glucose Fingerstick    Collection Time: 09/27/22  4:30 PM   Result Value Ref Range    POC Glucose 100 75 - 110 mg/dL   POC Glucose Fingerstick    Collection Time: 09/27/22  9:19 PM   Result Value Ref Range    POC Glucose 117 (H) 75 - 110 mg/dL   Basic Metabolic Panel    Collection Time: 09/28/22  5:42 AM   Result Value Ref Range    Glucose 90 70 - 99 mg/dL    BUN 12 8 - 23 mg/dL    Creatinine 0.94 0.70 - 1.20 mg/dL    Calcium 8.2 (L) 8.6 - 10.4 mg/dL    Sodium 140 135 - 144 mmol/L    Potassium 4.0 3.7 - 5.3 mmol/L    Chloride 111 (H) 98 - 107 mmol/L    CO2 22 20 - 31 mmol/L    Anion Gap 7 (L) 9 - 17 mmol/L    GFR Non-African American >60 >60 mL/min    GFR African American >60 >60 mL/min    GFR Comment         CBC with Auto Differential    Collection Time: 09/28/22  5:42 AM   Result Value Ref Range    WBC 6.5 3.5 - 11.0 k/uL    RBC 3.37 (L) 4.5 - 5.9 m/uL    Hemoglobin 9.3 (L) 13.5 - 17.5 g/dL    Hematocrit 27.8 (L) 41 - 53 %    MCV 82.4 80 - 100 fL    MCH 27.5 26 - 34 pg    MCHC 33.3 31 - 37 g/dL    RDW 19.5 (H) 11.5 - 14.9 %    Platelets 203 (L) 751 - 450 k/uL    MPV 7.6 6.0 - 12.0 fL    Seg Neutrophils 55 36 - 66 %    Lymphocytes 31 24 - 44 %    Monocytes 11 (H) 1 - 7 %    Eosinophils % 2 0 - 4 %    Basophils 1 0 - 2 %    Segs Absolute 3.60 1.3 - 9.1 k/uL    Absolute Lymph # 2.00 1.0 - 4.8 k/uL    Absolute Mono # 0.70 0.1 - 1.3 k/uL    Absolute Eos # 0.20 0.0 - 0.4 k/uL    Basophils Absolute 0.00 0.0 - 0.2 k/uL   Troponin    Collection Time: 09/28/22  5:42 AM   Result Value Ref Range    Troponin, High Sensitivity 59 (HH) 0 - 22 ng/L   POC Glucose Fingerstick    Collection Time: 22  6:40 AM   Result Value Ref Range    POC Glucose 74 (L) 75 - 110 mg/dL       Pulse Ox: SpO2  Av.3 %  Min: 91 %  Max: 94 %    Supplemental O2:       Current Meds:    mupirocin   Nasal BID    amLODIPine  10 mg Oral Daily    cefTRIAXone (ROCEPHIN) IV  1,000 mg IntraVENous Q24H    magnesium oxide  400 mg Oral Daily    losartan  50 mg Oral Daily    ibrutinib  420 mg Oral See Admin Instructions    doxycycline (VIBRAMYCIN) IV  100 mg IntraVENous Q12H    sodium chloride flush  5-40 mL IntraVENous 2 times per day    [Held by provider] apixaban  5 mg Oral BID    atorvastatin  10 mg Oral Daily    carvedilol  12.5 mg Oral BID WC    donepezil  10 mg Oral Daily    FLUoxetine  20 mg Oral Daily    lactobacillus  1 capsule Oral Daily    levothyroxine  150 mcg Oral Daily    megestrol  400 mg Oral Daily    therapeutic multivitamin-minerals  1 tablet Oral Daily    omeprazole  20 mg Oral BID    oxyCODONE  10 mg Oral Q12H    sucralfate  1 g Oral 4x Daily    budesonide-formoterol  2 puff Inhalation BID         Continuous Infusions:    sodium chloride      sodium chloride 75 mL/hr at 22 0916              VITAL SIGNS:    BP (!) 173/64   Pulse 65   Temp 98.1 °F (36.7 °C) (Oral)   Resp 16   Wt 159 lb 13.3 oz (72.5 kg)   SpO2 91%   BMI 22.93 kg/m²         Admit Weight:  173 lb (78.5 kg)    Last 3 weights: Wt Readings from Last 3 Encounters:   22 159 lb 13.3 oz (72.5 kg)   22 172 lb 13.5 oz (78.4 kg)   22 160 lb 3.2 oz (72.7 kg)       BMI: Body mass index is 22.93 kg/m². INPUT/OUTPUT:        Intake/Output Summary (Last 24 hours) at 2022 1005  Last data filed at 2022 0527  Gross per 24 hour   Intake 3424 ml   Output 1750 ml   Net 1674 ml         Telemetry shows sinus rhythm. EXAM:     General appearance: awake, alert. Neck: No JVD. Chest:  scattered rhonchi bilaterally.     Cardiac: Regular rate and rhythm. No S3 gallop or rubs. Extremities: No leg edema. Skin:  warm and dry. Neuro:  Able to move all 4 extremities. Nuclear Stress test June 2022:     Impression       Perfusion:  Perfusion defects at stress involve 5% of left ventricular   myocardium, with minimal reperfusion. Function:  Mild hypokinesis inferior wall. RCA distribution. Risk stratification: INTERMEDIATE            2 D ECHOCARDIOGRAM June 2022:     Summary  Limited echo with contrast performed per order. Left ventricle is normal in size. Calculated EF via Herman's method 64% with global L. strain of -19.6%. Moderate left ventricular hypertrophy. No significant pericardial effusion is seen. 2 D Echocardiogram Jan 2022:     Summary  Normal LV size and wall thickness. No obvious wall motion abnormality seen. Normal LV systolic function with LVEF >55%. Normal RV size and function. RV systolic pressure 44 mmHg  LA appears moderately dilated  No obvious significant structural valvular abnormality noted. Mild MR and TR  Normal aortic root dimension. No significant pericardial effusion noted. IVC appears dilated, impaired inspiratory variation indicating elevated RA  filling pressure. ASSESSMENT:    Non specific minimally elevated hs Troponin peak 60 due to Atrial fibrillation with RVR. Doubt any acute MI . Can not exclude any underlying ASCAD. No active angina pectoris. Paroxysmal Atrial fibrillation with RVR and intermittent palpitations. Normal LVEF. Abnormal EKG. Preop EGD -Eliquis is on hold, per nurse's report. Essential Hypertension. Non Hodgkin's Lymphoma. Other problems as charted. REC/PLAN:    Nurse told me that patient's Eliquis is on hold for planned EGD tomorrow per GI service apparently. Patient will be considered low to intermediate cardiac risk for the needed GI procedures and no absolute cardiac contraindications.     Continue on other current cardiac medications. No other new cardiac recommendations. Will follow. Electronically signed by Keenan Bee MD, [facility]        PLEASE NOTE:  This progress note was completed using a voice transcription system. Every effort was made to ensure accuracy. However, inadvertent computerized transcription errors may be present.

## 2022-09-28 NOTE — PLAN OF CARE
Problem: Discharge Planning  Goal: Discharge to home or other facility with appropriate resources  9/28/2022 0401 by Darryl Tanner RN  Outcome: Progressing     Problem: Skin/Tissue Integrity  Goal: Absence of new skin breakdown  Description: 1. Monitor for areas of redness and/or skin breakdown  2. Assess vascular access sites hourly  3. Every 4-6 hours minimum:  Change oxygen saturation probe site  4. Every 4-6 hours:  If on nasal continuous positive airway pressure, respiratory therapy assess nares and determine need for appliance change or resting period. 9/28/2022 0401 by Darryl Tanner RN  Outcome: Progressing     Problem: Safety - Adult  Goal: Free from fall injury  9/28/2022 0401 by Darryl Tanner RN  Outcome: Progressing  Note: The patient remained free from falls this shift, call light within reach, bed in locked and lowest position. Side rails up x2.        Problem: Pain  Goal: Verbalizes/displays adequate comfort level or baseline comfort level  9/28/2022 0401 by Darryl Tanner RN  Outcome: Progressing  Flowsheets (Taken 9/28/2022 0401)  Verbalizes/displays adequate comfort level or baseline comfort level:   Encourage patient to monitor pain and request assistance   Assess pain using appropriate pain scale

## 2022-09-28 NOTE — PROGRESS NOTES
[facility]   Urology Progress Note            Subjective: Follow-up bilateral ureteral obstruction, abdominal pain    Patient Vitals for the past 24 hrs:   BP Temp Temp src Pulse Resp SpO2 Weight   09/28/22 0633 (!) 173/64 98.1 °F (36.7 °C) Oral 65 18 92 % --   09/28/22 0523 -- -- -- -- 18 -- --   09/28/22 0151 -- -- -- -- 18 -- --   09/27/22 2358 (!) 151/77 97.9 °F (36.6 °C) Oral 62 16 92 % 159 lb 13.3 oz (72.5 kg)   09/27/22 1955 -- -- -- 66 16 94 % --   09/27/22 1822 (!) 111/54 97.9 °F (36.6 °C) Oral 69 16 91 % --   09/27/22 1749 -- -- -- -- 18 -- --   09/27/22 1715 (!) 153/74 -- -- 68 -- -- --   09/27/22 1225 -- -- -- -- 18 -- --   09/27/22 1145 (!) 172/78 98.3 °F (36.8 °C) Oral 80 18 94 % --   09/27/22 1015 (!) 169/72 -- -- 79 -- -- --   09/27/22 0805 -- -- -- 95 16 96 % --   09/27/22 0715 (!) 163/62 98.5 °F (36.9 °C) Oral 69 18 92 % --       Intake/Output Summary (Last 24 hours) at 9/28/2022 0703  Last data filed at 9/28/2022 0527  Gross per 24 hour   Intake 3424 ml   Output 1750 ml   Net 1674 ml       Recent Labs     09/26/22  0855 09/27/22  0531 09/28/22  0542   WBC 7.5 6.6 6.5   HGB 10.3* 10.1* 9.3*   HCT 31.3* 30.7* 27.8*   MCV 82.2 82.9 82.4    175 147*     Recent Labs     09/26/22  0855 09/27/22  0531 09/28/22  0542    142 140   K 3.6* 3.9 4.0   * 111* 111*   CO2 19* 22 22   BUN 19 14 12   CREATININE 0.82 0.89 0.94       Recent Labs     09/26/22  0931   COLORU [place]*   PHUR 6.5   WBCUA 10 TO 20   RBCUA 51    BACTERIA FEW*   SPECGRAV 1.015   LEUKOCYTESUR MOD*   UROBILINOGEN Normal   BILIRUBINUR NEGATIVE       Additional Lab/culture results:    Physical Exam: The patient has ureteral obstruction associated with extrinsic pressure on the ureters and the retroperitoneal lymphadenopathy. CT imaging did not demonstrate any evidence of hydronephrosis that would be compatible with stent obstruction  Bilateral ureteric stents in place.   No hydronephrosis. Interval Imaging Findings:  . Bilateral ureteric stents in place. No hydronephrosis. 2. Amorphous periaortic retroperitoneal soft tissue consistent with   adenopathy of lymphoma. Similar density findings appear 20 circled the left   kidney and may be extension of the lymphadenopathy. Underlying more discrete   lymph nodes are partly obscured. Lymphadenopathy extends along the root of   the mesentery similar to prior. 3. 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)    Hydronephrosis due to obstruction of ureter    Moderate malnutrition (HCC)    Chronic cholecystitis    Pleural effusion    Cardiomegaly    Hypothyroidism    Diabetes mellitus ([address]    Hypertensive urgency    Acute respiratory failure with hypoxia (HCC)    Hypoxia    Carotid stenosis, asymptomatic, bilateral    Simple chronic bronchitis (HCC)    Dependence on nocturnal oxygen therapy    Pneumonia    LAURO (acute kidney injury) ([address]    Failure to thrive in adult    DVT (deep venous thrombosis) (HCC)    Lymphoma, diffuse (HCC)    Severe malnutrition ([address]    Acute cystitis with hematuria    Lymphoma of body of stomach (HCC)    Atrial fibrillation with RVR ([address]       Plan:  At this point in time urology will follow along its not clear from the imaging whether stent exchange would be indicated    Svitlana Celaya MD  7:03 AM 9/28/2022

## 2022-09-28 NOTE — PROGRESS NOTES
Physical Therapy  Facility/Department: [address] [place] [address]  Physical Therapy Initial Assessment    Name: Fela Acosta  : 1950  MRN: [id number]  Date of Service: 2022    Discharge Recommendations:  Patient would benefit from continued therapy after discharge          Patient Diagnosis(es): The primary encounter diagnosis was Acute cystitis without hematuria. Diagnoses of Atrial fibrillation with RVR ([facility] [address] and Pneumonia due to infectious organism, unspecified laterality, unspecified part of lung were also pertinent to this visit. Past Medical History:  has a past medical history of Arthritis, Cancer ([facility] [address] Chemotherapy management, encounter for, CHF (congestive heart failure) ([facility] [address] COPD (chronic obstructive pulmonary disease) ([facility] [address] Dementia ([facility] [address] Diabetes mellitus ([facility] [address] H/O cardiovascular stress test, History of non-Hodgkin's lymphoma, Hx of blood clots, Hyperlipidemia, Hypertension, Hypothyroidism, and On home O2. Past Surgical History:  has a past surgical history that includes hernia repair; IR BIOPSY ABDOMINAL/RETROPERITONEAL MASS PERCUTANEOUS (2021); IR PORT PLACEMENT > 5 YEARS (2021); Colonoscopy (Left, 2021); Cystoscopy (Right, 10/6/2021); Cholecystectomy, laparoscopic (N/A, 10/9/2021); Tonsillectomy; Cystoscopy (Bilateral, 2021); Cystoscopy (Bilateral, 2022); eye surgery; Carotid endarterectomy (Left, 2022); and Cystoscopy (N/A, 2022).     Assessment   Assessment: The patient demonstrates limited endurance, BLE strength impairments, and decrease balance requiring 1 person A to safely mobilize with RW; The patient will be safe to return home at this level with assistance as needed and would benefit from continued PT services to address deficits  Treatment Diagnosis: Impaired functional mobility and endurance 2* A fib with RVR + Lymphoma  Specific Instructions for Next Treatment: Progress ambulation with focus on heel/toe gait pattern; BLE strengthening with focus on hamstring strengthening  Therapy Prognosis: Fair  Decision Making: Medium Complexity  Exam: ROM, MMT, balance, and functional mobility assessments  Clinical Presentation: evolving  Barriers to Learning: none  Requires PT Follow-Up: Yes  Activity Tolerance  Activity Tolerance: Patient limited by endurance; Patient limited by pain     Plan   Plan  Plan: 5-7 times per week  Specific Instructions for Next Treatment: Progress ambulation with focus on heel/toe gait pattern; BLE strengthening with focus on hamstring strengthening  Current Treatment Recommendations: Strengthening, Balance training, Functional mobility training, Transfer training, Gait training, Endurance training, Pain management, Safety education & training, Therapeutic activities  Safety Devices  Type of Devices: Nurse notified, Left in bed, Bed alarm in place, Call light within reach  Restraints  Restraints Initially in Place: No     Restrictions  Restrictions/Precautions  Restrictions/Precautions: Fall Risk, General Precautions, Up as Tolerated  Required Braces or Orthoses?: No  Position Activity Restriction  Other position/activity restrictions: PT Eval and treat     Subjective   Pain: No pain at this time. General  Chart Reviewed: Yes  Patient assessed for rehabilitation services?: Yes  Additional Pertinent Hx: The patient is a pleasant 74 y.o. male with significant medical history of B-cell lymphoma and abdominal pain secondary to above that is chronic presented with with similar problems every time he presents to the emergency room and especially when he gets home after few days he says he started having pain but the pain is not any different than what he had before he tells me. He is on pain medications through his cancer doctor and he takes those and that helps he also does not eat much when he is home he could not tell me why.   He seems to do better in the hospital he is eating his breakfast nonfenestrated with no nausea or vomiting or increased abdominal pain. He was discharged few weeks ago for similar presentation went [facility] for couple weeks but then shortness got him off from continuing care there he was doing okay at the facility and he just went home less than a week ago. Patient does have ureteral stents but the CT does not show any hydro he does have chronic UTIs second to above.   Response To Previous Treatment: Not applicable  Family / Caregiver Present: No  Referring Practitioner: Dr. Devin Morelos  Referral Date : 09/27/22  Diagnosis: Atrial Fibrillation with RVR  Follows Commands: Within Functional Limits  General Comment  Comments: [id number] [address]ide bains Nurse to proceed with therapy assessments  Subjective  Subjective: \"I came because i was having alot of abdominal pain\"         Social/Functional History  Social/Functional History  Lives With: Spouse (cousin)  Type of Home: House  Home Layout: One level  Home Access: Ramped entrance, Stairs to enter with rails  Entrance Stairs - Number of Steps: 1  Entrance Stairs - Rails: Left  Bathroom Shower/Tub: Shower chair without back, Tub/Shower unit, Doors  Bathroom Toilet: Handicap height (sink next to toilet to assist with transfers)  [employer]: Grab bars in shower, Hand-held shower (suction cup grab bar)  Bathroom Accessibility: Accessible  Home Equipment: Cane, Rollator, Wheelchair-manual, Oxygen, Reacher (2L O2 Continuous at night and PRN in day time)  Has the patient had two or more falls in the past year or any fall with injury in the past year?: Yes (pt reports 4 falls within the last year, feeling weaker than usual)  ADL Assistance: [address]: Independent (wife/cousin are primary)  Ambulation Assistance: Independent (using rollator at home and in community)  Transfer Assistance: Independent  Active : Yes  Mode of Transportation: Car  Occupation: Retired  Type of Occupation: supervisor at [address]: going to [email or web address], watching TV  IADL Comments: pt reports sleeping in recliner chair most of the time; pt reports occasionally sleeping in a flat bed  Additional Comments: Pt's spouse available to assist 24/7  Vision/Hearing  Vision  Vision: Within Functional Limits  Hearing  Hearing: Within functional limits    Cognition   Orientation  Overall Orientation Status: Within Normal Limits  Orientation Level: Oriented X4  Cognition  Overall Cognitive Status: WFL     Objective   Heart Rate: 66  Heart Rate Source: Monitor  BP: (!) 148/67  BP Location: Left upper arm  BP Method: Automatic  Patient Position: Semi fowlers  MAP (Calculated): 94  Resp: 18  SpO2: (!) 89 %  O2 Device: Nasal cannula (2L O2)     Observation/Palpation  Observation: 2 L O2        AROM RLE (degrees)  RLE AROM: WFL  AROM LLE (degrees)  LLE AROM : WFL  AROM RUE (degrees)  RUE General AROM: See OT  AROM LUE (degrees)  LUE General AROM: See OT  Strength RLE  Strength RLE: WFL  Comment: Grossly 4-/5; Except quad strength 4+/5  Strength LLE  Strength LLE: WFL  Comment: Grossly 4-/5; Except quad strength 4+/5  Strength RUE  Comment: See OT  Strength LUE  Comment: See OT     Sensation  Overall Sensation Status: Impaired (Chronic neuropathy in bilateral feet)     Bed mobility  Supine to Sit: Stand by assistance  Sit to Supine: Stand by assistance  Scooting: Stand by assistance  Bed Mobility Comments: SBA for line management/safety on a flat bed without use of rails.  pt able to adjust self appropriately in bed  Transfers  Sit to Stand: Contact guard assistance  Stand to sit: Contact guard assistance  Comment: RW for multiple transfers from bed and chair heights; Warren Sheet and bed linens refreshed; VCs for safe hands placement  Ambulation  Surface: level tile  Device: Rolling Walker  Other Apparatus: O2 (O2 tank + IV Pole managed by Writer and 2nd assist)  Assistance: 2 Person assistance;Contact guard assistance;Stand by assistance  Quality of Gait: Slow and shuffled gait with limited heelcontact and toe off; With Cues pt is able to correct gait pattern to increase heel contact and toe off  Gait Deviations: Decreased step length;Decreased step height; Increased KIKA; Slow Marta; Shuffles  Distance: 45'x2, 5'x2  Comments: pt amb in room and hallway with improving gait mechanics noted with cueing; pt limited by decrease plantar sensation of bilateral feet  Stairs/Curb  Stairs?: No     Balance  Posture: Good  Sitting - Static: Good  Sitting - Dynamic: Fair;+  Standing - Static: Fair;+ (RW)  Standing - Dynamic: Fair (RW)  Comments: No LOB to report  Exercise Treatment: Bed mobility x2, transfers x4 with RW, Ambulation x45', 5'x2            AM-PAC Score  AM-PAC Inpatient Mobility Raw Score : 17 (09/28/22 1322)  AM-PAC Inpatient T-Scale Score : 42.13 (09/28/22 1322)  Mobility Inpatient CMS 0-100% Score: 50.57 (09/28/22 1322)  Mobility Inpatient CMS G-Code Modifier : CK (09/28/22 1322)       Goals  Short Term Goals  Time Frame for Short term goals: 6 visits  Short term goal 1: pt to demo independent bed mobility  Short term goal 2: pt to perform all transfers with Rollator Mod I  Short term goal 3: pt to ambulate 54-65' with Rollator Mod I  Short term goal 4: pt to negotiate ramp x10-30' with rollator to allow for safe home access  Short term goal 5: pt to participate in 30 minutes of therapeutic exercise and activity to improve endurance and tolerance for mobility and ADLs more independently  Patient Goals   Patient goals : Pt does not state goals       Education  Patient Education  Education Given To: Patient  Education Provided: Role of Therapy;Plan of Care;Transfer Training;Equipment; Fall Prevention Strategies  Education Provided Comments: Edu on proper gait mechanics and gait speed in relation to improved safety and dec fall risk  Education Method: Verbal;Demonstration; Teach Back  Barriers to Learning: None  Education Outcome: Verbalized understanding;Demonstrated understanding;Continued education needed      Therapy Time   Individual Concurrent Group Co-treatment   Time In 1322         Time Out 1346         Minutes 24         Timed Code Treatment Minutes: 10 Minutes       Moni Salguero PT   Electronically signed by Moni Salguero PT on 9/28/2022 at 5:38 PM

## 2022-09-28 NOTE — PLAN OF CARE
Problem: Discharge Planning  Goal: Discharge to home or other facility with appropriate resources  9/28/2022 1747 by Ryanne Teague RN  Outcome: Progressing  Flowsheets (Taken 9/28/2022 [address] [facility] by Robyn Loza, RN)  Discharge to home or other facility with appropriate resources: Identify barriers to discharge with patient and caregiver     Problem: Skin/Tissue Integrity  Goal: Absence of new skin breakdown  Description: 1. Monitor for areas of redness and/or skin breakdown  2. Assess vascular access sites hourly  3. Every 4-6 hours minimum:  Change oxygen saturation probe site  4. Every 4-6 hours:  If on nasal continuous positive airway pressure, respiratory therapy assess nares and determine need for appliance change or resting period.   9/28/2022 1747 by Ryanne Teague RN  Outcome: Progressing     Problem: Safety - Adult  Goal: Free from fall injury  9/28/2022 1747 by Ryanne Teague RN  Outcome: Marcia Maldonado (Taken 9/28/2022 1158 by Robyn Loza, RN)  Free From Fall Injury: Instruct family/caregiver on patient safety     Problem: ABCDS Injury Assessment  Goal: Absence of physical injury  Outcome: Progressing  Flowsheets (Taken 9/28/2022 1158 by Robyn Loza, RN)  Absence of Physical Injury: Implement safety measures based on patient assessment

## 2022-09-28 NOTE — PROGRESS NOTES
Patient c/o SOB, O2 sat check was 89%, patient placed on 2 L nasal cannula. O2 saturation returned to mid 90's. Patient states he wears O2 at home for bedtime and PRN.

## 2022-09-29 ENCOUNTER — ANESTHESIA EVENT (OUTPATIENT)
Dept: ENDOSCOPY | Age: 72
DRG: 392 | End: 2022-09-29
Payer: MEDICARE

## 2022-09-29 ENCOUNTER — ANESTHESIA (OUTPATIENT)
Dept: ENDOSCOPY | Age: 72
DRG: 392 | End: 2022-09-29
Payer: MEDICARE

## 2022-09-29 LAB
ABSOLUTE EOS #: 0.2 K/UL (ref 0–0.4)
ABSOLUTE LYMPH #: 1.8 K/UL (ref 1–4.8)
ABSOLUTE MONO #: 0.7 K/UL (ref 0.1–1.3)
ANION GAP SERPL CALCULATED.3IONS-SCNC: 7 MMOL/L (ref 9–17)
BASOPHILS # BLD: 1 % (ref 0–2)
BASOPHILS ABSOLUTE: 0 K/UL (ref 0–0.2)
BUN BLDV-MCNC: 13 MG/DL (ref 8–23)
CALCIUM SERPL-MCNC: 8 MG/DL (ref 8.6–10.4)
CHLORIDE BLD-SCNC: 109 MMOL/L (ref 98–107)
CO2: 23 MMOL/L (ref 20–31)
CREAT SERPL-MCNC: 0.96 MG/DL (ref 0.7–1.2)
EOSINOPHILS RELATIVE PERCENT: 3 % (ref 0–4)
GFR AFRICAN AMERICAN: >60 ML/MIN
GFR NON-AFRICAN AMERICAN: >60 ML/MIN
GFR SERPL CREATININE-BSD FRML MDRD: ABNORMAL ML/MIN/{1.73_M2}
GLUCOSE BLD-MCNC: 127 MG/DL (ref 75–110)
GLUCOSE BLD-MCNC: 146 MG/DL (ref 75–110)
GLUCOSE BLD-MCNC: 73 MG/DL (ref 75–110)
GLUCOSE BLD-MCNC: 80 MG/DL (ref 75–110)
GLUCOSE BLD-MCNC: 85 MG/DL (ref 70–99)
GLUCOSE BLD-MCNC: 99 MG/DL (ref 75–110)
HCT VFR BLD CALC: 27.7 % (ref 41–53)
HEMOGLOBIN: 9.3 G/DL (ref 13.5–17.5)
LIPASE: 24 U/L (ref 13–60)
LYMPHOCYTES # BLD: 28 % (ref 24–44)
MAGNESIUM: 1.4 MG/DL (ref 1.6–2.6)
MCH RBC QN AUTO: 27.8 PG (ref 26–34)
MCHC RBC AUTO-ENTMCNC: 33.5 G/DL (ref 31–37)
MCV RBC AUTO: 82.9 FL (ref 80–100)
MONOCYTES # BLD: 10 % (ref 1–7)
PDW BLD-RTO: 19.3 % (ref 11.5–14.9)
PLATELET # BLD: 144 K/UL (ref 150–450)
PMV BLD AUTO: 8.4 FL (ref 6–12)
POTASSIUM SERPL-SCNC: 4.1 MMOL/L (ref 3.7–5.3)
RBC # BLD: 3.34 M/UL (ref 4.5–5.9)
SEG NEUTROPHILS: 58 % (ref 36–66)
SEGMENTED NEUTROPHILS ABSOLUTE COUNT: 3.9 K/UL (ref 1.3–9.1)
SODIUM BLD-SCNC: 139 MMOL/L (ref 135–144)
WBC # BLD: 6.6 K/UL (ref 3.5–11)

## 2022-09-29 PROCEDURE — 6370000000 HC RX 637 (ALT 250 FOR IP): Performed by: SURGERY

## 2022-09-29 PROCEDURE — 2500000003 HC RX 250 WO HCPCS: Performed by: SURGERY

## 2022-09-29 PROCEDURE — 7100000001 HC PACU RECOVERY - ADDTL 15 MIN: Performed by: SURGERY

## 2022-09-29 PROCEDURE — 94640 AIRWAY INHALATION TREATMENT: CPT

## 2022-09-29 PROCEDURE — 2700000000 HC OXYGEN THERAPY PER DAY

## 2022-09-29 PROCEDURE — 97530 THERAPEUTIC ACTIVITIES: CPT

## 2022-09-29 PROCEDURE — 7100000000 HC PACU RECOVERY - FIRST 15 MIN: Performed by: SURGERY

## 2022-09-29 PROCEDURE — 2500000003 HC RX 250 WO HCPCS: Performed by: ANESTHESIOLOGY

## 2022-09-29 PROCEDURE — 2500000003 HC RX 250 WO HCPCS: Performed by: NURSE ANESTHETIST, CERTIFIED REGISTERED

## 2022-09-29 PROCEDURE — 6370000000 HC RX 637 (ALT 250 FOR IP): Performed by: FAMILY MEDICINE

## 2022-09-29 PROCEDURE — 2060000000 HC ICU INTERMEDIATE R&B

## 2022-09-29 PROCEDURE — 97535 SELF CARE MNGMENT TRAINING: CPT

## 2022-09-29 PROCEDURE — 82947 ASSAY GLUCOSE BLOOD QUANT: CPT

## 2022-09-29 PROCEDURE — 0DB68ZX EXCISION OF STOMACH, VIA NATURAL OR ARTIFICIAL OPENING ENDOSCOPIC, DIAGNOSTIC: ICD-10-PCS | Performed by: SURGERY

## 2022-09-29 PROCEDURE — 2580000003 HC RX 258: Performed by: NURSE ANESTHETIST, CERTIFIED REGISTERED

## 2022-09-29 PROCEDURE — 3609012400 HC EGD TRANSORAL BIOPSY SINGLE/MULTIPLE: Performed by: SURGERY

## 2022-09-29 PROCEDURE — 94761 N-INVAS EAR/PLS OXIMETRY MLT: CPT

## 2022-09-29 PROCEDURE — 2500000003 HC RX 250 WO HCPCS: Performed by: FAMILY MEDICINE

## 2022-09-29 PROCEDURE — 6360000002 HC RX W HCPCS: Performed by: FAMILY MEDICINE

## 2022-09-29 PROCEDURE — 2580000003 HC RX 258: Performed by: FAMILY MEDICINE

## 2022-09-29 PROCEDURE — 80048 BASIC METABOLIC PNL TOTAL CA: CPT

## 2022-09-29 PROCEDURE — 83690 ASSAY OF LIPASE: CPT

## 2022-09-29 PROCEDURE — 6370000000 HC RX 637 (ALT 250 FOR IP): Performed by: INTERNAL MEDICINE

## 2022-09-29 PROCEDURE — 85025 COMPLETE CBC W/AUTO DIFF WBC: CPT

## 2022-09-29 PROCEDURE — 83735 ASSAY OF MAGNESIUM: CPT

## 2022-09-29 PROCEDURE — 3700000001 HC ADD 15 MINUTES (ANESTHESIA): Performed by: SURGERY

## 2022-09-29 PROCEDURE — 2709999900 HC NON-CHARGEABLE SUPPLY: Performed by: SURGERY

## 2022-09-29 PROCEDURE — 6360000002 HC RX W HCPCS: Performed by: NURSE ANESTHETIST, CERTIFIED REGISTERED

## 2022-09-29 PROCEDURE — 2580000003 HC RX 258: Performed by: SURGERY

## 2022-09-29 PROCEDURE — 0DB48ZX EXCISION OF ESOPHAGOGASTRIC JUNCTION, VIA NATURAL OR ARTIFICIAL OPENING ENDOSCOPIC, DIAGNOSTIC: ICD-10-PCS | Performed by: SURGERY

## 2022-09-29 PROCEDURE — 88305 TISSUE EXAM BY PATHOLOGIST: CPT

## 2022-09-29 PROCEDURE — 3700000000 HC ANESTHESIA ATTENDED CARE: Performed by: SURGERY

## 2022-09-29 PROCEDURE — 36415 COLL VENOUS BLD VENIPUNCTURE: CPT

## 2022-09-29 RX ORDER — LIDOCAINE HYDROCHLORIDE 20 MG/ML
15 SOLUTION OROPHARYNGEAL ONCE
Status: COMPLETED | OUTPATIENT
Start: 2022-09-29 | End: 2022-09-29

## 2022-09-29 RX ORDER — SODIUM CHLORIDE 9 MG/ML
INJECTION, SOLUTION INTRAVENOUS CONTINUOUS PRN
Status: DISCONTINUED | OUTPATIENT
Start: 2022-09-29 | End: 2022-09-29 | Stop reason: SDUPTHER

## 2022-09-29 RX ORDER — LIDOCAINE HYDROCHLORIDE 10 MG/ML
INJECTION, SOLUTION EPIDURAL; INFILTRATION; INTRACAUDAL; PERINEURAL PRN
Status: DISCONTINUED | OUTPATIENT
Start: 2022-09-29 | End: 2022-09-29 | Stop reason: SDUPTHER

## 2022-09-29 RX ORDER — PROPOFOL 10 MG/ML
INJECTION, EMULSION INTRAVENOUS PRN
Status: DISCONTINUED | OUTPATIENT
Start: 2022-09-29 | End: 2022-09-29 | Stop reason: SDUPTHER

## 2022-09-29 RX ORDER — MAGNESIUM SULFATE HEPTAHYDRATE 40 MG/ML
2000 INJECTION, SOLUTION INTRAVENOUS ONCE
Status: COMPLETED | OUTPATIENT
Start: 2022-09-29 | End: 2022-09-29

## 2022-09-29 RX ORDER — DEXTROSE MONOHYDRATE 25 G/50ML
25 INJECTION, SOLUTION INTRAVENOUS ONCE
Status: COMPLETED | OUTPATIENT
Start: 2022-09-29 | End: 2022-09-29

## 2022-09-29 RX ORDER — LANOLIN ALCOHOL/MO/W.PET/CERES
400 CREAM (GRAM) TOPICAL DAILY
Status: DISCONTINUED | OUTPATIENT
Start: 2022-09-29 | End: 2022-09-30 | Stop reason: HOSPADM

## 2022-09-29 RX ADMIN — SUCRALFATE 1 G: 1 TABLET ORAL at 20:24

## 2022-09-29 RX ADMIN — OXYCODONE HYDROCHLORIDE 10 MG: 10 TABLET, FILM COATED, EXTENDED RELEASE ORAL at 14:29

## 2022-09-29 RX ADMIN — Medication 1 CAPSULE: at 08:13

## 2022-09-29 RX ADMIN — AMLODIPINE BESYLATE 10 MG: 10 TABLET ORAL at 08:12

## 2022-09-29 RX ADMIN — MEGESTROL ACETATE 400 MG: 40 SUSPENSION ORAL at 08:11

## 2022-09-29 RX ADMIN — MAGNESIUM SULFATE HEPTAHYDRATE 2000 MG: 40 INJECTION, SOLUTION INTRAVENOUS at 09:38

## 2022-09-29 RX ADMIN — CEFTRIAXONE SODIUM 1000 MG: 1 INJECTION, POWDER, FOR SOLUTION INTRAMUSCULAR; INTRAVENOUS at 08:10

## 2022-09-29 RX ADMIN — MULTIPLE VITAMINS W/ MINERALS TAB 1 TABLET: TAB at 08:13

## 2022-09-29 RX ADMIN — BUDESONIDE AND FORMOTEROL FUMARATE DIHYDRATE 2 PUFF: 80; 4.5 AEROSOL RESPIRATORY (INHALATION) at 07:55

## 2022-09-29 RX ADMIN — MUPIROCIN: 20 OINTMENT TOPICAL at 08:10

## 2022-09-29 RX ADMIN — PROPOFOL 20 MG: 10 INJECTION, EMULSION INTRAVENOUS at 12:29

## 2022-09-29 RX ADMIN — PROPOFOL 20 MG: 10 INJECTION, EMULSION INTRAVENOUS at 12:19

## 2022-09-29 RX ADMIN — FLUOXETINE 20 MG: 20 CAPSULE ORAL at 08:12

## 2022-09-29 RX ADMIN — CARVEDILOL 12.5 MG: 12.5 TABLET, FILM COATED ORAL at 08:11

## 2022-09-29 RX ADMIN — APIXABAN 5 MG: 5 TABLET, FILM COATED ORAL at 20:24

## 2022-09-29 RX ADMIN — IPRATROPIUM BROMIDE AND ALBUTEROL SULFATE 3 ML: 2.5; .5 SOLUTION RESPIRATORY (INHALATION) at 23:49

## 2022-09-29 RX ADMIN — MUPIROCIN: 20 OINTMENT TOPICAL at 20:24

## 2022-09-29 RX ADMIN — DOXYCYCLINE 100 MG: 100 INJECTION, POWDER, LYOPHILIZED, FOR SOLUTION INTRAVENOUS at 14:44

## 2022-09-29 RX ADMIN — LOSARTAN POTASSIUM 50 MG: 50 TABLET, FILM COATED ORAL at 08:11

## 2022-09-29 RX ADMIN — OXYCODONE HYDROCHLORIDE 10 MG: 10 TABLET, FILM COATED, EXTENDED RELEASE ORAL at 23:55

## 2022-09-29 RX ADMIN — SODIUM CHLORIDE: 9 INJECTION, SOLUTION INTRAVENOUS at 14:27

## 2022-09-29 RX ADMIN — PROPOFOL 20 MG: 10 INJECTION, EMULSION INTRAVENOUS at 12:24

## 2022-09-29 RX ADMIN — SODIUM CHLORIDE: 9 INJECTION, SOLUTION INTRAVENOUS at 12:06

## 2022-09-29 RX ADMIN — CARVEDILOL 12.5 MG: 12.5 TABLET, FILM COATED ORAL at 17:10

## 2022-09-29 RX ADMIN — MAGNESIUM OXIDE TAB 400 MG (241.3 MG ELEMENTAL MG) 400 MG: 400 (241.3 MG) TAB at 08:15

## 2022-09-29 RX ADMIN — DONEPEZIL HYDROCHLORIDE 10 MG: 10 TABLET ORAL at 08:11

## 2022-09-29 RX ADMIN — ATORVASTATIN CALCIUM 10 MG: 10 TABLET, FILM COATED ORAL at 08:12

## 2022-09-29 RX ADMIN — IPRATROPIUM BROMIDE AND ALBUTEROL SULFATE 3 ML: 2.5; .5 SOLUTION RESPIRATORY (INHALATION) at 13:40

## 2022-09-29 RX ADMIN — BUDESONIDE AND FORMOTEROL FUMARATE DIHYDRATE 2 PUFF: 80; 4.5 AEROSOL RESPIRATORY (INHALATION) at 19:42

## 2022-09-29 RX ADMIN — DOXYCYCLINE 100 MG: 100 INJECTION, POWDER, LYOPHILIZED, FOR SOLUTION INTRAVENOUS at 03:25

## 2022-09-29 RX ADMIN — LIDOCAINE HYDROCHLORIDE 15 ML: 20 SOLUTION ORAL at 11:43

## 2022-09-29 RX ADMIN — SUCRALFATE 1 G: 1 TABLET ORAL at 16:25

## 2022-09-29 RX ADMIN — OMEPRAZOLE 20 MG: 20 CAPSULE, DELAYED RELEASE ORAL at 08:11

## 2022-09-29 RX ADMIN — OMEPRAZOLE 20 MG: 20 CAPSULE, DELAYED RELEASE ORAL at 20:24

## 2022-09-29 RX ADMIN — IPRATROPIUM BROMIDE AND ALBUTEROL SULFATE 3 ML: 2.5; .5 SOLUTION RESPIRATORY (INHALATION) at 15:48

## 2022-09-29 RX ADMIN — PROPOFOL 40 MG: 10 INJECTION, EMULSION INTRAVENOUS at 12:15

## 2022-09-29 RX ADMIN — LIDOCAINE HYDROCHLORIDE 40 MG: 10 INJECTION, SOLUTION EPIDURAL; INFILTRATION; INTRACAUDAL; PERINEURAL at 12:15

## 2022-09-29 RX ADMIN — DEXTROSE MONOHYDRATE 25 G: 25 INJECTION, SOLUTION INTRAVENOUS at 13:00

## 2022-09-29 ASSESSMENT — PAIN SCALES - GENERAL
PAINLEVEL_OUTOF10: 4
PAINLEVEL_OUTOF10: 7
PAINLEVEL_OUTOF10: 3
PAINLEVEL_OUTOF10: 0
PAINLEVEL_OUTOF10: 5
PAINLEVEL_OUTOF10: 4

## 2022-09-29 ASSESSMENT — PAIN DESCRIPTION - DESCRIPTORS
DESCRIPTORS: ACHING;DISCOMFORT
DESCRIPTORS: SHOOTING
DESCRIPTORS: SHARP
DESCRIPTORS: ACHING;DISCOMFORT
DESCRIPTORS: ACHING;DISCOMFORT

## 2022-09-29 ASSESSMENT — PAIN DESCRIPTION - LOCATION
LOCATION: ABDOMEN

## 2022-09-29 ASSESSMENT — PAIN - FUNCTIONAL ASSESSMENT
PAIN_FUNCTIONAL_ASSESSMENT: ACTIVITIES ARE NOT PREVENTED
PAIN_FUNCTIONAL_ASSESSMENT: 0-10
PAIN_FUNCTIONAL_ASSESSMENT: ACTIVITIES ARE NOT PREVENTED

## 2022-09-29 ASSESSMENT — LIFESTYLE VARIABLES: SMOKING_STATUS: 0

## 2022-09-29 ASSESSMENT — PAIN DESCRIPTION - ORIENTATION
ORIENTATION: RIGHT
ORIENTATION: UPPER;MID

## 2022-09-29 ASSESSMENT — PAIN DESCRIPTION - PAIN TYPE
TYPE: CHRONIC PAIN
TYPE: CHRONIC PAIN

## 2022-09-29 ASSESSMENT — ENCOUNTER SYMPTOMS
STRIDOR: 0
SHORTNESS OF BREATH: 0

## 2022-09-29 ASSESSMENT — COPD QUESTIONNAIRES: CAT_SEVERITY: NO INTERVAL CHANGE

## 2022-09-29 NOTE — PROGRESS NOTES
Physical Therapy  DATE: 2022    NAME: Narendra Borges  MRN: [id number]   : 1950    Patient not seen this date for Physical Therapy due to:      [] Cancel by RN or physician due to:    [] Hemodialysis    [] Critical Lab Value Level     [] Blood transfusion in progress    [] Acute or unstable cardiovascular status   _MAP < 55 or more than >115  _HR < 40 or > 130    [] Acute or unstable pulmonary status   -FiO2 > 60%   _RR < 5 or >40    _O2 sats < 85%    [] Strict Bedrest    [x] Off Unit for surgery or procedure; @ 9147. PT OOR for EGD. Will continue to follow. [] Off Unit for testing       [] Pending imaging to R/O fracture    [] Refusal by Patient      [] Other      [] PT being discontinued at this time. Patient independent. No further needs. [] PT being discontinued at this time as the patient has been transferred to hospice care. No further needs.       Electronically signed by Jaz Vega PTA on 22 at 11:42 AM EDT

## 2022-09-29 NOTE — ANESTHESIA PRE PROCEDURE
in water 50 mL IVPB  2,000 mg IntraVENous Once Anitabharathi Braga, DO 25 mL/hr at 09/29/22 0938 2,000 mg at 09/29/22 0938    [MAR Hold] magnesium oxide (MAG-OX) tablet 400 mg  400 mg Oral Daily Roderick T Dixon, DO        [MAR Hold] mupirocin (BACTROBAN) 2 % ointment   Nasal BID Anita Moctezumal, DO   Given at 09/29/22 0810    [MAR Hold] amLODIPine (NORVASC) tablet 10 mg  10 mg Oral Daily Roderick T Dixon, DO   10 mg at 09/29/22 0812    [MAR Hold] cefTRIAXone (ROCEPHIN) 1,000 mg in sodium chloride 0.9 % 50 mL IVPB mini-bag  1,000 mg IntraVENous Q24H Anita Braga, DO   Stopped at 09/29/22 0938    [MAR Hold] magnesium oxide (MAG-OX) tablet 400 mg  400 mg Oral Daily Sean Jeans Adusumilli, MD   400 mg at 09/29/22 0815    [MAR Hold] losartan (COZAAR) tablet 50 mg  50 mg Oral Daily Sean Jeans Adusumilli, MD   50 mg at 09/29/22 0811    [MAR Hold] ibrutinib (IMBRUVICA) chemo tablet 420 mg (Patient Supplied)  420 mg Oral See Admin Instructions Anita Braga, DO   420 mg at 09/28/22 0913    [MAR Hold] doxycycline (VIBRAMYCIN) 100 mg in dextrose 5 % 100 mL IVPB  100 mg IntraVENous Q12H Roderick T Dixon, DO   Stopped at 09/29/22 0426    [MAR Hold] sodium chloride flush 0.9 % injection 10 mL  10 mL IntraVENous PRN Paco Casas MD   10 mL at 09/26/22 1100    [MAR Hold] sodium chloride flush 0.9 % injection 5-40 mL  5-40 mL IntraVENous 2 times per day Anita Braga, DO   10 mL at 09/27/22 2223    [MAR Hold] sodium chloride flush 0.9 % injection 5-40 mL  5-40 mL IntraVENous PRN Roderick HODGE Dixon, DO        [MAR Hold] 0.9 % sodium chloride infusion   IntraVENous PRN Roderick T Dixon, DO        [MAR Hold] acetaminophen (TYLENOL) tablet 650 mg  650 mg Oral Q4H PRN Roderick T Dixon, DO        [MAR Hold] ondansetron (ZOFRAN-ODT) disintegrating tablet 4 mg  4 mg Oral Q8H PRN Roderick T Dixon, DO   4 mg at 09/27/22 1011    Or    [MAR Hold] ondansetron (ZOFRAN) injection 4 mg  4 mg IntraVENous Q6H PRN Roderick T Dixon, DO        [MAR Hold] 0.9 % sodium chloride infusion   IntraVENous Continuous Shelbi Maclachlan, DO 75 mL/hr at 09/28/22 1936 New Bag at 09/28/22 1936    [Held by provider] apixaban (ELIQUIS) tablet 5 mg  5 mg Oral BID Roderick T Dixon, DO   5 mg at 09/27/22 2043    [MAR Hold] albuterol sulfate HFA (PROVENTIL;VENTOLIN;PROAIR) 108 (90 Base) MCG/ACT inhaler 2 puff  2 puff Inhalation Q4H PRN Roderick T Dixon, DO        [MAR Hold] atorvastatin (LIPITOR) tablet 10 mg  10 mg Oral Daily Roderick T Dixon, DO   10 mg at 09/29/22 0812    [MAR Hold] carvedilol (COREG) tablet 12.5 mg  12.5 mg Oral BID  Stanley Mack MD   12.5 mg at 09/29/22 0811    [MAR Hold] donepezil (ARICEPT) tablet 10 mg  10 mg Oral Daily Roderick T Dixon, DO   10 mg at 09/29/22 0811    [MAR Hold] FLUoxetine (PROZAC) capsule 20 mg  20 mg Oral Daily Roderick T Dixon, DO   20 mg at 09/29/22 0812    [MAR Hold] ipratropium-albuterol (DUONEB) nebulizer solution 3 mL  1 vial Inhalation Q4H PRN Roderick T Dixon, DO   3 mL at 09/28/22 1955    [MAR Hold] lactobacillus (CULTURELLE) capsule 1 capsule  1 capsule Oral Daily Roderick T Dixon, DO   1 capsule at 09/29/22 0813    [MAR Hold] levothyroxine (SYNTHROID) tablet 150 mcg  150 mcg Oral Daily Roderick T Dixon, DO   150 mcg at 09/28/22 0521    [MAR Hold] megestrol (MEGACE) 40 MG/ML suspension 400 mg  400 mg Oral Daily Roderick T Dixon, DO   400 mg at 09/29/22 0811    [MAR Hold] therapeutic multivitamin-minerals 1 tablet  1 tablet Oral Daily Roderick T Dixon, DO   1 tablet at 09/29/22 0813    [MAR Hold] omeprazole (PRILOSEC) delayed release capsule 20 mg  20 mg Oral BID Roderick T Dixon, DO   20 mg at 09/29/22 0811    [MAR Hold] oxyCODONE (OXYCONTIN) extended release tablet 10 mg  10 mg Oral Q12H Roderick T Dixon, DO   10 mg at 09/28/22 2339    [MAR Hold] oxyCODONE (ROXICODONE) immediate release tablet 5 mg  5 mg Oral Q4H PRN Roderick T Dixon, DO   5 mg at 09/28/22 0949    [MAR Hold] sucralfate (CARAFATE) tablet 1 g  1 g Oral 4x Daily Roderick T Dixon, DO   1 g at 09/28/22 2114    [MAR Hold] budesonide-formoterol (SYMBICORT) 80-4.5 MCG/ACT inhaler 2 puff  2 puff Inhalation BID Mady Cunningham,    2 puff at 09/29/22 0755       Allergies:  No Known Allergies    Problem List:    Patient Active Problem List   Diagnosis Code    NHL (non-Hodgkin's lymphoma) ([facility] C85.90    Traumatic retroperitoneal hematoma S36.892A    Acute kidney injury ([facility] Z51.8    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    Hydronephrosis due to obstruction of ureter N13.1    Moderate malnutrition (HCC) E44.0    Chronic cholecystitis K81.1    Pleural effusion J90    Cardiomegaly I51.7    Hypothyroidism E03.9    Diabetes mellitus ([facility] E11.9    Hypertensive urgency I16.0    Acute respiratory failure with hypoxia (HCC) J96.01    Hypoxia R09.02    Carotid stenosis, asymptomatic, bilateral I65.23    Simple chronic bronchitis (HCC) J41.0    Dependence on nocturnal oxygen therapy Z99.81    Pneumonia J18.9    LAURO (acute kidney injury) ([facility] N17.9    Failure to thrive in adult R62.7    DVT (deep venous thrombosis) ([facility]) I82.409    Lymphoma, diffuse (HCC) C85.90    Severe malnutrition (HCC) E43    Acute cystitis with hematuria N30.01    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    Atrial fibrillation with RVR ([facility]) I48.91       Past Medical History:        Diagnosis Date    Arthritis     Cancer [facility])     chemotherapy lymphoma last was april 2022    Chemotherapy management, encounter for     CHF (congestive heart failure) ([facility]     COPD (chronic obstructive pulmonary disease) ([facility]     Dementia ([facility]     Diabetes mellitus ([facility]     H/O cardiovascular stress test     History of non-Hodgkin's lymphoma     Hx of blood clots     DVT in right leg     Hyperlipidemia     Hypertension     Hypothyroidism     On home O2     at 5 liters per nasal cannula 24 hrs. per day.         Past Surgical History:        Procedure Laterality Date    CAROTID ENDARTERECTOMY Left 2022    LEFT TYPE I EVERSION LEFT CAROTID ENDARTERECTOMY RE-IMPLANTATION LEFT ICA performed by Amrita Guzmán MD at [facility], LAPAROSCOPIC N/A 10/9/2021    CHOLECYSTECTOMY LAPAROSCOPIC ROBOTIC XI performed by Ana Luisa Florentino MD at [place] [address] Left 2021    COLONOSCOPY POLYPECTOMY SNARE/COLD BIOPSY performed by Anitha Meyers MD at [place] [address] Right 10/6/2021    CYSTOSCOPY PYELOGRAM URETERAL STENT INSERTION performed by Nakul Lopez MD at [place] [address] Bilateral 2021    [address] LEFT INSERTION performed by Nakul Lopez MD at [place] [address] Bilateral 2022    CYSTOSCOPY WITH BILATERAL STENT EXCHANGE performed by Nakul Lopez MD at [facility] 2022    CYSTOSCOPY BILATERAL STENT EXCHANGE performed by Nakul Lopez MD at [address] [place] [address]      cataracts   [address] [facility]      IR BIOPSY ABDOMINAL MASS  2021    IR BIOPSY ABDOMINAL MASS 2021 [facility]    IR PORT PLACEMENT EQUAL OR GREATER THAN 5 YEARS  2021    IR PORT PLACEMENT EQUAL OR GREATER THAN 5 YEARS 2021 [facility]    TONSILLECTOMY      as child       Social History:    Social History     Tobacco Use    Smoking status: Former     Packs/day: 2.00     Years: 45.00     Pack years: 90.00     Types: Cigarettes     Quit date: 2006     Years since quittin.7    Smokeless tobacco: Never   Substance Use Topics    Alcohol use:  Yes     Alcohol/week: 1.7 standard drinks     Types: 2 Standard drinks or equivalent per week     Comment: occassional                                Counseling given: Not Answered      Vital Signs (Current):   Vitals:    22 0641 22 0755 22 0811 22 0824   BP: (!) 166/75  (!) 166/75    Pulse: 65 69  71   Resp: 19 17     Temp: 98.1 °F (36.7 °C)      TempSrc: Oral      SpO2: 93% 98%     Weight: BP Readings from Last 3 Encounters:   09/29/22 (!) 166/75   09/07/22 (!) 190/81   08/22/22 (!) 148/77       NPO Status:                                                                                 BMI:   Wt Readings from Last 3 Encounters:   09/27/22 159 lb 13.3 oz (72.5 kg)   09/07/22 172 lb 13.5 oz (78.4 kg)   08/22/22 160 lb 3.2 oz (72.7 kg)     Body mass index is 22.93 kg/m².     CBC:   Lab Results   Component Value Date/Time    WBC 6.6 09/29/2022 05:04 AM    RBC 3.34 09/29/2022 05:04 AM    RBC 4.38 06/06/2012 06:57 AM    HGB 9.3 09/29/2022 05:04 AM    HCT 27.7 09/29/2022 05:04 AM    MCV 82.9 09/29/2022 05:04 AM    RDW 19.3 09/29/2022 05:04 AM     09/29/2022 05:04 AM     06/06/2012 06:57 AM     HB 9.3    CMP:   Lab Results   Component Value Date/Time     09/29/2022 05:04 AM    K 4.1 09/29/2022 05:04 AM     09/29/2022 05:04 AM    CO2 23 09/29/2022 05:04 AM    BUN 13 09/29/2022 05:04 AM    CREATININE 0.96 09/29/2022 05:04 AM    GFRAA >60 09/29/2022 05:04 AM    LABGLOM >60 09/29/2022 05:04 AM    GLUCOSE 85 09/29/2022 05:04 AM    GLUCOSE 97 06/06/2012 06:57 AM    PROT 5.2 09/26/2022 08:55 AM    CALCIUM 8.0 09/29/2022 05:04 AM    BILITOT 0.5 09/26/2022 08:55 AM    ALKPHOS 149 09/26/2022 08:55 AM    AST 9 09/26/2022 08:55 AM    ALT 10 09/26/2022 08:55 AM       POC Tests:   Recent Labs     09/29/22  0636   POCGLU 80       Coags:   Lab Results   Component Value Date/Time    PROTIME 14.4 09/26/2022 08:55 AM    INR 1.1 09/26/2022 08:55 AM    APTT 54.8 08/31/2022 02:10 PM       HCG (If Applicable): No results found for: PREGTESTUR, PREGSERUM, HCG, HCGQUANT     ABGs: No results found for: PHART, PO2ART, MLO7UUR, RCC7GUK, BEART, S5XEKBWU     Type & Screen (If Applicable):  No results found for: LABABO, LABRH    Drug/Infectious Status (If Applicable):  No results found for: HIV, HEPCAB    COVID-19 Screening (If Applicable):   Lab Results   Component Value Date/Time    COVID19 Not Detected 09/26/2022 09:36 AM           Anesthesia Evaluation  Patient summary reviewed and Nursing notes reviewed no history of anesthetic complications:   Airway: Mallampati: II  TM distance: >3 FB   Neck ROM: full  Mouth opening: > = 3 FB   Dental:          Pulmonary:normal exam  breath sounds clear to auscultation  (+) pneumonia: no interval change and resolved,  COPD: no interval change,      (-) asthma, shortness of breath, recent URI, sleep apnea, rhonchi, wheezes, rales, stridor, not a current smoker and no decreased breath sounds          Patient did not smoke on day of surgery. Cardiovascular:  Exercise tolerance: good (>4 METS),   (+) hypertension: no interval change, CHF:,     (-) pacemaker, valvular problems/murmurs, past MI, CAD, CABG/stent, dysrhythmias,  angina, orthopnea, PND,  DIALLO, murmur, weak pulses,  friction rub, systolic click, carotid bruit,  JVD, peripheral edema, no pulmonary hypertension and no hyperlipidemia    ECG reviewed  Rhythm: regular  Rate: normal  Echocardiogram reviewed         Beta Blocker:  Dose within 24 Hrs         Neuro/Psych:   (+) neuromuscular disease:, psychiatric history: stable with treatmentdepression/anxiety    (-) seizures, TIA, CVA and headaches           GI/Hepatic/Renal: Neg GI/Hepatic/Renal ROS       (-) hiatal hernia, GERD, PUD, hepatitis, liver disease, no renal disease, bowel prep and no morbid obesity       Endo/Other:    (+) DiabetesType II DM, no interval change, , hypothyroidism::., no malignancy/cancer. (-) hyperthyroidism, blood dyscrasia, arthritis, no electrolyte abnormalities, no malignancy/cancer               Abdominal:             Vascular: negative vascular ROS. - PVD, DVT and PE. Other Findings:           Anesthesia Plan      general     ASA 3       Induction: intravenous. MIPS: Postoperative opioids intended and Prophylactic antiemetics administered.   Anesthetic plan and risks discussed with patient. Plan discussed with CRNA.                     Marjan Fuentes MD   9/29/2022

## 2022-09-29 NOTE — PROGRESS NOTES
[address]   INPATIENT OCCUPATIONAL THERAPY  PROGRESS NOTE  Date: 2022  Patient Name: Johanna Acevedo       Room: 8602/9383-80  MRN: [id number]    : 1950  (74 y.o.)  Gender: male   Referring Practitioner: Layne Victor DO  Diagnosis: Atrial fibrillation with RVR    Restrictions/Precautions    Subjective  Subjective  Subjective: Pt. in upright supported position in bed upon arrival. Finishing breathing treatment. Agreeable for therapy session. Pain: No c/o any pain at this time. Objective  Orientation  Overall Orientation Status: Within Normal Limits  Orientation Level: Oriented X4  Cognition  Overall Cognitive Status: WFL  ADL  Feeding: Modified independent   Grooming: Setup  Grooming Skilled Clinical Factors: Standing at sink side for oral hygiene and hand washing. UE Bathing: Setup  UE Bathing Skilled Clinical Factors: Standing at sink side. Toileting: Stand by assistance  Toileting Skilled Clinical Factors: standing at toilet to urinate; pt able to pull up/down pants while standing, without LOB. Balance  Balance  Sitting Balance: Supervision  Standing Balance: Contact guard assistance (CGA during functional mobility. SBA during standing activities.)  Standing Balance  Time: 6min  Activity: functional mobility, functional transfers, self care, toileting  Comment: utilizing RW for UE support    Transfers/Mobility  Bed mobility  Supine to Sit: Supervision  Sit to Supine: Supervision  Scooting: Supervision  Bed Mobility Comments: no use of bed rails, HOB elevated ~20 degrees  Transfers  Sit to stand: Stand by assistance  Stand to sit: Stand by assistance  Toilet Transfers  Toilet Transfer: Stand by assistance  Toilet Transfers Comments: Pt. stood in front of toilet for urination.     Functional Mobility  Functional - Mobility Device: Rolling Walker  Activity: To/from bathroom (Within hallway of facility.)  Assist Level: Contact guard assistance  Functional Mobility Comments: CGA for safety due to limited endurance    Functional Activities       Patient Education  Patient Education  Education Given To: Patient  Education Provided: Role of Therapy, Plan of Care, Transfer Training, Energy Conservation  Education Provided Comments: safety awareness training  Education Method: Verbal, Demonstration, Teach Back  Barriers to Learning: None  Education Outcome: Verbalized understanding, Demonstrated understanding      Goals  Short Term Goals  Time Frame for Short term goals: By discharge  Short Term Goal 1: Pt will perform BADLs mod I, using appropriate adaptive strategies/equipment  Short Term Goal 2: Pt will perform transfers/functional mobility, mod I, using least restrictive device and Good safety  Short Term Goal 3: Pt will V/D use of energy conservation/work simplifcation techniques that are applicable to his daily routine  Short Term Goal 4: Pt will tolerate standing for 8+ minutes with SUP during functional task to increase endurance and strength needed for ADLs/IADLs  Short Term Goal 5: Pt will actively participate in 15+ minutes of therapeutic exercise/functional activity to promote safety and independence with self care and mobility  Short Term Goal 6: Pt will participate in BUE HEP to increase strength and endurance needed for ADLs/IADLs  Plan  Times per Week: 4-6  Current Treatment Recommendations: Strengthening, Balance training, Functional mobility training, Endurance training, Safety education & training, Patient/Caregiver education & training, Self-Care / ADL, Pain management      Assessment  Activity Tolerance  Activity Tolerance: Patient limited by fatigue  Assessment  Performance deficits / Impairments: Decreased functional mobility , Decreased ADL status, Decreased strength, Decreased safe awareness, Decreased endurance, Decreased sensation, Decreased balance, Decreased high-level IADLs  Treatment Diagnosis: impaired self care status  Prognosis: Fair  Decision Making: Medium Complexity  Discharge Recommendations: Patient would benefit from continued therapy after discharge  Safety Devices  Type of Devices: Nurse notified, Left in bed, Bed alarm in place, Call light within reach      AM-PAC Daily Activities Inpatient  AM-PAC Daily Activity Inpatient   How much help for putting on and taking off regular lower body clothing?: A Little  How much help for Toileting?: None  How much help for taking care of personal grooming?: None  How much help for eating meals?: None    OT Minutes  OT Individual Minutes  Time In: 1545  Time Out: [facility]  Minutes: [address], MANJULA/L

## 2022-09-29 NOTE — PLAN OF CARE
Problem: Discharge Planning  Goal: Discharge to home or other facility with appropriate resources  9/29/2022 0333 by Ghanshyam Claros RN  Outcome: Progressing     Problem: Skin/Tissue Integrity  Goal: Absence of new skin breakdown  Description: 1. Monitor for areas of redness and/or skin breakdown  2. Assess vascular access sites hourly  3. Every 4-6 hours minimum:  Change oxygen saturation probe site  4. Every 4-6 hours:  If on nasal continuous positive airway pressure, respiratory therapy assess nares and determine need for appliance change or resting period.   9/29/2022 0333 by Ghanshyam Claros RN  Outcome: Progressing     Problem: Safety - Adult  Goal: Free from fall injury  9/29/2022 0333 by Ghanshyam Claros RN  Outcome: Progressing     Problem: Pain  Goal: Verbalizes/displays adequate comfort level or baseline comfort level  9/29/2022 0333 by Ghanshyam Claros RN  Outcome: Progressing     Problem: Chronic Conditions and Co-morbidities  Goal: Patient's chronic conditions and co-morbidity symptoms are monitored and maintained or improved  9/29/2022 0333 by Ghanshyam Claros RN  Outcome: Progressing

## 2022-09-29 NOTE — ANESTHESIA POSTPROCEDURE EVALUATION
POST- ANESTHESIA EVALUATION       Pt Name: Vicky Wilkerson  MRN: [id number]  YOB: 1950  Date of evaluation: 9/29/2022  Time:  3:02 PM      BP (!) 146/59   Pulse 62   Temp 98.4 °F (36.9 °C)   Resp 14   Wt 159 lb 13.3 oz (72.5 kg)   SpO2 100%   BMI 22.93 kg/m²      Consciousness Level  Awake  Cardiopulmonary Status  Stable  Pain Adequately Treated YES  Nausea / Vomiting  NO  Adequate Hydration  YES  Anesthesia Related Complications NONE      Electronically signed by An Daugherty MD on 9/29/2022 at 3:02 PM       Department of Anesthesiology  Postprocedure Note    Patient: Vicky Wilkerson  MRN: [id number]  YOB: 1950  Date of evaluation: 9/29/2022      Procedure Summary     Date: 09/29/22 Room / Location: [address] AND [facility]    Anesthesia Start: 2296 Anesthesia Stop: 2299    Procedure: EGD BIOPSY (Esophagus) Diagnosis:       Epigastric pain      (EPIGASTRIC PAIN)      ( RM 1917)    Surgeons: Tae Umanzor MD Responsible Provider: An Daugherty MD    Anesthesia Type: General ASA Status: 3          Anesthesia Type: General    Yvette Phase I: Yvette Score: 9    Yvette Phase II:        Anesthesia Post Evaluation

## 2022-09-29 NOTE — OP NOTE
Operative Note      Patient: David Morley  YOB: 1950  MRN: [id number]    Date of Procedure: 9/29/2022  ESOPHAGOGASTRODUODENOSCOPY   ( EGD )  DATE OF PROCEDURE: 9/29/2022     SURGEON: Jose Chowdary MD    ASSISTANT: None    PREOPERATIVE DIAGNOSIS: Epigastric pain. History of lymphoma. POSTOPERATIVE DIAGNOSIS: Small sliding-type hiatal hernia. Diffuse gastritis throughout the stomach. OPERATION: Upper GI endoscopy with Biopsy    ANESTHESIA: Moderate Sedation     ESTIMATED BLOOD LOSS: None    COMPLICATIONS: None. SPECIMENS:  Was Obtained: GE junction biopsy. Gastric biopsies. HISTORY: The patient is a 74y.o. year old male with history of above preop diagnosis. I recommended esophagogastroduodenoscopy with possible biopsy and I explained the risk, benefits, expected outcome, and alternatives to the procedure. Risks included but are not limited to bleeding, infection, respiratory distress, hypotension, and perforation of the esophagus, stomach, or duodenum. Patient understands and is in agreement. PROCEDURE: The patient was given IV conscious sedation. The patient's SPO2 remained above 90% throughout the procedure. Cetacaine spray given. Patient placed in left lateral position. Olympus  videogastroscope was inserted orally under vision into the esophagus without difficulty and advanced into the stomach then through the pylorus up to the second part of duodenum. Findings:    Retropharyngeal area was grossly normal appearing    Esophagus: abnormal: Small sliding-type hiatal hernia. Biopsies obtained from GE junction. No obstruction or mass. No active esophagitis. Stomach:    Fundus and Cardia Examined in Retroflexed View: abnormal: Diffuse gastritis    Body: abnormal: Diffuse gastritis    Antrum: abnormal: Diffuse gastritis    Duodenum:     Descending: normal    Bulb: normal    While withdrawing the scope the above findings were verified and the scope was removed. The patient tolerated the procedure and conscious sedation without unusual events. In the recovery room patient was examined and remains hemodynamically stable. Discharge home when criteria met. Recommendations/Plan:   F/U Biopsies  F/U In Office as instructed  Resume diet. Medical management per admitting physician.     Electronically signed by Lola Dubon MD  on 9/29/2022 at 12:35 PM

## 2022-09-29 NOTE — PROGRESS NOTES
Writer talked to pt's wife Sarika in the hallway; she expressed being tired and going home to relax a bit. She talked about the pt's cancer dx and all that it has involved, especially this past year. Writer provided emotional support and listening presence. 09/29/22 8969   Encounter Summary   Encounter Overview/Reason  Spiritual/Emotional Needs   Service Provided For: Family   Referral/Consult From: [facility]   Support System Spouse; Children;Family members   Last Encounter  09/29/22   Complexity of Encounter Moderate   Spiritual/Emotional needs   Type Emotional Distress   Assessment/Intervention/Outcome   Assessment Compromised coping; Impaired resilience   Intervention Active listening;Discussed illness injury and its impact; End of Life Care;Explored Coping Skills/Resources;Nurtured Hope;Sustaining Presence/Ministry of presence   Outcome Comfort;Engaged in conversation;Expressed feelings, needs, and concerns;Expressed Gratitude;Receptive

## 2022-09-29 NOTE — CARE COORDINATION
ONGOING DISCHARGE PLAN:    Patient is alert and oriented x4. Spoke with patient regarding discharge plan and patient confirms that plan is still to discharge to home with Letty    Patient had a EGD done today     Mag replaced     Resume Eliquis     Recheck labs tomorrow     Will continue to follow for additional discharge needs.     Electronically signed by Amara Mattson RN on 9/29/2022 at 2:21 PM

## 2022-09-29 NOTE — PROGRESS NOTES
Accu check 73.  Patient states he feels short of breath, sat 96% , O2 increased from 2to 3liters nasal cannula

## 2022-09-29 NOTE — PROGRESS NOTES
[id number] [address]      PROGRESS NOTE        Patient:  Karron Kawasaki  YOB: 1950    MRN: [id number]     Acct: [de-identified]     Admit date: 9/26/2022    Pt seen and Chart reviewed. Consultant notes reviewed and care evaluated.     Subjective: Patient states he feels fair the pain is somewhat okay he feels little bit nauseous little bit of pain this morning but no vomiting he did eat yesterday seems to tolerated he is n.p.o. and scheduled for EGD today just to make sure there is nothing going on with his gastric area to cause his pain at the site of his lymphoma which most likely the cause of his symptoms the discussed with Dr. Siria Varela yesterday and feel the lymphoma causing most of his symptoms    Diet:  Diet NPO      Medications:Current Inpatient    Scheduled Meds:   mupirocin   Nasal BID    amLODIPine  10 mg Oral Daily    cefTRIAXone (ROCEPHIN) IV  1,000 mg IntraVENous Q24H    magnesium oxide  400 mg Oral Daily    losartan  50 mg Oral Daily    ibrutinib  420 mg Oral See Admin Instructions    doxycycline (VIBRAMYCIN) IV  100 mg IntraVENous Q12H    sodium chloride flush  5-40 mL IntraVENous 2 times per day    [Held by provider] apixaban  5 mg Oral BID    atorvastatin  10 mg Oral Daily    carvedilol  12.5 mg Oral BID WC    donepezil  10 mg Oral Daily    FLUoxetine  20 mg Oral Daily    lactobacillus  1 capsule Oral Daily    levothyroxine  150 mcg Oral Daily    megestrol  400 mg Oral Daily    therapeutic multivitamin-minerals  1 tablet Oral Daily    omeprazole  20 mg Oral BID    oxyCODONE  10 mg Oral Q12H    sucralfate  1 g Oral 4x Daily    budesonide-formoterol  2 puff Inhalation BID     Continuous Infusions:   sodium chloride      sodium chloride 75 mL/hr at 09/28/22 1936     PRN Meds:sodium chloride flush, sodium chloride flush, sodium chloride, acetaminophen, ondansetron **OR** ondansetron, albuterol sulfate HFA, ipratropium-albuterol, oxyCODONE        Physical Exam:  Vitals: BP (!) 166/75   Pulse 65   Temp 98.1 °F (36.7 °C) (Oral)   Resp 19   Wt 159 lb 13.3 oz (72.5 kg)   SpO2 93%   BMI 22.93 kg/m²   24 hour intake/output:  Intake/Output Summary (Last 24 hours) at 9/29/2022 0726  Last data filed at 9/29/2022 0630  Gross per 24 hour   Intake 2971.33 ml   Output 2200 ml   Net 771.33 ml     Last 3 weights:   Wt Readings from Last 3 Encounters:   09/27/22 159 lb 13.3 oz (72.5 kg)   09/07/22 172 lb 13.5 oz (78.4 kg)   08/22/22 160 lb 3.2 oz (72.7 kg)       Physical Examination:   General appearance - alert, well appearing, and in no distress  Mental status - alert, oriented to person, place, and time  oropharynx clear, no lesions  Chest - clear to auscultation, no wheezes, rales or rhonchi, symmetric air entry  Heart - normal rate, regular rhythm, normal S1, S2, no murmurs, rubs, clicks or gallops  Abdomen - soft, nontender, nondistended, no masses or organomegaly this morning x-ray could not reproduce any tenderness on him he seems to be okay on palpation  Neurological - alert, oriented, normal speech, no focal findings or movement disorder noted}  Extremities - peripheral pulses normal, +1 pitting edema but no calf tenderness no clubbing or cyanosis  Skin - normal coloration and turgor, no rashes, no suspicious skin lesions noted      Component Value Units   POC Glucose Fingerstick [[id number]]    Collected: 09/29/22 0636    Updated: 09/29/22 0642     POC Glucose 80 mg/dL   Basic Metabolic Panel [[id number]] (Abnormal)    Collected: 09/29/22 0504    Updated: 09/29/22 0552    Specimen Source: Blood     Glucose 85 mg/dL    BUN 13 mg/dL    Creatinine 0.96 mg/dL    Calcium 8.0 Low  mg/dL    Sodium 139 mmol/L    Potassium 4.1 mmol/L    Chloride 109 High  mmol/L    CO2 23 mmol/L    Anion Gap 7 Low  mmol/L    GFR Non-African American >60 mL/min    GFR African American >60 mL/min    GFR Comment         Comment: Average GFR for 79or more years old: 76 mL/min/1.73sq m   Chronic Kidney Disease:    <60 mL/min/1.73sq m   Kidney failure:    <15 mL/min/1.73sq m               eGFR calculated using average adult body mass. Additional eGFR calculator available at:         [email or web address]             Lipase [[id number]]    Collected: 09/29/22 0504    Updated: 09/29/22 0552    Specimen Source: Blood     Lipase 24 U/L   Magnesium [[id number]] (Abnormal)    Collected: 09/29/22 0504    Updated: 09/29/22 0552    Specimen Source: Blood     Magnesium 1.4 Low  mg/dL   CBC with Auto Differential [[id number]] (Abnormal)    Collected: 09/29/22 0504    Updated: 09/29/22 0537    Specimen Source: Blood     WBC 6.6 k/uL    RBC 3.34 Low  m/uL    Hemoglobin 9.3 Low  g/dL    Hematocrit 27.7 Low  %    MCV 82.9 fL    MCH 27.8 pg    MCHC 33.5 g/dL    RDW 19.3 High  %    Platelets 981 Low  k/uL    MPV 8.4 fL    Seg Neutrophils 58 %    Lymphocytes 28 %    Monocytes 10 High  %    Eosinophils % 3 %    Basophils 1 %    Segs Absolute 3.90 k/uL    Absolute Lymph # 1.80 k/uL    Absolute Mono # 0.70 k/uL    Absolute Eos # 0.20 k/uL    Basophils Absolute 0.00 k/uL   POC Glucose Fingerstick [[id number]] (Abnormal)    Collected: 09/28/22 2010    Updated: 09/28/22 2016     POC Glucose 130 High  mg/dL   POC Glucose Fingerstick [[id number]]    Collected: 09/28/22 1611    Updated: 09/28/22 1618     POC Glucose 106 mg/dL   XR CHEST PORTABLE [[id number]]    Collected: 09/28/22 0959    Updated: 09/28/22 1611    Narrative:     EXAMINATION:   ONE XRAY VIEW OF THE CHEST     9/28/2022 8:43 am     COMPARISON:   9/26/2022     HISTORY:   ORDERING SYSTEM PROVIDED HISTORY: cough   TECHNOLOGIST PROVIDED HISTORY: Cough   Reason for Exam: cough     FINDINGS:   Heart size is grossly stable. No vascular congestion. Right chest port   remains in place. There is improved aeration at the right base. No new   airspace consolidation, pneumothorax, or pleural effusion.     Impression: Improved aeration at the right base. No acute findings in the chest.    POC Glucose Fingerstick [[id number]] (Abnormal)    Collected: 09/28/22 1111    Updated: 09/28/22 1119     POC Glucose 114 High  mg/dL   EKG 12 Lead [[id number]]    Collected: 09/27/22 0836    Updated: 09/28/22 1028     Ventricular Rate 75 BPM    Atrial Rate 75 BPM    P-R Interval 132 ms    QRS Duration 88 ms    Q-T Interval 388 ms    QTc Calculation (Bazett) 433 ms    P Axis 71 degrees    R Axis 45 degrees    T Axis 58 degrees   Narrative:     Sinus rhythm with marked sinus arrhythmia   Low voltage QRS   Cannot rule out Anterior infarct , age undetermined   Abnormal ECG   When compared with ECG of 26-SEP-2022 08:55, (unconfirmed)   Sinus rhythm has replaced Atrial fibrillation   Vent. rate has decreased BY  57 BPM   QRS voltage has decreased   Criteria for Inferior infarct are no longer Present     Current [employer] Meds      Assessment:  Principal Problem:    Atrial fibrillation with RVR ([facility])  Resolved Problems:    * No resolved hospital problems. *    * No resolved hospital problems.  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    Hydronephrosis due to obstruction of ureter N13.1    Moderate malnutrition (HCC) E44.0    Chronic cholecystitis K81.1    Pleural effusion J90    Cardiomegaly I51.7    Hypothyroidism E03.9    Diabetes mellitus (HCC) E11.9    Hypertensive urgency I16.0    Acute respiratory failure with hypoxia (HCC) J96.01    Hypoxia R09.02    Carotid stenosis, asymptomatic, bilateral I65.23    Simple chronic bronchitis (HCC) J41.0    Dependence on nocturnal oxygen therapy Z99.81    Pneumonia J18.9    LAURO (acute kidney injury) ([address] N17.9    Failure to thrive in adult R62.7    DVT (deep venous thrombosis) ([address] I82.409    Lymphoma, diffuse (HCC) C85.90    Severe malnutrition ([address] E43    Acute cystitis with hematuria N30.01    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    Atrial fibrillation with RVR ([facility]) I48.91      Cardiac arrhythmias with more PVCs and PVCs history converted normal sinus this morning    Hypomagnesemia corrected    Slight bump in his troponin second to his heart rate yesterday doubt acute MI    No evidence of pneumonia on my reading on the chest x-ray probably some linear opacity looks more atelectasis    UTI chronic second to his stents but no evidence of hydro    Abdominal pain which is chronic and recurrent second to his lymphoma controlled with his pain meds    Nausea but no vomiting this time resolved  Abdominal pain still feel most likely second to his lymphoma but he does have epigastric tenderness on palpation to make sure he has no gastric issues such as gastritis or ulcer we will consult surgery for EGD  Nasal MRSA  Chest x-ray negative for acute changes most likely was atelectasis resolved  Hypomagnesemia we will replace with 2 g  Monitor is normal sinus rhythm at a rate about 67             Plan:  Discussed with   Will await his EGD for further treatment    Will resume his Eliquis and meds after the EGD    Once he is taking p.o. we will put him on mag oxide 400 daily  Check magnesium level tomorrow    Debborah Ganser, DO FAA            9/29/2022, 7:26 AM

## 2022-09-30 VITALS
TEMPERATURE: 97.9 F | BODY MASS INDEX: 24.2 KG/M2 | DIASTOLIC BLOOD PRESSURE: 57 MMHG | WEIGHT: 168.65 LBS | SYSTOLIC BLOOD PRESSURE: 123 MMHG | HEART RATE: 65 BPM | RESPIRATION RATE: 18 BRPM | OXYGEN SATURATION: 94 %

## 2022-09-30 LAB
ABSOLUTE EOS #: 0.2 K/UL (ref 0–0.4)
ABSOLUTE LYMPH #: 1.9 K/UL (ref 1–4.8)
ABSOLUTE MONO #: 0.7 K/UL (ref 0.1–1.3)
ANION GAP SERPL CALCULATED.3IONS-SCNC: 7 MMOL/L (ref 9–17)
BASOPHILS # BLD: 0 % (ref 0–2)
BASOPHILS ABSOLUTE: 0 K/UL (ref 0–0.2)
BUN BLDV-MCNC: 17 MG/DL (ref 8–23)
CALCIUM SERPL-MCNC: 7.9 MG/DL (ref 8.6–10.4)
CHLORIDE BLD-SCNC: 112 MMOL/L (ref 98–107)
CO2: 20 MMOL/L (ref 20–31)
CREAT SERPL-MCNC: 1.09 MG/DL (ref 0.7–1.2)
EOSINOPHILS RELATIVE PERCENT: 3 % (ref 0–4)
GFR AFRICAN AMERICAN: >60 ML/MIN
GFR NON-AFRICAN AMERICAN: >60 ML/MIN
GFR SERPL CREATININE-BSD FRML MDRD: ABNORMAL ML/MIN/{1.73_M2}
GLUCOSE BLD-MCNC: 109 MG/DL (ref 75–110)
GLUCOSE BLD-MCNC: 68 MG/DL (ref 75–110)
GLUCOSE BLD-MCNC: 91 MG/DL (ref 70–99)
GLUCOSE BLD-MCNC: 95 MG/DL (ref 75–110)
HCT VFR BLD CALC: 27 % (ref 41–53)
HEMOGLOBIN: 8.8 G/DL (ref 13.5–17.5)
LYMPHOCYTES # BLD: 27 % (ref 24–44)
MAGNESIUM: 1.8 MG/DL (ref 1.6–2.6)
MCH RBC QN AUTO: 27 PG (ref 26–34)
MCHC RBC AUTO-ENTMCNC: 32.6 G/DL (ref 31–37)
MCV RBC AUTO: 82.9 FL (ref 80–100)
MONOCYTES # BLD: 10 % (ref 1–7)
PDW BLD-RTO: 19.4 % (ref 11.5–14.9)
PLATELET # BLD: 136 K/UL (ref 150–450)
PMV BLD AUTO: 8.2 FL (ref 6–12)
POTASSIUM SERPL-SCNC: 4.4 MMOL/L (ref 3.7–5.3)
RBC # BLD: 3.26 M/UL (ref 4.5–5.9)
SEG NEUTROPHILS: 60 % (ref 36–66)
SEGMENTED NEUTROPHILS ABSOLUTE COUNT: 4.3 K/UL (ref 1.3–9.1)
SODIUM BLD-SCNC: 139 MMOL/L (ref 135–144)
SURGICAL PATHOLOGY REPORT: NORMAL
WBC # BLD: 7.1 K/UL (ref 3.5–11)

## 2022-09-30 PROCEDURE — 6370000000 HC RX 637 (ALT 250 FOR IP): Performed by: SURGERY

## 2022-09-30 PROCEDURE — 83735 ASSAY OF MAGNESIUM: CPT

## 2022-09-30 PROCEDURE — 6360000002 HC RX W HCPCS: Performed by: FAMILY MEDICINE

## 2022-09-30 PROCEDURE — 97110 THERAPEUTIC EXERCISES: CPT

## 2022-09-30 PROCEDURE — 80048 BASIC METABOLIC PNL TOTAL CA: CPT

## 2022-09-30 PROCEDURE — 94640 AIRWAY INHALATION TREATMENT: CPT

## 2022-09-30 PROCEDURE — 2700000000 HC OXYGEN THERAPY PER DAY

## 2022-09-30 PROCEDURE — 6360000002 HC RX W HCPCS: Performed by: SURGERY

## 2022-09-30 PROCEDURE — 99232 SBSQ HOSP IP/OBS MODERATE 35: CPT | Performed by: INTERNAL MEDICINE

## 2022-09-30 PROCEDURE — 97116 GAIT TRAINING THERAPY: CPT

## 2022-09-30 PROCEDURE — 2580000003 HC RX 258: Performed by: SURGERY

## 2022-09-30 PROCEDURE — 36415 COLL VENOUS BLD VENIPUNCTURE: CPT

## 2022-09-30 PROCEDURE — 82947 ASSAY GLUCOSE BLOOD QUANT: CPT

## 2022-09-30 PROCEDURE — 85025 COMPLETE CBC W/AUTO DIFF WBC: CPT

## 2022-09-30 PROCEDURE — 2500000003 HC RX 250 WO HCPCS: Performed by: SURGERY

## 2022-09-30 RX ORDER — AMLODIPINE BESYLATE 10 MG/1
10 TABLET ORAL DAILY
Qty: 30 TABLET | Refills: 3 | Status: SHIPPED | OUTPATIENT
Start: 2022-10-01

## 2022-09-30 RX ORDER — HEPARIN SODIUM (PORCINE) LOCK FLUSH IV SOLN 100 UNIT/ML 100 UNIT/ML
300 SOLUTION INTRAVENOUS ONCE
Status: COMPLETED | OUTPATIENT
Start: 2022-09-30 | End: 2022-09-30

## 2022-09-30 RX ORDER — CEPHALEXIN 500 MG/1
500 CAPSULE ORAL 2 TIMES DAILY
Qty: 20 CAPSULE | Refills: 0 | Status: SHIPPED | OUTPATIENT
Start: 2022-09-30 | End: 2022-10-21 | Stop reason: SDUPTHER

## 2022-09-30 RX ORDER — LANOLIN ALCOHOL/MO/W.PET/CERES
400 CREAM (GRAM) TOPICAL DAILY
Qty: 30 TABLET | Refills: 0 | Status: SHIPPED | OUTPATIENT
Start: 2022-10-01

## 2022-09-30 RX ADMIN — MULTIPLE VITAMINS W/ MINERALS TAB 1 TABLET: TAB at 08:55

## 2022-09-30 RX ADMIN — OXYCODONE HYDROCHLORIDE 5 MG: 5 TABLET ORAL at 14:44

## 2022-09-30 RX ADMIN — FLUOXETINE 20 MG: 20 CAPSULE ORAL at 08:56

## 2022-09-30 RX ADMIN — LOSARTAN POTASSIUM 50 MG: 50 TABLET, FILM COATED ORAL at 08:55

## 2022-09-30 RX ADMIN — MEGESTROL ACETATE 400 MG: 40 SUSPENSION ORAL at 08:55

## 2022-09-30 RX ADMIN — MUPIROCIN: 20 OINTMENT TOPICAL at 08:55

## 2022-09-30 RX ADMIN — CARVEDILOL 12.5 MG: 12.5 TABLET, FILM COATED ORAL at 08:57

## 2022-09-30 RX ADMIN — OXYCODONE HYDROCHLORIDE 10 MG: 10 TABLET, FILM COATED, EXTENDED RELEASE ORAL at 12:48

## 2022-09-30 RX ADMIN — MAGNESIUM OXIDE TAB 400 MG (241.3 MG ELEMENTAL MG) 400 MG: 400 (241.3 MG) TAB at 08:55

## 2022-09-30 RX ADMIN — APIXABAN 5 MG: 5 TABLET, FILM COATED ORAL at 08:56

## 2022-09-30 RX ADMIN — DOXYCYCLINE 100 MG: 100 INJECTION, POWDER, LYOPHILIZED, FOR SOLUTION INTRAVENOUS at 03:29

## 2022-09-30 RX ADMIN — SODIUM CHLORIDE, PRESERVATIVE FREE 10 ML: 5 INJECTION INTRAVENOUS at 08:48

## 2022-09-30 RX ADMIN — BUDESONIDE AND FORMOTEROL FUMARATE DIHYDRATE 2 PUFF: 80; 4.5 AEROSOL RESPIRATORY (INHALATION) at 06:11

## 2022-09-30 RX ADMIN — OMEPRAZOLE 20 MG: 20 CAPSULE, DELAYED RELEASE ORAL at 08:55

## 2022-09-30 RX ADMIN — CEFTRIAXONE SODIUM 1000 MG: 1 INJECTION, POWDER, FOR SOLUTION INTRAMUSCULAR; INTRAVENOUS at 09:01

## 2022-09-30 RX ADMIN — SODIUM CHLORIDE: 9 INJECTION, SOLUTION INTRAVENOUS at 01:17

## 2022-09-30 RX ADMIN — DONEPEZIL HYDROCHLORIDE 10 MG: 10 TABLET ORAL at 08:56

## 2022-09-30 RX ADMIN — ATORVASTATIN CALCIUM 10 MG: 10 TABLET, FILM COATED ORAL at 08:56

## 2022-09-30 RX ADMIN — SUCRALFATE 1 G: 1 TABLET ORAL at 12:48

## 2022-09-30 RX ADMIN — Medication 1 CAPSULE: at 08:55

## 2022-09-30 RX ADMIN — Medication 300 UNITS: at 15:30

## 2022-09-30 RX ADMIN — AMLODIPINE BESYLATE 10 MG: 10 TABLET ORAL at 08:57

## 2022-09-30 RX ADMIN — SUCRALFATE 1 G: 1 TABLET ORAL at 08:55

## 2022-09-30 RX ADMIN — LEVOTHYROXINE SODIUM 150 MCG: 0.07 TABLET ORAL at 05:16

## 2022-09-30 RX ADMIN — OXYCODONE HYDROCHLORIDE 5 MG: 5 TABLET ORAL at 09:21

## 2022-09-30 RX ADMIN — IPRATROPIUM BROMIDE AND ALBUTEROL SULFATE 3 ML: 2.5; .5 SOLUTION RESPIRATORY (INHALATION) at 06:11

## 2022-09-30 ASSESSMENT — PAIN SCALES - GENERAL: PAINLEVEL_OUTOF10: 5

## 2022-09-30 NOTE — PLAN OF CARE
Problem: Discharge Planning  Goal: Discharge to home or other facility with appropriate resources  9/30/2022 0555 by Ranjit Domingo RN  Outcome: Progressing     Problem: Skin/Tissue Integrity  Goal: Absence of new skin breakdown  Description: 1. Monitor for areas of redness and/or skin breakdown  2. Assess vascular access sites hourly  3. Every 4-6 hours minimum:  Change oxygen saturation probe site  4. Every 4-6 hours:  If on nasal continuous positive airway pressure, respiratory therapy assess nares and determine need for appliance change or resting period.   9/30/2022 0555 by Ranjit Domingo RN  Outcome: Progressing     Problem: Safety - Adult  Goal: Free from fall injury  9/30/2022 0555 by Ranjit Domingo RN  Outcome: Progressing     Problem: Pain  Goal: Verbalizes/displays adequate comfort level or baseline comfort level  9/30/2022 0555 by Ranjit Domingo RN  Outcome: Progressing     Problem: ABCDS Injury Assessment  Goal: Absence of physical injury  9/30/2022 0555 by Ranjit Domingo RN  Outcome: Progressing     Problem: Chronic Conditions and Co-morbidities  Goal: Patient's chronic conditions and co-morbidity symptoms are monitored and maintained or improved  9/30/2022 0555 by Ranjit Domingo RN  Outcome: Progressing

## 2022-09-30 NOTE — DISCHARGE SUMMARY
Physician Discharge Summary     Patient ID:  Armen Posadas  [id number]  51 y.o.  1950    Admit date: 9/26/2022    Discharge date and time: 9/30/2022    Admitting Physician: Gemma Necessary, DO     Discharge Physician: Gemma Angulo, DO      Admission Diagnoses:   Atrial fibrillation with RVR ([facility] [I48.91]  Acute cystitis without hematuria [N30.00]  Pneumonia due to infectious organism, unspecified laterality, unspecified part of lung [J18.9]    Discharge Diagnoses:   Principal Problem:    Atrial fibrillation with RVR ([facility]  Resolved Problems:    * No resolved hospital problems. *    * No resolved hospital problems. *    * No resolved hospital problems.  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    Hydronephrosis due to obstruction of ureter N13.1    Moderate malnutrition (HCC) E44.0    Chronic cholecystitis K81.1    Pleural effusion J90    Cardiomegaly I51.7    Hypothyroidism E03.9    Diabetes mellitus ([facility]) E11.9    Hypertensive urgency I16.0    Acute respiratory failure with hypoxia ([facility]) J96.01    Hypoxia R09.02    Carotid stenosis, asymptomatic, bilateral I65.23    Simple chronic bronchitis ([facility]) J41.0    Dependence on nocturnal oxygen therapy Z99.81    Pneumonia J18.9    LAURO (acute kidney injury) ([facility] N17.9    Failure to thrive in adult R62.7    DVT (deep venous thrombosis) ([facility]) I82.409    Lymphoma, diffuse ([facility]) C85.90    Severe malnutrition (HCC) E43    Acute cystitis with hematuria N30.01    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    Atrial fibrillation with RVR ([facility]) I48.91      Cardiac arrhythmias with more PVCs and PVCs history converted normal sinus this morning    Hypomagnesemia corrected    Slight bump in his troponin second to his heart rate yesterday doubt acute MI    No evidence of pneumonia on my reading on the chest x-ray probably some linear opacity looks more atelectasis    UTI chronic second to his stents but no evidence of hydro    Abdominal pain which is chronic and recurrent second to his lymphoma controlled with his pain meds    Nausea but no vomiting this time resolved  Abdominal pain still feel most likely second to his lymphoma but he does have epigastric tenderness on palpation to make sure he has no gastric issues such as gastritis or ulcer we will consult surgery for EGD  Nasal MRSA  Chest x-ray negative for acute changes most likely was atelectasis resolved  Hypomagnesemia we will replace with 2 g  Monitor is normal sinus rhythm at a rate about 67  Stridorous on EGD and hiatal hernia              Hospital Course: Patient was admitted with similar symptoms abdominal pain nausea. He has a history of lymphoma had recurrent problems with continuous abdominal pain he takes pain medications. Patient had a chest x-ray ER thought was pneumonia he also has stents and hydro second to his lymphoma pushing on his renal system which is chronic but started on antibiotics patient was feeling better the next day but continued to have off-and-on abdominal pain which thought mostly secondary to his lymphoma but because his continuous discomfort and epigastric discomfort surgery was consulted he had EGD that showed severe gastritis he was placed on Carafate continued with his PPI and his symptoms start resolving and feeling much better he was tolerating diet and patient was discharged to home to be followed by his hematologist who saw him he also had episode of paroxysmal A. fib and seen by his cardiologist.  Patient was already on Eliquis medications adjusted.   POC Glucose Fingerstick [[id number]]     Collected: 09/30/22 0555     Updated: 09/30/22 0611       POC Glucose 95 mg/dL   Basic Metabolic Panel [[id number]] (Abnormal)     Collected: 09/30/22 0456     Updated: 09/30/22 0523     Specimen Source: Blood       Glucose 91 mg/dL     BUN 17 mg/dL     Creatinine 1.09 mg/dL     Calcium 7.9 Low  mg/dL     Sodium 139 mmol/L     Potassium 4.4 mmol/L     Chloride 112 High  mmol/L     CO2 20 mmol/L     Anion Gap 7 Low  mmol/L     GFR Non-African American >60 mL/min     GFR African American >60 mL/min     GFR Comment          Comment: Average GFR for 79or more years old:    76 mL/min/1.73sq m   Chronic Kidney Disease:    <60 mL/min/1.73sq m   Kidney failure:    <15 mL/min/1.73sq m               eGFR calculated using average adult body mass.  Additional eGFR calculator available at:         [employer]             Magnesium [[id number]]     Collected: 09/30/22 0456     Updated: 09/30/22 0523     Specimen Source: Blood       Magnesium 1.8 mg/dL   CBC with Auto Differential [[id number]] (Abnormal)     Collected: 09/30/22 0456     Updated: 09/30/22 0507     Specimen Source: Blood       WBC 7.1 k/uL     RBC 3.26 Low  m/uL     Hemoglobin 8.8 Low  g/dL     Hematocrit 27.0 Low  %     MCV 82.9 fL     MCH 27.0 pg     MCHC 32.6 g/dL     RDW 19.4 High  %     Platelets 323 Low  k/uL     MPV 8.2 fL     Seg Neutrophils 60 %     Lymphocytes 27 %     Monocytes 10 High  %     Eosinophils % 3 %     Basophils 0 %     Segs Absolute 4.30 k/uL     Absolute Lymph # 1.90 k/uL     Absolute Mono # 0.70 k/uL     Absolute Eos # 0.20 k/uL     Basophils Absolute 0.00 k/uL   POC Glucose Fingerstick [[id number]] (Abnormal)     Collected: 09/29/22 2029     Updated: 09/29/22 2035       POC Glucose 146 High  mg/dL   POC Glucose Fingerstick [[id number]]     Collected: 09/29/22 1409     Updated: 09/29/22 1416       POC Glucose 99 mg/dL   POC Glucose Fingerstick [[id number]] (Abnormal)     Collected: 09/29/22 1322     Updated: 09/29/22 1334       POC Glucose 127 High  mg/dL   POC Glucose Fingerstick [[id number]] (Abnormal)     Collected: 09/29/22 1128     Updated: 09/29/22 1138       POC Glucose 73      Position to home    Condition on discharge stable but guarded    Patient Instructions: To eat small frequent meals avoid any greasy foods spicy foods or tomato sauce type food    Discharge Medications:  [unfilled]    Frequency    magnesium oxide (MAG-OX) tablet 400 mg     Discontinue      DAILY    mupirocin (BACTROBAN) 2 % ointment     Discontinue      2 TIMES DAILY    doxycycline (VIBRAMYCIN) 100 mg in dextrose 5 % 100 mL IVPB     Discontinue      EVERY 12 HOURS    losartan (COZAAR) tablet 50 mg     Discontinue      DAILY    ibrutinib (IMBRUVICA) chemo tablet 420 mg (Patient Supplied)     Discontinue    Note to Pharmacy:  Patient takes for 2 days on, 1...     SEE ADMIN INSTRUCTIONS    magnesium oxide (MAG-OX) tablet 400 mg     Discontinue      DAILY    atorvastatin (LIPITOR) tablet 10 mg     Discontinue      DAILY    donepezil (ARICEPT) tablet 10 mg     Discontinue      DAILY    FLUoxetine (PROZAC) capsule 20 mg     Discontinue      DAILY    lactobacillus (CULTURELLE) capsule 1 capsule     Discontinue      DAILY    megestrol (MEGACE) 40 MG/ML suspension 400 mg     Discontinue      DAILY    therapeutic multivitamin-minerals 1 tablet     Discontinue      DAILY    omeprazole (PRILOSEC) delayed release capsule 20 mg     Discontinue      2 TIMES DAILY    sucralfate (CARAFATE) tablet 1 g     Discontinue      4 TIMES DAILY    cefTRIAXone (ROCEPHIN) 1,000 mg in sodium chloride 0.9 % 50 mL IVPB mini-bag     Discontinue      EVERY 24 HOURS    carvedilol (COREG) tablet 12.5 mg     Discontinue      2 TIMES DAILY WITH MEALS    budesonide-formoterol (SYMBICORT) 80-4.5 MCG/ACT inhaler 2 puff     Discontinue      2 TIMES DAILY    levothyroxine (SYNTHROID) tablet 150 mcg     Discontinue      DAILY    amLODIPine (NORVASC) tablet 10 mg     Discontinue      DAILY    oxyCODONE (OXYCONTIN) extended release tablet 10 mg     Discontinue      EVERY 12 HOURS    sodium chloride flush 0.9 % injection 5-40 mL  (Saline Flushes)     Discontinue      2 times per day    oxyCODONE (ROXICODONE) immediate release tablet 5 mg     Discontinue      EVERY 4 HOURS PRN    ipratropium-albuterol (DUONEB) nebulizer solution 3 mL     Discontinue      EVERY 4 HOURS PRN    albuterol sulfate HFA (PROVENTIL;VENTOLIN;PROAIR) 108 (90 Base) MCG/ACT inhaler 2 puff     Discontinue      EVERY 4 HOURS PRN    apixaban (ELIQUIS) tablet 5 mg  (apixaban (ELIQUIS) panel)     Discontinue      2 TIMES DAILY    0.9 % sodium chloride infusion     Discontinue      CONTINUOUS    sodium chloride flush 0.9 % injection 5-40 mL  (Saline Flushes)     Discontinue      PRN    0.9 % sodium chloride infusion  (Saline Flushes)     Discontinue      PRN    acetaminophen (TYLENOL) tablet 650 mg     Discontinue      EVERY 4 HOURS PRN    ondansetron (ZOFRAN-ODT) disintegrating tablet 4 mg  (ondansetron (ZOFRAN) ODT or ondansetron (ZOFRAN) IV)     Discontinue     \"Or\" Linked Group Details    EVERY 8 HOURS PRN    ondansetron (ZOFRAN) injection 4 mg  (ondansetron (ZOFRAN) ODT or ondansetron (ZOFRAN) IV)     Discontinue     \"Or\" Linked Group Details    EVERY 6 HOURS PRN    sodium chloride flush 0.9 % injection 10 mL     Discontinue      AS NEEDED       Activity: activity as tolerated with assist and walker    Diet: ADULT DIET; Regular    Follow-up with DR Uri Ariza in 1 to 2 weeks, patient to call  for an appointment and if has any problems.       Electronically signed by KRISTI Nolasco  on 9/30/2022 at 8:26 AM

## 2022-09-30 NOTE — PROGRESS NOTES
Physical Therapy  Facility/Department: [facility] PROGRESSIVE CARE  Daily Treatment Note  NAME: Ulysses Chapman  : 1950  MRN: [id number]    Date of Service: 2022    Discharge Recommendations:  Patient would benefit from continued therapy after discharge        Patient Diagnosis(es): The primary encounter diagnosis was Acute cystitis without hematuria. Diagnoses of Atrial fibrillation with RVR ([address] Pneumonia due to infectious organism, unspecified laterality, unspecified part of lung, and Epigastric pain were also pertinent to this visit. Assessment   Assessment: Pt ambulates 67'x2 with rollator and CGA, performs STS transfers with Rollator and CGA, and tolerates therapeutic BLE exercise. Activity Tolerance: Patient limited by pain     Plan          Restrictions  Restrictions/Precautions  Restrictions/Precautions: Fall Risk, General Precautions, Up as Tolerated  Required Braces or Orthoses?: No  Position Activity Restriction  Other position/activity restrictions: PT Eval and treat     Subjective    Subjective  Subjective: pt agreeable to therapy treatment, activity, and exercise today.  Pt resting in bed upon arrival and upon exit  Pain: pt reports 5.5/10 pain in belly/abdomen  Orientation  Overall Orientation Status: Within Functional Limits  Orientation Level: Oriented X4  Cognition  Overall Cognitive Status: WFL     Objective   Vitals  Heart Rate: 70  SpO2: 96 %  O2 Device: Nasal cannula (2 L O2)  Bed Mobility Training  Bed Mobility Training: Yes  Overall Level of Assistance: Modified independent  Rolling: Modified independent  Supine to Sit: Modified independent  Sit to Supine: Modified independent  Scooting: Modified independent  Balance  Sitting: Intact  Standing: With support  Transfer Training  Transfer Training: Yes  Overall Level of Assistance: Stand-by assistance  Interventions: Verbal cues (Safe hands placement cues)  Sit to Stand: Stand-by assistance  Stand to Sit: Stand-by assistance  Gait JUNITO Cheng   Electronically signed by Marian Fuentes PT on 9/30/2022 at 4:30 PM

## 2022-09-30 NOTE — PROGRESS NOTES
[id number]      PROGRESS NOTE        Patient:  Fidelia Amin  YOB: 1950    MRN: [id number]     Acct: [de-identified]     Admit date: 9/26/2022    Pt seen and Chart reviewed. Consultant notes reviewed and care evaluated. Subjective: Patient is doing really good this morning he ate his breakfast no complaint he said his stomach feels very good this morning. His EGD showed severe gastritis he was started on Carafate he thinks it might be helping his symptoms. He has no nausea no vomiting he has no increasing cough or chest pain or shortness of breath he feels okay that way. Diet:  ADULT DIET;  Regular      Medications:Current Inpatient    Scheduled Meds:   magnesium oxide  400 mg Oral Daily    mupirocin   Nasal BID    amLODIPine  10 mg Oral Daily    cefTRIAXone (ROCEPHIN) IV  1,000 mg IntraVENous Q24H    magnesium oxide  400 mg Oral Daily    losartan  50 mg Oral Daily    ibrutinib  420 mg Oral See Admin Instructions    doxycycline (VIBRAMYCIN) IV  100 mg IntraVENous Q12H    sodium chloride flush  5-40 mL IntraVENous 2 times per day    apixaban  5 mg Oral BID    atorvastatin  10 mg Oral Daily    carvedilol  12.5 mg Oral BID WC    donepezil  10 mg Oral Daily    FLUoxetine  20 mg Oral Daily    lactobacillus  1 capsule Oral Daily    levothyroxine  150 mcg Oral Daily    megestrol  400 mg Oral Daily    therapeutic multivitamin-minerals  1 tablet Oral Daily    omeprazole  20 mg Oral BID    oxyCODONE  10 mg Oral Q12H    sucralfate  1 g Oral 4x Daily    budesonide-formoterol  2 puff Inhalation BID     Continuous Infusions:   sodium chloride      sodium chloride 75 mL/hr at 09/30/22 0117     PRN Meds:sodium chloride flush, sodium chloride flush, sodium chloride, acetaminophen, ondansetron **OR** ondansetron, albuterol sulfate HFA, ipratropium-albuterol, oxyCODONE        Physical Exam:  Vitals: BP (!) 162/150   Pulse 64   Temp 98.2 °F (36.8 °C) (Oral)   Resp 18   Wt 168 lb 10.4 oz (76.5 kg)   SpO2 96%   BMI 24.20 kg/m²   24 hour intake/output:  Intake/Output Summary (Last 24 hours) at 9/30/2022 0729  Last data filed at 9/30/2022 0433  Gross per 24 hour   Intake 2514.69 ml   Output 2550 ml   Net -35.31 ml     Last 3 weights: Wt Readings from Last 3 Encounters:   09/30/22 168 lb 10.4 oz (76.5 kg)   09/07/22 172 lb 13.5 oz (78.4 kg)   08/22/22 160 lb 3.2 oz (72.7 kg)       Physical Examination:   General appearance - alert, well appearing, and in no distress  Mental status - alert, oriented to person, place, and time  neck supple with midline trachea  Chest - clear to auscultation, no wheezes, rales or rhonchi, symmetric air entry  Heart - normal rate, regular rhythm, normal S1, S2, no murmurs, rubs, clicks or gallops  Abdomen - soft, nontender, nondistended, no masses or organomegaly tenderness on today exam  Neurological - alert, oriented, normal speech, no focal findings or movement disorder noted}  Extremities - peripheral pulses normal, no pedal edema, no clubbing or cyanosis  Skin - normal coloration and turgor, no rashes, no suspicious skin lesions noted      Component Value Units   POC Glucose Fingerstick [[id number]]    Collected: 09/30/22 0555    Updated: 09/30/22 0611     POC Glucose 95 mg/dL   Basic Metabolic Panel [[id number]] (Abnormal)    Collected: 09/30/22 0456    Updated: 09/30/22 0523    Specimen Source: Blood     Glucose 91 mg/dL    BUN 17 mg/dL    Creatinine 1.09 mg/dL    Calcium 7.9 Low  mg/dL    Sodium 139 mmol/L    Potassium 4.4 mmol/L    Chloride 112 High  mmol/L    CO2 20 mmol/L    Anion Gap 7 Low  mmol/L    GFR Non-African American >60 mL/min    GFR African American >60 mL/min    GFR Comment         Comment: Average GFR for 79or more years old:    76 mL/min/1.73sq m   Chronic Kidney Disease:    <60 mL/min/1.73sq m   Kidney failure:    <15 mL/min/1.73sq m               eGFR calculated using average adult body mass.  Additional eGFR calculator available at:         [employer]             Magnesium [[id number]]    Collected: 09/30/22 0456    Updated: 09/30/22 0523    Specimen Source: Blood     Magnesium 1.8 mg/dL   CBC with Auto Differential [[id number]] (Abnormal)    Collected: 09/30/22 0456    Updated: 09/30/22 0507    Specimen Source: Blood     WBC 7.1 k/uL    RBC 3.26 Low  m/uL    Hemoglobin 8.8 Low  g/dL    Hematocrit 27.0 Low  %    MCV 82.9 fL    MCH 27.0 pg    MCHC 32.6 g/dL    RDW 19.4 High  %    Platelets 087 Low  k/uL    MPV 8.2 fL    Seg Neutrophils 60 %    Lymphocytes 27 %    Monocytes 10 High  %    Eosinophils % 3 %    Basophils 0 %    Segs Absolute 4.30 k/uL    Absolute Lymph # 1.90 k/uL    Absolute Mono # 0.70 k/uL    Absolute Eos # 0.20 k/uL    Basophils Absolute 0.00 k/uL   POC Glucose Fingerstick [[id number]] (Abnormal)    Collected: 09/29/22 2029    Updated: 09/29/22 2035     POC Glucose 146 High  mg/dL   POC Glucose Fingerstick [[id number]]    Collected: 09/29/22 1409    Updated: 09/29/22 1416     POC Glucose 99 mg/dL   POC Glucose Fingerstick [[id number]] (Abnormal)    Collected: 09/29/22 1322    Updated: 09/29/22 1334     POC Glucose 127 High  mg/dL   POC Glucose Fingerstick [[id number]] (Abnormal)    Collected: 09/29/22 1128    Updated: 09/29/22 1138     POC Glucose 73         Assessment:  Principal Problem:    Atrial fibrillation with RVR ([facility] [address]  Resolved Problems:    * No resolved hospital problems. *    * No resolved hospital problems.  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    Hydronephrosis due to obstruction of ureter N13.1    Moderate malnutrition (HCC) E44.0    Chronic cholecystitis K81.1    Pleural effusion J90    Cardiomegaly I51.7    Hypothyroidism E03.9    Diabetes mellitus (HCC) E11.9    Hypertensive urgency I16.0    Acute respiratory failure with hypoxia ([facility]) J96.01    Hypoxia R09.02    Carotid stenosis, asymptomatic, bilateral I65.23    Simple chronic bronchitis ([facility]) J41.0    Dependence on nocturnal oxygen therapy Z99.81    Pneumonia J18.9    LAURO (acute kidney injury) ([facility] [address] N17.9    Failure to thrive in adult R62.7    DVT (deep venous thrombosis) ([facility]) I82.409    Lymphoma, diffuse ([facility]) C85.90    Severe malnutrition ([facility]) E43    Acute cystitis with hematuria N30.01    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    Atrial fibrillation with RVR ([facility]) I48.91      Cardiac arrhythmias with more PVCs and PVCs history converted normal sinus this morning    Hypomagnesemia corrected    Slight bump in his troponin second to his heart rate yesterday doubt acute MI    No evidence of pneumonia on my reading on the chest x-ray probably some linear opacity looks more atelectasis    UTI chronic second to his stents but no evidence of hydro    Abdominal pain which is chronic and recurrent second to his lymphoma controlled with his pain meds    Nausea but no vomiting this time resolved  Abdominal pain still feel most likely second to his lymphoma but he does have epigastric tenderness on palpation to make sure he has no gastric issues such as gastritis or ulcer we will consult surgery for EGD  Nasal MRSA  Chest x-ray negative for acute changes most likely was atelectasis resolved  Hypomagnesemia we will replace with 2 g  Monitor is normal sinus rhythm at a rate about 67  Stridorous on EGD and hiatal hernia                 Plan:  Continue with Carafate and Prilosec 20 mg twice daily for now    If patient is doing okay by noon or after lunch she could be discharged home to continue VNS treatment    His blood work and hemoglobin is mostly secondary to his lymphoma but that being monitored outpatient hopefully by his hematologist    Gemma Angulo,   FAAFP           9/30/2022, 7:29 AM

## 2022-09-30 NOTE — PROGRESS NOTES
Med rec over the phone w/ Dr. Trey La. Keflex, Bactroban, and Magnesium to [facility] in [place]. See discharge orders for details.

## 2022-09-30 NOTE — CARE COORDINATION
Face sheet, ebony, and med list faxed to [facility]. Susi from [facility] notified of discharge.  Electronically signed by Maximo Sainz RN on 9/30/2022 at 1:33 PM

## 2022-09-30 NOTE — PROGRESS NOTES
[facility]   Urology Progress Note            Subjective:  Follow-up bilateral ureteral obstruction with ureteral stents in place    Patient Vitals for the past 24 hrs:   BP Temp Temp src Pulse Resp SpO2   09/30/22 0612 -- -- -- 71 20 96 %   09/30/22 0025 -- -- -- -- 18 --   09/30/22 0015 (!) 165/65 98.4 °F (36.9 °C) Oral 63 20 96 %   09/29/22 2355 -- -- -- -- 18 --   09/29/22 2349 -- -- -- 64 20 96 %   09/29/22 1943 -- -- -- 62 16 90 %   09/29/22 1845 (!) 141/59 98.6 °F (37 °C) Oral 60 16 90 %   09/29/22 1615 (!) 133/52 -- -- -- -- 94 %   09/29/22 1548 -- -- -- 67 17 95 %   09/29/22 1459 -- -- -- -- 16 --   09/29/22 1429 -- -- -- -- 14 --   09/29/22 1400 (!) 146/59 98.4 °F (36.9 °C) -- 62 16 100 %   09/29/22 1350 120/63 -- -- 61 16 91 %   09/29/22 1341 -- -- -- -- 13 96 %   09/29/22 1340 130/63 97.5 °F (36.4 °C) -- 61 11 95 %   09/29/22 1330 (!) 134/57 -- -- 60 17 (!) 87 %   09/29/22 1320 (!) 127/55 -- -- 60 14 (!) 88 %   09/29/22 1310 (!) 144/64 -- -- 59 19 97 %   09/29/22 1300 (!) 134/57 -- -- 57 18 92 %   09/29/22 1250 (!) 120/57 -- -- 58 11 (!) 89 %   09/29/22 1240 (!) 85/44 -- -- 57 23 (!) 85 %   09/29/22 1237 (!) 85/44 97.5 °F (36.4 °C) Infrared 55 19 91 %   09/29/22 1216 (!) 84/58 -- -- 69 17 97 %   09/29/22 1053 (!) 143/69 97.3 °F (36.3 °C) Infrared 71 17 98 %   09/29/22 0824 -- -- -- 71 -- --   09/29/22 0811 (!) 166/75 -- -- -- -- --   09/29/22 0755 -- -- -- 69 17 98 %   09/29/22 0641 (!) 166/75 98.1 °F (36.7 °C) Oral 65 19 93 %       Intake/Output Summary (Last 24 hours) at 9/30/2022 0628  Last data filed at 9/30/2022 0433  Gross per 24 hour   Intake 2514.69 ml   Output 3150 ml   Net -635.31 ml       Recent Labs     09/28/22  0542 09/29/22  0504 09/30/22  0456   WBC 6.5 6.6 7.1   HGB 9.3* 9.3* 8.8*   HCT 27.8* 27.7* 27.0*   MCV 82.4 82.9 82.9   * 144* 136*     Recent Labs     09/28/22  0542 09/29/22  0504 09/30/22  0456    139 139   K 4.0 4.1 4.4   * 109* 112*   CO2 22 23 20   BUN 12 13 17   CREATININE 0.94 0.96 1.09       No results for input(s): COLORU, PHUR, LABCAST, WBCUA, RBCUA, MUCUS, TRICHOMONAS, YEAST, BACTERIA, CLARITYU, SPECGRAV, LEUKOCYTESUR, UROBILINOGEN, BILIRUBINUR, BLOODU in the last 72 hours.     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)    Hydronephrosis due to obstruction of ureter    Moderate malnutrition ([address]    Chronic cholecystitis    Pleural effusion    Cardiomegaly    Hypothyroidism    Diabetes mellitus ([address]    Hypertensive urgency    Acute respiratory failure with hypoxia ([address]    Hypoxia    Carotid stenosis, asymptomatic, bilateral    Simple chronic bronchitis (HCC)    Dependence on nocturnal oxygen therapy    Pneumonia    LAURO (acute kidney injury) ([address]    Failure to thrive in adult    DVT (deep venous thrombosis) (HCC)    Lymphoma, diffuse (HCC)    Severe malnutrition ([address]    Acute cystitis with hematuria    Lymphoma of body of stomach (HCC)    Atrial fibrillation with RVR ([address]       Plan: Agree with the present plan of care urology will follow as needed    Charmaine Rojas MD  6:28 AM 9/30/2022

## 2022-09-30 NOTE — PLAN OF CARE
Problem: Discharge Planning  Goal: Discharge to home or other facility with appropriate resources  9/30/2022 1548 by Ana Luisa Winkler RN  Outcome: Adequate for Discharge  9/30/2022 0555 by Lupillo Machado RN  Outcome: Progressing     Problem: Skin/Tissue Integrity  Goal: Absence of new skin breakdown  Description: 1. Monitor for areas of redness and/or skin breakdown  2. Assess vascular access sites hourly  3. Every 4-6 hours minimum:  Change oxygen saturation probe site  4. Every 4-6 hours:  If on nasal continuous positive airway pressure, respiratory therapy assess nares and determine need for appliance change or resting period.   9/30/2022 1548 by Ana Luisa Winkler RN  Outcome: Adequate for Discharge  9/30/2022 0555 by Lupillo Machado RN  Outcome: Progressing     Problem: Safety - Adult  Goal: Free from fall injury  9/30/2022 1548 by Ana Luisa Winkler RN  Outcome: Adequate for Discharge  9/30/2022 0555 by Lupillo Machado RN  Outcome: Progressing     Problem: Pain  Goal: Verbalizes/displays adequate comfort level or baseline comfort level  9/30/2022 1548 by Ana Luisa Winkler RN  Outcome: Adequate for Discharge  9/30/2022 0555 by Lupillo Machado RN  Outcome: Progressing     Problem: ABCDS Injury Assessment  Goal: Absence of physical injury  9/30/2022 1548 by Ana Luisa Winkler RN  Outcome: Adequate for Discharge  9/30/2022 0555 by Lupillo Machado RN  Outcome: Progressing     Problem: Chronic Conditions and Co-morbidities  Goal: Patient's chronic conditions and co-morbidity symptoms are monitored and maintained or improved  9/30/2022 1548 by Ana Luisa Winkler RN  Outcome: Adequate for Discharge  9/30/2022 0555 by Lupillo Machado RN  Outcome: Progressing

## 2022-09-30 NOTE — PROGRESS NOTES
[facility] Progress Note    9/30/2022 8:57 AM      Subjective:  Mr. Viry Machucaelor had EGD on 9/29/2022 and tolerated it well from cardiac standpoint. Patient denies any chest pain or worsening shortness of breath or palpitations or lightheadedness or dizziness. Review of systems:  No fever or chills.               LABS:     Recent Results (from the past 24 hour(s))   POC Glucose Fingerstick    Collection Time: 09/29/22 11:28 AM   Result Value Ref Range    POC Glucose 73 (L) 75 - 110 mg/dL   POC Glucose Fingerstick    Collection Time: 09/29/22  1:22 PM   Result Value Ref Range    POC Glucose 127 (H) 75 - 110 mg/dL   POC Glucose Fingerstick    Collection Time: 09/29/22  2:09 PM   Result Value Ref Range    POC Glucose 99 75 - 110 mg/dL   POC Glucose Fingerstick    Collection Time: 09/29/22  8:29 PM   Result Value Ref Range    POC Glucose 146 (H) 75 - 110 mg/dL   CBC with Auto Differential    Collection Time: 09/30/22  4:56 AM   Result Value Ref Range    WBC 7.1 3.5 - 11.0 k/uL    RBC 3.26 (L) 4.5 - 5.9 m/uL    Hemoglobin 8.8 (L) 13.5 - 17.5 g/dL    Hematocrit 27.0 (L) 41 - 53 %    MCV 82.9 80 - 100 fL    MCH 27.0 26 - 34 pg    MCHC 32.6 31 - 37 g/dL    RDW 19.4 (H) 11.5 - 14.9 %    Platelets 188 (L) 932 - 450 k/uL    MPV 8.2 6.0 - 12.0 fL    Seg Neutrophils 60 36 - 66 %    Lymphocytes 27 24 - 44 %    Monocytes 10 (H) 1 - 7 %    Eosinophils % 3 0 - 4 %    Basophils 0 0 - 2 %    Segs Absolute 4.30 1.3 - 9.1 k/uL    Absolute Lymph # 1.90 1.0 - 4.8 k/uL    Absolute Mono # 0.70 0.1 - 1.3 k/uL    Absolute Eos # 0.20 0.0 - 0.4 k/uL    Basophils Absolute 0.00 0.0 - 0.2 k/uL   Basic Metabolic Panel    Collection Time: 09/30/22  4:56 AM   Result Value Ref Range    Glucose 91 70 - 99 mg/dL    BUN 17 8 - 23 mg/dL    Creatinine 1.09 0.70 - 1.20 mg/dL    Calcium 7.9 (L) 8.6 - 10.4 mg/dL    Sodium 139 135 - 144 mmol/L    Potassium 4.4 3.7 - 5.3 mmol/L    Chloride 112 (H) 98 - 107 mmol/L    CO2 20 20 - 31 mmol/L    Anion Gap 7 (L) 9 - 17 mmol/L    GFR Non-African American >60 >60 mL/min    GFR African American >60 >60 mL/min    GFR Comment         Magnesium    Collection Time: 22  4:56 AM   Result Value Ref Range    Magnesium 1.8 1.6 - 2.6 mg/dL   POC Glucose Fingerstick    Collection Time: 22  5:55 AM   Result Value Ref Range    POC Glucose 95 75 - 110 mg/dL       Pulse Ox: SpO2  Av.3 %  Min: 85 %  Max: 100 %    Supplemental O2: O2 Flow Rate (L/min): 2 L/min     Current Meds:    magnesium oxide  400 mg Oral Daily    mupirocin   Nasal BID    amLODIPine  10 mg Oral Daily    cefTRIAXone (ROCEPHIN) IV  1,000 mg IntraVENous Q24H    magnesium oxide  400 mg Oral Daily    losartan  50 mg Oral Daily    ibrutinib  420 mg Oral See Admin Instructions    doxycycline (VIBRAMYCIN) IV  100 mg IntraVENous Q12H    sodium chloride flush  5-40 mL IntraVENous 2 times per day    apixaban  5 mg Oral BID    atorvastatin  10 mg Oral Daily    carvedilol  12.5 mg Oral BID WC    donepezil  10 mg Oral Daily    FLUoxetine  20 mg Oral Daily    lactobacillus  1 capsule Oral Daily    levothyroxine  150 mcg Oral Daily    megestrol  400 mg Oral Daily    therapeutic multivitamin-minerals  1 tablet Oral Daily    omeprazole  20 mg Oral BID    oxyCODONE  10 mg Oral Q12H    sucralfate  1 g Oral 4x Daily    budesonide-formoterol  2 puff Inhalation BID         Continuous Infusions:    sodium chloride      sodium chloride 75 mL/hr at 22 0117              VITAL SIGNS:    BP (!) 157/65   Pulse 64   Temp 98.2 °F (36.8 °C) (Oral)   Resp 18   Wt 168 lb 10.4 oz (76.5 kg)   SpO2 96%   BMI 24.20 kg/m²  2 L/min      Admit Weight:  173 lb (78.5 kg)    Last 3 weights: Wt Readings from Last 3 Encounters:   22 168 lb 10.4 oz (76.5 kg)   22 172 lb 13.5 oz (78.4 kg)   22 160 lb 3.2 oz (72.7 kg)       BMI: Body mass index is 24.2 kg/m².      INPUT/OUTPUT:        Intake/Output Summary (Last 24 hours) at 2022 0857  Last data filed at 9/30/2022 0433  Gross per 24 hour   Intake 2514.69 ml   Output 1650 ml   Net 864.69 ml         Telemetry shows sinus rhythm. EXAM:     General appearance: awake, alert. Laying in bed comfortably. Neck: No JVD or thyromegaly  Chest: Scattered rhonchi bilaterally. No tenderness. Extremities: No worsening bilateral lower leg edema. Skin:  warm and dry. Neuro:  Able to move all 4 extremities. ASSESSMENT:    Non specific minimally elevated hs Troponin peak 60 due to Atrial fibrillation with RVR. Doubt any acute MI . Can not exclude any underlying ASCAD. No active angina pectoris. Paroxysmal Atrial fibrillation with RVR and intermittent palpitations. Normal LVEF. Abnormal EKG. Hypomagnesemia - repleted. Essential Hypertension. Non Hodgkin's Lymphoma. Other problems as charted. REC/PLAN:    Tolerated EGD well on 9/29/2022 which revealed small sliding-type hiatal hernia, diffuse gastritis throughout the stomach with biopsy performed, is officially charted. Back on Eliquis post EGD. Continue on all current cardiac medications. Okay to discharge anytime from cardiac standpoint on current cardiac medications with plans to follow-up in our office [phone] in 4 to 6 weeks or sooner as needed. Discussed with PCP, Dr. Rajeev Paez and with the charge nurse. Signing off. Thank you. Electronically signed by Gurinder Soto MD, [facility]        PLEASE NOTE:  This progress note was completed using a voice transcription system. Every effort was made to ensure accuracy. However, inadvertent computerized transcription errors may be present.

## 2022-09-30 NOTE — PROGRESS NOTES
Today's Date: 9/30/2022  Patient Name: Crys Nieto  Date of admission: 9/26/2022  8:16 AM  Patient's age: 74 y.o., 1950  Admission Dx: Atrial fibrillation with RVR ([facility] [I48.91]  Acute cystitis without hematuria [N30.00]  Pneumonia due to infectious organism, unspecified laterality, unspecified part of lung [J18.9]    Reason for Consult: management recommendations  Requesting Physician: Kody Hart DO    CHIEF COMPLAINT: Non-Hodgkin's lymphoma, atrial fibrillation    INTERIM HISTORY  Pt is seen and examined, he had EGD yesterday that showed some gastritis and columnar metaplasia. Otherwise no active bleeding or no tumors were appreciated. Patient is feeling well and wants to go home he states that his pain is better. HISTORY OF PRESENT ILLNESS:      The patient is a 74 y.o.  male who is admitted to the hospital for abdominal pain. Patient is known to us with history of non-Hodgkin's lymphoma, recently relapsed. He has been started on Imbruvica with essentially stable disease. Upon emergency room evaluation, the patient was found to be in atrial fibrillation with RVR. He was managed conservatively and now his heart rhythm is back in sinus with frequent PVCs/PACs. Patient is following with cardiology for congestive heart failure. He has multiple comorbidities. Patient is seen and evaluated. Continues to complain of lower abdominal pain. Urology was consulted for ureteric obstruction and the need for stent. He has an underlying diabetes, cardiovascular disease, dementia, hypertension, hyperlipidemia and hypothyroidism.   Overall performance status is ECOG 2    Past Medical History:   has a past medical history of Arthritis, Cancer ([address] Chemotherapy management, encounter for, CHF (congestive heart failure) ([address] COPD (chronic obstructive pulmonary disease) ([address] Dementia ([address] Diabetes mellitus ([address] H/O cardiovascular stress test, History of non-Hodgkin's lymphoma, Hx of blood clots, Hyperlipidemia, Hypertension, Hypothyroidism, and On home O2. Past Surgical History:   has a past surgical history that includes hernia repair; IR BIOPSY ABDOMINAL/RETROPERITONEAL MASS PERCUTANEOUS (7/6/2021); IR PORT PLACEMENT > 5 YEARS (8/31/2021); Colonoscopy (Left, 9/24/2021); Cystoscopy (Right, 10/6/2021); Cholecystectomy, laparoscopic (N/A, 10/9/2021); Tonsillectomy; Cystoscopy (Bilateral, 11/24/2021); Cystoscopy (Bilateral, 2/16/2022); eye surgery; Carotid endarterectomy (Left, 6/28/2022); Cystoscopy (N/A, 7/20/2022); and Upper gastrointestinal endoscopy (N/A, 9/29/2022). Medications:    Reviewed in Epic     Allergies:  Patient has no known allergies. Social History:   reports that he quit smoking about 16 years ago. His smoking use included cigarettes. He has a 90.00 pack-year smoking history. He has never used smokeless tobacco. He reports current alcohol use of about 1.7 standard drinks per week. He reports current drug use. Drug: Marijuana [place]). Family History: family history includes Alzheimer's Disease in his father; Diabetes in his mother; Heart Disease in his brother and brother. REVIEW OF SYSTEMS:    Constitutional: No fever or chills. Forgetful, could not give much history. Most information obtained from the chart. He denies any weight loss. He has lower abdominal pain  Eyes: No eye discharge, double vision, or eye pain   HEENT: negative for sore mouth, sore throat, hoarseness and voice change   Respiratory: negative for cough , sputum, dyspnea, wheezing, hemoptysis, chest pain   Cardiovascular: negative for chest pain, dyspnea, palpitations, orthopnea, PND   Gastrointestinal: negative for nausea, vomiting, diarrhea, constipation, positive for lower abdominal pain, no  Dysphagia, hematemesis and hematochezia   Genitourinary: Dysuria but no hematuria, frequency  Integument: negative for rash, skin lesions, bruises.    Hematologic/Lymphatic: negative for easy bruising, bleeding, lymphadenopathy, or petechiae   Endocrine: negative for heat or cold intolerance,weight changes, change in bowel habits and hair loss   Musculoskeletal: negative for myalgias, arthralgias, pain, joint swelling,and bone pain   Neurological: negative for headaches, dizziness, seizures, weakness, numbness    PHYSICAL EXAM:      BP (!) 162/150   Pulse 64   Temp 98.2 °F (36.8 °C) (Oral)   Resp 18   Wt 168 lb 10.4 oz (76.5 kg)   SpO2 96%   BMI 24.20 kg/m²    Temp (24hrs), Av °F (36.7 °C), Min:97.3 °F (36.3 °C), Max:98.6 °F (37 °C)    General appearance -ill-appearing elderly man who appears her stated age. He is forgetful and could not give me much history. Most information obtained from the chart.   Mental status - alert and cooperative   Eyes - pupils equal and reactive, extraocular eye movements intact   Ears - bilateral TM's and external ear canals normal   Mouth - mucous membranes moist, pharynx normal without lesions   Neck - supple, no significant adenopathy   Lymphatics - no palpable lymphadenopathy, no hepatosplenomegaly   Chest - clear to auscultation, no wheezes, rales or rhonchi, symmetric air entry   Heart -heart examination is irregular but the monitor shows sinus rhythm with frequent PVCs  Abdomen - soft,  mildly distended, mild diffuse tenderness, no hepatosplenomegaly appreciated  Neurological - alert, oriented, normal speech, no focal findings or movement disorder noted   Musculoskeletal - no joint tenderness, deformity or swelling   Extremities - peripheral pulses normal, no pedal edema, no clubbing or cyanosis   Skin - normal coloration and turgor, no rashes, no suspicious skin lesions noted ,    DATA:    Labs:   CBC:   Recent Labs     22  0504 22  0456   WBC 6.6 7.1   HGB 9.3* 8.8*   HCT 27.7* 27.0*   * 136*     BMP:   Recent Labs     22  0504 22  0456    139   K 4.1 4.4   CO2 23 20   BUN 13 17   CREATININE 0.96 1.09   LABGLOM >60 >60 GLUCOSE 85 91     PT/INR:   No results for input(s): PROTIME, INR in the last 72 hours. IMAGING DATA:      Primary Problem  Atrial fibrillation with RVR [facility])    Active Hospital Problems    Diagnosis Date Noted    Atrial fibrillation with RVR ([facility] [address] [address] [I48.91] 09/26/2022     Priority: Medium         IMPRESSION:   Atrial fibrillation, reverted to sinus rhythm  Non-Hodgkin's lymphoma, relapsed on Imbruvica  Retroperitoneal/root of mesentery adenopathy  Urinary obstruction    RECOMMENDATIONS:  The patient condition seems to be improving. I think he is ready for discharge. We can resume Imbruvica upon discharge and follow him as an outpatient. Appreciate the help from GI and primary team.  Continue supportive care. We will follow him as an outpatient  Discussed with patient and Nurse. Thank you for asking us to see this patient.     HONG PLUMMER [facility] MD Darlene  Hematologist/Medical Oncologist  Cell: [phone]

## 2022-09-30 NOTE — PROGRESS NOTES
[address]   OCCUPATIONAL THERAPY MISSED TREATMENT NOTE   INPATIENT   Date: 22  Patient Name: Joel Desir       Room: 1129/0683-99  MRN: [id number]   Account #: [de-identified]    : 1950  (74 y.o.)  Gender: male   Referring Practitioner: Ayana Ochoa DO  Diagnosis: Atrial fibrillation with RVR             REASON FOR MISSED TREATMENT:  Pt lying supine in bed upon entry, writer introduces self and attempts pt engagement in OT. Pt reports waiting to go to IR, [employer] unaware of pt going to IR. Writer returns to room and updates pt, writer then encourages further therapy however pt refuses and states he just wants to go home.       Attempt: [address] Kristel SMANJULA/L

## 2022-09-30 NOTE — ANESTHESIA POSTPROCEDURE EVALUATION
Department of Anesthesiology  Postprocedure Note    Patient: David Farah  MRN: [id number]  YOB: 1950  Date of evaluation: 9/30/2022      Procedure Summary     Date: 09/29/22 Room / Location: [place] [address] 02 / [facility] AND [facility]    Anesthesia Start: 4205 Anesthesia Stop: 4034    Procedure: EGD BIOPSY (Esophagus) Diagnosis:       Epigastric pain      (EPIGASTRIC PAIN)      (IP RM 2312)    Surgeons: Loreta Zurita MD Responsible Provider: Candelario Phelps MD    Anesthesia Type: General ASA Status: 3          Anesthesia Type: General    Yvette Phase I: Yvette Score: 9    Yvette Phase II:        Anesthesia Post Evaluation    Comments: POD #1 Patient seen lying in bed. Denied any anesthesia related issues.

## 2022-09-30 NOTE — CARE COORDINATION
ONGOING DISCHARGE PLAN:    Patient is alert and oriented x4. Spoke with patient regarding discharge plan and patient confirms that plan is still home with [facility]. POD #1 EGD-severe gastritis   Oral carafate  Per cardio-resume eliquis (afib) f/u OP 4-6 wks  IV rocephin completed today    Will continue to follow for additional discharge needs.     Electronically signed by Carroll Mendiola, RN on 9/30/2022 at 11:18 AM

## 2022-09-30 NOTE — PROGRESS NOTES
..Discharge teaching and instructions for diagnosis Atrial fibrillation RVR and acute cystitis completed with patient using teachback method. AVS reviewed. Printed prescriptions given to patient. Patient voiced understanding regarding prescriptions, follow up appointments, and care of self at home.  Discharged in a wheelchair to  home with support per family

## 2022-09-30 NOTE — CARE COORDINATION
[address] Encounter Date/Time: 2022 [address] [place] [address] Account: [de-identified]    MRN: [id number]    Patient: Safia Estrada    Contact Serial #: [id number]      ENCOUNTER          Patient Class: I Private Enc? No Unit RM BD: [facility] PROG    Hospital Service: MED   Encounter DX: Atrial fibrillation with*   ADM Provider: Genesis Fonseca DO   Procedure:     ATT Provider: Genesis Fonseca DO   REF Provider:        Admission DX: Atrial fibrillation with RVR ([facility] [address] Acute cystitis without hematuria, Pneumonia due to infectious organism, unspecified laterality, unspecified part of lung and DX codes: I48.91, N30.00, J18.9      PATIENT                 Name: Safia Estrada : 1950 (72 yrs)   Address: [address] Sex: Male   [place]: [address] [place] [address] [address] [address]         Marital Status:    Employer: [employer]         Confucianism: None   Primary Care Provider: Flor Corbett DO         Primary Phone: [phone]   EMERGENCY CONTACT   Contact Name Legal Guardian? Relationship to Patient Home Phone Work Phone   1. Sarika Gresham  2. Ricardo Rosa Maria    No Spouse  Child [phone]              GUARANTOR            Guarantor: Safia Estrada     : 1950   Address: [address] [place] [address] Sex: Male     [place] [id number]     Relation to Patient: Self       Home Phone: [phone]   Guarantor ID: [id number]       Work Phone:     Guarantor Employer: [address]         [facility]: [facility] [address]: [address] [place] [address] Address:  [id number]       Group Number: [id number] Insurance Type: [address] Name: Mansi Cortez : 1950   Subscriber ID: [id number] Pat. Rel. to Sub: Self   SECONDARY INSURANCE   Payor:   Plan:     Payor Address:  ,           Group Number:   Insurance Type:     Subscriber Name:   Subscriber :     Subscriber ID:   Pat.  Rel. to Sub: CSN: [id number]    Continuity of Care Form    Patient Name: Lynnette Curran   :    MRN:  [id number]    Admit date:  2022  Discharge date:  2022    Code Status Order: Full Code   Advance Directives:     Admitting Physician:  Joanna Hernandez DO  PCP: Kervin Slaughter DO    Discharging Nurse: [facility] Unit/Room#: 2094/2094-01  Discharging Unit Phone Number: [phone]    Emergency Contact:   Extended Emergency Contact Information  Primary Emergency Contact: [place], [address] Phone: [phone]  Mobile Phone: [phone]  Relation: Spouse  Secondary Emergency Contact: Teresa Blair  Address:  [address] [facility] [address], [address] [facility] [facility] [address] [facility] Phone: [phone]  Mobile Phone: [phone]  Relation: Child   needed?  No    Past Surgical History:  Past Surgical History:   Procedure Laterality Date    CAROTID ENDARTERECTOMY Left 2022    LEFT TYPE I EVERSION LEFT CAROTID ENDARTERECTOMY RE-IMPLANTATION LEFT ICA performed by Yocasta Moreno MD at [address] [facility], LAPAROSCOPIC N/A 10/9/2021    CHOLECYSTECTOMY LAPAROSCOPIC ROBOTIC XI performed by Tasha Shrestha MD at [address] [facility] Left 2021    COLONOSCOPY POLYPECTOMY SNARE/COLD BIOPSY performed by Carolina Wells MD at [address] [place] [address] Right 10/6/2021    CYSTOSCOPY PYELOGRAM URETERAL STENT INSERTION performed by Kati Barkley MD at [address] [facility] Bilateral 2021    CYSTOSCOPY URETERAL [address] performed by Kati Barkley MD at [address] [facility] Bilateral 2022    CYSTOSCOPY WITH BILATERAL STENT EXCHANGE performed by Kati Barkley MD at [id number] [place] [address] N/A 2022    CYSTOSCOPY BILATERAL STENT EXCHANGE performed by Kati Barkley MD at [facility] [address]      IR BIOPSY ABDOMINAL MASS  7/6/2021    IR BIOPSY ABDOMINAL MASS 7/6/2021 [facility] PROCEDURES    IR PORT PLACEMENT EQUAL OR GREATER THAN 5 YEARS  8/31/2021    IR PORT PLACEMENT EQUAL OR GREATER THAN 5 YEARS 8/31/2021 [facility] SPECIAL PROCEDURES    TONSILLECTOMY      as child       Immunization History:   Immunization History   Administered Date(s) Administered    Influenza, FLUARIX, FLULAVAL, Vivien Carte (age 10 mo+) AND AFLURIA, (age 1 y+), PF, 0.5mL 10/12/2021    Influenza, Triv, inactivated, subunit, adjuvanted, IM (Fluad 65 yrs and older) 09/28/2019       Active Problems:  Patient Active Problem List   Diagnosis Code    NHL (non-Hodgkin's lymphoma) ([facility] C85.90    Traumatic retroperitoneal hematoma S36.892A    Acute kidney injury ([facility] [address] [facility] D77.2    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    Hydronephrosis due to obstruction of ureter N13.1    Moderate malnutrition (HCC) E44.0    Chronic cholecystitis K81.1    Pleural effusion J90    Cardiomegaly I51.7    Hypothyroidism E03.9    Diabetes mellitus ([address] E11.9    Hypertensive urgency I16.0    Acute respiratory failure with hypoxia (HCC) J96.01    Hypoxia R09.02    Carotid stenosis, asymptomatic, bilateral I65.23    Simple chronic bronchitis (HCC) J41.0    Dependence on nocturnal oxygen therapy Z99.81    Pneumonia J18.9    LAURO (acute kidney injury) ([address] N17.9    Failure to thrive in adult R62.7    DVT (deep venous thrombosis) ([address] I82.409    Lymphoma, diffuse (HCC) C85.90    Severe malnutrition ([address] E43    Acute cystitis with hematuria N30.01    Lymphoma of body of stomach ([address] C85.99    Atrial fibrillation with RVR ([address] I48.91       Isolation/Infection:   Isolation            No Isolation          Patient Infection Status       Infection Onset Added Last Indicated Last Indicated By Review Planned Expiration Resolved Resolved By    MRSA 09/26/22 09/27/22 09/26/22 MRSA DNA Probe, Nasal        Resolved    COVID-19 (Rule Out) 09/26/22 09/26/22 09/26/22 COVID-19 & Influenza Combo (Ordered)   09/26/22 Rule-Out Test Resulted    COVID-19 (Rule Out) 08/31/22 08/31/22 08/31/22 COVID-19, Rapid (Ordered)   08/31/22 Rule-Out Test Resulted    COVID-19 (Rule Out) 02/16/22 02/16/22 02/16/22 COVID-19 & Influenza Combo (Ordered)   02/17/22 Rule-Out Test Resulted    COVID-19 (Rule Out) 01/28/22 01/28/22 01/28/22 COVID-19, Rapid (Ordered)   01/28/22 Rule-Out Test Resulted            Nurse Assessment:  Last Vital Signs: BP (!) 169/72   Pulse 79   Temp 98.5 °F (36.9 °C) (Oral)   Resp 16   Wt 173 lb (78.5 kg)   SpO2 96%   BMI 24.82 kg/m²     Last documented pain score (0-10 scale): Pain Level: 6  Last Weight:   Wt Readings from Last 1 Encounters:   09/26/22 173 lb (78.5 kg)     Mental Status:  oriented and alert    IV Access:  - None    Nursing Mobility/ADLs:  Walking   Independent  Transfer  Independent  Bathing  Independent  Dressing  Independent  Toileting  Independent  Feeding  Independent  Med Admin  Independent  Med Delivery   whole and prefers mixed with applesauce    Wound Care Documentation and Therapy:        Elimination:  Continence: Bowel: No  Bladder: No  Urinary Catheter: None   Colostomy/Ileostomy/Ileal Conduit: No       Date of Last BM: 9/30/2022    Intake/Output Summary (Last 24 hours) at 9/27/2022 1143  Last data filed at 9/26/2022 2000  Gross per 24 hour   Intake 121 ml   Output 225 ml   Net -104 ml     I/O last 3 completed shifts: In: 121 [P.O.:121]  Out: 225 [Urine:225]    Safety Concerns: At Risk for Falls    Impairments/Disabilities:      None    Nutrition Therapy:  Current Nutrition Therapy:   - Oral Diet:  General and Low Sodium (2gm)    Routes of Feeding: Oral  Liquids: No Restrictions  Daily Fluid Restriction: no  Last Modified Barium Swallow with Video (Video Swallowing Test): not done    Treatments at the Time of Hospital Discharge:   Respiratory Treatments: see MAR  Oxygen Therapy:  is on oxygen at 2 L/min per nasal cannula. Reji Herron  Ventilator:    - No ventilator support    Rehab Therapies: Physical Therapy and Occupational Therapy  Weight Bearing Status/Restrictions: No weight bearing restrictions  Other Medical Equipment (for information only, NOT a DME order): Other Treatments: skilled nursing assessment, medication education and monitoring, resume previous orders, Palliative Care consult    Patient's personal belongings (please select all that are sent with patient):  None    RN SIGNATURE:  Electronically signed by Zeinab Joyner RN on 9/30/22 at 1:23 PM EDT    CASE MANAGEMENT/SOCIAL WORK SECTION    Inpatient Status Date: 9/26/22    Readmission Risk Assessment Score:  Readmission Risk              Risk of Unplanned Readmission:  39           Discharging to Facility/ . [address]  [address] [address]  Phone [phone]  Fax  [phone]      Dialysis Facility (if applicable)   Name:  Address:  Dialysis Schedule:  Phone:  Fax:    / signature: Electronically signed by Deidre Peres RN on 9/27/22 at 11:43 AM EDT    PHYSICIAN SECTION    Prognosis: Fair    Condition at Discharge: Stable    Rehab Potential (if transferring to Rehab): Fair    Recommended Labs or Other Treatments After Discharge: cbcd bmp in 7 days    Physician Certification: I certify the above information and transfer of Safia Estrada  is necessary for the continuing treatment of the diagnosis listed and that he requires Home Care for greater 30 days.      Update Admission H&P: No change in H&P    PHYSICIAN SIGNATURE:  Electronically signed by Genesis Fonseca DO on 9/30/22 at 8:25 AM EDT  Medication List    ASK your doctor about these medications    ASK your doctor about these medications   albuterol sulfate  (90 Base) MCG/ACT inhaler  Commonly known as: PROVENTIL;VENTOLIN;PROAIR  Inhale 2 puffs into the lungs every 4 hours as needed for Wheezing   amLODIPine 5 MG tablet  Commonly known as: NORVASC  Take 10 mg by mouth daily  Ask about: Which instructions should I use? apixaban 5 MG Tabs tablet  Commonly known as: ELIQUIS  Take 1 tablet by mouth in the morning and 1 tablet before bedtime. atorvastatin 10 MG tablet  Commonly known as: LIPITOR  Take 10 mg by mouth daily   carvedilol 6.25 MG tablet  Commonly known as: COREG  Take 12.5 mg by mouth 2 times daily (with meals)  Ask about: Which instructions should I use?   donepezil 10 MG tablet  Commonly known as: ARICEPT  Take 10 mg by mouth daily   ferrous sulfate 325 (65 Fe) MG EC tablet  Commonly known as: FE TABS 325  Take 1 tablet by mouth 3 times daily (with meals)   FLUoxetine 20 MG capsule  Commonly known as: PROZAC  Take 20 mg by mouth daily   fluticasone-salmeterol 100-50 MCG/DOSE diskus inhaler  Commonly known as: ADVAIR  Inhale 1 puff into the lungs every 12 hours   furosemide 20 MG tablet  Commonly known as: Lasix  Take 1 tablet by mouth daily   glipiZIDE 5 MG tablet  Commonly known as: GLUCOTROL  Take 0.5 tablets by mouth in the morning and 0.5 tablets in the evening. Take before meals. ibrutinib 420 MG tablet  Commonly known as: IMBRUVICA  Take 1 tablet by mouth See Admin Instructions 2 days on, then 1 day off-per Dr Sandra Tim 9/7/22   ipratropium-albuterol 0.5-2.5 (3) MG/3ML Soln nebulizer solution  Commonly known as: DUONEB  Inhale 3 mLs into the lungs every 4 hours as needed for Shortness of Breath   levothyroxine 150 MCG tablet  Commonly known as: SYNTHROID  Take 1 tablet by mouth Daily   losartan 50 MG tablet  Commonly known as: Cozaar  Take 1 tablet by mouth daily   megestrol 40 MG/ML suspension  Commonly known as: MEGACE  Take 10 mLs by mouth in the morning.    naloxone 4 MG/0.1ML Liqd nasal spray  1 spray by Nasal route as needed for Opioid Reversal   omeprazole 20 MG delayed release capsule  Commonly known as: PRILOSEC  Take 20 mg by mouth 2 times daily   * oxyCODONE 5 MG immediate release tablet  Commonly known as: ROXICODONE  Take 5 mg by mouth every 4 hours as needed for Pain. Last dispensed 8/2 for 30 days   * oxyCODONE 10 MG extended release tablet  Commonly known as: OXYCONTIN  Take 10 mg by mouth in the morning and 10 mg in the evening. OXYGEN  Inhale 2 L into the lungs at bedtime Nightly & during the day. Probiotic Acidophilus Caps  Take 1 caplet by mouth daily   prochlorperazine 10 MG tablet  Commonly known as: COMPAZINE  Take 1 tablet by mouth every 6 hours as needed (nausea vomiting)   sucralfate 1 GM tablet  Commonly known as: CARAFATE  Take 1 tablet by mouth in the morning and 1 tablet at noon and 1 tablet in the evening and 1 tablet before bedtime. therapeutic multivitamin-minerals tablet  Take 1 tablet by mouth daily    very important  * This list has 2 medication(s) that are the same as other medications prescribed for you. Read the directions carefully, and ask your doctor or other care provider to review them with you.

## 2022-10-03 ENCOUNTER — TELEPHONE (OUTPATIENT)
Dept: ONCOLOGY | Age: 72
End: 2022-10-03

## 2022-10-03 ENCOUNTER — TELEPHONE (OUTPATIENT)
Dept: INFUSION THERAPY | Age: 72
End: 2022-10-03

## 2022-10-03 NOTE — TELEPHONE ENCOUNTER
[facility] Nutrition Follow Up       Wendy Maldonado is a 74 y.o.  male     NUTRITION RECOMMENDATIONS / MONITORING / EVALUATION  Adjust anti-nausea regimen as needed  2. Continue with small frequent high calorie/high protein meals throughout the day  3. Will monitor po intakes, wts, labs, s/s, care plan     Subjective/Current Data:  Pt discharged on Friday. Having good days and bad days at home. Reviewed strategies for increased intake with spouse. She relayed questions on his nausea meds as they are unsure what he should be on after his most recent admission. Pt still struggles with nausea throughout the day. Call placed to nurse triage to reach out to pts/spouse regarding nausea meds. Recent Weights: Wt Readings from Last 3 Encounters:   09/30/22 168 lb 10.4 oz (76.5 kg)   09/07/22 172 lb 13.5 oz (78.4 kg)   08/22/22 160 lb 3.2 oz (72.7 kg)         Goal: Adequate intake to aide in wt maintenaince, signs and symptoms management, and overall wellbeing.     Progress towards goal: unchanged    Roseanne Stewart, MS, RD, LD  Registered Dietitian  [facility]  [phone]

## 2022-10-03 NOTE — TELEPHONE ENCOUNTER
Pts spouse spoke to [place] and had questions reguarding his nausea meds and how to take them  Called and clarified with her the dose and frequency of how to take these medications as needed for nausea   Verbalized understanding   Transferred to Pattie Álvarez to make f/u appt     Kathy Rivera RN

## 2022-10-21 ENCOUNTER — TELEPHONE (OUTPATIENT)
Dept: ONCOLOGY | Age: 72
End: 2022-10-21

## 2022-10-21 ENCOUNTER — HOSPITAL ENCOUNTER (OUTPATIENT)
Dept: INFUSION THERAPY | Age: 72
Discharge: HOME | End: 2022-10-21
Payer: MEDICARE

## 2022-10-21 ENCOUNTER — OFFICE VISIT (OUTPATIENT)
Dept: ONCOLOGY | Age: 72
End: 2022-10-21
Payer: MEDICARE

## 2022-10-21 VITALS
SYSTOLIC BLOOD PRESSURE: 86 MMHG | WEIGHT: 155.2 LBS | HEART RATE: 73 BPM | TEMPERATURE: 96.9 F | DIASTOLIC BLOOD PRESSURE: 39 MMHG | BODY MASS INDEX: 22.27 KG/M2

## 2022-10-21 VITALS — DIASTOLIC BLOOD PRESSURE: 55 MMHG | SYSTOLIC BLOOD PRESSURE: 138 MMHG

## 2022-10-21 DIAGNOSIS — C85.99 LYMPHOMA OF BODY OF STOMACH (HCC): Primary | ICD-10-CM

## 2022-10-21 DIAGNOSIS — C82.13 FOLLICULAR LYMPHOMA GRADE II OF INTRA-ABDOMINAL LYMPH NODES (HCC): Primary | ICD-10-CM

## 2022-10-21 LAB
ABSOLUTE EOS #: 0.2 K/UL (ref 0–0.4)
ABSOLUTE LYMPH #: 1.8 K/UL (ref 1–4.8)
ABSOLUTE MONO #: 0.7 K/UL (ref 0.1–1.2)
ALBUMIN SERPL-MCNC: 3 G/DL (ref 3.5–5.2)
ALBUMIN/GLOBULIN RATIO: 1.4 (ref 1–2.5)
ALP BLD-CCNC: 106 U/L (ref 40–129)
ALT SERPL-CCNC: 6 U/L (ref 5–41)
ANION GAP SERPL CALCULATED.3IONS-SCNC: 9 MMOL/L (ref 9–17)
AST SERPL-CCNC: 9 U/L
BASOPHILS # BLD: 0 % (ref 0–2)
BASOPHILS ABSOLUTE: 0 K/UL (ref 0–0.2)
BILIRUB SERPL-MCNC: 0.5 MG/DL (ref 0.3–1.2)
BUN BLDV-MCNC: 30 MG/DL (ref 8–23)
CALCIUM SERPL-MCNC: 8.1 MG/DL (ref 8.6–10.4)
CHLORIDE BLD-SCNC: 105 MMOL/L (ref 98–107)
CO2: 22 MMOL/L (ref 20–31)
CREAT SERPL-MCNC: 1.44 MG/DL (ref 0.7–1.2)
EOSINOPHILS RELATIVE PERCENT: 2 % (ref 1–4)
GFR SERPL CREATININE-BSD FRML MDRD: 51 ML/MIN/1.73M2
GLUCOSE BLD-MCNC: 151 MG/DL (ref 70–99)
HCT VFR BLD CALC: 30.5 % (ref 41–53)
HEMOGLOBIN: 9.9 G/DL (ref 13.5–17.5)
LYMPHOCYTES # BLD: 27 % (ref 24–44)
MCH RBC QN AUTO: 27.9 PG (ref 26–34)
MCHC RBC AUTO-ENTMCNC: 32.6 G/DL (ref 31–37)
MCV RBC AUTO: 85.6 FL (ref 80–100)
MONOCYTES # BLD: 11 % (ref 2–11)
PDW BLD-RTO: 17.2 % (ref 12.5–15.4)
PLATELET # BLD: 156 K/UL (ref 140–450)
PMV BLD AUTO: 9 FL (ref 6–12)
POTASSIUM SERPL-SCNC: 4 MMOL/L (ref 3.7–5.3)
RBC # BLD: 3.56 M/UL (ref 4.5–5.9)
SEG NEUTROPHILS: 60 % (ref 36–66)
SEGMENTED NEUTROPHILS ABSOLUTE COUNT: 4 K/UL (ref 1.8–7.7)
SODIUM BLD-SCNC: 136 MMOL/L (ref 135–144)
TOTAL PROTEIN: 5.1 G/DL (ref 6.4–8.3)
WBC # BLD: 6.7 K/UL (ref 3.5–11)

## 2022-10-21 PROCEDURE — 2580000003 HC RX 258: Performed by: INTERNAL MEDICINE

## 2022-10-21 PROCEDURE — 85025 COMPLETE CBC W/AUTO DIFF WBC: CPT

## 2022-10-21 PROCEDURE — G8420 CALC BMI NORM PARAMETERS: HCPCS | Performed by: INTERNAL MEDICINE

## 2022-10-21 PROCEDURE — 80053 COMPREHEN METABOLIC PANEL: CPT

## 2022-10-21 PROCEDURE — 3078F DIAST BP <80 MM HG: CPT | Performed by: INTERNAL MEDICINE

## 2022-10-21 PROCEDURE — 36591 DRAW BLOOD OFF VENOUS DEVICE: CPT

## 2022-10-21 PROCEDURE — 3017F COLORECTAL CA SCREEN DOC REV: CPT | Performed by: INTERNAL MEDICINE

## 2022-10-21 PROCEDURE — G8427 DOCREV CUR MEDS BY ELIG CLIN: HCPCS | Performed by: INTERNAL MEDICINE

## 2022-10-21 PROCEDURE — 99214 OFFICE O/P EST MOD 30 MIN: CPT | Performed by: INTERNAL MEDICINE

## 2022-10-21 PROCEDURE — 96361 HYDRATE IV INFUSION ADD-ON: CPT

## 2022-10-21 PROCEDURE — 1036F TOBACCO NON-USER: CPT | Performed by: INTERNAL MEDICINE

## 2022-10-21 PROCEDURE — 96360 HYDRATION IV INFUSION INIT: CPT

## 2022-10-21 PROCEDURE — G8484 FLU IMMUNIZE NO ADMIN: HCPCS | Performed by: INTERNAL MEDICINE

## 2022-10-21 PROCEDURE — 6360000002 HC RX W HCPCS: Performed by: INTERNAL MEDICINE

## 2022-10-21 PROCEDURE — 1123F ACP DISCUSS/DSCN MKR DOCD: CPT | Performed by: INTERNAL MEDICINE

## 2022-10-21 PROCEDURE — 3074F SYST BP LT 130 MM HG: CPT | Performed by: INTERNAL MEDICINE

## 2022-10-21 PROCEDURE — 1111F DSCHRG MED/CURRENT MED MERGE: CPT | Performed by: INTERNAL MEDICINE

## 2022-10-21 PROCEDURE — 99211 OFF/OP EST MAY X REQ PHY/QHP: CPT | Performed by: INTERNAL MEDICINE

## 2022-10-21 RX ORDER — SODIUM CHLORIDE 0.9 % (FLUSH) 0.9 %
5-40 SYRINGE (ML) INJECTION PRN
OUTPATIENT
Start: 2022-10-21

## 2022-10-21 RX ORDER — SODIUM CHLORIDE 9 MG/ML
25 INJECTION, SOLUTION INTRAVENOUS PRN
OUTPATIENT
Start: 2022-10-21

## 2022-10-21 RX ORDER — HEPARIN SODIUM (PORCINE) LOCK FLUSH IV SOLN 100 UNIT/ML 100 UNIT/ML
500 SOLUTION INTRAVENOUS PRN
OUTPATIENT
Start: 2022-10-21

## 2022-10-21 RX ORDER — HEPARIN SODIUM (PORCINE) LOCK FLUSH IV SOLN 100 UNIT/ML 100 UNIT/ML
500 SOLUTION INTRAVENOUS PRN
Status: DISCONTINUED | OUTPATIENT
Start: 2022-10-21 | End: 2022-10-22 | Stop reason: HOSPADM

## 2022-10-21 RX ORDER — 0.9 % SODIUM CHLORIDE 0.9 %
1000 INTRAVENOUS SOLUTION INTRAVENOUS ONCE
Status: COMPLETED | OUTPATIENT
Start: 2022-10-21 | End: 2022-10-21

## 2022-10-21 RX ORDER — CEPHALEXIN 500 MG/1
500 CAPSULE ORAL 2 TIMES DAILY
Qty: 20 CAPSULE | Refills: 0 | Status: SHIPPED | OUTPATIENT
Start: 2022-10-21 | End: 2022-11-17

## 2022-10-21 RX ORDER — SODIUM CHLORIDE 0.9 % (FLUSH) 0.9 %
5-40 SYRINGE (ML) INJECTION PRN
Status: DISCONTINUED | OUTPATIENT
Start: 2022-10-21 | End: 2022-10-22 | Stop reason: HOSPADM

## 2022-10-21 RX ADMIN — SODIUM CHLORIDE, PRESERVATIVE FREE 10 ML: 5 INJECTION INTRAVENOUS at 14:17

## 2022-10-21 RX ADMIN — HEPARIN 500 UNITS: 100 SYRINGE at 14:17

## 2022-10-21 RX ADMIN — SODIUM CHLORIDE 1000 ML: 9 INJECTION, SOLUTION INTRAVENOUS at 12:22

## 2022-10-21 RX ADMIN — SODIUM CHLORIDE, PRESERVATIVE FREE 10 ML: 5 INJECTION INTRAVENOUS at 12:21

## 2022-10-21 NOTE — TELEPHONE ENCOUNTER
AVS from 10/21/22      Continue Imbruvica  IVF 1 liter today  CBC, CMP today  RV about 6 weeks.        Hydration today    Labs today    Rv scheduled for 12/5 @ 10:45 am     Pt was given AVS and appointment schedule    Electronically signed by Leobardo Hurst on 10/21/2022 at 1:10 PM

## 2022-10-21 NOTE — PROGRESS NOTES
Patient here for hydration and labs per orders. Vitals stable. He saw Dr. Anaya Fuller today see his dictation. He tolerated treatment well and was discharged home in stable condition with family. He is due to return 12/5 for MD visit.

## 2022-10-24 ENCOUNTER — TELEPHONE (OUTPATIENT)
Dept: ONCOLOGY | Age: 72
End: 2022-10-24

## 2022-10-24 NOTE — TELEPHONE ENCOUNTER
[facility] Nutrition Follow Up       Jg David is a 74 y.o.  male     NUTRITION RECOMMENDATIONS / Shawanda Lester / EVALUATION  Encourage several small high calorie meals throughout the day  2. Continued compliance of med regimen (appetite stimulant, zofran, compazine, immodium)  3. Will monitor s/s, po intake, labs, care plan    Subjective/Current Data:  Spoke with spouse, Sarika who reports that pt is doing pretty well. Reports he is coming in once every 3 weeks or so for hydration, but has been taking his anti-nausea meds preventatively and that seems to be helping tremendously. Reports that he is drinking [employer] and is able to eat most foods, just reduced amounts. Reviewed additional high calorie/protein foods to try. Wife appreciative of call. Recent Weights: Wt Readings from Last 3 Encounters:   10/21/22 155 lb 3.2 oz (70.4 kg)   09/30/22 168 lb 10.4 oz (76.5 kg)   09/07/22 172 lb 13.5 oz (78.4 kg)         Goal: Adequate intake to aide in wt maintenaince, signs and symptoms management, and overall wellbeing.     Progress towards goal: unchanged    Kieran Enciso, MS, RD, LD  Registered Dietitian  [facility]  [phone]

## 2022-10-27 NOTE — PROGRESS NOTES
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 Evidence of relapse with biopsy-proven follicular lymphoma. 1-2 from treated medically lymph node biopsy July 6, 2021    CURRENT THERAPY:    1. Observation. 2. Status post treatment with Bexxar in May 2008. 3. Status post previous treatment with Rituxan. 4.  Start treatment with Rituxan August 23, 2021. completed4 weeks of rituximab on 9/20/2021  5. Induction Rituxan is followed by consolidation Rituxan maintenance   6. Due to progression we started Imbruvica July 2022. INTERIM HISTORY:  The patient is seen for follow-up of his lymphoma. Further work-up showed enlarged lymph nodes in the retroperitoneum and iliac area. Biopsy proved recurrence of follicular lymphoma. Patient did well on rituximab induction and was maintained on maintenance Rituxan. Repeated CT scan February 2022 showed stable disease. He had hospitalization in July which showed significant progressive disease. Started on Imbruvica. He was hospitalized several times due to dehydration and malnutrition. He started feeling much better after rehydration with improvement in kidney function. Repeated CT scan showed positive results with significant improvement on the Imbruvica treatment. REVIEW OF SYSTEMS:   General: ++weight loss. Significant shortness of breath. Eyes: No blurred vision, eye pain or double vision. Ears: No hearing problems or drainage. No tinnitus. Throat: No sore throat, problems with swallowing or dysphagia. Respiratory: As above. Cardiovascular: No chest pain, orthopnea or PND. No lower extremity edema. No palpitation. Gastrointestinal: No problems with swallowing.  No abdominal pain or bloating. No nausea or vomiting. No diarrhea or constipation. No GI bleeding. Genitourinary: No dysuria, hematuria, frequency or urgency. Musculoskeletal: No muscle aches or pains. No limitation of movement. No back pain. Dermatologic: No skin rashes or pruritus. No skin lesions or discolorations. Psychiatric: No depression, anxiety, or stress or signs of schizophrenia. Hematologic: No history of bleeding tendency. No bruises or ecchymosis. No history of clotting problems. Infectious disease: No fever, chills or frequent infections. Endocrine: No problems with opacity. No polydipsia or polyuria. Neurologic: No headaches or dizziness. No weakness or numbness of the extremities. SOCIAL HISTORY:  No smoking, no alcohol drinking. PHYSICAL EXAM: Patient is having significant shortness of breath using oxygen by portable oxygen tank. Vital signs: Blood pressure (!) 86/39, pulse 73, temperature 96.9 °F (36.1 °C), temperature source Temporal, weight 155 lb 3.2 oz (70.4 kg). HEENT:  Eyes are normal. Ears, nose and throat are normal.  Neck: Supple. No lymph node enlargement. No thyroid enlargement. Trachea is centrally located. Chest: Decreased air entry with hypoxia heart: Regular sinus rhythm. Abdomen: Soft, nontender. No hepatosplenomegaly. No masses. Extremities:  With no edema. Lymph Nodes:  No cervical, axillary or inguinal lymph node enlargement. Neurologic:  Conscious and oriented. No focal neurological deficits. Psychosocial: No depression, anxiety or stress. Skin: No rashes, bruises or ecchymoses.     REVIEW OF DIAGNOSTIC DATA:     Lab Results   Component Value Date    WBC 6.7 10/21/2022    HGB 9.9 (L) 10/21/2022    HCT 30.5 (L) 10/21/2022    MCV 85.6 10/21/2022     10/21/2022       Chemistry        Component Value Date/Time     10/21/2022 1215    K 4.0 10/21/2022 1215     10/21/2022 1215    CO2 22 10/21/2022 1215    BUN 30 (H) 10/21/2022 1215    CREATININE 1.44 (H) 10/21/2022 1215        Component Value Date/Time    CALCIUM 8.1 (L) 10/21/2022 1215    ALKPHOS 106 10/21/2022 1215    AST 9 10/21/2022 1215    ALT 6 10/21/2022 1215    BILITOT 0.5 10/21/2022 1215        CT scan showed stable findings with no evidence of progression. XR CHEST PORTABLE  Narrative: EXAMINATION:  ONE XRAY VIEW OF THE CHEST    9/28/2022 8:43 am    COMPARISON:  9/26/2022    HISTORY:  ORDERING SYSTEM PROVIDED HISTORY: cough  TECHNOLOGIST PROVIDED HISTORY: Cough  Reason for Exam: cough    FINDINGS:  Heart size is grossly stable. No vascular congestion. Right chest port  remains in place. There is improved aeration at the right base. No new  airspace consolidation, pneumothorax, or pleural effusion. Impression: Improved aeration at the right base. No acute findings in the chest.        ASSESSMENT:    Small cell, B-cell non-Hodgkins lymphoma in remission. Evidence of relapse with biopsy-proven follicular lymphoma grade 1-2 July 5, 2021  Progression after treatment with rituximab  Good results to Imbruvica treatment. PLAN:     Lymph node biopsy showed relapse of low-grade lymphoma. Grade 1 - 2 follicular lymphoma. Patient was treated with rituximab induction followed by maintenance. Initially he has stable disease. Recent scans showed significant progression in the abdomen. Considering his performance and other comorbidities he was started on Imbruvica. Tolerated well except for symptoms as above. He is much better after recent hospitalization. Discussed the repeated labs. Kidney function is better. Needs to stay hydrated. I will give IV fluids today and possibly twice weekly. We will monitor labs closely. We discussed further management. Repeated CT scan showed good response to Imbruvica. I will continue treatment and we will monitor closely. We will monitor for toxicity and side effects and to monitor treatment response.   Patient's questions were answered to the best of his satisfaction and he verbalized full understanding and agreement. [facility]                            This note is created with the assistance of a speech recognition program.  While intending to generate a document that actually reflects the content of the visit, the document can still have some errors including those of syntax and sound a like substitutions which may escape proof reading. It such instances, actual meaning can be extrapolated by contextual diversion.

## 2022-11-08 RX ORDER — MEGESTROL ACETATE 40 MG/ML
SUSPENSION ORAL
Qty: 240 ML | Refills: 3 | Status: SHIPPED | OUTPATIENT
Start: 2022-11-08

## 2022-11-09 ENCOUNTER — TELEPHONE (OUTPATIENT)
Dept: CASE MANAGEMENT | Age: 72
End: 2022-11-09

## 2022-11-09 NOTE — TELEPHONE ENCOUNTER
Name: Fela Acosta  : 1950  MRN: [id number]    Oncology Navigation Follow-Up Note  Navigator left message with spouse offering assistance if needed. Plan to follow.    Electronically signed by Cayetano Botello RN on 2022 at 10:12 AM

## 2022-11-10 ENCOUNTER — TELEPHONE (OUTPATIENT)
Dept: INFUSION THERAPY | Facility: MEDICAL CENTER | Age: 72
End: 2022-11-10

## 2022-11-10 ENCOUNTER — TELEPHONE (OUTPATIENT)
Dept: INFUSION THERAPY | Age: 72
End: 2022-11-10

## 2022-11-10 NOTE — TELEPHONE ENCOUNTER
Chika Valles pt wife called to set up hydration per Genevieve Hauser . Pt is a [place] pt , writer called [place] triage and left VM .

## 2022-11-10 NOTE — TELEPHONE ENCOUNTER
Pt's wife, Sarika, called stating pt has been weak, lethargic, decreased BP and increased blood sugars all week. She states today he is starting to improve; his BP was a little better, he is eating a little more, and hey are trying to make him drink more fluid. She is concerned that he will start to get dehydrated/sick again over the weekend. She was asking if he should have hydration tomorrow. Writer states we can draw labs and give hydration tomorrow if she feels he needs it. She states she will see how pt feels in the morning, and will come for hydration if needed. Pt scheduled for 0900 tomorrow; pt's wife will call if she feels he does not need this appt in the morning.

## 2022-11-11 ENCOUNTER — HOSPITAL ENCOUNTER (OUTPATIENT)
Dept: INFUSION THERAPY | Age: 72
Discharge: HOME | End: 2022-11-11

## 2022-11-11 NOTE — TELEPHONE ENCOUNTER
Writer reached out to Peggy, wife is present. Peggy received his free trial for 30 days. Teach completed for Imbruvica, diagnosis; Non-Hodgkins Lymphoma. Consultation for imbruvica    What is target therapy  Drug action [x]   Method of Administration [x]   Handouts given [x]     Side Effects  Nausea/vomiting [x]   Diarrhea [x]   Fatigue [x]   Signs / Symptoms of infection [x]   Neutropenia [x]   Thrombocytopenia [x]   Alopecia [x]   neuropathy [x]   [place] diet &  the importance of fluids [x]       Micellaneous  Importance of nutrition [x]   Importance of oral hygiene [x]   When to call the MD [x]   Monitoring labs [x]   Use of supportive services []     Explanation of Drug Regimen / Frequency  Imbruvica 420 mg, take 1 tablet daily by mouth. Comments  Verbalized understanding to drug,action,side effects and when to call MD. The [facility] Patient Education Folder with [facility] teaching sheet mailed to patient. The [facility] teaching sheet reviewed with patient over the phone. Questions answered to patient's satisfaction. Next follow up 8/22/22.
n/a

## 2022-11-17 ENCOUNTER — HOSPITAL ENCOUNTER (OUTPATIENT)
Dept: PREADMISSION TESTING | Age: 72
Discharge: HOME | End: 2022-11-21

## 2022-11-17 VITALS — BODY MASS INDEX: 21.14 KG/M2 | WEIGHT: 151 LBS | HEIGHT: 71 IN

## 2022-11-17 RX ORDER — CIPROFLOXACIN 250 MG/1
250 TABLET, FILM COATED ORAL 2 TIMES DAILY
Status: ON HOLD | COMMUNITY
End: 2022-12-01 | Stop reason: ALTCHOICE

## 2022-11-17 RX ORDER — IBRUTINIB 420 MG/1
420 TABLET, FILM COATED ORAL DAILY
COMMUNITY

## 2022-11-17 NOTE — PROGRESS NOTES
Pre-op Instructions For Out-Patient Surgery    Medication Instructions:  Please stop herbs and any supplements now (includes vitamins and minerals). Please contact your surgeon and prescribing physician for pre-op instructions for any blood thinners. If you have inhalers/aerosol treatments at home, please use them the morning of your surgery and bring the inhalers with you to the hospital.    Please take the following medications the morning of your surgery with a sip of water:     LOSARTAN, COREG, AMLODIPINE, THYROID, Darrol Crowley, CARAFATE. Surgery Instructions:  After midnight before surgery:  Do not eat or drink anything, including water, mints, gum, and hard candy. You may brush your teeth without swallowing. No smoking, chewing tobacco, or street drugs. Please shower or bathe before surgery. If you were given Surgical Scrub Chlorhexidine Gluconate Liquid (CHG), please shower the night before and the morning of your surgery following the detailed instructions you received during your pre-admission visit. Please do not wear any cologne, lotion, powder, deodorant, jewelry, piercings, perfume, makeup, nail polish, hair accessories, or hair spray on the day of surgery. Wear loose comfortable clothing. Leave your valuables at home. Bring a storage case for any glasses/contacts. An adult who is responsible for you MUST drive you home and should be with you for the first 24 hours after surgery. If having out-patient knee and foot surgeries, please arrange for planned crutches, walker, or wheelchair before arriving to the hospital.    The Day of Surgery:  Arrive at [address] [place] [address] Surgery Entrance at the time directed by your surgeon and check in at the desk. If you have a living will or healthcare power of , please bring a copy. You will be taken to the pre-op holding area where you will be prepared for surgery.   A physical assessment will be performed by a nurse practitioner or house officer. Your IV will be started and you will meet your anesthesiologist.    When you go to surgery, your family will be directed to the surgical waiting room, where the doctor should speak with them after your surgery. After surgery, you will be taken to the recovery room then when you are awake and stable you will go to the short stay unit for preparation to be discharged. If you use a Bi-PAP or C-PAP machine, please bring it with you and leave it in the car in case it is needed in recovery room. INSTRUCTIONS READ TO WIFE BETTYE AND UNDERSTANDING VERBALIZED.

## 2022-11-22 ENCOUNTER — TELEPHONE (OUTPATIENT)
Dept: ONCOLOGY | Age: 72
End: 2022-11-22

## 2022-11-22 NOTE — TELEPHONE ENCOUNTER
[facility] Nutrition Note:    Called pt on listed phone number r/t nutrition follow up. No answer; chart reviewed.      JOSE Spring, RD, LD  Registered Dietitian   [facility]  [phone]

## 2022-11-23 ENCOUNTER — HOSPITAL ENCOUNTER (OUTPATIENT)
Dept: INFUSION THERAPY | Age: 72
Discharge: HOME | End: 2022-11-23
Payer: MEDICARE

## 2022-11-23 ENCOUNTER — TELEPHONE (OUTPATIENT)
Dept: INFUSION THERAPY | Age: 72
End: 2022-11-23

## 2022-11-23 VITALS
RESPIRATION RATE: 18 BRPM | SYSTOLIC BLOOD PRESSURE: 107 MMHG | TEMPERATURE: 97.8 F | HEART RATE: 66 BPM | DIASTOLIC BLOOD PRESSURE: 52 MMHG

## 2022-11-23 DIAGNOSIS — C85.90 LYMPHOMA, DIFFUSE (HCC): Primary | ICD-10-CM

## 2022-11-23 PROCEDURE — 6360000002 HC RX W HCPCS: Performed by: INTERNAL MEDICINE

## 2022-11-23 PROCEDURE — 96360 HYDRATION IV INFUSION INIT: CPT

## 2022-11-23 PROCEDURE — 2580000003 HC RX 258: Performed by: INTERNAL MEDICINE

## 2022-11-23 RX ORDER — SODIUM CHLORIDE 0.9 % (FLUSH) 0.9 %
5-40 SYRINGE (ML) INJECTION PRN
Status: DISCONTINUED | OUTPATIENT
Start: 2022-11-23 | End: 2022-11-24 | Stop reason: HOSPADM

## 2022-11-23 RX ORDER — HEPARIN SODIUM (PORCINE) LOCK FLUSH IV SOLN 100 UNIT/ML 100 UNIT/ML
500 SOLUTION INTRAVENOUS PRN
Status: DISCONTINUED | OUTPATIENT
Start: 2022-11-23 | End: 2022-11-24 | Stop reason: HOSPADM

## 2022-11-23 RX ORDER — SODIUM CHLORIDE 0.9 % (FLUSH) 0.9 %
5-40 SYRINGE (ML) INJECTION PRN
OUTPATIENT
Start: 2022-11-23

## 2022-11-23 RX ORDER — 0.9 % SODIUM CHLORIDE 0.9 %
1000 INTRAVENOUS SOLUTION INTRAVENOUS ONCE
Status: CANCELLED | OUTPATIENT
Start: 2022-11-23 | End: 2022-11-23

## 2022-11-23 RX ORDER — 0.9 % SODIUM CHLORIDE 0.9 %
1000 INTRAVENOUS SOLUTION INTRAVENOUS ONCE
Status: COMPLETED | OUTPATIENT
Start: 2022-11-23 | End: 2022-11-23

## 2022-11-23 RX ORDER — SODIUM CHLORIDE 9 MG/ML
5-250 INJECTION, SOLUTION INTRAVENOUS PRN
Status: CANCELLED | OUTPATIENT
Start: 2022-11-23

## 2022-11-23 RX ORDER — HEPARIN SODIUM (PORCINE) LOCK FLUSH IV SOLN 100 UNIT/ML 100 UNIT/ML
500 SOLUTION INTRAVENOUS PRN
OUTPATIENT
Start: 2022-11-23

## 2022-11-23 RX ORDER — SODIUM CHLORIDE 9 MG/ML
5-250 INJECTION, SOLUTION INTRAVENOUS PRN
OUTPATIENT
Start: 2022-11-23

## 2022-11-23 RX ADMIN — SODIUM CHLORIDE 1000 ML: 9 INJECTION, SOLUTION INTRAVENOUS at 12:14

## 2022-11-23 RX ADMIN — Medication 20 ML: at 12:13

## 2022-11-23 RX ADMIN — Medication 500 UNITS: at 13:20

## 2022-11-23 RX ADMIN — Medication 10 ML: at 13:20

## 2022-11-23 NOTE — TELEPHONE ENCOUNTER
Pt's wife, Sarika, requesting hydration as soon as possible. Called pt's wife back and put her on schedule for 1:30pm today. Pt's wife states she will try her best to get him here earlier.

## 2022-11-30 ENCOUNTER — ANESTHESIA EVENT (OUTPATIENT)
Dept: OPERATING ROOM | Age: 72
End: 2022-11-30
Payer: MEDICARE

## 2022-11-30 NOTE — PRE-PROCEDURE INSTRUCTIONS
Nothing to eat after midnight.y  Are you taking any blood thinners?y When was the last day? Make sure to use Hibiclens prior to surgery. Remove any jewelry and body piercings.y  Do you wear glasses? If so, please bring a case to store them in. Are you having any Covid symptoms?n  Do you have any new rashes, infections, etc. that we should be aware of?n  Do you have a ride home the day of surgery?y It cannot be a cab or medical transportation.   Verify surgery time and what time to arrive at hospital. 0600, [phone]  Confirmed information with wife Nancy

## 2022-12-01 ENCOUNTER — ANESTHESIA (OUTPATIENT)
Dept: OPERATING ROOM | Age: 72
End: 2022-12-01
Payer: MEDICARE

## 2022-12-01 ENCOUNTER — APPOINTMENT (OUTPATIENT)
Dept: GENERAL RADIOLOGY | Age: 72
End: 2022-12-01
Attending: UROLOGY
Payer: MEDICARE

## 2022-12-01 ENCOUNTER — HOSPITAL ENCOUNTER (OUTPATIENT)
Age: 72
Setting detail: OUTPATIENT SURGERY
Discharge: HOME | End: 2022-12-01
Attending: UROLOGY | Admitting: UROLOGY
Payer: MEDICARE

## 2022-12-01 VITALS
HEART RATE: 65 BPM | WEIGHT: 148 LBS | HEIGHT: 71 IN | BODY MASS INDEX: 20.72 KG/M2 | SYSTOLIC BLOOD PRESSURE: 159 MMHG | DIASTOLIC BLOOD PRESSURE: 68 MMHG | TEMPERATURE: 97.3 F | OXYGEN SATURATION: 94 % | RESPIRATION RATE: 18 BRPM

## 2022-12-01 DIAGNOSIS — N13.30 BILATERAL HYDRONEPHROSIS: ICD-10-CM

## 2022-12-01 LAB
BACTERIA: NORMAL
BILIRUBIN URINE: NEGATIVE
CASTS UA: NORMAL /LPF
COLOR: YELLOW
EPITHELIAL CELLS UA: NORMAL /HPF
GLUCOSE BLD-MCNC: 81 MG/DL (ref 75–110)
GLUCOSE URINE: NEGATIVE
KETONES, URINE: ABNORMAL
LEUKOCYTE ESTERASE, URINE: ABNORMAL
NITRITE, URINE: NEGATIVE
PH UA: 6.5 (ref 5–8)
PROTEIN UA: ABNORMAL
RBC UA: NORMAL /HPF
SPECIFIC GRAVITY UA: 1.01 (ref 1–1.03)
TURBIDITY: ABNORMAL
URINE HGB: ABNORMAL
UROBILINOGEN, URINE: NORMAL
WBC UA: NORMAL /HPF

## 2022-12-01 PROCEDURE — 2500000003 HC RX 250 WO HCPCS: Performed by: NURSE ANESTHETIST, CERTIFIED REGISTERED

## 2022-12-01 PROCEDURE — 82947 ASSAY GLUCOSE BLOOD QUANT: CPT

## 2022-12-01 PROCEDURE — 3600000012 HC SURGERY LEVEL 2 ADDTL 15MIN: Performed by: UROLOGY

## 2022-12-01 PROCEDURE — 3700000000 HC ANESTHESIA ATTENDED CARE: Performed by: UROLOGY

## 2022-12-01 PROCEDURE — 3209999900 FLUORO FOR SURGICAL PROCEDURES

## 2022-12-01 PROCEDURE — 7100000011 HC PHASE II RECOVERY - ADDTL 15 MIN: Performed by: UROLOGY

## 2022-12-01 PROCEDURE — 7100000001 HC PACU RECOVERY - ADDTL 15 MIN: Performed by: UROLOGY

## 2022-12-01 PROCEDURE — 2580000003 HC RX 258: Performed by: ANESTHESIOLOGY

## 2022-12-01 PROCEDURE — 2709999900 HC NON-CHARGEABLE SUPPLY: Performed by: UROLOGY

## 2022-12-01 PROCEDURE — 6370000000 HC RX 637 (ALT 250 FOR IP): Performed by: NURSE PRACTITIONER

## 2022-12-01 PROCEDURE — 6370000000 HC RX 637 (ALT 250 FOR IP): Performed by: UROLOGY

## 2022-12-01 PROCEDURE — 94640 AIRWAY INHALATION TREATMENT: CPT

## 2022-12-01 PROCEDURE — C2617 STENT, NON-COR, TEM W/O DEL: HCPCS | Performed by: UROLOGY

## 2022-12-01 PROCEDURE — 87086 URINE CULTURE/COLONY COUNT: CPT

## 2022-12-01 PROCEDURE — 7100000010 HC PHASE II RECOVERY - FIRST 15 MIN: Performed by: UROLOGY

## 2022-12-01 PROCEDURE — 3700000001 HC ADD 15 MINUTES (ANESTHESIA): Performed by: UROLOGY

## 2022-12-01 PROCEDURE — 7100000030 HC ASPR PHASE II RECOVERY - FIRST 15 MIN: Performed by: UROLOGY

## 2022-12-01 PROCEDURE — 7100000000 HC PACU RECOVERY - FIRST 15 MIN: Performed by: UROLOGY

## 2022-12-01 PROCEDURE — 6360000002 HC RX W HCPCS: Performed by: UROLOGY

## 2022-12-01 PROCEDURE — 7100000031 HC ASPR PHASE II RECOVERY - ADDTL 15 MIN: Performed by: UROLOGY

## 2022-12-01 PROCEDURE — 94761 N-INVAS EAR/PLS OXIMETRY MLT: CPT

## 2022-12-01 PROCEDURE — 81001 URINALYSIS AUTO W/SCOPE: CPT

## 2022-12-01 PROCEDURE — 6360000002 HC RX W HCPCS: Performed by: NURSE ANESTHETIST, CERTIFIED REGISTERED

## 2022-12-01 PROCEDURE — 3600000002 HC SURGERY LEVEL 2 BASE: Performed by: UROLOGY

## 2022-12-01 DEVICE — URETERAL STENT
Type: IMPLANTABLE DEVICE | Site: URETER | Status: FUNCTIONAL
Brand: POLARIS™ ULTRA

## 2022-12-01 RX ORDER — HYDRALAZINE HYDROCHLORIDE 20 MG/ML
10 INJECTION INTRAMUSCULAR; INTRAVENOUS
Status: CANCELLED | OUTPATIENT
Start: 2022-12-01

## 2022-12-01 RX ORDER — LIDOCAINE HYDROCHLORIDE 20 MG/ML
JELLY TOPICAL PRN
Status: DISCONTINUED | OUTPATIENT
Start: 2022-12-01 | End: 2022-12-01 | Stop reason: ALTCHOICE

## 2022-12-01 RX ORDER — IPRATROPIUM BROMIDE AND ALBUTEROL SULFATE 2.5; .5 MG/3ML; MG/3ML
1 SOLUTION RESPIRATORY (INHALATION) ONCE
Status: COMPLETED | OUTPATIENT
Start: 2022-12-01 | End: 2022-12-01

## 2022-12-01 RX ORDER — CEPHALEXIN 500 MG/1
500 CAPSULE ORAL 3 TIMES DAILY
Qty: 15 CAPSULE | Refills: 0 | Status: SHIPPED | OUTPATIENT
Start: 2022-12-01 | End: 2022-12-06

## 2022-12-01 RX ORDER — METOCLOPRAMIDE HYDROCHLORIDE 5 MG/ML
10 INJECTION INTRAMUSCULAR; INTRAVENOUS
Status: CANCELLED | OUTPATIENT
Start: 2022-12-01 | End: 2022-12-02

## 2022-12-01 RX ORDER — SODIUM CHLORIDE 0.9 % (FLUSH) 0.9 %
5-40 SYRINGE (ML) INJECTION EVERY 12 HOURS SCHEDULED
Status: CANCELLED | OUTPATIENT
Start: 2022-12-01

## 2022-12-01 RX ORDER — LIDOCAINE HYDROCHLORIDE 20 MG/ML
INJECTION, SOLUTION EPIDURAL; INFILTRATION; INTRACAUDAL; PERINEURAL PRN
Status: DISCONTINUED | OUTPATIENT
Start: 2022-12-01 | End: 2022-12-01 | Stop reason: SDUPTHER

## 2022-12-01 RX ORDER — PROPOFOL 10 MG/ML
INJECTION, EMULSION INTRAVENOUS CONTINUOUS PRN
Status: DISCONTINUED | OUTPATIENT
Start: 2022-12-01 | End: 2022-12-01 | Stop reason: SDUPTHER

## 2022-12-01 RX ORDER — LABETALOL HYDROCHLORIDE 5 MG/ML
10 INJECTION, SOLUTION INTRAVENOUS
Status: CANCELLED | OUTPATIENT
Start: 2022-12-01

## 2022-12-01 RX ORDER — DIPHENHYDRAMINE HYDROCHLORIDE 50 MG/ML
12.5 INJECTION INTRAMUSCULAR; INTRAVENOUS
Status: CANCELLED | OUTPATIENT
Start: 2022-12-01 | End: 2022-12-02

## 2022-12-01 RX ORDER — SODIUM CHLORIDE 0.9 % (FLUSH) 0.9 %
5-40 SYRINGE (ML) INJECTION PRN
Status: DISCONTINUED | OUTPATIENT
Start: 2022-12-01 | End: 2022-12-01 | Stop reason: HOSPADM

## 2022-12-01 RX ORDER — ONDANSETRON 2 MG/ML
4 INJECTION INTRAMUSCULAR; INTRAVENOUS
Status: CANCELLED | OUTPATIENT
Start: 2022-12-01 | End: 2022-12-02

## 2022-12-01 RX ORDER — MEPERIDINE HYDROCHLORIDE 25 MG/ML
12.5 INJECTION INTRAMUSCULAR; INTRAVENOUS; SUBCUTANEOUS EVERY 5 MIN PRN
Status: CANCELLED | OUTPATIENT
Start: 2022-12-01

## 2022-12-01 RX ORDER — LIDOCAINE HYDROCHLORIDE 10 MG/ML
1 INJECTION, SOLUTION EPIDURAL; INFILTRATION; INTRACAUDAL; PERINEURAL
Status: DISCONTINUED | OUTPATIENT
Start: 2022-12-01 | End: 2022-12-01 | Stop reason: HOSPADM

## 2022-12-01 RX ORDER — SODIUM CHLORIDE 0.9 % (FLUSH) 0.9 %
5-40 SYRINGE (ML) INJECTION PRN
Status: CANCELLED | OUTPATIENT
Start: 2022-12-01

## 2022-12-01 RX ORDER — SODIUM CHLORIDE 9 MG/ML
INJECTION, SOLUTION INTRAVENOUS PRN
Status: CANCELLED | OUTPATIENT
Start: 2022-12-01

## 2022-12-01 RX ORDER — ACETAMINOPHEN 325 MG/1
650 TABLET ORAL
Status: CANCELLED | OUTPATIENT
Start: 2022-12-01 | End: 2022-12-02

## 2022-12-01 RX ORDER — SODIUM CHLORIDE 9 MG/ML
INJECTION, SOLUTION INTRAVENOUS CONTINUOUS
Status: DISCONTINUED | OUTPATIENT
Start: 2022-12-01 | End: 2022-12-01 | Stop reason: HOSPADM

## 2022-12-01 RX ORDER — FENTANYL CITRATE 0.05 MG/ML
25 INJECTION, SOLUTION INTRAMUSCULAR; INTRAVENOUS EVERY 5 MIN PRN
Status: CANCELLED | OUTPATIENT
Start: 2022-12-01

## 2022-12-01 RX ORDER — SODIUM CHLORIDE 0.9 % (FLUSH) 0.9 %
5-40 SYRINGE (ML) INJECTION EVERY 12 HOURS SCHEDULED
Status: DISCONTINUED | OUTPATIENT
Start: 2022-12-01 | End: 2022-12-01 | Stop reason: HOSPADM

## 2022-12-01 RX ORDER — SODIUM CHLORIDE 9 MG/ML
INJECTION, SOLUTION INTRAVENOUS PRN
Status: DISCONTINUED | OUTPATIENT
Start: 2022-12-01 | End: 2022-12-01 | Stop reason: HOSPADM

## 2022-12-01 RX ADMIN — SODIUM CHLORIDE: 9 INJECTION, SOLUTION INTRAVENOUS at 06:57

## 2022-12-01 RX ADMIN — IPRATROPIUM BROMIDE AND ALBUTEROL SULFATE 1 AMPULE: .5; 2.5 SOLUTION RESPIRATORY (INHALATION) at 07:11

## 2022-12-01 RX ADMIN — LIDOCAINE HYDROCHLORIDE 80 MG: 20 INJECTION, SOLUTION EPIDURAL; INFILTRATION; INTRACAUDAL; PERINEURAL at 07:25

## 2022-12-01 RX ADMIN — CEFAZOLIN 2000 MG: 10 INJECTION, POWDER, FOR SOLUTION INTRAVENOUS at 07:19

## 2022-12-01 RX ADMIN — PROPOFOL 100 MCG/KG/MIN: 10 INJECTION, EMULSION INTRAVENOUS at 07:25

## 2022-12-01 ASSESSMENT — COPD QUESTIONNAIRES: CAT_SEVERITY: NO INTERVAL CHANGE

## 2022-12-01 ASSESSMENT — ENCOUNTER SYMPTOMS
STRIDOR: 0
SHORTNESS OF BREATH: 0

## 2022-12-01 ASSESSMENT — LIFESTYLE VARIABLES: SMOKING_STATUS: 0

## 2022-12-01 ASSESSMENT — PAIN - FUNCTIONAL ASSESSMENT: PAIN_FUNCTIONAL_ASSESSMENT: 0-10

## 2022-12-01 NOTE — H&P
HISTORY and [id number]       NAME:  Shawn Ro  MRN: [id number]   YOB: 1950   Date: 11/30/2022   Age: 74 y.o. Gender: male       COMPLAINT AND PRESENT HISTORY:     Shawn Ro is 74 y.o., male, undergoing for Bilateral hydronephrosis     Patient has hx of urinary frequency, dysuria and hematuria. Pt C/O of poor flow of urine, dribbling and a sense of incomplete evacuation of the bladder. Symptoms started 1 year ago. when cancer started he reports. Pt has hx of non-hodgkin's lymphoma. Patient is not on Flomax. Patient will be having : [address]    Patient has had previous Cystoscopy done. No nausea,  vomiting . No dysuria. Pt continues to have discoloration of the urine, no fever or chills. Patient states that he has been having diarrhea off and on. Pt states his last episode day after yesterday. Significant medical history:  CAD, CHF, DVT -rt leg,  COPD, HTN, HLD, THYROID, hx of blood clots, DM,  Hx of non-Hodgkin's lymphoma,   home 02 2.5 L continuous. Pt is on Coreg, Cozaar, Eliquis    Anticoagulants or blood thinners: Eliquis- 2 imodium  Pt denies any chest pain, but admits to SOB. Activity level:  Functional Capacity per patient:              1. Patient is not able to walk 2 city blocks on level ground without SOB. 2. Patient is not able to climb 2 flights of stairs without SOB. Patient voices feeling well today. Denies any recent fever or chills, chest pain or SOB    Patient has been NPO since midnight. Pt has not withheld his blood thinners (Eliquis) pt took 2 imodium tabs this am.       IMAGING  CT OF THE ABDOMEN AND PELVIS WITH AND WITHOUT CONTRAST 9/26/2022   Impression  1. Bilateral ureteric stents in place. No hydronephrosis. 2. Amorphous periaortic retroperitoneal soft tissue consistent with  adenopathy of lymphoma.   Similar density findings appear 20 circled the left  kidney and may be extension of the lymphadenopathy. Underlying more discrete  lymph nodes are partly obscured. Lymphadenopathy extends along the root of  the mesentery similar to prior. 3. Trace free fluid manifested by haziness and stranding in the peritoneal  fat mainly along the right flank. 4. Appendix normal    PAST MEDICAL HISTORY     Past Medical History:   Diagnosis Date    Arthritis     Cancer [facility])     chemotherapy lymphoma last was april 2022    Chemotherapy management, encounter for     CHF (congestive heart failure) (HCC)     COPD (chronic obstructive pulmonary disease) ([facility]     Dementia ([facility]     Diabetes mellitus ([facility]     H/O cardiovascular stress test     History of non-Hodgkin's lymphoma     Hx of blood clots     DVT in right leg     Hyperlipidemia     Hypertension     Hypothyroidism     On home O2     at 5 liters per nasal cannula 24 hrs. per day.      Pneumonia 07/14/2022       SURGICAL HISTORY       Past Surgical History:   Procedure Laterality Date    CAROTID ENDARTERECTOMY Left 6/28/2022    LEFT TYPE I EVERSION LEFT CAROTID ENDARTERECTOMY RE-IMPLANTATION LEFT ICA performed by Dirk Peña MD at [facility], LAPAROSCOPIC N/A 10/9/2021    CHOLECYSTECTOMY LAPAROSCOPIC ROBOTIC XI performed by Fernanda Garg MD at [address] [place] [address] Left 9/24/2021    COLONOSCOPY POLYPECTOMY SNARE/COLD BIOPSY performed by Yesneia Lerma MD at [place] [address] Right 10/6/2021    CYSTOSCOPY PYELOGRAM URETERAL STENT INSERTION performed by Charmaine Rojas MD at [place] [address] Bilateral 11/24/2021    CYSTOSCOPY URETERAL [address] performed by Charmaine Rojas MD at [place] [address] Bilateral 2/16/2022    CYSTOSCOPY WITH BILATERAL STENT EXCHANGE performed by Charmaine Rojas MD at [place] [address] N/A 7/20/2022    CYSTOSCOPY BILATERAL STENT EXCHANGE performed by Charmaine Rojas MD at [address] [place] [address] REPAIR      IR BIOPSY ABDOMINAL MASS  2021    IR BIOPSY ABDOMINAL MASS 2021 [facility] PROCED[facility]    IR PORT PLACEMENT EQUAL OR GREATER THAN 5 YEARS  2021    IR PORT PLACEMENT EQUAL OR GREATER THAN 5 YEARS 2021 [facility]    TONSILLECTOMY      as child    UPPER GASTROINTESTINAL ENDOSCOPY N/A 2022    EGD BIOPSY performed by Tavia Simpson MD at [address] [place] [address]       Family History   Problem Relation Age of Onset    Diabetes Mother     Alzheimer's Disease Father     Heart Disease Brother     Heart Disease Brother        SOCIAL HISTORY       Social History     Socioeconomic History    Marital status:    Tobacco Use    Smoking status: Former     Packs/day: 2.00     Years: 45.00     Pack years: 90.00     Types: Cigarettes     Quit date: 2006     Years since quittin.9    Smokeless tobacco: Never   Vaping Use    Vaping Use: Never used   Substance and Sexual Activity    Alcohol use: Yes     Alcohol/week: 1.7 standard drinks     Types: 2 Standard drinks or equivalent per week     Comment: occassional    Drug use: Yes     Types: Marijuana [facility])     Comment:  CBD gummies no smoking           REVIEW OF SYSTEMS      No Known Allergies    No current facility-administered medications on file prior to encounter. Current Outpatient Medications on File Prior to Encounter   Medication Sig Dispense Refill    megestrol (MEGACE) 40 MG/ML suspension take 10 milliliters (2 TEASPOONFULS) by mouth every morning 240 mL 3    amLODIPine (NORVASC) 10 MG tablet Take 1 tablet by mouth daily 30 tablet 3    magnesium oxide (MAG-OX) 400 (240 Mg) MG tablet Take 1 tablet by mouth daily 30 tablet 0    carvedilol (COREG) 6.25 MG tablet Take 12.5 mg by mouth 2 times daily (with meals)      oxyCODONE (ROXICODONE) 5 MG immediate release tablet Take 5 mg by mouth every 4 hours as needed for Pain.  Last dispensed  for 30 days      ferrous sulfate (FE TABS 325) 325 (65 Fe) MG EC tablet Take 1 tablet by mouth 3 times daily (with meals) 90 tablet 5    atorvastatin (LIPITOR) 10 MG tablet Take 10 mg by mouth daily      [DISCONTINUED] prochlorperazine (COMPAZINE) 10 MG tablet Take 1 tablet by mouth every 6 hours as needed (nausea vomiting) 120 tablet 3    apixaban (ELIQUIS) 5 MG TABS tablet Take 1 tablet by mouth in the morning and 1 tablet before bedtime. 60 tablet 5    sucralfate (CARAFATE) 1 GM tablet Take 1 tablet by mouth in the morning and 1 tablet at noon and 1 tablet in the evening and 1 tablet before bedtime. 120 tablet 1    [DISCONTINUED] glipiZIDE (GLUCOTROL) 5 MG tablet Take 0.5 tablets by mouth in the morning and 0.5 tablets in the evening. Take before meals. (Patient not taking: Reported on 8/12/2022) 60 tablet 3    Lactobacillus (PROBIOTIC ACIDOPHILUS) CAPS Take 1 caplet by mouth daily      Multiple Vitamins-Minerals (THERAPEUTIC MULTIVITAMIN-MINERALS) tablet Take 1 tablet by mouth daily      OXYGEN Inhale 2 L into the lungs at bedtime Nightly & during the day. ipratropium-albuterol (DUONEB) 0.5-2.5 (3) MG/3ML SOLN nebulizer solution Inhale 3 mLs into the lungs every 4 hours as needed for Shortness of Breath 360 mL 3    levothyroxine (SYNTHROID) 150 MCG tablet Take 1 tablet by mouth Daily 30 tablet 3    fluticasone-salmeterol (ADVAIR) 100-50 MCG/DOSE diskus inhaler Inhale 1 puff into the lungs every 12 hours 60 each 0    albuterol sulfate HFA (PROVENTIL;VENTOLIN;PROAIR) 108 (90 Base) MCG/ACT inhaler Inhale 2 puffs into the lungs every 4 hours as needed for Wheezing       losartan (COZAAR) 50 MG tablet Take 1 tablet by mouth daily 30 tablet 5    donepezil (ARICEPT) 10 MG tablet Take 10 mg by mouth daily       FLUoxetine (PROZAC) 20 MG capsule Take 20 mg by mouth daily      omeprazole (PRILOSEC) 20 MG delayed release capsule Take 20 mg by mouth 2 times daily         Negative except for what is mentioned in the HPI.      GENERAL PHYSICAL EXAM     Vitals :   See vital signs in RN flow sheet. GENERAL APPEARANCE:   Safia Estrada is 74 y.o., male, moderately obese, nourished, conscious, alert. Does not appear to be distress or pain at this time. SKIN:  Warm, dry, no cyanosis or jaundice. HEAD:  Normocephalic, atraumatic, no swelling or tenderness. EYES:  Pupils equal, reactive to light. EARS:  No discharge, no marked hearing loss. NOSE:  No rhinorrhea, epistaxis or septal deformity. THROAT:  Not congested. No ulceration bleeding or discharge. NECK:  No stiffness, trachea central.  No palpable masses or L.N.                 CHEST:  Symmetrical and equal on expansion. Pt has mediport to right chest.               HEART:  RRR . No audible murmurs or gallops. LUNGS:  Equal on expansion,Has loose, moist cough. Coarse breath sounds with wheezing in skilled nursing's          ABDOMEN:  Obese. Soft on palpation. No dysphagia, No localized tenderness. No guarding or rigidity. LYMPHATICS:  No palpable cervical lymphadenopathy. LOCOMOTOR, BACK AND SPNE:  No tenderness or deformities. EXTREMITIES:  Symmetrical, no pretibial edema. No discoloration or ulcerations. NEUROLOGIC:  The patient is conscious, alert, oriented,Cranial nerve II-XII intact, taste and smell were not examined. No apparent focal sensory or motor deficits.              PROVISIONAL DIAGNOSES / SURGERY:      CYSTOSCOPY URETERAL STENT EXCHANGE    Bilateral hydronephrosis     Patient Active Problem List    Diagnosis Date Noted    Atrial fibrillation with RVR ([address] 09/26/2022    Lymphoma of body of stomach ([address] 09/17/2022    Severe malnutrition ([address] 09/01/2022    Acute cystitis with hematuria 09/01/2022    Lymphoma, diffuse ([address] 08/31/2022    LAURO (acute kidney injury) ([address] 08/12/2022    Failure to thrive in adult 08/12/2022    DVT (deep venous thrombosis) ([address] 08/12/2022 Pneumonia 07/14/2022    Carotid stenosis, asymptomatic, bilateral 06/28/2022    Simple chronic bronchitis ([address] 06/28/2022    Dependence on nocturnal oxygen therapy 06/28/2022    Cardiomegaly 01/29/2022    Hypoxia 01/29/2022    Hypothyroidism     Diabetes mellitus ([address]     Hypertensive urgency     Acute respiratory failure with hypoxia ([address]     Pleural effusion 01/28/2022    Chronic cholecystitis     Moderate malnutrition ([address] 10/07/2021    Hydronephrosis due to obstruction of ureter 28/32/4363    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

## 2022-12-01 NOTE — ANESTHESIA POSTPROCEDURE EVALUATION
POST- ANESTHESIA EVALUATION       Pt Name: David Khalil  MRN: [id number]  YOB: 1950  Date of evaluation: 12/1/2022  Time:  1:44 PM      BP (!) 159/68   Pulse 65   Temp 97.3 °F (36.3 °C) (Temporal)   Resp 18   Ht 5' 11\" (1.803 m)   Wt 148 lb (67.1 kg)   SpO2 94%   BMI 20.64 kg/m²      Consciousness Level  Awake  Cardiopulmonary Status  Stable  Pain Adequately Treated YES  Nausea / Vomiting  NO  Adequate Hydration  YES  Anesthesia Related Complications NONE      Electronically signed by Leo Coker MD on 12/1/2022 at 1:44 PM       Department of Anesthesiology  Postprocedure Note    Patient: David Khalil  MRN: [id number]  YOB: 1950  Date of evaluation: 12/1/2022      Procedure Summary     Date: 12/01/22 Room / Location: [address] [place] [address]: [facility]    Anesthesia Start: 0719 Anesthesia Stop: 0804    Procedure: CYSTOSCOPY URETERAL STENT EXCHANGE (Bilateral: Ureter) Diagnosis:       Bilateral hydronephrosis      (Bilateral hydronephrosis [N13.30])    Surgeons: Radha Saldivar MD Responsible Provider: Leo Coker MD    Anesthesia Type: general ASA Status: 3          Anesthesia Type: No value filed.     Yvette Phase I: Yvette Score: 10    Yvette Phase II: Yvette Score: 10      Anesthesia Post Evaluation

## 2022-12-01 NOTE — OP NOTE
Operative Note      Patient: Fela Acosta  YOB: 1950  MRN: [id number]    Date of Procedure: 12/1/2022    Pre-Op Diagnosis: Bilateral hydronephrosis [N13.30]    Post-Op Diagnosis: Same       Procedure(s):  CYSTOSCOPY URETERAL STENT EXCHANGE    Surgeon(s):  Owen Castro MD    Assistant:   * No surgical staff found *    Anesthesia: General    Estimated Blood Loss (mL): Minimal    Complications: None    Specimens:   * No specimens in log *    Implants:  * No implants in log *      Drains: * No LDAs found *    Findings: Indications: 66-year-old male, with bilateral hydronephrosis associated with lymphadenopathy patient under chemotherapy at this time we have discussed with the patient as well as as the oncology team stent exchange versus stent removal.    At the present time a stent exchange will be carried out since there is still significant lymphadenopathy and hydronephrosis    Detailed Description of Procedure:   Patient was brought to the operating room, positioned in dorsolithotomy, proper patient identification procedure identification prepping and draping. We entered the bladder with the cystoscope, we collected a urine for culture and sensitivity, bladder was thoroughly irrigated until clear. The bladder was found to be trabeculated,    We identified the stents in the ureter bilateral.    Under fluoroscopic guidance we proceeded with gently retrieving the stent and backloaded a Glidewire into the kidney. A second Glidewire was also inserted in the second ureter. Under fluoroscopic guidance 2 double-J stent, size 7, were introduced and positioned in the renal pelvis with decompression of the upper urinary tract, the distal end of the stents is positioned in the bladder without complications the bladder was irrigated again, final imaging demonstrates the stent in good position.     Bladder was emptied the scope removed the patient returned to recovery room in stable condition.     Recommendation follow-up visit at the office continue to monitor hydronephrosis with plan for stent exchange in 2-month    Electronically signed by Kati Barkley MD on 12/1/2022 at 7:06 AM

## 2022-12-01 NOTE — ANESTHESIA PRE PROCEDURE
Department of Anesthesiology  Preprocedure Note       Name:  Johanna Acevedo   Age:  74 y.o.  :  1950                                          MRN:  [id number]         Date:  2022      Surgeon: Elroy Vazquez):  Ana Luisa Boyd MD    Procedure: Procedure(s):  CYSTOSCOPY URETERAL STENT EXCHANGE    Medications prior to admission:   Prior to Admission medications    Medication Sig Start Date End Date Taking? Authorizing Provider   ciprofloxacin (CIPRO) 250 MG tablet Take 250 mg by mouth 2 times daily    Historical Provider, MD   ibrutinib (IMBRUVICA) 420 MG tablet Take 420 mg by mouth daily ON 2 DAYS OFF 1 DAY-HAS 8 DOSES LEFT. FOR CANCER TREATMENT. Historical Provider, MD   megestrol (MEGACE) 40 MG/ML suspension take 10 milliliters (2 TEASPOONFULS) by mouth every morning 22   Saumya Gale MD   amLODIPine (NORVASC) 10 MG tablet Take 1 tablet by mouth daily 10/1/22   [facility] Dixon, DO   magnesium oxide (MAG-OX) 400 (240 Mg) MG tablet Take 1 tablet by mouth daily 10/1/22   [facility] Dixon, DO   carvedilol (COREG) 6.25 MG tablet Take 12.5 mg by mouth 2 times daily (with meals)    Historical Provider, MD   oxyCODONE (ROXICODONE) 5 MG immediate release tablet Take 5 mg by mouth every 4 hours as needed for Pain. Last dispensed  for 30 days    Historical Provider, MD   ferrous sulfate (FE TABS 325) 325 (65 Fe) MG EC tablet Take 1 tablet by mouth 3 times daily (with meals) 22   Roderick Metcalf DO   atorvastatin (LIPITOR) 10 MG tablet Take 10 mg by mouth daily    Historical Provider, MD   prochlorperazine (COMPAZINE) 10 MG tablet Take 1 tablet by mouth every 6 hours as needed (nausea vomiting) 22  Saumya Gale MD   apixaban (ELIQUIS) 5 MG TABS tablet Take 1 tablet by mouth in the morning and 1 tablet before bedtime.  22   Roderick Metcalf, DO   sucralfate (CARAFATE) 1 GM tablet Take 1 tablet by mouth in the morning and 1 tablet at noon and 1 tablet in the evening and 1 tablet before bedtime. 7/21/22   Roderick Metcalf, DO   glipiZIDE (GLUCOTROL) 5 MG tablet Take 0.5 tablets by mouth in the morning and 0.5 tablets in the evening. Take before meals. Patient not taking: Reported on 8/12/2022 7/21/22 8/13/22  Babs Rosenberg NAVEEN Metcalf, DO   Lactobacillus (PROBIOTIC ACIDOPHILUS) CAPS Take 1 caplet by mouth daily    Historical Provider, MD   Multiple Vitamins-Minerals (THERAPEUTIC MULTIVITAMIN-MINERALS) tablet Take 1 tablet by mouth daily    Historical Provider, MD   OXYGEN Inhale 2 L into the lungs at bedtime Nightly & during the day.     Historical Provider, MD   ipratropium-albuterol (DUONEB) 0.5-2.5 (3) MG/3ML SOLN nebulizer solution Inhale 3 mLs into the lungs every 4 hours as needed for Shortness of Breath 2/20/22   EDWIGE Gilbert - CNP   levothyroxine (SYNTHROID) 150 MCG tablet Take 1 tablet by mouth Daily 2/2/22   Alvarez Joseph,    fluticasone-salmeterol (ADVAIR) 100-50 MCG/DOSE diskus inhaler Inhale 1 puff into the lungs every 12 hours 2/1/22   Brent Ulloa, DO   albuterol sulfate HFA (PROVENTIL;VENTOLIN;PROAIR) 108 (90 Base) MCG/ACT inhaler Inhale 2 puffs into the lungs every 4 hours as needed for Wheezing     Historical Provider, MD   losartan (COZAAR) 50 MG tablet Take 1 tablet by mouth daily 10/12/21   Roderick Metcalf DO   donepezil (ARICEPT) 10 MG tablet Take 10 mg by mouth daily  7/11/21   Historical Provider, MD   FLUoxetine (PROZAC) 20 MG capsule Take 20 mg by mouth daily    Historical Provider, MD   omeprazole (PRILOSEC) 20 MG delayed release capsule Take 20 mg by mouth 2 times daily    Historical Provider, MD       Current medications:    Current Facility-Administered Medications   Medication Dose Route Frequency Provider Last Rate Last Admin    lidocaine PF 1 % injection 1 mL  1 mL IntraDERmal Once PRN [place] MD Ronald        sodium chloride flush 0.9 % injection 5-40 mL  5-40 mL IntraVENous 2 times per day Ingrid Fregoso MD        sodium chloride flush 0.9 % injection 5-40 mL  5-40 mL IntraVENous PRN Suzanne Cardenas MD        0.9 % sodium chloride infusion   IntraVENous PRN Suzanne Cardenas MD        0.9 % sodium chloride infusion   IntraVENous Continuous Basaltnoah Cardenas MD        ipratropium-albuterol (DUONEB) nebulizer solution 1 ampule  1 ampule Inhalation Once Lum Eans, APRN - CNP           Allergies:  No Known Allergies    Problem List:    Patient Active Problem List   Diagnosis Code    NHL (non-Hodgkin's lymphoma) ([facility] C85.90    Traumatic retroperitoneal hematoma S36.892A    Acute kidney injury ([address] E90.0    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    Hydronephrosis due to obstruction of ureter N13.1    Moderate malnutrition (HCC) E44.0    Chronic cholecystitis K81.1    Pleural effusion J90    Cardiomegaly I51.7    Hypothyroidism E03.9    Diabetes mellitus ([address] E11.9    Hypertensive urgency I16.0    Acute respiratory failure with hypoxia (HCC) J96.01    Hypoxia R09.02    Carotid stenosis, asymptomatic, bilateral I65.23    Simple chronic bronchitis (HCC) J41.0    Dependence on nocturnal oxygen therapy Z99.81    Pneumonia J18.9    LAURO (acute kidney injury) ([address] N17.9    Failure to thrive in adult R62.7    DVT (deep venous thrombosis) ([facility]) I82.409    Lymphoma, diffuse (HCC) C85.90    Severe malnutrition ([address] E43    Acute cystitis with hematuria N30.01    Lymphoma of body of stomach ([address] C85.99    Atrial fibrillation with RVR ([address] I48.91       Past Medical History:        Diagnosis Date    Arthritis     Cancer [facility])     chemotherapy lymphoma last was april 2022    Chemotherapy management, encounter for     CHF (congestive heart failure) ([facility])     COPD (chronic obstructive pulmonary disease) ([address]     Dementia ([address]     Diabetes mellitus ([address]     H/O cardiovascular stress test     History of non-Hodgkin's lymphoma     Hx of blood clots     DVT in right leg     Hyperlipidemia     Hypertension  Hypothyroidism     On home O2     at 5 liters per nasal cannula 24 hrs. per day.  Pneumonia 2022       Past Surgical History:        Procedure Laterality Date    CAROTID ENDARTERECTOMY Left 2022    LEFT TYPE I EVERSION LEFT CAROTID ENDARTERECTOMY RE-IMPLANTATION LEFT ICA performed by Jnuo Ott MD at [address] [facility], LAPAROSCOPIC N/A 10/9/2021    CHOLECYSTECTOMY LAPAROSCOPIC ROBOTIC XI performed by Susana Fontanez MD at [address] Left 2021    COLONOSCOPY POLYPECTOMY SNARE/COLD BIOPSY performed by Samira Monroe MD at [address] [place] [address] Drive Right 10/6/2021    CYSTOSCOPY PYELOGRAM URETERAL STENT INSERTION performed by Rada Boxer, MD at [address] [place] [address] Drive Bilateral 2021    [address] performed by Rada Boxer, MD at [address] [place] [address] Bilateral 2022    CYSTOSCOPY WITH BILATERAL STENT EXCHANGE performed by Rada Boxer, MD at [facility] 2022    CYSTOSCOPY BILATERAL STENT EXCHANGE performed by Rada Boxer, MD at [address] [facility]      cataracts   [address] [facility]      IR BIOPSY ABDOMINAL MASS  2021    IR BIOPSY ABDOMINAL MASS 2021 [facility]    IR PORT PLACEMENT EQUAL OR GREATER THAN 5 YEARS  2021    IR PORT PLACEMENT EQUAL OR GREATER THAN 5 YEARS 2021 [facility]    TONSILLECTOMY      as child    UPPER GASTROINTESTINAL ENDOSCOPY N/A 2022    EGD BIOPSY performed by Susana Fontanez MD at [facility]       Social History:    Social History     Tobacco Use    Smoking status: Former     Packs/day: 2.00     Years: 45.00     Pack years: 90.00     Types: Cigarettes     Quit date: 2006     Years since quittin.9    Smokeless tobacco: Never   Substance Use Topics    Alcohol use:  Yes     Alcohol/week: 1.7 standard drinks     Types: 2 Standard drinks or equivalent per week Comment: occassional                                Counseling given: Not Answered      Vital Signs (Current): There were no vitals filed for this visit. BP Readings from Last 3 Encounters:   11/23/22 (!) 107/52   10/21/22 (!) 138/55   10/21/22 (!) 86/39       NPO Status:                                                                                 BMI:   Wt Readings from Last 3 Encounters:   11/17/22 151 lb (68.5 kg)   10/21/22 155 lb 3.2 oz (70.4 kg)   09/30/22 168 lb 10.4 oz (76.5 kg)     There is no height or weight on file to calculate BMI.    CBC:   Lab Results   Component Value Date/Time    WBC 6.7 10/21/2022 12:15 PM    RBC 3.56 10/21/2022 12:15 PM    RBC 4.38 06/06/2012 06:57 AM    HGB 9.9 10/21/2022 12:15 PM    HCT 30.5 10/21/2022 12:15 PM    MCV 85.6 10/21/2022 12:15 PM    RDW 17.2 10/21/2022 12:15 PM     10/21/2022 12:15 PM     06/06/2012 06:57 AM     HB 9.9    CMP:   Lab Results   Component Value Date/Time     10/21/2022 12:15 PM    K 4.0 10/21/2022 12:15 PM     10/21/2022 12:15 PM    CO2 22 10/21/2022 12:15 PM    BUN 30 10/21/2022 12:15 PM    CREATININE 1.44 10/21/2022 12:15 PM    GFRAA >60 09/30/2022 04:56 AM    LABGLOM 51 10/21/2022 12:15 PM    GLUCOSE 151 10/21/2022 12:15 PM    GLUCOSE 97 06/06/2012 06:57 AM    PROT 5.1 10/21/2022 12:15 PM    CALCIUM 8.1 10/21/2022 12:15 PM    BILITOT 0.5 10/21/2022 12:15 PM    ALKPHOS 106 10/21/2022 12:15 PM    AST 9 10/21/2022 12:15 PM    ALT 6 10/21/2022 12:15 PM       POC Tests: No results for input(s): POCGLU, POCNA, POCK, POCCL, POCBUN, POCHEMO, POCHCT in the last 72 hours.     Coags:   Lab Results   Component Value Date/Time    PROTIME 14.4 09/26/2022 08:55 AM    INR 1.1 09/26/2022 08:55 AM    APTT 54.8 08/31/2022 02:10 PM       HCG (If Applicable): No results found for: PREGTESTUR, PREGSERUM, HCG, HCGQUANT     ABGs: No results found for: PHART, PO2ART, BAI2BQZ, EDS7FSP, BEART, X1FSGCGY Type & Screen (If Applicable):  No results found for: LABABO, LABRH    Drug/Infectious Status (If Applicable):  No results found for: HIV, HEPCAB    COVID-19 Screening (If Applicable):   Lab Results   Component Value Date/Time    COVID19 Not Detected 09/26/2022 09:36 AM           Anesthesia Evaluation  Patient summary reviewed and Nursing notes reviewed no history of anesthetic complications:   Airway: Mallampati: II  TM distance: >3 FB   Neck ROM: full  Mouth opening: > = 3 FB   Dental:          Pulmonary:normal exam  breath sounds clear to auscultation  (+) pneumonia: no interval change,  COPD: no interval change,      (-) asthma, shortness of breath, recent URI, sleep apnea, rhonchi, wheezes, rales, stridor, not a current smoker and no decreased breath sounds          Patient did not smoke on day of surgery. Cardiovascular:  Exercise tolerance: good (>4 METS),   (+) hypertension: no interval change, CHF: no interval change,     (-) pacemaker, valvular problems/murmurs, past MI, CAD, CABG/stent, dysrhythmias,  angina, orthopnea, PND,  DIALLO, murmur, weak pulses,  friction rub, systolic click, carotid bruit,  JVD, peripheral edema, no pulmonary hypertension and no hyperlipidemia    ECG reviewed  Rhythm: regular  Rate: normal  Echocardiogram reviewed         Beta Blocker:  Dose within 24 Hrs         Neuro/Psych:   (+) neuromuscular disease:, psychiatric history:   (-) seizures, TIA, CVA, headaches and depression/anxiety            GI/Hepatic/Renal: Neg GI/Hepatic/Renal ROS       (-) hiatal hernia, GERD, PUD, hepatitis, liver disease, no renal disease, bowel prep and no morbid obesity       Endo/Other:    (+) DiabetesType II DM, no interval change, , hypothyroidism::., no malignancy/cancer. (-) hyperthyroidism, blood dyscrasia, arthritis, no electrolyte abnormalities, no malignancy/cancer               Abdominal:             Vascular: negative vascular ROS. - PVD, DVT and PE.       Other Findings: C crown  M missing          Anesthesia Plan      general     ASA 3       Induction: intravenous. MIPS: Postoperative opioids intended and Prophylactic antiemetics administered. Anesthetic plan and risks discussed with patient. Plan discussed with CRNA.                     Ken Siddiqi MD   12/1/2022

## 2022-12-02 ENCOUNTER — TELEPHONE (OUTPATIENT)
Dept: CASE MANAGEMENT | Age: 72
End: 2022-12-02

## 2022-12-02 LAB
CULTURE: NO GROWTH
SPECIMEN DESCRIPTION: NORMAL

## 2022-12-02 NOTE — TELEPHONE ENCOUNTER
Name: Karron Kawasaki  : 1950  MRN: [id number]    Oncology Navigation Follow-Up Note  Navigator left VM offering assistance if needed. Plan to follow.    Electronically signed by Moni Bob RN on 2022 at 12:26 PM

## 2022-12-05 ENCOUNTER — TELEPHONE (OUTPATIENT)
Dept: ONCOLOGY | Age: 72
End: 2022-12-05

## 2022-12-05 ENCOUNTER — OFFICE VISIT (OUTPATIENT)
Dept: ONCOLOGY | Age: 72
End: 2022-12-05
Payer: MEDICARE

## 2022-12-05 ENCOUNTER — HOSPITAL ENCOUNTER (OUTPATIENT)
Age: 72
Discharge: HOME | End: 2022-12-05
Payer: MEDICARE

## 2022-12-05 VITALS
WEIGHT: 153.7 LBS | OXYGEN SATURATION: 95 % | BODY MASS INDEX: 21.44 KG/M2 | SYSTOLIC BLOOD PRESSURE: 127 MMHG | HEART RATE: 71 BPM | DIASTOLIC BLOOD PRESSURE: 73 MMHG | TEMPERATURE: 97.5 F

## 2022-12-05 DIAGNOSIS — C85.90 NON-HODGKIN'S LYMPHOMA, UNSPECIFIED BODY REGION, UNSPECIFIED NON-HODGKIN LYMPHOMA TYPE (HCC): ICD-10-CM

## 2022-12-05 DIAGNOSIS — C82.13 FOLLICULAR LYMPHOMA GRADE II OF INTRA-ABDOMINAL LYMPH NODES (HCC): Primary | ICD-10-CM

## 2022-12-05 DIAGNOSIS — C85.90 NON-HODGKIN'S LYMPHOMA, UNSPECIFIED BODY REGION, UNSPECIFIED NON-HODGKIN LYMPHOMA TYPE (HCC): Primary | ICD-10-CM

## 2022-12-05 LAB
ABSOLUTE EOS #: 0.2 K/UL (ref 0–0.4)
ABSOLUTE LYMPH #: 1.2 K/UL (ref 1–4.8)
ABSOLUTE MONO #: 0.7 K/UL (ref 0.1–1.2)
ALBUMIN SERPL-MCNC: 2.7 G/DL (ref 3.5–5.2)
ALBUMIN/GLOBULIN RATIO: 1.3 (ref 1–2.5)
ALP BLD-CCNC: 95 U/L (ref 40–129)
ALT SERPL-CCNC: 7 U/L (ref 5–41)
ANION GAP SERPL CALCULATED.3IONS-SCNC: 7 MMOL/L (ref 9–17)
AST SERPL-CCNC: 10 U/L
BASOPHILS # BLD: 1 % (ref 0–2)
BASOPHILS ABSOLUTE: 0 K/UL (ref 0–0.2)
BILIRUB SERPL-MCNC: 0.5 MG/DL (ref 0.3–1.2)
BUN BLDV-MCNC: 20 MG/DL (ref 8–23)
CALCIUM SERPL-MCNC: 8.1 MG/DL (ref 8.6–10.4)
CHLORIDE BLD-SCNC: 106 MMOL/L (ref 98–107)
CO2: 24 MMOL/L (ref 20–31)
CREAT SERPL-MCNC: 1.06 MG/DL (ref 0.7–1.2)
EOSINOPHILS RELATIVE PERCENT: 2 % (ref 1–4)
GFR SERPL CREATININE-BSD FRML MDRD: >60 ML/MIN/1.73M2
GLUCOSE BLD-MCNC: 216 MG/DL (ref 70–99)
HCT VFR BLD CALC: 30.7 % (ref 41–53)
HEMOGLOBIN: 10.1 G/DL (ref 13.5–17.5)
LYMPHOCYTES # BLD: 18 % (ref 24–44)
MCH RBC QN AUTO: 28.1 PG (ref 26–34)
MCHC RBC AUTO-ENTMCNC: 33 G/DL (ref 31–37)
MCV RBC AUTO: 85.1 FL (ref 80–100)
MONOCYTES # BLD: 10 % (ref 2–11)
PDW BLD-RTO: 14.8 % (ref 12.5–15.4)
PLATELET # BLD: 163 K/UL (ref 140–450)
PMV BLD AUTO: 8.6 FL (ref 6–12)
POTASSIUM SERPL-SCNC: 4.3 MMOL/L (ref 3.7–5.3)
RBC # BLD: 3.61 M/UL (ref 4.5–5.9)
SEG NEUTROPHILS: 69 % (ref 36–66)
SEGMENTED NEUTROPHILS ABSOLUTE COUNT: 4.6 K/UL (ref 1.8–7.7)
SODIUM BLD-SCNC: 137 MMOL/L (ref 135–144)
TOTAL PROTEIN: 4.8 G/DL (ref 6.4–8.3)
WBC # BLD: 6.6 K/UL (ref 3.5–11)

## 2022-12-05 PROCEDURE — 3078F DIAST BP <80 MM HG: CPT | Performed by: INTERNAL MEDICINE

## 2022-12-05 PROCEDURE — 3074F SYST BP LT 130 MM HG: CPT | Performed by: INTERNAL MEDICINE

## 2022-12-05 PROCEDURE — G8420 CALC BMI NORM PARAMETERS: HCPCS | Performed by: INTERNAL MEDICINE

## 2022-12-05 PROCEDURE — 36415 COLL VENOUS BLD VENIPUNCTURE: CPT

## 2022-12-05 PROCEDURE — 1036F TOBACCO NON-USER: CPT | Performed by: INTERNAL MEDICINE

## 2022-12-05 PROCEDURE — 3017F COLORECTAL CA SCREEN DOC REV: CPT | Performed by: INTERNAL MEDICINE

## 2022-12-05 PROCEDURE — 85025 COMPLETE CBC W/AUTO DIFF WBC: CPT

## 2022-12-05 PROCEDURE — 1123F ACP DISCUSS/DSCN MKR DOCD: CPT | Performed by: INTERNAL MEDICINE

## 2022-12-05 PROCEDURE — 99214 OFFICE O/P EST MOD 30 MIN: CPT | Performed by: INTERNAL MEDICINE

## 2022-12-05 PROCEDURE — G8427 DOCREV CUR MEDS BY ELIG CLIN: HCPCS | Performed by: INTERNAL MEDICINE

## 2022-12-05 PROCEDURE — G8484 FLU IMMUNIZE NO ADMIN: HCPCS | Performed by: INTERNAL MEDICINE

## 2022-12-05 PROCEDURE — 80053 COMPREHEN METABOLIC PANEL: CPT

## 2022-12-05 RX ORDER — PROCHLORPERAZINE MALEATE 10 MG
10 TABLET ORAL EVERY 6 HOURS PRN
COMMUNITY
End: 2022-12-05 | Stop reason: SDUPTHER

## 2022-12-05 RX ORDER — ONDANSETRON 4 MG/1
4 TABLET, ORALLY DISINTEGRATING ORAL EVERY 8 HOURS PRN
Qty: 20 TABLET | Refills: 2 | Status: SHIPPED | OUTPATIENT
Start: 2022-12-05 | End: 2023-01-04

## 2022-12-05 RX ORDER — PROCHLORPERAZINE MALEATE 10 MG
10 TABLET ORAL EVERY 6 HOURS PRN
Qty: 120 TABLET | Refills: 2 | Status: SHIPPED | OUTPATIENT
Start: 2022-12-05

## 2022-12-05 RX ORDER — SUCRALFATE 1 G/1
1 TABLET ORAL 4 TIMES DAILY
Qty: 120 TABLET | Refills: 1 | Status: SHIPPED | OUTPATIENT
Start: 2022-12-05

## 2022-12-05 NOTE — TELEPHONE ENCOUNTER
AVS from 12/5/22      Continue Imbruvica  CBC, CMP today  CT scan abdomen soon  Home physical therapy  RV about 6-8 weeks.        Ct scheduled for 12/7 @ 3:00 pm     Rv scheduled for 2/6 @ 11:15 am     Labs done today     Faxed referral to [phone] to [address] [place] [address] physical therapy    Pt was given AVS and appointment schedule    Electronically signed by Taya Garcia on 12/5/2022 at 1:00 PM

## 2022-12-05 NOTE — PATIENT INSTRUCTIONS
Continue Imbruvica  CBC, CMP today  CT scan abdomen soon  Home physical therapy  RV about 6-8 weeks.

## 2022-12-06 NOTE — PROGRESS NOTES
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 Evidence of relapse with biopsy-proven follicular lymphoma. 1-2 from treated medically lymph node biopsy July 6, 2021    CURRENT THERAPY:    1. Observation. 2. Status post treatment with Bexxar in May 2008. 3. Status post previous treatment with Rituxan. 4.  Start treatment with Rituxan August 23, 2021. completed4 weeks of rituximab on 9/20/2021  5. Induction Rituxan is followed by consolidation Rituxan maintenance   6. Due to progression we started Imbruvica July 2022. INTERIM HISTORY:  The patient is seen for follow-up of his lymphoma. Further work-up showed enlarged lymph nodes in the retroperitoneum and iliac area. Biopsy proved recurrence of follicular lymphoma. Patient did well on rituximab induction and was maintained on maintenance Rituxan. Repeated CT scan February 2022 showed stable disease. He had hospitalization in July which showed significant progressive disease. Started on Imbruvica. He was hospitalized several times due to dehydration and malnutrition. He started feeling much better after rehydration with improvement in kidney function. Repeated CT scan showed positive results with significant improvement on the Imbruvica treatment. Patient is doing better over the last 2 months. No recent hospitalizations. He is having increasing weakness of the lower extremities. No fever. No abdominal pain. REVIEW OF SYSTEMS:   General: ++weight loss. Significant shortness of breath. Eyes: No blurred vision, eye pain or double vision. Ears: No hearing problems or drainage. No tinnitus. Throat: No sore throat, problems with swallowing or dysphagia. Respiratory: As above. Cardiovascular: No chest pain, orthopnea or PND. No lower extremity edema.  No palpitation. Gastrointestinal: No problems with swallowing. No abdominal pain or bloating. No nausea or vomiting. No diarrhea or constipation. No GI bleeding. Genitourinary: No dysuria, hematuria, frequency or urgency. Musculoskeletal: No muscle aches or pains. No limitation of movement. No back pain. Dermatologic: No skin rashes or pruritus. No skin lesions or discolorations. Psychiatric: No depression, anxiety, or stress or signs of schizophrenia. Hematologic: No history of bleeding tendency. No bruises or ecchymosis. No history of clotting problems. Infectious disease: No fever, chills or frequent infections. Endocrine: No problems with opacity. No polydipsia or polyuria. Neurologic: No headaches or dizziness. No weakness or numbness of the extremities. SOCIAL HISTORY:  No smoking, no alcohol drinking. PHYSICAL EXAM: Patient is having significant shortness of breath using oxygen by portable oxygen tank. Vital signs: Blood pressure 127/73, pulse 71, temperature 97.5 °F (36.4 °C), temperature source Temporal, weight 153 lb 11.2 oz (69.7 kg), SpO2 95 %. HEENT:  Eyes are normal. Ears, nose and throat are normal.  Neck: Supple. No lymph node enlargement. No thyroid enlargement. Trachea is centrally located. Chest: Decreased air entry with hypoxia heart: Regular sinus rhythm. Abdomen: Soft, nontender. No hepatosplenomegaly. No masses. Extremities:  With no edema. Lymph Nodes:  No cervical, axillary or inguinal lymph node enlargement. Neurologic:  Conscious and oriented. No focal neurological deficits. Psychosocial: No depression, anxiety or stress. Skin: No rashes, bruises or ecchymoses.     REVIEW OF DIAGNOSTIC DATA:     Lab Results   Component Value Date    WBC 6.6 12/05/2022    HGB 10.1 (L) 12/05/2022    HCT 30.7 (L) 12/05/2022    MCV 85.1 12/05/2022     12/05/2022       Chemistry        Component Value Date/Time     12/05/2022 1210    K 4.3 12/05/2022 1210     12/05/2022 1210 CO2 24 12/05/2022 1210    BUN 20 12/05/2022 1210    CREATININE 1.06 12/05/2022 1210        Component Value Date/Time    CALCIUM 8.1 (L) 12/05/2022 1210    ALKPHOS 95 12/05/2022 1210    AST 10 12/05/2022 1210    ALT 7 12/05/2022 1210    BILITOT 0.5 12/05/2022 1210        CT scan showed stable findings with no evidence of progression. FLUORO FOR SURGICAL PROCEDURES  Radiology exam is complete. No Radiologist dictation. Please follow up   with ordering provider. ASSESSMENT:    Small cell, B-cell non-Hodgkins lymphoma in remission. Evidence of relapse with biopsy-proven follicular lymphoma grade 1-2 July 5, 2021  Progression after treatment with rituximab  Good results to Imbruvica treatment. PLAN:     Lymph node biopsy showed relapse of low-grade lymphoma. Grade 1 - 2 follicular lymphoma. Patient was treated with rituximab induction followed by maintenance. Initially he has stable disease. Recent scans showed significant progression in the abdomen. Considering his performance and other comorbidities he was started on Imbruvica. Tolerated well except for symptoms as above. He is much better after recent hospitalization. Discussed the repeated labs. Kidney function is better. Needs to stay hydrated. Continue Imbruvica  CBC, CMP today  CT scan abdomen soon  Home physical therapy  RV about 6-8 weeks. We will monitor for toxicity and side effects and to monitor treatment response. Patient's questions were answered to the best of his satisfaction and he verbalized full understanding and agreement.                                [facility]                            This note is created with the assistance of a speech recognition program.  While intending to generate a document that actually reflects the content of the visit, the document can still have some errors including those of syntax and sound a like substitutions which may escape proof reading. It such instances, actual meaning can be extrapolated by contextual diversion.

## 2022-12-07 ENCOUNTER — HOSPITAL ENCOUNTER (OUTPATIENT)
Dept: CT IMAGING | Age: 72
Discharge: HOME | End: 2022-12-09
Payer: MEDICARE

## 2022-12-07 DIAGNOSIS — C82.13 FOLLICULAR LYMPHOMA GRADE II OF INTRA-ABDOMINAL LYMPH NODES (HCC): ICD-10-CM

## 2022-12-07 PROCEDURE — 6360000004 HC RX CONTRAST MEDICATION: Performed by: INTERNAL MEDICINE

## 2022-12-07 PROCEDURE — A4641 RADIOPHARM DX AGENT NOC: HCPCS | Performed by: INTERNAL MEDICINE

## 2022-12-07 PROCEDURE — 74177 CT ABD & PELVIS W/CONTRAST: CPT

## 2022-12-07 PROCEDURE — 2580000003 HC RX 258: Performed by: INTERNAL MEDICINE

## 2022-12-07 RX ORDER — 0.9 % SODIUM CHLORIDE 0.9 %
100 INTRAVENOUS SOLUTION INTRAVENOUS ONCE
Status: COMPLETED | OUTPATIENT
Start: 2022-12-07 | End: 2022-12-07

## 2022-12-07 RX ORDER — SODIUM CHLORIDE 0.9 % (FLUSH) 0.9 %
10 SYRINGE (ML) INJECTION PRN
Status: DISCONTINUED | OUTPATIENT
Start: 2022-12-07 | End: 2022-12-10 | Stop reason: HOSPADM

## 2022-12-07 RX ADMIN — SODIUM CHLORIDE, PRESERVATIVE FREE 10 ML: 5 INJECTION INTRAVENOUS at 15:45

## 2022-12-07 RX ADMIN — BARIUM SULFATE 450 ML: 20 SUSPENSION ORAL at 15:45

## 2022-12-07 RX ADMIN — SODIUM CHLORIDE 100 ML: 9 INJECTION, SOLUTION INTRAVENOUS at 15:45

## 2022-12-07 RX ADMIN — IOPAMIDOL 75 ML: 755 INJECTION, SOLUTION INTRAVENOUS at 15:44

## 2022-12-08 ENCOUNTER — TELEPHONE (OUTPATIENT)
Dept: INFUSION THERAPY | Age: 72
End: 2022-12-08

## 2022-12-08 NOTE — TELEPHONE ENCOUNTER
Pt's wife called requesting hydration appt for pt. She states his BP is low, and Dr. Edita Sands advised for them to come in for hydration if it was needed. Order already in for hydration, pt scheduled for Fri 12/9 at 2.

## 2022-12-09 ENCOUNTER — HOSPITAL ENCOUNTER (OUTPATIENT)
Dept: INFUSION THERAPY | Age: 72
Discharge: HOME | End: 2022-12-09
Payer: MEDICARE

## 2022-12-09 VITALS
SYSTOLIC BLOOD PRESSURE: 114 MMHG | HEART RATE: 71 BPM | DIASTOLIC BLOOD PRESSURE: 65 MMHG | RESPIRATION RATE: 18 BRPM | TEMPERATURE: 97.9 F

## 2022-12-09 DIAGNOSIS — C85.90 LYMPHOMA, DIFFUSE (HCC): Primary | ICD-10-CM

## 2022-12-09 PROCEDURE — 2580000003 HC RX 258: Performed by: INTERNAL MEDICINE

## 2022-12-09 PROCEDURE — 6360000002 HC RX W HCPCS: Performed by: INTERNAL MEDICINE

## 2022-12-09 PROCEDURE — 96360 HYDRATION IV INFUSION INIT: CPT

## 2022-12-09 RX ORDER — SODIUM CHLORIDE 0.9 % (FLUSH) 0.9 %
5-40 SYRINGE (ML) INJECTION PRN
OUTPATIENT
Start: 2022-12-09

## 2022-12-09 RX ORDER — 0.9 % SODIUM CHLORIDE 0.9 %
1000 INTRAVENOUS SOLUTION INTRAVENOUS ONCE
Status: COMPLETED | OUTPATIENT
Start: 2022-12-09 | End: 2022-12-09

## 2022-12-09 RX ORDER — SODIUM CHLORIDE 0.9 % (FLUSH) 0.9 %
5-40 SYRINGE (ML) INJECTION PRN
Status: DISCONTINUED | OUTPATIENT
Start: 2022-12-09 | End: 2022-12-10 | Stop reason: HOSPADM

## 2022-12-09 RX ORDER — SODIUM CHLORIDE 9 MG/ML
5-250 INJECTION, SOLUTION INTRAVENOUS PRN
OUTPATIENT
Start: 2022-12-09

## 2022-12-09 RX ORDER — HEPARIN SODIUM (PORCINE) LOCK FLUSH IV SOLN 100 UNIT/ML 100 UNIT/ML
500 SOLUTION INTRAVENOUS PRN
OUTPATIENT
Start: 2022-12-09

## 2022-12-09 RX ORDER — HEPARIN SODIUM (PORCINE) LOCK FLUSH IV SOLN 100 UNIT/ML 100 UNIT/ML
500 SOLUTION INTRAVENOUS PRN
Status: DISCONTINUED | OUTPATIENT
Start: 2022-12-09 | End: 2022-12-10 | Stop reason: HOSPADM

## 2022-12-09 RX ADMIN — Medication 10 ML: at 14:08

## 2022-12-09 RX ADMIN — Medication 500 UNITS: at 15:20

## 2022-12-09 RX ADMIN — SODIUM CHLORIDE 1000 ML: 9 INJECTION, SOLUTION INTRAVENOUS at 14:10

## 2022-12-09 RX ADMIN — Medication 10 ML: at 14:07

## 2022-12-09 RX ADMIN — Medication 10 ML: at 15:20

## 2022-12-14 ENCOUNTER — HOSPITAL ENCOUNTER (OUTPATIENT)
Age: 72
Discharge: HOME | End: 2022-12-14
Payer: MEDICARE

## 2022-12-14 LAB
BACTERIA: ABNORMAL
BILIRUBIN URINE: ABNORMAL
COLOR: ABNORMAL
EPITHELIAL CELLS UA: ABNORMAL /HPF
GLUCOSE URINE: NEGATIVE
KETONES, URINE: NEGATIVE
LEUKOCYTE ESTERASE, URINE: ABNORMAL
NITRITE, URINE: POSITIVE
PH UA: 6.5 (ref 5–8)
PROTEIN UA: ABNORMAL
RBC UA: ABNORMAL /HPF
SPECIFIC GRAVITY UA: 1.01 (ref 1–1.03)
TURBIDITY: ABNORMAL
URINE HGB: ABNORMAL
UROBILINOGEN, URINE: NORMAL
WBC UA: ABNORMAL /HPF

## 2022-12-14 PROCEDURE — 81001 URINALYSIS AUTO W/SCOPE: CPT

## 2022-12-14 PROCEDURE — 87086 URINE CULTURE/COLONY COUNT: CPT

## 2022-12-15 LAB
CULTURE: NORMAL
SPECIMEN DESCRIPTION: NORMAL

## 2022-12-22 ENCOUNTER — TELEPHONE (OUTPATIENT)
Dept: ONCOLOGY | Age: 72
End: 2022-12-22

## 2022-12-29 ENCOUNTER — HOSPITAL ENCOUNTER (OUTPATIENT)
Age: 72
Discharge: HOME | End: 2022-12-29
Payer: MEDICARE

## 2022-12-29 LAB
BILIRUBIN URINE: ABNORMAL
COLOR: ABNORMAL
GLUCOSE URINE: NEGATIVE
KETONES, URINE: NEGATIVE
LEUKOCYTE ESTERASE, URINE: ABNORMAL
NITRITE, URINE: NEGATIVE
PH UA: 6.5 (ref 5–8)
PROTEIN UA: ABNORMAL
SPECIFIC GRAVITY UA: 1.02 (ref 1–1.03)
TURBIDITY: ABNORMAL
URINE HGB: ABNORMAL
UROBILINOGEN, URINE: NORMAL

## 2022-12-29 PROCEDURE — 81001 URINALYSIS AUTO W/SCOPE: CPT

## 2022-12-29 PROCEDURE — 87086 URINE CULTURE/COLONY COUNT: CPT

## 2022-12-30 ENCOUNTER — HOSPITAL ENCOUNTER (OUTPATIENT)
Dept: INFUSION THERAPY | Age: 72
Discharge: HOME | End: 2022-12-30
Payer: MEDICARE

## 2022-12-30 VITALS
HEART RATE: 64 BPM | SYSTOLIC BLOOD PRESSURE: 101 MMHG | RESPIRATION RATE: 18 BRPM | TEMPERATURE: 98 F | DIASTOLIC BLOOD PRESSURE: 59 MMHG

## 2022-12-30 DIAGNOSIS — C85.90 LYMPHOMA, DIFFUSE (HCC): Primary | ICD-10-CM

## 2022-12-30 LAB
AMORPHOUS: ABNORMAL
BACTERIA: ABNORMAL
CASTS UA: ABNORMAL /LPF
CULTURE: NO GROWTH
EPITHELIAL CELLS UA: ABNORMAL /HPF
RBC UA: ABNORMAL /HPF
SPECIMEN DESCRIPTION: NORMAL
WBC UA: ABNORMAL /HPF

## 2022-12-30 PROCEDURE — 2580000003 HC RX 258: Performed by: INTERNAL MEDICINE

## 2022-12-30 PROCEDURE — 6360000002 HC RX W HCPCS: Performed by: INTERNAL MEDICINE

## 2022-12-30 PROCEDURE — 96361 HYDRATE IV INFUSION ADD-ON: CPT

## 2022-12-30 PROCEDURE — 96360 HYDRATION IV INFUSION INIT: CPT

## 2022-12-30 RX ORDER — HEPARIN SODIUM (PORCINE) LOCK FLUSH IV SOLN 100 UNIT/ML 100 UNIT/ML
500 SOLUTION INTRAVENOUS PRN
OUTPATIENT
Start: 2022-12-30

## 2022-12-30 RX ORDER — SODIUM CHLORIDE 0.9 % (FLUSH) 0.9 %
5-40 SYRINGE (ML) INJECTION PRN
Status: DISCONTINUED | OUTPATIENT
Start: 2022-12-30 | End: 2022-12-31 | Stop reason: HOSPADM

## 2022-12-30 RX ORDER — 0.9 % SODIUM CHLORIDE 0.9 %
1000 INTRAVENOUS SOLUTION INTRAVENOUS ONCE
Start: 2022-12-30 | End: 2022-12-30

## 2022-12-30 RX ORDER — SODIUM CHLORIDE 9 MG/ML
5-250 INJECTION, SOLUTION INTRAVENOUS PRN
OUTPATIENT
Start: 2022-12-30

## 2022-12-30 RX ORDER — SODIUM CHLORIDE 0.9 % (FLUSH) 0.9 %
5-40 SYRINGE (ML) INJECTION PRN
OUTPATIENT
Start: 2022-12-30

## 2022-12-30 RX ORDER — HEPARIN SODIUM (PORCINE) LOCK FLUSH IV SOLN 100 UNIT/ML 100 UNIT/ML
500 SOLUTION INTRAVENOUS PRN
Status: DISCONTINUED | OUTPATIENT
Start: 2022-12-30 | End: 2022-12-31 | Stop reason: HOSPADM

## 2022-12-30 RX ORDER — 0.9 % SODIUM CHLORIDE 0.9 %
1000 INTRAVENOUS SOLUTION INTRAVENOUS ONCE
Status: COMPLETED | OUTPATIENT
Start: 2022-12-30 | End: 2022-12-30

## 2022-12-30 RX ADMIN — SODIUM CHLORIDE, PRESERVATIVE FREE 10 ML: 5 INJECTION INTRAVENOUS at 14:30

## 2022-12-30 RX ADMIN — HEPARIN 500 UNITS: 100 SYRINGE at 14:30

## 2022-12-30 RX ADMIN — SODIUM CHLORIDE 1000 ML: 9 INJECTION, SOLUTION INTRAVENOUS at 13:02

## 2023-01-12 ENCOUNTER — HOSPITAL ENCOUNTER (OUTPATIENT)
Dept: INFUSION THERAPY | Age: 73
Discharge: HOME | End: 2023-01-12
Payer: COMMERCIAL

## 2023-01-12 VITALS
SYSTOLIC BLOOD PRESSURE: 129 MMHG | RESPIRATION RATE: 16 BRPM | HEART RATE: 64 BPM | TEMPERATURE: 97.6 F | DIASTOLIC BLOOD PRESSURE: 59 MMHG

## 2023-01-12 DIAGNOSIS — C85.90 LYMPHOMA, DIFFUSE (HCC): Primary | ICD-10-CM

## 2023-01-12 PROCEDURE — 96360 HYDRATION IV INFUSION INIT: CPT

## 2023-01-12 PROCEDURE — 2580000003 HC RX 258: Performed by: INTERNAL MEDICINE

## 2023-01-12 PROCEDURE — 6360000002 HC RX W HCPCS: Performed by: INTERNAL MEDICINE

## 2023-01-12 PROCEDURE — 96361 HYDRATE IV INFUSION ADD-ON: CPT

## 2023-01-12 RX ORDER — 0.9 % SODIUM CHLORIDE 0.9 %
1000 INTRAVENOUS SOLUTION INTRAVENOUS ONCE
Status: COMPLETED | OUTPATIENT
Start: 2023-01-12 | End: 2023-01-12

## 2023-01-12 RX ORDER — 0.9 % SODIUM CHLORIDE 0.9 %
1000 INTRAVENOUS SOLUTION INTRAVENOUS ONCE
Start: 2023-01-12 | End: 2023-01-12

## 2023-01-12 RX ORDER — HEPARIN SODIUM (PORCINE) LOCK FLUSH IV SOLN 100 UNIT/ML 100 UNIT/ML
500 SOLUTION INTRAVENOUS PRN
Status: DISCONTINUED | OUTPATIENT
Start: 2023-01-12 | End: 2023-01-13 | Stop reason: HOSPADM

## 2023-01-12 RX ORDER — SODIUM CHLORIDE 9 MG/ML
5-250 INJECTION, SOLUTION INTRAVENOUS PRN
OUTPATIENT
Start: 2023-01-12

## 2023-01-12 RX ORDER — SODIUM CHLORIDE 0.9 % (FLUSH) 0.9 %
5-40 SYRINGE (ML) INJECTION PRN
OUTPATIENT
Start: 2023-01-12

## 2023-01-12 RX ORDER — HEPARIN SODIUM (PORCINE) LOCK FLUSH IV SOLN 100 UNIT/ML 100 UNIT/ML
500 SOLUTION INTRAVENOUS PRN
OUTPATIENT
Start: 2023-01-12

## 2023-01-12 RX ORDER — SODIUM CHLORIDE 0.9 % (FLUSH) 0.9 %
5-40 SYRINGE (ML) INJECTION PRN
Status: DISCONTINUED | OUTPATIENT
Start: 2023-01-12 | End: 2023-01-13 | Stop reason: HOSPADM

## 2023-01-12 RX ADMIN — Medication 500 UNITS: at 12:06

## 2023-01-12 RX ADMIN — SODIUM CHLORIDE, PRESERVATIVE FREE 10 ML: 5 INJECTION INTRAVENOUS at 12:06

## 2023-01-12 RX ADMIN — SODIUM CHLORIDE, PRESERVATIVE FREE 10 ML: 5 INJECTION INTRAVENOUS at 10:03

## 2023-01-12 RX ADMIN — SODIUM CHLORIDE 1000 ML: 9 INJECTION, SOLUTION INTRAVENOUS at 10:06

## 2023-01-12 NOTE — PROGRESS NOTES
Pt here for hydration. Denies any complaints, states he scheduled hydration appt bc he \"just feels dehydrated\". Reports is eating and drinking well. Hydration complete without incident. Pt d/c'd stable condition. Returns 2/6/23 for f/u with Dr Rahda Carrero.

## 2023-01-16 DIAGNOSIS — C82.13 FOLLICULAR LYMPHOMA GRADE II OF INTRA-ABDOMINAL LYMPH NODES (HCC): ICD-10-CM

## 2023-01-16 DIAGNOSIS — C85.90 NON-HODGKIN'S LYMPHOMA, UNSPECIFIED BODY REGION, UNSPECIFIED NON-HODGKIN LYMPHOMA TYPE (HCC): Primary | ICD-10-CM

## 2023-01-16 RX ORDER — IBRUTINIB 420 MG/1
420 TABLET, FILM COATED ORAL DAILY
Qty: 30 TABLET | Refills: 2 | Status: ACTIVE | OUTPATIENT
Start: 2023-01-16

## 2023-01-16 NOTE — TELEPHONE ENCOUNTER
Received a refill request from [facility] pharmacy for imbruvica. Last note reads to continue imbruvica.  12/5/22

## 2023-01-17 DIAGNOSIS — C85.90 LYMPHOMA, DIFFUSE (HCC): Primary | ICD-10-CM

## 2023-01-17 RX ORDER — SUCRALFATE ORAL 1 G/10ML
1 SUSPENSION ORAL 4 TIMES DAILY
Qty: 1200 ML | Refills: 3 | Status: SHIPPED | OUTPATIENT
Start: 2023-01-17 | End: 2023-01-19

## 2023-01-19 ENCOUNTER — TELEPHONE (OUTPATIENT)
Dept: INFUSION THERAPY | Age: 73
End: 2023-01-19

## 2023-01-19 RX ORDER — FAMOTIDINE 40 MG/5ML
20 POWDER, FOR SUSPENSION ORAL 2 TIMES DAILY
Qty: 150 ML | Refills: 3 | Status: SHIPPED | OUTPATIENT
Start: 2023-01-19

## 2023-01-19 NOTE — TELEPHONE ENCOUNTER
[address]alobos at [phone] to see what medication is comparable on formulary list. Famotidine suspension is covered. Rx sent to pharmacy.

## 2023-01-24 ENCOUNTER — HOSPITAL ENCOUNTER (OUTPATIENT)
Age: 73
Discharge: HOME | End: 2023-01-26
Payer: COMMERCIAL

## 2023-01-24 ENCOUNTER — HOSPITAL ENCOUNTER (OUTPATIENT)
Dept: GENERAL RADIOLOGY | Age: 73
Discharge: HOME | End: 2023-01-26
Payer: COMMERCIAL

## 2023-01-24 DIAGNOSIS — G89.29 CHRONIC PAIN OF LEFT KNEE: ICD-10-CM

## 2023-01-24 DIAGNOSIS — M25.562 CHRONIC PAIN OF LEFT KNEE: ICD-10-CM

## 2023-01-24 DIAGNOSIS — M25.562 ACUTE PAIN OF LEFT KNEE: ICD-10-CM

## 2023-01-24 PROCEDURE — 73590 X-RAY EXAM OF LOWER LEG: CPT

## 2023-01-24 PROCEDURE — 73562 X-RAY EXAM OF KNEE 3: CPT

## 2023-01-27 ENCOUNTER — TELEPHONE (OUTPATIENT)
Dept: ONCOLOGY | Age: 73
End: 2023-01-27

## 2023-01-27 NOTE — TELEPHONE ENCOUNTER
[facility] Nutrition Follow Up       Stef Schneider is a 74 y.o.  male     Called pt on listed phone number r/t nutrition follow up. Pt reports appetite has been good and denies any nutrition related concerns or questions. Will continue to follow as requested. Recent Weights: Wt Readings from Last 3 Encounters:   12/05/22 153 lb 11.2 oz (69.7 kg)   12/01/22 148 lb (67.1 kg)   11/17/22 151 lb (68.5 kg)     Goal: Adequate intake to aide in wt maintenaince, signs and symptoms management, and overall wellbeing.     Progress towards goal: stable    JOSE Graff, RD, LD  Registered Dietitian   [facility]  [phone]

## 2023-02-02 ENCOUNTER — HOSPITAL ENCOUNTER (OUTPATIENT)
Dept: INFUSION THERAPY | Age: 73
Discharge: HOME | End: 2023-02-02
Payer: COMMERCIAL

## 2023-02-02 VITALS
DIASTOLIC BLOOD PRESSURE: 67 MMHG | OXYGEN SATURATION: 97 % | SYSTOLIC BLOOD PRESSURE: 111 MMHG | TEMPERATURE: 97.7 F | RESPIRATION RATE: 18 BRPM | HEART RATE: 60 BPM

## 2023-02-02 DIAGNOSIS — C85.90 LYMPHOMA, DIFFUSE (HCC): Primary | ICD-10-CM

## 2023-02-02 PROCEDURE — 96361 HYDRATE IV INFUSION ADD-ON: CPT

## 2023-02-02 PROCEDURE — 6360000002 HC RX W HCPCS: Performed by: INTERNAL MEDICINE

## 2023-02-02 PROCEDURE — 2580000003 HC RX 258: Performed by: INTERNAL MEDICINE

## 2023-02-02 PROCEDURE — 96360 HYDRATION IV INFUSION INIT: CPT

## 2023-02-02 RX ORDER — SODIUM CHLORIDE 0.9 % (FLUSH) 0.9 %
5-40 SYRINGE (ML) INJECTION PRN
Status: CANCELLED | OUTPATIENT
Start: 2023-02-02

## 2023-02-02 RX ORDER — SODIUM CHLORIDE 0.9 % (FLUSH) 0.9 %
5-40 SYRINGE (ML) INJECTION PRN
Status: DISCONTINUED | OUTPATIENT
Start: 2023-02-02 | End: 2023-02-03 | Stop reason: HOSPADM

## 2023-02-02 RX ORDER — HEPARIN SODIUM (PORCINE) LOCK FLUSH IV SOLN 100 UNIT/ML 100 UNIT/ML
500 SOLUTION INTRAVENOUS PRN
Status: CANCELLED | OUTPATIENT
Start: 2023-02-02

## 2023-02-02 RX ORDER — 0.9 % SODIUM CHLORIDE 0.9 %
1000 INTRAVENOUS SOLUTION INTRAVENOUS ONCE
Status: CANCELLED
Start: 2023-02-02 | End: 2023-02-02

## 2023-02-02 RX ORDER — LIDOCAINE AND PRILOCAINE 25; 25 MG/G; MG/G
CREAM TOPICAL
Qty: 1 EACH | Refills: 3 | Status: SHIPPED | OUTPATIENT
Start: 2023-02-02

## 2023-02-02 RX ORDER — HEPARIN SODIUM (PORCINE) LOCK FLUSH IV SOLN 100 UNIT/ML 100 UNIT/ML
500 SOLUTION INTRAVENOUS PRN
Status: DISCONTINUED | OUTPATIENT
Start: 2023-02-02 | End: 2023-02-03 | Stop reason: HOSPADM

## 2023-02-02 RX ORDER — 0.9 % SODIUM CHLORIDE 0.9 %
1000 INTRAVENOUS SOLUTION INTRAVENOUS ONCE
Status: COMPLETED | OUTPATIENT
Start: 2023-02-02 | End: 2023-02-02

## 2023-02-02 RX ORDER — SODIUM CHLORIDE 9 MG/ML
5-250 INJECTION, SOLUTION INTRAVENOUS PRN
Status: CANCELLED | OUTPATIENT
Start: 2023-02-02

## 2023-02-02 RX ADMIN — SODIUM CHLORIDE 1000 ML: 9 INJECTION, SOLUTION INTRAVENOUS at 15:03

## 2023-02-02 RX ADMIN — SODIUM CHLORIDE, PRESERVATIVE FREE 10 ML: 5 INJECTION INTRAVENOUS at 14:56

## 2023-02-02 RX ADMIN — SODIUM CHLORIDE, PRESERVATIVE FREE 10 ML: 5 INJECTION INTRAVENOUS at 16:44

## 2023-02-02 RX ADMIN — Medication 500 UNITS: at 16:44

## 2023-02-02 ASSESSMENT — PAIN SCALES - GENERAL: PAINLEVEL_OUTOF10: 8

## 2023-02-02 ASSESSMENT — PAIN DESCRIPTION - DESCRIPTORS: DESCRIPTORS: OTHER (COMMENT)

## 2023-02-02 ASSESSMENT — PAIN DESCRIPTION - LOCATION: LOCATION: ABDOMEN

## 2023-02-02 NOTE — PROGRESS NOTES
Patient here for hydration. Arrives ambulatory. Denies any new complaints. Hydration complete without incident. Discharged in stable condition. Returns 2/6/2023 for office visit with Dr. Kashmir Paredes.

## 2023-02-06 ENCOUNTER — OFFICE VISIT (OUTPATIENT)
Dept: ONCOLOGY | Age: 73
End: 2023-02-06
Payer: COMMERCIAL

## 2023-02-06 ENCOUNTER — HOSPITAL ENCOUNTER (OUTPATIENT)
Age: 73
Discharge: HOME | End: 2023-02-06
Payer: COMMERCIAL

## 2023-02-06 VITALS
DIASTOLIC BLOOD PRESSURE: 54 MMHG | WEIGHT: 165 LBS | BODY MASS INDEX: 23.01 KG/M2 | TEMPERATURE: 97.1 F | HEART RATE: 64 BPM | SYSTOLIC BLOOD PRESSURE: 102 MMHG

## 2023-02-06 DIAGNOSIS — C85.99 LYMPHOMA OF BODY OF STOMACH (HCC): Primary | ICD-10-CM

## 2023-02-06 DIAGNOSIS — C85.90 NON-HODGKIN'S LYMPHOMA, UNSPECIFIED BODY REGION, UNSPECIFIED NON-HODGKIN LYMPHOMA TYPE (HCC): ICD-10-CM

## 2023-02-06 DIAGNOSIS — C82.13 FOLLICULAR LYMPHOMA GRADE II OF INTRA-ABDOMINAL LYMPH NODES (HCC): ICD-10-CM

## 2023-02-06 LAB
ABSOLUTE EOS #: 0 K/UL (ref 0–0.4)
ABSOLUTE LYMPH #: 1.6 K/UL (ref 1–4.8)
ABSOLUTE MONO #: 0.7 K/UL (ref 0.1–1.2)
ALBUMIN SERPL-MCNC: 3.2 G/DL (ref 3.5–5.2)
ALBUMIN/GLOBULIN RATIO: 2 (ref 1–2.5)
ALP SERPL-CCNC: 67 U/L (ref 40–129)
ALT SERPL-CCNC: 7 U/L (ref 5–41)
ANION GAP SERPL CALCULATED.3IONS-SCNC: 8 MMOL/L (ref 9–17)
AST SERPL-CCNC: 9 U/L
BASOPHILS # BLD: 0 % (ref 0–2)
BASOPHILS ABSOLUTE: 0 K/UL (ref 0–0.2)
BILIRUB SERPL-MCNC: 0.4 MG/DL (ref 0.3–1.2)
BUN SERPL-MCNC: 39 MG/DL (ref 8–23)
CALCIUM SERPL-MCNC: 7.9 MG/DL (ref 8.6–10.4)
CHLORIDE SERPL-SCNC: 109 MMOL/L (ref 98–107)
CO2 SERPL-SCNC: 24 MMOL/L (ref 20–31)
CREAT SERPL-MCNC: 1.46 MG/DL (ref 0.7–1.2)
EOSINOPHILS RELATIVE PERCENT: 1 % (ref 1–4)
GFR SERPL CREATININE-BSD FRML MDRD: 50 ML/MIN/1.73M2
GLUCOSE SERPL-MCNC: 114 MG/DL (ref 70–99)
HCT VFR BLD AUTO: 28.4 % (ref 41–53)
HGB BLD-MCNC: 9.1 G/DL (ref 13.5–17.5)
LYMPHOCYTES # BLD: 28 % (ref 24–44)
MCH RBC QN AUTO: 28.9 PG (ref 26–34)
MCHC RBC AUTO-ENTMCNC: 32 G/DL (ref 31–37)
MCV RBC AUTO: 90.3 FL (ref 80–100)
MONOCYTES # BLD: 13 % (ref 2–11)
PDW BLD-RTO: 17.9 % (ref 12.5–15.4)
PLATELET # BLD AUTO: 136 K/UL (ref 140–450)
PMV BLD AUTO: 8.9 FL (ref 6–12)
POTASSIUM SERPL-SCNC: 4.8 MMOL/L (ref 3.7–5.3)
PROT SERPL-MCNC: 4.8 G/DL (ref 6.4–8.3)
RBC # BLD: 3.15 M/UL (ref 4.5–5.9)
SEG NEUTROPHILS: 58 % (ref 36–66)
SEGMENTED NEUTROPHILS ABSOLUTE COUNT: 3.3 K/UL (ref 1.8–7.7)
SODIUM SERPL-SCNC: 141 MMOL/L (ref 135–144)
WBC # BLD AUTO: 5.6 K/UL (ref 3.5–11)

## 2023-02-06 PROCEDURE — 3078F DIAST BP <80 MM HG: CPT | Performed by: INTERNAL MEDICINE

## 2023-02-06 PROCEDURE — 3074F SYST BP LT 130 MM HG: CPT | Performed by: INTERNAL MEDICINE

## 2023-02-06 PROCEDURE — 36415 COLL VENOUS BLD VENIPUNCTURE: CPT

## 2023-02-06 PROCEDURE — 80053 COMPREHEN METABOLIC PANEL: CPT

## 2023-02-06 PROCEDURE — 99214 OFFICE O/P EST MOD 30 MIN: CPT | Performed by: INTERNAL MEDICINE

## 2023-02-06 PROCEDURE — 85025 COMPLETE CBC W/AUTO DIFF WBC: CPT

## 2023-02-06 PROCEDURE — 1123F ACP DISCUSS/DSCN MKR DOCD: CPT | Performed by: INTERNAL MEDICINE

## 2023-02-06 PROCEDURE — 99211 OFF/OP EST MAY X REQ PHY/QHP: CPT | Performed by: INTERNAL MEDICINE

## 2023-02-06 RX ORDER — TAMSULOSIN HYDROCHLORIDE 0.4 MG/1
CAPSULE ORAL
COMMUNITY
Start: 2023-01-20

## 2023-02-06 NOTE — PROGRESS NOTES
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 Evidence of relapse with biopsy-proven follicular lymphoma. 1-2 from treated medically lymph node biopsy July 6, 2021    CURRENT THERAPY:    1. Observation. 2. Status post treatment with Bexxar in May 2008. 3. Status post previous treatment with Rituxan. 4.  Start treatment with Rituxan August 23, 2021. completed4 weeks of rituximab on 9/20/2021  5. Induction Rituxan is followed by consolidation Rituxan maintenance   6. Due to progression we started Imbruvica July 2022. INTERIM HISTORY:  The patient is seen for follow-up of his lymphoma. Further work-up showed enlarged lymph nodes in the retroperitoneum and iliac area. Biopsy proved recurrence of follicular lymphoma. Patient did well on rituximab induction and was maintained on maintenance Rituxan. Repeated CT scan February 2022 showed stable disease. He had hospitalization in July which showed significant progressive disease. Started on Imbruvica. He was hospitalized several times due to dehydration and malnutrition. He started feeling much better after rehydration with improvement in kidney function. Repeated CT scan showed positive results with significant improvement on the Imbruvica treatment. Patient is doing better over the last 2 months. No recent hospitalizations. He is having increasing weakness of the lower extremities. No fever. No abdominal pain. REVIEW OF SYSTEMS:   General: ++weight loss. Significant shortness of breath. Eyes: No blurred vision, eye pain or double vision. Ears: No hearing problems or drainage. No tinnitus. Throat: No sore throat, problems with swallowing or dysphagia. Respiratory: As above. Cardiovascular: No chest pain, orthopnea or PND. No lower extremity edema. No palpitation.  Gastrointestinal: No problems with swallowing. No abdominal pain or bloating. No nausea or vomiting. No diarrhea or constipation. No GI bleeding. Genitourinary: No dysuria, hematuria, frequency or urgency. Musculoskeletal: No muscle aches or pains. No limitation of movement. No back pain. Dermatologic: No skin rashes or pruritus. No skin lesions or discolorations. Psychiatric: No depression, anxiety, or stress or signs of schizophrenia. Hematologic: No history of bleeding tendency. No bruises or ecchymosis. No history of clotting problems. Infectious disease: No fever, chills or frequent infections. Endocrine: No problems with opacity. No polydipsia or polyuria. Neurologic: No headaches or dizziness. No weakness or numbness of the extremities. SOCIAL HISTORY:  No smoking, no alcohol drinking. PHYSICAL EXAM: Patient is having significant shortness of breath using oxygen by portable oxygen tank. Vital signs: Blood pressure (!) 102/54, pulse 64, temperature 97.1 °F (36.2 °C), temperature source Temporal, weight 165 lb (74.8 kg). HEENT:  Eyes are normal. Ears, nose and throat are normal.  Neck: Supple. No lymph node enlargement. No thyroid enlargement. Trachea is centrally located. Chest: Decreased air entry with hypoxia heart: Regular sinus rhythm. Abdomen: Soft, nontender. No hepatosplenomegaly. No masses. Extremities:  With no edema. Lymph Nodes:  No cervical, axillary or inguinal lymph node enlargement. Neurologic:  Conscious and oriented. No focal neurological deficits. Psychosocial: No depression, anxiety or stress. Skin: No rashes, bruises or ecchymoses.     REVIEW OF DIAGNOSTIC DATA:     Lab Results   Component Value Date    WBC 5.6 02/06/2023    HGB 9.1 (L) 02/06/2023    HCT 28.4 (L) 02/06/2023    MCV 90.3 02/06/2023     (L) 02/06/2023       Chemistry        Component Value Date/Time     02/06/2023 1241    K 4.8 02/06/2023 1241     (H) 02/06/2023 1241    CO2 24 02/06/2023 1241    BUN 39 (H) 02/06/2023 1241 CREATININE 1.46 (H) 02/06/2023 1241        Component Value Date/Time    CALCIUM 7.9 (L) 02/06/2023 1241    ALKPHOS 67 02/06/2023 1241    AST 9 02/06/2023 1241    ALT 7 02/06/2023 1241    BILITOT 0.4 02/06/2023 1241        CT scan showed stable findings with no evidence of progression. XR KNEE LEFT (3 VIEWS)  Narrative: EXAMINATION:  4 XRAY VIEWS OF THE LEFT TIBIA AND FIBULA; THREE XRAY VIEWS OF THE LEFT KNEE    1/24/2023 12:31 pm    COMPARISON:  None. HISTORY:  ORDERING SYSTEM PROVIDED HISTORY: Acute pain of left knee  TECHNOLOGIST PROVIDED HISTORY:  Reason for Exam: Pt fell last thurs, injuring knee, continued pain    49-year-old male with acute left knee pain    FINDINGS:  Left knee:    Small joint effusion. Osseous alignment is normal.  Joint spaces are well maintained. Atherosclerotic calcification of the vasculature. Diffuse osteopenia. No  acute fracture or gross dislocation is seen. No suspicious osteolytic or  osteoblastic lesions are identified. Left tib fib:    Tibia and fibula appear intact. No acute fracture or dislocation. Ankle  mortise appears intact. Atherosclerotic calcification of the vasculature. No tibiotalar joint effusion. Diffuse osteopenia. Impression: Left knee:    1. Diffuse osteopenia. Small joint effusion. 2. No acute fracture or dislocation. Left tib fib:    1. Diffuse osteopenia. 2. No acute osseous abnormality. XR TIBIA FIBULA LEFT (2 VIEWS)  Narrative: EXAMINATION:  4 XRAY VIEWS OF THE LEFT TIBIA AND FIBULA; THREE XRAY VIEWS OF THE LEFT KNEE    1/24/2023 12:31 pm    COMPARISON:  None. HISTORY:  ORDERING SYSTEM PROVIDED HISTORY: Acute pain of left knee  TECHNOLOGIST PROVIDED HISTORY:  Reason for Exam: Pt fell last thurs, injuring knee, continued pain    49-year-old male with acute left knee pain    FINDINGS:  Left knee:    Small joint effusion. Osseous alignment is normal.  Joint spaces are well maintained.   Atherosclerotic calcification of the vasculature. Diffuse osteopenia. No  acute fracture or gross dislocation is seen. No suspicious osteolytic or  osteoblastic lesions are identified. Left tib fib:    Tibia and fibula appear intact. No acute fracture or dislocation. Ankle  mortise appears intact. Atherosclerotic calcification of the vasculature. No tibiotalar joint effusion. Diffuse osteopenia. Impression: Left knee:    1. Diffuse osteopenia. Small joint effusion. 2. No acute fracture or dislocation. Left tib fib:    1. Diffuse osteopenia. 2. No acute osseous abnormality. ASSESSMENT:    Small cell, B-cell non-Hodgkins lymphoma in remission. Evidence of relapse with biopsy-proven follicular lymphoma grade 1-2 July 5, 2021  Progression after treatment with rituximab  Good results to Imbruvica treatment. PLAN:     Lymph node biopsy showed relapse of low-grade lymphoma. Grade 1 - 2 follicular lymphoma. Patient was treated with rituximab induction followed by maintenance. Initially he has stable disease. Recent scans showed significant progression in the abdomen. Considering his performance and other comorbidities he was started on Imbruvica. Tolerated well except for symptoms as above. He is much better after recent hospitalization. Discussed the repeated labs. Kidney function is better. Needs to stay hydrated. Will make IV fluids once weekly. Continue Imbruvica  CBC, CMP today  Continue Home physical therapy  Continue follow-up with his cardiologist and pulmonary. RV about 6-8 weeks. We will monitor for toxicity and side effects and to monitor treatment response. Patient's questions were answered to the best of his satisfaction and he verbalized full understanding and agreement.                                [facility]                            This note is created with the assistance of a speech recognition program.  While intending to generate a document that actually reflects the content of the visit, the document can still have some errors including those of syntax and sound a like substitutions which may escape proof reading. It such instances, actual meaning can be extrapolated by contextual diversion.

## 2023-02-08 ENCOUNTER — TELEPHONE (OUTPATIENT)
Dept: CASE MANAGEMENT | Age: 73
End: 2023-02-08

## 2023-02-08 NOTE — TELEPHONE ENCOUNTER
Name: Claudeen Brill  : 1950  MRN: [id number]    Oncology Navigation Follow-Up Note  Navigator spoke with pts. Spouse and pt. Having a really hard time while taking Imbruvica. Spouse sharing pts. Quality of life is very poor due to the imbruvica. Spouse asking if this will be a \"forever thing\"? Writer informing spouse will discuss further with Dr. Juan Patel. Plan to follow.    Electronically signed by Bernett Soulier, RN on 2023 at 4:00 PM

## 2023-02-09 ENCOUNTER — HOSPITAL ENCOUNTER (OUTPATIENT)
Dept: INFUSION THERAPY | Age: 73
Discharge: HOME | End: 2023-02-09
Payer: COMMERCIAL

## 2023-02-09 VITALS
RESPIRATION RATE: 18 BRPM | DIASTOLIC BLOOD PRESSURE: 61 MMHG | HEART RATE: 109 BPM | SYSTOLIC BLOOD PRESSURE: 115 MMHG | TEMPERATURE: 98.2 F

## 2023-02-09 DIAGNOSIS — C85.90 LYMPHOMA, DIFFUSE (HCC): Primary | ICD-10-CM

## 2023-02-09 PROCEDURE — 6360000002 HC RX W HCPCS: Performed by: INTERNAL MEDICINE

## 2023-02-09 PROCEDURE — 96360 HYDRATION IV INFUSION INIT: CPT

## 2023-02-09 PROCEDURE — 2580000003 HC RX 258: Performed by: INTERNAL MEDICINE

## 2023-02-09 PROCEDURE — 96361 HYDRATE IV INFUSION ADD-ON: CPT

## 2023-02-09 RX ORDER — SODIUM CHLORIDE 0.9 % (FLUSH) 0.9 %
5-40 SYRINGE (ML) INJECTION PRN
Status: CANCELLED | OUTPATIENT
Start: 2023-02-09

## 2023-02-09 RX ORDER — HEPARIN SODIUM (PORCINE) LOCK FLUSH IV SOLN 100 UNIT/ML 100 UNIT/ML
500 SOLUTION INTRAVENOUS PRN
Status: CANCELLED | OUTPATIENT
Start: 2023-02-09

## 2023-02-09 RX ORDER — SODIUM CHLORIDE 9 MG/ML
5-250 INJECTION, SOLUTION INTRAVENOUS PRN
OUTPATIENT
Start: 2023-02-09

## 2023-02-09 RX ORDER — SODIUM CHLORIDE 0.9 % (FLUSH) 0.9 %
5-40 SYRINGE (ML) INJECTION PRN
Status: DISCONTINUED | OUTPATIENT
Start: 2023-02-09 | End: 2023-02-10 | Stop reason: HOSPADM

## 2023-02-09 RX ORDER — 0.9 % SODIUM CHLORIDE 0.9 %
1000 INTRAVENOUS SOLUTION INTRAVENOUS ONCE
Status: CANCELLED
Start: 2023-02-09 | End: 2023-02-09

## 2023-02-09 RX ORDER — HEPARIN SODIUM (PORCINE) LOCK FLUSH IV SOLN 100 UNIT/ML 100 UNIT/ML
500 SOLUTION INTRAVENOUS PRN
Status: DISCONTINUED | OUTPATIENT
Start: 2023-02-09 | End: 2023-02-10 | Stop reason: HOSPADM

## 2023-02-09 RX ORDER — 0.9 % SODIUM CHLORIDE 0.9 %
1000 INTRAVENOUS SOLUTION INTRAVENOUS ONCE
Status: COMPLETED | OUTPATIENT
Start: 2023-02-09 | End: 2023-02-09

## 2023-02-09 RX ADMIN — Medication 500 UNITS: at 15:40

## 2023-02-09 RX ADMIN — SODIUM CHLORIDE, PRESERVATIVE FREE 10 ML: 5 INJECTION INTRAVENOUS at 14:06

## 2023-02-09 RX ADMIN — SODIUM CHLORIDE 1000 ML: 9 INJECTION, SOLUTION INTRAVENOUS at 14:06

## 2023-02-09 RX ADMIN — SODIUM CHLORIDE, PRESERVATIVE FREE 10 ML: 5 INJECTION INTRAVENOUS at 15:40

## 2023-02-09 ASSESSMENT — PAIN DESCRIPTION - LOCATION: LOCATION: ABDOMEN

## 2023-02-09 ASSESSMENT — PAIN SCALES - GENERAL: PAINLEVEL_OUTOF10: 8

## 2023-02-09 NOTE — PROGRESS NOTES
Patient here for hydration with spouse. Vitals stable. He tolerated treatment well and was discharged home in stable condition. He is due to return 2/15 for next hydration.

## 2023-02-14 RX ORDER — MEGESTROL ACETATE 40 MG/ML
SUSPENSION ORAL
Qty: 240 ML | Refills: 3 | Status: SHIPPED | OUTPATIENT
Start: 2023-02-14

## 2023-02-15 ENCOUNTER — HOSPITAL ENCOUNTER (OUTPATIENT)
Dept: INFUSION THERAPY | Age: 73
Discharge: HOME | End: 2023-02-15
Payer: COMMERCIAL

## 2023-02-15 VITALS
SYSTOLIC BLOOD PRESSURE: 122 MMHG | RESPIRATION RATE: 16 BRPM | DIASTOLIC BLOOD PRESSURE: 57 MMHG | TEMPERATURE: 98.5 F | HEART RATE: 68 BPM

## 2023-02-15 DIAGNOSIS — C85.90 LYMPHOMA, DIFFUSE (HCC): Primary | ICD-10-CM

## 2023-02-15 PROCEDURE — 96360 HYDRATION IV INFUSION INIT: CPT

## 2023-02-15 PROCEDURE — 2580000003 HC RX 258: Performed by: INTERNAL MEDICINE

## 2023-02-15 PROCEDURE — 96361 HYDRATE IV INFUSION ADD-ON: CPT

## 2023-02-15 PROCEDURE — 6360000002 HC RX W HCPCS: Performed by: INTERNAL MEDICINE

## 2023-02-15 RX ORDER — 0.9 % SODIUM CHLORIDE 0.9 %
1000 INTRAVENOUS SOLUTION INTRAVENOUS ONCE
Start: 2023-02-15 | End: 2023-02-15

## 2023-02-15 RX ORDER — SODIUM CHLORIDE 0.9 % (FLUSH) 0.9 %
5-40 SYRINGE (ML) INJECTION PRN
OUTPATIENT
Start: 2023-02-15

## 2023-02-15 RX ORDER — HEPARIN SODIUM (PORCINE) LOCK FLUSH IV SOLN 100 UNIT/ML 100 UNIT/ML
500 SOLUTION INTRAVENOUS PRN
Status: DISCONTINUED | OUTPATIENT
Start: 2023-02-15 | End: 2023-02-16 | Stop reason: HOSPADM

## 2023-02-15 RX ORDER — 0.9 % SODIUM CHLORIDE 0.9 %
1000 INTRAVENOUS SOLUTION INTRAVENOUS ONCE
Status: COMPLETED | OUTPATIENT
Start: 2023-02-15 | End: 2023-02-15

## 2023-02-15 RX ORDER — HEPARIN SODIUM (PORCINE) LOCK FLUSH IV SOLN 100 UNIT/ML 100 UNIT/ML
500 SOLUTION INTRAVENOUS PRN
OUTPATIENT
Start: 2023-02-15

## 2023-02-15 RX ORDER — SODIUM CHLORIDE 0.9 % (FLUSH) 0.9 %
5-40 SYRINGE (ML) INJECTION PRN
Status: DISCONTINUED | OUTPATIENT
Start: 2023-02-15 | End: 2023-02-16 | Stop reason: HOSPADM

## 2023-02-15 RX ORDER — SODIUM CHLORIDE 9 MG/ML
5-250 INJECTION, SOLUTION INTRAVENOUS PRN
OUTPATIENT
Start: 2023-02-15

## 2023-02-15 RX ADMIN — SODIUM CHLORIDE, PRESERVATIVE FREE 10 ML: 5 INJECTION INTRAVENOUS at 15:29

## 2023-02-15 RX ADMIN — SODIUM CHLORIDE, PRESERVATIVE FREE 10 ML: 5 INJECTION INTRAVENOUS at 14:07

## 2023-02-15 RX ADMIN — HEPARIN 500 UNITS: 100 SYRINGE at 15:29

## 2023-02-15 RX ADMIN — SODIUM CHLORIDE 1000 ML: 9 INJECTION, SOLUTION INTRAVENOUS at 14:08

## 2023-02-15 NOTE — PROGRESS NOTES
Patient here for hydration. Vitals stable. He tolerated treatment well and was discharged home in stable condition with spouse via wheelchair. He is due to return 2/22 for next hydration.

## 2023-02-17 ENCOUNTER — TELEPHONE (OUTPATIENT)
Dept: CASE MANAGEMENT | Age: 73
End: 2023-02-17

## 2023-02-17 NOTE — TELEPHONE ENCOUNTER
Name: Heriberto Marsh  : 1950  MRN: [id number]    Oncology Navigation Follow-Up Note  Navigator spoke with pt. And offering assistance. Pt. Getting ready for  of family member. Writer not wanting to interrupt pt. And requesting his spouse reach out to writer sometime next week.    Electronically signed by Rosemarie Crawford RN on 2023 at 12:12 PM

## 2023-02-22 ENCOUNTER — TELEPHONE (OUTPATIENT)
Dept: INFUSION THERAPY | Age: 73
End: 2023-02-22

## 2023-02-22 DIAGNOSIS — C85.90 NON-HODGKIN'S LYMPHOMA, UNSPECIFIED BODY REGION, UNSPECIFIED NON-HODGKIN LYMPHOMA TYPE (HCC): Primary | ICD-10-CM

## 2023-02-23 RX ORDER — DIPHENOXYLATE HYDROCHLORIDE AND ATROPINE SULFATE 2.5; .025 MG/1; MG/1
1 TABLET ORAL 4 TIMES DAILY PRN
Qty: 120 TABLET | Refills: 0 | Status: SHIPPED | OUTPATIENT
Start: 2023-02-23 | End: 2023-03-05

## 2023-02-23 NOTE — PROGRESS NOTES
Chronic Pulmonary Disease Specialist progress note    Chart reviewed for ABG results from yesterdays order. ABG not completed per RT note from last night. Checked in with Jenn to see if she would be okay for me to draw ABG. I had previously discussed with Jenn why it is important to get a baseline ABG, which she has never had done. I also had discussed with Jenn what the procedure process was. Jenn is in agreement to have me get ABG.     Filiberto's test performed with positive blood flow. Jenn was on 3 L NC which is her baseline. ABG drawn from right brachial without adverse effects. Sample placed on ice and sent to lab. Results are as follows:7.36/58/82/33.    Jenn was to use the V 60 BIPAP last night while sleeping, with her home settings, to monitor for tolerance. Jenn asked RT to place her on the BIPAP and was told she does not need it. I will connect with bedside RT again to let them know Jenn is to use the BIPAP tonight. I changed her settings to reflect what she will be using at home.    Nayana Gonzalez, SILVESTRE, RRT, CTTS  Chronic Pulmonary Disease Specialist  Office: [phone]   Pager: [phone]            Clint Penn is a 66 y.o. male evaluated via telephone on 9/14/2021. Consent:  He and/or health care decision maker is aware that that he may receive a bill for this telephone service, depending on his insurance coverage, and has provided verbal consent to proceed: Yes      GI  FOLLOW UP    INTERVAL HISTORY:   Nelson Hamilton MD  [facility],  [address] [place] [address]    Chief Complaint   Patient presents with    New Patient     Patient is new, referred for Non-Hodgkins Lymphoma. Patient c/o right side abd pain. He states hem/oc dr said his intestines are swollen           HISTORY OF PRESENT ILLNESS: Mr.Dale ROSE Fernandez is a 66 y.o. male , referred for evaluation of*abdominal pain, diarrhea  Patient is here for the first time   patient with significant diagnosis of B-cell non-Hodgkin's lymphoma, currently getting oral therapy, does follow with oncology  Been having right lower quadrant abdominal pain for the last few months  Associated with some loose stool although has not diarrhea maybe once or twice a day  No black stool no blood in the stool  No upper GI symptoms  Currently having sore throat congestion  And up respiratory symptoms she was supposed to be seen in person today but will switch it to video visit because of his symptoms  He is going to follow with his primary care physician  He has been vaccinated for Covid  He denied any other GI symptoms never had any colonoscopy  Denied any odynophagia dysphagia heartburn or any other symptoms  Besides the pain and some bulging feeling in the right lower quadrant he does not have any other symptoms whatsoever he is scheduled to have a CAT scan ordered by Dr. Leena Schwab  His liver enzymes are all normal  And his CBC showing hemoglobin of 11.7            Past Medical,Family, and Social History reviewed and does contribute to the patient presentingcondition.     Patient's PMH/PSH,SH,PSYCH Hx, MEDs, ALLERGIES, and ROS were all reviewed and updated in the appropriate sections. PAST MEDICAL HISTORY:  Past Medical History:   Diagnosis Date    [facility] [address]     Diabetes mellitus [facility] [address]     History of non-Hodgkin's lymphoma     Hyperlipidemia     Hypertension     Hypothyroidism        Past Surgical History:   Procedure Laterality Date    HERNIA REPAIR      IR BIOPSY ABDOMINAL MASS  7/6/2021    IR BIOPSY ABDOMINAL MASS 7/6/2021 [facility] SPECIAL PROCEDURES    IR PORT PLACEMENT EQUAL OR GREATER THAN 5 YEARS  8/31/2021    IR PORT PLACEMENT EQUAL OR GREATER THAN 5 YEARS 8/31/2021 [facility] SPECIAL PROCEDURES       CURRENT MEDICATIONS:    Current Outpatient Medications:     donepezil (ARICEPT) 10 MG tablet, , Disp: , Rfl:     meloxicam (MOBIC) 15 MG tablet, , Disp: , Rfl:     atorvastatin (LIPITOR) 10 MG tablet, Take 10 mg by mouth daily, Disp: , Rfl:     FLUoxetine (PROZAC) 20 MG capsule, Take 20 mg by mouth daily, Disp: , Rfl:     lisinopril-hydroCHLOROthiazide (PRINZIDE;ZESTORETIC) 20-12.5 MG per tablet, , Disp: , Rfl:     carvedilol (COREG) 6.25 MG tablet, Take 6.25 mg by mouth 2 times daily (with meals), Disp: , Rfl:     omeprazole (PRILOSEC) 20 MG delayed release capsule, Take 20 mg by mouth 2 times daily, Disp: , Rfl:     amLODIPine (NORVASC) 10 MG tablet, Take 1 tablet by mouth daily (Patient taking differently: Take by mouth daily ), Disp: 30 tablet, Rfl: 5    pregabalin (LYRICA) 50 MG capsule, 1 tablet at at bedtime for the next 4 days and then 1 tablets twice daily for 7 days then 1 tablet 3 times daily, Disp: 90 capsule, Rfl: 1    glyBURIDE-metformin (GLUCOVANCE) 5-500 MG per tablet, Take 1 tablet by mouth 2 times daily (with meals).   , Disp: , Rfl:     levothyroxine (SYNTHROID) 50 MCG tablet, Take 112 mcg by mouth Daily , Disp: , Rfl:     memantine (NAMENDA) 10 MG tablet, Take 10 mg by mouth 2 times daily  , Disp: , Rfl:     ALLERGIES:   No Known Allergies    FAMILY HISTORY:       Problem Relation Age of Onset    Diabetes Mother     Alzheimer's Disease Father     Heart Disease Brother     Heart Disease Brother          SOCIAL HISTORY:   Social History     Socioeconomic History    Marital status:      Spouse name: Not on file    Number of children: Not on file    Years of education: Not on file    Highest education level: Not on file   Occupational History    Not on file   Tobacco Use    Smoking status: Former Smoker     Packs/day: 2.00     Years: 45.00     Pack years: 90.00     Types: Cigarettes     Quit date: 1/7/2006     Years since quitting: 15.6    Smokeless tobacco: Never Used   Vaping Use    Vaping Use: Never used   Substance and Sexual Activity    Alcohol use: Yes     Alcohol/week: 1.7 standard drinks     Types: 2 Standard drinks or equivalent per week    Drug use: Yes     Types: Marijuana    Sexual activity: Not on file   Other Topics Concern    Not on file   Social History Narrative    Not on file     Social Determinants of Health     Financial Resource Strain:     Difficulty of Paying Living Expenses:    Food Insecurity:     Worried About Running Out of Food in the Last Year:     [address] in the Last Year:    Transportation Needs:     Lack of Transportation (Medical):  Lack of Transportation (Non-Medical):    Physical Activity:     Days of Exercise per Week:     Minutes of Exercise per Session:    Stress:     Feeling of Stress :    Social Connections:     Frequency of Communication with Friends and Family:     Frequency of Social Gatherings with Friends and Family:     Attends Jainism Services:     Active Member of Clubs or Organizations:     Attends Club or Organization Meetings:     Marital Status:    Intimate Partner Violence:     Fear of Current or Ex-Partner:     Emotionally Abused:     Physically Abused:     Sexually Abused:        REVIEW OF SYSTEMS: A 12-point review of systemswas obtained and pertinent positives and negatives were enumerated above in the history of present illness.  All other reviewed systems / symptoms were negative. Review of Systems   Constitutional: Negative for appetite change, fatigue and unexpected weight change. HENT: Negative for trouble swallowing. Respiratory: Negative for cough, choking and wheezing. Cardiovascular: Negative for chest pain, palpitations and leg swelling. Gastrointestinal: Positive for abdominal pain and constipation (pain meds-stool softeners). Negative for abdominal distention, anal bleeding, blood in stool, diarrhea, nausea, rectal pain and vomiting. Genitourinary: Negative for difficulty urinating. Allergic/Immunologic: Negative for environmental allergies and food allergies. Neurological: Negative for dizziness, weakness, light-headedness, numbness and headaches. Hematological: Bruises/bleeds easily. Psychiatric/Behavioral: Negative for sleep disturbance. The patient is not nervous/anxious. LABORATORY DATA: Reviewed  Lab Results   Component Value Date    WBC 7.3 09/13/2021    HGB 11.7 (L) 09/13/2021    HCT 35.2 (L) 09/13/2021    MCV 88.9 09/13/2021     (L) 09/13/2021     (H) 09/13/2021    K 4.8 09/13/2021     (H) 09/13/2021    CO2 23 09/13/2021    BUN 30 (H) 09/13/2021    CREATININE 1.13 09/13/2021    LABPROT 7.3 01/07/2013    LABALBU 3.9 09/13/2021    BILITOT 0.42 09/13/2021    ALKPHOS 88 09/13/2021    AST 17 09/13/2021    ALT 20 09/13/2021    INR 0.9 08/31/2021         Lab Results   Component Value Date    RBC 3.96 (L) 09/13/2021    HGB 11.7 (L) 09/13/2021    MCV 88.9 09/13/2021    MCH 29.5 09/13/2021    MCHC 33.2 09/13/2021    RDW 16.7 (H) 09/13/2021    MPV 8.8 09/13/2021    BASOPCT 0 09/13/2021    LYMPHSABS 1.20 09/13/2021    MONOSABS 0.80 09/13/2021    NEUTROABS 5.20 09/13/2021    EOSABS 0.10 09/13/2021    BASOSABS 0.00 09/13/2021         DIAGNOSTIC TESTING:     IR PORT PLACEMENT > 5 YEARS    Result Date: 8/31/2021  PROCEDURE: ULTRASOUND GUIDED VASCULAR ACCESS.  FLUOROSCOPY GUIDED PLACEMENT OF A SUBCUTANEOUS PORT-A-CATH. 8/31/2021. HISTORY: ORDERING SYSTEM PROVIDED HISTORY: Non-Hodgkin's lymphoma, unspecified body region, unspecified non-Hodgkin lymphoma type ([facility] [address] SEDATION: Local anesthesia FLUOROSCOPY DOSE AND TYPE OR TIME AND EXPOSURES: Fluoroscopy time-26 seconds D AP-71 cGy cm squared. TECHNIQUE: Informed consent was obtained after a detailed explanation of the procedure including risks, benefits, and alternatives. Universal protocol was observed. The right neck and chest were prepped and draped in sterile fashion using maximum sterile barrier technique. Local anesthesia was achieved with lidocaine. A micropuncture needle was used to access the right internal jugular vein using ultrasound guidance. An ultrasound image demonstrating patency of the vein with needle tip located within it. An image was obtained and stored in PACs. A 0.018 inch guidewire and 2 in 1 transitional set was left in place, with the guidewire extending into the IVC. A chest wall incision was then made and a subcutaneous pocket was created. The port reservoir was placed within the pocket and the port tubing was attached and tunneled subcutaneously to the venotomy site. The port tubing was cut to an appropriate length. A 0.035 guidewire was used to place a peel-away sheath. The catheter was then advanced through the peel-away sheath under fluoroscopic guidance to the cavo-atrial junction. The chest wall incision was closed using 2-0 and 4-0 Vicryl suture and tissue adhesive was applied to the venotomy site and chest wall incision. The port flushed easily and there was a good blood return. The port was locked with heparinized saline. The patient tolerated the procedure well and there were no immediate complications. FINDINGS: Fluoroscopic image demonstrates the tip of the port in the mid-upper right atrium. Successful ultrasound and fluoroscopy guided Port-A-Cath placement, as above.  Port is flushed and ready for use at this time. PHYSICAL EXAMINATION:     [ INSTRUCTIONS:  \"[x]\" Indicates a positive item  \"[]\" Indicates a negative item  -- DELETE ALL ITEMS NOT EXAMINED]  Vital Signs: (As obtained by patient/caregiver or practitioner observation)    Blood pressure-  Heart rate-    Respiratory rate-    Temperature-  Pulse oximetry-     Constitutional: [x] Appears well-developed and well-nourished [x] No apparent distress      [] Abnormal-   Mental status  [] Alert and awake  [] Oriented to person/place/time []Able to follow commands      Eyes:  EOM    [x]  Normal  [] Abnormal-  Sclera  [x]  Normal  [] Abnormal -         Discharge [x]  None visible  [] Abnormal -    HENT:   [x] Normocephalic, atraumatic. [] Abnormal   [x] Mouth/Throat: Mucous membranes are moist.     External Ears [x] Normal  [] Abnormal-     Neck: [x] No visualized mass     Pulmonary/Chest: [x] Respiratory effort normal.  [x] No visualized signs of difficulty breathing or respiratory distress        [] Abnormal-      Musculoskeletal:   [x] Normal gait with no signs of ataxia         [x] Normal range of motion of neck        [] Abnormal-       Neurological:        [x] No Facial Asymmetry (Cranial nerve 7 motor function) (limited exam to video visit)          [x] No gaze palsy        [] Abnormal-         Skin:        [x] No significant exanthematous lesions or discoloration noted on facial skin         [] Abnormal-            Psychiatric:       [x] Normal Affect [x] No Hallucinations        [] Abnormal-     Other pertinent observable physical exam findings-         IMPRESSION: Mr. Adair Chowdary is a 66 y.o. male with *   Diagnosis Orders   1. Abdominal pain, right lower quadrant  COLONOSCOPY W/ OR W/O BIOPSY   2. 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)  COLONOSCOPY W/ OR W/O BIOPSY   3. Change in bowel function  COLONOSCOPY W/ OR W/O BIOPSY   4.  Anemia, unspecified type  COLONOSCOPY W/ OR W/O BIOPSY       We will proceed with the you for allowing me to participate in the care of Mr. Kendra Antonio. For any further questions please do not hesitate to contact me. I have reviewed and agree with the ROS entered by the MA/RN. Note is dictated utilizing voice recognition software. Unfortunately this leads to occasional typographical errors. Please contact our office if you have any questions. An electronic signature was used to authenticate this note.         Nicole Mulligan MD  [facility]  O: #[phone]

## 2023-02-27 ENCOUNTER — TELEPHONE (OUTPATIENT)
Dept: CASE MANAGEMENT | Age: 73
End: 2023-02-27

## 2023-02-27 NOTE — TELEPHONE ENCOUNTER
Name: Caitlyn Woods  : 1950  MRN: [id number]    Oncology Navigation Follow-Up Note  Navigator left VM on both home and mobile line offering assistance if needed.    Electronically signed by Reji Duran RN on 2023 at 1:36 PM

## 2023-03-01 ENCOUNTER — TELEPHONE (OUTPATIENT)
Dept: INFUSION THERAPY | Age: 73
End: 2023-03-01

## 2023-03-01 NOTE — TELEPHONE ENCOUNTER
Patient missed today's hydration appointment. RN called to check on patient and spoke with Sarika, patient's wife. Sarika stated, patient was too weak to bring in for his appointment and is a high fall risk. She states, his diarrhea and vomiting has stopped. Patient's next hydration is scheduled for 3/8/2023.

## 2023-03-13 ENCOUNTER — APPOINTMENT (OUTPATIENT)
Dept: CT IMAGING | Age: 73
DRG: 683 | End: 2023-03-13
Payer: COMMERCIAL

## 2023-03-13 ENCOUNTER — HOSPITAL ENCOUNTER (INPATIENT)
Age: 73
LOS: 2 days | Discharge: HOME HEALTH | DRG: 683 | End: 2023-03-15
Attending: EMERGENCY MEDICINE | Admitting: FAMILY MEDICINE
Payer: COMMERCIAL

## 2023-03-13 ENCOUNTER — APPOINTMENT (OUTPATIENT)
Dept: GENERAL RADIOLOGY | Age: 73
DRG: 683 | End: 2023-03-13
Payer: COMMERCIAL

## 2023-03-13 DIAGNOSIS — E86.0 DEHYDRATION: ICD-10-CM

## 2023-03-13 DIAGNOSIS — R77.8 ELEVATED TROPONIN: ICD-10-CM

## 2023-03-13 DIAGNOSIS — N17.9 AKI (ACUTE KIDNEY INJURY) (HCC): Primary | ICD-10-CM

## 2023-03-13 DIAGNOSIS — K52.9 GASTROENTERITIS: ICD-10-CM

## 2023-03-13 LAB
ABSOLUTE EOS #: 0 K/UL (ref 0–0.4)
ABSOLUTE LYMPH #: 1.32 K/UL (ref 1–4.8)
ABSOLUTE MONO #: 0.57 K/UL (ref 0.1–1.3)
ALBUMIN SERPL-MCNC: 2.7 G/DL (ref 3.5–5.2)
ALP SERPL-CCNC: 78 U/L (ref 40–129)
ALT SERPL-CCNC: 5 U/L (ref 5–41)
AMPHETAMINE SCREEN URINE: NEGATIVE
ANION GAP SERPL CALCULATED.3IONS-SCNC: 10 MMOL/L (ref 9–17)
AST SERPL-CCNC: 10 U/L
BACTERIA: NORMAL
BARBITURATE SCREEN URINE: NEGATIVE
BASOPHILS # BLD: 0 % (ref 0–2)
BASOPHILS ABSOLUTE: 0 K/UL (ref 0–0.2)
BENZODIAZEPINE SCREEN, URINE: NEGATIVE
BILIRUB SERPL-MCNC: 0.4 MG/DL (ref 0.3–1.2)
BILIRUBIN URINE: NEGATIVE
BUN SERPL-MCNC: 44 MG/DL (ref 8–23)
CALCIUM SERPL-MCNC: 8.1 MG/DL (ref 8.6–10.4)
CANNABINOID SCREEN URINE: POSITIVE
CASTS UA: NORMAL /LPF
CHLORIDE SERPL-SCNC: 103 MMOL/L (ref 98–107)
CO2 SERPL-SCNC: 25 MMOL/L (ref 20–31)
COCAINE METABOLITE, URINE: NEGATIVE
COLOR: ABNORMAL
CREAT SERPL-MCNC: 1.62 MG/DL (ref 0.7–1.2)
EOSINOPHILS RELATIVE PERCENT: 0 % (ref 0–4)
EPITHELIAL CELLS UA: NORMAL /HPF
FENTANYL URINE: NEGATIVE
FLUAV RNA RESP QL NAA+PROBE: NOT DETECTED
FLUBV RNA RESP QL NAA+PROBE: NOT DETECTED
GFR SERPL CREATININE-BSD FRML MDRD: 44 ML/MIN/1.73M2
GLUCOSE SERPL-MCNC: 118 MG/DL (ref 70–99)
GLUCOSE UR STRIP.AUTO-MCNC: NEGATIVE MG/DL
HCT VFR BLD AUTO: 26.5 % (ref 41–53)
HGB BLD-MCNC: 9 G/DL (ref 13.5–17.5)
KETONES UR STRIP.AUTO-MCNC: NEGATIVE MG/DL
LEUKOCYTE ESTERASE UR QL STRIP.AUTO: ABNORMAL
LIPASE SERPL-CCNC: 73 U/L (ref 13–60)
LYMPHOCYTES # BLD: 21 % (ref 24–44)
MCH RBC QN AUTO: 28.8 PG (ref 26–34)
MCHC RBC AUTO-ENTMCNC: 34 G/DL (ref 31–37)
MCV RBC AUTO: 84.8 FL (ref 80–100)
METHADONE SCREEN, URINE: NEGATIVE
MONOCYTES # BLD: 9 % (ref 1–7)
MORPHOLOGY: ABNORMAL
NITRITE UR QL STRIP.AUTO: NEGATIVE
OPIATES, URINE: NEGATIVE
OXYCODONE SCREEN URINE: NEGATIVE
PDW BLD-RTO: 14.7 % (ref 11.5–14.9)
PHENCYCLIDINE, URINE: NEGATIVE
PLATELET # BLD AUTO: 145 K/UL (ref 150–450)
PMV BLD AUTO: 9 FL (ref 6–12)
POTASSIUM SERPL-SCNC: 5.2 MMOL/L (ref 3.7–5.3)
PROT SERPL-MCNC: 4.9 G/DL (ref 6.4–8.3)
PROT UR STRIP.AUTO-MCNC: 5.5 MG/DL (ref 5–8)
PROT UR STRIP.AUTO-MCNC: ABNORMAL MG/DL
RBC # BLD: 3.12 M/UL (ref 4.5–5.9)
RBC CLUMPS #/AREA URNS AUTO: NORMAL /HPF
SARS-COV-2 RNA RESP QL NAA+PROBE: NOT DETECTED
SEG NEUTROPHILS: 70 % (ref 36–66)
SEGMENTED NEUTROPHILS ABSOLUTE COUNT: 4.41 K/UL (ref 1.3–9.1)
SODIUM SERPL-SCNC: 138 MMOL/L (ref 135–144)
SOURCE: NORMAL
SPECIFIC GRAVITY UA: 1.01 (ref 1–1.03)
SPECIMEN DESCRIPTION: NORMAL
TEST INFORMATION: ABNORMAL
TROPONIN I SERPL DL<=0.01 NG/ML-MCNC: 108 NG/L (ref 0–22)
TROPONIN I SERPL DL<=0.01 NG/ML-MCNC: 118 NG/L (ref 0–22)
TURBIDITY: ABNORMAL
URINE HGB: ABNORMAL
UROBILINOGEN, URINE: NORMAL
WBC # BLD AUTO: 6.3 K/UL (ref 3.5–11)
WBC UA: NORMAL /HPF

## 2023-03-13 PROCEDURE — 80053 COMPREHEN METABOLIC PANEL: CPT

## 2023-03-13 PROCEDURE — 85025 COMPLETE CBC W/AUTO DIFF WBC: CPT

## 2023-03-13 PROCEDURE — 87636 SARSCOV2 & INF A&B AMP PRB: CPT

## 2023-03-13 PROCEDURE — 71045 X-RAY EXAM CHEST 1 VIEW: CPT

## 2023-03-13 PROCEDURE — 6370000000 HC RX 637 (ALT 250 FOR IP): Performed by: FAMILY MEDICINE

## 2023-03-13 PROCEDURE — 2060000000 HC ICU INTERMEDIATE R&B

## 2023-03-13 PROCEDURE — 94640 AIRWAY INHALATION TREATMENT: CPT

## 2023-03-13 PROCEDURE — 93005 ELECTROCARDIOGRAM TRACING: CPT | Performed by: PHYSICIAN ASSISTANT

## 2023-03-13 PROCEDURE — 87086 URINE CULTURE/COLONY COUNT: CPT

## 2023-03-13 PROCEDURE — 70450 CT HEAD/BRAIN W/O DYE: CPT

## 2023-03-13 PROCEDURE — 36415 COLL VENOUS BLD VENIPUNCTURE: CPT

## 2023-03-13 PROCEDURE — 81001 URINALYSIS AUTO W/SCOPE: CPT

## 2023-03-13 PROCEDURE — 84484 ASSAY OF TROPONIN QUANT: CPT

## 2023-03-13 PROCEDURE — 83690 ASSAY OF LIPASE: CPT

## 2023-03-13 PROCEDURE — 99285 EMERGENCY DEPT VISIT HI MDM: CPT

## 2023-03-13 PROCEDURE — 2580000003 HC RX 258: Performed by: STUDENT IN AN ORGANIZED HEALTH CARE EDUCATION/TRAINING PROGRAM

## 2023-03-13 PROCEDURE — 2580000003 HC RX 258: Performed by: FAMILY MEDICINE

## 2023-03-13 PROCEDURE — 80307 DRUG TEST PRSMV CHEM ANLYZR: CPT

## 2023-03-13 RX ORDER — TAMSULOSIN HYDROCHLORIDE 0.4 MG/1
0.8 CAPSULE ORAL NIGHTLY
Status: DISCONTINUED | OUTPATIENT
Start: 2023-03-13 | End: 2023-03-15 | Stop reason: HOSPADM

## 2023-03-13 RX ORDER — FERROUS SULFATE 325(65) MG
325 TABLET ORAL
Status: DISCONTINUED | OUTPATIENT
Start: 2023-03-14 | End: 2023-03-15 | Stop reason: HOSPADM

## 2023-03-13 RX ORDER — LIDOCAINE AND PRILOCAINE 25; 25 MG/G; MG/G
CREAM TOPICAL PRN
Status: DISCONTINUED | OUTPATIENT
Start: 2023-03-13 | End: 2023-03-15 | Stop reason: HOSPADM

## 2023-03-13 RX ORDER — CARVEDILOL 12.5 MG/1
6.25 TABLET ORAL 2 TIMES DAILY WITH MEALS
Status: DISCONTINUED | OUTPATIENT
Start: 2023-03-14 | End: 2023-03-15 | Stop reason: HOSPADM

## 2023-03-13 RX ORDER — FAMOTIDINE 20 MG/1
20 TABLET, FILM COATED ORAL 2 TIMES DAILY
Status: DISCONTINUED | OUTPATIENT
Start: 2023-03-13 | End: 2023-03-14

## 2023-03-13 RX ORDER — LOSARTAN POTASSIUM 50 MG/1
50 TABLET ORAL DAILY
Status: DISCONTINUED | OUTPATIENT
Start: 2023-03-14 | End: 2023-03-15 | Stop reason: HOSPADM

## 2023-03-13 RX ORDER — LACTOBACILLUS RHAMNOSUS GG 10B CELL
1 CAPSULE ORAL DAILY
Status: DISCONTINUED | OUTPATIENT
Start: 2023-03-14 | End: 2023-03-15 | Stop reason: HOSPADM

## 2023-03-13 RX ORDER — DONEPEZIL HYDROCHLORIDE 10 MG/1
10 TABLET, FILM COATED ORAL DAILY
Status: DISCONTINUED | OUTPATIENT
Start: 2023-03-14 | End: 2023-03-15 | Stop reason: HOSPADM

## 2023-03-13 RX ORDER — SUCRALFATE ORAL 1 G/10ML
1 SUSPENSION ORAL 4 TIMES DAILY
COMMUNITY

## 2023-03-13 RX ORDER — 0.9 % SODIUM CHLORIDE 0.9 %
1000 INTRAVENOUS SOLUTION INTRAVENOUS ONCE
Status: COMPLETED | OUTPATIENT
Start: 2023-03-13 | End: 2023-03-13

## 2023-03-13 RX ORDER — LANOLIN ALCOHOL/MO/W.PET/CERES
400 CREAM (GRAM) TOPICAL DAILY
Status: DISCONTINUED | OUTPATIENT
Start: 2023-03-14 | End: 2023-03-15 | Stop reason: HOSPADM

## 2023-03-13 RX ORDER — AMLODIPINE BESYLATE 10 MG/1
10 TABLET ORAL DAILY
Status: DISCONTINUED | OUTPATIENT
Start: 2023-03-14 | End: 2023-03-15 | Stop reason: HOSPADM

## 2023-03-13 RX ORDER — ALBUTEROL SULFATE 90 UG/1
2 AEROSOL, METERED RESPIRATORY (INHALATION) EVERY 6 HOURS PRN
Status: DISCONTINUED | OUTPATIENT
Start: 2023-03-13 | End: 2023-03-15 | Stop reason: HOSPADM

## 2023-03-13 RX ORDER — ATORVASTATIN CALCIUM 10 MG/1
10 TABLET, FILM COATED ORAL DAILY
Status: DISCONTINUED | OUTPATIENT
Start: 2023-03-14 | End: 2023-03-15 | Stop reason: HOSPADM

## 2023-03-13 RX ORDER — LEVOTHYROXINE SODIUM 0.15 MG/1
150 TABLET ORAL DAILY
Status: DISCONTINUED | OUTPATIENT
Start: 2023-03-14 | End: 2023-03-15 | Stop reason: HOSPADM

## 2023-03-13 RX ORDER — FUROSEMIDE 40 MG/1
40 TABLET ORAL 2 TIMES DAILY
COMMUNITY

## 2023-03-13 RX ORDER — SUCRALFATE 1 G/1
1 TABLET ORAL EVERY 6 HOURS SCHEDULED
Status: DISCONTINUED | OUTPATIENT
Start: 2023-03-14 | End: 2023-03-15 | Stop reason: HOSPADM

## 2023-03-13 RX ORDER — BUDESONIDE AND FORMOTEROL FUMARATE DIHYDRATE 80; 4.5 UG/1; UG/1
2 AEROSOL RESPIRATORY (INHALATION) 2 TIMES DAILY
Status: DISCONTINUED | OUTPATIENT
Start: 2023-03-13 | End: 2023-03-15 | Stop reason: HOSPADM

## 2023-03-13 RX ORDER — FUROSEMIDE 40 MG/1
40 TABLET ORAL 2 TIMES DAILY
Status: DISCONTINUED | OUTPATIENT
Start: 2023-03-14 | End: 2023-03-15 | Stop reason: HOSPADM

## 2023-03-13 RX ORDER — DIPHENOXYLATE HYDROCHLORIDE AND ATROPINE SULFATE 2.5; .025 MG/1; MG/1
1 TABLET ORAL 4 TIMES DAILY PRN
Status: DISCONTINUED | OUTPATIENT
Start: 2023-03-13 | End: 2023-03-15 | Stop reason: HOSPADM

## 2023-03-13 RX ORDER — DIPHENOXYLATE HYDROCHLORIDE AND ATROPINE SULFATE 2.5; .025 MG/1; MG/1
1 TABLET ORAL 4 TIMES DAILY PRN
COMMUNITY

## 2023-03-13 RX ORDER — AMLODIPINE BESYLATE 10 MG/1
10 TABLET ORAL DAILY
COMMUNITY

## 2023-03-13 RX ORDER — M-VIT,TX,IRON,MINS/CALC/FOLIC 27MG-0.4MG
1 TABLET ORAL DAILY
Status: DISCONTINUED | OUTPATIENT
Start: 2023-03-14 | End: 2023-03-15 | Stop reason: HOSPADM

## 2023-03-13 RX ORDER — PROCHLORPERAZINE MALEATE 10 MG
10 TABLET ORAL EVERY 6 HOURS PRN
Status: DISCONTINUED | OUTPATIENT
Start: 2023-03-13 | End: 2023-03-15 | Stop reason: HOSPADM

## 2023-03-13 RX ORDER — FLUOXETINE HYDROCHLORIDE 20 MG/1
20 CAPSULE ORAL DAILY
Status: DISCONTINUED | OUTPATIENT
Start: 2023-03-14 | End: 2023-03-15 | Stop reason: HOSPADM

## 2023-03-13 RX ORDER — PANTOPRAZOLE SODIUM 40 MG/1
40 TABLET, DELAYED RELEASE ORAL
Status: DISCONTINUED | OUTPATIENT
Start: 2023-03-14 | End: 2023-03-15 | Stop reason: HOSPADM

## 2023-03-13 RX ORDER — IPRATROPIUM BROMIDE AND ALBUTEROL SULFATE 2.5; .5 MG/3ML; MG/3ML
1 SOLUTION RESPIRATORY (INHALATION) EVERY 4 HOURS PRN
Status: DISCONTINUED | OUTPATIENT
Start: 2023-03-13 | End: 2023-03-15 | Stop reason: HOSPADM

## 2023-03-13 RX ORDER — SODIUM CHLORIDE 9 MG/ML
INJECTION, SOLUTION INTRAVENOUS CONTINUOUS
Status: DISCONTINUED | OUTPATIENT
Start: 2023-03-13 | End: 2023-03-15 | Stop reason: HOSPADM

## 2023-03-13 RX ADMIN — SODIUM CHLORIDE 75 ML/HR: 9 INJECTION, SOLUTION INTRAVENOUS at 22:31

## 2023-03-13 RX ADMIN — IPRATROPIUM BROMIDE AND ALBUTEROL SULFATE 3 ML: 2.5; .5 SOLUTION RESPIRATORY (INHALATION) at 23:53

## 2023-03-13 RX ADMIN — TAMSULOSIN HYDROCHLORIDE 0.8 MG: 0.4 CAPSULE ORAL at 23:35

## 2023-03-13 RX ADMIN — SODIUM CHLORIDE 1000 ML: 9 INJECTION, SOLUTION INTRAVENOUS at 21:29

## 2023-03-13 RX ADMIN — APIXABAN 5 MG: 5 TABLET, FILM COATED ORAL at 23:35

## 2023-03-13 ASSESSMENT — ENCOUNTER SYMPTOMS
NAUSEA: 1
VOMITING: 1
DIARRHEA: 1
BLOOD IN STOOL: 0
ABDOMINAL PAIN: 0
SHORTNESS OF BREATH: 1
CHEST TIGHTNESS: 0
CONSTIPATION: 0
BACK PAIN: 0
SORE THROAT: 0
COUGH: 1

## 2023-03-13 ASSESSMENT — VISUAL ACUITY: OU: 1

## 2023-03-13 ASSESSMENT — PAIN - FUNCTIONAL ASSESSMENT
PAIN_FUNCTIONAL_ASSESSMENT: NONE - DENIES PAIN
PAIN_FUNCTIONAL_ASSESSMENT: NONE - DENIES PAIN

## 2023-03-13 NOTE — ED PROVIDER NOTES
eMERGENCY dEPARTMENT eNCOUnter   [address] Name: Narendra Borges  MRN: [id number]  Jenarogfreuben 1950  Date of evaluation: 3/13/23     Narendra Borges is a 74 y.o. male with CC: Fall and Cough      Based on the medical record the care appears appropriate. I was personally available for consultation in the Emergency Department.     Cisco Solorzano DO  Attending Emergency Physician                 Cisco Solorzano DO  03/14/23 [id number]

## 2023-03-13 NOTE — ED PROVIDER NOTES
EMERGENCY DEPARTMENT ENCOUNTER    Pt Name: Armen Posadas  MRN: [id number]  [place] 1950  Date of evaluation: 3/13/23  CHIEF COMPLAINT       Chief Complaint   Patient presents with    Fall    Cough     HISTORY OF PRESENT ILLNESS   75 yo with hx of non hodgkin lymphoma presents with daughter via EMS for evaluation of fall. Pt states PTA he lost his balance walking down the hallway and fell. He was using his walker. Denies hitting head or loc. Reports skin tears to right arm. Pt denies dizziness, lightheadedness, headache, neck pain, vision changes, chest pain, numbness. Pt does reports chronic shortness of breath and cough. Has COPD, on home oxygen. No changes from baseline. Additionally, pt reports nausea, emesis, and non-bloody/ black diarrhea x 1 week. Daughter states Arsenio Jernigan virus went through family. Pt has been able to eat and drink. Denies abd pain. Daughter expresses concerns that patient is taking oxycodone. Was prescribed several months ago but family agreed to stop medication due to drug induced psychosis. Daughter would like patient checked. He is agreeable. No other complaints. The history is provided by the patient and a relative. REVIEW OF SYSTEMS     Review of Systems   Constitutional:  Negative for chills and fever. HENT:  Negative for congestion and sore throat. Eyes:  Negative for visual disturbance. Respiratory:  Positive for cough and shortness of breath. Negative for chest tightness. Cardiovascular:  Negative for chest pain. Gastrointestinal:  Positive for diarrhea, nausea and vomiting. Negative for abdominal pain, blood in stool and constipation. Genitourinary:  Negative for dysuria, frequency and urgency. Musculoskeletal:  Negative for back pain and neck pain. Neurological:  Negative for dizziness, syncope, weakness, light-headedness, numbness and headaches. Psychiatric/Behavioral:  Negative for confusion.     All other systems reviewed and are negative. PASTMEDICAL HISTORY     Past Medical History:   Diagnosis Date    Arthritis     Cancer [facility])     chemotherapy lymphoma last was april 2022    Chemotherapy management, encounter for     CHF (congestive heart failure) (HCC)     COPD (chronic obstructive pulmonary disease) ([address]     Dementia ([address]     Diabetes mellitus ([address]     H/O cardiovascular stress test     History of non-Hodgkin's lymphoma     Hx of blood clots     DVT in right leg     Hyperlipidemia     Hypertension     Hypothyroidism     On home O2     at 5 liters per nasal cannula 24 hrs. per day.      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    Hydronephrosis due to obstruction of ureter N13.1    Moderate malnutrition (HCC) E44.0    Chronic cholecystitis K81.1    Pleural effusion J90    Cardiomegaly I51.7    Hypothyroidism E03.9    Diabetes mellitus ([address] E11.9    Hypertensive urgency I16.0    Acute respiratory failure with hypoxia (HCC) J96.01    Hypoxia R09.02    Carotid stenosis, asymptomatic, bilateral I65.23    Simple chronic bronchitis (HCC) J41.0    Dependence on nocturnal oxygen therapy Z99.81    Pneumonia J18.9    LAURO (acute kidney injury) ([address] N17.9    Failure to thrive in adult R62.7    DVT (deep venous thrombosis) ([address] I82.409    Lymphoma, diffuse (HCC) C85.90    Severe malnutrition ([address] E43    Acute cystitis with hematuria N30.01    Lymphoma of body of stomach ([address] C85.99    Atrial fibrillation with RVR ([address] I48.91     SURGICAL HISTORY       Past Surgical History:   Procedure Laterality Date    CAROTID ENDARTERECTOMY Left 6/28/2022    LEFT TYPE I EVERSION LEFT CAROTID ENDARTERECTOMY RE-IMPLANTATION LEFT ICA performed by Seferino Cordero MD at [address] [facility] [address] N/A 10/9/2021    CHOLECYSTECTOMY LAPAROSCOPIC ROBOTIC XI performed by Fili Larsen MD at [facility] OR    COLONOSCOPY Left 9/24/2021    COLONOSCOPY POLYPECTOMY SNARE/COLD BIOPSY performed by Dominik Bergeron MD at [facility] OR    CYSTOSCOPY Right 10/6/2021    CYSTOSCOPY PYELOGRAM URETERAL STENT INSERTION performed by Paolo Thakkar MD at [facility] OR    CYSTOSCOPY Bilateral 11/24/2021    CYSTOSCOPY URETERAL STENT INSERTION RIGHT EXCHANGE & RETROGRADE PYELOGRAM LEFT INSERTION performed by Paolo Thakkar MD at [facility] OR    CYSTOSCOPY Bilateral 2/16/2022    CYSTOSCOPY WITH BILATERAL STENT EXCHANGE performed by Paolo Thakkar MD at [facility] OR    CYSTOSCOPY N/A 7/20/2022    CYSTOSCOPY BILATERAL STENT EXCHANGE performed by Paolo Thakkar MD at [facility] OR    CYSTOSCOPY Bilateral 12/1/2022    CYSTOSCOPY URETERAL STENT EXCHANGE performed by Paolo Thakkar MD at [facility] OR    EYE SURGERY      cataracts    HERNIA REPAIR      IR BIOPSY ABDOMINAL MASS  7/6/2021    IR BIOPSY ABDOMINAL MASS 7/6/2021 [facility] SPECIAL PROCEDURES    IR PORT PLACEMENT EQUAL OR GREATER THAN 5 YEARS  8/31/2021    IR PORT PLACEMENT EQUAL OR GREATER THAN 5 YEARS 8/31/2021 [facility] SPECIAL PROCEDURES    TONSILLECTOMY      as child    UPPER GASTROINTESTINAL ENDOSCOPY N/A 9/29/2022    EGD BIOPSY performed by Fili Larsen MD at [facility] ENDO     CURRENT MEDICATIONS       Previous Medications    ALBUTEROL SULFATE HFA (PROVENTIL;VENTOLIN;PROAIR) 108 (90 BASE) MCG/ACT INHALER    Inhale 2 puffs into the lungs every 4 hours as needed for Wheezing     AMLODIPINE (NORVASC) 10 MG TABLET    Take 10 mg by mouth daily    APIXABAN (ELIQUIS) 5 MG TABS TABLET    Take 1 tablet by mouth in the morning and 1 tablet before bedtime.    ATORVASTATIN (LIPITOR) 10 MG TABLET    Take 10 mg by mouth daily    CARVEDILOL (COREG) 6.25 MG TABLET    Take 6.25 mg by mouth 2 times daily (with meals)    DIPHENOXYLATE-ATROPINE (LOMOTIL) 2.5-0.025 MG PER TABLET    Take 1 tablet by mouth 4 times daily as needed for  Diarrhea. Indications: last filled 2/24/23 for 30 days    DONEPEZIL (ARICEPT) 10 MG TABLET    Take 10 mg by mouth daily     FAMOTIDINE (PEPCID) 40 MG/5ML SUSPENSION    Take 2.5 mLs by mouth 2 times daily    FERROUS SULFATE (FE TABS 325) 325 (65 FE) MG EC TABLET    Take 1 tablet by mouth 3 times daily (with meals)    FLUOXETINE (PROZAC) 20 MG CAPSULE    Take 20 mg by mouth daily    FLUTICASONE-SALMETEROL (ADVAIR) 100-50 MCG/DOSE DISKUS INHALER    Inhale 1 puff into the lungs every 12 hours    FUROSEMIDE (LASIX) 40 MG TABLET    Take 40 mg by mouth in the morning and 40 mg in the evening. IBRUTINIB (IMBRUVICA) 420 MG TABLET    Take 1 tablet by mouth daily ON 2 DAYS OFF 1 DAY-HAS 8 DOSES LEFT. FOR CANCER TREATMENT. IPRATROPIUM-ALBUTEROL (DUONEB) 0.5-2.5 (3) MG/3ML SOLN NEBULIZER SOLUTION    Inhale 3 mLs into the lungs every 4 hours as needed for Shortness of Breath    LACTOBACILLUS (PROBIOTIC ACIDOPHILUS) CAPS    Take 1 caplet by mouth daily    LEVOTHYROXINE (SYNTHROID) 150 MCG TABLET    Take 1 tablet by mouth Daily    LIDOCAINE-PRILOCAINE (EMLA) 2.5-2.5 % CREAM    Apply topically 45-60 minutes prior to needle poke daily PRN. LOSARTAN (COZAAR) 50 MG TABLET    Take 1 tablet by mouth daily    MAGNESIUM OXIDE (MAG-OX) 400 (240 MG) MG TABLET    Take 1 tablet by mouth daily    MEGESTROL (MEGACE) 40 MG/ML SUSPENSION    take 10 milliliters (2 TEASPOONFULS) by mouth every morning    MULTIPLE VITAMINS-MINERALS (THERAPEUTIC MULTIVITAMIN-MINERALS) TABLET    Take 1 tablet by mouth daily    OMEPRAZOLE (PRILOSEC) 20 MG DELAYED RELEASE CAPSULE    Take 20 mg by mouth 2 times daily    OXYCODONE (ROXICODONE) 5 MG IMMEDIATE RELEASE TABLET    Take 5 mg by mouth every 4 hours as needed for Pain. Last dispensed 8/2 for 30 days    OXYGEN    Inhale 2 L into the lungs at bedtime Nightly & during the day.     PROCHLORPERAZINE (COMPAZINE) 10 MG TABLET    Take 1 tablet by mouth every 6 hours as needed (nausea/ vomiting)    SUCRALFATE (CARAFATE) 1 GM/10ML SUSPENSION    Take 1 g by mouth 4 times daily    TAMSULOSIN (FLOMAX) 0.4 MG CAPSULE    Take 0.8 mg by mouth at bedtime     ALLERGIES     has No Known Allergies. FAMILY HISTORY     He indicated that his mother is . He indicated that his father is . SOCIAL HISTORY       Social History     Tobacco Use    Smoking status: Former     Packs/day: 2.00     Years: 45.00     Pack years: 90.00     Types: Cigarettes     Quit date: 2006     Years since quittin.1    Smokeless tobacco: Never   Vaping Use    Vaping Use: Never used   Substance Use Topics    Alcohol use: Yes     Alcohol/week: 1.7 standard drinks     Types: 2 Standard drinks or equivalent per week     Comment: occassional    Drug use: Yes     Types: Marijuana [facility])     Comment:  CBD gummies no smoking     PHYSICAL EXAM     INITIAL VITALS: /70   Pulse 54   Temp 98.1 °F (36.7 °C) (Oral)   Resp 16   Ht 5' 11\" (1.803 m)   Wt 150 lb (68 kg)   SpO2 93%   BMI 20.92 kg/m²    Physical Exam  Vitals reviewed. Constitutional:       General: He is awake. Appearance: Normal appearance. He is well-groomed. He is obese. HENT:      Head: Normocephalic and atraumatic. Nose: Nose normal.   Eyes:      General: Lids are normal. Vision grossly intact. Gaze aligned appropriately. Pupils: Pupils are equal, round, and reactive to light. Cardiovascular:      Rate and Rhythm: Regular rhythm. Bradycardia present. Pulses: Normal pulses. Dorsalis pedis pulses are 2+ on the right side and 2+ on the left side. Posterior tibial pulses are 2+ on the right side and 2+ on the left side. Heart sounds: Normal heart sounds, S1 normal and S2 normal.   Pulmonary:      Effort: Pulmonary effort is normal. No respiratory distress. Breath sounds: No stridor. Wheezing and rhonchi present. No rales. Chest:      Chest wall: No tenderness. Abdominal:      General: Abdomen is flat.  There is no distension. Palpations: Abdomen is soft. There is no mass. Tenderness: There is no abdominal tenderness. There is no guarding or rebound. Hernia: No hernia is present. Musculoskeletal:      Cervical back: Normal, full passive range of motion without pain and normal range of motion. No spinous process tenderness or muscular tenderness. Thoracic back: Normal.      Lumbar back: Normal.   Skin:     General: Skin is warm. Capillary Refill: Capillary refill takes less than 2 seconds. Neurological:      General: No focal deficit present. Mental Status: He is alert and oriented to person, place, and time. Mental status is at baseline. GCS: GCS eye subscore is 4. GCS verbal subscore is 5. GCS motor subscore is 6. Cranial Nerves: Cranial nerves 2-12 are intact. No cranial nerve deficit, dysarthria or facial asymmetry. Sensory: Sensation is intact. Motor: Motor function is intact. No weakness, tremor, atrophy, abnormal muscle tone, seizure activity or pronator drift. Coordination: Coordination is intact. Romberg sign negative. Coordination normal. Finger-Nose-Finger Test normal. Rapid alternating movements normal.   Psychiatric:         Behavior: Behavior is cooperative. MEDICAL DECISION MAKING / ED COURSE:     Fall, slipped in hallway. Skin tears to right arm. Did not hit head. -LOC. No neurological deficits on exam. No cervical tenderness. C/O n/v/d. Family has stomach virus. Labs shows LAURO. Elevated trop. Dehydration. Ct head unremarkable. Urine shows large HGB. Normal for patient. 10-20 WBC. Prior cultures seem to show no growth. Dr. Naomi Mcconnell is just requesting urine culture. Will not start antibiotics. 2nd trop pending. Dr Kailey Naranjo updated on status. Will admit for LAURO, dehydration, gastroenteritis. Dr. Naomi Mcconnell requesting stool culture.        1)  Number and Complexity of Problems Addressed at this Encounter  Problem List This Visit:  fall, skin tear, n/v/d    Differential Diagnosis: gastroenteritis, dehydration    Diagnoses Considered but Do Not Suspect:  MI, pneumonia, surgical abdomen, ICH, fracture     Pertinent Comorbid Conditions:  lymphoma, diabetes     2)  Data Reviewed and Analyzed  (Lab and radiology tests/orders below in next section)    External Documents Reviewed: My EKG interpretation:  sinus bradycardia      Tests considered but not ordered and why:  ct abd pain. I do not suspect surgical abdomen. Decision Rules/Scores utilized:      Imaging that is independently reviewed and interpreted by me as:      3)  Treatment and Disposition         Patient repeat assessment:  pt stable. Tolerating PO. No emesis in ed. Disposition discussion with patient/family, Shared Decision Making:    Discussed plan with patient and family. They are agreeable with admission. Case discussed with consulting clinician:    Discussed with dr. Candace Vera who is agreeable with plan to admit. Discussed with dr. Jefe Preston who accepted admission. MIPS:    #12 - Emergency Medicine: Utilization of CT for Minor Blunt Head Trauma (Adult)   [] Patient has one or more of the following conditions that are excluded from the measure (select all that apply):    [] Patient has ventricular shunt   [] Patient has brain tumor    [] Patient is pregnant   [] Patient has multi-system trauma    [x] Patient taking an antiplatelet medication (excluding aspirin)   [] Head CT not ordered by emergency care clinician   [] Head CT ordered for reasons other than trauma      [] Minor blunt head trauma.  Head CT was ordered by an emergency clinician for trauma because: [SATISFIES MIPS]:  Reasons:  [] Patient is 72 or older  [] Patient GCS < 15  [] Patient has focal neurologic deficit  [] Patient has severe headache  [] Patient is vomiting  [] Severe/dangerous mechanism of injury was identified (select one or more):  [] MVA with: patient ejection, death of another passenger, rollover, speed > 40mph, airbag deployment,  or passenger on ATV or motorcycle  [] Pedestrian or bicyclist without helmet: struck my motorized vehicle, in bicycle crash  [] Fall > 3 feet or 5 stairs  [] Head struck by high-impact object (hammer, baseball, baseball bat, heavy object such as falling brick)  [] Other: [*] (ie. assault description)  [] Patient has physical signs of basilar skull fracture present (including hemotympanum, raccoon eyes, CSF leakage from ear or nose, Jackson's sign)  [] Patient suspected of taking anticoagulant medication  [] Patient has thrombocytopenia  [] Patient has coagulopathy   [] Patient has loss of consciousness and (must select one of the following):  [] Headache  [] Alcohol/drug intoxication  [] Evidence of trauma above the clavicles  [] Age 61 or older  [] Post-traumatic seizure      Social determinants of health impacting treatment or disposition:      Code Status Discussion:      CRITICAL CARE:       PROCEDURES:    Procedures      DATA FOR LAB AND RADIOLOGY TESTS ORDERED BELOW ARE REVIEWED BY THE ED CLINICIAN:    RADIOLOGY: All x-rays, CT, MRI, and formal ultrasound images (except ED bedside ultrasound) are read by the radiologist, see reports below, unless otherwise noted in MDM or here. Reports below are reviewed by myself. CT HEAD WO CONTRAST   Final Result   No acute intracranial abnormality. Moderate atrophy and chronic microvascular ischemic changes. Bilateral maxillary and ethmoid sinusitis. Nonspecific left mastoid effusion. XR CHEST PORTABLE   Final Result   No acute abnormality detected. LABS: Lab orders shown below, the results are reviewed by myself, and all abnormals are listed below.   Labs Reviewed   CBC WITH AUTO DIFFERENTIAL - Abnormal; Notable for the following components:       Result Value    RBC 3.12 (*)     Hemoglobin 9.0 (*)     Hematocrit 26.5 (*)     Platelets 081 (*)     Seg Neutrophils 70 (*)     Lymphocytes 21 (*) Monocytes 9 (*)     All other components within normal limits   COMPREHENSIVE METABOLIC PANEL W/ REFLEX TO MG FOR LOW K - Abnormal; Notable for the following components:    Glucose 118 (*)     BUN 44 (*)     Creatinine 1.62 (*)     Est, Glom Filt Rate 44 (*)     Calcium 8.1 (*)     Total Protein 4.9 (*)     Albumin 2.7 (*)     All other components within normal limits   LIPASE - Abnormal; Notable for the following components:    Lipase 73 (*)     All other components within normal limits   URINALYSIS WITH REFLEX TO CULTURE - Abnormal; Notable for the following components:    Color, UA Orange (*)     Turbidity UA Cloudy (*)     Urine Hgb LARGE (*)     Protein, UA 2+ (*)     Leukocyte Esterase, Urine MOD (*)     All other components within normal limits   TROPONIN - Abnormal; Notable for the following components:    Troponin, High Sensitivity 118 (*)     All other components within normal limits   TROPONIN - Abnormal; Notable for the following components:    Troponin, High Sensitivity 108 (*)     All other components within normal limits   COVID-19 & INFLUENZA COMBO   CULTURE, URINE   C DIFF TOXIN/ANTIGEN   GASTROINTESTINAL PANEL, MOLECULAR   MICROSCOPIC URINALYSIS   URINE DRUG SCREEN   BLOOD OCCULT STOOL SCREEN #1       Vitals Reviewed:    Vitals:    03/13/23 1642 03/13/23 1910 03/13/23 2045   BP: (!) 115/52 (!) 147/49 115/70   Pulse: 83 52 54   Resp: 20 16 16   Temp: 97.8 °F (36.6 °C) 98.1 °F (36.7 °C)    TempSrc: Oral Oral    SpO2: 99% 97% 93%   Weight: 150 lb (68 kg)     Height: 5' 11\" (1.803 m)       MEDICATIONS GIVEN TO PATIENT THIS ENCOUNTER:  Orders Placed This Encounter   Medications    0.9 % sodium chloride bolus     DISCHARGE PRESCRIPTIONS:  New Prescriptions    No medications on file     PHYSICIAN CONSULTS ORDERED THIS ENCOUNTER:  IP CONSULT TO INTERNAL MEDICINE  FINAL IMPRESSION      1. LAURO (acute kidney injury) ([address]    2. Elevated troponin    3. Dehydration    4.  Gastroenteritis DISPOSITION/PLAN   DISPOSITION Admitted 03/13/2023 09:13:41 PM      OUTPATIENT FOLLOW UP THE PATIENT:  No follow-up provider specified.     MARIA DOLORES Zepeda, [place]  03/13/23 2120

## 2023-03-14 LAB
ABSOLUTE EOS #: 0.05 K/UL (ref 0–0.4)
ABSOLUTE LYMPH #: 1.12 K/UL (ref 1–4.8)
ABSOLUTE MONO #: 0.71 K/UL (ref 0.1–1.3)
ANION GAP SERPL CALCULATED.3IONS-SCNC: 8 MMOL/L (ref 9–17)
BASOPHILS # BLD: 0 % (ref 0–2)
BASOPHILS ABSOLUTE: 0 K/UL (ref 0–0.2)
BUN SERPL-MCNC: 39 MG/DL (ref 8–23)
CALCIUM SERPL-MCNC: 8.1 MG/DL (ref 8.6–10.4)
CHLORIDE SERPL-SCNC: 106 MMOL/L (ref 98–107)
CO2 SERPL-SCNC: 25 MMOL/L (ref 20–31)
CREAT SERPL-MCNC: 1.52 MG/DL (ref 0.7–1.2)
DATE, STOOL #1: NORMAL
EKG ATRIAL RATE: 49 BPM
EKG P AXIS: 73 DEGREES
EKG P-R INTERVAL: 138 MS
EKG Q-T INTERVAL: 448 MS
EKG QRS DURATION: 96 MS
EKG QTC CALCULATION (BAZETT): 404 MS
EKG R AXIS: 28 DEGREES
EKG T AXIS: 32 DEGREES
EKG VENTRICULAR RATE: 49 BPM
EOSINOPHILS RELATIVE PERCENT: 1 % (ref 0–4)
GFR SERPL CREATININE-BSD FRML MDRD: 48 ML/MIN/1.73M2
GLUCOSE BLD-MCNC: 119 MG/DL (ref 75–110)
GLUCOSE BLD-MCNC: 85 MG/DL (ref 75–110)
GLUCOSE SERPL-MCNC: 102 MG/DL (ref 70–99)
HCT VFR BLD AUTO: 27 % (ref 41–53)
HEMOCCULT SP1 STL QL: NEGATIVE
HGB BLD-MCNC: 9 G/DL (ref 13.5–17.5)
LYMPHOCYTES # BLD: 22 % (ref 24–44)
MCH RBC QN AUTO: 28.4 PG (ref 26–34)
MCHC RBC AUTO-ENTMCNC: 33.3 G/DL (ref 31–37)
MCV RBC AUTO: 85.4 FL (ref 80–100)
MICROORGANISM SPEC CULT: NORMAL
MONOCYTES # BLD: 14 % (ref 1–7)
MORPHOLOGY: ABNORMAL
PDW BLD-RTO: 14.6 % (ref 11.5–14.9)
PLATELET # BLD AUTO: 145 K/UL (ref 150–450)
PMV BLD AUTO: 8.9 FL (ref 6–12)
POTASSIUM SERPL-SCNC: 5.2 MMOL/L (ref 3.7–5.3)
RBC # BLD: 3.16 M/UL (ref 4.5–5.9)
REASON FOR REJECTION: NORMAL
SEG NEUTROPHILS: 63 % (ref 36–66)
SEGMENTED NEUTROPHILS ABSOLUTE COUNT: 3.22 K/UL (ref 1.3–9.1)
SODIUM SERPL-SCNC: 139 MMOL/L (ref 135–144)
SPECIMEN DESCRIPTION: NORMAL
TIME, STOOL #1: NORMAL
TROPONIN I SERPL DL<=0.01 NG/ML-MCNC: 113 NG/L (ref 0–22)
WBC # BLD AUTO: 5.1 K/UL (ref 3.5–11)
ZZ NTE CLEAN UP: ORDERED TEST: NORMAL
ZZ NTE WITH NAME CLEAN UP: SPECIMEN SOURCE: NORMAL

## 2023-03-14 PROCEDURE — 82272 OCCULT BLD FECES 1-3 TESTS: CPT

## 2023-03-14 PROCEDURE — 2060000000 HC ICU INTERMEDIATE R&B

## 2023-03-14 PROCEDURE — 94640 AIRWAY INHALATION TREATMENT: CPT

## 2023-03-14 PROCEDURE — 6370000000 HC RX 637 (ALT 250 FOR IP): Performed by: FAMILY MEDICINE

## 2023-03-14 PROCEDURE — 2700000000 HC OXYGEN THERAPY PER DAY

## 2023-03-14 PROCEDURE — 6360000002 HC RX W HCPCS: Performed by: FAMILY MEDICINE

## 2023-03-14 PROCEDURE — 2580000003 HC RX 258: Performed by: FAMILY MEDICINE

## 2023-03-14 PROCEDURE — 97162 PT EVAL MOD COMPLEX 30 MIN: CPT

## 2023-03-14 PROCEDURE — 94761 N-INVAS EAR/PLS OXIMETRY MLT: CPT

## 2023-03-14 PROCEDURE — 80048 BASIC METABOLIC PNL TOTAL CA: CPT

## 2023-03-14 PROCEDURE — 82947 ASSAY GLUCOSE BLOOD QUANT: CPT

## 2023-03-14 PROCEDURE — 94664 DEMO&/EVAL PT USE INHALER: CPT

## 2023-03-14 PROCEDURE — 36415 COLL VENOUS BLD VENIPUNCTURE: CPT

## 2023-03-14 PROCEDURE — 99223 1ST HOSP IP/OBS HIGH 75: CPT | Performed by: INTERNAL MEDICINE

## 2023-03-14 PROCEDURE — 93010 ELECTROCARDIOGRAM REPORT: CPT | Performed by: INTERNAL MEDICINE

## 2023-03-14 PROCEDURE — 84484 ASSAY OF TROPONIN QUANT: CPT

## 2023-03-14 PROCEDURE — 85025 COMPLETE CBC W/AUTO DIFF WBC: CPT

## 2023-03-14 RX ORDER — FAMOTIDINE 20 MG/1
20 TABLET, FILM COATED ORAL DAILY
Status: DISCONTINUED | OUTPATIENT
Start: 2023-03-14 | End: 2023-03-15 | Stop reason: HOSPADM

## 2023-03-14 RX ADMIN — SODIUM CHLORIDE: 9 INJECTION, SOLUTION INTRAVENOUS at 20:46

## 2023-03-14 RX ADMIN — FERROUS SULFATE TAB 325 MG (65 MG ELEMENTAL FE) 325 MG: 325 (65 FE) TAB at 12:04

## 2023-03-14 RX ADMIN — FERROUS SULFATE TAB 325 MG (65 MG ELEMENTAL FE) 325 MG: 325 (65 FE) TAB at 17:09

## 2023-03-14 RX ADMIN — MAGNESIUM OXIDE TAB 400 MG (241.3 MG ELEMENTAL MG) 400 MG: 400 (241.3 MG) TAB at 08:41

## 2023-03-14 RX ADMIN — AMLODIPINE BESYLATE 10 MG: 10 TABLET ORAL at 08:41

## 2023-03-14 RX ADMIN — FLUOXETINE 20 MG: 20 CAPSULE ORAL at 08:41

## 2023-03-14 RX ADMIN — ATORVASTATIN CALCIUM 10 MG: 10 TABLET, FILM COATED ORAL at 08:41

## 2023-03-14 RX ADMIN — FAMOTIDINE 20 MG: 20 TABLET, FILM COATED ORAL at 10:14

## 2023-03-14 RX ADMIN — APIXABAN 5 MG: 5 TABLET, FILM COATED ORAL at 20:42

## 2023-03-14 RX ADMIN — PANTOPRAZOLE SODIUM 40 MG: 40 TABLET, DELAYED RELEASE ORAL at 05:58

## 2023-03-14 RX ADMIN — APIXABAN 5 MG: 5 TABLET, FILM COATED ORAL at 08:41

## 2023-03-14 RX ADMIN — CARVEDILOL 6.25 MG: 12.5 TABLET, FILM COATED ORAL at 17:09

## 2023-03-14 RX ADMIN — SUCRALFATE 1 G: 1 TABLET ORAL at 05:57

## 2023-03-14 RX ADMIN — MULTIPLE VITAMINS W/ MINERALS TAB 1 TABLET: TAB at 08:41

## 2023-03-14 RX ADMIN — FERROUS SULFATE TAB 325 MG (65 MG ELEMENTAL FE) 325 MG: 325 (65 FE) TAB at 08:41

## 2023-03-14 RX ADMIN — DONEPEZIL HYDROCHLORIDE 10 MG: 10 TABLET, FILM COATED ORAL at 08:40

## 2023-03-14 RX ADMIN — FUROSEMIDE 40 MG: 40 TABLET ORAL at 17:09

## 2023-03-14 RX ADMIN — BUDESONIDE AND FORMOTEROL FUMARATE DIHYDRATE 2 PUFF: 80; 4.5 AEROSOL RESPIRATORY (INHALATION) at 19:49

## 2023-03-14 RX ADMIN — SUCRALFATE 1 G: 1 TABLET ORAL at 12:04

## 2023-03-14 RX ADMIN — Medication 1 CAPSULE: at 08:41

## 2023-03-14 RX ADMIN — LEVOTHYROXINE SODIUM 150 MCG: 150 TABLET ORAL at 08:46

## 2023-03-14 RX ADMIN — BUDESONIDE AND FORMOTEROL FUMARATE DIHYDRATE 2 PUFF: 80; 4.5 AEROSOL RESPIRATORY (INHALATION) at 08:21

## 2023-03-14 RX ADMIN — TAMSULOSIN HYDROCHLORIDE 0.8 MG: 0.4 CAPSULE ORAL at 20:42

## 2023-03-14 RX ADMIN — CARVEDILOL 6.25 MG: 12.5 TABLET, FILM COATED ORAL at 08:41

## 2023-03-14 RX ADMIN — SUCRALFATE 1 G: 1 TABLET ORAL at 17:09

## 2023-03-14 RX ADMIN — CEFTRIAXONE SODIUM 1000 MG: 1 INJECTION, POWDER, FOR SOLUTION INTRAMUSCULAR; INTRAVENOUS at 10:01

## 2023-03-14 RX ADMIN — FUROSEMIDE 40 MG: 40 TABLET ORAL at 08:41

## 2023-03-14 RX ADMIN — PROCHLORPERAZINE MALEATE 10 MG: 10 TABLET ORAL at 14:28

## 2023-03-14 RX ADMIN — LIDOCAINE AND PRILOCAINE: 25; 25 CREAM TOPICAL at 13:38

## 2023-03-14 RX ADMIN — LOSARTAN POTASSIUM 50 MG: 50 TABLET, FILM COATED ORAL at 08:41

## 2023-03-14 NOTE — PROGRESS NOTES
Medication History completed:    New medications: Lomotil, furosemide    Medications discontinued: none    Medications flagged for review:   Oxycodone - last filled in November 2022 for 30 days    Changes to dosing:   Carvedilol changed to 6.25 mg twice daily  Sucralfate changed to suspension formulation taking 1 g four times daily    Stated allergies: NKDA    Other pertinent information: Medications confirmed with [employer], [employer], and Rhonda Fontanez. The patient is not certain which day of his Imbruvica cycle he is currently on, but he reports that one of his daughters will know. The daughter currently with the patient is uncertain of the day of the cycle he is on. Last fill of Lomotil was on 2/24/23 for 30 days. The family is reporting that his Lomotil is not working well to control his symptoms.      Thank you,  Ricardo Wadsworth, PharmD, BCPS  [phone]

## 2023-03-14 NOTE — PROGRESS NOTES
Physical Therapy  Facility/Department: [address] [place] [address]  Physical Therapy Initial Assessment    Name: Reyna Anand  : 1950  MRN: [id number]  Date of Service: 3/14/2023    Discharge Recommendations:  Patient would benefit from continued therapy after discharge   PT Equipment Recommendations  Equipment Needed: No      Patient Diagnosis(es): The primary encounter diagnosis was LAURO (acute kidney injury) ([facility] Diagnoses of Elevated troponin, Dehydration, and Gastroenteritis were also pertinent to this visit. Past Medical History:  has a past medical history of Arthritis, Cancer ([facility] Chemotherapy management, encounter for, CHF (congestive heart failure) ([facility] COPD (chronic obstructive pulmonary disease) ([facility] Dementia ([facility] Diabetes mellitus ([facility] H/O cardiovascular stress test, History of non-Hodgkin's lymphoma, Hx of blood clots, Hyperlipidemia, Hypertension, Hypothyroidism, On home O2, and Pneumonia. Past Surgical History:  has a past surgical history that includes hernia repair; IR BIOPSY ABDOMINAL/RETROPERITONEAL MASS PERCUTANEOUS (2021); IR PORT PLACEMENT > 5 YEARS (2021); Colonoscopy (Left, 2021); Cystoscopy (Right, 10/6/2021); Cholecystectomy, laparoscopic (N/A, 10/9/2021); Tonsillectomy; Cystoscopy (Bilateral, 2021); Cystoscopy (Bilateral, 2022); eye surgery; Carotid endarterectomy (Left, 2022); Cystoscopy (N/A, 2022); Upper gastrointestinal endoscopy (N/A, 2022); and Cystoscopy (Bilateral, 2022). Assessment   Assessment: Pt is a 1 assist for mobility, CGA for safety when up using RW. Pt would benefit from continued PT and support upon D/C.   Treatment Diagnosis: impaired functional mobility 2* weakness  Specific Instructions for Next Treatment: gait, balance, HEP, monitor O2 sats  Therapy Prognosis: Fair;Good  Decision Making: Medium Complexity  Exam: ROM, MM,T, bed mobility, transfers, ambulation  Clinical Presentation: Pt alert, cooperative, pleasant  Barriers to Learning: cognition, safety awareness  Requires PT Follow-Up: Yes  Activity Tolerance  Activity Tolerance: Patient tolerated treatment well;Patient limited by fatigue     Plan   Physcial Therapy Plan  General Plan: 5-7 times per week  Specific Instructions for Next Treatment: gait, balance, HEP, monitor O2 sats  Current Treatment Recommendations: Strengthening, Balance training, Functional mobility training, Transfer training, Endurance training, Gait training, Equipment evaluation, education, & procurement, Home exercise program, Safety education & training, Patient/Caregiver education & training, Positioning, Therapeutic activities  Safety Devices  Type of Devices: All fall risk precautions in place, Call light within reach, Bed alarm in place, Gait belt, Patient at risk for falls, Left in bed, Nurse notified (GIOVANY Greer)  Restraints  Restraints Initially in Place: No     Restrictions  Restrictions/Precautions  Restrictions/Precautions: Fall Risk, Contact Precautions (CDIF rule out, MRSA; tripped at home)  Required Braces or Orthoses?: No  Implants present? :  (pt denies)     Subjective   Pain: Pt reports 8/10 abdominal pain, acute, constant  General  Chart Reviewed: Yes  Patient assessed for rehabilitation services?: Yes  Additional Pertinent Hx: lymphoma, dementia, CHF, COPD, (-) CT head  Family / Caregiver Present: No  Referring Practitioner: Júnior Tarango DO  Referral Date : 03/14/23  Diagnosis: dehydration  Follows Commands: Within Functional Limits  Subjective  Subjective: Pt in bed, on 2L of O2 and agreeablel to PT. GIOVANY Acevedo. Reports pt has recently had nausea and emesis.          Social/Functional History  Social/Functional History  Lives With: Spouse (Cousin Lives w/ Him & His Wife)  Type of Home: House  Home Layout: One level  Home Access: Ramped entrance  Bathroom Shower/Tub: Tub/Shower unit, Shower chair with back  Bathroom Toilet: Handicap height  Bathroom Equipment: Grab bars in shower, Shower chair, Hand-held shower, Grab bars around toilet  Bathroom Accessibility: Accessible  Home Equipment: Naomia Mutton, Oxygen, Rollator (Shower Chair, 2L O2 24/7)  Receives Help From: Family  ADL Assistance: Needs assistance (Wife assists with bathing)  Homemaking Assistance: Needs assistance (wife does cooking, cleaning, laundry)  Homemaking Responsibilities: No  Ambulation Assistance: Independent (uses walker MOD I)  Transfer Assistance: Needs assistance (wife assists)  Active : No  Patient's  Info: Wife/Cousin drives Car  Mode of Transportation: Car  Occupation: Retired  Type of Occupation: Factory  IADL Comments: sleeps in regular bed at home  Additional Comments: Wife is out of town and comes back in a week - [employer]. Cousin does not work and is in [employer]" good health per pt. Does not have to assist with anything. Vision/Hearing  Vision  Vision: Within Functional Limits  Hearing  Hearing: Within functional limits    Cognition   Orientation  Overall Orientation Status: Impaired  Orientation Level: Oriented to place;Oriented to person;Oriented to situation;Disoriented to time (June 2023)     Objective   Heart Rate: 60  Heart Rate Source: Monitor  BP: (!) 129/51  BP Location: Left upper arm  BP Method: Automatic  Patient Position: Semi fowlers  MAP (Calculated): 77  Resp: 16  SpO2: 97 %  O2 Device: Nasal cannula (2L)     Observation/Palpation  Observation: O2 sats 94% post amb, 97% pre amb.  Emesis post ambulation        AROM RLE (degrees)  RLE AROM: WFL  AROM LLE (degrees)  LLE AROM : WFL  AROM RUE (degrees)  RUE AROM : WFL  AROM LUE (degrees)  LUE AROM : WFL  Strength RLE  Strength RLE: WFL  Comment: Grossly 4-/5  Strength LLE  Strength LLE: WFL  Comment: Grossly 4-/5  Strength RUE  Strength RUE: WFL  Strength LUE  Strength LUE: WFL     Sensation  Overall Sensation Status: WFL (pt denies)     Bed mobility  Rolling to Right: Stand by assistance  Supine to Sit: Stand by assistance  Sit to Supine: Stand by assistance  Scooting: Stand by assistance  Bed Mobility Comments: head of bed flat, good technique and no dizziness. Pt wants to get back in bed end of session. Transfers  Sit to Stand: Stand by assistance;Contact guard assistance  Stand to Sit: Stand by assistance;Contact guard assistance  Bed to Chair: Contact guard assistance  Comment: SBA/CGA with cues for technique with RW. Fair carryover. Ambulation  Surface: Level tile  Device: Rolling Walker  Other Apparatus: O2 (2L)  Assistance: Contact guard assistance  Quality of Gait: small steps, slow lyn, forward flexed posture  Gait Deviations: Slow Lyn;Decreased step length;Decreased step height  Distance: 25'  Comments: Managing RW appropriately. Pt reports fatigue end of ambulation. O2 sats 94%. Pt gets back in bed and has emesis. RN Notified. Balance  Posture: Fair  Sitting - Static: Good  Sitting - Dynamic: Good;-  Standing - Static: Fair;+  Standing - Dynamic: Fair;+  Comments: Standing balance with RW             AM-PAC Score  AM-PAC Inpatient Mobility Raw Score : 17 (03/14/23 1542)  AM-PAC Inpatient T-Scale Score : 42.13 (03/14/23 1542)  Mobility Inpatient CMS 0-100% Score: 50.57 (03/14/23 1542)  Mobility Inpatient CMS G-Code Modifier : CK (03/14/23 1542)          Tinneti Score       Goals  Short Term Goals  Time Frame for Short Term Goals: 5 days  Short Term Goal 1: Pt to demo IND bed mobility  Short Term Goal 2: Pt to perform SBA for transfers with device. Short Term Goal 3: Pt to amb 76' with device, SBA. Short Term Goal 4: Pt to improve BLE strength by 1/2 MMG. Short Term Goal 5: Pt to improve stanading balance to GOOD- to reduce fall risk. Patient Goals   Patient Goals : To go home       Education  Patient Education  Education Given To: Patient  Education Provided: Role of Therapy;Plan of Care;Precautions;Transfer Training; Fall Prevention Strategies  Education Method: Demonstration;Verbal  Barriers to Learning: Cognition  Education Outcome: Continued education needed      Therapy Time   Individual Concurrent Group Co-treatment   Time In 1400         Time Out 1422         Minutes Brandan Vaughan8, PT

## 2023-03-14 NOTE — ED NOTES
Admission Dx: LAURO    Pts Chief Complaints on Arrival: S/P FALL    ADL's - Partial assistance    Pending Diagnostics:  N/A    Residence PTA: single story home    Special Considerations/Circumstances:  N/A    Vitals: Current vital signs:  BP (!) 109/43   Pulse 55   Temp 98.1 °F (36.7 °C) (Oral)   Resp 16   Ht 5' 11\" (1.803 m)   Wt 150 lb (68 kg)   SpO2 91%   BMI 20.92 kg/m²                MEWS Score: 2911 Michelle Penaloza RN  03/13/23 3190

## 2023-03-14 NOTE — ED NOTES
Voided 200 ml tea colored urine and urine specimen to lab     Olivia De La Rosa RN  03/13/23 2032

## 2023-03-14 NOTE — DISCHARGE INSTR - COC
Continuity of Care Form    Patient Name: Roxy Santana   :    MRN:  [id number]    Admit date:  3/13/2023  Discharge date:  ***    Code Status Order: Prior   Advance Directives:     Admitting Physician:  Aryan Hernandez DO  PCP: Zane Stringer DO    Discharging Nurse: [facility]/Room#: 2111/2111-01  Discharging Unit Phone Number: ***    Emergency Contact:   Extended Emergency Contact Information  Primary Emergency Contact: [place], [address] [place] [address] Phone: [phone]  Mobile Phone: [phone]  Relation: Spouse  Secondary Emergency Contact: Carlene Degroot  Address: [place] [address]  [address] [facility],[address] Phone: [phone]  Mobile Phone: [phone]  Relation: Child   needed?  No    Past Surgical History:  Past Surgical History:   Procedure Laterality Date    CAROTID ENDARTERECTOMY Left 2022    LEFT TYPE I EVERSION LEFT CAROTID ENDARTERECTOMY RE-IMPLANTATION LEFT ICA performed by Suzanne Calle MD at [facility], LAPAROSCOPIC N/A 10/9/2021    CHOLECYSTECTOMY LAPAROSCOPIC ROBOTIC XI performed by Gerald Morrow MD at [address] Left 2021    COLONOSCOPY POLYPECTOMY SNARE/COLD BIOPSY performed by Myron Goldstein MD at [address] Right 10/6/2021    CYSTOSCOPY PYELOGRAM URETERAL STENT INSERTION performed by Kenn Quintana MD at [address] Bilateral 2021    CYSTOSCOPY URETERAL [address] performed by Kenn Quintana MD at [address] Bilateral 2022    CYSTOSCOPY WITH BILATERAL STENT EXCHANGE performed by Kenn Quintana MD at [address] N/A 2022    CYSTOSCOPY BILATERAL STENT EXCHANGE performed by Kenn Quintana MD at [address] Bilateral 2022    CYSTOSCOPY URETERAL STENT EXCHANGE performed by Kenn Quintana MD at [address]  EYE SURGERY      cataracts    HERNIA REPAIR      IR BIOPSY ABDOMINAL MASS  7/6/2021    IR BIOPSY ABDOMINAL MASS 7/6/2021 [facility] PROCEDURES    IR PORT PLACEMENT EQUAL OR GREATER THAN 5 YEARS  8/31/2021    IR PORT PLACEMENT EQUAL OR GREATER THAN 5 YEARS 8/31/2021 [facility] SPECIAL PROCEDURES    TONSILLECTOMY      as child    UPPER GASTROINTESTINAL ENDOSCOPY N/A 9/29/2022    EGD BIOPSY performed by Irene Vail MD at [facility]       Immunization History:   Immunization History   Administered Date(s) Administered    Influenza, Georgeana Rhyme (age 10 mo+) AND AFLURIA, (age 1 y+), PF, 0.5mL 10/12/2021    Influenza, Triv, inactivated, subunit, adjuvanted, IM (Fluad 65 yrs and older) 09/28/2019       Active Problems:  Patient Active Problem List   Diagnosis Code    NHL (non-Hodgkin's lymphoma) ([facility] C85.90    Traumatic retroperitoneal hematoma S36.892A    Acute kidney injury ([facility] N67.9    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    Hydronephrosis due to obstruction of ureter N13.1    Moderate malnutrition ([address] E44.0    Chronic cholecystitis K81.1    Pleural effusion J90    Cardiomegaly I51.7    Hypothyroidism E03.9    Diabetes mellitus ([address] E11.9    Hypertensive urgency I16.0    Acute respiratory failure with hypoxia ([facility]) J96.01    Hypoxia R09.02    Carotid stenosis, asymptomatic, bilateral I65.23    Simple chronic bronchitis (HCC) J41.0    Dependence on nocturnal oxygen therapy Z99.81    Pneumonia J18.9    LAURO (acute kidney injury) ([facility] N17.9    Failure to thrive in adult R62.7    DVT (deep venous thrombosis) ([facility]) I82.409    Lymphoma, diffuse (HCC) C85.90    Severe malnutrition (HCC) E43    Acute cystitis with hematuria N30.01    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    Atrial fibrillation with RVR ([facility]) I48.91       Isolation/Infection:   Isolation            C Diff Contact  Contact          Patient Infection Status       Infection Onset Added Last Indicated Last Indicated By Review Planned Expiration Resolved Resolved By    C-diff Rule Out 03/13/23 03/13/23 03/13/23 C DIFF TOXIN/ANTIGEN (Ordered) 03/20/23 03/23/23      MRSA 09/26/22 09/27/22 09/26/22 MRSA DNA Probe, Nasal        Resolved    COVID-19 (Rule Out) 03/13/23 03/13/23 03/13/23 COVID-19 & Influenza Combo (Ordered)   03/13/23 Rule-Out Test Resulted    COVID-19 (Rule Out) 09/26/22 09/26/22 09/26/22 COVID-19 & Influenza Combo (Ordered)   09/26/22 Rule-Out Test Resulted    COVID-19 (Rule Out) 08/31/22 08/31/22 08/31/22 COVID-19, Rapid (Ordered)   08/31/22 Rule-Out Test Resulted    COVID-19 (Rule Out) 02/16/22 02/16/22 02/16/22 COVID-19 & Influenza Combo (Ordered)   02/17/22 Rule-Out Test Resulted    COVID-19 (Rule Out) 01/28/22 01/28/22 01/28/22 COVID-19, Rapid (Ordered)   01/28/22 Rule-Out Test Resulted            Nurse Assessment:  Last Vital Signs: BP (!) 128/55   Pulse 51   Temp 98.4 °F (36.9 °C) (Oral)   Resp 18   Ht 5' 11\" (1.803 m)   Wt 151 lb 14.4 oz (68.9 kg)   SpO2 94%   BMI 21.19 kg/m²     Last documented pain score (0-10 scale):    Last Weight:   Wt Readings from Last 1 Encounters:   03/13/23 151 lb 14.4 oz (68.9 kg)     Mental Status:  {IP PT MENTAL STATUS:46293}    IV Access:  {[facility] IV ACCESS:871790122}    Nursing Mobility/ADLs:  Walking   {CHP DME TSIX:217017632}  Transfer  {CHP DME SQZO:512038418}  Bathing  {CHP DME VGYX:588336837}  Dressing  {CHP DME DXST:499912792}  Toileting  {CHP DME TJRC:835319118}  Feeding  {CHP DME ZIWC:472292996}  Med Admin  {CHP DME [facility]:238319921}  Med Delivery   { TANIA MED Delivery:860786704}    Wound Care Documentation and Therapy:  Wound 03/13/23 Brachial Anterior;Right Skin Tear (Active)   Wound Etiology Traumatic 03/14/23 0848   Dressing Status New dressing applied 03/14/23 0848   Wound Cleansed Cleansed with saline 03/14/23 0848   Dressing/Treatment Dry dressing; Foam 03/14/23 0848   Wound Assessment Bleeding;Pink/red 03/14/23 0848   Drainage Amount Small 03/14/23 0848   Drainage Description Sanguinous 03/14/23 0848   Number of days: 0       Wound 03/13/23 Hand Anterior;Right (Active)   Wound Etiology Skin Tear 03/14/23 0848   Dressing Status New dressing applied 03/14/23 0848   Wound Cleansed Cleansed with saline 03/14/23 0848   Dressing/Treatment Dry dressing 03/14/23 0848   Wound Assessment Epithelialization 03/14/23 0848   Number of days: 0        Elimination:  Continence: Bowel: {YES / TF:23124}  Bladder: {YES / HAMILTON:18074}  Urinary Catheter: {Urinary Catheter:683254298}   Colostomy/Ileostomy/Ileal Conduit: {YES / ZE:34006}       Date of Last BM: ***    Intake/Output Summary (Last 24 hours) at 3/14/2023 0851  Last data filed at 3/14/2023 0832  Gross per 24 hour   Intake 1937.13 ml   Output 725 ml   Net 1212.13 ml     I/O last 3 completed shifts: In: 1937.1 [I.V.:1000; IV Piggyback:937.1]  Out: 625 [Urine:625]    Safety Concerns:     [address] Safety Concerns:773451805}    Impairments/Disabilities:      [address] Impairments/Disabilities:423884696}    Nutrition Therapy:  Current Nutrition Therapy:   [address] Diet List:740604165}    Routes of Feeding: {CHP DME Other Feedings:373190492}  Liquids: {Slp liquid thickness:23848}  Daily Fluid Restriction: {CHP DME Yes amt example:300792237}  Last Modified Barium Swallow with Video (Video Swallowing Test): {Done Not Done BZUN:220986343}    Treatments at the Time of Hospital Discharge:   Respiratory Treatments: ***  Oxygen Therapy:  {Therapy; copd oxygen:53097}  Ventilator:    {MH CC Vent YLFU:193063856}    Rehab Therapies: Physical Therapy and Occupational Therapy  Weight Bearing Status/Restrictions: No weight bearing restrictions  Other Medical Equipment (for information only, NOT a DME order):  walker, Cane,WC  Other Treatments: Skilled Nursing assessment and monitoring. Medication education and monitoring per protocol.       Patient's personal belongings (please select all that are sent with patient):  {CHP DME Belongings:655935380}    RN SIGNATURE:  {Esignature:312930607}    CASE MANAGEMENT/SOCIAL WORK SECTION    Inpatient Status Date: ***    Readmission Risk Assessment Score:  Readmission Risk              Risk of Unplanned Readmission:  40           Discharging to Facility/ [address]  [email or web address] [address]  Phone [phone]  Fax  [phone]     Dialysis Facility (if applicable)   Name:  Address:  Dialysis Schedule:  Phone:  Fax:    / signature: Electronically signed by Susan Greer RN on 3/14/23 at 9:33 AM EDT    PHYSICIAN SECTION    Prognosis: Fair    Condition at Discharge: Stable    Rehab Potential (if transferring to Rehab): Fair    Recommended Labs or Other Treatments After Discharge: cbcd bmp in 7ds  Apply Neosporin and wash the wounds for any infection    Physician Certification: I certify the above information and transfer of Kim Miller  is necessary for the continuing treatment of the diagnosis listed and that he requires [address] for greater 30 days.      Update Admission H&P: No change in H&P    PHYSICIAN SIGNATURE:  Electronically signed by Júnior Tarango DO on 3/14/23 at 8:51 AM EDT

## 2023-03-14 NOTE — ACP (ADVANCE CARE PLANNING)
Advance Care Planning     Advance Care Planning Activator (Inpatient)  Conversation Note      Date of ACP Conversation: 3/14/2023     Conversation Conducted with: Patient with Decision Making Capacity    ACP Activator: Elicia Lozada RN        Health Care Decision Maker:     Current Designated Health Care Decision Maker:     Primary Decision Maker: Clemente Fregoso - [phone]    Secondary Decision Maker: Jaime Espinoza - [phone]        Care Preferences    Ventilation: \"If you were in your present state of health and suddenly became very ill and were unable to breathe on your own, what would your preference be about the use of a ventilator (breathing machine) if it were available to you? \"      Would the patient desire the use of ventilator (breathing machine)?: yes    \"If your health worsens and it becomes clear that your chance of recovery is unlikely, what would your preference be about the use of a ventilator (breathing machine) if it were available to you? \"     Would the patient desire the use of ventilator (breathing machine)?: No      Resuscitation  \"CPR works best to restart the heart when there is a sudden event, like a heart attack, in someone who is otherwise healthy. Unfortunately, CPR does not typically restart the heart for people who have serious health conditions or who are very sick. \"    \"In the event your heart stopped as a result of an underlying serious health condition, would you want attempts to be made to restart your heart (answer \"yes\" for attempt to resuscitate) or would you prefer a natural death (answer \"no\" for do not attempt to resuscitate)? \" yes       [] Yes   [] No   Educated Patient / [place] regarding differences between Advance Directives and portable DNR orders.     Length of ACP Conversation in minutes:      Conversation Outcomes:  ACP discussion completed    Follow-up plan:    [] Schedule follow-up conversation to continue planning  [] Referred individual to Provider for additional questions/concerns   [] Advised patient/agent/surrogate to review completed ACP document and update if needed with changes in condition, patient preferences or care setting    [] This note routed to one or more involved healthcare providers

## 2023-03-14 NOTE — H&P
Dr. Archie Paez        History and Physical Examination   Admission Note    CHIEF COMPLAINT:   Chief Complaint   Patient presents with    Fall    Cough       Reason for Admission: Fall mechanical    History Obtained From:  Patient     HISTORY OF PRESENT ILLNESS:      The patient is a pleasant 74 y.o. male with significant medical history of lymphoma COPD presented with after mechanical fall at home he said he got up he just tripped fell like lost balance but no syncopal episode patient hit his head. There is some skin tear was present in the emergency room. He was found to have some renal insufficiency from his baseline. His creatinine was 1.6 normal for him in the 1.4 he does have chronic hydro second to his lymphoma mass in the abdomen which is chronic. Patient did not lose consciousness he feels okay this morning he feels okay to go home but he has had some episodes of diarrhea and some vomiting but not much she states. He had some virus going on at his home but looks like other family members at the same he has not had any more diarrhea he is tolerating diet he did eat breakfast he did not like it but he ate it with no vomiting and he has been feeling okay no chest pain or shortness of breath. He seen his hematologist about a month ago and said he was told things were okay patient likes to go home if he can. Past Medical History:    Past Medical History:   Diagnosis Date    Arthritis     Cancer [facility])     chemotherapy lymphoma last was april 2022    Chemotherapy management, encounter for     CHF (congestive heart failure) ([facility] [address]     COPD (chronic obstructive pulmonary disease) ([facility] [address]     Dementia ([facility] [address]     Diabetes mellitus ([facility] [address]     H/O cardiovascular stress test     History of non-Hodgkin's lymphoma     Hx of blood clots     DVT in right leg     Hyperlipidemia     Hypertension     Hypothyroidism     On home O2     at 5 liters per nasal cannula 24 hrs. per day.      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    Hydronephrosis due to obstruction of ureter N13.1    Moderate malnutrition (HCC) E44.0    Chronic cholecystitis K81.1    Pleural effusion J90    Cardiomegaly I51.7    Hypothyroidism E03.9    Diabetes mellitus ([address] E11.9    Hypertensive urgency I16.0    Acute respiratory failure with hypoxia (HCC) J96.01    Hypoxia R09.02    Carotid stenosis, asymptomatic, bilateral I65.23    Simple chronic bronchitis (HCC) J41.0    Dependence on nocturnal oxygen therapy Z99.81    Pneumonia J18.9    LAURO (acute kidney injury) ([facility] [address] N17.9    Failure to thrive in adult R62.7    DVT (deep venous thrombosis) (HCC) I82.409    Lymphoma, diffuse (HCC) C85.90    Severe malnutrition (HCC) E43    Acute cystitis with hematuria N30.01    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    Atrial fibrillation with RVR ([facility] [address] I48.91       Past Surgical History:       Past Surgical History:   Procedure Laterality Date    CAROTID ENDARTERECTOMY Left 6/28/2022    LEFT TYPE I EVERSION LEFT CAROTID ENDARTERECTOMY RE-IMPLANTATION LEFT ICA performed by Fredy Greer MD at [address] [facility], LAPAROSCOPIC N/A 10/9/2021    CHOLECYSTECTOMY LAPAROSCOPIC ROBOTIC XI performed by Chelle Huertas MD at [address] Left 9/24/2021    COLONOSCOPY POLYPECTOMY SNARE/COLD BIOPSY performed by Anita Louie MD at [address] [place] [address] Right 10/6/2021    CYSTOSCOPY PYELOGRAM URETERAL STENT INSERTION performed by Rustam Beltre MD at [address] [place] [address] Bilateral 11/24/2021    CYSTOSCOPY URETERAL STENT INSERTION RIGHT EXCHANGE & RETROGRADE PYELOGRAM LEFT INSERTION performed by Rustam Beltre MD at [address] [place] [address] Bilateral 2/16/2022    CYSTOSCOPY WITH BILATERAL STENT EXCHANGE performed by Rustam Beltre MD at [address] [facility] OR    CYSTOSCOPY N/A 7/20/2022    CYSTOSCOPY BILATERAL STENT EXCHANGE performed by Michael Flores MD at [address] Bilateral 12/1/2022    CYSTOSCOPY URETERAL STENT EXCHANGE performed by Michael Flores MD at [address]      IR BIOPSY ABDOMINAL MASS  7/6/2021    IR BIOPSY ABDOMINAL MASS 7/6/2021 [facility]    IR PORT PLACEMENT EQUAL OR GREATER THAN 5 YEARS  8/31/2021    IR PORT PLACEMENT EQUAL OR GREATER THAN 5 YEARS 8/31/2021 [facility]    TONSILLECTOMY      as child    UPPER GASTROINTESTINAL ENDOSCOPY N/A 9/29/2022    EGD BIOPSY performed by Yakov Bradley MD at [address] [facility] ENDO       Current Medications:    Current Facility-Administered Medications   Medication Dose Route Frequency Provider Last Rate Last Admin    0.9 % sodium chloride infusion   IntraVENous Continuous Roderick T Dixon, DO 75 mL/hr at 03/13/23 2231 75 mL/hr at 03/13/23 2231    FLUoxetine (PROZAC) capsule 20 mg  20 mg Oral Daily Roderick T Dixon, DO        donepezil (ARICEPT) tablet 10 mg  10 mg Oral Daily Roedrick T Dixon, DO        losartan (COZAAR) tablet 50 mg  50 mg Oral Daily Roderick T Dixon, DO        albuterol sulfate HFA (PROVENTIL;VENTOLIN;PROAIR) 108 (90 Base) MCG/ACT inhaler 2 puff  2 puff Inhalation Q6H PRN Roderick T Dixon, DO        levothyroxine (SYNTHROID) tablet 150 mcg  150 mcg Oral Daily Roderick T Dixon, DO        ipratropium-albuterol (DUONEB) nebulizer solution 3 mL  1 vial Inhalation Q4H PRN Ricky Pacheco, DO   3 mL at 03/13/23 2353    lactobacillus (CULTURELLE) capsule 1 capsule  1 capsule Oral Daily Roderick T Dixon, DO        therapeutic multivitamin-minerals 1 tablet  1 tablet Oral Daily Roderick T Dixon, DO        apixaban (ELIQUIS) tablet 5 mg  5 mg Oral BID Roderick T Dixon, DO   5 mg at 03/13/23 2335    atorvastatin (LIPITOR) tablet 10 mg  10 mg Oral Daily Roderick T Dixon, DO        ferrous sulfate (IRON 325) tablet 325 mg  325 mg Oral TID WC Roderick T Dixon, DO carvedilol (COREG) tablet 6.25 mg  6.25 mg Oral BID WC Roderick T Dixon, DO        magnesium oxide (MAG-OX) tablet 400 mg  400 mg Oral Daily Roderick T Dixon, DO        prochlorperazine (COMPAZINE) tablet 10 mg   10 mg Oral Q6H PRN Roderick T Dixon, DO        ibrutinib (IMBRUVICA) chemo tablet 420 mg  420 mg Oral Daily Roderick T Dixon, DO        famotidine (PEPCID) 40 MG/5ML suspension 20 mg  20 mg Oral BID Roderick T Dixon, DO        lidocaine-prilocaine (EMLA) cream   Topical PRN Roderick T Dixon, DO        tamsulosin (FLOMAX) capsule 0.8 mg  0.8 mg Oral Nightly Roderick T Dixon, DO   0.8 mg at 03/13/23 2335    amLODIPine (NORVASC) tablet 10 mg  10 mg Oral Daily Roderick T Dixon, DO        diphenoxylate-atropine (LOMOTIL) 2.5-0.025 MG per tablet 1 tablet  1 tablet Oral 4x Daily PRN Roderick T Dixon, DO        furosemide (LASIX) tablet 40 mg  40 mg Oral BID Roderick T Dixon, DO        pantoprazole (PROTONIX) tablet 40 mg  40 mg Oral QAM AC Roderick T Dixon, DO   40 mg at 03/14/23 0558    sucralfate (CARAFATE) tablet 1 g  1 g Oral 4 times per day Roderick T Dixon, DO   1 g at 03/14/23 0557    budesonide-formoterol (SYMBICORT) 80-4.5 MCG/ACT inhaler 2 puff  2 puff Inhalation BID Roderick T Dixon, DO           Allergies:  Patient has no known allergies. Social History:    reports that he quit smoking about 17 years ago. His smoking use included cigarettes. He has a 90.00 pack-year smoking history. He has never used smokeless tobacco. He reports current alcohol use of about 1.7 standard drinks per week. He reports current drug use. Drug: Marijuana [place]).     Family History:   Family History   Problem Relation Age of Onset    Diabetes Mother     Alzheimer's Disease Father     Heart Disease Brother     Heart Disease Brother        REVIEW OF SYSTEMS:  RESPIRATORY:  negative for  dry cough, dyspnea, wheezing and chest pain positive for  dyspnea COPD  CARDIOVASCULAR:  negative for  chest pain, dyspnea, palpitations, orthopnea, exertional chest pressure/discomfort, fatigue, edema, syncope positive for  htn A-fib  GASTROINTESTINAL:  negative for nausea, vomiting, change in bowel habits, diarrhea, constipation, abdominal pain and reflux positive for   GENITOURINARY:  negative for frequency, dysuria, nocturia, urinary incontinence and hesitancy positive for utis hydro  HEMATOLOGIC/LYMPHATIC:  negative for easy bruising, bleeding and swelling/edemapositive for lymphoma  ENDOCRINE:  negative for weight changes, change in bowel habits and diabetic symptoms including neither polyuria nor polydipsia nor blurred vision nor foot ulcerations nor neuropathypositive for dm  MUSCULOSKELETAL:  negative for  myalgias, arthralgias, pain, joint swelling, stiff joints and muscle weakness positive for  djd wekaness  NEUROLOGICAL:  negative for headaches, dizziness, memory problems, speech problems, visual disturbance, gait problems, weakness and numbness positive for as above diemtia?     Vitals:  BP (!) 128/55   Pulse 51   Temp 98.4 °F (36.9 °C) (Oral)   Resp 18   Ht 5' 11\" (1.803 m)   Wt 151 lb 14.4 oz (68.9 kg)   SpO2 94%   BMI 21.19 kg/m²     PHYSICAL EXAM:    CONSTITUTIONAL:  awake, alert, cooperative, no apparent distress, and appears stated age  EYES:  Lids and lashes normal, pupils equal, round and reactive to light, extra ocular muscles intact, sclera clear, conjunctiva normal bruise on his right upper eyelid but no tenderness CT was negative  ENT:  Normocephalic, without obvious abnormality, atraumatic, sinuses nontender on palpation, external ears without lesions, oral pharynx with moist mucus membranes, tonsils without erythema or exudates,    NECK:  Supple, symmetrical, trachea midline, no adenopathy, thyroid symmetric, not enlarged and no tenderness, skin normal    HEMATOLOGIC/LYMPHATICS:  no cervical lymphadenopathy   BACK:  Symmetric, no curvature, spinous processes are non-tender on palpation, paraspinous muscles are non-tender on palpation, no costal vertebral tenderness    LUNGS:  No increased work of breathing, good air exchange, clear to auscultation bilaterally, no crackles or wheezing   CARDIOVASCULAR:  Normal apical impulse, regular rate and rhythm, normal S1 and S2, no S3 or S4, and no murmur noted     ABDOMEN:  No scars, normal bowel sounds, soft, non-distended, non-tender, no masses palpated, no hepatosplenomegally    GENITAL/URINARY:  na  MUSCULOSKELETAL:  There is no redness, warmth, or swelling of the joints. Full range of motion noted. Motor strength is 5 out of 5 all extremities bilaterally. Tone is normal.  His right forearm and arm skin tear and bruising from his fall no active bleeding no infection so far  NEUROLOGIC:  Awake, alert, oriented to name, place and time. Cranial nerves II-XII are grossly intact. Motor is 5 out of 5 bilaterally.     Sensory is intact. gait is normal.       SKIN:  no bruising or bleeding, normal skin color, texture, turgor, no redness, warmth, or swelling and no rashes     RECENT DATA:  No results found for: CBC, BMP      Time Lg Kyle Orders]     Results     Component Value Units   POC Glucose Fingerstick [[id number]]    Collected: 03/14/23 0640    Updated: 03/14/23 0651     POC Glucose 85 mg/dL   CBC with Auto Differential [[id number]] (Abnormal)    Collected: 03/14/23 0542    Updated: 03/14/23 0629    Specimen Source: Blood     WBC 5.1 k/uL    RBC 3.16 Low  m/uL    Hemoglobin 9.0 Low  g/dL    Hematocrit 27.0 Low  %    MCV 85.4 fL    MCH 28.4 pg    MCHC 33.3 g/dL    RDW 14.6 %    Platelets 647 Low  k/uL    MPV 8.9 fL    Seg Neutrophils 63 %    Lymphocytes 22 Low  %    Monocytes 14 High  %    Eosinophils % 1 %    Basophils 0 %    Segs Absolute 3.22 k/uL    Absolute Lymph # 1.12 k/uL    Absolute Mono # 0.71 k/uL    Absolute Eos # 0.05 k/uL    Basophils Absolute 0.00 k/uL    Morphology ANISOCYTOSIS PRESENT    Morphology ELLIPTOCYTES    Morphology FEW POLYCHROMASIA   Basic Metabolic Panel [[id number]] (Abnormal)    Collected: 03/14/23 0542    Updated: 03/14/23 0606    Specimen Source: Blood     Glucose 102 High  mg/dL    BUN 39 High  mg/dL    Creatinine 1.52 High  mg/dL    EstSanta Filt Rate 48 Low  mL/min/1.73m2    Comment:        These results are not intended for use in patients <25years of age. eGFR results are calculated without a race factor using the 2021 CKD-EPI equation. Careful clinical correlation is recommended, particularly when comparing to results   calculated using previous equations. The CKD-EPI equation is less accurate in patients with extremes of muscle mass, extra-renal   metabolism of creatine, excessive creatine ingestion, or following therapy that affects   renal tubular secretion. Calcium 8.1 Low  mg/dL    Sodium 139 mmol/L    Potassium 5.2 mmol/L    Chloride 106 mmol/L    CO2 25 mmol/L    Anion Gap 8 Low  mmol/L   Troponin [[id number]] (Abnormal)    Collected: 03/14/23 0542    Updated: 03/14/23 0605    Specimen Source: Blood     Troponin, High Sensitivity 113 High Panic   ng/L    Comment: High Sensitivity Troponin values cannot be compared with other Troponin methodologies.       Culture, Urine [[id number]]    Collected: 03/13/23 2046    Updated: 03/13/23 2218    Specimen Source: Urine, clean catch    URINE DRUG SCREEN [[id number]] (Abnormal)    Collected: 03/13/23 2034    Updated: 03/13/23 2145    Specimen Source: Urine     Amphetamine Screen, Ur NEGATIVE    Comment:        (Positive cutoff 1000 ng/mL)                    Barbiturate Screen, Ur NEGATIVE    Comment:        (Positive cutoff 200 ng/mL)                    Benzodiazepine Screen, Urine NEGATIVE    Comment:        (Positive cutoff 200 ng/mL)                    Cocaine Metabolite, Urine NEGATIVE    Comment:        (Positive cutoff 300 ng/mL)                    Methadone Screen, Urine NEGATIVE    Comment:        (Positive cutoff 300 ng/mL)                    Opiates, Urine NEGATIVE    Comment:        (Positive cutoff 300 ng/mL)                    Phencyclidine, Urine NEGATIVE    Comment:        (Positive cutoff 25 ng/mL)                    Cannabinoid Scrn, Ur POSITIVE Abnormal     Comment:        (Positive cutoff 50 ng/mL)                    Oxycodone Screen, Ur NEGATIVE    Comment:        (Positive cutoff 100 ng/mL)                    Fentanyl, Ur NEGATIVE    Comment:        (Positive cutoff  5 ng/ml)              Test Information Assay provides medical screening only. The absence of expected drug(s) and/or metabolite(s) may indicate diluted or adulterated urine, limitations of testing or timing of collection. Comment: Testing for legal purposes should be confirmed by another method. To request confirmation   of test result, please call the lab within 7 days of sample submission. Troponin [[id number]] (Abnormal)    Collected: 03/13/23 2045    Updated: 03/13/23 2115    Specimen Source: Blood     Troponin, High Sensitivity 108 High Panic   ng/L    Comment: High Sensitivity Troponin values cannot be compared with other Troponin methodologies.       Urinalysis with Reflex to Culture [[id number]] (Abnormal)    Collected: 03/13/23 2034    Updated: 03/13/23 2046    Specimen Source: Urine voided     Color, UA Orange Abnormal     Turbidity UA Cloudy Abnormal     Glucose, Ur NEGATIVE    Bilirubin Urine NEGATIVE    Ketones, Urine NEGATIVE    Specific [place], UA 1.011    Urine Hgb LARGE Abnormal     pH, UA 5.5    Protein, UA 2+ Abnormal     Urobilinogen, Urine Normal    Nitrite, Urine NEGATIVE    Leukocyte Esterase, Urine MOD Abnormal    Microscopic Urinalysis [[id number]]    Collected: 03/13/23 2034    Updated: 03/13/23 2046     WBC, UA 10 TO 20 /HPF    RBC, UA TOO NUMEROUS TO COUNT /HPF    Casts UA 3 to 5 /LPF    Epithelial Cells UA 0 TO 2 /HPF    Bacteria, UA None   CBC with Auto Differential [[id number]] (Abnormal)    Collected: 03/13/23 1737    Updated: 03/13/23 1850    Specimen Source: Blood     WBC 6.3 k/uL    RBC 3.12 Low  m/uL    Hemoglobin 9.0 Low  g/dL    Hematocrit 26.5 Low  %    MCV 84.8 fL    MCH 28.8 pg    MCHC 34.0 g/dL    RDW 14.7 %    Platelets 539 Low  k/uL    MPV 9.0 fL    Seg Neutrophils 70 High  %    Lymphocytes 21 Low  %    Monocytes 9 High  %    Eosinophils % 0 %    Basophils 0 %    Segs Absolute 4.41 k/uL    Absolute Lymph # 1.32 k/uL    Absolute Mono # 0.57 k/uL    Absolute Eos # 0.00 k/uL    Basophils Absolute 0.00 k/uL    Morphology ANISOCYTOSIS PRESENT    Morphology 1+ ELLIPTOCYTES    Morphology 1+ TEARDROPS   CT HEAD WO CONTRAST [[id number]]    Collected: 03/13/23 1818    Updated: 03/13/23 1839    Narrative:     EXAMINATION:   CT OF THE HEAD WITHOUT CONTRAST  3/13/2023 6:01 pm     TECHNIQUE:   CT of the head was performed without the administration of intravenous   contrast. Automated exposure control, iterative reconstruction, and/or weight   based adjustment of the mA/kV was utilized to reduce the radiation dose to as   low as reasonably achievable. COMPARISON:   07/05/2021, 12/15/2019     HISTORY:   ORDERING SYSTEM PROVIDED HISTORY: fall   TECHNOLOGIST PROVIDED HISTORY:   fall     Decision Support Exception - unselect if not a suspected or confirmed   emergency medical condition->Emergency Medical Condition (MA)   Reason for Exam: fall   Additional signs and symptoms: patient states he fell this afternoon and   \"whole body hurts\" states did not hit head, no headaches     FINDINGS:   BRAIN/VENTRICLES: There is no acute intracranial hemorrhage, mass effect or   midline shift. No abnormal extra-axial fluid collection. The gray-white   differentiation is maintained without evidence of an acute infarct. There is   no evidence of hydrocephalus. There is mild generalized cortical atrophy. Moderate chronic microvascular   ischemic changes. No new foci of abnormal attenuation within the brain. ORBITS: The visualized portion of the orbits demonstrate no acute abnormality.      SINUSES: Prominent mucosal thickening involving the bilateral maxillary   sinuses with small fluid levels. Partial opacification also noted involving   the bilateral ethmoid sinuses. There is a left mastoid effusion. Left   frontal sinus osteoma, stable. SOFT TISSUES/SKULL:  No acute abnormality of the visualized skull or soft   tissues. Impression:     No acute intracranial abnormality. Moderate atrophy and chronic microvascular ischemic changes. Bilateral maxillary and ethmoid sinusitis. Nonspecific left mastoid effusion. XR CHEST PORTABLE [[id number]]    Collected: 03/13/23 1741    Updated: 03/13/23 1811    Narrative:     EXAMINATION:   ONE XRAY VIEW OF THE CHEST     3/13/2023 2:39 pm     COMPARISON:   09/20/2022     HISTORY:   ORDERING SYSTEM PROVIDED HISTORY: cough, copd   TECHNOLOGIST PROVIDED HISTORY:   cough, copd   Reason for Exam: pt states he has had a cough for a long time. FINDINGS:   Cardial pericardial silhouette is unremarkable. Lungs are clear. No   pneumothorax is seen. No free air. No acute bony abnormality. Eloy   catheter unchanged in position. Impression:     No acute abnormality detected. Lipase [[id number]] (Abnormal)    Collected: 03/13/23 1737    Updated: 03/13/23 1809    Specimen Source: Blood     Lipase 73 High  U/L   Comprehensive Metabolic Panel w/ Reflex to MG [[id number]] (Abnormal)    Collected: 03/13/23 1737    Updated: 03/13/23 1809    Specimen Source: Blood     Glucose 118 High  mg/dL    BUN 44 High  mg/dL    Creatinine 1.62 High  mg/dL    Est, Glom Filt Rate 44 Low  mL/min/1.73m2    Comment:        These results are not intended for use in patients <25years of age. eGFR results are calculated without a race factor using the 2021 CKD-EPI equation. Careful clinical correlation is recommended, particularly when comparing to results   calculated using previous equations.    The CKD-EPI equation is less accurate in patients with extremes of muscle mass, extra-renal   metabolism of creatine, excessive creatine ingestion, or following therapy that affects   renal tubular secretion. Calcium 8.1 Low  mg/dL    Sodium 138 mmol/L    Potassium 5.2 mmol/L    Chloride 103 mmol/L    CO2 25 mmol/L    Anion Gap 10 mmol/L    Alkaline Phosphatase 78 U/L    ALT 5 U/L    AST 10 U/L    Total Bilirubin 0.4 mg/dL    Total Protein 4.9 Low  g/dL    Albumin 2.7 Low  g/dL   Troponin [[id number]] (Abnormal)    Collected: 03/13/23 1737    Updated: 03/13/23 1809    Specimen Source: Blood     Troponin, High Sensitivity 118 High Panic   ng/L    Comment: High Sensitivity Troponin values cannot be compared with other Troponin methodologies. EKG 12 Lead [[id number]]    Collected: 03/13/23 1748    Updated: 03/13/23 1750     Ventricular Rate 49 BPM    Atrial Rate 49 BPM    P-R Interval 138 ms    QRS Duration 96 ms    Q-T Interval 448 ms    QTc Calculation (Bazett) 404 ms    P Axis 73 degrees    R Axis 28 degrees    T Axis 32 degrees   Narrative:     Sinus bradycardia   Possible Inferior infarct , age undetermined   Abnormal ECG   When compared with ECG of 27-SEP-2022 08:36,   Vent. rate has decreased BY  26 BPM   Borderline criteria for Inferior infarct are now Present   COVID-19 & Influenza Combo [[id number]]    Collected: 03/13/23 1706    Updated: 03/13/23 1746    Specimen Source: Nasopharyngeal Swab     Specimen Description . NASOPHARYNGEAL SWAB    Source . NASOPHARYNGEAL SWAB    SARS-CoV-2 RNA, RT PCR Not Detected    Comment:        Testing was performed using [employer]egin SARS-CoV-2 and Influenza A/B nucleic acid assay. This test is a multiplex Real-Time Reverse Transcriptase Polymerase Chain Reaction   (RT-PCR)-based in vitro diagnostic test intended for the qualitative detection of nucleic   acids from SARS-CoV-2,   influenza A, and influenza B in nasopharyngeal and nasal swab specimens for use under the   FDA's Emergency Use Authorization (EUA) only.          Not Detected results do not preclude SARS-CoV-2 infection and should not be used as the sole    basis for patient management decisions. Negative results must be combined with clinical observations, patient history, and   epidemiological information.          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    Hydronephrosis due to obstruction of ureter N13.1    Moderate malnutrition (HCC) E44.0    Chronic cholecystitis K81.1    Pleural effusion J90    Cardiomegaly I51.7    Hypothyroidism E03.9    Diabetes mellitus ([facility] [address] E11.9    Hypertensive urgency I16.0    Acute respiratory failure with hypoxia ([facility]) J96.01    Hypoxia R09.02    Carotid stenosis, asymptomatic, bilateral I65.23    Simple chronic bronchitis (HCC) J41.0    Dependence on nocturnal oxygen therapy Z99.81    Pneumonia J18.9    LAURO (acute kidney injury) ([facility] [address] N17.9    Failure to thrive in adult R62.7    DVT (deep venous thrombosis) ([facility]) I82.409    Lymphoma, diffuse (HCC) C85.90    Severe malnutrition (HCC) E43    Acute cystitis with hematuria N30.01    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    Atrial fibrillation with RVR ([facility]) I48.91     Mechanical fall  Blunt head injury negative CT  Right forearm and arm skin tear and bruising  UTI awaiting cultures and sensitivity  History of hydro  History of lymphoma  Status post diarrhea  Acute kidney injury resolving with hydration to his baseline    PLAN:  We will get physical therapy evaluate patient if he is doing okay we will plan on discharge home    We will put him on Rocephin 1 g IV daily second to abnormal urine till culture is back in    We will see patient is okay we could plan this afternoon otherwise 1 more day. For the arrange things for him at home    We will arrange VNS for him.         Electronically signed by KRISTI Tomlinson on 3/14/2023 at 7:20 [address] [facility], [address]

## 2023-03-14 NOTE — CARE COORDINATION
Case Management Assessment  Initial Evaluation    Date/Time of Evaluation: 3/14/2023 9:25 AM  Assessment Completed by: Lyndon Brown RN    If patient is discharged prior to next notation, then this note serves as note for discharge by case management. Patient Name: Jose Livingston                   YOB: 1950  Diagnosis: Dehydration [E86.0]  Gastroenteritis [K52.9]  Elevated troponin [R77.8]  LAURO (acute kidney injury) [facility]) [N17.9]                   Date / Time: 3/13/2023  4:41 PM    Patient Admission Status: Inpatient   Readmission Risk (Low < 19, Mod (19-27), High > 27): Readmission Risk Score: 23.5    Current PCP: Manual Meckel, DO  PCP verified by CM? Yes    Chart Reviewed: Yes      History Provided by: Patient  Patient Orientation: Alert and Oriented    Patient Cognition: Alert    Hospitalization in the last 30 days (Readmission):  No    If yes, Readmission Assessment in CM Navigator will be completed.     Advance Directives:      Code Status: Prior   Patient's Primary Decision Maker is: Legal Next of Kin    Primary Decision Maker: Sarika Gresham - Spouse - [phone]    Secondary Decision Maker: Renetta Cook Child - [phone]    Discharge Planning:    Patient lives with: Spouse/Significant Other ([address] lives w/ him & wife) Type of Home: House  Primary Care Giver: Self  Patient Support Systems include: Spouse/Significant Other, Family Members (Cousin lives w/ him.)   Current Financial resources: Medicare  Current community resources: None  Current services prior to admission: Durable Medical Equipment            Current DME: Daljit Lindsey, Oxygen Therapy (Comment), Wheelchair, Fer Show (Wears 2-2.5 LNC, 24/7, Has Port/Concentrator, [employer], unsure of what company provides.)            Type of Home Care services:  None    ADLS  Prior functional level: Assistance with the following:, Bathing, Dressing, Toileting, Cooking, Housework, Shopping  Current functional level: Assistance with the following:, Bathing, Dressing, Toileting, Cooking, Housework, Shopping    PT AM-PAC:   /24  OT AM-PAC:   /24    Family can provide assistance at DC: Yes  Would you like Case Management to discuss the discharge plan with any other family members/significant others, and if so, who? No  Plans to Return to Present Housing: Yes  Other Identified Issues/Barriers to RETURNING to current housing: None  [facility] (Has [facility] to [facility] in Past.)            Potential DME:    Patient expects to discharge to: [address] [place] [address] for transportation at discharge:  ([address] [employer])    Financial    Payor: [employer] / Plan: [employer] / Product Type: *No Product type* /     Does insurance require precert for SNF: Yes    Potential assistance Purchasing Medications: No  Meds-to-Beds request:        [address] [id number] -  [address] [facility] Jay[address] [facility] [address]  Phone: [phone] Fax: [phone]    [address] [facility] Mail Delivery - [place], [place] - [address] [address] [facility] [phone] - f [phone]  [address] [facility] [address]  Phone: [phone] Fax: [phone]    [address] [facility], Mauricio More [phone] - F [phone]  [facility] [address] [place] [address]  Phone: [phone] Fax: [phone]    Sam Hobson [phone] - f [phone]  [address] [place] [address]  Phone: [phone] Fax: [phone]      Notes:    Factors facilitating achievement of predicted outcomes: Family support, Caregiver support, Cooperative, Pleasant, Has needed Durable Medical Equipment at home, and Home is wheelchair accessible    Barriers to discharge: None    Additional Case Management Notes: 3/14/23 [employer] Pt.  Lives At Home in 1 story home w/ Wife, who is currently in [place], [address] [facility] lives there as well, & assists w/ needs. DME- Walker, WC, Canes, SC, GB, Oxygen- Wears 2-2.5 LNC, 24/7, Has Port/Oxygen/Nebulizer, From [place]. Wants VNS- [facility] Accepted. Follows at [facility] [place], Oral Chemo for Non-Hodgkin's Lymphoma. Eliquis PTA, Has been to [facility] in Past, PT. IV Rocephin, Dahlia will need signed/completed//KB    The Plan for Transition of Care is related to the following treatment goals of Dehydration [E86.0]  Gastroenteritis [K52.9]  Elevated troponin [R77.8]  LAURO (acute kidney injury) ([facility] [address] [Q56.7]    IF APPLICABLE: The Patient and/or patient representative Angi Ken and his family were provided with a choice of provider and agrees with the discharge plan. Freedom of choice list with basic dialogue that supports the patient's individualized plan of care/goals and shares the quality data associated with the providers was provided to:     Patient Representative Name:       The Patient and/or Patient Representative Agree with the Discharge Plan?       Fiona Shultz RN  Case Management Department  Ph: [phone] Fax: [phone]

## 2023-03-14 NOTE — ED PROVIDER NOTES
ADDENDUM:        Care of this patient was assumed from [facility]  at   2101   . The patient was seen for Fall and Cough  . The patient's initial evaluation and plan have been discussed with the prior provider who initially evaluated the patient. Nursing Notes, Past Medical Hx, Past Surgical Hx, Social Hx, Allergies, and Family Hx were all reviewed. I performed a repeat evaluation of the patient and reviewed tests completed so far. Briefly 64-year-old has a history of non-Hodgkin's lymphoma, A-fib on anticoagulation, failure to thrive not eating or drinking much has norovirus in the family had a fall with skin tear    Found to have LAURO dehydration and elevated troponin    No active chest pain no EKG changes    ED Course        Second troponin downtrending    Ana discussed the case with Dr. Christiane Matamoros who accepts admission    The patient was given the following medications:  Orders Placed This Encounter   Medications    0.9 % sodium chloride bolus       RECENT VITALS:  BP: 115/70, Temp: 98.1 °F (36.7 °C), Heart Rate: 54, Resp: 16     RADIOLOGY:All plain film, CT, MRI, and formal ultrasound images (except ED bedside ultrasound) are read by the radiologist and the images and interpretations are directly viewed by the emergency physician. CT HEAD WO CONTRAST   Final Result   No acute intracranial abnormality. Moderate atrophy and chronic microvascular ischemic changes. Bilateral maxillary and ethmoid sinusitis. Nonspecific left mastoid effusion. XR CHEST PORTABLE   Final Result   No acute abnormality detected. LABS: All lab results were reviewed by myself, and all abnormals are listed below.   Labs Reviewed   CBC WITH AUTO DIFFERENTIAL - Abnormal; Notable for the following components:       Result Value    RBC 3.12 (*)     Hemoglobin 9.0 (*)     Hematocrit 26.5 (*)     Platelets 128 (*)     Seg Neutrophils 70 (*)     Lymphocytes 21 (*)     Monocytes 9 (*)     All other components within normal limits   COMPREHENSIVE METABOLIC PANEL W/ REFLEX TO MG FOR LOW K - Abnormal; Notable for the following components:    Glucose 118 (*)     BUN 44 (*)     Creatinine 1.62 (*)     Est, Glom Filt Rate 44 (*)     Calcium 8.1 (*)     Total Protein 4.9 (*)     Albumin 2.7 (*)     All other components within normal limits   LIPASE - Abnormal; Notable for the following components:    Lipase 73 (*)     All other components within normal limits   URINALYSIS WITH REFLEX TO CULTURE - Abnormal; Notable for the following components:    Color, UA Orange (*)     Turbidity UA Cloudy (*)     Urine Hgb LARGE (*)     Protein, UA 2+ (*)     Leukocyte Esterase, Urine MOD (*)     All other components within normal limits   TROPONIN - Abnormal; Notable for the following components:    Troponin, High Sensitivity 118 (*)     All other components within normal limits   TROPONIN - Abnormal; Notable for the following components:    Troponin, High Sensitivity 108 (*)     All other components within normal limits   COVID-19 & INFLUENZA COMBO   CULTURE, URINE   C DIFF TOXIN/ANTIGEN   GASTROINTESTINAL PANEL, MOLECULAR   MICROSCOPIC URINALYSIS   URINE DRUG SCREEN   BLOOD OCCULT STOOL SCREEN #1           Disposition   DISPOSITION:    DISPOSITION Admitted 03/13/2023 09:13:41 PM      CLINICAL IMPRESSION:  1. LAURO (acute kidney injury) (HCC)    2. Elevated troponin    3. Dehydration    4. Gastroenteritis        PATIENT REFERRED TO:  No follow-up provider specified.    DISCHARGE MEDICATIONS:  New Prescriptions    No medications on file     The care is provided during an unprecedented national emergency due to the novel coronavirus, COVID 19.  Josep Raymond MD  Attending Emergency Physician              Jospe Raymond MD  03/13/23 1162

## 2023-03-14 NOTE — PROGRESS NOTES
New consult for Dr. Fam Carrero   Reason: lymphoma    Perfect serve to Dr. Too Chavira sent and acknowledged

## 2023-03-14 NOTE — PLAN OF CARE
Problem: Discharge Planning  Goal: Discharge to home or other facility with appropriate resources  Outcome: Progressing  Flowsheets (Taken 3/14/2023 0152)  Discharge to home or other facility with appropriate resources:   Identify barriers to discharge with patient and caregiver   Arrange for needed discharge resources and transportation as appropriate   Identify discharge learning needs (meds, wound care, etc)   Arrange for interpreters to assist at discharge as needed   Refer to discharge planning if patient needs post-hospital services based on physician order or complex needs related to functional status, cognitive ability or social support system     Problem: Skin/Tissue Integrity  Goal: Absence of new skin breakdown  Description: 1. Monitor for areas of redness and/or skin breakdown  2. Assess vascular access sites hourly  3. Every 4-6 hours minimum:  Change oxygen saturation probe site  4. Every 4-6 hours:  If on nasal continuous positive airway pressure, respiratory therapy assess nares and determine need for appliance change or resting period. Outcome: Progressing  Note: Patient fell pre-admission and sustained multiple bruises and skin tears . Dressings on skin tears intact.       Problem: ABCDS Injury Assessment  Goal: Absence of physical injury  Outcome: Progressing  Flowsheets (Taken 3/14/2023 0152)  Absence of Physical Injury: Implement safety measures based on patient assessment     Problem: Safety - Adult  Goal: Free from fall injury  Outcome: Progressing  Flowsheets (Taken 3/14/2023 0152)  Free From Fall Injury:   Instruct family/caregiver on patient safety   Based on caregiver fall risk screen, instruct family/caregiver to ask for assistance with transferring infant if caregiver noted to have fall risk factors

## 2023-03-15 VITALS
TEMPERATURE: 97.5 F | OXYGEN SATURATION: 97 % | HEIGHT: 71 IN | HEART RATE: 58 BPM | BODY MASS INDEX: 21.27 KG/M2 | SYSTOLIC BLOOD PRESSURE: 137 MMHG | WEIGHT: 151.9 LBS | DIASTOLIC BLOOD PRESSURE: 53 MMHG | RESPIRATION RATE: 15 BRPM

## 2023-03-15 LAB
ABSOLUTE EOS #: 0.1 K/UL (ref 0–0.4)
ABSOLUTE LYMPH #: 1.3 K/UL (ref 1–4.8)
ABSOLUTE MONO #: 0.73 K/UL (ref 0.1–1.3)
ANION GAP SERPL CALCULATED.3IONS-SCNC: 9 MMOL/L (ref 9–17)
BASOPHILS # BLD: 0 % (ref 0–2)
BASOPHILS ABSOLUTE: 0 K/UL (ref 0–0.2)
BUN SERPL-MCNC: 31 MG/DL (ref 8–23)
CALCIUM SERPL-MCNC: 8 MG/DL (ref 8.6–10.4)
CHLORIDE SERPL-SCNC: 106 MMOL/L (ref 98–107)
CO2 SERPL-SCNC: 24 MMOL/L (ref 20–31)
CREAT SERPL-MCNC: 1.3 MG/DL (ref 0.7–1.2)
EOSINOPHILS RELATIVE PERCENT: 2 % (ref 0–4)
GFR SERPL CREATININE-BSD FRML MDRD: 58 ML/MIN/1.73M2
GLUCOSE SERPL-MCNC: 82 MG/DL (ref 70–99)
HCT VFR BLD AUTO: 24.5 % (ref 41–53)
HGB BLD-MCNC: 8.1 G/DL (ref 13.5–17.5)
LYMPHOCYTES # BLD: 25 % (ref 24–44)
MCH RBC QN AUTO: 28.1 PG (ref 26–34)
MCHC RBC AUTO-ENTMCNC: 33.1 G/DL (ref 31–37)
MCV RBC AUTO: 84.9 FL (ref 80–100)
MONOCYTES # BLD: 14 % (ref 1–7)
MORPHOLOGY: NORMAL
PDW BLD-RTO: 14.6 % (ref 11.5–14.9)
PLATELET # BLD AUTO: 132 K/UL (ref 150–450)
PMV BLD AUTO: 8.8 FL (ref 6–12)
POTASSIUM SERPL-SCNC: 4.2 MMOL/L (ref 3.7–5.3)
RBC # BLD: 2.89 M/UL (ref 4.5–5.9)
SEG NEUTROPHILS: 59 % (ref 36–66)
SEGMENTED NEUTROPHILS ABSOLUTE COUNT: 3.07 K/UL (ref 1.3–9.1)
SODIUM SERPL-SCNC: 139 MMOL/L (ref 135–144)
WBC # BLD AUTO: 5.2 K/UL (ref 3.5–11)

## 2023-03-15 PROCEDURE — 94761 N-INVAS EAR/PLS OXIMETRY MLT: CPT

## 2023-03-15 PROCEDURE — 97530 THERAPEUTIC ACTIVITIES: CPT

## 2023-03-15 PROCEDURE — 97110 THERAPEUTIC EXERCISES: CPT

## 2023-03-15 PROCEDURE — 85025 COMPLETE CBC W/AUTO DIFF WBC: CPT

## 2023-03-15 PROCEDURE — 6370000000 HC RX 637 (ALT 250 FOR IP): Performed by: FAMILY MEDICINE

## 2023-03-15 PROCEDURE — 36415 COLL VENOUS BLD VENIPUNCTURE: CPT

## 2023-03-15 PROCEDURE — 94640 AIRWAY INHALATION TREATMENT: CPT

## 2023-03-15 PROCEDURE — 80048 BASIC METABOLIC PNL TOTAL CA: CPT

## 2023-03-15 PROCEDURE — 2700000000 HC OXYGEN THERAPY PER DAY

## 2023-03-15 RX ORDER — FAMOTIDINE 20 MG/1
20 TABLET, FILM COATED ORAL DAILY
Qty: 60 TABLET | Refills: 3 | Status: SHIPPED | OUTPATIENT
Start: 2023-03-16

## 2023-03-15 RX ADMIN — FERROUS SULFATE TAB 325 MG (65 MG ELEMENTAL FE) 325 MG: 325 (65 FE) TAB at 12:57

## 2023-03-15 RX ADMIN — Medication 1 CAPSULE: at 08:53

## 2023-03-15 RX ADMIN — ATORVASTATIN CALCIUM 10 MG: 10 TABLET, FILM COATED ORAL at 08:53

## 2023-03-15 RX ADMIN — SUCRALFATE 1 G: 1 TABLET ORAL at 05:05

## 2023-03-15 RX ADMIN — APIXABAN 5 MG: 5 TABLET, FILM COATED ORAL at 08:53

## 2023-03-15 RX ADMIN — AMLODIPINE BESYLATE 10 MG: 10 TABLET ORAL at 08:53

## 2023-03-15 RX ADMIN — DONEPEZIL HYDROCHLORIDE 10 MG: 10 TABLET, FILM COATED ORAL at 08:53

## 2023-03-15 RX ADMIN — SUCRALFATE 1 G: 1 TABLET ORAL at 12:57

## 2023-03-15 RX ADMIN — SUCRALFATE 1 G: 1 TABLET ORAL at 17:48

## 2023-03-15 RX ADMIN — LOSARTAN POTASSIUM 50 MG: 50 TABLET, FILM COATED ORAL at 08:53

## 2023-03-15 RX ADMIN — LEVOTHYROXINE SODIUM 150 MCG: 150 TABLET ORAL at 05:05

## 2023-03-15 RX ADMIN — FUROSEMIDE 40 MG: 40 TABLET ORAL at 17:48

## 2023-03-15 RX ADMIN — FERROUS SULFATE TAB 325 MG (65 MG ELEMENTAL FE) 325 MG: 325 (65 FE) TAB at 17:48

## 2023-03-15 RX ADMIN — FLUOXETINE 20 MG: 20 CAPSULE ORAL at 08:53

## 2023-03-15 RX ADMIN — MAGNESIUM OXIDE TAB 400 MG (241.3 MG ELEMENTAL MG) 400 MG: 400 (241.3 MG) TAB at 08:53

## 2023-03-15 RX ADMIN — SUCRALFATE 1 G: 1 TABLET ORAL at 00:27

## 2023-03-15 RX ADMIN — LIDOCAINE AND PRILOCAINE: 25; 25 CREAM TOPICAL at 05:05

## 2023-03-15 RX ADMIN — FERROUS SULFATE TAB 325 MG (65 MG ELEMENTAL FE) 325 MG: 325 (65 FE) TAB at 08:53

## 2023-03-15 RX ADMIN — BUDESONIDE AND FORMOTEROL FUMARATE DIHYDRATE 2 PUFF: 80; 4.5 AEROSOL RESPIRATORY (INHALATION) at 07:51

## 2023-03-15 RX ADMIN — PANTOPRAZOLE SODIUM 40 MG: 40 TABLET, DELAYED RELEASE ORAL at 05:05

## 2023-03-15 RX ADMIN — FUROSEMIDE 40 MG: 40 TABLET ORAL at 08:53

## 2023-03-15 RX ADMIN — CARVEDILOL 6.25 MG: 12.5 TABLET, FILM COATED ORAL at 17:48

## 2023-03-15 RX ADMIN — FAMOTIDINE 20 MG: 20 TABLET, FILM COATED ORAL at 08:53

## 2023-03-15 RX ADMIN — PROCHLORPERAZINE MALEATE 10 MG: 10 TABLET ORAL at 12:57

## 2023-03-15 RX ADMIN — MULTIPLE VITAMINS W/ MINERALS TAB 1 TABLET: TAB at 08:53

## 2023-03-15 RX ADMIN — CARVEDILOL 6.25 MG: 12.5 TABLET, FILM COATED ORAL at 08:53

## 2023-03-15 NOTE — PROGRESS NOTES
Physical Therapy  Facility/Department: [facility]  Daily Treatment Note  NAME: Joel Desir  : 1950  MRN: [id number]    Date of Service: 3/15/2023    Discharge Recommendations:  Patient would benefit from continued therapy after discharge   PT Equipment Recommendations  Equipment Needed: No    Patient Diagnosis(es): The primary encounter diagnosis was LAURO (acute kidney injury) ([facility] [address] Diagnoses of Elevated troponin, Dehydration, and Gastroenteritis were also pertinent to this visit. Assessment   Assessment: Pt ambulates 67'x2 with rollator and CGA, performs STS transfers with Rollator and CGA, and tolerates therapeutic BLE exercise. Activity Tolerance: Patient tolerated treatment well;Patient limited by fatigue  Equipment Needed: No     Plan    Physcial Therapy Plan  General Plan: 5-7 times per week  Specific Instructions for Next Treatment: gait, balance, HEP, monitor O2 sats  Current Treatment Recommendations: Strengthening;Balance training;Functional mobility training;Transfer training; Endurance training;Gait training;Equipment evaluation, education, & procurement;Home exercise program;Safety education & training;Patient/Caregiver education & training;Positioning; Therapeutic activities     Restrictions  Restrictions/Precautions  Restrictions/Precautions: Fall Risk, Contact Precautions (CDIF rule out, MRSA; tripped at home)  Required Braces or Orthoses?: No  Implants present? :  (pt denies)     Subjective    Subjective  Subjective: Patient resting bed upon approach, agreeable to treatment. Patient reports just wanting to go home. Pain: Denies pain at this time. Orientation  Overall Orientation Status: Impaired  Orientation Level: Oriented to place;Oriented to person;Oriented to situation (2023)     Objective   Vitals  O2 Device: Nasal cannula  Comment: SpO2 fluctuating between 90%<>98% on 1Lt.   Bed Mobility Training  Bed Mobility Training: Yes  Overall Level of Assistance: Minimum assistance; Additional time (per patient sleepins in bed or recliner depending on breathing)  Interventions: Tactile cues; Safety awareness training;Verbal cues  Rolling: Contact-guard assistance  Supine to Sit: Minimum assistance; Additional time  Sit to Supine: Stand-by assistance  Scooting: Contact-guard assistance  Balance  Sitting: Intact  Standing: With support  Transfer Training  Transfer Training: No (declined)  Gait Training  Gait Training: No (declined)     PT Exercises  A/AROM Exercises: supine bilat LE ther ex x10 reps (heel slides, SLR, SAQ, hip abd; slow movement with rest breaks bertween bouts)  Circulation/Endurance Exercises: ankle pumps  Pressure Relief Exercises: rolling; repositioning  Static Sitting Balance Exercises: 5 min unsupported  Postural Correction Exercises: vcs for scapular retraction and forward gaze to facilitate upright posture    AM-PAC Basic Mobility - Inpatient   How much help is needed turning from your back to your side while in a flat bed without using bedrails?: A Little  How much help is needed moving from lying on your back to sitting on the side of a flat bed without using bedrails?: A Little  How much help is needed moving to and from a bed to a chair?: A Little  How much help is needed standing up from a chair using your arms?: A Little  How much help is needed walking in hospital room?: A Little  How much help is needed climbing 3-5 steps with a railing?: A Lot  AM-PAC Inpatient Mobility Raw Score : 17  AM-PAC Inpatient T-Scale Score : 42.13  Mobility Inpatient CMS 0-100% Score: 50.57  Mobility Inpatient CMS G-Code Modifier : CK     Goals  Short Term Goals  Time Frame for Short Term Goals: 5 days  Short Term Goal 1: Pt to demo IND bed mobility  Short Term Goal 2: Pt to perform SBA for transfers with device. Short Term Goal 3: Pt to amb 76' with device, SBA. Short Term Goal 4: Pt to improve BLE strength by 1/2 MMG.   Short Term Goal 5: Pt to improve stanading balance to GOOD- to reduce fall risk.  Patient Goals   Patient Goals : To go home    Education  Patient Education  Education Given To: Patient  Education Provided: Precautions;Transfer Training;Fall Prevention Strategies  Education Method: Demonstration;Verbal  Barriers to Learning: Cognition  Education Outcome: Continued education needed    Therapy Time   Individual Concurrent Group Co-treatment   Time In 1342         Time Out 1406         Minutes 24                 Carolina Jerry, [facility]

## 2023-03-15 NOTE — PLAN OF CARE
Problem: Discharge Planning  Goal: Discharge to home or other facility with appropriate resources  3/15/2023 0228 by Army Jareth RN  Outcome: Progressing  Flowsheets (Taken 3/14/2023 2040)  Discharge to home or other facility with appropriate resources: Identify barriers to discharge with patient and caregiver     Problem: Skin/Tissue Integrity  Goal: Absence of new skin breakdown  Description: 1. Monitor for areas of redness and/or skin breakdown  2. Assess vascular access sites hourly  3. Every 4-6 hours minimum:  Change oxygen saturation probe site  4. Every 4-6 hours:  If on nasal continuous positive airway pressure, respiratory therapy assess nares and determine need for appliance change or resting period.   3/15/2023 0228 by Army Jareth RN  Outcome: Progressing     Problem: ABCDS Injury Assessment  Goal: Absence of physical injury  3/15/2023 0228 by Army Jareth RN  Outcome: Progressing     Problem: Safety - Adult  Goal: Free from fall injury  3/15/2023 0228 by Army Jareth RN  Outcome: Progressing

## 2023-03-15 NOTE — PROGRESS NOTES
03/15/23 1328   Encounter Summary   Encounter Overview/Reason  Spiritual/Emotional Needs   Service Provided For: Patient   Referral/Consult From: Rounding   Complexity of Encounter Low   Spiritual/Emotional needs   Type Spiritual Support   Assessment/Intervention/Outcome   Assessment Unable to assess   Intervention Prayer (assurance of)/[place]

## 2023-03-15 NOTE — PROGRESS NOTES
[id number]      PROGRESS NOTE        Patient:  Mike Chung  YOB: 1950    MRN: [id number]     Acct: [de-identified]     Admit date: 3/13/2023    Pt seen and Chart reviewed. Consultant notes reviewed and care evaluated. Subjective: Patient feeling better than yesterday said he had some nausea yesterday but no diarrhea. He is eating okay just does not like the fluid that he is not having any chest pain or shortness of breath or fevers or chills but this morning he feels cold he says is not sure he feels okay to go home but he denies any dizziness he seen by his hematologist his his cancer medication held his urine culture is negative will DC antibiotic    Diet:  ADULT DIET; Regular; 3 carb choices (45 gm/meal);  Low Fat/Low Chol/High Fiber/2 gm Na  ADULT ORAL NUTRITION SUPPLEMENT; Breakfast, Lunch, Dinner; Diabetic Oral Supplement      Medications:Current Inpatient    Scheduled Meds:   cefTRIAXone (ROCEPHIN) IV  1,000 mg IntraVENous Q24H    famotidine  20 mg Oral Daily    FLUoxetine  20 mg Oral Daily    donepezil  10 mg Oral Daily    losartan  50 mg Oral Daily    levothyroxine  150 mcg Oral Daily    lactobacillus  1 capsule Oral Daily    therapeutic multivitamin-minerals  1 tablet Oral Daily    apixaban  5 mg Oral BID    atorvastatin  10 mg Oral Daily    ferrous sulfate  325 mg Oral TID WC    carvedilol  6.25 mg Oral BID WC    magnesium oxide  400 mg Oral Daily    [Held by provider] ibrutinib  420 mg Oral Daily    tamsulosin  0.8 mg Oral Nightly    amLODIPine  10 mg Oral Daily    furosemide  40 mg Oral BID    pantoprazole  40 mg Oral QAM AC    sucralfate  1 g Oral 4 times per day    budesonide-formoterol  2 puff Inhalation BID     Continuous Infusions:   sodium chloride 75 mL/hr at 03/14/23 2046     PRN Meds:albuterol sulfate HFA, ipratropium-albuterol, prochlorperazine, lidocaine-prilocaine, diphenoxylate-atropine        Physical Exam:  Vitals: BP (!) 147/89   Pulse 66   Temp 98.1 °F (36.7 °C) (Oral)   Resp 18   Ht 5' 11\" (1.803 m)   Wt 151 lb 14.4 oz (68.9 kg)   SpO2 94%   BMI 21.19 kg/m²   24 hour intake/output:  Intake/Output Summary (Last 24 hours) at 3/15/2023 0744  Last data filed at 3/15/2023 0644  Gross per 24 hour   Intake 2890 ml   Output 2150 ml   Net 740 ml     Last 3 weights: Wt Readings from Last 3 Encounters:   03/13/23 151 lb 14.4 oz (68.9 kg)   02/06/23 165 lb (74.8 kg)   12/05/22 153 lb 11.2 oz (69.7 kg)       Physical Examination:   General appearance - alert, well appearing, and in no distress is okay. Mental status - alert, oriented to person, place, and time  oropharynx clear, no lesions  Chest - clear to auscultation, no wheezes, rales or rhonchi, symmetric air entry  Heart - normal rate, regular rhythm, normal S1, S2, no murmurs, rubs, clicks or gallops  Abdomen - soft, nontender, nondistended, no masses or organomegaly tenderness on palpation  Neurological - alert, oriented, normal speech, no focal findings or movement disorder noted}  Extremities - peripheral pulses normal, no pedal edema, no clubbing or cyanosis has bruising and wounds are noninfected  Skin - normal coloration and turgor, no rashes, no suspicious skin lesions noted   Time [place] Bad Orders]     Results     Component Value Units   Basic Metabolic Panel [[id number]] (Abnormal)    Collected: 03/15/23 0604    Updated: 03/15/23 0650    Specimen Source: Blood     Glucose 82 mg/dL    BUN 31 High  mg/dL    Creatinine 1.30 High  mg/dL    Est, Glom Filt Rate 58 Low  mL/min/1.73m2    Comment:        These results are not intended for use in patients <25years of age. eGFR results are calculated without a race factor using the 2021 CKD-EPI equation. Careful clinical correlation is recommended, particularly when comparing to results   calculated using previous equations.    The CKD-EPI equation is less accurate in patients with extremes of muscle mass, extra-renal   metabolism of creatine, excessive creatine ingestion, or following therapy that affects   renal tubular secretion. Calcium 8.0 Low  mg/dL    Sodium 139 mmol/L    Potassium 4.2 mmol/L    Chloride 106 mmol/L    CO2 24 mmol/L    Anion Gap 9 mmol/L   CBC with Auto Differential [[id number]] (Abnormal)    Collected: 03/15/23 0604    Updated: 03/15/23 0647    Specimen Source: Blood     WBC 5.2 k/uL    RBC 2.89 Low  m/uL    Hemoglobin 8.1 Low  g/dL    Hematocrit 24.5 Low  %    MCV 84.9 fL    MCH 28.1 pg    MCHC 33.1 g/dL    RDW 14.6 %    Platelets 179 Low  k/uL    MPV 8.8 fL    Seg Neutrophils PENDING %    Lymphocytes PENDING %    Monocytes PENDING %    Eosinophils % PENDING %    Basophils PENDING %    Segs Absolute PENDING k/uL    Absolute Lymph # PENDING k/uL    Absolute Mono # PENDING k/uL    Absolute Eos # PENDING k/uL    Basophils Absolute PENDING k/uL   Culture, Urine [[id number]]    Collected: 03/13/23 2046    Updated: 03/14/23 1743    Specimen Source: Urine, clean catch     Specimen Description . CLEAN CATCH URINE    Culture NO SIGNIFICANT GROWTH   Blood Occult Stool Screen #1 [[id number]]    Collected: 03/14/23 1341    Updated: 03/14/23 1346    Specimen Source: Stool     Occult Blood, Stool #1 NEGATIVE    Date, Stool #1 [phone]    Time, Stool #1 Unknown   SPECIMEN REJECTION [[id number]]    Collected: 03/14/23 1341    Updated: 03/14/23 1343     Specimen Source . FECES    Ordered Test STLPCR,CDIFQ    Reason for Rejection Unable to perform testing: No specimen received.    POC Glucose Fingerstick [[id number]] (Abnormal)    Collected: 03/14/23 1121    Updated: 03/14/23 1138     POC Glucose 119 High  mg/dL   EKG 12 Lead [[id number]]    Collected: 03/13/23 1748    Updated: 03/14/23 1001     Ventricular Rate 49 BPM    Atrial Rate 49 BPM    P-R Interval 138 ms    QRS Duration 96 ms    Q-T Interval 448 ms    QTc Calculation (Bazett) 404 ms    P Axis 73 degrees    R Axis 28 degrees    T Axis 32 degrees   Narrative:     Sinus bradycardia   Possible Inferior infarct , age undetermined   Abnormal ECG   When compared with ECG of 27-SEP-2022 08:36,   Vent. rate has decreased BY  26 BPM   Borderline criteria for Inferior infarct are now          Assessment:  Principal Problem:    LAURO (acute kidney injury) ([facility] [address]  Resolved Problems:    * No resolved hospital problems. 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    Hydronephrosis due to obstruction of ureter N13.1    Moderate malnutrition ([facility]) E44.0    Chronic cholecystitis K81.1    Pleural effusion J90    Cardiomegaly I51.7    Hypothyroidism E03.9    Diabetes mellitus ([facility]) E11.9    Hypertensive urgency I16.0    Acute respiratory failure with hypoxia ([facility]) J96.01    Hypoxia R09.02    Carotid stenosis, asymptomatic, bilateral I65.23    Simple chronic bronchitis ([facility]) J41.0    Dependence on nocturnal oxygen therapy Z99.81    Pneumonia J18.9    LAURO (acute kidney injury) ([facility] [address] N17.9    Failure to thrive in adult R62.7    DVT (deep venous thrombosis) ([facility]) I82.409    Lymphoma, diffuse ([facility]) C85.90    Severe malnutrition ([facility]) E43    Acute cystitis with hematuria N30.01    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   We will DC his antibiotics since his urine culture negative    Erika Martin,   FAA           3/15/2023, 7:44 AM

## 2023-03-15 NOTE — CARE COORDINATION
ONGOING DISCHARGE PLAN:    Patient is alert and oriented x4. Spoke with patient regarding discharge plan and patient confirms that plan is still to return to home w/ Cousin. Pt. Has been accepted by [facility]. Currently Sating 95% on 2LNC. Pt wears 2-2.5 L, NC already at home. CR today 1.30. [place] [facility] [place]. ?DC today. Will continue to follow for additional discharge needs.     Electronically signed by Luz Shearer RN on 3/15/2023 at 8:59 AM

## 2023-03-15 NOTE — PROGRESS NOTES
RN paged Dr. Mariya Paulson regarding if patient is to be discharged today or not. Awaiting a call back. [phone]: RN called to page Dr. Mariya Paulson again regarding possible d/c. Awaiting a call back.

## 2023-03-15 NOTE — CONSULTS
Today's Date: 3/14/2023  Patient Name: Tanmay Vasquez  Date of admission: 3/13/2023  4:41 PM  Patient's age: 74 y.o., 1950  Admission Dx: Dehydration [E86.0]  Gastroenteritis [K52.9]  Elevated troponin [R77.8]  LAURO (acute kidney injury) ([address] [N17.9]    Reason for Consult: management recommendations  Requesting Physician: Lieutenant Mac DO    CHIEF COMPLAINT: Follicular lymphoma    History Obtained From:  patient, electronic medical record    HISTORY OF PRESENT ILLNESS:      The patient is a 74 y.o.  male who is admitted to the hospital for  chief complaints of mechanical fall without loss of consciousness. Patient work-up in the ER revealed worsening renal function. Patient creatinine was 1.6. Complains of diarrhea. Work-up shows UTI. Patient is on antibiotics. Patient stool occult blood test is negative. Lab work-up shows WBC count of 5.1 hemoglobin 9.0 MCV 85 and platelet count of 715. Patient is known to our practice. Patient has history of follicular lymphoma. Patient is currently on Imbruvica. Patient last scans from December showed continued improvement in the soft tissue retroperitoneal mass. Patient is on Eliquis 5 mg twice daily. Patient is on Imbruvica for 20 mg daily      Past Medical History:   has a past medical history of Arthritis, Cancer ([address] Chemotherapy management, encounter for, CHF (congestive heart failure) ([address] COPD (chronic obstructive pulmonary disease) ([address] Dementia ([address] Diabetes mellitus ([address] H/O cardiovascular stress test, History of non-Hodgkin's lymphoma, Hx of blood clots, Hyperlipidemia, Hypertension, Hypothyroidism, On home O2, and Pneumonia. Past Surgical History:   has a past surgical history that includes hernia repair; IR BIOPSY ABDOMINAL/RETROPERITONEAL MASS PERCUTANEOUS (7/6/2021); IR PORT PLACEMENT > 5 YEARS (8/31/2021); Colonoscopy (Left, 9/24/2021); Cystoscopy (Right, 10/6/2021);  Cholecystectomy, laparoscopic (N/A, 10/9/2021); Tonsillectomy; Cystoscopy (Bilateral, 11/24/2021); Cystoscopy (Bilateral, 2/16/2022); eye surgery; Carotid endarterectomy (Left, 6/28/2022); Cystoscopy (N/A, 7/20/2022); Upper gastrointestinal endoscopy (N/A, 9/29/2022); and Cystoscopy (Bilateral, 12/1/2022). Medications:    Prior to Admission medications    Medication Sig Start Date End Date Taking? Authorizing Provider   amLODIPine (NORVASC) 10 MG tablet Take 10 mg by mouth daily   Yes Historical Provider, MD   diphenoxylate-atropine (LOMOTIL) 2.5-0.025 MG per tablet Take 1 tablet by mouth 4 times daily as needed for Diarrhea. Indications: last filled 2/24/23 for 30 days   Yes Historical Provider, MD   furosemide (LASIX) 40 MG tablet Take 40 mg by mouth in the morning and 40 mg in the evening. Yes Historical Provider, MD   sucralfate (CARAFATE) 1 GM/10ML suspension Take 1 g by mouth 4 times daily   Yes Historical Provider, MD   megestrol (MEGACE) 40 MG/ML suspension take 10 milliliters (2 TEASPOONFULS) by mouth every morning 2/14/23   Ayden Payne MD   tamsulosin (FLOMAX) 0.4 MG capsule Take 0.8 mg by mouth at bedtime 1/20/23   Historical Provider, MD   lidocaine-prilocaine (EMLA) 2.5-2.5 % cream Apply topically 45-60 minutes prior to needle poke daily PRN. 2/2/23   Ayden Payne MD   famotidine (PEPCID) 40 MG/5ML suspension Take 2.5 mLs by mouth 2 times daily 1/19/23   Guerrero Vivas MD   ibrutinib (IMBRUVICA) 420 MG tablet Take 1 tablet by mouth daily ON 2 DAYS OFF 1 DAY-HAS 8 DOSES LEFT. FOR CANCER TREATMENT. 1/16/23   Ayden Payne MD   prochlorperazine (COMPAZINE) 10 MG tablet Take 1 tablet by mouth every 6 hours as needed (nausea/ vomiting) 12/5/22   Ayden Payne MD   magnesium oxide (MAG-OX) 400 (240 Mg) MG tablet Take 1 tablet by mouth daily 10/1/22   Roderick T Dixon, DO   carvedilol (COREG) 6.25 MG tablet Take 6.25 mg by mouth 2 times daily (with meals)    Historical Provider, MD   oxyCODONE (ROXICODONE) 5 MG immediate release tablet Take 5 mg by mouth every 4 hours as needed for Pain. Last dispensed 8/2 for 30 days  Patient not taking: Reported on 3/13/2023    Historical Provider, MD   ferrous sulfate (FE TABS 325) 325 (65 Fe) MG EC tablet Take 1 tablet by mouth 3 times daily (with meals) 9/7/22   Roderick Metcalf, DO   atorvastatin (LIPITOR) 10 MG tablet Take 10 mg by mouth daily    Historical Provider, MD   apixaban (ELIQUIS) 5 MG TABS tablet Take 1 tablet by mouth in the morning and 1 tablet before bedtime. 7/21/22   Roderick Metcalf, DO   glipiZIDE (GLUCOTROL) 5 MG tablet Take 0.5 tablets by mouth in the morning and 0.5 tablets in the evening. Take before meals. Patient not taking: Reported on 8/12/2022 7/21/22 8/13/22  Sonny Metcalf, DO   Lactobacillus (PROBIOTIC ACIDOPHILUS) CAPS Take 1 caplet by mouth daily    Historical Provider, MD   Multiple Vitamins-Minerals (THERAPEUTIC MULTIVITAMIN-MINERALS) tablet Take 1 tablet by mouth daily    Historical Provider, MD   OXYGEN Inhale 2 L into the lungs at bedtime Nightly & during the day.     Historical Provider, MD   ipratropium-albuterol (DUONEB) 0.5-2.5 (3) MG/3ML SOLN nebulizer solution Inhale 3 mLs into the lungs every 4 hours as needed for Shortness of Breath 2/20/22   EDWIGE [place] - CNP   levothyroxine (SYNTHROID) 150 MCG tablet Take 1 tablet by mouth Daily 2/2/22   Ja Nice,    fluticasone-salmeterol (ADVAIR) 100-50 MCG/DOSE diskus inhaler Inhale 1 puff into the lungs every 12 hours 2/1/22   Brent Ulloa, DO   albuterol sulfate HFA (PROVENTIL;VENTOLIN;PROAIR) 108 (90 Base) MCG/ACT inhaler Inhale 2 puffs into the lungs every 4 hours as needed for Wheezing     Historical Provider, MD   losartan (COZAAR) 50 MG tablet Take 1 tablet by mouth daily 10/12/21   Roderick Metcalf, DO   donepezil (ARICEPT) 10 MG tablet Take 10 mg by mouth daily  7/11/21   Historical Provider, MD   FLUoxetine (PROZAC) 20 MG capsule Take 20 mg by mouth daily    Historical Provider, MD omeprazole (PRILOSEC) 20 MG delayed release capsule Take 20 mg by mouth 2 times daily    Historical Provider, MD     Current Facility-Administered Medications   Medication Dose Route Frequency Provider Last Rate Last Admin    cefTRIAXone (ROCEPHIN) 1,000 mg in sodium chloride 0.9 % 50 mL IVPB (mini-bag)  1,000 mg IntraVENous Q24H Roderick T Dixon, DO   Stopped at 03/14/23 1027    famotidine (PEPCID) tablet 20 mg  20 mg Oral Daily Roderick T Dixon, DO   20 mg at 03/14/23 1014    0.9 % sodium chloride infusion   IntraVENous Continuous Roderick T Dixon, DO 75 mL/hr at 03/13/23 2231 75 mL/hr at 03/13/23 2231    FLUoxetine (PROZAC) capsule 20 mg  20 mg Oral Daily Roderick T Dixon, DO   20 mg at 03/14/23 0841    donepezil (ARICEPT) tablet 10 mg  10 mg Oral Daily Roderick T Dixon, DO   10 mg at 03/14/23 0840    losartan (COZAAR) tablet 50 mg  50 mg Oral Daily Roderick T Dixon, DO   50 mg at 03/14/23 0841    albuterol sulfate HFA (PROVENTIL;VENTOLIN;PROAIR) 108 (90 Base) MCG/ACT inhaler 2 puff  2 puff Inhalation Q6H PRN Roderick T Dixon, DO        levothyroxine (SYNTHROID) tablet 150 mcg  150 mcg Oral Daily Roderick T Dixon, DO   150 mcg at 03/14/23 0846    ipratropium-albuterol (DUONEB) nebulizer solution 3 mL  1 vial Inhalation Q4H PRN Roderick T Dixon, DO   3 mL at 03/13/23 2353    lactobacillus (CULTURELLE) capsule 1 capsule  1 capsule Oral Daily Roderick T Dixon, DO   1 capsule at 03/14/23 0841    therapeutic multivitamin-minerals 1 tablet  1 tablet Oral Daily Roderick T Dixon, DO   1 tablet at 03/14/23 0841    apixaban (ELIQUIS) tablet 5 mg  5 mg Oral BID Roderick T Dixon, DO   5 mg at 03/14/23 0841    atorvastatin (LIPITOR) tablet 10 mg  10 mg Oral Daily Roderick T Dixon, DO   10 mg at 03/14/23 0841    ferrous sulfate (IRON 325) tablet 325 mg  325 mg Oral TID [facility]  D[facility], DO   325 mg at 03/14/23 1709    carvedilol (COREG) tablet 6.25 mg  6.25 mg Oral BID [facility] Dixon, DO   6.25 mg at 03/14/23 1709    magnesium oxide (MAG-OX) tablet 400 mg  400 mg Oral Daily Roderick T Dixon, DO   400 mg at 03/14/23 0841    prochlorperazine (COMPAZINE) tablet 10 mg   10 mg Oral Q6H PRN Roderick T Dixon, DO   10 mg at 03/14/23 1428    ibrutinib (IMBRUVICA) chemo tablet 420 mg  (Patient Supplied)  420 mg Oral Daily Roderick T Dixon, DO   420 mg at 03/14/23 1015    lidocaine-prilocaine (EMLA) cream   Topical PRN Lieutenant Bold, DO   Given at 03/14/23 1338    tamsulosin (FLOMAX) capsule 0.8 mg  0.8 mg Oral Nightly Roderick T Dixon, DO   0.8 mg at 03/13/23 2335    amLODIPine (NORVASC) tablet 10 mg  10 mg Oral Daily Roderick T Dixon, DO   10 mg at 03/14/23 0841    diphenoxylate-atropine (LOMOTIL) 2.5-0.025 MG per tablet 1 tablet  1 tablet Oral 4x Daily PRN Roderick T Dixon, DO        furosemide (LASIX) tablet 40 mg  40 mg Oral BID Roderick T Dixon, DO   40 mg at 03/14/23 1709    pantoprazole (PROTONIX) tablet 40 mg  40 mg Oral QAM AC Roderick T Dixon, DO   40 mg at 03/14/23 0558    sucralfate (CARAFATE) tablet 1 g  1 g Oral 4 times per day Roderick T Dixon, DO   1 g at 03/14/23 1709    budesonide-formoterol (SYMBICORT) 80-4.5 MCG/ACT inhaler 2 puff  2 puff Inhalation BID Lieutenant Bold, DO   2 puff at 03/14/23 1949       Allergies:  Patient has no known allergies. Social History:   reports that he quit smoking about 17 years ago. His smoking use included cigarettes. He has a 90.00 pack-year smoking history. He has never used smokeless tobacco. He reports current alcohol use of about 1.7 standard drinks per week. He reports current drug use. Drug: Marijuana [place] [employer]). Family History: family history includes Alzheimer's Disease in his father; Diabetes in his mother; Heart Disease in his brother and brother. REVIEW OF SYSTEMS:      Constitutional: No fever or chills. No night sweats, no weight loss.   Positive fatigue  Eyes: No eye discharge, double vision, or eye pain   HEENT: negative for sore mouth, sore throat, hoarseness and voice change   Respiratory: negative for cough , sputum, dyspnea, wheezing, hemoptysis, chest pain   Cardiovascular: negative for chest pain, dyspnea, palpitations, orthopnea, PND   Gastrointestinal: negative for nausea, vomiting, diarrhea, constipation, abdominal pain, Dysphagia, hematemesis and hematochezia   Genitourinary: negative for frequency, dysuria, nocturia, urinary incontinence, and hematuria   Integument: negative for rash, skin lesions, bruises.    Hematologic/Lymphatic: negative for easy bruising, bleeding, lymphadenopathy, or petechiae   Endocrine: negative for heat or cold intolerance,weight changes, change in bowel habits and hair loss   Musculoskeletal: negative for myalgias, arthralgias, pain, joint swelling,and bone pain   Neurological: negative for headaches, dizziness, seizures, weakness, numbness    PHYSICAL EXAM:        BP (!) 129/50   Pulse 58   Temp 97.7 °F (36.5 °C) (Oral)   Resp 16   Ht 5' 11\" (1.803 m)   Wt 151 lb 14.4 oz (68.9 kg)   SpO2 97%   BMI 21.19 kg/m²    Temp (24hrs), Av.9 °F (36.6 °C), Min:97.5 °F (36.4 °C), Max:98.4 °F (36.9 °C)      General appearance -frail appearing, no in pain or distress   Mental status - alert and cooperative   Eyes - pupils equal and reactive, extraocular eye movements intact   Ears - bilateral TM's and external ear canals normal   Mouth - mucous membranes moist, pharynx normal without lesions   Neck - supple, no significant adenopathy   Lymphatics - no palpable lymphadenopathy, no hepatosplenomegaly   Chest - clear to auscultation, no wheezes, rales or rhonchi, symmetric air entry   Heart - normal rate, regular rhythm, normal S1, S2, no murmurs  Abdomen - soft, nontender, nondistended, no masses or organomegaly   Neurological - alert, oriented, normal speech, no focal findings or movement disorder noted   Musculoskeletal - no joint tenderness, deformity or swelling   Extremities - peripheral pulses normal, no pedal edema, no clubbing or cyanosis   Skin - normal coloration and turgor, no rashes, no suspicious skin lesions noted ,      DATA:      Labs:     Results for orders placed or performed during the hospital encounter of 03/13/23   COVID-19 & Influenza Combo    Specimen: Nasopharyngeal Swab   Result Value Ref Range    Specimen Description . NASOPHARYNGEAL SWAB     Source . NASOPHARYNGEAL SWAB     SARS-CoV-2 RNA, RT PCR Not Detected Not Detected    INFLUENZA A Not Detected Not Detected    INFLUENZA B Not Detected Not Detected   Culture, Urine    Specimen: Urine, clean catch   Result Value Ref Range    Specimen Description . CLEAN CATCH URINE     Culture NO SIGNIFICANT GROWTH    CBC with Auto Differential   Result Value Ref Range    WBC 6.3 3.5 - 11.0 k/uL    RBC 3.12 (L) 4.5 - 5.9 m/uL    Hemoglobin 9.0 (L) 13.5 - 17.5 g/dL    Hematocrit 26.5 (L) 41 - 53 %    MCV 84.8 80 - 100 fL    MCH 28.8 26 - 34 pg    MCHC 34.0 31 - 37 g/dL    RDW 14.7 11.5 - 14.9 %    Platelets 537 (L) 582 - 450 k/uL    MPV 9.0 6.0 - 12.0 fL    Seg Neutrophils 70 (H) 36 - 66 %    Lymphocytes 21 (L) 24 - 44 %    Monocytes 9 (H) 1 - 7 %    Eosinophils % 0 0 - 4 %    Basophils 0 0 - 2 %    Segs Absolute 4.41 1.3 - 9.1 k/uL    Absolute Lymph # 1.32 1.0 - 4.8 k/uL    Absolute Mono # 0.57 0.1 - 1.3 k/uL    Absolute Eos # 0.00 0.0 - 0.4 k/uL    Basophils Absolute 0.00 0.0 - 0.2 k/uL    Morphology ANISOCYTOSIS PRESENT     Morphology 1+ ELLIPTOCYTES     Morphology 1+ TEARDROPS    Comprehensive Metabolic Panel w/ Reflex to MG   Result Value Ref Range    Glucose 118 (H) 70 - 99 mg/dL    BUN 44 (H) 8 - 23 mg/dL    Creatinine 1.62 (H) 0.70 - 1.20 mg/dL    Est, Glom Filt Rate 44 (L) >60 mL/min/1.73m2    Calcium 8.1 (L) 8.6 - 10.4 mg/dL    Sodium 138 135 - 144 mmol/L    Potassium 5.2 3.7 - 5.3 mmol/L    Chloride 103 98 - 107 mmol/L    CO2 25 20 - 31 mmol/L    Anion Gap 10 9 - 17 mmol/L    Alkaline Phosphatase 78 40 - 129 U/L    ALT 5 5 - 41 U/L    AST 10 <40 U/L    Total Bilirubin 0.4 0.3 - 1.2 mg/dL    Total Protein 4.9 (L) 6.4 - 8.3 g/dL    Albumin 2.7 (L) 3.5 - 5.2 g/dL   Lipase   Result Value Ref Range    Lipase 73 (H) 13 - 60 U/L   URINE DRUG SCREEN   Result Value Ref Range    Amphetamine Screen, Ur NEGATIVE NEGATIVE    Barbiturate Screen, Ur NEGATIVE NEGATIVE    Benzodiazepine Screen, Urine NEGATIVE NEGATIVE    Cocaine Metabolite, Urine NEGATIVE NEGATIVE    Methadone Screen, Urine NEGATIVE NEGATIVE    Opiates, Urine NEGATIVE NEGATIVE    Phencyclidine, Urine NEGATIVE NEGATIVE    Cannabinoid Scrn, Ur POSITIVE (A) NEGATIVE    Oxycodone Screen, Ur NEGATIVE NEGATIVE    Fentanyl, Ur NEGATIVE NEGATIVE    Test Information       Assay provides medical screening only. The absence of expected drug(s) and/or metabolite(s) may indicate diluted or adulterated urine, limitations of testing or timing of collection.    Urinalysis with Reflex to Culture    Specimen: Urine voided   Result Value Ref Range    Color, UA Orange (A) Yellow    Turbidity UA Cloudy (A) Clear    Glucose, Ur NEGATIVE NEGATIVE    Bilirubin Urine NEGATIVE NEGATIVE    Ketones, Urine NEGATIVE NEGATIVE    Specific [place], UA 1.011 1.000 - 1.030    Urine Hgb LARGE (A) NEGATIVE    pH, UA 5.5 5.0 - 8.0    Protein, UA 2+ (A) NEGATIVE    Urobilinogen, Urine Normal Normal    Nitrite, Urine NEGATIVE NEGATIVE    Leukocyte Esterase, Urine MOD (A) NEGATIVE   Troponin   Result Value Ref Range    Troponin, High Sensitivity 118 (HH) 0 - 22 ng/L   Troponin   Result Value Ref Range    Troponin, High Sensitivity 108 (HH) 0 - 22 ng/L   Microscopic Urinalysis   Result Value Ref Range    WBC, UA 10 TO 20 /HPF    RBC, UA TOO NUMEROUS TO COUNT /HPF    Casts UA 3 to 5 /LPF    Epithelial Cells UA 0 TO 2 /HPF    Bacteria, UA None None   Blood Occult Stool Screen #1   Result Value Ref Range    Occult Blood, Stool #1 NEGATIVE NEGATIVE    Date, Stool #1 [phone]     Time, Stool #1 Unknown    CBC with Auto Differential   Result Value Ref Range    WBC 5.1 3.5 - 11.0 k/uL    RBC 3.16 (L) 4.5 - 5.9 m/uL    Hemoglobin 9.0 (L) 13.5 - 17.5 g/dL Hematocrit 27.0 (L) 41 - 53 %    MCV 85.4 80 - 100 fL    MCH 28.4 26 - 34 pg    MCHC 33.3 31 - 37 g/dL    RDW 14.6 11.5 - 14.9 %    Platelets 871 (L) 632 - 450 k/uL    MPV 8.9 6.0 - 12.0 fL    Seg Neutrophils 63 36 - 66 %    Lymphocytes 22 (L) 24 - 44 %    Monocytes 14 (H) 1 - 7 %    Eosinophils % 1 0 - 4 %    Basophils 0 0 - 2 %    Segs Absolute 3.22 1.3 - 9.1 k/uL    Absolute Lymph # 1.12 1.0 - 4.8 k/uL    Absolute Mono # 0.71 0.1 - 1.3 k/uL    Absolute Eos # 0.05 0.0 - 0.4 k/uL    Basophils Absolute 0.00 0.0 - 0.2 k/uL    Morphology ANISOCYTOSIS PRESENT     Morphology ELLIPTOCYTES     Morphology FEW POLYCHROMASIA    Basic Metabolic Panel   Result Value Ref Range    Glucose 102 (H) 70 - 99 mg/dL    BUN 39 (H) 8 - 23 mg/dL    Creatinine 1.52 (H) 0.70 - 1.20 mg/dL    Est, Glom Filt Rate 48 (L) >60 mL/min/1.73m2    Calcium 8.1 (L) 8.6 - 10.4 mg/dL    Sodium 139 135 - 144 mmol/L    Potassium 5.2 3.7 - 5.3 mmol/L    Chloride 106 98 - 107 mmol/L    CO2 25 20 - 31 mmol/L    Anion Gap 8 (L) 9 - 17 mmol/L   Troponin   Result Value Ref Range    Troponin, High Sensitivity 113 (HH) 0 - 22 ng/L   SPECIMEN REJECTION   Result Value Ref Range    Specimen Source . FECES     Ordered Test STLPCR,CDIFQ     Reason for Rejection Unable to perform testing: No specimen received.     POC Glucose Fingerstick   Result Value Ref Range    POC Glucose 85 75 - 110 mg/dL   POC Glucose Fingerstick   Result Value Ref Range    POC Glucose 119 (H) 75 - 110 mg/dL   EKG 12 Lead   Result Value Ref Range    Ventricular Rate 49 BPM    Atrial Rate 49 BPM    P-R Interval 138 ms    QRS Duration 96 ms    Q-T Interval 448 ms    QTc Calculation (Bazett) 404 ms    P Axis 73 degrees    R Axis 28 degrees    T Axis 32 degrees         IMAGING DATA:    CT HEAD WO CONTRAST    Result Date: 3/13/2023  EXAMINATION: CT OF THE HEAD WITHOUT CONTRAST  3/13/2023 6:01 pm TECHNIQUE: CT of the head was performed without the administration of intravenous contrast. Automated exposure control, iterative reconstruction, and/or weight based adjustment of the mA/kV was utilized to reduce the radiation dose to as low as reasonably achievable. COMPARISON: 07/05/2021, 12/15/2019 HISTORY: ORDERING SYSTEM PROVIDED HISTORY: fall TECHNOLOGIST PROVIDED HISTORY: fall Decision Support Exception - unselect if not a suspected or confirmed emergency medical condition->Emergency Medical Condition (MA) Reason for Exam: fall Additional signs and symptoms: patient states he fell this afternoon and \"whole body hurts\" states did not hit head, no headaches FINDINGS: BRAIN/VENTRICLES: There is no acute intracranial hemorrhage, mass effect or midline shift. No abnormal extra-axial fluid collection. The gray-white differentiation is maintained without evidence of an acute infarct. There is no evidence of hydrocephalus. There is mild generalized cortical atrophy. Moderate chronic microvascular ischemic changes. No new foci of abnormal attenuation within the brain. ORBITS: The visualized portion of the orbits demonstrate no acute abnormality. SINUSES: Prominent mucosal thickening involving the bilateral maxillary sinuses with small fluid levels. Partial opacification also noted involving the bilateral ethmoid sinuses. There is a left mastoid effusion. Left frontal sinus osteoma, stable. SOFT TISSUES/SKULL:  No acute abnormality of the visualized skull or soft tissues. No acute intracranial abnormality. Moderate atrophy and chronic microvascular ischemic changes. Bilateral maxillary and ethmoid sinusitis. Nonspecific left mastoid effusion. XR CHEST PORTABLE    Result Date: 3/13/2023  EXAMINATION: ONE XRAY VIEW OF THE CHEST 3/13/2023 2:39 pm COMPARISON: 09/20/2022 HISTORY: ORDERING SYSTEM PROVIDED HISTORY: cough, copd TECHNOLOGIST PROVIDED HISTORY: cough, copd Reason for Exam: pt states he has had a cough for a long time. FINDINGS: Cardial pericardial silhouette is unremarkable. Lungs are clear.   No pneumothorax is seen. No free air. No acute bony abnormality. Eloy catheter unchanged in position. No acute abnormality detected. IMPRESSION:   Primary Problem  LAURO (acute kidney injury) [facility])    Active Hospital Problems    Diagnosis Date Noted    LAURO (acute kidney injury) ([facility] [address] [N17.9] 08/12/2022     Priority: Medium       Follicular lymphoma on Imbruvica with response to therapy  CKD  UTI    RECOMMENDATIONS:  I reviewed the labs/imaging available to me,outside records and discussed with the patient. I explained to the patient the nature of this problem. I explained the significance of these abnormalities and possible etiology and management options  Reviewed previous medical records. Reviewed hospitalization record. Discussed natural history of follicular lymphoma. Per scans from December patient has been having good response to [place] he has been tolerating it also. According to patient's EMR he was prescribed 420 mg of Imbruvica. Patient states he takes 2 pills in the morning 1 in the evening and then if they are off. I am not able to find any documentation to support what patient is saying. Patient says his niece gives him the pill. Patient total dose seems to be 420 but Imbruvica comes in different milligram strengths  Recommend holding Imbruvica for now. I will reach out to the office in the morning and confirm his exact dosage. Continue treatment for UTI and supportive care and symptom management  We will follow      Discussed with patient and Nurse. Thank you for asking us to see this patient. Anna Lane MD          This note is created with the assistance of a speech recognition program.  While intending to generate a document that actually reflects the content of the visit, the document can still have some errors including those of syntax and sound a like substitutions which may escape proof reading.   It such instances, actual meaning can be extrapolated by contextual diversion.

## 2023-03-15 NOTE — PROGRESS NOTES
Today's Date: 3/15/2023  Patient Name: Narendra Borges  Date of admission: 3/13/2023  4:41 PM  Patient's age: 74 y.o., 1950  Admission Dx: Dehydration [E86.0]  Gastroenteritis [K52.9]  Elevated troponin [R77.8]  LAURO (acute kidney injury) ([address] [N17.9]    Reason for Consult: management recommendations  Requesting Physician: Chester Rivero DO    CHIEF COMPLAINT: Follicular lymphoma    History Obtained From:  patient, electronic medical record    HISTORY OF PRESENT ILLNESS:      The patient is a 74 y.o.  male who is admitted to the hospital for  chief complaints of mechanical fall without loss of consciousness. Patient work-up in the ER revealed worsening renal function. Patient creatinine was 1.6. Complains of diarrhea. Work-up shows UTI. Patient is on antibiotics. Patient stool occult blood test is negative. Lab work-up shows WBC count of 5.1 hemoglobin 9.0 MCV 85 and platelet count of 910. Patient is known to our practice. Patient has history of follicular lymphoma. Patient is currently on Imbruvica. Patient last scans from December showed continued improvement in the soft tissue retroperitoneal mass. Patient is on Eliquis 5 mg twice daily. Patient is on Imbruvica for 20 mg daily      Past Medical History:   has a past medical history of Arthritis, Cancer ([address] Chemotherapy management, encounter for, CHF (congestive heart failure) ([address] COPD (chronic obstructive pulmonary disease) ([address] Dementia ([address] Diabetes mellitus ([address] H/O cardiovascular stress test, History of non-Hodgkin's lymphoma, Hx of blood clots, Hyperlipidemia, Hypertension, Hypothyroidism, On home O2, and Pneumonia. Past Surgical History:   has a past surgical history that includes hernia repair; IR BIOPSY ABDOMINAL/RETROPERITONEAL MASS PERCUTANEOUS (7/6/2021); IR PORT PLACEMENT > 5 YEARS (8/31/2021); Colonoscopy (Left, 9/24/2021); Cystoscopy (Right, 10/6/2021);  Cholecystectomy, laparoscopic (N/A, 10/9/2021); Tonsillectomy; Cystoscopy (Bilateral, 11/24/2021); Cystoscopy (Bilateral, 2/16/2022); eye surgery; Carotid endarterectomy (Left, 6/28/2022); Cystoscopy (N/A, 7/20/2022); Upper gastrointestinal endoscopy (N/A, 9/29/2022); and Cystoscopy (Bilateral, 12/1/2022). Medications:    Prior to Admission medications    Medication Sig Start Date End Date Taking? Authorizing Provider   amLODIPine (NORVASC) 10 MG tablet Take 10 mg by mouth daily   Yes Historical Provider, MD   diphenoxylate-atropine (LOMOTIL) 2.5-0.025 MG per tablet Take 1 tablet by mouth 4 times daily as needed for Diarrhea. Indications: last filled 2/24/23 for 30 days   Yes Historical Provider, MD   furosemide (LASIX) 40 MG tablet Take 40 mg by mouth in the morning and 40 mg in the evening. Yes Historical Provider, MD   sucralfate (CARAFATE) 1 GM/10ML suspension Take 1 g by mouth 4 times daily   Yes Historical Provider, MD   megestrol (MEGACE) 40 MG/ML suspension take 10 milliliters (2 TEASPOONFULS) by mouth every morning 2/14/23   Monet Beasley MD   tamsulosin (FLOMAX) 0.4 MG capsule Take 0.8 mg by mouth at bedtime 1/20/23   Historical Provider, MD   lidocaine-prilocaine (EMLA) 2.5-2.5 % cream Apply topically 45-60 minutes prior to needle poke daily PRN. 2/2/23   Monet Beasley MD   famotidine (PEPCID) 40 MG/5ML suspension Take 2.5 mLs by mouth 2 times daily 1/19/23   Benjamin Vivas MD   ibrutinib (IMBRUVICA) 420 MG tablet Take 1 tablet by mouth daily ON 2 DAYS OFF 1 DAY-HAS 8 DOSES LEFT. FOR CANCER TREATMENT. 1/16/23   Monet Beasley MD   prochlorperazine (COMPAZINE) 10 MG tablet Take 1 tablet by mouth every 6 hours as needed (nausea/ vomiting) 12/5/22   Monet Beasley MD   magnesium oxide (MAG-OX) 400 (240 Mg) MG tablet Take 1 tablet by mouth daily 10/1/22   Roderick Metcalf DO   carvedilol (COREG) 6.25 MG tablet Take 6.25 mg by mouth 2 times daily (with meals)    Historical Provider, MD   oxyCODONE (ROXICODONE) 5 MG immediate release tablet Take 5 mg by mouth every 4 hours as needed for Pain. Last dispensed 8/2 for 30 days  Patient not taking: Reported on 3/13/2023    Historical Provider, MD   ferrous sulfate (FE TABS 325) 325 (65 Fe) MG EC tablet Take 1 tablet by mouth 3 times daily (with meals) 9/7/22   Roderick Metcalf, DO   atorvastatin (LIPITOR) 10 MG tablet Take 10 mg by mouth daily    Historical Provider, MD   apixaban (ELIQUIS) 5 MG TABS tablet Take 1 tablet by mouth in the morning and 1 tablet before bedtime. 7/21/22   Roderick Metcalf, DO   glipiZIDE (GLUCOTROL) 5 MG tablet Take 0.5 tablets by mouth in the morning and 0.5 tablets in the evening. Take before meals. Patient not taking: Reported on 8/12/2022 7/21/22 8/13/22  Johnnie Metcalf, DO   Lactobacillus (PROBIOTIC ACIDOPHILUS) CAPS Take 1 caplet by mouth daily    Historical Provider, MD   Multiple Vitamins-Minerals (THERAPEUTIC MULTIVITAMIN-MINERALS) tablet Take 1 tablet by mouth daily    Historical Provider, MD   OXYGEN Inhale 2 L into the lungs at bedtime Nightly & during the day.     Historical Provider, MD   ipratropium-albuterol (DUONEB) 0.5-2.5 (3) MG/3ML SOLN nebulizer solution Inhale 3 mLs into the lungs every 4 hours as needed for Shortness of Breath 2/20/22   EDWIGE Strong - CNP   levothyroxine (SYNTHROID) 150 MCG tablet Take 1 tablet by mouth Daily 2/2/22   Dayana Souza,    fluticasone-salmeterol (ADVAIR) 100-50 MCG/DOSE diskus inhaler Inhale 1 puff into the lungs every 12 hours 2/1/22   Brent Ulloa,    albuterol sulfate HFA (PROVENTIL;VENTOLIN;PROAIR) 108 (90 Base) MCG/ACT inhaler Inhale 2 puffs into the lungs every 4 hours as needed for Wheezing     Historical Provider, MD   losartan (COZAAR) 50 MG tablet Take 1 tablet by mouth daily 10/12/21   Roderick Metcalf DO   donepezil (ARICEPT) 10 MG tablet Take 10 mg by mouth daily  7/11/21   Historical Provider, MD   FLUoxetine (PROZAC) 20 MG capsule Take 20 mg by mouth daily    Historical Provider, MD omeprazole (PRILOSEC) 20 MG delayed release capsule Take 20 mg by mouth 2 times daily    Historical Provider, MD     Current Facility-Administered Medications   Medication Dose Route Frequency Provider Last Rate Last Admin    famotidine (PEPCID) tablet 20 mg  20 mg Oral Daily Roderick T Dixon, DO   20 mg at 03/15/23 0853    0.9 % sodium chloride infusion   IntraVENous Continuous [place] Mocha, DO 75 mL/hr at 03/14/23 2046 New Bag at 03/14/23 2046    FLUoxetine (PROZAC) capsule 20 mg  20 mg Oral Daily Roderick T Dixon, DO   20 mg at 03/15/23 0853    donepezil (ARICEPT) tablet 10 mg  10 mg Oral Daily Roderick T Dixon, DO   10 mg at 03/15/23 0853    losartan (COZAAR) tablet 50 mg  50 mg Oral Daily Roderick T Dixon, DO   50 mg at 03/15/23 0853    albuterol sulfate HFA (PROVENTIL;VENTOLIN;PROAIR) 108 (90 Base) MCG/ACT inhaler 2 puff  2 puff Inhalation Q6H PRN Roderick T Dixon, DO        levothyroxine (SYNTHROID) tablet 150 mcg  150 mcg Oral Daily Roderick T Dixon, DO   150 mcg at 03/15/23 0505    ipratropium-albuterol (DUONEB) nebulizer solution 3 mL  1 vial Inhalation Q4H PRN Roderick T Dixon, DO   3 mL at 03/13/23 2353    lactobacillus (CULTURELLE) capsule 1 capsule  1 capsule Oral Daily Roderick T Dixon, DO   1 capsule at 03/15/23 0853    therapeutic multivitamin-minerals 1 tablet  1 tablet Oral Daily Roderick T Dixon, DO   1 tablet at 03/15/23 0853    apixaban (ELIQUIS) tablet 5 mg  5 mg Oral BID Roderick T Dixon, DO   5 mg at 03/15/23 0853    atorvastatin (LIPITOR) tablet 10 mg  10 mg Oral Daily Roderick T Dixon, DO   10 mg at 03/15/23 0853    ferrous sulfate (IRON 325) tablet 325 mg  325 mg Oral TID  Roderick T Dixon, DO   325 mg at 03/15/23 1257    carvedilol (COREG) tablet 6.25 mg  6.25 mg Oral BID  Roderick T Dixon, DO   6.25 mg at 03/15/23 0853    magnesium oxide (MAG-OX) tablet 400 mg  400 mg Oral Daily Roderick T Dixon, DO   400 mg at 03/15/23 0853    prochlorperazine (COMPAZINE) tablet 10 mg   10 mg Oral Q6H PRN Rdoerick T Dixon, DO   10 mg at 03/15/23 1257    [Held by provider] ibrutinib (IMBRUVICA) chemo tablet 420 mg  (Patient Supplied)  420 mg Oral Daily Roderick T Dixon, DO   420 mg at 03/14/23 1015    lidocaine-prilocaine (EMLA) cream   Topical PRN Faby Gayla, DO   Given at 03/15/23 0505    tamsulosin (FLOMAX) capsule 0.8 mg  0.8 mg Oral Nightly Roderick T Dixon, DO   0.8 mg at 03/14/23 2042    amLODIPine (NORVASC) tablet 10 mg  10 mg Oral Daily Roderick T Dixon, DO   10 mg at 03/15/23 0853    diphenoxylate-atropine (LOMOTIL) 2.5-0.025 MG per tablet 1 tablet  1 tablet Oral 4x Daily PRN Roderick T Dixon, DO        furosemide (LASIX) tablet 40 mg  40 mg Oral BID Roderick T Dixon, DO   40 mg at 03/15/23 0853    pantoprazole (PROTONIX) tablet 40 mg  40 mg Oral QAM AC Roderick T Dixon, DO   40 mg at 03/15/23 0505    sucralfate (CARAFATE) tablet 1 g  1 g Oral 4 times per day Roderick T Dixon, DO   1 g at 03/15/23 1257    budesonide-formoterol (SYMBICORT) 80-4.5 MCG/ACT inhaler 2 puff  2 puff Inhalation BID Faby Gayla, DO   2 puff at 03/15/23 0751       Allergies:  Patient has no known allergies. Social History:   reports that he quit smoking about 17 years ago. His smoking use included cigarettes. He has a 90.00 pack-year smoking history. He has never used smokeless tobacco. He reports current alcohol use of about 1.7 standard drinks per week. He reports current drug use. Drug: Marijuana Aniyah Cotton). Family History: family history includes Alzheimer's Disease in his father; Diabetes in his mother; Heart Disease in his brother and brother. REVIEW OF SYSTEMS:      Constitutional: No fever or chills. No night sweats, no weight loss.   Positive fatigue  Eyes: No eye discharge, double vision, or eye pain   HEENT: negative for sore mouth, sore throat, hoarseness and voice change   Respiratory: negative for cough , sputum, dyspnea, wheezing, hemoptysis, chest pain   Cardiovascular: negative for chest pain, dyspnea, palpitations, orthopnea, PND   Gastrointestinal: negative for nausea, vomiting, diarrhea, constipation, abdominal pain, Dysphagia, hematemesis and hematochezia   Genitourinary: negative for frequency, dysuria, nocturia, urinary incontinence, and hematuria   Integument: negative for rash, skin lesions, bruises.    Hematologic/Lymphatic: negative for easy bruising, bleeding, lymphadenopathy, or petechiae   Endocrine: negative for heat or cold intolerance,weight changes, change in bowel habits and hair loss   Musculoskeletal: negative for myalgias, arthralgias, pain, joint swelling,and bone pain   Neurological: negative for headaches, dizziness, seizures, weakness, numbness    PHYSICAL EXAM:        BP (!) 114/50   Pulse 64   Temp 97.2 °F (36.2 °C) (Oral)   Resp 16   Ht 5' 11\" (1.803 m)   Wt 151 lb 14.4 oz (68.9 kg)   SpO2 94%   BMI 21.19 kg/m²    Temp (24hrs), Av.8 °F (36.6 °C), Min:97.2 °F (36.2 °C), Max:98.1 °F (36.7 °C)      General appearance -frail appearing, no in pain or distress   Mental status - alert and cooperative   Eyes - pupils equal and reactive, extraocular eye movements intact   Ears - bilateral TM's and external ear canals normal   Mouth - mucous membranes moist, pharynx normal without lesions   Neck - supple, no significant adenopathy   Lymphatics - no palpable lymphadenopathy, no hepatosplenomegaly   Chest - clear to auscultation, no wheezes, rales or rhonchi, symmetric air entry   Heart - normal rate, regular rhythm, normal S1, S2, no murmurs  Abdomen - soft, nontender, nondistended, no masses or organomegaly   Neurological - alert, oriented, normal speech, no focal findings or movement disorder noted   Musculoskeletal - no joint tenderness, deformity or swelling   Extremities - peripheral pulses normal, no pedal edema, no clubbing or cyanosis   Skin - normal coloration and turgor, no rashes, no suspicious skin lesions noted ,      DATA:      Labs:     Results for orders placed or performed during the hospital encounter of 23   COVID-19 & Influenza Combo    Specimen: Nasopharyngeal Swab   Result Value Ref Range    Specimen Description .NASOPHARYNGEAL SWAB     Source .NASOPHARYNGEAL SWAB     SARS-CoV-2 RNA, RT PCR Not Detected Not Detected    INFLUENZA A Not Detected Not Detected    INFLUENZA B Not Detected Not Detected   Culture, Urine    Specimen: Urine, clean catch   Result Value Ref Range    Specimen Description .CLEAN CATCH URINE     Culture NO SIGNIFICANT GROWTH    CBC with Auto Differential   Result Value Ref Range    WBC 6.3 3.5 - 11.0 k/uL    RBC 3.12 (L) 4.5 - 5.9 m/uL    Hemoglobin 9.0 (L) 13.5 - 17.5 g/dL    Hematocrit 26.5 (L) 41 - 53 %    MCV 84.8 80 - 100 fL    MCH 28.8 26 - 34 pg    MCHC 34.0 31 - 37 g/dL    RDW 14.7 11.5 - 14.9 %    Platelets 145 (L) 150 - 450 k/uL    MPV 9.0 6.0 - 12.0 fL    Seg Neutrophils 70 (H) 36 - 66 %    Lymphocytes 21 (L) 24 - 44 %    Monocytes 9 (H) 1 - 7 %    Eosinophils % 0 0 - 4 %    Basophils 0 0 - 2 %    Segs Absolute 4.41 1.3 - 9.1 k/uL    Absolute Lymph # 1.32 1.0 - 4.8 k/uL    Absolute Mono # 0.57 0.1 - 1.3 k/uL    Absolute Eos # 0.00 0.0 - 0.4 k/uL    Basophils Absolute 0.00 0.0 - 0.2 k/uL    Morphology ANISOCYTOSIS PRESENT     Morphology 1+ ELLIPTOCYTES     Morphology 1+ TEARDROPS    Comprehensive Metabolic Panel w/ Reflex to MG   Result Value Ref Range    Glucose 118 (H) 70 - 99 mg/dL    BUN 44 (H) 8 - 23 mg/dL    Creatinine 1.62 (H) 0.70 - 1.20 mg/dL    Est, Glom Filt Rate 44 (L) >60 mL/min/1.73m2    Calcium 8.1 (L) 8.6 - 10.4 mg/dL    Sodium 138 135 - 144 mmol/L    Potassium 5.2 3.7 - 5.3 mmol/L    Chloride 103 98 - 107 mmol/L    CO2 25 20 - 31 mmol/L    Anion Gap 10 9 - 17 mmol/L    Alkaline Phosphatase 78 40 - 129 U/L    ALT 5 5 - 41 U/L    AST 10 <40 U/L    Total Bilirubin 0.4 0.3 - 1.2 mg/dL    Total Protein 4.9 (L) 6.4 - 8.3 g/dL    Albumin 2.7 (L) 3.5 - 5.2 g/dL   Lipase   Result Value Ref Range    Lipase 73 (H) 13 - 60 U/L   URINE DRUG SCREEN   Result Value Ref Range    Amphetamine Screen,  Ur NEGATIVE NEGATIVE    Barbiturate Screen, Ur NEGATIVE NEGATIVE    Benzodiazepine Screen, Urine NEGATIVE NEGATIVE    Cocaine Metabolite, Urine NEGATIVE NEGATIVE    Methadone Screen, Urine NEGATIVE NEGATIVE    Opiates, Urine NEGATIVE NEGATIVE    Phencyclidine, Urine NEGATIVE NEGATIVE    Cannabinoid Scrn, Ur POSITIVE (A) NEGATIVE    Oxycodone Screen, Ur NEGATIVE NEGATIVE    Fentanyl, Ur NEGATIVE NEGATIVE    Test Information       Assay provides medical screening only. The absence of expected drug(s) and/or metabolite(s) may indicate diluted or adulterated urine, limitations of testing or timing of collection.    Urinalysis with Reflex to Culture    Specimen: Urine voided   Result Value Ref Range    Color, UA Orange (A) Yellow    Turbidity UA Cloudy (A) Clear    Glucose, Ur NEGATIVE NEGATIVE    Bilirubin Urine NEGATIVE NEGATIVE    Ketones, Urine NEGATIVE NEGATIVE    Specific Nursery, UA 1.011 1.000 - 1.030    Urine Hgb LARGE (A) NEGATIVE    pH, UA 5.5 5.0 - 8.0    Protein, UA 2+ (A) NEGATIVE    Urobilinogen, Urine Normal Normal    Nitrite, Urine NEGATIVE NEGATIVE    Leukocyte Esterase, Urine MOD (A) NEGATIVE   Troponin   Result Value Ref Range    Troponin, High Sensitivity 118 (HH) 0 - 22 ng/L   Troponin   Result Value Ref Range    Troponin, High Sensitivity 108 (HH) 0 - 22 ng/L   Microscopic Urinalysis   Result Value Ref Range    WBC, UA 10 TO 20 /HPF    RBC, UA TOO NUMEROUS TO COUNT /HPF    Casts UA 3 to 5 /LPF    Epithelial Cells UA 0 TO 2 /HPF    Bacteria, UA None None   Blood Occult Stool Screen #1   Result Value Ref Range    Occult Blood, Stool #1 NEGATIVE NEGATIVE    Date, Stool #1 [phone]     Time, Stool #1 Unknown    CBC with Auto Differential   Result Value Ref Range    WBC 5.1 3.5 - 11.0 k/uL    RBC 3.16 (L) 4.5 - 5.9 m/uL    Hemoglobin 9.0 (L) 13.5 - 17.5 g/dL    Hematocrit 27.0 (L) 41 - 53 %    MCV 85.4 80 - 100 fL    MCH 28.4 26 - 34 pg    MCHC 33.3 31 - 37 g/dL    RDW 14.6 11.5 - 14.9 %    Platelets 145 (L) 150 - 450 k/uL    MPV 8.9 6.0 - 12.0 fL    Seg Neutrophils 63 36 - 66 %    Lymphocytes 22 (L) 24 - 44 %    Monocytes 14 (H) 1 - 7 %    Eosinophils % 1 0 - 4 %    Basophils 0 0 - 2 %    Segs Absolute 3.22 1.3 - 9.1 k/uL    Absolute Lymph # 1.12 1.0 - 4.8 k/uL    Absolute Mono # 0.71 0.1 - 1.3 k/uL    Absolute Eos # 0.05 0.0 - 0.4 k/uL    Basophils Absolute 0.00 0.0 - 0.2 k/uL    Morphology ANISOCYTOSIS PRESENT     Morphology ELLIPTOCYTES     Morphology FEW POLYCHROMASIA    Basic Metabolic Panel   Result Value Ref Range    Glucose 102 (H) 70 - 99 mg/dL    BUN 39 (H) 8 - 23 mg/dL    Creatinine 1.52 (H) 0.70 - 1.20 mg/dL    Est, Glom Filt Rate 48 (L) >60 mL/min/1.73m2    Calcium 8.1 (L) 8.6 - 10.4 mg/dL    Sodium 139 135 - 144 mmol/L    Potassium 5.2 3.7 - 5.3 mmol/L    Chloride 106 98 - 107 mmol/L    CO2 25 20 - 31 mmol/L    Anion Gap 8 (L) 9 - 17 mmol/L   Troponin   Result Value Ref Range    Troponin, High Sensitivity 113 (HH) 0 - 22 ng/L   SPECIMEN REJECTION   Result Value Ref Range    Specimen Source . FECES     Ordered Test STLPCR,CDIFQ     Reason for Rejection Unable to perform testing: No specimen received.     CBC with Auto Differential   Result Value Ref Range    WBC 5.2 3.5 - 11.0 k/uL    RBC 2.89 (L) 4.5 - 5.9 m/uL    Hemoglobin 8.1 (L) 13.5 - 17.5 g/dL    Hematocrit 24.5 (L) 41 - 53 %    MCV 84.9 80 - 100 fL    MCH 28.1 26 - 34 pg    MCHC 33.1 31 - 37 g/dL    RDW 14.6 11.5 - 14.9 %    Platelets 401 (L) 986 - 450 k/uL    MPV 8.8 6.0 - 12.0 fL    Seg Neutrophils 59 36 - 66 %    Lymphocytes 25 24 - 44 %    Monocytes 14 (H) 1 - 7 %    Eosinophils % 2 0 - 4 %    Basophils 0 0 - 2 %    Segs Absolute 3.07 1.3 - 9.1 k/uL    Absolute Lymph # 1.30 1.0 - 4.8 k/uL    Absolute Mono # 0.73 0.1 - 1.3 k/uL    Absolute Eos # 0.10 0.0 - 0.4 k/uL    Basophils Absolute 0.00 0.0 - 0.2 k/uL    Morphology Normal    Basic Metabolic Panel   Result Value Ref Range    Glucose 82 70 - 99 mg/dL    BUN 31 (H) 8 - 23 mg/dL Creatinine 1.30 (H) 0.70 - 1.20 mg/dL    Est, Glom Filt Rate 58 (L) >60 mL/min/1.73m2    Calcium 8.0 (L) 8.6 - 10.4 mg/dL    Sodium 139 135 - 144 mmol/L    Potassium 4.2 3.7 - 5.3 mmol/L    Chloride 106 98 - 107 mmol/L    CO2 24 20 - 31 mmol/L    Anion Gap 9 9 - 17 mmol/L   POC Glucose Fingerstick   Result Value Ref Range    POC Glucose 85 75 - 110 mg/dL   POC Glucose Fingerstick   Result Value Ref Range    POC Glucose 119 (H) 75 - 110 mg/dL   EKG 12 Lead   Result Value Ref Range    Ventricular Rate 49 BPM    Atrial Rate 49 BPM    P-R Interval 138 ms    QRS Duration 96 ms    Q-T Interval 448 ms    QTc Calculation (Bazett) 404 ms    P Axis 73 degrees    R Axis 28 degrees    T Axis 32 degrees         IMAGING DATA:    CT HEAD WO CONTRAST    Result Date: 3/13/2023  EXAMINATION: CT OF THE HEAD WITHOUT CONTRAST  3/13/2023 6:01 pm TECHNIQUE: CT of the head was performed without the administration of intravenous contrast. Automated exposure control, iterative reconstruction, and/or weight based adjustment of the mA/kV was utilized to reduce the radiation dose to as low as reasonably achievable. COMPARISON: 07/05/2021, 12/15/2019 HISTORY: ORDERING SYSTEM PROVIDED HISTORY: fall TECHNOLOGIST PROVIDED HISTORY: fall Decision Support Exception - unselect if not a suspected or confirmed emergency medical condition->Emergency Medical Condition (MA) Reason for Exam: fall Additional signs and symptoms: patient states he fell this afternoon and \"whole body hurts\" states did not hit head, no headaches FINDINGS: BRAIN/VENTRICLES: There is no acute intracranial hemorrhage, mass effect or midline shift. No abnormal extra-axial fluid collection. The gray-white differentiation is maintained without evidence of an acute infarct. There is no evidence of hydrocephalus. There is mild generalized cortical atrophy. Moderate chronic microvascular ischemic changes. No new foci of abnormal attenuation within the brain.  ORBITS: The visualized portion of the orbits demonstrate no acute abnormality. SINUSES: Prominent mucosal thickening involving the bilateral maxillary sinuses with small fluid levels. Partial opacification also noted involving the bilateral ethmoid sinuses. There is a left mastoid effusion. Left frontal sinus osteoma, stable. SOFT TISSUES/SKULL:  No acute abnormality of the visualized skull or soft tissues. No acute intracranial abnormality. Moderate atrophy and chronic microvascular ischemic changes. Bilateral maxillary and ethmoid sinusitis. Nonspecific left mastoid effusion. XR CHEST PORTABLE    Result Date: 3/13/2023  EXAMINATION: ONE XRAY VIEW OF THE CHEST 3/13/2023 2:39 pm COMPARISON: 09/20/2022 HISTORY: ORDERING SYSTEM PROVIDED HISTORY: cough, copd TECHNOLOGIST PROVIDED HISTORY: cough, copd Reason for Exam: pt states he has had a cough for a long time. FINDINGS: Cardial pericardial silhouette is unremarkable. Lungs are clear. No pneumothorax is seen. No free air. No acute bony abnormality. Eloy catheter unchanged in position. No acute abnormality detected. IMPRESSION:   Primary Problem  LAURO (acute kidney injury) [facility])    Active Hospital Problems    Diagnosis Date Noted    LAURO (acute kidney injury) ([facility] [address] [N17.9] 08/12/2022     Priority: Medium       Follicular lymphoma on Imbruvica with response to therapy  CKD  UTI    RECOMMENDATIONS:  I reviewed the labs/imaging available to me,outside records and discussed with the patient. I explained to the patient the nature of this problem. I explained the significance of these abnormalities and possible etiology and management options  Reviewed previous medical records. Reviewed hospitalization record. Discussed natural history of follicular lymphoma. Per scans from December patient has been having good response to [facility] [place] he has been tolerating it also. According to patient's EMR he was prescribed 420 mg of Imbruvica.   Patient states he takes 2 pills in the morning 1 in the evening and then if they are off. I am not able to find any documentation to support what patient is saying. Patient says his niece gives him the pill. Patient total dose seems to be 420 but Imbruvica comes in different milligram strengths  Recommend holding Imbruvica for now. I will reach out to the office in the morning and confirm his exact dosage. Continue treatment for UTI and supportive care and symptom management  We will follow      Discussed with patient and Nurse. Thank you for asking us to see this patient. Jairo Lane MD          This note is created with the assistance of a speech recognition program.  While intending to generate a document that actually reflects the content of the visit, the document can still have some errors including those of syntax and sound a like substitutions which may escape proof reading. It such instances, actual meaning can be extrapolated by contextual diversion.

## 2023-03-15 NOTE — DISCHARGE SUMMARY
Physician Discharge Summary     Patient ID:  Johanna Acevedo  [id number]  13 y.o.  1950    Admit date: 3/13/2023    Discharge date and time: 3/15/2023    Admitting Physician: Layne Victor DO     Discharge Physician: Layne Victor DO      Admission Diagnoses:   Dehydration [E86.0]  Gastroenteritis [K52.9]  Elevated troponin [R77.8]  LAURO (acute kidney injury) ([facility] [N17.9]    Discharge Diagnoses:   Principal Problem:    LAURO (acute kidney injury) ([facility] [address] [facility]  Resolved Problems:    * No resolved hospital problems.  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    Hydronephrosis due to obstruction of ureter N13.1    Moderate malnutrition (HCC) E44.0    Chronic cholecystitis K81.1    Pleural effusion J90    Cardiomegaly I51.7    Hypothyroidism E03.9    Diabetes mellitus ([facility]) E11.9    Hypertensive urgency I16.0    Acute respiratory failure with hypoxia ([facility]) J96.01    Hypoxia R09.02    Carotid stenosis, asymptomatic, bilateral I65.23    Simple chronic bronchitis ([facility]) J41.0    Dependence on nocturnal oxygen therapy Z99.81    Pneumonia J18.9    LAURO (acute kidney injury) ([facility] [address] [facility] N17.9    Failure to thrive in adult R62.7    DVT (deep venous thrombosis) ([facility]) I82.409    Lymphoma, diffuse (HCC) C85.90    Severe malnutrition (HCC) E43    Acute cystitis with hematuria N30.01    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    Atrial fibrillation with RVR ([facility]) I48.91      Mechanical fall  Blunt head injury negative CT  Right forearm and arm skin tear and bruising  UTI awaiting cultures and sensitivity  History of hydro  History of lymphoma  Status post diarrhea  Acute kidney injury resolving with hydration to his baseline       Hospital Course: Patient was admitted with a fall which he felt weak and fell and tripped but he did not have any passing out episodes or syncopal episode patient has been having nausea vomiting diarrhea has been going on and his family were below has similar symptoms wasn't eating much. He had workup in the ER including CAT scan was negative he had abrasion is skin tag was just adjusted with the dressing in emergency room and Steri-Strips patient was admitted for observation he seemed to do okay with the hydration and his kidney function was a little bit off compared to his baseline but that came down to his baseline he has a chronic hydrocele into the mass and second to his lymphoma in the abdomen is very initially showed maybe some hint of infection but his culture was negative he was started on Rocephin that was stopped on the culture was negative patient was eating and drinking better in the hospital felt okay to go home and only go to rehabilitation he was having some episode of nausea but nothing different than what he usually has with his medication he was seen by his hematologist they stopped his chemotherapy meds. But asked RN tierra on the sure to check with them that they want to go back on miniscule medications   Component Value Units   Basic Metabolic Panel [[id number]] (Abnormal)    Collected: 03/15/23 0604    Updated: 03/15/23 0650    Specimen Source: Blood     Glucose 82 mg/dL    BUN 31 High  mg/dL    Creatinine 1.30 High  mg/dL    Est, Glom Filt Rate 58 Low  mL/min/1.73m2    Comment:        These results are not intended for use in patients <25years of age. eGFR results are calculated without a race factor using the 2021 CKD-EPI equation. Careful clinical correlation is recommended, particularly when comparing to results   calculated using previous equations. The CKD-EPI equation is less accurate in patients with extremes of muscle mass, extra-renal   metabolism of creatine, excessive creatine ingestion, or following therapy that affects   renal tubular secretion.         Calcium 8.0 Low  mg/dL    Sodium 139 mmol/L    Potassium 4.2 mmol/L Chloride 106 mmol/L    CO2 24 mmol/L    Anion Gap 9 mmol/L   CBC with Auto Differential [[id number]] (Abnormal)    Collected: 03/15/23 0604    Updated: 03/15/23 0647    Specimen Source: Blood     WBC 5.2 k/uL    RBC 2.89 Low  m/uL    Hemoglobin 8.1 Low  g/dL    Hematocrit 24.5 Low  %    MCV 84.9 fL    MCH 28.1 pg    MCHC 33.1 g/dL    RDW 14.6 %    Platelets 501 Low  k/uL    MPV 8.8 fL    Seg Neutrophils PENDING %    Lymphocytes PENDING %    Monocytes PENDING %    Eosinophils % PENDING %    Basophils PENDING %    Segs Absolute PENDING k/uL    Absolute Lymph # PENDING k/uL    Absolute Mono # PENDING k/uL    Absolute Eos # PENDING k/uL    Basophils Absolute PENDING k/uL   Culture, Urine [[id number]]    Collected: 03/13/23 2046    Updated: 03/14/23 1743    Specimen Source: Urine, clean catch     Specimen Description . CLEAN CATCH URINE    Culture NO SIGNIFICANT GROWTH   Blood Occult Stool Screen #1 [[id number]]    Collected: 03/14/23 1341    Updated: 03/14/23 1346    Specimen Source: Stool     Occult Blood, Stool #1 NEGATIVE    Date, Stool #1 [id number]    Time, Stool #1 Unknown   SPECIMEN REJECTION [[id number]]    Collected: 03/14/23 1341    Updated: 03/14/23 1343     Specimen Source . FECES    Ordered Test STLPCR,CDIFQ    Reason for Rejection Unable to perform testing: No specimen received. POC Glucose Fingerstick [[id number]] (Abnormal)    Collected: 03/14/23 1121    Updated: 03/14/23 1138     POC Glucose 119 High  mg/dL   EKG 12 Lead [[id number]]    Collected: 03/13/23 1748    Updated: 03/14/23 1001     Ventricular Rate 49 BPM    Atrial Rate 49 BPM    P-R Interval 138 ms    QRS Duration 96 ms    Q-T Interval 448 ms    QTc Calculation (Bazett) 404 ms    P Axis 73 degrees    R Axis 28 degrees    T Axis 32 degrees   Narrative:     Sinus bradycardia   Possible Inferior infarct , age undetermined   Abnormal ECG   When compared with ECG of 27-SEP-2022 08:36,   Vent.  rate has decreased BY  26 BPM   Borderline criteria for Inferior infarct are now Present     Current IP Meds  Diposition; to home with vns  Condition on discharge stable but guarded    Patient Instructions:  To report of any nausea vomiting or pain    Discharge Medications:  [unfilled]   Frequency     cefTRIAXone (ROCEPHIN) 1,000 mg in sodium chloride 0.9 % 50 mL IVPB (mini-bag)     Discontinue      EVERY 24 HOURS    famotidine (PEPCID) tablet 20 mg     Discontinue      DAILY    FLUoxetine (PROZAC) capsule 20 mg     Discontinue      DAILY    donepezil (ARICEPT) tablet 10 mg     Discontinue      DAILY    losartan (COZAAR) tablet 50 mg     Discontinue      DAILY    lactobacillus (CULTURELLE) capsule 1 capsule     Discontinue      DAILY    therapeutic multivitamin-minerals 1 tablet     Discontinue      DAILY    atorvastatin (LIPITOR) tablet 10 mg     Discontinue      DAILY    magnesium oxide (MAG-OX) tablet 400 mg     Discontinue      DAILY    [Held by provider]  ibrutinib (IMBRUVICA) chemo tablet 420 mg  (Patient Supplied)     Discontinue      DAILY    amLODIPine (NORVASC) tablet 10 mg     Discontinue      DAILY    ferrous sulfate (IRON 325) tablet 325 mg     Discontinue      3 TIMES DAILY WITH MEALS    carvedilol (COREG) tablet 6.25 mg     Discontinue      2 TIMES DAILY WITH MEALS    furosemide (LASIX) tablet 40 mg     Discontinue      2 TIMES DAILY    levothyroxine (SYNTHROID) tablet 150 mcg     Discontinue      DAILY    pantoprazole (PROTONIX) tablet 40 mg     Discontinue      DAILY BEFORE BREAKFAST    sucralfate (CARAFATE) tablet 1 g     Discontinue      4 times per day    0.9 % sodium chloride infusion     Discontinue      CONTINUOUS    apixaban (ELIQUIS) tablet 5 mg     Discontinue      2 TIMES DAILY    tamsulosin (FLOMAX) capsule 0.8 mg     Discontinue      Nightly    budesonide-formoterol (SYMBICORT) 80-4.5 MCG/ACT inhaler 2 puff     Discontinue      2 TIMES DAILY    albuterol sulfate HFA (PROVENTIL;VENTOLIN;PROAIR) 108 (90 Base) MCG/ACT inhaler 2 puff Discontinue      EVERY 6 HOURS PRN    lidocaine-prilocaine (EMLA) cream     Discontinue      PRN    prochlorperazine (COMPAZINE) tablet 10 mg      Discontinue      EVERY 6 HOURS PRN    diphenoxylate-atropine (LOMOTIL) 2.5-0.025 MG per tablet 1 tablet     Discontinue      4 TIMES DAILY PRN    ipratropium-albuterol (DUONEB) nebulizer solution 3 mL     Discontinue      EVERY 4 HOURS PRN     Intake/Output      Activity: activity as tolerated    Diet: ADULT DIET; Regular; 3 carb choices (45 gm/meal); Low Fat/Low Chol/High Fiber/2 gm Na  ADULT ORAL NUTRITION SUPPLEMENT; Breakfast, Lunch, Dinner; Diabetic Oral Supplement    Follow-up with DR GREENBERG in 1 to 2 weeks, patient to call  for an appointment and if has any problems.       Electronically signed by KRISTI Garcia  on 3/15/2023 at 7:45 AM

## 2023-03-23 ENCOUNTER — TELEPHONE (OUTPATIENT)
Dept: INFUSION THERAPY | Age: 73
End: 2023-03-23

## 2023-03-23 DIAGNOSIS — C85.90 NON-HODGKIN'S LYMPHOMA, UNSPECIFIED BODY REGION, UNSPECIFIED NON-HODGKIN LYMPHOMA TYPE (HCC): Primary | ICD-10-CM

## 2023-03-23 NOTE — TELEPHONE ENCOUNTER
Pt's wife called stating pt is very weak, and she does not know if she can get him to his appt Monday with Dr. Darrion Bourne. She does not know if hospice needs to be involved now. Writer suggested hospice consult so they can come to the home and discuss their services with her and the pt to see if they are agreeable. Writer states they do not need to sign up with hospice after the consultation if they are not ready yet, and they can sign out at any time. Sarika agreeable and referral faxed to [facility]. Sarika will call if they decide to sign with hospice, or if they decide to keep appt with Dr. Darrion Bourne Monday. Did discuss possibly changing appt Monday to a VV to accommodate pt. Sarika will keep us updated.

## 2023-03-27 ENCOUNTER — OFFICE VISIT (OUTPATIENT)
Dept: ONCOLOGY | Age: 73
End: 2023-03-27
Payer: COMMERCIAL

## 2023-03-27 ENCOUNTER — HOSPITAL ENCOUNTER (OUTPATIENT)
Age: 73
Discharge: HOME | End: 2023-03-27
Payer: COMMERCIAL

## 2023-03-27 ENCOUNTER — HOSPITAL ENCOUNTER (OUTPATIENT)
Dept: INFUSION THERAPY | Age: 73
Discharge: HOME | End: 2023-03-27
Payer: COMMERCIAL

## 2023-03-27 ENCOUNTER — TELEPHONE (OUTPATIENT)
Dept: ONCOLOGY | Age: 73
End: 2023-03-27

## 2023-03-27 VITALS
HEART RATE: 79 BPM | DIASTOLIC BLOOD PRESSURE: 53 MMHG | WEIGHT: 157 LBS | SYSTOLIC BLOOD PRESSURE: 94 MMHG | BODY MASS INDEX: 21.9 KG/M2 | TEMPERATURE: 97.4 F

## 2023-03-27 DIAGNOSIS — C85.99 LYMPHOMA OF BODY OF STOMACH (HCC): ICD-10-CM

## 2023-03-27 DIAGNOSIS — C82.13 FOLLICULAR LYMPHOMA GRADE II OF INTRA-ABDOMINAL LYMPH NODES (HCC): Primary | ICD-10-CM

## 2023-03-27 DIAGNOSIS — C85.90 NON-HODGKIN'S LYMPHOMA, UNSPECIFIED BODY REGION, UNSPECIFIED NON-HODGKIN LYMPHOMA TYPE (HCC): ICD-10-CM

## 2023-03-27 DIAGNOSIS — C85.99 LYMPHOMA OF BODY OF STOMACH (HCC): Primary | ICD-10-CM

## 2023-03-27 LAB
ABSOLUTE EOS #: 0 K/UL (ref 0–0.4)
ABSOLUTE LYMPH #: 1.02 K/UL (ref 1–4.8)
ABSOLUTE MONO #: 0.6 K/UL (ref 0.1–0.8)
ALBUMIN SERPL-MCNC: 2.6 G/DL (ref 3.5–5.2)
ALBUMIN/GLOBULIN RATIO: 1.2 (ref 1–2.5)
ALP SERPL-CCNC: 85 U/L (ref 40–129)
ALT SERPL-CCNC: 10 U/L (ref 5–41)
ANION GAP SERPL CALCULATED.3IONS-SCNC: 10 MMOL/L (ref 9–17)
AST SERPL-CCNC: 9 U/L
BASOPHILS # BLD: 0 % (ref 0–2)
BASOPHILS ABSOLUTE: 0 K/UL (ref 0–0.2)
BILIRUB SERPL-MCNC: 0.3 MG/DL (ref 0.3–1.2)
BUN SERPL-MCNC: 48 MG/DL (ref 8–23)
CALCIUM SERPL-MCNC: 7.7 MG/DL (ref 8.6–10.4)
CHLORIDE SERPL-SCNC: 107 MMOL/L (ref 98–107)
CO2 SERPL-SCNC: 22 MMOL/L (ref 20–31)
CREAT SERPL-MCNC: 2.1 MG/DL (ref 0.7–1.2)
EOSINOPHILS RELATIVE PERCENT: 0 % (ref 1–4)
GFR SERPL CREATININE-BSD FRML MDRD: 32 ML/MIN/1.73M2
GLUCOSE SERPL-MCNC: 182 MG/DL (ref 70–99)
HCT VFR BLD AUTO: 24.2 % (ref 41–53)
HGB BLD-MCNC: 7.7 G/DL (ref 13.5–17.5)
LYMPHOCYTES # BLD: 12 % (ref 24–44)
MCH RBC QN AUTO: 27.8 PG (ref 26–34)
MCHC RBC AUTO-ENTMCNC: 32 G/DL (ref 31–37)
MCV RBC AUTO: 87 FL (ref 80–100)
METAMYELOCYTES ABSOLUTE COUNT: 0.09 K/UL
METAMYELOCYTES: 1 %
MONOCYTES # BLD: 7 % (ref 1–7)
MORPHOLOGY: NORMAL
PDW BLD-RTO: 15.3 % (ref 12.5–15.4)
PLATELET # BLD AUTO: 225 K/UL (ref 140–450)
PMV BLD AUTO: 8.5 FL (ref 6–12)
POTASSIUM SERPL-SCNC: 4.9 MMOL/L (ref 3.7–5.3)
PROT SERPL-MCNC: 4.8 G/DL (ref 6.4–8.3)
RBC # BLD: 2.78 M/UL (ref 4.5–5.9)
SEG NEUTROPHILS: 80 % (ref 36–66)
SEGMENTED NEUTROPHILS ABSOLUTE COUNT: 6.79 K/UL (ref 1.8–7.7)
SODIUM SERPL-SCNC: 139 MMOL/L (ref 135–144)
WBC # BLD AUTO: 8.5 K/UL (ref 3.5–11)

## 2023-03-27 PROCEDURE — 85025 COMPLETE CBC W/AUTO DIFF WBC: CPT

## 2023-03-27 PROCEDURE — 6360000002 HC RX W HCPCS: Performed by: INTERNAL MEDICINE

## 2023-03-27 PROCEDURE — 3074F SYST BP LT 130 MM HG: CPT | Performed by: INTERNAL MEDICINE

## 2023-03-27 PROCEDURE — 99214 OFFICE O/P EST MOD 30 MIN: CPT | Performed by: INTERNAL MEDICINE

## 2023-03-27 PROCEDURE — 36591 DRAW BLOOD OFF VENOUS DEVICE: CPT | Performed by: NURSE PRACTITIONER

## 2023-03-27 PROCEDURE — 2580000003 HC RX 258: Performed by: INTERNAL MEDICINE

## 2023-03-27 PROCEDURE — 99211 OFF/OP EST MAY X REQ PHY/QHP: CPT | Performed by: INTERNAL MEDICINE

## 2023-03-27 PROCEDURE — 80053 COMPREHEN METABOLIC PANEL: CPT

## 2023-03-27 PROCEDURE — 3078F DIAST BP <80 MM HG: CPT | Performed by: INTERNAL MEDICINE

## 2023-03-27 PROCEDURE — 1123F ACP DISCUSS/DSCN MKR DOCD: CPT | Performed by: INTERNAL MEDICINE

## 2023-03-27 RX ORDER — HEPARIN SODIUM (PORCINE) LOCK FLUSH IV SOLN 100 UNIT/ML 100 UNIT/ML
500 SOLUTION INTRAVENOUS PRN
OUTPATIENT
Start: 2023-03-27

## 2023-03-27 RX ORDER — SODIUM CHLORIDE 9 MG/ML
25 INJECTION, SOLUTION INTRAVENOUS PRN
OUTPATIENT
Start: 2023-03-27

## 2023-03-27 RX ORDER — SODIUM CHLORIDE 0.9 % (FLUSH) 0.9 %
5-40 SYRINGE (ML) INJECTION PRN
OUTPATIENT
Start: 2023-03-27

## 2023-03-27 RX ORDER — PROCHLORPERAZINE MALEATE 10 MG
10 TABLET ORAL EVERY 6 HOURS PRN
Qty: 120 TABLET | Refills: 2 | Status: SHIPPED | OUTPATIENT
Start: 2023-03-27

## 2023-03-27 RX ORDER — HEPARIN SODIUM (PORCINE) LOCK FLUSH IV SOLN 100 UNIT/ML 100 UNIT/ML
500 SOLUTION INTRAVENOUS PRN
Status: DISCONTINUED | OUTPATIENT
Start: 2023-03-27 | End: 2023-03-28 | Stop reason: HOSPADM

## 2023-03-27 RX ORDER — SODIUM CHLORIDE 0.9 % (FLUSH) 0.9 %
5-40 SYRINGE (ML) INJECTION PRN
Status: DISCONTINUED | OUTPATIENT
Start: 2023-03-27 | End: 2023-03-28 | Stop reason: HOSPADM

## 2023-03-27 RX ADMIN — SODIUM CHLORIDE, PRESERVATIVE FREE 10 ML: 5 INJECTION INTRAVENOUS at 11:13

## 2023-03-27 RX ADMIN — SODIUM CHLORIDE, PRESERVATIVE FREE 10 ML: 5 INJECTION INTRAVENOUS at 11:19

## 2023-03-27 RX ADMIN — HEPARIN 500 UNITS: 100 SYRINGE at 12:17

## 2023-03-27 NOTE — TELEPHONE ENCOUNTER
AVS from 3/27/23      Continue IVF one weekly  Continue Imbruvica  CBC, CMP today  Ct scan soon  RV following the scan         *CT sched for 4/3@ 415   *rv is sched for [email or web address]    PT was given AVS and appt schedule

## 2023-03-30 ENCOUNTER — HOSPITAL ENCOUNTER (OUTPATIENT)
Dept: INFUSION THERAPY | Age: 73
Discharge: HOME | End: 2023-03-30
Payer: COMMERCIAL

## 2023-03-30 VITALS — SYSTOLIC BLOOD PRESSURE: 129 MMHG | DIASTOLIC BLOOD PRESSURE: 50 MMHG | RESPIRATION RATE: 16 BRPM | HEART RATE: 76 BPM

## 2023-03-30 DIAGNOSIS — C85.90 LYMPHOMA, DIFFUSE (HCC): Primary | ICD-10-CM

## 2023-03-30 PROCEDURE — 96360 HYDRATION IV INFUSION INIT: CPT

## 2023-03-30 PROCEDURE — 96361 HYDRATE IV INFUSION ADD-ON: CPT

## 2023-03-30 PROCEDURE — 2580000003 HC RX 258: Performed by: INTERNAL MEDICINE

## 2023-03-30 PROCEDURE — 6360000002 HC RX W HCPCS: Performed by: INTERNAL MEDICINE

## 2023-03-30 RX ORDER — HEPARIN SODIUM (PORCINE) LOCK FLUSH IV SOLN 100 UNIT/ML 100 UNIT/ML
500 SOLUTION INTRAVENOUS PRN
Status: CANCELLED | OUTPATIENT
Start: 2023-03-30

## 2023-03-30 RX ORDER — SODIUM CHLORIDE 0.9 % (FLUSH) 0.9 %
5-40 SYRINGE (ML) INJECTION PRN
Status: DISCONTINUED | OUTPATIENT
Start: 2023-03-30 | End: 2023-03-31 | Stop reason: HOSPADM

## 2023-03-30 RX ORDER — SODIUM CHLORIDE 0.9 % (FLUSH) 0.9 %
5-40 SYRINGE (ML) INJECTION PRN
Status: CANCELLED | OUTPATIENT
Start: 2023-03-30

## 2023-03-30 RX ORDER — SODIUM CHLORIDE 9 MG/ML
5-250 INJECTION, SOLUTION INTRAVENOUS PRN
Status: CANCELLED | OUTPATIENT
Start: 2023-03-30

## 2023-03-30 RX ORDER — HEPARIN SODIUM (PORCINE) LOCK FLUSH IV SOLN 100 UNIT/ML 100 UNIT/ML
500 SOLUTION INTRAVENOUS PRN
Status: DISCONTINUED | OUTPATIENT
Start: 2023-03-30 | End: 2023-03-31 | Stop reason: HOSPADM

## 2023-03-30 RX ORDER — 0.9 % SODIUM CHLORIDE 0.9 %
1000 INTRAVENOUS SOLUTION INTRAVENOUS ONCE
Status: CANCELLED
Start: 2023-03-30 | End: 2023-03-30

## 2023-03-30 RX ORDER — 0.9 % SODIUM CHLORIDE 0.9 %
1000 INTRAVENOUS SOLUTION INTRAVENOUS ONCE
Status: COMPLETED | OUTPATIENT
Start: 2023-03-30 | End: 2023-03-30

## 2023-03-30 RX ADMIN — SODIUM CHLORIDE, PRESERVATIVE FREE 10 ML: 5 INJECTION INTRAVENOUS at 15:33

## 2023-03-30 RX ADMIN — SODIUM CHLORIDE 1000 ML: 9 INJECTION, SOLUTION INTRAVENOUS at 13:41

## 2023-03-30 RX ADMIN — HEPARIN 500 UNITS: 100 SYRINGE at 15:33

## 2023-03-30 RX ADMIN — SODIUM CHLORIDE, PRESERVATIVE FREE 10 ML: 5 INJECTION INTRAVENOUS at 13:40

## 2023-03-30 NOTE — PROGRESS NOTES
Pt here for hydration. Denies any problems. Tolerates very well. Will return 4/6 for hydrtion and 4/7 for Dr visit.

## 2023-03-31 ENCOUNTER — TELEPHONE (OUTPATIENT)
Dept: INFUSION THERAPY | Age: 73
End: 2023-03-31

## 2023-03-31 NOTE — TELEPHONE ENCOUNTER
FMLA paperwork for [employer] filled out. Notified family to  paperwork at .   Verbalize understanding

## 2023-03-31 NOTE — H&P
Dr. Caitlin Anna        History and Physical Examination   Admission Note    CHIEF COMPLAINT: No chief complaint on file. Reason for Admission: Abdominal pain    History Obtained From:  Patient     HISTORY OF PRESENT ILLNESS:      The patient is a pleasant 74 y.o. male with significant medical history of B-cell lymphoma and abdominal pain secondary to above that is chronic presented with with similar problems every time he presents to the emergency room and especially when he gets home after few days he says he started having pain but the pain is not any different than what he had before he tells me. He is on pain medications through his cancer doctor and he takes those and that helps he also does not eat much when he is home he could not tell me why. He seems to do better in the hospital he is eating his breakfast nonfenestrated with no nausea or vomiting or increased abdominal pain. He was discharged few weeks ago for similar presentation went [facility] for couple weeks but then shortness got him off from continuing care there he was doing okay at the facility and he just went home less than a week ago. Patient does have ureteral stents but the CT does not show any hydro he does have chronic UTIs second to above. And second to the lymphoma wrapping around his renal system. Patient told the ER he had A. fib the EKG read as such looks to me more PACs and PVCs. He does have a history of that we are told me he does not patient was on Eliquis and he told me he was not as well but he is back on those medications. His magnesium was low and his troponin was in the 40s this morning went up to 60s but he denies any chest pain. And he is back to normal sinus on the monitor and high 60s to 70s.   Patient has occasional cough but not any more than normal chest x-ray reported possible infiltrate atelectasis to me looks more atelectasis than infiltrate do not see any evidence of infiltrate reviewing the chest x-ray on my reading. He was seen by Owen Bobby in the past for cardiology and did discuss withDr Harris this morning for the consult. Patient troponin jumped to 68 could be most likely secondary his heart rate yesterday. Past Medical History:    Past Medical History:   Diagnosis Date    Arthritis     Cancer [facility])     chemotherapy lymphoma last was april 2022    Chemotherapy management, encounter for     CHF (congestive heart failure) ([facility]     COPD (chronic obstructive pulmonary disease) ([facility]     Dementia ([facility] [facility]     Diabetes mellitus ([facility] [facility]     H/O cardiovascular stress test     History of non-Hodgkin's lymphoma     Hx of blood clots     DVT in right leg     Hyperlipidemia     Hypertension     Hypothyroidism     On home O2     at 5 liters per nasal cannula 24 hrs. per day.       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    Hydronephrosis due to obstruction of ureter N13.1    Moderate malnutrition (HCC) E44.0    Chronic cholecystitis K81.1    Pleural effusion J90    Cardiomegaly I51.7    Hypothyroidism E03.9    Diabetes mellitus ([address] E11.9    Hypertensive urgency I16.0    Acute respiratory failure with hypoxia (HCC) J96.01    Hypoxia R09.02    Carotid stenosis, asymptomatic, bilateral I65.23    Simple chronic bronchitis (HCC) J41.0    Dependence on nocturnal oxygen therapy Z99.81    Pneumonia J18.9    LAURO (acute kidney injury) ([facility] [facility] N17.9    Failure to thrive in adult R62.7    DVT (deep venous thrombosis) (HCC) I82.409    Lymphoma, diffuse (HCC) C85.90    Severe malnutrition (HCC) E43    Acute cystitis with hematuria N30.01    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    Atrial fibrillation with RVR ([facility] [facility] I48.91       Past Surgical History:       Past Surgical History:   Procedure Laterality Date CAROTID ENDARTERECTOMY Left 6/28/2022    LEFT TYPE I EVERSION LEFT CAROTID ENDARTERECTOMY RE-IMPLANTATION LEFT ICA performed by Joy Metzger MD at [address] [place] [address], LAPAROSCOPIC N/A 10/9/2021    CHOLECYSTECTOMY LAPAROSCOPIC ROBOTIC XI performed by Maria Guadalupe Noble MD at [address] [facility] Left 9/24/2021    COLONOSCOPY POLYPECTOMY SNARE/COLD BIOPSY performed by Alexus Zurita MD at [place] [address] Right 10/6/2021    CYSTOSCOPY PYELOGRAM URETERAL STENT INSERTION performed by Derry Klinefelter, MD at [place] [address] Bilateral 11/24/2021    CYSTOSCOPY URETERAL [address] performed by Derry Klinefelter, MD at [place] [address] Bilateral 2/16/2022    CYSTOSCOPY WITH BILATERAL STENT EXCHANGE performed by Derry Klinefelter, MD at [place] [address] N/A 7/20/2022    CYSTOSCOPY BILATERAL STENT EXCHANGE performed by Derry Klinefelter, MD at [facility] [address]      IR BIOPSY ABDOMINAL MASS  7/6/2021    IR BIOPSY ABDOMINAL MASS 7/6/2021 [facility]    IR PORT PLACEMENT EQUAL OR GREATER THAN 5 YEARS  8/31/2021    IR PORT PLACEMENT EQUAL OR GREATER THAN 5 YEARS 8/31/2021 [facility]    TONSILLECTOMY      as child       Current Medications:    Current Facility-Administered Medications   Medication Dose Route Frequency Provider Last Rate Last Admin    amLODIPine (NORVASC) tablet 10 mg  10 mg Oral Daily Roderick T Dixon, DO   10 mg at 09/27/22 0544    sodium chloride flush 0.9 % injection 10 mL  10 mL IntraVENous PRN Amelia Mondragon MD   10 mL at 09/26/22 1100    sodium chloride flush 0.9 % injection 5-40 mL  5-40 mL IntraVENous 2 times per day Roderick T Dixon, DO        sodium chloride flush 0.9 % injection 5-40 mL  5-40 mL IntraVENous PRN Roderick T Dixon, DO        0.9 % sodium chloride infusion   IntraVENous PRN Roderick T Dixon, DO        acetaminophen (TYLENOL) tablet 650 mg  650 mg Oral Q4H PRN Manus John, DO        ondansetron (ZOFRAN-ODT) disintegrating tablet 4 mg  4 mg Oral Q8H PRN Roderick T Dixon, DO        Or    ondansetron (ZOFRAN) injection 4 mg  4 mg IntraVENous Q6H PRN Roderick T Dixon, DO        0.9 % sodium chloride infusion   IntraVENous Continuous Roderick T Dixon,  mL/hr at 09/26/22 1834 New Bag at 09/26/22 1834    apixaban (ELIQUIS) tablet 5 mg  5 mg Oral BID Roderick T Dixon, DO   5 mg at 09/26/22 1836    albuterol sulfate HFA (PROVENTIL;VENTOLIN;PROAIR) 108 (90 Base) MCG/ACT inhaler 2 puff  2 puff Inhalation Q4H PRN Roderick T Dixon, DO        atorvastatin (LIPITOR) tablet 10 mg  10 mg Oral Daily Roderick T Dixon, DO        carvedilol (COREG) tablet 12.5 mg  12.5 mg Oral BID WC Roderick T Dixon, DO        donepezil (ARICEPT) tablet 10 mg  10 mg Oral Daily Roderick T Dixon, DO        FLUoxetine (PROZAC) capsule 20 mg  20 mg Oral Daily Roderick T Dixon, DO        ibrutinib (IMBRUVICA) chemo tablet 420 mg  420 mg Oral See Admin Instructions Roderick T Dixon, DO        ipratropium-albuterol (DUONEB) nebulizer solution 3 mL  1 vial Inhalation Q4H PRN Roderick T Dixon, DO        lactobacillus (CULTURELLE) capsule 1 capsule  1 capsule Oral Daily Roderick T Dixon, DO        levothyroxine (SYNTHROID) tablet 150 mcg  150 mcg Oral Daily Roderick T Dixon, DO   150 mcg at 09/27/22 0543    losartan (COZAAR) tablet 50 mg  50 mg Oral Daily Roderick T Dixon, DO        megestrol (MEGACE) 40 MG/ML suspension 400 mg  400 mg Oral Daily Roderick T Dixon, DO        therapeutic multivitamin-minerals 1 tablet  1 tablet Oral Daily Roderick T Dixon, DO        omeprazole (PRILOSEC) delayed release capsule 20 mg  20 mg Oral BID Roderick T Dixon, DO        oxyCODONE (OXYCONTIN) extended release tablet 10 mg  10 mg Oral Q12H Roderick T Dixon, DO   10 mg at 09/27/22 0008    oxyCODONE (ROXICODONE) immediate release tablet 5 mg  5 mg Oral Q4H PRN Roderick Metcalf, DO   5 mg at 09/26/22 1934    sucralfate (CARAFATE) tablet 1 g  1 g Oral 4x Daily Manus DO John budesonide-formoterol (SYMBICORT) 80-4.5 MCG/ACT inhaler 2 puff  2 puff Inhalation BID Roderick NAVEEN Metcalf,            Allergies:  Patient has no known allergies. Social History:    reports that he quit smoking about 16 years ago. His smoking use included cigarettes. He has a 90.00 pack-year smoking history. He has never used smokeless tobacco. He reports current alcohol use of about 1.7 standard drinks per week. He reports current drug use. Drug: Marijuana Theo Fernandez).     Family History:   Family History   Problem Relation Age of Onset    Diabetes Mother     Alzheimer's Disease Father     Heart Disease Brother     Heart Disease Brother        REVIEW OF SYSTEMS:  RESPIRATORY:  negative for  dry cough, dyspnea, wheezing and chest pain positive for    CARDIOVASCULAR:  negative for  chest pain, dyspnea, palpitations, orthopnea, exertional chest pressure/discomfort, fatigue, edema, syncope positive for if hypertension  GASTROINTESTINAL:  negative for nausea, vomiting, change in bowel habits, diarrhea, constipation, abdominal pain and reflux positive for   GENITOURINARY:  negative for frequency, dysuria, nocturia, urinary incontinence and hesitancy positive for hydronephrosis second to lymphoma status post stents and UTIs  HEMATOLOGIC/LYMPHATIC:  negative for easy bruising, bleeding and swelling/edemapositive for history of lymphoma who  ENDOCRINE:  negative for weight changes, change in bowel habits and diabetic symptoms including neither polyuria nor polydipsia nor blurred vision nor foot ulcerations nor neuropathypositive for   MUSCULOSKELETAL:  negative for  myalgias, arthralgias, pain, joint swelling, stiff joints and muscle weakness positive for  djd  NEUROLOGICAL:  negative for headaches, dizziness, memory problems, speech problems, visual disturbance, gait problems, weakness and numbness positive for weakness neuropathy    Vitals:  BP (!) 163/62   Pulse 69   Temp 98.5 °F (36.9 °C) (Oral)   Resp 18   Wt 173 lb (78.5 kg)   SpO2 92%   BMI 24.82 kg/m²     PHYSICAL EXAM:    CONSTITUTIONAL:  awake, alert, cooperative, no apparent distress, and appears stated age{CONSTITUTIONAL:[id number]  EYES:  Lids and lashes normal, pupils equal, round and reactive to light, extra ocular muscles intact, sclera clear, conjunctiva normal    ENT:  Normocephalic, without obvious abnormality, atraumatic, sinuses nontender on palpation, external ears without lesions, oral pharynx with moist mucus membranes, tonsils without erythema or exudates,    NECK:  Supple, symmetrical, trachea midline, no adenopathy, thyroid symmetric, not enlarged and no tenderness, skin normal    HEMATOLOGIC/LYMPHATICS:  no cervical lymphadenopathy   BACK:  Symmetric, no curvature, spinous processes are non-tender on palpation, paraspinous muscles are non-tender on palpation, no costal vertebral tenderness    LUNGS:  No increased work of breathing, good air exchange, clear to auscultation bilaterally, no crackles or wheezing   CARDIOVASCULAR:  Normal apical impulse, regular rate and rhythm, normal S1 and S2, no S3 or S4, and no murmur noted     ABDOMEN:  No scars, normal bowel sounds, soft, non-distended, non-tender, no masses palpated, no hepatosplenomegally no tenderness on palpation today and no distention has good bowel sounds  GENITAL/URINARY:  na  MUSCULOSKELETAL:  There is no redness, warmth, or swelling of the joints. Full range of motion noted. Motor strength is 5 out of 5 all extremities bilaterally. Tone is normal.   NEUROLOGIC:  Awake, alert, oriented to name, place and time. Cranial nerves II-XII are grossly intact. Motor is 5 out of 5 bilaterally.     Sensory is intact. gait is normal.       SKIN:  no bruising or bleeding, normal skin color, texture, turgor, no redness, warmth, or swelling and no rashes     RECENT DATA:  No results found for: CBC, BMP    DATA:   Component Value Units   Culture, Urine [[id number]]    Collected: 09/26/22 1010    Updated: 09/27/22 0655    Specimen Source: Urine     Specimen Description . URINE    Culture CULTURE IN PROGRESS   Troponin [[id number]] (Abnormal)    Collected: 09/27/22 0531    Updated: 09/27/22 0604    Specimen Source: Blood     Troponin, High Sensitivity 60 High Panic   ng/L    Comment:        High Sensitivity Troponin values cannot be compared with other Troponin methodologies. Patients with high levels of Biotin oral intake (i.e >5mg/day) may have falsely decreased   Troponin levels. Samples collected within 8 hours of biotin intake may require additional   information for diagnosis. Magnesium [[id number]]    Collected: 09/27/22 0531    Updated: 09/27/22 0601    Specimen Source: Blood     Magnesium 1.8 mg/dL   Basic Metabolic Panel [[id number]] (Abnormal)    Collected: 09/27/22 0531    Updated: 09/27/22 0601    Specimen Source: Blood     Glucose 87 mg/dL    BUN 14 mg/dL    Creatinine 0.89 mg/dL    Calcium 8.4 Low  mg/dL    Sodium 142 mmol/L    Potassium 3.9 mmol/L    Chloride 111 High  mmol/L    CO2 22 mmol/L    Anion Gap 9 mmol/L    GFR Non-African American >60 mL/min    GFR African American >60 mL/min    GFR Comment         Comment: Average GFR for 79or more years old:    76 mL/min/1.73sq m   Chronic Kidney Disease:    <60 mL/min/1.73sq m   Kidney failure:    <15 mL/min/1.73sq m               eGFR calculated using average adult body mass.  Additional eGFR calculator available at:         [email or web address]             CBC [[id number]] (Abnormal)    Collected: 09/27/22 0531    Updated: 09/27/22 0545    Specimen Source: Blood     WBC 6.6 k/uL    RBC 3.70 Low  m/uL    Hemoglobin 10.1 Low  g/dL    Hematocrit 30.7 Low  %    MCV 82.9 fL    MCH 27.3 pg    MCHC 32.9 g/dL    RDW 20.1 High  %    Platelets 639 k/uL    MPV 8.2 fL   POC Glucose Fingerstick [[id number]]    Collected: 09/26/22 2201    Updated: 09/26/22 2208     POC Glucose 104 mg/dL   Troponin [[id number]] (Abnormal) Collected: 09/26/22 2108    Updated: 09/26/22 2148    Specimen Source: Blood     Troponin, High Sensitivity 57 High Panic   ng/L    Comment:        High Sensitivity Troponin values cannot be compared with other Troponin methodologies. Patients with high levels of Biotin oral intake (i.e >5mg/day) may have falsely decreased   Troponin levels. Samples collected within 8 hours of biotin intake may require additional   information for diagnosis. XR CHEST PORTABLE [[id number]]    Collected: 09/26/22 3659    Updated: 09/26/22 1651    Narrative:     EXAMINATION:   ONE XRAY VIEW OF THE CHEST     9/26/2022 9:16 am     COMPARISON:   09/03/2022 and 06/13/2022. CT chest dated 07/15/2022     HISTORY:   ORDERING SYSTEM PROVIDED HISTORY: cough, sob   TECHNOLOGIST PROVIDED HISTORY:   cough, sob   Reason for Exam: cough, sob     FINDINGS:   Right chest port is in place with distal tip at the cavoatrial junction. Heart size is within normal limits. There are mildly increased strandy   opacities in the medial right base. A nodular density in the mid right lung   is unchanged and corresponds to a calcified granuloma on previous CT. Left   lung appears to be clear. No evidence of pneumothorax or pleural effusion. Impression:     Mild increased strandy densities in the medial right base, possibly related   to atelectasis versus developing infiltrate.     Joshua Cook [[id number]]    Collected: 09/26/22 1117    Updated: 09/26/22 1638     Thyroxine, Free 1.33 ng/dL   TSH [[id number]]    Collected: 09/26/22 1117    Updated: 09/26/22 1638     TSH 2.83 uIU/mL   CT ABDOMEN PELVIS W WO CONTRAST Additional Contrast? None [[id number]]    Collected: 09/26/22 1107    Updated: 09/26/22 1200    Narrative:     EXAMINATION:   CT OF THE ABDOMEN AND PELVIS WITH AND WITHOUT CONTRAST 9/26/2022 10:29 am     TECHNIQUE:   CT of the abdomen and pelvis was performed with and without the   administration of intravenous contrast.  Multiplanar reformatted images are   provided for review. Automated exposure control, iterative reconstruction,   and/or weight based adjustment of the mA/kV was utilized to reduce the   radiation dose to as low as reasonably achievable. COMPARISON:   08/31/2022     HISTORY:   ORDERING SYSTEM PROVIDED HISTORY: RLQ abdominal pain   TECHNOLOGIST PROVIDED HISTORY:   RLQ abdominal pain     Decision Support Exception - unselect if not a suspected or confirmed   emergency medical condition->Emergency Medical Condition (MA)   Reason for Exam: Patient is on oral chemo for abdominal cancer and has not   been feeling well. non-Hodgkin's lymphoma   Additional signs and symptoms: RLQ abdominal pain, N/V for 3 days   Relevant Medical/Surgical History: cholecystectomy, hernia repair, port,     FINDINGS:   Lower Chest: The visualized heart and lungs show no acute abnormalities. Organs: Liver, spleen show no significant abnormalities noting splenic   granulomatous calcifications. No splenomegaly right adrenal gland is   unremarkable. Left adrenal gland is not clearly visualized. May be obscured   by lymphadenopathy. No clear evidence for focal pancreatic lesion. Bilateral ureteric stents in place with proximal coil in in the kidneys. 17   mm proteinaceous right renal cyst in the posterior midpole and nonobstructing   3 mm calculus in the lower pole. Bilateral simple small cysts are noted. Symmetric enhancement of the kidneys. GI/Bowel: There is limited evaluation due to absence of oral contrast.   Stomach collapsed otherwise unremarkable. No evidence for bowel obstruction. Appendix appears normal.  No acute process in the colon. Pelvis: Urinary bladder grossly normal showing the distal ends of the   ureteric stents. No significant pelvic lymphadenopathy.      Peritoneum/Retroperitoneum: Amorphous periaortic soft tissue with some   underlying nodular areas representing retroperitoneal lymphadenopathy   consistent with the history of lymphoma. The nodular components are partly   obscured for example 2.1 cm left periaortic series 4, image 39. Circumferential left perinephric soft tissue density continuous with the   retroperitoneal adenopathy felt to be extension of the disease. Component of   similar confluent adenopathy along the root of the mesentery about 3.4 x 7.2   cm similar to prior. There is right perinephric low-density stranding with right flank and lower   quadrant hazy peritoneal fat stranding which may consistent with trace free   fluid. Bones/Soft Tissues: Moderate aortic atherosclerotic calcifications. No   aggressive bony lesion. Impression:     1. Bilateral ureteric stents in place. No hydronephrosis. 2. Amorphous periaortic retroperitoneal soft tissue consistent with   adenopathy of lymphoma. Similar density findings appear 20 circled the left   kidney and may be extension of the lymphadenopathy. Underlying more discrete   lymph nodes are partly obscured. Lymphadenopathy extends along the root of   the mesentery similar to prior. 3. Trace free fluid manifested by haziness and stranding in the peritoneal   fat mainly along the right flank. 4. Appendix normal.    Troponin [[id number]] (Abnormal)    Collected: 09/26/22 1117    Updated: 09/26/22 1147    Specimen Source: Blood     Troponin, High Sensitivity 43 High  ng/L    Comment:        High Sensitivity Troponin values cannot be compared with other Troponin methodologies. Patients with high levels of Biotin oral intake (i.e >5mg/day) may have falsely decreased   Troponin levels. Samples collected within 8 hours of biotin intake may require additional   information for diagnosis.        MRSA DNA Probe, Nasal [[id number]]    Collected: 09/26/22 1126    Updated: 09/26/22 1126    Specimen Source: Nares    EKG 12 Lead [[id number]]    Collected: 09/26/22 0855    Updated: 09/26/22 1109     Ventricular Rate 132 BPM    Atrial Rate 119 BPM    QRS Duration 90 ms    Q-T Interval 312 ms    QTc Calculation (Bazett) 462 ms    R Axis 20 degrees    T Axis 7 degrees   Narrative:      Poor data quality, interpretation may be adversely affected   Atrial fibrillation with rapid ventricular response with premature ventricular or aberrantly conducted complexes   Inferior infarct (cited on or before 29-JAN-2005)   Abnormal ECG   When compared with ECG of 31-AUG-2022 13:52,   Atrial fibrillation has replaced Sinus rhythm   Vent. rate has increased BY  71 BPM   Urinalysis with Reflex to Culture [[id number]] (Abnormal)    Collected: 09/26/22 0931    Updated: 09/26/22 1010    Specimen Source: Urine     Color, UA Orange Abnormal     Turbidity UA Cloudy Abnormal     Glucose, Ur NEGATIVE    Bilirubin Urine NEGATIVE    Ketones, Urine NEGATIVE    Specific Saxe, UA 1.015    Urine Hgb LARGE Abnormal     pH, UA 6.5    Protein, UA 3+ Abnormal     Urobilinogen, Urine Normal    Nitrite, Urine NEGATIVE    Leukocyte Esterase, Urine MOD Abnormal    Microscopic Urinalysis [[id number]] (Abnormal)    Collected: 09/26/22 0931    Updated: 09/26/22 1010     WBC, UA 10 TO 20 /HPF    RBC, UA 51  /HPF    Epithelial Cells UA 0 TO 2 /HPF    Bacteria, UA FEW Abnormal    COVID-19 & Influenza Combo [[id number]]    Collected: 09/26/22 0936    Updated: 09/26/22 1002    Specimen Source: Nasopharyngeal Swab     Specimen Description . NASOPHARYNGEAL SWAB    Source . NASOPHARYNGEAL SWAB    SARS-CoV-2 RNA, RT PCR Not Detected    Comment:        Testing was performed using KRISTIAN Negin SARS-CoV-2 and Influenza A/B nucleic acid assay. This test is a multiplex Real-Time Reverse Transcriptase Polymerase Chain Reaction   (RT-PCR)-based in vitro diagnostic test intended for the qualitative detection of nucleic   acids from SARS-CoV-2,   influenza A, and influenza B in nasopharyngeal and nasal swab specimens for use under the   FDA's Emergency Use Authorization (EUA) only.          Not Detected results do not preclude SARS-CoV-2 infection and should not be used as the sole    basis for patient management decisions. Negative results must be combined with clinical observations, patient history, and   epidemiological information. Fact sheet for Patients: [employer]   Fact sheet for Healthcare Providers: [employer]         Results reported to the appropriate Health Department        INFLUENZA A Not Detected    INFLUENZA B Not Detected   Comprehensive Metabolic Panel [[id number]] (Abnormal)    Collected: 09/26/22 0855    Updated: 09/26/22 0942    Specimen Source: Blood     Glucose 139 High  mg/dL    BUN 19 mg/dL    Creatinine 0.82 mg/dL    Calcium 8.1 Low  mg/dL    Sodium 144 mmol/L    Potassium 3.6 Low  mmol/L    Chloride 112 High  mmol/L    CO2 19 Low  mmol/L    Anion Gap 13 mmol/L    Alkaline Phosphatase 149 High  U/L    ALT 10 U/L    AST 9 U/L    Total Bilirubin 0.5 mg/dL    Total Protein 5.2 Low  g/dL    Albumin 2.9 Low  g/dL    GFR Non-African American >60 mL/min    GFR African American >60 mL/min    GFR Comment         Comment: Average GFR for 79or more years old:    76 mL/min/1.73sq m   Chronic Kidney Disease:    <60 mL/min/1.73sq m   Kidney failure:    <15 mL/min/1.73sq m               eGFR calculated using average adult body mass.  Additional eGFR calculator available at:         [email or web address]             Magnesium [[id number]] (Abnormal)    Collected: 09/26/22 0855    Updated: 09/26/22 0942    Specimen Source: Blood     Magnesium 1.5 Low  mg/dL   Lipase [[id number]]    Collected: 09/26/22 0855    Updated: 09/26/22 0942    Specimen Source: Blood     Lipase 46 U/L   Troponin [[id number]] (Abnormal)    Collected: 09/26/22 0855    Updated: 09/26/22 0941    Specimen Source: Blood     Troponin, High Sensitivity 42 High  ng/L    Comment:        High Sensitivity Troponin values cannot be compared with other Troponin methodologies. Patients with high levels of Biotin oral intake (i.e >5mg/day) may have falsely decreased   Troponin levels. Samples collected within 8 hours of biotin intake may require additional   information for diagnosis. Blood Gas, Venous [[id number]] (Abnormal)    Collected: 09/26/22 0851    Updated: 09/26/22 0929    Specimen Source: Blood gases     pH, Odin 7.389    pCO2, Odin 36.2 Low  mm Hg    pO2, Odin 84.3 High  mm Hg    HCO3, Venous 21.8 Low  mmol/L    Negative Base Excess, Odin 3.1 High  mmol/L    O2 Sat, Odin 93.0 High  %    Carboxyhemoglobin 2.8 %    Comment:        Reference Range:   Non-Smokers     0-2%   Average Smoker  2-4%   Heavy Smoker    <10%              Methemoglobin 0.8 %    Pt Temp 37    O2 Device/Flow/% ROOM AIR   Lactic Acid [[id number]]    Collected: 09/26/22 0855    Updated: 09/26/22 0920    Specimen Source: Blood     Lactic Acid 0.6 mmol/L   Protime-INR [[id number]]    Collected: 09/26/22 0855    Updated: 09/26/22 0915    Specimen Source: Blood     Protime 14.4 sec    INR 1.1    Comment:        Non-therapeutic Range:      INR = 0.9-1.2   Therapeutic Range:     Moderate Anticoagulant Intensity:      INR = 2.0-3.0    High Anticoagulant Intensity:      INR = 2.5-3.5             CBC with Auto Differential [[id number]] (Abnormal)    Collected: 09/26/22 0855    Updated: 09/26/22 0910    Specimen Source: Blood     WBC 7.5 k/uL    RBC 3.80 Low  m/uL    Hemoglobin 10.3 Low  g/dL    Hematocrit 31.3 Low  %    MCV 82.2 fL    MCH 27.2 pg    MCHC 33.0 g/dL    RDW 20.0 High  %    Platelets 120 k/uL    MPV 8.5 fL    Seg Neutrophils 71 High  %    Lymphocytes 20 Low  %    Monocytes 9 High  %    Eosinophils % 0 %    Basophils 0 %    Segs Absolute 5.30 k/uL    Absolute Lymph # 1.50 k/uL    Absolute Mono # 0.60 k/uL    Absolute Eos # 0.00 k/uL    Basophils Absolute 0.00            ASSESSMENT:  Patient Active Problem List   Diagnosis Code    NHL (non-Hodgkin's lymphoma) (HCC) C85.90 Traumatic retroperitoneal hematoma S36.892A    Acute kidney injury (HCC) U66.4    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    Hydronephrosis due to obstruction of ureter N13.1    Moderate malnutrition (HCC) E44.0    Chronic cholecystitis K81.1    Pleural effusion J90    Cardiomegaly I51.7    Hypothyroidism E03.9    Diabetes mellitus (HCC) E11.9    Hypertensive urgency I16.0    Acute respiratory failure with hypoxia (HCC) J96.01    Hypoxia R09.02    Carotid stenosis, asymptomatic, bilateral I65.23    Simple chronic bronchitis (HCC) J41.0    Dependence on nocturnal oxygen therapy Z99.81    Pneumonia J18.9    LAURO (acute kidney injury) ([facility] [address] N17.9    Failure to thrive in adult R62.7    DVT (deep venous thrombosis) ([facility]) I82.409    Lymphoma, diffuse (HCC) C85.90    Severe malnutrition (HCC) E43    Acute cystitis with hematuria N30.01    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    Atrial fibrillation with RVR ([facility]) I48.91     Cardiac arrhythmias with more PVCs and PVCs history converted normal sinus this morning    Hypomagnesemia corrected    Slight bump in his troponin second to his heart rate yesterday doubt acute MI    No evidence of pneumonia on my reading on the chest x-ray probably some linear opacity looks more atelectasis    UTI chronic second to his stents but no evidence of hydro    Abdominal pain which is chronic and recurrent second to his lymphoma controlled with his pain meds    Nausea but no vomiting this time resolved    PLAN:  We will continue with the current treatment and hydration for now    Continue with his pain meds and home meds including Eliquis    We will consult his cardiologist discussed with   Patient was getting therapy at home we will get physical therapy to work with him    Hopefully this time we will get him home if not today tomorrow unless he qualifies to go to [facility] for more time    No family in the room to discuss with    Social service to look into his home situation see if he needs more assistance from the visiting nurses to control his pain and some of his symptoms that probably could be managed at home.   Or he may need palliative care at home through his oncologist        Electronically signed by KRISTI Saucedo on 9/27/2022 at 7:26 [address] [facility], [address] No

## 2023-04-02 NOTE — PROGRESS NOTES
soft  tissues.  Impression: No acute intracranial abnormality.    Moderate atrophy and chronic microvascular ischemic changes.    Bilateral maxillary and ethmoid sinusitis.    Nonspecific left mastoid effusion.  XR CHEST PORTABLE  Narrative: EXAMINATION:  ONE XRAY VIEW OF THE CHEST    3/13/2023 2:39 pm    COMPARISON:  09/20/2022    HISTORY:  ORDERING SYSTEM PROVIDED HISTORY: cough, copd  TECHNOLOGIST PROVIDED HISTORY:  cough, copd  Reason for Exam: pt states he has had a cough for a long time.    FINDINGS:  Cardial pericardial silhouette is unremarkable.  Lungs are clear.  No  pneumothorax is seen.  No free air.  No acute bony abnormality.  Eloy  catheter unchanged in position.  Impression: No acute abnormality detected.        ASSESSMENT:    Small cell, B-cell non-Hodgkin’s lymphoma in remission.   Evidence of relapse with biopsy-proven follicular lymphoma grade 1-2 July 5, 2021  Progression after treatment with rituximab  Good results to Imbruvica treatment.    PLAN:     Lymph node biopsy showed relapse of low-grade lymphoma.  Grade 1 - 2 follicular lymphoma.    Patient was treated with rituximab induction followed by maintenance.  Initially he has stable disease.  Recent scans showed significant progression in the abdomen.  Considering his performance and other comorbidities he was started on Imbruvica.  Tolerated well except for symptoms as above.   He is much better after recent hospitalization.  Discussed the repeated labs.  Kidney function is better.  Needs to stay hydrated.  Will make IV fluids once weekly.  Continue Imbruvica  CBC, CMP today  Continue Home physical therapy  Continue follow-up with his cardiologist and pulmonary.  Repeat scan soon to evaluate for response to treatment.  RV after scan.   We will monitor for toxicity and side effects and to monitor treatment response.  Patient's questions were answered to the best of his satisfaction and he verbalized full understanding and agreement.

## 2023-04-03 ENCOUNTER — HOSPITAL ENCOUNTER (OUTPATIENT)
Dept: CT IMAGING | Age: 73
Discharge: HOME | End: 2023-04-05
Payer: COMMERCIAL

## 2023-04-03 DIAGNOSIS — C85.99 LYMPHOMA OF BODY OF STOMACH (HCC): ICD-10-CM

## 2023-04-03 PROCEDURE — 74176 CT ABD & PELVIS W/O CONTRAST: CPT

## 2023-04-04 ENCOUNTER — HOSPITAL ENCOUNTER (OUTPATIENT)
Dept: PREADMISSION TESTING | Age: 73
Discharge: HOME | End: 2023-04-04
Attending: UROLOGY | Admitting: UROLOGY

## 2023-04-06 ENCOUNTER — HOSPITAL ENCOUNTER (OUTPATIENT)
Dept: INFUSION THERAPY | Age: 73
Discharge: HOME | End: 2023-04-06
Payer: COMMERCIAL

## 2023-04-06 VITALS
SYSTOLIC BLOOD PRESSURE: 108 MMHG | TEMPERATURE: 97.5 F | HEART RATE: 68 BPM | DIASTOLIC BLOOD PRESSURE: 56 MMHG | RESPIRATION RATE: 16 BRPM

## 2023-04-06 DIAGNOSIS — C85.90 LYMPHOMA, DIFFUSE (HCC): Primary | ICD-10-CM

## 2023-04-06 PROCEDURE — 6360000002 HC RX W HCPCS: Performed by: INTERNAL MEDICINE

## 2023-04-06 PROCEDURE — 96361 HYDRATE IV INFUSION ADD-ON: CPT

## 2023-04-06 PROCEDURE — 2580000003 HC RX 258: Performed by: INTERNAL MEDICINE

## 2023-04-06 PROCEDURE — 96360 HYDRATION IV INFUSION INIT: CPT

## 2023-04-06 RX ORDER — SODIUM CHLORIDE 0.9 % (FLUSH) 0.9 %
5-40 SYRINGE (ML) INJECTION PRN
Status: DISCONTINUED | OUTPATIENT
Start: 2023-04-06 | End: 2023-04-07 | Stop reason: HOSPADM

## 2023-04-06 RX ORDER — SODIUM CHLORIDE 9 MG/ML
5-250 INJECTION, SOLUTION INTRAVENOUS PRN
OUTPATIENT
Start: 2023-04-06

## 2023-04-06 RX ORDER — HEPARIN SODIUM (PORCINE) LOCK FLUSH IV SOLN 100 UNIT/ML 100 UNIT/ML
500 SOLUTION INTRAVENOUS PRN
Status: DISCONTINUED | OUTPATIENT
Start: 2023-04-06 | End: 2023-04-07 | Stop reason: HOSPADM

## 2023-04-06 RX ORDER — 0.9 % SODIUM CHLORIDE 0.9 %
1000 INTRAVENOUS SOLUTION INTRAVENOUS ONCE
Status: COMPLETED | OUTPATIENT
Start: 2023-04-06 | End: 2023-04-06

## 2023-04-06 RX ORDER — HEPARIN SODIUM (PORCINE) LOCK FLUSH IV SOLN 100 UNIT/ML 100 UNIT/ML
500 SOLUTION INTRAVENOUS PRN
OUTPATIENT
Start: 2023-04-06

## 2023-04-06 RX ORDER — SODIUM CHLORIDE 0.9 % (FLUSH) 0.9 %
5-40 SYRINGE (ML) INJECTION PRN
OUTPATIENT
Start: 2023-04-06

## 2023-04-06 RX ORDER — 0.9 % SODIUM CHLORIDE 0.9 %
1000 INTRAVENOUS SOLUTION INTRAVENOUS ONCE
Start: 2023-04-06 | End: 2023-04-06

## 2023-04-06 RX ADMIN — SODIUM CHLORIDE 1000 ML: 9 INJECTION, SOLUTION INTRAVENOUS at 14:12

## 2023-04-06 RX ADMIN — HEPARIN 500 UNITS: 100 SYRINGE at 15:50

## 2023-04-06 RX ADMIN — SODIUM CHLORIDE, PRESERVATIVE FREE 10 ML: 5 INJECTION INTRAVENOUS at 15:50

## 2023-04-06 RX ADMIN — SODIUM CHLORIDE, PRESERVATIVE FREE 10 ML: 5 INJECTION INTRAVENOUS at 14:11

## 2023-04-06 NOTE — PROGRESS NOTES
Patient here for hydration. Vitals stable. Denies any issues at this time. He tolerated treatment well and was discharged home in stable condition. He is due to return 4/7 for MD visit.

## 2023-04-07 ENCOUNTER — OFFICE VISIT (OUTPATIENT)
Dept: ONCOLOGY | Age: 73
End: 2023-04-07
Payer: COMMERCIAL

## 2023-04-07 ENCOUNTER — TELEPHONE (OUTPATIENT)
Dept: ONCOLOGY | Age: 73
End: 2023-04-07

## 2023-04-07 DIAGNOSIS — C82.13 FOLLICULAR LYMPHOMA GRADE II OF INTRA-ABDOMINAL LYMPH NODES (HCC): ICD-10-CM

## 2023-04-07 DIAGNOSIS — C85.90 LYMPHOMA, DIFFUSE (HCC): Primary | ICD-10-CM

## 2023-04-07 PROCEDURE — 99211 OFF/OP EST MAY X REQ PHY/QHP: CPT | Performed by: INTERNAL MEDICINE

## 2023-04-07 PROCEDURE — 99214 OFFICE O/P EST MOD 30 MIN: CPT | Performed by: INTERNAL MEDICINE

## 2023-04-07 PROCEDURE — 1123F ACP DISCUSS/DSCN MKR DOCD: CPT | Performed by: INTERNAL MEDICINE

## 2023-04-07 RX ORDER — OXYBUTYNIN CHLORIDE 5 MG/1
5 TABLET ORAL NIGHTLY
COMMUNITY

## 2023-04-07 NOTE — TELEPHONE ENCOUNTER
AVS from 4/7/23      Continue IVF one weekly  Continue Imbruvica  RV 3 months with labs at RV      Hydration weekly    Rv scheduled for 7/28 @ 11:00 am    Pt was given AVS and appointment schedule    Electronically signed by Nelia Robles on 4/7/2023 at 2:19 PM

## 2023-04-07 NOTE — PATIENT INSTRUCTIONS
Continue IVF one weekly  Continue Imbruvica  RV 3 months with labs at [facility]

## 2023-04-08 NOTE — PROGRESS NOTES
suspicious appearing axillary  lymph nodes. Abdomen/Pelvis:    Organs: Evaluation of the abdominal and pelvic viscera is limited in the  absence of contrast.  Infiltrative and mesenteric root lymphadenopathy again  demonstrated with induration around the left perinephric fat and encompassing  the left adrenal gland again demonstrated. The liver, pancreas, spleen,  right adrenal and kidneys otherwise reveal no new findings. Bilateral  double-J ureteral stents in place. No hydronephrosis. Punctate  nonobstructing renal calculi right and proteinaceous or hemorrhagic right  renal cyst.  Simple appearing subcentimeter left renal cysts again noted. GI/Bowel: The stomach is mildly distended with ingested material.  No  appreciable wall thickening. No evidence for bowel obstruction or wall  thickening otherwise appreciated. Extensive distal colonic diverticulosis. Pelvis: No acute findings. Bilateral double-J ureteral stents in place. Mild prostate enlargement. Peritoneum/Retroperitoneum: No free air or ascites. Confluent  lymphadenopathy in the root of the mesentery and retroperitoneum again  demonstrated and grossly unchanged. Induration of the perinephric fat, left  greater than right again demonstrated. Bones/Soft Tissues: No new findings. No new or enlarging inguinal lymph  nodes. Impression: 1. Patchy opacity in the medial right lung apex is noted, which may be due  to an inflammatory process or infection in the appropriate clinical setting. Trace left pleural effusion and dependent subsegmental atelectasis. 2.  Grossly unchanged appearance of confluent lymphadenopathy in the root of  the mesentery and retroperitoneum, within the limits of a noncontrast exam.  No new or enlarging lymph nodes appreciated. 3.  Bilateral ureteral stents in place without hydronephrosis. 4.  Bilateral perinephric stranding, left greater than right, without  significant interval change.   This may be due

## 2023-04-14 ENCOUNTER — APPOINTMENT (OUTPATIENT)
Dept: GENERAL RADIOLOGY | Age: 73
End: 2023-04-14
Attending: UROLOGY
Payer: COMMERCIAL

## 2023-04-14 ENCOUNTER — HOSPITAL ENCOUNTER (OUTPATIENT)
Age: 73
Setting detail: OUTPATIENT SURGERY
Discharge: HOME | End: 2023-04-14
Attending: UROLOGY | Admitting: UROLOGY
Payer: COMMERCIAL

## 2023-04-14 VITALS
HEIGHT: 71 IN | RESPIRATION RATE: 20 BRPM | DIASTOLIC BLOOD PRESSURE: 50 MMHG | BODY MASS INDEX: 21.7 KG/M2 | OXYGEN SATURATION: 91 % | WEIGHT: 155 LBS | HEART RATE: 78 BPM | SYSTOLIC BLOOD PRESSURE: 92 MMHG | TEMPERATURE: 98 F

## 2023-04-14 DIAGNOSIS — R52 PAIN: ICD-10-CM

## 2023-04-14 DIAGNOSIS — N13.30 HYDRONEPHROSIS, UNSPECIFIED HYDRONEPHROSIS TYPE: ICD-10-CM

## 2023-04-14 LAB
BACTERIA: ABNORMAL
BILIRUBIN URINE: NEGATIVE
COLOR: YELLOW
EPITHELIAL CELLS UA: ABNORMAL /HPF
GLUCOSE BLD-MCNC: 75 MG/DL (ref 75–110)
GLUCOSE BLD-MCNC: 83 MG/DL (ref 75–110)
GLUCOSE UR STRIP.AUTO-MCNC: NEGATIVE MG/DL
KETONES UR STRIP.AUTO-MCNC: NEGATIVE MG/DL
LEUKOCYTE ESTERASE UR QL STRIP.AUTO: ABNORMAL
NITRITE UR QL STRIP.AUTO: NEGATIVE
PROT UR STRIP.AUTO-MCNC: 5.5 MG/DL (ref 5–8)
PROT UR STRIP.AUTO-MCNC: ABNORMAL MG/DL
RBC CLUMPS #/AREA URNS AUTO: ABNORMAL /HPF
SPECIFIC GRAVITY UA: 1.01 (ref 1–1.03)
TURBIDITY: ABNORMAL
URINE HGB: ABNORMAL
UROBILINOGEN, URINE: NORMAL
WBC UA: ABNORMAL /HPF

## 2023-04-14 PROCEDURE — 6360000002 HC RX W HCPCS: Performed by: ANESTHESIOLOGY

## 2023-04-14 PROCEDURE — C1769 GUIDE WIRE: HCPCS | Performed by: UROLOGY

## 2023-04-14 PROCEDURE — 7100000000 HC PACU RECOVERY - FIRST 15 MIN: Performed by: UROLOGY

## 2023-04-14 PROCEDURE — 2580000003 HC RX 258: Performed by: ANESTHESIOLOGY

## 2023-04-14 PROCEDURE — 3209999900 FLUORO FOR SURGICAL PROCEDURES

## 2023-04-14 PROCEDURE — 2709999900 HC NON-CHARGEABLE SUPPLY: Performed by: UROLOGY

## 2023-04-14 PROCEDURE — 7100000011 HC PHASE II RECOVERY - ADDTL 15 MIN: Performed by: UROLOGY

## 2023-04-14 PROCEDURE — 7100000010 HC PHASE II RECOVERY - FIRST 15 MIN: Performed by: UROLOGY

## 2023-04-14 PROCEDURE — 7100000001 HC PACU RECOVERY - ADDTL 15 MIN: Performed by: UROLOGY

## 2023-04-14 PROCEDURE — 7100000030 HC ASPR PHASE II RECOVERY - FIRST 15 MIN: Performed by: UROLOGY

## 2023-04-14 PROCEDURE — 3700000001 HC ADD 15 MINUTES (ANESTHESIA): Performed by: UROLOGY

## 2023-04-14 PROCEDURE — 2500000003 HC RX 250 WO HCPCS: Performed by: ANESTHESIOLOGY

## 2023-04-14 PROCEDURE — 7100000031 HC ASPR PHASE II RECOVERY - ADDTL 15 MIN: Performed by: UROLOGY

## 2023-04-14 PROCEDURE — 6370000000 HC RX 637 (ALT 250 FOR IP): Performed by: UROLOGY

## 2023-04-14 PROCEDURE — 87086 URINE CULTURE/COLONY COUNT: CPT

## 2023-04-14 PROCEDURE — 3600000002 HC SURGERY LEVEL 2 BASE: Performed by: UROLOGY

## 2023-04-14 PROCEDURE — 3700000000 HC ANESTHESIA ATTENDED CARE: Performed by: UROLOGY

## 2023-04-14 PROCEDURE — 82947 ASSAY GLUCOSE BLOOD QUANT: CPT

## 2023-04-14 PROCEDURE — 3600000012 HC SURGERY LEVEL 2 ADDTL 15MIN: Performed by: UROLOGY

## 2023-04-14 PROCEDURE — 81001 URINALYSIS AUTO W/SCOPE: CPT

## 2023-04-14 RX ORDER — SODIUM CHLORIDE 9 MG/ML
INJECTION, SOLUTION INTRAVENOUS PRN
Status: DISCONTINUED | OUTPATIENT
Start: 2023-04-14 | End: 2023-04-14 | Stop reason: HOSPADM

## 2023-04-14 RX ORDER — SODIUM CHLORIDE 0.9 % (FLUSH) 0.9 %
5-40 SYRINGE (ML) INJECTION EVERY 12 HOURS SCHEDULED
Status: DISCONTINUED | OUTPATIENT
Start: 2023-04-14 | End: 2023-04-14 | Stop reason: HOSPADM

## 2023-04-14 RX ORDER — FENTANYL CITRATE 0.05 MG/ML
25 INJECTION, SOLUTION INTRAMUSCULAR; INTRAVENOUS EVERY 5 MIN PRN
Status: DISCONTINUED | OUTPATIENT
Start: 2023-04-14 | End: 2023-04-14 | Stop reason: HOSPADM

## 2023-04-14 RX ORDER — DIPHENHYDRAMINE HYDROCHLORIDE 50 MG/ML
12.5 INJECTION INTRAMUSCULAR; INTRAVENOUS
Status: DISCONTINUED | OUTPATIENT
Start: 2023-04-14 | End: 2023-04-14 | Stop reason: HOSPADM

## 2023-04-14 RX ORDER — CEPHALEXIN 500 MG/1
500 CAPSULE ORAL 3 TIMES DAILY
Qty: 15 CAPSULE | Refills: 0 | Status: SHIPPED | OUTPATIENT
Start: 2023-04-14 | End: 2023-04-19

## 2023-04-14 RX ORDER — ONDANSETRON 2 MG/ML
4 INJECTION INTRAMUSCULAR; INTRAVENOUS
Status: DISCONTINUED | OUTPATIENT
Start: 2023-04-14 | End: 2023-04-14 | Stop reason: HOSPADM

## 2023-04-14 RX ORDER — ACETAMINOPHEN 500 MG
1000 TABLET ORAL ONCE
Status: DISCONTINUED | OUTPATIENT
Start: 2023-04-14 | End: 2023-04-14 | Stop reason: HOSPADM

## 2023-04-14 RX ORDER — SODIUM CHLORIDE 0.9 % (FLUSH) 0.9 %
5-40 SYRINGE (ML) INJECTION PRN
Status: DISCONTINUED | OUTPATIENT
Start: 2023-04-14 | End: 2023-04-14 | Stop reason: HOSPADM

## 2023-04-14 RX ORDER — ONDANSETRON 2 MG/ML
4 INJECTION INTRAMUSCULAR; INTRAVENOUS ONCE
Status: COMPLETED | OUTPATIENT
Start: 2023-04-14 | End: 2023-04-14

## 2023-04-14 RX ORDER — GABAPENTIN 100 MG/1
100 CAPSULE ORAL ONCE
Status: DISCONTINUED | OUTPATIENT
Start: 2023-04-14 | End: 2023-04-14 | Stop reason: HOSPADM

## 2023-04-14 RX ORDER — LIDOCAINE HYDROCHLORIDE 20 MG/ML
JELLY TOPICAL PRN
Status: DISCONTINUED | OUTPATIENT
Start: 2023-04-14 | End: 2023-04-14 | Stop reason: ALTCHOICE

## 2023-04-14 RX ORDER — SODIUM CHLORIDE, SODIUM LACTATE, POTASSIUM CHLORIDE, CALCIUM CHLORIDE 600; 310; 30; 20 MG/100ML; MG/100ML; MG/100ML; MG/100ML
INJECTION, SOLUTION INTRAVENOUS CONTINUOUS
Status: DISCONTINUED | OUTPATIENT
Start: 2023-04-14 | End: 2023-04-14 | Stop reason: HOSPADM

## 2023-04-14 RX ORDER — LIDOCAINE HYDROCHLORIDE 10 MG/ML
1 INJECTION, SOLUTION EPIDURAL; INFILTRATION; INTRACAUDAL; PERINEURAL
Status: COMPLETED | OUTPATIENT
Start: 2023-04-14 | End: 2023-04-14

## 2023-04-14 RX ORDER — FENTANYL CITRATE 0.05 MG/ML
50 INJECTION, SOLUTION INTRAMUSCULAR; INTRAVENOUS EVERY 5 MIN PRN
Status: DISCONTINUED | OUTPATIENT
Start: 2023-04-14 | End: 2023-04-14 | Stop reason: HOSPADM

## 2023-04-14 RX ADMIN — LIDOCAINE HYDROCHLORIDE 1 ML: 10 INJECTION, SOLUTION EPIDURAL; INFILTRATION; INTRACAUDAL; PERINEURAL at 07:08

## 2023-04-14 RX ADMIN — SODIUM CHLORIDE, POTASSIUM CHLORIDE, SODIUM LACTATE AND CALCIUM CHLORIDE: 600; 310; 30; 20 INJECTION, SOLUTION INTRAVENOUS at 07:08

## 2023-04-14 RX ADMIN — ONDANSETRON 4 MG: 2 INJECTION INTRAMUSCULAR; INTRAVENOUS at 07:15

## 2023-04-14 ASSESSMENT — PAIN - FUNCTIONAL ASSESSMENT: PAIN_FUNCTIONAL_ASSESSMENT: 0-10

## 2023-04-15 LAB
MICROORGANISM SPEC CULT: NO GROWTH
SPECIMEN DESCRIPTION: NORMAL

## 2023-04-20 ENCOUNTER — HOSPITAL ENCOUNTER (OUTPATIENT)
Dept: INFUSION THERAPY | Age: 73
Discharge: HOME | End: 2023-04-20
Payer: COMMERCIAL

## 2023-04-20 VITALS — SYSTOLIC BLOOD PRESSURE: 70 MMHG | TEMPERATURE: 97.5 F | HEART RATE: 60 BPM | DIASTOLIC BLOOD PRESSURE: 40 MMHG

## 2023-04-20 DIAGNOSIS — C85.90 LYMPHOMA, DIFFUSE (HCC): Primary | ICD-10-CM

## 2023-04-20 PROCEDURE — 96361 HYDRATE IV INFUSION ADD-ON: CPT

## 2023-04-20 PROCEDURE — 2580000003 HC RX 258: Performed by: INTERNAL MEDICINE

## 2023-04-20 PROCEDURE — 96360 HYDRATION IV INFUSION INIT: CPT

## 2023-04-20 RX ORDER — SODIUM CHLORIDE 0.9 % (FLUSH) 0.9 %
5-40 SYRINGE (ML) INJECTION PRN
Status: CANCELLED | OUTPATIENT
Start: 2023-04-20

## 2023-04-20 RX ORDER — SODIUM CHLORIDE 9 MG/ML
5-250 INJECTION, SOLUTION INTRAVENOUS PRN
Status: CANCELLED | OUTPATIENT
Start: 2023-04-20

## 2023-04-20 RX ORDER — 0.9 % SODIUM CHLORIDE 0.9 %
1000 INTRAVENOUS SOLUTION INTRAVENOUS ONCE
Status: CANCELLED
Start: 2023-04-20 | End: 2023-04-20

## 2023-04-20 RX ORDER — HEPARIN SODIUM (PORCINE) LOCK FLUSH IV SOLN 100 UNIT/ML 100 UNIT/ML
500 SOLUTION INTRAVENOUS PRN
Status: DISCONTINUED | OUTPATIENT
Start: 2023-04-20 | End: 2023-04-21 | Stop reason: HOSPADM

## 2023-04-20 RX ORDER — SODIUM CHLORIDE 0.9 % (FLUSH) 0.9 %
5-40 SYRINGE (ML) INJECTION PRN
Status: DISCONTINUED | OUTPATIENT
Start: 2023-04-20 | End: 2023-04-21 | Stop reason: HOSPADM

## 2023-04-20 RX ORDER — HEPARIN SODIUM (PORCINE) LOCK FLUSH IV SOLN 100 UNIT/ML 100 UNIT/ML
500 SOLUTION INTRAVENOUS PRN
Status: CANCELLED | OUTPATIENT
Start: 2023-04-20

## 2023-04-20 RX ORDER — 0.9 % SODIUM CHLORIDE 0.9 %
1000 INTRAVENOUS SOLUTION INTRAVENOUS ONCE
Status: COMPLETED | OUTPATIENT
Start: 2023-04-20 | End: 2023-04-20

## 2023-04-20 RX ADMIN — SODIUM CHLORIDE, PRESERVATIVE FREE 10 ML: 5 INJECTION INTRAVENOUS at 14:24

## 2023-04-20 RX ADMIN — SODIUM CHLORIDE, PRESERVATIVE FREE 10 ML: 5 INJECTION INTRAVENOUS at 14:25

## 2023-04-20 RX ADMIN — SODIUM CHLORIDE 1000 ML: 9 INJECTION, SOLUTION INTRAVENOUS at 14:26

## 2023-04-20 NOTE — PROGRESS NOTES
Patient arrived for hydration. Pt hypotensive on arrival. Hydration completed and pt remains hypotensive. Pt going to [facility] with wife and caregiver. Scheduled to return 4/27/23 for hydration.

## 2023-04-27 ENCOUNTER — HOSPITAL ENCOUNTER (OUTPATIENT)
Dept: INFUSION THERAPY | Age: 73
Discharge: HOME | End: 2023-04-27
Payer: COMMERCIAL

## 2023-04-27 VITALS
DIASTOLIC BLOOD PRESSURE: 57 MMHG | TEMPERATURE: 97.7 F | SYSTOLIC BLOOD PRESSURE: 94 MMHG | RESPIRATION RATE: 18 BRPM | OXYGEN SATURATION: 97 % | HEART RATE: 61 BPM

## 2023-04-27 DIAGNOSIS — C85.90 LYMPHOMA, DIFFUSE (HCC): Primary | ICD-10-CM

## 2023-04-27 PROCEDURE — 2580000003 HC RX 258: Performed by: INTERNAL MEDICINE

## 2023-04-27 PROCEDURE — 96361 HYDRATE IV INFUSION ADD-ON: CPT

## 2023-04-27 PROCEDURE — 6360000002 HC RX W HCPCS: Performed by: INTERNAL MEDICINE

## 2023-04-27 PROCEDURE — 96360 HYDRATION IV INFUSION INIT: CPT

## 2023-04-27 RX ORDER — SODIUM CHLORIDE 9 MG/ML
5-250 INJECTION, SOLUTION INTRAVENOUS PRN
OUTPATIENT
Start: 2023-04-27

## 2023-04-27 RX ORDER — HEPARIN SODIUM (PORCINE) LOCK FLUSH IV SOLN 100 UNIT/ML 100 UNIT/ML
500 SOLUTION INTRAVENOUS PRN
OUTPATIENT
Start: 2023-04-27

## 2023-04-27 RX ORDER — HEPARIN SODIUM (PORCINE) LOCK FLUSH IV SOLN 100 UNIT/ML 100 UNIT/ML
500 SOLUTION INTRAVENOUS PRN
Status: DISCONTINUED | OUTPATIENT
Start: 2023-04-27 | End: 2023-04-28 | Stop reason: HOSPADM

## 2023-04-27 RX ORDER — SODIUM CHLORIDE 0.9 % (FLUSH) 0.9 %
5-40 SYRINGE (ML) INJECTION PRN
Status: DISCONTINUED | OUTPATIENT
Start: 2023-04-27 | End: 2023-04-28 | Stop reason: HOSPADM

## 2023-04-27 RX ORDER — 0.9 % SODIUM CHLORIDE 0.9 %
1000 INTRAVENOUS SOLUTION INTRAVENOUS ONCE
Start: 2023-04-27 | End: 2023-04-27

## 2023-04-27 RX ORDER — SODIUM CHLORIDE 0.9 % (FLUSH) 0.9 %
5-40 SYRINGE (ML) INJECTION PRN
OUTPATIENT
Start: 2023-04-27

## 2023-04-27 RX ORDER — 0.9 % SODIUM CHLORIDE 0.9 %
1000 INTRAVENOUS SOLUTION INTRAVENOUS ONCE
Status: COMPLETED | OUTPATIENT
Start: 2023-04-27 | End: 2023-04-27

## 2023-04-27 RX ADMIN — SODIUM CHLORIDE, PRESERVATIVE FREE 10 ML: 5 INJECTION INTRAVENOUS at 15:21

## 2023-04-27 RX ADMIN — HEPARIN 500 UNITS: 100 SYRINGE at 15:21

## 2023-04-27 RX ADMIN — SODIUM CHLORIDE 1000 ML: 9 INJECTION, SOLUTION INTRAVENOUS at 13:05

## 2023-04-27 RX ADMIN — SODIUM CHLORIDE, PRESERVATIVE FREE 10 ML: 5 INJECTION INTRAVENOUS at 12:57

## 2023-04-27 NOTE — PROGRESS NOTES
Patient here for hydration. Arrives via wheelchair with his two daughters. Reports dizziness and lightheadedness. Low blood pressure noted. Patient family states he takes lasix at home. RN encouraged patient and family to reach out to patient's PCP regarding consistent low blood pressure and medications. Hydration tolerated without incident. Patient still remains hypotensive but states, dizziness and lightheadedness has improved. Discharged in stable condition via wheelchair. Returns 5/4/2023 for hydration.

## 2023-05-10 ENCOUNTER — TELEPHONE (OUTPATIENT)
Dept: CASE MANAGEMENT | Age: 73
End: 2023-05-10

## 2023-05-10 NOTE — TELEPHONE ENCOUNTER
Name: Phan Harris  : 1950  MRN: [id number]    Oncology Navigation Follow-Up Note  Navigator spoke with pts. Spouse and pt. Following with in home Hospice now.  Spouse very thankful for all the support from the care team.   Electronically signed by Hannah Solano RN on 5/10/2023 at 2:06 PM

## 2023-05-11 ENCOUNTER — HOSPITAL ENCOUNTER (OUTPATIENT)
Dept: INFUSION THERAPY | Age: 73
Discharge: HOME | End: 2023-05-11

## 2023-06-23 NOTE — PROGRESS NOTES
Comprehensive Nutrition Assessment    Type and Reason for Visit:  Positive Nutrition Screen (weight loss, poor appetite)    Nutrition Recommendations/Plan:   1. Continue current Regular diet  2. Monitor PO intakes, if less than 50% offer supplements again or house milkshakes if he will accept it. Like pineapple. Nutrition Assessment:  Patient admission is related to Hydronephrosis due to obstruction of ureter. Has a history of non-Hodgkin's lymphoma. Patient reports poor appetite x3 days with vomiting and 10 lbs weight loss. Had Cystoscopy Pyelogram Ureteral stent insertion yesterday. Diet advanced to Regular but po intakes continue to be poor. Patient refused offer of all supplements. Malnutrition Assessment:  Malnutrition Status: Moderate malnutrition    Context:  Acute Illness     Findings of the 6 clinical characteristics of malnutrition:  Energy Intake:  7 - 50% or less of estimated energy requirements for 5 or more days  Weight Loss:  7 - Greater than 2% over 1 week     Body Fat Loss:  Unable to assess     Muscle Mass Loss:  Unable to assess    Fluid Accumulation:  No significant fluid accumulation     Strength:  Not Performed    Estimated Daily Nutrient Needs:  Energy (kcal):  1430-5709 kcal based on 28-30 kcal/kg admission; Weight Used for Energy Requirements:  Admission     Protein (g):   gm based on 1.2-1.4 gm/kg admission; Weight Used for Protein Requirements:  Admission        Fluid (ml/day):   ; Method Used for Fluid Requirements:  1 ml/kcal      Nutrition Related Findings:  No edema. Hypoactive bowel sounds. Labs and meds reviewed      Wounds:          Current Nutrition Therapies:    ADULT DIET;  Regular    Anthropometric Measures:  · Height: 5' 10\" (177.8 cm)  · Current Body Weight: 164 lb (74.4 kg)   · Admission Body Weight: 164 lb (74.4 kg)    · Usual Body Weight: 200 lb (90.7 kg)     · Ideal Body Weight: 166 lbs; % Ideal Body Weight 98.8 %   · BMI: 23.5  · Adjusted Body Weight: Ideally IO as she has pain and examination in person is helpful to determine cause  But if unable VV better than nothign   ; No Adjustment   · BMI Categories: Normal Weight (BMI 22.0 to 24.9) age over 72       Nutrition Diagnosis:   · Inadequate oral intake related to catabolic illness as evidenced by intake 0-25%, vomiting, nausea    Nutrition Interventions:   Food and/or Nutrient Delivery:  Continue Current Diet  Nutrition Education/Counseling:  Education not indicated   Coordination of Nutrition Care:  Continue to monitor while inpatient    Goals:  Meet over 75% of estimated nutrition needs       Nutrition Monitoring and Evaluation:   Behavioral-Environmental Outcomes:  None Identified   Food/Nutrient Intake Outcomes:  Food and Nutrient Intake    Discharge Planning:     Too soon to determine       Some areas of assessment may be incomplete due to COVID-19 precautions    Minh Parks RD, LD  Office phone [phone]

## 2023-10-15 NOTE — PROGRESS NOTES
Caller: RommelShiNora M    Relationship: Self    Best call back number: [phone]    Requested Prescriptions:   Requested Prescriptions     Pending Prescriptions Disp Refills   • oxyCODONE-acetaminophen (Percocet)  MG per tablet 60 tablet 0     Sig: Take 1 tablet by mouth Every 6 (Six) Hours As Needed for Moderate Pain .   • gabapentin (NEURONTIN) 300 MG capsule 90 capsule 0     Sig: Take 1 capsule by mouth 3 (Three) Times a Day.        Pharmacy where request should be sent: [facility] [address] [phone] [facility] [phone] FX     Additional details provided by patient: PATIENT IS OUT OF MEDICATION TODAY, SHE HAS A 3 MO F/U SCHEDULED FOR 6/23/22 AND IS HOPING FOR A PROVISIONAL UNTIL THEN.     Does the patient have less than a 3 day supply:  [x] Yes  [] No    Kathleen Piña Rep   06/10/22 07:55 CDT            Pt. Transported down to surgery via bed with transport. Rx Refill Note  Requested Prescriptions     Pending Prescriptions Disp Refills   • oxyCODONE-acetaminophen (Percocet)  MG per tablet 60 tablet 0     Sig: Take 1 tablet by mouth Every 6 (Six) Hours As Needed for Moderate Pain .   • gabapentin (NEURONTIN) 300 MG capsule 90 capsule 0     Sig: Take 1 capsule by mouth 3 (Three) Times a Day.      Last office visit with prescribing clinician: 2/15/2022      Next office visit with prescribing clinician: 6/23/2022         Leandra Pope MA  06/10/22, 08:40 CDT   regular

## 2024-04-19 NOTE — ACP (ADVANCE CARE PLANNING)
Advance Care Planning     Advance Care Planning Activator (Inpatient)  Conversation Note      Date of ACP Conversation: 9/27/2022     Conversation Conducted with: Patient with Decision Making Capacity    ACP Activator: Alfred Christianson RN    Health Care Decision Maker:     Current Designated Health Care Decision Maker:     Primary Decision Maker: Eboni [facility] - [phone]    Secondary Decision Maker: Roxy Cheek - Child - [phone]  Click here to complete Healthcare Decision Makers including section of the Healthcare Decision Maker Relationship (ie \"Primary\")  Today we documented Decision Maker(s) consistent with Legal Next of Kin hierarchy. Care Preferences    Ventilation: \"If you were in your present state of health and suddenly became very ill and were unable to breathe on your own, what would your preference be about the use of a ventilator (breathing machine) if it were available to you? \"      Would the patient desire the use of ventilator (breathing machine)?: yes    \"If your health worsens and it becomes clear that your chance of recovery is unlikely, what would your preference be about the use of a ventilator (breathing machine) if it were available to you? \"     Would the patient desire the use of ventilator (breathing machine)?: Yes      Resuscitation  \"CPR works best to restart the heart when there is a sudden event, like a heart attack, in someone who is otherwise healthy. Unfortunately, CPR does not typically restart the heart for people who have serious health conditions or who are very sick. \"    \"In the event your heart stopped as a result of an underlying serious health condition, would you want attempts to be made to restart your heart (answer \"yes\" for attempt to resuscitate) or would you prefer a natural death (answer \"no\" for do not attempt to resuscitate)? \" yes       [] Yes   [] No   Educated Patient / Decision Maker regarding differences between Advance Directives and Note reviewed.  Thanks for seeing him.    - discharge summary reviewed.  Patient was prescribed guaifenesin with codeine at hospital discharge.  I will refill it once for him, but future refills need to come from his pulmonologist if he still needs it after this.  [place].  - I did not tell the patient that he can wean off of xarelto.  This will need to be determined by his pulmonologist.  He was started on xarelto after his hospitalization on 12/28/21 for bilateral pulmonary emboli with recommendation for at least 6 months of anticoagulation.  He should discuss this with his pulmonologist.  - repeat BMP and A1c in July is reasonable given his situation (prednisone will raise his blood sugars).  I reordered the labs under my name so the results will come to me.    Nursing - please contact patient with the above recommendations.   Patient notified of provider's message.  Patient verbalized understanding and had no questions.    Please review this patient's assessment and plan and advise. Patient wanted your input.    1.Requests a refill of Guaifenesin with Codeine. I recommended Delsym instead.  2.Patient also requests to wean off of Xarelto.   3. Patient declined repeat Glucose today. Placed orders for an A1c and BMP in July. Last A1c 6.8 but with prednisone.       portable DNR orders.     Length of ACP Conversation in minutes:      Conversation Outcomes:  [] ACP discussion completed  [] Existing advance directive reviewed with patient; no changes to patient's previously recorded wishes  [] New Advance Directive completed  [] Portable Do Not Rescitate prepared for Provider review and signature  [] POLST/POST/MOLST/MOST prepared for Provider review and signature      Follow-up plan:    [] Schedule follow-up conversation to continue planning  [] Referred individual to Provider for additional questions/concerns   [] Advised patient/agent/surrogate to review completed ACP document and update if needed with changes in condition, patient preferences or care setting    [] This note routed to one or more involved healthcare providers Unknown

## 2024-07-16 NOTE — PLAN OF CARE
Problem: Pain:  Goal: Pain level will decrease  Description: Pain level will decrease  1/31/2022 0433 by Hali Jay RN  Outcome: Ongoing     Problem: Pain:  Goal: Control of acute pain  Description: Control of acute pain  1/31/2022 0433 by Hali Jay RN  Outcome: Ongoing     Problem: Pain:  Goal: Control of chronic pain  Description: Control of chronic pain  Outcome: Ongoing     Problem: Falls - Risk of:  Goal: Will remain free from falls  Description: Will remain free from falls  1/31/2022 0433 by Hali Jay RN  Outcome: Met This Shift     Problem: Falls - Risk of:  Goal: Absence of physical injury  Description: Absence of physical injury  1/31/2022 0433 by Hali Jay RN  Outcome: Met This Shift     Problem: Gas Exchange - Impaired:  Goal: Levels of oxygenation will improve  Description: Levels of oxygenation will improve  1/31/2022 0433 by Hali Jay RN  Outcome: Ongoing Pt seen for wound care follow up; pt awake, lying in the bed with his wife at bedside. Flowsheet documentation to follow; sutures to left shoulder in place, no s/s infection, no drainage, area clear. Staples in place to mid abdominal area, no s/s infection, no drainage, area clear. Podiatry evaluated right toe sutures. Heels are clear;pt does have heel lift boots but did not want them on at this time. Sacral /buttock wounds remain ulcerated with scant drainage and denuded periwound area.- Triad wound cream applied and would continue with this. Pt has a small umbilicus wound- triad wound cream applied.     Brooklynn Flores RN, ON  [facility] [address] [facility]  7/16/24

## 2025-01-06 NOTE — PROGRESS NOTES
Copied from CRM #[id number]. Topic: MW Paging/Messaging - MW Paging Healthcare Rep/Clinician Request  >> Jan 6, 2025  2:36 PM Susan JOYNER wrote:  moses with provider/healthcare rep to provider call:    Regarding YOUNG KAPADIA during Working Hrs;  Selected 'Wrap Up CRM' and created new Telephone Encounter after clicking 'Convert to Clinical Call'.  Sent Pt Message template, selected Reason for Call Other and input in the specific reason in the comments.     Message Urgent (cannot wait): Routed as high priority per Clinician KB page to appropriate clinician pool. WARM transferred to site Clinic Direct Line.   [facility]    9/29/2022 7:47 AM      Subjective:  Mr. Len Martinez  is NPO for EGD today. Patient denies any chest pain or shortness of breath or palpitations or lightheadedness or dizziness.                LABS:     Recent Results (from the past 24 hour(s))   POC Glucose Fingerstick    Collection Time: 09/28/22 11:11 AM   Result Value Ref Range    POC Glucose 114 (H) 75 - 110 mg/dL   POC Glucose Fingerstick    Collection Time: 09/28/22  4:11 PM   Result Value Ref Range    POC Glucose 106 75 - 110 mg/dL   POC Glucose Fingerstick    Collection Time: 09/28/22  8:10 PM   Result Value Ref Range    POC Glucose 130 (H) 75 - 110 mg/dL   Basic Metabolic Panel    Collection Time: 09/29/22  5:04 AM   Result Value Ref Range    Glucose 85 70 - 99 mg/dL    BUN 13 8 - 23 mg/dL    Creatinine 0.96 0.70 - 1.20 mg/dL    Calcium 8.0 (L) 8.6 - 10.4 mg/dL    Sodium 139 135 - 144 mmol/L    Potassium 4.1 3.7 - 5.3 mmol/L    Chloride 109 (H) 98 - 107 mmol/L    CO2 23 20 - 31 mmol/L    Anion Gap 7 (L) 9 - 17 mmol/L    GFR Non-African American >60 >60 mL/min    GFR African American >60 >60 mL/min    GFR Comment         CBC with Auto Differential    Collection Time: 09/29/22  5:04 AM   Result Value Ref Range    WBC 6.6 3.5 - 11.0 k/uL    RBC 3.34 (L) 4.5 - 5.9 m/uL    Hemoglobin 9.3 (L) 13.5 - 17.5 g/dL    Hematocrit 27.7 (L) 41 - 53 %    MCV 82.9 80 - 100 fL    MCH 27.8 26 - 34 pg    MCHC 33.5 31 - 37 g/dL    RDW 19.3 (H) 11.5 - 14.9 %    Platelets 140 (L) 801 - 450 k/uL    MPV 8.4 6.0 - 12.0 fL    Seg Neutrophils 58 36 - 66 %    Lymphocytes 28 24 - 44 %    Monocytes 10 (H) 1 - 7 %    Eosinophils % 3 0 - 4 %    Basophils 1 0 - 2 %    Segs Absolute 3.90 1.3 - 9.1 k/uL    Absolute Lymph # 1.80 1.0 - 4.8 k/uL    Absolute Mono # 0.70 0.1 - 1.3 k/uL    Absolute Eos # 0.20 0.0 - 0.4 k/uL    Basophils Absolute 0.00 0.0 - 0.2 k/uL   Lipase    Collection Time: 09/29/22  5:04 AM   Result Value Ref Range    Lipase 24 13 - 60 U/L   Magnesium    Collection Time: 22  5:04 AM   Result Value Ref Range    Magnesium 1.4 (L) 1.6 - 2.6 mg/dL   POC Glucose Fingerstick    Collection Time: 22  6:36 AM   Result Value Ref Range    POC Glucose 80 75 - 110 mg/dL       Pulse Ox: SpO2  Av.1 %  Min: 89 %  Max: 98 %    Supplemental O2: O2 Flow Rate (L/min): 2 L/min     Current Meds:    magnesium sulfate  2,000 mg IntraVENous Once    mupirocin   Nasal BID    amLODIPine  10 mg Oral Daily    cefTRIAXone (ROCEPHIN) IV  1,000 mg IntraVENous Q24H    magnesium oxide  400 mg Oral Daily    losartan  50 mg Oral Daily    ibrutinib  420 mg Oral See Admin Instructions    doxycycline (VIBRAMYCIN) IV  100 mg IntraVENous Q12H    sodium chloride flush  5-40 mL IntraVENous 2 times per day    [Held by provider] apixaban  5 mg Oral BID    atorvastatin  10 mg Oral Daily    carvedilol  12.5 mg Oral BID WC    donepezil  10 mg Oral Daily    FLUoxetine  20 mg Oral Daily    lactobacillus  1 capsule Oral Daily    levothyroxine  150 mcg Oral Daily    megestrol  400 mg Oral Daily    therapeutic multivitamin-minerals  1 tablet Oral Daily    omeprazole  20 mg Oral BID    oxyCODONE  10 mg Oral Q12H    sucralfate  1 g Oral 4x Daily    budesonide-formoterol  2 puff Inhalation BID         Continuous Infusions:    sodium chloride      sodium chloride 75 mL/hr at 22 1936              VITAL SIGNS:    BP (!) 166/75   Pulse 65   Temp 98.1 °F (36.7 °C) (Oral)   Resp 19   Wt 159 lb 13.3 oz (72.5 kg)   SpO2 93%   BMI 22.93 kg/m²  2 L/min      Admit Weight:  173 lb (78.5 kg)    Last 3 weights: Wt Readings from Last 3 Encounters:   22 159 lb 13.3 oz (72.5 kg)   22 172 lb 13.5 oz (78.4 kg)   22 160 lb 3.2 oz (72.7 kg)       BMI: Body mass index is 22.93 kg/m².      INPUT/OUTPUT:        Intake/Output Summary (Last 24 hours) at 2022 0701  Last data filed at 2022 0630  Gross per 24 hour   Intake 2491.33 ml   Output 2200 ml   Net 291.33 ml         Telemetry shows sinus rhythm. EXAM:     General appearance: awake, alert. Laying in bed comfortably. Neck: No JVD  Chest:   Scattered rhonchi bilaterally. No tenderness. Cardiac: Regular rate and rhythm. No S3 gallop or rubs. Abdomen: soft, non-tender. Extremities: No calf tenderness, no leg edema. Skin:  warm and dry. Neuro:  Able to move all 4 extremities. Nuclear Stress test June 2022:     Impression       Perfusion:  Perfusion defects at stress involve 5% of left ventricular   myocardium, with minimal reperfusion. Function:  Mild hypokinesis inferior wall. RCA distribution. Risk stratification: INTERMEDIATE            2 D ECHOCARDIOGRAM June 2022:     Summary  Limited echo with contrast performed per order. Left ventricle is normal in size. Calculated EF via Herman's method 64% with global L. strain of -19.6%. Moderate left ventricular hypertrophy. No significant pericardial effusion is seen. 2 D Echocardiogram Jan 2022:     Summary  Normal LV size and wall thickness. No obvious wall motion abnormality seen. Normal LV systolic function with LVEF >55%. Normal RV size and function. RV systolic pressure 44 mmHg  LA appears moderately dilated  No obvious significant structural valvular abnormality noted. Mild MR and TR  Normal aortic root dimension. No significant pericardial effusion noted. IVC appears dilated, impaired inspiratory variation indicating elevated RA  filling pressure. ASSESSMENT:    Non specific minimally elevated hs Troponin peak 60 due to Atrial fibrillation with RVR. Doubt any acute MI . Can not exclude any underlying ASCAD. No active angina pectoris. Paroxysmal Atrial fibrillation with RVR and intermittent palpitations. Normal LVEF. Abnormal EKG. Preop EGD - Eliquis is on hold. Hypo magnesemia -being repleted. Essential Hypertension. Non Hodgkin's Lymphoma.      Other problems as charted. REC/PLAN:    Stable overall cardiac-wise. Magnesium is being replaced as ordered. Following EGD later today, resume Eliquis 5 mg b.I.d. and add magnesium oxide 400 mg daily. Continue on other prior cardiac medications. Discussed with PCP. Discussed with the charge nurse. Will follow. Electronically signed by April Coleman MD, [facility] [place]        PLEASE NOTE:  This progress note was completed using a voice transcription system. Every effort was made to ensure accuracy. However, inadvertent computerized transcription errors may be present.

## (undated) DEVICE — SOLUTION IRRIG 1000ML STRL H2O USP PLAS POUR BTL

## (undated) DEVICE — MERCY HEALTH ST CHARLES: Brand: MEDLINE INDUSTRIES, INC.

## (undated) DEVICE — STRIP,CLOSURE,WOUND,MEDI-STRIP,1/2X4: Brand: MEDLINE

## (undated) DEVICE — TROCARS: Brand: KII® BALLOON BLUNT TIP SYSTEM

## (undated) DEVICE — SUTURE PROL SZ 6-0 L30IN NONABSORBABLE BLU L13MM RB-2 1/2 8711H

## (undated) DEVICE — SOLUTION IRRIGATION STRL H2O 1000 ML UROMATIC CONTAINER

## (undated) DEVICE — CATHETER URET 5FR L70CM TIP 8FR OPN END CONE TIP INJ HUB

## (undated) DEVICE — GLOVE ORTHO 7 1/2   MSG9475

## (undated) DEVICE — TROCAR: Brand: KII FIOS FIRST ENTRY

## (undated) DEVICE — SYRINGE MED 10ML LUERLOCK TIP W/O SFTY DISP

## (undated) DEVICE — FORCEPS BX L240CM WRK CHN 2.8MM STD CAP W/ NDL MIC MESH

## (undated) DEVICE — SNARE ENDOSCP M L240CM LOOP W27MM SHTH DIA2.4MM OVL FLX

## (undated) DEVICE — RADIFOCUS GLIDEWIRE: Brand: GLIDEWIRE

## (undated) DEVICE — MEDICINE CUP, GRADUATED, STER: Brand: MEDLINE

## (undated) DEVICE — SYRINGE, LUER LOCK, 10ML: Brand: MEDLINE

## (undated) DEVICE — CONTAINER,SPECIMEN,OR STERILE,4OZ: Brand: MEDLINE

## (undated) DEVICE — SUTURE PDS II SZ 0 L27IN ABSRB VLT UR-6 L26MM 1/2 CIR D7185

## (undated) DEVICE — GLOVE ORANGE PI 7 1/2   MSG9075

## (undated) DEVICE — SYRINGE MED 20ML STD CLR PLAS LUERSLIP TIP N CTRL DISP

## (undated) DEVICE — SUTURE NONABSORBABLE MONOFILAMENT 6-0 BV-1 1X30 IN PROLENE 8709H

## (undated) DEVICE — TRAP SURG QUAD PARABOLA SLOT DSGN SFTY SCRN TRAPEASE

## (undated) DEVICE — SOLUTION IV 500ML 0.9% SOD CHL PH 5 INJ USP VIAFLX PLAS

## (undated) DEVICE — BLANKET WRM W29.9XL79.1IN UP BODY FORC AIR MISTRAL-AIR

## (undated) DEVICE — SYRINGE MED 50ML LUERLOCK TIP

## (undated) DEVICE — DRAPE,THYROID,SOFT,STERILE: Brand: MEDLINE

## (undated) DEVICE — SOLUTION IRRIG 3000ML 0.9% SOD CHL USP UROMATIC PLAS CONT

## (undated) DEVICE — CUP MED 1OZ CLR POLYPR FEED GRAD W/O LID

## (undated) DEVICE — ADAPTER TBNG LUER STUB 15 GA INTMED

## (undated) DEVICE — TUBING, SUCTION, 1/4" X 12', STRAIGHT: Brand: MEDLINE

## (undated) DEVICE — GLOVE ORANGE PI 7   MSG9070

## (undated) DEVICE — SOLUTION SCRB 0.04 CHG DYNA HEX

## (undated) DEVICE — SET CATH 20GA L1.75IN RAD ART POLYUR RADPQ W/ INTEGR

## (undated) DEVICE — BITEBLOCK 54FR W/ DENT RIM BLOX

## (undated) DEVICE — Device: Brand: DEFENDO VALVE AND CONNECTOR KIT

## (undated) DEVICE — ST CHARLES CYSTO PACK: Brand: MEDLINE INDUSTRIES, INC.

## (undated) DEVICE — GUIDEWIRE URO L150CM DIA .035IN STIFF NIT HYDRPHL STR TIP

## (undated) DEVICE — GLOVE SURG SZ 8 L11.77IN FNGR THK9.8MIL STRW LTX POLYMER

## (undated) DEVICE — SYRINGE, LUER SLIP, STERILE, 3ML: Brand: MEDLINE

## (undated) DEVICE — SUTURE MCRYL SZ 4-0 L27IN ABSRB UD L19MM PS-2 1/2 CIR PRIM Y426H

## (undated) DEVICE — SUTURE MCRYL + SZ 4-0 L27IN ABSRB UD L19MM PS-2 3/8 CIR MCP426H

## (undated) DEVICE — APPLICATOR MEDICATED 10.5 CC SOLUTION HI LT ORNG CHLORAPREP

## (undated) DEVICE — COVER,TABLE,60X90,STERILE: Brand: MEDLINE

## (undated) DEVICE — SHUNT CV L30CM DIA3X5MM SIL EXT LOOP FULL SPR REINF SUNDT

## (undated) DEVICE — SUTURE VCRL SZ 3-0 L54IN ABSRB UD LIGAPAK REEL POLYGLACTIN J285G

## (undated) DEVICE — YANKAUER,FLEXIBLE HANDLE,REGLR CAPACITY: Brand: MEDLINE INDUSTRIES, INC.

## (undated) DEVICE — Z INACTIVE USE 2392665 BLADE LARYNGOSCOPE 4 GLIDESCOPE GVL STAT DISP

## (undated) DEVICE — CLIP INT M L POLYMER LOK LIG HEM O LOK

## (undated) DEVICE — DEFENDO AIR WATER SUCTION AND BIOPSY VALVE KIT FOR  OLYMPUS: Brand: DEFENDO AIR/WATER/SUCTION AND BIOPSY VALVE

## (undated) DEVICE — GAUZE, BORDER, 3"X6", 1.5"X4"PAD, STERIL: Brand: MEDLINE INDUSTRIES, INC.

## (undated) DEVICE — CORD,CAUTERY,BIPOLAR,STERILE: Brand: MEDLINE

## (undated) DEVICE — PLUMEPORT LAPAROSCOPIC SMOKE FILTRATION DEVICE: Brand: PLUMEPORT ACTIV

## (undated) DEVICE — Device

## (undated) DEVICE — SPONGE GZ W2XL2IN NONWOVEN 4 PLY FASTER WICKING ABIL AVANT

## (undated) DEVICE — SET,IRRIGATION,CYSTO/TUR,90": Brand: MEDLINE

## (undated) DEVICE — CANNULA SEAL

## (undated) DEVICE — ENDO KIT W/SYRINGE: Brand: MEDLINE INDUSTRIES, INC.

## (undated) DEVICE — BLANKET WRM W40.2XL55.9IN IORT LO BODY + MISTRAL AIR

## (undated) DEVICE — CONTAINER,SPECIMEN,4OZ,OR STRL: Brand: MEDLINE

## (undated) DEVICE — SUTURE PERMAHAND SZ 0 L30IN NONABSORBABLE BLK SILK BRAID A306H

## (undated) DEVICE — SUTURE VCRL SZ 3-0 L27IN ABSRB UD L26MM SH 1/2 CIR J416H

## (undated) DEVICE — GOWN,AURORA,NONREINFORCED,LARGE: Brand: MEDLINE

## (undated) DEVICE — SPONGE,PEANUT,XRAY,ST,SM,3/8",5/CARD: Brand: MEDLINE INDUSTRIES, INC.

## (undated) DEVICE — OBTURATOR ROBOTIC DIA8MM BLDELSS ENDOSCP DISP DA VINCI SI

## (undated) DEVICE — KIT DRP 3 ARM ACC DISP ENDOWRIST DA VINCI SI

## (undated) DEVICE — SOLUTION PREP 4OZ 3% H PEROX 1ST AID ANTISEP ORAL DEBRIDING

## (undated) DEVICE — DRAPE,REIN 53X77,STERILE: Brand: MEDLINE

## (undated) DEVICE — PREMIUM DRY TRAY LF: Brand: MEDLINE INDUSTRIES, INC.

## (undated) DEVICE — BLADELESS OBTURATOR: Brand: WECK VISTA

## (undated) DEVICE — COVER,MAYO STAND,XL,STERILE: Brand: MEDLINE

## (undated) DEVICE — POSITIONER HD W8XH4XL8.5IN RASPBERRY FOAM SLT

## (undated) DEVICE — ELECTRODE PT RET AD L9FT HI MOIST COND ADH HYDRGEL CORDED

## (undated) DEVICE — GARMENT,MEDLINE,DVT,INT,CALF,MED, GEN2: Brand: MEDLINE

## (undated) DEVICE — JELLY,LUBE,STERILE,FLIP TOP,TUBE,2-OZ: Brand: MEDLINE

## (undated) DEVICE — DISPOSABLE LAPAROSCOPIC CORDS, 1 PER POUCH: Brand: A&E MEDICAL / DISPOSABLE LAPAROSCOPIC CORDS

## (undated) DEVICE — ADHESIVE SKIN CLOSURE TOP 36 CC HI VISC DERMBND MINI

## (undated) DEVICE — BASIN EMSIS 700ML GRAPHITE PLAS KID SHP GRAD

## (undated) DEVICE — CO2 CANNULA,SUPERSOFT, ADLT,7'O2,7'CO2: Brand: MEDLINE

## (undated) DEVICE — PRESSURE MONITORING SET: Brand: TRUWAVE

## (undated) DEVICE — ARM DRAPE

## (undated) DEVICE — SUTURE PERMA-HAND SZ 2-0 L30IN NONABSORBABLE BLK L26MM SH K833H

## (undated) DEVICE — ELECTRODE ES L3IN S STL BLDE INSUL DISP VALLEYLAB EDGE

## (undated) DEVICE — GAUZE,SPONGE,4"X4",16PLY,STRL,LF,10/TRAY: Brand: MEDLINE

## (undated) DEVICE — ST CHARLES GEN LAPAROSCOPY PK: Brand: MEDLINE INDUSTRIES, INC.